# Patient Record
Sex: MALE | Race: WHITE | NOT HISPANIC OR LATINO | Employment: OTHER | URBAN - METROPOLITAN AREA
[De-identification: names, ages, dates, MRNs, and addresses within clinical notes are randomized per-mention and may not be internally consistent; named-entity substitution may affect disease eponyms.]

---

## 2017-01-12 ENCOUNTER — LAB (OUTPATIENT)
Dept: LAB | Facility: HOSPITAL | Age: 77
End: 2017-01-12
Attending: INTERNAL MEDICINE

## 2017-01-12 ENCOUNTER — TRANSCRIBE ORDERS (OUTPATIENT)
Dept: ADMINISTRATIVE | Facility: HOSPITAL | Age: 77
End: 2017-01-12

## 2017-01-12 DIAGNOSIS — N18.30 CHRONIC KIDNEY DISEASE, STAGE III (MODERATE) (HCC): Primary | ICD-10-CM

## 2017-01-12 DIAGNOSIS — N18.30 CHRONIC KIDNEY DISEASE, STAGE III (MODERATE) (HCC): ICD-10-CM

## 2017-01-12 LAB
ANION GAP SERPL CALCULATED.3IONS-SCNC: 14.3 MMOL/L
BACTERIA UR QL AUTO: ABNORMAL /HPF
BILIRUB UR QL STRIP: NEGATIVE
BUN BLD-MCNC: 21 MG/DL (ref 8–23)
BUN/CREAT SERPL: 17.5 (ref 7–25)
CALCIUM SPEC-SCNC: 9.1 MG/DL (ref 8.6–10.5)
CHLORIDE SERPL-SCNC: 98 MMOL/L (ref 98–107)
CLARITY UR: CLEAR
CO2 SERPL-SCNC: 26.7 MMOL/L (ref 22–29)
COLOR UR: YELLOW
CREAT BLD-MCNC: 1.2 MG/DL (ref 0.76–1.27)
CREAT UR-MCNC: 138.6 MG/DL
DEPRECATED RDW RBC AUTO: 43.2 FL (ref 37–54)
ERYTHROCYTE [DISTWIDTH] IN BLOOD BY AUTOMATED COUNT: 12.9 % (ref 11.5–14.5)
GFR SERPL CREATININE-BSD FRML MDRD: 59 ML/MIN/1.73
GLUCOSE BLD-MCNC: 122 MG/DL (ref 65–99)
GLUCOSE UR STRIP-MCNC: NEGATIVE MG/DL
HCT VFR BLD AUTO: 38.5 % (ref 40.4–52.2)
HGB BLD-MCNC: 12.7 G/DL (ref 13.7–17.6)
HGB UR QL STRIP.AUTO: NEGATIVE
HYALINE CASTS UR QL AUTO: ABNORMAL /LPF
KETONES UR QL STRIP: NEGATIVE
LEUKOCYTE ESTERASE UR QL STRIP.AUTO: ABNORMAL
MAGNESIUM SERPL-MCNC: 2.4 MG/DL (ref 1.6–2.4)
MCH RBC QN AUTO: 30.2 PG (ref 27–32.7)
MCHC RBC AUTO-ENTMCNC: 33 G/DL (ref 32.6–36.4)
MCV RBC AUTO: 91.7 FL (ref 79.8–96.2)
NITRITE UR QL STRIP: POSITIVE
PH UR STRIP.AUTO: <=5 [PH] (ref 5–8)
PHOSPHATE SERPL-MCNC: 2.7 MG/DL (ref 2.5–4.5)
PLATELET # BLD AUTO: 260 10*3/MM3 (ref 140–500)
PMV BLD AUTO: 10.2 FL (ref 6–12)
POTASSIUM BLD-SCNC: 4.3 MMOL/L (ref 3.5–5.2)
PROT UR QL STRIP: NEGATIVE
PROT UR-MCNC: 9 MG/DL
RBC # BLD AUTO: 4.2 10*6/MM3 (ref 4.6–6)
RBC # UR: ABNORMAL /HPF
REF LAB TEST METHOD: ABNORMAL
SODIUM BLD-SCNC: 139 MMOL/L (ref 136–145)
SP GR UR STRIP: 1.02 (ref 1–1.03)
SQUAMOUS #/AREA URNS HPF: ABNORMAL /HPF
UROBILINOGEN UR QL STRIP: ABNORMAL
WBC NRBC COR # BLD: 8.61 10*3/MM3 (ref 4.5–10.7)
WBC UR QL AUTO: ABNORMAL /HPF

## 2017-01-12 PROCEDURE — 36415 COLL VENOUS BLD VENIPUNCTURE: CPT

## 2017-01-12 PROCEDURE — 81001 URINALYSIS AUTO W/SCOPE: CPT

## 2017-01-12 PROCEDURE — 82570 ASSAY OF URINE CREATININE: CPT

## 2017-01-12 PROCEDURE — 80048 BASIC METABOLIC PNL TOTAL CA: CPT

## 2017-01-12 PROCEDURE — 84100 ASSAY OF PHOSPHORUS: CPT

## 2017-01-12 PROCEDURE — 84156 ASSAY OF PROTEIN URINE: CPT

## 2017-01-12 PROCEDURE — 85027 COMPLETE CBC AUTOMATED: CPT

## 2017-01-12 PROCEDURE — 83735 ASSAY OF MAGNESIUM: CPT

## 2017-03-22 ENCOUNTER — OFFICE VISIT (OUTPATIENT)
Dept: CARDIOLOGY | Facility: CLINIC | Age: 77
End: 2017-03-22

## 2017-03-22 VITALS
HEART RATE: 64 BPM | SYSTOLIC BLOOD PRESSURE: 140 MMHG | BODY MASS INDEX: 30.86 KG/M2 | DIASTOLIC BLOOD PRESSURE: 60 MMHG | HEIGHT: 66 IN | WEIGHT: 192 LBS

## 2017-03-22 DIAGNOSIS — Z79.4 TYPE 2 DIABETES MELLITUS WITHOUT COMPLICATION, WITH LONG-TERM CURRENT USE OF INSULIN (HCC): ICD-10-CM

## 2017-03-22 DIAGNOSIS — I51.7 LEFT VENTRICULAR HYPERTROPHY: ICD-10-CM

## 2017-03-22 DIAGNOSIS — I10 ESSENTIAL HYPERTENSION: Primary | ICD-10-CM

## 2017-03-22 DIAGNOSIS — E78.5 HYPERLIPIDEMIA, UNSPECIFIED HYPERLIPIDEMIA TYPE: ICD-10-CM

## 2017-03-22 DIAGNOSIS — E11.9 TYPE 2 DIABETES MELLITUS WITHOUT COMPLICATION, WITH LONG-TERM CURRENT USE OF INSULIN (HCC): ICD-10-CM

## 2017-03-22 DIAGNOSIS — G47.33 OBSTRUCTIVE SLEEP APNEA SYNDROME: ICD-10-CM

## 2017-03-22 PROCEDURE — 93000 ELECTROCARDIOGRAM COMPLETE: CPT | Performed by: INTERNAL MEDICINE

## 2017-03-22 PROCEDURE — 99214 OFFICE O/P EST MOD 30 MIN: CPT | Performed by: INTERNAL MEDICINE

## 2017-03-22 RX ORDER — INSULIN DEGLUDEC 200 U/ML
INJECTION, SOLUTION SUBCUTANEOUS
COMMUNITY
Start: 2017-03-03 | End: 2018-01-23 | Stop reason: ALTCHOICE

## 2017-03-22 RX ORDER — INSULIN ASPART 100 [IU]/ML
INJECTION, SOLUTION INTRAVENOUS; SUBCUTANEOUS
COMMUNITY
Start: 2016-12-19 | End: 2018-01-23 | Stop reason: ALTCHOICE

## 2017-03-22 NOTE — PROGRESS NOTES
Date of Office Visit: 2017  Encounter Provider: Tata Lee MD  Place of Service: [facility] CARDIOLOGY  Patient Name: Semaj Herrera  :1940      Patient ID:  Semaj Herrera is a 76 y.o. male is here for  followup for HTN and LVH.         History of Present Illness    I first saw the patient on 2010 for dizziness, fatigue and  bradycardia. At that time, we stopped his metoprolol and this went away.   He has a known history of hypertension and has had left ventricular  hypertrophy and diastolic dysfunction. His last echocardiogram was done  2011, showing grade 2 diastolic dysfunction, mild aortic  insufficiency, ejection fraction of 53%, normal left ventricular systolic  function, mild left atrial enlargement, aortic valve calcification without  stenosis and mild concentric left ventricular hypertrophy.      He does have severe obstructive sleep apnea. He uses a BIPAP machine and follows with Dr. Mcdermott for this. This has been stable.   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      When I saw the patient in 2013, he was having dizziness, weakness, feeling unwell,  and his blood pressure was low. We decreased his diuretic to every other day, the  triamterene/hydrochlorothiazide and he has done much better.      He sees Dr. almanza for his diabetes and Dr. Vicky Duran for his renal disease.      The patient is here today for a followup. He has no chest pain or difficulty breathing. He has had no tachycardia, dizziness or syncope.   He has cramping in his legs only at night when he is trying to sleep, but not when he walks. He worked at [employer] for three months over the winter, eight hours a day, and says that he felt great.  He overall feels like he is doing well, and is tolerating his medications well.   His blood sugars have been a bit high, but his kidney function remains  stable.   He continues to use his CPAP therapy for his obstructive sleep apnea.              Past Medical History:   Diagnosis Date   • Carcinoma of prostate    • Diabetes mellitus    • Diastolic dysfunction     GRADE II   • Difficulty breathing     during exertion   • Dyslipidemia    • Erectile dysfunction    • Fatigue    • Hyperlipidemia    • Hypertension    • Left ventricular hypertrophy    • Obstructive sleep apnea     USING CPAP   • Peptic ulcer    • Restless leg syndrome    • Sinus bradycardia    • Transient cerebral ischemia    • Type 2 diabetes mellitus          Past Surgical History:   Procedure Laterality Date   • HEMORRHOIDECTOMY     • PROSTATECTOMY  2006       Current Outpatient Prescriptions on File Prior to Visit   Medication Sig Dispense Refill   • aspirin 81 MG EC tablet Take 1 tablet by mouth daily.     • diltiaZEM CD (CARDIZEM CD) 120 MG 24 hr capsule Take 1 capsule by mouth Daily.     • Insulin Glargine (LANTUS SOLOSTAR) 100 UNIT/ML injection pen Inject  under the skin. As directed     • Insulin Glargine (TOUJEO SOLOSTAR SC) Inject  under the skin.     • Insulin Pen Needle (B-D UF III MINI PEN NEEDLES) 31G X 5 MM misc      • Liraglutide (VICTOZA) 18 MG/3ML solution pen-injector Inject under the skin.     • modafinil (PROVIGIL) 200 MG tablet Take 200 mg by mouth daily.     • pravastatin (PRAVACHOL) 40 MG tablet Take 40 mg by mouth daily.     • rOPINIRole (REQUIP) 0.5 MG tablet Take 1 tablet by mouth 2 (two) times a day.     • triamterene-hydrochlorothiazide (DYAZIDE) 37.5-25 MG per capsule Take 1 capsule by mouth daily.     • valsartan (DIOVAN) 320 MG tablet TAKE 1 TABLET EVERY DAY 90 tablet 2   • [DISCONTINUED] CARTIA  MG 24 hr capsule TAKE 1 CAPSULE EVERY DAY 90 capsule 2   • [DISCONTINUED] triamterene-hydrochlorothiazide (MAXZIDE-25) 37.5-25 MG per tablet TAKE 1 TABLET EVERY DAY 90 tablet 3     No current facility-administered medications on file prior to visit.        Social History  "    Social History   • Marital status:      Spouse name: N/A   • Number of children: N/A   • Years of education: N/A     Occupational History   • Not on file.     Social History Main Topics   • Smoking status: Former Smoker   • Smokeless tobacco: Not on file   • Alcohol use No      Comment: caffeine use   • Drug use: Not on file   • Sexual activity: Not on file     Other Topics Concern   • Not on file     Social History Narrative           Review of Systems   Constitution: Negative.   HENT: Positive for hearing loss. Negative for congestion and headaches.    Eyes: Negative for vision loss in left eye and vision loss in right eye.   Respiratory: Negative.  Negative for cough, hemoptysis, shortness of breath, sleep disturbances due to breathing, snoring, sputum production and wheezing.    Endocrine: Negative.    Hematologic/Lymphatic: Negative.    Skin: Negative for poor wound healing and rash.   Musculoskeletal: Positive for joint pain. Negative for falls, gout, muscle cramps and myalgias.   Gastrointestinal: Positive for diarrhea. Negative for abdominal pain, dysphagia, hematemesis, melena, nausea and vomiting.   Neurological: Negative for excessive daytime sleepiness, dizziness, light-headedness, loss of balance, seizures and vertigo.   Psychiatric/Behavioral: Negative for depression and substance abuse. The patient is not nervous/anxious.        Procedures    ECG 12 Lead  Date/Time: 3/22/2017 12:04 PM  Performed by: CHRISTIANO MCDOWELL  Authorized by: CHRISTIANO MCDOWELL   Comparison: compared with previous ECG   Similar to previous ECG  Rhythm: sinus rhythm  QRS axis: left  Clinical impression: non-specific ECG               Objective:      Vitals:    03/22/17 1117   BP: 140/60   BP Location: Right arm   Patient Position: Sitting   Pulse: 64   Weight: 192 lb (87.1 kg)   Height: 66\" (167.6 cm)     Body mass index is 30.99 kg/(m^2).    Physical Exam   Constitutional: He is oriented to person, place, and " time. He appears well-developed and well-nourished. No distress.   HENT:   Head: Normocephalic and atraumatic.   Eyes: Conjunctivae are normal. No scleral icterus.   Neck: Neck supple. No JVD present. Carotid bruit is not present. No thyromegaly present.   Cardiovascular: Normal rate, regular rhythm, S1 normal, S2 normal, normal heart sounds and intact distal pulses.   No extrasystoles are present. PMI is not displaced.  Exam reveals no gallop.    No murmur heard.  Pulses:       Carotid pulses are 2+ on the right side, and 2+ on the left side.       Radial pulses are 2+ on the right side, and 2+ on the left side.        Dorsalis pedis pulses are 2+ on the right side, and 2+ on the left side.        Posterior tibial pulses are 2+ on the right side, and 2+ on the left side.   Pulmonary/Chest: Effort normal and breath sounds normal. No respiratory distress. He has no wheezes. He has no rhonchi. He has no rales. He exhibits no tenderness.   Abdominal: Soft. Bowel sounds are normal. He exhibits no distension, no abdominal bruit and no mass. There is no tenderness.   Musculoskeletal: He exhibits no edema or deformity.   Lymphadenopathy:     He has no cervical adenopathy.   Neurological: He is alert and oriented to person, place, and time. No cranial nerve deficit.   Skin: Skin is warm and dry. No rash noted. He is not diaphoretic. No cyanosis. No pallor. Nails show no clubbing.   Psychiatric: He has a normal mood and affect. Judgment normal.   Vitals reviewed.      Lab Review:       Assessment:      Diagnosis Plan   1. Essential hypertension     2. Left ventricular hypertrophy     3. Hyperlipidemia, unspecified hyperlipidemia type     4. Type 2 diabetes mellitus without complication, with long-term current use of insulin     5. Obstructive sleep apnea syndrome               1. Hypertensive heart disease. He has mild left ventricular hypertrophy and  grade II diastolic dysfunction which has been stable. No medication  changes at this time.  2. Hyperlipidemia. Per Dr. Guardado, stable.   3. Diabetes mellitus type 2 with neuropathy. Per Dr. Lu.   4. Transient ischemic attack, no further issues.   5. Obstructive sleep apnea, on CPAP. Follows with Dr. Mcdermott and has been  well treated.   6. Restless leg syndrome.   7. Prostate cancer, status post prostatectomy, radiation therapy and hormone  therapy.   8. Erectile dysfunction.   9. Aortic valve sclerosis and aortic insufficiency which is mild.      Set up echo to evaluate diastolic function.      Plan:       See back in 1 year.

## 2017-03-29 RX ORDER — DILTIAZEM HYDROCHLORIDE 120 MG/1
CAPSULE, EXTENDED RELEASE ORAL
Qty: 90 CAPSULE | Refills: 2 | Status: SHIPPED | OUTPATIENT
Start: 2017-03-29 | End: 2018-03-11 | Stop reason: SDUPTHER

## 2017-03-29 RX ORDER — VALSARTAN 320 MG/1
TABLET ORAL
Qty: 90 TABLET | Refills: 2 | Status: SHIPPED | OUTPATIENT
Start: 2017-03-29 | End: 2018-03-11 | Stop reason: SDUPTHER

## 2017-04-03 ENCOUNTER — HOSPITAL ENCOUNTER (OUTPATIENT)
Dept: CARDIOLOGY | Facility: HOSPITAL | Age: 77
Discharge: HOME | End: 2017-04-03
Attending: INTERNAL MEDICINE | Admitting: INTERNAL MEDICINE

## 2017-04-03 VITALS
SYSTOLIC BLOOD PRESSURE: 156 MMHG | HEART RATE: 55 BPM | WEIGHT: 192 LBS | HEIGHT: 66 IN | OXYGEN SATURATION: 96 % | BODY MASS INDEX: 30.86 KG/M2 | DIASTOLIC BLOOD PRESSURE: 82 MMHG

## 2017-04-03 DIAGNOSIS — E11.9 TYPE 2 DIABETES MELLITUS WITHOUT COMPLICATION, WITH LONG-TERM CURRENT USE OF INSULIN (HCC): ICD-10-CM

## 2017-04-03 DIAGNOSIS — G47.33 OBSTRUCTIVE SLEEP APNEA SYNDROME: ICD-10-CM

## 2017-04-03 DIAGNOSIS — I51.7 LEFT VENTRICULAR HYPERTROPHY: ICD-10-CM

## 2017-04-03 DIAGNOSIS — Z79.4 TYPE 2 DIABETES MELLITUS WITHOUT COMPLICATION, WITH LONG-TERM CURRENT USE OF INSULIN (HCC): ICD-10-CM

## 2017-04-03 DIAGNOSIS — I10 ESSENTIAL HYPERTENSION: ICD-10-CM

## 2017-04-03 LAB
ASCENDING AORTA: 3.2 CM
BH CV ECHO MEAS - ACS: 1.6 CM
BH CV ECHO MEAS - AI DEC SLOPE: 111.4 CM/SEC^2
BH CV ECHO MEAS - AI MAX PG: 32.3 MMHG
BH CV ECHO MEAS - AI MAX VEL: 284 CM/SEC
BH CV ECHO MEAS - AI P1/2T: 747 MSEC
BH CV ECHO MEAS - AO MAX PG (FULL): 11.6 MMHG
BH CV ECHO MEAS - AO MAX PG: 16.6 MMHG
BH CV ECHO MEAS - AO MEAN PG (FULL): 6.3 MMHG
BH CV ECHO MEAS - AO MEAN PG: 8.9 MMHG
BH CV ECHO MEAS - AO ROOT AREA (BSA CORRECTED): 1.5
BH CV ECHO MEAS - AO ROOT AREA: 8.4 CM^2
BH CV ECHO MEAS - AO ROOT DIAM: 3.3 CM
BH CV ECHO MEAS - AO V2 MAX: 203.8 CM/SEC
BH CV ECHO MEAS - AO V2 MEAN: 131.2 CM/SEC
BH CV ECHO MEAS - AO V2 VTI: 45.7 CM
BH CV ECHO MEAS - AVA(I,A): 1.7 CM^2
BH CV ECHO MEAS - AVA(I,D): 1.7 CM^2
BH CV ECHO MEAS - AVA(V,A): 1.6 CM^2
BH CV ECHO MEAS - AVA(V,D): 1.6 CM^2
BH CV ECHO MEAS - BSA(HAYCOCK): 2.3 M^2
BH CV ECHO MEAS - BSA: 2.2 M^2
BH CV ECHO MEAS - BZI_BMI: 32.1 KILOGRAMS/M^2
BH CV ECHO MEAS - BZI_METRIC_HEIGHT: 180.3 CM
BH CV ECHO MEAS - BZI_METRIC_WEIGHT: 104.3 KG
BH CV ECHO MEAS - CONTRAST EF (2CH): 65.6 ML/M^2
BH CV ECHO MEAS - CONTRAST EF 4CH: 62 ML/M^2
BH CV ECHO MEAS - EDV(MOD-SP2): 131 ML
BH CV ECHO MEAS - EDV(MOD-SP4): 121 ML
BH CV ECHO MEAS - EDV(TEICH): 84.6 ML
BH CV ECHO MEAS - EF(CUBED): 75.3 %
BH CV ECHO MEAS - EF(MOD-SP2): 65.6 %
BH CV ECHO MEAS - EF(MOD-SP4): 62 %
BH CV ECHO MEAS - EF(TEICH): 67.5 %
BH CV ECHO MEAS - ESV(MOD-SP2): 45 ML
BH CV ECHO MEAS - ESV(MOD-SP4): 46 ML
BH CV ECHO MEAS - ESV(TEICH): 27.5 ML
BH CV ECHO MEAS - FS: 37.2 %
BH CV ECHO MEAS - IVS/LVPW: 1
BH CV ECHO MEAS - IVSD: 1.2 CM
BH CV ECHO MEAS - LAT PEAK E' VEL: 9 CM/SEC
BH CV ECHO MEAS - LV DIASTOLIC VOL/BSA (35-75): 54.1 ML/M^2
BH CV ECHO MEAS - LV MASS(C)D: 191.7 GRAMS
BH CV ECHO MEAS - LV MASS(C)DI: 85.7 GRAMS/M^2
BH CV ECHO MEAS - LV MAX PG: 5 MMHG
BH CV ECHO MEAS - LV MEAN PG: 2.6 MMHG
BH CV ECHO MEAS - LV SYSTOLIC VOL/BSA (12-30): 20.6 ML/M^2
BH CV ECHO MEAS - LV V1 MAX: 111.6 CM/SEC
BH CV ECHO MEAS - LV V1 MEAN: 74.5 CM/SEC
BH CV ECHO MEAS - LV V1 VTI: 25.7 CM
BH CV ECHO MEAS - LVIDD: 4.3 CM
BH CV ECHO MEAS - LVIDS: 2.7 CM
BH CV ECHO MEAS - LVLD AP2: 8.7 CM
BH CV ECHO MEAS - LVLD AP4: 8 CM
BH CV ECHO MEAS - LVLS AP2: 6.6 CM
BH CV ECHO MEAS - LVLS AP4: 6.9 CM
BH CV ECHO MEAS - LVOT AREA (M): 3.1 CM^2
BH CV ECHO MEAS - LVOT AREA: 3 CM^2
BH CV ECHO MEAS - LVOT DIAM: 2 CM
BH CV ECHO MEAS - LVPWD: 1.2 CM
BH CV ECHO MEAS - MED PEAK E' VEL: 7 CM/SEC
BH CV ECHO MEAS - MR MAX PG: 23.2 MMHG
BH CV ECHO MEAS - MR MAX VEL: 240.6 CM/SEC
BH CV ECHO MEAS - MV A DUR: 0.14 SEC
BH CV ECHO MEAS - MV A MAX VEL: 96.1 CM/SEC
BH CV ECHO MEAS - MV DEC SLOPE: 668.1 CM/SEC^2
BH CV ECHO MEAS - MV DEC TIME: 0.19 SEC
BH CV ECHO MEAS - MV E MAX VEL: 99.4 CM/SEC
BH CV ECHO MEAS - MV E/A: 1
BH CV ECHO MEAS - MV MAX PG: 3.6 MMHG
BH CV ECHO MEAS - MV MEAN PG: 1.2 MMHG
BH CV ECHO MEAS - MV P1/2T MAX VEL: 102.8 CM/SEC
BH CV ECHO MEAS - MV P1/2T: 45.1 MSEC
BH CV ECHO MEAS - MV V2 MAX: 94.7 CM/SEC
BH CV ECHO MEAS - MV V2 MEAN: 48.2 CM/SEC
BH CV ECHO MEAS - MV V2 VTI: 38 CM
BH CV ECHO MEAS - MVA P1/2T LCG: 2.1 CM^2
BH CV ECHO MEAS - MVA(P1/2T): 4.9 CM^2
BH CV ECHO MEAS - MVA(VTI): 2 CM^2
BH CV ECHO MEAS - PA ACC TIME: 0.12 SEC
BH CV ECHO MEAS - PA MAX PG (FULL): 2.9 MMHG
BH CV ECHO MEAS - PA MAX PG: 4 MMHG
BH CV ECHO MEAS - PA PR(ACCEL): 25.1 MMHG
BH CV ECHO MEAS - PA V2 MAX: 100.4 CM/SEC
BH CV ECHO MEAS - PULM A REVS DUR: 0.12 SEC
BH CV ECHO MEAS - PULM A REVS VEL: 35.5 CM/SEC
BH CV ECHO MEAS - PULM DIAS VEL: 39.4 CM/SEC
BH CV ECHO MEAS - PULM S/D: 1.2
BH CV ECHO MEAS - PULM SYS VEL: 48.3 CM/SEC
BH CV ECHO MEAS - PVA(V,A): 1.8 CM^2
BH CV ECHO MEAS - PVA(V,D): 1.8 CM^2
BH CV ECHO MEAS - QP/QS: 0.43
BH CV ECHO MEAS - RV MAX PG: 1.1 MMHG
BH CV ECHO MEAS - RV MEAN PG: 0.49 MMHG
BH CV ECHO MEAS - RV V1 MAX: 53.4 CM/SEC
BH CV ECHO MEAS - RV V1 MEAN: 32.4 CM/SEC
BH CV ECHO MEAS - RV V1 VTI: 9.9 CM
BH CV ECHO MEAS - RVOT AREA: 3.4 CM^2
BH CV ECHO MEAS - RVOT DIAM: 2.1 CM
BH CV ECHO MEAS - SI(AO): 171.9 ML/M^2
BH CV ECHO MEAS - SI(CUBED): 27.4 ML/M^2
BH CV ECHO MEAS - SI(LVOT): 34.4 ML/M^2
BH CV ECHO MEAS - SI(MOD-SP2): 38.4 ML/M^2
BH CV ECHO MEAS - SI(MOD-SP4): 33.5 ML/M^2
BH CV ECHO MEAS - SI(TEICH): 25.5 ML/M^2
BH CV ECHO MEAS - SV(AO): 384.8 ML
BH CV ECHO MEAS - SV(CUBED): 61.2 ML
BH CV ECHO MEAS - SV(LVOT): 77 ML
BH CV ECHO MEAS - SV(MOD-SP2): 86 ML
BH CV ECHO MEAS - SV(MOD-SP4): 75 ML
BH CV ECHO MEAS - SV(RVOT): 33.3 ML
BH CV ECHO MEAS - SV(TEICH): 57.1 ML
BH CV ECHO MEAS - TAPSE (>1.6): 2.2 CM2
BH CV ECHO MEAS - TR MAX VEL: 188.2 CM/SEC
BH CV XLRA - RV BASE: 2.9 CM
BH CV XLRA - TDI S': 15 CM/SEC
E/E' RATIO: 13
LEFT ATRIUM VOLUME INDEX: 31 ML/M2
LV EF 2D ECHO EST: 62 %
SINUS: 2.9 CM
STJ: 2.8 CM

## 2017-04-03 PROCEDURE — 93306 TTE W/DOPPLER COMPLETE: CPT | Performed by: INTERNAL MEDICINE

## 2017-04-03 PROCEDURE — 25010000002 PERFLUTREN (DEFINITY) 8.476 MG IN SODIUM CHLORIDE 10 ML INJECTION: Performed by: INTERNAL MEDICINE

## 2017-04-03 PROCEDURE — C8929 TTE W OR WO FOL WCON,DOPPLER: HCPCS

## 2017-04-03 RX ADMIN — PERFLUTREN 1.5 ML: 6.52 INJECTION, SUSPENSION INTRAVENOUS at 10:44

## 2017-07-05 ENCOUNTER — HOSPITAL ENCOUNTER (OUTPATIENT)
Dept: SLEEP MEDICINE | Facility: HOSPITAL | Age: 77
Discharge: HOME | End: 2017-07-05
Attending: INTERNAL MEDICINE

## 2017-07-05 PROCEDURE — 99213 OFFICE O/P EST LOW 20 MIN: CPT | Performed by: INTERNAL MEDICINE

## 2017-07-05 NOTE — PROGRESS NOTES
Follow Up [facility] Note       Patient Care Team:  Axel Guardado MD as PCP - General  Bob Mcdermott MD as Consulting Physician (Sleep Medicine)    Chief Complaint:  RONNY     Interval History:   The patient was last seen by me in July 2016.  He is stable without new complaints.  He goes to bed at 10:30 PM and awakens at 6 AM.  He awakens 3 times for the restroom.  [place] Sleepiness Scale is normal at 5.    Review of Systems:  Recorded on the Sleep Questionnaire.  Unremarkable .    Social History:  He does not smoke cigarettes.  No alcohol.  One or 2 caffeinated drinks a day.    Allergies:  No known medical allergies.     Medication Review:  His list was reviewed.  He uses modafinil 200 mg by mouth every morning and Roperinole 0.5 mg 2 daily.    Vital Signs:  Height 64.5 inches and weight 196 and he is obese with a body mass index of 33.    Physical Exam:    Constitutional:  Well developed white male and appears in no apparent distress.  Awake & oriented times 3.  Normal mood with normal recent and remote memory and normal judgement.  Eyes:  Conjunctivae normal.  Oropharynx:  moist mucous membranes without exudate and a large tongue and normal uvula and narrow posterior pharyngeal opening.      Results Review:  DME is [employer] and he uses nasal pillows.  Downloads between January 1-June 29, 2017 compliances 99% and average usage is 6 hours and 41 minutes and average AHI is mildly abnormal 7.5 without leak and he uses BiPAP 13/10.       Impression:   Obstructive sleep apnea nearly adequately treated with BiPAP with good compliance and usage and no complaints of hypersomnolence.  Persistent hypersomnolence treated with modafinil 200 mg by mouth every morning.      Plan:  Good sleep hygiene measures should be maintained.  Weight loss would be beneficial in this patient who is obese by BMI.    The patient will continue his BiPAP.  However, pressures will be changed to 14/11.  He will continue  modafinil.    The patient will follow up in 9 months to one year.      Bob Mcdermott MD  07/05/17  8:51 AM

## 2017-08-10 ENCOUNTER — TELEPHONE (OUTPATIENT)
Dept: PULMONOLOGY | Facility: HOSPITAL | Age: 77
End: 2017-08-10

## 2017-08-28 ENCOUNTER — TRANSCRIBE ORDERS (OUTPATIENT)
Dept: ADMINISTRATIVE | Facility: HOSPITAL | Age: 77
End: 2017-08-28

## 2017-08-28 ENCOUNTER — LAB (OUTPATIENT)
Dept: LAB | Facility: HOSPITAL | Age: 77
End: 2017-08-28
Attending: INTERNAL MEDICINE

## 2017-08-28 DIAGNOSIS — N18.30 CHRONIC KIDNEY DISEASE, STAGE III (MODERATE) (HCC): Primary | ICD-10-CM

## 2017-08-28 DIAGNOSIS — N18.30 CHRONIC KIDNEY DISEASE, STAGE III (MODERATE) (HCC): ICD-10-CM

## 2017-08-28 DIAGNOSIS — E55.9 UNSPECIFIED VITAMIN D DEFICIENCY: ICD-10-CM

## 2017-08-28 DIAGNOSIS — E21.1 HYPERPARATHYROIDISM DUE TO 1,25(0H)2D3 (HCC): ICD-10-CM

## 2017-08-28 LAB
25(OH)D3 SERPL-MCNC: 21.5 NG/ML (ref 30–100)
ANION GAP SERPL CALCULATED.3IONS-SCNC: 12.5 MMOL/L
BACTERIA UR QL AUTO: ABNORMAL /HPF
BILIRUB UR QL STRIP: NEGATIVE
BUN BLD-MCNC: 25 MG/DL (ref 8–23)
BUN/CREAT SERPL: 20.5 (ref 7–25)
CALCIUM SPEC-SCNC: 9.8 MG/DL (ref 8.6–10.5)
CHLORIDE SERPL-SCNC: 105 MMOL/L (ref 98–107)
CLARITY UR: CLEAR
CO2 SERPL-SCNC: 24.5 MMOL/L (ref 22–29)
COLOR UR: YELLOW
CREAT BLD-MCNC: 1.22 MG/DL (ref 0.76–1.27)
CREAT UR-MCNC: 141.8 MG/DL
DEPRECATED RDW RBC AUTO: 44.6 FL (ref 37–54)
ERYTHROCYTE [DISTWIDTH] IN BLOOD BY AUTOMATED COUNT: 13.6 % (ref 11.5–14.5)
GFR SERPL CREATININE-BSD FRML MDRD: 58 ML/MIN/1.73
GLUCOSE BLD-MCNC: 92 MG/DL (ref 65–99)
GLUCOSE UR STRIP-MCNC: NEGATIVE MG/DL
HCT VFR BLD AUTO: 40.2 % (ref 40.4–52.2)
HGB BLD-MCNC: 13.5 G/DL (ref 13.7–17.6)
HGB UR QL STRIP.AUTO: NEGATIVE
HYALINE CASTS UR QL AUTO: ABNORMAL /LPF
KETONES UR QL STRIP: NEGATIVE
LEUKOCYTE ESTERASE UR QL STRIP.AUTO: ABNORMAL
MAGNESIUM SERPL-MCNC: 2.4 MG/DL (ref 1.6–2.4)
MCH RBC QN AUTO: 30.4 PG (ref 27–32.7)
MCHC RBC AUTO-ENTMCNC: 33.6 G/DL (ref 32.6–36.4)
MCV RBC AUTO: 90.5 FL (ref 79.8–96.2)
NITRITE UR QL STRIP: NEGATIVE
PH UR STRIP.AUTO: 6 [PH] (ref 5–8)
PHOSPHATE SERPL-MCNC: 3 MG/DL (ref 2.5–4.5)
PLATELET # BLD AUTO: 229 10*3/MM3 (ref 140–500)
PMV BLD AUTO: 10.6 FL (ref 6–12)
POTASSIUM BLD-SCNC: 4.3 MMOL/L (ref 3.5–5.2)
PROT UR QL STRIP: NEGATIVE
PROT UR-MCNC: 14 MG/DL
PTH-INTACT SERPL-MCNC: 31.5 PG/ML (ref 15–65)
RBC # BLD AUTO: 4.44 10*6/MM3 (ref 4.6–6)
RBC # UR: ABNORMAL /HPF
REF LAB TEST METHOD: ABNORMAL
SODIUM BLD-SCNC: 142 MMOL/L (ref 136–145)
SP GR UR STRIP: 1.02 (ref 1–1.03)
SQUAMOUS #/AREA URNS HPF: ABNORMAL /HPF
UROBILINOGEN UR QL STRIP: ABNORMAL
WBC NRBC COR # BLD: 7.17 10*3/MM3 (ref 4.5–10.7)
WBC UR QL AUTO: ABNORMAL /HPF

## 2017-08-28 PROCEDURE — 85027 COMPLETE CBC AUTOMATED: CPT

## 2017-08-28 PROCEDURE — 83735 ASSAY OF MAGNESIUM: CPT

## 2017-08-28 PROCEDURE — 82306 VITAMIN D 25 HYDROXY: CPT

## 2017-08-28 PROCEDURE — 81001 URINALYSIS AUTO W/SCOPE: CPT

## 2017-08-28 PROCEDURE — 84156 ASSAY OF PROTEIN URINE: CPT

## 2017-08-28 PROCEDURE — 84100 ASSAY OF PHOSPHORUS: CPT

## 2017-08-28 PROCEDURE — 82570 ASSAY OF URINE CREATININE: CPT

## 2017-08-28 PROCEDURE — 83970 ASSAY OF PARATHORMONE: CPT

## 2017-08-28 PROCEDURE — 36415 COLL VENOUS BLD VENIPUNCTURE: CPT

## 2017-08-28 PROCEDURE — 80048 BASIC METABOLIC PNL TOTAL CA: CPT

## 2018-01-23 ENCOUNTER — OFFICE VISIT (OUTPATIENT)
Dept: FAMILY MEDICINE CLINIC | Facility: CLINIC | Age: 78
End: 2018-01-23

## 2018-01-23 VITALS
BODY MASS INDEX: 32.78 KG/M2 | RESPIRATION RATE: 20 BRPM | WEIGHT: 204 LBS | OXYGEN SATURATION: 98 % | TEMPERATURE: 98 F | HEIGHT: 66 IN | HEART RATE: 82 BPM | DIASTOLIC BLOOD PRESSURE: 60 MMHG | SYSTOLIC BLOOD PRESSURE: 130 MMHG

## 2018-01-23 DIAGNOSIS — Z87.891 PERSONAL HISTORY OF NICOTINE DEPENDENCE: ICD-10-CM

## 2018-01-23 DIAGNOSIS — R49.0 HOARSENESS: ICD-10-CM

## 2018-01-23 DIAGNOSIS — R06.00 DYSPNEA, UNSPECIFIED TYPE: Primary | ICD-10-CM

## 2018-01-23 PROCEDURE — 71046 X-RAY EXAM CHEST 2 VIEWS: CPT | Performed by: FAMILY MEDICINE

## 2018-01-23 PROCEDURE — 99213 OFFICE O/P EST LOW 20 MIN: CPT | Performed by: FAMILY MEDICINE

## 2018-01-23 RX ORDER — LEVOTHYROXINE SODIUM 0.03 MG/1
37.5 TABLET ORAL DAILY
COMMUNITY
Start: 2018-01-19 | End: 2018-03-28

## 2018-01-23 RX ORDER — ICOSAPENT ETHYL 1000 MG/1
1 CAPSULE ORAL 4 TIMES DAILY
COMMUNITY
Start: 2017-11-22 | End: 2018-07-09

## 2018-01-23 NOTE — PROGRESS NOTES
SUBJECTIVE:  The patient is [] a 77-year-old white male who complains of shortness of breath tries to go to sleep.  The patient does not get these symptoms with exertion.  This been going on rollover month.  He's also had intermittent hoarseness 2.  His smoking history is from age 19 to about 59.  He states has not smoked in about 20 years.  Has a clear cough but no hemoptysis.  No chest pain.  He is a diabetic and sees an endocrinologist for this problem.  No weight loss.  He has sleep apnea and uses a CPAP.    PAST MEDICAL HISTORY:  Reviewed.    REVIEW OF SYSTEMS:  Please see above; 14 point ROS otherwise negative.      OBJECTIVE: Vitals signs are reviewed and are stable.    HEENT: PERRLA.  []Throat and oral pharynx clear  Neck:  Supple.  []No masses or bruits  Lungs:  Clear.  No rales rhonchi or wheezes.  Heart:  Regular rate and rhythm.  []  Abdomen:   Soft, nontender.  []  Extremities:  No cyanosis, clubbing or edema.  []    Two-view chest x-ray is done here and interpreted by me.  No old x-rays for comparison.  Indication hoarseness and dyspnea.  Right hilar markings and right lower base markings    ASSESSMENT:    []Nocturnal dyspnea  Hoarseness  History of tobacco abuse    PLAN:  []CT scan is ordered.  The patient will be referred to ENT.

## 2018-01-25 ENCOUNTER — TELEPHONE (OUTPATIENT)
Dept: FAMILY MEDICINE CLINIC | Facility: CLINIC | Age: 78
End: 2018-01-25

## 2018-01-25 NOTE — TELEPHONE ENCOUNTER
Pt informed of X-Ray report Dr Montes said showed Arthritis and needs to F/U with [facility] on Tuesday so pt made an appt.

## 2018-01-30 ENCOUNTER — OFFICE VISIT (OUTPATIENT)
Dept: FAMILY MEDICINE CLINIC | Facility: CLINIC | Age: 78
End: 2018-01-30

## 2018-01-30 VITALS
OXYGEN SATURATION: 98 % | TEMPERATURE: 98 F | HEIGHT: 66 IN | HEART RATE: 54 BPM | WEIGHT: 202 LBS | BODY MASS INDEX: 32.47 KG/M2 | DIASTOLIC BLOOD PRESSURE: 64 MMHG | SYSTOLIC BLOOD PRESSURE: 136 MMHG

## 2018-01-30 DIAGNOSIS — E03.9 HYPOTHYROIDISM, UNSPECIFIED TYPE: ICD-10-CM

## 2018-01-30 DIAGNOSIS — R49.0 HOARSENESS: Primary | ICD-10-CM

## 2018-01-30 DIAGNOSIS — Z87.891 HISTORY OF TOBACCO ABUSE: ICD-10-CM

## 2018-01-30 PROCEDURE — 99213 OFFICE O/P EST LOW 20 MIN: CPT | Performed by: FAMILY MEDICINE

## 2018-01-30 RX ORDER — MELATONIN
2000 DAILY
COMMUNITY

## 2018-01-30 RX ORDER — DILTIAZEM HYDROCHLORIDE 120 MG/1
120 CAPSULE, EXTENDED RELEASE ORAL DAILY
COMMUNITY
End: 2018-06-11

## 2018-02-12 RX ORDER — MODAFINIL 200 MG/1
TABLET ORAL
Qty: 90 TABLET | Refills: 1 | OUTPATIENT
Start: 2018-02-12 | End: 2018-04-03

## 2018-03-02 ENCOUNTER — TELEPHONE (OUTPATIENT)
Dept: FAMILY MEDICINE CLINIC | Facility: CLINIC | Age: 78
End: 2018-03-02

## 2018-03-06 ENCOUNTER — TELEPHONE (OUTPATIENT)
Dept: FAMILY MEDICINE CLINIC | Facility: CLINIC | Age: 78
End: 2018-03-06

## 2018-03-06 NOTE — TELEPHONE ENCOUNTER
PT SAID HE WAS SUPPOSE TO BE REFERRED TO HAVE EITHER A CT OR AN MRI ON HIS CHEST DUE TO SHORTNESS OF BREATH WHEN HE LAYS DOWN AT NIGHT. PT HASN'T HEARD ANYTHING ABOUT IT SINCE HIS APPOINTMENT WHEN IT WAS DISCUSSED.  HE ALSO SAID HE WAS SUPPOSE TO HAVE AN UPPER SCOPE AND HASN'T HEARD ANYTHING ABOUT THAT EITHER  PT WANTS DR QUINONEZ TO CALL HIM ABOUT ALL OF THIS AND TO DISCUSS OTHER MATTERS (DIDN'T SPECIFY REASONS)

## 2018-03-07 NOTE — TELEPHONE ENCOUNTER
Patient wants to speak with Dr. Montes.  These tests had been previously advised by Dr. Montes.  I'm not sure why they weren't scheduled.

## 2018-03-08 DIAGNOSIS — R04.2 HEMOPTYSIS: Primary | ICD-10-CM

## 2018-03-08 DIAGNOSIS — R06.02 SHORTNESS OF BREATH: ICD-10-CM

## 2018-03-12 ENCOUNTER — HOSPITAL ENCOUNTER (OUTPATIENT)
Dept: CT IMAGING | Facility: HOSPITAL | Age: 78
Discharge: HOME | End: 2018-03-12
Admitting: FAMILY MEDICINE

## 2018-03-12 DIAGNOSIS — R04.2 HEMOPTYSIS: ICD-10-CM

## 2018-03-12 DIAGNOSIS — R06.02 SHORTNESS OF BREATH: ICD-10-CM

## 2018-03-12 PROCEDURE — 71250 CT THORAX DX C-: CPT

## 2018-03-12 RX ORDER — TRIAMTERENE AND HYDROCHLOROTHIAZIDE 37.5; 25 MG/1; MG/1
TABLET ORAL
Qty: 90 TABLET | Refills: 3 | Status: SHIPPED | OUTPATIENT
Start: 2018-03-12 | End: 2018-04-03

## 2018-03-12 RX ORDER — DILTIAZEM HYDROCHLORIDE 120 MG/1
CAPSULE, EXTENDED RELEASE ORAL
Qty: 90 CAPSULE | Refills: 0 | Status: SHIPPED | OUTPATIENT
Start: 2018-03-12 | End: 2018-04-03

## 2018-03-12 RX ORDER — VALSARTAN 320 MG/1
TABLET ORAL
Qty: 90 TABLET | Refills: 0 | Status: SHIPPED | OUTPATIENT
Start: 2018-03-12 | End: 2018-04-03

## 2018-03-13 DIAGNOSIS — C79.51 SECONDARY MALIGNANT NEOPLASM OF BONE (HCC): ICD-10-CM

## 2018-03-13 DIAGNOSIS — R93.89 ABNORMAL CT SCAN: Primary | ICD-10-CM

## 2018-03-13 DIAGNOSIS — I10 ESSENTIAL HYPERTENSION: ICD-10-CM

## 2018-03-13 DIAGNOSIS — C79.9 MULTIPLE LESIONS OF METASTATIC MALIGNANCY (HCC): Primary | ICD-10-CM

## 2018-03-14 ENCOUNTER — OFFICE VISIT (OUTPATIENT)
Dept: FAMILY MEDICINE CLINIC | Facility: CLINIC | Age: 78
End: 2018-03-14

## 2018-03-14 VITALS
DIASTOLIC BLOOD PRESSURE: 62 MMHG | HEART RATE: 64 BPM | BODY MASS INDEX: 32.78 KG/M2 | HEIGHT: 66 IN | RESPIRATION RATE: 20 BRPM | OXYGEN SATURATION: 98 % | TEMPERATURE: 98.1 F | WEIGHT: 204 LBS | SYSTOLIC BLOOD PRESSURE: 140 MMHG

## 2018-03-14 DIAGNOSIS — R79.89 ELEVATED TSH: Primary | ICD-10-CM

## 2018-03-14 DIAGNOSIS — Z87.891 HISTORY OF TOBACCO ABUSE: Primary | ICD-10-CM

## 2018-03-14 DIAGNOSIS — C79.51 SECONDARY MALIGNANT NEOPLASM OF BONE (HCC): ICD-10-CM

## 2018-03-14 DIAGNOSIS — Z85.46 HISTORY OF PROSTATE CANCER: ICD-10-CM

## 2018-03-14 DIAGNOSIS — C61 PRIMARY PROSTATE CANCER WITH METASTASIS FROM PROSTATE TO OTHER SITE (HCC): Primary | ICD-10-CM

## 2018-03-14 DIAGNOSIS — R93.89 ABNORMAL CT SCAN: ICD-10-CM

## 2018-03-14 LAB
ALBUMIN SERPL-MCNC: 4.4 G/DL (ref 3.5–5.2)
ALBUMIN/GLOB SERPL: 1.3 G/DL
ALP SERPL-CCNC: 322 U/L (ref 39–117)
ALT SERPL W P-5'-P-CCNC: 18 U/L (ref 1–41)
AMORPH URATE CRY URNS QL MICRO: ABNORMAL /HPF
ANION GAP SERPL CALCULATED.3IONS-SCNC: 12.8 MMOL/L
AST SERPL-CCNC: 14 U/L (ref 1–40)
BACTERIA UR QL AUTO: ABNORMAL /HPF
BASOPHILS # BLD AUTO: 0.02 10*3/MM3 (ref 0–0.2)
BASOPHILS NFR BLD AUTO: 0.2 % (ref 0–1.5)
BILIRUB SERPL-MCNC: 0.4 MG/DL (ref 0.1–1.2)
BILIRUB UR QL STRIP: NEGATIVE
BUN BLD-MCNC: 21 MG/DL (ref 8–23)
BUN/CREAT SERPL: 16.8 (ref 7–25)
CALCIUM SPEC-SCNC: 9.7 MG/DL (ref 8.6–10.5)
CHLORIDE SERPL-SCNC: 102 MMOL/L (ref 98–107)
CLARITY UR: CLEAR
CO2 SERPL-SCNC: 26.2 MMOL/L (ref 22–29)
COLOR UR: YELLOW
CREAT BLD-MCNC: 1.25 MG/DL (ref 0.76–1.27)
DEPRECATED RDW RBC AUTO: 46.4 FL (ref 37–54)
DEVELOPER EXPIRATION DATE: NORMAL
DEVELOPER LOT NUMBER: NORMAL
EOSINOPHIL # BLD AUTO: 0.14 10*3/MM3 (ref 0–0.7)
EOSINOPHIL NFR BLD AUTO: 1.5 % (ref 0.3–6.2)
ERYTHROCYTE [DISTWIDTH] IN BLOOD BY AUTOMATED COUNT: 14 % (ref 11.5–14.5)
EXPIRATION DATE: NORMAL
FECAL OCCULT BLOOD SCREEN, POC: NEGATIVE
GFR SERPL CREATININE-BSD FRML MDRD: 56 ML/MIN/1.73
GLOBULIN UR ELPH-MCNC: 3.4 GM/DL
GLUCOSE BLD-MCNC: 125 MG/DL (ref 65–99)
GLUCOSE UR STRIP-MCNC: NEGATIVE MG/DL
HCT VFR BLD AUTO: 42.5 % (ref 40.4–52.2)
HGB BLD-MCNC: 13.5 G/DL (ref 13.7–17.6)
HGB UR QL STRIP.AUTO: NEGATIVE
IMM GRANULOCYTES # BLD: 0.04 10*3/MM3 (ref 0–0.03)
IMM GRANULOCYTES NFR BLD: 0.4 % (ref 0–0.5)
KETONES UR QL STRIP: NEGATIVE
LEUKOCYTE ESTERASE UR QL STRIP.AUTO: ABNORMAL
LYMPHOCYTES # BLD AUTO: 3.38 10*3/MM3 (ref 0.9–4.8)
LYMPHOCYTES NFR BLD AUTO: 35.5 % (ref 19.6–45.3)
Lab: NORMAL
MCH RBC QN AUTO: 29 PG (ref 27–32.7)
MCHC RBC AUTO-ENTMCNC: 31.8 G/DL (ref 32.6–36.4)
MCV RBC AUTO: 91.2 FL (ref 79.8–96.2)
MONOCYTES # BLD AUTO: 1.1 10*3/MM3 (ref 0.2–1.2)
MONOCYTES NFR BLD AUTO: 11.6 % (ref 5–12)
NEGATIVE CONTROL: NEGATIVE
NEUTROPHILS # BLD AUTO: 4.84 10*3/MM3 (ref 1.9–8.1)
NEUTROPHILS NFR BLD AUTO: 50.8 % (ref 42.7–76)
NITRITE UR QL STRIP: NEGATIVE
PH UR STRIP.AUTO: 7 [PH] (ref 4.6–8)
PLATELET # BLD AUTO: 258 10*3/MM3 (ref 140–500)
PMV BLD AUTO: 10.9 FL (ref 6–12)
POSITIVE CONTROL: POSITIVE
POTASSIUM BLD-SCNC: 4.7 MMOL/L (ref 3.5–5.2)
PROT SERPL-MCNC: 7.8 G/DL (ref 6–8.5)
PROT UR QL STRIP: NEGATIVE
PSA SERPL-MCNC: 395.1 NG/ML (ref 0–4)
RBC # BLD AUTO: 4.66 10*6/MM3 (ref 4.6–6)
RBC # UR: ABNORMAL /HPF
REF LAB TEST METHOD: ABNORMAL
SODIUM BLD-SCNC: 141 MMOL/L (ref 136–145)
SP GR UR STRIP: 1.02 (ref 1–1.03)
SQUAMOUS #/AREA URNS HPF: ABNORMAL /HPF
T-UPTAKE NFR SERPL: 1.05 TBI (ref 0.8–1.3)
T4 SERPL-MCNC: 6.89 MCG/DL (ref 4.5–11.7)
TSH SERPL DL<=0.05 MIU/L-ACNC: 5.37 MIU/ML (ref 0.27–4.2)
UROBILINOGEN UR QL STRIP: ABNORMAL
WBC NRBC COR # BLD: 9.52 10*3/MM3 (ref 4.5–10.7)
WBC UR QL AUTO: ABNORMAL /HPF

## 2018-03-14 PROCEDURE — 82270 OCCULT BLOOD FECES: CPT | Performed by: FAMILY MEDICINE

## 2018-03-14 PROCEDURE — 99214 OFFICE O/P EST MOD 30 MIN: CPT | Performed by: FAMILY MEDICINE

## 2018-03-14 PROCEDURE — 36415 COLL VENOUS BLD VENIPUNCTURE: CPT | Performed by: FAMILY MEDICINE

## 2018-03-14 PROCEDURE — 85025 COMPLETE CBC W/AUTO DIFF WBC: CPT | Performed by: FAMILY MEDICINE

## 2018-03-14 PROCEDURE — 84479 ASSAY OF THYROID (T3 OR T4): CPT | Performed by: FAMILY MEDICINE

## 2018-03-14 PROCEDURE — 84436 ASSAY OF TOTAL THYROXINE: CPT | Performed by: FAMILY MEDICINE

## 2018-03-14 PROCEDURE — 84153 ASSAY OF PSA TOTAL: CPT | Performed by: FAMILY MEDICINE

## 2018-03-14 PROCEDURE — 80053 COMPREHEN METABOLIC PANEL: CPT | Performed by: FAMILY MEDICINE

## 2018-03-14 PROCEDURE — 81001 URINALYSIS AUTO W/SCOPE: CPT | Performed by: FAMILY MEDICINE

## 2018-03-14 PROCEDURE — 84443 ASSAY THYROID STIM HORMONE: CPT | Performed by: FAMILY MEDICINE

## 2018-03-14 RX ORDER — LEVOTHYROXINE SODIUM 0.07 MG/1
TABLET ORAL
COMMUNITY
Start: 2018-02-12 | End: 2018-04-03

## 2018-03-14 NOTE — PROGRESS NOTES
SUBJECTIVE:  The patient is a 77-year-old white male is here for follow-up.  I saw him on January 30 for hoarseness.  He was a previous smoker.  He subsequently had a CAT scan which was concerning for possible metastatic disease.  He has a history of type 2 diabetes is is on an insulin pump.  He has a history of prostate cancer with a prostatectomy roughly 12 years ago.  He has hypertension and hyperlipidemia.  He states he had a colonoscopy a couple years ago.  He denies any melena or hematochezia.  He denies any fever or chills.  He denies dysuria or frequency.     PAST MEDICAL HISTORY:  Reviewed.    REVIEW OF SYSTEMS:  Please see above; 14 point ROS otherwise negative.      OBJECTIVE: Vitals signs are reviewed and are stable.    HEENT: PERRLA.    Neck:  Supple.  Bilateral bruits-possibly transmitted heart murmur  Lungs:  Clear.  No rales rhonchi or wheezes  Heart:  Regular rate and rhythm.  1/6 systolic murmur  Abdomen:   Soft, nontender.  Obese.  Bowel sounds present.  No organomegaly can be detected.  Rectal: Surgical absence of prostate.  No masses.  Hemoccult negative.  Extremities:  No cyanosis, clubbing or edema.      ASSESSMENT:    Abnormal CAT scan suggestive of bony metastases.  Abnormal lymphadenopathy  History of prostate cancer  History of tobacco abuse    PLAN:  CT scan of abdomen and pelvis.  CBC CMP PSA TSH thyroid profile urinalysis ordered.  The patient will be referred for bronchoscopy.  I will notify him as soon as I know the results of his CAT scan and labs.  Further workup will be pending these results.  He will call me in the meantime should he have any problems.

## 2018-03-15 ENCOUNTER — TRANSCRIBE ORDERS (OUTPATIENT)
Dept: ADMINISTRATIVE | Facility: HOSPITAL | Age: 78
End: 2018-03-15

## 2018-03-15 DIAGNOSIS — C61 PROSTATE CA (HCC): Primary | ICD-10-CM

## 2018-03-15 DIAGNOSIS — C79.51 BONE METASTASIS: ICD-10-CM

## 2018-03-20 ENCOUNTER — HOSPITAL ENCOUNTER (OUTPATIENT)
Dept: NUCLEAR MEDICINE | Facility: HOSPITAL | Age: 78
Discharge: HOME | End: 2018-03-20
Attending: UROLOGY

## 2018-03-20 ENCOUNTER — HOSPITAL ENCOUNTER (OUTPATIENT)
Dept: CT IMAGING | Facility: HOSPITAL | Age: 78
Discharge: HOME | End: 2018-03-20
Admitting: FAMILY MEDICINE

## 2018-03-20 ENCOUNTER — HOSPITAL ENCOUNTER (OUTPATIENT)
Dept: GENERAL RADIOLOGY | Facility: HOSPITAL | Age: 78
Discharge: HOME | End: 2018-03-20

## 2018-03-20 DIAGNOSIS — C61 PROSTATE CA (HCC): ICD-10-CM

## 2018-03-20 DIAGNOSIS — C79.51 BONE METASTASIS: ICD-10-CM

## 2018-03-20 DIAGNOSIS — R52 PAIN: ICD-10-CM

## 2018-03-20 DIAGNOSIS — R93.89 ABNORMAL CT SCAN: ICD-10-CM

## 2018-03-20 LAB — CREAT BLDA-MCNC: 1.2 MG/DL (ref 0.6–1.3)

## 2018-03-20 PROCEDURE — 73060 X-RAY EXAM OF HUMERUS: CPT

## 2018-03-20 PROCEDURE — 74177 CT ABD & PELVIS W/CONTRAST: CPT

## 2018-03-20 PROCEDURE — 25010000002 IOPAMIDOL 61 % SOLUTION: Performed by: FAMILY MEDICINE

## 2018-03-20 PROCEDURE — 82565 ASSAY OF CREATININE: CPT

## 2018-03-20 PROCEDURE — A9503 TC99M MEDRONATE: HCPCS | Performed by: UROLOGY

## 2018-03-20 PROCEDURE — 0 TECHNETIUM MEDRONATE KIT: Performed by: UROLOGY

## 2018-03-20 PROCEDURE — 78306 BONE IMAGING WHOLE BODY: CPT

## 2018-03-20 RX ORDER — TC 99M MEDRONATE 20 MG/10ML
21.3 INJECTION, POWDER, LYOPHILIZED, FOR SOLUTION INTRAVENOUS
Status: COMPLETED | OUTPATIENT
Start: 2018-03-20 | End: 2018-03-20

## 2018-03-20 RX ADMIN — Medication 21.3 MILLICURIE: at 07:10

## 2018-03-20 RX ADMIN — IOPAMIDOL 85 ML: 612 INJECTION, SOLUTION INTRAVENOUS at 08:00

## 2018-03-21 RX ORDER — ROPINIROLE 0.5 MG/1
TABLET, FILM COATED ORAL
Qty: 180 TABLET | Refills: 2 | OUTPATIENT
Start: 2018-03-21 | End: 2018-03-26 | Stop reason: SDUPTHER

## 2018-03-26 RX ORDER — ROPINIROLE 0.5 MG/1
TABLET, FILM COATED ORAL
Qty: 180 TABLET | Refills: 2 | OUTPATIENT
Start: 2018-03-26 | End: 2018-04-03

## 2018-03-28 ENCOUNTER — OFFICE VISIT (OUTPATIENT)
Dept: OTHER | Facility: HOSPITAL | Age: 78
End: 2018-03-28
Attending: THORACIC SURGERY (CARDIOTHORACIC VASCULAR SURGERY)

## 2018-03-28 ENCOUNTER — OFFICE VISIT (OUTPATIENT)
Dept: OTHER | Facility: HOSPITAL | Age: 78
End: 2018-03-28
Attending: INTERNAL MEDICINE

## 2018-03-28 ENCOUNTER — PREP FOR SURGERY (OUTPATIENT)
Dept: OTHER | Facility: HOSPITAL | Age: 78
End: 2018-03-28

## 2018-03-28 VITALS
OXYGEN SATURATION: 96 % | RESPIRATION RATE: 16 BRPM | WEIGHT: 204.7 LBS | SYSTOLIC BLOOD PRESSURE: 127 MMHG | BODY MASS INDEX: 32.9 KG/M2 | HEIGHT: 66 IN | TEMPERATURE: 98.7 F | HEART RATE: 68 BPM | DIASTOLIC BLOOD PRESSURE: 60 MMHG

## 2018-03-28 VITALS
RESPIRATION RATE: 16 BRPM | BODY MASS INDEX: 32.9 KG/M2 | TEMPERATURE: 98.7 F | OXYGEN SATURATION: 96 % | SYSTOLIC BLOOD PRESSURE: 127 MMHG | WEIGHT: 204.7 LBS | DIASTOLIC BLOOD PRESSURE: 60 MMHG | HEART RATE: 68 BPM | HEIGHT: 66 IN

## 2018-03-28 DIAGNOSIS — R59.0 MEDIASTINAL ADENOPATHY: Primary | ICD-10-CM

## 2018-03-28 DIAGNOSIS — C79.51 PROSTATE CANCER METASTATIC TO BONE (HCC): Primary | ICD-10-CM

## 2018-03-28 DIAGNOSIS — C61 PROSTATE CANCER METASTATIC TO BONE (HCC): ICD-10-CM

## 2018-03-28 DIAGNOSIS — C79.51 PROSTATE CANCER METASTATIC TO BONE (HCC): ICD-10-CM

## 2018-03-28 DIAGNOSIS — R79.1 ABNORMAL COAGULATION PROFILE: ICD-10-CM

## 2018-03-28 DIAGNOSIS — R59.0 MEDIASTINAL ADENOPATHY: ICD-10-CM

## 2018-03-28 DIAGNOSIS — R59.0 MEDIASTINAL LYMPHADENOPATHY: Primary | ICD-10-CM

## 2018-03-28 DIAGNOSIS — C61 PROSTATE CANCER METASTATIC TO BONE (HCC): Primary | ICD-10-CM

## 2018-03-28 PROCEDURE — 99205 OFFICE O/P NEW HI 60 MIN: CPT | Performed by: THORACIC SURGERY (CARDIOTHORACIC VASCULAR SURGERY)

## 2018-03-28 PROCEDURE — G0463 HOSPITAL OUTPT CLINIC VISIT: HCPCS

## 2018-03-28 PROCEDURE — 99203 OFFICE O/P NEW LOW 30 MIN: CPT | Performed by: INTERNAL MEDICINE

## 2018-03-28 PROCEDURE — G0463 HOSPITAL OUTPT CLINIC VISIT: HCPCS | Performed by: INTERNAL MEDICINE

## 2018-03-28 RX ORDER — SODIUM CHLORIDE 0.9 % (FLUSH) 0.9 %
1-10 SYRINGE (ML) INJECTION AS NEEDED
Status: CANCELLED | OUTPATIENT
Start: 2018-04-09

## 2018-03-28 RX ORDER — CEFAZOLIN SODIUM 2 G/100ML
2 INJECTION, SOLUTION INTRAVENOUS ONCE
Status: CANCELLED | OUTPATIENT
Start: 2018-04-09 | End: 2018-04-09

## 2018-03-28 NOTE — PATIENT INSTRUCTIONS
Pt seen by Dr. Rivera and will be scheduled for a MEDE with bx. Clara will call patient with appointment date,time and instructions. Pt instructed to call nurse navigator with any questions or concerns. Pt given contact cards for Dr. Rivera and nurse navigator.

## 2018-03-28 NOTE — PROGRESS NOTES
Subjective   Patient ID: Semaj Herrera is a 77 y.o. male is being seen for consultation today at the request of Axel Guardado MD    History of Present Illness  Dear Colleague,  Semaj Herrera was seen in the lung care center at [facility] today March 28, 2018 as part of our multidisciplinary thoracic oncology clinic.  Together with Dr. Jose Lee of the [facility] oncology group we have reviewed his history his x-rays and have examined him.  Thank you for asking us to participate in the care Mr. Herrera.    Mr. Herrera is a 77-year-old  male.  He recently complained of chest tightness and shortness of breath.  CT scan was obtained which showed extensive mediastinal lymphadenopathy.  There is also a polypoid lesion in the lumen of the trachea.  He was referred here for further evaluation.  This gentleman was diagnosed with prostate cancer 12-14 years ago.  He was treated with surgery and hormonal therapy.  He has recently been diagnosed with the bony metastases.    He is a former smoker having stopped smoking in 1998.  He smoked one pack of cigarettes per day for 30 years.  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  He does get short of breath with moderate exertion.  He has had no unexplained weight loss.  He feels as though his voice is more hoarse now than it had been.  He has no cough or hemoptysis.    The following portions of the patient's history were reviewed and updated as appropriate: allergies, current medications, past family history, past medical history, past social history, past surgical history and problem list.  Review of Systems   Constitution: Positive for chills, malaise/fatigue and night sweats.   HENT: Negative.    Eyes: Negative.    Cardiovascular: Negative.         Chest heaviness,ankle swelling   Respiratory: Positive for cough, shortness of breath and sputum production.    Endocrine: Negative.    Hematologic/Lymphatic: Negative.    Skin: Negative.    Musculoskeletal: Positive  for joint pain and stiffness.   Gastrointestinal: Positive for diarrhea.   Genitourinary: Negative.         Urine leakage,difficulty urinating,urinating at night   Neurological: Negative.    Psychiatric/Behavioral: Negative.    All other systems reviewed and are negative.    Patient Active Problem List   Diagnosis   • Diabetes mellitus   • Fatigue   • Hyperlipidemia   • Hypertension   • Left ventricular hypertrophy   • Obstructive sleep apnea syndrome   • Sinus bradycardia   • Prostate cancer metastatic to bone   • Mediastinal adenopathy     Past Medical History:   Diagnosis Date   • Carcinoma of prostate    • Diabetes mellitus    • Diastolic dysfunction     GRADE II   • Difficulty breathing     during exertion   • Dyslipidemia    • Erectile dysfunction    • Fatigue    • Hyperlipidemia    • Hypertension    • Left ventricular hypertrophy    • Obstructive sleep apnea     USING CPAP   • Peptic ulcer    • Restless leg syndrome    • Sinus bradycardia    • Transient cerebral ischemia    • Type 2 diabetes mellitus      Past Surgical History:   Procedure Laterality Date   • COLONOSCOPY     • HEMORRHOIDECTOMY     • PROSTATECTOMY  2006     Family History   Problem Relation Age of Onset   • Heart failure Mother    • Hypertension Mother    • Diabetes Mother    • Cancer Father      LUNG   • Cancer Sister      LUNG   • Hypertension Sister    • Cancer Brother      LUNG, PROSTATE   • Hypertension Brother    • Heart disease Other      Social History     Social History   • Marital status:      Spouse name: N/A   • Number of children: N/A   • Years of education: N/A     Occupational History   • Not on file.     Social History Main Topics   • Smoking status: Former Smoker     Quit date: 1/23/1998   • Smokeless tobacco: Never Used   • Alcohol use No      Comment: caffeine use   • Drug use: Unknown   • Sexual activity: Defer     Other Topics Concern   • Not on file     Social History Narrative   • No narrative on file        Current Outpatient Prescriptions:   •  aspirin 81 MG EC tablet, Take 1 tablet by mouth daily., Disp: , Rfl:   •  CARTIA  MG 24 hr capsule, TAKE 1 CAPSULE EVERY DAY, Disp: 90 capsule, Rfl: 0  •  cholecalciferol (VITAMIN D3) 1000 units tablet, Take 1,000 Units by mouth Daily., Disp: , Rfl:   •  diltiaZEM (TIAZAC) 120 MG 24 hr capsule, Take 120 mg by mouth Daily., Disp: , Rfl:   •  fluticasone (FLONASE SENSIMIST) 27.5 MCG/SPRAY nasal spray, , Disp: , Rfl:   •  insulin aspart (NovoLOG) 100 UNIT/ML patient supplied pump, Inject 150-180 Units under the skin Continuous., Disp: , Rfl:   •  levothyroxine (SYNTHROID, LEVOTHROID) 75 MCG tablet, , Disp: , Rfl:   •  modafinil (PROVIGIL) 200 MG tablet, TAKE ONE TABLET BY MOUTH IN THE MORNING, Disp: 90 tablet, Rfl: 1  •  pravastatin (PRAVACHOL) 40 MG tablet, Take 40 mg by mouth daily., Disp: , Rfl:   •  rOPINIRole (REQUIP) 0.5 MG tablet, TAKE ONE TABLET BY MOUTH IN THE EVENING AND ONE AT BEDTIME, Disp: 180 tablet, Rfl: 2  •  triamterene-hydrochlorothiazide (MAXZIDE-25) 37.5-25 MG per tablet, TAKE 1 TABLET EVERY DAY, Disp: 90 tablet, Rfl: 3  •  valsartan (DIOVAN) 320 MG tablet, TAKE 1 TABLET EVERY DAY, Disp: 90 tablet, Rfl: 0  •  VASCEPA 1 g capsule capsule, Take 1 g by mouth 4 (Four) Times a Day., Disp: , Rfl:   No Known Allergies     Objective   Vitals:    03/28/18 0932   BP: 127/60   Pulse: 68   Resp: 16   Temp: 98.7 °F (37.1 °C)   SpO2: 96%     Physical Exam   Constitutional: He is oriented to person, place, and time. He appears well-developed and well-nourished.   HENT:   Head: Normocephalic.   Eyes: Conjunctivae, EOM and lids are normal. Pupils are equal, round, and reactive to light.   Neck: Trachea normal and normal range of motion. Neck supple. No hepatojugular reflux and no JVD present. Carotid bruit is not present. No thyroid mass and no thyromegaly present.   Cardiovascular: Normal rate, regular rhythm, S1 normal, S2 normal, normal heart sounds and normal  pulses.   No extrasystoles are present. PMI is not displaced.    Pulmonary/Chest: Effort normal and breath sounds normal.   Abdominal: Soft. Normal appearance and bowel sounds are normal. He exhibits no mass. There is no hepatosplenomegaly. There is no tenderness. No hernia.   Musculoskeletal: Normal range of motion.   Neurological: He is alert and oriented to person, place, and time. He has normal strength and normal reflexes. No cranial nerve deficit or sensory deficit. He displays a negative Romberg sign.   Skin: Skin is warm, dry and intact.   Psychiatric: He has a normal mood and affect. His speech is normal and behavior is normal. Judgment and thought content normal. Cognition and memory are normal.     Independent Review of Radiographic Studies:    CT of the chest performed March 12, 2018 was independently reviewed.  There are multiple enlarged lymph nodes within the mediastinum.  These lymph nodes are new since a CT scan dated July 2004.  There is a polypoid abnormality along the right lateral wall of the trachea.  There is no pleural effusion.  There is a 6 mm nodule in the right lower lobe which is stable since 2004.  There is a 5 mm right middle lobe lesion and a left lower lobe nodule also stable since 2004.  Upper abdomen is unremarkable.  There are multiple sclerotic lesions seen throughout the visualized bones.      Assessment/Plan       Etiology of the mediastinal lymphadenopathy is unclear.  It could be related to  metastatic prostate cancer but unlikely.  He could have a second primary such as a lung cancer or a lymphoma.  I believe that the next step should be a mediastinoscopy with lymph node biopsy.  At the same time we can do a bronchoscopy to assess the polypoid lesion in the trachea.  I've explained all this to the patient in detail.  He understands the procedure.  We have discussed the risks and benefits.  I have answered all his questions.  He has requested that we proceed.  Arrangements  will be made to do this at [facility] in the near future.  I will keep you informed of his progress.    Diagnoses and all orders for this visit:    Mediastinal adenopathy  -     Case Request    Prostate cancer metastatic to bone    Other orders  -     fluticasone (FLONASE SENSIMIST) 27.5 MCG/SPRAY nasal spray;

## 2018-03-28 NOTE — PROGRESS NOTES
Subjective discussed findings with patient and son, he notes generalized discomfort including right abdominal wall.    REASON FOR CONSULTATION:    Provide an opinion on any further workup or treatment                             REQUESTING PHYSICIAN:  Axel Guardado    RECORDS OBTAINED:  Records of the patients history including those obtained from the referring provider were reviewed and summarized in detail.    HISTORY OF PRESENT ILLNESS:  The patient is a 77 y.o. year old male who is here for an opinion about the above issue.    History of Present Illness    The patient 77-year-old male with significant past medical history including prostate carcinoma, CKD 3 and long-term tobacco use be been seen by primary care in late January, 2018 for hoarseness.  Chest x-ray is now outpatient January 23 revealed sclerotic change in the posterior mid chest to be within 3 adjacent thoracic vertebral bodies of uncertain significance.  A follow-up chest CT revealed numerous sclerotic foci within all visualized bones consistent with metastatic disease, multiple enlarged mediastinal lymph nodes concerning for metastatic lymphadenopathy and a polypoidal abnormality in the right lateral wall of the trachea.  CT of abdomen March 20 revealed extensive osseous metastatic disease mildly enlarged retroperitoneal lymph nodes.  Bone scan March 20 was consistent with widespread osseous metastasis particularly in the proximal half the left humeral shaft, abnormal uptake in the sternum and manubrium and a small focus in the posterior aspect of the calvarium.  This led to a plain film the left humerus with mottled sclerosis involving the shaft of the humerus particularly in the proximal half but no evidence of pathologic fracture.    The patient has additionally type 2 diabetes on insulin pump, again a history of prostate carcinoma prostatectomy 12 years previously, hypertension, and hyperlipidemia.  Additional studies included a PSA of 395.1  from March 14, 2018.The patient's been seen by urology March 15 with plans to start Firmagon.    The patient is now seen in pulmonary clinic with Dr. Bijan Rivera and we have discussed that he will need bronchoscopy and mediastinoscopy.  Interestingly his additional history includes a long occupational exposure including working in the [place] just out of school, subsequent construction work for many years and a 30-pack-year history of smoking stopping in the late 1990s.  He indicates that he followed with Dr. Guillen of urology for many years with no changes in his exams and normal PSAs.  He stopped this follow-up approximately 2 years ago.    Past Medical History:   Diagnosis Date   • Carcinoma of prostate    • Diabetes mellitus    • Diastolic dysfunction     GRADE II   • Difficulty breathing     during exertion   • Dyslipidemia    • Erectile dysfunction    • Fatigue    • Hyperlipidemia    • Hypertension    • Left ventricular hypertrophy    • Obstructive sleep apnea     USING CPAP   • Peptic ulcer    • Restless leg syndrome    • Sinus bradycardia    • Transient cerebral ischemia    • Type 2 diabetes mellitus         Past Surgical History:   Procedure Laterality Date   • COLONOSCOPY     • HEMORRHOIDECTOMY     • PROSTATECTOMY  2006        Current Outpatient Prescriptions on File Prior to Visit   Medication Sig Dispense Refill   • aspirin 81 MG EC tablet Take 1 tablet by mouth daily.     • CARTIA  MG 24 hr capsule TAKE 1 CAPSULE EVERY DAY 90 capsule 0   • cholecalciferol (VITAMIN D3) 1000 units tablet Take 1,000 Units by mouth Daily.     • diltiaZEM (TIAZAC) 120 MG 24 hr capsule Take 120 mg by mouth Daily.     • fluticasone (FLONASE SENSIMIST) 27.5 MCG/SPRAY nasal spray      • insulin aspart (NovoLOG) 100 UNIT/ML patient supplied pump Inject 150-180 Units under the skin Continuous.     • levothyroxine (SYNTHROID, LEVOTHROID) 75 MCG tablet      • modafinil (PROVIGIL) 200 MG tablet TAKE ONE  "TABLET BY MOUTH IN THE MORNING 90 tablet 1   • pravastatin (PRAVACHOL) 40 MG tablet Take 40 mg by mouth daily.     • rOPINIRole (REQUIP) 0.5 MG tablet TAKE ONE TABLET BY MOUTH IN THE EVENING AND ONE AT BEDTIME 180 tablet 2   • triamterene-hydrochlorothiazide (MAXZIDE-25) 37.5-25 MG per tablet TAKE 1 TABLET EVERY DAY 90 tablet 3   • valsartan (DIOVAN) 320 MG tablet TAKE 1 TABLET EVERY DAY 90 tablet 0   • VASCEPA 1 g capsule capsule Take 1 g by mouth 4 (Four) Times a Day.     • [DISCONTINUED] levothyroxine (SYNTHROID, LEVOTHROID) 25 MCG tablet Take 37.5 mcg by mouth Daily.     • [DISCONTINUED] triamterene-hydrochlorothiazide (DYAZIDE) 37.5-25 MG per capsule Take 1 capsule by mouth daily.       No current facility-administered medications on file prior to visit.         ALLERGIES:  No Known Allergies     Social History     Social History   • Marital status:      Social History Main Topics   • Smoking status: Former Smoker     Quit date: 1/23/1998   • Smokeless tobacco: Never Used   • Alcohol use No      Comment: caffeine use   • Drug use: Unknown     Other Topics Concern   • Not on file        Family History   Problem Relation Age of Onset   • Heart failure Mother    • Hypertension Mother    • Diabetes Mother    • Cancer Father      LUNG   • Cancer Sister      LUNG   • Hypertension Sister    • Cancer Brother      LUNG, PROSTATE   • Hypertension Brother    • Heart disease Other         Review of Systems   Patient describes generalized discomfort for bony skeleton but also particularly right mid to lower abdomen that began just after his bone scan injection.    Objective     Vitals:    03/28/18 1003   BP: 127/60   Pulse: 68   Resp: 16   Temp: 98.7 °F (37.1 °C)   TempSrc: Oral   SpO2: 96%  Comment: room air   Weight: 92.9 kg (204 lb 11.2 oz)   Height: 167.6 cm (65.98\")     No flowsheet data found.    Physical Exam    OBJECTIVE: Vitals signs are reviewed and are stable.    HEENT: PERRLA.    Neck:  Supple.  Bilateral " bruits, No thyromegaly or adenopathy  Lungs:  Clear.  No rales rhonchi or wheezes  Heart:  Regular rate and rhythm.  1/6 systolic murmur  Abdomen:   Soft, nontender.  Obese.  Bowel sounds present.  No organomegaly can be detected, though patient is tender in right lower to mid abdomen.  No ascites or masses again to be detected  Extremities:  No cyanosis, clubbing or edema.      RECENT LABS:  Hematology WBC   Date Value Ref Range Status   03/14/2018 9.52 4.50 - 10.70 10*3/mm3 Final     RBC   Date Value Ref Range Status   03/14/2018 4.66 4.60 - 6.00 10*6/mm3 Final     Hemoglobin   Date Value Ref Range Status   03/14/2018 13.5 (L) 13.7 - 17.6 g/dL Final     Hematocrit   Date Value Ref Range Status   03/14/2018 42.5 40.4 - 52.2 % Final     Platelets   Date Value Ref Range Status   03/14/2018 258 140 - 500 10*3/mm3 Final          Assessment/Plan       77-year-old male with medical history including prostate carcinoma, CK D3, long-term tobacco use recent development of hoarseness and subsequent studies that demonstrated findings consistent with metastatic disease to bone as well as multiple enlarged metastatic lymph nodes, and potential abnormality in the right lateral wall trachea.  His additional history includes type 2 diabetes on insulin pump which has been more effective for control of his blood sugars.    His recent symptoms have included generalized discomfort in multiple sites in his bony skeleton as well as right lower quadrant abdominal pain.  We discussed this made will improve after he starts Firmagon in the a.m.  Discussions with Dr. Rivera also have led to the conclusion that he'll need bronchoscopy and mediastinoscopy which is currently being scheduled.  Plan:  1.  Procedures as above  2.  [facility]-currently treating Mr. Herrera with plans for Firmagon in the am.  3.  Patient to be referred back once tissue diagnosis is obtained.

## 2018-04-03 ENCOUNTER — APPOINTMENT (OUTPATIENT)
Dept: PREADMISSION TESTING | Facility: HOSPITAL | Age: 78
End: 2018-04-03

## 2018-04-03 VITALS
OXYGEN SATURATION: 99 % | SYSTOLIC BLOOD PRESSURE: 123 MMHG | BODY MASS INDEX: 32.14 KG/M2 | RESPIRATION RATE: 20 BRPM | HEIGHT: 66 IN | HEART RATE: 56 BPM | TEMPERATURE: 99.5 F | WEIGHT: 200 LBS | DIASTOLIC BLOOD PRESSURE: 70 MMHG

## 2018-04-03 DIAGNOSIS — R59.0 MEDIASTINAL LYMPHADENOPATHY: ICD-10-CM

## 2018-04-03 DIAGNOSIS — R79.1 ABNORMAL COAGULATION PROFILE: ICD-10-CM

## 2018-04-03 LAB
ABO GROUP BLD: NORMAL
ANION GAP SERPL CALCULATED.3IONS-SCNC: 12.6 MMOL/L
BASOPHILS # BLD AUTO: 0.02 10*3/MM3 (ref 0–0.2)
BASOPHILS NFR BLD AUTO: 0.2 % (ref 0–1.5)
BLD GP AB SCN SERPL QL: NEGATIVE
BUN BLD-MCNC: 20 MG/DL (ref 8–23)
BUN/CREAT SERPL: 16.1 (ref 7–25)
CALCIUM SPEC-SCNC: 8.9 MG/DL (ref 8.6–10.5)
CHLORIDE SERPL-SCNC: 97 MMOL/L (ref 98–107)
CO2 SERPL-SCNC: 25.4 MMOL/L (ref 22–29)
CREAT BLD-MCNC: 1.24 MG/DL (ref 0.76–1.27)
DEPRECATED RDW RBC AUTO: 46 FL (ref 37–54)
EOSINOPHIL # BLD AUTO: 0.19 10*3/MM3 (ref 0–0.7)
EOSINOPHIL NFR BLD AUTO: 1.5 % (ref 0.3–6.2)
ERYTHROCYTE [DISTWIDTH] IN BLOOD BY AUTOMATED COUNT: 14 % (ref 11.5–14.5)
GFR SERPL CREATININE-BSD FRML MDRD: 57 ML/MIN/1.73
GLUCOSE BLD-MCNC: 83 MG/DL (ref 65–99)
HCT VFR BLD AUTO: 37.4 % (ref 40.4–52.2)
HGB BLD-MCNC: 12 G/DL (ref 13.7–17.6)
IMM GRANULOCYTES # BLD: 0.07 10*3/MM3 (ref 0–0.03)
IMM GRANULOCYTES NFR BLD: 0.6 % (ref 0–0.5)
INR PPP: 1.21 (ref 0.9–1.1)
LYMPHOCYTES # BLD AUTO: 1.87 10*3/MM3 (ref 0.9–4.8)
LYMPHOCYTES NFR BLD AUTO: 15.2 % (ref 19.6–45.3)
MCH RBC QN AUTO: 29.1 PG (ref 27–32.7)
MCHC RBC AUTO-ENTMCNC: 32.1 G/DL (ref 32.6–36.4)
MCV RBC AUTO: 90.6 FL (ref 79.8–96.2)
MONOCYTES # BLD AUTO: 1.36 10*3/MM3 (ref 0.2–1.2)
MONOCYTES NFR BLD AUTO: 11 % (ref 5–12)
NEUTROPHILS # BLD AUTO: 8.81 10*3/MM3 (ref 1.9–8.1)
NEUTROPHILS NFR BLD AUTO: 71.5 % (ref 42.7–76)
PLATELET # BLD AUTO: 307 10*3/MM3 (ref 140–500)
PMV BLD AUTO: 10.4 FL (ref 6–12)
POTASSIUM BLD-SCNC: 3.9 MMOL/L (ref 3.5–5.2)
PROTHROMBIN TIME: 15.1 SECONDS (ref 11.7–14.2)
RBC # BLD AUTO: 4.13 10*6/MM3 (ref 4.6–6)
RH BLD: NEGATIVE
SODIUM BLD-SCNC: 135 MMOL/L (ref 136–145)
T&S EXPIRATION DATE: NORMAL
WBC NRBC COR # BLD: 12.32 10*3/MM3 (ref 4.5–10.7)

## 2018-04-03 PROCEDURE — 93005 ELECTROCARDIOGRAM TRACING: CPT

## 2018-04-03 PROCEDURE — 85025 COMPLETE CBC W/AUTO DIFF WBC: CPT | Performed by: THORACIC SURGERY (CARDIOTHORACIC VASCULAR SURGERY)

## 2018-04-03 PROCEDURE — 86900 BLOOD TYPING SEROLOGIC ABO: CPT | Performed by: THORACIC SURGERY (CARDIOTHORACIC VASCULAR SURGERY)

## 2018-04-03 PROCEDURE — 86901 BLOOD TYPING SEROLOGIC RH(D): CPT | Performed by: THORACIC SURGERY (CARDIOTHORACIC VASCULAR SURGERY)

## 2018-04-03 PROCEDURE — 86850 RBC ANTIBODY SCREEN: CPT | Performed by: THORACIC SURGERY (CARDIOTHORACIC VASCULAR SURGERY)

## 2018-04-03 PROCEDURE — 36415 COLL VENOUS BLD VENIPUNCTURE: CPT

## 2018-04-03 PROCEDURE — 85610 PROTHROMBIN TIME: CPT | Performed by: THORACIC SURGERY (CARDIOTHORACIC VASCULAR SURGERY)

## 2018-04-03 PROCEDURE — 80048 BASIC METABOLIC PNL TOTAL CA: CPT | Performed by: THORACIC SURGERY (CARDIOTHORACIC VASCULAR SURGERY)

## 2018-04-03 PROCEDURE — 93010 ELECTROCARDIOGRAM REPORT: CPT | Performed by: INTERNAL MEDICINE

## 2018-04-03 RX ORDER — ROPINIROLE 5 MG/1
5 TABLET, FILM COATED ORAL 2 TIMES DAILY
COMMUNITY
End: 2018-04-05

## 2018-04-03 RX ORDER — MODAFINIL 200 MG/1
200 TABLET ORAL DAILY
COMMUNITY
End: 2018-06-23

## 2018-04-03 RX ORDER — VALSARTAN 320 MG/1
320 TABLET ORAL DAILY
COMMUNITY
End: 2018-05-20 | Stop reason: SDUPTHER

## 2018-04-03 RX ORDER — LEVOTHYROXINE SODIUM 0.07 MG/1
75 TABLET ORAL DAILY
COMMUNITY
End: 2019-03-15

## 2018-04-03 RX ORDER — TRIAMTERENE AND HYDROCHLOROTHIAZIDE 37.5; 25 MG/1; MG/1
1 CAPSULE ORAL EVERY MORNING
COMMUNITY
End: 2019-01-30 | Stop reason: SDUPTHER

## 2018-04-03 NOTE — DISCHARGE INSTRUCTIONS
Take the following medications the morning of surgery with a small sip of water:    DILTIAZEM, VALSARTAN    General Instructions:  • Do not eat solid food after midnight the night before surgery.  • You may drink clear liquids day of surgery but must stop at least one hour before your hospital arrival time.  • It is beneficial for you to have a clear drink that contains carbohydrates the day of surgery.  We suggest a 12 to 20 ounce bottle of Gatorade or Powerade for non-diabetic patients or a 12 to 20 ounce bottle of G2 or Powerade Zero for diabetic patients. (Pediatric patients, are not advised to drink a 12 to 20 ounce carbohydrate drink)    Clear liquids are liquids you can see through.  Nothing red in color.     Plain water                               Sports drinks  Sodas                                   Gelatin (Jell-O)  Fruit juices without pulp such as white grape juice and apple juice  Popsicles that contain no fruit or yogurt  Tea or coffee (no cream or milk added)  Gatorade / Powerade  G2 / Powerade Zero    • Infants may have breast milk up to four hours before surgery.  • Infants drinking formula may drink formula up to six hours before surgery.   • Patients who avoid smoking, chewing tobacco and alcohol for 4 weeks prior to surgery have a reduced risk of post-operative complications.  Quit smoking as many days before surgery as you can.  • Do not smoke, use chewing tobacco or drink alcohol the day of surgery.   • If applicable bring your C-PAP/ BI-PAP machine.  • Bring any papers given to you in the doctor’s office.  • Wear clean comfortable clothes and socks.  • Do not wear contact lenses or make-up.  Bring a case for your glasses.   • Bring crutches or walker if applicable.  • Remove all piercings.  Leave jewelry and any other valuables at home.  • Hair extensions with metal clips must be removed prior to surgery.  • The Pre-Admission Testing nurse will instruct you to bring medications if unable to  obtain an accurate list in Pre-Admission Testing.        If you were given a blood bank ID arm band remember to bring it with you the day of surgery.    Preventing a Surgical Site Infection:  • For 2 to 3 days before surgery, avoid shaving with a razor because the razor can irritate skin and make it easier to develop an infection.  • The night prior to surgery sleep in a clean bed with clean clothing.  Do not allow pets to sleep with you.  • Shower on the morning of surgery using a fresh bar of anti-bacterial soap (such as Dial) and clean washcloth.  Dry with a clean towel and dress in clean clothing.  • Ask your surgeon if you will be receiving antibiotics prior to surgery.  • Make sure you, your family, and all healthcare providers clean their hands with soap and water or an alcohol based hand  before caring for you or your wound.    Day of surgery:  Upon arrival, a Pre-op nurse and Anesthesiologist will review your health history, obtain vital signs, and answer questions you may have.  The only belongings needed at this time will be your home medications and if applicable your C-PAP/BI-PAP machine.  If you are staying overnight your family can leave the rest of your belongings in the car and bring them to your room later.  A Pre-op nurse will start an IV and you may receive medication in preparation for surgery, including something to help you relax.  Your family will be able to see you in the Pre-op area.  While you are in surgery your family should notify the waiting room  if they leave the waiting room area and provide a contact phone number.    Please be aware that surgery does come with discomfort.  We want to make every effort to control your discomfort so please discuss any uncontrolled symptoms with your nurse.   Your doctor will most likely have prescribed pain medications.      If you are going home after surgery you will receive individualized written care instructions before being  discharged.  A responsible adult must drive you to and from the hospital on the day of your surgery and stay with you for 24 hours.    If you are staying overnight following surgery, you will be transported to your hospital room following the recovery period.  [facility] has all private rooms.    If you have any questions please call Pre-Admission Testing at [phone].  Deductibles and co-payments are collected on the day of service. Please be prepared to pay the required co-pay, deductible or deposit on the day of service as defined by your plan.

## 2018-04-05 RX ORDER — ROPINIROLE 0.5 MG/1
TABLET, FILM COATED ORAL
Qty: 180 TABLET | Refills: 2 | OUTPATIENT
Start: 2018-04-05 | End: 2018-05-08 | Stop reason: SDUPTHER

## 2018-04-09 ENCOUNTER — APPOINTMENT (OUTPATIENT)
Dept: GENERAL RADIOLOGY | Facility: HOSPITAL | Age: 78
End: 2018-04-09

## 2018-04-09 ENCOUNTER — ANESTHESIA EVENT (OUTPATIENT)
Dept: PERIOP | Facility: HOSPITAL | Age: 78
End: 2018-04-09

## 2018-04-09 ENCOUNTER — HOSPITAL ENCOUNTER (OUTPATIENT)
Facility: HOSPITAL | Age: 78
Setting detail: HOSPITAL OUTPATIENT SURGERY
Discharge: HOME | End: 2018-04-09
Attending: THORACIC SURGERY (CARDIOTHORACIC VASCULAR SURGERY) | Admitting: THORACIC SURGERY (CARDIOTHORACIC VASCULAR SURGERY)

## 2018-04-09 ENCOUNTER — ANESTHESIA (OUTPATIENT)
Dept: PERIOP | Facility: HOSPITAL | Age: 78
End: 2018-04-09

## 2018-04-09 VITALS
OXYGEN SATURATION: 95 % | TEMPERATURE: 98 F | SYSTOLIC BLOOD PRESSURE: 130 MMHG | WEIGHT: 190.7 LBS | DIASTOLIC BLOOD PRESSURE: 58 MMHG | HEART RATE: 84 BPM | BODY MASS INDEX: 31.77 KG/M2 | RESPIRATION RATE: 16 BRPM | HEIGHT: 65 IN

## 2018-04-09 DIAGNOSIS — R59.0 MEDIASTINAL ADENOPATHY: ICD-10-CM

## 2018-04-09 DIAGNOSIS — R59.0 MEDIASTINAL LYMPHADENOPATHY: ICD-10-CM

## 2018-04-09 LAB — GLUCOSE BLDC GLUCOMTR-MCNC: 144 MG/DL (ref 70–130)

## 2018-04-09 PROCEDURE — 25010000002 PROPOFOL 10 MG/ML EMULSION: Performed by: NURSE ANESTHETIST, CERTIFIED REGISTERED

## 2018-04-09 PROCEDURE — 25010000002 MIDAZOLAM PER 1 MG: Performed by: ANESTHESIOLOGY

## 2018-04-09 PROCEDURE — 25010000002 FENTANYL CITRATE (PF) 100 MCG/2ML SOLUTION: Performed by: NURSE ANESTHETIST, CERTIFIED REGISTERED

## 2018-04-09 PROCEDURE — 82962 GLUCOSE BLOOD TEST: CPT

## 2018-04-09 PROCEDURE — 87070 CULTURE OTHR SPECIMN AEROBIC: CPT | Performed by: THORACIC SURGERY (CARDIOTHORACIC VASCULAR SURGERY)

## 2018-04-09 PROCEDURE — 25010000003 CEFAZOLIN IN DEXTROSE 2-4 GM/100ML-% SOLUTION: Performed by: THORACIC SURGERY (CARDIOTHORACIC VASCULAR SURGERY)

## 2018-04-09 PROCEDURE — 87075 CULTR BACTERIA EXCEPT BLOOD: CPT | Performed by: THORACIC SURGERY (CARDIOTHORACIC VASCULAR SURGERY)

## 2018-04-09 PROCEDURE — 88331 PATH CONSLTJ SURG 1 BLK 1SPC: CPT | Performed by: THORACIC SURGERY (CARDIOTHORACIC VASCULAR SURGERY)

## 2018-04-09 PROCEDURE — 87116 MYCOBACTERIA CULTURE: CPT | Performed by: THORACIC SURGERY (CARDIOTHORACIC VASCULAR SURGERY)

## 2018-04-09 PROCEDURE — 88305 TISSUE EXAM BY PATHOLOGIST: CPT | Performed by: THORACIC SURGERY (CARDIOTHORACIC VASCULAR SURGERY)

## 2018-04-09 PROCEDURE — 87176 TISSUE HOMOGENIZATION CULTR: CPT | Performed by: THORACIC SURGERY (CARDIOTHORACIC VASCULAR SURGERY)

## 2018-04-09 PROCEDURE — 71045 X-RAY EXAM CHEST 1 VIEW: CPT

## 2018-04-09 PROCEDURE — 25010000002 DEXAMETHASONE PER 1 MG: Performed by: NURSE ANESTHETIST, CERTIFIED REGISTERED

## 2018-04-09 PROCEDURE — 25010000002 FENTANYL CITRATE (PF) 100 MCG/2ML SOLUTION: Performed by: ANESTHESIOLOGY

## 2018-04-09 PROCEDURE — 88342 IMHCHEM/IMCYTCHM 1ST ANTB: CPT | Performed by: THORACIC SURGERY (CARDIOTHORACIC VASCULAR SURGERY)

## 2018-04-09 PROCEDURE — 87205 SMEAR GRAM STAIN: CPT | Performed by: THORACIC SURGERY (CARDIOTHORACIC VASCULAR SURGERY)

## 2018-04-09 PROCEDURE — 25010000002 ONDANSETRON PER 1 MG: Performed by: NURSE ANESTHETIST, CERTIFIED REGISTERED

## 2018-04-09 PROCEDURE — 88341 IMHCHEM/IMCYTCHM EA ADD ANTB: CPT | Performed by: THORACIC SURGERY (CARDIOTHORACIC VASCULAR SURGERY)

## 2018-04-09 PROCEDURE — 87206 SMEAR FLUORESCENT/ACID STAI: CPT | Performed by: THORACIC SURGERY (CARDIOTHORACIC VASCULAR SURGERY)

## 2018-04-09 PROCEDURE — 25010000002 PHENYLEPHRINE PER 1 ML: Performed by: NURSE ANESTHETIST, CERTIFIED REGISTERED

## 2018-04-09 PROCEDURE — 39402 MEDIASTINOSCPY W/LMPH NOD BX: CPT | Performed by: THORACIC SURGERY (CARDIOTHORACIC VASCULAR SURGERY)

## 2018-04-09 PROCEDURE — S0260 H&P FOR SURGERY: HCPCS | Performed by: THORACIC SURGERY (CARDIOTHORACIC VASCULAR SURGERY)

## 2018-04-09 RX ORDER — HYDROCODONE BITARTRATE AND ACETAMINOPHEN 7.5; 325 MG/1; MG/1
1 TABLET ORAL EVERY 6 HOURS PRN
Status: DISCONTINUED | OUTPATIENT
Start: 2018-04-09 | End: 2018-04-09 | Stop reason: HOSPADM

## 2018-04-09 RX ORDER — DIPHENHYDRAMINE HYDROCHLORIDE 50 MG/ML
12.5 INJECTION INTRAMUSCULAR; INTRAVENOUS
Status: DISCONTINUED | OUTPATIENT
Start: 2018-04-09 | End: 2018-04-09 | Stop reason: HOSPADM

## 2018-04-09 RX ORDER — EPHEDRINE SULFATE 50 MG/ML
5 INJECTION, SOLUTION INTRAVENOUS ONCE AS NEEDED
Status: DISCONTINUED | OUTPATIENT
Start: 2018-04-09 | End: 2018-04-09 | Stop reason: HOSPADM

## 2018-04-09 RX ORDER — MIDAZOLAM HYDROCHLORIDE 1 MG/ML
1 INJECTION INTRAMUSCULAR; INTRAVENOUS
Status: DISCONTINUED | OUTPATIENT
Start: 2018-04-09 | End: 2018-04-09 | Stop reason: HOSPADM

## 2018-04-09 RX ORDER — ROCURONIUM BROMIDE 10 MG/ML
INJECTION, SOLUTION INTRAVENOUS AS NEEDED
Status: DISCONTINUED | OUTPATIENT
Start: 2018-04-09 | End: 2018-04-09

## 2018-04-09 RX ORDER — DEXAMETHASONE SODIUM PHOSPHATE 10 MG/ML
INJECTION INTRAMUSCULAR; INTRAVENOUS AS NEEDED
Status: DISCONTINUED | OUTPATIENT
Start: 2018-04-09 | End: 2018-04-09 | Stop reason: SURG

## 2018-04-09 RX ORDER — SODIUM CHLORIDE 0.9 % (FLUSH) 0.9 %
1-10 SYRINGE (ML) INJECTION AS NEEDED
Status: DISCONTINUED | OUTPATIENT
Start: 2018-04-09 | End: 2018-04-09 | Stop reason: HOSPADM

## 2018-04-09 RX ORDER — CEFAZOLIN SODIUM 2 G/100ML
2 INJECTION, SOLUTION INTRAVENOUS ONCE
Status: COMPLETED | OUTPATIENT
Start: 2018-04-09 | End: 2018-04-09

## 2018-04-09 RX ORDER — MAGNESIUM HYDROXIDE 1200 MG/15ML
LIQUID ORAL AS NEEDED
Status: DISCONTINUED | OUTPATIENT
Start: 2018-04-09 | End: 2018-04-09 | Stop reason: HOSPADM

## 2018-04-09 RX ORDER — NALOXONE HCL 0.4 MG/ML
0.4 VIAL (ML) INJECTION
Status: CANCELLED | OUTPATIENT
Start: 2018-04-09

## 2018-04-09 RX ORDER — LIDOCAINE HYDROCHLORIDE 20 MG/ML
INJECTION, SOLUTION INFILTRATION; PERINEURAL AS NEEDED
Status: DISCONTINUED | OUTPATIENT
Start: 2018-04-09 | End: 2018-04-09 | Stop reason: SURG

## 2018-04-09 RX ORDER — ONDANSETRON 2 MG/ML
4 INJECTION INTRAMUSCULAR; INTRAVENOUS ONCE AS NEEDED
Status: DISCONTINUED | OUTPATIENT
Start: 2018-04-09 | End: 2018-04-09 | Stop reason: HOSPADM

## 2018-04-09 RX ORDER — PROMETHAZINE HYDROCHLORIDE 25 MG/1
12.5 TABLET ORAL ONCE AS NEEDED
Status: DISCONTINUED | OUTPATIENT
Start: 2018-04-09 | End: 2018-04-09 | Stop reason: HOSPADM

## 2018-04-09 RX ORDER — PROPOFOL 10 MG/ML
VIAL (ML) INTRAVENOUS AS NEEDED
Status: DISCONTINUED | OUTPATIENT
Start: 2018-04-09 | End: 2018-04-09 | Stop reason: SURG

## 2018-04-09 RX ORDER — PROMETHAZINE HYDROCHLORIDE 25 MG/1
25 SUPPOSITORY RECTAL ONCE AS NEEDED
Status: DISCONTINUED | OUTPATIENT
Start: 2018-04-09 | End: 2018-04-09 | Stop reason: HOSPADM

## 2018-04-09 RX ORDER — HYDROMORPHONE HCL 110MG/55ML
0.5 PATIENT CONTROLLED ANALGESIA SYRINGE INTRAVENOUS
Status: DISCONTINUED | OUTPATIENT
Start: 2018-04-09 | End: 2018-04-09 | Stop reason: HOSPADM

## 2018-04-09 RX ORDER — FLUMAZENIL 0.1 MG/ML
0.2 INJECTION INTRAVENOUS AS NEEDED
Status: DISCONTINUED | OUTPATIENT
Start: 2018-04-09 | End: 2018-04-09 | Stop reason: HOSPADM

## 2018-04-09 RX ORDER — EPHEDRINE SULFATE 50 MG/ML
INJECTION, SOLUTION INTRAVENOUS AS NEEDED
Status: DISCONTINUED | OUTPATIENT
Start: 2018-04-09 | End: 2018-04-09 | Stop reason: SURG

## 2018-04-09 RX ORDER — OXYCODONE AND ACETAMINOPHEN 7.5; 325 MG/1; MG/1
1 TABLET ORAL ONCE AS NEEDED
Status: DISCONTINUED | OUTPATIENT
Start: 2018-04-09 | End: 2018-04-09 | Stop reason: HOSPADM

## 2018-04-09 RX ORDER — SODIUM CHLORIDE, SODIUM LACTATE, POTASSIUM CHLORIDE, CALCIUM CHLORIDE 600; 310; 30; 20 MG/100ML; MG/100ML; MG/100ML; MG/100ML
9 INJECTION, SOLUTION INTRAVENOUS CONTINUOUS
Status: DISCONTINUED | OUTPATIENT
Start: 2018-04-09 | End: 2018-04-09 | Stop reason: HOSPADM

## 2018-04-09 RX ORDER — HYDROCODONE BITARTRATE AND ACETAMINOPHEN 5; 325 MG/1; MG/1
1 TABLET ORAL EVERY 6 HOURS PRN
Qty: 20 TABLET | Refills: 0 | Status: SHIPPED | OUTPATIENT
Start: 2018-04-09 | End: 2018-04-18 | Stop reason: SDUPTHER

## 2018-04-09 RX ORDER — LABETALOL HYDROCHLORIDE 5 MG/ML
5 INJECTION, SOLUTION INTRAVENOUS
Status: DISCONTINUED | OUTPATIENT
Start: 2018-04-09 | End: 2018-04-09 | Stop reason: HOSPADM

## 2018-04-09 RX ORDER — HYDRALAZINE HYDROCHLORIDE 20 MG/ML
5 INJECTION INTRAMUSCULAR; INTRAVENOUS
Status: DISCONTINUED | OUTPATIENT
Start: 2018-04-09 | End: 2018-04-09 | Stop reason: HOSPADM

## 2018-04-09 RX ORDER — MIDAZOLAM HYDROCHLORIDE 1 MG/ML
2 INJECTION INTRAMUSCULAR; INTRAVENOUS
Status: DISCONTINUED | OUTPATIENT
Start: 2018-04-09 | End: 2018-04-09 | Stop reason: HOSPADM

## 2018-04-09 RX ORDER — HYDROCODONE BITARTRATE AND ACETAMINOPHEN 7.5; 325 MG/1; MG/1
1 TABLET ORAL ONCE AS NEEDED
Status: COMPLETED | OUTPATIENT
Start: 2018-04-09 | End: 2018-04-09

## 2018-04-09 RX ORDER — PROMETHAZINE HYDROCHLORIDE 25 MG/1
25 TABLET ORAL ONCE AS NEEDED
Status: DISCONTINUED | OUTPATIENT
Start: 2018-04-09 | End: 2018-04-09 | Stop reason: HOSPADM

## 2018-04-09 RX ORDER — FAMOTIDINE 10 MG/ML
20 INJECTION, SOLUTION INTRAVENOUS ONCE
Status: COMPLETED | OUTPATIENT
Start: 2018-04-09 | End: 2018-04-09

## 2018-04-09 RX ORDER — PROMETHAZINE HYDROCHLORIDE 25 MG/ML
12.5 INJECTION, SOLUTION INTRAMUSCULAR; INTRAVENOUS ONCE AS NEEDED
Status: DISCONTINUED | OUTPATIENT
Start: 2018-04-09 | End: 2018-04-09 | Stop reason: HOSPADM

## 2018-04-09 RX ORDER — LIDOCAINE HYDROCHLORIDE 10 MG/ML
INJECTION, SOLUTION EPIDURAL; INFILTRATION; INTRACAUDAL; PERINEURAL AS NEEDED
Status: DISCONTINUED | OUTPATIENT
Start: 2018-04-09 | End: 2018-04-09 | Stop reason: HOSPADM

## 2018-04-09 RX ORDER — NALOXONE HCL 0.4 MG/ML
0.2 VIAL (ML) INJECTION AS NEEDED
Status: DISCONTINUED | OUTPATIENT
Start: 2018-04-09 | End: 2018-04-09 | Stop reason: HOSPADM

## 2018-04-09 RX ORDER — FENTANYL CITRATE 50 UG/ML
50 INJECTION, SOLUTION INTRAMUSCULAR; INTRAVENOUS
Status: DISCONTINUED | OUTPATIENT
Start: 2018-04-09 | End: 2018-04-09 | Stop reason: HOSPADM

## 2018-04-09 RX ORDER — CEPHALEXIN 250 MG/1
250 CAPSULE ORAL 3 TIMES DAILY
Qty: 15 CAPSULE | Refills: 0 | Status: SHIPPED | OUTPATIENT
Start: 2018-04-09 | End: 2018-04-14

## 2018-04-09 RX ORDER — ONDANSETRON 2 MG/ML
INJECTION INTRAMUSCULAR; INTRAVENOUS AS NEEDED
Status: DISCONTINUED | OUTPATIENT
Start: 2018-04-09 | End: 2018-04-09 | Stop reason: SURG

## 2018-04-09 RX ORDER — FENTANYL CITRATE 50 UG/ML
INJECTION, SOLUTION INTRAMUSCULAR; INTRAVENOUS AS NEEDED
Status: DISCONTINUED | OUTPATIENT
Start: 2018-04-09 | End: 2018-04-09 | Stop reason: SURG

## 2018-04-09 RX ORDER — LIDOCAINE HYDROCHLORIDE 10 MG/ML
0.5 INJECTION, SOLUTION EPIDURAL; INFILTRATION; INTRACAUDAL; PERINEURAL ONCE AS NEEDED
Status: DISCONTINUED | OUTPATIENT
Start: 2018-04-09 | End: 2018-04-09 | Stop reason: HOSPADM

## 2018-04-09 RX ORDER — BUPIVACAINE HYDROCHLORIDE 2.5 MG/ML
INJECTION, SOLUTION INFILTRATION; PERINEURAL AS NEEDED
Status: DISCONTINUED | OUTPATIENT
Start: 2018-04-09 | End: 2018-04-09 | Stop reason: HOSPADM

## 2018-04-09 RX ORDER — ROCURONIUM BROMIDE 10 MG/ML
INJECTION, SOLUTION INTRAVENOUS AS NEEDED
Status: DISCONTINUED | OUTPATIENT
Start: 2018-04-09 | End: 2018-04-09 | Stop reason: SURG

## 2018-04-09 RX ADMIN — PROPOFOL 200 MG: 10 INJECTION, EMULSION INTRAVENOUS at 10:11

## 2018-04-09 RX ADMIN — FAMOTIDINE 20 MG: 10 INJECTION INTRAVENOUS at 07:32

## 2018-04-09 RX ADMIN — PHENYLEPHRINE HYDROCHLORIDE 100 MCG: 10 INJECTION INTRAVENOUS at 10:38

## 2018-04-09 RX ADMIN — EPHEDRINE SULFATE 10 MG: 50 INJECTION INTRAMUSCULAR; INTRAVENOUS; SUBCUTANEOUS at 10:38

## 2018-04-09 RX ADMIN — LIDOCAINE HYDROCHLORIDE 60 MG: 20 INJECTION, SOLUTION INFILTRATION; PERINEURAL at 10:11

## 2018-04-09 RX ADMIN — ROCURONIUM BROMIDE 50 MG: 10 INJECTION INTRAVENOUS at 10:29

## 2018-04-09 RX ADMIN — EPHEDRINE SULFATE 10 MG: 50 INJECTION INTRAMUSCULAR; INTRAVENOUS; SUBCUTANEOUS at 10:53

## 2018-04-09 RX ADMIN — FENTANYL CITRATE 50 MCG: 50 INJECTION, SOLUTION INTRAMUSCULAR; INTRAVENOUS at 08:28

## 2018-04-09 RX ADMIN — SUGAMMADEX 200 MG: 100 INJECTION, SOLUTION INTRAVENOUS at 11:15

## 2018-04-09 RX ADMIN — EPHEDRINE SULFATE 10 MG: 50 INJECTION INTRAMUSCULAR; INTRAVENOUS; SUBCUTANEOUS at 10:15

## 2018-04-09 RX ADMIN — SODIUM CHLORIDE, POTASSIUM CHLORIDE, SODIUM LACTATE AND CALCIUM CHLORIDE: 600; 310; 30; 20 INJECTION, SOLUTION INTRAVENOUS at 11:05

## 2018-04-09 RX ADMIN — CEFAZOLIN SODIUM 2 G: 2 INJECTION, SOLUTION INTRAVENOUS at 10:15

## 2018-04-09 RX ADMIN — HYDROCODONE BITARTRATE AND ACETAMINOPHEN 1 TABLET: 7.5; 325 TABLET ORAL at 12:03

## 2018-04-09 RX ADMIN — FENTANYL CITRATE 100 MCG: 50 INJECTION INTRAMUSCULAR; INTRAVENOUS at 10:11

## 2018-04-09 RX ADMIN — PHENYLEPHRINE HYDROCHLORIDE 100 MCG: 10 INJECTION INTRAVENOUS at 10:53

## 2018-04-09 RX ADMIN — MIDAZOLAM 1 MG: 1 INJECTION INTRAMUSCULAR; INTRAVENOUS at 07:32

## 2018-04-09 RX ADMIN — DEXAMETHASONE SODIUM PHOSPHATE 6 MG: 10 INJECTION INTRAMUSCULAR; INTRAVENOUS at 10:45

## 2018-04-09 RX ADMIN — ONDANSETRON 4 MG: 2 INJECTION INTRAMUSCULAR; INTRAVENOUS at 11:15

## 2018-04-09 RX ADMIN — SODIUM CHLORIDE, POTASSIUM CHLORIDE, SODIUM LACTATE AND CALCIUM CHLORIDE 9 ML/HR: 600; 310; 30; 20 INJECTION, SOLUTION INTRAVENOUS at 07:20

## 2018-04-09 RX ADMIN — PHENYLEPHRINE HYDROCHLORIDE 200 MCG: 10 INJECTION INTRAVENOUS at 10:15

## 2018-04-09 NOTE — ANESTHESIA POSTPROCEDURE EVALUATION
Patient: Semaj Herrera    Procedure Summary     Date:  04/09/18 Room / Location:  [facility] OR [address] [place] [address] MAIN OR    Anesthesia Start:  1005 Anesthesia Stop:  1138    Procedures:       MEDIASTINOSCOPY WITH LYMPH NODE BIOPSY (N/A Chest)      BRONCHOSCOPY (N/A Bronchus) Diagnosis:       Mediastinal adenopathy      (Mediastinal adenopathy [R59.0])    Surgeon:  Bijan Rivera III, MD Provider:  Raymundo Paiz MD    Anesthesia Type:  general ASA Status:  3          Anesthesia Type: general  Last vitals  BP   130/58 (04/09/18 1230)   Temp   36.7 °C (98 °F) (04/09/18 1155)   Pulse   84 (04/09/18 1230)   Resp   16 (04/09/18 1230)     SpO2   95 % (04/09/18 1230)     Post Anesthesia Care and Evaluation    Patient location during evaluation: PACU  Patient participation: complete - patient participated  Level of consciousness: awake and alert  Pain management: adequate  Airway patency: patent  Anesthetic complications: No anesthetic complications    Cardiovascular status: acceptable  Respiratory status: acceptable  Hydration status: acceptable    Comments: --------------------            04/09/18               1230     --------------------   BP:       130/58     Pulse:      84       Resp:       16       Temp:                SpO2:      95%      --------------------

## 2018-04-09 NOTE — ANESTHESIA PROCEDURE NOTES
Airway  Urgency: elective    Date/Time: 4/9/2018 10:12 AM  Airway not difficult    General Information and Staff    Patient location during procedure: OR  Anesthesiologist: YESENIA DASILVA  CRNA: JESSICA CARTER    Indications and Patient Condition  Indications for airway management: airway protection    Preoxygenated: yes  MILS maintained throughout  Mask difficulty assessment: 1 - vent by mask    Final Airway Details  Final airway type: supraglottic airway      Successful airway: unique  Size 4.5    Number of attempts at approach: 1

## 2018-04-09 NOTE — ANESTHESIA PREPROCEDURE EVALUATION
Anesthesia Evaluation     Patient summary reviewed and Nursing notes reviewed   no history of anesthetic complications:  NPO Solid Status: > 8 hours  NPO Liquid Status: > 2 hours           Airway   Mallampati: II  TM distance: >3 FB  Neck ROM: full  Dental - normal exam     Pulmonary - normal exam   (+) a smoker Former, shortness of breath, sleep apnea,   Cardiovascular - normal exam    (+) hypertension, hyperlipidemia,     ROS comment: Diastolic dysfunction    Neuro/Psych  (+) TIA,     GI/Hepatic/Renal/Endo    (+)  PUD,  diabetes mellitus,     Musculoskeletal     Abdominal    Substance History      OB/GYN          Other                        Anesthesia Plan    ASA 3     general     Anesthetic plan and risks discussed with patient.

## 2018-04-09 NOTE — ANESTHESIA POSTPROCEDURE EVALUATION
"Patient: Semaj Herrera    Procedure Summary     Date:  04/09/18 Room / Location:  [facility] OR [address] [place] [address] MAIN OR    Anesthesia Start:  1005 Anesthesia Stop:  1138    Procedures:       MEDIASTINOSCOPY WITH LYMPH NODE BIOPSY (N/A Chest)      BRONCHOSCOPY (N/A Bronchus) Diagnosis:       Mediastinal adenopathy      (Mediastinal adenopathy [R59.0])    Surgeon:  Bijan Rivera III, MD Provider:  Raymundo Paiz MD    Anesthesia Type:  general ASA Status:  3          Anesthesia Type: general  Last vitals  BP   134/67 (04/09/18 1215)   Temp   36.7 °C (98 °F) (04/09/18 1155)   Pulse   87 (04/09/18 1215)   Resp   16 (04/09/18 1215)     SpO2   95 % (04/09/18 1215)     Post Anesthesia Care and Evaluation    Patient location during evaluation: bedside  Patient participation: complete - patient participated  Level of consciousness: awake  Pain management: adequate  Airway patency: patent  Anesthetic complications: No anesthetic complications    Cardiovascular status: acceptable  Respiratory status: acceptable  Hydration status: acceptable    Comments: */67   Pulse 87   Temp 36.7 °C (98 °F) (Oral)   Resp 16   Ht 165.1 cm (65\")   Wt 86.5 kg (190 lb 11.2 oz)   SpO2 95%   BMI 31.73 kg/m²         "

## 2018-04-09 NOTE — ANESTHESIA POSTPROCEDURE EVALUATION
Patient: Semaj Herrera    Procedure Summary     Date:  04/09/18 Room / Location:  [facility] OR [address] [place] [address] MAIN OR    Anesthesia Start:  1005 Anesthesia Stop:  1138    Procedures:       MEDIASTINOSCOPY WITH LYMPH NODE BIOPSY (N/A Chest)      BRONCHOSCOPY (N/A Bronchus) Diagnosis:       Mediastinal adenopathy      (Mediastinal adenopathy [R59.0])    Surgeon:  Bijan Rivera III, MD Provider:  Raymundo Paiz MD    Anesthesia Type:  general ASA Status:  3          Anesthesia Type: general  Last vitals  BP   130/58 (04/09/18 1230)   Temp   36.7 °C (98 °F) (04/09/18 1155)   Pulse   84 (04/09/18 1230)   Resp   16 (04/09/18 1230)     SpO2   95 % (04/09/18 1230)     Post Anesthesia Care and Evaluation    Patient location during evaluation: PACU  Patient participation: complete - patient participated  Level of consciousness: awake and alert  Pain management: adequate  Airway patency: patent  Anesthetic complications: No anesthetic complications    Cardiovascular status: acceptable  Respiratory status: acceptable  Hydration status: acceptable    Comments: --------------------            04/09/18               1230     --------------------   BP:       130/58     Pulse:      84       Resp:       16       Temp:                SpO2:      95%      --------------------

## 2018-04-09 NOTE — DISCHARGE INSTRUCTIONS
**** You received a Lortab at 12:03pm today.  You may take a Norco (for pain) at 6:03 pm today. ********

## 2018-04-09 NOTE — OP NOTE
MEDIASTINOSCOPY, BRONCHOSCOPY  Progress Note    Semaj Herrera  4/9/2018    Pre-op Diagnosis:   Mediastinal adenopathy [R59.0]       Post-Op Diagnosis Codes:     * Mediastinal adenopathy [R59.0]    Procedure/CPT® Codes:      Procedure(s):  MEDIASTINOSCOPY WITH LYMPH NODE BIOPSY  BRONCHOSCOPY    Surgeon(s):  Bijan Rivera III, MD    Anesthesia: General    Staff:   Circulator: Susan Zhou RN  Scrub Person: Vik Ordoñez Technician: Juan Manuel Ordoñez Nurse: Trenton Guallpa RN    Estimated Blood Loss: 100 mL    Urine Voided: 0 mL    Specimens:                ID Type Source Tests Collected by Time   1 : MEDIASTINAL LYMPH NODE TISSUE Tissue Mediastinum ANAEROBIC CULTURE, TISSUE / BONE CULTURE, AFB CULTURE Bijan Rivera III, MD 4/9/2018 1109   A : MEDIASTINAL LYMPH NODES; FRESH FOR PERMANENT; CALL [id number] WITH RESULTS Tissue Mediastinum TISSUE PATHOLOGY EXAM Bijan Rivera III, MD 4/9/2018 1107         Drains:  none    Findings:   No Tracheal or endobronchial lesions noted.  Extensive mediastinal lymphadenopathy.  Frozen section showed metastatic adenocarcinoma    Complications: none    Summary of procedure: Mr. Herrera was brought to the operating room and placed on the operating table in supine position.  Following the induction of adequate general anesthesia, the flexible bronchoscope was passed through the LMA airway.  The cords appeared to be normal.  Cords moved equally well bilaterally and met in the midline.  The entire area was anesthetized with 1% Xylocaine.  The scope was passed through the cords into the trachea.  There was some enlarged tracheal rings  to the right but no mucosal abnormality.  Mild distortion of the subglottic area.  The entire trachea appeared normal otherwise.  Moni was sharp and nondisplaced.  Right mainstem bronchus, right upper lobe, right middle lobe, and right lower lobe bronchi showed no endobronchial lesions or mucosal abnormalities.  Scope was passed  down the left main bronchus.  Left upper lobe and left lower lobe bronchi showed no endobronchial lesions or mucosal abnormalities.  The scope was withdrawn.  The LMA was replaced with an endotracheal tube.  Patient tolerated this portion of the procedure well.    A roll was placed behind the shoulders to extend the neck.  Neck chest and shoulders were prepped and draped in usual sterile manner.  An area 1 fingerbreadth above the sternal notch was infiltrated with 0.25% Marcaine.  A transverse incision was made and carried down through the skin and subcutaneous tissues.  Dissection was carried down to the strap muscles.  These muscles were split longitudinally.  The trachea was exposed.  The pretracheal fascia was opened and blunt dissection was carried out into the mediastinum.  Mediastinoscope was introduced into the mediastinum.  Extensive adenopathy was encountered.  Multiple biopsies were taken.  Frozen section showed metastatic adenocarcinoma.  Hemostasis is obtained.      The wound was closed in layers with 3-0 Vicryl and the skin with 4-0 Vicryl.  Dry sterile dressing was applied.  The patient was awakened and extubated in the operating room.  He was transported to the recovery room in satisfactory condition having tolerated procedure well    .        Dictated utilizing Dragon dictation  Bijan Rivera III, MD     Date: 4/9/2018  Time: 11:51 AM

## 2018-04-09 NOTE — H&P
Progress Notes  Encounter Date: 3/28/2018 9:30 AM  Bijan Rivera III, MD   Thoracic Surgery   Expand All Collapse All    []Manual[]Template  []Copied     Subjective      Patient ID: Semaj Herrera is a 77 y.o. male is being seen for consultation today at the request of Axel Guardado MD     History of Present Illness  Dear Colleague,  Semaj Herrera was seen in the lung care center at [facility] today March 28, 2018 as part of our multidisciplinary thoracic oncology clinic.  Together with Dr. Jose Lee of the [facility] oncology group we have reviewed his history his x-rays and have examined him.  Thank you for asking us to participate in the care Mr. Herrera.     Mr. Herrera is a 77-year-old  male.  He recently complained of chest tightness and shortness of breath.  CT scan was obtained which showed extensive mediastinal lymphadenopathy.  There is also a polypoid lesion in the lumen of the trachea.  He was referred here for further evaluation.  This gentleman was diagnosed with prostate cancer 12-14 years ago.  He was treated with surgery and hormonal therapy.  He has recently been diagnosed with the bony metastases.     He is a former smoker having stopped smoking in 1998.  He smoked one pack of cigarettes per day for 30 years.  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  He does get short of breath with moderate exertion.  He has had no unexplained weight loss.  He feels as though his voice is more hoarse now than it had been.  He has no cough or hemoptysis.     The following portions of the patient's history were reviewed and updated as appropriate: allergies, current medications, past family history, past medical history, past social history, past surgical history and problem list.  Review of Systems   Constitution: Positive for chills, malaise/fatigue and night sweats.   HENT: Negative.    Eyes: Negative.    Cardiovascular: Negative.         Chest heaviness,ankle swelling   Respiratory:  Positive for cough, shortness of breath and sputum production.    Endocrine: Negative.    Hematologic/Lymphatic: Negative.    Skin: Negative.    Musculoskeletal: Positive for joint pain and stiffness.   Gastrointestinal: Positive for diarrhea.   Genitourinary: Negative.         Urine leakage,difficulty urinating,urinating at night   Neurological: Negative.    Psychiatric/Behavioral: Negative.    All other systems reviewed and are negative.         Patient Active Problem List   Diagnosis   • Diabetes mellitus   • Fatigue   • Hyperlipidemia   • Hypertension   • Left ventricular hypertrophy   • Obstructive sleep apnea syndrome   • Sinus bradycardia   • Prostate cancer metastatic to bone   • Mediastinal adenopathy      Medical History        Past Medical History:   Diagnosis Date   • Carcinoma of prostate     • Diabetes mellitus     • Diastolic dysfunction       GRADE II   • Difficulty breathing       during exertion   • Dyslipidemia     • Erectile dysfunction     • Fatigue     • Hyperlipidemia     • Hypertension     • Left ventricular hypertrophy     • Obstructive sleep apnea       USING CPAP   • Peptic ulcer     • Restless leg syndrome     • Sinus bradycardia     • Transient cerebral ischemia     • Type 2 diabetes mellitus           Surgical History         Past Surgical History:   Procedure Laterality Date   • COLONOSCOPY       • HEMORRHOIDECTOMY       • PROSTATECTOMY   2006                Family History   Problem Relation Age of Onset   • Heart failure Mother     • Hypertension Mother     • Diabetes Mother     • Cancer Father         LUNG   • Cancer Sister         LUNG   • Hypertension Sister     • Cancer Brother         LUNG, PROSTATE   • Hypertension Brother     • Heart disease Other        Social History   Social History            Social History   • Marital status:        Spouse name: N/A   • Number of children: N/A   • Years of education: N/A          Occupational History   • Not on file.              Social History Main Topics   • Smoking status: Former Smoker       Quit date: 1/23/1998   • Smokeless tobacco: Never Used   • Alcohol use No         Comment: caffeine use   • Drug use: Unknown   • Sexual activity: Defer           Other Topics Concern   • Not on file          Social History Narrative   • No narrative on file            Current Outpatient Prescriptions:   •  aspirin 81 MG EC tablet, Take 1 tablet by mouth daily., Disp: , Rfl:   •  CARTIA  MG 24 hr capsule, TAKE 1 CAPSULE EVERY DAY, Disp: 90 capsule, Rfl: 0  •  cholecalciferol (VITAMIN D3) 1000 units tablet, Take 1,000 Units by mouth Daily., Disp: , Rfl:   •  diltiaZEM (TIAZAC) 120 MG 24 hr capsule, Take 120 mg by mouth Daily., Disp: , Rfl:   •  fluticasone (FLONASE SENSIMIST) 27.5 MCG/SPRAY nasal spray, , Disp: , Rfl:   •  insulin aspart (NovoLOG) 100 UNIT/ML patient supplied pump, Inject 150-180 Units under the skin Continuous., Disp: , Rfl:   •  levothyroxine (SYNTHROID, LEVOTHROID) 75 MCG tablet, , Disp: , Rfl:   •  modafinil (PROVIGIL) 200 MG tablet, TAKE ONE TABLET BY MOUTH IN THE MORNING, Disp: 90 tablet, Rfl: 1  •  pravastatin (PRAVACHOL) 40 MG tablet, Take 40 mg by mouth daily., Disp: , Rfl:   •  rOPINIRole (REQUIP) 0.5 MG tablet, TAKE ONE TABLET BY MOUTH IN THE EVENING AND ONE AT BEDTIME, Disp: 180 tablet, Rfl: 2  •  triamterene-hydrochlorothiazide (MAXZIDE-25) 37.5-25 MG per tablet, TAKE 1 TABLET EVERY DAY, Disp: 90 tablet, Rfl: 3  •  valsartan (DIOVAN) 320 MG tablet, TAKE 1 TABLET EVERY DAY, Disp: 90 tablet, Rfl: 0  •  VASCEPA 1 g capsule capsule, Take 1 g by mouth 4 (Four) Times a Day., Disp: , Rfl:   No Known Allergies        Objective          Vitals:     03/28/18 0932   BP: 127/60   Pulse: 68   Resp: 16   Temp: 98.7 °F (37.1 °C)   SpO2: 96%      Physical Exam   Constitutional: He is oriented to person, place, and time. He appears well-developed and well-nourished.   HENT:   Head: Normocephalic.   Eyes: Conjunctivae, EOM and lids  are normal. Pupils are equal, round, and reactive to light.   Neck: Trachea normal and normal range of motion. Neck supple. No hepatojugular reflux and no JVD present. Carotid bruit is not present. No thyroid mass and no thyromegaly present.   Cardiovascular: Normal rate, regular rhythm, S1 normal, S2 normal, normal heart sounds and normal pulses.   No extrasystoles are present. PMI is not displaced.    Pulmonary/Chest: Effort normal and breath sounds normal.   Abdominal: Soft. Normal appearance and bowel sounds are normal. He exhibits no mass. There is no hepatosplenomegaly. There is no tenderness. No hernia.   Musculoskeletal: Normal range of motion.   Neurological: He is alert and oriented to person, place, and time. He has normal strength and normal reflexes. No cranial nerve deficit or sensory deficit. He displays a negative Romberg sign.   Skin: Skin is warm, dry and intact.   Psychiatric: He has a normal mood and affect. His speech is normal and behavior is normal. Judgment and thought content normal. Cognition and memory are normal.      Independent Review of Radiographic Studies:    CT of the chest performed March 12, 2018 was independently reviewed.  There are multiple enlarged lymph nodes within the mediastinum.  These lymph nodes are new since a CT scan dated July 2004.  There is a polypoid abnormality along the right lateral wall of the trachea.  There is no pleural effusion.  There is a 6 mm nodule in the right lower lobe which is stable since 2004.  There is a 5 mm right middle lobe lesion and a left lower lobe nodule also stable since 2004.  Upper abdomen is unremarkable.  There are multiple sclerotic lesions seen throughout the visualized bones.           Assessment/Plan            Etiology of the mediastinal lymphadenopathy is unclear.  It could be related to  metastatic prostate cancer but unlikely.  He could have a second primary such as a lung cancer or a lymphoma.  I believe that the next step  should be a mediastinoscopy with lymph node biopsy.  At the same time we can do a bronchoscopy to assess the polypoid lesion in the trachea.  I've explained all this to the patient in detail.  He understands the procedure.  We have discussed the risks and benefits.  I have answered all his questions.  He has requested that we proceed.  Arrangements will be made to do this at [facility] in the near future.  I will keep you informed of his progress.     Diagnoses and all orders for this visit:     Mediastinal adenopathy  -     Case Request     Prostate cancer metastatic to bone     Other orders  -     fluticasone (FLONASE SENSIMIST) 27.5 MCG/SPRAY nasal spray;                                Office Visit on 3/28/2018            Revision History            Detailed Report            Update: April 9, 2018    I have seen and examined the patient.  There have been no significant changes since initial evaluation.  The patient is here today for mediastinoscopy with lymph node biopsy.  He also plan to do bronchoscopy.  I've explained the procedures to the patient in detail.  We have discussed the risks and benefits.  I have answered all of his questions.  He has requested that we proceed.

## 2018-04-11 DIAGNOSIS — C34.90 MALIGNANT NEOPLASM OF LUNG, UNSPECIFIED LATERALITY, UNSPECIFIED PART OF LUNG (HCC): Primary | ICD-10-CM

## 2018-04-11 DIAGNOSIS — R91.8 OTHER NONSPECIFIC ABNORMAL FINDING OF LUNG FIELD (CODE): ICD-10-CM

## 2018-04-12 LAB
BACTERIA SPEC AEROBE CULT: NORMAL
GRAM STN SPEC: NORMAL

## 2018-04-13 DIAGNOSIS — C78.1 SECONDARY MALIGNANT NEOPLASM OF MEDIASTINUM (HCC): Primary | ICD-10-CM

## 2018-04-14 LAB — BACTERIA SPEC ANAEROBE CULT: NORMAL

## 2018-04-16 ENCOUNTER — HOSPITAL ENCOUNTER (OUTPATIENT)
Dept: PET IMAGING | Facility: HOSPITAL | Age: 78
End: 2018-04-16
Attending: THORACIC SURGERY (CARDIOTHORACIC VASCULAR SURGERY)

## 2018-04-16 ENCOUNTER — TELEPHONE (OUTPATIENT)
Dept: SURGERY | Facility: CLINIC | Age: 78
End: 2018-04-16

## 2018-04-16 ENCOUNTER — HOSPITAL ENCOUNTER (OUTPATIENT)
Dept: PET IMAGING | Facility: HOSPITAL | Age: 78
Discharge: HOME | End: 2018-04-16
Attending: THORACIC SURGERY (CARDIOTHORACIC VASCULAR SURGERY) | Admitting: THORACIC SURGERY (CARDIOTHORACIC VASCULAR SURGERY)

## 2018-04-16 DIAGNOSIS — C78.1 SECONDARY MALIGNANT NEOPLASM OF MEDIASTINUM (HCC): ICD-10-CM

## 2018-04-16 DIAGNOSIS — R91.8 OTHER NONSPECIFIC ABNORMAL FINDING OF LUNG FIELD (CODE): ICD-10-CM

## 2018-04-16 DIAGNOSIS — C34.90 MALIGNANT NEOPLASM OF LUNG, UNSPECIFIED LATERALITY, UNSPECIFIED PART OF LUNG (HCC): ICD-10-CM

## 2018-04-16 LAB
GLUCOSE BLDC GLUCOMTR-MCNC: 50 MG/DL (ref 70–130)
GLUCOSE BLDC GLUCOMTR-MCNC: 50 MG/DL (ref 70–130)
GLUCOSE BLDC GLUCOMTR-MCNC: 74 MG/DL (ref 70–130)

## 2018-04-16 PROCEDURE — 82962 GLUCOSE BLOOD TEST: CPT

## 2018-04-16 NOTE — TELEPHONE ENCOUNTER
Called PT and let him know that his PET was CX he confirmed.  ----- Message from Bijan Rivera III, MD sent at 4/16/2018  3:37 PM EDT -----  This patient has a history of prostate cancer and has a bone scan which is positive for metastatic disease.  Pathology report shows the lymph nodes are positive for metastatic prostate cancer.  I'm not sure that he currently needs a PET scan.  Lets hold on the PET scan until I see him back in the office and I will talk with the oncologists as to what we do next.  Maybe we could put him on for thoracic conference this week or next  ----- Message -----  From: Portia Peter MA  Sent: 4/12/2018   3:35 PM  To: Bijan Rivera III, MD    After you did the MEDE that showed the lung cancer we ordered a PET and compliance is asking that a telephone note be put in stating that the pt has a DX of lung cancer as they can not use the path. Thank you in advance

## 2018-04-16 NOTE — TELEPHONE ENCOUNTER
----- Message from Portia Peter MA sent at 4/12/2018  3:35 PM EDT -----  After you did the MEDE that showed the lung cancer we ordered a PET and compliance is asking that a telephone note be put in stating that the pt has a DX of lung cancer as they can not use the path. Thank you in advance

## 2018-04-17 ENCOUNTER — APPOINTMENT (OUTPATIENT)
Dept: PET IMAGING | Facility: HOSPITAL | Age: 78
End: 2018-04-17
Attending: THORACIC SURGERY (CARDIOTHORACIC VASCULAR SURGERY)

## 2018-04-18 ENCOUNTER — OFFICE VISIT (OUTPATIENT)
Dept: OTHER | Facility: HOSPITAL | Age: 78
End: 2018-04-18
Attending: THORACIC SURGERY (CARDIOTHORACIC VASCULAR SURGERY)

## 2018-04-18 VITALS
HEART RATE: 87 BPM | OXYGEN SATURATION: 96 % | SYSTOLIC BLOOD PRESSURE: 126 MMHG | TEMPERATURE: 97.1 F | HEIGHT: 65 IN | BODY MASS INDEX: 30.32 KG/M2 | WEIGHT: 182 LBS | DIASTOLIC BLOOD PRESSURE: 76 MMHG | RESPIRATION RATE: 16 BRPM

## 2018-04-18 DIAGNOSIS — R59.0 MEDIASTINAL ADENOPATHY: Primary | ICD-10-CM

## 2018-04-18 DIAGNOSIS — C79.51 PROSTATE CANCER METASTATIC TO BONE (HCC): ICD-10-CM

## 2018-04-18 DIAGNOSIS — C61 PROSTATE CANCER METASTATIC TO BONE (HCC): ICD-10-CM

## 2018-04-18 PROCEDURE — 99213 OFFICE O/P EST LOW 20 MIN: CPT | Performed by: THORACIC SURGERY (CARDIOTHORACIC VASCULAR SURGERY)

## 2018-04-18 RX ORDER — HYDROCODONE BITARTRATE AND ACETAMINOPHEN 5; 325 MG/1; MG/1
1 TABLET ORAL EVERY 6 HOURS PRN
Qty: 60 TABLET | Refills: 0 | Status: SHIPPED | OUTPATIENT
Start: 2018-04-18 | End: 2018-05-03

## 2018-04-18 NOTE — PROGRESS NOTES
Subjective   Patient ID: Semaj Herrera is a 77 y.o. male is here today for follow-up.    History of Present Illness  Dear Colleague,  Semaj Herrera was seen in the lung care center at [facility] today April 18, 2018 as part of our multidisciplinary thoracic oncology clinic.  Since his last visit he has undergone mediastinoscopy with lymph node biopsy.  Lymph nodes were positive for metastatic prostate cancer.  His case was discussed in our multidisciplinary thoracic oncology conference this morning.  He is here today to discuss treatment options.    In January of this year his PSA was 395.  He has received 1 hormone injections since then.  He continues to have quite a bit of back pain.  He also has fatigue and loss of appetite.    The following portions of the patient's history were reviewed and updated as appropriate: allergies, current medications, past family history, past medical history, past social history, past surgical history and problem list.  Review of Systems   Constitution: Positive for chills, malaise/fatigue and night sweats.   HENT: Negative.    Eyes: Negative.    Cardiovascular: Negative.    Respiratory: Positive for cough, shortness of breath and sputum production.    Endocrine: Negative.    Hematologic/Lymphatic: Negative.    Skin: Negative.    Musculoskeletal: Positive for joint pain and stiffness.   Gastrointestinal: Negative.    Genitourinary: Negative.    Neurological: Negative.    Psychiatric/Behavioral: Negative.    All other systems reviewed and are negative.    Patient Active Problem List   Diagnosis   • Diabetes mellitus   • Fatigue   • Hyperlipidemia   • Hypertension   • Left ventricular hypertrophy   • Obstructive sleep apnea syndrome   • Sinus bradycardia   • Prostate cancer metastatic to bone   • Mediastinal adenopathy     Past Medical History:   Diagnosis Date   • Carcinoma of prostate    • Diabetes mellitus    • Diastolic dysfunction     GRADE II   • Difficulty breathing      during exertion   • Dyslipidemia    • Erectile dysfunction    • Fatigue    • Hyperlipidemia    • Hypertension    • Left ventricular hypertrophy    • Metastatic cancer    • Obstructive sleep apnea     USING CPAP   • Peptic ulcer    • Restless leg syndrome    • Sinus bradycardia    • SOB (shortness of breath)    • Transient cerebral ischemia    • Type 2 diabetes mellitus      Past Surgical History:   Procedure Laterality Date   • BRONCHOSCOPY N/A 4/9/2018    Procedure: BRONCHOSCOPY;  Surgeon: Bijan Rivera III, MD;  Location: [facility] OR;  Service: Cardiothoracic   • COLONOSCOPY     • HEMORRHOIDECTOMY     • MEDIASTINOSCOPY N/A 4/9/2018    Procedure: MEDIASTINOSCOPY WITH LYMPH NODE BIOPSY;  Surgeon: Bijan Rivera III, MD;  Location: [facility] OR;  Service: Cardiothoracic   • PROSTATECTOMY  2006     Family History   Problem Relation Age of Onset   • Heart failure Mother    • Hypertension Mother    • Diabetes Mother    • Cancer Father      LUNG   • Cancer Sister      LUNG   • Hypertension Sister    • Cancer Brother      LUNG, PROSTATE   • Hypertension Brother    • Heart disease Other    • Malig Hyperthermia Neg Hx      Social History     Social History   • Marital status:      Spouse name: N/A   • Number of children: N/A   • Years of education: N/A     Occupational History   • Not on file.     Social History Main Topics   • Smoking status: Former Smoker     Quit date: 1/23/1998   • Smokeless tobacco: Never Used   • Alcohol use No      Comment: caffeine use   • Drug use: Unknown   • Sexual activity: Defer     Other Topics Concern   • Not on file     Social History Narrative   • No narrative on file       Current Outpatient Prescriptions:   •  aspirin 81 MG EC tablet, Take 1 tablet by mouth daily., Disp: , Rfl:   •  cholecalciferol (VITAMIN D3) 1000 units tablet, Take 1,000 Units by mouth Daily., Disp: , Rfl:   •  diltiaZEM (TIAZAC) 120 MG 24 hr capsule, Take 120 mg by mouth Daily., Disp: , Rfl:   •   fluticasone (FLONASE SENSIMIST) 27.5 MCG/SPRAY nasal spray, 2 sprays into each nostril Daily., Disp: , Rfl:   •  HYDROcodone-acetaminophen (NORCO) 5-325 MG per tablet, Take 1 tablet by mouth Every 6 (Six) Hours As Needed for Moderate Pain  for up to 15 days., Disp: 60 tablet, Rfl: 0  •  insulin aspart (NovoLOG) 100 UNIT/ML patient supplied pump, Inject 150-180 Units under the skin Continuous., Disp: , Rfl:   •  levothyroxine (SYNTHROID, LEVOTHROID) 75 MCG tablet, Take 75 mcg by mouth Daily. TAKES 1/2 TABLET, Disp: , Rfl:   •  modafinil (PROVIGIL) 200 MG tablet, Take 200 mg by mouth Daily., Disp: , Rfl:   •  pravastatin (PRAVACHOL) 40 MG tablet, Take 40 mg by mouth daily., Disp: , Rfl:   •  rOPINIRole (REQUIP) 0.5 MG tablet, TAKE ONE TABLET BY MOUTH IN THE EVENING AND ONE AT BEDTIME, Disp: 180 tablet, Rfl: 2  •  triamterene-hydrochlorothiazide (DYAZIDE) 37.5-25 MG per capsule, Take 1 capsule by mouth Every Morning., Disp: , Rfl:   •  valsartan (DIOVAN) 320 MG tablet, Take 320 mg by mouth Daily., Disp: , Rfl:   •  VASCEPA 1 g capsule capsule, Take 1 g by mouth 4 (Four) Times a Day., Disp: , Rfl:   No Known Allergies     Objective   Vitals:    04/18/18 0854   BP: 126/76   Pulse: 87   Resp: 16   Temp: 97.1 °F (36.2 °C)   SpO2: 96%     Physical Exam   Constitutional: He is oriented to person, place, and time. He appears well-developed and well-nourished.   HENT:   Head: Normocephalic.   Eyes: Conjunctivae, EOM and lids are normal. Pupils are equal, round, and reactive to light.   Neck: Trachea normal and normal range of motion. Neck supple. No hepatojugular reflux and no JVD present. Carotid bruit is not present. No thyroid mass and no thyromegaly present.   Incision is well-healed.  No drainage.  There is a hematoma present.  There is no tenderness.   Cardiovascular: Normal rate, regular rhythm, S1 normal, S2 normal, normal heart sounds and normal pulses.   No extrasystoles are present. PMI is not displaced.   "  Pulmonary/Chest: Effort normal and breath sounds normal.   Abdominal: Soft. Normal appearance and bowel sounds are normal. He exhibits no mass. There is no hepatosplenomegaly. There is no tenderness. No hernia.   Musculoskeletal: Normal range of motion.   Neurological: He is alert and oriented to person, place, and time. He has normal strength and normal reflexes. No cranial nerve deficit or sensory deficit. He displays a negative Romberg sign.   Skin: Skin is warm, dry and intact.   Psychiatric: He has a normal mood and affect. His speech is normal and behavior is normal. Judgment and thought content normal. Cognition and memory are normal.     Pathology:  Final Diagnosis   \"MEDIASTINAL LYMPH NODE\", EXCISION:                POSITIVE FOR METASTATIC ADENOCARCINOMA, CONSISTENT WITH ORIGIN FROM A PROSTATIC                       PRIMARY (SEE COMMENT).     COMMENT: The above diagnosis is supported by immunohistochemical stains.  See Microscopic Description for immunohistochemical staining details.       TDJ/chandab IHC/a/THM         Assessment/Plan       Patient is recovering from his mediastinoscopy.  The hematoma does not appear infected and should resolve on its own.  The patient has requested consultation with the medical oncology.  He has an appointment to see Dr. Lee.  He will return to see me on an as-needed basis only.  Thank you for allowing me to participate in the care Mr. Herrera.  If I can be of any assistance to you or Mr. Herrera in the future please let me know    Diagnoses and all orders for this visit:    Mediastinal adenopathy    Prostate cancer metastatic to bone    Other orders  -     HYDROcodone-acetaminophen (NORCO) 5-325 MG per tablet; Take 1 tablet by mouth Every 6 (Six) Hours As Needed for Moderate Pain  for up to 15 days.              "

## 2018-04-23 ENCOUNTER — LAB (OUTPATIENT)
Dept: LAB | Facility: HOSPITAL | Age: 78
End: 2018-04-23

## 2018-04-23 ENCOUNTER — OFFICE VISIT (OUTPATIENT)
Dept: ONCOLOGY | Facility: CLINIC | Age: 78
End: 2018-04-23

## 2018-04-23 VITALS
RESPIRATION RATE: 16 BRPM | SYSTOLIC BLOOD PRESSURE: 126 MMHG | WEIGHT: 186 LBS | OXYGEN SATURATION: 98 % | BODY MASS INDEX: 30.99 KG/M2 | TEMPERATURE: 98.7 F | DIASTOLIC BLOOD PRESSURE: 68 MMHG | HEART RATE: 66 BPM | HEIGHT: 65 IN

## 2018-04-23 DIAGNOSIS — E78.1 PURE HYPERGLYCERIDEMIA: Primary | ICD-10-CM

## 2018-04-23 DIAGNOSIS — C79.51 PROSTATE CANCER METASTATIC TO BONE (HCC): ICD-10-CM

## 2018-04-23 DIAGNOSIS — C61 PROSTATE CANCER METASTATIC TO BONE (HCC): ICD-10-CM

## 2018-04-23 DIAGNOSIS — R59.0 MEDIASTINAL ADENOPATHY: Primary | ICD-10-CM

## 2018-04-23 LAB
BASOPHILS # BLD AUTO: 0.05 10*3/MM3 (ref 0–0.1)
BASOPHILS NFR BLD AUTO: 0.6 % (ref 0–1.1)
DEPRECATED RDW RBC AUTO: 43 FL (ref 37–49)
EOSINOPHIL # BLD AUTO: 0.21 10*3/MM3 (ref 0–0.36)
EOSINOPHIL NFR BLD AUTO: 2.5 % (ref 1–5)
ERYTHROCYTE [DISTWIDTH] IN BLOOD BY AUTOMATED COUNT: 13.9 % (ref 11.7–14.5)
HCT VFR BLD AUTO: 40.1 % (ref 40–49)
HGB BLD-MCNC: 13.2 G/DL (ref 13.5–16.5)
IMM GRANULOCYTES # BLD: 0.08 10*3/MM3 (ref 0–0.03)
IMM GRANULOCYTES NFR BLD: 0.9 % (ref 0–0.5)
LYMPHOCYTES # BLD AUTO: 2.34 10*3/MM3 (ref 1–3.5)
LYMPHOCYTES NFR BLD AUTO: 27.3 % (ref 20–49)
MCH RBC QN AUTO: 28.2 PG (ref 27–33)
MCHC RBC AUTO-ENTMCNC: 32.9 G/DL (ref 32–35)
MCV RBC AUTO: 85.7 FL (ref 83–97)
MONOCYTES # BLD AUTO: 0.76 10*3/MM3 (ref 0.25–0.8)
MONOCYTES NFR BLD AUTO: 8.9 % (ref 4–12)
NEUTROPHILS # BLD AUTO: 5.13 10*3/MM3 (ref 1.5–7)
NEUTROPHILS NFR BLD AUTO: 59.8 % (ref 39–75)
NRBC BLD MANUAL-RTO: 0 /100 WBC (ref 0–0)
PLATELET # BLD AUTO: 301 10*3/MM3 (ref 150–375)
PMV BLD AUTO: 9.5 FL (ref 8.9–12.1)
RBC # BLD AUTO: 4.68 10*6/MM3 (ref 4.3–5.5)
WBC NRBC COR # BLD: 8.57 10*3/MM3 (ref 4–10)

## 2018-04-23 PROCEDURE — 36416 COLLJ CAPILLARY BLOOD SPEC: CPT | Performed by: INTERNAL MEDICINE

## 2018-04-23 PROCEDURE — 99215 OFFICE O/P EST HI 40 MIN: CPT | Performed by: INTERNAL MEDICINE

## 2018-04-23 PROCEDURE — 85025 COMPLETE CBC W/AUTO DIFF WBC: CPT | Performed by: INTERNAL MEDICINE

## 2018-04-23 NOTE — PROGRESS NOTES
Subjective patient feeling much improved Blaise discussed treatment in terms of continuing anti-antigen therapy and in agents to improve bone mineralization    REASON FOR CONSULTATION:    Provide an opinion on any further workup or treatment                             REQUESTING PHYSICIAN:  Axel Guardado    .    History of Present Illness    The patient 77-year-old male with significant past medical history including prostate carcinoma, CKD 3 and long-term tobacco use be been seen by primary care in late January, 2018 for hoarseness.  Chest x-ray is now outpatient January 23 revealed sclerotic change in the posterior mid chest to be within 3 adjacent thoracic vertebral bodies of uncertain significance.  A follow-up chest CT revealed numerous sclerotic foci within all visualized bones consistent with metastatic disease, multiple enlarged mediastinal lymph nodes concerning for metastatic lymphadenopathy and a polypoidal abnormality in the right lateral wall of the trachea.  CT of abdomen March 20 revealed extensive osseous metastatic disease mildly enlarged retroperitoneal lymph nodes.  Bone scan March 20 was consistent with widespread osseous metastasis particularly in the proximal half the left humeral shaft, abnormal uptake in the sternum and manubrium and a small focus in the posterior aspect of the calvarium.  This led to a plain film the left humerus with mottled sclerosis involving the shaft of the humerus particularly in the proximal half but no evidence of pathologic fracture.    The patient has additionally type 2 diabetes on insulin pump, again a history of prostate carcinoma prostatectomy 12 years previously, hypertension, and hyperlipidemia.  Additional studies included a PSA of 395.1 from March 14, 2018.The patient's been seen by urology March 15 with plans to start Firmagon.    The patient is now seen in pulmonary clinic with Dr. Bijan Rivera and we have discussed that he will need bronchoscopy and  mediastinoscopy.  Interestingly his additional history includes a long occupational exposure including working in the [place] just out of school, subsequent construction work for many years and a 30-pack-year history of smoking stopping in the late 1990s.  He indicates that he followed with Dr. Guillen of urology for many years with no changes in his exams and normal PSAs.  He stopped this follow-up approximately 2 years ago.     Patient was seen in consultation with thoracic surgery on March 28 and plans are made for mediastinoscopy and bronchoscopy with lymph node biopsy.  Pathology revealed that these nodes were consistent with metastatic prostate carcinoma.  He was discussed at thoracic conference.  A PET/CT was not pursued and it was determined that he see medical oncology for hormonal treatment and radiation therapy if symptomatic.  It should be noted that the patient's March 15 [facility] office note describes that Firmagon was planned.     The patient has met with the son and grandson April 23.  We have also contacted Dr. Taylor of urology in that Firmagon was given just after his last visit and would next be due approximately May 3.  Patient feels considerably better with resolution of his pain, normalization of his performance status.  He would like to continue treatment through our office now contacted Dr. Taylor concerning this.       Past Medical History:   Diagnosis Date   • Carcinoma of prostate    • CKD (chronic kidney disease)     Stage 3   • Diabetes mellitus    • Diastolic dysfunction     GRADE II   • Difficulty breathing     during exertion   • Dyslipidemia    • Erectile dysfunction    • Fatigue    • History of kidney stones    • Hyperlipidemia    • Hypertension    • Left ventricular hypertrophy    • Metastatic cancer    • Obstructive sleep apnea     USING CPAP   • Osteoarthritis    • Peptic ulcer    • Restless leg syndrome    • Sinus bradycardia    • SOB (shortness of  breath)    • Transient cerebral ischemia    • Type 2 diabetes mellitus         Past Surgical History:   Procedure Laterality Date   • BRONCHOSCOPY N/A 4/9/2018    Procedure: BRONCHOSCOPY;  Surgeon: Bijan Rivera III, MD;  Location: [facility] OR;  Service: Cardiothoracic   • COLONOSCOPY     • HEMORRHOIDECTOMY     • MEDIASTINOSCOPY N/A 4/9/2018    Procedure: MEDIASTINOSCOPY WITH LYMPH NODE BIOPSY;  Surgeon: Bijan Rivera III, MD;  Location: [facility] OR;  Service: Cardiothoracic   • OTHER SURGICAL HISTORY      ulcer repair   • PROSTATECTOMY  2006        Current Outpatient Prescriptions on File Prior to Visit   Medication Sig Dispense Refill   • aspirin 81 MG EC tablet Take 1 tablet by mouth daily.     • cholecalciferol (VITAMIN D3) 1000 units tablet Take 1,000 Units by mouth Daily.     • diltiaZEM (TIAZAC) 120 MG 24 hr capsule Take 120 mg by mouth Daily.     • fluticasone (FLONASE SENSIMIST) 27.5 MCG/SPRAY nasal spray 2 sprays into each nostril Daily.     • HYDROcodone-acetaminophen (NORCO) 5-325 MG per tablet Take 1 tablet by mouth Every 6 (Six) Hours As Needed for Moderate Pain  for up to 15 days. 60 tablet 0   • insulin aspart (NovoLOG) 100 UNIT/ML patient supplied pump Inject 150-180 Units under the skin Continuous.     • levothyroxine (SYNTHROID, LEVOTHROID) 75 MCG tablet Take 75 mcg by mouth Daily. TAKES 1/2 TABLET     • modafinil (PROVIGIL) 200 MG tablet Take 200 mg by mouth Daily.     • pravastatin (PRAVACHOL) 40 MG tablet Take 40 mg by mouth daily.     • rOPINIRole (REQUIP) 0.5 MG tablet TAKE ONE TABLET BY MOUTH IN THE EVENING AND ONE AT BEDTIME 180 tablet 2   • triamterene-hydrochlorothiazide (DYAZIDE) 37.5-25 MG per capsule Take 1 capsule by mouth Every Morning.     • valsartan (DIOVAN) 320 MG tablet Take 320 mg by mouth Daily.     • VASCEPA 1 g capsule capsule Take 1 g by mouth 4 (Four) Times a Day.       No current facility-administered medications on file prior to visit.         ALLERGIES:  No  Known Allergies     Social History     Social History   • Marital status:      Occupational History   •  Retired     Social History Main Topics   • Smoking status: Former Smoker     Quit date: 1/23/1998   • Smokeless tobacco: Never Used   • Alcohol use No      Comment: caffeine use   • Drug use: Unknown   • Sexual activity: Defer     Other Topics Concern   • Not on file        Family History   Problem Relation Age of Onset   • Heart failure Mother    • Hypertension Mother    • Diabetes Mother    • Cancer Father      LUNG   • Cancer Sister      LUNG   • Hypertension Sister    • Cancer Brother      LUNG, PROSTATE   • Hypertension Brother    • Heart disease Other    • Malig Hyperthermia Neg Hx         Review of Systems    Constitution:Normalization of performance status  HENT: Negative.    Eyes: Negative.    Cardiovascular: Negative.    Respiratory: Positive for cough, shortness of breath and sputum production.    Endocrine: Negative.    Hematologic/Lymphatic: Negative.    Skin: Negative.    Musculoskeletal: Resolution of joint pain and stiffness.   Gastrointestinal: Negative.    Genitourinary: Negative.    Neurological: Negative.    Psychiatric/Behavioral: Negative.    All other systems reviewed and are negative.  Objective     There were no vitals filed for this visit.  Current Status 4/23/2018   ECOG score 0       Physical Exam    OBJECTIVE: Vitals signs are reviewed and are stable.    HEENT: PERRLA.    Neck:  Supple.  Bilateral bruits, No thyromegaly or adenopathy  Lungs:  Clear.  No rales rhonchi or wheezes  Heart:  Regular rate and rhythm.  1/6 systolic murmur  Abdomen:   Soft, nontender.  Obese.  Bowel sounds present.  No organomegaly can be detected, though patient is tender in right lower to mid abdomen.  No ascites or masses again to be detected  Extremities:  No cyanosis, clubbing or edema.      RECENT LABS:  Hematology WBC   Date Value Ref Range Status   04/23/2018 8.57 4.00 - 10.00 10*3/mm3 Final      RBC   Date Value Ref Range Status   04/23/2018 4.68 4.30 - 5.50 10*6/mm3 Final     Hemoglobin   Date Value Ref Range Status   04/23/2018 13.2 (L) 13.5 - 16.5 g/dL Final     Hematocrit   Date Value Ref Range Status   04/23/2018 40.1 40.0 - 49.0 % Final     Platelets   Date Value Ref Range Status   04/23/2018 301 150 - 375 10*3/mm3 Final          Assessment/Plan       77-year-old male with medical history including prostate carcinoma, CK D3, long-term tobacco use recent development of hoarseness and subsequent studies that demonstrated findings consistent with metastatic disease to bone as well as multiple enlarged metastatic lymph nodes, and potential abnormality in the right lateral wall trachea.  His additional history includes type 2 diabetes on insulin pump which has been more effective for control of his blood sugars.    His recent symptoms have included generalized discomfort in multiple sites in his bony skeleton as well as right lower quadrant abdominal pain.  We discussed this made will improve after he starts Firmagon in the a.m.  Discussions with Dr. Rivera also have led to the conclusion that he'll need bronchoscopy and mediastinoscopy which did proceed as above on May 9.  Pathology revealed mediastinal lymph nodes to be involved with metastatic adenocarcinoma consistent with origin from a prostatic primary . The patient's case was discussed at thoracic conference and recommendations were to continue with ADT and radiation for symptomatic sites.  The patient is seen back April 23 his treatment with ADT-Firmagon-has improved his symptomatic performance status to near baseline.  It is not felt that he'll require other treatments such as oral antiandrogens at this point.  He could, however, potentially benefit from agents such as Xgeva given at appropriate intervals.  For plan:  1.  Patient to return on May 7 for his next treatment with ADT- now with Lupron to be a every 3 month and advanced to a every 6  month treatment thereafter  2.  Patient be seen 1 month later for reassessment and his first Xgeva  3.  Following that visit we'll rescan via CT.  4.   PSA assay will be drawn on May 7 and when the patient is seen back 1 month later.

## 2018-05-07 ENCOUNTER — INFUSION (OUTPATIENT)
Dept: ONCOLOGY | Facility: HOSPITAL | Age: 78
End: 2018-05-07

## 2018-05-07 ENCOUNTER — LAB (OUTPATIENT)
Dept: LAB | Facility: HOSPITAL | Age: 78
End: 2018-05-07

## 2018-05-07 DIAGNOSIS — C79.51 PROSTATE CANCER METASTATIC TO BONE (HCC): ICD-10-CM

## 2018-05-07 DIAGNOSIS — C61 PROSTATE CANCER METASTATIC TO BONE (HCC): Primary | ICD-10-CM

## 2018-05-07 DIAGNOSIS — C79.51 PROSTATE CANCER METASTATIC TO BONE (HCC): Primary | ICD-10-CM

## 2018-05-07 DIAGNOSIS — O26.812 PREGNANCY RELATED FATIGUE IN SECOND TRIMESTER: Primary | ICD-10-CM

## 2018-05-07 DIAGNOSIS — C61 PROSTATE CANCER METASTATIC TO BONE (HCC): ICD-10-CM

## 2018-05-07 LAB
BASOPHILS # BLD AUTO: 0.02 10*3/MM3 (ref 0–0.1)
BASOPHILS NFR BLD AUTO: 0.2 % (ref 0–1.1)
DEPRECATED RDW RBC AUTO: 49.1 FL (ref 37–49)
EOSINOPHIL # BLD AUTO: 0.21 10*3/MM3 (ref 0–0.36)
EOSINOPHIL NFR BLD AUTO: 2.4 % (ref 1–5)
ERYTHROCYTE [DISTWIDTH] IN BLOOD BY AUTOMATED COUNT: 15 % (ref 11.7–14.5)
HCT VFR BLD AUTO: 36.3 % (ref 40–49)
HGB BLD-MCNC: 11.6 G/DL (ref 13.5–16.5)
IMM GRANULOCYTES # BLD: 0.05 10*3/MM3 (ref 0–0.03)
IMM GRANULOCYTES NFR BLD: 0.6 % (ref 0–0.5)
LYMPHOCYTES # BLD AUTO: 2.09 10*3/MM3 (ref 1–3.5)
LYMPHOCYTES NFR BLD AUTO: 24 % (ref 20–49)
MCH RBC QN AUTO: 28.5 PG (ref 27–33)
MCHC RBC AUTO-ENTMCNC: 32 G/DL (ref 32–35)
MCV RBC AUTO: 89.2 FL (ref 83–97)
MONOCYTES # BLD AUTO: 0.94 10*3/MM3 (ref 0.25–0.8)
MONOCYTES NFR BLD AUTO: 10.8 % (ref 4–12)
NEUTROPHILS # BLD AUTO: 5.4 10*3/MM3 (ref 1.5–7)
NEUTROPHILS NFR BLD AUTO: 62 % (ref 39–75)
NRBC BLD MANUAL-RTO: 0 /100 WBC (ref 0–0)
PLATELET # BLD AUTO: 254 10*3/MM3 (ref 150–375)
PMV BLD AUTO: 9.6 FL (ref 8.9–12.1)
PSA SERPL-MCNC: 7.81 NG/ML (ref 0–4)
RBC # BLD AUTO: 4.07 10*6/MM3 (ref 4.3–5.5)
WBC NRBC COR # BLD: 8.71 10*3/MM3 (ref 4–10)

## 2018-05-07 PROCEDURE — 96402 CHEMO HORMON ANTINEOPL SQ/IM: CPT | Performed by: NURSE PRACTITIONER

## 2018-05-07 PROCEDURE — 84153 ASSAY OF PSA TOTAL: CPT | Performed by: INTERNAL MEDICINE

## 2018-05-07 PROCEDURE — 85025 COMPLETE CBC W/AUTO DIFF WBC: CPT | Performed by: INTERNAL MEDICINE

## 2018-05-07 PROCEDURE — 25010000002 LEUPROLIDE ACETATE (3 MONTH) PER 7.5 MG: Performed by: INTERNAL MEDICINE

## 2018-05-07 PROCEDURE — 36415 COLL VENOUS BLD VENIPUNCTURE: CPT | Performed by: INTERNAL MEDICINE

## 2018-05-07 RX ADMIN — LEUPROLIDE ACETATE 22.5 MG: KIT at 08:25

## 2018-05-08 RX ORDER — ROPINIROLE 0.5 MG/1
TABLET, FILM COATED ORAL
Qty: 180 TABLET | Refills: 2 | OUTPATIENT
Start: 2018-05-08 | End: 2019-03-12

## 2018-05-21 LAB
MYCOBACTERIUM SPEC CULT: NORMAL
NIGHT BLUE STAIN TISS: NORMAL

## 2018-05-21 RX ORDER — DILTIAZEM HYDROCHLORIDE 120 MG/1
CAPSULE, EXTENDED RELEASE ORAL
Qty: 90 CAPSULE | Refills: 0 | Status: SHIPPED | OUTPATIENT
Start: 2018-05-21 | End: 2018-07-29 | Stop reason: SDUPTHER

## 2018-05-21 RX ORDER — VALSARTAN 320 MG/1
TABLET ORAL
Qty: 90 TABLET | Refills: 0 | Status: SHIPPED | OUTPATIENT
Start: 2018-05-21 | End: 2018-07-31 | Stop reason: CLARIF

## 2018-06-07 DIAGNOSIS — C79.51 PROSTATE CANCER METASTATIC TO BONE (HCC): ICD-10-CM

## 2018-06-07 DIAGNOSIS — C79.51 OSSEOUS METASTASIS: ICD-10-CM

## 2018-06-07 DIAGNOSIS — C61 PROSTATE CANCER METASTATIC TO BONE (HCC): ICD-10-CM

## 2018-06-11 ENCOUNTER — LAB (OUTPATIENT)
Dept: LAB | Facility: HOSPITAL | Age: 78
End: 2018-06-11

## 2018-06-11 ENCOUNTER — INFUSION (OUTPATIENT)
Dept: ONCOLOGY | Facility: HOSPITAL | Age: 78
End: 2018-06-11

## 2018-06-11 ENCOUNTER — OFFICE VISIT (OUTPATIENT)
Dept: ONCOLOGY | Facility: CLINIC | Age: 78
End: 2018-06-11

## 2018-06-11 VITALS
SYSTOLIC BLOOD PRESSURE: 144 MMHG | TEMPERATURE: 98 F | HEIGHT: 65 IN | RESPIRATION RATE: 12 BRPM | OXYGEN SATURATION: 100 % | BODY MASS INDEX: 32.62 KG/M2 | WEIGHT: 195.8 LBS | HEART RATE: 56 BPM | DIASTOLIC BLOOD PRESSURE: 72 MMHG

## 2018-06-11 DIAGNOSIS — C61 PROSTATE CANCER METASTATIC TO BONE (HCC): Primary | ICD-10-CM

## 2018-06-11 DIAGNOSIS — C79.51 OSSEOUS METASTASIS: ICD-10-CM

## 2018-06-11 DIAGNOSIS — C61 PROSTATE CANCER METASTATIC TO BONE (HCC): ICD-10-CM

## 2018-06-11 DIAGNOSIS — C79.51 OSSEOUS METASTASIS: Primary | ICD-10-CM

## 2018-06-11 DIAGNOSIS — C79.51 PROSTATE CANCER METASTATIC TO BONE (HCC): ICD-10-CM

## 2018-06-11 DIAGNOSIS — C79.51 PROSTATE CANCER METASTATIC TO BONE (HCC): Primary | ICD-10-CM

## 2018-06-11 LAB
ALBUMIN SERPL-MCNC: 4 G/DL (ref 3.5–5.2)
ALBUMIN/GLOB SERPL: 1.3 G/DL (ref 1.1–2.4)
ALP SERPL-CCNC: 191 U/L (ref 38–116)
ALT SERPL W P-5'-P-CCNC: 28 U/L (ref 0–41)
ANION GAP SERPL CALCULATED.3IONS-SCNC: 13.1 MMOL/L
AST SERPL-CCNC: 25 U/L (ref 0–40)
BASOPHILS # BLD AUTO: 0.03 10*3/MM3 (ref 0–0.1)
BASOPHILS NFR BLD AUTO: 0.4 % (ref 0–1.1)
BILIRUB SERPL-MCNC: 0.2 MG/DL (ref 0.1–1.2)
BUN BLD-MCNC: 21 MG/DL (ref 6–20)
BUN/CREAT SERPL: 18.3 (ref 7.3–30)
CALCIUM SPEC-SCNC: 9.4 MG/DL (ref 8.5–10.2)
CHLORIDE SERPL-SCNC: 104 MMOL/L (ref 98–107)
CO2 SERPL-SCNC: 25.9 MMOL/L (ref 22–29)
CREAT BLD-MCNC: 1.15 MG/DL (ref 0.7–1.3)
DEPRECATED RDW RBC AUTO: 53.6 FL (ref 37–49)
EOSINOPHIL # BLD AUTO: 0.15 10*3/MM3 (ref 0–0.36)
EOSINOPHIL NFR BLD AUTO: 2.2 % (ref 1–5)
ERYTHROCYTE [DISTWIDTH] IN BLOOD BY AUTOMATED COUNT: 15.9 % (ref 11.7–14.5)
GFR SERPL CREATININE-BSD FRML MDRD: 62 ML/MIN/1.73
GLOBULIN UR ELPH-MCNC: 3.2 GM/DL (ref 1.8–3.5)
GLUCOSE BLD-MCNC: 118 MG/DL (ref 74–124)
HCT VFR BLD AUTO: 37.2 % (ref 40–49)
HGB BLD-MCNC: 12 G/DL (ref 13.5–16.5)
IMM GRANULOCYTES # BLD: 0.02 10*3/MM3 (ref 0–0.03)
IMM GRANULOCYTES NFR BLD: 0.3 % (ref 0–0.5)
LYMPHOCYTES # BLD AUTO: 2.32 10*3/MM3 (ref 1–3.5)
LYMPHOCYTES NFR BLD AUTO: 34.2 % (ref 20–49)
MAGNESIUM SERPL-MCNC: 2 MG/DL (ref 1.8–2.5)
MCH RBC QN AUTO: 29.5 PG (ref 27–33)
MCHC RBC AUTO-ENTMCNC: 32.3 G/DL (ref 32–35)
MCV RBC AUTO: 91.4 FL (ref 83–97)
MONOCYTES # BLD AUTO: 0.48 10*3/MM3 (ref 0.25–0.8)
MONOCYTES NFR BLD AUTO: 7.1 % (ref 4–12)
NEUTROPHILS # BLD AUTO: 3.79 10*3/MM3 (ref 1.5–7)
NEUTROPHILS NFR BLD AUTO: 55.8 % (ref 39–75)
NRBC BLD MANUAL-RTO: 0 /100 WBC (ref 0–0)
PHOSPHATE SERPL-MCNC: 4.3 MG/DL (ref 2.5–4.5)
PLATELET # BLD AUTO: 205 10*3/MM3 (ref 150–375)
PMV BLD AUTO: 10.3 FL (ref 8.9–12.1)
POTASSIUM BLD-SCNC: 4.6 MMOL/L (ref 3.5–4.7)
PROT SERPL-MCNC: 7.2 G/DL (ref 6.3–8)
PSA SERPL-MCNC: 2.03 NG/ML (ref 0–4)
RBC # BLD AUTO: 4.07 10*6/MM3 (ref 4.3–5.5)
SODIUM BLD-SCNC: 143 MMOL/L (ref 134–145)
WBC NRBC COR # BLD: 6.79 10*3/MM3 (ref 4–10)

## 2018-06-11 PROCEDURE — 80053 COMPREHEN METABOLIC PANEL: CPT | Performed by: INTERNAL MEDICINE

## 2018-06-11 PROCEDURE — 99213 OFFICE O/P EST LOW 20 MIN: CPT | Performed by: INTERNAL MEDICINE

## 2018-06-11 PROCEDURE — 96372 THER/PROPH/DIAG INJ SC/IM: CPT | Performed by: INTERNAL MEDICINE

## 2018-06-11 PROCEDURE — 85025 COMPLETE CBC W/AUTO DIFF WBC: CPT | Performed by: INTERNAL MEDICINE

## 2018-06-11 PROCEDURE — 84153 ASSAY OF PSA TOTAL: CPT | Performed by: INTERNAL MEDICINE

## 2018-06-11 PROCEDURE — 36415 COLL VENOUS BLD VENIPUNCTURE: CPT | Performed by: INTERNAL MEDICINE

## 2018-06-11 PROCEDURE — 83735 ASSAY OF MAGNESIUM: CPT | Performed by: INTERNAL MEDICINE

## 2018-06-11 PROCEDURE — 25010000002 DENOSUMAB 120 MG/1.7ML SOLUTION: Performed by: INTERNAL MEDICINE

## 2018-06-11 PROCEDURE — 84100 ASSAY OF PHOSPHORUS: CPT | Performed by: INTERNAL MEDICINE

## 2018-06-11 RX ORDER — GABAPENTIN 100 MG/1
300 CAPSULE ORAL NIGHTLY
Qty: 90 CAPSULE | Refills: 1 | Status: SHIPPED | OUTPATIENT
Start: 2018-06-11 | End: 2018-07-30 | Stop reason: SDUPTHER

## 2018-06-11 RX ADMIN — DENOSUMAB 120 MG: 120 INJECTION SUBCUTANEOUS at 16:25

## 2018-06-11 NOTE — PROGRESS NOTES
Subjective patient feeling much improve                REASON FOR CONSULTATION:    Provide an opinion on any further workup or treatment                             REQUESTING PHYSICIAN:  Axel Guardado    .    History of Present Illness    The patient 77-year-old male with significant past medical history including prostate carcinoma, CKD 3 and long-term tobacco use be been seen by primary care in late January, 2018 for hoarseness.  Chest x-ray is now outpatient January 23 revealed sclerotic change in the posterior mid chest to be within 3 adjacent thoracic vertebral bodies of uncertain significance.  A follow-up chest CT revealed numerous sclerotic foci within all visualized bones consistent with metastatic disease, multiple enlarged mediastinal lymph nodes concerning for metastatic lymphadenopathy and a polypoidal abnormality in the right lateral wall of the trachea.  CT of abdomen March 20 revealed extensive osseous metastatic disease mildly enlarged retroperitoneal lymph nodes.  Bone scan March 20 was consistent with widespread osseous metastasis particularly in the proximal half the left humeral shaft, abnormal uptake in the sternum and manubrium and a small focus in the posterior aspect of the calvarium.  This led to a plain film the left humerus with mottled sclerosis involving the shaft of the humerus particularly in the proximal half but no evidence of pathologic fracture.    The patient has additionally type 2 diabetes on insulin pump, again a history of prostate carcinoma prostatectomy 12 years previously, hypertension, and hyperlipidemia.  Additional studies included a PSA of 395.1 from March 14, 2018.The patient's been seen by urology March 15 with plans to start Firmagon.    The patient is now seen in pulmonary clinic with Dr. Bijan Rivera and we have discussed that he will need bronchoscopy and mediastinoscopy.  Interestingly his additional history includes a long occupational exposure including  working in the [place] just out of school, subsequent construction work for many years and a 30-pack-year history of smoking stopping in the late 1990s.  He indicates that he followed with Dr. Guillen of urology for many years with no changes in his exams and normal PSAs.  He stopped this follow-up approximately 2 years ago.     Patient was seen in consultation with thoracic surgery on March 28 and plans are made for mediastinoscopy and bronchoscopy with lymph node biopsy.  Pathology revealed that these nodes were consistent with metastatic prostate carcinoma.  He was discussed at thoracic conference.  A PET/CT was not pursued and it was determined that he see medical oncology for hormonal treatment and radiation therapy if symptomatic.  It should be noted that the patient's March 15 [facility] office note describes that Firmagon was planned.     The patient has met with the son and grandson April 23.  We have also contacted Dr. Taylor of urology in that Firmagon was given just after his last visit and would next be due approximately May 3.  Patient feels considerably better with resolution of his pain, normalization of his performance status.  He would like to continue treatment through our office now contacted Dr. Taylor concerning this.    The patient is next seen June 11, 2018 we discussed his follow-up including his treatments with Lupron May 7 and his next treatment on July 30.  He has returned to essentially normal performance status and his PSA has dropped further from 395 March 14 27.8 May 7 and now 2.03 June 11.  We do plan to initiate Xgeva also study him via scans to document his improvement approximately a month from now.       Past Medical History:   Diagnosis Date   • Carcinoma of prostate    • CKD (chronic kidney disease)     Stage 3   • Diabetes mellitus    • Diastolic dysfunction     GRADE II   • Difficulty breathing     during exertion   • Dyslipidemia    • Erectile  dysfunction    • Fatigue    • History of blood transfusion    • History of kidney stones    • Hyperlipidemia    • Hypertension    • Left ventricular hypertrophy    • Metastatic cancer    • Obstructive sleep apnea     USING CPAP   • Osteoarthritis    • Peptic ulcer    • Restless leg syndrome    • Sinus bradycardia    • SOB (shortness of breath)    • Transient cerebral ischemia    • Type 2 diabetes mellitus         Past Surgical History:   Procedure Laterality Date   • BRONCHOSCOPY N/A 4/9/2018    Procedure: BRONCHOSCOPY;  Surgeon: Bijan Rivera III, MD;  Location: [facility] OR;  Service: Cardiothoracic   • COLONOSCOPY     • HEMORRHOIDECTOMY     • MEDIASTINOSCOPY N/A 4/9/2018    Procedure: MEDIASTINOSCOPY WITH LYMPH NODE BIOPSY;  Surgeon: Bijan Rivera III, MD;  Location: [facility] OR;  Service: Cardiothoracic   • OTHER SURGICAL HISTORY      ulcer repair   • PROSTATECTOMY  2006        Current Outpatient Prescriptions on File Prior to Visit   Medication Sig Dispense Refill   • aspirin 81 MG EC tablet Take 1 tablet by mouth daily.     • CARTIA  MG 24 hr capsule TAKE 1 CAPSULE EVERY DAY 90 capsule 0   • cholecalciferol (VITAMIN D3) 1000 units tablet Take 1,000 Units by mouth Daily.     • fluticasone (FLONASE SENSIMIST) 27.5 MCG/SPRAY nasal spray 2 sprays into each nostril Daily.     • insulin aspart (NovoLOG) 100 UNIT/ML patient supplied pump Inject 150-180 Units under the skin Continuous.     • levothyroxine (SYNTHROID, LEVOTHROID) 75 MCG tablet Take 75 mcg by mouth Daily. TAKES 1/2 TABLET     • modafinil (PROVIGIL) 200 MG tablet Take 200 mg by mouth Daily.     • pravastatin (PRAVACHOL) 40 MG tablet Take 40 mg by mouth daily.     • rOPINIRole (REQUIP) 0.5 MG tablet TAKE ONE TABLET BY MOUTH IN THE EVENING AND ONE AT BEDTIME 180 tablet 2   • triamterene-hydrochlorothiazide (DYAZIDE) 37.5-25 MG per capsule Take 1 capsule by mouth Every Morning.     • valsartan (DIOVAN) 320 MG tablet TAKE 1 TABLET EVERY DAY  "90 tablet 0   • VASCEPA 1 g capsule capsule Take 1 g by mouth 4 (Four) Times a Day.     • [DISCONTINUED] diltiaZEM (TIAZAC) 120 MG 24 hr capsule Take 120 mg by mouth Daily.       No current facility-administered medications on file prior to visit.         ALLERGIES:  No Known Allergies     Social History     Social History   • Marital status:    • Years of education: College     Occupational History   •  Retired     Social History Main Topics   • Smoking status: Former Smoker     Packs/day: 1.00     Years: 30.00     Types: Cigarettes     Quit date: 1/23/1998   • Smokeless tobacco: Never Used   • Alcohol use No      Comment: caffeine use   • Drug use: No   • Sexual activity: Defer     Other Topics Concern   • Not on file        Family History   Problem Relation Age of Onset   • Heart failure Mother    • Hypertension Mother    • Diabetes Mother    • Heart disease Mother    • Cancer Father         LUNG   • Lung cancer Father 46   • Cancer Sister         LUNG   • Hypertension Sister    • Lung cancer Sister 60   • Cancer Brother         LUNG   • Hypertension Brother    • Lung cancer Brother 62   • Heart disease Other    • Prostate cancer Brother 60   • Malig Hyperthermia Neg Hx         Review of Systems    Constitution:Normalization of performance status  HENT: Negative.    Eyes: Negative.    Cardiovascular: Negative.    Respiratory: Positive for cough, shortness of breath and sputum production.    Endocrine: Negative.    Hematologic/Lymphatic: Negative.    Skin: Negative.    Musculoskeletal: Resolution of joint pain and stiffness.   Gastrointestinal: Negative.    Genitourinary: Negative.    Neurological: Negative.    Psychiatric/Behavioral: Negative.    All other systems reviewed and are negative.  Objective     Vitals:    06/11/18 1543   BP: 144/72   Pulse: 56   Resp: 12   Temp: 98 °F (36.7 °C)   TempSrc: Oral   SpO2: 100%   Weight: 88.8 kg (195 lb 12.8 oz)   Height: 166.3 cm (65.47\")   PainSc: 0-No pain "     Current Status 6/11/2018   ECOG score 0       Physical Exam    OBJECTIVE: Vitals signs are reviewed and are stable.    HEENT: PERRLA.    Neck:  Supple.  Bilateral bruits, No thyromegaly or adenopathy  Lungs:  Clear.  No rales rhonchi or wheezes  Heart:  Regular rate and rhythm.  1/6 systolic murmur  Abdomen:   Soft, nontender.  Obese.  Bowel sounds present.  No organomegaly can be detected, though patient is tender in right lower to mid abdomen.  No ascites or masses again to be detected  Extremities:  No cyanosis, clubbing or edema.      RECENT LABS:  Hematology WBC   Date Value Ref Range Status   06/11/2018 6.79 4.00 - 10.00 10*3/mm3 Final     RBC   Date Value Ref Range Status   06/11/2018 4.07 (L) 4.30 - 5.50 10*6/mm3 Final     Hemoglobin   Date Value Ref Range Status   06/11/2018 12.0 (L) 13.5 - 16.5 g/dL Final     Hematocrit   Date Value Ref Range Status   06/11/2018 37.2 (L) 40.0 - 49.0 % Final     Platelets   Date Value Ref Range Status   06/11/2018 205 150 - 375 10*3/mm3 Final          Assessment/Plan       77-year-old male with medical history including prostate carcinoma, CK D3, long-term tobacco use recent development of hoarseness and subsequent studies that demonstrated findings consistent with metastatic disease to bone as well as multiple enlarged metastatic lymph nodes, and potential abnormality in the right lateral wall trachea.  His additional history includes type 2 diabetes on insulin pump which has been more effective for control of his blood sugars.    His recent symptoms have included generalized discomfort in multiple sites in his bony skeleton as well as right lower quadrant abdominal pain.  We discussed this made will improve after he starts Firmagon in the a.m.  Discussions with Dr. Rivera also have led to the conclusion that he'll need bronchoscopy and mediastinoscopy which did proceed as above on May 9.  Pathology revealed mediastinal lymph nodes to be involved with metastatic  adenocarcinoma consistent with origin from a prostatic primary . The patient's case was discussed at thoracic conference and recommendations were to continue with ADT and radiation for symptomatic sites.  The patient is seen back April 23 his treatment with ADT-Firmagon-has improved his symptomatic performance status to near baseline.  It is not felt that he'll require other treatments such as oral antiandrogens at this point.  He could, however, potentially benefit from agents such as Xgeva given at appropriate intervals. Plan:  *Xgeva today  *Scans July 23-CT of chest abdomen pelvis, repeat PSA and serum chemistries  *M.D. July 30 with his next Lupron and Xgeva planned, at this point we would move to Lupron at six-month periods

## 2018-06-15 ENCOUNTER — OFFICE VISIT (OUTPATIENT)
Dept: CARDIOLOGY | Facility: CLINIC | Age: 78
End: 2018-06-15

## 2018-06-15 VITALS
DIASTOLIC BLOOD PRESSURE: 80 MMHG | HEIGHT: 65 IN | SYSTOLIC BLOOD PRESSURE: 126 MMHG | WEIGHT: 192.8 LBS | HEART RATE: 63 BPM | BODY MASS INDEX: 32.12 KG/M2

## 2018-06-15 DIAGNOSIS — I51.89 DIASTOLIC DYSFUNCTION: ICD-10-CM

## 2018-06-15 DIAGNOSIS — C61 PROSTATE CANCER METASTATIC TO BONE (HCC): ICD-10-CM

## 2018-06-15 DIAGNOSIS — C79.51 PROSTATE CANCER METASTATIC TO BONE (HCC): ICD-10-CM

## 2018-06-15 DIAGNOSIS — I10 ESSENTIAL HYPERTENSION: Primary | ICD-10-CM

## 2018-06-15 DIAGNOSIS — G47.33 OBSTRUCTIVE SLEEP APNEA SYNDROME: ICD-10-CM

## 2018-06-15 DIAGNOSIS — E78.1 PURE HYPERGLYCERIDEMIA: ICD-10-CM

## 2018-06-15 DIAGNOSIS — R60.0 LOCALIZED EDEMA: ICD-10-CM

## 2018-06-15 DIAGNOSIS — N18.30 CHRONIC KIDNEY DISEASE, STAGE III (MODERATE) (HCC): ICD-10-CM

## 2018-06-15 PROCEDURE — 99213 OFFICE O/P EST LOW 20 MIN: CPT | Performed by: NURSE PRACTITIONER

## 2018-06-15 PROCEDURE — 93000 ELECTROCARDIOGRAM COMPLETE: CPT | Performed by: NURSE PRACTITIONER

## 2018-06-15 NOTE — PROGRESS NOTES
Date of Office Visit: 06/15/2018  Encounter Provider: SYD Underwood  Place of Service: [facility] CARDIOLOGY  Patient Name: Semaj Herrera  :1940    Chief Complaint   Patient presents with   • Follow-up   :     HPI: Semaj Herrera is a 77 y.o. male who presents today for cardiac follow up. He is a new patient to me and his previous records have been reviewed.  He has a history of hypertension, LVH, diastolic dysfunction, severe obstructive sleep apnea compliant with CPAP machine, diabetes mellitus, and chronic kidney disease followed by Dr. Vicky Duran.  He is an established patient of Dr. Tata Lee and was last seen in the office in March.  She recommended a repeat echocardiogram to reassess his diastolic dysfunction which had been grade 2 in the past.  The echocardiogram was completed on 4/3/17 with the final results: EF 62%, grade 2 diastolic dysfunction, left atrium volume borderline increased, trace aortic valve regurgitation, trace mitral valve regurgitation, and trace tricuspid valve regurgitation.    He presents today for follow up.  He feels these been doing well from a cardiac standpoint.  We reviewed the echocardiogram results and he verbalizes understanding.  He has lost about 28 pounds now that he is on hormone treatment for his cancer.  His oncologist is Dr. Lee.  He has also recently been diagnosed with neuropathy.  He denies chest pain, shortness of air, PND, orthopnea, palpitations, dizziness, or syncope.  He has chronic lower extremity edema which is unchanged.  Both his blood pressure and heart rate are normal today.    The following portion of the patient's history were reviewed and updated as appropriate: past medical history, past surgical history, past social history, past family history, allergies, current medications, and problem list.    Past Medical History:   Diagnosis Date   • CKD (chronic kidney disease)     Stage 3; followed by  Dr. Vicky Duran    • Diastolic dysfunction     GRADE II per echo 2018   • Difficulty breathing     during exertion   • Dyslipidemia    • Erectile dysfunction    • Fatigue    • History of blood transfusion    • History of kidney stones    • Hyperlipidemia    • Hypertension    • Left ventricular hypertrophy     per echo 2018   • Neuropathy    • Obstructive sleep apnea     USING CPAP   • Osteoarthritis    • Peptic ulcer    • Prostate cancer     Status post prostatectomy, radiation therapy, and hormone therapy followed by Dr. Lee; metastatsis to bone   • Restless leg syndrome    • Sinus bradycardia    • Transient cerebral ischemia    • Type 2 diabetes mellitus        Past Surgical History:   Procedure Laterality Date   • BRONCHOSCOPY N/A 4/9/2018    Procedure: BRONCHOSCOPY;  Surgeon: Bijan Rivera III, MD;  Location: [facility];  Service: Cardiothoracic   • COLONOSCOPY     • HEMORRHOIDECTOMY     • MEDIASTINOSCOPY N/A 4/9/2018    Procedure: MEDIASTINOSCOPY WITH LYMPH NODE BIOPSY;  Surgeon: Bijan Rivera III, MD;  Location: [facility];  Service: Cardiothoracic   • OTHER SURGICAL HISTORY      ulcer repair   • PROSTATECTOMY  2006       Social History     Social History   • Marital status:      Spouse name: N/A   • Number of children: N/A   • Years of education: College     Occupational History   •  Retired     Social History Main Topics   • Smoking status: Former Smoker     Packs/day: 1.00     Years: 30.00     Types: Cigarettes     Quit date: 1/23/1998   • Smokeless tobacco: Never Used   • Alcohol use No      Comment: caffeine use   • Drug use: No   • Sexual activity: Defer       Family History   Problem Relation Age of Onset   • Heart failure Mother    • Hypertension Mother    • Diabetes Mother    • Heart disease Mother    • Cancer Father         LUNG   • Lung cancer Father 46   • Cancer Sister         LUNG   • Hypertension Sister    • Lung cancer Sister 60   • Cancer Brother         LUNG   •  Hypertension Brother    • Lung cancer Brother 62   • Heart disease Other    • Prostate cancer Brother 60   • Malig Hyperthermia Neg Hx        Review of Systems   Constitution: Negative for chills, diaphoresis, fever, malaise/fatigue, night sweats, weight gain and weight loss.   HENT: Positive for hearing loss. Negative for nosebleeds, sore throat and tinnitus.    Eyes: Negative for blurred vision, double vision, pain and visual disturbance.   Cardiovascular: Negative for chest pain, claudication, cyanosis, dyspnea on exertion, irregular heartbeat, leg swelling, near-syncope, orthopnea, palpitations, paroxysmal nocturnal dyspnea and syncope.   Respiratory: Negative for cough, hemoptysis, shortness of breath, snoring and wheezing.    Endocrine: Positive for cold intolerance and heat intolerance. Negative for polyuria.   Hematologic/Lymphatic: Negative for bleeding problem. Does not bruise/bleed easily.   Skin: Negative for color change, dry skin, flushing and itching.   Musculoskeletal: Negative for falls, joint pain, joint swelling, muscle cramps, muscle weakness and myalgias.   Gastrointestinal: Negative for abdominal pain, constipation, heartburn, melena, nausea and vomiting.   Genitourinary: Positive for frequency. Negative for dysuria and hematuria.   Neurological: Positive for paresthesias. Negative for excessive daytime sleepiness, dizziness, light-headedness, loss of balance, numbness, seizures and vertigo.   Psychiatric/Behavioral: Negative for altered mental status, depression, memory loss and substance abuse. The patient does not have insomnia and is not nervous/anxious.    Allergic/Immunologic: Negative for environmental allergies.       No Known Allergies      Current Outpatient Prescriptions:   •  aspirin 81 MG EC tablet, Take 1 tablet by mouth daily., Disp: , Rfl:   •  Calcium Carbonate (CALTRATE 600 PO), Take 1 tablet by mouth Daily., Disp: , Rfl:   •  CARTIA  MG 24 hr capsule, TAKE 1 CAPSULE  "EVERY DAY, Disp: 90 capsule, Rfl: 0  •  cholecalciferol (VITAMIN D3) 1000 units tablet, Take 2,000 Units by mouth Daily., Disp: , Rfl:   •  Denosumab (XGEVA SC), Inject  under the skin., Disp: , Rfl:   •  fluticasone (FLONASE SENSIMIST) 27.5 MCG/SPRAY nasal spray, 2 sprays into each nostril As Needed., Disp: , Rfl:   •  gabapentin (NEURONTIN) 100 MG capsule, Take 3 capsules by mouth Every Night., Disp: 90 capsule, Rfl: 1  •  insulin aspart (NovoLOG) 100 UNIT/ML patient supplied pump, Inject 150-180 Units under the skin Continuous., Disp: , Rfl:   •  Leuprolide Acetate, 3 Month, (LUPRON DEPOT, 3-MONTH, IM), Inject  into the shoulder, thigh, or buttocks., Disp: , Rfl:   •  levothyroxine (SYNTHROID, LEVOTHROID) 75 MCG tablet, Take 75 mcg by mouth Daily. TAKES 1/2 TABLET, Disp: , Rfl:   •  modafinil (PROVIGIL) 200 MG tablet, Take 200 mg by mouth Daily., Disp: , Rfl:   •  pravastatin (PRAVACHOL) 40 MG tablet, Take 40 mg by mouth daily., Disp: , Rfl:   •  rOPINIRole (REQUIP) 0.5 MG tablet, TAKE ONE TABLET BY MOUTH IN THE EVENING AND ONE AT BEDTIME, Disp: 180 tablet, Rfl: 2  •  triamterene-hydrochlorothiazide (DYAZIDE) 37.5-25 MG per capsule, Take 1 capsule by mouth Every Morning., Disp: , Rfl:   •  valsartan (DIOVAN) 320 MG tablet, TAKE 1 TABLET EVERY DAY, Disp: 90 tablet, Rfl: 0  •  VASCEPA 1 g capsule capsule, Take 1 g by mouth 4 (Four) Times a Day., Disp: , Rfl:       Objective:     Vitals:    06/15/18 1438   BP: 126/80   Pulse: 63   Weight: 87.5 kg (192 lb 12.8 oz)   Height: 165.1 cm (65\")     Body mass index is 32.08 kg/m².    PHYSICAL EXAM:    Vitals Reviewed.   General Appearance: No acute distress, well developed and well nourished.   Eyes: Conjunctiva and lids: No erythema, swelling, or discharge. Sclera non-icteric.   HENT: Atraumatic, normocephalic. External eyes, ears, and nose normal. No hearing loss noted. Mucous membranes normal. Lips not cyanotic. Neck supple with no tenderness.  Respiratory: No signs of " respiratory distress. Respiration rhythm and depth normal.   Clear to auscultation. No rales, crackles, rhonchi, or wheezing auscultated.   Cardiovascular:  Jugular Venous Pressure: Normal  Heart Rate and Rhythm: Normal, Heart Sounds: Normal S1 and S2. No S3 or S4 noted.  Murmurs: No murmurs noted. No rubs, thrills, or gallops.   Arterial Pulses: Carotid pulses normal. No carotid bruit noted. Posterior tibialis and dorsalis pedis pulses normal.   Lower Extremities: No edema noted.  Gastrointestinal:  Abdomen soft, non-distended, non-tender. Normal bowel sounds. No hepatomegaly.   Musculoskeletal: Normal movement of extremities  Skin and Nails: General appearance normal. No pallor, cyanosis, diaphoresis. Skin temperature normal. No clubbing of fingernails.   Psychiatric: Patient alert and oriented to person, place, and time. Speech and behavior appropriate. Normal mood and affect.       ECG 12 Lead  Date/Time: 6/15/2018 2:35 PM  Performed by: CHRIS SMITH  Authorized by: CHRIS SMITH   Comparison: compared with previous ECG from 3/22/2017  Rhythm: sinus rhythm  Rate: normal  BPM: 63  Conduction: conduction normal  ST Segments: ST segments normal  T Waves: T waves normal  QRS axis: normal  Q waves: III and aVF  Clinical impression: abnormal ECG              Assessment:       Diagnosis Plan   1. Essential hypertension     2. Diastolic dysfunction     3. Localized edema     4. Chronic kidney disease, stage III (moderate)     5. Obstructive sleep apnea syndrome     6. Pure hyperglyceridemia     7. Prostate cancer metastatic to bone            Plan:       1.  Essential Hypertension: Blood pressure is well-controlled on current medication regimen.  2.  Diastolic Dysfunction: Noted to be grade 2 on recent echocardiogram with LVH noted.  Good blood pressure control recommended.  3.  Localized Edema: I believe this is multifactorial secondary to his diet, calcium channel blocker, diastolic dysfunction, and chronic  kidney disease.  I've recommended elevation of legs and low-sodium diet.  4.  Chronic Kidney Disease: Followed by Dr. Vicky Duran.  5.  Obstructive Sleep Apnea: Compliant with CPAP machine and followed by Dr. Bob Mcdermott.  6.  Neuropathy: Recently diagnosed and placed on gabapentin.  7.  Prostate Cancer: Status post prostatectomy, radiation therapy, and most recently hormone replacement.  Followed by Dr. Lee.   8.  He is scheduled to follow-up with Dr. Tata Lee in 6 months, unless otherwise needed sooner.    As always, it has been a pleasure to participate in your patient's care.      Sincerely,         SYD Mejia

## 2018-06-23 RX ORDER — MODAFINIL 200 MG/1
200 TABLET ORAL DAILY
Qty: 90 TABLET | Refills: 1 | Status: SHIPPED | OUTPATIENT
Start: 2018-06-23 | End: 2018-12-01

## 2018-07-03 ENCOUNTER — TRANSCRIBE ORDERS (OUTPATIENT)
Dept: ADMINISTRATIVE | Facility: HOSPITAL | Age: 78
End: 2018-07-03

## 2018-07-03 ENCOUNTER — LAB (OUTPATIENT)
Dept: LAB | Facility: HOSPITAL | Age: 78
End: 2018-07-03
Attending: INTERNAL MEDICINE

## 2018-07-03 DIAGNOSIS — N18.30 CHRONIC KIDNEY DISEASE, STAGE III (MODERATE) (HCC): Primary | ICD-10-CM

## 2018-07-03 DIAGNOSIS — N18.30 CHRONIC KIDNEY DISEASE, STAGE III (MODERATE) (HCC): ICD-10-CM

## 2018-07-03 LAB
25(OH)D3 SERPL-MCNC: 24.8 NG/ML (ref 30–100)
ANION GAP SERPL CALCULATED.3IONS-SCNC: 10.3 MMOL/L
BILIRUB UR QL STRIP: NEGATIVE
BUN BLD-MCNC: 19 MG/DL (ref 8–23)
BUN/CREAT SERPL: 17.6 (ref 7–25)
CALCIUM SPEC-SCNC: 8 MG/DL (ref 8.6–10.5)
CHLORIDE SERPL-SCNC: 107 MMOL/L (ref 98–107)
CLARITY UR: CLEAR
CO2 SERPL-SCNC: 23.7 MMOL/L (ref 22–29)
COLOR UR: YELLOW
CREAT BLD-MCNC: 1.08 MG/DL (ref 0.76–1.27)
CREAT UR-MCNC: 90.9 MG/DL
DEPRECATED RDW RBC AUTO: 52.6 FL (ref 37–54)
ERYTHROCYTE [DISTWIDTH] IN BLOOD BY AUTOMATED COUNT: 15.7 % (ref 11.5–14.5)
GFR SERPL CREATININE-BSD FRML MDRD: 66 ML/MIN/1.73
GLUCOSE BLD-MCNC: 92 MG/DL (ref 65–99)
GLUCOSE UR STRIP-MCNC: NEGATIVE MG/DL
HCT VFR BLD AUTO: 35.8 % (ref 40.4–52.2)
HGB BLD-MCNC: 11.7 G/DL (ref 13.7–17.6)
HGB UR QL STRIP.AUTO: NEGATIVE
KETONES UR QL STRIP: NEGATIVE
LEUKOCYTE ESTERASE UR QL STRIP.AUTO: NEGATIVE
MAGNESIUM SERPL-MCNC: 2.5 MG/DL (ref 1.6–2.4)
MCH RBC QN AUTO: 30.2 PG (ref 27–32.7)
MCHC RBC AUTO-ENTMCNC: 32.7 G/DL (ref 32.6–36.4)
MCV RBC AUTO: 92.5 FL (ref 79.8–96.2)
NITRITE UR QL STRIP: NEGATIVE
PH UR STRIP.AUTO: 6 [PH] (ref 5–8)
PHOSPHATE SERPL-MCNC: 2.7 MG/DL (ref 2.5–4.5)
PLATELET # BLD AUTO: 196 10*3/MM3 (ref 140–500)
PMV BLD AUTO: 11 FL (ref 6–12)
POTASSIUM BLD-SCNC: 4.5 MMOL/L (ref 3.5–5.2)
PROT UR QL STRIP: NEGATIVE
PROT UR-MCNC: 8 MG/DL
RBC # BLD AUTO: 3.87 10*6/MM3 (ref 4.6–6)
SODIUM BLD-SCNC: 141 MMOL/L (ref 136–145)
SP GR UR STRIP: 1.02 (ref 1–1.03)
UROBILINOGEN UR QL STRIP: NORMAL
WBC NRBC COR # BLD: 6.85 10*3/MM3 (ref 4.5–10.7)

## 2018-07-03 PROCEDURE — 82570 ASSAY OF URINE CREATININE: CPT

## 2018-07-03 PROCEDURE — 85027 COMPLETE CBC AUTOMATED: CPT

## 2018-07-03 PROCEDURE — 83735 ASSAY OF MAGNESIUM: CPT

## 2018-07-03 PROCEDURE — 36415 COLL VENOUS BLD VENIPUNCTURE: CPT

## 2018-07-03 PROCEDURE — 84100 ASSAY OF PHOSPHORUS: CPT

## 2018-07-03 PROCEDURE — 84156 ASSAY OF PROTEIN URINE: CPT

## 2018-07-03 PROCEDURE — 82306 VITAMIN D 25 HYDROXY: CPT

## 2018-07-03 PROCEDURE — 80048 BASIC METABOLIC PNL TOTAL CA: CPT

## 2018-07-03 PROCEDURE — 81003 URINALYSIS AUTO W/O SCOPE: CPT

## 2018-07-09 ENCOUNTER — DOCUMENTATION (OUTPATIENT)
Dept: FAMILY MEDICINE CLINIC | Facility: CLINIC | Age: 78
End: 2018-07-09

## 2018-07-19 ENCOUNTER — HOSPITAL ENCOUNTER (OUTPATIENT)
Dept: PET IMAGING | Facility: HOSPITAL | Age: 78
Discharge: HOME | End: 2018-07-19
Attending: INTERNAL MEDICINE | Admitting: INTERNAL MEDICINE

## 2018-07-19 ENCOUNTER — LAB (OUTPATIENT)
Dept: LAB | Facility: HOSPITAL | Age: 78
End: 2018-07-19

## 2018-07-19 DIAGNOSIS — C79.51 OSSEOUS METASTASIS: ICD-10-CM

## 2018-07-19 DIAGNOSIS — C79.51 PROSTATE CANCER METASTATIC TO BONE (HCC): ICD-10-CM

## 2018-07-19 DIAGNOSIS — C61 PROSTATE CANCER METASTATIC TO BONE (HCC): ICD-10-CM

## 2018-07-19 LAB
ALBUMIN SERPL-MCNC: 3.9 G/DL (ref 3.5–5.2)
ALBUMIN/GLOB SERPL: 1.3 G/DL (ref 1.1–2.4)
ALP SERPL-CCNC: 139 U/L (ref 38–116)
ALT SERPL W P-5'-P-CCNC: 28 U/L (ref 0–41)
ANION GAP SERPL CALCULATED.3IONS-SCNC: 12.5 MMOL/L
AST SERPL-CCNC: 24 U/L (ref 0–40)
BILIRUB SERPL-MCNC: 0.2 MG/DL (ref 0.1–1.2)
BUN BLD-MCNC: 20 MG/DL (ref 6–20)
BUN/CREAT SERPL: 19.8 (ref 7.3–30)
CALCIUM SPEC-SCNC: 9.1 MG/DL (ref 8.5–10.2)
CHLORIDE SERPL-SCNC: 103 MMOL/L (ref 98–107)
CO2 SERPL-SCNC: 25.5 MMOL/L (ref 22–29)
CREAT BLD-MCNC: 1.01 MG/DL (ref 0.7–1.3)
GFR SERPL CREATININE-BSD FRML MDRD: 72 ML/MIN/1.73
GLOBULIN UR ELPH-MCNC: 3.1 GM/DL (ref 1.8–3.5)
GLUCOSE BLD-MCNC: 113 MG/DL (ref 74–124)
POTASSIUM BLD-SCNC: 4.2 MMOL/L (ref 3.5–4.7)
PROT SERPL-MCNC: 7 G/DL (ref 6.3–8)
PSA SERPL-MCNC: 1.66 NG/ML (ref 0–4)
SODIUM BLD-SCNC: 141 MMOL/L (ref 134–145)

## 2018-07-19 PROCEDURE — 36415 COLL VENOUS BLD VENIPUNCTURE: CPT | Performed by: INTERNAL MEDICINE

## 2018-07-19 PROCEDURE — 74176 CT ABD & PELVIS W/O CONTRAST: CPT

## 2018-07-19 PROCEDURE — 71250 CT THORAX DX C-: CPT

## 2018-07-19 PROCEDURE — 80053 COMPREHEN METABOLIC PANEL: CPT | Performed by: INTERNAL MEDICINE

## 2018-07-19 PROCEDURE — 84153 ASSAY OF PSA TOTAL: CPT | Performed by: INTERNAL MEDICINE

## 2018-07-19 PROCEDURE — 0 DIATRIZOATE MEGLUMINE & SODIUM PER 1 ML: Performed by: INTERNAL MEDICINE

## 2018-07-19 RX ADMIN — DIATRIZOATE MEGLUMINE AND DIATRIZOATE SODIUM 30 ML: 660; 100 LIQUID ORAL; RECTAL at 07:10

## 2018-07-24 DIAGNOSIS — C79.51 PROSTATE CANCER METASTATIC TO BONE (HCC): ICD-10-CM

## 2018-07-24 DIAGNOSIS — C61 PROSTATE CANCER METASTATIC TO BONE (HCC): ICD-10-CM

## 2018-07-24 DIAGNOSIS — C79.51 OSSEOUS METASTASIS: ICD-10-CM

## 2018-07-26 ENCOUNTER — INFUSION (OUTPATIENT)
Dept: ONCOLOGY | Facility: HOSPITAL | Age: 78
End: 2018-07-26

## 2018-07-26 ENCOUNTER — LAB (OUTPATIENT)
Dept: LAB | Facility: HOSPITAL | Age: 78
End: 2018-07-26

## 2018-07-26 ENCOUNTER — OFFICE VISIT (OUTPATIENT)
Dept: ONCOLOGY | Facility: CLINIC | Age: 78
End: 2018-07-26

## 2018-07-26 VITALS
SYSTOLIC BLOOD PRESSURE: 120 MMHG | RESPIRATION RATE: 16 BRPM | DIASTOLIC BLOOD PRESSURE: 64 MMHG | HEIGHT: 65 IN | WEIGHT: 193.6 LBS | HEART RATE: 64 BPM | BODY MASS INDEX: 32.26 KG/M2 | OXYGEN SATURATION: 98 % | TEMPERATURE: 98.4 F

## 2018-07-26 DIAGNOSIS — C79.51 PROSTATE CANCER METASTATIC TO BONE (HCC): Primary | ICD-10-CM

## 2018-07-26 DIAGNOSIS — C79.51 OSSEOUS METASTASIS: ICD-10-CM

## 2018-07-26 DIAGNOSIS — C79.51 PROSTATE CANCER METASTATIC TO BONE (HCC): ICD-10-CM

## 2018-07-26 DIAGNOSIS — C61 PROSTATE CANCER METASTATIC TO BONE (HCC): Primary | ICD-10-CM

## 2018-07-26 DIAGNOSIS — C61 PROSTATE CANCER METASTATIC TO BONE (HCC): ICD-10-CM

## 2018-07-26 LAB
ALBUMIN SERPL-MCNC: 4 G/DL (ref 3.5–5.2)
ALBUMIN/GLOB SERPL: 1.2 G/DL (ref 1.1–2.4)
ALP SERPL-CCNC: 130 U/L (ref 38–116)
ALT SERPL W P-5'-P-CCNC: 25 U/L (ref 0–41)
ANION GAP SERPL CALCULATED.3IONS-SCNC: 12.1 MMOL/L
AST SERPL-CCNC: 22 U/L (ref 0–40)
BASOPHILS # BLD AUTO: 0.03 10*3/MM3 (ref 0–0.1)
BASOPHILS NFR BLD AUTO: 0.4 % (ref 0–1.1)
BILIRUB SERPL-MCNC: 0.2 MG/DL (ref 0.1–1.2)
BUN BLD-MCNC: 24 MG/DL (ref 6–20)
BUN/CREAT SERPL: 20.2 (ref 7.3–30)
CALCIUM SPEC-SCNC: 9.7 MG/DL (ref 8.5–10.2)
CHLORIDE SERPL-SCNC: 104 MMOL/L (ref 98–107)
CO2 SERPL-SCNC: 25.9 MMOL/L (ref 22–29)
CREAT BLD-MCNC: 1.19 MG/DL (ref 0.7–1.3)
DEPRECATED RDW RBC AUTO: 45.9 FL (ref 37–49)
EOSINOPHIL # BLD AUTO: 0.16 10*3/MM3 (ref 0–0.36)
EOSINOPHIL NFR BLD AUTO: 2.1 % (ref 1–5)
ERYTHROCYTE [DISTWIDTH] IN BLOOD BY AUTOMATED COUNT: 13.9 % (ref 11.7–14.5)
GFR SERPL CREATININE-BSD FRML MDRD: 59 ML/MIN/1.73
GLOBULIN UR ELPH-MCNC: 3.3 GM/DL (ref 1.8–3.5)
GLUCOSE BLD-MCNC: 144 MG/DL (ref 74–124)
HCT VFR BLD AUTO: 38.8 % (ref 40–49)
HGB BLD-MCNC: 12.7 G/DL (ref 13.5–16.5)
IMM GRANULOCYTES # BLD: 0.03 10*3/MM3 (ref 0–0.03)
IMM GRANULOCYTES NFR BLD: 0.4 % (ref 0–0.5)
LYMPHOCYTES # BLD AUTO: 2.74 10*3/MM3 (ref 1–3.5)
LYMPHOCYTES NFR BLD AUTO: 36.4 % (ref 20–49)
MAGNESIUM SERPL-MCNC: 2 MG/DL (ref 1.8–2.5)
MCH RBC QN AUTO: 30 PG (ref 27–33)
MCHC RBC AUTO-ENTMCNC: 32.7 G/DL (ref 32–35)
MCV RBC AUTO: 91.7 FL (ref 83–97)
MONOCYTES # BLD AUTO: 0.49 10*3/MM3 (ref 0.25–0.8)
MONOCYTES NFR BLD AUTO: 6.5 % (ref 4–12)
NEUTROPHILS # BLD AUTO: 4.08 10*3/MM3 (ref 1.5–7)
NEUTROPHILS NFR BLD AUTO: 54.2 % (ref 39–75)
NRBC BLD MANUAL-RTO: 0 /100 WBC (ref 0–0)
PHOSPHATE SERPL-MCNC: 4.1 MG/DL (ref 2.5–4.5)
PLATELET # BLD AUTO: 205 10*3/MM3 (ref 150–375)
PMV BLD AUTO: 10 FL (ref 8.9–12.1)
POTASSIUM BLD-SCNC: 4.6 MMOL/L (ref 3.5–4.7)
PROT SERPL-MCNC: 7.3 G/DL (ref 6.3–8)
PSA SERPL-MCNC: 1.79 NG/ML (ref 0–4)
RBC # BLD AUTO: 4.23 10*6/MM3 (ref 4.3–5.5)
SODIUM BLD-SCNC: 142 MMOL/L (ref 134–145)
WBC NRBC COR # BLD: 7.53 10*3/MM3 (ref 4–10)

## 2018-07-26 PROCEDURE — 36415 COLL VENOUS BLD VENIPUNCTURE: CPT | Performed by: INTERNAL MEDICINE

## 2018-07-26 PROCEDURE — 85025 COMPLETE CBC W/AUTO DIFF WBC: CPT | Performed by: INTERNAL MEDICINE

## 2018-07-26 PROCEDURE — 25010000002 LEUPROLIDE ACETATE (3 MONTH) PER 7.5 MG: Performed by: INTERNAL MEDICINE

## 2018-07-26 PROCEDURE — 99214 OFFICE O/P EST MOD 30 MIN: CPT | Performed by: INTERNAL MEDICINE

## 2018-07-26 PROCEDURE — 84100 ASSAY OF PHOSPHORUS: CPT | Performed by: INTERNAL MEDICINE

## 2018-07-26 PROCEDURE — 96372 THER/PROPH/DIAG INJ SC/IM: CPT | Performed by: INTERNAL MEDICINE

## 2018-07-26 PROCEDURE — 25010000002 DENOSUMAB 120 MG/1.7ML SOLUTION: Performed by: INTERNAL MEDICINE

## 2018-07-26 PROCEDURE — 80053 COMPREHEN METABOLIC PANEL: CPT | Performed by: INTERNAL MEDICINE

## 2018-07-26 PROCEDURE — 84153 ASSAY OF PSA TOTAL: CPT | Performed by: INTERNAL MEDICINE

## 2018-07-26 PROCEDURE — 83735 ASSAY OF MAGNESIUM: CPT | Performed by: INTERNAL MEDICINE

## 2018-07-26 RX ADMIN — LEUPROLIDE ACETATE 22.5 MG: KIT at 16:14

## 2018-07-26 RX ADMIN — DENOSUMAB 120 MG: 120 INJECTION SUBCUTANEOUS at 16:07

## 2018-07-26 NOTE — PROGRESS NOTES
Subjective patient continuing to improve                    REASON FOR CONSULTATION:    Provide an opinion on any further workup or treatment                             REQUESTING PHYSICIAN:  Axel Guardado    .    History of Present Illness    The patient 77-year-old male with significant past medical history including prostate carcinoma, CKD 3 and long-term tobacco use be been seen by primary care in late January, 2018 for hoarseness.  Chest x-ray is now outpatient January 23 revealed sclerotic change in the posterior mid chest to be within 3 adjacent thoracic vertebral bodies of uncertain significance.  A follow-up chest CT revealed numerous sclerotic foci within all visualized bones consistent with metastatic disease, multiple enlarged mediastinal lymph nodes concerning for metastatic lymphadenopathy and a polypoidal abnormality in the right lateral wall of the trachea.  CT of abdomen March 20 revealed extensive osseous metastatic disease mildly enlarged retroperitoneal lymph nodes.  Bone scan March 20 was consistent with widespread osseous metastasis particularly in the proximal half the left humeral shaft, abnormal uptake in the sternum and manubrium and a small focus in the posterior aspect of the calvarium.  This led to a plain film the left humerus with mottled sclerosis involving the shaft of the humerus particularly in the proximal half but no evidence of pathologic fracture.    The patient has additionally type 2 diabetes on insulin pump, again a history of prostate carcinoma prostatectomy 12 years previously, hypertension, and hyperlipidemia.  Additional studies included a PSA of 395.1 from March 14, 2018.The patient's been seen by urology March 15 with plans to start Firmagon.    The patient is now seen in pulmonary clinic with Dr. Bijan Rivera and we have discussed that he will need bronchoscopy and mediastinoscopy.  Interestingly his additional history includes a long occupational exposure  including working in the [place] just out of school, subsequent construction work for many years and a 30-pack-year history of smoking stopping in the late 1990s.  He indicates that he followed with Dr. Guillen of urology for many years with no changes in his exams and normal PSAs.  He stopped this follow-up approximately 2 years ago.     Patient was seen in consultation with thoracic surgery on March 28 and plans are made for mediastinoscopy and bronchoscopy with lymph node biopsy.  Pathology revealed that these nodes were consistent with metastatic prostate carcinoma.  He was discussed at thoracic conference.  A PET/CT was not pursued and it was determined that he see medical oncology for hormonal treatment and radiation therapy if symptomatic.  It should be noted that the patient's March 15 [facility] office note describes that Firmagon was planned.     The patient has met with the son and grandson April 23.  We have also contacted Dr. Taylor of urology in that Firmagon was given just after his last visit and would next be due approximately May 3.  Patient feels considerably better with resolution of his pain, normalization of his performance status.  He would like to continue treatment through our office now contacted Dr. Taylor concerning this.    The patient is next seen June 11, 2018 we discussed his follow-up including his treatments with Lupron May 7 and his next treatment on July 30.  He has returned to essentially normal performance status and his PSA has dropped further from 395 March 14 27.8 May 7 and now 2.03 June 11.  We do plan to initiate Xgeva also study him via scans to document his improvement approximately a month from now.   The patient is next seen July 26, 2018.  Repeat imaging demonstrated interval resolution of mediastinal and retroperitoneal lymphadenopathy.  There is thickening in the distal aspect of the appendix with stranding?  Chronic appendicitis.  The patient  indicates he is having no such symptoms and never has.  There is no change in sclerotic bony metastasis in the patient's PSA has dropped substantially to 1.66 by July 19 and 1.79 July 26.  He is actually feeling excellent and this is corroborated by family members.       Past Medical History:   Diagnosis Date   • CKD (chronic kidney disease)     Stage 3; followed by Dr. Vicky Duran    • Diastolic dysfunction     GRADE II per echo 2018   • Difficulty breathing     during exertion   • Dyslipidemia    • Erectile dysfunction    • Fatigue    • History of blood transfusion    • History of kidney stones    • Hyperlipidemia    • Hypertension    • Left ventricular hypertrophy     per echo 2018   • Neuropathy    • Obstructive sleep apnea     USING CPAP   • Osteoarthritis    • Peptic ulcer    • Prostate cancer (CMS/HCC)     Status post prostatectomy, radiation therapy, and hormone therapy followed by Dr. Lee; metastatsis to bone   • Restless leg syndrome    • Sinus bradycardia    • Transient cerebral ischemia    • Type 2 diabetes mellitus (CMS/HCC)         Past Surgical History:   Procedure Laterality Date   • BRONCHOSCOPY N/A 4/9/2018    Procedure: BRONCHOSCOPY;  Surgeon: Bijan Rivera III, MD;  Location: [facility];  Service: Cardiothoracic   • COLONOSCOPY     • HEMORRHOIDECTOMY     • MEDIASTINOSCOPY N/A 4/9/2018    Procedure: MEDIASTINOSCOPY WITH LYMPH NODE BIOPSY;  Surgeon: Bijan Rivera III, MD;  Location: [facility] OR;  Service: Cardiothoracic   • OTHER SURGICAL HISTORY      ulcer repair   • PROSTATECTOMY  2006        Current Outpatient Prescriptions on File Prior to Visit   Medication Sig Dispense Refill   • aspirin 81 MG EC tablet Take 1 tablet by mouth daily.     • Calcium Carbonate (CALTRATE 600 PO) Take 1 tablet by mouth Daily.     • CARTIA  MG 24 hr capsule TAKE 1 CAPSULE EVERY DAY 90 capsule 0   • cholecalciferol (VITAMIN D3) 1000 units tablet Take 2,000 Units by mouth Daily.     •  Denosumab (XGEVA SC) Inject  under the skin.     • gabapentin (NEURONTIN) 100 MG capsule Take 3 capsules by mouth Every Night. 90 capsule 1   • insulin aspart (NovoLOG) 100 UNIT/ML patient supplied pump Inject 150-180 Units under the skin Continuous.     • Leuprolide Acetate, 3 Month, (LUPRON DEPOT, 3-MONTH, IM) Inject  into the shoulder, thigh, or buttocks.     • levothyroxine (SYNTHROID, LEVOTHROID) 75 MCG tablet Take 75 mcg by mouth Daily. TAKES 1/2 TABLET     • modafinil (PROVIGIL) 200 MG tablet Take 1 tablet by mouth Daily. 90 tablet 1   • pravastatin (PRAVACHOL) 40 MG tablet Take 40 mg by mouth daily.     • rOPINIRole (REQUIP) 0.5 MG tablet TAKE ONE TABLET BY MOUTH IN THE EVENING AND ONE AT BEDTIME 180 tablet 2   • triamterene-hydrochlorothiazide (DYAZIDE) 37.5-25 MG per capsule Take 1 capsule by mouth Every Morning.     • valsartan (DIOVAN) 320 MG tablet TAKE 1 TABLET EVERY DAY 90 tablet 0   • fluticasone (FLONASE SENSIMIST) 27.5 MCG/SPRAY nasal spray 2 sprays into each nostril As Needed.       No current facility-administered medications on file prior to visit.         ALLERGIES:  No Known Allergies     Social History     Social History   • Marital status:    • Years of education: College     Occupational History   •  Retired     Social History Main Topics   • Smoking status: Former Smoker     Packs/day: 1.00     Years: 30.00     Types: Cigarettes     Quit date: 1/23/1998   • Smokeless tobacco: Never Used   • Alcohol use No      Comment: caffeine use   • Drug use: No   • Sexual activity: Defer     Other Topics Concern   • Not on file        Family History   Problem Relation Age of Onset   • Heart failure Mother    • Hypertension Mother    • Diabetes Mother    • Heart disease Mother    • Cancer Father         LUNG   • Lung cancer Father 46   • Cancer Sister         LUNG   • Hypertension Sister    • Lung cancer Sister 60   • Cancer Brother         LUNG   • Hypertension Brother    • Lung cancer Brother  "62   • Heart disease Other    • Prostate cancer Brother 60   • Malig Hyperthermia Neg Hx         Review of Systems    Constitution:Normalization of performance status  HENT: Negative.    Eyes: Negative.    Cardiovascular: Negative.    Respiratory:  No additional cough or shortness of breath Endocrine: Negative.    Hematologic/Lymphatic: Negative.    Skin: Negative.    Musculoskeletal: Resolution of joint pain and stiffness.   Gastrointestinal: Negative.    Genitourinary: Negative.    Neurological: Negative.    Psychiatric/Behavioral: Negative.    All other systems reviewed and are negative.  Objective     Vitals:    07/26/18 1512   BP: 120/64   Pulse: 64   Resp: 16   Temp: 98.4 °F (36.9 °C)   SpO2: 98%   Weight: 87.8 kg (193 lb 9.6 oz)   Height: 166.3 cm (65.47\")   PainSc: 0-No pain     Current Status 7/26/2018   ECOG score 0       Physical Exam    OBJECTIVE: Vitals signs are reviewed and are stable.    HEENT: PERRLA.    Neck:  Supple.  Bilateral bruits, No thyromegaly or adenopathy  Lungs:  Clear.  No rales rhonchi or wheezes  Heart:  Regular rate and rhythm.  1/6 systolic murmur  Abdomen:   Soft, nontender.  Obese.  Bowel sounds present.  No organomegaly can be detected, though patient is tender in right lower to mid abdomen.  No ascites or masses again to be detected  Extremities:  No cyanosis, clubbing or edema.      RECENT LABS:  Hematology WBC   Date Value Ref Range Status   07/26/2018 7.53 4.00 - 10.00 10*3/mm3 Final     RBC   Date Value Ref Range Status   07/26/2018 4.23 (L) 4.30 - 5.50 10*6/mm3 Final     Hemoglobin   Date Value Ref Range Status   07/26/2018 12.7 (L) 13.5 - 16.5 g/dL Final     Hematocrit   Date Value Ref Range Status   07/26/2018 38.8 (L) 40.0 - 49.0 % Final     Platelets   Date Value Ref Range Status   07/26/2018 205 150 - 375 10*3/mm3 Final      CT CHEST, ABDOMEN AND PELVIS WITHOUT CONTRAST  July 19, 2018    FINDINGS:  CHEST: There has been resolution of the previously seen " mediastinal  lymphadenopathy. There are no remaining enlarged mediastinal nodes.  There are no pulmonary airspace opacities and there are no pleural or  pericardial effusions. A tiny pleural-based nodular opacity at the right  lower lobe is stable, image 78. Extensive sclerotic bone metastases are  noted.     ABDOMEN/PELVIS: There has been interval resolution of the previously  seen retroperitoneal lymphadenopathy. Shotty groin nodes are stable.  Noncontrasted liver, gallbladder, spleen, pancreas,, adrenals, and  kidneys appear unremarkable. There is extensive colonic diverticulosis  without evidence for diverticulitis. The appendix previously appeared  normal and on the current examination, there is ill-defined thickening  of the distal aspect of the appendix with surrounding stranding, image  156. There is no free fluid or fluid collection. Prostatectomy changes  are noted. Extensive sclerotic bone metastases appear unchanged.     IMPRESSION:  1. There has been interval resolution of the mediastinal and  retroperitoneal lymphadenopathy.  2. There is new thickening of the distal aspect of the appendix with  surrounding stranding. Subacute or chronic appendicitis is possible.  Please correlate if symptoms in the right lower quadrant developed since  the previous CT examination. Follow-up is recommended as clinically  indicated.  3. No significant change in the extensive sclerotic bone metastases         Assessment/Plan       77-year-old male with medical history including prostate carcinoma, CK D3, long-term tobacco use recent development of hoarseness and subsequent studies that demonstrated findings consistent with metastatic disease to bone as well as multiple enlarged metastatic lymph nodes, and potential abnormality in the right lateral wall trachea.  His additional history includes type 2 diabetes on insulin pump which has been more effective for control of his blood sugars.    His recent symptoms have  included generalized discomfort in multiple sites in his bony skeleton as well as right lower quadrant abdominal pain.  We discussed this made will improve after he starts Firmagon in the a.m.  Discussions with Dr. Rivera also have led to the conclusion that he'll need bronchoscopy and mediastinoscopy which did proceed as above on May 9.  Pathology revealed mediastinal lymph nodes to be involved with metastatic adenocarcinoma consistent with origin from a prostatic primary . The patient's case was discussed at thoracic conference and recommendations were to continue with ADT and radiation for symptomatic sites.  The patient is seen back April 23 his treatment with ADT-Firmagon-has improved his symptomatic performance status to near baseline.  It is not felt that he'll require other treatments such as oral antiandrogens at this point.  He could, however, potentially benefit from agents such as Xgeva given at appropriate intervals. We went on to continue with Xgeva and rescan him July 23 demonstrating near remission developing.  This is also supported by his normalizing PSA.  The patient's scans demonstrated possibly chronic appendicitis is not having symptoms referable to this.he plans to be alert to the possibility of stenosis family member.  We discussed moving to an every 6 month treatment but at this point we'll continue at 3 months.  Plan:  *Today treatment with Lupron and Xgeva  *Every month Xgeva  *3 months Lupron and nurse practitioner and if doing well without every 6 month Lupron injection .

## 2018-07-29 RX ORDER — GABAPENTIN 100 MG/1
CAPSULE ORAL
Qty: 90 CAPSULE | Refills: 1 | Status: CANCELLED | OUTPATIENT
Start: 2018-07-29

## 2018-07-30 ENCOUNTER — TELEPHONE (OUTPATIENT)
Dept: CARDIOLOGY | Facility: CLINIC | Age: 78
End: 2018-07-30

## 2018-07-30 ENCOUNTER — PATIENT MESSAGE (OUTPATIENT)
Dept: CARDIOLOGY | Facility: CLINIC | Age: 78
End: 2018-07-30

## 2018-07-30 RX ORDER — GABAPENTIN 100 MG/1
400 CAPSULE ORAL NIGHTLY
Qty: 120 CAPSULE | Refills: 2 | Status: SHIPPED | OUTPATIENT
Start: 2018-07-30 | End: 2018-11-04 | Stop reason: SDUPTHER

## 2018-07-30 RX ORDER — DILTIAZEM HYDROCHLORIDE 120 MG/1
CAPSULE, EXTENDED RELEASE ORAL
Qty: 90 CAPSULE | Refills: 0 | Status: SHIPPED | OUTPATIENT
Start: 2018-07-30 | End: 2018-09-21 | Stop reason: SDUPTHER

## 2018-07-30 NOTE — TELEPHONE ENCOUNTER
----- Message from Jose Lee MD sent at 7/30/2018  7:28 AM EDT -----  Regarding: FW: Prescription Question  Contact: [phone]  Yes. Please. ASAEL Coburn    ----- Message -----  From: Marielos Mosley RN  Sent: 7/30/2018   7:26 AM  To: Jose Lee MD  Subject: FW: Prescription Question                        I didn't see anything in last dictation about increasing so just wanted to check with you first. Is it ok to send a new script with instructions to take 4 pills at night?  ----- Message -----  From: Semaj Herrera  Sent: 7/29/2018  10:51 PM  To: [facility]  Subject: Prescription Question                            We  discussed increasing my gabapentin to 4 instead of 3  but I did not see this mentioned in the visit summary. I need a new prescription sent to [facility].  I am taking 4 every day.

## 2018-07-30 NOTE — TELEPHONE ENCOUNTER
Pt is taking valsartan and it is currently on recall. What do you want to switch him to?     Pt #: [phone]     Thanks Ryann

## 2018-07-31 ENCOUNTER — TELEPHONE (OUTPATIENT)
Dept: CARDIOLOGY | Facility: CLINIC | Age: 78
End: 2018-07-31

## 2018-07-31 RX ORDER — LOSARTAN POTASSIUM 100 MG/1
100 TABLET ORAL DAILY
Qty: 90 TABLET | Refills: 2 | Status: SHIPPED | OUTPATIENT
Start: 2018-07-31 | End: 2018-07-31 | Stop reason: CLARIF

## 2018-07-31 RX ORDER — OLMESARTAN MEDOXOMIL 40 MG/1
40 TABLET ORAL DAILY
Qty: 90 TABLET | Refills: 1 | Status: SHIPPED | OUTPATIENT
Start: 2018-07-31 | End: 2018-11-04 | Stop reason: SDUPTHER

## 2018-07-31 NOTE — TELEPHONE ENCOUNTER
Regarding: FW: Prescription Question  Contact: [phone]  Change to benicar 40mg - stop valsartan - pls send in  ----- Message -----  From: Keren Goldberg MA  Sent: 7/31/2018   8:20 AM  To: Tata Lee MD  Subject: Prescription Question                            ----- Message from Keren Goldberg MA sent at 7/31/2018  8:20 AM EDT -----       ----- Message from Semaj Herrera to Tata Lee MD sent at 7/30/2018  7:46 PM -----   What about the valsartan i am currently taking.  Need a prescription sent to [facility] for replacement med due to recall of valsartan. I continue to take the valsartan 320mg tbs.  thanks

## 2018-07-31 NOTE — TELEPHONE ENCOUNTER
From: Semaj Herrera  To: Tata Lee MD  Sent: 7/30/2018 7:46 PM EDT  Subject: Prescription Question    What about the valsartan i am currently taking. Need a prescription sent to [facility] for replacement med due to recall of valsartan. I continue to take the valsartan 320mg tbs. thanks

## 2018-07-31 NOTE — TELEPHONE ENCOUNTER
Do you want the pt to start taking benicar 40 mg or losartan 100 mg instead of valsartan 320 mg? .....please advise      Keren ENCISO

## 2018-08-01 ENCOUNTER — OFFICE VISIT (OUTPATIENT)
Dept: SLEEP MEDICINE | Facility: HOSPITAL | Age: 78
End: 2018-08-01
Attending: INTERNAL MEDICINE

## 2018-08-01 DIAGNOSIS — G47.14 HYPERSOMNIA DUE TO MEDICAL CONDITION: ICD-10-CM

## 2018-08-01 DIAGNOSIS — G47.33 OBSTRUCTIVE SLEEP APNEA SYNDROME: Primary | ICD-10-CM

## 2018-08-01 PROCEDURE — 99214 OFFICE O/P EST MOD 30 MIN: CPT | Performed by: INTERNAL MEDICINE

## 2018-08-01 PROCEDURE — G0463 HOSPITAL OUTPT CLINIC VISIT: HCPCS

## 2018-08-01 NOTE — PROGRESS NOTES
Follow Up Sleep Disorders Center Note     Chief Complaint:  RONNY     Primary Care Physician: Axel Guardado MD    Interval History:   The patient was last seen by me in July 2017.  He states he is somewhat more agitated.  It takes him longer to fall asleep.  He has had recurrence of his previously treated prostate cancer.  He goes to bed 11:30 PM awakens at 7 AM.  He wakes up twice for the bathroom.  [place] Sleepiness Scale is normal at 4.    Review of Systems:  Recorded on the Sleep Questionnaire.  Unremarkable except for anxiety    Past medical history as outlined above    Family history unchanged    Social History:  2-3 caffeinated beverages  Social History     Social History   • Marital status:    • Years of education: College     Occupational History   •  Retired     Social History Main Topics   • Smoking status: Former Smoker     Packs/day: 1.00     Years: 30.00     Types: Cigarettes     Quit date: 1/23/1998   • Smokeless tobacco: Never Used   • Alcohol use No      Comment: caffeine use   • Drug use: No   • Sexual activity: Defer     Other Topics Concern   • Not on file       Allergies:  Patient has no known allergies.     Medication Review:  His list was reviewed.      Vital Signs:  Height 64.5 inches, weight 193 pounds and BMI obese at 33.    Physical Exam:    Constitutional:  Well developed white male and appears in no apparent distress.  Awake & oriented times 3.  Normal mood with normal recent and remote memory and normal judgement.  Eyes:  Conjunctivae normal.  Oropharynx:  moist mucous membranes without exudate and a large tongue and normal uvula and narrow posterior pharyngeal opening   Please see sleep disorder Center physical exam form for further details     Results Review:  DME is [facility] and he uses a nasal interface.  Downloads between February 2 and July 31, 2018 compliance 69%; since May 28, compliance greater than 95%.  Average usage is 6 hours and 54 minutes.  Average AHI is abnormal at  9.5 without a significant leak.  BiPAP pressure is IPAP of 14 and EPAP of 11.       Impression:   Obstructive sleep apnea nearly adequately treated with BiPAP 14/11 with good compliance and usage and no significant complaints of hypersomnolence since he is treated with modafinil 200 mg by mouth every morning.      Plan:  Good sleep hygiene measures should be maintained.  Weight loss would be beneficial in this patient who is obese by BMI.  The patient is benefiting from the treatment being provided.     After reviewing all with the patient, I would recommend changing to auto BiPAP.  High IPAP of 16 and low EPAP of 10.  Pressure support 6    The patient will continue modafinil    The patient will call for any problems and will follow up in 2 months.      Bob Mcdermott MD  Sleep Medicine  08/01/18  9:03 AM

## 2018-08-04 PROBLEM — G47.14 HYPERSOMNIA DUE TO MEDICAL CONDITION: Status: ACTIVE | Noted: 2018-08-04

## 2018-08-21 DIAGNOSIS — C79.51 OSSEOUS METASTASIS: ICD-10-CM

## 2018-08-21 DIAGNOSIS — C79.51 PROSTATE CANCER METASTATIC TO BONE (HCC): ICD-10-CM

## 2018-08-21 DIAGNOSIS — C61 PROSTATE CANCER METASTATIC TO BONE (HCC): ICD-10-CM

## 2018-08-23 ENCOUNTER — LAB (OUTPATIENT)
Dept: LAB | Facility: HOSPITAL | Age: 78
End: 2018-08-23

## 2018-08-23 ENCOUNTER — INFUSION (OUTPATIENT)
Dept: ONCOLOGY | Facility: HOSPITAL | Age: 78
End: 2018-08-23

## 2018-08-23 DIAGNOSIS — C61 PROSTATE CANCER METASTATIC TO BONE (HCC): ICD-10-CM

## 2018-08-23 DIAGNOSIS — C79.51 PROSTATE CANCER METASTATIC TO BONE (HCC): ICD-10-CM

## 2018-08-23 DIAGNOSIS — C79.51 OSSEOUS METASTASIS: Primary | ICD-10-CM

## 2018-08-23 DIAGNOSIS — C79.51 OSSEOUS METASTASIS: ICD-10-CM

## 2018-08-23 LAB
ALBUMIN SERPL-MCNC: 4.3 G/DL (ref 3.5–5.2)
ALBUMIN/GLOB SERPL: 1.4 G/DL (ref 1.1–2.4)
ALP SERPL-CCNC: 100 U/L (ref 38–116)
ALT SERPL W P-5'-P-CCNC: 31 U/L (ref 0–41)
ANION GAP SERPL CALCULATED.3IONS-SCNC: 12.2 MMOL/L
AST SERPL-CCNC: 26 U/L (ref 0–40)
BASOPHILS # BLD AUTO: 0.02 10*3/MM3 (ref 0–0.1)
BASOPHILS NFR BLD AUTO: 0.3 % (ref 0–1.1)
BILIRUB SERPL-MCNC: 0.2 MG/DL (ref 0.1–1.2)
BUN BLD-MCNC: 21 MG/DL (ref 6–20)
BUN/CREAT SERPL: 19.6 (ref 7.3–30)
CALCIUM SPEC-SCNC: 9.5 MG/DL (ref 8.5–10.2)
CHLORIDE SERPL-SCNC: 103 MMOL/L (ref 98–107)
CO2 SERPL-SCNC: 25.8 MMOL/L (ref 22–29)
CREAT BLD-MCNC: 1.07 MG/DL (ref 0.7–1.3)
DEPRECATED RDW RBC AUTO: 43.5 FL (ref 37–49)
EOSINOPHIL # BLD AUTO: 0.12 10*3/MM3 (ref 0–0.36)
EOSINOPHIL NFR BLD AUTO: 1.8 % (ref 1–5)
ERYTHROCYTE [DISTWIDTH] IN BLOOD BY AUTOMATED COUNT: 13 % (ref 11.7–14.5)
GFR SERPL CREATININE-BSD FRML MDRD: 67 ML/MIN/1.73
GLOBULIN UR ELPH-MCNC: 3.1 GM/DL (ref 1.8–3.5)
GLUCOSE BLD-MCNC: 107 MG/DL (ref 74–124)
HCT VFR BLD AUTO: 40.8 % (ref 40–49)
HGB BLD-MCNC: 13.4 G/DL (ref 13.5–16.5)
IMM GRANULOCYTES # BLD: 0.02 10*3/MM3 (ref 0–0.03)
IMM GRANULOCYTES NFR BLD: 0.3 % (ref 0–0.5)
LYMPHOCYTES # BLD AUTO: 2.36 10*3/MM3 (ref 1–3.5)
LYMPHOCYTES NFR BLD AUTO: 35.9 % (ref 20–49)
MAGNESIUM SERPL-MCNC: 1.9 MG/DL (ref 1.8–2.5)
MCH RBC QN AUTO: 30.1 PG (ref 27–33)
MCHC RBC AUTO-ENTMCNC: 32.8 G/DL (ref 32–35)
MCV RBC AUTO: 91.7 FL (ref 83–97)
MONOCYTES # BLD AUTO: 0.57 10*3/MM3 (ref 0.25–0.8)
MONOCYTES NFR BLD AUTO: 8.7 % (ref 4–12)
NEUTROPHILS # BLD AUTO: 3.48 10*3/MM3 (ref 1.5–7)
NEUTROPHILS NFR BLD AUTO: 53 % (ref 39–75)
NRBC BLD MANUAL-RTO: 0 /100 WBC (ref 0–0)
PHOSPHATE SERPL-MCNC: 3.6 MG/DL (ref 2.5–4.5)
PLATELET # BLD AUTO: 190 10*3/MM3 (ref 150–375)
PMV BLD AUTO: 10 FL (ref 8.9–12.1)
POTASSIUM BLD-SCNC: 5.4 MMOL/L (ref 3.5–4.7)
PROT SERPL-MCNC: 7.4 G/DL (ref 6.3–8)
RBC # BLD AUTO: 4.45 10*6/MM3 (ref 4.3–5.5)
SODIUM BLD-SCNC: 141 MMOL/L (ref 134–145)
WBC NRBC COR # BLD: 6.57 10*3/MM3 (ref 4–10)

## 2018-08-23 PROCEDURE — 83735 ASSAY OF MAGNESIUM: CPT | Performed by: INTERNAL MEDICINE

## 2018-08-23 PROCEDURE — 85025 COMPLETE CBC W/AUTO DIFF WBC: CPT | Performed by: INTERNAL MEDICINE

## 2018-08-23 PROCEDURE — 84100 ASSAY OF PHOSPHORUS: CPT | Performed by: INTERNAL MEDICINE

## 2018-08-23 PROCEDURE — 80053 COMPREHEN METABOLIC PANEL: CPT | Performed by: INTERNAL MEDICINE

## 2018-08-23 PROCEDURE — 36415 COLL VENOUS BLD VENIPUNCTURE: CPT | Performed by: INTERNAL MEDICINE

## 2018-08-23 PROCEDURE — 96372 THER/PROPH/DIAG INJ SC/IM: CPT | Performed by: INTERNAL MEDICINE

## 2018-08-23 PROCEDURE — 25010000002 DENOSUMAB 120 MG/1.7ML SOLUTION: Performed by: INTERNAL MEDICINE

## 2018-08-23 RX ADMIN — DENOSUMAB 120 MG: 120 INJECTION SUBCUTANEOUS at 11:23

## 2018-09-19 DIAGNOSIS — C61 PROSTATE CANCER METASTATIC TO BONE (HCC): ICD-10-CM

## 2018-09-19 DIAGNOSIS — C79.51 PROSTATE CANCER METASTATIC TO BONE (HCC): ICD-10-CM

## 2018-09-19 DIAGNOSIS — C79.51 OSSEOUS METASTASIS: ICD-10-CM

## 2018-09-20 ENCOUNTER — LAB (OUTPATIENT)
Dept: LAB | Facility: HOSPITAL | Age: 78
End: 2018-09-20

## 2018-09-20 ENCOUNTER — INFUSION (OUTPATIENT)
Dept: ONCOLOGY | Facility: HOSPITAL | Age: 78
End: 2018-09-20

## 2018-09-20 DIAGNOSIS — C79.51 PROSTATE CANCER METASTATIC TO BONE (HCC): ICD-10-CM

## 2018-09-20 DIAGNOSIS — C61 PROSTATE CANCER METASTATIC TO BONE (HCC): ICD-10-CM

## 2018-09-20 DIAGNOSIS — C79.51 OSSEOUS METASTASIS: ICD-10-CM

## 2018-09-20 DIAGNOSIS — C79.51 OSSEOUS METASTASIS: Primary | ICD-10-CM

## 2018-09-20 LAB
ALBUMIN SERPL-MCNC: 4 G/DL (ref 3.5–5.2)
ALBUMIN/GLOB SERPL: 1.3 G/DL (ref 1.1–2.4)
ALP SERPL-CCNC: 85 U/L (ref 38–116)
ALT SERPL W P-5'-P-CCNC: 23 U/L (ref 0–41)
ANION GAP SERPL CALCULATED.3IONS-SCNC: 12 MMOL/L
AST SERPL-CCNC: 20 U/L (ref 0–40)
BASOPHILS # BLD AUTO: 0.03 10*3/MM3 (ref 0–0.1)
BASOPHILS NFR BLD AUTO: 0.4 % (ref 0–1.1)
BILIRUB SERPL-MCNC: 0.3 MG/DL (ref 0.1–1.2)
BUN BLD-MCNC: 21 MG/DL (ref 6–20)
BUN/CREAT SERPL: 17.6 (ref 7.3–30)
CALCIUM SPEC-SCNC: 9.3 MG/DL (ref 8.5–10.2)
CHLORIDE SERPL-SCNC: 103 MMOL/L (ref 98–107)
CO2 SERPL-SCNC: 26 MMOL/L (ref 22–29)
CREAT BLD-MCNC: 1.19 MG/DL (ref 0.7–1.3)
DEPRECATED RDW RBC AUTO: 43.5 FL (ref 37–49)
EOSINOPHIL # BLD AUTO: 0.17 10*3/MM3 (ref 0–0.36)
EOSINOPHIL NFR BLD AUTO: 2.5 % (ref 1–5)
ERYTHROCYTE [DISTWIDTH] IN BLOOD BY AUTOMATED COUNT: 12.9 % (ref 11.7–14.5)
GFR SERPL CREATININE-BSD FRML MDRD: 59 ML/MIN/1.73
GLOBULIN UR ELPH-MCNC: 3 GM/DL (ref 1.8–3.5)
GLUCOSE BLD-MCNC: 197 MG/DL (ref 74–124)
HCT VFR BLD AUTO: 37.5 % (ref 40–49)
HGB BLD-MCNC: 12.2 G/DL (ref 13.5–16.5)
IMM GRANULOCYTES # BLD: 0.03 10*3/MM3 (ref 0–0.03)
IMM GRANULOCYTES NFR BLD: 0.4 % (ref 0–0.5)
LYMPHOCYTES # BLD AUTO: 2.54 10*3/MM3 (ref 1–3.5)
LYMPHOCYTES NFR BLD AUTO: 37.9 % (ref 20–49)
MAGNESIUM SERPL-MCNC: 1.8 MG/DL (ref 1.8–2.5)
MCH RBC QN AUTO: 30.1 PG (ref 27–33)
MCHC RBC AUTO-ENTMCNC: 32.5 G/DL (ref 32–35)
MCV RBC AUTO: 92.6 FL (ref 83–97)
MONOCYTES # BLD AUTO: 0.57 10*3/MM3 (ref 0.25–0.8)
MONOCYTES NFR BLD AUTO: 8.5 % (ref 4–12)
NEUTROPHILS # BLD AUTO: 3.37 10*3/MM3 (ref 1.5–7)
NEUTROPHILS NFR BLD AUTO: 50.3 % (ref 39–75)
NRBC BLD MANUAL-RTO: 0 /100 WBC (ref 0–0)
PHOSPHATE SERPL-MCNC: 3.7 MG/DL (ref 2.5–4.5)
PLATELET # BLD AUTO: 177 10*3/MM3 (ref 150–375)
PMV BLD AUTO: 10.4 FL (ref 8.9–12.1)
POTASSIUM BLD-SCNC: 4.9 MMOL/L (ref 3.5–4.7)
PROT SERPL-MCNC: 7 G/DL (ref 6.3–8)
RBC # BLD AUTO: 4.05 10*6/MM3 (ref 4.3–5.5)
SODIUM BLD-SCNC: 141 MMOL/L (ref 134–145)
WBC NRBC COR # BLD: 6.71 10*3/MM3 (ref 4–10)

## 2018-09-20 PROCEDURE — 80053 COMPREHEN METABOLIC PANEL: CPT | Performed by: INTERNAL MEDICINE

## 2018-09-20 PROCEDURE — 36415 COLL VENOUS BLD VENIPUNCTURE: CPT | Performed by: INTERNAL MEDICINE

## 2018-09-20 PROCEDURE — 84100 ASSAY OF PHOSPHORUS: CPT | Performed by: INTERNAL MEDICINE

## 2018-09-20 PROCEDURE — 25010000002 DENOSUMAB 120 MG/1.7ML SOLUTION: Performed by: INTERNAL MEDICINE

## 2018-09-20 PROCEDURE — 83735 ASSAY OF MAGNESIUM: CPT | Performed by: INTERNAL MEDICINE

## 2018-09-20 PROCEDURE — 96372 THER/PROPH/DIAG INJ SC/IM: CPT | Performed by: INTERNAL MEDICINE

## 2018-09-20 PROCEDURE — 85025 COMPLETE CBC W/AUTO DIFF WBC: CPT | Performed by: INTERNAL MEDICINE

## 2018-09-20 RX ADMIN — DENOSUMAB 120 MG: 120 INJECTION SUBCUTANEOUS at 11:02

## 2018-09-24 RX ORDER — DILTIAZEM HYDROCHLORIDE 120 MG/1
CAPSULE, EXTENDED RELEASE ORAL
Qty: 90 CAPSULE | Refills: 1 | Status: SHIPPED | OUTPATIENT
Start: 2018-09-24 | End: 2019-03-15

## 2018-10-10 ENCOUNTER — OFFICE VISIT (OUTPATIENT)
Dept: SLEEP MEDICINE | Facility: HOSPITAL | Age: 78
End: 2018-10-10
Attending: INTERNAL MEDICINE

## 2018-10-10 VITALS
WEIGHT: 200 LBS | HEART RATE: 50 BPM | HEIGHT: 65 IN | DIASTOLIC BLOOD PRESSURE: 60 MMHG | BODY MASS INDEX: 33.32 KG/M2 | OXYGEN SATURATION: 99 % | SYSTOLIC BLOOD PRESSURE: 149 MMHG

## 2018-10-10 DIAGNOSIS — G47.14 HYPERSOMNIA DUE TO MEDICAL CONDITION: ICD-10-CM

## 2018-10-10 DIAGNOSIS — G47.33 OBSTRUCTIVE SLEEP APNEA SYNDROME: Primary | ICD-10-CM

## 2018-10-10 PROCEDURE — G0463 HOSPITAL OUTPT CLINIC VISIT: HCPCS

## 2018-10-10 PROCEDURE — 99213 OFFICE O/P EST LOW 20 MIN: CPT | Performed by: INTERNAL MEDICINE

## 2018-10-10 NOTE — PROGRESS NOTES
"Follow Up Sleep Disorders Center Note     Chief Complaint:  RONNY     Primary Care Physician: Axel Guardado MD    Interval History:   The patient was last seen by me in August.  Pressure changes were made.  He states he is better.  He has a at least 25-50 percent better.  He goes to bed at 9:30 PM and awakens at 6 AM.  [place] Sleepiness Scale normal at 6.  He will use the restroom at night    Review of Systems:  Recorded on the Sleep Questionnaire.  Unremarkable     Social History:    Social History     Social History   • Marital status:    • Years of education: College     Occupational History   •  Retired     Social History Main Topics   • Smoking status: Former Smoker     Packs/day: 1.00     Years: 30.00     Types: Cigarettes     Quit date: 1/23/1998   • Smokeless tobacco: Never Used   • Alcohol use No      Comment: caffeine use   • Drug use: No   • Sexual activity: Defer     Other Topics Concern   • Not on file       Allergies:  Patient has no known allergies.     Medication Review:  Reviewed.      Vital Signs:    Vitals:    10/10/18 0900   BP: 149/60   BP Location: Left arm   Patient Position: Sitting   Pulse: 50   SpO2: 99%   Weight: 90.7 kg (200 lb)   Height: 165.1 cm (65\")     Body mass index is 33.28 kg/m².    Physical Exam:    Constitutional:  Well developed white male and appears in no apparent distress.  Awake & oriented times 3.  Normal mood with normal recent and remote memory and normal judgement.  Eyes:  Conjunctivae normal.  Oropharynx:  moist mucous membranes without exudate and a large tongue and normal uvula and narrow posterior pharyngeal opening      Results Review:  DME is [facility] and he uses a nasal interface.  Downloads between July 12 and October 9, 2018 compliances 98%.  Average usage is 7 hours and 56 minutes.  Average AHI is mildly abnormal at 11 without leak.  Average AutoBiPAP pressure is IPAP of 14.3 and EPAP of 11.3.  Auto BiPAP settings: Max IPAP 16 and minimum EPAP 10 and max " pressure support 6.       Impression:   Obstructive sleep apnea nearly adequately treated with auto titrating BiPAP with good compliance and usage and no significant complaints of hypersomnolence.  Hypersomnolence treated with modafinil 200 mg every a.m.      Plan:  Good sleep hygiene measures should be maintained.  Weight loss would be beneficial in this patient who is obese by BMI.  The patient is benefiting from the treatment being provided.     The patient will continue auto BiPAP.  EPAP will be changed to 11 and pressure support will be changed to 4.  The patient will continue modafinil.    The patient will call for any problems and will follow up in 6 months.      Bob Mcdermott MD  Sleep Medicine  10/10/18  10:54 AM

## 2018-10-17 DIAGNOSIS — C61 PROSTATE CANCER METASTATIC TO BONE (HCC): ICD-10-CM

## 2018-10-17 DIAGNOSIS — C79.51 PROSTATE CANCER METASTATIC TO BONE (HCC): ICD-10-CM

## 2018-10-17 DIAGNOSIS — C79.51 OSSEOUS METASTASIS: ICD-10-CM

## 2018-10-18 ENCOUNTER — OFFICE VISIT (OUTPATIENT)
Dept: ONCOLOGY | Facility: CLINIC | Age: 78
End: 2018-10-18

## 2018-10-18 ENCOUNTER — INFUSION (OUTPATIENT)
Dept: ONCOLOGY | Facility: HOSPITAL | Age: 78
End: 2018-10-18

## 2018-10-18 ENCOUNTER — TELEPHONE (OUTPATIENT)
Dept: ONCOLOGY | Facility: CLINIC | Age: 78
End: 2018-10-18

## 2018-10-18 ENCOUNTER — LAB (OUTPATIENT)
Dept: LAB | Facility: HOSPITAL | Age: 78
End: 2018-10-18

## 2018-10-18 VITALS
HEIGHT: 65 IN | OXYGEN SATURATION: 98 % | SYSTOLIC BLOOD PRESSURE: 120 MMHG | TEMPERATURE: 98 F | WEIGHT: 199 LBS | RESPIRATION RATE: 14 BRPM | DIASTOLIC BLOOD PRESSURE: 68 MMHG | BODY MASS INDEX: 33.15 KG/M2 | HEART RATE: 57 BPM

## 2018-10-18 DIAGNOSIS — C79.51 OSSEOUS METASTASIS: ICD-10-CM

## 2018-10-18 DIAGNOSIS — C79.51 PROSTATE CANCER METASTATIC TO BONE (HCC): Primary | ICD-10-CM

## 2018-10-18 DIAGNOSIS — C61 PROSTATE CANCER METASTATIC TO BONE (HCC): ICD-10-CM

## 2018-10-18 DIAGNOSIS — C61 PROSTATE CANCER METASTATIC TO BONE (HCC): Primary | ICD-10-CM

## 2018-10-18 DIAGNOSIS — N18.30 CHRONIC KIDNEY DISEASE, STAGE III (MODERATE) (HCC): ICD-10-CM

## 2018-10-18 DIAGNOSIS — C79.51 PROSTATE CANCER METASTATIC TO BONE (HCC): ICD-10-CM

## 2018-10-18 LAB
ALBUMIN SERPL-MCNC: 4.2 G/DL (ref 3.5–5.2)
ALBUMIN/GLOB SERPL: 1.5 G/DL (ref 1.1–2.4)
ALP SERPL-CCNC: 85 U/L (ref 38–116)
ALT SERPL W P-5'-P-CCNC: 29 U/L (ref 0–41)
ANION GAP SERPL CALCULATED.3IONS-SCNC: 13.6 MMOL/L
AST SERPL-CCNC: 21 U/L (ref 0–40)
BASOPHILS # BLD AUTO: 0.03 10*3/MM3 (ref 0–0.1)
BASOPHILS NFR BLD AUTO: 0.4 % (ref 0–1.1)
BILIRUB SERPL-MCNC: 0.2 MG/DL (ref 0.1–1.2)
BUN BLD-MCNC: 31 MG/DL (ref 6–20)
BUN/CREAT SERPL: 22 (ref 7.3–30)
CALCIUM SPEC-SCNC: 8.8 MG/DL (ref 8.5–10.2)
CHLORIDE SERPL-SCNC: 102 MMOL/L (ref 98–107)
CO2 SERPL-SCNC: 24.4 MMOL/L (ref 22–29)
CREAT BLD-MCNC: 1.41 MG/DL (ref 0.7–1.3)
DEPRECATED RDW RBC AUTO: 44.6 FL (ref 37–49)
EOSINOPHIL # BLD AUTO: 0.16 10*3/MM3 (ref 0–0.36)
EOSINOPHIL NFR BLD AUTO: 2.2 % (ref 1–5)
ERYTHROCYTE [DISTWIDTH] IN BLOOD BY AUTOMATED COUNT: 13.2 % (ref 11.7–14.5)
GFR SERPL CREATININE-BSD FRML MDRD: 49 ML/MIN/1.73
GLOBULIN UR ELPH-MCNC: 2.8 GM/DL (ref 1.8–3.5)
GLUCOSE BLD-MCNC: 199 MG/DL (ref 74–124)
HCT VFR BLD AUTO: 37.8 % (ref 40–49)
HGB BLD-MCNC: 12.7 G/DL (ref 13.5–16.5)
IMM GRANULOCYTES # BLD: 0.03 10*3/MM3 (ref 0–0.03)
IMM GRANULOCYTES NFR BLD: 0.4 % (ref 0–0.5)
LYMPHOCYTES # BLD AUTO: 2.81 10*3/MM3 (ref 1–3.5)
LYMPHOCYTES NFR BLD AUTO: 38.4 % (ref 20–49)
MAGNESIUM SERPL-MCNC: 2.2 MG/DL (ref 1.8–2.5)
MCH RBC QN AUTO: 30.8 PG (ref 27–33)
MCHC RBC AUTO-ENTMCNC: 33.6 G/DL (ref 32–35)
MCV RBC AUTO: 91.5 FL (ref 83–97)
MONOCYTES # BLD AUTO: 0.61 10*3/MM3 (ref 0.25–0.8)
MONOCYTES NFR BLD AUTO: 8.3 % (ref 4–12)
NEUTROPHILS # BLD AUTO: 3.68 10*3/MM3 (ref 1.5–7)
NEUTROPHILS NFR BLD AUTO: 50.3 % (ref 39–75)
NRBC BLD MANUAL-RTO: 0 /100 WBC (ref 0–0)
PHOSPHATE SERPL-MCNC: 4.1 MG/DL (ref 2.5–4.5)
PLATELET # BLD AUTO: 188 10*3/MM3 (ref 150–375)
PMV BLD AUTO: 10.3 FL (ref 8.9–12.1)
POTASSIUM BLD-SCNC: 4.4 MMOL/L (ref 3.5–4.7)
PROT SERPL-MCNC: 7 G/DL (ref 6.3–8)
PSA SERPL-MCNC: 0.91 NG/ML (ref 0–4)
RBC # BLD AUTO: 4.13 10*6/MM3 (ref 4.3–5.5)
SODIUM BLD-SCNC: 140 MMOL/L (ref 134–145)
WBC NRBC COR # BLD: 7.32 10*3/MM3 (ref 4–10)

## 2018-10-18 PROCEDURE — 80053 COMPREHEN METABOLIC PANEL: CPT | Performed by: INTERNAL MEDICINE

## 2018-10-18 PROCEDURE — 85025 COMPLETE CBC W/AUTO DIFF WBC: CPT | Performed by: INTERNAL MEDICINE

## 2018-10-18 PROCEDURE — 96402 CHEMO HORMON ANTINEOPL SQ/IM: CPT | Performed by: NURSE PRACTITIONER

## 2018-10-18 PROCEDURE — 84153 ASSAY OF PSA TOTAL: CPT | Performed by: INTERNAL MEDICINE

## 2018-10-18 PROCEDURE — 96372 THER/PROPH/DIAG INJ SC/IM: CPT | Performed by: NURSE PRACTITIONER

## 2018-10-18 PROCEDURE — 36415 COLL VENOUS BLD VENIPUNCTURE: CPT | Performed by: INTERNAL MEDICINE

## 2018-10-18 PROCEDURE — 99214 OFFICE O/P EST MOD 30 MIN: CPT | Performed by: NURSE PRACTITIONER

## 2018-10-18 PROCEDURE — 84100 ASSAY OF PHOSPHORUS: CPT | Performed by: INTERNAL MEDICINE

## 2018-10-18 PROCEDURE — 25010000002 DENOSUMAB 120 MG/1.7ML SOLUTION: Performed by: NURSE PRACTITIONER

## 2018-10-18 PROCEDURE — 25010000002 LEUPROLIDE ACETATE (3 MONTH) PER 7.5 MG: Performed by: NURSE PRACTITIONER

## 2018-10-18 PROCEDURE — 83735 ASSAY OF MAGNESIUM: CPT | Performed by: INTERNAL MEDICINE

## 2018-10-18 RX ORDER — HYDROCODONE BITARTRATE AND ACETAMINOPHEN 5; 325 MG/1; MG/1
1 TABLET ORAL EVERY 6 HOURS PRN
Qty: 60 TABLET | Refills: 0 | Status: SHIPPED | OUTPATIENT
Start: 2018-10-18 | End: 2018-12-03 | Stop reason: SDUPTHER

## 2018-10-18 RX ORDER — TRIAMTERENE AND HYDROCHLOROTHIAZIDE 37.5; 25 MG/1; MG/1
TABLET ORAL
COMMUNITY
Start: 2018-10-07 | End: 2018-10-21 | Stop reason: HOSPADM

## 2018-10-18 RX ADMIN — LEUPROLIDE ACETATE 22.5 MG: KIT at 11:40

## 2018-10-18 RX ADMIN — DENOSUMAB 120 MG: 120 INJECTION SUBCUTANEOUS at 11:40

## 2018-10-18 NOTE — PROGRESS NOTES
Subjective patient with no concerns today                        REASON FOR CONSULTATION:                                 REQUESTING PHYSICIAN:  Axel Guardado    .    History of Present Illness    The patient 77-year-old male with significant past medical history including prostate carcinoma, CKD 3 and long-term tobacco use be been seen by primary care in late January, 2018 for hoarseness.  Chest x-ray is now outpatient January 23 revealed sclerotic change in the posterior mid chest to be within 3 adjacent thoracic vertebral bodies of uncertain significance.  A follow-up chest CT revealed numerous sclerotic foci within all visualized bones consistent with metastatic disease, multiple enlarged mediastinal lymph nodes concerning for metastatic lymphadenopathy and a polypoidal abnormality in the right lateral wall of the trachea.  CT of abdomen March 20 revealed extensive osseous metastatic disease mildly enlarged retroperitoneal lymph nodes.  Bone scan March 20 was consistent with widespread osseous metastasis particularly in the proximal half the left humeral shaft, abnormal uptake in the sternum and manubrium and a small focus in the posterior aspect of the calvarium.  This led to a plain film the left humerus with mottled sclerosis involving the shaft of the humerus particularly in the proximal half but no evidence of pathologic fracture.    The patient has additionally type 2 diabetes on insulin pump, again a history of prostate carcinoma prostatectomy 12 years previously, hypertension, and hyperlipidemia.  Additional studies included a PSA of 395.1 from March 14, 2018.The patient's been seen by urology March 15 with plans to start Firmagon.    The patient is now seen in pulmonary clinic with Dr. Bjian Rivera and we have discussed that he will need bronchoscopy and mediastinoscopy.  Interestingly his additional history includes a long occupational exposure including working in the [place]  just out of school, subsequent construction work for many years and a 30-pack-year history of smoking stopping in the late 1990s.  He indicates that he followed with Dr. Guillen of urology for many years with no changes in his exams and normal PSAs.  He stopped this follow-up approximately 2 years ago.     Patient was seen in consultation with thoracic surgery on March 28 and plans are made for mediastinoscopy and bronchoscopy with lymph node biopsy.  Pathology revealed that these nodes were consistent with metastatic prostate carcinoma.  He was discussed at thoracic conference.  A PET/CT was not pursued and it was determined that he see medical oncology for hormonal treatment and radiation therapy if symptomatic.  It should be noted that the patient's March 15 [facility] office note describes that Firmagon was planned.     The patient has met with the son and grandson April 23.  We have also contacted Dr. Taylor of urology in that Firmagon was given just after his last visit and would next be due approximately May 3.  Patient feels considerably better with resolution of his pain, normalization of his performance status.  He would like to continue treatment through our office now contacted Dr. Taylor concerning this.    The patient is next seen June 11, 2018 we discussed his follow-up including his treatments with Lupron May 7 and his next treatment on July 30.  He has returned to essentially normal performance status and his PSA has dropped further from 395 March 14 27.8 May 7 and now 2.03 June 11.  We do plan to initiate Xgeva also study him via scans to document his improvement approximately a month from now.   The patient is next seen July 26, 2018.  Repeat imaging demonstrated interval resolution of mediastinal and retroperitoneal lymphadenopathy.  There is thickening in the distal aspect of the appendix with stranding?  Chronic appendicitis.  The patient indicates he is having no such symptoms and never  has.  There is no change in sclerotic bony metastasis in the patient's PSA has dropped substantially to 1.66 by July 19 and 1.79 July 26.  He is actually feeling excellent and this is corroborated by family members.    Patient is seen on 10/18/2018 with no concerns.  He is happy with regards to the improvement in his PSA.  He denies new pain.  He does have continued pain however in his arms, noted previously to be in the area of metastatic disease.  He reports this causes some numbness in his arms.  He did have some pain medication initially that helps but is not had some for some time.  He states he took this judiciously and lasted quite a while for him.  He is requesting a refill today.  He reports he has a good appetite and is drinking plenty of fluids.  He denies any other changes.       Past Medical History:   Diagnosis Date   • CKD (chronic kidney disease)     Stage 3; followed by Dr. Vicky Duran    • Diastolic dysfunction     GRADE II per echo 2018   • Difficulty breathing     during exertion   • Dyslipidemia    • Erectile dysfunction    • Fatigue    • History of blood transfusion    • History of kidney stones    • Hyperlipidemia    • Hypertension    • Left ventricular hypertrophy     per echo 2018   • Neuropathy    • Obstructive sleep apnea     USING CPAP   • Osteoarthritis    • Peptic ulcer    • Prostate cancer (CMS/HCC)     Status post prostatectomy, radiation therapy, and hormone therapy followed by Dr. Lee; metastatsis to bone   • Restless leg syndrome    • Sinus bradycardia    • Transient cerebral ischemia    • Type 2 diabetes mellitus (CMS/HCC)         Past Surgical History:   Procedure Laterality Date   • BRONCHOSCOPY N/A 4/9/2018    Procedure: BRONCHOSCOPY;  Surgeon: Bijan Rivera III, MD;  Location: [facility];  Service: Cardiothoracic   • COLONOSCOPY     • HEMORRHOIDECTOMY     • MEDIASTINOSCOPY N/A 4/9/2018    Procedure: MEDIASTINOSCOPY WITH LYMPH NODE BIOPSY;  Surgeon: Bijan HOFF  Nicole CROCKER MD;  Location: [facility] OR;  Service: Cardiothoracic   • OTHER SURGICAL HISTORY      ulcer repair   • PROSTATECTOMY  2006        Current Outpatient Prescriptions on File Prior to Visit   Medication Sig Dispense Refill   • aspirin 81 MG EC tablet Take 1 tablet by mouth daily.     • Calcium Carbonate (CALTRATE 600 PO) Take 1 tablet by mouth Daily.     • CARTIA  MG 24 hr capsule TAKE 1 CAPSULE EVERY DAY 90 capsule 1   • cholecalciferol (VITAMIN D3) 1000 units tablet Take 2,000 Units by mouth Daily.     • Denosumab (XGEVA SC) Inject  under the skin.     • fluticasone (FLONASE SENSIMIST) 27.5 MCG/SPRAY nasal spray 2 sprays into each nostril As Needed.     • gabapentin (NEURONTIN) 100 MG capsule Take 4 capsules by mouth Every Night. 120 capsule 2   • insulin aspart (NovoLOG) 100 UNIT/ML patient supplied pump Inject 150-180 Units under the skin Continuous.     • Leuprolide Acetate, 3 Month, (LUPRON DEPOT, 3-MONTH, IM) Inject  into the shoulder, thigh, or buttocks.     • levothyroxine (SYNTHROID, LEVOTHROID) 75 MCG tablet Take 75 mcg by mouth Daily. TAKES 1/2 TABLET     • modafinil (PROVIGIL) 200 MG tablet Take 1 tablet by mouth Daily. 90 tablet 1   • olmesartan (BENICAR) 40 MG tablet Take 1 tablet by mouth Daily. 90 tablet 1   • pravastatin (PRAVACHOL) 40 MG tablet Take 40 mg by mouth daily.     • rOPINIRole (REQUIP) 0.5 MG tablet TAKE ONE TABLET BY MOUTH IN THE EVENING AND ONE AT BEDTIME 180 tablet 2   • triamterene-hydrochlorothiazide (DYAZIDE) 37.5-25 MG per capsule Take 1 capsule by mouth Every Morning.       Current Facility-Administered Medications on File Prior to Visit   Medication Dose Route Frequency Provider Last Rate Last Dose   • [COMPLETED] denosumab (XGEVA) injection 120 mg  120 mg Subcutaneous Once Apurva Fairchild APRN   120 mg at 10/18/18 1140   • [COMPLETED] leuprolide (LUPRON) injection 22.5 mg  22.5 mg Intramuscular Once Apurva Fairchild APRN   22.5 mg at 10/18/18 1140     "    ALLERGIES:  No Known Allergies     Social History     Social History   • Marital status:    • Years of education: College     Occupational History   •  Retired     Social History Main Topics   • Smoking status: Former Smoker     Packs/day: 1.00     Years: 30.00     Types: Cigarettes     Quit date: 1/23/1998   • Smokeless tobacco: Never Used   • Alcohol use No      Comment: caffeine use   • Drug use: No   • Sexual activity: Defer     Other Topics Concern   • Not on file        Family History   Problem Relation Age of Onset   • Heart failure Mother    • Hypertension Mother    • Diabetes Mother    • Heart disease Mother    • Cancer Father         LUNG   • Lung cancer Father 46   • Cancer Sister         LUNG   • Hypertension Sister    • Lung cancer Sister 60   • Cancer Brother         LUNG   • Hypertension Brother    • Lung cancer Brother 62   • Heart disease Other    • Prostate cancer Brother 60   • Malig Hyperthermia Neg Hx         Review of Systems    Constitution:Normalization of performance status  HENT: Negative.    Eyes: Negative.    Cardiovascular: Negative.    Respiratory:  No additional cough or shortness of breath Endocrine: Negative.    Hematologic/Lymphatic: Negative.    Skin: Negative.    Musculoskeletal: Resolution of joint pain and stiffness.   Gastrointestinal: Negative.    Genitourinary: Negative.    Neurological: Negative.    Psychiatric/Behavioral: Negative.    All other systems reviewed and are negative.  Objective     Vitals:    10/18/18 1045   BP: 120/68   Pulse: 57   Resp: 14   Temp: 98 °F (36.7 °C)   SpO2: 98%  Comment: at rest   Weight: 90.3 kg (199 lb)   Height: 166.3 cm (65.47\")   PainSc: 0-No pain     Current Status 10/18/2018   ECOG score 0       Physical Exam    OBJECTIVE: Vitals signs are reviewed and are stable.    HEENT: PERRLA.    Neck:  Supple.  Bilateral bruits, No thyromegaly or adenopathy  Lungs:  Clear.  No rales rhonchi or wheezes  Heart:  Regular rate and rhythm.  1/6 " systolic murmur  Abdomen:   Soft, nontender.  Obese.  Bowel sounds present.  No organomegaly can be detected, though patient is tender in right lower abdomen.  No ascites or masses again to be detected  Extremities:  No cyanosis, clubbing or edema.      RECENT LABS:  Hematology WBC   Date Value Ref Range Status   10/18/2018 7.32 4.00 - 10.00 10*3/mm3 Final     RBC   Date Value Ref Range Status   10/18/2018 4.13 (L) 4.30 - 5.50 10*6/mm3 Final     Hemoglobin   Date Value Ref Range Status   10/18/2018 12.7 (L) 13.5 - 16.5 g/dL Final     Hematocrit   Date Value Ref Range Status   10/18/2018 37.8 (L) 40.0 - 49.0 % Final     Platelets   Date Value Ref Range Status   10/18/2018 188 150 - 375 10*3/mm3 Final      CT CHEST, ABDOMEN AND PELVIS WITHOUT CONTRAST  July 19, 2018    FINDINGS:  CHEST: There has been resolution of the previously seen mediastinal  lymphadenopathy. There are no remaining enlarged mediastinal nodes.  There are no pulmonary airspace opacities and there are no pleural or  pericardial effusions. A tiny pleural-based nodular opacity at the right  lower lobe is stable, image 78. Extensive sclerotic bone metastases are  noted.     ABDOMEN/PELVIS: There has been interval resolution of the previously  seen retroperitoneal lymphadenopathy. Shotty groin nodes are stable.  Noncontrasted liver, gallbladder, spleen, pancreas,, adrenals, and  kidneys appear unremarkable. There is extensive colonic diverticulosis  without evidence for diverticulitis. The appendix previously appeared  normal and on the current examination, there is ill-defined thickening  of the distal aspect of the appendix with surrounding stranding, image  156. There is no free fluid or fluid collection. Prostatectomy changes  are noted. Extensive sclerotic bone metastases appear unchanged.     IMPRESSION:  1. There has been interval resolution of the mediastinal and  retroperitoneal lymphadenopathy.  2. There is new thickening of the distal aspect  of the appendix with  surrounding stranding. Subacute or chronic appendicitis is possible.  Please correlate if symptoms in the right lower quadrant developed since  the previous CT examination. Follow-up is recommended as clinically  indicated.  3. No significant change in the extensive sclerotic bone metastases         Assessment/Plan       77-year-old male with medical history including prostate carcinoma, CK D3, long-term tobacco use recent development of hoarseness and subsequent studies that demonstrated findings consistent with metastatic disease to bone as well as multiple enlarged metastatic lymph nodes, and potential abnormality in the right lateral wall trachea.  His additional history includes type 2 diabetes on insulin pump which has been more effective for control of his blood sugars.    His recent symptoms have included generalized discomfort in multiple sites in his bony skeleton as well as right lower quadrant abdominal pain.  We discussed this made will improve after he starts Firmagon in the a.m.  Discussions with Dr. Rivera also have led to the conclusion that he'll need bronchoscopy and mediastinoscopy which did proceed as above on May 9.  Pathology revealed mediastinal lymph nodes to be involved with metastatic adenocarcinoma consistent with origin from a prostatic primary . The patient's case was discussed at thoracic conference and recommendations were to continue with ADT and radiation for symptomatic sites.  The patient is seen back April 23 his treatment with ADT-Firmagon-has improved his symptomatic performance status to near baseline.  It is not felt that he'll require other treatments such as oral antiandrogens at this point.  He could, however, potentially benefit from agents such as Xgeva given at appropriate intervals. We went on to continue with Xgeva and rescan him July 23 demonstrating near remission developing.  This is also supported by his normalizing PSA.  The patient's scans  demonstrated possibly chronic appendicitis is not having symptoms referable to this.he plans to be alert to the possibility of as is his family member.     She comes in for treatment on 10/18/2018 due for Lupron and Xgeva.  His PSA continues to improve and we will consider spacing out Lupron to every 6 months, to be discussed with Dr. Lee.  The patient is tolerating treatment well.  Chemistries resulted after he left today and his creatinine is elevated as well as his BUN.  He does not wish to return to the office for IV fluids and will instead push oral fluids and have his labs rechecked next week.  If they remain further elevated we will alert Dr. Duran, his nephrologist.      Plan:  *Today treatment with Lupron and Xgeva  *Every month Xgeva  *Follow up with Dr. Lee in 3 months and will plan to push back Lupron to every 6 months.  *  Hydrocodone 5/325 1 po q6H PRN Pain #60 RF 0   *Return to the office in 1 week for repeat stat BMP, patient to wait for results and will receive IVF if it continues to be elevated.  Results to be sent to Dr. Duran at that time.

## 2018-10-18 NOTE — TELEPHONE ENCOUNTER
----- Message from SYD Mcfarlane sent at 10/18/2018  3:06 PM EDT -----  Regarding: labs next week  Please call this patient to set up stat bmp with RN review for possible ivf next week due to elevated BUN/Creat today. He is aware you will be calling

## 2018-10-19 ENCOUNTER — HOSPITAL ENCOUNTER (INPATIENT)
Facility: HOSPITAL | Age: 78
LOS: 2 days | Discharge: HOME | End: 2018-10-21
Attending: EMERGENCY MEDICINE | Admitting: HOSPITALIST

## 2018-10-19 ENCOUNTER — APPOINTMENT (OUTPATIENT)
Dept: GENERAL RADIOLOGY | Facility: HOSPITAL | Age: 78
End: 2018-10-19

## 2018-10-19 DIAGNOSIS — L03.032 CELLULITIS OF GREAT TOE OF LEFT FOOT: Primary | ICD-10-CM

## 2018-10-19 LAB
ALBUMIN SERPL-MCNC: 3.8 G/DL (ref 3.5–5.2)
ALBUMIN/GLOB SERPL: 1.2 G/DL
ALP SERPL-CCNC: 91 U/L (ref 39–117)
ALT SERPL W P-5'-P-CCNC: 26 U/L (ref 1–41)
ANION GAP SERPL CALCULATED.3IONS-SCNC: 12.3 MMOL/L
AST SERPL-CCNC: 21 U/L (ref 1–40)
BASOPHILS # BLD AUTO: 0.01 10*3/MM3 (ref 0–0.2)
BASOPHILS NFR BLD AUTO: 0.1 % (ref 0–1.5)
BILIRUB SERPL-MCNC: <0.2 MG/DL (ref 0.1–1.2)
BUN BLD-MCNC: 32 MG/DL (ref 8–23)
BUN/CREAT SERPL: 25.2 (ref 7–25)
CALCIUM SPEC-SCNC: 8.9 MG/DL (ref 8.6–10.5)
CHLORIDE SERPL-SCNC: 103 MMOL/L (ref 98–107)
CO2 SERPL-SCNC: 22.7 MMOL/L (ref 22–29)
CREAT BLD-MCNC: 1.27 MG/DL (ref 0.76–1.27)
CRP SERPL-MCNC: 1.04 MG/DL (ref 0–0.5)
DEPRECATED RDW RBC AUTO: 45.1 FL (ref 37–54)
EOSINOPHIL # BLD AUTO: 0.12 10*3/MM3 (ref 0–0.7)
EOSINOPHIL NFR BLD AUTO: 1.7 % (ref 0.3–6.2)
ERYTHROCYTE [DISTWIDTH] IN BLOOD BY AUTOMATED COUNT: 13.5 % (ref 11.5–14.5)
ERYTHROCYTE [SEDIMENTATION RATE] IN BLOOD: 40 MM/HR (ref 0–20)
GFR SERPL CREATININE-BSD FRML MDRD: 55 ML/MIN/1.73
GLOBULIN UR ELPH-MCNC: 3.1 GM/DL
GLUCOSE BLD-MCNC: 280 MG/DL (ref 65–99)
GLUCOSE BLDC GLUCOMTR-MCNC: 156 MG/DL (ref 70–130)
HCT VFR BLD AUTO: 35.8 % (ref 40.4–52.2)
HGB BLD-MCNC: 11.5 G/DL (ref 13.7–17.6)
IMM GRANULOCYTES # BLD: 0.02 10*3/MM3 (ref 0–0.03)
IMM GRANULOCYTES NFR BLD: 0.3 % (ref 0–0.5)
LYMPHOCYTES # BLD AUTO: 2.07 10*3/MM3 (ref 0.9–4.8)
LYMPHOCYTES NFR BLD AUTO: 29.2 % (ref 19.6–45.3)
MCH RBC QN AUTO: 29.5 PG (ref 27–32.7)
MCHC RBC AUTO-ENTMCNC: 32.1 G/DL (ref 32.6–36.4)
MCV RBC AUTO: 91.8 FL (ref 79.8–96.2)
MONOCYTES # BLD AUTO: 0.53 10*3/MM3 (ref 0.2–1.2)
MONOCYTES NFR BLD AUTO: 7.5 % (ref 5–12)
NEUTROPHILS # BLD AUTO: 4.33 10*3/MM3 (ref 1.9–8.1)
NEUTROPHILS NFR BLD AUTO: 61.2 % (ref 42.7–76)
PLATELET # BLD AUTO: 175 10*3/MM3 (ref 140–500)
PMV BLD AUTO: 11 FL (ref 6–12)
POTASSIUM BLD-SCNC: 4.3 MMOL/L (ref 3.5–5.2)
PROT SERPL-MCNC: 6.9 G/DL (ref 6–8.5)
RBC # BLD AUTO: 3.9 10*6/MM3 (ref 4.6–6)
SODIUM BLD-SCNC: 138 MMOL/L (ref 136–145)
WBC NRBC COR # BLD: 7.08 10*3/MM3 (ref 4.5–10.7)

## 2018-10-19 PROCEDURE — 80053 COMPREHEN METABOLIC PANEL: CPT | Performed by: EMERGENCY MEDICINE

## 2018-10-19 PROCEDURE — 83036 HEMOGLOBIN GLYCOSYLATED A1C: CPT | Performed by: HOSPITALIST

## 2018-10-19 PROCEDURE — 73630 X-RAY EXAM OF FOOT: CPT

## 2018-10-19 PROCEDURE — 86140 C-REACTIVE PROTEIN: CPT | Performed by: EMERGENCY MEDICINE

## 2018-10-19 PROCEDURE — 36415 COLL VENOUS BLD VENIPUNCTURE: CPT

## 2018-10-19 PROCEDURE — 25010000002 VANCOMYCIN 10 G RECONSTITUTED SOLUTION: Performed by: EMERGENCY MEDICINE

## 2018-10-19 PROCEDURE — 82962 GLUCOSE BLOOD TEST: CPT

## 2018-10-19 PROCEDURE — 87040 BLOOD CULTURE FOR BACTERIA: CPT | Performed by: EMERGENCY MEDICINE

## 2018-10-19 PROCEDURE — 99284 EMERGENCY DEPT VISIT MOD MDM: CPT

## 2018-10-19 PROCEDURE — 85652 RBC SED RATE AUTOMATED: CPT | Performed by: EMERGENCY MEDICINE

## 2018-10-19 PROCEDURE — 85025 COMPLETE CBC W/AUTO DIFF WBC: CPT | Performed by: EMERGENCY MEDICINE

## 2018-10-19 RX ORDER — SODIUM CHLORIDE 0.9 % (FLUSH) 0.9 %
10 SYRINGE (ML) INJECTION AS NEEDED
Status: DISCONTINUED | OUTPATIENT
Start: 2018-10-19 | End: 2018-10-21 | Stop reason: HOSPADM

## 2018-10-19 RX ADMIN — SODIUM CHLORIDE 1000 ML: 9 INJECTION, SOLUTION INTRAVENOUS at 22:37

## 2018-10-19 RX ADMIN — VANCOMYCIN HYDROCHLORIDE 1750 MG: 10 INJECTION, POWDER, LYOPHILIZED, FOR SOLUTION INTRAVENOUS at 22:40

## 2018-10-20 LAB
ANION GAP SERPL CALCULATED.3IONS-SCNC: 10 MMOL/L
BASOPHILS # BLD AUTO: 0.02 10*3/MM3 (ref 0–0.2)
BASOPHILS NFR BLD AUTO: 0.3 % (ref 0–1.5)
BUN BLD-MCNC: 23 MG/DL (ref 8–23)
BUN/CREAT SERPL: 24 (ref 7–25)
CALCIUM SPEC-SCNC: 8.3 MG/DL (ref 8.6–10.5)
CHLORIDE SERPL-SCNC: 108 MMOL/L (ref 98–107)
CO2 SERPL-SCNC: 23 MMOL/L (ref 22–29)
CREAT BLD-MCNC: 0.96 MG/DL (ref 0.76–1.27)
DEPRECATED RDW RBC AUTO: 45 FL (ref 37–54)
EOSINOPHIL # BLD AUTO: 0.21 10*3/MM3 (ref 0–0.7)
EOSINOPHIL NFR BLD AUTO: 3.4 % (ref 0.3–6.2)
ERYTHROCYTE [DISTWIDTH] IN BLOOD BY AUTOMATED COUNT: 13.5 % (ref 11.5–14.5)
GFR SERPL CREATININE-BSD FRML MDRD: 76 ML/MIN/1.73
GLUCOSE BLD-MCNC: 118 MG/DL (ref 65–99)
GLUCOSE BLDC GLUCOMTR-MCNC: 116 MG/DL (ref 70–130)
GLUCOSE BLDC GLUCOMTR-MCNC: 121 MG/DL (ref 70–130)
GLUCOSE BLDC GLUCOMTR-MCNC: 164 MG/DL (ref 70–130)
HBA1C MFR BLD: 7.5 % (ref 4.8–5.6)
HCT VFR BLD AUTO: 34.1 % (ref 40.4–52.2)
HGB BLD-MCNC: 10.9 G/DL (ref 13.7–17.6)
IMM GRANULOCYTES # BLD: 0 10*3/MM3 (ref 0–0.03)
IMM GRANULOCYTES NFR BLD: 0 % (ref 0–0.5)
LYMPHOCYTES # BLD AUTO: 2.63 10*3/MM3 (ref 0.9–4.8)
LYMPHOCYTES NFR BLD AUTO: 42.4 % (ref 19.6–45.3)
MCH RBC QN AUTO: 29.5 PG (ref 27–32.7)
MCHC RBC AUTO-ENTMCNC: 32 G/DL (ref 32.6–36.4)
MCV RBC AUTO: 92.2 FL (ref 79.8–96.2)
MONOCYTES # BLD AUTO: 0.61 10*3/MM3 (ref 0.2–1.2)
MONOCYTES NFR BLD AUTO: 9.8 % (ref 5–12)
NEUTROPHILS # BLD AUTO: 2.74 10*3/MM3 (ref 1.9–8.1)
NEUTROPHILS NFR BLD AUTO: 44.1 % (ref 42.7–76)
PLATELET # BLD AUTO: 158 10*3/MM3 (ref 140–500)
PMV BLD AUTO: 10.8 FL (ref 6–12)
POTASSIUM BLD-SCNC: 4.4 MMOL/L (ref 3.5–5.2)
RBC # BLD AUTO: 3.7 10*6/MM3 (ref 4.6–6)
SODIUM BLD-SCNC: 141 MMOL/L (ref 136–145)
WBC NRBC COR # BLD: 6.21 10*3/MM3 (ref 4.5–10.7)

## 2018-10-20 PROCEDURE — 82962 GLUCOSE BLOOD TEST: CPT

## 2018-10-20 PROCEDURE — 25010000002 ENOXAPARIN PER 10 MG: Performed by: HOSPITALIST

## 2018-10-20 PROCEDURE — 80048 BASIC METABOLIC PNL TOTAL CA: CPT | Performed by: HOSPITALIST

## 2018-10-20 PROCEDURE — 85025 COMPLETE CBC W/AUTO DIFF WBC: CPT | Performed by: HOSPITALIST

## 2018-10-20 PROCEDURE — 25010000002 CEFTRIAXONE PER 250 MG: Performed by: HOSPITALIST

## 2018-10-20 PROCEDURE — 25010000002 CEFTRIAXONE PER 250 MG: Performed by: INTERNAL MEDICINE

## 2018-10-20 PROCEDURE — 25010000002 VANCOMYCIN PER 500 MG: Performed by: HOSPITALIST

## 2018-10-20 RX ORDER — HYDROCODONE BITARTRATE AND ACETAMINOPHEN 5; 325 MG/1; MG/1
1 TABLET ORAL EVERY 6 HOURS PRN
Status: DISCONTINUED | OUTPATIENT
Start: 2018-10-20 | End: 2018-10-21 | Stop reason: HOSPADM

## 2018-10-20 RX ORDER — TRIAMTERENE AND HYDROCHLOROTHIAZIDE 37.5; 25 MG/1; MG/1
1 TABLET ORAL EVERY MORNING
Status: DISCONTINUED | OUTPATIENT
Start: 2018-10-20 | End: 2018-10-21 | Stop reason: HOSPADM

## 2018-10-20 RX ORDER — NALOXONE HCL 0.4 MG/ML
0.4 VIAL (ML) INJECTION
Status: DISCONTINUED | OUTPATIENT
Start: 2018-10-20 | End: 2018-10-21 | Stop reason: HOSPADM

## 2018-10-20 RX ORDER — CEFTRIAXONE SODIUM 1 G/50ML
1 INJECTION, SOLUTION INTRAVENOUS EVERY 24 HOURS
Status: DISCONTINUED | OUTPATIENT
Start: 2018-10-20 | End: 2018-10-20

## 2018-10-20 RX ORDER — HYDROCODONE BITARTRATE AND ACETAMINOPHEN 5; 325 MG/1; MG/1
1 TABLET ORAL EVERY 4 HOURS PRN
Status: DISCONTINUED | OUTPATIENT
Start: 2018-10-20 | End: 2018-10-21 | Stop reason: HOSPADM

## 2018-10-20 RX ORDER — ASPIRIN 81 MG/1
81 TABLET ORAL DAILY
Status: DISCONTINUED | OUTPATIENT
Start: 2018-10-20 | End: 2018-10-21 | Stop reason: HOSPADM

## 2018-10-20 RX ORDER — MODAFINIL 100 MG/1
200 TABLET ORAL DAILY
Status: DISCONTINUED | OUTPATIENT
Start: 2018-10-20 | End: 2018-10-21 | Stop reason: HOSPADM

## 2018-10-20 RX ORDER — TRIAMTERENE AND HYDROCHLOROTHIAZIDE 37.5; 25 MG/1; MG/1
1 CAPSULE ORAL EVERY MORNING
Status: DISCONTINUED | OUTPATIENT
Start: 2018-10-20 | End: 2018-10-20 | Stop reason: CLARIF

## 2018-10-20 RX ORDER — SODIUM CHLORIDE 0.9 % (FLUSH) 0.9 %
3 SYRINGE (ML) INJECTION EVERY 12 HOURS SCHEDULED
Status: DISCONTINUED | OUTPATIENT
Start: 2018-10-20 | End: 2018-10-21 | Stop reason: HOSPADM

## 2018-10-20 RX ORDER — LOSARTAN POTASSIUM 100 MG/1
100 TABLET ORAL
Status: DISCONTINUED | OUTPATIENT
Start: 2018-10-20 | End: 2018-10-21 | Stop reason: HOSPADM

## 2018-10-20 RX ORDER — NICOTINE POLACRILEX 4 MG
15 LOZENGE BUCCAL
Status: DISCONTINUED | OUTPATIENT
Start: 2018-10-20 | End: 2018-10-21 | Stop reason: HOSPADM

## 2018-10-20 RX ORDER — CEFTRIAXONE SODIUM 1 G/50ML
1 INJECTION, SOLUTION INTRAVENOUS EVERY 24 HOURS
Status: DISCONTINUED | OUTPATIENT
Start: 2018-10-20 | End: 2018-10-21 | Stop reason: HOSPADM

## 2018-10-20 RX ORDER — ONDANSETRON 2 MG/ML
4 INJECTION INTRAMUSCULAR; INTRAVENOUS EVERY 6 HOURS PRN
Status: DISCONTINUED | OUTPATIENT
Start: 2018-10-20 | End: 2018-10-21 | Stop reason: HOSPADM

## 2018-10-20 RX ORDER — ONDANSETRON 4 MG/1
4 TABLET, FILM COATED ORAL EVERY 6 HOURS PRN
Status: DISCONTINUED | OUTPATIENT
Start: 2018-10-20 | End: 2018-10-21 | Stop reason: HOSPADM

## 2018-10-20 RX ORDER — VANCOMYCIN HYDROCHLORIDE 1 G/200ML
10 INJECTION, SOLUTION INTRAVENOUS EVERY 12 HOURS
Status: DISCONTINUED | OUTPATIENT
Start: 2018-10-20 | End: 2018-10-21 | Stop reason: HOSPADM

## 2018-10-20 RX ORDER — ONDANSETRON 4 MG/1
4 TABLET, ORALLY DISINTEGRATING ORAL EVERY 6 HOURS PRN
Status: DISCONTINUED | OUTPATIENT
Start: 2018-10-20 | End: 2018-10-21 | Stop reason: HOSPADM

## 2018-10-20 RX ORDER — SODIUM CHLORIDE 0.9 % (FLUSH) 0.9 %
3-10 SYRINGE (ML) INJECTION AS NEEDED
Status: DISCONTINUED | OUTPATIENT
Start: 2018-10-20 | End: 2018-10-21 | Stop reason: HOSPADM

## 2018-10-20 RX ORDER — DEXTROSE MONOHYDRATE 25 G/50ML
25 INJECTION, SOLUTION INTRAVENOUS
Status: DISCONTINUED | OUTPATIENT
Start: 2018-10-20 | End: 2018-10-21 | Stop reason: HOSPADM

## 2018-10-20 RX ORDER — ACETAMINOPHEN 325 MG/1
650 TABLET ORAL EVERY 4 HOURS PRN
Status: DISCONTINUED | OUTPATIENT
Start: 2018-10-20 | End: 2018-10-21 | Stop reason: HOSPADM

## 2018-10-20 RX ORDER — ATORVASTATIN CALCIUM 10 MG/1
10 TABLET, FILM COATED ORAL DAILY
Status: DISCONTINUED | OUTPATIENT
Start: 2018-10-20 | End: 2018-10-21 | Stop reason: HOSPADM

## 2018-10-20 RX ORDER — GABAPENTIN 400 MG/1
400 CAPSULE ORAL NIGHTLY
Status: DISCONTINUED | OUTPATIENT
Start: 2018-10-20 | End: 2018-10-21 | Stop reason: HOSPADM

## 2018-10-20 RX ORDER — LEVOTHYROXINE SODIUM 0.07 MG/1
37.5 TABLET ORAL
Status: DISCONTINUED | OUTPATIENT
Start: 2018-10-20 | End: 2018-10-21 | Stop reason: HOSPADM

## 2018-10-20 RX ORDER — DILTIAZEM HYDROCHLORIDE 120 MG/1
120 CAPSULE, COATED, EXTENDED RELEASE ORAL DAILY
Status: DISCONTINUED | OUTPATIENT
Start: 2018-10-20 | End: 2018-10-21 | Stop reason: HOSPADM

## 2018-10-20 RX ORDER — ROPINIROLE 0.5 MG/1
0.5 TABLET, FILM COATED ORAL NIGHTLY
Status: DISCONTINUED | OUTPATIENT
Start: 2018-10-20 | End: 2018-10-21 | Stop reason: HOSPADM

## 2018-10-20 RX ADMIN — ENOXAPARIN SODIUM 40 MG: 40 INJECTION SUBCUTANEOUS at 02:09

## 2018-10-20 RX ADMIN — LOSARTAN POTASSIUM 100 MG: 100 TABLET, FILM COATED ORAL at 08:49

## 2018-10-20 RX ADMIN — ASPIRIN 81 MG: 81 TABLET, DELAYED RELEASE ORAL at 08:48

## 2018-10-20 RX ADMIN — CEFTRIAXONE SODIUM 1 G: 1 INJECTION, SOLUTION INTRAVENOUS at 02:46

## 2018-10-20 RX ADMIN — ATORVASTATIN CALCIUM 10 MG: 10 TABLET, FILM COATED ORAL at 08:49

## 2018-10-20 RX ADMIN — ROPINIROLE HYDROCHLORIDE 0.5 MG: 0.5 TABLET, FILM COATED ORAL at 20:33

## 2018-10-20 RX ADMIN — LEVOTHYROXINE SODIUM 37.5 MCG: 75 TABLET ORAL at 06:48

## 2018-10-20 RX ADMIN — GABAPENTIN 400 MG: 400 CAPSULE ORAL at 02:09

## 2018-10-20 RX ADMIN — Medication 3 ML: at 01:30

## 2018-10-20 RX ADMIN — VANCOMYCIN HYDROCHLORIDE 1000 MG: 1 INJECTION, SOLUTION INTRAVENOUS at 10:47

## 2018-10-20 RX ADMIN — VANCOMYCIN HYDROCHLORIDE 1000 MG: 1 INJECTION, SOLUTION INTRAVENOUS at 22:50

## 2018-10-20 RX ADMIN — HYDROCODONE BITARTRATE AND ACETAMINOPHEN 1 TABLET: 5; 325 TABLET ORAL at 01:43

## 2018-10-20 RX ADMIN — TRIAMTERENE AND HYDROCHLOROTHIAZIDE 1 TABLET: 37.5; 25 TABLET ORAL at 06:48

## 2018-10-20 RX ADMIN — CEFTRIAXONE SODIUM 1 G: 1 INJECTION, SOLUTION INTRAVENOUS at 20:33

## 2018-10-20 RX ADMIN — GABAPENTIN 400 MG: 400 CAPSULE ORAL at 20:33

## 2018-10-20 RX ADMIN — Medication 3 ML: at 20:34

## 2018-10-20 RX ADMIN — MODAFINIL 200 MG: 100 TABLET ORAL at 10:47

## 2018-10-20 RX ADMIN — Medication 3 ML: at 08:50

## 2018-10-20 RX ADMIN — DILTIAZEM HYDROCHLORIDE 120 MG: 120 CAPSULE, COATED, EXTENDED RELEASE ORAL at 08:49

## 2018-10-20 NOTE — PROGRESS NOTES
"Pharmacokinetic Consult - Vancomycin Dosing (Initial Note)    Semaj Herrera has been consulted for pharmacy to dose vancomycin for SSTI.  Pharmacy dosing vancomycin per Dr. Alexis Piña's request.   Goal trough: 10-20 mg/L   Other antimicrobials: Rocephin    Relevant clinical data and objective history reviewed:  77 y.o. male 165.1 cm (65\") 90.7 kg (200 lb)    Past Medical History:   Diagnosis Date   • CKD (chronic kidney disease)     Stage 3; followed by Dr. Vicky Duran    • Diastolic dysfunction     GRADE II per echo 2018   • Difficulty breathing     during exertion   • Dyslipidemia    • Erectile dysfunction    • Fatigue    • History of blood transfusion    • History of kidney stones    • Hyperlipidemia    • Hypertension    • Left ventricular hypertrophy     per echo 2018   • Neuropathy    • Obstructive sleep apnea     USING CPAP   • Osteoarthritis    • Peptic ulcer    • Prostate cancer (CMS/HCC)     Status post prostatectomy, radiation therapy, and hormone therapy followed by Dr. Lee; metastatsis to bone   • Restless leg syndrome    • Sinus bradycardia    • Transient cerebral ischemia    • Type 2 diabetes mellitus (CMS/HCC)      Creatinine   Date Value Ref Range Status   10/19/2018 1.27 0.76 - 1.27 mg/dL Final   10/18/2018 1.41 (H) 0.70 - 1.30 mg/dL Final   09/20/2018 1.19 0.70 - 1.30 mg/dL Final   03/20/2018 1.20 0.60 - 1.30 mg/dL Final     Comment:     Serial Number: [id number]:  [id number]     BUN   Date Value Ref Range Status   10/19/2018 32 (H) 8 - 23 mg/dL Final     Estimated Creatinine Clearance: 50.4 mL/min (by C-G formula based on SCr of 1.27 mg/dL).    Lab Results   Component Value Date    WBC 7.08 10/19/2018     Temp Readings from Last 3 Encounters:   10/19/18 97.1 °F (36.2 °C) (Oral)   10/18/18 98 °F (36.7 °C)   07/26/18 98.4 °F (36.9 °C)        Assessment/Plan  Patient received Vancomycin 1750 mg iv x1 at 2240    Will start vancomycin 1000 mg IV q12h.     Vancomycin level on 10/21 at " 1030.    Will monitor serum creatinine every 24 hours for the first 3 days then at least every 48 hours per dosing recommendations.      Pharmacy will continue to follow daily while on vancomycin and adjust as needed.     Shai Steele, RP

## 2018-10-20 NOTE — ED NOTES
"Pt to room 18 reports L great toe began to \"bother me\" yesterday but denies any severe pain. Pt reports today he noticed the toe was red and after wearing his shoes for a while the toe was more swollen and red.  Pt describes pain as sharp and \"feels like electricity\" intermittently. Toe is swollen, red but blanchable and warm to touch. + for sensation and with full ROM.  Pt denies hitting toe or any injuries. Denies any fevers at home.   States swelling and redness has improved since removing shoes in room       Giselle Martinez RN  10/19/18 2008    "

## 2018-10-20 NOTE — PLAN OF CARE
Problem: Patient Care Overview  Goal: Plan of Care Review  Outcome: Ongoing (interventions implemented as appropriate)   10/20/18 0037   Coping/Psychosocial   Plan of Care Reviewed With patient;family   Plan of Care Review   Progress improving   OTHER   Outcome Summary Patient denied the need for pain medication today, antibiotics given, pt up ambulating in room and tolerating well. Pt continues to treat BG with insulin pump, accuchecks performed ac/hs. Will continue current plan of care.       Problem: Pain, Acute (Adult)  Goal: Acceptable Pain Control/Comfort Level  Outcome: Ongoing (interventions implemented as appropriate)

## 2018-10-20 NOTE — ED PROVIDER NOTES
EMERGENCY DEPARTMENT ENCOUNTER    CHIEF COMPLAINT  Chief Complaint: Toe pain  History given by: patient   History limited by: n/a  Room Number: P477/1  PMD: Axel Guardado MD Dr Kommor, CBC    HPI:  Pt is a 77 y.o. male who presents complaining of left great toe pain that began yesterday and worsened today. He states that today he noticed increased redness surrounding the toenail. He denies fever, chills, or any other sx. Hx of prostate cancer, in partial remission current. He is not on chemotherapy. Hx of diabetes     Duration:  2 days   Onset: gradual  Timing: constant   Location: left great toe  Radiation: none  Quality: pain  Intensity/Severity: moderate  Progression: worsening   Associated Symptoms: redness   Aggravating Factors: none  Alleviating Factors: none    PAST MEDICAL HISTORY  Active Ambulatory Problems     Diagnosis Date Noted   • Diabetes mellitus (CMS/HCC) 03/22/2016   • Fatigue 03/22/2016   • Hyperlipidemia 03/22/2016   • Hypertension 03/22/2016   • Left ventricular hypertrophy 03/22/2016   • Obstructive sleep apnea syndrome treated auto BiPAP 03/22/2016   • Sinus bradycardia 03/22/2016   • 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) 03/28/2018   • Mediastinal adenopathy 03/28/2018   • Osseous metastasis (CMS/HCC) 06/07/2018   • Chronic kidney disease, stage III (moderate) (CMS/HCC) 02/03/2016   • Diastolic dysfunction 06/15/2018   • Localized edema 06/15/2018   • Neuropathy    • Hypersomnia due to medical condition treated with modafinil 200 mg every morning 08/04/2018     Resolved Ambulatory Problems     Diagnosis Date Noted   • No Resolved Ambulatory Problems     Past Medical History:   Diagnosis Date   • CKD (chronic kidney disease)    • Diastolic dysfunction    • Difficulty breathing    • Dyslipidemia    • Erectile dysfunction    • Fatigue    • History of blood transfusion    • History of kidney stones    • Hyperlipidemia    • Hypertension    • Left ventricular hypertrophy    • Neuropathy     • Obstructive sleep apnea    • Osteoarthritis    • Peptic ulcer    • Prostate cancer (CMS/HCC)    • Restless leg syndrome    • Sinus bradycardia    • Transient cerebral ischemia    • Type 2 diabetes mellitus (CMS/HCC)        PAST SURGICAL HISTORY  Past Surgical History:   Procedure Laterality Date   • BRONCHOSCOPY N/A 4/9/2018    Procedure: BRONCHOSCOPY;  Surgeon: Bijan Rivera III, MD;  Location: [facility];  Service: Cardiothoracic   • COLONOSCOPY     • HEMORRHOIDECTOMY     • MEDIASTINOSCOPY N/A 4/9/2018    Procedure: MEDIASTINOSCOPY WITH LYMPH NODE BIOPSY;  Surgeon: Bijan Rivera III, MD;  Location: [facility];  Service: Cardiothoracic   • OTHER SURGICAL HISTORY      ulcer repair   • PROSTATECTOMY  2006       FAMILY HISTORY  Family History   Problem Relation Age of Onset   • Heart failure Mother    • Hypertension Mother    • Diabetes Mother    • Heart disease Mother    • Cancer Father         LUNG   • Lung cancer Father 46   • Cancer Sister         LUNG   • Hypertension Sister    • Lung cancer Sister 60   • Cancer Brother         LUNG   • Hypertension Brother    • Lung cancer Brother 62   • Heart disease Other    • Prostate cancer Brother 60   • Malig Hyperthermia Neg Hx        SOCIAL HISTORY  Social History     Social History   • Marital status:      Spouse name: N/A   • Number of children: N/A   • Years of education: College     Occupational History   •  Retired     Social History Main Topics   • Smoking status: Former Smoker     Packs/day: 1.00     Years: 30.00     Types: Cigarettes     Quit date: 1/23/1998   • Smokeless tobacco: Never Used   • Alcohol use No      Comment: caffeine use   • Drug use: No   • Sexual activity: Defer     Other Topics Concern   • Not on file     Social History Narrative   • No narrative on file       ALLERGIES  Patient has no known allergies.    REVIEW OF SYSTEMS  Review of Systems   Constitutional: Negative for activity change, appetite change and fever.    HENT: Negative for congestion and sore throat.    Eyes: Negative.    Respiratory: Negative for cough and shortness of breath.    Cardiovascular: Negative for chest pain and leg swelling.   Gastrointestinal: Negative for abdominal pain, diarrhea and vomiting.   Endocrine: Negative.    Genitourinary: Negative for decreased urine volume and dysuria.   Musculoskeletal: Positive for myalgias (left great toe.). Negative for neck pain.   Skin: Negative for rash and wound.   Allergic/Immunologic: Negative.    Neurological: Negative for weakness, numbness and headaches.   Hematological: Negative.    Psychiatric/Behavioral: Negative.    All other systems reviewed and are negative.      PHYSICAL EXAM  ED Triage Vitals   Temp Heart Rate Resp BP SpO2   10/19/18 1933 10/19/18 1933 10/19/18 1933 10/19/18 1959 10/19/18 1933   97.7 °F (36.5 °C) 74 20 145/56 98 %      Temp src Heart Rate Source Patient Position BP Location FiO2 (%)   10/19/18 1933 10/19/18 1959 -- -- --   Tympanic Monitor          Physical Exam   Constitutional: He is oriented to person, place, and time. No distress.   HENT:   Head: Normocephalic and atraumatic.   Eyes: Pupils are equal, round, and reactive to light. EOM are normal.   Neck: Normal range of motion. Neck supple.   Cardiovascular: Normal rate, regular rhythm and normal heart sounds.    Pulmonary/Chest: Effort normal and breath sounds normal. No respiratory distress.   Abdominal: Soft. There is no tenderness. There is no rebound and no guarding.   Musculoskeletal: Normal range of motion. He exhibits no edema.   Warmth and erythema to the left great toe extending into the forefoot. Pulses palpable.    Neurological: He is alert and oriented to person, place, and time. He has normal sensation and normal strength.   Skin: Skin is warm and dry.   Psychiatric: Mood and affect normal.   Nursing note and vitals reviewed.      LAB RESULTS  Lab Results (last 24 hours)     Procedure Component Value Units  Date/Time    CBC & Differential [[id number]] Collected:  10/19/18 2102    Specimen:  Blood Updated:  10/19/18 2123    Narrative:       The following orders were created for panel order CBC & Differential.  Procedure                               Abnormality         Status                     ---------                               -----------         ------                     CBC Auto Differential[[id number]]        Abnormal            Final result                 Please view results for these tests on the individual orders.    Comprehensive Metabolic Panel [[id number]]  (Abnormal) Collected:  10/19/18 2102    Specimen:  Blood Updated:  10/19/18 2149     Glucose 280 (H) mg/dL      BUN 32 (H) mg/dL      Creatinine 1.27 mg/dL      Sodium 138 mmol/L      Potassium 4.3 mmol/L      Chloride 103 mmol/L      CO2 22.7 mmol/L      Calcium 8.9 mg/dL      Total Protein 6.9 g/dL      Albumin 3.80 g/dL      ALT (SGPT) 26 U/L      AST (SGOT) 21 U/L      Alkaline Phosphatase 91 U/L      Total Bilirubin <0.2 mg/dL      eGFR Non African Amer 55 (L) mL/min/1.73      Globulin 3.1 gm/dL      A/G Ratio 1.2 g/dL      BUN/Creatinine Ratio 25.2 (H)     Anion Gap 12.3 mmol/L     Narrative:       The MDRD GFR formula is only valid for adults with stable renal function between ages 18 and 70.    Sedimentation Rate [[id number]]  (Abnormal) Collected:  10/19/18 2102    Specimen:  Blood Updated:  10/19/18 2204     Sed Rate 40 (H) mm/hr     C-reactive Protein [[id number]]  (Abnormal) Collected:  10/19/18 2102    Specimen:  Blood Updated:  10/19/18 2144     C-Reactive Protein 1.04 (H) mg/dL     CBC Auto Differential [[id number]]  (Abnormal) Collected:  10/19/18 2102    Specimen:  Blood Updated:  10/19/18 2123     WBC 7.08 10*3/mm3      RBC 3.90 (L) 10*6/mm3      Hemoglobin 11.5 (L) g/dL      Hematocrit 35.8 (L) %      MCV 91.8 fL      MCH 29.5 pg      MCHC 32.1 (L) g/dL      RDW 13.5 %      RDW-SD 45.1 fl      MPV 11.0 fL      Platelets 175 10*3/mm3       Neutrophil % 61.2 %      Lymphocyte % 29.2 %      Monocyte % 7.5 %      Eosinophil % 1.7 %      Basophil % 0.1 %      Immature Grans % 0.3 %      Neutrophils, Absolute 4.33 10*3/mm3      Lymphocytes, Absolute 2.07 10*3/mm3      Monocytes, Absolute 0.53 10*3/mm3      Eosinophils, Absolute 0.12 10*3/mm3      Basophils, Absolute 0.01 10*3/mm3      Immature Grans, Absolute 0.02 10*3/mm3     Blood Culture - Blood, [[id number]] Collected:  10/19/18 2237    Specimen:  Blood from Arm, Right Updated:  10/19/18 2251    Blood Culture - Blood, [[id number]] Collected:  10/19/18 2237    Specimen:  Blood from Arm, Left Updated:  10/19/18 2251          I ordered the above labs and reviewed the results    RADIOLOGY  XR Foot 3+ View Left   Final Result   Apparent soft tissue defect seen overlying the dorsum of the foot at the   level of the metatarsals. Correlation with physical exam findings is   recommended. No underlying cortical abnormality is seen to suggest   osteomyelitis.       This report was finalized on 10/19/2018 9:41 PM by Dr. Chary Abarca M.D.               I ordered the above noted radiological studies. Interpreted by radiologist. Reviewed by me in PACS.       PROCEDURES  Procedures      PROGRESS AND CONSULTS       20:11  BP- 145/56 HR- 66 Temp- 97.7 °F (36.5 °C) (Tympanic) O2 sat- 99%  Informed pt of the plan for labs and XR. Pt understands and agrees with the plan, all questions answered.    20:35  XR left foot, CMP, CBC, sed rate, and CRP ordered.     21:51  Call placed to [facility] for admission.    21:56  BP- 138/53 HR- 61 Temp- 97.7 °F (36.5 °C) (Tympanic) O2 sat- 99%  Rechecked the patient who is in NAD and is resting comfortably. Informed pt that the XR shows NAD. Informed him of the plan for admission for IV abx. Pt understands and agrees with the plan, all questions answered.    22:02  Vancomycin ordered to treat cellulitis.     22:04  Discussed pt's case with Dr Piña ([facility]), who agrees to admit the pt.      MEDICAL DECISION MAKING  Results were reviewed/discussed with the patient and they were also made aware of online access. Pt also made aware that some labs, such as cultures, will not be resulted during ER visit and follow up with PMD is necessary.     MDM  Number of Diagnoses or Management Options  Cellulitis of great toe of left foot:      Amount and/or Complexity of Data Reviewed  Clinical lab tests: reviewed and ordered (WBC=7.08)  Tests in the radiology section of CPT®: ordered and reviewed (XR left foot shows soft tissue swelling, no bony abnormality. )  Decide to obtain previous medical records or to obtain history from someone other than the patient: yes  Review and summarize past medical records: yes (No prior ED visits. )  Discuss the patient with other providers: yes (Dr Piña ([facility]))    Patient Progress  Patient progress: stable         DIAGNOSIS  Final diagnoses:   Cellulitis of great toe of left foot       DISPOSITION  ADMISSION    Discussed treatment plan and reason for admission with pt/family and admitting physician.  Pt/family voiced understanding of the plan for admission for further testing/treatment as needed.     Latest Documented Vital Signs:  As of 11:20 PM  BP- 160/56 HR- 58 Temp- 97.7 °F (36.5 °C) (Tympanic) O2 sat- 98%    --  Documentation assistance provided by ryanne Bergeron for Dr Rojo.  Information recorded by the scribe was done at my direction and has been verified and validated by me.     Cornelia Bergeron  10/19/18 [id number]       Massimo Rojo MD  10/19/18 4215

## 2018-10-20 NOTE — PROGRESS NOTES
Name: Semaj Herrera ADMIT: 10/19/2018   : 1940  PCP: Axel Guardado MD    MRN: [id number] LOS: 1 days   AGE/SEX: 77 y.o. male  ROOM: [facility]   Subjective   Cc- red toe    Started 2 days ago  Increased redness after part of toenail fell off  On IV abx    ROS  No f/c  No n/v    Objective   Vital Signs  Temp:  [97.1 °F (36.2 °C)-97.7 °F (36.5 °C)] 97.1 °F (36.2 °C)  Heart Rate:  [54-74] 54  Resp:  [16-20] 16  BP: (119-160)/(53-68) 119/65  SpO2:  [97 %-99 %] 97 %  on   ;   Device (Oxygen Therapy): room air  Body mass index is 33.28 kg/m².    Physical Exam   Constitutional: He is oriented to person, place, and time. He appears well-developed and well-nourished.   HENT:   Head: Normocephalic and atraumatic.   Pulmonary/Chest: No respiratory distress.   Musculoskeletal: He exhibits no edema.   Neurological: He is alert and oriented to person, place, and time.   Skin: There is erythema (left great toe ).   Psychiatric: He has a normal mood and affect. His behavior is normal.       Results Review:       I reviewed the patient's new clinical results.    Results from last 7 days  Lab Units 10/20/18  0613 10/19/18  2102 10/18/18  1034   WBC 10*3/mm3 6.21 7.08 7.32   HEMOGLOBIN g/dL 10.9* 11.5* 12.7*   PLATELETS 10*3/mm3 158 175 188     Results from last 7 days  Lab Units 10/20/18  0613 10/19/18  2102 10/18/18  1034   SODIUM mmol/L 141 138 140   POTASSIUM mmol/L 4.4 4.3 4.4   CHLORIDE mmol/L 108* 103 102   CO2 mmol/L 23.0 22.7 24.4   BUN mg/dL 23 32* 31*   CREATININE mg/dL 0.96 1.27 1.41*   GLUCOSE mg/dL 118* 280* 199*   Estimated Creatinine Clearance: 66.7 mL/min (by C-G formula based on SCr of 0.96 mg/dL).  Results from last 7 days  Lab Units 10/19/18  2102 10/18/18  1034   ALBUMIN g/dL 3.80 4.20   BILIRUBIN mg/dL <0.2 0.2   ALK PHOS U/L 91 85   AST (SGOT) U/L 21 21   ALT (SGPT) U/L 26 29     Results from last 7 days  Lab Units 10/20/18  0613 10/19/18  2102 10/18/18  1034   CALCIUM mg/dL 8.3* 8.9 8.8   ALBUMIN g/dL   --  3.80 4.20   MAGNESIUM mg/dL  --   --  2.2   PHOSPHORUS mg/dL  --   --  4.1           XR Foot 3+ View Left [[id number]] Josue as Reviewed   Order Status: Completed Collected: 10/19/18 2138    Updated: 10/19/18 2144   Narrative:     3 VIEWS LEFT FOOT     HISTORY: Infected toe     COMPARISON: None available.     TECHNIQUE: 3 views     FINDINGS:  No acute fracture or subluxation of the left foot is identified. On the  lateral view, there is an apparent soft tissue defect overlying the  dorsal aspect of the foot at the level of the metatarsals. No underlying  cortical abnormality is seen to suggest osteomyelitis, although MRI  would be more sensitive for evaluation. Patient does have some  degenerative changes seen throughout the left foot.      Impression:     Apparent soft tissue defect seen overlying the dorsum of the foot at the  level of the metatarsals. Correlation with physical exam findings is  recommended. No underlying cortical abnormality is seen to suggest  osteomyelitis.              aspirin 81 mg Oral Daily   atorvastatin 10 mg Oral Daily   ceftriaxone 1 g Intravenous Q24H   diltiaZEM  mg Oral Daily   enoxaparin 40 mg Subcutaneous Q24H   gabapentin 400 mg Oral Nightly   levothyroxine 37.5 mcg Oral Q AM   losartan 100 mg Oral Q24H   modafinil 200 mg Oral Daily   rOPINIRole 0.5 mg Oral Nightly   sodium chloride 3 mL Intravenous Q12H   triamterene-hydrochlorothiazide 1 tablet Oral QAM   vancomycin 10 mg/kg Intravenous Q12H       insulin    Pharmacy to dose vancomycin    Diet Regular; Consistent Carbohydrate      Assessment/Plan      Active Hospital Problems    Diagnosis Date Noted   • **Cellulitis of great toe of left foot [L03.032] 10/19/2018   • Neuropathy [G62.9]    • Osseous metastasis (CMS/HCC) [C79.51] 06/07/2018   • 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       Mr. Herrera is a 77 y.o. male with a history of CKD, HTN, RONNY, DM2, RLS, metastatic prostate cancer who has been admitted with cellulitis of the toe    · Continue abx and monitor progress  · On insulin pump  · Monitor labs  · Continue home BP agents and monitor BP    Reviewed previous records    D/W ASHLEY Fox MD  [facility]  10/20/18  8:39 AM

## 2018-10-20 NOTE — H&P
HISTORY AND PHYSICAL   [facility]        Patient Identification:  Name: Semaj Herrera  Age: 77 y.o.  Sex: male  :  1940  MRN: [id number]                     Primary Care Physician: Axel Guardado MD    Chief Complaint:  Right great toe / foot infection    History of Present Illness:         The patient is 77-year-old white male with history of chronic kidney disease, hyperlipidemia, kidney stones, hypertension, LVH, neuropathy, sleep apnea, osteoarthritis, restless leg syndrome, prostate cancer metastatic to bone and type 2 diabetes who is admitted with a one day history of some swelling pain and redness in the left great toe.  The patient came to the ER and appeared to have cellulitis of the left great toe.  He's not had any fevers or chills.  He's not had any chest pain or shortness of air.  The patient was started on some broad-spectrum IV antibiotics and admitted for further evaluation treatment.  He is diabetic and is on insulin pump which is adjusted to keep his sugars under control.    Past Medical History:  Past Medical History:   Diagnosis Date   • CKD (chronic kidney disease)     Stage 3; followed by Dr. Vicky Duran    • Diastolic dysfunction     GRADE II per echo 2018   • Difficulty breathing     during exertion   • Dyslipidemia    • Erectile dysfunction    • Fatigue    • History of blood transfusion    • History of kidney stones    • Hyperlipidemia    • Hypertension    • Left ventricular hypertrophy     per echo 2018   • Neuropathy    • Obstructive sleep apnea     USING CPAP   • Osteoarthritis    • Peptic ulcer    • Prostate cancer (CMS/HCC)     Status post prostatectomy, radiation therapy, and hormone therapy followed by Dr. Lee; metastatsis to bone   • Restless leg syndrome    • Sinus bradycardia    • Transient cerebral ischemia    • Type 2 diabetes mellitus (CMS/HCC)      Past Surgical History:  Past Surgical History:   Procedure Laterality Date   • BRONCHOSCOPY N/A  4/9/2018    Procedure: BRONCHOSCOPY;  Surgeon: Bijan Rivera III, MD;  Location: [facility] OR;  Service: Cardiothoracic   • COLONOSCOPY     • HEMORRHOIDECTOMY     • MEDIASTINOSCOPY N/A 4/9/2018    Procedure: MEDIASTINOSCOPY WITH LYMPH NODE BIOPSY;  Surgeon: Bijan Rivera III, MD;  Location: [facility] OR;  Service: Cardiothoracic   • OTHER SURGICAL HISTORY      ulcer repair   • PROSTATECTOMY  2006      Home Meds:  Prescriptions Prior to Admission   Medication Sig Dispense Refill Last Dose   • aspirin 81 MG EC tablet Take 1 tablet by mouth daily.   Taking   • Calcium Carbonate (CALTRATE 600 PO) Take 1 tablet by mouth Daily.   Taking   • CARTIA  MG 24 hr capsule TAKE 1 CAPSULE EVERY DAY 90 capsule 1 Taking   • cholecalciferol (VITAMIN D3) 1000 units tablet Take 2,000 Units by mouth Daily.   Taking   • Denosumab (XGEVA SC) Inject  under the skin.   Taking   • fluticasone (FLONASE SENSIMIST) 27.5 MCG/SPRAY nasal spray 2 sprays into each nostril As Needed.   Taking   • gabapentin (NEURONTIN) 100 MG capsule Take 4 capsules by mouth Every Night. 120 capsule 2 Taking   • HYDROcodone-acetaminophen (NORCO) 5-325 MG per tablet Take 1 tablet by mouth Every 6 (Six) Hours As Needed for Moderate Pain . 60 tablet 0    • insulin aspart (NovoLOG) 100 UNIT/ML patient supplied pump Inject  under the skin into the appropriate area as directed Continuous. Pt reports pump with basal rate 1.9 units/hr from 0800 to 1200. Basal rate 1.8 units/hr 1200 to 0800.  Checks bg ac/hs with bolus doses per pumps parameters   Taking   • Leuprolide Acetate, 3 Month, (LUPRON DEPOT, 3-MONTH, IM) Inject  into the shoulder, thigh, or buttocks.   Taking   • levothyroxine (SYNTHROID, LEVOTHROID) 75 MCG tablet Take 75 mcg by mouth Daily. TAKES 1/2 TABLET   Taking   • modafinil (PROVIGIL) 200 MG tablet Take 1 tablet by mouth Daily. 90 tablet 1 Taking   • olmesartan (BENICAR) 40 MG tablet Take 1 tablet by mouth Daily. 90 tablet 1 Taking   •  pravastatin (PRAVACHOL) 40 MG tablet Take 40 mg by mouth daily.   Taking   • rOPINIRole (REQUIP) 0.5 MG tablet TAKE ONE TABLET BY MOUTH IN THE EVENING AND ONE AT BEDTIME 180 tablet 2 Taking   • triamterene-hydrochlorothiazide (DYAZIDE) 37.5-25 MG per capsule Take 1 capsule by mouth Every Morning.   Taking   • triamterene-hydrochlorothiazide (MAXZIDE-25) 37.5-25 MG per tablet    Not Taking     Current meds    Current Facility-Administered Medications:   •  Insert peripheral IV, , , Once **AND** sodium chloride 0.9 % flush 10 mL, 10 mL, Intravenous, PRN, Massimo Rojo MD  Allergies:  No Known Allergies  Immunizations:    There is no immunization history on file for this patient.  Social History:   Social History     Social History Narrative   • No narrative on file     Social History     Social History   • Marital status:      Spouse name: N/A   • Number of children: N/A   • Years of education: College     Occupational History   •  Retired     Social History Main Topics   • Smoking status: Former Smoker     Packs/day: 1.00     Years: 30.00     Types: Cigarettes     Quit date: 1/23/1998   • Smokeless tobacco: Never Used   • Alcohol use No      Comment: caffeine use   • Drug use: No   • Sexual activity: Defer     Other Topics Concern   • Not on file     Social History Narrative   • No narrative on file       Family History:  Family History   Problem Relation Age of Onset   • Heart failure Mother    • Hypertension Mother    • Diabetes Mother    • Heart disease Mother    • Cancer Father         LUNG   • Lung cancer Father 46   • Cancer Sister         LUNG   • Hypertension Sister    • Lung cancer Sister 60   • Cancer Brother         LUNG   • Hypertension Brother    • Lung cancer Brother 62   • Heart disease Other    • Prostate cancer Brother 60   • Malig Hyperthermia Neg Hx         Review of Systems  See history of present illness and past medical history.  Patient denies headache, dizziness, syncope, falls,  "trauma, change in vision, change in hearing, change in taste, changes in weight, changes in appetite, focal weakness, numbness, or paresthesia.  Patient denies chest pain, palpitations, dyspnea, orthopnea, PND, cough, sinus pressure, rhinorrhea, epistaxis, hemoptysis, nausea, vomiting, hematemesis, diarrhea, constipation or hematchezia.  Denies cold or heat intolerance, polydipsia, polyuria, polyphagia. Denies hematuria, pyuria, dysuria, hesitancy, frequency or urgency.   Denies fever, chills, sweats, night sweats.   Remainder of ROS is negative.    Objective:  tMax 24 hrs: Temp (24hrs), Av.4 °F (36.3 °C), Min:97.1 °F (36.2 °C), Max:97.7 °F (36.5 °C)    Vitals Ranges:   Temp:  [97.1 °F (36.2 °C)-97.7 °F (36.5 °C)] 97.1 °F (36.2 °C)  Heart Rate:  [58-74] 64  Resp:  [16-20] 16  BP: (138-160)/(53-68) 147/68      Exam:  /68 (BP Location: Left arm, Patient Position: Lying)   Pulse 64   Temp 97.1 °F (36.2 °C) (Oral)   Resp 16   Ht 165.1 cm (65\")   Wt 90.7 kg (200 lb)   SpO2 98%   BMI 33.28 kg/m²     General Appearance:    Alert, cooperative, no distress, appears stated age   Head:    Normocephalic, without obvious abnormality, atraumatic   Eyes:    PERRL, conjunctiva/corneas clear, EOM's intact, both eyes   Ears:    Normal external ear canals, both ears   Nose:   Nares normal, septum midline, mucosa normal, no drainage    or sinus tenderness   Throat:   Lips, mucosa, and tongue normal   Neck:   Supple, symmetrical, trachea midline, no adenopathy;     thyroid:  no enlargement/tenderness/nodules; no carotid    bruit or JVD   Back:     Symmetric, no curvature, ROM normal, no CVA tenderness   Lungs:     Clear to auscultation bilaterally, respirations unlabored   Chest Wall:    No tenderness or deformity    Heart:    Regular rate and rhythm, S1 and S2 normal, no murmur, rub   or gallop   Abdomen:     Soft, non-tender, bowel sounds active all four quadrants,     no masses, no hepatomegaly, no splenomegaly "   Extremities:   Extremities normal, atraumatic, no cyanosis or edema   Pulses:   2+ and symmetric all extremities   Skin:   Skin color, texture, turgor normal, no rashes or lesions   Lymph nodes:   Cervical, supraclavicular, and axillary nodes normal   Neurologic:   CNII-XII intact, normal strength, sensation intact throughout      .    Data Review:  Lab Results (last 72 hours)     Procedure Component Value Units Date/Time    POC Glucose Once [[id number]]  (Abnormal) Collected:  10/19/18 2323    Specimen:  Blood Updated:  10/19/18 2325     Glucose 156 (H) mg/dL     Narrative:       Meter: [id number] : [id number] [place]    Blood Culture - Blood, [[id number]] Collected:  10/19/18 2237    Specimen:  Blood from Arm, Right Updated:  10/19/18 2251    Blood Culture - Blood, [[id number]] Collected:  10/19/18 2237    Specimen:  Blood from Arm, Left Updated:  10/19/18 2251    Sedimentation Rate [[id number]]  (Abnormal) Collected:  10/19/18 2102    Specimen:  Blood Updated:  10/19/18 2204     Sed Rate 40 (H) mm/hr     Comprehensive Metabolic Panel [[id number]]  (Abnormal) Collected:  10/19/18 2102    Specimen:  Blood Updated:  10/19/18 2149     Glucose 280 (H) mg/dL      BUN 32 (H) mg/dL      Creatinine 1.27 mg/dL      Sodium 138 mmol/L      Potassium 4.3 mmol/L      Chloride 103 mmol/L      CO2 22.7 mmol/L      Calcium 8.9 mg/dL      Total Protein 6.9 g/dL      Albumin 3.80 g/dL      ALT (SGPT) 26 U/L      AST (SGOT) 21 U/L      Alkaline Phosphatase 91 U/L      Total Bilirubin <0.2 mg/dL      eGFR Non African Amer 55 (L) mL/min/1.73      Globulin 3.1 gm/dL      A/G Ratio 1.2 g/dL      BUN/Creatinine Ratio 25.2 (H)     Anion Gap 12.3 mmol/L     Narrative:       The MDRD GFR formula is only valid for adults with stable renal function between ages 18 and 70.    C-reactive Protein [[id number]]  (Abnormal) Collected:  10/19/18 2102    Specimen:  Blood Updated:  10/19/18 2144     C-Reactive Protein 1.04 (H) mg/dL     CBC &  Differential [[id number]] Collected:  10/19/18 2102    Specimen:  Blood Updated:  10/19/18 2123    Narrative:       The following orders were created for panel order CBC & Differential.  Procedure                               Abnormality         Status                     ---------                               -----------         ------                     CBC Auto Differential[[id number]]        Abnormal            Final result                 Please view results for these tests on the individual orders.    CBC Auto Differential [[id number]]  (Abnormal) Collected:  10/19/18 2102    Specimen:  Blood Updated:  10/19/18 2123     WBC 7.08 10*3/mm3      RBC 3.90 (L) 10*6/mm3      Hemoglobin 11.5 (L) g/dL      Hematocrit 35.8 (L) %      MCV 91.8 fL      MCH 29.5 pg      MCHC 32.1 (L) g/dL      RDW 13.5 %      RDW-SD 45.1 fl      MPV 11.0 fL      Platelets 175 10*3/mm3      Neutrophil % 61.2 %      Lymphocyte % 29.2 %      Monocyte % 7.5 %      Eosinophil % 1.7 %      Basophil % 0.1 %      Immature Grans % 0.3 %      Neutrophils, Absolute 4.33 10*3/mm3      Lymphocytes, Absolute 2.07 10*3/mm3      Monocytes, Absolute 0.53 10*3/mm3      Eosinophils, Absolute 0.12 10*3/mm3      Basophils, Absolute 0.01 10*3/mm3      Immature Grans, Absolute 0.02 10*3/mm3                    Imaging Results (all)     Procedure Component Value Units Date/Time    XR Foot 3+ View Left [[id number]] Collected:  10/19/18 2138     Updated:  10/19/18 2144    Narrative:       3 VIEWS LEFT FOOT     HISTORY: Infected toe     COMPARISON: None available.     TECHNIQUE: 3 views     FINDINGS:  No acute fracture or subluxation of the left foot is identified. On the  lateral view, there is an apparent soft tissue defect overlying the  dorsal aspect of the foot at the level of the metatarsals. No underlying  cortical abnormality is seen to suggest osteomyelitis, although MRI  would be more sensitive for evaluation. Patient does have some  degenerative changes  seen throughout the left foot.       Impression:       Apparent soft tissue defect seen overlying the dorsum of the foot at the  level of the metatarsals. Correlation with physical exam findings is  recommended. No underlying cortical abnormality is seen to suggest  osteomyelitis.     This report was finalized on 10/19/2018 9:41 PM by Dr. Chary Abarca M.D.               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       Plan:  The patient's admitted to hospital and will continue broad-spectrum IV antibiotics for cellulitis of the left great toe.  Blood cultures have been obtained.  We'll continue with his insulin pump and monitor sugars and repeat some other laboratory studies.    Alexis Piña MD  10/20/2018  12:30 AM

## 2018-10-20 NOTE — PLAN OF CARE
Problem: Patient Care Overview  Goal: Plan of Care Review  Outcome: Ongoing (interventions implemented as appropriate)   10/20/18 0521   Coping/Psychosocial   Plan of Care Reviewed With patient   Plan of Care Review   Progress no change   OTHER   Outcome Summary Pt with cellulitis left great toe. Whole toe is red and warm. Lortab x1 for c/o pain. IV abx per orders. VSS. Pt treating BG with insulin pump, we will perform accuchecks ac/hs. Will monitor for needs       Problem: Skin and Soft Tissue Infection (Adult)  Goal: Signs and Symptoms of Listed Potential Problems Will be Absent, Minimized or Managed (Skin and Soft Tissue Infection)  Outcome: Outcome(s) achieved Date Met: 10/20/18      Problem: Diabetes, Type 2 (Adult)  Goal: Signs and Symptoms of Listed Potential Problems Will be Absent, Minimized or Managed (Diabetes, Type 2)  Outcome: Outcome(s) achieved Date Met: 10/20/18   10/20/18 0521   Goal/Outcome Evaluation   Problems Assessed (Type 2 Diabetes) all   Problems Present (Type 2 Diabetes) hyperglycemia       Problem: Pain, Acute (Adult)  Goal: Identify Related Risk Factors and Signs and Symptoms  Outcome: Outcome(s) achieved Date Met: 10/20/18   10/20/18 0521   Pain, Acute (Adult)   Related Risk Factors (Acute Pain) infection;disease process   Signs and Symptoms (Acute Pain) verbalization of pain descriptors     Goal: Acceptable Pain Control/Comfort Level  Outcome: Ongoing (interventions implemented as appropriate)

## 2018-10-21 VITALS
RESPIRATION RATE: 16 BRPM | BODY MASS INDEX: 33.32 KG/M2 | TEMPERATURE: 97 F | HEIGHT: 65 IN | HEART RATE: 53 BPM | DIASTOLIC BLOOD PRESSURE: 74 MMHG | WEIGHT: 200 LBS | OXYGEN SATURATION: 96 % | SYSTOLIC BLOOD PRESSURE: 149 MMHG

## 2018-10-21 LAB
ANION GAP SERPL CALCULATED.3IONS-SCNC: 10.4 MMOL/L
BUN BLD-MCNC: 20 MG/DL (ref 8–23)
BUN/CREAT SERPL: 16.8 (ref 7–25)
CALCIUM SPEC-SCNC: 9 MG/DL (ref 8.6–10.5)
CHLORIDE SERPL-SCNC: 107 MMOL/L (ref 98–107)
CO2 SERPL-SCNC: 24.6 MMOL/L (ref 22–29)
CREAT BLD-MCNC: 1.19 MG/DL (ref 0.76–1.27)
GFR SERPL CREATININE-BSD FRML MDRD: 59 ML/MIN/1.73
GLUCOSE BLD-MCNC: 130 MG/DL (ref 65–99)
GLUCOSE BLDC GLUCOMTR-MCNC: 145 MG/DL (ref 70–130)
POTASSIUM BLD-SCNC: 4.5 MMOL/L (ref 3.5–5.2)
SODIUM BLD-SCNC: 142 MMOL/L (ref 136–145)

## 2018-10-21 PROCEDURE — 82962 GLUCOSE BLOOD TEST: CPT

## 2018-10-21 PROCEDURE — 80048 BASIC METABOLIC PNL TOTAL CA: CPT | Performed by: HOSPITALIST

## 2018-10-21 PROCEDURE — 25010000002 ENOXAPARIN PER 10 MG: Performed by: HOSPITALIST

## 2018-10-21 RX ORDER — CEPHALEXIN 500 MG/1
500 CAPSULE ORAL 3 TIMES DAILY
Qty: 30 CAPSULE | Refills: 0 | Status: SHIPPED | OUTPATIENT
Start: 2018-10-21 | End: 2018-10-21

## 2018-10-21 RX ORDER — DOXYCYCLINE HYCLATE 100 MG/1
100 CAPSULE ORAL EVERY 12 HOURS SCHEDULED
Qty: 20 CAPSULE | Refills: 0 | Status: SHIPPED | OUTPATIENT
Start: 2018-10-21 | End: 2018-10-21

## 2018-10-21 RX ORDER — DOXYCYCLINE HYCLATE 100 MG/1
100 CAPSULE ORAL EVERY 12 HOURS SCHEDULED
Qty: 20 CAPSULE | Refills: 0 | Status: SHIPPED | OUTPATIENT
Start: 2018-10-21 | End: 2018-10-31

## 2018-10-21 RX ORDER — CEPHALEXIN 500 MG/1
500 CAPSULE ORAL 3 TIMES DAILY
Qty: 30 CAPSULE | Refills: 0 | Status: SHIPPED | OUTPATIENT
Start: 2018-10-21 | End: 2018-10-31

## 2018-10-21 RX ADMIN — LOSARTAN POTASSIUM 100 MG: 100 TABLET, FILM COATED ORAL at 08:21

## 2018-10-21 RX ADMIN — DILTIAZEM HYDROCHLORIDE 120 MG: 120 CAPSULE, COATED, EXTENDED RELEASE ORAL at 08:21

## 2018-10-21 RX ADMIN — ENOXAPARIN SODIUM 40 MG: 40 INJECTION SUBCUTANEOUS at 00:53

## 2018-10-21 RX ADMIN — ATORVASTATIN CALCIUM 10 MG: 10 TABLET, FILM COATED ORAL at 08:21

## 2018-10-21 RX ADMIN — LEVOTHYROXINE SODIUM 37.5 MCG: 75 TABLET ORAL at 06:55

## 2018-10-21 RX ADMIN — Medication 3 ML: at 08:21

## 2018-10-21 RX ADMIN — MODAFINIL 200 MG: 100 TABLET ORAL at 08:21

## 2018-10-21 RX ADMIN — ASPIRIN 81 MG: 81 TABLET, DELAYED RELEASE ORAL at 08:21

## 2018-10-21 RX ADMIN — TRIAMTERENE AND HYDROCHLOROTHIAZIDE 1 TABLET: 37.5; 25 TABLET ORAL at 06:55

## 2018-10-21 NOTE — PLAN OF CARE
Problem: Patient Care Overview  Goal: Plan of Care Review  Outcome: Ongoing (interventions implemented as appropriate)   10/21/18 6715   Coping/Psychosocial   Plan of Care Reviewed With patient   Plan of Care Review   Progress improving   OTHER   Outcome Summary Denies pain. Swelling and redness improving in left great toe. Abx per orders. VSS. Will monitor for needs       Problem: Pain, Acute (Adult)  Goal: Acceptable Pain Control/Comfort Level  Outcome: Ongoing (interventions implemented as appropriate)

## 2018-10-21 NOTE — DISCHARGE SUMMARY
NAME: Semaj Herrera ADMIT: 10/19/2018   : 1940  PCP: Axel Guardado MD    MRN: [id number] LOS: 2 days   AGE/SEX: 77 y.o. 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       SECONDARY DIAGNOSES  Past Medical History:   Diagnosis Date   • CKD (chronic kidney disease)     Stage 3; followed by Dr. Vicky Duran    • Diastolic dysfunction     GRADE II per echo 2018   • Difficulty breathing     during exertion   • Dyslipidemia    • Erectile dysfunction    • Fatigue    • History of blood transfusion    • History of kidney stones    • Hyperlipidemia    • Hypertension    • Left ventricular hypertrophy     per echo 2018   • Neuropathy    • Obstructive sleep apnea     USING CPAP   • Osteoarthritis    • Peptic ulcer    • Prostate cancer (CMS/HCC)     Status post prostatectomy, radiation therapy, and hormone therapy followed by Dr. Lee; metastatsis to bone   • Restless leg syndrome    • Sinus bradycardia    • Transient cerebral ischemia    • Type 2 diabetes mellitus (CMS/HCC)          HOSPITAL COURSE  Mr. Herrera is a 77 y.o. male with a history of CKD, HTN, RONNY, DM2, RLS, metastatic prostate cancer who  has been admitted with cellulitis of the toe after having part of his toenail rip off. He was given a couple of days of IV vanc and ceftriaxone and is having improvement. Will send him home with 10 days of po keflex and doxycycline and follow up with Axel Guardado MD and podiatry.       DIAGNOSTICS    Basic Metabolic Panel [[id number]] (Abnormal) Collected: 10/21/18 0613   Lab Status: Final result Specimen: Blood Updated: 10/21/18 0736    Glucose 130 (H) mg/dL     BUN 20 mg/dL     Creatinine 1.19 mg/dL     Sodium 142 mmol/L     Potassium 4.5 mmol/L     Chloride 107 mmol/L     CO2 24.6 mmol/L     Calcium 9.0 mg/dL     eGFR Non African Amer 59 (L) mL/min/1.73     BUN/Creatinine Ratio 16.8    Anion Gap 10.4 mmol/L      CBC Auto Differential [[id number]] (Abnormal) Collected: 10/20/18 0613   Lab Status: Final result Specimen: Blood Updated: 10/20/18 0639    WBC 6.21 10*3/mm3     RBC 3.70 (L) 10*6/mm3     Hemoglobin 10.9 (L) g/dL     Hematocrit 34.1 (L) %     MCV 92.2 fL     MCH 29.5 pg     MCHC 32.0 (L) g/dL     RDW 13.5 %     RDW-SD 45.0 fl     MPV 10.8 fL     Platelets 158 10*3/mm3     Neutrophil % 44.1 %     Lymphocyte % 42.4 %     Monocyte % 9.8 %          PHYSICAL EXAM  Objective     Physical Exam   Constitutional: He is oriented to person, place, and time. He appears well-developed and well-nourished.   HENT:   Head: Normocephalic and atraumatic.   Pulmonary/Chest: No respiratory distress.   Musculoskeletal: He exhibits no edema.   Neurological: He is alert and oriented to person, place, and time.   Skin: There is erythema (left great toe ).   Psychiatric: He has a normal mood and affect. His behavior is normal.     CONDITION ON DISCHARGE  Stable.      DISCHARGE DISPOSITION   Home or Self Care      DISCHARGE MEDICATIONS       Your medication list      START taking these medications      Instructions Last Dose Given Next Dose Due   cephalexin 500 MG capsule  Commonly known as:  KEFLEX      Take 1 capsule by mouth 3  (Three) Times a Day for 10 days.       doxycycline 100 MG capsule  Commonly known as:  VIBRAMYCIN      Take 1 capsule by mouth Every 12 (Twelve) Hours for 10 days.          CHANGE how you take these medications      Instructions Last Dose Given Next Dose Due   triamterene-hydrochlorothiazide 37.5-25 MG per capsule  Commonly known as:  DYAZIDE  What changed:  Another medication with the same name was removed. Continue taking this medication, and follow the directions you see here.      Take 1 capsule by mouth Every Morning.          CONTINUE taking these medications      Instructions Last Dose Given Next Dose Due   aspirin 81 MG EC tablet      Take 1 tablet by mouth daily.       CALTRATE 600 PO      Take 1 tablet by mouth Daily.       CARTIA  MG 24 hr capsule  Generic drug:  diltiaZEM CD      TAKE 1 CAPSULE EVERY DAY       cholecalciferol 1000 units tablet  Commonly known as:  VITAMIN D3      Take 2,000 Units by mouth Daily.       FLONASE SENSIMIST 27.5 MCG/SPRAY nasal spray  Generic drug:  fluticasone      2 sprays into each nostril As Needed.       gabapentin 100 MG capsule  Commonly known as:  NEURONTIN      Take 4 capsules by mouth Every Night.       HYDROcodone-acetaminophen 5-325 MG per tablet  Commonly known as:  NORCO      Take 1 tablet by mouth Every 6 (Six) Hours As Needed for Moderate Pain .       insulin aspart 100 UNIT/ML patient supplied pump  Commonly known as:  NovoLOG      Inject  under the skin into the appropriate area as directed Continuous. Pt reports pump with basal rate 1.9 units/hr from 0800 to 1200. Basal rate 1.8 units/hr 1200 to 0800.  Checks bg ac/hs with bolus doses per pumps parameters       levothyroxine 75 MCG tablet  Commonly known as:  SYNTHROID, LEVOTHROID      Take 75 mcg by mouth Daily. TAKES 1/2 TABLET       LUPRON DEPOT (3-MONTH) IM      Inject  into the shoulder, thigh, or buttocks.       modafinil 200 MG tablet  Commonly known as:  PROVIGIL      Take 1 tablet by mouth  Daily.       olmesartan 40 MG tablet  Commonly known as:  BENICAR      Take 1 tablet by mouth Daily.       pravastatin 40 MG tablet  Commonly known as:  PRAVACHOL      Take 40 mg by mouth daily.       rOPINIRole 0.5 MG tablet  Commonly known as:  REQUIP      TAKE ONE TABLET BY MOUTH IN THE EVENING AND ONE AT BEDTIME       XGEVA SC      Inject  under the skin.             Where to Get Your Medications      These medications were sent to [facility] - [place], [place] - [address] - [phone] [facility] [phone]   [address] [facility], [facility] [address]    Phone:  [phone]   · cephalexin 500 MG capsule  · doxycycline 100 MG capsule        Future Appointments  Date Time Provider Department Center   10/24/2018 10:00 AM LAB CHAIR 1 [facility]  LAB [facility]   10/24/2018 10:30 AM CHAIR 06 MARGIE PATEL INFUS [facility]   11/15/2018 10:30 AM LAB CHAIR 6 [facility]  LAB [facility]   11/15/2018 11:00 AM INJECTION CHAIR [facility]  INFUS [facility]   12/13/2018 11:30 AM LAB CHAIR 3 [facility]  LAB [facility]   12/13/2018 12:00 PM INJECTION CHAIR [facility]  INFUS [facility]   1/9/2019 2:40 PM Tata Lee MD [facility] CD L[facility] None   1/10/2019 10:30 AM LAB CHAIR 6 [facility]  LAB [facility]   1/10/2019 11:00 AM Jose Lee MD [facility]  [facility]   1/10/2019 11:30 AM INJECTION CHAIR [facility] K[facility]  INFUS [facility]   4/10/2019 10:45 AM Bob Mcdermott MD [facility] ANDERSO2 None     Additional Instructions for the Follow-ups that You Need to Schedule     Discharge Follow-up with PCP    As directed      Currently Documented PCP:  Axel Guardado MD  PCP Phone Number:  [phone]    Follow Up Details:  2 weeks         Discharge Follow-up with Specified Provider: podiatrist; 2 Weeks    As directed      To:  podiatrist    Follow Up:  2 Weeks           Follow-up Information     Axel Guardado MD .    Specialty:  Family Medicine  Why:  2 weeks  Contact information:  [address] [place] [address]  [id number]  [phone]             Jack Boyle MD .    Specialty:  Endocrinology  Why:  2 weeks  Contact information:  [address] [id number] [phone]                   TEST  RESULTS PENDING AT DISCHARGE   Order Current Status    Blood Culture - Blood, Preliminary result    Blood Culture - Blood, Preliminary result             Bull Fox MD  [facility]  10/21/18  9:01 AM      Time: greater than 32 minutes on discharge  It was a pleasure taking care of this patient while in the hospital.

## 2018-10-22 ENCOUNTER — READMISSION MANAGEMENT (OUTPATIENT)
Dept: CALL CENTER | Facility: HOSPITAL | Age: 78
End: 2018-10-22

## 2018-10-22 ENCOUNTER — TELEPHONE (OUTPATIENT)
Dept: GENERAL RADIOLOGY | Facility: HOSPITAL | Age: 78
End: 2018-10-22

## 2018-10-22 ENCOUNTER — TELEPHONE (OUTPATIENT)
Dept: ONCOLOGY | Facility: HOSPITAL | Age: 78
End: 2018-10-22

## 2018-10-22 NOTE — TELEPHONE ENCOUNTER
----- Message from Adry Todd RN sent at 10/22/2018 11:13 AM EDT -----  Cancel appt for Wednesday.  No need to call.

## 2018-10-22 NOTE — TELEPHONE ENCOUNTER
Reviewed labs with Apurva JEROME, patient does Not need to come in for fluids.     ----- Message from Hayde Sotelo sent at 10/22/2018 10:22 AM EDT -----  Regarding: keep appt for fluids?  [phone]  Patient was inpatient over the weekend and received fluids. Does he still need to keep appt Wednesday for fluids?

## 2018-10-22 NOTE — PROGRESS NOTES
Case Management Discharge Note    Final Note: Discharged to home on 10/21/18 @ 08:59. DARRIAN Ji RN, CCP    Destination     No service has been selected for the patient.      Durable Medical Equipment     No service has been selected for the patient.      Dialysis/Infusion     No service has been selected for the patient.      Home Medical Care     No service has been selected for the patient.      Social Care     No service has been selected for the patient.             Final Discharge Disposition Code: 01 - home or self-care

## 2018-10-23 NOTE — OUTREACH NOTE
Prep Survey      Responses   Facility patient discharged from?  [place]   Is patient eligible?  Yes   Discharge diagnosis  left foot toe cellulitis after toenail ripped off   Does the patient have one of the following disease processes/diagnoses(primary or secondary)?  Other   Does the patient have Home health ordered?  No   Is there a DME ordered?  No   General alerts for this patient  metastatic prostate CA   Prep survey completed?  Yes          Katia Otoole RN

## 2018-10-24 ENCOUNTER — APPOINTMENT (OUTPATIENT)
Dept: LAB | Facility: HOSPITAL | Age: 78
End: 2018-10-24

## 2018-10-24 ENCOUNTER — READMISSION MANAGEMENT (OUTPATIENT)
Dept: CALL CENTER | Facility: HOSPITAL | Age: 78
End: 2018-10-24

## 2018-10-24 ENCOUNTER — APPOINTMENT (OUTPATIENT)
Dept: ONCOLOGY | Facility: HOSPITAL | Age: 78
End: 2018-10-24

## 2018-10-24 LAB
BACTERIA SPEC AEROBE CULT: NORMAL
BACTERIA SPEC AEROBE CULT: NORMAL

## 2018-10-24 NOTE — OUTREACH NOTE
Medical Week 1 Survey      Responses   Facility patient discharged from?  [place]   Does the patient have one of the following disease processes/diagnoses(primary or secondary)?  Other   Is there a successful TCM telephone encounter documented?  No   Week 1 attempt successful?  Yes   Call start time  1803   Call end time  1807   General alerts for this patient  metastatic prostate CA   Discharge diagnosis  left foot toe cellulitis after toenail ripped off   Meds reviewed with patient/caregiver?  Yes   Is the patient having any side effects they believe may be caused by any medication additions or changes?  No   Does the patient have all medications ordered at discharge?  Yes   Is the patient taking all medications as directed (includes completed medication regime)?  Yes   Does the patient have a primary care provider?   Yes   Does the patient have an appointment with their PCP within 7 days of discharge?  Yes   Has the patient kept scheduled appointments due by today?  N/A   Has home health visited the patient within 72 hours of discharge?  N/A   Psychosocial issues?  No   Did the patient receive a copy of their discharge instructions?  Yes   Nursing interventions  Reviewed instructions with patient   What is the patient's perception of their health status since discharge?  Improving   Is the patient/caregiver able to teach back signs and symptoms related to disease process for when to call PCP?  Yes   Is the patient/caregiver able to teach back signs and symptoms related to disease process for when to call 911?  Yes   Is the patient/caregiver able to teach back the hierarchy of who to call/visit for symptoms/problems? PCP, Specialist, Home health nurse, Urgent Care, ED, 911  Yes   Additional teach back comments  He says his foot almost looks normal. He is propping it up.   Week 1 call completed?  Yes          Yolanda Juarez RN

## 2018-10-31 ENCOUNTER — READMISSION MANAGEMENT (OUTPATIENT)
Dept: CALL CENTER | Facility: HOSPITAL | Age: 78
End: 2018-10-31

## 2018-10-31 NOTE — OUTREACH NOTE
Medical Week 2 Survey      Responses   Facility patient discharged from?  [place]   Does the patient have one of the following disease processes/diagnoses(primary or secondary)?  Other   Week 2 attempt successful?  No   Unsuccessful attempts  Attempt 1          Mary Jo Mckeon RN

## 2018-11-01 ENCOUNTER — READMISSION MANAGEMENT (OUTPATIENT)
Dept: CALL CENTER | Facility: HOSPITAL | Age: 78
End: 2018-11-01

## 2018-11-01 NOTE — OUTREACH NOTE
Medical Week 2 Survey      Responses   Facility patient discharged from?  [place]   Does the patient have one of the following disease processes/diagnoses(primary or secondary)?  Other   Week 2 attempt successful?  No   Unsuccessful attempts  Attempt 2          Vignesh Stern RN

## 2018-11-02 ENCOUNTER — READMISSION MANAGEMENT (OUTPATIENT)
Dept: CALL CENTER | Facility: HOSPITAL | Age: 78
End: 2018-11-02

## 2018-11-02 NOTE — OUTREACH NOTE
Medical Week 2 Survey      Responses   Facility patient discharged from?  [place]   Does the patient have one of the following disease processes/diagnoses(primary or secondary)?  Other   Week 2 attempt successful?  Yes   Call start time  1640   General alerts for this patient  metastatic prostate CA   Discharge diagnosis  left foot toe cellulitis after toenail ripped off   Call end time  1646   Meds reviewed with patient/caregiver?  Yes   Is the patient having any side effects they believe may be caused by any medication additions or changes?  No   Does the patient have all medications ordered at discharge?  Yes   Is the patient taking all medications as directed (includes completed medication regime)?  Yes   Does the patient have a primary care provider?   Yes   Does the patient have an appointment with their PCP within 7 days of discharge?  No   Nursing Interventions  Verified appointment date/time/provider   Has the patient kept scheduled appointments due by today?  Yes   Comments  He has seen his PCP and he has seen Podiatry, He has an office visit in December with PCP and Endocrinololgy in 2 weeks.    Did the patient receive a copy of their discharge instructions?  Yes   Nursing interventions  Reviewed instructions with patient, Educated on MyChart   What is the patient's perception of their health status since discharge?  Improving   Is the patient/caregiver able to teach back signs and symptoms related to disease process for when to call PCP?  Yes   Is the patient/caregiver able to teach back signs and symptoms related to disease process for when to call 911?  Yes   Is the patient/caregiver able to teach back the hierarchy of who to call/visit for symptoms/problems? PCP, Specialist, Home health nurse, Urgent Care, ED, 911  Yes   Additional teach back comments  He says his foot almost looks normal. He is propping it up.   Week 2 Call Completed?  Yes          Janneth Rose RN

## 2018-11-05 RX ORDER — GABAPENTIN 100 MG/1
CAPSULE ORAL
Qty: 120 CAPSULE | Refills: 2 | Status: SHIPPED | OUTPATIENT
Start: 2018-11-05 | End: 2019-01-19 | Stop reason: SDUPTHER

## 2018-11-05 RX ORDER — OLMESARTAN MEDOXOMIL 40 MG/1
TABLET ORAL
Qty: 90 TABLET | Refills: 1 | Status: SHIPPED | OUTPATIENT
Start: 2018-11-05 | End: 2019-03-15

## 2018-11-05 RX ORDER — TRIAMTERENE AND HYDROCHLOROTHIAZIDE 37.5; 25 MG/1; MG/1
TABLET ORAL
Qty: 90 TABLET | Refills: 3 | Status: SHIPPED | OUTPATIENT
Start: 2018-11-05 | End: 2019-03-15

## 2018-11-10 ENCOUNTER — READMISSION MANAGEMENT (OUTPATIENT)
Dept: CALL CENTER | Facility: HOSPITAL | Age: 78
End: 2018-11-10

## 2018-11-10 NOTE — OUTREACH NOTE
Medical Week 3 Survey      Responses   Facility patient discharged from?  [place]   Does the patient have one of the following disease processes/diagnoses(primary or secondary)?  Other   Week 3 attempt successful?  No   Unsuccessful attempts  Attempt 1          Randee Bah RN

## 2018-11-11 ENCOUNTER — READMISSION MANAGEMENT (OUTPATIENT)
Dept: CALL CENTER | Facility: HOSPITAL | Age: 78
End: 2018-11-11

## 2018-11-14 DIAGNOSIS — C79.51 PROSTATE CANCER METASTATIC TO BONE (HCC): ICD-10-CM

## 2018-11-14 DIAGNOSIS — C79.51 OSSEOUS METASTASIS: ICD-10-CM

## 2018-11-14 DIAGNOSIS — C61 PROSTATE CANCER METASTATIC TO BONE (HCC): ICD-10-CM

## 2018-11-15 ENCOUNTER — LAB (OUTPATIENT)
Dept: LAB | Facility: HOSPITAL | Age: 78
End: 2018-11-15

## 2018-11-15 ENCOUNTER — INFUSION (OUTPATIENT)
Dept: ONCOLOGY | Facility: HOSPITAL | Age: 78
End: 2018-11-15

## 2018-11-15 DIAGNOSIS — C79.51 OSSEOUS METASTASIS: Primary | ICD-10-CM

## 2018-11-15 DIAGNOSIS — C61 PROSTATE CANCER METASTATIC TO BONE (HCC): ICD-10-CM

## 2018-11-15 DIAGNOSIS — C79.51 OSSEOUS METASTASIS: ICD-10-CM

## 2018-11-15 DIAGNOSIS — C79.51 PROSTATE CANCER METASTATIC TO BONE (HCC): ICD-10-CM

## 2018-11-15 LAB
ALBUMIN SERPL-MCNC: 4.1 G/DL (ref 3.5–5.2)
ALBUMIN/GLOB SERPL: 1.3 G/DL (ref 1.1–2.4)
ALP SERPL-CCNC: 72 U/L (ref 38–116)
ALT SERPL W P-5'-P-CCNC: 25 U/L (ref 0–41)
ANION GAP SERPL CALCULATED.3IONS-SCNC: 11.7 MMOL/L
AST SERPL-CCNC: 18 U/L (ref 0–40)
BASOPHILS # BLD AUTO: 0.03 10*3/MM3 (ref 0–0.1)
BASOPHILS NFR BLD AUTO: 0.4 % (ref 0–1.1)
BILIRUB SERPL-MCNC: 0.2 MG/DL (ref 0.1–1.2)
BUN BLD-MCNC: 25 MG/DL (ref 6–20)
BUN/CREAT SERPL: 20.2 (ref 7.3–30)
CALCIUM SPEC-SCNC: 9.8 MG/DL (ref 8.5–10.2)
CHLORIDE SERPL-SCNC: 105 MMOL/L (ref 98–107)
CO2 SERPL-SCNC: 27.3 MMOL/L (ref 22–29)
CREAT BLD-MCNC: 1.24 MG/DL (ref 0.7–1.3)
DEPRECATED RDW RBC AUTO: 44.9 FL (ref 37–49)
EOSINOPHIL # BLD AUTO: 0.16 10*3/MM3 (ref 0–0.36)
EOSINOPHIL NFR BLD AUTO: 2.4 % (ref 1–5)
ERYTHROCYTE [DISTWIDTH] IN BLOOD BY AUTOMATED COUNT: 13.2 % (ref 11.7–14.5)
GFR SERPL CREATININE-BSD FRML MDRD: 56 ML/MIN/1.73
GLOBULIN UR ELPH-MCNC: 3.1 GM/DL (ref 1.8–3.5)
GLUCOSE BLD-MCNC: 114 MG/DL (ref 74–124)
HCT VFR BLD AUTO: 37.1 % (ref 40–49)
HGB BLD-MCNC: 12.2 G/DL (ref 13.5–16.5)
IMM GRANULOCYTES # BLD: 0.01 10*3/MM3 (ref 0–0.03)
IMM GRANULOCYTES NFR BLD: 0.1 % (ref 0–0.5)
LYMPHOCYTES # BLD AUTO: 2.83 10*3/MM3 (ref 1–3.5)
LYMPHOCYTES NFR BLD AUTO: 41.7 % (ref 20–49)
MAGNESIUM SERPL-MCNC: 1.9 MG/DL (ref 1.8–2.5)
MCH RBC QN AUTO: 30.4 PG (ref 27–33)
MCHC RBC AUTO-ENTMCNC: 32.9 G/DL (ref 32–35)
MCV RBC AUTO: 92.5 FL (ref 83–97)
MONOCYTES # BLD AUTO: 0.58 10*3/MM3 (ref 0.25–0.8)
MONOCYTES NFR BLD AUTO: 8.5 % (ref 4–12)
NEUTROPHILS # BLD AUTO: 3.18 10*3/MM3 (ref 1.5–7)
NEUTROPHILS NFR BLD AUTO: 46.9 % (ref 39–75)
NRBC BLD MANUAL-RTO: 0 /100 WBC (ref 0–0)
PHOSPHATE SERPL-MCNC: 4.2 MG/DL (ref 2.5–4.5)
PLATELET # BLD AUTO: 186 10*3/MM3 (ref 150–375)
PMV BLD AUTO: 10 FL (ref 8.9–12.1)
POTASSIUM BLD-SCNC: 5.4 MMOL/L (ref 3.5–4.7)
PROT SERPL-MCNC: 7.2 G/DL (ref 6.3–8)
RBC # BLD AUTO: 4.01 10*6/MM3 (ref 4.3–5.5)
SODIUM BLD-SCNC: 144 MMOL/L (ref 134–145)
WBC NRBC COR # BLD: 6.79 10*3/MM3 (ref 4–10)

## 2018-11-15 PROCEDURE — 25010000002 DENOSUMAB 120 MG/1.7ML SOLUTION: Performed by: INTERNAL MEDICINE

## 2018-11-15 PROCEDURE — 80053 COMPREHEN METABOLIC PANEL: CPT | Performed by: INTERNAL MEDICINE

## 2018-11-15 PROCEDURE — 85025 COMPLETE CBC W/AUTO DIFF WBC: CPT | Performed by: INTERNAL MEDICINE

## 2018-11-15 PROCEDURE — 96372 THER/PROPH/DIAG INJ SC/IM: CPT | Performed by: INTERNAL MEDICINE

## 2018-11-15 PROCEDURE — 84100 ASSAY OF PHOSPHORUS: CPT | Performed by: INTERNAL MEDICINE

## 2018-11-15 PROCEDURE — 83735 ASSAY OF MAGNESIUM: CPT | Performed by: INTERNAL MEDICINE

## 2018-11-15 PROCEDURE — 36415 COLL VENOUS BLD VENIPUNCTURE: CPT | Performed by: INTERNAL MEDICINE

## 2018-11-15 RX ADMIN — DENOSUMAB 120 MG: 120 INJECTION SUBCUTANEOUS at 12:17

## 2018-11-19 ENCOUNTER — OFFICE VISIT (OUTPATIENT)
Dept: FAMILY MEDICINE CLINIC | Facility: CLINIC | Age: 78
End: 2018-11-19

## 2018-11-19 VITALS
HEIGHT: 65 IN | DIASTOLIC BLOOD PRESSURE: 60 MMHG | HEART RATE: 54 BPM | BODY MASS INDEX: 33.15 KG/M2 | OXYGEN SATURATION: 96 % | WEIGHT: 199 LBS | SYSTOLIC BLOOD PRESSURE: 130 MMHG | TEMPERATURE: 97.9 F

## 2018-11-19 DIAGNOSIS — H61.23 BILATERAL HEARING LOSS DUE TO CERUMEN IMPACTION: ICD-10-CM

## 2018-11-19 DIAGNOSIS — Z00.00 MEDICARE ANNUAL WELLNESS VISIT, INITIAL: Primary | ICD-10-CM

## 2018-11-19 PROCEDURE — G0438 PPPS, INITIAL VISIT: HCPCS | Performed by: FAMILY MEDICINE

## 2018-11-19 PROCEDURE — 99213 OFFICE O/P EST LOW 20 MIN: CPT | Performed by: FAMILY MEDICINE

## 2018-11-19 PROCEDURE — 69209 REMOVE IMPACTED EAR WAX UNI: CPT | Performed by: FAMILY MEDICINE

## 2018-11-19 NOTE — PATIENT INSTRUCTIONS
Medicare Wellness  Personal Prevention Plan of Service     Date of Office Visit:  2018  Encounter Provider:  Axel Guardado MD  Place of Service:  [facility]  Patient Name: Semaj Herrera  :  1940    As part of the Medicare Wellness portion of your visit today, we are providing you with this personalized preventive plan of services (PPPS). This plan is based upon recommendations of the [place] Preventive Services Task Force (USPSTF) and the Advisory Committee on Immunization Practices (ACIP).    This lists the preventive care services that should be considered, and provides dates of when you are due. Items listed as completed are up-to-date and do not require any further intervention.    Health Maintenance   Topic Date Due   • URINE MICROALBUMIN  1940   • TDAP/TD VACCINES (1 - Tdap) 1959   • ZOSTER VACCINE (1 of 2) 1990   • LIPID PANEL  2017   • HEMOGLOBIN A1C  2019   • MEDICARE ANNUAL WELLNESS  2019   • COLONOSCOPY  2021   • INFLUENZA VACCINE  Completed   • PNEUMOCOCCAL VACCINES (65+ LOW/MEDIUM RISK)  Completed   • LUNG CANCER SCREENING  Discontinued       No orders of the defined types were placed in this encounter.      No Follow-up on file.

## 2018-11-19 NOTE — PROGRESS NOTES
QUICK REFERENCE INFORMATION:  The ABCs of the Annual Wellness Visit    Initial Medicare Wellness Visit    HEALTH RISK ASSESSMENT    1940    Recent Hospitalizations:  Recently treated at the following:  [facility].        Current Medical Providers:  Patient Care Team:  Axel Guardado MD as PCP - General  Jose Lee MD as PCP - Claims Attributed  Baldemar, Bob HERNANDEZ MD as Consulting Physician (Sleep Medicine)  Tata Lee MD as Consulting Physician (Cardiology)  Bijan Rivera III, MD as Referring Physician (Thoracic Surgery)  Jose Lee MD as Consulting Physician (Hematology and Oncology)        Smoking Status:  Social History     Tobacco Use   Smoking Status Former Smoker   • Packs/day: 1.00   • Years: 30.00   • Pack years: 30.00   • Types: Cigarettes   • Last attempt to quit: 1998   • Years since quittin.8   Smokeless Tobacco Never Used       Alcohol Consumption:  Social History     Substance and Sexual Activity   Alcohol Use No    Comment: caffeine use       Depression Screen:   PHQ-2/PHQ-9 Depression Screening 2018   Little interest or pleasure in doing things 0   Feeling down, depressed, or hopeless 0   Trouble falling or staying asleep, or sleeping too much 1   Feeling tired or having little energy 0   Poor appetite or overeating 2   Feeling bad about yourself - or that you are a failure or have let yourself or your family down 0   Trouble concentrating on things, such as reading the newspaper or watching television 0   Moving or speaking so slowly that other people could have noticed. Or the opposite - being so fidgety or restless that you have been moving around a lot more than usual 1   Thoughts that you would be better off dead, or of hurting yourself in some way 0   Total Score 4   If you checked off any problems, how difficult have these problems made it for you to do your work, take care of things at home, or get along with other people?  Not difficult at all       Health Habits and Functional and Cognitive Screening:  Functional & Cognitive Status 11/19/2018   Do you have difficulty preparing food and eating? No   Do you have difficulty bathing yourself, getting dressed or grooming yourself? No   Do you have difficulty using the toilet? No   Do you have difficulty moving around from place to place? No   Do you have trouble with steps or getting out of a bed or a chair? No   In the past year have you fallen or experienced a near fall? No   Current Diet Well Balanced Diet   Dental Exam Up to date   Eye Exam Up to date   Exercise (times per week) 7 times per week   Current Exercise Activities Include Gardening   Do you need help using the phone?  No   Are you deaf or do you have serious difficulty hearing?  No   Do you need help with transportation? No   Do you need help shopping? No   Do you need help preparing meals?  No   Do you need help with housework?  No   Do you need help with laundry? No   Do you need help taking your medications? No   Do you need help managing money? No   Do you ever drive or ride in a car without wearing a seat belt? No   Have you felt unusual stress, anger or loneliness in the last month? No   Who do you live with? Child   If you need help, do you have trouble finding someone available to you? No   Do you have difficulty concentrating, remembering or making decisions? No           Does the patient have evidence of cognitive impairment? No    Asiprin use counseling: Taking ASA appropriately as indicated      Recent Lab Results:    Visual Acuity:  No exam data present    Age-appropriate Screening Schedule:  Refer to the list below for future screening recommendations based on patient's age, sex and/or medical conditions. Orders for these recommended tests are listed in the plan section. The patient has been provided with a written plan.    Health Maintenance   Topic Date Due   • URINE MICROALBUMIN  1940   • TDAP/TD  VACCINES (1 - Tdap) 11/08/1959   • ZOSTER VACCINE (1 of 2) 11/08/1990   • LIPID PANEL  07/05/2017   • HEMOGLOBIN A1C  04/19/2019   • COLONOSCOPY  01/30/2021   • INFLUENZA VACCINE  Completed   • PNEUMOCOCCAL VACCINES (65+ LOW/MEDIUM RISK)  Completed        Subjective   History of Present Illness    Semaj Herrera is a 78 y.o. male who presents for an Annual Wellness Visit.    The following portions of the patient's history were reviewed and updated as appropriate: past family history and past social history.    Outpatient Medications Prior to Visit   Medication Sig Dispense Refill   • aspirin 81 MG EC tablet Take 1 tablet by mouth daily.     • Calcium Carbonate (CALTRATE 600 PO) Take 1 tablet by mouth Daily.     • CARTIA  MG 24 hr capsule TAKE 1 CAPSULE EVERY DAY 90 capsule 1   • cholecalciferol (VITAMIN D3) 1000 units tablet Take 2,000 Units by mouth Daily.     • Denosumab (XGEVA SC) Inject  under the skin.     • fluticasone (FLONASE SENSIMIST) 27.5 MCG/SPRAY nasal spray 2 sprays into each nostril As Needed.     • gabapentin (NEURONTIN) 100 MG capsule TAKE 4 CAPSULES EVERY NIGHT 120 capsule 2   • HYDROcodone-acetaminophen (NORCO) 5-325 MG per tablet Take 1 tablet by mouth Every 6 (Six) Hours As Needed for Moderate Pain . 60 tablet 0   • insulin aspart (NovoLOG) 100 UNIT/ML patient supplied pump Inject  under the skin into the appropriate area as directed Continuous. Pt reports pump with basal rate 1.9 units/hr from 0800 to 1200. Basal rate 1.8 units/hr 1200 to 0800.  Checks bg ac/hs with bolus doses per pumps parameters     • Leuprolide Acetate, 3 Month, (LUPRON DEPOT, 3-MONTH, IM) Inject  into the shoulder, thigh, or buttocks.     • levothyroxine (SYNTHROID, LEVOTHROID) 75 MCG tablet Take 75 mcg by mouth Daily. TAKES 1/2 TABLET     • modafinil (PROVIGIL) 200 MG tablet Take 1 tablet by mouth Daily. 90 tablet 1   • olmesartan (BENICAR) 40 MG tablet TAKE 1 TABLET EVERY DAY (DISCONTINUE LOSARTAN 100MG) 90 tablet 1  "  • pravastatin (PRAVACHOL) 40 MG tablet Take 40 mg by mouth daily.     • rOPINIRole (REQUIP) 0.5 MG tablet TAKE ONE TABLET BY MOUTH IN THE EVENING AND ONE AT BEDTIME 180 tablet 2   • triamterene-hydrochlorothiazide (DYAZIDE) 37.5-25 MG per capsule Take 1 capsule by mouth Every Morning.     • triamterene-hydrochlorothiazide (MAXZIDE-25) 37.5-25 MG per tablet TAKE 1 TABLET EVERY DAY 90 tablet 3     No facility-administered medications prior to visit.        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    Review of Systems    Compared to one year ago, the patient feels his physical health is better.  Compared to one year ago, the patient feels his mental health is the same.    Objective     Physical Exam    Vitals:    11/19/18 1346   BP: 130/60   BP Location: Left arm   Patient Position: Sitting   Cuff Size: Adult   Pulse: 54   Temp: 97.9 °F (36.6 °C)   TempSrc: Oral   SpO2: 96%   Weight: 90.3 kg (199 lb)   Height: 165.1 cm (65\")   PainSc: 0-No pain       Patient's Body mass index is 33.12 kg/m². BMI is above normal parameters. Recommendations include: exercise counseling and no follow-up required.      Assessment/Plan   Patient Self-Management and Personalized Health Advice  The patient " has been provided with information about: diet, exercise and fall prevention and preventive services including:   · Diabetes screening, see lab orders, Fall Risk assessment done.    Visit Diagnoses:    ICD-10-CM ICD-9-CM   1. Medicare annual wellness visit, initial Z00.00 V70.0       No orders of the defined types were placed in this encounter.      Outpatient Encounter Medications as of 11/19/2018   Medication Sig Dispense Refill   • aspirin 81 MG EC tablet Take 1 tablet by mouth daily.     • Calcium Carbonate (CALTRATE 600 PO) Take 1 tablet by mouth Daily.     • CARTIA  MG 24 hr capsule TAKE 1 CAPSULE EVERY DAY 90 capsule 1   • cholecalciferol (VITAMIN D3) 1000 units tablet Take 2,000 Units by mouth Daily.     • Denosumab (XGEVA SC) Inject  under the skin.     • fluticasone (FLONASE SENSIMIST) 27.5 MCG/SPRAY nasal spray 2 sprays into each nostril As Needed.     • gabapentin (NEURONTIN) 100 MG capsule TAKE 4 CAPSULES EVERY NIGHT 120 capsule 2   • HYDROcodone-acetaminophen (NORCO) 5-325 MG per tablet Take 1 tablet by mouth Every 6 (Six) Hours As Needed for Moderate Pain . 60 tablet 0   • insulin aspart (NovoLOG) 100 UNIT/ML patient supplied pump Inject  under the skin into the appropriate area as directed Continuous. Pt reports pump with basal rate 1.9 units/hr from 0800 to 1200. Basal rate 1.8 units/hr 1200 to 0800.  Checks bg ac/hs with bolus doses per pumps parameters     • Leuprolide Acetate, 3 Month, (LUPRON DEPOT, 3-MONTH, IM) Inject  into the shoulder, thigh, or buttocks.     • levothyroxine (SYNTHROID, LEVOTHROID) 75 MCG tablet Take 75 mcg by mouth Daily. TAKES 1/2 TABLET     • modafinil (PROVIGIL) 200 MG tablet Take 1 tablet by mouth Daily. 90 tablet 1   • olmesartan (BENICAR) 40 MG tablet TAKE 1 TABLET EVERY DAY (DISCONTINUE LOSARTAN 100MG) 90 tablet 1   • pravastatin (PRAVACHOL) 40 MG tablet Take 40 mg by mouth daily.     • rOPINIRole (REQUIP) 0.5 MG tablet TAKE ONE TABLET BY MOUTH IN THE EVENING  AND ONE AT BEDTIME 180 tablet 2   • triamterene-hydrochlorothiazide (DYAZIDE) 37.5-25 MG per capsule Take 1 capsule by mouth Every Morning.     • triamterene-hydrochlorothiazide (MAXZIDE-25) 37.5-25 MG per tablet TAKE 1 TABLET EVERY DAY 90 tablet 3     No facility-administered encounter medications on file as of 11/19/2018.        Reviewed use of high risk medication in the elderly: not applicable  Reviewed for potential of harmful drug interactions in the elderly: not applicable    Follow Up:  No Follow-up on file.     An After Visit Summary and PPPS with all of these plans were given to the patient.        SUBJECTIVE:  The patient is  a 78-year-old white male who is here for follow-up.  He was hospitalized a couple weeks ago at [facility] for cellulitis of the foot.  Please refer to hospital admission history and physical for specific details.  Refer to discharge summary.  He is finished his course of antibiotics and is much better.    PAST MEDICAL HISTORY:  Reviewed.  Current outpatient and discharge medications have been reconciled for the patient.  Reviewed by: Axel Guardado MD    REVIEW OF SYSTEMS:  Please see above; 14 point ROS negative.      OBJECTIVE: Vitals signs are reviewed and are stable.    HEENT: PERRLA.   Neck:  Supple.   Lungs:  Clear.    Heart:  Regular rate and rhythm.   Abdomen:   Soft, nontender.   Extremities:  No cyanosis, clubbing or edema.  Nails are thickened.  There is no evidence of infection in the area of the great toe on the on the left foot.        ASSESSMENT:    Cellulitis left foot-improved  Bilateral cerumen impaction      PLAN: Ears are irrigated.  Patient will follow up as needed.

## 2018-11-30 RX ORDER — GABAPENTIN 100 MG/1
CAPSULE ORAL
Qty: 120 CAPSULE | Refills: 2 | OUTPATIENT
Start: 2018-11-30

## 2018-12-01 RX ORDER — MODAFINIL 200 MG/1
200 TABLET ORAL DAILY
Qty: 90 TABLET | Refills: 1 | Status: SHIPPED | OUTPATIENT
Start: 2018-12-01 | End: 2019-06-24 | Stop reason: SDUPTHER

## 2018-12-03 RX ORDER — HYDROCODONE BITARTRATE AND ACETAMINOPHEN 5; 325 MG/1; MG/1
1 TABLET ORAL EVERY 6 HOURS PRN
Qty: 60 TABLET | Refills: 0 | Status: SHIPPED | OUTPATIENT
Start: 2018-12-03 | End: 2019-01-02 | Stop reason: SDUPTHER

## 2018-12-03 RX ORDER — HYDROCODONE BITARTRATE AND ACETAMINOPHEN 5; 325 MG/1; MG/1
1 TABLET ORAL EVERY 6 HOURS PRN
Qty: 60 TABLET | Refills: 0 | Status: CANCELLED | OUTPATIENT
Start: 2018-12-03

## 2018-12-03 NOTE — TELEPHONE ENCOUNTER
----- Message from Rula Aly MD sent at 12/3/2018  1:47 PM EST -----  An was sent a message to send her Percocet order, treatment but this is a patient who sees  and I don't remember giving this patient Percocet before but please check with Dr. Lee if he needs it or not as I don't regularly see this patient.  Please send the treatment request to Dr.Kommor Rula Aly MD

## 2018-12-11 DIAGNOSIS — C61 PROSTATE CANCER METASTATIC TO BONE (HCC): ICD-10-CM

## 2018-12-11 DIAGNOSIS — C79.51 OSSEOUS METASTASIS: ICD-10-CM

## 2018-12-11 DIAGNOSIS — C79.51 PROSTATE CANCER METASTATIC TO BONE (HCC): ICD-10-CM

## 2018-12-13 ENCOUNTER — LAB (OUTPATIENT)
Dept: LAB | Facility: HOSPITAL | Age: 78
End: 2018-12-13

## 2018-12-13 ENCOUNTER — INFUSION (OUTPATIENT)
Dept: ONCOLOGY | Facility: HOSPITAL | Age: 78
End: 2018-12-13

## 2018-12-13 DIAGNOSIS — C79.51 OSSEOUS METASTASIS: Primary | ICD-10-CM

## 2018-12-13 DIAGNOSIS — C61 PROSTATE CANCER METASTATIC TO BONE (HCC): ICD-10-CM

## 2018-12-13 DIAGNOSIS — C79.51 PROSTATE CANCER METASTATIC TO BONE (HCC): ICD-10-CM

## 2018-12-13 DIAGNOSIS — C79.51 OSSEOUS METASTASIS: ICD-10-CM

## 2018-12-13 LAB
ALBUMIN SERPL-MCNC: 4.3 G/DL (ref 3.5–5.2)
ALBUMIN/GLOB SERPL: 1.4 G/DL (ref 1.1–2.4)
ALP SERPL-CCNC: 82 U/L (ref 38–116)
ALT SERPL W P-5'-P-CCNC: 31 U/L (ref 0–41)
ANION GAP SERPL CALCULATED.3IONS-SCNC: 13.2 MMOL/L
AST SERPL-CCNC: 23 U/L (ref 0–40)
BASOPHILS # BLD AUTO: 0.04 10*3/MM3 (ref 0–0.1)
BASOPHILS NFR BLD AUTO: 0.5 % (ref 0–1.1)
BILIRUB SERPL-MCNC: 0.2 MG/DL (ref 0.1–1.2)
BUN BLD-MCNC: 22 MG/DL (ref 6–20)
BUN/CREAT SERPL: 17.1 (ref 7.3–30)
CALCIUM SPEC-SCNC: 10.1 MG/DL (ref 8.5–10.2)
CHLORIDE SERPL-SCNC: 104 MMOL/L (ref 98–107)
CO2 SERPL-SCNC: 27.8 MMOL/L (ref 22–29)
CREAT BLD-MCNC: 1.29 MG/DL (ref 0.7–1.3)
DEPRECATED RDW RBC AUTO: 44.3 FL (ref 37–49)
EOSINOPHIL # BLD AUTO: 0.18 10*3/MM3 (ref 0–0.36)
EOSINOPHIL NFR BLD AUTO: 2.1 % (ref 1–5)
ERYTHROCYTE [DISTWIDTH] IN BLOOD BY AUTOMATED COUNT: 13 % (ref 11.7–14.5)
GFR SERPL CREATININE-BSD FRML MDRD: 54 ML/MIN/1.73
GLOBULIN UR ELPH-MCNC: 3.1 GM/DL (ref 1.8–3.5)
GLUCOSE BLD-MCNC: 120 MG/DL (ref 74–124)
HCT VFR BLD AUTO: 37.5 % (ref 40–49)
HGB BLD-MCNC: 12.4 G/DL (ref 13.5–16.5)
IMM GRANULOCYTES # BLD: 0.03 10*3/MM3 (ref 0–0.03)
IMM GRANULOCYTES NFR BLD: 0.4 % (ref 0–0.5)
LYMPHOCYTES # BLD AUTO: 2.97 10*3/MM3 (ref 1–3.5)
LYMPHOCYTES NFR BLD AUTO: 35.1 % (ref 20–49)
MAGNESIUM SERPL-MCNC: 2 MG/DL (ref 1.8–2.5)
MCH RBC QN AUTO: 30.7 PG (ref 27–33)
MCHC RBC AUTO-ENTMCNC: 33.1 G/DL (ref 32–35)
MCV RBC AUTO: 92.8 FL (ref 83–97)
MONOCYTES # BLD AUTO: 0.57 10*3/MM3 (ref 0.25–0.8)
MONOCYTES NFR BLD AUTO: 6.7 % (ref 4–12)
NEUTROPHILS # BLD AUTO: 4.68 10*3/MM3 (ref 1.5–7)
NEUTROPHILS NFR BLD AUTO: 55.2 % (ref 39–75)
NRBC BLD MANUAL-RTO: 0 /100 WBC (ref 0–0)
PHOSPHATE SERPL-MCNC: 3.9 MG/DL (ref 2.5–4.5)
PLATELET # BLD AUTO: 189 10*3/MM3 (ref 150–375)
PMV BLD AUTO: 10.3 FL (ref 8.9–12.1)
POTASSIUM BLD-SCNC: 5.2 MMOL/L (ref 3.5–4.7)
PROT SERPL-MCNC: 7.4 G/DL (ref 6.3–8)
RBC # BLD AUTO: 4.04 10*6/MM3 (ref 4.3–5.5)
SODIUM BLD-SCNC: 145 MMOL/L (ref 134–145)
WBC NRBC COR # BLD: 8.47 10*3/MM3 (ref 4–10)

## 2018-12-13 PROCEDURE — 25010000002 DENOSUMAB 120 MG/1.7ML SOLUTION: Performed by: INTERNAL MEDICINE

## 2018-12-13 PROCEDURE — 80053 COMPREHEN METABOLIC PANEL: CPT | Performed by: INTERNAL MEDICINE

## 2018-12-13 PROCEDURE — 84100 ASSAY OF PHOSPHORUS: CPT | Performed by: INTERNAL MEDICINE

## 2018-12-13 PROCEDURE — 36415 COLL VENOUS BLD VENIPUNCTURE: CPT | Performed by: INTERNAL MEDICINE

## 2018-12-13 PROCEDURE — 85025 COMPLETE CBC W/AUTO DIFF WBC: CPT | Performed by: INTERNAL MEDICINE

## 2018-12-13 PROCEDURE — 83735 ASSAY OF MAGNESIUM: CPT | Performed by: INTERNAL MEDICINE

## 2018-12-13 PROCEDURE — 96372 THER/PROPH/DIAG INJ SC/IM: CPT | Performed by: INTERNAL MEDICINE

## 2018-12-13 RX ADMIN — DENOSUMAB 120 MG: 120 INJECTION SUBCUTANEOUS at 12:54

## 2018-12-31 RX ORDER — HYDROCODONE BITARTRATE AND ACETAMINOPHEN 5; 325 MG/1; MG/1
1 TABLET ORAL EVERY 6 HOURS PRN
Qty: 60 TABLET | Refills: 0 | Status: CANCELLED | OUTPATIENT
Start: 2018-12-31

## 2019-01-02 ENCOUNTER — DOCUMENTATION (OUTPATIENT)
Dept: ONCOLOGY | Facility: HOSPITAL | Age: 79
End: 2019-01-02

## 2019-01-02 RX ORDER — HYDROCODONE BITARTRATE AND ACETAMINOPHEN 5; 325 MG/1; MG/1
1 TABLET ORAL EVERY 6 HOURS PRN
Qty: 60 TABLET | Refills: 0 | Status: SHIPPED | OUTPATIENT
Start: 2019-01-02 | End: 2019-01-19 | Stop reason: SDUPTHER

## 2019-01-08 DIAGNOSIS — C61 PROSTATE CANCER METASTATIC TO BONE (HCC): ICD-10-CM

## 2019-01-08 DIAGNOSIS — C79.51 OSSEOUS METASTASIS: ICD-10-CM

## 2019-01-08 DIAGNOSIS — C79.51 PROSTATE CANCER METASTATIC TO BONE (HCC): ICD-10-CM

## 2019-01-08 NOTE — PROGRESS NOTES
Subjective periodic aches and pains, we'll be seen by [facility] for potential coverage as well                         REASON FOR CONSULTATION:    Provide an opinion on any further workup or treatment                             REQUESTING PHYSICIAN:  Axel Guardado    .    History of Present Illness    The patient 78-year-old male with significant past medical history including prostate carcinoma, CKD 3 and long-term tobacco use be been seen by primary care in late January, 2018 for hoarseness.  Chest x-ray is now outpatient January 23 revealed sclerotic change in the posterior mid chest to be within 3 adjacent thoracic vertebral bodies of uncertain significance.  A follow-up chest CT revealed numerous sclerotic foci within all visualized bones consistent with metastatic disease, multiple enlarged mediastinal lymph nodes concerning for metastatic lymphadenopathy and a polypoidal abnormality in the right lateral wall of the trachea.  CT of abdomen March 20 revealed extensive osseous metastatic disease mildly enlarged retroperitoneal lymph nodes.  Bone scan March 20 was consistent with widespread osseous metastasis particularly in the proximal half the left humeral shaft, abnormal uptake in the sternum and manubrium and a small focus in the posterior aspect of the calvarium.  This led to a plain film the left humerus with mottled sclerosis involving the shaft of the humerus particularly in the proximal half but no evidence of pathologic fracture.    The patient has additionally type 2 diabetes on insulin pump, again a history of prostate carcinoma prostatectomy 12 years previously, hypertension, and hyperlipidemia.  Additional studies included a PSA of 395.1 from March 14, 2018.The patient's been seen by urology March 15 with plans to start Firmagon.    The patient is now seen in pulmonary clinic with Dr. Bijan Rivera and we have discussed that he will need bronchoscopy and mediastinoscopy.  Interestingly his  additional history includes a long occupational exposure including working in the [place] just out of school, subsequent construction work for many years and a 30-pack-year history of smoking stopping in the late 1990s.  He indicates that he followed with Dr. Guillen of urology for many years with no changes in his exams and normal PSAs.  He stopped this follow-up approximately 2 years ago.     Patient was seen in consultation with thoracic surgery on March 28 and plans are made for mediastinoscopy and bronchoscopy with lymph node biopsy.  Pathology revealed that these nodes were consistent with metastatic prostate carcinoma.  He was discussed at thoracic conference.  A PET/CT was not pursued and it was determined that he see medical oncology for hormonal treatment and radiation therapy if symptomatic.  It should be noted that the patient's March 15 [facility] office note describes that Firmagon was planned.     The patient has met with the son and grandson April 23.  We have also contacted Dr. Taylor of urology in that Firmagon was given just after his last visit and would next be due approximately May 3.  Patient feels considerably better with resolution of his pain, normalization of his performance status.  He would like to continue treatment through our office now contacted Dr. Taylor concerning this.    The patient is next seen June 11, 2018 we discussed his follow-up including his treatments with Lupron May 7 and his next treatment on July 30.  He has returned to essentially normal performance status and his PSA has dropped further from 395 March 14 27.8 May 7 and now 2.03 June 11.  We do plan to initiate Xgeva also study him via scans to document his improvement approximately a month from now.   The patient is next seen July 26, 2018.  Repeat imaging demonstrated interval resolution of mediastinal and retroperitoneal lymphadenopathy.  There is thickening in the distal aspect of the  appendix with stranding?  Chronic appendicitis.  The patient indicates he is having no such symptoms and never has.  There is no change in sclerotic bony metastasis in the patient's PSA has dropped substantially to 1.66 by July 19 and 1.79 July 26.  He is actually feeling excellent and this is corroborated by family members.   Patient is next seen January 10, 2019 continuing to do very well and, in fact indicating that he is going [facility] by [facility] care, likely hearing replacements, visual review.  He is not certain is going to continue his care with them or with us or combination of both.      Past Medical History:   Diagnosis Date   • Bone cancer (CMS/HCC)    • CKD (chronic kidney disease)     Stage 3; followed by Dr. Vicky Duran    • Diastolic dysfunction     GRADE II per echo 2018   • Difficulty breathing     during exertion   • Dyslipidemia    • Erectile dysfunction    • Fatigue    • History of blood transfusion    • History of kidney stones    • Hyperlipidemia    • Hypertension    • Left ventricular hypertrophy     per echo 2018   • Neuropathy    • Obstructive sleep apnea     USING CPAP   • Osteoarthritis    • Peptic ulcer    • Prostate cancer (CMS/HCC)     Status post prostatectomy, radiation therapy, and hormone therapy followed by Dr. Lee; metastatsis to bone   • Restless leg syndrome    • Sinus bradycardia    • Transient cerebral ischemia    • Type 2 diabetes mellitus (CMS/HCC)         Past Surgical History:   Procedure Laterality Date   • BRONCHOSCOPY N/A 4/9/2018    Procedure: BRONCHOSCOPY;  Surgeon: Bijan Rivera III, MD;  Location: [facility];  Service: Cardiothoracic   • COLONOSCOPY     • DEEP NECK LYMPH NODE BIOPSY / EXCISION     • HEMORRHOIDECTOMY     • MEDIASTINOSCOPY N/A 4/9/2018    Procedure: MEDIASTINOSCOPY WITH LYMPH NODE BIOPSY;  Surgeon: Bijan Rivera III, MD;  Location: [facility];  Service: Cardiothoracic   • OTHER SURGICAL HISTORY      ulcer  repair   • PROSTATECTOMY  2006        Current Outpatient Medications on File Prior to Visit   Medication Sig Dispense Refill   • aspirin 81 MG EC tablet Take 1 tablet by mouth daily.     • Calcium Carbonate (CALTRATE 600 PO) Take 1 tablet by mouth Daily.     • CARTIA  MG 24 hr capsule TAKE 1 CAPSULE EVERY DAY 90 capsule 1   • cholecalciferol (VITAMIN D3) 1000 units tablet Take 2,000 Units by mouth Daily.     • Denosumab (XGEVA SC) Inject  under the skin.     • fluticasone (FLONASE SENSIMIST) 27.5 MCG/SPRAY nasal spray 2 sprays into each nostril As Needed.     • gabapentin (NEURONTIN) 100 MG capsule TAKE 4 CAPSULES EVERY NIGHT 120 capsule 2   • HYDROcodone-acetaminophen (NORCO) 5-325 MG per tablet Take 1 tablet by mouth Every 6 (Six) Hours As Needed for Moderate Pain . 60 tablet 0   • insulin aspart (NovoLOG) 100 UNIT/ML patient supplied pump Inject  under the skin into the appropriate area as directed Continuous. Pt reports pump with basal rate 1.9 units/hr from 0800 to 1200. Basal rate 1.8 units/hr 1200 to 0800.  Checks bg ac/hs with bolus doses per pumps parameters     • Leuprolide Acetate, 3 Month, (LUPRON DEPOT, 3-MONTH, IM) Inject  into the shoulder, thigh, or buttocks.     • levothyroxine (SYNTHROID, LEVOTHROID) 75 MCG tablet Take 75 mcg by mouth Daily. TAKES 1/2 TABLET     • modafinil (PROVIGIL) 200 MG tablet Take 1 tablet by mouth Daily. 90 tablet 1   • olmesartan (BENICAR) 40 MG tablet TAKE 1 TABLET EVERY DAY (DISCONTINUE LOSARTAN 100MG) 90 tablet 1   • pravastatin (PRAVACHOL) 40 MG tablet Take 40 mg by mouth daily.     • Probiotic Product (ALIGN) chewable tablet      • rOPINIRole (REQUIP) 0.5 MG tablet TAKE ONE TABLET BY MOUTH IN THE EVENING AND ONE AT BEDTIME 180 tablet 2   • triamterene-hydrochlorothiazide (DYAZIDE) 37.5-25 MG per capsule Take 1 capsule by mouth Every Morning.     • triamterene-hydrochlorothiazide (MAXZIDE-25) 37.5-25 MG per tablet TAKE 1 TABLET EVERY DAY 90 tablet 3     No current  facility-administered medications on file prior to visit.         ALLERGIES:  No Known Allergies     Social History     Socioeconomic History   • Marital status:      Spouse name: Not on file   • Number of children: Not on file   • Years of education: College   • Highest education level: Not on file   Occupational History     Employer: RETIRED   Tobacco Use   • Smoking status: Former Smoker     Packs/day: 1.00     Years: 30.00     Pack years: 30.00     Types: Cigarettes     Last attempt to quit: 1998     Years since quittin.9   • Smokeless tobacco: Never Used   Substance and Sexual Activity   • Alcohol use: No     Comment: caffeine use   • Drug use: No   • Sexual activity: Defer        Family History   Problem Relation Age of Onset   • Heart failure Mother    • Hypertension Mother    • Diabetes Mother    • Heart disease Mother    • Cancer Father         LUNG   • Lung cancer Father 46   • Cancer Sister         LUNG   • Hypertension Sister    • Lung cancer Sister 60   • Cancer Brother         LUNG   • Hypertension Brother    • Lung cancer Brother 62   • Heart disease Other    • Prostate cancer Brother 60   • Malig Hyperthermia Neg Hx         Review of Systems   Constitutional: Negative for fatigue.   Respiratory: Negative for chest tightness, shortness of breath and wheezing.    Gastrointestinal: Negative for constipation, diarrhea, nausea and vomiting.   Neurological: Negative for weakness.       Constitution:Normalization of performance status  HENT: Negative.    Eyes: Negative.    Cardiovascular: Negative.    Respiratory:  No additional cough or shortness of breath Endocrine: Negative.    Hematologic/Lymphatic: Negative.    Skin: Negative.    Musculoskeletal: Resolution of joint pain and stiffness.   Gastrointestinal: Negative.    Genitourinary: Negative.    Neurological: Negative.    Psychiatric/Behavioral: Negative.    All other systems reviewed and are negative.  Objective     Vitals:     "01/10/19 1042   BP: 143/77   Pulse: 53   Resp: 16   Temp: 98.1 °F (36.7 °C)   TempSrc: Oral   SpO2: 97%   Weight: 92.9 kg (204 lb 12.8 oz)   Height: 165.1 cm (65\")   PainSc:   5   PainLoc: Comment: bilateral hand and shoulder pain     Current Status 1/10/2019   ECOG score 0       Physical Exam    OBJECTIVE: Vitals signs are reviewed and are stable.    HEENT: PERRLA.    Neck:  Supple.  Bilateral bruits, No thyromegaly or adenopathy  Lungs:  Clear.  No rales rhonchi or wheezes  Heart:  Regular rate and rhythm.  1/6 systolic murmur  Abdomen:   Soft, nontender.  Obese.  Bowel sounds present.  No organomegaly can be detected, though patient is tender in right lower to mid abdomen.  No ascites or masses again to be detected  Extremities:  No cyanosis, clubbing or edema.      RECENT LABS:  Hematology WBC   Date Value Ref Range Status   01/10/2019 8.04 4.00 - 10.00 10*3/mm3 Final     RBC   Date Value Ref Range Status   01/10/2019 4.19 (L) 4.30 - 5.50 10*6/mm3 Final     Hemoglobin   Date Value Ref Range Status   01/10/2019 12.7 (L) 13.5 - 16.5 g/dL Final     Hematocrit   Date Value Ref Range Status   01/10/2019 38.8 (L) 40.0 - 49.0 % Final     Platelets   Date Value Ref Range Status   01/10/2019 205 150 - 375 10*3/mm3 Final      CT CHEST, ABDOMEN AND PELVIS WITHOUT CONTRAST  July 19, 2018    FINDINGS:  CHEST: There has been resolution of the previously seen mediastinal  lymphadenopathy. There are no remaining enlarged mediastinal nodes.  There are no pulmonary airspace opacities and there are no pleural or  pericardial effusions. A tiny pleural-based nodular opacity at the right  lower lobe is stable, image 78. Extensive sclerotic bone metastases are  noted.     ABDOMEN/PELVIS: There has been interval resolution of the previously  seen retroperitoneal lymphadenopathy. Shotty groin nodes are stable.  Noncontrasted liver, gallbladder, spleen, pancreas,, adrenals, and  kidneys appear unremarkable. There is extensive colonic " diverticulosis  without evidence for diverticulitis. The appendix previously appeared  normal and on the current examination, there is ill-defined thickening  of the distal aspect of the appendix with surrounding stranding, image  156. There is no free fluid or fluid collection. Prostatectomy changes  are noted. Extensive sclerotic bone metastases appear unchanged.     IMPRESSION:  1. There has been interval resolution of the mediastinal and  retroperitoneal lymphadenopathy.  2. There is new thickening of the distal aspect of the appendix with  surrounding stranding. Subacute or chronic appendicitis is possible.  Please correlate if symptoms in the right lower quadrant developed since  the previous CT examination. Follow-up is recommended as clinically  indicated.  3. No significant change in the extensive sclerotic bone metastases         Assessment/Plan       77-year-old male with medical history including prostate carcinoma, CK D3, long-term tobacco use recent development of hoarseness and subsequent studies that demonstrated findings consistent with metastatic disease to bone as well as multiple enlarged metastatic lymph nodes, and potential abnormality in the right lateral wall trachea.  His additional history includes type 2 diabetes on insulin pump which has been more effective for control of his blood sugars.    His recent symptoms have included generalized discomfort in multiple sites in his bony skeleton as well as right lower quadrant abdominal pain.  We discussed this made will improve after he starts Firmagon in the a.m.  Discussions with Dr. Rivera also have led to the conclusion that he'll need bronchoscopy and mediastinoscopy which did proceed as above on May 9.  Pathology revealed mediastinal lymph nodes to be involved with metastatic adenocarcinoma consistent with origin from a prostatic primary . The patient's case was discussed at thoracic conference and recommendations were to continue with ADT  and radiation for symptomatic sites.  The patient is seen back April 23 his treatment with ADT-Firmagon-has improved his symptomatic performance status to near baseline.  It is not felt that he'll require other treatments such as oral antiandrogens at this point.  He could, however, potentially benefit from agents such as Xgeva given at appropriate intervals. We went on to continue with Xgeva and rescan him July 23 demonstrating near remission developing.  This is also supported by his normalizing PSA.  The patient's scans demonstrated possibly chronic appendicitis is not having symptoms referable to this.he plans to be alert to the possibility of stenosis family member.  We discussed moving to an every 6 month treatment but at this point we'll continue at 3 months. As result we continued Lupron and Xgeva on schedule and is seen back now 6 months later continuing to generally improve.  As he makes his decision about [facility] coverage we will  plan:  *Today treatment with Lupron and Xgeva  *Every month Xgeva  *3 months Chirag nolan M.D. assessment  *Patient will notify us if he wants to [facility] care to [facility].

## 2019-01-10 ENCOUNTER — OFFICE VISIT (OUTPATIENT)
Dept: ONCOLOGY | Facility: CLINIC | Age: 79
End: 2019-01-10

## 2019-01-10 ENCOUNTER — LAB (OUTPATIENT)
Dept: LAB | Facility: HOSPITAL | Age: 79
End: 2019-01-10

## 2019-01-10 ENCOUNTER — INFUSION (OUTPATIENT)
Dept: ONCOLOGY | Facility: HOSPITAL | Age: 79
End: 2019-01-10

## 2019-01-10 VITALS
HEIGHT: 65 IN | RESPIRATION RATE: 16 BRPM | DIASTOLIC BLOOD PRESSURE: 77 MMHG | OXYGEN SATURATION: 97 % | SYSTOLIC BLOOD PRESSURE: 143 MMHG | TEMPERATURE: 98.1 F | BODY MASS INDEX: 34.12 KG/M2 | WEIGHT: 204.8 LBS | HEART RATE: 53 BPM

## 2019-01-10 DIAGNOSIS — C79.51 PROSTATE CANCER METASTATIC TO BONE (HCC): Primary | ICD-10-CM

## 2019-01-10 DIAGNOSIS — C79.51 OSSEOUS METASTASIS: Primary | ICD-10-CM

## 2019-01-10 DIAGNOSIS — C79.51 PROSTATE CANCER METASTATIC TO BONE (HCC): ICD-10-CM

## 2019-01-10 DIAGNOSIS — C61 PROSTATE CANCER METASTATIC TO BONE (HCC): ICD-10-CM

## 2019-01-10 DIAGNOSIS — R59.0 MEDIASTINAL ADENOPATHY: ICD-10-CM

## 2019-01-10 DIAGNOSIS — C79.51 OSSEOUS METASTASIS: ICD-10-CM

## 2019-01-10 DIAGNOSIS — C61 PROSTATE CANCER METASTATIC TO BONE (HCC): Primary | ICD-10-CM

## 2019-01-10 LAB
ALBUMIN SERPL-MCNC: 4.2 G/DL (ref 3.5–5.2)
ALBUMIN/GLOB SERPL: 1.3 G/DL (ref 1.1–2.4)
ALP SERPL-CCNC: 74 U/L (ref 38–116)
ALT SERPL W P-5'-P-CCNC: 30 U/L (ref 0–41)
ANION GAP SERPL CALCULATED.3IONS-SCNC: 11.5 MMOL/L
AST SERPL-CCNC: 23 U/L (ref 0–40)
BASOPHILS # BLD AUTO: 0.03 10*3/MM3 (ref 0–0.1)
BASOPHILS NFR BLD AUTO: 0.4 % (ref 0–1.1)
BILIRUB SERPL-MCNC: 0.2 MG/DL (ref 0.1–1.2)
BUN BLD-MCNC: 24 MG/DL (ref 6–20)
BUN/CREAT SERPL: 17.6 (ref 7.3–30)
CALCIUM SPEC-SCNC: 9.9 MG/DL (ref 8.5–10.2)
CHLORIDE SERPL-SCNC: 106 MMOL/L (ref 98–107)
CO2 SERPL-SCNC: 26.5 MMOL/L (ref 22–29)
CREAT BLD-MCNC: 1.36 MG/DL (ref 0.7–1.3)
DEPRECATED RDW RBC AUTO: 43.5 FL (ref 37–49)
EOSINOPHIL # BLD AUTO: 0.17 10*3/MM3 (ref 0–0.36)
EOSINOPHIL NFR BLD AUTO: 2.1 % (ref 1–5)
ERYTHROCYTE [DISTWIDTH] IN BLOOD BY AUTOMATED COUNT: 12.9 % (ref 11.7–14.5)
GFR SERPL CREATININE-BSD FRML MDRD: 51 ML/MIN/1.73
GLOBULIN UR ELPH-MCNC: 3.2 GM/DL (ref 1.8–3.5)
GLUCOSE BLD-MCNC: 110 MG/DL (ref 74–124)
HCT VFR BLD AUTO: 38.8 % (ref 40–49)
HGB BLD-MCNC: 12.7 G/DL (ref 13.5–16.5)
IMM GRANULOCYTES # BLD AUTO: 0.05 10*3/MM3 (ref 0–0.03)
IMM GRANULOCYTES NFR BLD AUTO: 0.6 % (ref 0–0.5)
LYMPHOCYTES # BLD AUTO: 3.15 10*3/MM3 (ref 1–3.5)
LYMPHOCYTES NFR BLD AUTO: 39.2 % (ref 20–49)
MAGNESIUM SERPL-MCNC: 1.8 MG/DL (ref 1.8–2.5)
MCH RBC QN AUTO: 30.3 PG (ref 27–33)
MCHC RBC AUTO-ENTMCNC: 32.7 G/DL (ref 32–35)
MCV RBC AUTO: 92.6 FL (ref 83–97)
MONOCYTES # BLD AUTO: 0.62 10*3/MM3 (ref 0.25–0.8)
MONOCYTES NFR BLD AUTO: 7.7 % (ref 4–12)
NEUTROPHILS # BLD AUTO: 4.02 10*3/MM3 (ref 1.5–7)
NEUTROPHILS NFR BLD AUTO: 50 % (ref 39–75)
NRBC BLD AUTO-RTO: 0 /100 WBC (ref 0–0)
PHOSPHATE SERPL-MCNC: 4.3 MG/DL (ref 2.5–4.5)
PLATELET # BLD AUTO: 205 10*3/MM3 (ref 150–375)
PMV BLD AUTO: 10.1 FL (ref 8.9–12.1)
POTASSIUM BLD-SCNC: 4.4 MMOL/L (ref 3.5–4.7)
PROT SERPL-MCNC: 7.4 G/DL (ref 6.3–8)
PSA SERPL-MCNC: 0.75 NG/ML (ref 0–4)
RBC # BLD AUTO: 4.19 10*6/MM3 (ref 4.3–5.5)
SODIUM BLD-SCNC: 144 MMOL/L (ref 134–145)
WBC NRBC COR # BLD: 8.04 10*3/MM3 (ref 4–10)

## 2019-01-10 PROCEDURE — 84100 ASSAY OF PHOSPHORUS: CPT | Performed by: INTERNAL MEDICINE

## 2019-01-10 PROCEDURE — 96402 CHEMO HORMON ANTINEOPL SQ/IM: CPT | Performed by: INTERNAL MEDICINE

## 2019-01-10 PROCEDURE — 83735 ASSAY OF MAGNESIUM: CPT | Performed by: INTERNAL MEDICINE

## 2019-01-10 PROCEDURE — 96372 THER/PROPH/DIAG INJ SC/IM: CPT | Performed by: INTERNAL MEDICINE

## 2019-01-10 PROCEDURE — 84153 ASSAY OF PSA TOTAL: CPT | Performed by: INTERNAL MEDICINE

## 2019-01-10 PROCEDURE — 25010000002 LEUPROLIDE ACETATE (3 MONTH) PER 7.5 MG: Performed by: INTERNAL MEDICINE

## 2019-01-10 PROCEDURE — 36415 COLL VENOUS BLD VENIPUNCTURE: CPT | Performed by: INTERNAL MEDICINE

## 2019-01-10 PROCEDURE — 85025 COMPLETE CBC W/AUTO DIFF WBC: CPT | Performed by: INTERNAL MEDICINE

## 2019-01-10 PROCEDURE — 80053 COMPREHEN METABOLIC PANEL: CPT | Performed by: INTERNAL MEDICINE

## 2019-01-10 PROCEDURE — 99214 OFFICE O/P EST MOD 30 MIN: CPT | Performed by: INTERNAL MEDICINE

## 2019-01-10 PROCEDURE — 25010000002 DENOSUMAB 120 MG/1.7ML SOLUTION: Performed by: INTERNAL MEDICINE

## 2019-01-10 RX ORDER — SODIUM FLUORIDE 0.1 MG/ML
1 RINSE ORAL DAILY
COMMUNITY
Start: 2018-10-21 | End: 2020-01-30

## 2019-01-10 RX ADMIN — DENOSUMAB 120 MG: 120 INJECTION SUBCUTANEOUS at 11:53

## 2019-01-10 RX ADMIN — LEUPROLIDE ACETATE 22.5 MG: KIT at 11:53

## 2019-01-21 RX ORDER — HYDROCODONE BITARTRATE AND ACETAMINOPHEN 5; 325 MG/1; MG/1
1 TABLET ORAL EVERY 6 HOURS PRN
Qty: 60 TABLET | Refills: 0 | Status: SHIPPED | OUTPATIENT
Start: 2019-01-21 | End: 2019-02-25 | Stop reason: SDUPTHER

## 2019-01-21 RX ORDER — GABAPENTIN 100 MG/1
400 CAPSULE ORAL NIGHTLY
Qty: 120 CAPSULE | Refills: 2 | Status: SHIPPED | OUTPATIENT
Start: 2019-01-21 | End: 2019-04-27 | Stop reason: SDUPTHER

## 2019-01-30 ENCOUNTER — OFFICE VISIT (OUTPATIENT)
Dept: CARDIOLOGY | Facility: CLINIC | Age: 79
End: 2019-01-30

## 2019-01-30 VITALS
BODY MASS INDEX: 34.22 KG/M2 | WEIGHT: 205.4 LBS | HEIGHT: 65 IN | HEART RATE: 54 BPM | DIASTOLIC BLOOD PRESSURE: 76 MMHG | SYSTOLIC BLOOD PRESSURE: 106 MMHG

## 2019-01-30 DIAGNOSIS — I51.7 LEFT VENTRICULAR HYPERTROPHY: ICD-10-CM

## 2019-01-30 DIAGNOSIS — G47.33 OBSTRUCTIVE SLEEP APNEA SYNDROME: ICD-10-CM

## 2019-01-30 DIAGNOSIS — I10 ESSENTIAL HYPERTENSION: ICD-10-CM

## 2019-01-30 DIAGNOSIS — I51.89 DIASTOLIC DYSFUNCTION: Primary | ICD-10-CM

## 2019-01-30 DIAGNOSIS — E11.9 TYPE 2 DIABETES MELLITUS WITHOUT COMPLICATION, WITH LONG-TERM CURRENT USE OF INSULIN (HCC): ICD-10-CM

## 2019-01-30 DIAGNOSIS — E78.1 PURE HYPERGLYCERIDEMIA: ICD-10-CM

## 2019-01-30 DIAGNOSIS — N18.30 CHRONIC KIDNEY DISEASE, STAGE III (MODERATE) (HCC): ICD-10-CM

## 2019-01-30 DIAGNOSIS — Z79.4 TYPE 2 DIABETES MELLITUS WITHOUT COMPLICATION, WITH LONG-TERM CURRENT USE OF INSULIN (HCC): ICD-10-CM

## 2019-01-30 PROCEDURE — 93000 ELECTROCARDIOGRAM COMPLETE: CPT | Performed by: INTERNAL MEDICINE

## 2019-01-30 PROCEDURE — 99214 OFFICE O/P EST MOD 30 MIN: CPT | Performed by: INTERNAL MEDICINE

## 2019-01-30 NOTE — PROGRESS NOTES
Date of Office Visit: 2019  Encounter Provider: Tata Lee MD  Place of Service: [facility] CARDIOLOGY  Patient Name: Smeaj Herrera  :1940      Patient ID:  Semaj Herrera is a 78 y.o. male is here for  followup for hypertension and LVH.         History of Present Illness    I first saw the patient on 2010 for dizziness, fatigue and  bradycardia. At that time, we stopped his metoprolol and this went away.   He has a known history of hypertension and has had left ventricular  hypertrophy and diastolic dysfunction. His echocardiogram was done  2011, showing grade 2 diastolic dysfunction, mild aortic  insufficiency, ejection fraction of 53%, normal left ventricular systolic  function, mild left atrial enlargement, aortic valve calcification without  stenosis and mild concentric left ventricular hypertrophy.      He does have severe obstructive sleep apnea. He uses a BIPAP machine and follows with Dr. Mcdermott for this. This has been stable.   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      When I saw the patient in 2013, he was having dizziness, weakness, feeling unwell,  and his blood pressure was low. We decreased his diuretic to every other day, the  triamterene/hydrochlorothiazide and he has done much better.      He sees Dr. almanza for his diabetes and Dr. Vicky Duran for his renal disease.     Echo done 3/2017 showed LVEF 62% with grade 2 diastolic dysfunction. There was no significant valve disease.     He sees Dr. Lee for metastatic prostate cancer.  He had bony metastases.  This affected his spine and his right shoulder.  He still has right shoulder pain but his PSA is now 0.7 after hormone therapy.  He has no chest pain or pressure.  He sometimes feels short winded at 3:00 in the morning gets up and sits in a chair and then feels better.  He does use his CPAP machine.  He  doesn't feels heart racing or skipping.  He's had no dizziness or syncope.  He's had no exertional chest tightness or pressure.  He has minimal lower extremity edema.      Past Medical History:   Diagnosis Date   • Bone cancer (CMS/HCC)    • CKD (chronic kidney disease)     Stage 3; followed by Dr. Vicky Duran    • Diastolic dysfunction     GRADE II per echo 2018   • Difficulty breathing     during exertion   • Dyslipidemia    • Erectile dysfunction    • Fatigue    • History of blood transfusion    • History of kidney stones    • Hyperlipidemia    • Hypertension    • Left ventricular hypertrophy     per echo 2018   • Localized edema    • Neuropathy    • Obstructive sleep apnea     USING CPAP   • Osteoarthritis    • Peptic ulcer    • Prostate cancer (CMS/HCC)     Status post prostatectomy, radiation therapy, and hormone therapy followed by Dr. Lee; metastatsis to bone   • Pure hyperglyceridemia    • Restless leg syndrome    • Sinus bradycardia    • Transient cerebral ischemia    • Type 2 diabetes mellitus (CMS/HCC)          Past Surgical History:   Procedure Laterality Date   • BRONCHOSCOPY N/A 4/9/2018    Procedure: BRONCHOSCOPY;  Surgeon: Bijan Rivera III, MD;  Location: [facility];  Service: Cardiothoracic   • COLONOSCOPY     • DEEP NECK LYMPH NODE BIOPSY / EXCISION     • HEMORRHOIDECTOMY     • MEDIASTINOSCOPY N/A 4/9/2018    Procedure: MEDIASTINOSCOPY WITH LYMPH NODE BIOPSY;  Surgeon: Bijan Rivera III, MD;  Location: [facility] OR;  Service: Cardiothoracic   • OTHER SURGICAL HISTORY      ulcer repair   • PROSTATECTOMY  2006       Current Outpatient Medications on File Prior to Visit   Medication Sig Dispense Refill   • aspirin 81 MG EC tablet Take 1 tablet by mouth daily.     • Calcium Carbonate (CALTRATE 600 PO) Take 1 tablet by mouth Daily.     • CARTIA  MG 24 hr capsule TAKE 1 CAPSULE EVERY DAY 90 capsule 1   • cholecalciferol (VITAMIN D3) 1000 units tablet Take 2,000 Units by mouth  Daily.     • Denosumab (XGEVA SC) Inject  under the skin.     • fluticasone (FLONASE SENSIMIST) 27.5 MCG/SPRAY nasal spray 2 sprays into each nostril As Needed.     • gabapentin (NEURONTIN) 100 MG capsule Take 4 capsules by mouth Every Night. 120 capsule 2   • HYDROcodone-acetaminophen (NORCO) 5-325 MG per tablet Take 1 tablet by mouth Every 6 (Six) Hours As Needed for Moderate Pain . 60 tablet 0   • insulin aspart (NovoLOG) 100 UNIT/ML patient supplied pump Inject  under the skin into the appropriate area as directed Continuous. Pt reports pump with basal rate 1.9 units/hr from 0800 to 1200. Basal rate 1.8 units/hr 1200 to 0800.  Checks bg ac/hs with bolus doses per pumps parameters     • Leuprolide Acetate, 3 Month, (LUPRON DEPOT, 3-MONTH, IM) Inject  into the shoulder, thigh, or buttocks.     • levothyroxine (SYNTHROID, LEVOTHROID) 75 MCG tablet Take 75 mcg by mouth Daily. TAKES 1/2 TABLET     • modafinil (PROVIGIL) 200 MG tablet Take 1 tablet by mouth Daily. 90 tablet 1   • olmesartan (BENICAR) 40 MG tablet TAKE 1 TABLET EVERY DAY (DISCONTINUE LOSARTAN 100MG) 90 tablet 1   • pravastatin (PRAVACHOL) 40 MG tablet Take 40 mg by mouth daily.     • Probiotic Product (ALIGN) chewable tablet      • rOPINIRole (REQUIP) 0.5 MG tablet TAKE ONE TABLET BY MOUTH IN THE EVENING AND ONE AT BEDTIME 180 tablet 2   • triamterene-hydrochlorothiazide (MAXZIDE-25) 37.5-25 MG per tablet TAKE 1 TABLET EVERY DAY 90 tablet 3   • [DISCONTINUED] triamterene-hydrochlorothiazide (DYAZIDE) 37.5-25 MG per capsule Take 1 capsule by mouth Every Morning.       No current facility-administered medications on file prior to visit.        Social History     Socioeconomic History   • Marital status:      Spouse name: Not on file   • Number of children: Not on file   • Years of education: College   • Highest education level: Not on file   Social Needs   • Financial resource strain: Not on file   • Food insecurity - worry: Not on file   • Food  insecurity - inability: Not on file   • Transportation needs - medical: Not on file   • Transportation needs - non-medical: Not on file   Occupational History     Employer: RETIRED   Tobacco Use   • Smoking status: Former Smoker     Packs/day: 1.00     Years: 30.00     Pack years: 30.00     Types: Cigarettes     Last attempt to quit: 1998     Years since quittin.0   • Smokeless tobacco: Never Used   Substance and Sexual Activity   • Alcohol use: No     Comment: caffeine use   • Drug use: No   • Sexual activity: Defer   Other Topics Concern   • Not on file   Social History Narrative   • Not on file           Review of Systems   Constitution: Negative.   HENT: Positive for hearing loss. Negative for congestion.    Eyes: Negative for vision loss in left eye and vision loss in right eye.   Respiratory: Positive for cough and shortness of breath. Negative for hemoptysis, sleep disturbances due to breathing, snoring, sputum production and wheezing.    Endocrine: Negative.    Hematologic/Lymphatic: Negative.    Skin: Negative for poor wound healing and rash.   Musculoskeletal: Positive for joint pain. Negative for falls, gout, muscle cramps and myalgias.   Gastrointestinal: Negative for abdominal pain, diarrhea, dysphagia, hematemesis, melena, nausea and vomiting.   Neurological: Negative for excessive daytime sleepiness, dizziness, headaches, light-headedness, loss of balance, seizures and vertigo.   Psychiatric/Behavioral: Negative for depression and substance abuse. The patient is not nervous/anxious.        Procedures    ECG 12 Lead  Date/Time: 2019 9:03 AM  Performed by: Tata Lee MD  Authorized by: Tata Lee MD   Comparison: compared with previous ECG   Similar to previous ECG  Rhythm: sinus rhythm  Clinical impression: normal ECG                Objective:      Vitals:    19 0850   BP: 106/76   BP Location: Left arm   Patient Position: Sitting   Pulse: 54   Weight: 93.2 kg  "(205 lb 6.4 oz)   Height: 165.1 cm (65\")     Body mass index is 34.18 kg/m².    Physical Exam   Constitutional: He is oriented to person, place, and time. He appears well-developed and well-nourished. No distress.   HENT:   Head: Normocephalic and atraumatic.   Eyes: Conjunctivae are normal. No scleral icterus.   Neck: Neck supple. No JVD present. Carotid bruit is not present. No thyromegaly present.   Cardiovascular: Normal rate, regular rhythm, S1 normal, S2 normal, normal heart sounds and intact distal pulses.  No extrasystoles are present. PMI is not displaced. Exam reveals no gallop.   No murmur heard.  Pulses:       Carotid pulses are 2+ on the right side, and 2+ on the left side.       Radial pulses are 2+ on the right side, and 2+ on the left side.        Dorsalis pedis pulses are 2+ on the right side, and 2+ on the left side.        Posterior tibial pulses are 2+ on the right side, and 2+ on the left side.   Pulmonary/Chest: Effort normal and breath sounds normal. No respiratory distress. He has no wheezes. He has no rhonchi. He has no rales. He exhibits no tenderness.   Abdominal: Soft. Bowel sounds are normal. He exhibits no distension, no abdominal bruit and no mass. There is no tenderness.   Musculoskeletal: He exhibits no edema or deformity.   Lymphadenopathy:     He has no cervical adenopathy.   Neurological: He is alert and oriented to person, place, and time. No cranial nerve deficit.   Skin: Skin is warm and dry. No rash noted. He is not diaphoretic. No cyanosis. No pallor. Nails show no clubbing.   Psychiatric: He has a normal mood and affect. Judgment normal.   Vitals reviewed.      Lab Review:       Assessment:      Diagnosis Plan   1. Diastolic dysfunction     2. Left ventricular hypertrophy     3. Essential hypertension     4. Obstructive sleep apnea syndrome treated auto BiPAP     5. Pure hyperglyceridemia     6. Type 2 diabetes mellitus without complication, with long-term current use of " insulin (CMS/HCC)     7. Chronic kidney disease, stage III (moderate) (CMS/HCC)       1. Hypertensive heart disease. He has mild left ventricular hypertrophy and  grade II diastolic dysfunction which has been stable. No medication changes at this time.  2. Hyperlipidemia. Per jayson Lau.   3. Diabetes mellitus type 2 with neuropathy.   4. Transient ischemic attack, no further issues.   5. Obstructive sleep apnea, on CPAP. Follows with Dr. Mcdermott and has been  well treated.   6. Restless leg syndrome.   7. Prostate cancer, status post prostatectomy, radiation therapy and hormone  therapy.  Is metastatic.  Sees Dr. Lee.   8. Erectile dysfunction.   9. Aortic valve sclerosis and aortic insufficiency which is mild.      Plan:       See [facility] in 1 year.  No changes.   May need an echo after the next visit.

## 2019-02-05 DIAGNOSIS — C79.51 PROSTATE CANCER METASTATIC TO BONE (HCC): ICD-10-CM

## 2019-02-05 DIAGNOSIS — C79.51 OSSEOUS METASTASIS: ICD-10-CM

## 2019-02-05 DIAGNOSIS — C61 PROSTATE CANCER METASTATIC TO BONE (HCC): ICD-10-CM

## 2019-02-07 ENCOUNTER — LAB (OUTPATIENT)
Dept: LAB | Facility: HOSPITAL | Age: 79
End: 2019-02-07

## 2019-02-07 ENCOUNTER — INFUSION (OUTPATIENT)
Dept: ONCOLOGY | Facility: HOSPITAL | Age: 79
End: 2019-02-07

## 2019-02-07 DIAGNOSIS — C61 PROSTATE CANCER METASTATIC TO BONE (HCC): ICD-10-CM

## 2019-02-07 DIAGNOSIS — C79.51 PROSTATE CANCER METASTATIC TO BONE (HCC): ICD-10-CM

## 2019-02-07 DIAGNOSIS — C79.51 OSSEOUS METASTASIS: Primary | ICD-10-CM

## 2019-02-07 DIAGNOSIS — C79.51 OSSEOUS METASTASIS: ICD-10-CM

## 2019-02-07 LAB
ALBUMIN SERPL-MCNC: 4.1 G/DL (ref 3.5–5.2)
ALBUMIN/GLOB SERPL: 1.3 G/DL (ref 1.1–2.4)
ALP SERPL-CCNC: 74 U/L (ref 38–116)
ALT SERPL W P-5'-P-CCNC: 25 U/L (ref 0–41)
ANION GAP SERPL CALCULATED.3IONS-SCNC: 12.1 MMOL/L
AST SERPL-CCNC: 18 U/L (ref 0–40)
BASOPHILS # BLD AUTO: 0.03 10*3/MM3 (ref 0–0.1)
BASOPHILS NFR BLD AUTO: 0.4 % (ref 0–1.1)
BILIRUB SERPL-MCNC: 0.2 MG/DL (ref 0.1–1.2)
BUN BLD-MCNC: 30 MG/DL (ref 6–20)
BUN/CREAT SERPL: 19.6 (ref 7.3–30)
CALCIUM SPEC-SCNC: 9.7 MG/DL (ref 8.5–10.2)
CHLORIDE SERPL-SCNC: 106 MMOL/L (ref 98–107)
CO2 SERPL-SCNC: 25.9 MMOL/L (ref 22–29)
CREAT BLD-MCNC: 1.53 MG/DL (ref 0.7–1.3)
DEPRECATED RDW RBC AUTO: 43.5 FL (ref 37–49)
EOSINOPHIL # BLD AUTO: 0.17 10*3/MM3 (ref 0–0.36)
EOSINOPHIL NFR BLD AUTO: 2.2 % (ref 1–5)
ERYTHROCYTE [DISTWIDTH] IN BLOOD BY AUTOMATED COUNT: 12.8 % (ref 11.7–14.5)
GFR SERPL CREATININE-BSD FRML MDRD: 44 ML/MIN/1.73
GLOBULIN UR ELPH-MCNC: 3.1 GM/DL (ref 1.8–3.5)
GLUCOSE BLD-MCNC: 159 MG/DL (ref 74–124)
HCT VFR BLD AUTO: 37.1 % (ref 40–49)
HGB BLD-MCNC: 12 G/DL (ref 13.5–16.5)
IMM GRANULOCYTES # BLD AUTO: 0.04 10*3/MM3 (ref 0–0.03)
IMM GRANULOCYTES NFR BLD AUTO: 0.5 % (ref 0–0.5)
LYMPHOCYTES # BLD AUTO: 3.04 10*3/MM3 (ref 1–3.5)
LYMPHOCYTES NFR BLD AUTO: 39.9 % (ref 20–49)
MAGNESIUM SERPL-MCNC: 1.8 MG/DL (ref 1.8–2.5)
MCH RBC QN AUTO: 30 PG (ref 27–33)
MCHC RBC AUTO-ENTMCNC: 32.3 G/DL (ref 32–35)
MCV RBC AUTO: 92.8 FL (ref 83–97)
MONOCYTES # BLD AUTO: 0.63 10*3/MM3 (ref 0.25–0.8)
MONOCYTES NFR BLD AUTO: 8.3 % (ref 4–12)
NEUTROPHILS # BLD AUTO: 3.71 10*3/MM3 (ref 1.5–7)
NEUTROPHILS NFR BLD AUTO: 48.7 % (ref 39–75)
NRBC BLD AUTO-RTO: 0 /100 WBC (ref 0–0)
PHOSPHATE SERPL-MCNC: 4.6 MG/DL (ref 2.5–4.5)
PLATELET # BLD AUTO: 179 10*3/MM3 (ref 150–375)
PMV BLD AUTO: 10.5 FL (ref 8.9–12.1)
POTASSIUM BLD-SCNC: 4.8 MMOL/L (ref 3.5–4.7)
PROT SERPL-MCNC: 7.2 G/DL (ref 6.3–8)
RBC # BLD AUTO: 4 10*6/MM3 (ref 4.3–5.5)
SODIUM BLD-SCNC: 144 MMOL/L (ref 134–145)
WBC NRBC COR # BLD: 7.62 10*3/MM3 (ref 4–10)

## 2019-02-07 PROCEDURE — 84100 ASSAY OF PHOSPHORUS: CPT | Performed by: INTERNAL MEDICINE

## 2019-02-07 PROCEDURE — 96372 THER/PROPH/DIAG INJ SC/IM: CPT | Performed by: INTERNAL MEDICINE

## 2019-02-07 PROCEDURE — 36415 COLL VENOUS BLD VENIPUNCTURE: CPT | Performed by: INTERNAL MEDICINE

## 2019-02-07 PROCEDURE — 80053 COMPREHEN METABOLIC PANEL: CPT | Performed by: INTERNAL MEDICINE

## 2019-02-07 PROCEDURE — 83735 ASSAY OF MAGNESIUM: CPT | Performed by: INTERNAL MEDICINE

## 2019-02-07 PROCEDURE — 85025 COMPLETE CBC W/AUTO DIFF WBC: CPT | Performed by: INTERNAL MEDICINE

## 2019-02-07 PROCEDURE — 25010000002 DENOSUMAB 120 MG/1.7ML SOLUTION: Performed by: INTERNAL MEDICINE

## 2019-02-07 RX ADMIN — DENOSUMAB 120 MG: 120 INJECTION SUBCUTANEOUS at 10:46

## 2019-02-25 RX ORDER — HYDROCODONE BITARTRATE AND ACETAMINOPHEN 5; 325 MG/1; MG/1
1 TABLET ORAL EVERY 6 HOURS PRN
Qty: 60 TABLET | Refills: 0 | Status: SHIPPED | OUTPATIENT
Start: 2019-02-25 | End: 2019-03-22 | Stop reason: SDUPTHER

## 2019-03-05 DIAGNOSIS — C79.51 PROSTATE CANCER METASTATIC TO BONE (HCC): ICD-10-CM

## 2019-03-05 DIAGNOSIS — C79.51 OSSEOUS METASTASIS: ICD-10-CM

## 2019-03-05 DIAGNOSIS — C61 PROSTATE CANCER METASTATIC TO BONE (HCC): ICD-10-CM

## 2019-03-07 ENCOUNTER — LAB (OUTPATIENT)
Dept: LAB | Facility: HOSPITAL | Age: 79
End: 2019-03-07

## 2019-03-07 ENCOUNTER — INFUSION (OUTPATIENT)
Dept: ONCOLOGY | Facility: HOSPITAL | Age: 79
End: 2019-03-07

## 2019-03-07 DIAGNOSIS — C61 PROSTATE CANCER METASTATIC TO BONE (HCC): ICD-10-CM

## 2019-03-07 DIAGNOSIS — C79.51 PROSTATE CANCER METASTATIC TO BONE (HCC): ICD-10-CM

## 2019-03-07 DIAGNOSIS — C79.51 OSSEOUS METASTASIS: Primary | ICD-10-CM

## 2019-03-07 DIAGNOSIS — C79.51 OSSEOUS METASTASIS: ICD-10-CM

## 2019-03-07 LAB
ALBUMIN SERPL-MCNC: 4.2 G/DL (ref 3.5–5.2)
ALBUMIN/GLOB SERPL: 1.3 G/DL (ref 1.1–2.4)
ALP SERPL-CCNC: 80 U/L (ref 38–116)
ALT SERPL W P-5'-P-CCNC: 27 U/L (ref 0–41)
ANION GAP SERPL CALCULATED.3IONS-SCNC: 10.4 MMOL/L
AST SERPL-CCNC: 22 U/L (ref 0–40)
BASOPHILS # BLD AUTO: 0.03 10*3/MM3 (ref 0–0.2)
BASOPHILS NFR BLD AUTO: 0.3 % (ref 0–1.5)
BILIRUB SERPL-MCNC: 0.2 MG/DL (ref 0.1–1.2)
BUN BLD-MCNC: 24 MG/DL (ref 6–20)
BUN/CREAT SERPL: 19.7 (ref 7.3–30)
CALCIUM SPEC-SCNC: 10.7 MG/DL (ref 8.5–10.2)
CHLORIDE SERPL-SCNC: 104 MMOL/L (ref 98–107)
CO2 SERPL-SCNC: 28.6 MMOL/L (ref 22–29)
CREAT BLD-MCNC: 1.22 MG/DL (ref 0.7–1.3)
DEPRECATED RDW RBC AUTO: 43.8 FL (ref 37–54)
EOSINOPHIL # BLD AUTO: 0.16 10*3/MM3 (ref 0–0.4)
EOSINOPHIL NFR BLD AUTO: 1.8 % (ref 0.3–6.2)
ERYTHROCYTE [DISTWIDTH] IN BLOOD BY AUTOMATED COUNT: 13.1 % (ref 12.3–15.4)
GFR SERPL CREATININE-BSD FRML MDRD: 57 ML/MIN/1.73
GLOBULIN UR ELPH-MCNC: 3.3 GM/DL (ref 1.8–3.5)
GLUCOSE BLD-MCNC: 157 MG/DL (ref 74–124)
HCT VFR BLD AUTO: 39.2 % (ref 37.5–51)
HGB BLD-MCNC: 12.7 G/DL (ref 13–17.7)
IMM GRANULOCYTES # BLD AUTO: 0.05 10*3/MM3 (ref 0–0.05)
IMM GRANULOCYTES NFR BLD AUTO: 0.6 % (ref 0–0.5)
LYMPHOCYTES # BLD AUTO: 3.21 10*3/MM3 (ref 0.7–3.1)
LYMPHOCYTES NFR BLD AUTO: 36.8 % (ref 19.6–45.3)
MAGNESIUM SERPL-MCNC: 1.9 MG/DL (ref 1.8–2.5)
MCH RBC QN AUTO: 29.8 PG (ref 26.6–33)
MCHC RBC AUTO-ENTMCNC: 32.4 G/DL (ref 31.5–35.7)
MCV RBC AUTO: 92 FL (ref 79–97)
MONOCYTES # BLD AUTO: 0.68 10*3/MM3 (ref 0.1–0.9)
MONOCYTES NFR BLD AUTO: 7.8 % (ref 5–12)
NEUTROPHILS # BLD AUTO: 4.6 10*3/MM3 (ref 1.4–7)
NEUTROPHILS NFR BLD AUTO: 52.7 % (ref 42.7–76)
NRBC BLD AUTO-RTO: 0 /100 WBC (ref 0–0)
PHOSPHATE SERPL-MCNC: 4.1 MG/DL (ref 2.5–4.5)
PLATELET # BLD AUTO: 208 10*3/MM3 (ref 140–450)
PMV BLD AUTO: 10.6 FL (ref 6–12)
POTASSIUM BLD-SCNC: 5.3 MMOL/L (ref 3.5–4.7)
PROT SERPL-MCNC: 7.5 G/DL (ref 6.3–8)
RBC # BLD AUTO: 4.26 10*6/MM3 (ref 4.14–5.8)
SODIUM BLD-SCNC: 143 MMOL/L (ref 134–145)
WBC NRBC COR # BLD: 8.73 10*3/MM3 (ref 3.4–10.8)

## 2019-03-07 PROCEDURE — 80053 COMPREHEN METABOLIC PANEL: CPT | Performed by: INTERNAL MEDICINE

## 2019-03-07 PROCEDURE — 85025 COMPLETE CBC W/AUTO DIFF WBC: CPT | Performed by: INTERNAL MEDICINE

## 2019-03-07 PROCEDURE — 25010000002 DENOSUMAB 120 MG/1.7ML SOLUTION: Performed by: INTERNAL MEDICINE

## 2019-03-07 PROCEDURE — 83735 ASSAY OF MAGNESIUM: CPT | Performed by: INTERNAL MEDICINE

## 2019-03-07 PROCEDURE — 84100 ASSAY OF PHOSPHORUS: CPT | Performed by: INTERNAL MEDICINE

## 2019-03-07 PROCEDURE — 36415 COLL VENOUS BLD VENIPUNCTURE: CPT | Performed by: INTERNAL MEDICINE

## 2019-03-07 PROCEDURE — 96372 THER/PROPH/DIAG INJ SC/IM: CPT | Performed by: INTERNAL MEDICINE

## 2019-03-07 RX ADMIN — DENOSUMAB 120 MG: 120 INJECTION SUBCUTANEOUS at 11:41

## 2019-03-07 NOTE — PROGRESS NOTES
Ca level today is 10.7.  Patient is taking calcium 600 mg daily, instructed him to stop taking and we will check again next visit.  Patient v/u.  Message sent to Dr Lee re ca level and inform with any changes.

## 2019-03-12 RX ORDER — ROPINIROLE 0.5 MG/1
TABLET, FILM COATED ORAL
Qty: 180 TABLET | Refills: 2 | Status: SHIPPED | OUTPATIENT
Start: 2019-03-12 | End: 2019-04-29

## 2019-03-15 ENCOUNTER — HOSPITAL ENCOUNTER (INPATIENT)
Facility: HOSPITAL | Age: 79
LOS: 3 days | Discharge: HOME | End: 2019-03-18
Attending: EMERGENCY MEDICINE | Admitting: INTERNAL MEDICINE

## 2019-03-15 ENCOUNTER — APPOINTMENT (OUTPATIENT)
Dept: GENERAL RADIOLOGY | Facility: HOSPITAL | Age: 79
End: 2019-03-15

## 2019-03-15 ENCOUNTER — APPOINTMENT (OUTPATIENT)
Dept: CT IMAGING | Facility: HOSPITAL | Age: 79
End: 2019-03-15

## 2019-03-15 DIAGNOSIS — R50.9 FEBRILE ILLNESS, ACUTE: ICD-10-CM

## 2019-03-15 DIAGNOSIS — J18.9 PNEUMONIA OF LEFT UPPER LOBE DUE TO INFECTIOUS ORGANISM: Primary | ICD-10-CM

## 2019-03-15 PROBLEM — E87.1 HYPONATREMIA: Status: ACTIVE | Noted: 2019-03-15

## 2019-03-15 PROBLEM — A41.9 SEPSIS (HCC): Status: ACTIVE | Noted: 2019-03-15

## 2019-03-15 LAB
ALBUMIN SERPL-MCNC: 3.7 G/DL (ref 3.5–5.2)
ALBUMIN/GLOB SERPL: 1 G/DL
ALP SERPL-CCNC: 65 U/L (ref 39–117)
ALT SERPL W P-5'-P-CCNC: 27 U/L (ref 1–41)
ANION GAP SERPL CALCULATED.3IONS-SCNC: 11.3 MMOL/L
AST SERPL-CCNC: 21 U/L (ref 1–40)
B PARAPERT DNA SPEC QL NAA+PROBE: NOT DETECTED
B PERT DNA SPEC QL NAA+PROBE: NOT DETECTED
BACTERIA UR QL AUTO: ABNORMAL /HPF
BASOPHILS # BLD AUTO: 0.03 10*3/MM3 (ref 0–0.2)
BASOPHILS NFR BLD AUTO: 0.3 % (ref 0–1.5)
BILIRUB SERPL-MCNC: 0.4 MG/DL (ref 0.1–1.2)
BILIRUB UR QL STRIP: NEGATIVE
BUN BLD-MCNC: 25 MG/DL (ref 8–23)
BUN/CREAT SERPL: 17 (ref 7–25)
C PNEUM DNA NPH QL NAA+NON-PROBE: NOT DETECTED
CALCIUM SPEC-SCNC: 8.5 MG/DL (ref 8.6–10.5)
CHLORIDE SERPL-SCNC: 97 MMOL/L (ref 98–107)
CLARITY UR: CLEAR
CO2 SERPL-SCNC: 22.7 MMOL/L (ref 22–29)
COLOR UR: YELLOW
CREAT BLD-MCNC: 1.47 MG/DL (ref 0.76–1.27)
D-LACTATE SERPL-SCNC: 1.6 MMOL/L (ref 0.5–2)
DEPRECATED RDW RBC AUTO: 42.9 FL (ref 37–54)
EOSINOPHIL # BLD AUTO: 0.03 10*3/MM3 (ref 0–0.4)
EOSINOPHIL NFR BLD AUTO: 0.3 % (ref 0.3–6.2)
ERYTHROCYTE [DISTWIDTH] IN BLOOD BY AUTOMATED COUNT: 13.1 % (ref 12.3–15.4)
FLUAV H1 2009 PAND RNA NPH QL NAA+PROBE: NOT DETECTED
FLUAV H1 HA GENE NPH QL NAA+PROBE: NOT DETECTED
FLUAV H3 RNA NPH QL NAA+PROBE: NOT DETECTED
FLUAV SUBTYP SPEC NAA+PROBE: NOT DETECTED
FLUBV RNA ISLT QL NAA+PROBE: NOT DETECTED
GFR SERPL CREATININE-BSD FRML MDRD: 46 ML/MIN/1.73
GLOBULIN UR ELPH-MCNC: 3.8 GM/DL
GLUCOSE BLD-MCNC: 142 MG/DL (ref 65–99)
GLUCOSE BLDC GLUCOMTR-MCNC: 324 MG/DL (ref 70–130)
GLUCOSE UR STRIP-MCNC: NEGATIVE MG/DL
HADV DNA SPEC NAA+PROBE: NOT DETECTED
HCOV 229E RNA SPEC QL NAA+PROBE: NOT DETECTED
HCOV HKU1 RNA SPEC QL NAA+PROBE: NOT DETECTED
HCOV NL63 RNA SPEC QL NAA+PROBE: NOT DETECTED
HCOV OC43 RNA SPEC QL NAA+PROBE: NOT DETECTED
HCT VFR BLD AUTO: 35.2 % (ref 37.5–51)
HGB BLD-MCNC: 11.6 G/DL (ref 13–17.7)
HGB UR QL STRIP.AUTO: ABNORMAL
HMPV RNA NPH QL NAA+NON-PROBE: NOT DETECTED
HPIV1 RNA SPEC QL NAA+PROBE: NOT DETECTED
HPIV2 RNA SPEC QL NAA+PROBE: NOT DETECTED
HPIV3 RNA NPH QL NAA+PROBE: NOT DETECTED
HPIV4 P GENE NPH QL NAA+PROBE: NOT DETECTED
HYALINE CASTS UR QL AUTO: ABNORMAL /LPF
IMM GRANULOCYTES # BLD AUTO: 0.08 10*3/MM3 (ref 0–0.05)
IMM GRANULOCYTES NFR BLD AUTO: 0.7 % (ref 0–0.5)
INR PPP: 1.2 (ref 0.9–1.1)
KETONES UR QL STRIP: NEGATIVE
LEUKOCYTE ESTERASE UR QL STRIP.AUTO: ABNORMAL
LIPASE SERPL-CCNC: 34 U/L (ref 13–60)
LYMPHOCYTES # BLD AUTO: 1.32 10*3/MM3 (ref 0.7–3.1)
LYMPHOCYTES NFR BLD AUTO: 11.7 % (ref 19.6–45.3)
M PNEUMO IGG SER IA-ACNC: NOT DETECTED
MCH RBC QN AUTO: 29.7 PG (ref 26.6–33)
MCHC RBC AUTO-ENTMCNC: 33 G/DL (ref 31.5–35.7)
MCV RBC AUTO: 90.3 FL (ref 79–97)
MONOCYTES # BLD AUTO: 1.32 10*3/MM3 (ref 0.1–0.9)
MONOCYTES NFR BLD AUTO: 11.7 % (ref 5–12)
NEUTROPHILS # BLD AUTO: 8.48 10*3/MM3 (ref 1.4–7)
NEUTROPHILS NFR BLD AUTO: 75.3 % (ref 42.7–76)
NITRITE UR QL STRIP: NEGATIVE
NRBC BLD AUTO-RTO: 0 /100 WBC (ref 0–0)
PH UR STRIP.AUTO: <=5 [PH] (ref 5–8)
PLATELET # BLD AUTO: 213 10*3/MM3 (ref 140–450)
PMV BLD AUTO: 10.6 FL (ref 6–12)
POTASSIUM BLD-SCNC: 4.4 MMOL/L (ref 3.5–5.2)
PROCALCITONIN SERPL-MCNC: 0.31 NG/ML (ref 0.1–0.25)
PROT SERPL-MCNC: 7.5 G/DL (ref 6–8.5)
PROT UR QL STRIP: ABNORMAL
PROTHROMBIN TIME: 15 SECONDS (ref 11.7–14.2)
RBC # BLD AUTO: 3.9 10*6/MM3 (ref 4.14–5.8)
RBC # UR: ABNORMAL /HPF
REF LAB TEST METHOD: ABNORMAL
RHINOVIRUS RNA SPEC NAA+PROBE: NOT DETECTED
RSV RNA NPH QL NAA+NON-PROBE: NOT DETECTED
SODIUM BLD-SCNC: 131 MMOL/L (ref 136–145)
SP GR UR STRIP: 1.02 (ref 1–1.03)
SQUAMOUS #/AREA URNS HPF: ABNORMAL /HPF
TROPONIN T SERPL-MCNC: <0.01 NG/ML (ref 0–0.03)
UROBILINOGEN UR QL STRIP: ABNORMAL
WBC NRBC COR # BLD: 11.26 10*3/MM3 (ref 3.4–10.8)
WBC UR QL AUTO: ABNORMAL /HPF

## 2019-03-15 PROCEDURE — 84484 ASSAY OF TROPONIN QUANT: CPT | Performed by: EMERGENCY MEDICINE

## 2019-03-15 PROCEDURE — 81001 URINALYSIS AUTO W/SCOPE: CPT | Performed by: EMERGENCY MEDICINE

## 2019-03-15 PROCEDURE — 71045 X-RAY EXAM CHEST 1 VIEW: CPT

## 2019-03-15 PROCEDURE — 25010000002 VANCOMYCIN 10 G RECONSTITUTED SOLUTION: Performed by: EMERGENCY MEDICINE

## 2019-03-15 PROCEDURE — 87040 BLOOD CULTURE FOR BACTERIA: CPT | Performed by: EMERGENCY MEDICINE

## 2019-03-15 PROCEDURE — 84145 PROCALCITONIN (PCT): CPT | Performed by: EMERGENCY MEDICINE

## 2019-03-15 PROCEDURE — 83605 ASSAY OF LACTIC ACID: CPT | Performed by: EMERGENCY MEDICINE

## 2019-03-15 PROCEDURE — 83690 ASSAY OF LIPASE: CPT | Performed by: EMERGENCY MEDICINE

## 2019-03-15 PROCEDURE — 93005 ELECTROCARDIOGRAM TRACING: CPT | Performed by: EMERGENCY MEDICINE

## 2019-03-15 PROCEDURE — 82962 GLUCOSE BLOOD TEST: CPT

## 2019-03-15 PROCEDURE — 94640 AIRWAY INHALATION TREATMENT: CPT

## 2019-03-15 PROCEDURE — 87798 DETECT AGENT NOS DNA AMP: CPT | Performed by: EMERGENCY MEDICINE

## 2019-03-15 PROCEDURE — 25010000002 PIPERACILLIN SOD-TAZOBACTAM PER 1 G: Performed by: EMERGENCY MEDICINE

## 2019-03-15 PROCEDURE — 71250 CT THORAX DX C-: CPT

## 2019-03-15 PROCEDURE — 87633 RESP VIRUS 12-25 TARGETS: CPT | Performed by: EMERGENCY MEDICINE

## 2019-03-15 PROCEDURE — 93010 ELECTROCARDIOGRAM REPORT: CPT | Performed by: INTERNAL MEDICINE

## 2019-03-15 PROCEDURE — 74176 CT ABD & PELVIS W/O CONTRAST: CPT

## 2019-03-15 PROCEDURE — 85025 COMPLETE CBC W/AUTO DIFF WBC: CPT | Performed by: EMERGENCY MEDICINE

## 2019-03-15 PROCEDURE — 25010000002 METHYLPREDNISOLONE PER 125 MG: Performed by: EMERGENCY MEDICINE

## 2019-03-15 PROCEDURE — 85610 PROTHROMBIN TIME: CPT | Performed by: EMERGENCY MEDICINE

## 2019-03-15 PROCEDURE — 99285 EMERGENCY DEPT VISIT HI MDM: CPT

## 2019-03-15 PROCEDURE — 87486 CHLMYD PNEUM DNA AMP PROBE: CPT | Performed by: EMERGENCY MEDICINE

## 2019-03-15 PROCEDURE — 80053 COMPREHEN METABOLIC PANEL: CPT | Performed by: EMERGENCY MEDICINE

## 2019-03-15 PROCEDURE — 87581 M.PNEUMON DNA AMP PROBE: CPT | Performed by: EMERGENCY MEDICINE

## 2019-03-15 RX ORDER — METHYLPREDNISOLONE SODIUM SUCCINATE 125 MG/2ML
125 INJECTION, POWDER, LYOPHILIZED, FOR SOLUTION INTRAMUSCULAR; INTRAVENOUS ONCE
Status: COMPLETED | OUTPATIENT
Start: 2019-03-15 | End: 2019-03-15

## 2019-03-15 RX ORDER — IBUPROFEN 200 MG
800 TABLET ORAL ONCE
Status: COMPLETED | OUTPATIENT
Start: 2019-03-15 | End: 2019-03-15

## 2019-03-15 RX ORDER — DILTIAZEM HYDROCHLORIDE 120 MG/1
120 CAPSULE, COATED, EXTENDED RELEASE ORAL DAILY
Status: DISCONTINUED | OUTPATIENT
Start: 2019-03-16 | End: 2019-03-18 | Stop reason: HOSPADM

## 2019-03-15 RX ORDER — OLMESARTAN MEDOXOMIL 40 MG/1
40 TABLET ORAL DAILY
COMMUNITY
End: 2019-05-13 | Stop reason: SDUPTHER

## 2019-03-15 RX ORDER — NITROGLYCERIN 0.4 MG/1
0.4 TABLET SUBLINGUAL
Status: DISCONTINUED | OUTPATIENT
Start: 2019-03-15 | End: 2019-03-18 | Stop reason: HOSPADM

## 2019-03-15 RX ORDER — MODAFINIL 100 MG/1
200 TABLET ORAL DAILY
Status: DISCONTINUED | OUTPATIENT
Start: 2019-03-16 | End: 2019-03-18 | Stop reason: HOSPADM

## 2019-03-15 RX ORDER — TRIAMTERENE AND HYDROCHLOROTHIAZIDE 37.5; 25 MG/1; MG/1
1 TABLET ORAL DAILY
COMMUNITY
End: 2019-11-07 | Stop reason: SDUPTHER

## 2019-03-15 RX ORDER — IPRATROPIUM BROMIDE AND ALBUTEROL SULFATE 2.5; .5 MG/3ML; MG/3ML
3 SOLUTION RESPIRATORY (INHALATION) ONCE
Status: COMPLETED | OUTPATIENT
Start: 2019-03-15 | End: 2019-03-15

## 2019-03-15 RX ORDER — HYDROCODONE BITARTRATE AND ACETAMINOPHEN 5; 325 MG/1; MG/1
1 TABLET ORAL EVERY 6 HOURS PRN
Status: DISCONTINUED | OUTPATIENT
Start: 2019-03-15 | End: 2019-03-18 | Stop reason: HOSPADM

## 2019-03-15 RX ORDER — LEVOTHYROXINE SODIUM 0.05 MG/1
50 TABLET ORAL DAILY
Status: ON HOLD | COMMUNITY
End: 2019-03-18 | Stop reason: SDUPTHER

## 2019-03-15 RX ORDER — LEVOTHYROXINE SODIUM 0.05 MG/1
50 TABLET ORAL
Status: DISCONTINUED | OUTPATIENT
Start: 2019-03-16 | End: 2019-03-17

## 2019-03-15 RX ORDER — ACETAMINOPHEN 325 MG/1
650 TABLET ORAL EVERY 6 HOURS PRN
Status: DISCONTINUED | OUTPATIENT
Start: 2019-03-15 | End: 2019-03-18 | Stop reason: HOSPADM

## 2019-03-15 RX ORDER — L.ACID,PARA/B.BIFIDUM/S.THERM 8B CELL
1 CAPSULE ORAL DAILY
Status: DISCONTINUED | OUTPATIENT
Start: 2019-03-16 | End: 2019-03-18 | Stop reason: HOSPADM

## 2019-03-15 RX ORDER — IPRATROPIUM BROMIDE AND ALBUTEROL SULFATE 2.5; .5 MG/3ML; MG/3ML
3 SOLUTION RESPIRATORY (INHALATION) EVERY 4 HOURS PRN
Status: DISCONTINUED | OUTPATIENT
Start: 2019-03-15 | End: 2019-03-17

## 2019-03-15 RX ORDER — ACETAMINOPHEN 500 MG
1000 TABLET ORAL ONCE
Status: COMPLETED | OUTPATIENT
Start: 2019-03-15 | End: 2019-03-15

## 2019-03-15 RX ORDER — DEXTROSE MONOHYDRATE 25 G/50ML
25 INJECTION, SOLUTION INTRAVENOUS
Status: DISCONTINUED | OUTPATIENT
Start: 2019-03-15 | End: 2019-03-18 | Stop reason: HOSPADM

## 2019-03-15 RX ORDER — ONDANSETRON 2 MG/ML
4 INJECTION INTRAMUSCULAR; INTRAVENOUS EVERY 6 HOURS PRN
Status: DISCONTINUED | OUTPATIENT
Start: 2019-03-15 | End: 2019-03-18 | Stop reason: HOSPADM

## 2019-03-15 RX ORDER — NICOTINE POLACRILEX 4 MG
15 LOZENGE BUCCAL
Status: DISCONTINUED | OUTPATIENT
Start: 2019-03-15 | End: 2019-03-18 | Stop reason: HOSPADM

## 2019-03-15 RX ORDER — ASPIRIN 81 MG/1
81 TABLET ORAL DAILY
Status: DISCONTINUED | OUTPATIENT
Start: 2019-03-16 | End: 2019-03-18 | Stop reason: HOSPADM

## 2019-03-15 RX ORDER — PRAVASTATIN SODIUM 20 MG
40 TABLET ORAL DAILY
Status: DISCONTINUED | OUTPATIENT
Start: 2019-03-16 | End: 2019-03-18 | Stop reason: HOSPADM

## 2019-03-15 RX ORDER — LOSARTAN POTASSIUM 100 MG/1
100 TABLET ORAL
Status: DISCONTINUED | OUTPATIENT
Start: 2019-03-16 | End: 2019-03-18 | Stop reason: HOSPADM

## 2019-03-15 RX ORDER — DILTIAZEM HYDROCHLORIDE 120 MG/1
120 CAPSULE, COATED, EXTENDED RELEASE ORAL DAILY
COMMUNITY
End: 2019-09-04 | Stop reason: SDUPTHER

## 2019-03-15 RX ORDER — GABAPENTIN 400 MG/1
400 CAPSULE ORAL NIGHTLY
Status: DISCONTINUED | OUTPATIENT
Start: 2019-03-15 | End: 2019-03-18 | Stop reason: HOSPADM

## 2019-03-15 RX ORDER — MELATONIN
2000 DAILY
Status: DISCONTINUED | OUTPATIENT
Start: 2019-03-16 | End: 2019-03-18 | Stop reason: HOSPADM

## 2019-03-15 RX ORDER — ROPINIROLE 0.5 MG/1
0.5 TABLET, FILM COATED ORAL 2 TIMES DAILY
Status: DISCONTINUED | OUTPATIENT
Start: 2019-03-16 | End: 2019-03-18 | Stop reason: HOSPADM

## 2019-03-15 RX ADMIN — ACETAMINOPHEN 1000 MG: 500 TABLET, FILM COATED ORAL at 17:07

## 2019-03-15 RX ADMIN — IBUPROFEN 800 MG: 200 TABLET, FILM COATED ORAL at 17:40

## 2019-03-15 RX ADMIN — METHYLPREDNISOLONE SODIUM SUCCINATE 125 MG: 125 INJECTION, POWDER, FOR SOLUTION INTRAMUSCULAR; INTRAVENOUS at 17:08

## 2019-03-15 RX ADMIN — IPRATROPIUM BROMIDE AND ALBUTEROL SULFATE 3 ML: 2.5; .5 SOLUTION RESPIRATORY (INHALATION) at 16:54

## 2019-03-15 RX ADMIN — SODIUM CHLORIDE 1000 ML: 9 INJECTION, SOLUTION INTRAVENOUS at 17:03

## 2019-03-15 RX ADMIN — TAZOBACTAM SODIUM AND PIPERACILLIN SODIUM 3.38 G: 375; 3 INJECTION, SOLUTION INTRAVENOUS at 17:29

## 2019-03-15 RX ADMIN — VANCOMYCIN HYDROCHLORIDE 2000 MG: 10 INJECTION, POWDER, LYOPHILIZED, FOR SOLUTION INTRAVENOUS at 17:55

## 2019-03-15 NOTE — H&P
Patient Name:  Semaj Herrera  YOB: 1940  MRN:  [id number]  Admit Date:  3/15/2019  Patient Care Team:  Axel Guardado MD as PCP - General  Jose Lee MD as PCP - [facility] Bob Thurman MD as Consulting Physician (Sleep Medicine)  Tata Lee MD as Consulting Physician (Cardiology)  Bijan Rivera III, MD as Referring Physician (Thoracic Surgery)  Jose Lee MD as Consulting Physician (Hematology and Oncology)      Subjective   History Present Illness     Chief Complaint   Patient presents with   • Shortness of Breath     acute episode started today.   • Fever       Mr. Herrera is a 78 y.o. former smoker with a history of prostate cancer on immunotherapy, diabetes and CHF that presents to [facility] complaining of fever, SOA and cough.  He has had low-grade fever for a couple of days but became more febrile today.  Did not respond to Tylenol at home and son brought him in.  He sort of downplays his respiratory complaints.  He does feel short of breath and has had a slight cough he states is mostly nonproductive.  He has no sick contacts or recent travel.  He has a history of prostate cancer and is on immunotherapy.  He also has a diabetic.  He was in his facility and of October and treated for cellulitis in his foot.      History of Present Illness  Review of Systems   Constitutional: Positive for chills, diaphoresis and fever.   HENT: Negative.    Eyes: Negative.    Respiratory: Positive for cough and shortness of breath.    Gastrointestinal: Negative.    Endocrine: Negative.    Genitourinary: Negative.    Musculoskeletal: Positive for arthralgias.   Skin: Negative.    Neurological: Positive for weakness.   Hematological: Negative.    Psychiatric/Behavioral: Negative.         Personal History     Past Medical History:   Diagnosis Date   • Bone cancer (CMS/HCC)    • CKD (chronic kidney disease)     Stage 3; followed by Dr. Perry  Roger    • Diastolic dysfunction     GRADE II per echo 2018   • Difficulty breathing     during exertion   • Dyslipidemia    • Erectile dysfunction    • Fatigue    • History of blood transfusion    • History of kidney stones    • Hyperlipidemia    • Hypertension    • Left ventricular hypertrophy     per echo 2018   • Localized edema    • Neuropathy    • Obstructive sleep apnea     USING CPAP   • Osteoarthritis    • Peptic ulcer    • Prostate cancer (CMS/HCC)     Status post prostatectomy, radiation therapy, and hormone therapy followed by Dr. Lee; metastatsis to bone   • Pure hyperglyceridemia    • Restless leg syndrome    • Sinus bradycardia    • Transient cerebral ischemia    • Type 2 diabetes mellitus (CMS/HCC)      Past Surgical History:   Procedure Laterality Date   • BRONCHOSCOPY N/A 2018    Procedure: BRONCHOSCOPY;  Surgeon: Bijan Rivera III, MD;  Location: [facility];  Service: Cardiothoracic   • COLONOSCOPY     • DEEP NECK LYMPH NODE BIOPSY / EXCISION     • HEMORRHOIDECTOMY     • MEDIASTINOSCOPY N/A 2018    Procedure: MEDIASTINOSCOPY WITH LYMPH NODE BIOPSY;  Surgeon: Bijan Rivera III, MD;  Location: [facility];  Service: Cardiothoracic   • OTHER SURGICAL HISTORY      ulcer repair   • PROSTATECTOMY       Family History   Problem Relation Age of Onset   • Heart failure Mother    • Hypertension Mother    • Diabetes Mother    • Heart disease Mother    • Lung cancer Father 46   • Hypertension Sister    • Lung cancer Sister 60   • Hypertension Brother    • Lung cancer Brother 62   • Heart disease Other    • Prostate cancer Brother 60   • Malig Hyperthermia Neg Hx      Social History     Tobacco Use   • Smoking status: Former Smoker     Packs/day: 1.00     Years: 30.00     Pack years: 30.00     Types: Cigarettes     Last attempt to quit: 1998     Years since quittin.1   • Smokeless tobacco: Never Used   Substance Use Topics   • Alcohol use: No     Comment: caffeine use   •  Drug use: No       (Not in a hospital admission)  Allergies:  No Known Allergies    Objective    Objective     Vital Signs  Temp:  [100 °F (37.8 °C)-104.9 °F (40.5 °C)] 100 °F (37.8 °C)  Heart Rate:  [70-80] 70  Resp:  [24] 24  BP: (118-156)/(51-73) 118/51  SpO2:  [92 %-98 %] 92 %  on   ;   Device (Oxygen Therapy): room air  Body mass index is 34.83 kg/m².    Physical Exam   Constitutional: He is oriented to person, place, and time. He appears well-developed and well-nourished.   HENT:   Head: Normocephalic and atraumatic.   Eyes: Conjunctivae and EOM are normal. No scleral icterus.   Neck: Normal range of motion. Neck supple. No JVD present.   Cardiovascular: Normal rate and regular rhythm.   No murmur heard.  Pulmonary/Chest: Tachypnea noted. He has rales (- egophany) in the left upper field.   Abdominal: Soft. Bowel sounds are normal. He exhibits no distension. There is no tenderness.   Musculoskeletal: He exhibits no edema.   Neurological: He is alert and oriented to person, place, and time. No cranial nerve deficit.   Skin: Skin is warm and dry.   Psychiatric: He has a normal mood and affect. His behavior is normal.   Vitals reviewed.      Results Review:  I reviewed the patient's new clinical results.  I reviewed the patient's new imaging results and agree with the interpretation.  I reviewed the patient's other test results and agree with the interpretation  I personally viewed and interpreted the patient's EKG/Telemetry data  Discussed with ED provider.      Lab Results (last 24 hours)     Procedure Component Value Units Date/Time    Comprehensive Metabolic Panel [[id number]]  (Abnormal) Collected:  03/15/19 1647    Specimen:  Blood Updated:  03/15/19 1738     Glucose 142 mg/dL      BUN 25 mg/dL      Creatinine 1.47 mg/dL      Sodium 131 mmol/L      Potassium 4.4 mmol/L      Chloride 97 mmol/L      CO2 22.7 mmol/L      Calcium 8.5 mg/dL      Total Protein 7.5 g/dL      Albumin 3.70 g/dL      ALT (SGPT) 27 U/L       AST (SGOT) 21 U/L      Alkaline Phosphatase 65 U/L      Total Bilirubin 0.4 mg/dL      eGFR Non African Amer 46 mL/min/1.73      Globulin 3.8 gm/dL      A/G Ratio 1.0 g/dL      BUN/Creatinine Ratio 17.0     Anion Gap 11.3 mmol/L     Protime-INR [[id number]]  (Abnormal) Collected:  03/15/19 1647    Specimen:  Blood Updated:  03/15/19 1711     Protime 15.0 Seconds      INR 1.20    Lipase [[id number]]  (Normal) Collected:  03/15/19 1647    Specimen:  Blood Updated:  03/15/19 1736     Lipase 34 U/L     Troponin [[id number]]  (Normal) Collected:  03/15/19 1647    Specimen:  Blood Updated:  03/15/19 1739     Troponin T <0.010 ng/mL     Narrative:       Blood Culture - Blood, Arm, Left [[id number]] Collected:  03/15/19 1647    Specimen:  Blood from Arm, Left Updated:  03/15/19 1652    Lactic Acid, Plasma [[id number]]  (Normal) Collected:  03/15/19 1647    Specimen:  Blood Updated:  03/15/19 1713     Lactate 1.6 mmol/L     Procalcitonin [[id number]]  (Abnormal) Collected:  03/15/19 1647    Specimen:  Blood Updated:  03/15/19 1739     Procalcitonin 0.31 ng/mL     Narrative:       CBC Auto Differential [[id number]]  (Abnormal) Collected:  03/15/19 1647    Specimen:  Blood Updated:  03/15/19 1701     WBC 11.26 10*3/mm3      RBC 3.90 10*6/mm3      Hemoglobin 11.6 g/dL      Hematocrit 35.2 %      MCV 90.3 fL      MCH 29.7 pg      MCHC 33.0 g/dL      RDW 13.1 %      RDW-SD 42.9 fl      MPV 10.6 fL      Platelets 213 10*3/mm3      Neutrophil % 75.3 %      Lymphocyte % 11.7 %      Monocyte % 11.7 %      Eosinophil % 0.3 %      Basophil % 0.3 %      Immature Grans % 0.7 %      Neutrophils, Absolute 8.48 10*3/mm3      Lymphocytes, Absolute 1.32 10*3/mm3      Monocytes, Absolute 1.32 10*3/mm3      Eosinophils, Absolute 0.03 10*3/mm3      Basophils, Absolute 0.03 10*3/mm3      Immature Grans, Absolute 0.08 10*3/mm3      nRBC 0.0 /100 WBC     Respiratory Panel, PCR - Swab, Nasopharynx [[id number]]  (Normal) Collected:  03/15/19 4002     Specimen:  Swab from Nasopharynx Updated:  03/15/19 1811     ADENOVIRUS, PCR Not Detected     Coronavirus 229E Not Detected     Coronavirus HKU1 Not Detected     Coronavirus NL63 Not Detected     Coronavirus OC43 Not Detected     Human Metapneumovirus Not Detected     Human Rhinovirus/Enterovirus Not Detected     Influenza B PCR Not Detected     Parainfluenza Virus 1 Not Detected     Parainfluenza Virus 2 Not Detected     Parainfluenza Virus 3 Not Detected     Parainfluenza Virus 4 Not Detected     Bordetella pertussis pcr Not Detected     Influenza A H1 2009 PCR Not Detected     Chlamydophila pneumoniae PCR Not Detected     Mycoplasma pneumo by PCR Not Detected     Influenza A PCR Not Detected     Influenza A H3 Not Detected     Influenza A H1 Not Detected     RSV, PCR Not Detected     Bordetella parapertussis PCR Not Detected    Blood Culture - Blood, Arm, Left [[id number]] Collected:  03/15/19 1729    Specimen:  Blood from Arm, Left Updated:  03/15/19 1733    Urinalysis With Culture If Indicated - Urine, Clean Catch [[id number]]  (Abnormal) Collected:  03/15/19 1739    Specimen:  Urine, Clean Catch Updated:  03/15/19 1803     Color, UA Yellow     Appearance, UA Clear     pH, UA <=5.0     Specific Gravity, UA 1.020     Glucose, UA Negative     Ketones, UA Negative     Bilirubin, UA Negative     Blood, UA Trace     Protein, UA 30 mg/dL (1+)     Leuk Esterase, UA Trace     Nitrite, UA Negative     Urobilinogen, UA 1.0 E.U./dL    Urinalysis, Microscopic Only - Urine, Clean Catch [[id number]]  (Abnormal) Collected:  03/15/19 1739    Specimen:  Urine, Clean Catch Updated:  03/15/19 1806     RBC, UA 3-5 /HPF      WBC, UA 0-2 /HPF      Bacteria, UA None Seen /HPF      Squamous Epithelial Cells, UA 0-2 /HPF      Hyaline Casts, UA 7-12 /LPF      Methodology Automated Microscopy          Imaging Results (last 24 hours)     Procedure Component Value Units Date/Time    CT Chest Abdomen and Pelvis Without Contrast  Collected:   03/15/19 1903    Impression:       1. Left upper lobe pneumonia, follow-up recommended.  2. Colonic diverticulosis. No acute inflammatory process of bowel is  identified, follow up as indications persist.  3. No urolithiasis or hydronephrosis. Hepatic steatosis.       XR Chest 1 View [[id number]] Collected:  03/15/19 6646    IMPRESSIONS:  No evidence of acute disease within the chest.           Results for orders placed during the hospital encounter of 04/03/17   Adult Transthoracic Echo Complete With Contrast    Narrative · Left ventricular function is normal. Calculated EF = 62%. Estimated EF   was in agreement with the calculated EF. Estimated EF = 62%. Normal left   ventricular cavity size and wall thickness noted. All left ventricular   wall segments contract normally. Left ventricular diastolic dysfunction is   noted (grade II w/high LAP) consistent with pseudonormalization.  · Left atrial volume is borderline increased.  · There is moderate thickening of the aortic valve. Trace aortic valve   regurgitation is present. No aortic valve stenosis is present.  · Trace mitral valve regurgitation is present.  · The tricuspid valve is grossly normal. Trace tricuspid valve   regurgitation is present. Insufficient TR velocity profile to estimate the   right ventricular systolic pressure.          ECG 12 Lead   Final Result   HEART RATE= 75  bpm   RR Interval= 804  ms   ME Interval= 154  ms   P Horizontal Axis= 10  deg   P Front Axis= 30  deg   QRSD Interval= 89  ms   QT Interval= 337  ms   QRS Axis= -25  deg   T Wave Axis= 28  deg   - OTHERWISE NORMAL ECG -   Sinus rhythm   Borderline left axis deviation   Low voltage, extremity leads   No change from prior tracing   Electronically Signed By: Clark Hadley) ([facility]) 15-Mar-2019 19:06:06   Date and Time of Study: 2019-03-15 16:33:54           Assessment/Plan     Active Hospital Problems    Diagnosis Date Noted   • **Pneumonia of left upper lobe due to infectious  organism (CMS/[facility]) [J18.1] 03/15/2019   • Hyponatremia [E87.1] 03/15/2019   • Sepsis (CMS/[facility]) [A41.9] 03/15/2019   • Prostate cancer metastatic to bone (CMS/[facility]) [C61, C79.51] 03/28/2018   • Diabetes mellitus (CMS/[facility]) [E11.9] 03/22/2016   • Hyperlipidemia [E78.5] 03/22/2016   • Hypertension [I10] 03/22/2016   • Obstructive sleep apnea syndrome treated auto BiPAP [G47.33] 03/22/2016   • Chronic kidney disease, stage III (moderate) (CMS/[facility]) [N18.3] 02/03/2016       Mr. Herrera is a 78 y.o. former smoker with a history of prostate cancer on immunotherapy, diabetes and CHF who is admitted with sepsis and SHARON bacterial pneumonia.    · Admit to monitored unit.  · Blood cultures x2 done in ED.  · He was given Zosyn and vancomycin in the emergency department due to his history of cancer and diabetes.  He is rather non-ill-appearing and a little outside the window for nosocomial exposure.  Initial plan was to continue Zosyn and vancomycin in the hospital but I think I will switch to azithromycin and Rocephin in the morning to treat community-acquired pneumonia.  · Note he received Solu-Medrol 125 mg IV in the ED.  This could affect his WBC tomorrow and glycemic control.  He is not wheezing during my exam and will not continue steroids.  · Sputum for gram stain and culture, MRSA screen and VRP.  · Supplemental oxygen to keep SaO2 > 92%  · Bronchodilators and antitussives as needed  · Physical therapy to see  · I discussed the patients findings and my recommendations with patient.    VTE Prophylaxis - Lovenox 40 mg SC daily.  Code Status - Full code.       Darryl Rivera MD  [facility]  03/16/19  12:35 AM    Patient seen by prior to midnight on 3/15/2019

## 2019-03-16 LAB
ANION GAP SERPL CALCULATED.3IONS-SCNC: 13.7 MMOL/L
BUN BLD-MCNC: 26 MG/DL (ref 8–23)
BUN/CREAT SERPL: 16.6 (ref 7–25)
CALCIUM SPEC-SCNC: 8 MG/DL (ref 8.6–10.5)
CHLORIDE SERPL-SCNC: 103 MMOL/L (ref 98–107)
CO2 SERPL-SCNC: 20.3 MMOL/L (ref 22–29)
CREAT BLD-MCNC: 1.57 MG/DL (ref 0.76–1.27)
DEPRECATED RDW RBC AUTO: 42.9 FL (ref 37–54)
ERYTHROCYTE [DISTWIDTH] IN BLOOD BY AUTOMATED COUNT: 12.9 % (ref 12.3–15.4)
GFR SERPL CREATININE-BSD FRML MDRD: 43 ML/MIN/1.73
GLUCOSE BLD-MCNC: 372 MG/DL (ref 65–99)
GLUCOSE BLDC GLUCOMTR-MCNC: 129 MG/DL (ref 70–130)
GLUCOSE BLDC GLUCOMTR-MCNC: 270 MG/DL (ref 70–130)
GLUCOSE BLDC GLUCOMTR-MCNC: 317 MG/DL (ref 70–130)
GLUCOSE BLDC GLUCOMTR-MCNC: 339 MG/DL (ref 70–130)
GLUCOSE BLDC GLUCOMTR-MCNC: 368 MG/DL (ref 70–130)
HBA1C MFR BLD: 8.8 % (ref 4.8–5.6)
HCT VFR BLD AUTO: 31.9 % (ref 37.5–51)
HGB BLD-MCNC: 10.2 G/DL (ref 13–17.7)
L PNEUMO1 AG UR QL IA: NEGATIVE
MCH RBC QN AUTO: 28.7 PG (ref 26.6–33)
MCHC RBC AUTO-ENTMCNC: 32 G/DL (ref 31.5–35.7)
MCV RBC AUTO: 89.9 FL (ref 79–97)
PLATELET # BLD AUTO: 204 10*3/MM3 (ref 140–450)
PMV BLD AUTO: 10.6 FL (ref 6–12)
POTASSIUM BLD-SCNC: 4.4 MMOL/L (ref 3.5–5.2)
RBC # BLD AUTO: 3.55 10*6/MM3 (ref 4.14–5.8)
S PNEUM AG SPEC QL LA: NEGATIVE
SODIUM BLD-SCNC: 137 MMOL/L (ref 136–145)
WBC NRBC COR # BLD: 10.31 10*3/MM3 (ref 3.4–10.8)

## 2019-03-16 PROCEDURE — 94799 UNLISTED PULMONARY SVC/PX: CPT

## 2019-03-16 PROCEDURE — 25010000002 CEFTRIAXONE PER 250 MG: Performed by: HOSPITALIST

## 2019-03-16 PROCEDURE — 87899 AGENT NOS ASSAY W/OPTIC: CPT | Performed by: HOSPITALIST

## 2019-03-16 PROCEDURE — 82962 GLUCOSE BLOOD TEST: CPT

## 2019-03-16 PROCEDURE — 85027 COMPLETE CBC AUTOMATED: CPT | Performed by: HOSPITALIST

## 2019-03-16 PROCEDURE — 63710000001 INSULIN LISPRO (HUMAN) PER 5 UNITS: Performed by: HOSPITALIST

## 2019-03-16 PROCEDURE — 63710000001 INSULIN GLARGINE PER 5 UNITS: Performed by: INTERNAL MEDICINE

## 2019-03-16 PROCEDURE — 87081 CULTURE SCREEN ONLY: CPT | Performed by: HOSPITALIST

## 2019-03-16 PROCEDURE — 87205 SMEAR GRAM STAIN: CPT | Performed by: HOSPITALIST

## 2019-03-16 PROCEDURE — 87070 CULTURE OTHR SPECIMN AEROBIC: CPT | Performed by: HOSPITALIST

## 2019-03-16 PROCEDURE — 25010000002 PIPERACILLIN SOD-TAZOBACTAM PER 1 G: Performed by: HOSPITALIST

## 2019-03-16 PROCEDURE — 99222 1ST HOSP IP/OBS MODERATE 55: CPT | Performed by: INTERNAL MEDICINE

## 2019-03-16 PROCEDURE — 25010000002 AZITHROMYCIN PER 500 MG: Performed by: HOSPITALIST

## 2019-03-16 PROCEDURE — 80048 BASIC METABOLIC PNL TOTAL CA: CPT | Performed by: HOSPITALIST

## 2019-03-16 PROCEDURE — 97162 PT EVAL MOD COMPLEX 30 MIN: CPT | Performed by: PHYSICAL THERAPIST

## 2019-03-16 PROCEDURE — 25010000002 ENOXAPARIN PER 10 MG: Performed by: HOSPITALIST

## 2019-03-16 PROCEDURE — 83036 HEMOGLOBIN GLYCOSYLATED A1C: CPT | Performed by: HOSPITALIST

## 2019-03-16 PROCEDURE — 97116 GAIT TRAINING THERAPY: CPT | Performed by: PHYSICAL THERAPIST

## 2019-03-16 RX ORDER — INSULIN GLARGINE 100 [IU]/ML
40 INJECTION, SOLUTION SUBCUTANEOUS EVERY MORNING
Status: DISCONTINUED | OUTPATIENT
Start: 2019-03-16 | End: 2019-03-18 | Stop reason: HOSPADM

## 2019-03-16 RX ORDER — INSULIN GLARGINE 100 [IU]/ML
40 INJECTION, SOLUTION SUBCUTANEOUS NIGHTLY
Status: DISCONTINUED | OUTPATIENT
Start: 2019-03-16 | End: 2019-03-16

## 2019-03-16 RX ORDER — CEFTRIAXONE SODIUM 1 G/50ML
1 INJECTION, SOLUTION INTRAVENOUS EVERY 24 HOURS
Status: DISCONTINUED | OUTPATIENT
Start: 2019-03-16 | End: 2019-03-18 | Stop reason: HOSPADM

## 2019-03-16 RX ADMIN — INSULIN LISPRO 10 UNITS: 100 INJECTION, SOLUTION INTRAVENOUS; SUBCUTANEOUS at 21:15

## 2019-03-16 RX ADMIN — AZITHROMYCIN MONOHYDRATE 500 MG: 500 INJECTION, POWDER, LYOPHILIZED, FOR SOLUTION INTRAVENOUS at 08:55

## 2019-03-16 RX ADMIN — ROPINIROLE HYDROCHLORIDE 0.5 MG: 0.5 TABLET, FILM COATED ORAL at 21:15

## 2019-03-16 RX ADMIN — ROPINIROLE HYDROCHLORIDE 0.5 MG: 0.5 TABLET, FILM COATED ORAL at 00:55

## 2019-03-16 RX ADMIN — GABAPENTIN 400 MG: 400 CAPSULE ORAL at 00:55

## 2019-03-16 RX ADMIN — DILTIAZEM HYDROCHLORIDE 120 MG: 120 CAPSULE, COATED, EXTENDED RELEASE ORAL at 08:55

## 2019-03-16 RX ADMIN — IPRATROPIUM BROMIDE AND ALBUTEROL SULFATE 3 ML: 2.5; .5 SOLUTION RESPIRATORY (INHALATION) at 17:42

## 2019-03-16 RX ADMIN — LOSARTAN POTASSIUM 100 MG: 100 TABLET, FILM COATED ORAL at 08:55

## 2019-03-16 RX ADMIN — ROPINIROLE HYDROCHLORIDE 0.5 MG: 0.5 TABLET, FILM COATED ORAL at 08:56

## 2019-03-16 RX ADMIN — VITAMIN D, TAB 1000IU (100/BT) 2000 UNITS: 25 TAB at 08:56

## 2019-03-16 RX ADMIN — INSULIN GLARGINE 40 UNITS: 100 INJECTION, SOLUTION SUBCUTANEOUS at 18:02

## 2019-03-16 RX ADMIN — ENOXAPARIN SODIUM 40 MG: 40 INJECTION SUBCUTANEOUS at 06:35

## 2019-03-16 RX ADMIN — CEFTRIAXONE SODIUM 1 G: 1 INJECTION, SOLUTION INTRAVENOUS at 08:55

## 2019-03-16 RX ADMIN — ACETAMINOPHEN 650 MG: 325 TABLET, FILM COATED ORAL at 19:36

## 2019-03-16 RX ADMIN — Medication 1 CAPSULE: at 08:56

## 2019-03-16 RX ADMIN — PRAVASTATIN SODIUM 40 MG: 10 TABLET ORAL at 08:56

## 2019-03-16 RX ADMIN — TAZOBACTAM SODIUM AND PIPERACILLIN SODIUM 3.38 G: 375; 3 INJECTION, SOLUTION INTRAVENOUS at 01:09

## 2019-03-16 RX ADMIN — ASPIRIN 81 MG: 81 TABLET, DELAYED RELEASE ORAL at 08:56

## 2019-03-16 RX ADMIN — IPRATROPIUM BROMIDE AND ALBUTEROL SULFATE 3 ML: 2.5; .5 SOLUTION RESPIRATORY (INHALATION) at 01:19

## 2019-03-16 RX ADMIN — GABAPENTIN 400 MG: 400 CAPSULE ORAL at 21:15

## 2019-03-16 RX ADMIN — LEVOTHYROXINE SODIUM 50 MCG: 50 TABLET ORAL at 06:35

## 2019-03-16 NOTE — PROGRESS NOTES
Clinical Pharmacy Services: Medication History    Semaj Herrera is a 78 y.o. male presenting to [facility] for   Chief Complaint   Patient presents with   • Shortness of Breath     acute episode started today.   • Fever       He  has a past medical history of Bone cancer (CMS/HCC), CKD (chronic kidney disease), Diastolic dysfunction, Difficulty breathing, Dyslipidemia, Erectile dysfunction, Fatigue, History of blood transfusion, History of kidney stones, Hyperlipidemia, Hypertension, Left ventricular hypertrophy, Localized edema, Neuropathy, Obstructive sleep apnea, Osteoarthritis, Peptic ulcer, Prostate cancer (CMS/HCC), Pure hyperglyceridemia, Restless leg syndrome, Sinus bradycardia, Transient cerebral ischemia, and Type 2 diabetes mellitus (CMS/[facility]).    Allergies as of 03/15/2019   • (No Known Allergies)       Medication information was obtained from: Patient, family, medication list  Pharmacy and Phone Number: [facility]a [facility] [phone]    Prior to Admission Medications     Prescriptions Last Dose Informant Patient Reported? Taking?    aspirin 81 MG EC tablet  Family Member Yes Yes    Take 1 tablet by mouth daily.    cholecalciferol (VITAMIN D3) 1000 units tablet  Family Member Yes Yes    Take 2,000 Units by mouth Daily.    Denosumab (XGEVA SC)  Family Member Yes Yes    Inject  under the skin into the appropriate area as directed Every 30 (Thirty) Days.    diltiaZEM CD (CARTIA XT) 120 MG 24 hr capsule  Family Member Yes Yes    Take 120 mg by mouth Daily.    fluticasone (FLONASE SENSIMIST) 27.5 MCG/SPRAY nasal spray  Family Member Yes Yes    2 sprays into each nostril As Needed.    gabapentin (NEURONTIN) 100 MG capsule  Family Member No Yes    Take 4 capsules by mouth Every Night.    HYDROcodone-acetaminophen (NORCO) 5-325 MG per tablet  Family Member No Yes    Take 1 tablet by mouth Every 6 (Six) Hours As Needed for Moderate Pain .    insulin aspart (NovoLOG) 100 UNIT/ML patient supplied pump   Family Member Yes Yes    Inject  under the skin into the appropriate area as directed Continuous. Pt reports pump with basal rate 1.9 units/hr from 0800 to 1200. Basal rate 1.8 units/hr 1200 to 0800.  Checks bg ac/hs with bolus doses per pumps parameters    Leuprolide Acetate, 3 Month, (LUPRON DEPOT, 3-MONTH, IM)  Family Member Yes Yes    Inject  into the appropriate muscle as directed by prescriber Every 3 (Three) Months.    levothyroxine (SYNTHROID, LEVOTHROID) 50 MCG tablet  Family Member Yes Yes    Take 50 mcg by mouth Daily.    modafinil (PROVIGIL) 200 MG tablet  Family Member No Yes    Take 1 tablet by mouth Daily.    olmesartan (BENICAR) 40 MG tablet  Family Member Yes Yes    Take 40 mg by mouth Daily.    pravastatin (PRAVACHOL) 40 MG tablet  Family Member Yes Yes    Take 40 mg by mouth daily.    Probiotic Product (ALIGN) chewable tablet  Family Member Yes Yes    Chew 1 tablet Daily.    rOPINIRole (REQUIP) 0.5 MG tablet  Family Member No Yes    Take 1 tablet by mouth in the evening and 1 at bedtime.    Patient taking differently:  Take 0.5 mg by mouth 2 (Two) Times a Day. Take 1 tablet by mouth in the evening and 1 at bedtime.    triamterene-hydrochlorothiazide (MAXZIDE-25) 37.5-25 MG per tablet  Family Member Yes Yes    Take 1 tablet by mouth Daily.            Medication notes: Calcium removed per patient, D/C by physician    This medication list is complete to the best of my knowledge as of 3/15/2019    Please call if questions.    Tata Montez, Medication History Technician  3/15/2019 8:36 PM

## 2019-03-16 NOTE — THERAPY EVALUATION
Acute Care - Physical Therapy Initial Evaluation  Psychiatric     Patient Name: Semaj Herrera  : 1940  MRN: [id number]  Today's Date: 3/16/2019   Onset of Illness/Injury or Date of Surgery: 19  Date of Referral to PT: 19  Referring Physician: HARJIT Rivera M.D.      Admit Date: 3/15/2019    Visit Dx:     ICD-10-CM ICD-9-CM   1. Pneumonia of left upper lobe due to infectious organism (CMS/HCC) J18.1 486   2. 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 Vicky Duran    • Diastolic dysfunction     GRADE II per echo 2018   • Difficulty breathing     during exertion   • Dyslipidemia    • Erectile dysfunction    • Fatigue    • History of blood transfusion    • History of kidney stones    • Hyperlipidemia    • Hypertension    • Left ventricular hypertrophy     per echo 2018   • Localized edema    • Neuropathy    • Obstructive sleep apnea     USING CPAP   • Osteoarthritis    • Peptic ulcer    • Prostate cancer (CMS/HCC)     Status post prostatectomy, radiation therapy, and hormone therapy followed by Dr. Lee; metastatsis to bone   • Pure hyperglyceridemia    • Restless leg syndrome    • Sinus  bradycardia    • Transient cerebral ischemia    • Type 2 diabetes mellitus (CMS/HCC)      Past Surgical History:   Procedure Laterality Date   • BRONCHOSCOPY N/A 4/9/2018    Procedure: BRONCHOSCOPY;  Surgeon: Bijan Rivera III, MD;  Location: [facility];  Service: Cardiothoracic   • COLONOSCOPY     • DEEP NECK LYMPH NODE BIOPSY / EXCISION     • HEMORRHOIDECTOMY     • MEDIASTINOSCOPY N/A 4/9/2018    Procedure: MEDIASTINOSCOPY WITH LYMPH NODE BIOPSY;  Surgeon: Bijan Rivera III, MD;  Location: [facility];  Service: Cardiothoracic   • OTHER SURGICAL HISTORY      ulcer repair   • PROSTATECTOMY  2006        PT ASSESSMENT (last 12 hours)      Physical Therapy Evaluation     Row Name 03/16/19 0900          PT Evaluation Time/Intention    Subjective Information  no complaints  -MP     Document Type  evaluation  -MP     Mode of Treatment  physical therapy  -MP     Total Evaluation Minutes, Physical Therapy  40  -MP     Patient Effort  good  -MP     Symptoms Noted During/After Treatment  none  -MP     Row Name 03/16/19 0900          General Information    Patient Profile Reviewed?  yes  -MP     Onset of Illness/Injury or Date of Surgery  03/14/19  -MP     Referring Physician  HARJIT Rivera M.D.  -MP     Patient Observations  alert;agree to therapy  -MP     Patient/Family Observations  Mr. Herrera's son and grandson were present at time of evaluation.  -MP     General Observations of Patient  He is sitting up in bed and alert, oriented x 4.  -MP     Prior Level of Function  independent:;community mobility  -MP     Equipment Currently Used at Home  none  -MP     Pertinent History of Current Functional Problem  Recently contracted pneumonia becoming weak and decreasing normal levels of functin.  -MP     Row Name 03/16/19 0900          Relationship/Environment    Primary Source of Support/Comfort  child(jovanni)  -MP     Name of Support/Comfort Primary Source  Cody Herrera  -MP     Lives With  child(jovanni),  adult;grandchild(jovanni)  -MP     Name(s) of Who Lives With Patient  kenny Ross  -MP     Primary Roles/Responsibilities  retired  -MP     Row Name 03/16/19 0900          Resource/Environmental Concerns    Current Living Arrangements  home/apartment/condo  -MP     Resource/Environmental Concerns  none  -MP     Row Name 03/16/19 0900          Cognitive Assessment/Intervention- PT/OT    Orientation Status (Cognition)  oriented x 4  -MP     Follows Commands (Cognition)  WNL  -MP     Row Name 03/16/19 0900          Bed Mobility Assessment/Treatment    Bed Mobility Assessment/Treatment  bed mobility (all) activities  -MP     [place] Level (Bed Mobility)  independent  -MP     Row Name 03/16/19 0900          Transfer Assessment/Treatment    Transfer Assessment/Treatment  sit-stand transfer;stand-sit transfer  -MP     Maintains Weight-bearing Status (Transfers)  able to maintain  -MP     Sit-Stand [place] (Transfers)  supervision  -MP     Stand-Sit [place] (Transfers)  supervision  -MP     Row Name 03/16/19 0900          Gait/Stairs Assessment/Training    05134 - Gait Training Minutes   15  -MP     [place] Level (Gait)  supervision  -MP     Distance in Feet (Gait)  150 Afterwards, rested on bed for 2 minutes and repeated  -MP     Pattern (Gait)  step-through  -MP     Comment (Gait/Stairs)  He intially was somewhat wobbly but improved with distance.  -MP     Row Name 03/16/19 0900          General ROM    GENERAL ROM COMMENTS  Neck, trunk and all extremities were WFL  -MP     Row Name 03/16/19 0900          MMT (Manual Muscle Testing)    General MMT Comments  All extremities and  were normal B  -MP     Row Name 03/16/19 0900          Plan of Care Review    Plan of Care Reviewed With  patient;son  -MP     Row Name 03/16/19 0900          Physical Therapy Clinical Impression    Date of Referral to PT  03/16/19  -MP     PT Diagnosis (PT Clinical Impression)  Difficulty walking   -MP     Prognosis (PT Clinical  Impression)  good  -MP     Functional Level at Time of Evaluation (PT Clinical Impression)  Needs minimal supervision for gait for safety  -MP     Patient/Family Goals Statement (PT Clinical Impression)  Return home  -MP     Criteria for Skilled Interventions Met (PT Clinical Impression)  yes;treatment indicated  -MP     Pathology/Pathophysiology Noted (Describe Specifically for Each System)  musculoskeletal;neuromuscular  -MP     Impairments Found (describe specific impairments)  gait, locomotion, and balance  -MP     Rehab Potential (PT Clinical Summary)  good, to achieve stated therapy goals  -MP     Predicted Duration of Therapy (PT)  2-3 days  -MP     Row Name 03/16/19 0900          Physical Therapy Goals    Transfer Goal Selection (PT)  transfer, PT goal 1  -MP     Gait Training Goal Selection (PT)  gait training, PT goal 1  -MP     Row Name 03/16/19 0900          Transfer Goal 1 (PT)    Activity/Assistive Device (Transfer Goal 1, PT)  sit-to-stand/stand-to-sit  -MP     [place] Level/Cues Needed (Transfer Goal 1, PT)  independent  -MP     Time Frame (Transfer Goal 1, PT)  2 - 3 days  -MP     Row Name 03/16/19 0900          Gait Training Goal 1 (PT)    Activity/Assistive Device (Gait Training Goal 1, PT)  gait (walking locomotion)  -MP     [place] Level (Gait Training Goal 1, PT)  independent  -MP     Distance (Gait Goal 1, PT)  300  -MP     Time Frame (Gait Training Goal 1, PT)  2 - 3 days  -MP     Row Name 03/16/19 0900          Positioning and Restraints    Pre-Treatment Position  in bed  -MP     Post Treatment Position  bathroom  -MP     Bathroom  encouraged to call for assist;with family/caregiver  -MP     Row Name 03/16/19 0900          Living Environment    Home Accessibility  -- No stairs to deal with, all on one level  -MP       User Key  (r) = Recorded By, (t) = Taken By, (c) = Cosigned By    Initials Name Provider Type    Josue Mg, PT Physical Therapist        Physical Therapy  Education     Title: PT OT SLP Therapies (Done)     Topic: Physical Therapy (Done)     Point: Mobility training (Done)     Learning Progress Summary           Patient VICKI Douglas, VU by  at 3/16/2019  9:57 AM   Family VICKI Douglas, VU by  at 3/16/2019  9:57 AM                   Point: Home exercise program (Done)     Learning Progress Summary           Patient Misael, VICKI, VU by MP at 3/16/2019  9:57 AM   Family VICKI Douglas, VU by  at 3/16/2019  9:57 AM                   Point: Body mechanics (Done)     Learning Progress Summary           Patient Eager, E, VU by  at 3/16/2019  9:57 AM   Family Eager, E, VU by MP at 3/16/2019  9:57 AM                   Point: Precautions (Done)     Learning Progress Summary           Patient Misael, VICKI, VU by  at 3/16/2019  9:57 AM   Family VICKI Douglas, VU by MP at 3/16/2019  9:57 AM                               User Key     Initials Effective Dates Name Provider Type Discipline     06/22/16 -  Josue Boucher, PT Physical Therapist PT              PT Recommendation and Plan  Anticipated Discharge Disposition (PT): home  Planned Therapy Interventions (PT Eval): gait training, patient/family education, transfer training  Outcome Summary/Treatment Plan (PT)  Anticipated Discharge Disposition (PT): home  Plan of Care Reviewed With: patient  Progress: improving  Outcome Summary: Mr. Herrera ambulated 150 feet, rested two minutes, then ambulated another 150 feet, level surface with SBA-1, helping with IV pole.  Outcome Measures     Row Name 03/16/19 0900             How much help from another person do you currently need...    Turning from your back to your side while in flat bed without using bedrails?  4  -MP      Moving from lying on back to sitting on the side of a flat bed without bedrails?  4  -MP      Moving to and from a bed to a chair (including a wheelchair)?  3  -MP      Standing up from a chair using your arms (e.g., wheelchair, bedside chair)?  3  -MP      Climbing 3-5 steps with a  railing?  3  -MP      To walk in hospital room?  3  -MP      AM-PAC 6 Clicks Score  20  -MP         Functional Assessment    Outcome Measure Options  AM-PAC 6 Clicks Basic Mobility (PT)  -MP        User Key  (r) = Recorded By, (t) = Taken By, (c) = Cosigned By    Initials Name Provider Type    Josue Mg PT Physical Therapist         Time Calculation:   PT Charges     Row Name 03/16/19 1000 03/16/19 0900          Time Calculation    Start Time  0915  -MP  --     Stop Time  1000  -MP  --     Time Calculation (min)  45 min  -MP  --        Timed Charges    45539 - Gait Training Minutes   --  15  -MP       User Key  (r) = Recorded By, (t) = Taken By, (c) = Cosigned By    Initials Name Provider Type    Josue Mg PT Physical Therapist        Therapy Suggested Charges     Code   Minutes Charges    61331 (CPT®) Hc Pt Neuromusc Re Education Ea 15 Min      74341 (CPT®) Hc Pt Ther Proc Ea 15 Min      49184 (CPT®) Hc Gait Training Ea 15 Min 15 1    76369 (CPT®) Hc Pt Therapeutic Act Ea 15 Min      75256 (CPT®) Hc Pt Manual Therapy Ea 15 Min      62005 (CPT®) Hc Pt Iontophoresis Ea 15 Min      13357 (CPT®) Hc Pt Elec Stim Ea-Per 15 Min      16335 (CPT®) Hc Pt Ultrasound Ea 15 Min      94144 (CPT®) Hc Pt Self Care/Mgmt/Train Ea 15 Min      05398 (CPT®) Hc Pt Prosthetic (S) Train Initial Encounter, Each 15 Min      71533 (CPT®) Hc Pt Orthotic(S)/Prosthetic(S) Encounter, Each 15 Min      90447 (CPT®) Hc Orthotic(S) Mgmt/Train Initial Encounter, Each 15min      Total  15 1        Therapy Charges for Today     Code Description Service Date Service Provider Modifiers Qty    20427578808 HC GAIT TRAINING EA 15 MIN 3/16/2019 Josue Boucher, PT GP 1    55479752890 HC PT EVAL MOD COMPLEXITY 2 3/16/2019 Josue Boucher, PT GP 1          PT G-Codes  Outcome Measure Options: AM-PAC 6 Clicks Basic Mobility (PT)  AM-PAC 6 Clicks Score: 20      Josue Boucher PT  3/16/2019

## 2019-03-16 NOTE — CONSULTS
78 y.o.  Patient Care Team:  Axel Guardado MD as PCP - General  Jose Lee MD as PCP - Claims Attributed  Bob Mcdermott MD as Consulting Physician (Sleep Medicine)  Tata Lee MD as Consulting Physician (Cardiology)  Bijan Rivera III, MD as Referring Physician (Thoracic Surgery)  Jose Lee MD as Consulting Physician (Hematology and Oncology)      Chief Complaint   Patient presents with   • Shortness of Breath     acute episode started today.   • Fever     Reason for consultation-elevated blood sugars    HPI  78-year-old white male presents to the hospital with fever, shortness of breath and cough.  Endocrine has been consulted for elevated blood sugars.  Patient sees Dr. Boyle for the management of his diabetes as outpatient.    Type 2 diabetes mellitus-diagnosed about 25 years ago.  He has been on tandem insulin pump for about 2-3 years.  He is not on any sensor.  He checks his blood sugars at least 3-4 times a day.  Before starting feeling sick his blood sugars have been running between 120-180.  However since he has been sick for the last 1 week his blood sugars were running in 200s range.  Denied complains of significantly low blood sugars.  Does have history of diabetic retinopathy, status post treatment.  Does have diabetic neuropathy on Neurontin as outpatient.  No ulcers or amputations of his toes.  No history of coronary artery disease, stroke but does have history of CKD stage III.    Received IV methylprednisolone x1 here in the hospital.  Insulin pump settings  Basal-2 units/h  Bolus-1 unit for 5 g of carbohydrates  Sensitivity-25  Blood glucose target-120.    Past Medical History:   Diagnosis Date   • Bone cancer (CMS/HCC)    • CKD (chronic kidney disease)     Stage 3; followed by Dr. Vicky Duran    • Diastolic dysfunction     GRADE II per echo 2018   • Difficulty breathing     during exertion   • Dyslipidemia    • Erectile dysfunction    • Fatigue    •  History of blood transfusion    • History of kidney stones    • Hyperlipidemia    • Hypertension    • Left ventricular hypertrophy     per echo 2018   • Localized edema    • Neuropathy    • Obstructive sleep apnea     USING CPAP   • Osteoarthritis    • Peptic ulcer    • Prostate cancer (CMS/HCC)     Status post prostatectomy, radiation therapy, and hormone therapy followed by Dr. Lee; metastatsis to bone   • Pure hyperglyceridemia    • Restless leg syndrome    • Sinus bradycardia    • Transient cerebral ischemia    • Type 2 diabetes mellitus (CMS/HCC)        Family History   Problem Relation Age of Onset   • Heart failure Mother    • Hypertension Mother    • Diabetes Mother    • Heart disease Mother    • Lung cancer Father 46   • Hypertension Sister    • Lung cancer Sister 60   • Hypertension Brother    • Lung cancer Brother 62   • Heart disease Other    • Prostate cancer Brother 60   • Malig Hyperthermia Neg Hx        Social History     Socioeconomic History   • Marital status:      Spouse name: Not on file   • Number of children: Not on file   • Years of education: College   • Highest education level: Not on file   Social Needs   • Financial resource strain: Not on file   • Food insecurity - worry: Not on file   • Food insecurity - inability: Not on file   • Transportation needs - medical: Not on file   • Transportation needs - non-medical: Not on file   Occupational History     Employer: RETIRED   Tobacco Use   • Smoking status: Former Smoker     Packs/day: 1.00     Years: 30.00     Pack years: 30.00     Types: Cigarettes     Last attempt to quit: 1998     Years since quittin.1   • Smokeless tobacco: Never Used   Substance and Sexual Activity   • Alcohol use: No     Comment: caffeine use   • Drug use: No   • Sexual activity: Defer   Other Topics Concern   • Not on file   Social History Narrative   • Not on file       No Known Allergies      Current Facility-Administered Medications:   •   acetaminophen (TYLENOL) tablet 650 mg, 650 mg, Oral, Q6H PRN, Darryl Rivera MD  •  aspirin EC tablet 81 mg, 81 mg, Oral, Daily, Darryl Rivera MD, 81 mg at 03/16/19 0856  •  azithromycin (ZITHROMAX) 500 mg 0.9% NaCl (Add-vantage) 250 mL, 500 mg, Intravenous, Q24H, Darryl Rivera MD, 500 mg at 03/16/19 0855  •  cefTRIAXone (ROCEPHIN) IVPB 1 g, 1 g, Intravenous, Q24H, Darryl Rivera MD, Last Rate: 100 mL/hr at 03/16/19 0855, 1 g at 03/16/19 0855  •  cholecalciferol (VITAMIN D3) tablet 2,000 Units, 2,000 Units, Oral, Daily, Darryl Rivera MD, 2,000 Units at 03/16/19 0856  •  dextrose (D50W) 25 g/ 50mL Intravenous Solution 25 g, 25 g, Intravenous, Q15 Min PRN, Darryl Rivera MD  •  dextrose (GLUTOSE) oral gel 15 g, 15 g, Oral, Q15 Min PRN, Darryl Rivera MD  •  diltiaZEM CD (CARDIZEM CD) 24 hr capsule 120 mg, 120 mg, Oral, Daily, Darryl Rivera MD, 120 mg at 03/16/19 0855  •  enoxaparin (LOVENOX) syringe 40 mg, 40 mg, Subcutaneous, Q24H, Darryl Rivera MD, 40 mg at 03/16/19 0635  •  gabapentin (NEURONTIN) capsule 400 mg, 400 mg, Oral, Nightly, Darryl Rivera MD, 400 mg at 03/16/19 0055  •  glucagon (human recombinant) (GLUCAGEN DIAGNOSTIC) injection 1 mg, 1 mg, Subcutaneous, PRN, Darryl Rivera MD  •  HYDROcodone-acetaminophen (NORCO) 5-325 MG per tablet 1 tablet, 1 tablet, Oral, Q6H PRN, Darryl Rivera MD  •  insulin lispro (humaLOG) injection 0-14 Units, 0-14 Units, Subcutaneous, 4x Daily With Meals & Nightly, Darryl Rivera MD  •  ipratropium-albuterol (DUO-NEB) nebulizer solution 3 mL, 3 mL, Nebulization, Q4H PRN, Darryl Rivera MD, 3 mL at 03/16/19 0119  •  lactobacillus acidophilus (RISAQUAD) capsule 1 capsule, 1 capsule, Oral, Daily, Darryl Rivera MD, 1 capsule at 03/16/19 0856  •  levothyroxine (SYNTHROID, LEVOTHROID) tablet 50 mcg, 50 mcg, Oral, Q AM, Darryl Rivera MD, 50 mcg at 03/16/19 0635  •  losartan (COZAAR) tablet 100 mg, 100 mg, Oral, Q24H, Darryl Rivera MD, 100 mg at 03/16/19 0855  •  magnesium  hydroxide (MILK OF MAGNESIA) suspension 2400 mg/10mL 10 mL, 10 mL, Oral, Daily PRN, Darryl Rivera MD  •  modafinil (PROVIGIL) tablet 200 mg, 200 mg, Oral, Daily, Darryl Rivera MD  •  nitroglycerin (NITROSTAT) SL tablet 0.4 mg, 0.4 mg, Sublingual, Q5 Min PRN, Darryl Rivera MD  •  ondansetron (ZOFRAN) injection 4 mg, 4 mg, Intravenous, Q6H PRN, Darryl Rivera MD  •  Pharmacy to Dose enoxaparin (LOVENOX), , Does not apply, Continuous PRN, Darryl Rivera MD  •  pravastatin (PRAVACHOL) tablet 40 mg, 40 mg, Oral, Daily, Darryl Rivera MD, 40 mg at 03/16/19 0856  •  rOPINIRole (REQUIP) tablet 0.5 mg, 0.5 mg, Oral, BID, Darryl Rivera MD, 0.5 mg at 03/16/19 0856         Review of Systems   Constitutional: Positive for appetite change and fatigue.   Eyes: Negative for visual disturbance.   Respiratory: Positive for cough and shortness of breath.    Cardiovascular: Negative for palpitations.   Gastrointestinal: Negative for nausea and vomiting.   Endocrine: Negative for polydipsia and polyuria.   Musculoskeletal: Positive for arthralgias.   Skin: Positive for wound. Negative for pallor.   Neurological: Positive for weakness. Negative for numbness.   Psychiatric/Behavioral: Negative for agitation.     Objective     Vital Signs  Temp:  [97.3 °F (36.3 °C)-104.9 °F (40.5 °C)] 97.5 °F (36.4 °C)  Heart Rate:  [56-80] 69  Resp:  [18-24] 18  BP: (104-156)/(51-73) 116/62    Physical Exam  Physical Exam   Constitutional: He is oriented to person, place, and time. He appears well-nourished.   obese   HENT:   Head: Normocephalic and atraumatic.   Wide neck   Eyes: Conjunctivae and EOM are normal.   Neck: Normal range of motion. Neck supple. Carotid bruit is not present. No thyromegaly present.   Acanthosis nigricans   Cardiovascular: Normal rate and normal heart sounds.   Pulmonary/Chest: Effort normal. No stridor. No respiratory distress. He has rales.   Abdominal: Soft. Bowel sounds are normal. He exhibits no distension. There is no  tenderness.   Central obesity   Musculoskeletal: He exhibits no edema or tenderness.   Neurological: He is alert and oriented to person, place, and time.   Skin: Skin is warm and dry.   Psychiatric: He has a normal mood and affect. His behavior is normal.   Vitals reviewed.      Results Review:    I reviewed the patient's new clinical results and summarized them in the HPI and in plan.  Glucose   Date/Time Value Ref Range Status   03/16/2019 1112 270 (H) 70 - 130 mg/dL Final   03/16/2019 0625 317 (H) 70 - 130 mg/dL Final   03/16/2019 0232 368 (H) 70 - 130 mg/dL Final   03/15/2019 2157 324 (H) 70 - 130 mg/dL Final     Lab Results (last 24 hours)     Procedure Component Value Units Date/Time    Respiratory Culture - Sputum, Cough [[id number]] Collected:  03/16/19 0858    Specimen:  Sputum from Cough Updated:  03/16/19 1352     Gram Stain Rare (1+) WBCs seen      Rare (1+) Epithelial cells per low power field      Mixed bacterial morphotypes seen on Gram Stain    POC Glucose Once [[id number]]  (Abnormal) Collected:  03/16/19 1112    Specimen:  Blood Updated:  03/16/19 1114     Glucose 270 mg/dL     POC Glucose Once [[id number]]  (Abnormal) Collected:  03/16/19 0625    Specimen:  Blood Updated:  03/16/19 0627     Glucose 317 mg/dL     Basic Metabolic Panel [[id number]]  (Abnormal) Collected:  03/16/19 0505    Specimen:  Blood Updated:  03/16/19 0549     Glucose 372 mg/dL      BUN 26 mg/dL      Creatinine 1.57 mg/dL      Sodium 137 mmol/L      Potassium 4.4 mmol/L      Chloride 103 mmol/L      CO2 20.3 mmol/L      Calcium 8.0 mg/dL      eGFR Non African Amer 43 mL/min/1.73      BUN/Creatinine Ratio 16.6     Anion Gap 13.7 mmol/L     Narrative:       GFR Normal >60  Chronic Kidney Disease <60  Kidney Failure <15    Blood Culture - Blood, Arm, Left [[id number]] Collected:  03/15/19 1729    Specimen:  Blood from Arm, Left Updated:  03/16/19 0546     Blood Culture No growth at less than 24 hours    Hemoglobin A1c [[id number]]   (Abnormal) Collected:  03/16/19 0505    Specimen:  Blood Updated:  03/16/19 0530     Hemoglobin A1C 8.80 %     Narrative:       Hemoglobin A1C Ranges:    Increased Risk for Diabetes  5.7% to 6.4%  Diabetes                     >= 6.5%  Diabetic Goal                < 7.0%    CBC (No Diff) [[id number]]  (Abnormal) Collected:  03/16/19 0505    Specimen:  Blood Updated:  03/16/19 0518     WBC 10.31 10*3/mm3      RBC 3.55 10*6/mm3      Hemoglobin 10.2 g/dL      Hematocrit 31.9 %      MCV 89.9 fL      MCH 28.7 pg      MCHC 32.0 g/dL      RDW 12.9 %      RDW-SD 42.9 fl      MPV 10.6 fL      Platelets 204 10*3/mm3     Blood Culture - Blood, Arm, Left [[id number]] Collected:  03/15/19 1647    Specimen:  Blood from Arm, Left Updated:  03/16/19 0501     Blood Culture No growth at less than 24 hours    POC Glucose Once [[id number]]  (Abnormal) Collected:  03/16/19 0232    Specimen:  Blood Updated:  03/16/19 0233     Glucose 368 mg/dL     S. Pneumo Ag Urine or CSF - Urine, Urinary Bladder [[id number]]  (Normal) Collected:  03/16/19 0057    Specimen:  Urine from Urinary Bladder Updated:  03/16/19 0138     Strep Pneumo Ag Negative    Legionella Antigen, Urine - Urine, Urinary Bladder [[id number]]  (Normal) Collected:  03/16/19 0057    Specimen:  Urine from Urinary Bladder Updated:  03/16/19 0137     LEGIONELLA ANTIGEN, URINE Negative    MRSA Screen Culture - Swab, Naris, Left [[id number]] Collected:  03/16/19 0057    Specimen:  Swab from Naris, Left Updated:  03/16/19 0104    POC Glucose Once [[id number]]  (Abnormal) Collected:  03/15/19 2157    Specimen:  Blood Updated:  03/15/19 2158     Glucose 324 mg/dL     Respiratory Panel, PCR - Swab, Nasopharynx [[id number]]  (Normal) Collected:  03/15/19 1713    Specimen:  Swab from Nasopharynx Updated:  03/15/19 1811     ADENOVIRUS, PCR Not Detected     Coronavirus 229E Not Detected     Coronavirus HKU1 Not Detected     Coronavirus NL63 Not Detected     Coronavirus OC43 Not Detected     Human  Metapneumovirus Not Detected     Human Rhinovirus/Enterovirus Not Detected     Influenza B PCR Not Detected     Parainfluenza Virus 1 Not Detected     Parainfluenza Virus 2 Not Detected     Parainfluenza Virus 3 Not Detected     Parainfluenza Virus 4 Not Detected     Bordetella pertussis pcr Not Detected     Influenza A H1 2009 PCR Not Detected     Chlamydophila pneumoniae PCR Not Detected     Mycoplasma pneumo by PCR Not Detected     Influenza A PCR Not Detected     Influenza A H3 Not Detected     Influenza A H1 Not Detected     RSV, PCR Not Detected     Bordetella parapertussis PCR Not Detected    Urinalysis, Microscopic Only - Urine, Clean Catch [[id number]]  (Abnormal) Collected:  03/15/19 1739    Specimen:  Urine, Clean Catch Updated:  03/15/19 1806     RBC, UA 3-5 /HPF      WBC, UA 0-2 /HPF      Bacteria, UA None Seen /HPF      Squamous Epithelial Cells, UA 0-2 /HPF      Hyaline Casts, UA 7-12 /LPF      Methodology Automated Microscopy    Urinalysis With Culture If Indicated - Urine, Clean Catch [[id number]]  (Abnormal) Collected:  03/15/19 1739    Specimen:  Urine, Clean Catch Updated:  03/15/19 1803     Color, UA Yellow     Appearance, UA Clear     pH, UA <=5.0     Specific Gravity, UA 1.020     Glucose, UA Negative     Ketones, UA Negative     Bilirubin, UA Negative     Blood, UA Trace     Protein, UA 30 mg/dL (1+)     Leuk Esterase, UA Trace     Nitrite, UA Negative     Urobilinogen, UA 1.0 E.U./dL    Troponin [[id number]]  (Normal) Collected:  03/15/19 1647    Specimen:  Blood Updated:  03/15/19 1739     Troponin T <0.010 ng/mL     Narrative:       Troponin T Reference Range:  <= 0.03 ng/mL-   Negative for AMI  >0.03 ng/mL-     Abnormal for myocardial necrosis.  Clinicians would have to utilize clinical acumen, EKG, Troponin and serial changes to determine if it is an Acute Myocardial Infarction or myocardial injury due to an underlying chronic condition.     Procalcitonin [[id number]]  (Abnormal) Collected:   "03/15/19 1647    Specimen:  Blood Updated:  03/15/19 1739     Procalcitonin 0.31 ng/mL     Narrative:       As a Marker for Sepsis (Non-Neonates):   1. <0.5 ng/mL represents a low risk of severe sepsis and/or septic shock.  1. >2 ng/mL represents a high risk of severe sepsis and/or septic shock.    As a Marker for Lower Respiratory Tract Infections that require antibiotic therapy:  PCT on Admission     Antibiotic Therapy             6-12 Hrs later  > 0.5                Strongly Recommended            >0.25 - <0.5         Recommended  0.1 - 0.25           Discouraged                   Remeasure/reassess PCT  <0.1                 Strongly Discouraged          Remeasure/reassess PCT      As 28 day mortality risk marker: \"Change in Procalcitonin Result\" (> 80 % or <=80 %) if Day 0 (or Day 1) and Day 4 values are available. Refer to http://www.[facility] Cardiovascularpct-calculator.[employer]/   Change in PCT <=80 %   A decrease of PCT levels below or equal to 80 % defines a positive change in PCT test result representing a higher risk for 28-day all-cause mortality of patients diagnosed with severe sepsis or septic shock.  Change in PCT > 80 %   A decrease of PCT levels of more than 80 % defines a negative change in PCT result representing a lower risk for 28-day all-cause mortality of patients diagnosed with severe sepsis or septic shock.                Comprehensive Metabolic Panel [[id number]]  (Abnormal) Collected:  03/15/19 1647    Specimen:  Blood Updated:  03/15/19 1738     Glucose 142 mg/dL      BUN 25 mg/dL      Creatinine 1.47 mg/dL      Sodium 131 mmol/L      Potassium 4.4 mmol/L      Chloride 97 mmol/L      CO2 22.7 mmol/L      Calcium 8.5 mg/dL      Total Protein 7.5 g/dL      Albumin 3.70 g/dL      ALT (SGPT) 27 U/L      AST (SGOT) 21 U/L      Alkaline Phosphatase 65 U/L      Total Bilirubin 0.4 mg/dL      eGFR Non African Amer 46 mL/min/1.73      Globulin 3.8 gm/dL      A/G Ratio 1.0 g/dL      BUN/Creatinine Ratio 17.0     Anion " Gap 11.3 mmol/L     Narrative:       GFR Normal >60  Chronic Kidney Disease <60  Kidney Failure <15    Lipase [[id number]]  (Normal) Collected:  03/15/19 1647    Specimen:  Blood Updated:  03/15/19 1736     Lipase 34 U/L     Lactic Acid, Plasma [[id number]]  (Normal) Collected:  03/15/19 1647    Specimen:  Blood Updated:  03/15/19 1713     Lactate 1.6 mmol/L     Protime-INR [[id number]]  (Abnormal) Collected:  03/15/19 1647    Specimen:  Blood Updated:  03/15/19 1711     Protime 15.0 Seconds      INR 1.20    CBC & Differential [[id number]] Collected:  03/15/19 1647    Specimen:  Blood Updated:  03/15/19 1701    Narrative:       The following orders were created for panel order CBC & Differential.  Procedure                               Abnormality         Status                     ---------                               -----------         ------                     CBC Auto Differential[[id number]]        Abnormal            Final result                 Please view results for these tests on the individual orders.    CBC Auto Differential [[id number]]  (Abnormal) Collected:  03/15/19 1647    Specimen:  Blood Updated:  03/15/19 1701     WBC 11.26 10*3/mm3      RBC 3.90 10*6/mm3      Hemoglobin 11.6 g/dL      Hematocrit 35.2 %      MCV 90.3 fL      MCH 29.7 pg      MCHC 33.0 g/dL      RDW 13.1 %      RDW-SD 42.9 fl      MPV 10.6 fL      Platelets 213 10*3/mm3      Neutrophil % 75.3 %      Lymphocyte % 11.7 %      Monocyte % 11.7 %      Eosinophil % 0.3 %      Basophil % 0.3 %      Immature Grans % 0.7 %      Neutrophils, Absolute 8.48 10*3/mm3      Lymphocytes, Absolute 1.32 10*3/mm3      Monocytes, Absolute 1.32 10*3/mm3      Eosinophils, Absolute 0.03 10*3/mm3      Basophils, Absolute 0.03 10*3/mm3      Immature Grans, Absolute 0.08 10*3/mm3      nRBC 0.0 /100 WBC           Imaging Results (last 24 hours)     Procedure Component Value Units Date/Time    CT Chest Without Contrast [[id number]] Collected:  03/15/19 2165      Updated:  03/15/19 1915    Narrative:       CT CHEST WO CONTRAST-, CT ABDOMEN PELVIS WO CONTRAST-     INDICATIONS: Cough, vomiting, fever. Metastatic prostate cancer.     TECHNIQUE: Radiation dose reduction techniques were utilized, including  automated exposure control and exposure modulation based on body size.     Unenhanced CHEST, abdomen, pelvis CT     COMPARISON: 7/19/2018     FINDINGS:      Chest CT:     The heart size is normal without pericardial effusion. Coronary arterial  calcification is present. The ascending aorta is dilated, 3.8 cm, not  significantly changed. A few subcentimeter short axis mediastinal lymph  nodes are seen. Assessment of vascular and mediastinal structures is  limited without intravenous contrast material. Distal paraesophageal  densities, as large as 6 mm x 8 mm on image 79 of series 1 appear  stable, could for example be lymph nodes.        The airways appear clear.     No pleural effusion or pneumothorax.     The lungs show consolidation/pneumonia in the left upper lobe, follow-up  recommended to characterize resolution and to exclude any possibility of  underlying lesion.     Abdomen pelvis CT:     Relative low-density of the liver compatible with steatosis.     Pancreas is thinned.     Prostatectomy change is apparent.     Otherwise unremarkable unenhanced appearance of the liver, gallbladder,  spleen, adrenal glands, pancreas, kidneys, bladder.     No bowel obstruction or abnormal bowel thickening is identified. Colonic  diverticula are seen that do not appear inflamed. Appendix does not  appear inflamed.     No free intraperitoneal gas or free fluid.     Scattered small mesenteric and para-aortic lymph nodes are seen that are  not significant by size criteria.     Abdominal aorta is not aneurysmal. Aortic and other arterial  calcifications are present.     Degenerative changes are seen in the spine. Extensive sclerotic osseous  metastatic disease appears similar to the prior  exam. No acute fracture  is identified.             Impression:             1. Left upper lobe pneumonia, follow-up recommended.  2. Colonic diverticulosis. No acute inflammatory process of bowel is  identified, follow up as indications persist.  3. No urolithiasis or hydronephrosis. Hepatic steatosis.     Discussed by telephone with Dr. Hutton at 1910, 3/15/2019.     This report was finalized on 3/15/2019 7:12 PM by Dr. Dhaval Brink M.D.       CT Abdomen Pelvis Without Contrast [[id number]] Collected:  03/15/19 1903     Updated:  03/15/19 1915    Narrative:       CT CHEST WO CONTRAST-, CT ABDOMEN PELVIS WO CONTRAST-     INDICATIONS: Cough, vomiting, fever. Metastatic prostate cancer.     TECHNIQUE: Radiation dose reduction techniques were utilized, including  automated exposure control and exposure modulation based on body size.     Unenhanced CHEST, abdomen, pelvis CT     COMPARISON: 7/19/2018     FINDINGS:      Chest CT:     The heart size is normal without pericardial effusion. Coronary arterial  calcification is present. The ascending aorta is dilated, 3.8 cm, not  significantly changed. A few subcentimeter short axis mediastinal lymph  nodes are seen. Assessment of vascular and mediastinal structures is  limited without intravenous contrast material. Distal paraesophageal  densities, as large as 6 mm x 8 mm on image 79 of series 1 appear  stable, could for example be lymph nodes.        The airways appear clear.     No pleural effusion or pneumothorax.     The lungs show consolidation/pneumonia in the left upper lobe, follow-up  recommended to characterize resolution and to exclude any possibility of  underlying lesion.     Abdomen pelvis CT:     Relative low-density of the liver compatible with steatosis.     Pancreas is thinned.     Prostatectomy change is apparent.     Otherwise unremarkable unenhanced appearance of the liver, gallbladder,  spleen, adrenal glands, pancreas, kidneys, bladder.     No  bowel obstruction or abnormal bowel thickening is identified. Colonic  diverticula are seen that do not appear inflamed. Appendix does not  appear inflamed.     No free intraperitoneal gas or free fluid.     Scattered small mesenteric and para-aortic lymph nodes are seen that are  not significant by size criteria.     Abdominal aorta is not aneurysmal. Aortic and other arterial  calcifications are present.     Degenerative changes are seen in the spine. Extensive sclerotic osseous  metastatic disease appears similar to the prior exam. No acute fracture  is identified.             Impression:             1. Left upper lobe pneumonia, follow-up recommended.  2. Colonic diverticulosis. No acute inflammatory process of bowel is  identified, follow up as indications persist.  3. No urolithiasis or hydronephrosis. Hepatic steatosis.     Discussed by telephone with Dr. Hutton at 1910, 3/15/2019.     This report was finalized on 3/15/2019 7:12 PM by Dr. Dhaval Brink M.D.       XR Chest 1 View [[id number]] Collected:  03/15/19 1716     Updated:  03/15/19 1720    Narrative:       PORTABLE CHEST 3/15/2019 AT 4:49 PM     CLINICAL HISTORY: Dyspnea     Compared to the previous chest x-ray dated 4/9/2018.     The lungs are fairly well-expanded and appear free of focal infiltrates.  There are no pleural effusions. The heart is normal in size.     IMPRESSIONS: No evidence of acute disease within the chest.     This report was finalized on 3/15/2019 5:17 PM by Dr. Jacobo Collins M.D.             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     Type 2 diabetes mellitus-uncontrolled  HbA1c is elevated  Blood sugars are noted to be elevated here in the hospital.  We will discontinue the insulin pump.  Will start Lantus 40 units now  Will start Humalog 10 units with each meal  We will cover with Humalog sliding scale 3 times daily before meals and at bedtime.    Hypothyroidism  We will check thyroid panel  Continue levothyroxine 50 mcg oral daily    Hyperlipidemia  Continue Pravachol.    Discussed plan with nurse.     Thank you for your consultation.  Will follow the pt while in hospital.     Mrailee Jean MD.  03/16/19  2:42 PM      EMR Dragon / transcription disclaimer:    \"Dictated utilizing Dragon dictation\".   "

## 2019-03-16 NOTE — PROGRESS NOTES
"Pharmacokinetic Consult - Vancomycin Dosing (Initial Note)    Semaj Herrera has been consulted for pharmacy to dose vancomycin for PNA.  Pharmacy dosing vancomycin per Dr Rivera's request.   Goal trough: 15-20 mg/L   Other antimicrobials: zosyn  Duration: 5 days    Relevant clinical data and objective history reviewed:  78 y.o. male 165.1 cm (65\") 96.7 kg (213 lb 1.6 oz)    Past Medical History:   Diagnosis Date   • Bone cancer (CMS/HCC)    • CKD (chronic kidney disease)     Stage 3; followed by Dr. Vicky Duran    • Diastolic dysfunction     GRADE II per echo 2018   • Difficulty breathing     during exertion   • Dyslipidemia    • Erectile dysfunction    • Fatigue    • History of blood transfusion    • History of kidney stones    • Hyperlipidemia    • Hypertension    • Left ventricular hypertrophy     per echo 2018   • Localized edema    • Neuropathy    • Obstructive sleep apnea     USING CPAP   • Osteoarthritis    • Peptic ulcer    • Prostate cancer (CMS/HCC)     Status post prostatectomy, radiation therapy, and hormone therapy followed by Dr. Lee; metastatsis to bone   • Pure hyperglyceridemia    • Restless leg syndrome    • Sinus bradycardia    • Transient cerebral ischemia    • Type 2 diabetes mellitus (CMS/HCC)      Creatinine   Date Value Ref Range Status   03/15/2019 1.47 (H) 0.76 - 1.27 mg/dL Final   03/07/2019 1.22 0.70 - 1.30 mg/dL Final   02/07/2019 1.53 (H) 0.70 - 1.30 mg/dL Final   03/20/2018 1.20 0.60 - 1.30 mg/dL Final     Comment:     Serial Number: [id number]:  [id number]     BUN   Date Value Ref Range Status   03/15/2019 25 (H) 8 - 23 mg/dL Final     Estimated Creatinine Clearance: 44.3 mL/min (A) (by C-G formula based on SCr of 1.47 mg/dL (H)).    Lab Results   Component Value Date    WBC 11.26 (H) 03/15/2019     Temp Readings from Last 3 Encounters:   03/15/19 98.3 °F (36.8 °C) (Oral)   01/10/19 98.1 °F (36.7 °C) (Oral)   11/19/18 97.9 °F (36.6 °C) (Oral)      Baseline " culture/source/susceptibility: .     Assessment/Plan  Pt received 2gm vancomycin @ 17:55. Will start vancomycin 1500mg IV q24h. Will monitor serum creatinine every 24 hours for the first 3 days then at least every 48 hours per dosing recommendations. Vancomycin level 3/17 @ 16:30. Pharmacy will continue to follow daily while on vancomycin and adjust as needed.     Ryann Scherer, [facility]

## 2019-03-16 NOTE — NURSING NOTE
Pt encouraged to take hospital insulin, until seen by endocrinologist,  due to continued elevated blood glucose throughout the night. Pt refused.  Blood glucose this am- 317.  Pt entered blood glucose amount into insulin pump and added 64 carbs for planned breakfast intake, insulin pump to give 20.68 units over 3 hours.

## 2019-03-16 NOTE — PROGRESS NOTES
"Pharmacy Consult to dose Lovenox    Semaj Herrera has been consulted for pharmacy to dose enoxaparin for VTE prophylaxis per Dr Rivera's request.     78 y.o. male 165.1 cm (65\") 96.7 kg (213 lb 1.6 oz)       Lab Results   Component Value Date     03/15/2019     03/07/2019     02/07/2019     Lab Results   Component Value Date    HGB 11.6 (L) 03/15/2019    HGB 12.7 (L) 03/07/2019    HGB 12.0 (L) 02/07/2019     Estimated Creatinine Clearance: 44.3 mL/min (A) (by C-G formula based on SCr of 1.47 mg/dL (H)).      Plan    Will start Lovenox 40mg Q24h. Pharmacy will continue to follow.     Ryann Scherer, [facility]    "

## 2019-03-16 NOTE — PLAN OF CARE
Problem: Patient Care Overview  Goal: Plan of Care Review  Outcome: Ongoing (interventions implemented as appropriate)   03/16/19 0310   Plan of Care Review   Progress improving   OTHER   Outcome Summary Pt admitted from ER from home, lives independently, c/o 2 days of cough and fever that spiked to 104, medicated in ER, has been afebrile since admit to floor, to continue antibiotics, Endocrinologist to see pt today to address pt's insulin pump, pt got resp tx once for c/o soa/chest tightness with good results, urine and mrsa swab obtained, pt instructed to give sputum specimen when able.        Problem: Fall Risk (Adult)  Goal: Absence of Fall  Outcome: Ongoing (interventions implemented as appropriate)

## 2019-03-16 NOTE — PROGRESS NOTES
Name: Semaj Herrera ADMIT: 3/15/2019   : 1940  PCP: Axel Guardado MD    MRN: [id number] LOS: 1 days   AGE/SEX: 78 y.o. male  ROOM: [facility]   Subjective   Ambulating well in room. No CP NVD. Dyspnea has improved some as has cough.    Objective   Vital Signs  Temp:  [97.3 °F (36.3 °C)-104.9 °F (40.5 °C)] 97.5 °F (36.4 °C)  Heart Rate:  [56-80] 69  Resp:  [18-24] 18  BP: (104-156)/(51-73) 116/62  SpO2:  [92 %-98 %] 97 %  on  Flow (L/min):  [2] 2;   Device (Oxygen Therapy): room air  Body mass index is 34.15 kg/m².    Physical Exam   Constitutional: He is oriented to person, place, and time. He appears well-developed. No distress.   HENT:   Head: Atraumatic.   Nose: Nose normal.   Eyes: Conjunctivae and EOM are normal.   Neck: Normal range of motion. Neck supple.   Cardiovascular: Normal rate, regular rhythm and intact distal pulses.   Pulmonary/Chest: Effort normal and breath sounds normal. He has no wheezes. He has no rales.   Abdominal: Soft. He exhibits no distension. There is no tenderness.   Musculoskeletal: Normal range of motion. He exhibits no edema.   Neurological: He is alert and oriented to person, place, and time.   Skin: Skin is warm and dry. He is not diaphoretic.   Psychiatric: He has a normal mood and affect. His behavior is normal.   Nursing note and vitals reviewed.      Results Review:       I reviewed the patient's new clinical results.     I reviewed imaging, agree with interpretation.     I reviewed telemetry/EKG results, sinus rhythm.      Results from last 7 days   Lab Units 19  0505 03/15/19  1647   WBC 10*3/mm3 10.31 11.26*   HEMOGLOBIN g/dL 10.2* 11.6*   PLATELETS 10*3/mm3 204 213     Results from last 7 days   Lab Units 19  0505 03/15/19  1647   SODIUM mmol/L 137 131*   POTASSIUM mmol/L 4.4 4.4   CHLORIDE mmol/L 103 97*   CO2 mmol/L 20.3* 22.7   BUN mg/dL 26* 25*   CREATININE mg/dL 1.57* 1.47*   GLUCOSE mg/dL 372* 142*   Estimated Creatinine Clearance: 40.6 mL/min (A)  (by C-G formula based on SCr of 1.57 mg/dL (H)).  Results from last 7 days   Lab Units 03/16/19  0505 03/15/19  1647   CALCIUM mg/dL 8.0* 8.5*   ALBUMIN g/dL  --  3.70         aspirin 81 mg Oral Daily   azithromycin 500 mg Intravenous Q24H   ceftriaxone 1 g Intravenous Q24H   cholecalciferol 2,000 Units Oral Daily   diltiaZEM  mg Oral Daily   enoxaparin 40 mg Subcutaneous Q24H   gabapentin 400 mg Oral Nightly   insulin lispro 0-14 Units Subcutaneous 4x Daily With Meals & Nightly   lactobacillus acidophilus 1 capsule Oral Daily   levothyroxine 50 mcg Oral Q AM   losartan 100 mg Oral Q24H   modafinil 200 mg Oral Daily   pravastatin 40 mg Oral Daily   rOPINIRole 0.5 mg Oral BID       Pharmacy to Dose enoxaparin (LOVENOX)    Diet Regular; Cardiac, Consistent Carbohydrate      Assessment/Plan      Active Hospital Problems    Diagnosis  POA   • **Pneumonia of left upper lobe due to infectious organism (CMS/HCC) [J18.1]  Yes   • Hyponatremia [E87.1]  Yes   • Sepsis (CMS/HCC) [A41.9]  Yes   • 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     - PNA: Urinary antigens negative. Cx pending. Elevated procal and infiltrate on imaging. Conitnue Rocephin/Azithromycin.  - Sepsis: Fever, leukocytosis on admission are both improving.  - DM2: A1c 8.8. Insulin pump to be reviewed by Endocrinology.  - HTN: Stable  - Hyponatremia: Resolved  - CKD3: Stable Cr.  - Disposition: Home/1-2 days    >35 minutes time spent    Yasir Orellana MD  [facility]  03/16/19  1:51 PM

## 2019-03-16 NOTE — PLAN OF CARE
Problem: Patient Care Overview  Goal: Plan of Care Review   03/16/19 0958   Plan of Care Review   Progress improving   OTHER   Outcome Summary Mr. Herrera ambulated 150 feet, rested two minutes, then ambulated another 150 feet, level surface with SBA-1, helping with IV pole.   Coping/Psychosocial   Plan of Care Reviewed With patient

## 2019-03-16 NOTE — ED NOTES
Nursing report ED to floor  Semaj Herrera  78 y.o.  male    HPI (triage note):   Chief Complaint   Patient presents with   • Shortness of Breath     acute episode started today.   • Fever       Admitting doctor:   Darryl Rivera MD    Admitting diagnosis:   The primary encounter diagnosis was Pneumonia of left upper lobe due to infectious organism (CMS/[facility]). A diagnosis of Febrile illness, acute was also pertinent to this visit.    Code status:   Current Code Status     Date Active Code Status Order ID Comments User Context       Prior          Allergies:   Patient has no known allergies.    Weight:       03/15/19  1614   Weight: 94.9 kg (209 lb 4.8 oz)       Most recent vitals:   Vitals:    03/15/19 1726 03/15/19 1729 03/15/19 1857 03/15/19 1905   BP:  121/73 118/51    Pulse:  79 70    Resp:       Temp: (!) 102.8 °F (39.3 °C)   100 °F (37.8 °C)   TempSrc: Oral   Oral   SpO2:  98% 92%    Weight:       Height:           Active LDAs/IV Access:   Lines, Drains & Airways    Active LDAs     Name:   Placement date:   Placement time:   Site:   Days:    Peripheral IV 03/15/19 1655 Right Antecubital   03/15/19    1655    Antecubital   less than 1                Labs (abnormal labs have a star):   Labs Reviewed   COMPREHENSIVE METABOLIC PANEL - Abnormal; Notable for the following components:       Result Value    Glucose 142 (*)     BUN 25 (*)     Creatinine 1.47 (*)     Sodium 131 (*)     Chloride 97 (*)     Calcium 8.5 (*)     eGFR Non  Amer 46 (*)     All other components within normal limits    Narrative:     GFR Normal >60  Chronic Kidney Disease <60  Kidney Failure <15   PROTIME-INR - Abnormal; Notable for the following components:    Protime 15.0 (*)     INR 1.20 (*)     All other components within normal limits   URINALYSIS W/ CULTURE IF INDICATED - Abnormal; Notable for the following components:    Blood, UA Trace (*)     Protein, UA 30 mg/dL (1+) (*)     Leuk Esterase, UA Trace (*)     All other components  "within normal limits   PROCALCITONIN - Abnormal; Notable for the following components:    Procalcitonin 0.31 (*)     All other components within normal limits    Narrative:     As a Marker for Sepsis (Non-Neonates):   1. <0.5 ng/mL represents a low risk of severe sepsis and/or septic shock.  1. >2 ng/mL represents a high risk of severe sepsis and/or septic shock.    As a Marker for Lower Respiratory Tract Infections that require antibiotic therapy:  PCT on Admission     Antibiotic Therapy             6-12 Hrs later  > 0.5                Strongly Recommended            >0.25 - <0.5         Recommended  0.1 - 0.25           Discouraged                   Remeasure/reassess PCT  <0.1                 Strongly Discouraged          Remeasure/reassess PCT      As 28 day mortality risk marker: \"Change in Procalcitonin Result\" (> 80 % or <=80 %) if Day 0 (or Day 1) and Day 4 values are available. Refer to http://[email or web address].compct-calculator.com/   Change in PCT <=80 %   A decrease of PCT levels below or equal to 80 % defines a positive change in PCT test result representing a higher risk for 28-day all-cause mortality of patients diagnosed with severe sepsis or septic shock.  Change in PCT > 80 %   A decrease of PCT levels of more than 80 % defines a negative change in PCT result representing a lower risk for 28-day all-cause mortality of patients diagnosed with severe sepsis or septic shock.               CBC WITH AUTO DIFFERENTIAL - Abnormal; Notable for the following components:    WBC 11.26 (*)     RBC 3.90 (*)     Hemoglobin 11.6 (*)     Hematocrit 35.2 (*)     Lymphocyte % 11.7 (*)     Immature Grans % 0.7 (*)     Neutrophils, Absolute 8.48 (*)     Monocytes, Absolute 1.32 (*)     Immature Grans, Absolute 0.08 (*)     All other components within normal limits   URINALYSIS, MICROSCOPIC ONLY - Abnormal; Notable for the following components:    RBC, UA 3-5 (*)     All other components within normal limits   RESPIRATORY PANEL, " PCR - Normal   LIPASE - Normal   TROPONIN (IN-HOUSE) - Normal    Narrative:     Troponin T Reference Range:  <= 0.03 ng/mL-   Negative for AMI  >0.03 ng/mL-     Abnormal for myocardial necrosis.  Clinicians would have to utilize clinical acumen, EKG, Troponin and serial changes to determine if it is an Acute Myocardial Infarction or myocardial injury due to an underlying chronic condition.    LACTIC ACID, PLASMA - Normal   BLOOD CULTURE   BLOOD CULTURE   CBC AND DIFFERENTIAL    Narrative:     The following orders were created for panel order CBC & Differential.  Procedure                               Abnormality         Status                     ---------                               -----------         ------                     CBC Auto Differential[[id number]]        Abnormal            Final result                 Please view results for these tests on the individual orders.       EKG:   ECG 12 Lead   Final Result   HEART RATE= 75  bpm   RR Interval= 804  ms   NE Interval= 154  ms   P Horizontal Axis= 10  deg   P Front Axis= 30  deg   QRSD Interval= 89  ms   QT Interval= 337  ms   QRS Axis= -25  deg   T Wave Axis= 28  deg   - OTHERWISE NORMAL ECG -   Sinus rhythm   Borderline left axis deviation   Low voltage, extremity leads   No change from prior tracing   Electronically Signed By: Clark Hadley) ([facility]) 15-Mar-2019 19:06:06   Date and Time of Study: 2019-03-15 16:33:54          Meds given in ED:   Medications   sodium chloride 0.9 % bolus 1,000 mL (1,000 mL Intravenous New Bag 3/15/19 1703)   acetaminophen (TYLENOL) tablet 1,000 mg (1,000 mg Oral Given 3/15/19 1707)   piperacillin-tazobactam (ZOSYN) 3.375 g in iso-osmotic dextrose 50 ml (premix) (0 g Intravenous Stopped 3/15/19 1755)   vancomycin 2000 mg/500 mL 0.9% NS IVPB ([facility]) (2,000 mg Intravenous New Bag 3/15/19 1755)   methylPREDNISolone sodium succinate (SOLU-Medrol) injection 125 mg (125 mg Intravenous Given 3/15/19 1708)    ipratropium-albuterol (DUO-NEB) nebulizer solution 3 mL (3 mL Nebulization Given 3/15/19 1654)   ibuprofen (ADVIL,MOTRIN) tablet 800 mg (800 mg Oral Given 3/15/19 1740)       Imaging results:  Ct Abdomen Pelvis Without Contrast    Result Date: 3/15/2019    1. Left upper lobe pneumonia, follow-up recommended. 2. Colonic diverticulosis. No acute inflammatory process of bowel is identified, follow up as indications persist. 3. No urolithiasis or hydronephrosis. Hepatic steatosis.  Discussed by telephone with Dr. Hutton at , 3/15/2019.  This report was finalized on 3/15/2019 7:12 PM by Dr. Dhaval Brink M.D.      Ct Chest Without Contrast    Result Date: 3/15/2019    1. Left upper lobe pneumonia, follow-up recommended. 2. Colonic diverticulosis. No acute inflammatory process of bowel is identified, follow up as indications persist. 3. No urolithiasis or hydronephrosis. Hepatic steatosis.  Discussed by telephone with Dr. Hutton at , 3/15/2019.  This report was finalized on 3/15/2019 7:12 PM by Dr. Dhaval Brink M.D.        Ambulatory status:   - up ad geo    Social issues:   Social History     Socioeconomic History   • Marital status:      Spouse name: Not on file   • Number of children: Not on file   • Years of education: College   • Highest education level: Not on file   Social Needs   • Financial resource strain: Not on file   • Food insecurity - worry: Not on file   • Food insecurity - inability: Not on file   • Transportation needs - medical: Not on file   • Transportation needs - non-medical: Not on file   Occupational History     Employer: RETIRED   Tobacco Use   • Smoking status: Former Smoker     Packs/day: 1.00     Years: 30.00     Pack years: 30.00     Types: Cigarettes     Last attempt to quit: 1998     Years since quittin.1   • Smokeless tobacco: Never Used   Substance and Sexual Activity   • Alcohol use: No     Comment: caffeine use   • Drug use: No   • Sexual activity:  Defer   Other Topics Concern   • Not on file   Social History Narrative   • Not on file          Cassy Garduno, RN  03/15/19 2008

## 2019-03-16 NOTE — NURSING NOTE
Pt reported using insulin pump and giving self 8.42 units insulin in ER at 2020 and then 8.01 units insulin at 2157 after blood glucose checked on unit.

## 2019-03-17 LAB
ANION GAP SERPL CALCULATED.3IONS-SCNC: 15.7 MMOL/L
BUN BLD-MCNC: 26 MG/DL (ref 8–23)
BUN/CREAT SERPL: 18.2 (ref 7–25)
CALCIUM SPEC-SCNC: 8.5 MG/DL (ref 8.6–10.5)
CHLORIDE SERPL-SCNC: 101 MMOL/L (ref 98–107)
CO2 SERPL-SCNC: 20.3 MMOL/L (ref 22–29)
CREAT BLD-MCNC: 1.43 MG/DL (ref 0.76–1.27)
DEPRECATED RDW RBC AUTO: 44 FL (ref 37–54)
ERYTHROCYTE [DISTWIDTH] IN BLOOD BY AUTOMATED COUNT: 13.1 % (ref 12.3–15.4)
GFR SERPL CREATININE-BSD FRML MDRD: 48 ML/MIN/1.73
GLUCOSE BLD-MCNC: 172 MG/DL (ref 65–99)
GLUCOSE BLDC GLUCOMTR-MCNC: 135 MG/DL (ref 70–130)
GLUCOSE BLDC GLUCOMTR-MCNC: 153 MG/DL (ref 70–130)
GLUCOSE BLDC GLUCOMTR-MCNC: 167 MG/DL (ref 70–130)
GLUCOSE BLDC GLUCOMTR-MCNC: 207 MG/DL (ref 70–130)
HCT VFR BLD AUTO: 36.5 % (ref 37.5–51)
HGB BLD-MCNC: 11.7 G/DL (ref 13–17.7)
MCH RBC QN AUTO: 29.2 PG (ref 26.6–33)
MCHC RBC AUTO-ENTMCNC: 32.1 G/DL (ref 31.5–35.7)
MCV RBC AUTO: 91 FL (ref 79–97)
PLATELET # BLD AUTO: 265 10*3/MM3 (ref 140–450)
PMV BLD AUTO: 10.5 FL (ref 6–12)
POTASSIUM BLD-SCNC: 4.6 MMOL/L (ref 3.5–5.2)
RBC # BLD AUTO: 4.01 10*6/MM3 (ref 4.14–5.8)
SODIUM BLD-SCNC: 137 MMOL/L (ref 136–145)
T4 FREE SERPL-MCNC: 1 NG/DL (ref 0.93–1.7)
TSH SERPL DL<=0.05 MIU/L-ACNC: 4.56 MIU/ML (ref 0.27–4.2)
WBC NRBC COR # BLD: 12.83 10*3/MM3 (ref 3.4–10.8)

## 2019-03-17 PROCEDURE — 63710000001 INSULIN LISPRO (HUMAN) PER 5 UNITS: Performed by: INTERNAL MEDICINE

## 2019-03-17 PROCEDURE — 25010000002 ENOXAPARIN PER 10 MG: Performed by: HOSPITALIST

## 2019-03-17 PROCEDURE — 85027 COMPLETE CBC AUTOMATED: CPT | Performed by: INTERNAL MEDICINE

## 2019-03-17 PROCEDURE — 94799 UNLISTED PULMONARY SVC/PX: CPT

## 2019-03-17 PROCEDURE — 63710000001 INSULIN GLARGINE PER 5 UNITS: Performed by: INTERNAL MEDICINE

## 2019-03-17 PROCEDURE — 25010000002 AZITHROMYCIN PER 500 MG: Performed by: HOSPITALIST

## 2019-03-17 PROCEDURE — 82962 GLUCOSE BLOOD TEST: CPT

## 2019-03-17 PROCEDURE — 84443 ASSAY THYROID STIM HORMONE: CPT | Performed by: INTERNAL MEDICINE

## 2019-03-17 PROCEDURE — 25010000002 CEFTRIAXONE PER 250 MG: Performed by: HOSPITALIST

## 2019-03-17 PROCEDURE — 99232 SBSQ HOSP IP/OBS MODERATE 35: CPT | Performed by: INTERNAL MEDICINE

## 2019-03-17 PROCEDURE — 80048 BASIC METABOLIC PNL TOTAL CA: CPT | Performed by: INTERNAL MEDICINE

## 2019-03-17 PROCEDURE — 63710000001 INSULIN LISPRO (HUMAN) PER 5 UNITS: Performed by: HOSPITALIST

## 2019-03-17 PROCEDURE — 84439 ASSAY OF FREE THYROXINE: CPT | Performed by: INTERNAL MEDICINE

## 2019-03-17 RX ORDER — LEVOTHYROXINE SODIUM 0.07 MG/1
75 TABLET ORAL
Status: DISCONTINUED | OUTPATIENT
Start: 2019-03-18 | End: 2019-03-18 | Stop reason: HOSPADM

## 2019-03-17 RX ORDER — IPRATROPIUM BROMIDE AND ALBUTEROL SULFATE 2.5; .5 MG/3ML; MG/3ML
3 SOLUTION RESPIRATORY (INHALATION)
Status: DISCONTINUED | OUTPATIENT
Start: 2019-03-17 | End: 2019-03-18 | Stop reason: HOSPADM

## 2019-03-17 RX ORDER — ALBUTEROL SULFATE 2.5 MG/3ML
2.5 SOLUTION RESPIRATORY (INHALATION) EVERY 6 HOURS PRN
Status: DISCONTINUED | OUTPATIENT
Start: 2019-03-17 | End: 2019-03-18 | Stop reason: HOSPADM

## 2019-03-17 RX ADMIN — INSULIN LISPRO 3 UNITS: 100 INJECTION, SOLUTION INTRAVENOUS; SUBCUTANEOUS at 12:02

## 2019-03-17 RX ADMIN — ROPINIROLE HYDROCHLORIDE 0.5 MG: 0.5 TABLET, FILM COATED ORAL at 21:18

## 2019-03-17 RX ADMIN — LOSARTAN POTASSIUM 100 MG: 100 TABLET, FILM COATED ORAL at 07:51

## 2019-03-17 RX ADMIN — ASPIRIN 81 MG: 81 TABLET, DELAYED RELEASE ORAL at 07:52

## 2019-03-17 RX ADMIN — INSULIN GLARGINE 40 UNITS: 100 INJECTION, SOLUTION SUBCUTANEOUS at 06:53

## 2019-03-17 RX ADMIN — AZITHROMYCIN MONOHYDRATE 500 MG: 500 INJECTION, POWDER, LYOPHILIZED, FOR SOLUTION INTRAVENOUS at 07:52

## 2019-03-17 RX ADMIN — ACETAMINOPHEN 650 MG: 325 TABLET, FILM COATED ORAL at 07:49

## 2019-03-17 RX ADMIN — DILTIAZEM HYDROCHLORIDE 120 MG: 120 CAPSULE, COATED, EXTENDED RELEASE ORAL at 07:51

## 2019-03-17 RX ADMIN — GABAPENTIN 400 MG: 400 CAPSULE ORAL at 21:18

## 2019-03-17 RX ADMIN — ENOXAPARIN SODIUM 40 MG: 40 INJECTION SUBCUTANEOUS at 06:53

## 2019-03-17 RX ADMIN — INSULIN LISPRO 10 UNITS: 100 INJECTION, SOLUTION INTRAVENOUS; SUBCUTANEOUS at 12:01

## 2019-03-17 RX ADMIN — CEFTRIAXONE SODIUM 1 G: 1 INJECTION, SOLUTION INTRAVENOUS at 07:53

## 2019-03-17 RX ADMIN — ROPINIROLE HYDROCHLORIDE 0.5 MG: 0.5 TABLET, FILM COATED ORAL at 07:52

## 2019-03-17 RX ADMIN — PRAVASTATIN SODIUM 40 MG: 10 TABLET ORAL at 07:51

## 2019-03-17 RX ADMIN — HYDROCODONE BITARTRATE AND ACETAMINOPHEN 1 TABLET: 5; 325 TABLET ORAL at 15:30

## 2019-03-17 RX ADMIN — IPRATROPIUM BROMIDE AND ALBUTEROL SULFATE 3 ML: 2.5; .5 SOLUTION RESPIRATORY (INHALATION) at 15:15

## 2019-03-17 RX ADMIN — IPRATROPIUM BROMIDE AND ALBUTEROL SULFATE 3 ML: 2.5; .5 SOLUTION RESPIRATORY (INHALATION) at 10:34

## 2019-03-17 RX ADMIN — INSULIN LISPRO 5 UNITS: 100 INJECTION, SOLUTION INTRAVENOUS; SUBCUTANEOUS at 21:19

## 2019-03-17 RX ADMIN — LEVOTHYROXINE SODIUM 50 MCG: 50 TABLET ORAL at 06:53

## 2019-03-17 RX ADMIN — IPRATROPIUM BROMIDE AND ALBUTEROL SULFATE 3 ML: 2.5; .5 SOLUTION RESPIRATORY (INHALATION) at 20:30

## 2019-03-17 RX ADMIN — VITAMIN D, TAB 1000IU (100/BT) 2000 UNITS: 25 TAB at 07:52

## 2019-03-17 RX ADMIN — MODAFINIL 200 MG: 100 TABLET ORAL at 07:51

## 2019-03-17 NOTE — PROGRESS NOTES
Name: Semaj Herrera ADMIT: 3/15/2019   : 1940  PCP: Axel Guardado MD    MRN: [id number] LOS: 2 days   AGE/SEX: 78 y.o. male  ROOM: [facility]   Subjective   Fever yesterday afternoon and this morning. Some rigors during the fever as well. He reports that his dyspnea is improving and he has had less cough. No CP or palpitations. No NV or diarrhea. No dysuria.    Objective   Vital Signs  Temp:  [97.5 °F (36.4 °C)-102.6 °F (39.2 °C)] 102.5 °F (39.2 °C)  Heart Rate:  [61-91] 73  Resp:  [16-18] 18  BP: (116-147)/(55-69) 132/68  SpO2:  [93 %-98 %] 93 %  on   ;   Device (Oxygen Therapy): room air  Body mass index is 33.9 kg/m².    Physical Exam   Constitutional: He is oriented to person, place, and time. He appears well-developed. No distress.   HENT:   Head: Atraumatic.   Nose: Nose normal.   Eyes: Conjunctivae and EOM are normal.   Neck: Normal range of motion. Neck supple.   Cardiovascular: Normal rate, regular rhythm and intact distal pulses.   Pulmonary/Chest: Effort normal. He has wheezes (end expiratory). He has no rales.   Abdominal: Soft. He exhibits no distension. There is no tenderness.   Musculoskeletal: Normal range of motion. He exhibits no edema.   Neurological: He is alert and oriented to person, place, and time.   Skin: Skin is warm and dry. He is not diaphoretic.   Psychiatric: He has a normal mood and affect. His behavior is normal.   Nursing note and vitals reviewed.      Results Review:       I reviewed the patient's new clinical results.     I reviewed imaging, agree with interpretation.     I reviewed telemetry/EKG results, sinus rhythm.      Results from last 7 days   Lab Units 19  0607 19  0505 03/15/19  1647   WBC 10*3/mm3 12.83* 10.31 11.26*   HEMOGLOBIN g/dL 11.7* 10.2* 11.6*   PLATELETS 10*3/mm3 265 204 213     Results from last 7 days   Lab Units 19  0607 19  0505 03/15/19  1647   SODIUM mmol/L 137 137 131*   POTASSIUM mmol/L 4.6 4.4 4.4   CHLORIDE mmol/L 101 103  97*   CO2 mmol/L 20.3* 20.3* 22.7   BUN mg/dL 26* 26* 25*   CREATININE mg/dL 1.43* 1.57* 1.47*   GLUCOSE mg/dL 172* 372* 142*   Estimated Creatinine Clearance: 44.5 mL/min (A) (by C-G formula based on SCr of 1.43 mg/dL (H)).  Results from last 7 days   Lab Units 03/17/19  0607 03/16/19  0505 03/15/19  1647   CALCIUM mg/dL 8.5* 8.0* 8.5*   ALBUMIN g/dL  --   --  3.70         aspirin 81 mg Oral Daily   azithromycin 500 mg Intravenous Q24H   ceftriaxone 1 g Intravenous Q24H   cholecalciferol 2,000 Units Oral Daily   diltiaZEM  mg Oral Daily   enoxaparin 40 mg Subcutaneous Q24H   gabapentin 400 mg Oral Nightly   insulin glargine 40 Units Subcutaneous QAM   insulin lispro 0-14 Units Subcutaneous 4x Daily With Meals & Nightly   insulin lispro 10 Units Subcutaneous TID With Meals   lactobacillus acidophilus 1 capsule Oral Daily   levothyroxine 50 mcg Oral Q AM   losartan 100 mg Oral Q24H   modafinil 200 mg Oral Daily   pravastatin 40 mg Oral Daily   rOPINIRole 0.5 mg Oral BID       Pharmacy to Dose enoxaparin (LOVENOX)    Diet Regular; Cardiac, Consistent Carbohydrate      Assessment/Plan      Active Hospital Problems    Diagnosis  POA   • **Pneumonia of left upper lobe due to infectious organism (CMS/HCC) [J18.1]  Yes   • Hyponatremia [E87.1]  Yes   • Sepsis (CMS/HCC) [A41.9]  Yes   • 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     - PNA: Cx remain negative. MRSA is pending. Had fevers overnight and has leukocytosis again. He did have steroid in ER 2 days ago. Repeat procalcitonin. Continue Rocephin/Azithromycin.  - Sepsis  - DM2: A1c 8.8. Insulin per Endocrinology.  - HTN: Stable  -  Hyponatremia: Resolved  - CKD3: Stable Cr.  - Prostate Cancer with Bone Metastasis: Follows with [facility].  - Disposition: Home/TBD     Yasir Orellana MD  [facility]  03/17/19  8:57 AM

## 2019-03-17 NOTE — PLAN OF CARE
Problem: Patient Care Overview  Goal: Plan of Care Review  Outcome: Ongoing (interventions implemented as appropriate)   03/17/19 9655   Plan of Care Review   Progress no change   OTHER   Outcome Summary Pt sleeping much of the time. Tolerating room air with sat above 90%. Meds given as per MAR. Elevated temperature last night.    Coping/Psychosocial   Plan of Care Reviewed With patient     Goal: Individualization and Mutuality  Outcome: Ongoing (interventions implemented as appropriate)      Problem: Fall Risk (Adult)  Goal: Identify Related Risk Factors and Signs and Symptoms  Outcome: Ongoing (interventions implemented as appropriate)    Goal: Absence of Fall  Outcome: Ongoing (interventions implemented as appropriate)      Problem: Pneumonia (Adult)  Goal: Signs and Symptoms of Listed Potential Problems Will be Absent, Minimized or Managed (Pneumonia)  Outcome: Ongoing (interventions implemented as appropriate)      Problem: Pain, Chronic (Adult)  Goal: Identify Related Risk Factors and Signs and Symptoms  Outcome: Ongoing (interventions implemented as appropriate)    Goal: Acceptable Pain/Comfort Level and Functional Ability  Outcome: Ongoing (interventions implemented as appropriate)

## 2019-03-17 NOTE — PROGRESS NOTES
78 y.o.   LOS: 2 days   Patient Care Team:  Axel Guardado MD as PCP - General  Jose Lee MD as PCP - Claims Attributed  Baldemar, Bob HERNANDEZ MD as Consulting Physician (Sleep Medicine)  Tata Lee MD as Consulting Physician (Cardiology)  Bijan Rivera III, MD as Referring Physician (Thoracic Surgery)  Jose Lee MD as Consulting Physician (Hematology and Oncology)    Chief Complaint: Elevated blood sugar    Chief Complaint   Patient presents with   • Shortness of Breath     acute episode started today.   • Fever       Subjective   Patient did have fever yesterday and this morning.  Blood sugars have significantly improved with insulin regimen.  He does report that his appetite is poor.    Interval History:    Review of Systems:   Review of Systems   Constitutional: Positive for appetite change, fatigue and fever.   Eyes: Negative for visual disturbance.   Respiratory: Positive for cough and shortness of breath.    Cardiovascular: Negative for palpitations and leg swelling.   Gastrointestinal: Negative for abdominal pain and vomiting.   Endocrine: Negative for polydipsia and polyuria.   Musculoskeletal: Negative for joint swelling and neck pain.   Skin: Negative for rash.   Neurological: Positive for weakness. Negative for numbness.   Psychiatric/Behavioral: Negative for behavioral problems.     Objective     Vital Signs   Temp:  [98.3 °F (36.8 °C)-102.6 °F (39.2 °C)] 98.7 °F (37.1 °C)  Heart Rate:  [58-91] 82  Resp:  [16-20] 16  BP: (116-147)/(55-69) 116/61    Physical Exam:  Physical Exam   Constitutional: He is oriented to person, place, and time. He appears well-nourished.   obese   HENT:   Head: Normocephalic and atraumatic.   Wide neck   Eyes: Conjunctivae and EOM are normal.   Neck: Normal range of motion. Neck supple. Carotid bruit is not present. No thyromegaly present.   Acanthosis nigricans   Cardiovascular: Normal rate and normal heart sounds.   Pulmonary/Chest: Effort  normal and breath sounds normal. No stridor. No respiratory distress.   Abdominal: Soft. Bowel sounds are normal. He exhibits no distension. There is no tenderness.   Central obesity   Musculoskeletal: He exhibits no edema or tenderness.   Neurological: He is alert and oriented to person, place, and time.   Skin: Skin is warm and dry.   Psychiatric: He has a normal mood and affect. His behavior is normal.   Vitals reviewed.  Results Review:     I reviewed the patient's new clinical results and summarized them in subjective and in plan.      Glucose   Date/Time Value Ref Range Status   03/17/2019 0607 172 (H) 65 - 99 mg/dL Final   03/16/2019 0505 372 (H) 65 - 99 mg/dL Final   03/15/2019 1647 142 (H) 65 - 99 mg/dL Final     Lab Results (last 24 hours)     Procedure Component Value Units Date/Time    MRSA Screen Culture - Swab, Naris, Left [[id number]]  (Normal) Collected:  03/16/19 0057    Specimen:  Swab from Naris, Left Updated:  03/17/19 1120     MRSA SCREEN CX No Methicillin Resistant Staphylococcus aureus isolated    POC Glucose Once [[id number]]  (Abnormal) Collected:  03/17/19 1048    Specimen:  Blood Updated:  03/17/19 1049     Glucose 167 mg/dL     Respiratory Culture - Sputum, Cough [[id number]] Collected:  03/16/19 0858    Specimen:  Sputum from Cough Updated:  03/17/19 0819     Respiratory Culture Heavy growth (4+) Normal Respiratory Marissa     Gram Stain Rare (1+) WBCs seen      Rare (1+) Epithelial cells per low power field      Mixed bacterial morphotypes seen on Gram Stain    Basic Metabolic Panel [[id number]]  (Abnormal) Collected:  03/17/19 0607    Specimen:  Blood Updated:  03/17/19 0713     Glucose 172 mg/dL      BUN 26 mg/dL      Creatinine 1.43 mg/dL      Sodium 137 mmol/L      Potassium 4.6 mmol/L      Chloride 101 mmol/L      CO2 20.3 mmol/L      Calcium 8.5 mg/dL      eGFR Non African Amer 48 mL/min/1.73      BUN/Creatinine Ratio 18.2     Anion Gap 15.7 mmol/L     Narrative:       GFR Normal  >60  Chronic Kidney Disease <60  Kidney Failure <15    T4, Free [[id number]]  (Normal) Collected:  03/17/19 0607    Specimen:  Blood Updated:  03/17/19 0708     Free T4 1.00 ng/dL     TSH [[id number]]  (Abnormal) Collected:  03/17/19 0607    Specimen:  Blood Updated:  03/17/19 0708     TSH 4.560 mIU/mL     CBC (No Diff) [[id number]]  (Abnormal) Collected:  03/17/19 0607    Specimen:  Blood Updated:  03/17/19 0636     WBC 12.83 10*3/mm3      RBC 4.01 10*6/mm3      Hemoglobin 11.7 g/dL      Hematocrit 36.5 %      MCV 91.0 fL      MCH 29.2 pg      MCHC 32.1 g/dL      RDW 13.1 %      RDW-SD 44.0 fl      MPV 10.5 fL      Platelets 265 10*3/mm3     POC Glucose Once [[id number]]  (Abnormal) Collected:  03/17/19 0553    Specimen:  Blood Updated:  03/17/19 0555     Glucose 153 mg/dL     POC Glucose Once [[id number]]  (Abnormal) Collected:  03/16/19 2056    Specimen:  Blood Updated:  03/16/19 2057     Glucose 339 mg/dL     Blood Culture - Blood, Arm, Left [[id number]] Collected:  03/15/19 1729    Specimen:  Blood from Arm, Left Updated:  03/16/19 1746     Blood Culture No growth at 24 hours    Blood Culture - Blood, Arm, Left [[id number]] Collected:  03/15/19 1647    Specimen:  Blood from Arm, Left Updated:  03/16/19 1701     Blood Culture No growth at 24 hours    POC Glucose Once [[id number]]  (Normal) Collected:  03/16/19 1623    Specimen:  Blood Updated:  03/16/19 1625     Glucose 129 mg/dL         Imaging Results (last 24 hours)     ** No results found for the last 24 hours. **          Medication Review: done      Current Facility-Administered Medications:   •  acetaminophen (TYLENOL) tablet 650 mg, 650 mg, Oral, Q6H PRN, Darryl Rivera MD, 650 mg at 03/17/19 0749  •  albuterol (PROVENTIL) nebulizer solution 0.083% 2.5 mg/3mL, 2.5 mg, Nebulization, Q6H PRN, Yasir Orellana MD  •  aspirin EC tablet 81 mg, 81 mg, Oral, Daily, Darryl Rivera MD, 81 mg at 03/17/19 0752  •  azithromycin (ZITHROMAX) 500 mg 0.9% NaCl (Add-vantage)  250 mL, 500 mg, Intravenous, Q24H, Darryl Rivera MD, 500 mg at 03/17/19 0752  •  cefTRIAXone (ROCEPHIN) IVPB 1 g, 1 g, Intravenous, Q24H, Darryl Rivera MD, Last Rate: 100 mL/hr at 03/17/19 0753, 1 g at 03/17/19 0753  •  cholecalciferol (VITAMIN D3) tablet 2,000 Units, 2,000 Units, Oral, Daily, Darryl Rivera MD, 2,000 Units at 03/17/19 0752  •  dextrose (D50W) 25 g/ 50mL Intravenous Solution 25 g, 25 g, Intravenous, Q15 Min PRN, Darryl Rivera MD  •  dextrose (GLUTOSE) oral gel 15 g, 15 g, Oral, Q15 Min PRN, Darryl Rivera MD  •  diltiaZEM CD (CARDIZEM CD) 24 hr capsule 120 mg, 120 mg, Oral, Daily, Darryl Rivera MD, 120 mg at 03/17/19 0751  •  enoxaparin (LOVENOX) syringe 40 mg, 40 mg, Subcutaneous, Q24H, Darryl Rivera MD, 40 mg at 03/17/19 0653  •  gabapentin (NEURONTIN) capsule 400 mg, 400 mg, Oral, Nightly, Darryl Rivera MD, 400 mg at 03/16/19 2115  •  glucagon (human recombinant) (GLUCAGEN DIAGNOSTIC) injection 1 mg, 1 mg, Subcutaneous, PRN, Darryl Rivera MD  •  HYDROcodone-acetaminophen (NORCO) 5-325 MG per tablet 1 tablet, 1 tablet, Oral, Q6H PRN, Darryl Rivera MD  •  insulin glargine (LANTUS) injection 40 Units, 40 Units, Subcutaneous, QAM, Marilee Jean MD, 40 Units at 03/17/19 0653  •  insulin lispro (humaLOG) injection 0-14 Units, 0-14 Units, Subcutaneous, 4x Daily With Meals & Nightly, Darryl Rivera MD, 3 Units at 03/17/19 1202  •  insulin lispro (humaLOG) injection 12 Units, 12 Units, Subcutaneous, TID With Meals, Marilee Jean MD  •  ipratropium-albuterol (DUO-NEB) nebulizer solution 3 mL, 3 mL, Nebulization, 4x Daily - RT, Yasir Orellana MD, 3 mL at 03/17/19 1034  •  lactobacillus acidophilus (RISAQUAD) capsule 1 capsule, 1 capsule, Oral, Daily, Darryl Rivera MD, 1 capsule at 03/16/19 0856  •  [START ON 3/18/2019] levothyroxine (SYNTHROID, LEVOTHROID) tablet 75 mcg, 75 mcg, Oral, Q AM, Marilee Jean MD  •  losartan (COZAAR) tablet 100 mg, 100 mg, Oral, Q24H, Darryl Rivera MD,  "100 mg at 03/17/19 0751  •  magnesium hydroxide (MILK OF MAGNESIA) suspension 2400 mg/10mL 10 mL, 10 mL, Oral, Daily PRN, Darryl Rivera MD  •  modafinil (PROVIGIL) tablet 200 mg, 200 mg, Oral, Daily, Darryl Rivera MD, 200 mg at 03/17/19 0751  •  nitroglycerin (NITROSTAT) SL tablet 0.4 mg, 0.4 mg, Sublingual, Q5 Min PRN, Darryl Rivera MD  •  ondansetron (ZOFRAN) injection 4 mg, 4 mg, Intravenous, Q6H PRN, Darryl Rivera MD  •  Pharmacy to Dose enoxaparin (LOVENOX), , Does not apply, Continuous PRN, Darryl Rivera MD  •  pravastatin (PRAVACHOL) tablet 40 mg, 40 mg, Oral, Daily, Darryl Rivera MD, 40 mg at 03/17/19 0751  •  rOPINIRole (REQUIP) tablet 0.5 mg, 0.5 mg, Oral, BID, Darryl Rivera MD, 0.5 mg at 03/17/19 0752    Assessment/Plan     Active Hospital Problems    Diagnosis  POA   • **Pneumonia of left upper lobe due to infectious organism (CMS/HCC) [J18.1]  Yes   • Hyponatremia [E87.1]  Yes   • Sepsis (CMS/HCC) [A41.9]  Yes   • 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     Type 2 dm - Uncontrolled  History A1c is elevated  We will discontinue the insulin pump  Continue Lantus 40 units in the morning  Increase Humalog to 12 units with each meal  Continue Humalog sliding scale 3 times daily before meals and at bedtime    Hypothyroidism  TSH is noted to be elevated  Increase levothyroxine to 75 mcg of    Hyperlipidemia  Continue Pravachol.    Discussed plan with Nurse.     Marilee Jean MD.  03/17/19  2:45 PM      EMR Dragon / transcription disclaimer:    \"Dictated utilizing Dragon dictation\".   "

## 2019-03-18 VITALS
HEIGHT: 65 IN | BODY MASS INDEX: 34.91 KG/M2 | OXYGEN SATURATION: 98 % | RESPIRATION RATE: 16 BRPM | WEIGHT: 209.5 LBS | DIASTOLIC BLOOD PRESSURE: 79 MMHG | HEART RATE: 56 BPM | TEMPERATURE: 98.9 F | SYSTOLIC BLOOD PRESSURE: 131 MMHG

## 2019-03-18 LAB
ANION GAP SERPL CALCULATED.3IONS-SCNC: 11.7 MMOL/L
BACTERIA SPEC RESP CULT: NORMAL
BASOPHILS # BLD AUTO: 0.02 10*3/MM3 (ref 0–0.2)
BASOPHILS NFR BLD AUTO: 0.2 % (ref 0–1.5)
BUN BLD-MCNC: 20 MG/DL (ref 8–23)
BUN/CREAT SERPL: 17.7 (ref 7–25)
CALCIUM SPEC-SCNC: 8.4 MG/DL (ref 8.6–10.5)
CHLORIDE SERPL-SCNC: 103 MMOL/L (ref 98–107)
CO2 SERPL-SCNC: 25.3 MMOL/L (ref 22–29)
CREAT BLD-MCNC: 1.13 MG/DL (ref 0.76–1.27)
DEPRECATED RDW RBC AUTO: 45 FL (ref 37–54)
EOSINOPHIL # BLD AUTO: 0.14 10*3/MM3 (ref 0–0.4)
EOSINOPHIL NFR BLD AUTO: 1.5 % (ref 0.3–6.2)
ERYTHROCYTE [DISTWIDTH] IN BLOOD BY AUTOMATED COUNT: 13.2 % (ref 12.3–15.4)
GFR SERPL CREATININE-BSD FRML MDRD: 63 ML/MIN/1.73
GLUCOSE BLD-MCNC: 138 MG/DL (ref 65–99)
GLUCOSE BLDC GLUCOMTR-MCNC: 108 MG/DL (ref 70–130)
GLUCOSE BLDC GLUCOMTR-MCNC: 123 MG/DL (ref 70–130)
GRAM STN SPEC: NORMAL
HCT VFR BLD AUTO: 32.9 % (ref 37.5–51)
HGB BLD-MCNC: 10.3 G/DL (ref 13–17.7)
IMM GRANULOCYTES # BLD AUTO: 0.14 10*3/MM3 (ref 0–0.05)
IMM GRANULOCYTES NFR BLD AUTO: 1.5 % (ref 0–0.5)
LYMPHOCYTES # BLD AUTO: 1.91 10*3/MM3 (ref 0.7–3.1)
LYMPHOCYTES NFR BLD AUTO: 21.1 % (ref 19.6–45.3)
MCH RBC QN AUTO: 29.2 PG (ref 26.6–33)
MCHC RBC AUTO-ENTMCNC: 31.3 G/DL (ref 31.5–35.7)
MCV RBC AUTO: 93.2 FL (ref 79–97)
MONOCYTES # BLD AUTO: 1.07 10*3/MM3 (ref 0.1–0.9)
MONOCYTES NFR BLD AUTO: 11.8 % (ref 5–12)
MRSA SPEC QL CULT: NORMAL
NEUTROPHILS # BLD AUTO: 5.79 10*3/MM3 (ref 1.4–7)
NEUTROPHILS NFR BLD AUTO: 63.9 % (ref 42.7–76)
NRBC BLD AUTO-RTO: 0.1 /100 WBC (ref 0–0)
PLATELET # BLD AUTO: 253 10*3/MM3 (ref 140–450)
PMV BLD AUTO: 9.9 FL (ref 6–12)
POTASSIUM BLD-SCNC: 4.5 MMOL/L (ref 3.5–5.2)
PROCALCITONIN SERPL-MCNC: 0.17 NG/ML (ref 0.1–0.25)
RBC # BLD AUTO: 3.53 10*6/MM3 (ref 4.14–5.8)
SODIUM BLD-SCNC: 140 MMOL/L (ref 136–145)
WBC NRBC COR # BLD: 9.07 10*3/MM3 (ref 3.4–10.8)

## 2019-03-18 PROCEDURE — 84145 PROCALCITONIN (PCT): CPT | Performed by: INTERNAL MEDICINE

## 2019-03-18 PROCEDURE — 99232 SBSQ HOSP IP/OBS MODERATE 35: CPT | Performed by: INTERNAL MEDICINE

## 2019-03-18 PROCEDURE — 25010000002 AZITHROMYCIN PER 500 MG: Performed by: HOSPITALIST

## 2019-03-18 PROCEDURE — 94799 UNLISTED PULMONARY SVC/PX: CPT

## 2019-03-18 PROCEDURE — 63710000001 INSULIN GLARGINE PER 5 UNITS: Performed by: INTERNAL MEDICINE

## 2019-03-18 PROCEDURE — 80048 BASIC METABOLIC PNL TOTAL CA: CPT | Performed by: INTERNAL MEDICINE

## 2019-03-18 PROCEDURE — 82962 GLUCOSE BLOOD TEST: CPT

## 2019-03-18 PROCEDURE — 25010000002 ENOXAPARIN PER 10 MG: Performed by: HOSPITALIST

## 2019-03-18 PROCEDURE — 25010000002 CEFTRIAXONE PER 250 MG: Performed by: HOSPITALIST

## 2019-03-18 PROCEDURE — 85025 COMPLETE CBC W/AUTO DIFF WBC: CPT | Performed by: INTERNAL MEDICINE

## 2019-03-18 PROCEDURE — 63710000001 INSULIN LISPRO (HUMAN) PER 5 UNITS: Performed by: INTERNAL MEDICINE

## 2019-03-18 RX ORDER — LEVOTHYROXINE SODIUM 0.07 MG/1
75 TABLET ORAL DAILY
Qty: 30 TABLET | Refills: 0 | Status: SHIPPED | OUTPATIENT
Start: 2019-03-18 | End: 2019-03-18 | Stop reason: SDUPTHER

## 2019-03-18 RX ORDER — CEFDINIR 300 MG/1
300 CAPSULE ORAL 2 TIMES DAILY
Qty: 10 CAPSULE | Refills: 0 | Status: SHIPPED | OUTPATIENT
Start: 2019-03-18 | End: 2019-03-18 | Stop reason: SDUPTHER

## 2019-03-18 RX ORDER — ALBUTEROL SULFATE 90 UG/1
2 AEROSOL, METERED RESPIRATORY (INHALATION) EVERY 4 HOURS PRN
Qty: 1 INHALER | Refills: 0 | Status: SHIPPED | OUTPATIENT
Start: 2019-03-18 | End: 2019-03-18 | Stop reason: SDUPTHER

## 2019-03-18 RX ORDER — AZITHROMYCIN 250 MG/1
TABLET, FILM COATED ORAL
Qty: 6 TABLET | Refills: 0 | Status: SHIPPED | OUTPATIENT
Start: 2019-03-18 | End: 2019-03-18 | Stop reason: SDUPTHER

## 2019-03-18 RX ORDER — LEVOTHYROXINE SODIUM 0.07 MG/1
75 TABLET ORAL DAILY
Qty: 30 TABLET | Refills: 0 | Status: SHIPPED | OUTPATIENT
Start: 2019-03-18 | End: 2020-10-07

## 2019-03-18 RX ORDER — AZITHROMYCIN 250 MG/1
TABLET, FILM COATED ORAL
Qty: 6 TABLET | Refills: 0 | Status: SHIPPED | OUTPATIENT
Start: 2019-03-18 | End: 2019-04-04

## 2019-03-18 RX ORDER — CEFDINIR 300 MG/1
300 CAPSULE ORAL 2 TIMES DAILY
Qty: 10 CAPSULE | Refills: 0 | Status: SHIPPED | OUTPATIENT
Start: 2019-03-18 | End: 2019-03-23

## 2019-03-18 RX ORDER — ALBUTEROL SULFATE 90 UG/1
2 AEROSOL, METERED RESPIRATORY (INHALATION) EVERY 4 HOURS PRN
Qty: 1 INHALER | Refills: 0 | Status: SHIPPED | OUTPATIENT
Start: 2019-03-18 | End: 2021-09-08

## 2019-03-18 RX ADMIN — INSULIN LISPRO 12 UNITS: 100 INJECTION, SOLUTION INTRAVENOUS; SUBCUTANEOUS at 11:31

## 2019-03-18 RX ADMIN — LEVOTHYROXINE SODIUM 75 MCG: 75 TABLET ORAL at 06:37

## 2019-03-18 RX ADMIN — INSULIN LISPRO 12 UNITS: 100 INJECTION, SOLUTION INTRAVENOUS; SUBCUTANEOUS at 08:27

## 2019-03-18 RX ADMIN — LOSARTAN POTASSIUM 100 MG: 100 TABLET, FILM COATED ORAL at 08:28

## 2019-03-18 RX ADMIN — VITAMIN D, TAB 1000IU (100/BT) 2000 UNITS: 25 TAB at 08:28

## 2019-03-18 RX ADMIN — AZITHROMYCIN MONOHYDRATE 500 MG: 500 INJECTION, POWDER, LYOPHILIZED, FOR SOLUTION INTRAVENOUS at 08:34

## 2019-03-18 RX ADMIN — ASPIRIN 81 MG: 81 TABLET, DELAYED RELEASE ORAL at 08:28

## 2019-03-18 RX ADMIN — ROPINIROLE HYDROCHLORIDE 0.5 MG: 0.5 TABLET, FILM COATED ORAL at 08:28

## 2019-03-18 RX ADMIN — Medication 1 CAPSULE: at 08:27

## 2019-03-18 RX ADMIN — INSULIN GLARGINE 40 UNITS: 100 INJECTION, SOLUTION SUBCUTANEOUS at 06:37

## 2019-03-18 RX ADMIN — DILTIAZEM HYDROCHLORIDE 120 MG: 120 CAPSULE, COATED, EXTENDED RELEASE ORAL at 08:27

## 2019-03-18 RX ADMIN — PRAVASTATIN SODIUM 40 MG: 10 TABLET ORAL at 08:28

## 2019-03-18 RX ADMIN — ENOXAPARIN SODIUM 40 MG: 40 INJECTION SUBCUTANEOUS at 06:37

## 2019-03-18 RX ADMIN — IPRATROPIUM BROMIDE AND ALBUTEROL SULFATE 3 ML: 2.5; .5 SOLUTION RESPIRATORY (INHALATION) at 12:34

## 2019-03-18 RX ADMIN — MODAFINIL 200 MG: 100 TABLET ORAL at 08:28

## 2019-03-18 RX ADMIN — CEFTRIAXONE SODIUM 1 G: 1 INJECTION, SOLUTION INTRAVENOUS at 08:25

## 2019-03-18 RX ADMIN — IPRATROPIUM BROMIDE AND ALBUTEROL SULFATE 3 ML: 2.5; .5 SOLUTION RESPIRATORY (INHALATION) at 08:17

## 2019-03-18 NOTE — PLAN OF CARE
Problem: Patient Care Overview  Goal: Plan of Care Review  Outcome: Ongoing (interventions implemented as appropriate)   03/18/19 0520   Plan of Care Review   Progress improving   OTHER   Outcome Summary Pt more active. No c/o's. Meds given as per MAR. Uneventful night.    Coping/Psychosocial   Plan of Care Reviewed With patient     Goal: Individualization and Mutuality  Outcome: Ongoing (interventions implemented as appropriate)      Problem: Fall Risk (Adult)  Goal: Identify Related Risk Factors and Signs and Symptoms  Outcome: Ongoing (interventions implemented as appropriate)    Goal: Absence of Fall  Outcome: Ongoing (interventions implemented as appropriate)      Problem: Pneumonia (Adult)  Goal: Signs and Symptoms of Listed Potential Problems Will be Absent, Minimized or Managed (Pneumonia)  Outcome: Ongoing (interventions implemented as appropriate)      Problem: Pain, Chronic (Adult)  Goal: Identify Related Risk Factors and Signs and Symptoms  Outcome: Ongoing (interventions implemented as appropriate)    Goal: Acceptable Pain/Comfort Level and Functional Ability  Outcome: Ongoing (interventions implemented as appropriate)

## 2019-03-18 NOTE — PLAN OF CARE
Problem: Patient Care Overview  Goal: Plan of Care Review  Outcome: Ongoing (interventions implemented as appropriate)   03/18/19 1222   Plan of Care Review   Progress improving   OTHER   Outcome Summary vss, no falls, no pain, dc home today, continue to monitor   Coping/Psychosocial   Plan of Care Reviewed With patient

## 2019-03-18 NOTE — DISCHARGE SUMMARY
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       Presenting Problem/History of Present Illness:  Febrile illness, acute [R50.9]  Pneumonia of left upper lobe due to infectious organism (CMS/HCC) [J18.1]     Mr. Herrera is a 78 y.o. former smoker with a history of prostate cancer on immunotherapy, diabetes and CHF that presents to [facility] complaining of fever, SOA and cough.  He has had low-grade fever for a couple of days but became more febrile today.  Did not respond to Tylenol at home and son brought him in.  He sort of downplays his respiratory complaints.  He does feel short of breath and has had a slight cough he states is mostly nonproductive.  He has no sick contacts or recent travel.  He has a history of prostate cancer and is on immunotherapy.  He also has a diabetic.  He was in his facility and of October and treated for cellulitis in his foot.    Hospital Course:  The patient is a 78 y.o. male who presented with acute bacterial pneumonia and uncontrolled diabetes.  He was admitted and was given a single dose of steroids in the ER for acute bronchospasm.  He had evidence of pneumonia on chest imaging and also had an elevated pro-calcitonin.  He qualified for sepsis with fever, leukocytosis.  Cultures were obtained and antibiotics were  adjusted to cover for community-acquired pneumonia with Rocephin and azithromycin.  He responded well to this and did not need additional steroids because bronchospasm was controlled with inhalers.  He did have recurrent fever yesterday.  Repeat labs today show resolved leukocytosis and pro calcitonin is now normal.  He has not had a fever for 24 hours and symptoms are nearly back to baseline.  Cultures have remained negative including negative urinary antigens and negative MRSA screen.  He will be discharged with azithromycin and Omnicef to complete his antibiotic course.  I will also prescribe albuterol inhaler as needed for any additional bronchospasm.    He is diabetic and uses an insulin pump at home.  Endocrinology was consulted and his A1c was 8.8.  The pump was stopped inpatient and he was given long and short acting insulin with additional correctional insulin as needed.  I have asked endocrinology to weigh in on whether he should resume his insulin pump at discharge or if they would like to write him prescriptions for subcutaneous insulin injections.    Blood pressures remained stable.  Hyponatremia present on admission has resolved.  CKD stage III is stable.  He has prostate cancer with metastatic disease to bone and should follow-up with his oncologist as scheduled in clinic.    Exam Today:  Constitutional: He is oriented to person, place, and time. He appears well-developed. No distress.   HENT:   Head: Atraumatic.   Nose: Nose normal.   Eyes: Conjunctivae and EOM are normal.   Neck: Normal range of motion. Neck supple.   Cardiovascular: Normal rate, regular rhythm and intact distal pulses.   Pulmonary/Chest: Effort normal. No wheezes or rales.   Abdominal: Soft. He exhibits no distension. There is no tenderness.   Musculoskeletal: Normal range of motion. He exhibits no edema.   Neurological: He is alert and oriented to person, place, and time.   Skin: Skin is warm and dry. He is not diaphoretic.    Psychiatric: He has a normal mood and affect. His behavior is normal.     Results:  CT Chest/Abdomen/Pelvis  1. Left upper lobe pneumonia, follow-up recommended.  2. Colonic diverticulosis. No acute inflammatory process of bowel is  identified, follow up as indications persist.  3. No urolithiasis or hydronephrosis. Hepatic steatosis.    Procedures Performed:         Consults:   Consults     Date and Time Order Name Status Description    3/15/2019 7208 Inpatient Endocrinology Consult Completed     3/15/2019 1924 [facility] (on-call MD unless specified) Completed            Discharge Disposition:  Home or Self Care    Discharge Medications:     Discharge Medications      New Medications      Instructions Start Date   albuterol sulfate  (90 Base) MCG/ACT inhaler  Commonly known as:  PROVENTIL HFA;VENTOLIN HFA;PROAIR HFA   2 puffs, Inhalation, Every 4 Hours PRN      azithromycin 250 MG tablet  Commonly known as:  ZITHROMAX   Take 2 tablets the first day, then 1 tablet daily for 4 days.      cefdinir 300 MG capsule  Commonly known as:  OMNICEF   300 mg, Oral, 2 Times Daily         Changes to Medications      Instructions Start Date   levothyroxine 75 MCG tablet  Commonly known as:  SYNTHROID, LEVOTHROID  What changed:    · medication strength  · how much to take   75 mcg, Oral, Daily      rOPINIRole 0.5 MG tablet  Commonly known as:  REQUIP  What changed:    · how much to take  · how to take this  · when to take this  · additional instructions   Take 1 tablet by mouth in the evening and 1 at bedtime.         Continue These Medications      Instructions Start Date   ALIGN chewable tablet   1 tablet, Oral, Daily      aspirin 81 MG EC tablet   1 tablet, Oral, Daily      BENICAR 40 MG tablet  Generic drug:  olmesartan   40 mg, Oral, Daily      CARTIA  MG 24 hr capsule  Generic drug:  diltiaZEM CD   120 mg, Oral, Daily      cholecalciferol 1000 units tablet  Commonly known as:  VITAMIN D3   2,000 Units, Oral, Daily       FLONASE SENSIMIST 27.5 MCG/SPRAY nasal spray  Generic drug:  fluticasone   2 sprays, Nasal, As Needed      gabapentin 100 MG capsule  Commonly known as:  NEURONTIN   400 mg, Oral, Nightly      HYDROcodone-acetaminophen 5-325 MG per tablet  Commonly known as:  NORCO   1 tablet, Oral, Every 6 Hours PRN      insulin aspart 100 UNIT/ML patient supplied pump  Commonly known as:  NovoLOG   Subcutaneous, Continuous, Pt reports pump with basal rate 1.9 units/hr from 0800 to 1200. Basal rate 1.8 units/hr 1200 to 0800.  Checks bg ac/hs with bolus doses per pumps parameters      LUPRON DEPOT (3-MONTH) IM   Intramuscular, Every 3 Months      modafinil 200 MG tablet  Commonly known as:  PROVIGIL   200 mg, Oral, Daily      pravastatin 40 MG tablet  Commonly known as:  PRAVACHOL   40 mg, Oral, Daily      triamterene-hydrochlorothiazide 37.5-25 MG per tablet  Commonly known as:  MAXZIDE-25   1 tablet, Oral, Daily      XGEVA SC   Subcutaneous, Every 30 Days             Discharge Diet:   Diet Instructions     Diet: Consistent Carbohydrate, Cardiac      Discharge Diet:   Consistent Carbohydrate  Cardiac             Activity at Discharge:   Activity Instructions     Activity as Tolerated            Follow-up Appointments:  Follow-up Information     Axel Guardado MD Follow up.    Specialty:  Family Medicine  Contact information:  [address] [facility] [id number] [phone]             Jose Lee MD Follow up.    Specialties:  Hematology and Oncology, Oncology, Hematology  Contact information:  [address]  [facility] [id number] [phone]             Marilee Jean MD Follow up.    Specialty:  Endocrinology  Contact information:  [address]  [facility] [address] [phone]                   Test Results Pending at Discharge:   Order Current Status    Blood Culture - Blood, Arm, Left Preliminary result    Blood Culture - Blood, Arm, Left Preliminary result           Yasir CANCINO  MD Esteban  03/18/19  12:17 PM    Time Spent on Discharge Activities: >30 minutes

## 2019-03-18 NOTE — PLAN OF CARE
Problem: Pneumonia (Adult)  Intervention: Maximize Oxygenation/Ventilation/Perfusion   03/18/19 0305   Positioning   Head of Bed (HOB) HOB elevated   Respiratory Interventions   Airway/Ventilation Management airway patency maintained;calming measures promoted;humidification applied;pulmonary hygiene promoted

## 2019-03-18 NOTE — PROGRESS NOTES
Discharge Planning Assessment  [facility]     Patient Name: Semaj Herrera  MRN: [id number]  Today's Date: 3/18/2019    Admit Date: 3/15/2019    Discharge Needs Assessment     Row Name 03/18/19 0756       Living Environment    Lives With  child(jovanni), adult    Name(s) of Who Lives With Patient  Cody    Current Living Arrangements  home/apartment/condo    Primary Care Provided by  self;other (see comments)    Provides Primary Care For  no one, unable/limited ability to care for self    Family Caregiver if Needed  child(jovanni), adult    Quality of Family Relationships  helpful;involved;supportive    Able to Return to Prior Arrangements  yes       Resource/Environmental Concerns    Resource/Environmental Concerns  none    Transportation Concerns  car, none       Transition Planning    Patient/Family Anticipates Transition to  home with family;home    Patient/Family Anticipated Services at Transition  none    Transportation Anticipated  family or friend will provide       Discharge Needs Assessment    Readmission Within the Last 30 Days  no previous admission in last 30 days    Concerns to be Addressed  discharge planning    Equipment Currently Used at Home  none    Anticipated Changes Related to Illness  none    Equipment Needed After Discharge  none        Discharge Plan     Row Name 03/18/19 2911       Plan    Plan  Home with brothleah Ross    Patient/Family in Agreement with Plan  yes    Plan Comments  Spoke with pt and his brother Cody at bedside ([phone]), introduced self and explained ccp role. Verified facesheet and confirmed local pharmacy is [facility], pt also uses [facility]. Pt denies any DC needs. Pt states brother will transport home. CCP made pt Hospital follow up appointment for enxt wednesday and put it on his AVS. Garret RN/CCP        Destination      No service coordination in this encounter.      Durable Medical Equipment      No service coordination in this encounter.       Dialysis/Infusion      No service coordination in this encounter.      Home Medical Care      No service coordination in this encounter.      Community Resources      No service coordination in this encounter.        Expected Discharge Date and Time     Expected Discharge Date Expected Discharge Time    Mar 18, 2019         Demographic Summary     Row Name 03/18/19 1336       General Information    Admission Type  inpatient    Referral Source  admission list    Reason for Consult  discharge planning    Preferred Language  English     Used During This Interaction  no       Contact Information    Permission Granted to Share Info With  family/designee        Functional Status     Row Name 03/18/19 1336       Functional Status    Usual Activity Tolerance  good    Current Activity Tolerance  good       Functional Status, IADL    Medications  independent    Meal Preparation  independent    Housekeeping  independent    Laundry  independent    Shopping  independent       Mental Status    General Appearance WDL  WDL       Mental Status Summary    Recent Changes in Mental Status/Cognitive Functioning  no changes       Employment/    Employment Status  retired        Psychosocial    No documentation.       Abuse/Neglect    No documentation.       Legal    No documentation.       Substance Abuse    No documentation.       Patient Forms    No documentation.           Vane Venegas RN

## 2019-03-19 ENCOUNTER — READMISSION MANAGEMENT (OUTPATIENT)
Dept: CALL CENTER | Facility: HOSPITAL | Age: 79
End: 2019-03-19

## 2019-03-19 NOTE — OUTREACH NOTE
Prep Survey      Responses   Facility patient discharged from?  [facility]   Is patient eligible?  Yes   Discharge diagnosis  PNA   Does the patient have one of the following disease processes/diagnoses(primary or secondary)?  COPD/Pneumonia   Does the patient have Home health ordered?  No   Is there a DME ordered?  No   Prep survey completed?  Yes          Gillian Souza RN

## 2019-03-20 ENCOUNTER — READMISSION MANAGEMENT (OUTPATIENT)
Dept: CALL CENTER | Facility: HOSPITAL | Age: 79
End: 2019-03-20

## 2019-03-20 LAB
BACTERIA SPEC AEROBE CULT: NORMAL
BACTERIA SPEC AEROBE CULT: NORMAL

## 2019-03-20 NOTE — OUTREACH NOTE
COPD/PN Week 1 Survey      Responses   Facility patient discharged from?  [facility]   Does the patient have one of the following disease processes/diagnoses(primary or secondary)?  COPD/Pneumonia   Is there a successful TCM telephone encounter documented?  No   Was the primary reason for admission:  Pneumonia   Week 1 attempt successful?  No   Unsuccessful attempts  Attempt 1          Radha Ji, RN

## 2019-03-21 ENCOUNTER — READMISSION MANAGEMENT (OUTPATIENT)
Dept: CALL CENTER | Facility: HOSPITAL | Age: 79
End: 2019-03-21

## 2019-03-21 NOTE — OUTREACH NOTE
COPD/PN Week 1 Survey      Responses   Facility patient discharged from?  [facility]   Does the patient have one of the following disease processes/diagnoses(primary or secondary)?  COPD/Pneumonia   Is there a successful TCM telephone encounter documented?  No   Week 1 attempt successful?  No   Unsuccessful attempts  Attempt 2          Rosalie Hood RN

## 2019-03-22 RX ORDER — HYDROCODONE BITARTRATE AND ACETAMINOPHEN 5; 325 MG/1; MG/1
1 TABLET ORAL EVERY 6 HOURS PRN
Qty: 60 TABLET | Refills: 0 | Status: SHIPPED | OUTPATIENT
Start: 2019-03-22 | End: 2019-04-26 | Stop reason: SDUPTHER

## 2019-03-25 RX ORDER — HYDROCODONE BITARTRATE AND ACETAMINOPHEN 5; 325 MG/1; MG/1
1 TABLET ORAL EVERY 6 HOURS PRN
Qty: 60 TABLET | Refills: 0 | OUTPATIENT
Start: 2019-03-25

## 2019-03-26 ENCOUNTER — READMISSION MANAGEMENT (OUTPATIENT)
Dept: CALL CENTER | Facility: HOSPITAL | Age: 79
End: 2019-03-26

## 2019-03-26 NOTE — OUTREACH NOTE
COPD/PN Week 2 Survey      Responses   Facility patient discharged from?  [facility]   Does the patient have one of the following disease processes/diagnoses(primary or secondary)?  COPD/Pneumonia   Was the primary reason for admission:  Pneumonia   Week 2 attempt successful?  No   Unsuccessful attempts  Attempt 1          France Carpenter RN

## 2019-03-27 ENCOUNTER — OFFICE VISIT (OUTPATIENT)
Dept: FAMILY MEDICINE CLINIC | Facility: CLINIC | Age: 79
End: 2019-03-27

## 2019-03-27 ENCOUNTER — READMISSION MANAGEMENT (OUTPATIENT)
Dept: CALL CENTER | Facility: HOSPITAL | Age: 79
End: 2019-03-27

## 2019-03-27 VITALS
OXYGEN SATURATION: 92 % | HEART RATE: 61 BPM | BODY MASS INDEX: 33.32 KG/M2 | TEMPERATURE: 98 F | DIASTOLIC BLOOD PRESSURE: 50 MMHG | HEIGHT: 65 IN | WEIGHT: 200 LBS | SYSTOLIC BLOOD PRESSURE: 122 MMHG

## 2019-03-27 DIAGNOSIS — J18.9 PNEUMONIA OF LEFT UPPER LOBE DUE TO INFECTIOUS ORGANISM: Primary | ICD-10-CM

## 2019-03-27 PROCEDURE — 99213 OFFICE O/P EST LOW 20 MIN: CPT | Performed by: FAMILY MEDICINE

## 2019-03-27 NOTE — OUTREACH NOTE
COPD/PN Week 2 Survey      Responses   Facility patient discharged from?  [facility]   Does the patient have one of the following disease processes/diagnoses(primary or secondary)?  COPD/Pneumonia   Was the primary reason for admission:  Pneumonia   Week 2 attempt successful?  No   Unsuccessful attempts  Attempt 2          Mary Jo Russell RN

## 2019-03-27 NOTE — PROGRESS NOTES
SUBJECTIVE:  The patient is a 78-year-old white male is here for follow-up.  He was made to the hospital for left upper lobe pneumonia.  Please refer to hospital admission history physical and discharge summary for specific details.  He is feeling better.  He still somewhat tired.  This voice is still a little raspy.  He is also getting treated for prostate cancer.    PAST MEDICAL HISTORY:  Reviewed.  Current outpatient and discharge medications have been reconciled for the patient.  Reviewed by: Axel Guardado MD  REVIEW OF SYSTEMS:  Please see above; 14 point ROS negative.      OBJECTIVE:   Vitals signs are reviewed and are stable.    General:  Well-nourished.  Alert and oriented x3 in no acute distress.  HEENT: PERRLA.   Neck:  Supple.   Lungs:  Clear.    Heart:  Regular rate and rhythm.   Abdomen:   Soft, nontender.   Extremities:  No cyanosis, clubbing or edema.   Neurological:  Grossly intact without motor or sensory deficits.     ASSESSMENT:    Pneumonia-improved  Prostate cancer  PLAN: He is finishes antibiotics.  He will follow-up should he have any problems but otherwise recheck as scheduled.    Dictated utilizing Dragon dictation.

## 2019-04-04 ENCOUNTER — INFUSION (OUTPATIENT)
Dept: ONCOLOGY | Facility: HOSPITAL | Age: 79
End: 2019-04-04

## 2019-04-04 ENCOUNTER — OFFICE VISIT (OUTPATIENT)
Dept: ONCOLOGY | Facility: CLINIC | Age: 79
End: 2019-04-04

## 2019-04-04 ENCOUNTER — LAB (OUTPATIENT)
Dept: LAB | Facility: HOSPITAL | Age: 79
End: 2019-04-04

## 2019-04-04 VITALS
WEIGHT: 200.3 LBS | OXYGEN SATURATION: 98 % | HEIGHT: 65 IN | DIASTOLIC BLOOD PRESSURE: 70 MMHG | BODY MASS INDEX: 33.37 KG/M2 | RESPIRATION RATE: 14 BRPM | HEART RATE: 61 BPM | SYSTOLIC BLOOD PRESSURE: 124 MMHG | TEMPERATURE: 97.9 F

## 2019-04-04 DIAGNOSIS — J18.9 PNEUMONIA OF LEFT UPPER LOBE DUE TO INFECTIOUS ORGANISM: ICD-10-CM

## 2019-04-04 DIAGNOSIS — C79.51 OSSEOUS METASTASIS: ICD-10-CM

## 2019-04-04 DIAGNOSIS — C61 PROSTATE CANCER METASTATIC TO BONE (HCC): Primary | ICD-10-CM

## 2019-04-04 DIAGNOSIS — C79.51 PROSTATE CANCER METASTATIC TO BONE (HCC): ICD-10-CM

## 2019-04-04 DIAGNOSIS — C79.51 OSSEOUS METASTASIS: Primary | ICD-10-CM

## 2019-04-04 DIAGNOSIS — C61 PROSTATE CANCER METASTATIC TO BONE (HCC): ICD-10-CM

## 2019-04-04 DIAGNOSIS — C79.51 PROSTATE CANCER METASTATIC TO BONE (HCC): Primary | ICD-10-CM

## 2019-04-04 LAB
ALBUMIN SERPL-MCNC: 4.1 G/DL (ref 3.5–5.2)
ALBUMIN/GLOB SERPL: 1.3 G/DL (ref 1.1–2.4)
ALP SERPL-CCNC: 78 U/L (ref 38–116)
ALT SERPL W P-5'-P-CCNC: 37 U/L (ref 0–41)
ANION GAP SERPL CALCULATED.3IONS-SCNC: 12.6 MMOL/L
AST SERPL-CCNC: 30 U/L (ref 0–40)
BASOPHILS # BLD AUTO: 0.03 10*3/MM3 (ref 0–0.2)
BASOPHILS NFR BLD AUTO: 0.4 % (ref 0–1.5)
BILIRUB SERPL-MCNC: 0.2 MG/DL (ref 0.2–1.2)
BUN BLD-MCNC: 27 MG/DL (ref 6–20)
BUN/CREAT SERPL: 20 (ref 7.3–30)
CALCIUM SPEC-SCNC: 9.4 MG/DL (ref 8.5–10.2)
CHLORIDE SERPL-SCNC: 108 MMOL/L (ref 98–107)
CO2 SERPL-SCNC: 25.4 MMOL/L (ref 22–29)
CREAT BLD-MCNC: 1.35 MG/DL (ref 0.7–1.3)
DEPRECATED RDW RBC AUTO: 46.4 FL (ref 37–54)
EOSINOPHIL # BLD AUTO: 0.18 10*3/MM3 (ref 0–0.4)
EOSINOPHIL NFR BLD AUTO: 2.2 % (ref 0.3–6.2)
ERYTHROCYTE [DISTWIDTH] IN BLOOD BY AUTOMATED COUNT: 13.5 % (ref 12.3–15.4)
GFR SERPL CREATININE-BSD FRML MDRD: 51 ML/MIN/1.73
GLOBULIN UR ELPH-MCNC: 3.2 GM/DL (ref 1.8–3.5)
GLUCOSE BLD-MCNC: 119 MG/DL (ref 74–124)
HCT VFR BLD AUTO: 35.4 % (ref 37.5–51)
HGB BLD-MCNC: 11.3 G/DL (ref 13–17.7)
IMM GRANULOCYTES # BLD AUTO: 0.02 10*3/MM3 (ref 0–0.05)
IMM GRANULOCYTES NFR BLD AUTO: 0.2 % (ref 0–0.5)
LYMPHOCYTES # BLD AUTO: 2.55 10*3/MM3 (ref 0.7–3.1)
LYMPHOCYTES NFR BLD AUTO: 30.6 % (ref 19.6–45.3)
MAGNESIUM SERPL-MCNC: 2.2 MG/DL (ref 1.8–2.5)
MCH RBC QN AUTO: 29.8 PG (ref 26.6–33)
MCHC RBC AUTO-ENTMCNC: 31.9 G/DL (ref 31.5–35.7)
MCV RBC AUTO: 93.4 FL (ref 79–97)
MONOCYTES # BLD AUTO: 0.71 10*3/MM3 (ref 0.1–0.9)
MONOCYTES NFR BLD AUTO: 8.5 % (ref 5–12)
NEUTROPHILS # BLD AUTO: 4.83 10*3/MM3 (ref 1.4–7)
NEUTROPHILS NFR BLD AUTO: 58.1 % (ref 42.7–76)
NRBC BLD AUTO-RTO: 0 /100 WBC (ref 0–0)
PHOSPHATE SERPL-MCNC: 4 MG/DL (ref 2.5–4.5)
PLATELET # BLD AUTO: 229 10*3/MM3 (ref 140–450)
PMV BLD AUTO: 9.5 FL (ref 6–12)
POTASSIUM BLD-SCNC: 5.6 MMOL/L (ref 3.5–4.7)
PROT SERPL-MCNC: 7.3 G/DL (ref 6.3–8)
RBC # BLD AUTO: 3.79 10*6/MM3 (ref 4.14–5.8)
SODIUM BLD-SCNC: 146 MMOL/L (ref 134–145)
WBC NRBC COR # BLD: 8.32 10*3/MM3 (ref 3.4–10.8)

## 2019-04-04 PROCEDURE — 96402 CHEMO HORMON ANTINEOPL SQ/IM: CPT | Performed by: INTERNAL MEDICINE

## 2019-04-04 PROCEDURE — 80053 COMPREHEN METABOLIC PANEL: CPT | Performed by: INTERNAL MEDICINE

## 2019-04-04 PROCEDURE — 25010000002 LEUPROLIDE ACETATE (3 MONTH) PER 7.5 MG: Performed by: INTERNAL MEDICINE

## 2019-04-04 PROCEDURE — 99214 OFFICE O/P EST MOD 30 MIN: CPT | Performed by: INTERNAL MEDICINE

## 2019-04-04 PROCEDURE — 84100 ASSAY OF PHOSPHORUS: CPT | Performed by: INTERNAL MEDICINE

## 2019-04-04 PROCEDURE — 85025 COMPLETE CBC W/AUTO DIFF WBC: CPT | Performed by: INTERNAL MEDICINE

## 2019-04-04 PROCEDURE — 83735 ASSAY OF MAGNESIUM: CPT | Performed by: INTERNAL MEDICINE

## 2019-04-04 PROCEDURE — 96372 THER/PROPH/DIAG INJ SC/IM: CPT | Performed by: INTERNAL MEDICINE

## 2019-04-04 PROCEDURE — 25010000002 DENOSUMAB 120 MG/1.7ML SOLUTION: Performed by: INTERNAL MEDICINE

## 2019-04-04 PROCEDURE — 36415 COLL VENOUS BLD VENIPUNCTURE: CPT | Performed by: INTERNAL MEDICINE

## 2019-04-04 RX ADMIN — LEUPROLIDE ACETATE 22.5 MG: KIT at 13:03

## 2019-04-04 RX ADMIN — DENOSUMAB 120 MG: 120 INJECTION SUBCUTANEOUS at 13:03

## 2019-04-10 ENCOUNTER — OFFICE VISIT (OUTPATIENT)
Dept: SLEEP MEDICINE | Facility: HOSPITAL | Age: 79
End: 2019-04-10
Attending: INTERNAL MEDICINE

## 2019-04-10 VITALS
BODY MASS INDEX: 33.76 KG/M2 | DIASTOLIC BLOOD PRESSURE: 66 MMHG | HEART RATE: 53 BPM | WEIGHT: 202.6 LBS | HEIGHT: 65 IN | SYSTOLIC BLOOD PRESSURE: 152 MMHG

## 2019-04-10 DIAGNOSIS — G47.31 CSA (CENTRAL SLEEP APNEA): ICD-10-CM

## 2019-04-10 DIAGNOSIS — G47.33 OBSTRUCTIVE SLEEP APNEA SYNDROME: ICD-10-CM

## 2019-04-10 DIAGNOSIS — G47.14 HYPERSOMNIA DUE TO MEDICAL CONDITION: Primary | ICD-10-CM

## 2019-04-10 PROCEDURE — G0463 HOSPITAL OUTPT CLINIC VISIT: HCPCS

## 2019-04-10 PROCEDURE — 99214 OFFICE O/P EST MOD 30 MIN: CPT | Performed by: INTERNAL MEDICINE

## 2019-04-10 NOTE — PROGRESS NOTES
"Follow Up Sleep Disorders Center Note     Chief Complaint:  RONNY     Primary Care Physician: Axel Guardado MD    Interval History:   I last saw the patient in October.  He was hospitalized for pneumonia for 2 weeks in early March.  He is better.  He goes to bed at 10:30 PM.  He does use the bathroom during the nighttime.  [place] Sleepiness Scale is normal at 4    Review of Systems:  Recorded on the Sleep Questionnaire.  Unremarkable except for nasal congestion and occasional cough    Besides his recent hospitalization, the patient does not describe any other significant changes in his past medical history.  No changes in his family history.  I reviewed the integrated medical records.    Social History:    Social History     Socioeconomic History   • Marital status:      Spouse name: Not on file   • Number of children: Not on file   • Years of education: College   • Highest education level: Not on file   Occupational History     Employer: RETIRED   Tobacco Use   • Smoking status: Former Smoker     Packs/day: 1.00     Years: 30.00     Pack years: 30.00     Types: Cigarettes     Last attempt to quit: 1998     Years since quittin.2   • Smokeless tobacco: Never Used   Substance and Sexual Activity   • Alcohol use: No     Comment: caffeine use   • Drug use: No   • Sexual activity: Defer       Allergies:  Patient has no known allergies.     Medication Review:  Reviewed.      Vital Signs:    Vitals:    04/10/19 0900   BP: 152/66   Pulse: 53   Weight: 91.9 kg (202 lb 9.6 oz)   Height: 165.1 cm (65\")     Body mass index is 33.71 kg/m².    Physical Exam:    Constitutional:  Well developed 78 y.o. male that appears in no apparent distress.  Awake & oriented times 3.  Normal mood with normal recent and remote memory and normal judgement.  Eyes:  Conjunctivae normal.  Oropharynx:  moist mucous membranes without exudate and a large tongue and normal uvula and narrow posterior pharyngeal opening  Neck: Trachea " Pt transferred to room 2206 from L&D via wheelchair    1335: Nursery nurse at bedside    1350: Pt sitting up in bed breastfeeding, denies needs, ice water given, assessment deferred until breastfeeding is completed    1400: VSS, postpartum teaching initiated, oriented to room and unit, shift assessment completed, plan of care discussed, will continue to monitor    1445: sitting up in bed, bonding with baby, denies needs, will continue to monitor    1545: Resting quietly, no needs voiced    1700: Sitting up in bed, breastfeeding, denies needs    1745: Pt showering, no needs voiced midline  Respiratory: Effort not labored at rest  Musculoskeletal: No significant changes noted in his gait and he has no clubbing and no significant edema at this time     Results Review:  DME is [facility] and he uses a nasal interface.  Downloads between 1/10 and 4/9/2019 compliance 80%.  Average usage is 8 hours and 12 minutes.  Average AHI is mildly abnormal at 14 without a significant leak.  Average AutoBiPAP pressure is IPAP of 15.2 and an EPAP of 12.7 and auto BiPAP settings: Max IPAP 16 minimum EPAP 11 and pressure support 4.       Impression:   Obstructive sleep apnea nearly adequately treated with auto BiPAP with good compliance and usage and no complaints of hypersomnolence.  The patient continues to take modafinil 200 mg every morning.  The patient is obstructive apnea appears to be adequately treated.  However, the patient's average clear airway index is elevated to 8.6 events per hour.  This is a severe yet for central sleep apnea.  This is most likely worsened with his recent pneumonia?    Plan:  Good sleep hygiene measures should be maintained.  Weight loss would be beneficial in this patient who is obese by BMI.  The patient is benefiting from the treatment being provided.     The patient will continue auto BiPAP.  No changes presently.    The patient will call for any problems and will follow up in 6 months.      Bob Mcdermott MD  Sleep Medicine  04/10/19  11:12 AM

## 2019-04-14 PROBLEM — G47.31 CSA (CENTRAL SLEEP APNEA): Status: ACTIVE | Noted: 2019-04-14

## 2019-04-29 RX ORDER — ROPINIROLE 0.5 MG/1
TABLET, FILM COATED ORAL
Qty: 180 TABLET | Refills: 2 | Status: SHIPPED | OUTPATIENT
Start: 2019-04-29 | End: 2020-01-31 | Stop reason: SDUPTHER

## 2019-04-30 RX ORDER — HYDROCODONE BITARTRATE AND ACETAMINOPHEN 5; 325 MG/1; MG/1
1 TABLET ORAL EVERY 6 HOURS PRN
Qty: 60 TABLET | Refills: 0 | Status: SHIPPED | OUTPATIENT
Start: 2019-04-30 | End: 2019-05-30 | Stop reason: SDUPTHER

## 2019-04-30 RX ORDER — GABAPENTIN 100 MG/1
CAPSULE ORAL
Qty: 120 CAPSULE | Refills: 2 | Status: SHIPPED | OUTPATIENT
Start: 2019-04-30 | End: 2019-07-29 | Stop reason: SDUPTHER

## 2019-05-02 ENCOUNTER — LAB (OUTPATIENT)
Dept: LAB | Facility: HOSPITAL | Age: 79
End: 2019-05-02

## 2019-05-02 ENCOUNTER — INFUSION (OUTPATIENT)
Dept: ONCOLOGY | Facility: HOSPITAL | Age: 79
End: 2019-05-02

## 2019-05-02 DIAGNOSIS — C79.51 PROSTATE CANCER METASTATIC TO BONE (HCC): ICD-10-CM

## 2019-05-02 DIAGNOSIS — C79.51 OSSEOUS METASTASIS: Primary | ICD-10-CM

## 2019-05-02 DIAGNOSIS — C61 PROSTATE CANCER METASTATIC TO BONE (HCC): ICD-10-CM

## 2019-05-02 DIAGNOSIS — C79.51 OSSEOUS METASTASIS: ICD-10-CM

## 2019-05-02 LAB
ALBUMIN SERPL-MCNC: 4.2 G/DL (ref 3.5–5.2)
ALBUMIN/GLOB SERPL: 1.3 G/DL (ref 1.1–2.4)
ALP SERPL-CCNC: 78 U/L (ref 38–116)
ALT SERPL W P-5'-P-CCNC: 17 U/L (ref 0–41)
ANION GAP SERPL CALCULATED.3IONS-SCNC: 10.5 MMOL/L
AST SERPL-CCNC: 20 U/L (ref 0–40)
BASOPHILS # BLD AUTO: 0.03 10*3/MM3 (ref 0–0.2)
BASOPHILS NFR BLD AUTO: 0.4 % (ref 0–1.5)
BILIRUB SERPL-MCNC: 0.3 MG/DL (ref 0.2–1.2)
BUN BLD-MCNC: 29 MG/DL (ref 6–20)
BUN/CREAT SERPL: 22.8 (ref 7.3–30)
CALCIUM SPEC-SCNC: 9.9 MG/DL (ref 8.5–10.2)
CHLORIDE SERPL-SCNC: 106 MMOL/L (ref 98–107)
CO2 SERPL-SCNC: 25.5 MMOL/L (ref 22–29)
CREAT BLD-MCNC: 1.27 MG/DL (ref 0.7–1.3)
DEPRECATED RDW RBC AUTO: 47.8 FL (ref 37–54)
EOSINOPHIL # BLD AUTO: 0.19 10*3/MM3 (ref 0–0.4)
EOSINOPHIL NFR BLD AUTO: 2.3 % (ref 0.3–6.2)
ERYTHROCYTE [DISTWIDTH] IN BLOOD BY AUTOMATED COUNT: 14.1 % (ref 12.3–15.4)
GFR SERPL CREATININE-BSD FRML MDRD: 55 ML/MIN/1.73
GLOBULIN UR ELPH-MCNC: 3.3 GM/DL (ref 1.8–3.5)
GLUCOSE BLD-MCNC: 142 MG/DL (ref 74–124)
HCT VFR BLD AUTO: 37.1 % (ref 37.5–51)
HGB BLD-MCNC: 12.2 G/DL (ref 13–17.7)
IMM GRANULOCYTES # BLD AUTO: 0.03 10*3/MM3 (ref 0–0.05)
IMM GRANULOCYTES NFR BLD AUTO: 0.4 % (ref 0–0.5)
LYMPHOCYTES # BLD AUTO: 2.63 10*3/MM3 (ref 0.7–3.1)
LYMPHOCYTES NFR BLD AUTO: 31.5 % (ref 19.6–45.3)
MAGNESIUM SERPL-MCNC: 2 MG/DL (ref 1.8–2.5)
MCH RBC QN AUTO: 30.3 PG (ref 26.6–33)
MCHC RBC AUTO-ENTMCNC: 32.9 G/DL (ref 31.5–35.7)
MCV RBC AUTO: 92.1 FL (ref 79–97)
MONOCYTES # BLD AUTO: 0.62 10*3/MM3 (ref 0.1–0.9)
MONOCYTES NFR BLD AUTO: 7.4 % (ref 5–12)
NEUTROPHILS # BLD AUTO: 4.84 10*3/MM3 (ref 1.7–7)
NEUTROPHILS NFR BLD AUTO: 58 % (ref 42.7–76)
NRBC BLD AUTO-RTO: 0 /100 WBC (ref 0–0.2)
PHOSPHATE SERPL-MCNC: 3.6 MG/DL (ref 2.5–4.5)
PLATELET # BLD AUTO: 219 10*3/MM3 (ref 140–450)
PMV BLD AUTO: 10.1 FL (ref 6–12)
POTASSIUM BLD-SCNC: 5.2 MMOL/L (ref 3.5–4.7)
PROT SERPL-MCNC: 7.5 G/DL (ref 6.3–8)
RBC # BLD AUTO: 4.03 10*6/MM3 (ref 4.14–5.8)
SODIUM BLD-SCNC: 142 MMOL/L (ref 134–145)
WBC NRBC COR # BLD: 8.34 10*3/MM3 (ref 3.4–10.8)

## 2019-05-02 PROCEDURE — 80053 COMPREHEN METABOLIC PANEL: CPT | Performed by: INTERNAL MEDICINE

## 2019-05-02 PROCEDURE — 36415 COLL VENOUS BLD VENIPUNCTURE: CPT | Performed by: INTERNAL MEDICINE

## 2019-05-02 PROCEDURE — 25010000002 DENOSUMAB 120 MG/1.7ML SOLUTION: Performed by: NURSE PRACTITIONER

## 2019-05-02 PROCEDURE — 83735 ASSAY OF MAGNESIUM: CPT | Performed by: INTERNAL MEDICINE

## 2019-05-02 PROCEDURE — 85025 COMPLETE CBC W/AUTO DIFF WBC: CPT | Performed by: INTERNAL MEDICINE

## 2019-05-02 PROCEDURE — 96372 THER/PROPH/DIAG INJ SC/IM: CPT | Performed by: NURSE PRACTITIONER

## 2019-05-02 PROCEDURE — 84100 ASSAY OF PHOSPHORUS: CPT | Performed by: INTERNAL MEDICINE

## 2019-05-02 RX ORDER — HYDROCODONE BITARTRATE AND ACETAMINOPHEN 5; 325 MG/1; MG/1
1 TABLET ORAL EVERY 6 HOURS PRN
Qty: 60 TABLET | Refills: 0 | OUTPATIENT
Start: 2019-05-02

## 2019-05-02 RX ADMIN — DENOSUMAB 120 MG: 120 INJECTION SUBCUTANEOUS at 12:24

## 2019-05-02 NOTE — TELEPHONE ENCOUNTER
Received refill request from [facility]. Per chart, refill was escribed last week. Called pt and he states [facility] said it was cancelled and new script was needed. Called [facility] pharmacy and they stated prescription was in process of being filled and new script not needed.

## 2019-05-13 RX ORDER — OLMESARTAN MEDOXOMIL 40 MG/1
TABLET ORAL
Qty: 90 TABLET | Refills: 1 | Status: SHIPPED | OUTPATIENT
Start: 2019-05-13 | End: 2019-11-07 | Stop reason: SDUPTHER

## 2019-05-16 RX ORDER — HYDROCODONE BITARTRATE AND ACETAMINOPHEN 5; 325 MG/1; MG/1
1 TABLET ORAL EVERY 6 HOURS PRN
Qty: 60 TABLET | Refills: 0 | Status: CANCELLED | OUTPATIENT
Start: 2019-05-16

## 2019-05-29 DIAGNOSIS — C79.51 OSSEOUS METASTASIS: ICD-10-CM

## 2019-05-29 DIAGNOSIS — C61 PROSTATE CANCER METASTATIC TO BONE (HCC): ICD-10-CM

## 2019-05-29 DIAGNOSIS — C79.51 PROSTATE CANCER METASTATIC TO BONE (HCC): ICD-10-CM

## 2019-05-29 RX ORDER — HYDROCODONE BITARTRATE AND ACETAMINOPHEN 5; 325 MG/1; MG/1
1 TABLET ORAL EVERY 6 HOURS PRN
Qty: 60 TABLET | Refills: 0 | Status: CANCELLED | OUTPATIENT
Start: 2019-05-29

## 2019-05-30 ENCOUNTER — LAB (OUTPATIENT)
Dept: LAB | Facility: HOSPITAL | Age: 79
End: 2019-05-30

## 2019-05-30 ENCOUNTER — INFUSION (OUTPATIENT)
Dept: ONCOLOGY | Facility: HOSPITAL | Age: 79
End: 2019-05-30

## 2019-05-30 DIAGNOSIS — C61 PROSTATE CANCER METASTATIC TO BONE (HCC): ICD-10-CM

## 2019-05-30 DIAGNOSIS — C79.51 OSSEOUS METASTASIS: Primary | ICD-10-CM

## 2019-05-30 DIAGNOSIS — C79.51 PROSTATE CANCER METASTATIC TO BONE (HCC): ICD-10-CM

## 2019-05-30 DIAGNOSIS — C79.51 OSSEOUS METASTASIS: ICD-10-CM

## 2019-05-30 LAB
ALBUMIN SERPL-MCNC: 4.2 G/DL (ref 3.5–5.2)
ALBUMIN/GLOB SERPL: 1.4 G/DL (ref 1.1–2.4)
ALP SERPL-CCNC: 79 U/L (ref 38–116)
ALT SERPL W P-5'-P-CCNC: 17 U/L (ref 0–41)
ANION GAP SERPL CALCULATED.3IONS-SCNC: 9.9 MMOL/L
AST SERPL-CCNC: 14 U/L (ref 0–40)
BASOPHILS # BLD AUTO: 0.03 10*3/MM3 (ref 0–0.2)
BASOPHILS NFR BLD AUTO: 0.4 % (ref 0–1.5)
BILIRUB SERPL-MCNC: 0.2 MG/DL (ref 0.2–1.2)
BUN BLD-MCNC: 27 MG/DL (ref 6–20)
BUN/CREAT SERPL: 21.3 (ref 7.3–30)
CALCIUM SPEC-SCNC: 9.7 MG/DL (ref 8.5–10.2)
CHLORIDE SERPL-SCNC: 109 MMOL/L (ref 98–107)
CO2 SERPL-SCNC: 26.1 MMOL/L (ref 22–29)
CREAT BLD-MCNC: 1.27 MG/DL (ref 0.7–1.3)
DEPRECATED RDW RBC AUTO: 46.7 FL (ref 37–54)
EOSINOPHIL # BLD AUTO: 0.19 10*3/MM3 (ref 0–0.4)
EOSINOPHIL NFR BLD AUTO: 2.6 % (ref 0.3–6.2)
ERYTHROCYTE [DISTWIDTH] IN BLOOD BY AUTOMATED COUNT: 13.9 % (ref 12.3–15.4)
GFR SERPL CREATININE-BSD FRML MDRD: 55 ML/MIN/1.73
GLOBULIN UR ELPH-MCNC: 2.9 GM/DL (ref 1.8–3.5)
GLUCOSE BLD-MCNC: 141 MG/DL (ref 74–124)
HCT VFR BLD AUTO: 36.5 % (ref 37.5–51)
HGB BLD-MCNC: 11.6 G/DL (ref 13–17.7)
IMM GRANULOCYTES # BLD AUTO: 0.03 10*3/MM3 (ref 0–0.05)
IMM GRANULOCYTES NFR BLD AUTO: 0.4 % (ref 0–0.5)
LYMPHOCYTES # BLD AUTO: 2.69 10*3/MM3 (ref 0.7–3.1)
LYMPHOCYTES NFR BLD AUTO: 37 % (ref 19.6–45.3)
MAGNESIUM SERPL-MCNC: 1.9 MG/DL (ref 1.8–2.5)
MCH RBC QN AUTO: 29.7 PG (ref 26.6–33)
MCHC RBC AUTO-ENTMCNC: 31.8 G/DL (ref 31.5–35.7)
MCV RBC AUTO: 93.6 FL (ref 79–97)
MONOCYTES # BLD AUTO: 0.52 10*3/MM3 (ref 0.1–0.9)
MONOCYTES NFR BLD AUTO: 7.1 % (ref 5–12)
NEUTROPHILS # BLD AUTO: 3.82 10*3/MM3 (ref 1.7–7)
NEUTROPHILS NFR BLD AUTO: 52.5 % (ref 42.7–76)
NRBC BLD AUTO-RTO: 0 /100 WBC (ref 0–0.2)
PHOSPHATE SERPL-MCNC: 3.9 MG/DL (ref 2.5–4.5)
PLATELET # BLD AUTO: 196 10*3/MM3 (ref 140–450)
PMV BLD AUTO: 10.4 FL (ref 6–12)
POTASSIUM BLD-SCNC: 5.7 MMOL/L (ref 3.5–4.7)
PROT SERPL-MCNC: 7.1 G/DL (ref 6.3–8)
RBC # BLD AUTO: 3.9 10*6/MM3 (ref 4.14–5.8)
SODIUM BLD-SCNC: 145 MMOL/L (ref 134–145)
WBC NRBC COR # BLD: 7.28 10*3/MM3 (ref 3.4–10.8)

## 2019-05-30 PROCEDURE — 25010000002 DENOSUMAB 120 MG/1.7ML SOLUTION: Performed by: INTERNAL MEDICINE

## 2019-05-30 PROCEDURE — 36415 COLL VENOUS BLD VENIPUNCTURE: CPT | Performed by: INTERNAL MEDICINE

## 2019-05-30 PROCEDURE — 84100 ASSAY OF PHOSPHORUS: CPT | Performed by: INTERNAL MEDICINE

## 2019-05-30 PROCEDURE — 96372 THER/PROPH/DIAG INJ SC/IM: CPT | Performed by: NURSE PRACTITIONER

## 2019-05-30 PROCEDURE — 83735 ASSAY OF MAGNESIUM: CPT | Performed by: INTERNAL MEDICINE

## 2019-05-30 PROCEDURE — 85025 COMPLETE CBC W/AUTO DIFF WBC: CPT | Performed by: INTERNAL MEDICINE

## 2019-05-30 PROCEDURE — 80053 COMPREHEN METABOLIC PANEL: CPT | Performed by: INTERNAL MEDICINE

## 2019-05-30 RX ORDER — HYDROCODONE BITARTRATE AND ACETAMINOPHEN 5; 325 MG/1; MG/1
1 TABLET ORAL EVERY 6 HOURS PRN
Qty: 60 TABLET | Refills: 0 | Status: SHIPPED | OUTPATIENT
Start: 2019-05-30 | End: 2019-06-28 | Stop reason: SDUPTHER

## 2019-05-30 RX ADMIN — DENOSUMAB 120 MG: 120 INJECTION SUBCUTANEOUS at 12:56

## 2019-05-30 NOTE — PROGRESS NOTES
K level today is 5.7.  Reviewed with Nirali JEROME.  Patient sees Vicky Juares as his kidney Dr.   Patient states that his next appt is in July.   Requested that patient call her office to let them know that his k level has been elevated and see if she wants to see him sooner.   Patient is not on any potassium supplements and no other supplements containing potassium.   Patient is no on any ace inhibitors that could elevate K level.   Patient was given list of foods high in potassium to avoid.   Patient v/u.

## 2019-06-10 ENCOUNTER — LAB (OUTPATIENT)
Dept: LAB | Facility: HOSPITAL | Age: 79
End: 2019-06-10

## 2019-06-10 ENCOUNTER — TRANSCRIBE ORDERS (OUTPATIENT)
Dept: LAB | Facility: HOSPITAL | Age: 79
End: 2019-06-10

## 2019-06-10 DIAGNOSIS — N18.30 CHRONIC KIDNEY DISEASE, STAGE III (MODERATE) (HCC): ICD-10-CM

## 2019-06-10 DIAGNOSIS — E55.9 VITAMIN D DEFICIENCY, UNSPECIFIED: ICD-10-CM

## 2019-06-10 DIAGNOSIS — E55.9 AVITAMINOSIS D: ICD-10-CM

## 2019-06-10 DIAGNOSIS — I12.9 HYPERTENSIVE NEPHROPATHY: ICD-10-CM

## 2019-06-10 DIAGNOSIS — N18.30 CHRONIC KIDNEY DISEASE, STAGE III (MODERATE) (HCC): Primary | ICD-10-CM

## 2019-06-10 LAB
25(OH)D3 SERPL-MCNC: 32.9 NG/ML (ref 30–100)
ANION GAP SERPL CALCULATED.3IONS-SCNC: 12.3 MMOL/L
BILIRUB UR QL STRIP: NEGATIVE
BUN BLD-MCNC: 20 MG/DL (ref 8–23)
BUN/CREAT SERPL: 17.9 (ref 7–25)
CALCIUM SPEC-SCNC: 9.1 MG/DL (ref 8.6–10.5)
CALCIUM SPEC-SCNC: 9.1 MG/DL (ref 8.6–10.5)
CHLORIDE SERPL-SCNC: 103 MMOL/L (ref 98–107)
CLARITY UR: CLEAR
CO2 SERPL-SCNC: 24.7 MMOL/L (ref 22–29)
COLOR UR: YELLOW
CREAT BLD-MCNC: 1.12 MG/DL (ref 0.76–1.27)
CREAT UR-MCNC: 128.6 MG/DL
DEPRECATED RDW RBC AUTO: 46.4 FL (ref 37–54)
ERYTHROCYTE [DISTWIDTH] IN BLOOD BY AUTOMATED COUNT: 13.8 % (ref 12.3–15.4)
GFR SERPL CREATININE-BSD FRML MDRD: 63 ML/MIN/1.73
GLUCOSE BLD-MCNC: 135 MG/DL (ref 65–99)
GLUCOSE UR STRIP-MCNC: NEGATIVE MG/DL
HCT VFR BLD AUTO: 36.1 % (ref 37.5–51)
HGB BLD-MCNC: 11.6 G/DL (ref 13–17.7)
HGB UR QL STRIP.AUTO: NEGATIVE
KETONES UR QL STRIP: NEGATIVE
LEUKOCYTE ESTERASE UR QL STRIP.AUTO: NEGATIVE
MAGNESIUM SERPL-MCNC: 1.9 MG/DL (ref 1.6–2.4)
MCH RBC QN AUTO: 29.3 PG (ref 26.6–33)
MCHC RBC AUTO-ENTMCNC: 32.1 G/DL (ref 31.5–35.7)
MCV RBC AUTO: 91.2 FL (ref 79–97)
NITRITE UR QL STRIP: NEGATIVE
PH UR STRIP.AUTO: <=5 [PH] (ref 5–8)
PHOSPHATE SERPL-MCNC: 4.1 MG/DL (ref 2.5–4.5)
PLATELET # BLD AUTO: 186 10*3/MM3 (ref 140–450)
PMV BLD AUTO: 11 FL (ref 6–12)
POTASSIUM BLD-SCNC: 4 MMOL/L (ref 3.5–5.2)
PROT UR QL STRIP: NEGATIVE
PROT UR-MCNC: 8 MG/DL
PROT/CREAT UR: 62.2 MG/G CREA (ref 0–200)
PTH-INTACT SERPL-MCNC: 29.9 PG/ML (ref 15–65)
RBC # BLD AUTO: 3.96 10*6/MM3 (ref 4.14–5.8)
SODIUM BLD-SCNC: 140 MMOL/L (ref 136–145)
SP GR UR STRIP: 1.02 (ref 1–1.03)
UROBILINOGEN UR QL STRIP: NORMAL
WBC NRBC COR # BLD: 6.84 10*3/MM3 (ref 3.4–10.8)

## 2019-06-10 PROCEDURE — 83970 ASSAY OF PARATHORMONE: CPT

## 2019-06-10 PROCEDURE — 80048 BASIC METABOLIC PNL TOTAL CA: CPT

## 2019-06-10 PROCEDURE — 83735 ASSAY OF MAGNESIUM: CPT

## 2019-06-10 PROCEDURE — 84156 ASSAY OF PROTEIN URINE: CPT

## 2019-06-10 PROCEDURE — 82306 VITAMIN D 25 HYDROXY: CPT

## 2019-06-10 PROCEDURE — 84100 ASSAY OF PHOSPHORUS: CPT

## 2019-06-10 PROCEDURE — 85027 COMPLETE CBC AUTOMATED: CPT

## 2019-06-10 PROCEDURE — 82570 ASSAY OF URINE CREATININE: CPT

## 2019-06-10 PROCEDURE — 36415 COLL VENOUS BLD VENIPUNCTURE: CPT

## 2019-06-10 PROCEDURE — 81003 URINALYSIS AUTO W/O SCOPE: CPT

## 2019-06-13 ENCOUNTER — LAB (OUTPATIENT)
Dept: LAB | Facility: HOSPITAL | Age: 79
End: 2019-06-13

## 2019-06-13 DIAGNOSIS — C61 PROSTATE CANCER METASTATIC TO BONE (HCC): ICD-10-CM

## 2019-06-13 DIAGNOSIS — C79.51 PROSTATE CANCER METASTATIC TO BONE (HCC): ICD-10-CM

## 2019-06-13 DIAGNOSIS — C79.51 OSSEOUS METASTASIS: ICD-10-CM

## 2019-06-13 LAB
ALBUMIN SERPL-MCNC: 4.3 G/DL (ref 3.5–5.2)
ALBUMIN/GLOB SERPL: 1.4 G/DL (ref 1.1–2.4)
ALP SERPL-CCNC: 79 U/L (ref 38–116)
ALT SERPL W P-5'-P-CCNC: 17 U/L (ref 0–41)
ANION GAP SERPL CALCULATED.3IONS-SCNC: 10.7 MMOL/L
AST SERPL-CCNC: 21 U/L (ref 0–40)
BILIRUB SERPL-MCNC: 0.2 MG/DL (ref 0.2–1.2)
BUN BLD-MCNC: 23 MG/DL (ref 6–20)
BUN/CREAT SERPL: 18.9 (ref 7.3–30)
CALCIUM SPEC-SCNC: 9.8 MG/DL (ref 8.5–10.2)
CHLORIDE SERPL-SCNC: 107 MMOL/L (ref 98–107)
CO2 SERPL-SCNC: 26.3 MMOL/L (ref 22–29)
CREAT BLD-MCNC: 1.22 MG/DL (ref 0.7–1.3)
GFR SERPL CREATININE-BSD FRML MDRD: 57 ML/MIN/1.73
GLOBULIN UR ELPH-MCNC: 3.1 GM/DL (ref 1.8–3.5)
GLUCOSE BLD-MCNC: 108 MG/DL (ref 74–124)
POTASSIUM BLD-SCNC: 5.3 MMOL/L (ref 3.5–4.7)
PROT SERPL-MCNC: 7.4 G/DL (ref 6.3–8)
PSA SERPL-MCNC: 0.88 NG/ML (ref 0–4)
SODIUM BLD-SCNC: 144 MMOL/L (ref 134–145)

## 2019-06-13 PROCEDURE — 80053 COMPREHEN METABOLIC PANEL: CPT | Performed by: INTERNAL MEDICINE

## 2019-06-13 PROCEDURE — 36415 COLL VENOUS BLD VENIPUNCTURE: CPT | Performed by: INTERNAL MEDICINE

## 2019-06-13 PROCEDURE — 84153 ASSAY OF PSA TOTAL: CPT | Performed by: INTERNAL MEDICINE

## 2019-06-20 ENCOUNTER — APPOINTMENT (OUTPATIENT)
Dept: ONCOLOGY | Facility: CLINIC | Age: 79
End: 2019-06-20

## 2019-06-20 ENCOUNTER — APPOINTMENT (OUTPATIENT)
Dept: ONCOLOGY | Facility: HOSPITAL | Age: 79
End: 2019-06-20

## 2019-06-24 RX ORDER — MODAFINIL 200 MG/1
200 TABLET ORAL DAILY
Qty: 90 TABLET | Refills: 1 | Status: SHIPPED | OUTPATIENT
Start: 2019-06-24 | End: 2019-11-08 | Stop reason: SDUPTHER

## 2019-06-24 RX ORDER — MODAFINIL 200 MG/1
200 TABLET ORAL DAILY
Qty: 90 TABLET | Refills: 1 | Status: SHIPPED | OUTPATIENT
Start: 2019-06-24 | End: 2019-06-24 | Stop reason: SDUPTHER

## 2019-06-24 NOTE — PROGRESS NOTES
Subjective  Today the patient reports that he has been doing well since the last visit. He complains of trouble sleeping.                                     .    History of Present Illness    The patient 78-year-old male with significant past medical history including prostate carcinoma, CKD 3 and long-term tobacco use be been seen by primary care in late January, 2018 for hoarseness.  Chest x-ray is now outpatient January 23 revealed sclerotic change in the posterior mid chest to be within 3 adjacent thoracic vertebral bodies of uncertain significance.  A follow-up chest CT revealed numerous sclerotic foci within all visualized bones consistent with metastatic disease, multiple enlarged mediastinal lymph nodes concerning for metastatic lymphadenopathy and a polypoidal abnormality in the right lateral wall of the trachea.  CT of abdomen March 20 revealed extensive osseous metastatic disease mildly enlarged retroperitoneal lymph nodes.  Bone scan March 20 was consistent with widespread osseous metastasis particularly in the proximal half the left humeral shaft, abnormal uptake in the sternum and manubrium and a small focus in the posterior aspect of the calvarium.  This led to a plain film the left humerus with mottled sclerosis involving the shaft of the humerus particularly in the proximal half but no evidence of pathologic fracture.    The patient has additionally type 2 diabetes on insulin pump, again a history of prostate carcinoma prostatectomy 12 years previously, hypertension, and hyperlipidemia.  Additional studies included a PSA of 395.1 from March 14, 2018.The patient's been seen by urology March 15 with plans to start Firmagon.    The patient is now seen in pulmonary clinic with Dr. Bijan Rivera and we have discussed that he will need bronchoscopy and mediastinoscopy.  Interestingly his additional history includes a long occupational exposure including working in the [place] just  out of school, subsequent construction work for many years and a 30-pack-year history of smoking stopping in the late 1990s.  He indicates that he followed with Dr. Guillen of urology for many years with no changes in his exams and normal PSAs.  He stopped this follow-up approximately 2 years ago.     Patient was seen in consultation with thoracic surgery on March 28 and plans are made for mediastinoscopy and bronchoscopy with lymph node biopsy.  Pathology revealed that these nodes were consistent with metastatic prostate carcinoma.  He was discussed at thoracic conference.  A PET/CT was not pursued and it was determined that he see medical oncology for hormonal treatment and radiation therapy if symptomatic.  It should be noted that the patient's March 15 [facility] office note describes that Firmagon was planned.     The patient has met with the son and grandson April 23.  We have also contacted Dr. Taylor of urology in that Firmagon was given just after his last visit and would next be due approximately May 3.  Patient feels considerably better with resolution of his pain, normalization of his performance status.  He would like to continue treatment through our office now contacted Dr. Taylor concerning this.    The patient is next seen June 11, 2018 we discussed his follow-up including his treatments with Lupron May 7 and his next treatment on July 30.  He has returned to essentially normal performance status and his PSA has dropped further from 395 March 14 27.8 May 7 and now 2.03 June 11.  We do plan to initiate Xgeva also study him via scans to document his improvement approximately a month from now.   The patient is next seen July 26, 2018.  Repeat imaging demonstrated interval resolution of mediastinal and retroperitoneal lymphadenopathy.  There is thickening in the distal aspect of the appendix with stranding?  Chronic appendicitis.  The patient indicates he is having no such symptoms and never has.   There is no change in sclerotic bony metastasis in the patient's PSA has dropped substantially to 1.66 by July 19 and 1.79 July 26.  He is actually feeling excellent and this is corroborated by family members.   Patient is next seen January 10, 2019 continuing to do very well and, in fact indicating [facility], likely [facility] replacements, visual review.  He is not certain is going to continue his care with them or with us or combination of both.    Patient is next seen April 4, 2019.  He is recently discharged after hospitalization March 15-18 with left upper lobe pneumonia.  We reviewed the findings in detail with his gradual recovery now documented.  His studies include a CT of chest which does not demonstrate any further bony lesions and/or pulmonary metastasis having developed.  He is feeling considerably better and approximately back to his baseline.  The patient is next seen June 28, 2019 feeling well but having baseline generally musculoskeletal pain and restless legs.His recent exams include a PSA of 0.81, CMP with potassium of 5.3 with repeat pending.  His performance status overall remains good to very good and is clearly responding to his treatments.    Past Medical History:   Diagnosis Date   • Bone cancer (CMS/HCC)    • CKD (chronic kidney disease)     Stage 3; followed by Dr. Vicky Duran    • Diastolic dysfunction     GRADE II per echo 2018   • Difficulty breathing     during exertion   • Dyslipidemia    • Erectile dysfunction    • Fatigue    • History of blood transfusion    • History of kidney stones    • Hyperlipidemia    • Hypertension    • Left ventricular hypertrophy     per echo 2018   • Localized edema    • Neuropathy    • Obstructive sleep apnea     USING CPAP   • Osteoarthritis    • Peptic ulcer    • Pneumonia    • Prostate cancer (CMS/HCC)     Status post prostatectomy, radiation therapy, and hormone therapy followed by Dr. Lee;  metastatsis to bone   • Pure hyperglyceridemia    • Restless leg syndrome    • Sinus bradycardia    • Transient cerebral ischemia    • Type 2 diabetes mellitus (CMS/HCC)         Past Surgical History:   Procedure Laterality Date   • BRONCHOSCOPY N/A 4/9/2018    Procedure: BRONCHOSCOPY;  Surgeon: Bijan Rivera III, MD;  Location: [facility];  Service: Cardiothoracic   • COLONOSCOPY     • DEEP NECK LYMPH NODE BIOPSY / EXCISION     • HEMORRHOIDECTOMY     • MEDIASTINOSCOPY N/A 4/9/2018    Procedure: MEDIASTINOSCOPY WITH LYMPH NODE BIOPSY;  Surgeon: Bijan Rivera III, MD;  Location: [facility];  Service: Cardiothoracic   • OTHER SURGICAL HISTORY      ulcer repair   • PROSTATECTOMY  2006        Current Outpatient Medications on File Prior to Visit   Medication Sig Dispense Refill   • albuterol sulfate  (90 Base) MCG/ACT inhaler Inhale 2 puffs Every 4 (Four) Hours As Needed for Wheezing or Shortness of Air. 1 inhaler 0   • aspirin 81 MG EC tablet Take 1 tablet by mouth daily.     • cholecalciferol (VITAMIN D3) 1000 units tablet Take 2,000 Units by mouth Daily.     • Denosumab (XGEVA SC) Inject  under the skin into the appropriate area as directed Every 30 (Thirty) Days.     • diltiaZEM CD (CARTIA XT) 120 MG 24 hr capsule Take 120 mg by mouth Daily.     • fluticasone (FLONASE SENSIMIST) 27.5 MCG/SPRAY nasal spray 2 sprays into each nostril As Needed.     • FOLBIC 2.5-25-2 MG tablet tablet Take 1 tablet by mouth Daily.  2   • gabapentin (NEURONTIN) 100 MG capsule TAKE 4 CAPSULES EVERY NIGHT 120 capsule 2   • insulin aspart (NovoLOG) 100 UNIT/ML patient supplied pump Inject  under the skin into the appropriate area as directed Continuous. Pt reports pump with basal rate 1.9 units/hr from 0800 to 1200. Basal rate 1.8 units/hr 1200 to 0800.  Checks bg ac/hs with bolus doses per pumps parameters     • Leuprolide Acetate, 3 Month, (LUPRON DEPOT, 3-MONTH, IM) Inject  into the appropriate muscle as directed by  prescriber Every 3 (Three) Months.     • levothyroxine (SYNTHROID, LEVOTHROID) 75 MCG tablet Take 1 tablet by mouth Daily. 30 tablet 0   • modafinil (PROVIGIL) 200 MG tablet Take 1 tablet by mouth Daily. 90 tablet 1   • olmesartan (BENICAR) 40 MG tablet TAKE 1 TABLET EVERY DAY (DISCONTINUE LOSARTAN 100MG) 90 tablet 1   • pravastatin (PRAVACHOL) 40 MG tablet Take 40 mg by mouth daily.     • Probiotic Product (ALIGN) chewable tablet Chew 1 tablet Daily.     • rOPINIRole (REQUIP) 0.5 MG tablet Take 1 tablet by mouth in the evening and 1 at bedtime. 180 tablet 2   • triamterene-hydrochlorothiazide (MAXZIDE-25) 37.5-25 MG per tablet Take 1 tablet by mouth Daily.     • [DISCONTINUED] HYDROcodone-acetaminophen (NORCO) 5-325 MG per tablet Take 1 tablet by mouth Every 6 (Six) Hours As Needed for Moderate Pain . 60 tablet 0     No current facility-administered medications on file prior to visit.         ALLERGIES:  No Known Allergies     Social History     Socioeconomic History   • Marital status:      Spouse name: Not on file   • Number of children: Not on file   • Years of education: College   • Highest education level: Not on file   Occupational History     Employer: RETIRED   Tobacco Use   • Smoking status: Former Smoker     Packs/day: 1.00     Years: 30.00     Pack years: 30.00     Types: Cigarettes     Last attempt to quit: 1998     Years since quittin.4   • Smokeless tobacco: Never Used   Substance and Sexual Activity   • Alcohol use: No     Comment: caffeine use   • Drug use: No   • Sexual activity: Defer        Family History   Problem Relation Age of Onset   • Heart failure Mother    • Hypertension Mother    • Diabetes Mother    • Heart disease Mother    • Lung cancer Father 46   • Hypertension Sister    • Lung cancer Sister 60   • Hypertension Brother    • Lung cancer Brother 62   • Heart disease Other    • Prostate cancer Brother 60   • Malig Hyperthermia Neg Hx         Review of Systems  "  Constitutional: Negative for fatigue.   Eyes: Negative.    Respiratory: Negative for chest tightness, shortness of breath and wheezing.    Gastrointestinal: Positive for nausea. Negative for abdominal pain, constipation, diarrhea and vomiting.   Allergic/Immunologic: Negative.    Neurological: Positive for weakness.   Hematological: Negative.        Constitution:Normalization of performance status  HENT: Negative.    Eyes: Negative.    Cardiovascular: Negative.    Respiratory:  No additional cough or shortness of breath Endocrine: Negative.    Hematologic/Lymphatic: Negative.    Skin: Negative.    Musculoskeletal: Resolution of joint pain and stiffness.   Gastrointestinal: Negative.    Genitourinary: Negative.    Neurological: Negative.    Psychiatric/Behavioral: Negative.    All other systems reviewed and are negative.  Objective     Vitals:    06/28/19 1421   BP: 121/67   Pulse: 52   Resp: 16   Temp: 98.2 °F (36.8 °C)   TempSrc: Oral   SpO2: 97%   Weight: 90.7 kg (200 lb)   Height: 165.1 cm (65\")   PainSc:   6   PainLoc: Comment: bilateral arma and leg pain     Current Status 6/28/2019   ECOG score 0       Physical Exam    OBJECTIVE: Vitals signs are reviewed and are stable.    HEENT: PERRLA.    Neck:  Supple.  Bilateral bruits, No thyromegaly or adenopathy  Lungs:  Clear.  No rales rhonchi or wheezes  Heart:  Regular rate and rhythm.  1/6 systolic murmur  Abdomen:   Soft, nontender.  Obese.  Bowel sounds present.  No organomegaly can be detected, though patient is tender in right lower to mid abdomen.  No ascites or masses again to be detected  Extremities:  No cyanosis, clubbing or edema.      RECENT LABS:  Hematology WBC   Date Value Ref Range Status   06/28/2019 9.17 3.40 - 10.80 10*3/mm3 Final     RBC   Date Value Ref Range Status   06/28/2019 3.99 (L) 4.14 - 5.80 10*6/mm3 Final     Hemoglobin   Date Value Ref Range Status   06/28/2019 11.7 (L) 13.0 - 17.7 g/dL Final     Hematocrit   Date Value Ref Range " Status   06/28/2019 36.6 (L) 37.5 - 51.0 % Final     Platelets   Date Value Ref Range Status   06/28/2019 194 140 - 450 10*3/mm3 Final        Assessment/Plan       78-year-old male with medical history including prostate carcinoma, CK D3, long-term tobacco use recent development of hoarseness and subsequent studies that demonstrated findings consistent with metastatic disease to bone as well as multiple enlarged metastatic lymph nodes, and potential abnormality in the right lateral wall trachea.  His additional history includes type 2 diabetes on insulin pump which has been more effective for control of his blood sugars.    His recent symptoms have included generalized discomfort in multiple sites in his bony skeleton as well as right lower quadrant abdominal pain.  We discussed this made will improve after he starts Firmagon in the a.m.  Discussions with Dr. Rivera also have led to the conclusion that he'll need bronchoscopy and mediastinoscopy which did proceed as above on May 9.  Pathology revealed mediastinal lymph nodes to be involved with metastatic adenocarcinoma consistent with origin from a prostatic primary . The patient's case was discussed at thoracic conference and recommendations were to continue with ADT and radiation for symptomatic sites.  The patient is seen back April 23 his treatment with ADT-Firmagon-has improved his symptomatic performance status to near baseline.  It is not felt that he'll require other treatments such as oral antiandrogens at this point.  He could, however, potentially benefit from agents such as Xgeva given at appropriate intervals. We went on to continue with Xgeva and rescan him July 23 demonstrating near remission developing.  This is also supported by his normalizing PSA.  The patient's scans demonstrated possibly chronic appendicitis is not having symptoms referable to this.he plans to be alert to the possibility of stenosis family member.  We discussed moving to an  every 6 month treatment but at this point we'll continue at 3 months. As result we continued Lupron and Xgeva on schedule and is seen back now 6 months later continuing to generally improve.     We will continue the patient's treatment and is done well with no further symptoms until recently admitted mid March with upper lobe pneumonia.  Review of his studies during that visit fortunately do not demonstrate bony metastasis, additional mediastinal adenopathy, pleural effusions or masses.  It is felt that his metastatic prostate carcinoma remains controlled.  We planned, as a result to continue Xgeva and Lupron and the patient is now seen back continuing to do well.  We have discussed:      *Continue Lupron and Xgeva today  *Continue additional medications including Neurontin 400 mg p.o. nightly and Requip  *At 25 mg p.o. nightly with his Neurontin to reduce mild pruritus  *Return 3 months, NP, Lupron and Xgeva

## 2019-06-28 ENCOUNTER — APPOINTMENT (OUTPATIENT)
Dept: LAB | Facility: HOSPITAL | Age: 79
End: 2019-06-28

## 2019-06-28 ENCOUNTER — INFUSION (OUTPATIENT)
Dept: ONCOLOGY | Facility: HOSPITAL | Age: 79
End: 2019-06-28

## 2019-06-28 ENCOUNTER — OFFICE VISIT (OUTPATIENT)
Dept: ONCOLOGY | Facility: CLINIC | Age: 79
End: 2019-06-28

## 2019-06-28 ENCOUNTER — LAB (OUTPATIENT)
Dept: LAB | Facility: HOSPITAL | Age: 79
End: 2019-06-28

## 2019-06-28 VITALS
SYSTOLIC BLOOD PRESSURE: 121 MMHG | HEIGHT: 65 IN | BODY MASS INDEX: 33.32 KG/M2 | TEMPERATURE: 98.2 F | RESPIRATION RATE: 16 BRPM | WEIGHT: 200 LBS | DIASTOLIC BLOOD PRESSURE: 67 MMHG | OXYGEN SATURATION: 97 % | HEART RATE: 52 BPM

## 2019-06-28 DIAGNOSIS — C79.51 OSSEOUS METASTASIS: ICD-10-CM

## 2019-06-28 DIAGNOSIS — C79.51 OSSEOUS METASTASIS: Primary | ICD-10-CM

## 2019-06-28 DIAGNOSIS — C79.51 PROSTATE CANCER METASTATIC TO BONE (HCC): ICD-10-CM

## 2019-06-28 DIAGNOSIS — C61 PROSTATE CANCER METASTATIC TO BONE (HCC): ICD-10-CM

## 2019-06-28 LAB
ALBUMIN SERPL-MCNC: 4.3 G/DL (ref 3.5–5.2)
ALBUMIN/GLOB SERPL: 1.4 G/DL (ref 1.1–2.4)
ALP SERPL-CCNC: 80 U/L (ref 38–116)
ALT SERPL W P-5'-P-CCNC: 20 U/L (ref 0–41)
ANION GAP SERPL CALCULATED.3IONS-SCNC: 12.4 MMOL/L (ref 5–15)
AST SERPL-CCNC: 20 U/L (ref 0–40)
BASOPHILS # BLD AUTO: 0.02 10*3/MM3 (ref 0–0.2)
BASOPHILS NFR BLD AUTO: 0.2 % (ref 0–1.5)
BILIRUB SERPL-MCNC: 0.2 MG/DL (ref 0.2–1.2)
BUN BLD-MCNC: 26 MG/DL (ref 6–20)
BUN/CREAT SERPL: 20.6 (ref 7.3–30)
CALCIUM SPEC-SCNC: 9.5 MG/DL (ref 8.5–10.2)
CHLORIDE SERPL-SCNC: 104 MMOL/L (ref 98–107)
CO2 SERPL-SCNC: 24.6 MMOL/L (ref 22–29)
CREAT BLD-MCNC: 1.26 MG/DL (ref 0.7–1.3)
DEPRECATED RDW RBC AUTO: 46.2 FL (ref 37–54)
EOSINOPHIL # BLD AUTO: 0.13 10*3/MM3 (ref 0–0.4)
EOSINOPHIL NFR BLD AUTO: 1.4 % (ref 0.3–6.2)
ERYTHROCYTE [DISTWIDTH] IN BLOOD BY AUTOMATED COUNT: 13.6 % (ref 12.3–15.4)
GFR SERPL CREATININE-BSD FRML MDRD: 55 ML/MIN/1.73
GLOBULIN UR ELPH-MCNC: 3 GM/DL (ref 1.8–3.5)
GLUCOSE BLD-MCNC: 170 MG/DL (ref 74–124)
HCT VFR BLD AUTO: 36.6 % (ref 37.5–51)
HGB BLD-MCNC: 11.7 G/DL (ref 13–17.7)
IMM GRANULOCYTES # BLD AUTO: 0.03 10*3/MM3 (ref 0–0.05)
IMM GRANULOCYTES NFR BLD AUTO: 0.3 % (ref 0–0.5)
LYMPHOCYTES # BLD AUTO: 2.85 10*3/MM3 (ref 0.7–3.1)
LYMPHOCYTES NFR BLD AUTO: 31.1 % (ref 19.6–45.3)
MAGNESIUM SERPL-MCNC: 2.1 MG/DL (ref 1.8–2.5)
MCH RBC QN AUTO: 29.3 PG (ref 26.6–33)
MCHC RBC AUTO-ENTMCNC: 32 G/DL (ref 31.5–35.7)
MCV RBC AUTO: 91.7 FL (ref 79–97)
MONOCYTES # BLD AUTO: 0.6 10*3/MM3 (ref 0.1–0.9)
MONOCYTES NFR BLD AUTO: 6.5 % (ref 5–12)
NEUTROPHILS # BLD AUTO: 5.54 10*3/MM3 (ref 1.7–7)
NEUTROPHILS NFR BLD AUTO: 60.5 % (ref 42.7–76)
NRBC BLD AUTO-RTO: 0 /100 WBC (ref 0–0.2)
PHOSPHATE SERPL-MCNC: 3.2 MG/DL (ref 2.5–4.5)
PLATELET # BLD AUTO: 194 10*3/MM3 (ref 140–450)
PMV BLD AUTO: 10.6 FL (ref 6–12)
POTASSIUM BLD-SCNC: 4.9 MMOL/L (ref 3.5–4.7)
PROT SERPL-MCNC: 7.3 G/DL (ref 6.3–8)
RBC # BLD AUTO: 3.99 10*6/MM3 (ref 4.14–5.8)
SODIUM BLD-SCNC: 141 MMOL/L (ref 134–145)
WBC NRBC COR # BLD: 9.17 10*3/MM3 (ref 3.4–10.8)

## 2019-06-28 PROCEDURE — 25010000002 DENOSUMAB 120 MG/1.7ML SOLUTION: Performed by: INTERNAL MEDICINE

## 2019-06-28 PROCEDURE — 36415 COLL VENOUS BLD VENIPUNCTURE: CPT | Performed by: INTERNAL MEDICINE

## 2019-06-28 PROCEDURE — 25010000002 LEUPROLIDE ACETATE (3 MONTH) PER 7.5 MG: Performed by: INTERNAL MEDICINE

## 2019-06-28 PROCEDURE — 85025 COMPLETE CBC W/AUTO DIFF WBC: CPT | Performed by: INTERNAL MEDICINE

## 2019-06-28 PROCEDURE — 99214 OFFICE O/P EST MOD 30 MIN: CPT | Performed by: INTERNAL MEDICINE

## 2019-06-28 PROCEDURE — 84100 ASSAY OF PHOSPHORUS: CPT | Performed by: INTERNAL MEDICINE

## 2019-06-28 PROCEDURE — 83735 ASSAY OF MAGNESIUM: CPT | Performed by: INTERNAL MEDICINE

## 2019-06-28 PROCEDURE — 96402 CHEMO HORMON ANTINEOPL SQ/IM: CPT | Performed by: INTERNAL MEDICINE

## 2019-06-28 PROCEDURE — 80053 COMPREHEN METABOLIC PANEL: CPT | Performed by: INTERNAL MEDICINE

## 2019-06-28 PROCEDURE — 96372 THER/PROPH/DIAG INJ SC/IM: CPT | Performed by: INTERNAL MEDICINE

## 2019-06-28 RX ORDER — HYDROCODONE BITARTRATE AND ACETAMINOPHEN 5; 325 MG/1; MG/1
1 TABLET ORAL EVERY 6 HOURS PRN
Qty: 120 TABLET | Refills: 0 | Status: SHIPPED | OUTPATIENT
Start: 2019-06-28 | End: 2019-07-29 | Stop reason: SDUPTHER

## 2019-06-28 RX ORDER — FOLIC ACID-PYRIDOXINE-CYANOCOBALAMIN TAB 2.5-25-2 MG 2.5-25-2 MG
1 TAB ORAL DAILY
Refills: 2 | COMMUNITY
Start: 2019-06-04 | End: 2020-01-30 | Stop reason: ALTCHOICE

## 2019-06-28 RX ADMIN — DENOSUMAB 120 MG: 120 INJECTION SUBCUTANEOUS at 15:56

## 2019-06-28 RX ADMIN — LEUPROLIDE ACETATE 22.5 MG: KIT at 15:56

## 2019-07-26 ENCOUNTER — LAB (OUTPATIENT)
Dept: LAB | Facility: HOSPITAL | Age: 79
End: 2019-07-26

## 2019-07-26 ENCOUNTER — INFUSION (OUTPATIENT)
Dept: ONCOLOGY | Facility: HOSPITAL | Age: 79
End: 2019-07-26

## 2019-07-26 DIAGNOSIS — C79.51 PROSTATE CANCER METASTATIC TO BONE (HCC): ICD-10-CM

## 2019-07-26 DIAGNOSIS — C61 PROSTATE CANCER METASTATIC TO BONE (HCC): ICD-10-CM

## 2019-07-26 DIAGNOSIS — C79.51 OSSEOUS METASTASIS: ICD-10-CM

## 2019-07-26 DIAGNOSIS — C79.51 OSSEOUS METASTASIS: Primary | ICD-10-CM

## 2019-07-26 LAB
ALBUMIN SERPL-MCNC: 4.1 G/DL (ref 3.5–5.2)
ALBUMIN/GLOB SERPL: 1.2 G/DL (ref 1.1–2.4)
ALP SERPL-CCNC: 86 U/L (ref 38–116)
ALT SERPL W P-5'-P-CCNC: 18 U/L (ref 0–41)
ANION GAP SERPL CALCULATED.3IONS-SCNC: 12.2 MMOL/L (ref 5–15)
AST SERPL-CCNC: 16 U/L (ref 0–40)
BASOPHILS # BLD AUTO: 0.05 10*3/MM3 (ref 0–0.2)
BASOPHILS NFR BLD AUTO: 0.7 % (ref 0–1.5)
BILIRUB SERPL-MCNC: 0.2 MG/DL (ref 0.2–1.2)
BUN BLD-MCNC: 26 MG/DL (ref 6–20)
BUN/CREAT SERPL: 21.5 (ref 7.3–30)
CALCIUM SPEC-SCNC: 9.7 MG/DL (ref 8.5–10.2)
CHLORIDE SERPL-SCNC: 105 MMOL/L (ref 98–107)
CO2 SERPL-SCNC: 25.8 MMOL/L (ref 22–29)
CREAT BLD-MCNC: 1.21 MG/DL (ref 0.7–1.3)
DEPRECATED RDW RBC AUTO: 45.4 FL (ref 37–54)
EOSINOPHIL # BLD AUTO: 0.13 10*3/MM3 (ref 0–0.4)
EOSINOPHIL NFR BLD AUTO: 1.7 % (ref 0.3–6.2)
ERYTHROCYTE [DISTWIDTH] IN BLOOD BY AUTOMATED COUNT: 13.3 % (ref 12.3–15.4)
GFR SERPL CREATININE-BSD FRML MDRD: 58 ML/MIN/1.73
GLOBULIN UR ELPH-MCNC: 3.3 GM/DL (ref 1.8–3.5)
GLUCOSE BLD-MCNC: 221 MG/DL (ref 74–124)
HCT VFR BLD AUTO: 38.4 % (ref 37.5–51)
HGB BLD-MCNC: 12.1 G/DL (ref 13–17.7)
IMM GRANULOCYTES # BLD AUTO: 0.03 10*3/MM3 (ref 0–0.05)
IMM GRANULOCYTES NFR BLD AUTO: 0.4 % (ref 0–0.5)
LYMPHOCYTES # BLD AUTO: 2.59 10*3/MM3 (ref 0.7–3.1)
LYMPHOCYTES NFR BLD AUTO: 34.2 % (ref 19.6–45.3)
MAGNESIUM SERPL-MCNC: 1.9 MG/DL (ref 1.8–2.5)
MCH RBC QN AUTO: 29.8 PG (ref 26.6–33)
MCHC RBC AUTO-ENTMCNC: 31.5 G/DL (ref 31.5–35.7)
MCV RBC AUTO: 94.6 FL (ref 79–97)
MONOCYTES # BLD AUTO: 0.7 10*3/MM3 (ref 0.1–0.9)
MONOCYTES NFR BLD AUTO: 9.2 % (ref 5–12)
NEUTROPHILS # BLD AUTO: 4.07 10*3/MM3 (ref 1.7–7)
NEUTROPHILS NFR BLD AUTO: 53.8 % (ref 42.7–76)
NRBC BLD AUTO-RTO: 0 /100 WBC (ref 0–0.2)
PHOSPHATE SERPL-MCNC: 3.3 MG/DL (ref 2.5–4.5)
PLATELET # BLD AUTO: 192 10*3/MM3 (ref 140–450)
PMV BLD AUTO: 9.8 FL (ref 6–12)
POTASSIUM BLD-SCNC: 4.9 MMOL/L (ref 3.5–4.7)
PROT SERPL-MCNC: 7.4 G/DL (ref 6.3–8)
RBC # BLD AUTO: 4.06 10*6/MM3 (ref 4.14–5.8)
SODIUM BLD-SCNC: 143 MMOL/L (ref 134–145)
WBC NRBC COR # BLD: 7.57 10*3/MM3 (ref 3.4–10.8)

## 2019-07-26 PROCEDURE — 36415 COLL VENOUS BLD VENIPUNCTURE: CPT | Performed by: INTERNAL MEDICINE

## 2019-07-26 PROCEDURE — 96372 THER/PROPH/DIAG INJ SC/IM: CPT | Performed by: INTERNAL MEDICINE

## 2019-07-26 PROCEDURE — 84100 ASSAY OF PHOSPHORUS: CPT | Performed by: INTERNAL MEDICINE

## 2019-07-26 PROCEDURE — 25010000002 DENOSUMAB 120 MG/1.7ML SOLUTION: Performed by: INTERNAL MEDICINE

## 2019-07-26 PROCEDURE — 85025 COMPLETE CBC W/AUTO DIFF WBC: CPT | Performed by: INTERNAL MEDICINE

## 2019-07-26 PROCEDURE — 80053 COMPREHEN METABOLIC PANEL: CPT | Performed by: INTERNAL MEDICINE

## 2019-07-26 PROCEDURE — 83735 ASSAY OF MAGNESIUM: CPT | Performed by: INTERNAL MEDICINE

## 2019-07-26 RX ADMIN — DENOSUMAB 120 MG: 120 INJECTION SUBCUTANEOUS at 09:39

## 2019-07-27 RX ORDER — GABAPENTIN 100 MG/1
CAPSULE ORAL
Qty: 120 CAPSULE | Refills: 2 | Status: CANCELLED | OUTPATIENT
Start: 2019-07-27

## 2019-07-27 RX ORDER — HYDROCODONE BITARTRATE AND ACETAMINOPHEN 5; 325 MG/1; MG/1
1 TABLET ORAL EVERY 6 HOURS PRN
Qty: 120 TABLET | Refills: 0 | Status: CANCELLED | OUTPATIENT
Start: 2019-07-27

## 2019-07-28 RX ORDER — GABAPENTIN 100 MG/1
CAPSULE ORAL
Qty: 120 CAPSULE | Refills: 2 | Status: CANCELLED | OUTPATIENT
Start: 2019-07-28

## 2019-07-28 RX ORDER — HYDROCODONE BITARTRATE AND ACETAMINOPHEN 5; 325 MG/1; MG/1
1 TABLET ORAL EVERY 6 HOURS PRN
Qty: 120 TABLET | Refills: 0 | Status: CANCELLED | OUTPATIENT
Start: 2019-07-28

## 2019-07-29 RX ORDER — HYDROCODONE BITARTRATE AND ACETAMINOPHEN 5; 325 MG/1; MG/1
1 TABLET ORAL EVERY 6 HOURS PRN
Qty: 120 TABLET | Refills: 0 | Status: SHIPPED | OUTPATIENT
Start: 2019-07-29 | End: 2019-08-28 | Stop reason: SDUPTHER

## 2019-07-29 RX ORDER — GABAPENTIN 100 MG/1
CAPSULE ORAL
Qty: 120 CAPSULE | Refills: 2 | Status: SHIPPED | OUTPATIENT
Start: 2019-07-29 | End: 2019-08-30 | Stop reason: SDUPTHER

## 2019-08-16 DIAGNOSIS — C79.51 PROSTATE CANCER METASTATIC TO BONE (HCC): ICD-10-CM

## 2019-08-16 DIAGNOSIS — C61 PROSTATE CANCER METASTATIC TO BONE (HCC): ICD-10-CM

## 2019-08-16 DIAGNOSIS — C79.51 OSSEOUS METASTASIS: ICD-10-CM

## 2019-08-23 ENCOUNTER — LAB (OUTPATIENT)
Dept: LAB | Facility: HOSPITAL | Age: 79
End: 2019-08-23

## 2019-08-23 ENCOUNTER — INFUSION (OUTPATIENT)
Dept: ONCOLOGY | Facility: HOSPITAL | Age: 79
End: 2019-08-23

## 2019-08-23 DIAGNOSIS — C79.51 PROSTATE CANCER METASTATIC TO BONE (HCC): ICD-10-CM

## 2019-08-23 DIAGNOSIS — C61 PROSTATE CANCER METASTATIC TO BONE (HCC): Primary | ICD-10-CM

## 2019-08-23 DIAGNOSIS — C79.51 OSSEOUS METASTASIS: ICD-10-CM

## 2019-08-23 DIAGNOSIS — C79.51 PROSTATE CANCER METASTATIC TO BONE (HCC): Primary | ICD-10-CM

## 2019-08-23 DIAGNOSIS — C61 PROSTATE CANCER METASTATIC TO BONE (HCC): ICD-10-CM

## 2019-08-23 LAB
ALBUMIN SERPL-MCNC: 4.2 G/DL (ref 3.5–5.2)
ALBUMIN/GLOB SERPL: 1.3 G/DL (ref 1.1–2.4)
ALP SERPL-CCNC: 87 U/L (ref 38–116)
ALT SERPL W P-5'-P-CCNC: 17 U/L (ref 0–41)
ANION GAP SERPL CALCULATED.3IONS-SCNC: 11.8 MMOL/L (ref 5–15)
AST SERPL-CCNC: 17 U/L (ref 0–40)
BASOPHILS # BLD AUTO: 0.03 10*3/MM3 (ref 0–0.2)
BASOPHILS NFR BLD AUTO: 0.4 % (ref 0–1.5)
BILIRUB SERPL-MCNC: 0.2 MG/DL (ref 0.2–1.2)
BUN BLD-MCNC: 23 MG/DL (ref 6–20)
BUN/CREAT SERPL: 18.1 (ref 7.3–30)
CALCIUM SPEC-SCNC: 9.5 MG/DL (ref 8.5–10.2)
CHLORIDE SERPL-SCNC: 103 MMOL/L (ref 98–107)
CO2 SERPL-SCNC: 25.2 MMOL/L (ref 22–29)
CREAT BLD-MCNC: 1.27 MG/DL (ref 0.7–1.3)
DEPRECATED RDW RBC AUTO: 44.6 FL (ref 37–54)
EOSINOPHIL # BLD AUTO: 0.2 10*3/MM3 (ref 0–0.4)
EOSINOPHIL NFR BLD AUTO: 2.6 % (ref 0.3–6.2)
ERYTHROCYTE [DISTWIDTH] IN BLOOD BY AUTOMATED COUNT: 13.3 % (ref 12.3–15.4)
GFR SERPL CREATININE-BSD FRML MDRD: 55 ML/MIN/1.73
GLOBULIN UR ELPH-MCNC: 3.3 GM/DL (ref 1.8–3.5)
GLUCOSE BLD-MCNC: 122 MG/DL (ref 74–124)
HCT VFR BLD AUTO: 38.9 % (ref 37.5–51)
HGB BLD-MCNC: 12.6 G/DL (ref 13–17.7)
IMM GRANULOCYTES # BLD AUTO: 0.05 10*3/MM3 (ref 0–0.05)
IMM GRANULOCYTES NFR BLD AUTO: 0.6 % (ref 0–0.5)
LYMPHOCYTES # BLD AUTO: 2.48 10*3/MM3 (ref 0.7–3.1)
LYMPHOCYTES NFR BLD AUTO: 31.6 % (ref 19.6–45.3)
MAGNESIUM SERPL-MCNC: 2 MG/DL (ref 1.8–2.5)
MCH RBC QN AUTO: 29.6 PG (ref 26.6–33)
MCHC RBC AUTO-ENTMCNC: 32.4 G/DL (ref 31.5–35.7)
MCV RBC AUTO: 91.5 FL (ref 79–97)
MONOCYTES # BLD AUTO: 0.67 10*3/MM3 (ref 0.1–0.9)
MONOCYTES NFR BLD AUTO: 8.5 % (ref 5–12)
NEUTROPHILS # BLD AUTO: 4.41 10*3/MM3 (ref 1.7–7)
NEUTROPHILS NFR BLD AUTO: 56.3 % (ref 42.7–76)
NRBC BLD AUTO-RTO: 0 /100 WBC (ref 0–0.2)
PHOSPHATE SERPL-MCNC: 3.2 MG/DL (ref 2.5–4.5)
PLATELET # BLD AUTO: 204 10*3/MM3 (ref 140–450)
PMV BLD AUTO: 10.1 FL (ref 6–12)
POTASSIUM BLD-SCNC: 4.6 MMOL/L (ref 3.5–4.7)
PROT SERPL-MCNC: 7.5 G/DL (ref 6.3–8)
RBC # BLD AUTO: 4.25 10*6/MM3 (ref 4.14–5.8)
SODIUM BLD-SCNC: 140 MMOL/L (ref 134–145)
WBC NRBC COR # BLD: 7.84 10*3/MM3 (ref 3.4–10.8)

## 2019-08-23 PROCEDURE — 80053 COMPREHEN METABOLIC PANEL: CPT

## 2019-08-23 PROCEDURE — 83735 ASSAY OF MAGNESIUM: CPT

## 2019-08-23 PROCEDURE — 84100 ASSAY OF PHOSPHORUS: CPT

## 2019-08-23 PROCEDURE — 96372 THER/PROPH/DIAG INJ SC/IM: CPT | Performed by: INTERNAL MEDICINE

## 2019-08-23 PROCEDURE — 85025 COMPLETE CBC W/AUTO DIFF WBC: CPT

## 2019-08-23 PROCEDURE — 25010000002 DENOSUMAB 120 MG/1.7ML SOLUTION: Performed by: INTERNAL MEDICINE

## 2019-08-23 PROCEDURE — 36415 COLL VENOUS BLD VENIPUNCTURE: CPT

## 2019-08-23 RX ADMIN — DENOSUMAB 120 MG: 120 INJECTION SUBCUTANEOUS at 11:33

## 2019-08-27 RX ORDER — HYDROCODONE BITARTRATE AND ACETAMINOPHEN 5; 325 MG/1; MG/1
1 TABLET ORAL EVERY 6 HOURS PRN
Qty: 120 TABLET | Refills: 0 | Status: CANCELLED | OUTPATIENT
Start: 2019-08-27

## 2019-08-28 RX ORDER — GABAPENTIN 100 MG/1
CAPSULE ORAL
Qty: 120 CAPSULE | Refills: 2 | OUTPATIENT
Start: 2019-08-28

## 2019-08-28 RX ORDER — HYDROCODONE BITARTRATE AND ACETAMINOPHEN 5; 325 MG/1; MG/1
1 TABLET ORAL EVERY 6 HOURS PRN
Qty: 120 TABLET | Refills: 0 | OUTPATIENT
Start: 2019-08-28

## 2019-08-28 RX ORDER — HYDROCODONE BITARTRATE AND ACETAMINOPHEN 5; 325 MG/1; MG/1
1 TABLET ORAL EVERY 6 HOURS PRN
Qty: 120 TABLET | Refills: 0 | Status: SHIPPED | OUTPATIENT
Start: 2019-08-28 | End: 2019-09-25 | Stop reason: SDUPTHER

## 2019-08-30 RX ORDER — GABAPENTIN 100 MG/1
CAPSULE ORAL
Qty: 120 CAPSULE | Refills: 2 | Status: SHIPPED | OUTPATIENT
Start: 2019-08-30 | End: 2019-09-25 | Stop reason: SDUPTHER

## 2019-09-04 RX ORDER — DILTIAZEM HYDROCHLORIDE 120 MG/1
CAPSULE, COATED, EXTENDED RELEASE ORAL
Qty: 90 CAPSULE | Refills: 1 | Status: SHIPPED | OUTPATIENT
Start: 2019-09-04 | End: 2020-01-31

## 2019-09-20 ENCOUNTER — LAB (OUTPATIENT)
Dept: LAB | Facility: HOSPITAL | Age: 79
End: 2019-09-20

## 2019-09-20 ENCOUNTER — OFFICE VISIT (OUTPATIENT)
Dept: ONCOLOGY | Facility: CLINIC | Age: 79
End: 2019-09-20

## 2019-09-20 ENCOUNTER — INFUSION (OUTPATIENT)
Dept: ONCOLOGY | Facility: HOSPITAL | Age: 79
End: 2019-09-20

## 2019-09-20 VITALS
HEIGHT: 65 IN | OXYGEN SATURATION: 98 % | DIASTOLIC BLOOD PRESSURE: 59 MMHG | SYSTOLIC BLOOD PRESSURE: 144 MMHG | BODY MASS INDEX: 34.01 KG/M2 | TEMPERATURE: 98.1 F | WEIGHT: 204.1 LBS | RESPIRATION RATE: 16 BRPM | HEART RATE: 50 BPM

## 2019-09-20 DIAGNOSIS — C79.51 OSSEOUS METASTASIS: ICD-10-CM

## 2019-09-20 DIAGNOSIS — C61 PROSTATE CANCER METASTATIC TO BONE (HCC): ICD-10-CM

## 2019-09-20 DIAGNOSIS — C79.51 PROSTATE CANCER METASTATIC TO BONE (HCC): ICD-10-CM

## 2019-09-20 DIAGNOSIS — C79.51 OSSEOUS METASTASIS: Primary | ICD-10-CM

## 2019-09-20 LAB
ALBUMIN SERPL-MCNC: 4.1 G/DL (ref 3.5–5.2)
ALBUMIN/GLOB SERPL: 1.2 G/DL (ref 1.1–2.4)
ALP SERPL-CCNC: 87 U/L (ref 38–116)
ALT SERPL W P-5'-P-CCNC: 17 U/L (ref 0–41)
ANION GAP SERPL CALCULATED.3IONS-SCNC: 12.3 MMOL/L (ref 5–15)
AST SERPL-CCNC: 15 U/L (ref 0–40)
BASOPHILS # BLD AUTO: 0.04 10*3/MM3 (ref 0–0.2)
BASOPHILS NFR BLD AUTO: 0.4 % (ref 0–1.5)
BILIRUB SERPL-MCNC: 0.2 MG/DL (ref 0.2–1.2)
BUN BLD-MCNC: 34 MG/DL (ref 6–20)
BUN/CREAT SERPL: 26.8 (ref 7.3–30)
CALCIUM SPEC-SCNC: 9.6 MG/DL (ref 8.5–10.2)
CHLORIDE SERPL-SCNC: 105 MMOL/L (ref 98–107)
CO2 SERPL-SCNC: 24.7 MMOL/L (ref 22–29)
CREAT BLD-MCNC: 1.27 MG/DL (ref 0.7–1.3)
DEPRECATED RDW RBC AUTO: 46.5 FL (ref 37–54)
EOSINOPHIL # BLD AUTO: 0.18 10*3/MM3 (ref 0–0.4)
EOSINOPHIL NFR BLD AUTO: 2 % (ref 0.3–6.2)
ERYTHROCYTE [DISTWIDTH] IN BLOOD BY AUTOMATED COUNT: 13.4 % (ref 12.3–15.4)
GFR SERPL CREATININE-BSD FRML MDRD: 55 ML/MIN/1.73
GLOBULIN UR ELPH-MCNC: 3.3 GM/DL (ref 1.8–3.5)
GLUCOSE BLD-MCNC: 223 MG/DL (ref 74–124)
HCT VFR BLD AUTO: 37.3 % (ref 37.5–51)
HGB BLD-MCNC: 11.7 G/DL (ref 13–17.7)
IMM GRANULOCYTES # BLD AUTO: 0.08 10*3/MM3 (ref 0–0.05)
IMM GRANULOCYTES NFR BLD AUTO: 0.9 % (ref 0–0.5)
LYMPHOCYTES # BLD AUTO: 2.85 10*3/MM3 (ref 0.7–3.1)
LYMPHOCYTES NFR BLD AUTO: 32.1 % (ref 19.6–45.3)
MAGNESIUM SERPL-MCNC: 2.1 MG/DL (ref 1.8–2.5)
MCH RBC QN AUTO: 29.6 PG (ref 26.6–33)
MCHC RBC AUTO-ENTMCNC: 31.4 G/DL (ref 31.5–35.7)
MCV RBC AUTO: 94.4 FL (ref 79–97)
MONOCYTES # BLD AUTO: 0.73 10*3/MM3 (ref 0.1–0.9)
MONOCYTES NFR BLD AUTO: 8.2 % (ref 5–12)
NEUTROPHILS # BLD AUTO: 5.01 10*3/MM3 (ref 1.7–7)
NEUTROPHILS NFR BLD AUTO: 56.4 % (ref 42.7–76)
NRBC BLD AUTO-RTO: 0 /100 WBC (ref 0–0.2)
PHOSPHATE SERPL-MCNC: 3.1 MG/DL (ref 2.5–4.5)
PLATELET # BLD AUTO: 188 10*3/MM3 (ref 140–450)
PMV BLD AUTO: 9.9 FL (ref 6–12)
POTASSIUM BLD-SCNC: 5.1 MMOL/L (ref 3.5–4.7)
PROT SERPL-MCNC: 7.4 G/DL (ref 6.3–8)
RBC # BLD AUTO: 3.95 10*6/MM3 (ref 4.14–5.8)
SODIUM BLD-SCNC: 142 MMOL/L (ref 134–145)
WBC NRBC COR # BLD: 8.89 10*3/MM3 (ref 3.4–10.8)

## 2019-09-20 PROCEDURE — 99213 OFFICE O/P EST LOW 20 MIN: CPT | Performed by: NURSE PRACTITIONER

## 2019-09-20 PROCEDURE — 84100 ASSAY OF PHOSPHORUS: CPT | Performed by: NURSE PRACTITIONER

## 2019-09-20 PROCEDURE — 83735 ASSAY OF MAGNESIUM: CPT | Performed by: NURSE PRACTITIONER

## 2019-09-20 PROCEDURE — 25010000002 DENOSUMAB 120 MG/1.7ML SOLUTION: Performed by: NURSE PRACTITIONER

## 2019-09-20 PROCEDURE — 80053 COMPREHEN METABOLIC PANEL: CPT | Performed by: NURSE PRACTITIONER

## 2019-09-20 PROCEDURE — 96372 THER/PROPH/DIAG INJ SC/IM: CPT | Performed by: NURSE PRACTITIONER

## 2019-09-20 PROCEDURE — 25010000002 LEUPROLIDE ACETATE (3 MONTH) PER 7.5 MG: Performed by: NURSE PRACTITIONER

## 2019-09-20 PROCEDURE — 36415 COLL VENOUS BLD VENIPUNCTURE: CPT | Performed by: NURSE PRACTITIONER

## 2019-09-20 PROCEDURE — 96402 CHEMO HORMON ANTINEOPL SQ/IM: CPT | Performed by: NURSE PRACTITIONER

## 2019-09-20 PROCEDURE — 85025 COMPLETE CBC W/AUTO DIFF WBC: CPT | Performed by: NURSE PRACTITIONER

## 2019-09-20 RX ORDER — INFLUENZA VACCINE, ADJUVANTED 15; 15; 15 UG/.5ML; UG/.5ML; UG/.5ML
INJECTION, SUSPENSION INTRAMUSCULAR
Refills: 0 | COMMUNITY
Start: 2019-09-04 | End: 2020-05-13

## 2019-09-20 RX ADMIN — LEUPROLIDE ACETATE 22.5 MG: KIT at 12:13

## 2019-09-20 RX ADMIN — DENOSUMAB 120 MG: 120 INJECTION SUBCUTANEOUS at 12:13

## 2019-09-20 NOTE — PROGRESS NOTES
Subjective .     REASONS FOR FOLLOWUP: Metastatic prostate cancer with osseous metastases.    HISTORY OF PRESENT ILLNESS:  The patient is a 78 y.o. year old male who is here for follow-up with the above-mentioned history.    History of Present Illness   patient returns today for follow-up visit on his static prostate cancer.  He continues on Lupron injections as well as monthly Xgeva shots.  He does report some occasional hot flashes as well as some generalized arthralgias.  These are all tolerable.  He denies any new problems or concerns since his last visit.     Past Medical History:   Diagnosis Date   • Bone cancer (CMS/HCC)    • CKD (chronic kidney disease)     Stage 3; followed by Dr. Vicky Duran    • Diastolic dysfunction     GRADE II per echo 2018   • Difficulty breathing     during exertion   • Dyslipidemia    • Erectile dysfunction    • Fatigue    • History of blood transfusion    • History of kidney stones    • Hyperlipidemia    • Hypertension    • Left ventricular hypertrophy     per echo 2018   • Localized edema    • Neuropathy    • Obstructive sleep apnea     USING CPAP   • Osteoarthritis    • Peptic ulcer    • Pneumonia    • Prostate cancer (CMS/HCC)     Status post prostatectomy, radiation therapy, and hormone therapy followed by Dr. Lee; metastatsis to bone   • Pure hyperglyceridemia    • Restless leg syndrome    • Sinus bradycardia    • Transient cerebral ischemia    • Type 2 diabetes mellitus (CMS/HCC)        ONCOLOGIC HISTORY:  (History from previous dates can be found in the separate document.)    Current Outpatient Medications on File Prior to Visit   Medication Sig Dispense Refill   • albuterol sulfate  (90 Base) MCG/ACT inhaler Inhale 2 puffs Every 4 (Four) Hours As Needed for Wheezing or Shortness of Air. 1 inhaler 0   • aspirin 81 MG EC tablet Take 1 tablet by mouth daily.     • cholecalciferol (VITAMIN D3) 1000 units tablet Take 2,000 Units by mouth Daily.     • Denosumab  (XGEVA SC) Inject  under the skin into the appropriate area as directed Every 30 (Thirty) Days.     • diltiaZEM CD (CARDIZEM CD) 120 MG 24 hr capsule TAKE 1 CAPSULE EVERY DAY 90 capsule 1   • FLUAD 0.5 ML suspension prefilled syringe ADM 0.5ML IM UTD  0   • fluticasone (FLONASE SENSIMIST) 27.5 MCG/SPRAY nasal spray 2 sprays into each nostril As Needed.     • FOLBIC 2.5-25-2 MG tablet tablet Take 1 tablet by mouth Daily.  2   • gabapentin (NEURONTIN) 100 MG capsule Take 4 capsule every night. 120 capsule 2   • HYDROcodone-acetaminophen (NORCO) 5-325 MG per tablet Take 1 tablet by mouth Every 6 (Six) Hours As Needed for Moderate Pain . 120 tablet 0   • insulin aspart (NovoLOG) 100 UNIT/ML patient supplied pump Inject  under the skin into the appropriate area as directed Continuous. Pt reports pump with basal rate 1.9 units/hr from 0800 to 1200. Basal rate 1.8 units/hr 1200 to 0800.  Checks bg ac/hs with bolus doses per pumps parameters     • Leuprolide Acetate, 3 Month, (LUPRON DEPOT, 3-MONTH, IM) Inject  into the appropriate muscle as directed by prescriber Every 3 (Three) Months.     • levothyroxine (SYNTHROID, LEVOTHROID) 75 MCG tablet Take 1 tablet by mouth Daily. 30 tablet 0   • modafinil (PROVIGIL) 200 MG tablet Take 1 tablet by mouth Daily. 90 tablet 1   • olmesartan (BENICAR) 40 MG tablet TAKE 1 TABLET EVERY DAY (DISCONTINUE LOSARTAN 100MG) 90 tablet 1   • pravastatin (PRAVACHOL) 40 MG tablet Take 40 mg by mouth daily.     • Probiotic Product (ALIGN) chewable tablet Chew 1 tablet Daily.     • rOPINIRole (REQUIP) 0.5 MG tablet Take 1 tablet by mouth in the evening and 1 at bedtime. 180 tablet 2   • triamterene-hydrochlorothiazide (MAXZIDE-25) 37.5-25 MG per tablet Take 1 tablet by mouth Daily.       No current facility-administered medications on file prior to visit.        ALLERGIES:   No Known Allergies    Social History     Socioeconomic History   • Marital status:      Spouse name: Not on file   •  "Number of children: Not on file   • Years of education: College   • Highest education level: Not on file   Occupational History     Employer: RETIRED   Tobacco Use   • Smoking status: Former Smoker     Packs/day: 1.00     Years: 30.00     Pack years: 30.00     Types: Cigarettes     Last attempt to quit: 1998     Years since quittin.6   • Smokeless tobacco: Never Used   Substance and Sexual Activity   • Alcohol use: No     Comment: caffeine use   • Drug use: No   • Sexual activity: Defer         Cancer-related family history includes Lung cancer (age of onset: 46) in his father; Lung cancer (age of onset: 60) in his sister; Lung cancer (age of onset: 62) in his brother; Prostate cancer (age of onset: 60) in his brother.     Review of Systems   Constitutional: Negative for activity change, appetite change, chills, fatigue and fever.   HENT: Negative for mouth sores, nosebleeds and trouble swallowing.    Respiratory: Negative for cough and shortness of breath.    Cardiovascular: Negative for chest pain and leg swelling.   Gastrointestinal: Negative for abdominal pain, constipation, diarrhea, nausea and vomiting.   Endocrine: Positive for heat intolerance.   Genitourinary: Negative for difficulty urinating.   Musculoskeletal: Positive for arthralgias.   Skin: Negative for rash.   Neurological: Negative for dizziness, weakness and numbness.   Hematological: Negative for adenopathy. Does not bruise/bleed easily.   Psychiatric/Behavioral: Negative for sleep disturbance.       Objective      Vitals:    19 1103   BP: 144/59   Pulse: 50   Resp: 16   Temp: 98.1 °F (36.7 °C)   TempSrc: Oral   SpO2: 98%   Weight: 92.6 kg (204 lb 1.6 oz)   Height: 165.1 cm (65\")   PainSc: 0-No pain     Current Status 2019   ECOG score 1       Physical Exam   Constitutional: He is oriented to person, place, and time. He appears well-developed and well-nourished. No distress.   HENT:   Head: Normocephalic and atraumatic. "   Mouth/Throat: Oropharynx is clear and moist and mucous membranes are normal. No oropharyngeal exudate.   Eyes: Pupils are equal, round, and reactive to light.   Neck: Normal range of motion.   Cardiovascular: Normal rate, regular rhythm and normal heart sounds.   No murmur heard.  Pulmonary/Chest: Effort normal and breath sounds normal. No respiratory distress. He has no wheezes. He has no rhonchi. He has no rales.   Abdominal: Soft. Normal appearance and bowel sounds are normal. He exhibits no distension. There is no hepatosplenomegaly.   Musculoskeletal: Normal range of motion. He exhibits no edema.   Neurological: He is alert and oriented to person, place, and time.   Skin: Skin is warm and dry. No rash noted.   Psychiatric: He has a normal mood and affect.   Vitals reviewed.        RECENT LABS:  Hematology WBC   Date Value Ref Range Status   09/20/2019 8.89 3.40 - 10.80 10*3/mm3 Final     RBC   Date Value Ref Range Status   09/20/2019 3.95 (L) 4.14 - 5.80 10*6/mm3 Final     Hemoglobin   Date Value Ref Range Status   09/20/2019 11.7 (L) 13.0 - 17.7 g/dL Final     Hematocrit   Date Value Ref Range Status   09/20/2019 37.3 (L) 37.5 - 51.0 % Final     Platelets   Date Value Ref Range Status   09/20/2019 188 140 - 450 10*3/mm3 Final        Assessment/Plan     1.  Metastatic prostate cancer:  · Patient initially diagnosed in 2006 and had a prostatectomy and hormone therapy.  · Patient in January 2018 began to complain of shortness of breath and hoarseness.  Chest x-ray obtained 1/23/18 showed sclerotic bone lesions.  · CT of the chest 3/12/2018 numerous sclerotic bone foci with all visualized bones consistent with metastatic disease, multiple enlarged mediastinal lymph nodes.  · .1 from 3/14/2018.  Patient was started on Firmagon by urology, first urology.  · 4/9/2018 mediastinoscopy pathology positive for metastatic adenocarcinoma consistent with origin from prostatic primary.  · Case discussed at thoracic  conference and recommendations were to continue ADT and radiation for symptomatic sites.  · Lupron injections every 3 mos initiated 5/7/18 PSA at that time 7.810  · Monthly Xgeva initiated 6/11/18 PSA 2.030  · Last PSA 6/13/2019 0.881  · 9/20/2019 patient tolerating treatment well, no new concerns.      PLAN:  1. Lupron today.  2. Xgeva today.  3. Continue to follow-up monthly for Xgeva injections.  4. Return in 3 months for follow-up visit with Dr. Lee for reevaluation prior to his next dose of Lupron.            Cc:  Jose Lee MD

## 2019-09-20 NOTE — PROGRESS NOTES
K+  level today is 5.1.  Reviewed with Randee JEROME.  Patient had previously been given a list of foods high in potassium to avoid.  We discussed that list again and patient states that he will be more careful about what he's eating.   States that he is drinking plenty of fluids, he also being followed by Dr Duran.

## 2019-09-25 RX ORDER — HYDROCODONE BITARTRATE AND ACETAMINOPHEN 5; 325 MG/1; MG/1
1 TABLET ORAL EVERY 6 HOURS PRN
Qty: 120 TABLET | Refills: 0 | Status: SHIPPED | OUTPATIENT
Start: 2019-09-25 | End: 2019-10-28 | Stop reason: SDUPTHER

## 2019-09-25 RX ORDER — GABAPENTIN 100 MG/1
CAPSULE ORAL
Qty: 120 CAPSULE | Refills: 2 | Status: SHIPPED | OUTPATIENT
Start: 2019-09-25 | End: 2019-12-28 | Stop reason: SDUPTHER

## 2019-10-17 ENCOUNTER — OFFICE VISIT (OUTPATIENT)
Dept: SLEEP MEDICINE | Facility: HOSPITAL | Age: 79
End: 2019-10-17

## 2019-10-17 VITALS — HEIGHT: 65 IN | BODY MASS INDEX: 33.82 KG/M2 | WEIGHT: 203 LBS

## 2019-10-17 DIAGNOSIS — G47.31 CSA (CENTRAL SLEEP APNEA): ICD-10-CM

## 2019-10-17 DIAGNOSIS — G47.33 OBSTRUCTIVE SLEEP APNEA SYNDROME: Primary | ICD-10-CM

## 2019-10-17 DIAGNOSIS — G47.14 HYPERSOMNIA DUE TO MEDICAL CONDITION: ICD-10-CM

## 2019-10-17 PROCEDURE — G0463 HOSPITAL OUTPT CLINIC VISIT: HCPCS

## 2019-10-17 PROCEDURE — 99213 OFFICE O/P EST LOW 20 MIN: CPT | Performed by: INTERNAL MEDICINE

## 2019-10-17 NOTE — PROGRESS NOTES
"[facility] Up [facility] Note     Chief Complaint:  RONNY     Primary Care Physician: Axel Guardado MD    Interval History:   I last saw the patient in April.  Continues to use auto BiPAP and Provigil.  He states he is unchanged.  He goes to bed at 10:30 PM and awakens at 7 AM.  He will use the restroom during the nighttime.  [place] Sleepiness Scale is normal at 5    The patient does have metastatic prostate cancer to bone and he is doing well with hormone therapy.    Review of Systems:    A complete review of systems was done and all were negative with the exception of WILKINSON    Social History:    Social History     Socioeconomic History   • Marital status:      Spouse name: Not on file   • Number of children: Not on file   • Years of education: College   • Highest education level: Not on file   Occupational History     Employer: RETIRED   Tobacco Use   • Smoking status: Former Smoker     Packs/day: 1.00     Years: 30.00     Pack years: 30.00     Types: Cigarettes     Last attempt to quit: 1998     Years since quittin.7   • Smokeless tobacco: Never Used   Substance and Sexual Activity   • Alcohol use: No     Comment: caffeine use   • Drug use: No   • Sexual activity: Defer       Allergies:  Patient has no known allergies.     Medication Review:  Reviewed.      Vital Signs:    Vitals:    10/17/19 0900   Weight: 92.1 kg (203 lb)   Height: 165.1 cm (65\")     Body mass index is 33.78 kg/m².    Physical Exam:    Constitutional:  Well developed 78 y.o. male that appears in no apparent distress.  Awake & oriented times 3.  Normal mood with normal recent and remote memory and normal judgement.  Eyes:  Conjunctivae normal.  Oropharynx:  moist mucous membranes without exudate and a large tongue and normal uvula and narrow posterior pharyngeal opening     Results Review:  DME is [employer] and he uses a nasal interface.  Downloads between  and 10/17/2019 compliance 94%.  Average usage is 8 hours and 14 " minutes.  Average AHI is slightly abnormal at 8.8 and his OA index and hypopnea index is normal and his CA index is borderline at 5.3.  Average AutoBiPAP pressure is IPAP of 14.9 and EPAP of 12.1 and his auto BiPAP settings: Max IPAP 16 minimum EPAP 11 and pressure support 4.       Impression:   Obstructive sleep apnea adequately treated with auto BiPAP with good compliance and usage and no complaints of hypersomnolence.  The patient continues to take modafinil 200 mg every morning.  The patient's obstructive apnea appears to be adequately treated.  However, the patient's average clear airway index has improved from 8.6 down to 5.3 events per hour.      Plan:  Good sleep hygiene measures should be maintained.  Weight loss would be beneficial in this patient who is obese by BMI.  The patient is benefiting from the treatment being provided.     Patient will continue auto BiPAP and a new prescription will be sent to his DME    The patient will call for any problems and will follow up in 6-8 months.      Bob Mcdermott MD  Sleep Medicine  10/26/19  5:37 PM

## 2019-10-18 ENCOUNTER — INFUSION (OUTPATIENT)
Dept: ONCOLOGY | Facility: HOSPITAL | Age: 79
End: 2019-10-18

## 2019-10-18 ENCOUNTER — LAB (OUTPATIENT)
Dept: LAB | Facility: HOSPITAL | Age: 79
End: 2019-10-18

## 2019-10-18 DIAGNOSIS — C61 PROSTATE CANCER METASTATIC TO BONE (HCC): ICD-10-CM

## 2019-10-18 DIAGNOSIS — C79.51 OSSEOUS METASTASIS: Primary | ICD-10-CM

## 2019-10-18 DIAGNOSIS — C79.51 PROSTATE CANCER METASTATIC TO BONE (HCC): ICD-10-CM

## 2019-10-18 LAB
ALBUMIN SERPL-MCNC: 4.1 G/DL (ref 3.5–5.2)
ALBUMIN/GLOB SERPL: 1.3 G/DL (ref 1.1–2.4)
ALP SERPL-CCNC: 94 U/L (ref 38–116)
ALT SERPL W P-5'-P-CCNC: 14 U/L (ref 0–41)
ANION GAP SERPL CALCULATED.3IONS-SCNC: 12.4 MMOL/L (ref 5–15)
AST SERPL-CCNC: 14 U/L (ref 0–40)
BASOPHILS # BLD AUTO: 0.03 10*3/MM3 (ref 0–0.2)
BASOPHILS NFR BLD AUTO: 0.4 % (ref 0–1.5)
BILIRUB SERPL-MCNC: 0.2 MG/DL (ref 0.2–1.2)
BUN BLD-MCNC: 21 MG/DL (ref 6–20)
BUN/CREAT SERPL: 16.8 (ref 7.3–30)
CALCIUM SPEC-SCNC: 9.9 MG/DL (ref 8.5–10.2)
CHLORIDE SERPL-SCNC: 102 MMOL/L (ref 98–107)
CO2 SERPL-SCNC: 26.6 MMOL/L (ref 22–29)
CREAT BLD-MCNC: 1.25 MG/DL (ref 0.7–1.3)
DEPRECATED RDW RBC AUTO: 46.4 FL (ref 37–54)
EOSINOPHIL # BLD AUTO: 0.15 10*3/MM3 (ref 0–0.4)
EOSINOPHIL NFR BLD AUTO: 1.9 % (ref 0.3–6.2)
ERYTHROCYTE [DISTWIDTH] IN BLOOD BY AUTOMATED COUNT: 13.8 % (ref 12.3–15.4)
GFR SERPL CREATININE-BSD FRML MDRD: 56 ML/MIN/1.73
GLOBULIN UR ELPH-MCNC: 3.1 GM/DL (ref 1.8–3.5)
GLUCOSE BLD-MCNC: 224 MG/DL (ref 74–124)
HCT VFR BLD AUTO: 36.1 % (ref 37.5–51)
HGB BLD-MCNC: 11.5 G/DL (ref 13–17.7)
IMM GRANULOCYTES # BLD AUTO: 0.05 10*3/MM3 (ref 0–0.05)
IMM GRANULOCYTES NFR BLD AUTO: 0.6 % (ref 0–0.5)
LYMPHOCYTES # BLD AUTO: 2.43 10*3/MM3 (ref 0.7–3.1)
LYMPHOCYTES NFR BLD AUTO: 30.6 % (ref 19.6–45.3)
MAGNESIUM SERPL-MCNC: 1.7 MG/DL (ref 1.8–2.5)
MCH RBC QN AUTO: 29.9 PG (ref 26.6–33)
MCHC RBC AUTO-ENTMCNC: 31.9 G/DL (ref 31.5–35.7)
MCV RBC AUTO: 93.8 FL (ref 79–97)
MONOCYTES # BLD AUTO: 0.61 10*3/MM3 (ref 0.1–0.9)
MONOCYTES NFR BLD AUTO: 7.7 % (ref 5–12)
NEUTROPHILS # BLD AUTO: 4.68 10*3/MM3 (ref 1.7–7)
NEUTROPHILS NFR BLD AUTO: 58.8 % (ref 42.7–76)
NRBC BLD AUTO-RTO: 0 /100 WBC (ref 0–0.2)
PHOSPHATE SERPL-MCNC: 4.1 MG/DL (ref 2.5–4.5)
PLATELET # BLD AUTO: 196 10*3/MM3 (ref 140–450)
PMV BLD AUTO: 10.2 FL (ref 6–12)
POTASSIUM BLD-SCNC: 4.7 MMOL/L (ref 3.5–4.7)
PROT SERPL-MCNC: 7.2 G/DL (ref 6.3–8)
RBC # BLD AUTO: 3.85 10*6/MM3 (ref 4.14–5.8)
SODIUM BLD-SCNC: 141 MMOL/L (ref 134–145)
WBC NRBC COR # BLD: 7.95 10*3/MM3 (ref 3.4–10.8)

## 2019-10-18 PROCEDURE — 83735 ASSAY OF MAGNESIUM: CPT

## 2019-10-18 PROCEDURE — 84100 ASSAY OF PHOSPHORUS: CPT

## 2019-10-18 PROCEDURE — 85025 COMPLETE CBC W/AUTO DIFF WBC: CPT

## 2019-10-18 PROCEDURE — 80053 COMPREHEN METABOLIC PANEL: CPT

## 2019-10-18 PROCEDURE — 96372 THER/PROPH/DIAG INJ SC/IM: CPT | Performed by: INTERNAL MEDICINE

## 2019-10-18 PROCEDURE — 36416 COLLJ CAPILLARY BLOOD SPEC: CPT

## 2019-10-18 PROCEDURE — 25010000002 DENOSUMAB 120 MG/1.7ML SOLUTION: Performed by: INTERNAL MEDICINE

## 2019-10-18 RX ADMIN — DENOSUMAB 120 MG: 120 INJECTION SUBCUTANEOUS at 10:42

## 2019-10-18 NOTE — PROGRESS NOTES
Reviewed Mag level of 1.7 wit Dr Lee, previous month was 2.1, and okay to give Xgeva today.  Per Dr Lee, no need for supplemental mag, patient does not tolerate.

## 2019-10-25 RX ORDER — GABAPENTIN 100 MG/1
CAPSULE ORAL
Qty: 120 CAPSULE | Refills: 2 | Status: SHIPPED | OUTPATIENT
Start: 2019-10-25 | End: 2020-03-05 | Stop reason: SDUPTHER

## 2019-10-29 RX ORDER — HYDROCODONE BITARTRATE AND ACETAMINOPHEN 5; 325 MG/1; MG/1
1 TABLET ORAL EVERY 6 HOURS PRN
Qty: 120 TABLET | Refills: 0 | Status: SHIPPED | OUTPATIENT
Start: 2019-10-29 | End: 2019-12-02 | Stop reason: SDUPTHER

## 2019-10-29 RX ORDER — HYDROCODONE BITARTRATE AND ACETAMINOPHEN 5; 325 MG/1; MG/1
1 TABLET ORAL EVERY 6 HOURS PRN
Qty: 120 TABLET | Refills: 0 | OUTPATIENT
Start: 2019-10-29

## 2019-11-07 RX ORDER — TRIAMTERENE AND HYDROCHLOROTHIAZIDE 37.5; 25 MG/1; MG/1
TABLET ORAL
Qty: 90 TABLET | Refills: 0 | Status: SHIPPED | OUTPATIENT
Start: 2019-11-07 | End: 2020-01-31

## 2019-11-07 RX ORDER — OLMESARTAN MEDOXOMIL 40 MG/1
TABLET ORAL
Qty: 90 TABLET | Refills: 0 | Status: SHIPPED | OUTPATIENT
Start: 2019-11-07 | End: 2019-11-29 | Stop reason: SDUPTHER

## 2019-11-08 RX ORDER — MODAFINIL 200 MG/1
200 TABLET ORAL DAILY
Qty: 90 TABLET | Refills: 1 | Status: SHIPPED | OUTPATIENT
Start: 2019-11-08 | End: 2020-05-11 | Stop reason: SDUPTHER

## 2019-11-13 DIAGNOSIS — C79.51 OSSEOUS METASTASIS: ICD-10-CM

## 2019-11-13 DIAGNOSIS — C79.51 PROSTATE CANCER METASTATIC TO BONE (HCC): ICD-10-CM

## 2019-11-13 DIAGNOSIS — C61 PROSTATE CANCER METASTATIC TO BONE (HCC): ICD-10-CM

## 2019-11-15 ENCOUNTER — INFUSION (OUTPATIENT)
Dept: ONCOLOGY | Facility: HOSPITAL | Age: 79
End: 2019-11-15

## 2019-11-15 ENCOUNTER — LAB (OUTPATIENT)
Dept: LAB | Facility: HOSPITAL | Age: 79
End: 2019-11-15

## 2019-11-15 DIAGNOSIS — C79.51 OSSEOUS METASTASIS: Primary | ICD-10-CM

## 2019-11-15 DIAGNOSIS — C61 PROSTATE CANCER METASTATIC TO BONE (HCC): ICD-10-CM

## 2019-11-15 DIAGNOSIS — C79.51 OSSEOUS METASTASIS: ICD-10-CM

## 2019-11-15 DIAGNOSIS — C79.51 PROSTATE CANCER METASTATIC TO BONE (HCC): ICD-10-CM

## 2019-11-15 LAB
ALBUMIN SERPL-MCNC: 3.9 G/DL (ref 3.5–5.2)
ALBUMIN/GLOB SERPL: 1.2 G/DL (ref 1.1–2.4)
ALP SERPL-CCNC: 88 U/L (ref 38–116)
ALT SERPL W P-5'-P-CCNC: 16 U/L (ref 0–41)
ANION GAP SERPL CALCULATED.3IONS-SCNC: 11.8 MMOL/L (ref 5–15)
AST SERPL-CCNC: 16 U/L (ref 0–40)
BASOPHILS # BLD AUTO: 0.03 10*3/MM3 (ref 0–0.2)
BASOPHILS NFR BLD AUTO: 0.3 % (ref 0–1.5)
BILIRUB SERPL-MCNC: <0.2 MG/DL (ref 0.2–1.2)
BUN BLD-MCNC: 26 MG/DL (ref 6–20)
BUN/CREAT SERPL: 21.5 (ref 7.3–30)
CALCIUM SPEC-SCNC: 9.6 MG/DL (ref 8.5–10.2)
CHLORIDE SERPL-SCNC: 106 MMOL/L (ref 98–107)
CO2 SERPL-SCNC: 25.2 MMOL/L (ref 22–29)
CREAT BLD-MCNC: 1.21 MG/DL (ref 0.7–1.3)
DEPRECATED RDW RBC AUTO: 46.5 FL (ref 37–54)
EOSINOPHIL # BLD AUTO: 0.17 10*3/MM3 (ref 0–0.4)
EOSINOPHIL NFR BLD AUTO: 2 % (ref 0.3–6.2)
ERYTHROCYTE [DISTWIDTH] IN BLOOD BY AUTOMATED COUNT: 13.6 % (ref 12.3–15.4)
GFR SERPL CREATININE-BSD FRML MDRD: 58 ML/MIN/1.73
GLOBULIN UR ELPH-MCNC: 3.2 GM/DL (ref 1.8–3.5)
GLUCOSE BLD-MCNC: 206 MG/DL (ref 74–124)
HCT VFR BLD AUTO: 35.8 % (ref 37.5–51)
HGB BLD-MCNC: 11.3 G/DL (ref 13–17.7)
IMM GRANULOCYTES # BLD AUTO: 0.07 10*3/MM3 (ref 0–0.05)
IMM GRANULOCYTES NFR BLD AUTO: 0.8 % (ref 0–0.5)
LYMPHOCYTES # BLD AUTO: 2.76 10*3/MM3 (ref 0.7–3.1)
LYMPHOCYTES NFR BLD AUTO: 31.9 % (ref 19.6–45.3)
MAGNESIUM SERPL-MCNC: 1.9 MG/DL (ref 1.8–2.5)
MCH RBC QN AUTO: 29.7 PG (ref 26.6–33)
MCHC RBC AUTO-ENTMCNC: 31.6 G/DL (ref 31.5–35.7)
MCV RBC AUTO: 94.2 FL (ref 79–97)
MONOCYTES # BLD AUTO: 0.68 10*3/MM3 (ref 0.1–0.9)
MONOCYTES NFR BLD AUTO: 7.9 % (ref 5–12)
NEUTROPHILS # BLD AUTO: 4.94 10*3/MM3 (ref 1.7–7)
NEUTROPHILS NFR BLD AUTO: 57.1 % (ref 42.7–76)
NRBC BLD AUTO-RTO: 0 /100 WBC (ref 0–0.2)
PHOSPHATE SERPL-MCNC: 3.5 MG/DL (ref 2.5–4.5)
PLATELET # BLD AUTO: 194 10*3/MM3 (ref 140–450)
PMV BLD AUTO: 10.4 FL (ref 6–12)
POTASSIUM BLD-SCNC: 5.3 MMOL/L (ref 3.5–4.7)
PROT SERPL-MCNC: 7.1 G/DL (ref 6.3–8)
RBC # BLD AUTO: 3.8 10*6/MM3 (ref 4.14–5.8)
SODIUM BLD-SCNC: 143 MMOL/L (ref 134–145)
WBC NRBC COR # BLD: 8.65 10*3/MM3 (ref 3.4–10.8)

## 2019-11-15 PROCEDURE — 84100 ASSAY OF PHOSPHORUS: CPT

## 2019-11-15 PROCEDURE — 85025 COMPLETE CBC W/AUTO DIFF WBC: CPT

## 2019-11-15 PROCEDURE — 25010000002 DENOSUMAB 120 MG/1.7ML SOLUTION: Performed by: INTERNAL MEDICINE

## 2019-11-15 PROCEDURE — 96372 THER/PROPH/DIAG INJ SC/IM: CPT | Performed by: INTERNAL MEDICINE

## 2019-11-15 PROCEDURE — 83735 ASSAY OF MAGNESIUM: CPT

## 2019-11-15 PROCEDURE — 80053 COMPREHEN METABOLIC PANEL: CPT

## 2019-11-15 PROCEDURE — 36415 COLL VENOUS BLD VENIPUNCTURE: CPT

## 2019-11-15 RX ADMIN — DENOSUMAB 120 MG: 120 INJECTION SUBCUTANEOUS at 12:18

## 2019-11-28 RX ORDER — HYDROCODONE BITARTRATE AND ACETAMINOPHEN 5; 325 MG/1; MG/1
1 TABLET ORAL EVERY 6 HOURS PRN
Qty: 120 TABLET | Refills: 0 | Status: CANCELLED | OUTPATIENT
Start: 2019-11-28

## 2019-12-02 RX ORDER — OLMESARTAN MEDOXOMIL 40 MG/1
TABLET ORAL
Qty: 90 TABLET | Refills: 0 | Status: SHIPPED | OUTPATIENT
Start: 2019-12-02 | End: 2020-01-31

## 2019-12-02 RX ORDER — HYDROCODONE BITARTRATE AND ACETAMINOPHEN 5; 325 MG/1; MG/1
1 TABLET ORAL EVERY 6 HOURS PRN
Qty: 120 TABLET | Refills: 0 | OUTPATIENT
Start: 2019-12-02

## 2019-12-02 RX ORDER — HYDROCODONE BITARTRATE AND ACETAMINOPHEN 5; 325 MG/1; MG/1
1 TABLET ORAL EVERY 6 HOURS PRN
Qty: 120 TABLET | Refills: 0 | Status: SHIPPED | OUTPATIENT
Start: 2019-12-02 | End: 2019-12-28 | Stop reason: SDUPTHER

## 2019-12-06 NOTE — PROGRESS NOTES
Subjective  Today the patient reports that he has been doing well since the last visit. He reports that he has had intermittent sleep paralysis.        .    History of Present Illness    The patient 79-year-old male with significant past medical history including prostate carcinoma, CKD 3 and long-term tobacco use be been seen by primary care in late January, 2018 for hoarseness.  Chest x-ray is now outpatient January 23 revealed sclerotic change in the posterior mid chest to be within 3 adjacent thoracic vertebral bodies of uncertain significance.  A follow-up chest CT revealed numerous sclerotic foci within all visualized bones consistent with metastatic disease, multiple enlarged mediastinal lymph nodes concerning for metastatic lymphadenopathy and a polypoidal abnormality in the right lateral wall of the trachea.  CT of abdomen March 20 revealed extensive osseous metastatic disease mildly enlarged retroperitoneal lymph nodes.  Bone scan March 20 was consistent with widespread osseous metastasis particularly in the proximal half the left humeral shaft, abnormal uptake in the sternum and manubrium and a small focus in the posterior aspect of the calvarium.  This led to a plain film the left humerus with mottled sclerosis involving the shaft of the humerus particularly in the proximal half but no evidence of pathologic fracture.    The patient has additionally type 2 diabetes on insulin pump, again a history of prostate carcinoma prostatectomy 12 years previously, hypertension, and hyperlipidemia.  Additional studies included a PSA of 395.1 from March 14, 2018.The patient's been seen by urology March 15 with plans to start Firmagon.    The patient is now seen in pulmonary clinic with Dr. Bijan Rivera and we have discussed that he will need bronchoscopy and mediastinoscopy.  Interestingly his additional history includes a long occupational exposure including working in the [place] just out of  school, subsequent construction work for many years and a 30-pack-year history of smoking stopping in the late 1990s.  He indicates that he followed with Dr. Guillen of urology for many years with no changes in his exams and normal PSAs.  He stopped this follow-up approximately 2 years ago.     Patient was seen in consultation with thoracic surgery on March 28 and plans are made for mediastinoscopy and bronchoscopy with lymph node biopsy.  Pathology revealed that these nodes were consistent with metastatic prostate carcinoma.  He was discussed at thoracic conference.  A PET/CT was not pursued and it was determined that he see medical oncology for hormonal treatment and radiation therapy if symptomatic.  It should be noted that the patient's March 15 [facility] office note describes that Firmagon was planned.     The patient has met with the son and grandson April 23.  We have also contacted Dr. Taylor of urology in that Firmagon was given just after his last visit and would next be due approximately May 3.  Patient feels considerably better with resolution of his pain, normalization of his performance status.  He would like to continue treatment through our office now contacted Dr. Taylor concerning this.    The patient is next seen June 11, 2018 we discussed his follow-up including his treatments with Lupron May 7 and his next treatment on July 30.  He has returned to essentially normal performance status and his PSA has dropped further from 395 March 14 27.8 May 7 and now 2.03 June 11.  We do plan to initiate Xgeva also study him via scans to document his improvement approximately a month from now.   The patient is next seen July 26, 2018.  Repeat imaging demonstrated interval resolution of mediastinal and retroperitoneal lymphadenopathy.  There is thickening in the distal aspect of the appendix with stranding?  Chronic appendicitis.  The patient indicates he is having no such symptoms and never has.  There is  no change in sclerotic bony metastasis in the patient's PSA has dropped substantially to 1.66 by July 19 and 1.79 July 26.  He is actually feeling excellent and this is corroborated by family members.   Patient is next seen January 10, 2019 continuing to do very well and, in fact indicating that [facility], likely [facility] replacements, visual review.  He is not certain is going to continue his care with them or with us or combination of both.    Patient is next seen April 4, 2019.  He is recently discharged after hospitalization March 15-18 with left upper lobe pneumonia.  We reviewed the findings in detail with his gradual recovery now documented.  His studies include a CT of chest which does not demonstrate any further bony lesions and/or pulmonary metastasis having developed.  He is feeling considerably better and approximately back to his baseline.  The patient is next seen June 28, 2019 feeling well but having baseline generally musculoskeletal pain and restless legs.His recent exams include a PSA of 0.81, CMP with potassium of 5.3 with repeat pending.  His performance status overall remains good to very good and is clearly responding to his treatments.  The patient is next seen December 13, 2019 continue to feel well.  He has had, oddly enough, 2 episodes of sleep paralysis which have occurred to him previously and he wonders if there is any connection with his prostate carcinoma though this is felt unlikely.  Past Medical History:   Diagnosis Date   • Bone cancer (CMS/HCC)    • CKD (chronic kidney disease)     Stage 3; followed by Dr. Vicky Duran    • Diastolic dysfunction     GRADE II per echo 2018   • Difficulty breathing     during exertion   • Dyslipidemia    • Erectile dysfunction    • Fatigue    • History of blood transfusion    • History of kidney stones    • Hyperlipidemia    • Hypertension    • Left ventricular hypertrophy     per echo 2018   •  Localized edema    • Neuropathy    • Obstructive sleep apnea     USING CPAP   • Osteoarthritis    • Peptic ulcer    • Pneumonia    • Prostate cancer (CMS/HCC)     Status post prostatectomy, radiation therapy, and hormone therapy followed by Dr. Lee; metastatsis to bone   • Pure hyperglyceridemia    • Restless leg syndrome    • Sinus bradycardia    • Transient cerebral ischemia    • Type 2 diabetes mellitus (CMS/HCC)         Past Surgical History:   Procedure Laterality Date   • BRONCHOSCOPY N/A 4/9/2018    Procedure: BRONCHOSCOPY;  Surgeon: Bijan Rivera III, MD;  Location: [facility];  Service: Cardiothoracic   • COLONOSCOPY     • DEEP NECK LYMPH NODE BIOPSY / EXCISION     • HEMORRHOIDECTOMY     • MEDIASTINOSCOPY N/A 4/9/2018    Procedure: MEDIASTINOSCOPY WITH LYMPH NODE BIOPSY;  Surgeon: Bijan Rivera III, MD;  Location: [facility];  Service: Cardiothoracic   • OTHER SURGICAL HISTORY      ulcer repair   • PROSTATECTOMY  2006        Current Outpatient Medications on File Prior to Visit   Medication Sig Dispense Refill   • albuterol sulfate  (90 Base) MCG/ACT inhaler Inhale 2 puffs Every 4 (Four) Hours As Needed for Wheezing or Shortness of Air. 1 inhaler 0   • aspirin 81 MG EC tablet Take 1 tablet by mouth daily.     • cholecalciferol (VITAMIN D3) 1000 units tablet Take 2,000 Units by mouth Daily.     • Denosumab (XGEVA SC) Inject  under the skin into the appropriate area as directed Every 30 (Thirty) Days.     • diltiaZEM CD (CARDIZEM CD) 120 MG 24 hr capsule TAKE 1 CAPSULE EVERY DAY 90 capsule 1   • FLUAD 0.5 ML suspension prefilled syringe ADM 0.5ML IM UTD  0   • fluticasone (FLONASE SENSIMIST) 27.5 MCG/SPRAY nasal spray 2 sprays into each nostril As Needed.     • FOLBIC 2.5-25-2 MG tablet tablet Take 1 tablet by mouth Daily.  2   • gabapentin (NEURONTIN) 100 MG capsule Take 4 capsule every night. 120 capsule 2   • gabapentin (NEURONTIN) 100 MG capsule TAKE 4 CAPSULES EVERY NIGHT 120  capsule 2   • HYDROcodone-acetaminophen (NORCO) 5-325 MG per tablet Take 1 tablet by mouth Every 6 (Six) Hours As Needed for Moderate Pain . 120 tablet 0   • insulin aspart (NovoLOG) 100 UNIT/ML patient supplied pump Inject  under the skin into the appropriate area as directed Continuous. Pt reports pump with basal rate 1.9 units/hr from 0800 to 1200. Basal rate 1.8 units/hr 1200 to 0800.  Checks bg ac/hs with bolus doses per pumps parameters     • Leuprolide Acetate, 3 Month, (LUPRON DEPOT, 3-MONTH, IM) Inject  into the appropriate muscle as directed by prescriber Every 3 (Three) Months.     • levothyroxine (SYNTHROID, LEVOTHROID) 75 MCG tablet Take 1 tablet by mouth Daily. 30 tablet 0   • modafinil (PROVIGIL) 200 MG tablet Take 1 tablet by mouth Daily. 90 tablet 1   • olmesartan (BENICAR) 40 MG tablet TAKE 1 TABLET EVERY DAY (DISCONTINUE LOSARTAN 100MG) 90 tablet 0   • pravastatin (PRAVACHOL) 40 MG tablet Take 40 mg by mouth daily.     • Probiotic Product (ALIGN) chewable tablet Chew 1 tablet Daily.     • rOPINIRole (REQUIP) 0.5 MG tablet Take 1 tablet by mouth in the evening and 1 at bedtime. 180 tablet 2   • triamterene-hydrochlorothiazide (MAXZIDE-25) 37.5-25 MG per tablet TAKE 1 TABLET EVERY DAY 90 tablet 0     No current facility-administered medications on file prior to visit.         ALLERGIES:  No Known Allergies     Social History     Socioeconomic History   • Marital status:      Spouse name: Not on file   • Number of children: Not on file   • Years of education: College   • Highest education level: Not on file   Occupational History     Employer: RETIRED   Tobacco Use   • Smoking status: Former Smoker     Packs/day: 1.00     Years: 30.00     Pack years: 30.00     Types: Cigarettes     Last attempt to quit: 1998     Years since quittin.9   • Smokeless tobacco: Never Used   Substance and Sexual Activity   • Alcohol use: No     Comment: caffeine use   • Drug use: No   • Sexual activity:  "Defer        Family History   Problem Relation Age of Onset   • Heart failure Mother    • Hypertension Mother    • Diabetes Mother    • Heart disease Mother    • Lung cancer Father 46   • Hypertension Sister    • Lung cancer Sister 60   • Hypertension Brother    • Lung cancer Brother 62   • Heart disease Other    • Prostate cancer Brother 60   • Malig Hyperthermia Neg Hx         Review of Systems   Constitutional: Negative for fatigue.   Eyes: Negative.    Respiratory: Negative for chest tightness, shortness of breath and wheezing.    Gastrointestinal: Positive for nausea. Negative for abdominal pain, constipation, diarrhea and vomiting.   Allergic/Immunologic: Negative.    Neurological: Positive for weakness.   Hematological: Negative.    Psychiatric/Behavioral: Positive for sleep disturbance.       Constitution:Normalization of performance status  HENT: Negative.    Eyes: Negative.    Cardiovascular: Negative.    Respiratory:  No additional cough or shortness of breath Endocrine: Negative.    Hematologic/Lymphatic: Negative.    Skin: Negative.    Musculoskeletal: Resolution of joint pain and stiffness.   Gastrointestinal: Negative.    Genitourinary: Negative.    Neurological: Negative.    Psychiatric/Behavioral: Negative.    All other systems reviewed and are negative.  Objective     Vitals:    12/13/19 0947   BP: 127/67   Pulse: 51   Resp: 18   Temp: 98 °F (36.7 °C)   TempSrc: Oral   SpO2: 97%   Weight: 93.8 kg (206 lb 14.4 oz)   Height: 165.1 cm (65\")   PainSc: 0-No pain     Current Status 12/13/2019   ECOG score 0       Physical Exam    OBJECTIVE: Vitals signs are reviewed and are stable.    HEENT: PERRLA.    Neck:  Supple.  Bilateral bruits, No thyromegaly or adenopathy  Lungs:  Clear.  No rales rhonchi or wheezes  Heart:  Regular rate and rhythm.  1/6 systolic murmur  Abdomen:   Soft, nontender.  Obese.  Bowel sounds present.  No organomegaly can be detected, though patient is tender in right lower to mid " abdomen.  No ascites or masses again to be detected  Extremities:  No cyanosis, clubbing or edema.      RECENT LABS:  Hematology WBC   Date Value Ref Range Status   12/13/2019 7.92 3.40 - 10.80 10*3/mm3 Final     RBC   Date Value Ref Range Status   12/13/2019 4.23 4.14 - 5.80 10*6/mm3 Final     Hemoglobin   Date Value Ref Range Status   12/13/2019 12.4 (L) 13.0 - 17.7 g/dL Final     Hematocrit   Date Value Ref Range Status   12/13/2019 38.3 37.5 - 51.0 % Final     Platelets   Date Value Ref Range Status   12/13/2019 212 140 - 450 10*3/mm3 Final        Assessment/Plan       79-year-old male with medical history including prostate carcinoma, CK D3, long-term tobacco use recent development of hoarseness and subsequent studies that demonstrated findings consistent with metastatic disease to bone as well as multiple enlarged metastatic lymph nodes, and potential abnormality in the right lateral wall trachea.  His additional history includes type 2 diabetes on insulin pump which has been more effective for control of his blood sugars.    His recent symptoms have included generalized discomfort in multiple sites in his bony skeleton as well as right lower quadrant abdominal pain.  We discussed this made will improve after he starts Firmagon in the a.m.  Discussions with Dr. Rivera also have led to the conclusion that he'll need bronchoscopy and mediastinoscopy which did proceed as above on May 9.  Pathology revealed mediastinal lymph nodes to be involved with metastatic adenocarcinoma consistent with origin from a prostatic primary . The patient's case was discussed at thoracic conference and recommendations were to continue with ADT and radiation for symptomatic sites.  The patient is seen back April 23 his treatment with ADT-Firmagon-has improved his symptomatic performance status to near baseline.  It is not felt that he'll require other treatments such as oral antiandrogens at this point.  He could, however,  potentially benefit from agents such as Xgeva given at appropriate intervals. We went on to continue with Xgeva and rescan him July 23 demonstrating near remission developing.  This is also supported by his normalizing PSA.  The patient's scans demonstrated possibly chronic appendicitis is not having symptoms referable to this.he plans to be alert to the possibility of stenosis family member.  We discussed moving to an every 6 month treatment but at this point we'll continue at 3 months. As result we continued Lupron and Xgeva on schedule and is seen back now 6 months later continuing to generally improve.     We will continue the patient's treatment and is done well with no further symptoms until recently admitted mid March with upper lobe pneumonia.  Review of his studies during that visit fortunately do not demonstrate bony metastasis, additional mediastinal adenopathy, pleural effusions or masses.  It is felt that his metastatic prostate carcinoma remains controlled.  We planned, as a result to continue Xgeva and Lupron and the patient is now seen back continuing to do well.  We went on to discuss Lupron and Xgeva, medications including Neurontin and Requip which have been helpful and the patient is next seen December 13, 2019 again with symptoms control.  As his PSA is pending we will plan:      *Continue Lupron and Xgeva today  *Continue additional medications including Neurontin 400 mg p.o. nightly and Requip  *At 25 mg p.o. nightly with his Neurontin to reduce mild pruritus  *Return 3 months, NP, Lupron and Xgeva

## 2019-12-13 ENCOUNTER — LAB (OUTPATIENT)
Dept: LAB | Facility: HOSPITAL | Age: 79
End: 2019-12-13

## 2019-12-13 ENCOUNTER — INFUSION (OUTPATIENT)
Dept: ONCOLOGY | Facility: HOSPITAL | Age: 79
End: 2019-12-13

## 2019-12-13 ENCOUNTER — OFFICE VISIT (OUTPATIENT)
Dept: ONCOLOGY | Facility: CLINIC | Age: 79
End: 2019-12-13

## 2019-12-13 VITALS
WEIGHT: 206.9 LBS | OXYGEN SATURATION: 97 % | SYSTOLIC BLOOD PRESSURE: 127 MMHG | HEART RATE: 51 BPM | BODY MASS INDEX: 34.47 KG/M2 | TEMPERATURE: 98 F | DIASTOLIC BLOOD PRESSURE: 67 MMHG | HEIGHT: 65 IN | RESPIRATION RATE: 18 BRPM

## 2019-12-13 DIAGNOSIS — C61 PROSTATE CANCER METASTATIC TO BONE (HCC): ICD-10-CM

## 2019-12-13 DIAGNOSIS — C79.51 PROSTATE CANCER METASTATIC TO BONE (HCC): ICD-10-CM

## 2019-12-13 DIAGNOSIS — C79.51 OSSEOUS METASTASIS: ICD-10-CM

## 2019-12-13 DIAGNOSIS — C79.51 OSSEOUS METASTASIS: Primary | ICD-10-CM

## 2019-12-13 LAB
ALBUMIN SERPL-MCNC: 4.2 G/DL (ref 3.5–5.2)
ALBUMIN/GLOB SERPL: 1.2 G/DL (ref 1.1–2.4)
ALP SERPL-CCNC: 96 U/L (ref 38–116)
ALT SERPL W P-5'-P-CCNC: 14 U/L (ref 0–41)
ANION GAP SERPL CALCULATED.3IONS-SCNC: 14.2 MMOL/L (ref 5–15)
AST SERPL-CCNC: 13 U/L (ref 0–40)
BASOPHILS # BLD AUTO: 0.04 10*3/MM3 (ref 0–0.2)
BASOPHILS NFR BLD AUTO: 0.5 % (ref 0–1.5)
BILIRUB SERPL-MCNC: 0.2 MG/DL (ref 0.2–1.2)
BUN BLD-MCNC: 33 MG/DL (ref 6–20)
BUN/CREAT SERPL: 26.6 (ref 7.3–30)
CALCIUM SPEC-SCNC: 10.1 MG/DL (ref 8.5–10.2)
CHLORIDE SERPL-SCNC: 103 MMOL/L (ref 98–107)
CO2 SERPL-SCNC: 24.8 MMOL/L (ref 22–29)
CREAT BLD-MCNC: 1.24 MG/DL (ref 0.7–1.3)
DEPRECATED RDW RBC AUTO: 44.8 FL (ref 37–54)
EOSINOPHIL # BLD AUTO: 0.18 10*3/MM3 (ref 0–0.4)
EOSINOPHIL NFR BLD AUTO: 2.3 % (ref 0.3–6.2)
ERYTHROCYTE [DISTWIDTH] IN BLOOD BY AUTOMATED COUNT: 13.4 % (ref 12.3–15.4)
GFR SERPL CREATININE-BSD FRML MDRD: 56 ML/MIN/1.73
GLOBULIN UR ELPH-MCNC: 3.4 GM/DL (ref 1.8–3.5)
GLUCOSE BLD-MCNC: 192 MG/DL (ref 74–124)
HCT VFR BLD AUTO: 38.3 % (ref 37.5–51)
HGB BLD-MCNC: 12.4 G/DL (ref 13–17.7)
IMM GRANULOCYTES # BLD AUTO: 0.03 10*3/MM3 (ref 0–0.05)
IMM GRANULOCYTES NFR BLD AUTO: 0.4 % (ref 0–0.5)
LYMPHOCYTES # BLD AUTO: 2.68 10*3/MM3 (ref 0.7–3.1)
LYMPHOCYTES NFR BLD AUTO: 33.8 % (ref 19.6–45.3)
MAGNESIUM SERPL-MCNC: 1.8 MG/DL (ref 1.8–2.5)
MCH RBC QN AUTO: 29.3 PG (ref 26.6–33)
MCHC RBC AUTO-ENTMCNC: 32.4 G/DL (ref 31.5–35.7)
MCV RBC AUTO: 90.5 FL (ref 79–97)
MONOCYTES # BLD AUTO: 0.66 10*3/MM3 (ref 0.1–0.9)
MONOCYTES NFR BLD AUTO: 8.3 % (ref 5–12)
NEUTROPHILS # BLD AUTO: 4.33 10*3/MM3 (ref 1.7–7)
NEUTROPHILS NFR BLD AUTO: 54.7 % (ref 42.7–76)
NRBC BLD AUTO-RTO: 0 /100 WBC (ref 0–0.2)
PHOSPHATE SERPL-MCNC: 3.5 MG/DL (ref 2.5–4.5)
PLATELET # BLD AUTO: 212 10*3/MM3 (ref 140–450)
PMV BLD AUTO: 9.8 FL (ref 6–12)
POTASSIUM BLD-SCNC: 5 MMOL/L (ref 3.5–4.7)
PROT SERPL-MCNC: 7.6 G/DL (ref 6.3–8)
PSA SERPL-MCNC: 2.9 NG/ML (ref 0–4)
RBC # BLD AUTO: 4.23 10*6/MM3 (ref 4.14–5.8)
SODIUM BLD-SCNC: 142 MMOL/L (ref 134–145)
WBC NRBC COR # BLD: 7.92 10*3/MM3 (ref 3.4–10.8)

## 2019-12-13 PROCEDURE — 25010000002 LEUPROLIDE ACETATE (3 MONTH) PER 7.5 MG: Performed by: INTERNAL MEDICINE

## 2019-12-13 PROCEDURE — 83735 ASSAY OF MAGNESIUM: CPT

## 2019-12-13 PROCEDURE — 85025 COMPLETE CBC W/AUTO DIFF WBC: CPT

## 2019-12-13 PROCEDURE — 99214 OFFICE O/P EST MOD 30 MIN: CPT | Performed by: INTERNAL MEDICINE

## 2019-12-13 PROCEDURE — 25010000002 DENOSUMAB 120 MG/1.7ML SOLUTION: Performed by: INTERNAL MEDICINE

## 2019-12-13 PROCEDURE — 96372 THER/PROPH/DIAG INJ SC/IM: CPT | Performed by: INTERNAL MEDICINE

## 2019-12-13 PROCEDURE — 84100 ASSAY OF PHOSPHORUS: CPT

## 2019-12-13 PROCEDURE — 36415 COLL VENOUS BLD VENIPUNCTURE: CPT

## 2019-12-13 PROCEDURE — 84153 ASSAY OF PSA TOTAL: CPT | Performed by: INTERNAL MEDICINE

## 2019-12-13 PROCEDURE — 96402 CHEMO HORMON ANTINEOPL SQ/IM: CPT | Performed by: INTERNAL MEDICINE

## 2019-12-13 PROCEDURE — 80053 COMPREHEN METABOLIC PANEL: CPT

## 2019-12-13 RX ADMIN — DENOSUMAB 120 MG: 120 INJECTION SUBCUTANEOUS at 11:25

## 2019-12-13 RX ADMIN — LEUPROLIDE ACETATE 22.5 MG: KIT at 11:43

## 2019-12-28 DIAGNOSIS — G62.9 NEUROPATHY: Primary | ICD-10-CM

## 2019-12-28 DIAGNOSIS — C79.51 PROSTATE CANCER METASTATIC TO BONE (HCC): Primary | ICD-10-CM

## 2019-12-28 DIAGNOSIS — C79.51 PROSTATE CANCER METASTATIC TO BONE (HCC): ICD-10-CM

## 2019-12-28 DIAGNOSIS — C61 PROSTATE CANCER METASTATIC TO BONE (HCC): Primary | ICD-10-CM

## 2019-12-28 DIAGNOSIS — C61 PROSTATE CANCER METASTATIC TO BONE (HCC): ICD-10-CM

## 2019-12-30 RX ORDER — HYDROCODONE BITARTRATE AND ACETAMINOPHEN 5; 325 MG/1; MG/1
1 TABLET ORAL EVERY 6 HOURS PRN
Qty: 120 TABLET | Refills: 0 | Status: SHIPPED | OUTPATIENT
Start: 2019-12-30 | End: 2020-01-28 | Stop reason: SDUPTHER

## 2019-12-30 RX ORDER — GABAPENTIN 100 MG/1
CAPSULE ORAL
Qty: 120 CAPSULE | Refills: 2 | Status: SHIPPED | OUTPATIENT
Start: 2019-12-30 | End: 2020-01-30 | Stop reason: SDUPTHER

## 2020-01-10 ENCOUNTER — INFUSION (OUTPATIENT)
Dept: ONCOLOGY | Facility: HOSPITAL | Age: 80
End: 2020-01-10

## 2020-01-10 ENCOUNTER — LAB (OUTPATIENT)
Dept: LAB | Facility: HOSPITAL | Age: 80
End: 2020-01-10

## 2020-01-10 DIAGNOSIS — C61 PROSTATE CANCER METASTATIC TO BONE (HCC): ICD-10-CM

## 2020-01-10 DIAGNOSIS — C79.51 PROSTATE CANCER METASTATIC TO BONE (HCC): ICD-10-CM

## 2020-01-10 DIAGNOSIS — C79.51 OSSEOUS METASTASIS: ICD-10-CM

## 2020-01-10 DIAGNOSIS — C79.51 OSSEOUS METASTASIS: Primary | ICD-10-CM

## 2020-01-10 LAB
ALBUMIN SERPL-MCNC: 4.2 G/DL (ref 3.5–5.2)
ALBUMIN/GLOB SERPL: 1.3 G/DL (ref 1.1–2.4)
ALP SERPL-CCNC: 106 U/L (ref 38–116)
ALT SERPL W P-5'-P-CCNC: 17 U/L (ref 0–41)
ANION GAP SERPL CALCULATED.3IONS-SCNC: 12.3 MMOL/L (ref 5–15)
AST SERPL-CCNC: 16 U/L (ref 0–40)
BASOPHILS # BLD AUTO: 0.07 10*3/MM3 (ref 0–0.2)
BASOPHILS NFR BLD AUTO: 0.8 % (ref 0–1.5)
BILIRUB SERPL-MCNC: <0.2 MG/DL (ref 0.2–1.2)
BUN BLD-MCNC: 23 MG/DL (ref 6–20)
BUN/CREAT SERPL: 19.3 (ref 7.3–30)
CALCIUM SPEC-SCNC: 9.9 MG/DL (ref 8.5–10.2)
CHLORIDE SERPL-SCNC: 103 MMOL/L (ref 98–107)
CO2 SERPL-SCNC: 26.7 MMOL/L (ref 22–29)
CREAT BLD-MCNC: 1.19 MG/DL (ref 0.7–1.3)
DEPRECATED RDW RBC AUTO: 44.1 FL (ref 37–54)
EOSINOPHIL # BLD AUTO: 0.18 10*3/MM3 (ref 0–0.4)
EOSINOPHIL NFR BLD AUTO: 2.2 % (ref 0.3–6.2)
ERYTHROCYTE [DISTWIDTH] IN BLOOD BY AUTOMATED COUNT: 13.2 % (ref 12.3–15.4)
GFR SERPL CREATININE-BSD FRML MDRD: 59 ML/MIN/1.73
GLOBULIN UR ELPH-MCNC: 3.2 GM/DL (ref 1.8–3.5)
GLUCOSE BLD-MCNC: 157 MG/DL (ref 74–124)
HCT VFR BLD AUTO: 39.9 % (ref 37.5–51)
HGB BLD-MCNC: 12.8 G/DL (ref 13–17.7)
IMM GRANULOCYTES # BLD AUTO: 0.04 10*3/MM3 (ref 0–0.05)
IMM GRANULOCYTES NFR BLD AUTO: 0.5 % (ref 0–0.5)
LYMPHOCYTES # BLD AUTO: 2.88 10*3/MM3 (ref 0.7–3.1)
LYMPHOCYTES NFR BLD AUTO: 34.7 % (ref 19.6–45.3)
MAGNESIUM SERPL-MCNC: 1.9 MG/DL (ref 1.8–2.5)
MCH RBC QN AUTO: 29.2 PG (ref 26.6–33)
MCHC RBC AUTO-ENTMCNC: 32.1 G/DL (ref 31.5–35.7)
MCV RBC AUTO: 91.1 FL (ref 79–97)
MONOCYTES # BLD AUTO: 0.66 10*3/MM3 (ref 0.1–0.9)
MONOCYTES NFR BLD AUTO: 7.9 % (ref 5–12)
NEUTROPHILS # BLD AUTO: 4.48 10*3/MM3 (ref 1.7–7)
NEUTROPHILS NFR BLD AUTO: 53.9 % (ref 42.7–76)
NRBC BLD AUTO-RTO: 0 /100 WBC (ref 0–0.2)
PHOSPHATE SERPL-MCNC: 3.4 MG/DL (ref 2.5–4.5)
PLATELET # BLD AUTO: 210 10*3/MM3 (ref 140–450)
PMV BLD AUTO: 10 FL (ref 6–12)
POTASSIUM BLD-SCNC: 4.7 MMOL/L (ref 3.5–4.7)
PROT SERPL-MCNC: 7.4 G/DL (ref 6.3–8)
RBC # BLD AUTO: 4.38 10*6/MM3 (ref 4.14–5.8)
SODIUM BLD-SCNC: 142 MMOL/L (ref 134–145)
WBC NRBC COR # BLD: 8.31 10*3/MM3 (ref 3.4–10.8)

## 2020-01-10 PROCEDURE — 84100 ASSAY OF PHOSPHORUS: CPT

## 2020-01-10 PROCEDURE — 36415 COLL VENOUS BLD VENIPUNCTURE: CPT

## 2020-01-10 PROCEDURE — 83735 ASSAY OF MAGNESIUM: CPT

## 2020-01-10 PROCEDURE — 96372 THER/PROPH/DIAG INJ SC/IM: CPT | Performed by: INTERNAL MEDICINE

## 2020-01-10 PROCEDURE — 80053 COMPREHEN METABOLIC PANEL: CPT

## 2020-01-10 PROCEDURE — 25010000002 DENOSUMAB 120 MG/1.7ML SOLUTION: Performed by: INTERNAL MEDICINE

## 2020-01-10 PROCEDURE — 85025 COMPLETE CBC W/AUTO DIFF WBC: CPT

## 2020-01-10 RX ADMIN — DENOSUMAB 120 MG: 120 INJECTION SUBCUTANEOUS at 11:27

## 2020-01-28 ENCOUNTER — TELEPHONE (OUTPATIENT)
Dept: ONCOLOGY | Facility: HOSPITAL | Age: 80
End: 2020-01-28

## 2020-01-28 DIAGNOSIS — C61 PROSTATE CANCER METASTATIC TO BONE (HCC): ICD-10-CM

## 2020-01-28 DIAGNOSIS — C79.51 PROSTATE CANCER METASTATIC TO BONE (HCC): ICD-10-CM

## 2020-01-28 DIAGNOSIS — G62.9 NEUROPATHY: ICD-10-CM

## 2020-01-28 RX ORDER — GABAPENTIN 100 MG/1
CAPSULE ORAL
Qty: 120 CAPSULE | Refills: 2 | OUTPATIENT
Start: 2020-01-28

## 2020-01-28 NOTE — TELEPHONE ENCOUNTER
PT SENT MESSAGE THRU [employer] ASKING FOR RF ON NEURONTIN. CALLED PT AND EXPLAINED THAT WHEN WE RF'D LAST MONTH WE GAVE HIM 2 ADDITIONAL RF'S ON THAT RX. HE JUST NEEDS TO CALL THE [facility] AND ASK FOR RF. PT V/U.

## 2020-01-29 RX ORDER — HYDROCODONE BITARTRATE AND ACETAMINOPHEN 5; 325 MG/1; MG/1
1 TABLET ORAL EVERY 6 HOURS PRN
Qty: 120 TABLET | Refills: 0 | Status: SHIPPED | OUTPATIENT
Start: 2020-01-29 | End: 2020-02-25 | Stop reason: SDUPTHER

## 2020-01-30 ENCOUNTER — OFFICE VISIT (OUTPATIENT)
Dept: CARDIOLOGY | Facility: CLINIC | Age: 80
End: 2020-01-30

## 2020-01-30 VITALS
HEIGHT: 65 IN | DIASTOLIC BLOOD PRESSURE: 70 MMHG | SYSTOLIC BLOOD PRESSURE: 142 MMHG | BODY MASS INDEX: 34.16 KG/M2 | WEIGHT: 205 LBS | HEART RATE: 59 BPM

## 2020-01-30 DIAGNOSIS — I77.810 ASCENDING AORTA DILATATION (HCC): ICD-10-CM

## 2020-01-30 DIAGNOSIS — I25.10 CORONARY ARTERY CALCIFICATION: ICD-10-CM

## 2020-01-30 DIAGNOSIS — I10 ESSENTIAL HYPERTENSION: ICD-10-CM

## 2020-01-30 DIAGNOSIS — I51.89 DIASTOLIC DYSFUNCTION: ICD-10-CM

## 2020-01-30 DIAGNOSIS — I25.84 CORONARY ARTERY CALCIFICATION: ICD-10-CM

## 2020-01-30 DIAGNOSIS — G47.33 OBSTRUCTIVE SLEEP APNEA SYNDROME: ICD-10-CM

## 2020-01-30 DIAGNOSIS — E78.1 PURE HYPERTRIGLYCERIDEMIA: ICD-10-CM

## 2020-01-30 DIAGNOSIS — R00.1 SINUS BRADYCARDIA: ICD-10-CM

## 2020-01-30 DIAGNOSIS — R01.1 HEART MURMUR: Primary | ICD-10-CM

## 2020-01-30 DIAGNOSIS — R60.0 LOCALIZED EDEMA: ICD-10-CM

## 2020-01-30 DIAGNOSIS — N18.30 CHRONIC KIDNEY DISEASE, STAGE III (MODERATE) (HCC): ICD-10-CM

## 2020-01-30 PROBLEM — E87.1 HYPONATREMIA: Status: RESOLVED | Noted: 2019-03-15 | Resolved: 2020-01-30

## 2020-01-30 PROBLEM — L03.032 CELLULITIS OF GREAT TOE OF LEFT FOOT: Status: RESOLVED | Noted: 2018-10-19 | Resolved: 2020-01-30

## 2020-01-30 PROBLEM — J18.9 PNEUMONIA OF LEFT UPPER LOBE DUE TO INFECTIOUS ORGANISM: Status: RESOLVED | Noted: 2019-03-15 | Resolved: 2020-01-30

## 2020-01-30 PROBLEM — A41.9 SEPSIS: Status: RESOLVED | Noted: 2019-03-15 | Resolved: 2020-01-30

## 2020-01-30 PROCEDURE — 93000 ELECTROCARDIOGRAM COMPLETE: CPT | Performed by: NURSE PRACTITIONER

## 2020-01-30 PROCEDURE — 99214 OFFICE O/P EST MOD 30 MIN: CPT | Performed by: NURSE PRACTITIONER

## 2020-01-30 NOTE — PROGRESS NOTES
Date of Office Visit: 06/15/2018  Encounter Provider: SYD Underwood  Place of Service: [facility] CARDIOLOGY  Patient Name: Semaj Herrera  :1940   Primary Cardiologist: Dr. Tata Lee    Chief Complaint   Patient presents with   • Hypertension   • Annual Exam   :     HPI: Semaj Herrera is a 79 y.o. male who presents today for annual cardiac follow up.     He has been diagnosed with hypertension, LVH, diastolic dysfunction, severe obstructive sleep apnea compliant with BiPAP machine, diabetes mellitus, and chronic kidney disease.  On 4/3/2017 he had a echocardiogram was completed with the final results: EF 62%, grade 2 diastolic dysfunction, left atrium volume borderline increased, trace aortic valve regurgitation, trace mitral valve regurgitation, and trace tricuspid valve regurgitation.    In 2018, he followed up with me and was doing well at that time.  He was on a hormone treatment for metastatic prostate cancer under the care of Dr. Lee.  In 2019, he was hospitalized for pneumonia.  He had a CT of the chest on 3/15/2019 which showed heart size normal, coronary artery calcification, ascending aorta dilated at 3.8 cm, left lower lobe pneumonia, colonic diverticulosis, and hepatic steatosis.    He presents in today with his son accompanying him.  His blood pressure is elevated today and he has not had his morning medications.  His blood pressures been well controlled in the 120s-130s over 80s at home.  Around 3 AM he wakes up in the middle the night short of breath despite wearing his BiPAP machine.  He has an appointment to see Dr. Bob Mcdermott in the near future.  He has been suffering from sinus drainage and a cough.  He denies chest pain, shortness of breath with daily activities, orthopnea, palpitations, dizziness, syncope, or bleeding.  He is not very active during the winter, but during the spring, summer, and fall he gardens all day  long with his specialty being raising tomato plants.    I reviewed his last blood work from 1/10/2020 which included a CBC with a hemoglobin of 12.8 and hematocrit 39.9.  CMP normal except for glucose of 157 and BUN of 23.  Magnesium normal.  He has not had a lipid panel completed in 2019.    Past Medical History:   Diagnosis Date   • Bone cancer (CMS/HCC)    • Cellulitis of great toe of left foot 10/19/2018   • CKD (chronic kidney disease)     Stage 3; followed by Dr. Vicky Duran    • Diastolic dysfunction     GRADE II per echo 2018   • Difficulty breathing     during exertion   • Dyslipidemia    • Erectile dysfunction    • Fatigue    • History of blood transfusion    • History of kidney stones    • Hyperlipidemia    • Hypertension    • Hyponatremia    • Left ventricular hypertrophy     per echo 2018   • Localized edema    • Neuropathy    • Obstructive sleep apnea     USING CPAP   • Osteoarthritis    • Peptic ulcer    • Pneumonia    • Pneumonia of left upper lobe due to infectious organism (CMS/HCC) 3/15/2019   • Prostate cancer (CMS/HCC)     Status post prostatectomy, radiation therapy, and hormone therapy followed by Dr. Lee; metastatsis to bone   • Pure hyperglyceridemia    • Restless leg syndrome    • Sepsis (CMS/HCC)    • Sinus bradycardia    • Transient cerebral ischemia    • Type 2 diabetes mellitus (CMS/HCC)        Past Surgical History:   Procedure Laterality Date   • BRONCHOSCOPY N/A 4/9/2018    Procedure: BRONCHOSCOPY;  Surgeon: Bijan Rivera III, MD;  Location: [facility];  Service: Cardiothoracic   • COLONOSCOPY     • DEEP NECK LYMPH NODE BIOPSY / EXCISION     • HEMORRHOIDECTOMY     • MEDIASTINOSCOPY N/A 4/9/2018    Procedure: MEDIASTINOSCOPY WITH LYMPH NODE BIOPSY;  Surgeon: Bijan Rivera III, MD;  Location: [facility];  Service: Cardiothoracic   • OTHER SURGICAL HISTORY      ulcer repair   • PROSTATECTOMY  2006       Social History     Social History   • Marital status:       Spouse name: N/A   • Number of children: N/A   • Years of education: College     Occupational History   •  Retired     Social History Main Topics   • Smoking status: Former Smoker     Packs/day: 1.00     Years: 30.00     Types: Cigarettes     Quit date: 1/23/1998   • Smokeless tobacco: Never Used   • Alcohol use No      Comment: caffeine use   • Drug use: No   • Sexual activity: Defer       Family History   Problem Relation Age of Onset   • Heart failure Mother    • Hypertension Mother    • Diabetes Mother    • Heart disease Mother    • Lung cancer Father 46   • Hypertension Sister    • Lung cancer Sister 60   • Hypertension Brother    • Lung cancer Brother 62   • Heart disease Other    • Prostate cancer Brother 60   • Malig Hyperthermia Neg Hx        Review of Systems   Constitution: Negative for chills, diaphoresis, fever, malaise/fatigue, night sweats, weight gain and weight loss.   HENT: Positive for hearing loss. Negative for nosebleeds, sore throat and tinnitus.    Eyes: Positive for blurred vision. Negative for double vision, pain and visual disturbance.   Cardiovascular: Negative for chest pain, claudication, cyanosis, dyspnea on exertion, irregular heartbeat, leg swelling, near-syncope, orthopnea, palpitations, paroxysmal nocturnal dyspnea and syncope.   Respiratory: Positive for cough and snoring. Negative for hemoptysis, shortness of breath and wheezing.    Endocrine: Negative for cold intolerance, heat intolerance and polyuria.   Hematologic/Lymphatic: Negative for bleeding problem. Does not bruise/bleed easily.   Skin: Negative for color change, dry skin, flushing and itching.   Musculoskeletal: Positive for joint pain and myalgias. Negative for falls, joint swelling, muscle cramps and muscle weakness.   Gastrointestinal: Negative for abdominal pain, constipation, heartburn, melena, nausea and vomiting.   Genitourinary: Negative for dysuria, frequency and hematuria.        Erectile dysfunction    Neurological: Positive for paresthesias. Negative for excessive daytime sleepiness, dizziness, light-headedness, loss of balance, numbness, seizures and vertigo.   Psychiatric/Behavioral: Negative for altered mental status, depression, memory loss and substance abuse. The patient does not have insomnia and is not nervous/anxious.    Allergic/Immunologic: Negative for environmental allergies.       No Known Allergies      Current Outpatient Medications:   •  albuterol sulfate  (90 Base) MCG/ACT inhaler, Inhale 2 puffs Every 4 (Four) Hours As Needed for Wheezing or Shortness of Air., Disp: 1 inhaler, Rfl: 0  •  aspirin 81 MG EC tablet, Take 1 tablet by mouth daily., Disp: , Rfl:   •  cholecalciferol (VITAMIN D3) 1000 units tablet, Take 2,000 Units by mouth Daily., Disp: , Rfl:   •  Denosumab (XGEVA SC), Inject  under the skin into the appropriate area as directed Every 30 (Thirty) Days., Disp: , Rfl:   •  diltiaZEM CD (CARDIZEM CD) 120 MG 24 hr capsule, TAKE 1 CAPSULE EVERY DAY, Disp: 90 capsule, Rfl: 1  •  FLUAD 0.5 ML suspension prefilled syringe, ADM 0.5ML IM UTD, Disp: , Rfl: 0  •  fluticasone (FLONASE SENSIMIST) 27.5 MCG/SPRAY nasal spray, 2 sprays into each nostril As Needed., Disp: , Rfl:   •  gabapentin (NEURONTIN) 100 MG capsule, TAKE 4 CAPSULES EVERY NIGHT, Disp: 120 capsule, Rfl: 2  •  HYDROcodone-acetaminophen (NORCO) 5-325 MG per tablet, Take 1 tablet by mouth Every 6 (Six) Hours As Needed for Moderate Pain ., Disp: 120 tablet, Rfl: 0  •  insulin aspart (NovoLOG) 100 UNIT/ML patient supplied pump, Inject  under the skin into the appropriate area as directed Continuous. Pt reports pump with basal rate 1.9 units/hr from 0800 to 1200. Basal rate 1.8 units/hr 1200 to 0800.  Checks bg ac/hs with bolus doses per pumps parameters, Disp: , Rfl:   •  Leuprolide Acetate, 3 Month, (LUPRON DEPOT, 3-MONTH, IM), Inject  into the appropriate muscle as directed by prescriber Every 3 (Three) Months., Disp: , Rfl:  "  •  levothyroxine (SYNTHROID, LEVOTHROID) 75 MCG tablet, Take 1 tablet by mouth Daily., Disp: 30 tablet, Rfl: 0  •  modafinil (PROVIGIL) 200 MG tablet, Take 1 tablet by mouth Daily., Disp: 90 tablet, Rfl: 1  •  olmesartan (BENICAR) 40 MG tablet, TAKE 1 TABLET EVERY DAY (DISCONTINUE LOSARTAN 100MG), Disp: 90 tablet, Rfl: 0  •  pravastatin (PRAVACHOL) 40 MG tablet, Take 40 mg by mouth daily., Disp: , Rfl:   •  rOPINIRole (REQUIP) 0.5 MG tablet, Take 1 tablet by mouth in the evening and 1 at bedtime., Disp: 180 tablet, Rfl: 2  •  triamterene-hydrochlorothiazide (MAXZIDE-25) 37.5-25 MG per tablet, TAKE 1 TABLET EVERY DAY, Disp: 90 tablet, Rfl: 0      Objective:     Vitals:    01/30/20 0933   BP: 142/70   BP Location: Left arm   Pulse: 59   Weight: 93 kg (205 lb)   Height: 165.1 cm (65\")     Body mass index is 34.11 kg/m².    PHYSICAL EXAM:    Vitals Reviewed.   General Appearance: No acute distress, well developed and well nourished.  Obese.  Eyes: Conjunctiva and lids: No erythema, swelling, or discharge. Sclera non-icteric.   HENT: Atraumatic, normocephalic. External eyes, ears, and nose normal. No hearing loss noted. Mucous membranes normal. Lips not cyanotic. Neck supple with no tenderness.  Respiratory: No signs of respiratory distress. Respiration rhythm and depth normal.   Clear to auscultation. No rales, crackles, rhonchi, or wheezing auscultated.   Cardiovascular:  Jugular Venous Pressure: Normal  Heart Rate and Rhythm: Normal, Heart Sounds: Normal S1 and S2. No S3 or S4 noted.  Murmurs: RUSB grade 2/6 systolic murmur noted. No rubs, thrills, or gallops.   Lower Extremities: Bilateral trace lower extremity edema noted.  Gastrointestinal:  Abdomen obese, distended, nontender.  Normal bowel sounds.    Musculoskeletal: Normal movement of extremities  Skin and Nails: General appearance normal. No pallor, cyanosis, diaphoresis. Skin temperature normal. No clubbing of fingernails.   Psychiatric: Patient alert and " oriented to person, place, and time. Speech and behavior appropriate. Normal mood and affect.       ECG 12 Lead  Date/Time: 1/30/2020 9:41 AM  Performed by: Stephanie Farooq APRN  Authorized by: Stephanie Faroqo APRN   Comparison: compared with previous ECG from 1/30/2019  Similar to previous ECG  Rhythm: sinus rhythm  Rate: normal  BPM: 59  Conduction: non-specific intraventricular conduction delay  ST Segments: ST segments normal  T Waves: T waves normal  QRS axis: normal  Other findings: poor R wave progression    Clinical impression: abnormal EKG              Assessment:       Diagnosis Plan   1. Heart murmur  Adult Transthoracic Echo Complete W/ Cont if Necessary Per Protocol   2. Diastolic dysfunction  Adult Transthoracic Echo Complete W/ Cont if Necessary Per Protocol   3. Coronary artery calcification     4. Ascending aorta dilatation (CMS/HCC)     5. Sinus bradycardia     6. Essential hypertension     7. Localized edema     8. Chronic kidney disease, stage III (moderate) (CMS/HCC)     9. Pure hypertriglyceridemia  Lipid Panel   10. Obstructive sleep apnea syndrome treated auto BiPAP            Plan:       1.  Heart Murmur: Repeat echocardiogram.    2.  Diastolic Dysfunction: Grade 2 on last echocardiogram and will repeat the echocardiogram.  Recommended good blood pressure control and weight loss.    3.  Coronary Artery Calcification: Seen on CT scan 3/2019.  Denies anginal symptoms.  Continue aspirin and pravastatin for primary prevention.    4.  Ascending Aortic Dilation: Measured 3.8 cm on CT scan.  Continue to monitor .    5. Bradycardia: On diltiazem.  Asymptomatic.    6.  Hypertension: Blood pressure elevated today and is not had his morning medications.  He would benefit from exercise, weight loss, and low-sodium diet.    7.  Edema: He would benefit from compression stockings, low-sodium diet, and elevation of legs.    8.  Chronic Kidney Disease: Followed by Dr. Vicky Duran    9.   Hyperlipidemia/hypertriglyceridemia: I recommended a lipid panel this year and he wants to have that done at Dr. Lee's office.     10.  Obstructive Sleep Apnea: Compliant with BiPAP machine.  I recommended that he follow-up with Dr. Mcdermott as he is waking up short of breath middle the night.    11.  Blurred Vision: I recommend follow-up with his ophthalmologist.    12. I recommended follow-up with Dr. Lee in 1 year, unless otherwise needed sooner.    ADDENDUM 2/20/2020:    Echocardiogram result Text     · Estimated EF appears to be in the range of 61 - 65%.  · Left ventricular diastolic dysfunction is noted (grade II w/high LAP) consistent with pseudonormalization.  · Normal right ventricular cavity size and systolic function noted.  · Left atrial cavity size is mildly dilated.  · Mild aortic valve stenosis is present. Aortic valve maximum pressure gradient is 24.0 mmHg. Aortic valve mean pressure gradient is 11.0 mmHg.  · Calculated right ventricular systolic pressure from tricuspid regurgitation is 22 mmHg.  · There is no evidence of pericardial effusion.     Patient given results and I told him that the heart murmur is coming from the mild aortic stenosis.  We will continue to monitor.    As always, it has been a pleasure to participate in your patient's care.      Sincerely,         SYD Mejia

## 2020-01-30 NOTE — PATIENT INSTRUCTIONS
Echocardiogram  Continue meds  Start walking  Follow up in one year with Dr. Lee   See your eye MD Dr. Mcdermott - tell him your waking up short of breath   Need lipid panel - ask Dr. Lee if he will draw it

## 2020-01-31 RX ORDER — TRIAMTERENE AND HYDROCHLOROTHIAZIDE 37.5; 25 MG/1; MG/1
TABLET ORAL
Qty: 90 TABLET | Refills: 0 | Status: SHIPPED | OUTPATIENT
Start: 2020-01-31 | End: 2020-05-11

## 2020-01-31 RX ORDER — ROPINIROLE 0.5 MG/1
TABLET, FILM COATED ORAL
Qty: 180 TABLET | Refills: 2 | Status: SHIPPED | OUTPATIENT
Start: 2020-01-31 | End: 2020-09-25 | Stop reason: SDUPTHER

## 2020-01-31 RX ORDER — DILTIAZEM HYDROCHLORIDE 120 MG/1
CAPSULE, COATED, EXTENDED RELEASE ORAL
Qty: 90 CAPSULE | Refills: 3 | Status: SHIPPED | OUTPATIENT
Start: 2020-01-31 | End: 2020-11-03

## 2020-01-31 RX ORDER — OLMESARTAN MEDOXOMIL 40 MG/1
TABLET ORAL
Qty: 90 TABLET | Refills: 3 | Status: SHIPPED | OUTPATIENT
Start: 2020-01-31 | End: 2021-05-13

## 2020-02-07 ENCOUNTER — TELEPHONE (OUTPATIENT)
Dept: CARDIOLOGY | Facility: CLINIC | Age: 80
End: 2020-02-07

## 2020-02-07 ENCOUNTER — INFUSION (OUTPATIENT)
Dept: ONCOLOGY | Facility: HOSPITAL | Age: 80
End: 2020-02-07

## 2020-02-07 ENCOUNTER — LAB (OUTPATIENT)
Dept: LAB | Facility: HOSPITAL | Age: 80
End: 2020-02-07

## 2020-02-07 DIAGNOSIS — C79.51 OSSEOUS METASTASIS: Primary | ICD-10-CM

## 2020-02-07 DIAGNOSIS — C79.51 OSSEOUS METASTASIS: ICD-10-CM

## 2020-02-07 DIAGNOSIS — C61 PROSTATE CANCER METASTATIC TO BONE (HCC): ICD-10-CM

## 2020-02-07 DIAGNOSIS — C79.51 PROSTATE CANCER METASTATIC TO BONE (HCC): ICD-10-CM

## 2020-02-07 DIAGNOSIS — E78.1 PURE HYPERTRIGLYCERIDEMIA: ICD-10-CM

## 2020-02-07 LAB
ALBUMIN SERPL-MCNC: 4.3 G/DL (ref 3.5–5.2)
ALBUMIN/GLOB SERPL: 1.3 G/DL (ref 1.1–2.4)
ALP SERPL-CCNC: 109 U/L (ref 38–116)
ALT SERPL W P-5'-P-CCNC: 17 U/L (ref 0–41)
ANION GAP SERPL CALCULATED.3IONS-SCNC: 13.4 MMOL/L (ref 5–15)
AST SERPL-CCNC: 15 U/L (ref 0–40)
BASOPHILS # BLD AUTO: 0.04 10*3/MM3 (ref 0–0.2)
BASOPHILS NFR BLD AUTO: 0.4 % (ref 0–1.5)
BILIRUB SERPL-MCNC: 0.2 MG/DL (ref 0.2–1.2)
BUN BLD-MCNC: 30 MG/DL (ref 6–20)
BUN/CREAT SERPL: 23.4 (ref 7.3–30)
CALCIUM SPEC-SCNC: 9.8 MG/DL (ref 8.5–10.2)
CHLORIDE SERPL-SCNC: 102 MMOL/L (ref 98–107)
CHOLEST SERPL-MCNC: 185 MG/DL (ref 0–200)
CO2 SERPL-SCNC: 23.6 MMOL/L (ref 22–29)
CREAT BLD-MCNC: 1.28 MG/DL (ref 0.7–1.3)
DEPRECATED RDW RBC AUTO: 45 FL (ref 37–54)
EOSINOPHIL # BLD AUTO: 0.19 10*3/MM3 (ref 0–0.4)
EOSINOPHIL NFR BLD AUTO: 2.1 % (ref 0.3–6.2)
ERYTHROCYTE [DISTWIDTH] IN BLOOD BY AUTOMATED COUNT: 13.5 % (ref 12.3–15.4)
GFR SERPL CREATININE-BSD FRML MDRD: 54 ML/MIN/1.73
GLOBULIN UR ELPH-MCNC: 3.3 GM/DL (ref 1.8–3.5)
GLUCOSE BLD-MCNC: 243 MG/DL (ref 74–124)
HCT VFR BLD AUTO: 40.4 % (ref 37.5–51)
HDLC SERPL-MCNC: 40 MG/DL (ref 40–60)
HGB BLD-MCNC: 13.1 G/DL (ref 13–17.7)
IMM GRANULOCYTES # BLD AUTO: 0.06 10*3/MM3 (ref 0–0.05)
IMM GRANULOCYTES NFR BLD AUTO: 0.7 % (ref 0–0.5)
LDLC SERPL CALC-MCNC: 105 MG/DL (ref 0–100)
LDLC/HDLC SERPL: 2.63 {RATIO}
LYMPHOCYTES # BLD AUTO: 2.64 10*3/MM3 (ref 0.7–3.1)
LYMPHOCYTES NFR BLD AUTO: 28.9 % (ref 19.6–45.3)
MAGNESIUM SERPL-MCNC: 2.1 MG/DL (ref 1.8–2.5)
MCH RBC QN AUTO: 29.8 PG (ref 26.6–33)
MCHC RBC AUTO-ENTMCNC: 32.4 G/DL (ref 31.5–35.7)
MCV RBC AUTO: 92 FL (ref 79–97)
MONOCYTES # BLD AUTO: 0.69 10*3/MM3 (ref 0.1–0.9)
MONOCYTES NFR BLD AUTO: 7.5 % (ref 5–12)
NEUTROPHILS # BLD AUTO: 5.52 10*3/MM3 (ref 1.7–7)
NEUTROPHILS NFR BLD AUTO: 60.4 % (ref 42.7–76)
NRBC BLD AUTO-RTO: 0 /100 WBC (ref 0–0.2)
PHOSPHATE SERPL-MCNC: 3.6 MG/DL (ref 2.5–4.5)
PLATELET # BLD AUTO: 229 10*3/MM3 (ref 140–450)
PMV BLD AUTO: 10.1 FL (ref 6–12)
POTASSIUM BLD-SCNC: 4.8 MMOL/L (ref 3.5–4.7)
PROT SERPL-MCNC: 7.6 G/DL (ref 6.3–8)
RBC # BLD AUTO: 4.39 10*6/MM3 (ref 4.14–5.8)
SODIUM BLD-SCNC: 139 MMOL/L (ref 134–145)
TRIGL SERPL-MCNC: 199 MG/DL (ref 0–150)
VLDLC SERPL-MCNC: 39.8 MG/DL (ref 5–40)
WBC NRBC COR # BLD: 9.14 10*3/MM3 (ref 3.4–10.8)

## 2020-02-07 PROCEDURE — 85025 COMPLETE CBC W/AUTO DIFF WBC: CPT

## 2020-02-07 PROCEDURE — 36415 COLL VENOUS BLD VENIPUNCTURE: CPT

## 2020-02-07 PROCEDURE — 80061 LIPID PANEL: CPT | Performed by: NURSE PRACTITIONER

## 2020-02-07 PROCEDURE — 84100 ASSAY OF PHOSPHORUS: CPT

## 2020-02-07 PROCEDURE — 80053 COMPREHEN METABOLIC PANEL: CPT

## 2020-02-07 PROCEDURE — 83735 ASSAY OF MAGNESIUM: CPT

## 2020-02-07 PROCEDURE — 96372 THER/PROPH/DIAG INJ SC/IM: CPT

## 2020-02-07 PROCEDURE — 25010000002 DENOSUMAB 120 MG/1.7ML SOLUTION: Performed by: INTERNAL MEDICINE

## 2020-02-07 RX ADMIN — DENOSUMAB 120 MG: 120 INJECTION SUBCUTANEOUS at 10:54

## 2020-02-07 NOTE — TELEPHONE ENCOUNTER
----- Message from SYD Youngblood sent at 2/7/2020  1:59 PM EST -----  Please call patient and let him know that triglycerides and LDL are both elevated on cholesterol panel.  I would recommend heart healthy diet, weight loss, and regular exercise program.

## 2020-02-11 NOTE — TELEPHONE ENCOUNTER
Called unable to reach the patient will continue to try and if unsuccessful I will send a letter  Tracey Jhaveri RN  Triage nurse

## 2020-02-12 NOTE — TELEPHONE ENCOUNTER
Called- sending letter, he never called back after several attempts to reach him.  Tracey Jhaveri RN  Triage nurse

## 2020-02-14 ENCOUNTER — APPOINTMENT (OUTPATIENT)
Dept: CARDIOLOGY | Facility: HOSPITAL | Age: 80
End: 2020-02-14

## 2020-02-14 NOTE — TELEPHONE ENCOUNTER
Pt called back. Went over results. He verbalized understanding.    Thank you!    Nirali Bhatti, RN  [place] [facility] [place]

## 2020-02-19 ENCOUNTER — HOSPITAL ENCOUNTER (OUTPATIENT)
Dept: CARDIOLOGY | Facility: HOSPITAL | Age: 80
Discharge: HOME | End: 2020-02-19
Admitting: NURSE PRACTITIONER

## 2020-02-19 VITALS
HEART RATE: 55 BPM | SYSTOLIC BLOOD PRESSURE: 128 MMHG | BODY MASS INDEX: 34.16 KG/M2 | WEIGHT: 205 LBS | DIASTOLIC BLOOD PRESSURE: 66 MMHG | HEIGHT: 65 IN

## 2020-02-19 DIAGNOSIS — I51.89 DIASTOLIC DYSFUNCTION: ICD-10-CM

## 2020-02-19 DIAGNOSIS — R01.1 HEART MURMUR: ICD-10-CM

## 2020-02-19 LAB
AORTIC ROOT ANNULUS: 1.7 CM
ASCENDING AORTA: 3.3 CM
BH CV ECHO MEAS - ACS: 1.3 CM
BH CV ECHO MEAS - AI DEC SLOPE: 120.7 CM/SEC^2
BH CV ECHO MEAS - AI MAX PG: 35.3 MMHG
BH CV ECHO MEAS - AI MAX VEL: 297.1 CM/SEC
BH CV ECHO MEAS - AI P1/2T: 720.9 MSEC
BH CV ECHO MEAS - AO MAX PG (FULL): 16.9 MMHG
BH CV ECHO MEAS - AO MAX PG: 24 MMHG
BH CV ECHO MEAS - AO MEAN PG (FULL): 7.4 MMHG
BH CV ECHO MEAS - AO MEAN PG: 11 MMHG
BH CV ECHO MEAS - AO ROOT AREA (BSA CORRECTED): 1.6
BH CV ECHO MEAS - AO ROOT AREA: 7.8 CM^2
BH CV ECHO MEAS - AO ROOT DIAM: 3.2 CM
BH CV ECHO MEAS - AO V2 MAX: 244.3 CM/SEC
BH CV ECHO MEAS - AO V2 MEAN: 154.7 CM/SEC
BH CV ECHO MEAS - AO V2 VTI: 51.1 CM
BH CV ECHO MEAS - AVA(I,A): 1.5 CM^2
BH CV ECHO MEAS - AVA(I,D): 1.5 CM^2
BH CV ECHO MEAS - AVA(V,A): 1.4 CM^2
BH CV ECHO MEAS - AVA(V,D): 1.4 CM^2
BH CV ECHO MEAS - BSA(HAYCOCK): 2.1 M^2
BH CV ECHO MEAS - BSA: 2 M^2
BH CV ECHO MEAS - BZI_BMI: 34.1 KILOGRAMS/M^2
BH CV ECHO MEAS - BZI_METRIC_HEIGHT: 165.1 CM
BH CV ECHO MEAS - BZI_METRIC_WEIGHT: 93 KG
BH CV ECHO MEAS - EDV(MOD-SP2): 53 ML
BH CV ECHO MEAS - EDV(MOD-SP4): 56 ML
BH CV ECHO MEAS - EDV(TEICH): 68.6 ML
BH CV ECHO MEAS - EF(CUBED): 74.4 %
BH CV ECHO MEAS - EF(MOD-BP): 55 %
BH CV ECHO MEAS - EF(MOD-SP2): 56.6 %
BH CV ECHO MEAS - EF(MOD-SP4): 51.8 %
BH CV ECHO MEAS - EF(TEICH): 66.9 %
BH CV ECHO MEAS - ESV(MOD-SP2): 23 ML
BH CV ECHO MEAS - ESV(MOD-SP4): 27 ML
BH CV ECHO MEAS - ESV(TEICH): 22.7 ML
BH CV ECHO MEAS - FS: 36.5 %
BH CV ECHO MEAS - IVS/LVPW: 1
BH CV ECHO MEAS - IVSD: 1 CM
BH CV ECHO MEAS - LAT PEAK E' VEL: 9 CM/SEC
BH CV ECHO MEAS - LV DIASTOLIC VOL/BSA (35-75): 28 ML/M^2
BH CV ECHO MEAS - LV MASS(C)D: 130 GRAMS
BH CV ECHO MEAS - LV MASS(C)DI: 65.1 GRAMS/M^2
BH CV ECHO MEAS - LV MAX PG: 7 MMHG
BH CV ECHO MEAS - LV MEAN PG: 3.9 MMHG
BH CV ECHO MEAS - LV SYSTOLIC VOL/BSA (12-30): 13.5 ML/M^2
BH CV ECHO MEAS - LV V1 MAX: 132.4 CM/SEC
BH CV ECHO MEAS - LV V1 MEAN: 93.5 CM/SEC
BH CV ECHO MEAS - LV V1 VTI: 28.2 CM
BH CV ECHO MEAS - LVIDD: 4 CM
BH CV ECHO MEAS - LVIDS: 2.5 CM
BH CV ECHO MEAS - LVLD AP2: 6.4 CM
BH CV ECHO MEAS - LVLD AP4: 7 CM
BH CV ECHO MEAS - LVLS AP2: 5.8 CM
BH CV ECHO MEAS - LVLS AP4: 5.9 CM
BH CV ECHO MEAS - LVOT AREA (M): 2.5 CM^2
BH CV ECHO MEAS - LVOT AREA: 2.6 CM^2
BH CV ECHO MEAS - LVOT DIAM: 1.8 CM
BH CV ECHO MEAS - LVPWD: 1 CM
BH CV ECHO MEAS - MED PEAK E' VEL: 7 CM/SEC
BH CV ECHO MEAS - MV A DUR: 0.15 SEC
BH CV ECHO MEAS - MV A MAX VEL: 93.5 CM/SEC
BH CV ECHO MEAS - MV DEC SLOPE: 419.3 CM/SEC^2
BH CV ECHO MEAS - MV DEC TIME: 0.16 SEC
BH CV ECHO MEAS - MV E MAX VEL: 96.8 CM/SEC
BH CV ECHO MEAS - MV E/A: 1
BH CV ECHO MEAS - MV MAX PG: 4.5 MMHG
BH CV ECHO MEAS - MV MEAN PG: 1.6 MMHG
BH CV ECHO MEAS - MV P1/2T MAX VEL: 108.6 CM/SEC
BH CV ECHO MEAS - MV P1/2T: 75.9 MSEC
BH CV ECHO MEAS - MV V2 MAX: 106.4 CM/SEC
BH CV ECHO MEAS - MV V2 MEAN: 57.6 CM/SEC
BH CV ECHO MEAS - MV V2 VTI: 42.7 CM
BH CV ECHO MEAS - MVA P1/2T LCG: 2 CM^2
BH CV ECHO MEAS - MVA(P1/2T): 2.9 CM^2
BH CV ECHO MEAS - MVA(VTI): 1.7 CM^2
BH CV ECHO MEAS - PA MAX PG (FULL): 2.2 MMHG
BH CV ECHO MEAS - PA MAX PG: 3.9 MMHG
BH CV ECHO MEAS - PA V2 MAX: 98.8 CM/SEC
BH CV ECHO MEAS - PULM A REVS DUR: 0.08 SEC
BH CV ECHO MEAS - PULM A REVS VEL: 36.9 CM/SEC
BH CV ECHO MEAS - PULM DIAS VEL: 43.7 CM/SEC
BH CV ECHO MEAS - PULM S/D: 1.3
BH CV ECHO MEAS - PULM SYS VEL: 58.2 CM/SEC
BH CV ECHO MEAS - PVA(V,A): 2.9 CM^2
BH CV ECHO MEAS - PVA(V,D): 2.9 CM^2
BH CV ECHO MEAS - QP/QS: 0.66
BH CV ECHO MEAS - RAP SYSTOLE: 3 MMHG
BH CV ECHO MEAS - RV MAX PG: 1.7 MMHG
BH CV ECHO MEAS - RV MEAN PG: 0.83 MMHG
BH CV ECHO MEAS - RV V1 MAX: 65.6 CM/SEC
BH CV ECHO MEAS - RV V1 MEAN: 41.9 CM/SEC
BH CV ECHO MEAS - RV V1 VTI: 11.1 CM
BH CV ECHO MEAS - RVOT AREA: 4.4 CM^2
BH CV ECHO MEAS - RVOT DIAM: 2.4 CM
BH CV ECHO MEAS - RVSP: 22 MMHG
BH CV ECHO MEAS - SI(AO): 200 ML/M^2
BH CV ECHO MEAS - SI(CUBED): 23.2 ML/M^2
BH CV ECHO MEAS - SI(LVOT): 37.1 ML/M^2
BH CV ECHO MEAS - SI(MOD-SP2): 15 ML/M^2
BH CV ECHO MEAS - SI(MOD-SP4): 14.5 ML/M^2
BH CV ECHO MEAS - SI(TEICH): 23 ML/M^2
BH CV ECHO MEAS - SUP REN AO DIAM: 1.8 CM
BH CV ECHO MEAS - SV(AO): 399.8 ML
BH CV ECHO MEAS - SV(CUBED): 46.4 ML
BH CV ECHO MEAS - SV(LVOT): 74.1 ML
BH CV ECHO MEAS - SV(MOD-SP2): 30 ML
BH CV ECHO MEAS - SV(MOD-SP4): 29 ML
BH CV ECHO MEAS - SV(RVOT): 48.8 ML
BH CV ECHO MEAS - SV(TEICH): 45.9 ML
BH CV ECHO MEAS - TAPSE (>1.6): 1.8 CM2
BH CV ECHO MEAS - TR MAX VEL: 219.9 CM/SEC
BH CV ECHO MEASUREMENTS AVERAGE E/E' RATIO: 12.1
BH CV XLRA - RV BASE: 2.8 CM
BH CV XLRA - RV LENGTH: 6.7 CM
BH CV XLRA - RV MID: 2.5 CM
BH CV XLRA - TDI S': 15 CM/SEC
LEFT ATRIUM VOLUME INDEX: 24 ML/M2
SINUS: 3.1 CM
STJ: 3.2 CM

## 2020-02-19 PROCEDURE — 93306 TTE W/DOPPLER COMPLETE: CPT | Performed by: INTERNAL MEDICINE

## 2020-02-19 PROCEDURE — 93306 TTE W/DOPPLER COMPLETE: CPT

## 2020-02-25 DIAGNOSIS — C79.51 PROSTATE CANCER METASTATIC TO BONE (HCC): ICD-10-CM

## 2020-02-25 DIAGNOSIS — C61 PROSTATE CANCER METASTATIC TO BONE (HCC): ICD-10-CM

## 2020-02-25 RX ORDER — HYDROCODONE BITARTRATE AND ACETAMINOPHEN 5; 325 MG/1; MG/1
1 TABLET ORAL EVERY 6 HOURS PRN
Qty: 120 TABLET | Refills: 0 | Status: SHIPPED | OUTPATIENT
Start: 2020-02-25 | End: 2020-03-29 | Stop reason: SDUPTHER

## 2020-03-03 NOTE — PROGRESS NOTES
Subjective Feeling generally well, no additional symptoms    History of Present Illness    The patient 79-year-old male with significant past medical history including prostate carcinoma, CKD 3 and long-term tobacco use be been seen by primary care in late January, 2018 for hoarseness.  Chest x-ray is now outpatient January 23 revealed sclerotic change in the posterior mid chest to be within 3 adjacent thoracic vertebral bodies of uncertain significance.  A follow-up chest CT revealed numerous sclerotic foci within all visualized bones consistent with metastatic disease, multiple enlarged mediastinal lymph nodes concerning for metastatic lymphadenopathy and a polypoidal abnormality in the right lateral wall of the trachea.  CT of abdomen March 20 revealed extensive osseous metastatic disease mildly enlarged retroperitoneal lymph nodes.  Bone scan March 20 was consistent with widespread osseous metastasis particularly in the proximal half the left humeral shaft, abnormal uptake in the sternum and manubrium and a small focus in the posterior aspect of the calvarium.  This led to a plain film the left humerus with mottled sclerosis involving the shaft of the humerus particularly in the proximal half but no evidence of pathologic fracture.    The patient has additionally type 2 diabetes on insulin pump, again a history of prostate carcinoma prostatectomy 12 years previously, hypertension, and hyperlipidemia.  Additional studies included a PSA of 395.1 from March 14, 2018.The patient's been seen by urology March 15 with plans to start Firmagon.    The patient is now seen in pulmonary clinic with Dr. Bijan Rivera and we have discussed that he will need bronchoscopy and mediastinoscopy.  Interestingly his additional history includes a long occupational exposure including working in the [place] just out of school, subsequent construction work for many years and a 30-pack-year history of smoking  stopping in the late 1990s.  He indicates that he followed with Dr. Guillen of urology for many years with no changes in his exams and normal PSAs.  He stopped this follow-up approximately 2 years ago.     Patient was seen in consultation with thoracic surgery on March 28 and plans are made for mediastinoscopy and bronchoscopy with lymph node biopsy.  Pathology revealed that these nodes were consistent with metastatic prostate carcinoma.  He was discussed at thoracic conference.  A PET/CT was not pursued and it was determined that he see medical oncology for hormonal treatment and radiation therapy if symptomatic.  It should be noted that the patient's March 15 [facility] office note describes that Firmagon was planned.     The patient has met with the son and grandson April 23.  We have also contacted Dr. Taylor of urology in that Firmagon was given just after his last visit and would next be due approximately May 3.  Patient feels considerably better with resolution of his pain, normalization of his performance status.  He would like to continue treatment through our office now contacted Dr. Taylor concerning this.    The patient is next seen June 11, 2018 we discussed his follow-up including his treatments with Lupron May 7 and his next treatment on July 30.  He has returned to essentially normal performance status and his PSA has dropped further from 395 March 14 27.8 May 7 and now 2.03 June 11.  We do plan to initiate Xgeva also study him via scans to document his improvement approximately a month from now.   The patient is next seen July 26, 2018.  Repeat imaging demonstrated interval resolution of mediastinal and retroperitoneal lymphadenopathy.  There is thickening in the distal aspect of the appendix with stranding?  Chronic appendicitis.  The patient indicates he is having no such symptoms and never has.  There is no change in sclerotic bony metastasis in the patient's PSA has dropped substantially to  1.66 by July 19 and 1.79 July 26.  He is actually feeling excellent and this is corroborated by family members.   Patient is next seen January 10, 2019 continuing to do very well and, in fact indicating [facility], likely [facility] replacements, visual review.  He is not certain is going to continue his care with them or with us or combination of both.    Patient is next seen April 4, 2019.  He is recently discharged after hospitalization March 15-18 with left upper lobe pneumonia.  We reviewed the findings in detail with his gradual recovery now documented.  His studies include a CT of chest which does not demonstrate any further bony lesions and/or pulmonary metastasis having developed.  He is feeling considerably better and approximately back to his baseline.  The patient is next seen June 28, 2019 feeling well but having baseline generally musculoskeletal pain and restless legs.His recent exams include a PSA of 0.81, CMP with potassium of 5.3 with repeat pending.  His performance status overall remains good to very good and is clearly responding to his treatments.  The patient is next seen December 13, 2019 continue to feel well.  He has had, oddly enough, 2 episodes of sleep paralysis which have occurred to him previously and he wonders if there is any connection with his prostate carcinoma though this is felt unlikely.     The patient when assessed in December had his PSA go to 2.9.  We continue with Lupron and Xgeva and is seen back now March 9, 2020 without additional symptoms.  We discussed that a schedule could likely change moving to 3 months for both of these medications particularly if he needs additional therapy directed at progressive disease.  Past Medical History:   Diagnosis Date   • Bone cancer (CMS/HCC)    • Cellulitis of great toe of left foot 10/19/2018   • CKD (chronic kidney disease)     Stage 3; followed by Dr. Vicky Duran    •  Diastolic dysfunction     GRADE II per echo 2018   • Difficulty breathing     during exertion   • Dyslipidemia    • Erectile dysfunction    • Fatigue    • History of blood transfusion    • History of kidney stones    • Hyperlipidemia    • Hypertension    • Hyponatremia    • Left ventricular hypertrophy     per echo 2018   • Localized edema    • Neuropathy    • Obstructive sleep apnea     USING CPAP   • Osteoarthritis    • Peptic ulcer    • Pneumonia    • Pneumonia of left upper lobe due to infectious organism (CMS/HCC) 3/15/2019   • Prostate cancer (CMS/HCC)     Status post prostatectomy, radiation therapy, and hormone therapy followed by Dr. Lee; metastatsis to bone   • Pure hyperglyceridemia    • Restless leg syndrome    • Sepsis (CMS/HCC)    • Sinus bradycardia    • Transient cerebral ischemia    • Type 2 diabetes mellitus (CMS/HCC)         Past Surgical History:   Procedure Laterality Date   • BRONCHOSCOPY N/A 4/9/2018    Procedure: BRONCHOSCOPY;  Surgeon: Bijan Rivera III, MD;  Location: [facility];  Service: Cardiothoracic   • COLONOSCOPY     • DEEP NECK LYMPH NODE BIOPSY / EXCISION     • HEMORRHOIDECTOMY     • MEDIASTINOSCOPY N/A 4/9/2018    Procedure: MEDIASTINOSCOPY WITH LYMPH NODE BIOPSY;  Surgeon: Bijan Rivera III, MD;  Location: [facility];  Service: Cardiothoracic   • OTHER SURGICAL HISTORY      ulcer repair   • PROSTATECTOMY  2006        Current Outpatient Medications on File Prior to Visit   Medication Sig Dispense Refill   • albuterol sulfate  (90 Base) MCG/ACT inhaler Inhale 2 puffs Every 4 (Four) Hours As Needed for Wheezing or Shortness of Air. 1 inhaler 0   • aspirin 81 MG EC tablet Take 1 tablet by mouth daily.     • cholecalciferol (VITAMIN D3) 1000 units tablet Take 2,000 Units by mouth Daily.     • Denosumab (XGEVA SC) Inject  under the skin into the appropriate area as directed Every 30 (Thirty) Days.     • dilTIAZem CD (CARDIZEM CD) 120 MG 24 hr capsule TAKE 1  CAPSULE EVERY DAY 90 capsule 3   • FLUAD 0.5 ML suspension prefilled syringe ADM 0.5ML IM UTD  0   • fluticasone (FLONASE SENSIMIST) 27.5 MCG/SPRAY nasal spray 2 sprays into each nostril As Needed.     • gabapentin (NEURONTIN) 100 MG capsule Take 4 capsules by mouth Every Night. 360 capsule 1   • HYDROcodone-acetaminophen (NORCO) 5-325 MG per tablet Take 1 tablet by mouth Every 6 (Six) Hours As Needed for Moderate Pain . 120 tablet 0   • insulin aspart (NovoLOG) 100 UNIT/ML patient supplied pump Inject  under the skin into the appropriate area as directed Continuous. Pt reports pump with basal rate 1.9 units/hr from 0800 to 1200. Basal rate 1.8 units/hr 1200 to 0800.  Checks bg ac/hs with bolus doses per pumps parameters     • Leuprolide Acetate, 3 Month, (LUPRON DEPOT, 3-MONTH, IM) Inject  into the appropriate muscle as directed by prescriber Every 3 (Three) Months.     • levothyroxine (SYNTHROID, LEVOTHROID) 75 MCG tablet Take 1 tablet by mouth Daily. 30 tablet 0   • modafinil (PROVIGIL) 200 MG tablet Take 1 tablet by mouth Daily. 90 tablet 1   • olmesartan (BENICAR) 40 MG tablet TAKE 1 TABLET EVERY DAY (DISCONTINUE LOSARTAN 100MG) 90 tablet 3   • pravastatin (PRAVACHOL) 40 MG tablet Take 40 mg by mouth daily.     • rOPINIRole (REQUIP) 0.5 MG tablet Take 1 tablet by mouth in the evening and 1 at bedtime. 180 tablet 2   • triamterene-hydrochlorothiazide (MAXZIDE-25) 37.5-25 MG per tablet TAKE 1 TABLET EVERY DAY 90 tablet 0     No current facility-administered medications on file prior to visit.         ALLERGIES:  No Known Allergies     Social History     Socioeconomic History   • Marital status:      Spouse name: Not on file   • Number of children: Not on file   • Years of education: College   • Highest education level: Not on file   Occupational History     Employer: RETIRED   Tobacco Use   • Smoking status: Former Smoker     Packs/day: 1.00     Years: 30.00     Pack years: 30.00     Types: Cigarettes      "Last attempt to quit: 1998     Years since quittin.1   • Smokeless tobacco: Never Used   Substance and Sexual Activity   • Alcohol use: No   • Drug use: No   • Sexual activity: Defer   Social History Narrative    CAFFEINE USE: 1 CUP DAILY; OCCASIONAL SOFT DRINK.         Family History   Problem Relation Age of Onset   • Heart failure Mother    • Hypertension Mother    • Diabetes Mother    • Heart disease Mother    • Lung cancer Father 46   • Hypertension Sister    • Lung cancer Sister 60   • Hypertension Brother    • Lung cancer Brother 62   • Heart disease Other    • Prostate cancer Brother 60   • Malig Hyperthermia Neg Hx         Review of Systems   Constitutional: Negative for fatigue.   Eyes: Negative.    Respiratory: Negative for chest tightness, shortness of breath and wheezing.    Gastrointestinal: Negative for abdominal pain, constipation, diarrhea, nausea and vomiting.   Allergic/Immunologic: Negative.    Neurological: Negative for weakness.   Hematological: Negative.    Psychiatric/Behavioral: Positive for sleep disturbance.       Constitution:Normalization of performance status  HENT: Negative.    Eyes: Negative.    Cardiovascular: Negative.    Respiratory:  No additional cough or shortness of breath Endocrine: Negative.    Hematologic/Lymphatic: Negative.    Skin: Negative.    Musculoskeletal: Resolution of joint pain and stiffness.   Gastrointestinal: Negative.    Genitourinary: Negative.    Neurological: Negative.    Psychiatric/Behavioral: Negative.    All other systems reviewed and are negative.  Objective     Vitals:    20 1050   BP: 152/76   Pulse: 50   Resp: 16   Temp: 97.5 °F (36.4 °C)   TempSrc: Oral   SpO2: 99%   Weight: 91.9 kg (202 lb 8 oz)   Height: 165.1 cm (65\")   PainSc: 0-No pain     Current Status 3/9/2020   ECOG score 0       Physical Exam    OBJECTIVE: Vitals signs are reviewed and are stable.    HEENT: PERRLA.    Neck:  Supple.  Bilateral bruits, No thyromegaly or " adenopathy  Lungs:  Clear.  No rales rhonchi or wheezes  Heart:  Regular rate and rhythm.  1/6 systolic murmur  Abdomen:   Soft, nontender.  Obese.  Bowel sounds present.  No organomegaly can be detected, though patient is tender in right lower to mid abdomen.  No ascites or masses again to be detected  Extremities:  No cyanosis, clubbing or edema.      RECENT LABS:  Hematology WBC   Date Value Ref Range Status   03/09/2020 7.31 3.40 - 10.80 10*3/mm3 Final     RBC   Date Value Ref Range Status   03/09/2020 4.08 (L) 4.14 - 5.80 10*6/mm3 Final     Hemoglobin   Date Value Ref Range Status   03/09/2020 12.1 (L) 13.0 - 17.7 g/dL Final     Hematocrit   Date Value Ref Range Status   03/09/2020 37.4 (L) 37.5 - 51.0 % Final     Platelets   Date Value Ref Range Status   03/09/2020 193 140 - 450 10*3/mm3 Final        Assessment/Plan       79-year-old male with medical history including prostate carcinoma, CK D3, long-term tobacco use recent development of hoarseness and subsequent studies that demonstrated findings consistent with metastatic disease to bone as well as multiple enlarged metastatic lymph nodes, and potential abnormality in the right lateral wall trachea.  His additional history includes type 2 diabetes on insulin pump which has been more effective for control of his blood sugars.    His recent symptoms have included generalized discomfort in multiple sites in his bony skeleton as well as right lower quadrant abdominal pain.  We discussed this made will improve after he starts Firmagon in the a.m.  Discussions with Dr. Rivera also have led to the conclusion that he'll need bronchoscopy and mediastinoscopy which did proceed as above on May 9.  Pathology revealed mediastinal lymph nodes to be involved with metastatic adenocarcinoma consistent with origin from a prostatic primary . The patient's case was discussed at thoracic conference and recommendations were to continue with ADT and radiation for symptomatic  sites.  The patient is seen back April 23 his treatment with ADT-Firmagon-has improved his symptomatic performance status to near baseline.  It is not felt that he'll require other treatments such as oral antiandrogens at this point.  He could, however, potentially benefit from agents such as Xgeva given at appropriate intervals. We went on to continue with Xgeva and rescan him July 23 demonstrating near remission developing.  This is also supported by his normalizing PSA.  The patient's scans demonstrated possibly chronic appendicitis is not having symptoms referable to this.he plans to be alert to the possibility of stenosis family member.  We discussed moving to an every 6 month treatment but at this point we'll continue at 3 months. As result we continued Lupron and Xgeva on schedule and is seen back now 6 months later continuing to generally improve.     We will continue the patient's treatment and is done well with no further symptoms until recently admitted mid March with upper lobe pneumonia.  Review of his studies during that visit fortunately do not demonstrate bony metastasis, additional mediastinal adenopathy, pleural effusions or masses.  It is felt that his metastatic prostate carcinoma remains controlled.  We planned, as a result to continue Xgeva and Lupron and the patient is now seen back continuing to do well.  We went on to discuss Lupron and Xgeva, medications including Neurontin and Requip which have been helpful and the patient is next seen December 13, 2019 again with symptoms control.  His PSA went on to be measured at 2.9.  Would like to continue same and have him return for follow-up in the further progressive consider adding additional oral therapy to her treatment.      *Continue Lupron and Xgeva today  *The patient will be contacted about his results as they become available and other therapy added as needed.  *Return 3 months, NP, Lupron and Xgeva    Addendum- patient's PSA has returned  at 5.030.  Again he remains asymptomatic though follow-up CT of chest and pelvis will be requested in 11 weeks prior to follow-up in 12 weeks.  The patient agrees with this plan and follow-up.

## 2020-03-05 DIAGNOSIS — C79.51 PROSTATE CANCER METASTATIC TO BONE (HCC): Primary | ICD-10-CM

## 2020-03-05 DIAGNOSIS — C61 PROSTATE CANCER METASTATIC TO BONE (HCC): Primary | ICD-10-CM

## 2020-03-05 RX ORDER — GABAPENTIN 100 MG/1
CAPSULE ORAL
Qty: 120 CAPSULE | Refills: 2 | Status: CANCELLED | OUTPATIENT
Start: 2020-03-05

## 2020-03-05 RX ORDER — GABAPENTIN 100 MG/1
400 CAPSULE ORAL NIGHTLY
Qty: 360 CAPSULE | Refills: 1 | Status: SHIPPED | OUTPATIENT
Start: 2020-03-05 | End: 2020-05-13 | Stop reason: SDUPTHER

## 2020-03-09 ENCOUNTER — INFUSION (OUTPATIENT)
Dept: ONCOLOGY | Facility: HOSPITAL | Age: 80
End: 2020-03-09

## 2020-03-09 ENCOUNTER — OFFICE VISIT (OUTPATIENT)
Dept: ONCOLOGY | Facility: CLINIC | Age: 80
End: 2020-03-09

## 2020-03-09 ENCOUNTER — LAB (OUTPATIENT)
Dept: LAB | Facility: HOSPITAL | Age: 80
End: 2020-03-09

## 2020-03-09 VITALS
RESPIRATION RATE: 16 BRPM | BODY MASS INDEX: 33.74 KG/M2 | WEIGHT: 202.5 LBS | TEMPERATURE: 97.5 F | SYSTOLIC BLOOD PRESSURE: 152 MMHG | OXYGEN SATURATION: 99 % | HEIGHT: 65 IN | DIASTOLIC BLOOD PRESSURE: 76 MMHG | HEART RATE: 50 BPM

## 2020-03-09 DIAGNOSIS — C79.51 OSSEOUS METASTASIS: ICD-10-CM

## 2020-03-09 DIAGNOSIS — C61 PROSTATE CANCER METASTATIC TO BONE (HCC): ICD-10-CM

## 2020-03-09 DIAGNOSIS — C79.51 OSSEOUS METASTASIS: Primary | ICD-10-CM

## 2020-03-09 DIAGNOSIS — C79.51 PROSTATE CANCER METASTATIC TO BONE (HCC): ICD-10-CM

## 2020-03-09 DIAGNOSIS — C61 PROSTATE CANCER METASTATIC TO BONE (HCC): Primary | ICD-10-CM

## 2020-03-09 DIAGNOSIS — C79.51 PROSTATE CANCER METASTATIC TO BONE (HCC): Primary | ICD-10-CM

## 2020-03-09 LAB
ALBUMIN SERPL-MCNC: 4.1 G/DL (ref 3.5–5.2)
ALBUMIN/GLOB SERPL: 1.3 G/DL (ref 1.1–2.4)
ALP SERPL-CCNC: 115 U/L (ref 38–116)
ALT SERPL W P-5'-P-CCNC: 15 U/L (ref 0–41)
ANION GAP SERPL CALCULATED.3IONS-SCNC: 12.3 MMOL/L (ref 5–15)
AST SERPL-CCNC: 14 U/L (ref 0–40)
BASOPHILS # BLD AUTO: 0.03 10*3/MM3 (ref 0–0.2)
BASOPHILS NFR BLD AUTO: 0.4 % (ref 0–1.5)
BILIRUB SERPL-MCNC: 0.3 MG/DL (ref 0.2–1.2)
BUN BLD-MCNC: 25 MG/DL (ref 6–20)
BUN/CREAT SERPL: 20.8 (ref 7.3–30)
CALCIUM SPEC-SCNC: 9.4 MG/DL (ref 8.5–10.2)
CHLORIDE SERPL-SCNC: 104 MMOL/L (ref 98–107)
CO2 SERPL-SCNC: 23.7 MMOL/L (ref 22–29)
CREAT BLD-MCNC: 1.2 MG/DL (ref 0.7–1.3)
DEPRECATED RDW RBC AUTO: 46.9 FL (ref 37–54)
EOSINOPHIL # BLD AUTO: 0.15 10*3/MM3 (ref 0–0.4)
EOSINOPHIL NFR BLD AUTO: 2.1 % (ref 0.3–6.2)
ERYTHROCYTE [DISTWIDTH] IN BLOOD BY AUTOMATED COUNT: 14 % (ref 12.3–15.4)
GFR SERPL CREATININE-BSD FRML MDRD: 58 ML/MIN/1.73
GLOBULIN UR ELPH-MCNC: 3.1 GM/DL (ref 1.8–3.5)
GLUCOSE BLD-MCNC: 169 MG/DL (ref 74–124)
HCT VFR BLD AUTO: 37.4 % (ref 37.5–51)
HGB BLD-MCNC: 12.1 G/DL (ref 13–17.7)
IMM GRANULOCYTES # BLD AUTO: 0.04 10*3/MM3 (ref 0–0.05)
IMM GRANULOCYTES NFR BLD AUTO: 0.5 % (ref 0–0.5)
LYMPHOCYTES # BLD AUTO: 2.58 10*3/MM3 (ref 0.7–3.1)
LYMPHOCYTES NFR BLD AUTO: 35.3 % (ref 19.6–45.3)
MAGNESIUM SERPL-MCNC: 2 MG/DL (ref 1.8–2.5)
MCH RBC QN AUTO: 29.7 PG (ref 26.6–33)
MCHC RBC AUTO-ENTMCNC: 32.4 G/DL (ref 31.5–35.7)
MCV RBC AUTO: 91.7 FL (ref 79–97)
MONOCYTES # BLD AUTO: 0.62 10*3/MM3 (ref 0.1–0.9)
MONOCYTES NFR BLD AUTO: 8.5 % (ref 5–12)
NEUTROPHILS # BLD AUTO: 3.89 10*3/MM3 (ref 1.7–7)
NEUTROPHILS NFR BLD AUTO: 53.2 % (ref 42.7–76)
NRBC BLD AUTO-RTO: 0 /100 WBC (ref 0–0.2)
PHOSPHATE SERPL-MCNC: 2.8 MG/DL (ref 2.5–4.5)
PLATELET # BLD AUTO: 193 10*3/MM3 (ref 140–450)
PMV BLD AUTO: 10.3 FL (ref 6–12)
POTASSIUM BLD-SCNC: 4.7 MMOL/L (ref 3.5–4.7)
PROT SERPL-MCNC: 7.2 G/DL (ref 6.3–8)
PSA SERPL-MCNC: 5.03 NG/ML (ref 0–4)
RBC # BLD AUTO: 4.08 10*6/MM3 (ref 4.14–5.8)
SODIUM BLD-SCNC: 140 MMOL/L (ref 134–145)
WBC NRBC COR # BLD: 7.31 10*3/MM3 (ref 3.4–10.8)

## 2020-03-09 PROCEDURE — 25010000002 LEUPROLIDE ACETATE (3 MONTH) PER 7.5 MG: Performed by: INTERNAL MEDICINE

## 2020-03-09 PROCEDURE — 96402 CHEMO HORMON ANTINEOPL SQ/IM: CPT

## 2020-03-09 PROCEDURE — 84153 ASSAY OF PSA TOTAL: CPT | Performed by: INTERNAL MEDICINE

## 2020-03-09 PROCEDURE — 80053 COMPREHEN METABOLIC PANEL: CPT

## 2020-03-09 PROCEDURE — 84100 ASSAY OF PHOSPHORUS: CPT

## 2020-03-09 PROCEDURE — 99214 OFFICE O/P EST MOD 30 MIN: CPT | Performed by: INTERNAL MEDICINE

## 2020-03-09 PROCEDURE — 83735 ASSAY OF MAGNESIUM: CPT

## 2020-03-09 PROCEDURE — 36415 COLL VENOUS BLD VENIPUNCTURE: CPT

## 2020-03-09 PROCEDURE — 96372 THER/PROPH/DIAG INJ SC/IM: CPT

## 2020-03-09 PROCEDURE — 85025 COMPLETE CBC W/AUTO DIFF WBC: CPT

## 2020-03-09 PROCEDURE — 25010000002 DENOSUMAB 120 MG/1.7ML SOLUTION: Performed by: INTERNAL MEDICINE

## 2020-03-09 RX ADMIN — LEUPROLIDE ACETATE 22.5 MG: KIT at 12:24

## 2020-03-09 RX ADMIN — DENOSUMAB 120 MG: 120 INJECTION SUBCUTANEOUS at 12:24

## 2020-03-10 DIAGNOSIS — C61 PROSTATE CANCER METASTATIC TO BONE (HCC): Primary | ICD-10-CM

## 2020-03-10 DIAGNOSIS — C79.51 PROSTATE CANCER METASTATIC TO BONE (HCC): Primary | ICD-10-CM

## 2020-03-29 DIAGNOSIS — C61 PROSTATE CANCER METASTATIC TO BONE (HCC): ICD-10-CM

## 2020-03-29 DIAGNOSIS — C79.51 PROSTATE CANCER METASTATIC TO BONE (HCC): ICD-10-CM

## 2020-03-30 RX ORDER — HYDROCODONE BITARTRATE AND ACETAMINOPHEN 5; 325 MG/1; MG/1
1 TABLET ORAL EVERY 6 HOURS PRN
Qty: 120 TABLET | Refills: 0 | Status: SHIPPED | OUTPATIENT
Start: 2020-03-30 | End: 2020-04-27 | Stop reason: SDUPTHER

## 2020-04-27 DIAGNOSIS — C79.51 PROSTATE CANCER METASTATIC TO BONE (HCC): ICD-10-CM

## 2020-04-27 DIAGNOSIS — C61 PROSTATE CANCER METASTATIC TO BONE (HCC): ICD-10-CM

## 2020-04-27 RX ORDER — HYDROCODONE BITARTRATE AND ACETAMINOPHEN 5; 325 MG/1; MG/1
1 TABLET ORAL EVERY 6 HOURS PRN
Qty: 120 TABLET | Refills: 0 | Status: SHIPPED | OUTPATIENT
Start: 2020-04-27 | End: 2020-05-26 | Stop reason: SDUPTHER

## 2020-05-04 ENCOUNTER — TELEPHONE (OUTPATIENT)
Dept: ONCOLOGY | Facility: CLINIC | Age: 80
End: 2020-05-04

## 2020-05-04 ENCOUNTER — TELEPHONE (OUTPATIENT)
Dept: ONCOLOGY | Facility: HOSPITAL | Age: 80
End: 2020-05-04

## 2020-05-04 NOTE — TELEPHONE ENCOUNTER
PT called in for advice regarding his CT scan. Pt has been in severe pain in his hip and has been in pain has been getting increasingly worse, PT would like medical advice as soon as possible.    Best call back number: [phone]

## 2020-05-04 NOTE — TELEPHONE ENCOUNTER
----- Message from Jose Lee MD sent at 5/4/2020  1:38 PM EDT -----  Yes, move them up to this week and and schedule with me next week.  Thanks, ASAEL  ----- Message -----  From: Adry Todd RN  Sent: 5/4/2020  10:41 AM EDT  To: Jose Lee MD    Hi Dr. Lee, I tried to call but I know you are busy.    Mr. Hrerera is having more pain in his right hip that has gotten worse over the last 2 days.  The pain meds he has are helping but he says the pain always comes right back.  He is wanting to know if you think we should move his ct scan up?  He is scheduled for a scan at the end of the month for chest/abd/pelvis.    Let me know what you think.

## 2020-05-06 NOTE — PROGRESS NOTES
Subjective Patient describes recently noted hip pain has resolved    History of Present Illness    The patient 79-year-old male with significant past medical history including prostate carcinoma, CKD 3 and long-term tobacco use be been seen by primary care in late January, 2018 for hoarseness.  Chest x-ray is now outpatient January 23 revealed sclerotic change in the posterior mid chest to be within 3 adjacent thoracic vertebral bodies of uncertain significance.  A follow-up chest CT revealed numerous sclerotic foci within all visualized bones consistent with metastatic disease, multiple enlarged mediastinal lymph nodes concerning for metastatic lymphadenopathy and a polypoidal abnormality in the right lateral wall of the trachea.  CT of abdomen March 20 revealed extensive osseous metastatic disease mildly enlarged retroperitoneal lymph nodes.  Bone scan March 20 was consistent with widespread osseous metastasis particularly in the proximal half the left humeral shaft, abnormal uptake in the sternum and manubrium and a small focus in the posterior aspect of the calvarium.  This led to a plain film the left humerus with mottled sclerosis involving the shaft of the humerus particularly in the proximal half but no evidence of pathologic fracture.    The patient has additionally type 2 diabetes on insulin pump, again a history of prostate carcinoma prostatectomy 12 years previously, hypertension, and hyperlipidemia.  Additional studies included a PSA of 395.1 from March 14, 2018.The patient's been seen by urology March 15 with plans to start Firmagon.    The patient is now seen in pulmonary clinic with Dr. Bijan Rivera and we have discussed that he will need bronchoscopy and mediastinoscopy.  Interestingly his additional history includes a long occupational exposure including working in the [place] just out of school, subsequent construction work for many years and a 30-pack-year history of  smoking stopping in the late 1990s.  He indicates that he followed with Dr. Guillen of urology for many years with no changes in his exams and normal PSAs.  He stopped this follow-up approximately 2 years ago.     Patient was seen in consultation with thoracic surgery on March 28 and plans are made for mediastinoscopy and bronchoscopy with lymph node biopsy.  Pathology revealed that these nodes were consistent with metastatic prostate carcinoma.  He was discussed at thoracic conference.  A PET/CT was not pursued and it was determined that he see medical oncology for hormonal treatment and radiation therapy if symptomatic.  It should be noted that the patient's March 15 [facility] office note describes that Firmagon was planned.     The patient has met with the son and grandson April 23.  We have also contacted Dr. Taylor of urology in that Firmagon was given just after his last visit and would next be due approximately May 3.  Patient feels considerably better with resolution of his pain, normalization of his performance status.  He would like to continue treatment through our office now contacted Dr. Taylor concerning this.    The patient is next seen June 11, 2018 we discussed his follow-up including his treatments with Lupron May 7 and his next treatment on July 30.  He has returned to essentially normal performance status and his PSA has dropped further from 395 March 14 27.8 May 7 and now 2.03 June 11.  We do plan to initiate Xgeva also study him via scans to document his improvement approximately a month from now.   The patient is next seen July 26, 2018.  Repeat imaging demonstrated interval resolution of mediastinal and retroperitoneal lymphadenopathy.  There is thickening in the distal aspect of the appendix with stranding?  Chronic appendicitis.  The patient indicates he is having no such symptoms and never has.  There is no change in sclerotic bony metastasis in the patient's PSA has dropped  substantially to 1.66 by July 19 and 1.79 July 26.  He is actually feeling excellent and this is corroborated by family members.   Patient is next seen January 10, 2019 continuing to do very well and, in fact indicating [facility], likely [facility] replacements, visual review.  He is not certain is going to continue his care with them or with us or combination of both.    Patient is next seen April 4, 2019.  He is recently discharged after hospitalization March 15-18 with left upper lobe pneumonia.  We reviewed the findings in detail with his gradual recovery now documented.  His studies include a CT of chest which does not demonstrate any further bony lesions and/or pulmonary metastasis having developed.  He is feeling considerably better and approximately back to his baseline.  The patient is next seen June 28, 2019 feeling well but having baseline generally musculoskeletal pain and restless legs.His recent exams include a PSA of 0.81, CMP with potassium of 5.3 with repeat pending.  His performance status overall remains good to very good and is clearly responding to his treatments.  The patient is next seen December 13, 2019 continue to feel well.  He has had, oddly enough, 2 episodes of sleep paralysis which have occurred to him previously and he wonders if there is any connection with his prostate carcinoma though this is felt unlikely.     The patient when assessed in December had his PSA go to 2.9.  We continue with Lupron and Xgeva and is seen back now March 9, 2020 without additional symptoms.  We discussed that a schedule could likely change moving to 3 months for both of these medications particularly if he needs additional therapy directed at progressive disease.     Patient contacted our practice May 4 concern for pain in his hips.  This led to moving his scans up and they were performed May 8, 2020 showing a sub-6 mm pleural-based pulmonary nodule in  the right lower lobe that is new from March 15, 2018, remainder of the sub-6 mm pulmonary nodules bilaterally are stable.  There is extensive osseous metastatic disease as previous with no other new findings.     The patient indicates, when seen, May 13 that his bone pain is now resolved.  While this is good information does not mean that he does not have further bony metastasis and we have discussed that a follow-up bone scan is likely necessary.  Furthermore he is having some difficulty with sleeplessness and increase in restless legs as he continues to stay at home during the COVID 19 crisis which, itself, is producing more anxiety for him.  Past Medical History:   Diagnosis Date   • Bone cancer (CMS/HCC)    • Cellulitis of great toe of left foot 10/19/2018   • CKD (chronic kidney disease)     Stage 3; followed by Dr. Vicky Duran    • Diastolic dysfunction     GRADE II per echo 2018   • Difficulty breathing     during exertion   • Dyslipidemia    • Erectile dysfunction    • Fatigue    • History of blood transfusion    • History of kidney stones    • Hyperlipidemia    • Hypertension    • Hyponatremia    • Left ventricular hypertrophy     per echo 2018   • Localized edema    • Neuropathy    • Obstructive sleep apnea     USING CPAP   • Osteoarthritis    • Peptic ulcer    • Pneumonia    • Pneumonia of left upper lobe due to infectious organism (CMS/HCC) 3/15/2019   • Prostate cancer (CMS/HCC)     Status post prostatectomy, radiation therapy, and hormone therapy followed by Dr. Lee; metastatsis to bone   • Pure hyperglyceridemia    • Restless leg syndrome    • Sepsis (CMS/HCC)    • Sinus bradycardia    • Transient cerebral ischemia    • Type 2 diabetes mellitus (CMS/HCC)         Past Surgical History:   Procedure Laterality Date   • BRONCHOSCOPY N/A 4/9/2018    Procedure: BRONCHOSCOPY;  Surgeon: Bijan Rivera III, MD;  Location: [facility];  Service: Cardiothoracic   • COLONOSCOPY     • DEEP NECK LYMPH  NODE BIOPSY / EXCISION     • HEMORRHOIDECTOMY     • MEDIASTINOSCOPY N/A 4/9/2018    Procedure: MEDIASTINOSCOPY WITH LYMPH NODE BIOPSY;  Surgeon: Bijan Rivera III, MD;  [facility];  Service: Cardiothoracic   • OTHER SURGICAL HISTORY      ulcer repair   • PROSTATECTOMY  2006        Current Outpatient Medications on File Prior to Visit   Medication Sig Dispense Refill   • albuterol sulfate  (90 Base) MCG/ACT inhaler Inhale 2 puffs Every 4 (Four) Hours As Needed for Wheezing or Shortness of Air. 1 inhaler 0   • aspirin 81 MG EC tablet Take 1 tablet by mouth daily.     • cholecalciferol (VITAMIN D3) 1000 units tablet Take 2,000 Units by mouth Daily.     • Denosumab (XGEVA SC) Inject  under the skin into the appropriate area as directed Every 30 (Thirty) Days.     • dilTIAZem CD (CARDIZEM CD) 120 MG 24 hr capsule TAKE 1 CAPSULE EVERY DAY 90 capsule 3   • fluticasone (FLONASE SENSIMIST) 27.5 MCG/SPRAY nasal spray 2 sprays into each nostril As Needed.     • HYDROcodone-acetaminophen (NORCO) 5-325 MG per tablet Take 1 tablet by mouth Every 6 (Six) Hours As Needed for Moderate Pain . 120 tablet 0   • insulin aspart (NovoLOG) 100 UNIT/ML patient supplied pump Inject  under the skin into the appropriate area as directed Continuous. Pt reports pump with basal rate 1.9 units/hr from 0800 to 1200. Basal rate 1.8 units/hr 1200 to 0800.  Checks bg ac/hs with bolus doses per pumps parameters     • Leuprolide Acetate, 3 Month, (LUPRON DEPOT, 3-MONTH, IM) Inject  into the appropriate muscle as directed by prescriber Every 3 (Three) Months.     • levothyroxine (SYNTHROID, LEVOTHROID) 75 MCG tablet Take 1 tablet by mouth Daily. 30 tablet 0   • modafinil (PROVIGIL) 200 MG tablet Take 1 tablet by mouth Daily. 90 tablet 1   • olmesartan (BENICAR) 40 MG tablet TAKE 1 TABLET EVERY DAY (DISCONTINUE LOSARTAN 100MG) 90 tablet 3   • pravastatin (PRAVACHOL) 40 MG tablet Take 40 mg by mouth daily.     • rOPINIRole (REQUIP)  0.5 MG tablet Take 1 tablet by mouth in the evening and 1 at bedtime. 180 tablet 2   • triamterene-hydrochlorothiazide (MAXZIDE-25) 37.5-25 MG per tablet TAKE 1 TABLET EVERY DAY APPOINTMENT IS NEEDED 90 tablet 0   • [DISCONTINUED] gabapentin (NEURONTIN) 100 MG capsule Take 4 capsules by mouth Every Night. 360 capsule 1   • [DISCONTINUED] FLUAD 0.5 ML suspension prefilled syringe ADM 0.5ML IM UTD  0     No current facility-administered medications on file prior to visit.         ALLERGIES:  No Known Allergies     Social History     Socioeconomic History   • Marital status:      Spouse name: Not on file   • Number of children: Not on file   • Years of education: College   • Highest education level: Not on file   Occupational History     Employer: RETIRED   Tobacco Use   • Smoking status: Former Smoker     Packs/day: 1.00     Years: 30.00     Pack years: 30.00     Types: Cigarettes     Last attempt to quit: 1998     Years since quittin.3   • Smokeless tobacco: Never Used   Substance and Sexual Activity   • Alcohol use: No   • Drug use: No   • Sexual activity: Defer   Social History Narrative    CAFFEINE USE: 1 CUP DAILY; OCCASIONAL SOFT DRINK.         Family History   Problem Relation Age of Onset   • Heart failure Mother    • Hypertension Mother    • Diabetes Mother    • Heart disease Mother    • Lung cancer Father 46   • Hypertension Sister    • Lung cancer Sister 60   • Hypertension Brother    • Lung cancer Brother 62   • Heart disease Other    • Prostate cancer Brother 60   • Malig Hyperthermia Neg Hx         Review of Systems   Constitutional: Negative for fatigue.   HENT: Negative.    Eyes: Negative.    Respiratory: Negative for chest tightness, shortness of breath and wheezing.    Gastrointestinal: Negative for abdominal pain, constipation, diarrhea, nausea and vomiting.   Endocrine: Negative.    Genitourinary: Negative.    Musculoskeletal:        Recently noted arthralgias now resolved   Skin:  "Negative.    Allergic/Immunologic: Negative.    Neurological: Negative for weakness.   Hematological: Negative.    Psychiatric/Behavioral: Positive for sleep disturbance.       Constitution:Normalization of performance status  HENT: Negative.    Eyes: Negative.    Cardiovascular: Negative.    Respiratory:  No additional cough or shortness of breath Endocrine: Negative.    Hematologic/Lymphatic: Negative.    Skin: Negative.    Musculoskeletal: Resolution of joint pain and stiffness.   Gastrointestinal: Negative.    Genitourinary: Negative.    Neurological: Negative.    Psychiatric/Behavioral: Negative.    All other systems reviewed and are negative.  Objective     Vitals:    05/13/20 0807   BP: 160/72   Pulse: 55   Resp: 18   Temp: 98.7 °F (37.1 °C)   TempSrc: Oral   SpO2: 99%   Weight: 90.5 kg (199 lb 8 oz)   Height: 165.1 cm (65\")   PainSc: 0-No pain     Current Status 5/13/2020   ECOG score 0       Physical Exam    OBJECTIVE: Vitals signs are reviewed and are stable.    HEENT: PERRLA.    Neck:  Supple.  Bilateral bruits, No thyromegaly or adenopathy  Lungs:  Clear.  No rales rhonchi or wheezes  Heart:  Regular rate and rhythm.  1/6 systolic murmur  Abdomen:   Soft, nontender.  Obese.  Bowel sounds present.  No organomegaly can be detected, though patient is tender in right lower to mid abdomen.  No ascites or masses again to be detected  Extremities:  No cyanosis, clubbing or edema.    Neurologic: Nonfocal, no sensory loss peripherally    RECENT LABS:  Hematology WBC   Date Value Ref Range Status   05/13/2020 8.48 3.40 - 10.80 10*3/mm3 Final     RBC   Date Value Ref Range Status   05/13/2020 4.32 4.14 - 5.80 10*6/mm3 Final     Hemoglobin   Date Value Ref Range Status   05/13/2020 12.7 (L) 13.0 - 17.7 g/dL Final     Hematocrit   Date Value Ref Range Status   05/13/2020 39.6 37.5 - 51.0 % Final     Platelets   Date Value Ref Range Status   05/13/2020 242 140 - 450 10*3/mm3 Final        Assessment/Plan       " 79-year-old male with medical history including prostate carcinoma, CK D3, long-term tobacco use recent development of hoarseness and subsequent studies that demonstrated findings consistent with metastatic disease to bone as well as multiple enlarged metastatic lymph nodes, and potential abnormality in the right lateral wall trachea.  His additional history includes type 2 diabetes on insulin pump which has been more effective for control of his blood sugars.    His recent symptoms have included generalized discomfort in multiple sites in his bony skeleton as well as right lower quadrant abdominal pain.  We discussed this made will improve after he starts Firmagon in the a.m.  Discussions with Dr. Rivera also have led to the conclusion that he'll need bronchoscopy and mediastinoscopy which did proceed as above on May 9.  Pathology revealed mediastinal lymph nodes to be involved with metastatic adenocarcinoma consistent with origin from a prostatic primary . The patient's case was discussed at thoracic conference and recommendations were to continue with ADT and radiation for symptomatic sites.  The patient is seen back April 23 his treatment with ADT-Firmagon-has improved his symptomatic performance status to near baseline.  It is not felt that he'll require other treatments such as oral antiandrogens at this point.  He could, however, potentially benefit from agents such as Xgeva given at appropriate intervals. We went on to continue with Xgeva and rescan him July 23 demonstrating near remission developing.  This is also supported by his normalizing PSA.  The patient's scans demonstrated possibly chronic appendicitis is not having symptoms referable to this.he plans to be alert to the possibility of stenosis family member.  We discussed moving to an every 6 month treatment but at this point we'll continue at 3 months. As result we continued Lupron and Xgeva on schedule and is seen back now 6 months later  continuing to generally improve.     We will continue the patient's treatment and is done well with no further symptoms until recently admitted mid March with upper lobe pneumonia.  Review of his studies during that visit fortunately do not demonstrate bony metastasis, additional mediastinal adenopathy, pleural effusions or masses.  It is felt that his metastatic prostate carcinoma remains controlled.  We planned, as a result to continue Xgeva and Lupron and the patient is now seen back continuing to do well.  We went on to discuss Lupron and Xgeva, medications including Neurontin and Requip which have been helpful and the patient is next seen December 13, 2019 again with symptoms control.  His PSA went on to be measured at 2.9.  Would like to continue same and have him return for follow-up in the further progressive consider adding additional oral therapy to her treatment.   We continued Xgeva and Lupron and later contacted the patient about his PSA of 5.030.  Follow-up scans were scheduled prior to his visit in 3 months but they have not been done at 2 months at May 8 when the patient called about increasing hip pain.  These scans do not demonstrate progression of disease in any significant way.  We have gone on to review the scans together and his overall symptoms and plan to try to address his additional sleep issues, restless legs, pain management as necessary and further diagnostics.      Plan:    *Patient's Neurontin will be increased to 600 mg p.o. nightly-E scribed to his mail order pharmacy at 300 mg capsules 2 p.o. nightly #60    *Patient continue Norco PRN, he will hold his current recently prescribed trazodone to 50 mg p.o. nightly for sleep if needed.  His increased Neurontin dose may well help this issue and trazodone may not be necessary    *PSA will be added to today's laboratories    *Bone scan scheduled in 3 weeks    *Patient will be asked to be seen in 4 weeks for reevaluation in terms of  localized bony metastasis and possible need for local XRT versus systemic additional therapy such as Xtandi    *Possible Xgeva will be given in 4 weeks moving his schedule to every 3 months unless he has clear progression of bony metastasis indicating that we would need to move this back to monthly.

## 2020-05-08 ENCOUNTER — HOSPITAL ENCOUNTER (OUTPATIENT)
Dept: PET IMAGING | Facility: HOSPITAL | Age: 80
Discharge: HOME | End: 2020-05-08
Admitting: INTERNAL MEDICINE

## 2020-05-08 DIAGNOSIS — C61 PROSTATE CANCER METASTATIC TO BONE (HCC): ICD-10-CM

## 2020-05-08 DIAGNOSIS — C79.51 PROSTATE CANCER METASTATIC TO BONE (HCC): ICD-10-CM

## 2020-05-08 LAB — CREAT BLDA-MCNC: 1.3 MG/DL (ref 0.6–1.3)

## 2020-05-08 PROCEDURE — 82565 ASSAY OF CREATININE: CPT

## 2020-05-08 PROCEDURE — 71260 CT THORAX DX C+: CPT

## 2020-05-08 PROCEDURE — 25010000002 IOPAMIDOL 61 % SOLUTION: Performed by: INTERNAL MEDICINE

## 2020-05-08 PROCEDURE — 0 DIATRIZOATE MEGLUMINE & SODIUM PER 1 ML: Performed by: INTERNAL MEDICINE

## 2020-05-08 PROCEDURE — 74177 CT ABD & PELVIS W/CONTRAST: CPT

## 2020-05-08 RX ADMIN — DIATRIZOATE MEGLUMINE AND DIATRIZOATE SODIUM 30 ML: 660; 100 LIQUID ORAL; RECTAL at 10:45

## 2020-05-08 RX ADMIN — IOPAMIDOL 85 ML: 612 INJECTION, SOLUTION INTRAVENOUS at 11:20

## 2020-05-11 DIAGNOSIS — G47.14 HYPERSOMNIA DUE TO MEDICAL CONDITION: ICD-10-CM

## 2020-05-11 DIAGNOSIS — G47.33 OBSTRUCTIVE SLEEP APNEA SYNDROME: Primary | ICD-10-CM

## 2020-05-11 RX ORDER — TRIAMTERENE AND HYDROCHLOROTHIAZIDE 37.5; 25 MG/1; MG/1
TABLET ORAL
Qty: 90 TABLET | Refills: 0 | Status: SHIPPED | OUTPATIENT
Start: 2020-05-11 | End: 2020-08-08

## 2020-05-11 RX ORDER — MODAFINIL 200 MG/1
200 TABLET ORAL DAILY
Qty: 90 TABLET | Refills: 1 | Status: SHIPPED | OUTPATIENT
Start: 2020-05-11 | End: 2020-10-16 | Stop reason: SDUPTHER

## 2020-05-13 ENCOUNTER — LAB (OUTPATIENT)
Dept: LAB | Facility: HOSPITAL | Age: 80
End: 2020-05-13

## 2020-05-13 ENCOUNTER — APPOINTMENT (OUTPATIENT)
Dept: ONCOLOGY | Facility: HOSPITAL | Age: 80
End: 2020-05-13

## 2020-05-13 ENCOUNTER — OFFICE VISIT (OUTPATIENT)
Dept: ONCOLOGY | Facility: CLINIC | Age: 80
End: 2020-05-13

## 2020-05-13 VITALS
OXYGEN SATURATION: 99 % | HEIGHT: 65 IN | HEART RATE: 55 BPM | BODY MASS INDEX: 33.24 KG/M2 | TEMPERATURE: 98.7 F | DIASTOLIC BLOOD PRESSURE: 72 MMHG | WEIGHT: 199.5 LBS | RESPIRATION RATE: 18 BRPM | SYSTOLIC BLOOD PRESSURE: 160 MMHG

## 2020-05-13 DIAGNOSIS — C79.51 OSSEOUS METASTASIS: ICD-10-CM

## 2020-05-13 DIAGNOSIS — C79.51 PROSTATE CANCER METASTATIC TO BONE (HCC): ICD-10-CM

## 2020-05-13 DIAGNOSIS — C79.51 PROSTATE CANCER METASTATIC TO BONE (HCC): Primary | ICD-10-CM

## 2020-05-13 DIAGNOSIS — C61 PROSTATE CANCER METASTATIC TO BONE (HCC): Primary | ICD-10-CM

## 2020-05-13 DIAGNOSIS — C61 PROSTATE CANCER METASTATIC TO BONE (HCC): ICD-10-CM

## 2020-05-13 LAB
ALBUMIN SERPL-MCNC: 4.3 G/DL (ref 3.5–5.2)
ALBUMIN/GLOB SERPL: 1.3 G/DL (ref 1.1–2.4)
ALP SERPL-CCNC: 115 U/L (ref 38–116)
ALT SERPL W P-5'-P-CCNC: 13 U/L (ref 0–41)
ANION GAP SERPL CALCULATED.3IONS-SCNC: 12.1 MMOL/L (ref 5–15)
AST SERPL-CCNC: 16 U/L (ref 0–40)
BASOPHILS # BLD AUTO: 0.03 10*3/MM3 (ref 0–0.2)
BASOPHILS NFR BLD AUTO: 0.4 % (ref 0–1.5)
BILIRUB SERPL-MCNC: 0.3 MG/DL (ref 0.2–1.2)
BUN BLD-MCNC: 23 MG/DL (ref 6–20)
BUN/CREAT SERPL: 20.2 (ref 7.3–30)
CALCIUM SPEC-SCNC: 9.5 MG/DL (ref 8.5–10.2)
CHLORIDE SERPL-SCNC: 102 MMOL/L (ref 98–107)
CO2 SERPL-SCNC: 23.9 MMOL/L (ref 22–29)
CREAT BLD-MCNC: 1.14 MG/DL (ref 0.7–1.3)
DEPRECATED RDW RBC AUTO: 45.1 FL (ref 37–54)
EOSINOPHIL # BLD AUTO: 0.18 10*3/MM3 (ref 0–0.4)
EOSINOPHIL NFR BLD AUTO: 2.1 % (ref 0.3–6.2)
ERYTHROCYTE [DISTWIDTH] IN BLOOD BY AUTOMATED COUNT: 13.2 % (ref 12.3–15.4)
GFR SERPL CREATININE-BSD FRML MDRD: 62 ML/MIN/1.73
GLOBULIN UR ELPH-MCNC: 3.4 GM/DL (ref 1.8–3.5)
GLUCOSE BLD-MCNC: 125 MG/DL (ref 74–124)
HCT VFR BLD AUTO: 39.6 % (ref 37.5–51)
HGB BLD-MCNC: 12.7 G/DL (ref 13–17.7)
IMM GRANULOCYTES # BLD AUTO: 0.04 10*3/MM3 (ref 0–0.05)
IMM GRANULOCYTES NFR BLD AUTO: 0.5 % (ref 0–0.5)
LYMPHOCYTES # BLD AUTO: 2.96 10*3/MM3 (ref 0.7–3.1)
LYMPHOCYTES NFR BLD AUTO: 34.9 % (ref 19.6–45.3)
MAGNESIUM SERPL-MCNC: 2 MG/DL (ref 1.8–2.5)
MCH RBC QN AUTO: 29.4 PG (ref 26.6–33)
MCHC RBC AUTO-ENTMCNC: 32.1 G/DL (ref 31.5–35.7)
MCV RBC AUTO: 91.7 FL (ref 79–97)
MONOCYTES # BLD AUTO: 0.66 10*3/MM3 (ref 0.1–0.9)
MONOCYTES NFR BLD AUTO: 7.8 % (ref 5–12)
NEUTROPHILS # BLD AUTO: 4.61 10*3/MM3 (ref 1.7–7)
NEUTROPHILS NFR BLD AUTO: 54.3 % (ref 42.7–76)
NRBC BLD AUTO-RTO: 0 /100 WBC (ref 0–0.2)
PHOSPHATE SERPL-MCNC: 3 MG/DL (ref 2.5–4.5)
PLATELET # BLD AUTO: 242 10*3/MM3 (ref 140–450)
PMV BLD AUTO: 9.5 FL (ref 6–12)
POTASSIUM BLD-SCNC: 5.1 MMOL/L (ref 3.5–4.7)
PROT SERPL-MCNC: 7.7 G/DL (ref 6.3–8)
PSA SERPL-MCNC: 7.86 NG/ML (ref 0–4)
RBC # BLD AUTO: 4.32 10*6/MM3 (ref 4.14–5.8)
SODIUM BLD-SCNC: 138 MMOL/L (ref 134–145)
WBC NRBC COR # BLD: 8.48 10*3/MM3 (ref 3.4–10.8)

## 2020-05-13 PROCEDURE — 84100 ASSAY OF PHOSPHORUS: CPT

## 2020-05-13 PROCEDURE — 85025 COMPLETE CBC W/AUTO DIFF WBC: CPT

## 2020-05-13 PROCEDURE — 99215 OFFICE O/P EST HI 40 MIN: CPT | Performed by: INTERNAL MEDICINE

## 2020-05-13 PROCEDURE — 84153 ASSAY OF PSA TOTAL: CPT | Performed by: INTERNAL MEDICINE

## 2020-05-13 PROCEDURE — 80053 COMPREHEN METABOLIC PANEL: CPT

## 2020-05-13 PROCEDURE — 36415 COLL VENOUS BLD VENIPUNCTURE: CPT

## 2020-05-13 PROCEDURE — 83735 ASSAY OF MAGNESIUM: CPT

## 2020-05-13 RX ORDER — GABAPENTIN 300 MG/1
600 CAPSULE ORAL NIGHTLY
Qty: 60 CAPSULE | Refills: 0 | Status: SHIPPED | OUTPATIENT
Start: 2020-05-13 | End: 2020-06-23 | Stop reason: SDUPTHER

## 2020-05-14 ENCOUNTER — APPOINTMENT (OUTPATIENT)
Dept: LAB | Facility: HOSPITAL | Age: 80
End: 2020-05-14

## 2020-05-26 DIAGNOSIS — C61 PROSTATE CANCER METASTATIC TO BONE (HCC): ICD-10-CM

## 2020-05-26 DIAGNOSIS — C79.51 PROSTATE CANCER METASTATIC TO BONE (HCC): ICD-10-CM

## 2020-05-26 RX ORDER — HYDROCODONE BITARTRATE AND ACETAMINOPHEN 5; 325 MG/1; MG/1
1 TABLET ORAL EVERY 6 HOURS PRN
Qty: 120 TABLET | Refills: 0 | Status: CANCELLED | OUTPATIENT
Start: 2020-05-26

## 2020-05-26 RX ORDER — HYDROCODONE BITARTRATE AND ACETAMINOPHEN 5; 325 MG/1; MG/1
1 TABLET ORAL EVERY 6 HOURS PRN
Qty: 120 TABLET | Refills: 0 | Status: SHIPPED | OUTPATIENT
Start: 2020-05-26 | End: 2020-06-23 | Stop reason: SDUPTHER

## 2020-06-03 ENCOUNTER — HOSPITAL ENCOUNTER (OUTPATIENT)
Dept: NUCLEAR MEDICINE | Facility: HOSPITAL | Age: 80
Discharge: HOME | End: 2020-06-03

## 2020-06-03 DIAGNOSIS — C61 PROSTATE CANCER METASTATIC TO BONE (HCC): ICD-10-CM

## 2020-06-03 DIAGNOSIS — C79.51 OSSEOUS METASTASIS: ICD-10-CM

## 2020-06-03 DIAGNOSIS — C79.51 PROSTATE CANCER METASTATIC TO BONE (HCC): ICD-10-CM

## 2020-06-03 PROCEDURE — 0 TECHNETIUM MEDRONATE KIT: Performed by: INTERNAL MEDICINE

## 2020-06-03 PROCEDURE — 78306 BONE IMAGING WHOLE BODY: CPT

## 2020-06-03 PROCEDURE — A9503 TC99M MEDRONATE: HCPCS | Performed by: INTERNAL MEDICINE

## 2020-06-03 RX ORDER — TC 99M MEDRONATE 20 MG/10ML
20 INJECTION, POWDER, LYOPHILIZED, FOR SOLUTION INTRAVENOUS
Status: COMPLETED | OUTPATIENT
Start: 2020-06-03 | End: 2020-06-03

## 2020-06-03 RX ADMIN — Medication 20 MILLICURIE: at 11:27

## 2020-06-10 NOTE — PROGRESS NOTES
Subjective Patient again with resolution of bony discomfort.    History of Present Illness    The patient 79-year-old male with significant past medical history including prostate carcinoma, CKD 3 and long-term tobacco use be been seen by primary care in late January, 2018 for hoarseness.  Chest x-ray is now outpatient January 23 revealed sclerotic change in the posterior mid chest to be within 3 adjacent thoracic vertebral bodies of uncertain significance.  A follow-up chest CT revealed numerous sclerotic foci within all visualized bones consistent with metastatic disease, multiple enlarged mediastinal lymph nodes concerning for metastatic lymphadenopathy and a polypoidal abnormality in the right lateral wall of the trachea.  CT of abdomen March 20 revealed extensive osseous metastatic disease mildly enlarged retroperitoneal lymph nodes.  Bone scan March 20 was consistent with widespread osseous metastasis particularly in the proximal half the left humeral shaft, abnormal uptake in the sternum and manubrium and a small focus in the posterior aspect of the calvarium.  This led to a plain film the left humerus with mottled sclerosis involving the shaft of the humerus particularly in the proximal half but no evidence of pathologic fracture.    The patient has additionally type 2 diabetes on insulin pump, again a history of prostate carcinoma prostatectomy 12 years previously, hypertension, and hyperlipidemia.  Additional studies included a PSA of 395.1 from March 14, 2018.The patient's been seen by urology March 15 with plans to start Firmagon.    The patient is now seen in pulmonary clinic with Dr. Bijan Rivera and we have discussed that he will need bronchoscopy and mediastinoscopy.  Interestingly his additional history includes a long occupational exposure including working in the [place] just out of school, subsequent construction work for many years and a 30-pack-year history of smoking  stopping in the late 1990s.  He indicates that he followed with Dr. Guillen of urology for many years with no changes in his exams and normal PSAs.  He stopped this follow-up approximately 2 years ago.     Patient was seen in consultation with thoracic surgery on March 28 and plans are made for mediastinoscopy and bronchoscopy with lymph node biopsy.  Pathology revealed that these nodes were consistent with metastatic prostate carcinoma.  He was discussed at thoracic conference.  A PET/CT was not pursued and it was determined that he see medical oncology for hormonal treatment and radiation therapy if symptomatic.  It should be noted that the patient's March 15 [facility] office note describes that Firmagon was planned.     The patient has met with the son and grandson April 23.  We have also contacted Dr. Taylor of urology in that Firmagon was given just after his last visit and would next be due approximately May 3.  Patient feels considerably better with resolution of his pain, normalization of his performance status.  He would like to continue treatment through our office now contacted Dr. Taylor concerning this.    The patient is next seen June 11, 2018 we discussed his follow-up including his treatments with Lupron May 7 and his next treatment on July 30.  He has returned to essentially normal performance status and his PSA has dropped further from 395 March 14 27.8 May 7 and now 2.03 June 11.  We do plan to initiate Xgeva also study him via scans to document his improvement approximately a month from now.   The patient is next seen July 26, 2018.  Repeat imaging demonstrated interval resolution of mediastinal and retroperitoneal lymphadenopathy.  There is thickening in the distal aspect of the appendix with stranding?  Chronic appendicitis.  The patient indicates he is having no such symptoms and never has.  There is no change in sclerotic bony metastasis in the patient's PSA has dropped substantially to  1.66 by July 19 and 1.79 July 26.  He is actually feeling excellent and this is corroborated by family members.   Patient is next seen January 10, 2019 continuing to do very well and, in fact indicating that [facility], likely [facility] replacements, visual review.  He is not certain is going to continue his care with them or with us or combination of both.    Patient is next seen April 4, 2019.  He is recently discharged after hospitalization March 15-18 with left upper lobe pneumonia.  We reviewed the findings in detail with his gradual recovery now documented.  His studies include a CT of chest which does not demonstrate any further bony lesions and/or pulmonary metastasis having developed.  He is feeling considerably better and approximately back to his baseline.  The patient is next seen June 28, 2019 feeling well but having baseline generally musculoskeletal pain and restless legs.His recent exams include a PSA of 0.81, CMP with potassium of 5.3 with repeat pending.  His performance status overall remains good to very good and is clearly responding to his treatments.  The patient is next seen December 13, 2019 continue to feel well.  He has had, oddly enough, 2 episodes of sleep paralysis which have occurred to him previously and he wonders if there is any connection with his prostate carcinoma though this is felt unlikely.     The patient when assessed in December had his PSA go to 2.9.  We continue with Lupron and Xgeva and is seen back now March 9, 2020 without additional symptoms.  We discussed that a schedule could likely change moving to 3 months for both of these medications particularly if he needs additional therapy directed at progressive disease.     Patient contacted our practice May 4 concern for pain in his hips.  This led to moving his scans up and they were performed May 8, 2020 showing a sub-6 mm pleural-based pulmonary nodule in the right lower  lobe that is new from March 15, 2018, remainder of the sub-6 mm pulmonary nodules bilaterally are stable.  There is extensive osseous metastatic disease as previous with no other new findings.     The patient indicates, when seen, May 13 that his bone pain is now resolved.  While this is good information does not mean that he does not have further bony metastasis and we have discussed that a follow-up bone scan is likely necessary.  Furthermore he is having some difficulty with sleeplessness and increase in restless legs as he continues to stay at home during the COVID 19 crisis which, itself, is producing more anxiety for him.  A follow-up bone scan was obtained Lety 3 revealing multifocal osseous metastatic disease which was compared to March 2, 2018 with improvement.  No new abnormal uptake is present.  He is next seen with us on June 17, 2020 and, fortunately, is not having any significant pain at this point.  We discussed his scans in detail and, on balance, his performance status also remains improved.  Past Medical History:   Diagnosis Date   • Bone cancer (CMS/HCC)    • Cellulitis of great toe of left foot 10/19/2018   • CKD (chronic kidney disease)     Stage 3; followed by Dr. Vicky Duran    • Diastolic dysfunction     GRADE II per echo 2018   • Difficulty breathing     during exertion   • Dyslipidemia    • Erectile dysfunction    • Fatigue    • History of blood transfusion    • History of kidney stones    • Hyperlipidemia    • Hypertension    • Hyponatremia    • Left ventricular hypertrophy     per echo 2018   • Localized edema    • Neuropathy    • Obstructive sleep apnea     USING CPAP   • Osteoarthritis    • Peptic ulcer    • Pneumonia    • Pneumonia of left upper lobe due to infectious organism 3/15/2019   • Prostate cancer (CMS/HCC)     Status post prostatectomy, radiation therapy, and hormone therapy followed by Dr. Lee; metastatsis to bone   • Pure hyperglyceridemia    • Restless leg syndrome     • Sepsis (CMS/HCC)    • Sinus bradycardia    • Transient cerebral ischemia    • Type 2 diabetes mellitus (CMS/HCC)         Past Surgical History:   Procedure Laterality Date   • BRONCHOSCOPY N/A 4/9/2018    Procedure: BRONCHOSCOPY;  Surgeon: Bijan Rivera III, MD;  Location: [facility] OR;  Service: Cardiothoracic   • COLONOSCOPY     • DEEP NECK LYMPH NODE BIOPSY / EXCISION     • HEMORRHOIDECTOMY     • MEDIASTINOSCOPY N/A 4/9/2018    Procedure: MEDIASTINOSCOPY WITH LYMPH NODE BIOPSY;  Surgeon: Bijan Rivera III, MD;  Location: [facility] OR;  Service: Cardiothoracic   • OTHER SURGICAL HISTORY      ulcer repair   • PROSTATECTOMY  2006        Current Outpatient Medications on File Prior to Visit   Medication Sig Dispense Refill   • albuterol sulfate  (90 Base) MCG/ACT inhaler Inhale 2 puffs Every 4 (Four) Hours As Needed for Wheezing or Shortness of Air. 1 inhaler 0   • aspirin 81 MG EC tablet Take 1 tablet by mouth daily.     • cholecalciferol (VITAMIN D3) 1000 units tablet Take 2,000 Units by mouth Daily.     • Denosumab (XGEVA SC) Inject  under the skin into the appropriate area as directed Every 30 (Thirty) Days.     • dilTIAZem CD (CARDIZEM CD) 120 MG 24 hr capsule TAKE 1 CAPSULE EVERY DAY 90 capsule 3   • fluticasone (FLONASE SENSIMIST) 27.5 MCG/SPRAY nasal spray 2 sprays into each nostril As Needed.     • gabapentin (NEURONTIN) 300 MG capsule Take 2 capsules by mouth Every Night. 60 capsule 0   • HYDROcodone-acetaminophen (NORCO) 5-325 MG per tablet Take 1 tablet by mouth Every 6 (Six) Hours As Needed for Moderate Pain . 120 tablet 0   • insulin aspart (NovoLOG) 100 UNIT/ML patient supplied pump Inject  under the skin into the appropriate area as directed Continuous. Pt reports pump with basal rate 1.9 units/hr from 0800 to 1200. Basal rate 1.8 units/hr 1200 to 0800.  Checks bg ac/hs with bolus doses per pumps parameters     • Leuprolide Acetate, 3 Month, (LUPRON DEPOT, 3-MONTH, IM) Inject   into the appropriate muscle as directed by prescriber Every 3 (Three) Months.     • levothyroxine (SYNTHROID, LEVOTHROID) 75 MCG tablet Take 1 tablet by mouth Daily. 30 tablet 0   • modafinil (PROVIGIL) 200 MG tablet Take 1 tablet by mouth Daily. 90 tablet 1   • olmesartan (BENICAR) 40 MG tablet TAKE 1 TABLET EVERY DAY (DISCONTINUE LOSARTAN 100MG) 90 tablet 3   • pravastatin (PRAVACHOL) 40 MG tablet Take 40 mg by mouth daily.     • rOPINIRole (REQUIP) 0.5 MG tablet Take 1 tablet by mouth in the evening and 1 at bedtime. 180 tablet 2   • triamterene-hydrochlorothiazide (MAXZIDE-25) 37.5-25 MG per tablet TAKE 1 TABLET EVERY DAY APPOINTMENT IS NEEDED 90 tablet 0   • traZODone (DESYREL) 50 MG tablet Take  mg by mouth every night at bedtime.       No current facility-administered medications on file prior to visit.         ALLERGIES:  No Known Allergies     Social History     Socioeconomic History   • Marital status:      Spouse name: Not on file   • Number of children: Not on file   • Years of education: College   • Highest education level: Not on file   Occupational History     Employer: RETIRED   Tobacco Use   • Smoking status: Former Smoker     Packs/day: 1.00     Years: 30.00     Pack years: 30.00     Types: Cigarettes     Last attempt to quit: 1998     Years since quittin.4   • Smokeless tobacco: Never Used   Substance and Sexual Activity   • Alcohol use: No   • Drug use: No   • Sexual activity: Defer   Social History Narrative    CAFFEINE USE: 1 CUP DAILY; OCCASIONAL SOFT DRINK.         Family History   Problem Relation Age of Onset   • Heart failure Mother    • Hypertension Mother    • Diabetes Mother    • Heart disease Mother    • Lung cancer Father 46   • Hypertension Sister    • Lung cancer Sister 60   • Hypertension Brother    • Lung cancer Brother 62   • Heart disease Other    • Prostate cancer Brother 60   • Malig Hyperthermia Neg Hx         Review of Systems   Constitutional:  "Negative for fatigue.   HENT: Negative.    Eyes: Negative.    Respiratory: Negative for chest tightness, shortness of breath and wheezing.    Gastrointestinal: Negative for abdominal pain, constipation, diarrhea, nausea and vomiting.   Endocrine: Negative.    Genitourinary: Negative.    Musculoskeletal:        Recently noted arthralgias now resolved   Skin: Negative.    Allergic/Immunologic: Negative.    Neurological: Negative for weakness.   Hematological: Negative.    Psychiatric/Behavioral: Positive for sleep disturbance.       Constitution:Normalization of performance status  HENT: Negative.    Eyes: Negative.    Cardiovascular: Negative.    Respiratory:  No additional cough or shortness of breath Endocrine: Negative.    Hematologic/Lymphatic: Negative.    Skin: Negative.    Musculoskeletal: Resolution of joint pain and stiffness.   Gastrointestinal: Negative.    Genitourinary: Negative.    Neurological: Negative.    Psychiatric/Behavioral: Negative.    All other systems reviewed and are negative.  Objective     Vitals:    06/17/20 1107   BP: 125/71   Pulse: (!) 49   Resp: 18   Temp: 97.3 °F (36.3 °C)   TempSrc: Temporal   SpO2: 96%   Weight: 91.3 kg (201 lb 4.8 oz)   Height: 165.1 cm (65\")   PainSc: 0-No pain     Current Status 6/17/2020   ECOG score 0       Physical Exam    OBJECTIVE: Vitals signs are reviewed and are stable.    HEENT: PERRLA.    Neck:  Supple.  Bilateral bruits, No thyromegaly or adenopathy  Lungs:  Clear.  No rales rhonchi or wheezes  Heart:  Regular rate and rhythm.  1/6 systolic murmur  Abdomen:   Soft, nontender.  Obese.  Bowel sounds present.  No organomegaly can be detected, though patient is tender in right lower to mid abdomen.  No ascites or masses again to be detected  Extremities:  No cyanosis, clubbing or edema.    Neurologic: Nonfocal, no sensory loss peripherally    RECENT LABS:  Hematology WBC   Date Value Ref Range Status   06/17/2020 7.24 3.40 - 10.80 10*3/mm3 Final     RBC "   Date Value Ref Range Status   06/17/2020 4.14 4.14 - 5.80 10*6/mm3 Final     Hemoglobin   Date Value Ref Range Status   06/17/2020 12.1 (L) 13.0 - 17.7 g/dL Final     Hematocrit   Date Value Ref Range Status   06/17/2020 38.5 37.5 - 51.0 % Final     Platelets   Date Value Ref Range Status   06/17/2020 204 140 - 450 10*3/mm3 Final      NUCLEAR MEDICINE WHOLE BODY BONE SCAN 6/3/2020    FINDINGS:  There is persistent abnormal uptake within the sternum,  thoracic spine, left sacrum, left acetabulum, both proximal femora,  caudal right scapula, proximal left humeral metaphysis, ribs. Overall,  the intensity of uptake and the number of areas of abnormal uptake are  both improved when compared to the previous exam. No new areas of  abnormal uptake are present.     IMPRESSION:  Multifocal osseous metastatic disease. Since the previous  bone scan 03/02/2018, there has been improvement in the intensity and  extent of abnormal uptake. No new abnormal uptake.     This report was finalized on 6/4/2020 8:04 AM by Dr. Boogie Cortez M.D    Assessment/Plan       79-year-old male with medical history including prostate carcinoma, CK D3, long-term tobacco use recent development of hoarseness and subsequent studies that demonstrated findings consistent with metastatic disease to bone as well as multiple enlarged metastatic lymph nodes, and potential abnormality in the right lateral wall trachea.  His additional history includes type 2 diabetes on insulin pump which has been more effective for control of his blood sugars.    His recent symptoms have included generalized discomfort in multiple sites in his bony skeleton as well as right lower quadrant abdominal pain.  We discussed this made will improve after he starts Firmagon in the a.m.  Discussions with Dr. Rivera also have led to the conclusion that he'll need bronchoscopy and mediastinoscopy which did proceed as above on May 9.  Pathology revealed mediastinal lymph nodes to  be involved with metastatic adenocarcinoma consistent with origin from a prostatic primary . The patient's case was discussed at thoracic conference and recommendations were to continue with ADT and radiation for symptomatic sites.  The patient is seen back April 23 his treatment with ADT-Firmagon-has improved his symptomatic performance status to near baseline.  It is not felt that he'll require other treatments such as oral antiandrogens at this point.  He could, however, potentially benefit from agents such as Xgeva given at appropriate intervals. We went on to continue with Xgeva and rescan him July 23 demonstrating near remission developing.  This is also supported by his normalizing PSA.  The patient's scans demonstrated possibly chronic appendicitis is not having symptoms referable to this.he plans to be alert to the possibility of stenosis family member.  We discussed moving to an every 6 month treatment but at this point we'll continue at 3 months. As result we continued Lupron and Xgeva on schedule and is seen back now 6 months later continuing to generally improve.     We will continue the patient's treatment and is done well with no further symptoms until recently admitted mid March with upper lobe pneumonia.  Review of his studies during that visit fortunately do not demonstrate bony metastasis, additional mediastinal adenopathy, pleural effusions or masses.  It is felt that his metastatic prostate carcinoma remains controlled.  We planned, as a result to continue Xgeva and Lupron and the patient is now seen back continuing to do well.  We went on to discuss Lupron and Xgeva, medications including Neurontin and Requip which have been helpful and the patient is next seen December 13, 2019 again with symptoms control.  His PSA went on to be measured at 2.9.  Would like to continue same and have him return for follow-up in the further progressive consider adding additional oral therapy to her treatment.    We continued Xgeva and Lupron and later contacted the patient about his PSA of 5.030.  Follow-up scans were scheduled prior to his visit in 3 months but they have not been done at 2 months at May 8 when the patient called about increasing hip pain.  These scans do not demonstrate progression of disease in any significant way.  We have gone on to review the scans together and his overall symptoms and plan to try to address his additional sleep issues, restless legs, pain management as necessary and further diagnostics.  The patient's Neurontin was increased and did improve his symptoms.  He is next seen with follow-up bone scan that does not demonstrate worsening of bone nor need for localized radiation therapy.  The patient's PSA has been slowly rising and we have not been able to determine worsening of disease and and, therefore, it is not felt warranted add additional medications such as Xtandi or apalutamide to his therapy.  Please note would like to avoid Zytiga try not to add prednisone which would worsen his blood sugar control.          Plan:    *Continue current Neurontin and Trazodone dosing    *PSA now pending.  We decided not to act differently unless it is truly [id number]-.    *At this point Lupron will continue every 3 monthly, Xgeva monthly    *Reassess 11 weeks with repeat PSA, chest x-ray, CMP, CBC    *12 weeks MD, Xgeva and Chirag?  Additional antiandrogen

## 2020-06-17 ENCOUNTER — LAB (OUTPATIENT)
Dept: LAB | Facility: HOSPITAL | Age: 80
End: 2020-06-17

## 2020-06-17 ENCOUNTER — OFFICE VISIT (OUTPATIENT)
Dept: ONCOLOGY | Facility: CLINIC | Age: 80
End: 2020-06-17

## 2020-06-17 ENCOUNTER — INFUSION (OUTPATIENT)
Dept: ONCOLOGY | Facility: HOSPITAL | Age: 80
End: 2020-06-17

## 2020-06-17 VITALS
WEIGHT: 201.3 LBS | SYSTOLIC BLOOD PRESSURE: 125 MMHG | OXYGEN SATURATION: 96 % | HEIGHT: 65 IN | RESPIRATION RATE: 18 BRPM | HEART RATE: 49 BPM | TEMPERATURE: 97.3 F | DIASTOLIC BLOOD PRESSURE: 71 MMHG | BODY MASS INDEX: 33.54 KG/M2

## 2020-06-17 DIAGNOSIS — C61 PROSTATE CANCER METASTATIC TO BONE (HCC): ICD-10-CM

## 2020-06-17 DIAGNOSIS — C79.51 OSSEOUS METASTASIS: Primary | ICD-10-CM

## 2020-06-17 DIAGNOSIS — C79.51 PROSTATE CANCER METASTATIC TO BONE (HCC): ICD-10-CM

## 2020-06-17 DIAGNOSIS — C79.51 OSSEOUS METASTASIS: ICD-10-CM

## 2020-06-17 DIAGNOSIS — C61 PROSTATE CANCER METASTATIC TO BONE (HCC): Primary | ICD-10-CM

## 2020-06-17 DIAGNOSIS — C79.51 PROSTATE CANCER METASTATIC TO BONE (HCC): Primary | ICD-10-CM

## 2020-06-17 LAB
ALBUMIN SERPL-MCNC: 4.2 G/DL (ref 3.5–5.2)
ALBUMIN/GLOB SERPL: 1.3 G/DL (ref 1.1–2.4)
ALP SERPL-CCNC: 113 U/L (ref 38–116)
ALT SERPL W P-5'-P-CCNC: 11 U/L (ref 0–41)
ANION GAP SERPL CALCULATED.3IONS-SCNC: 9.3 MMOL/L (ref 5–15)
AST SERPL-CCNC: 16 U/L (ref 0–40)
BASOPHILS # BLD AUTO: 0.03 10*3/MM3 (ref 0–0.2)
BASOPHILS NFR BLD AUTO: 0.4 % (ref 0–1.5)
BILIRUB SERPL-MCNC: 0.2 MG/DL (ref 0.2–1.2)
BUN BLD-MCNC: 16 MG/DL (ref 6–20)
BUN/CREAT SERPL: 14.8 (ref 7.3–30)
CALCIUM SPEC-SCNC: 9.6 MG/DL (ref 8.5–10.2)
CHLORIDE SERPL-SCNC: 106 MMOL/L (ref 98–107)
CO2 SERPL-SCNC: 25.7 MMOL/L (ref 22–29)
CREAT BLD-MCNC: 1.08 MG/DL (ref 0.7–1.3)
DEPRECATED RDW RBC AUTO: 46 FL (ref 37–54)
EOSINOPHIL # BLD AUTO: 0.14 10*3/MM3 (ref 0–0.4)
EOSINOPHIL NFR BLD AUTO: 1.9 % (ref 0.3–6.2)
ERYTHROCYTE [DISTWIDTH] IN BLOOD BY AUTOMATED COUNT: 13.4 % (ref 12.3–15.4)
GFR SERPL CREATININE-BSD FRML MDRD: 66 ML/MIN/1.73
GLOBULIN UR ELPH-MCNC: 3.2 GM/DL (ref 1.8–3.5)
GLUCOSE BLD-MCNC: 114 MG/DL (ref 74–124)
HCT VFR BLD AUTO: 38.5 % (ref 37.5–51)
HGB BLD-MCNC: 12.1 G/DL (ref 13–17.7)
IMM GRANULOCYTES # BLD AUTO: 0.05 10*3/MM3 (ref 0–0.05)
IMM GRANULOCYTES NFR BLD AUTO: 0.7 % (ref 0–0.5)
LYMPHOCYTES # BLD AUTO: 2.43 10*3/MM3 (ref 0.7–3.1)
LYMPHOCYTES NFR BLD AUTO: 33.6 % (ref 19.6–45.3)
MAGNESIUM SERPL-MCNC: 2 MG/DL (ref 1.8–2.5)
MCH RBC QN AUTO: 29.2 PG (ref 26.6–33)
MCHC RBC AUTO-ENTMCNC: 31.4 G/DL (ref 31.5–35.7)
MCV RBC AUTO: 93 FL (ref 79–97)
MONOCYTES # BLD AUTO: 0.58 10*3/MM3 (ref 0.1–0.9)
MONOCYTES NFR BLD AUTO: 8 % (ref 5–12)
NEUTROPHILS # BLD AUTO: 4.01 10*3/MM3 (ref 1.7–7)
NEUTROPHILS NFR BLD AUTO: 55.4 % (ref 42.7–76)
NRBC BLD AUTO-RTO: 0 /100 WBC (ref 0–0.2)
PHOSPHATE SERPL-MCNC: 3.1 MG/DL (ref 2.5–4.5)
PLATELET # BLD AUTO: 204 10*3/MM3 (ref 140–450)
PMV BLD AUTO: 9.6 FL (ref 6–12)
POTASSIUM BLD-SCNC: 4.7 MMOL/L (ref 3.5–4.7)
PROT SERPL-MCNC: 7.4 G/DL (ref 6.3–8)
PSA SERPL-MCNC: 8.2 NG/ML (ref 0–4)
RBC # BLD AUTO: 4.14 10*6/MM3 (ref 4.14–5.8)
SODIUM BLD-SCNC: 141 MMOL/L (ref 134–145)
WBC NRBC COR # BLD: 7.24 10*3/MM3 (ref 3.4–10.8)

## 2020-06-17 PROCEDURE — 36415 COLL VENOUS BLD VENIPUNCTURE: CPT

## 2020-06-17 PROCEDURE — 25010000002 LEUPROLIDE ACETATE (3 MONTH) PER 7.5 MG: Performed by: INTERNAL MEDICINE

## 2020-06-17 PROCEDURE — 96402 CHEMO HORMON ANTINEOPL SQ/IM: CPT

## 2020-06-17 PROCEDURE — 84153 ASSAY OF PSA TOTAL: CPT | Performed by: INTERNAL MEDICINE

## 2020-06-17 PROCEDURE — 25010000002 DENOSUMAB 120 MG/1.7ML SOLUTION: Performed by: INTERNAL MEDICINE

## 2020-06-17 PROCEDURE — 84100 ASSAY OF PHOSPHORUS: CPT

## 2020-06-17 PROCEDURE — 96372 THER/PROPH/DIAG INJ SC/IM: CPT

## 2020-06-17 PROCEDURE — 99214 OFFICE O/P EST MOD 30 MIN: CPT | Performed by: INTERNAL MEDICINE

## 2020-06-17 PROCEDURE — 85025 COMPLETE CBC W/AUTO DIFF WBC: CPT

## 2020-06-17 PROCEDURE — 80053 COMPREHEN METABOLIC PANEL: CPT

## 2020-06-17 PROCEDURE — 83735 ASSAY OF MAGNESIUM: CPT

## 2020-06-17 RX ORDER — TRAZODONE HYDROCHLORIDE 50 MG/1
50-100 TABLET ORAL
COMMUNITY
Start: 2020-05-28

## 2020-06-17 RX ADMIN — LEUPROLIDE ACETATE 22.5 MG: KIT at 12:47

## 2020-06-17 RX ADMIN — DENOSUMAB 120 MG: 120 INJECTION SUBCUTANEOUS at 12:47

## 2020-06-17 NOTE — PROGRESS NOTES
Case Management Discharge Note    Final Note: home with jordon Combs RN/CCP    Destination      No service has been selected for the patient.      Durable Medical Equipment      No service has been selected for the patient.      Dialysis/Infusion      No service has been selected for the patient.      Home Medical Care      No service has been selected for the patient.      Community Resources      No service has been selected for the patient.        Transportation Services  Private: Car    Final Discharge Disposition Code: 01 - home or self-care   None

## 2020-06-19 ENCOUNTER — TELEPHONE (OUTPATIENT)
Dept: ONCOLOGY | Facility: CLINIC | Age: 80
End: 2020-06-19

## 2020-06-22 ENCOUNTER — TRANSCRIBE ORDERS (OUTPATIENT)
Dept: LAB | Facility: HOSPITAL | Age: 80
End: 2020-06-22

## 2020-06-22 ENCOUNTER — LAB (OUTPATIENT)
Dept: LAB | Facility: HOSPITAL | Age: 80
End: 2020-06-22

## 2020-06-22 DIAGNOSIS — N18.30 CHRONIC KIDNEY DISEASE, STAGE III (MODERATE) (HCC): ICD-10-CM

## 2020-06-22 DIAGNOSIS — N18.30 CHRONIC KIDNEY DISEASE, STAGE III (MODERATE) (HCC): Primary | ICD-10-CM

## 2020-06-22 LAB
ANION GAP SERPL CALCULATED.3IONS-SCNC: 10.2 MMOL/L (ref 5–15)
BILIRUB UR QL STRIP: NEGATIVE
BUN BLD-MCNC: 19 MG/DL (ref 8–23)
BUN/CREAT SERPL: 15.6 (ref 7–25)
CALCIUM SPEC-SCNC: 9.3 MG/DL (ref 8.6–10.5)
CHLORIDE SERPL-SCNC: 104 MMOL/L (ref 98–107)
CLARITY UR: CLEAR
CO2 SERPL-SCNC: 24.8 MMOL/L (ref 22–29)
COLOR UR: YELLOW
CREAT BLD-MCNC: 1.22 MG/DL (ref 0.76–1.27)
DEPRECATED RDW RBC AUTO: 44.1 FL (ref 37–54)
ERYTHROCYTE [DISTWIDTH] IN BLOOD BY AUTOMATED COUNT: 13.3 % (ref 12.3–15.4)
GFR SERPL CREATININE-BSD FRML MDRD: 57 ML/MIN/1.73
GLUCOSE BLD-MCNC: 159 MG/DL (ref 65–99)
GLUCOSE UR STRIP-MCNC: NEGATIVE MG/DL
HCT VFR BLD AUTO: 35.4 % (ref 37.5–51)
HGB BLD-MCNC: 11.6 G/DL (ref 13–17.7)
HGB UR QL STRIP.AUTO: NEGATIVE
KETONES UR QL STRIP: NEGATIVE
LEUKOCYTE ESTERASE UR QL STRIP.AUTO: NEGATIVE
MCH RBC QN AUTO: 29.1 PG (ref 26.6–33)
MCHC RBC AUTO-ENTMCNC: 32.8 G/DL (ref 31.5–35.7)
MCV RBC AUTO: 88.9 FL (ref 79–97)
NITRITE UR QL STRIP: NEGATIVE
PH UR STRIP.AUTO: 6 [PH] (ref 5–8)
PLATELET # BLD AUTO: 211 10*3/MM3 (ref 140–450)
PMV BLD AUTO: 10.6 FL (ref 6–12)
POTASSIUM BLD-SCNC: 4.8 MMOL/L (ref 3.5–5.2)
PROT UR QL STRIP: NEGATIVE
RBC # BLD AUTO: 3.98 10*6/MM3 (ref 4.14–5.8)
SODIUM BLD-SCNC: 139 MMOL/L (ref 136–145)
SP GR UR STRIP: 1.02 (ref 1–1.03)
UROBILINOGEN UR QL STRIP: NORMAL
WBC NRBC COR # BLD: 8.67 10*3/MM3 (ref 3.4–10.8)

## 2020-06-22 PROCEDURE — 85027 COMPLETE CBC AUTOMATED: CPT

## 2020-06-22 PROCEDURE — 80048 BASIC METABOLIC PNL TOTAL CA: CPT

## 2020-06-22 PROCEDURE — 81003 URINALYSIS AUTO W/O SCOPE: CPT

## 2020-06-22 PROCEDURE — 36415 COLL VENOUS BLD VENIPUNCTURE: CPT

## 2020-06-23 ENCOUNTER — TELEPHONE (OUTPATIENT)
Dept: ONCOLOGY | Facility: CLINIC | Age: 80
End: 2020-06-23

## 2020-06-23 DIAGNOSIS — C79.51 PROSTATE CANCER METASTATIC TO BONE (HCC): ICD-10-CM

## 2020-06-23 DIAGNOSIS — C61 PROSTATE CANCER METASTATIC TO BONE (HCC): ICD-10-CM

## 2020-06-23 RX ORDER — GABAPENTIN 300 MG/1
CAPSULE ORAL
Qty: 60 CAPSULE | Refills: 4 | OUTPATIENT
Start: 2020-06-23 | End: 2020-12-15 | Stop reason: SDUPTHER

## 2020-06-23 RX ORDER — HYDROCODONE BITARTRATE AND ACETAMINOPHEN 5; 325 MG/1; MG/1
1 TABLET ORAL EVERY 6 HOURS PRN
Qty: 120 TABLET | Refills: 0 | Status: SHIPPED | OUTPATIENT
Start: 2020-06-23 | End: 2020-07-24 | Stop reason: SDUPTHER

## 2020-06-23 NOTE — TELEPHONE ENCOUNTER
PT. REQUESTING A REFILL ON HIS NORCO 5/325 AND THE NEURONTIN IN WHICH HE TAKES 2 AT BEDTIME FOR A TOTAL OF 600MG EVERY NIGHT.  CALLED TO [facility] AT [phone] WITH 4 ADDITIONAL REFILLS.  WILL SEND THE NORCO SCRIPT TO DR. GRIFFITH FOR APPROVAL AND THEN IT WILL GO TO HIS [facility].   PT. V/U.

## 2020-07-02 ENCOUNTER — APPOINTMENT (OUTPATIENT)
Dept: LAB | Facility: HOSPITAL | Age: 80
End: 2020-07-02

## 2020-07-02 ENCOUNTER — APPOINTMENT (OUTPATIENT)
Dept: ONCOLOGY | Facility: HOSPITAL | Age: 80
End: 2020-07-02

## 2020-07-15 ENCOUNTER — LAB (OUTPATIENT)
Dept: LAB | Facility: HOSPITAL | Age: 80
End: 2020-07-15

## 2020-07-15 ENCOUNTER — INFUSION (OUTPATIENT)
Dept: ONCOLOGY | Facility: HOSPITAL | Age: 80
End: 2020-07-15

## 2020-07-15 DIAGNOSIS — C79.51 OSSEOUS METASTASIS: Primary | ICD-10-CM

## 2020-07-15 DIAGNOSIS — C79.51 PROSTATE CANCER METASTATIC TO BONE (HCC): ICD-10-CM

## 2020-07-15 DIAGNOSIS — C61 PROSTATE CANCER METASTATIC TO BONE (HCC): ICD-10-CM

## 2020-07-15 DIAGNOSIS — C79.51 OSSEOUS METASTASIS: ICD-10-CM

## 2020-07-15 LAB
ALBUMIN SERPL-MCNC: 4.1 G/DL (ref 3.5–5.2)
ALBUMIN/GLOB SERPL: 1.3 G/DL (ref 1.1–2.4)
ALP SERPL-CCNC: 141 U/L (ref 38–116)
ALT SERPL W P-5'-P-CCNC: 16 U/L (ref 0–41)
ANION GAP SERPL CALCULATED.3IONS-SCNC: 11 MMOL/L (ref 5–15)
AST SERPL-CCNC: 16 U/L (ref 0–40)
BASOPHILS # BLD AUTO: 0.03 10*3/MM3 (ref 0–0.2)
BASOPHILS NFR BLD AUTO: 0.4 % (ref 0–1.5)
BILIRUB SERPL-MCNC: 0.2 MG/DL (ref 0.2–1.2)
BUN SERPL-MCNC: 21 MG/DL (ref 6–20)
BUN/CREAT SERPL: 17.1 (ref 7.3–30)
CALCIUM SPEC-SCNC: 9.4 MG/DL (ref 8.5–10.2)
CHLORIDE SERPL-SCNC: 105 MMOL/L (ref 98–107)
CO2 SERPL-SCNC: 24 MMOL/L (ref 22–29)
CREAT SERPL-MCNC: 1.23 MG/DL (ref 0.7–1.3)
DEPRECATED RDW RBC AUTO: 44.1 FL (ref 37–54)
EOSINOPHIL # BLD AUTO: 0.19 10*3/MM3 (ref 0–0.4)
EOSINOPHIL NFR BLD AUTO: 2.7 % (ref 0.3–6.2)
ERYTHROCYTE [DISTWIDTH] IN BLOOD BY AUTOMATED COUNT: 13.4 % (ref 12.3–15.4)
GFR SERPL CREATININE-BSD FRML MDRD: 57 ML/MIN/1.73
GLOBULIN UR ELPH-MCNC: 3.1 GM/DL (ref 1.8–3.5)
GLUCOSE SERPL-MCNC: 115 MG/DL (ref 74–124)
HCT VFR BLD AUTO: 36.1 % (ref 37.5–51)
HGB BLD-MCNC: 11.5 G/DL (ref 13–17.7)
IMM GRANULOCYTES # BLD AUTO: 0.03 10*3/MM3 (ref 0–0.05)
IMM GRANULOCYTES NFR BLD AUTO: 0.4 % (ref 0–0.5)
LYMPHOCYTES # BLD AUTO: 2.61 10*3/MM3 (ref 0.7–3.1)
LYMPHOCYTES NFR BLD AUTO: 37.2 % (ref 19.6–45.3)
MAGNESIUM SERPL-MCNC: 1.9 MG/DL (ref 1.8–2.5)
MCH RBC QN AUTO: 28.8 PG (ref 26.6–33)
MCHC RBC AUTO-ENTMCNC: 31.9 G/DL (ref 31.5–35.7)
MCV RBC AUTO: 90.3 FL (ref 79–97)
MONOCYTES # BLD AUTO: 0.51 10*3/MM3 (ref 0.1–0.9)
MONOCYTES NFR BLD AUTO: 7.3 % (ref 5–12)
NEUTROPHILS NFR BLD AUTO: 3.65 10*3/MM3 (ref 1.7–7)
NEUTROPHILS NFR BLD AUTO: 52 % (ref 42.7–76)
NRBC BLD AUTO-RTO: 0 /100 WBC (ref 0–0.2)
PHOSPHATE SERPL-MCNC: 3.6 MG/DL (ref 2.5–4.5)
PLATELET # BLD AUTO: 195 10*3/MM3 (ref 140–450)
PMV BLD AUTO: 9.9 FL (ref 6–12)
POTASSIUM SERPL-SCNC: 5.1 MMOL/L (ref 3.5–4.7)
PROT SERPL-MCNC: 7.2 G/DL (ref 6.3–8)
RBC # BLD AUTO: 4 10*6/MM3 (ref 4.14–5.8)
SODIUM SERPL-SCNC: 140 MMOL/L (ref 134–145)
WBC # BLD AUTO: 7.02 10*3/MM3 (ref 3.4–10.8)

## 2020-07-15 PROCEDURE — 84100 ASSAY OF PHOSPHORUS: CPT

## 2020-07-15 PROCEDURE — 85025 COMPLETE CBC W/AUTO DIFF WBC: CPT

## 2020-07-15 PROCEDURE — 83735 ASSAY OF MAGNESIUM: CPT

## 2020-07-15 PROCEDURE — 36415 COLL VENOUS BLD VENIPUNCTURE: CPT

## 2020-07-15 PROCEDURE — 80053 COMPREHEN METABOLIC PANEL: CPT

## 2020-07-15 PROCEDURE — 25010000002 DENOSUMAB 120 MG/1.7ML SOLUTION: Performed by: INTERNAL MEDICINE

## 2020-07-15 PROCEDURE — 96372 THER/PROPH/DIAG INJ SC/IM: CPT

## 2020-07-15 RX ADMIN — DENOSUMAB 120 MG: 120 INJECTION SUBCUTANEOUS at 08:26

## 2020-07-24 DIAGNOSIS — C61 PROSTATE CANCER METASTATIC TO BONE (HCC): ICD-10-CM

## 2020-07-24 DIAGNOSIS — C79.51 PROSTATE CANCER METASTATIC TO BONE (HCC): ICD-10-CM

## 2020-07-26 RX ORDER — HYDROCODONE BITARTRATE AND ACETAMINOPHEN 5; 325 MG/1; MG/1
1 TABLET ORAL EVERY 6 HOURS PRN
Qty: 120 TABLET | Refills: 0 | Status: SHIPPED | OUTPATIENT
Start: 2020-07-26 | End: 2020-08-24 | Stop reason: SDUPTHER

## 2020-08-08 RX ORDER — TRIAMTERENE AND HYDROCHLOROTHIAZIDE 37.5; 25 MG/1; MG/1
TABLET ORAL
Qty: 90 TABLET | Refills: 0 | Status: SHIPPED | OUTPATIENT
Start: 2020-08-08 | End: 2020-11-02

## 2020-08-12 ENCOUNTER — LAB (OUTPATIENT)
Dept: LAB | Facility: HOSPITAL | Age: 80
End: 2020-08-12

## 2020-08-12 ENCOUNTER — INFUSION (OUTPATIENT)
Dept: ONCOLOGY | Facility: HOSPITAL | Age: 80
End: 2020-08-12

## 2020-08-12 DIAGNOSIS — C61 PROSTATE CANCER METASTATIC TO BONE (HCC): Primary | ICD-10-CM

## 2020-08-12 DIAGNOSIS — C79.51 OSSEOUS METASTASIS: ICD-10-CM

## 2020-08-12 DIAGNOSIS — C79.51 PROSTATE CANCER METASTATIC TO BONE (HCC): Primary | ICD-10-CM

## 2020-08-12 LAB
ALBUMIN SERPL-MCNC: 3.9 G/DL (ref 3.5–5.2)
ALBUMIN/GLOB SERPL: 1.2 G/DL (ref 1.1–2.4)
ALP SERPL-CCNC: 143 U/L (ref 38–116)
ALT SERPL W P-5'-P-CCNC: 15 U/L (ref 0–41)
ANION GAP SERPL CALCULATED.3IONS-SCNC: 10.7 MMOL/L (ref 5–15)
AST SERPL-CCNC: 16 U/L (ref 0–40)
BASOPHILS # BLD AUTO: 0.05 10*3/MM3 (ref 0–0.2)
BASOPHILS NFR BLD AUTO: 0.6 % (ref 0–1.5)
BILIRUB SERPL-MCNC: <0.2 MG/DL (ref 0.2–1.2)
BUN SERPL-MCNC: 26 MG/DL (ref 6–20)
BUN/CREAT SERPL: 20.8 (ref 7.3–30)
CALCIUM SPEC-SCNC: 10.2 MG/DL (ref 8.5–10.2)
CHLORIDE SERPL-SCNC: 107 MMOL/L (ref 98–107)
CO2 SERPL-SCNC: 23.3 MMOL/L (ref 22–29)
CREAT SERPL-MCNC: 1.25 MG/DL (ref 0.7–1.3)
DEPRECATED RDW RBC AUTO: 44.8 FL (ref 37–54)
EOSINOPHIL # BLD AUTO: 0.17 10*3/MM3 (ref 0–0.4)
EOSINOPHIL NFR BLD AUTO: 2 % (ref 0.3–6.2)
ERYTHROCYTE [DISTWIDTH] IN BLOOD BY AUTOMATED COUNT: 13.9 % (ref 12.3–15.4)
GFR SERPL CREATININE-BSD FRML MDRD: 56 ML/MIN/1.73
GLOBULIN UR ELPH-MCNC: 3.2 GM/DL (ref 1.8–3.5)
GLUCOSE SERPL-MCNC: 191 MG/DL (ref 74–124)
HCT VFR BLD AUTO: 35 % (ref 37.5–51)
HGB BLD-MCNC: 11.4 G/DL (ref 13–17.7)
IMM GRANULOCYTES # BLD AUTO: 0.06 10*3/MM3 (ref 0–0.05)
IMM GRANULOCYTES NFR BLD AUTO: 0.7 % (ref 0–0.5)
LYMPHOCYTES # BLD AUTO: 3.16 10*3/MM3 (ref 0.7–3.1)
LYMPHOCYTES NFR BLD AUTO: 37.5 % (ref 19.6–45.3)
MAGNESIUM SERPL-MCNC: 2.2 MG/DL (ref 1.8–2.5)
MCH RBC QN AUTO: 28.9 PG (ref 26.6–33)
MCHC RBC AUTO-ENTMCNC: 32.6 G/DL (ref 31.5–35.7)
MCV RBC AUTO: 88.8 FL (ref 79–97)
MONOCYTES # BLD AUTO: 0.68 10*3/MM3 (ref 0.1–0.9)
MONOCYTES NFR BLD AUTO: 8.1 % (ref 5–12)
NEUTROPHILS NFR BLD AUTO: 4.3 10*3/MM3 (ref 1.7–7)
NEUTROPHILS NFR BLD AUTO: 51.1 % (ref 42.7–76)
NRBC BLD AUTO-RTO: 0 /100 WBC (ref 0–0.2)
PHOSPHATE SERPL-MCNC: 3.9 MG/DL (ref 2.5–4.5)
PLATELET # BLD AUTO: 245 10*3/MM3 (ref 140–450)
PMV BLD AUTO: 10.2 FL (ref 6–12)
POTASSIUM SERPL-SCNC: 5.5 MMOL/L (ref 3.5–4.7)
PROT SERPL-MCNC: 7.1 G/DL (ref 6.3–8)
RBC # BLD AUTO: 3.94 10*6/MM3 (ref 4.14–5.8)
SODIUM SERPL-SCNC: 141 MMOL/L (ref 134–145)
WBC # BLD AUTO: 8.42 10*3/MM3 (ref 3.4–10.8)

## 2020-08-12 PROCEDURE — 83735 ASSAY OF MAGNESIUM: CPT

## 2020-08-12 PROCEDURE — 96372 THER/PROPH/DIAG INJ SC/IM: CPT

## 2020-08-12 PROCEDURE — 85025 COMPLETE CBC W/AUTO DIFF WBC: CPT

## 2020-08-12 PROCEDURE — 80053 COMPREHEN METABOLIC PANEL: CPT | Performed by: INTERNAL MEDICINE

## 2020-08-12 PROCEDURE — 25010000002 DENOSUMAB 120 MG/1.7ML SOLUTION: Performed by: INTERNAL MEDICINE

## 2020-08-12 PROCEDURE — 84100 ASSAY OF PHOSPHORUS: CPT | Performed by: INTERNAL MEDICINE

## 2020-08-12 PROCEDURE — 36415 COLL VENOUS BLD VENIPUNCTURE: CPT

## 2020-08-12 RX ADMIN — DENOSUMAB 120 MG: 120 INJECTION SUBCUTANEOUS at 10:02

## 2020-08-12 NOTE — PROGRESS NOTES
Pt to injection room for xgeva , had to get labs drawn  Labs okay . xgeva given  K 5.5 today , has been 5.1  Instructed pt to decrease amount of potassium rich foods in diet and let his PCP know of elevated K  Pt V/U

## 2020-08-24 DIAGNOSIS — C61 PROSTATE CANCER METASTATIC TO BONE (HCC): ICD-10-CM

## 2020-08-24 DIAGNOSIS — C79.51 PROSTATE CANCER METASTATIC TO BONE (HCC): ICD-10-CM

## 2020-08-25 RX ORDER — HYDROCODONE BITARTRATE AND ACETAMINOPHEN 5; 325 MG/1; MG/1
1 TABLET ORAL EVERY 6 HOURS PRN
Qty: 120 TABLET | Refills: 0 | Status: SHIPPED | OUTPATIENT
Start: 2020-08-25 | End: 2020-09-27 | Stop reason: SDUPTHER

## 2020-09-03 ENCOUNTER — HOSPITAL ENCOUNTER (OUTPATIENT)
Dept: GENERAL RADIOLOGY | Facility: HOSPITAL | Age: 80
Discharge: HOME | End: 2020-09-03
Admitting: INTERNAL MEDICINE

## 2020-09-03 ENCOUNTER — LAB (OUTPATIENT)
Dept: LAB | Facility: HOSPITAL | Age: 80
End: 2020-09-03

## 2020-09-03 DIAGNOSIS — C79.51 PROSTATE CANCER METASTATIC TO BONE (HCC): ICD-10-CM

## 2020-09-03 DIAGNOSIS — C61 PROSTATE CANCER METASTATIC TO BONE (HCC): Primary | ICD-10-CM

## 2020-09-03 DIAGNOSIS — C61 PROSTATE CANCER METASTATIC TO BONE (HCC): ICD-10-CM

## 2020-09-03 DIAGNOSIS — C79.51 PROSTATE CANCER METASTATIC TO BONE (HCC): Primary | ICD-10-CM

## 2020-09-03 LAB
ALBUMIN SERPL-MCNC: 4.3 G/DL (ref 3.5–5.2)
ALBUMIN/GLOB SERPL: 1.3 G/DL (ref 1.1–2.4)
ALP SERPL-CCNC: 150 U/L (ref 38–116)
ALT SERPL W P-5'-P-CCNC: 12 U/L (ref 0–41)
ANION GAP SERPL CALCULATED.3IONS-SCNC: 8.3 MMOL/L (ref 5–15)
AST SERPL-CCNC: 15 U/L (ref 0–40)
BASOPHILS # BLD AUTO: 0.03 10*3/MM3 (ref 0–0.2)
BASOPHILS NFR BLD AUTO: 0.4 % (ref 0–1.5)
BILIRUB SERPL-MCNC: 0.2 MG/DL (ref 0.2–1.2)
BUN SERPL-MCNC: 30 MG/DL (ref 6–20)
BUN/CREAT SERPL: 22.9 (ref 7.3–30)
CALCIUM SPEC-SCNC: 9.2 MG/DL (ref 8.5–10.2)
CHLORIDE SERPL-SCNC: 106 MMOL/L (ref 98–107)
CO2 SERPL-SCNC: 24.7 MMOL/L (ref 22–29)
CREAT SERPL-MCNC: 1.31 MG/DL (ref 0.7–1.3)
DEPRECATED RDW RBC AUTO: 46.2 FL (ref 37–54)
EOSINOPHIL # BLD AUTO: 0.15 10*3/MM3 (ref 0–0.4)
EOSINOPHIL NFR BLD AUTO: 1.8 % (ref 0.3–6.2)
ERYTHROCYTE [DISTWIDTH] IN BLOOD BY AUTOMATED COUNT: 13.9 % (ref 12.3–15.4)
GFR SERPL CREATININE-BSD FRML MDRD: 53 ML/MIN/1.73
GLOBULIN UR ELPH-MCNC: 3.3 GM/DL (ref 1.8–3.5)
GLUCOSE SERPL-MCNC: 117 MG/DL (ref 74–124)
HCT VFR BLD AUTO: 36.3 % (ref 37.5–51)
HGB BLD-MCNC: 11.4 G/DL (ref 13–17.7)
IMM GRANULOCYTES # BLD AUTO: 0.02 10*3/MM3 (ref 0–0.05)
IMM GRANULOCYTES NFR BLD AUTO: 0.2 % (ref 0–0.5)
LYMPHOCYTES # BLD AUTO: 2.68 10*3/MM3 (ref 0.7–3.1)
LYMPHOCYTES NFR BLD AUTO: 32.3 % (ref 19.6–45.3)
MCH RBC QN AUTO: 28.4 PG (ref 26.6–33)
MCHC RBC AUTO-ENTMCNC: 31.4 G/DL (ref 31.5–35.7)
MCV RBC AUTO: 90.5 FL (ref 79–97)
MONOCYTES # BLD AUTO: 0.68 10*3/MM3 (ref 0.1–0.9)
MONOCYTES NFR BLD AUTO: 8.2 % (ref 5–12)
NEUTROPHILS NFR BLD AUTO: 4.74 10*3/MM3 (ref 1.7–7)
NEUTROPHILS NFR BLD AUTO: 57.1 % (ref 42.7–76)
NRBC BLD AUTO-RTO: 0 /100 WBC (ref 0–0.2)
PLATELET # BLD AUTO: 231 10*3/MM3 (ref 140–450)
PMV BLD AUTO: 9.9 FL (ref 6–12)
POTASSIUM SERPL-SCNC: 5.6 MMOL/L (ref 3.5–4.7)
PROT SERPL-MCNC: 7.6 G/DL (ref 6.3–8)
PSA SERPL-MCNC: 15.8 NG/ML (ref 0–4)
RBC # BLD AUTO: 4.01 10*6/MM3 (ref 4.14–5.8)
SODIUM SERPL-SCNC: 139 MMOL/L (ref 134–145)
WBC # BLD AUTO: 8.3 10*3/MM3 (ref 3.4–10.8)

## 2020-09-03 PROCEDURE — 71046 X-RAY EXAM CHEST 2 VIEWS: CPT

## 2020-09-03 PROCEDURE — 84153 ASSAY OF PSA TOTAL: CPT | Performed by: INTERNAL MEDICINE

## 2020-09-03 PROCEDURE — 36415 COLL VENOUS BLD VENIPUNCTURE: CPT

## 2020-09-03 PROCEDURE — 80053 COMPREHEN METABOLIC PANEL: CPT

## 2020-09-03 PROCEDURE — 85025 COMPLETE CBC W/AUTO DIFF WBC: CPT

## 2020-09-03 NOTE — PROGRESS NOTES
Subjective Patient now with right hip pain after active gardening.    History of Present Illness    The patient 79-year-old male with significant past medical history including prostate carcinoma, CKD 3 and long-term tobacco use be been seen by primary care in late January, 2018 for hoarseness.  Chest x-ray is now outpatient January 23 revealed sclerotic change in the posterior mid chest to be within 3 adjacent thoracic vertebral bodies of uncertain significance.  A follow-up chest CT revealed numerous sclerotic foci within all visualized bones consistent with metastatic disease, multiple enlarged mediastinal lymph nodes concerning for metastatic lymphadenopathy and a polypoidal abnormality in the right lateral wall of the trachea.  CT of abdomen March 20 revealed extensive osseous metastatic disease mildly enlarged retroperitoneal lymph nodes.  Bone scan March 20 was consistent with widespread osseous metastasis particularly in the proximal half the left humeral shaft, abnormal uptake in the sternum and manubrium and a small focus in the posterior aspect of the calvarium.  This led to a plain film the left humerus with mottled sclerosis involving the shaft of the humerus particularly in the proximal half but no evidence of pathologic fracture.    The patient has additionally type 2 diabetes on insulin pump, again a history of prostate carcinoma prostatectomy 12 years previously, hypertension, and hyperlipidemia.  Additional studies included a PSA of 395.1 from March 14, 2018.The patient's been seen by urology March 15 with plans to start Firmagon.    The patient is now seen in pulmonary clinic with Dr. Bijan Rivera and we have discussed that he will need bronchoscopy and mediastinoscopy.  Interestingly his additional history includes a long occupational exposure including working in the [place] just out of school, subsequent construction work for many years and a 30-pack-year history of  smoking stopping in the late 1990s.  He indicates that he followed with Dr. Guillen of urology for many years with no changes in his exams and normal PSAs.  He stopped this follow-up approximately 2 years ago.     Patient was seen in consultation with thoracic surgery on March 28 and plans are made for mediastinoscopy and bronchoscopy with lymph node biopsy.  Pathology revealed that these nodes were consistent with metastatic prostate carcinoma.  He was discussed at thoracic conference.  A PET/CT was not pursued and it was determined that he see medical oncology for hormonal treatment and radiation therapy if symptomatic.  It should be noted that the patient's March 15 [facility] office note describes that Firmagon was planned.     The patient has met with the son and grandson April 23.  We have also contacted Dr. Taylor of urology in that Firmagon was given just after his last visit and would next be due approximately May 3.  Patient feels considerably better with resolution of his pain, normalization of his performance status.  He would like to continue treatment through our office now contacted Dr. Taylor concerning this.    The patient is next seen June 11, 2018 we discussed his follow-up including his treatments with Lupron May 7 and his next treatment on July 30.  He has returned to essentially normal performance status and his PSA has dropped further from 395 March 14 27.8 May 7 and now 2.03 June 11.  We do plan to initiate Xgeva also study him via scans to document his improvement approximately a month from now.   The patient is next seen July 26, 2018.  Repeat imaging demonstrated interval resolution of mediastinal and retroperitoneal lymphadenopathy.  There is thickening in the distal aspect of the appendix with stranding?  Chronic appendicitis.  The patient indicates he is having no such symptoms and never has.  There is no change in sclerotic bony metastasis in the patient's PSA has dropped  substantially to 1.66 by July 19 and 1.79 July 26.  He is actually feeling excellent and this is corroborated by family members.   Patient is next seen January 10, 2019 continuing to do very well and, in fact indicating [facility], likely [facility] replacements, visual review.  He is not certain is going to continue his care with them or with us or combination of both.    Patient is next seen April 4, 2019.  He is recently discharged after hospitalization March 15-18 with left upper lobe pneumonia.  We reviewed the findings in detail with his gradual recovery now documented.  His studies include a CT of chest which does not demonstrate any further bony lesions and/or pulmonary metastasis having developed.  He is feeling considerably better and approximately back to his baseline.  The patient is next seen June 28, 2019 feeling well but having baseline generally musculoskeletal pain and restless legs.His recent exams include a PSA of 0.81, CMP with potassium of 5.3 with repeat pending.  His performance status overall remains good to very good and is clearly responding to his treatments.  The patient is next seen December 13, 2019 continue to feel well.  He has had, oddly enough, 2 episodes of sleep paralysis which have occurred to him previously and he wonders if there is any connection with his prostate carcinoma though this is felt unlikely.     The patient when assessed in December had his PSA go to 2.9.  We continue with Lupron and Xgeva and is seen back now March 9, 2020 without additional symptoms.  We discussed that a schedule could likely change moving to 3 months for both of these medications particularly if he needs additional therapy directed at progressive disease.     Patient contacted our practice May 4 concern for pain in his hips.  This led to moving his scans up and they were performed May 8, 2020 showing a sub-6 mm pleural-based pulmonary nodule in  the right lower lobe that is new from March 15, 2018, remainder of the sub-6 mm pulmonary nodules bilaterally are stable.  There is extensive osseous metastatic disease as previous with no other new findings.     The patient indicates, when seen, May 13 that his bone pain is now resolved.  While this is good information does not mean that he does not have further bony metastasis and we have discussed that a follow-up bone scan is likely necessary.  Furthermore he is having some difficulty with sleeplessness and increase in restless legs as he continues to stay at home during the COVID 19 crisis which, itself, is producing more anxiety for him.  A follow-up bone scan was obtained Lety 3 revealing multifocal osseous metastatic disease which was compared to March 2, 2018 with improvement.  No new abnormal uptake is present.  He is next seen with us on June 17, 2020 and, fortunately, is not having any significant pain at this point.  We discussed his scans in detail and, on balance, his performance status also remains improved.     It was elected to follow the patient rather than changes antiandrogen therapy.  He is reassessed September 3 with unchanged CMP, H&H of 11.4 and 36.3 with normal white count, platelet count and differential, PSA at 15.8.  Follow-up PA lateral chest x-ray does not show any new abnormalities though there is extensive metastatic bone disease throughout the bony thorax and osteoblastic metastasis have been seen on chest CT May 2020.  The patient is seen with his son Georges September 10 noticing increasing bony discomfort primarily in the right hip following fairly intensive gardening which he does frequently.  Now has further elevation of PSA were wondering whether we should try to intervene sooner per additional antiandrogens.  We will need to further assess him via CT scanning to make this decision      Past Medical History:   Diagnosis Date   • Bone cancer (CMS/HCC)    • Cellulitis of great toe  of left foot 10/19/2018   • CKD (chronic kidney disease)     Stage 3; followed by Dr. Vicky Duran    • Diastolic dysfunction     GRADE II per echo 2018   • Difficulty breathing     during exertion   • Dyslipidemia    • Erectile dysfunction    • Fatigue    • History of blood transfusion    • History of kidney stones    • Hyperlipidemia    • Hypertension    • Hyponatremia    • Left ventricular hypertrophy     per echo 2018   • Localized edema    • Neuropathy    • Obstructive sleep apnea     USING CPAP   • Osteoarthritis    • Peptic ulcer    • Pneumonia    • Pneumonia of left upper lobe due to infectious organism 3/15/2019   • Prostate cancer (CMS/HCC)     Status post prostatectomy, radiation therapy, and hormone therapy followed by Dr. Lee; metastatsis to bone   • Pure hyperglyceridemia    • Restless leg syndrome    • Sepsis (CMS/HCC)    • Sinus bradycardia    • Transient cerebral ischemia    • Type 2 diabetes mellitus (CMS/HCC)         Past Surgical History:   Procedure Laterality Date   • BRONCHOSCOPY N/A 4/9/2018    Procedure: BRONCHOSCOPY;  Surgeon: Bijan Rivera III, MD;  Location: [facility]  Service: Cardiothoracic   • COLONOSCOPY     • DEEP NECK LYMPH NODE BIOPSY / EXCISION     • HEMORRHOIDECTOMY     • MEDIASTINOSCOPY N/A 4/9/2018    Procedure: MEDIASTINOSCOPY WITH LYMPH NODE BIOPSY;  Surgeon: Bijan Rivera III, MD;  Location: [facility]  Service: Cardiothoracic   • OTHER SURGICAL HISTORY      ulcer repair   • PROSTATECTOMY  2006        Current Outpatient Medications on File Prior to Visit   Medication Sig Dispense Refill   • albuterol sulfate  (90 Base) MCG/ACT inhaler Inhale 2 puffs Every 4 (Four) Hours As Needed for Wheezing or Shortness of Air. 1 inhaler 0   • aspirin 81 MG EC tablet Take 1 tablet by mouth daily.     • cholecalciferol (VITAMIN D3) 1000 units tablet Take 2,000 Units by mouth Daily.     • Denosumab (XGEVA SC) Inject  under the skin into the appropriate area as  directed Every 30 (Thirty) Days.     • dilTIAZem CD (CARDIZEM CD) 120 MG 24 hr capsule TAKE 1 CAPSULE EVERY DAY 90 capsule 3   • fluticasone (FLONASE SENSIMIST) 27.5 MCG/SPRAY nasal spray 2 sprays into each nostril As Needed.     • FLUZONE HIGH-DOSE QUADRIVALENT 0.7 ML suspension prefilled syringe ADM 0.7ML IM UTD     • gabapentin (NEURONTIN) 300 MG capsule TAKE 2 PO EVERY NIGHT 60 capsule 4   • HYDROcodone-acetaminophen (NORCO) 5-325 MG per tablet Take 1 tablet by mouth Every 6 (Six) Hours As Needed for Moderate Pain . 120 tablet 0   • insulin aspart (NovoLOG) 100 UNIT/ML patient supplied pump Inject  under the skin into the appropriate area as directed Continuous. Pt reports pump with basal rate 1.9 units/hr from 0800 to 1200. Basal rate 1.8 units/hr 1200 to 0800.  Checks bg ac/hs with bolus doses per pumps parameters     • Leuprolide Acetate, 3 Month, (LUPRON DEPOT, 3-MONTH, IM) Inject  into the appropriate muscle as directed by prescriber Every 3 (Three) Months.     • levothyroxine (SYNTHROID, LEVOTHROID) 75 MCG tablet Take 1 tablet by mouth Daily. 30 tablet 0   • levothyroxine (SYNTHROID, LEVOTHROID) 88 MCG tablet      • modafinil (PROVIGIL) 200 MG tablet Take 1 tablet by mouth Daily. 90 tablet 1   • olmesartan (BENICAR) 40 MG tablet TAKE 1 TABLET EVERY DAY (DISCONTINUE LOSARTAN 100MG) 90 tablet 3   • pravastatin (PRAVACHOL) 40 MG tablet Take 40 mg by mouth daily.     • rOPINIRole (REQUIP) 0.5 MG tablet Take 1 tablet by mouth in the evening and 1 at bedtime. 180 tablet 2   • traZODone (DESYREL) 50 MG tablet Take  mg by mouth every night at bedtime.     • triamterene-hydrochlorothiazide (MAXZIDE-25) 37.5-25 MG per tablet TAKE 1 TABLET EVERY DAY; APPOINTMENT IS NEEDED 90 tablet 0     No current facility-administered medications on file prior to visit.         ALLERGIES:    Allergies   Allergen Reactions   • Niacin Unknown - Low Severity   • Statins Unknown - Low Severity        Social History  "    Socioeconomic History   • Marital status:      Spouse name: Not on file   • Number of children: Not on file   • Years of education: College   • Highest education level: Not on file   Occupational History     Employer: RETIRED   Tobacco Use   • Smoking status: Former Smoker     Packs/day: 1.00     Years: 30.00     Pack years: 30.00     Types: Cigarettes     Last attempt to quit: 1998     Years since quittin.6   • Smokeless tobacco: Never Used   Substance and Sexual Activity   • Alcohol use: No   • Drug use: No   • Sexual activity: Defer   Social History Narrative    CAFFEINE USE: 1 CUP DAILY; OCCASIONAL SOFT DRINK.         Family History   Problem Relation Age of Onset   • Heart failure Mother    • Hypertension Mother    • Diabetes Mother    • Heart disease Mother    • Lung cancer Father 46   • Hypertension Sister    • Lung cancer Sister 60   • Hypertension Brother    • Lung cancer Brother 62   • Heart disease Other    • Prostate cancer Brother 60   • Malig Hyperthermia Neg Hx         Review of Systems   Constitutional: Negative for fatigue.   HENT: Negative.    Eyes: Negative.    Respiratory: Negative for chest tightness, shortness of breath and wheezing.    Gastrointestinal: Negative for abdominal pain, constipation, diarrhea, nausea and vomiting.   Endocrine: Negative.    Genitourinary: Negative.    Musculoskeletal: Positive for arthralgias.   Skin: Negative.    Allergic/Immunologic: Negative.    Neurological: Negative for weakness.   Hematological: Negative.    Psychiatric/Behavioral: Positive for sleep disturbance.       .  Objective     Vitals:    09/10/20 1303   BP: 147/65   Pulse: (!) 48   Resp: 16   Temp: 97.3 °F (36.3 °C)   TempSrc: Temporal   SpO2: 98%   Weight: 90.8 kg (200 lb 1.6 oz)   Height: 165.1 cm (65\")   PainSc: 0-No pain     Current Status 9/10/2020   ECOG score 2       Physical Exam    OBJECTIVE: Vitals signs are reviewed and are stable.    HEENT: PERRLA.    Neck:  Supple.  " Bilateral bruits, No thyromegaly or adenopathy  Lungs:  Clear.  No rales rhonchi or wheezes  Heart:  Regular rate and rhythm.  1/6 systolic murmur  Abdomen:   Soft, nontender.  Obese.  Bowel sounds present.  No organomegaly can be detected, though patient is tender in right lower to mid abdomen.  No ascites or masses again to be detected  Extremities:  No cyanosis, clubbing or edema.    Neurologic: Nonfocal, no sensory loss peripherally    RECENT LABS:  Hematology WBC   Date Value Ref Range Status   09/10/2020 7.46 3.40 - 10.80 10*3/mm3 Final     RBC   Date Value Ref Range Status   09/10/2020 4.01 (L) 4.14 - 5.80 10*6/mm3 Final     Hemoglobin   Date Value Ref Range Status   09/10/2020 11.6 (L) 13.0 - 17.7 g/dL Final     Hematocrit   Date Value Ref Range Status   09/10/2020 37.0 (L) 37.5 - 51.0 % Final     Platelets   Date Value Ref Range Status   09/10/2020 255 140 - 450 10*3/mm3 Final      TWO-VIEW CHEST 9/3/2020    The lungs are well-expanded and clear and the heart and hilar structures  are normal. There is extensive metastatic bone disease throughout the  bony thorax with osteoblastic metastases as also noted on the chest CT  scan dated 05/08/2020.     This report was finalized on 9/3/2020 2:15 PM by Dr. Jeremiah Bernard M.D    Assessment/Plan       79-year-old male with medical history including prostate carcinoma, CK D3, long-term tobacco use recent development of hoarseness and subsequent studies that demonstrated findings consistent with metastatic disease to bone as well as multiple enlarged metastatic lymph nodes, and potential abnormality in the right lateral wall trachea.  His additional history includes type 2 diabetes on insulin pump which has been more effective for control of his blood sugars.    His recent symptoms have included generalized discomfort in multiple sites in his bony skeleton as well as right lower quadrant abdominal pain.  We discussed this made will improve after he starts Firmagon in  the a.m.  Discussions with Dr. Rivera also have led to the conclusion that he'll need bronchoscopy and mediastinoscopy which did proceed as above on April 9, 2018.  Pathology revealed mediastinal lymph nodes to be involved with metastatic adenocarcinoma consistent with origin from a prostatic primary . The patient's case was discussed at thoracic conference and recommendations were to continue with ADT and radiation for symptomatic sites.  The patient is seen back April 23 his treatment with ADT-Firmagon-has improved his symptomatic performance status to near baseline.  It is not felt that he'll require other treatments such as oral antiandrogens at this point.  He could, however, potentially benefit from agents such as Xgeva given at appropriate intervals. We went on to continue with Xgeva and rescan him July 23 demonstrating near remission developing.  This is also supported by his normalizing PSA.  The patient's scans demonstrated possibly chronic appendicitis is not having symptoms referable to this.he plans to be alert to the possibility of stenosis family member.  We discussed moving to an every 6 month treatment but at this point we'll continue at 3 months. As result we continued Lupron and Xgeva on schedule and is seen back now 6 months later continuing to generally improve.     We will continue the patient's treatment and is done well with no further symptoms until recently admitted mid March with upper lobe pneumonia.  Review of his studies during that visit fortunately do not demonstrate bony metastasis, additional mediastinal adenopathy, pleural effusions or masses.  It is felt that his metastatic prostate carcinoma remains controlled.  We planned, as a result to continue Xgeva and Lupron and the patient is now seen back continuing to do well.  We went on to discuss Lupron and Xgeva, medications including Neurontin and Requip which have been helpful and the patient is next seen December 13, 2019 again  with symptoms control.  His PSA went on to be measured at 2.9.  Would like to continue same and have him return for follow-up in the further progressive consider adding additional oral therapy to her treatment.   We continued Xgeva and Lupron and later contacted the patient about his PSA of 5.030.  Follow-up scans were scheduled prior to his visit in 3 months but they have not been done at 2 months at May 8 when the patient called about increasing hip pain.  These scans do not demonstrate progression of disease in any significant way.  We have gone on to review the scans together and his overall symptoms and plan to try to address his additional sleep issues, restless legs, pain management as necessary and further diagnostics.  The patient's Neurontin was increased and did improve his symptoms.  He is next seen with follow-up bone scan that does not demonstrate worsening of bone nor need for localized radiation therapy.  The patient's PSA has been slowly rising and we have not been able to determine worsening of disease and and, therefore, it is not felt warranted add additional medications such as Xtandi or apalutamide to his therapy.  Please note would like to avoid Zytiga try not to add prednisone which would worsen his blood sugar control.  Is next seen September 9, 2020 with PSA that has increased to 15.8 though the patient remains reasonably asymptomatic and chest x-ray is otherwise negative.  We have discussed continue Neurontin and trazodone, follow-up CT scan series in the next several months to determine whether he has additional disease progressing that can be documented other than the PSA alone.     As he is reassessed September 10 he is having slowly increasing bony discomfort and his PSA has now gone to just over 15.  We have discussed repeat assessment requesting CT scanning.          Plan:    *Xgeva and Lupron will be due today    *Continue current Neurontin and Trazodone dosing    *CT of chest  abdomen pelvis in 3 weeks, repeat PSA    *Assess for the availability of Xtandi 160 mg Po daily    *4 weeks follow-up, MD and Xgeva

## 2020-09-09 ENCOUNTER — TELEPHONE (OUTPATIENT)
Dept: ONCOLOGY | Facility: CLINIC | Age: 80
End: 2020-09-09

## 2020-09-09 NOTE — TELEPHONE ENCOUNTER
Patient had a lab done last Thursday so he's wondering if the lab on his 09/10 appt is necessary.   He's also asking if his son could accompany him to this appt to help retain information.

## 2020-09-09 NOTE — TELEPHONE ENCOUNTER
Pt asking if he needed to have blood drawn again and if his son can accompany him to MD apt.  We will need to draw labs again due to the medications he is due to receive after MD visit.  Explained to pt that he will need to come up alone and that once his labs are drawn, he can call his son to come up for MD visit.  Pt v/u.

## 2020-09-10 ENCOUNTER — INFUSION (OUTPATIENT)
Dept: ONCOLOGY | Facility: HOSPITAL | Age: 80
End: 2020-09-10

## 2020-09-10 ENCOUNTER — OFFICE VISIT (OUTPATIENT)
Dept: ONCOLOGY | Facility: CLINIC | Age: 80
End: 2020-09-10

## 2020-09-10 ENCOUNTER — LAB (OUTPATIENT)
Dept: LAB | Facility: HOSPITAL | Age: 80
End: 2020-09-10

## 2020-09-10 VITALS
RESPIRATION RATE: 16 BRPM | HEIGHT: 65 IN | SYSTOLIC BLOOD PRESSURE: 147 MMHG | WEIGHT: 200.1 LBS | OXYGEN SATURATION: 98 % | TEMPERATURE: 97.3 F | BODY MASS INDEX: 33.34 KG/M2 | HEART RATE: 48 BPM | DIASTOLIC BLOOD PRESSURE: 65 MMHG

## 2020-09-10 DIAGNOSIS — C79.51 OSSEOUS METASTASIS: Primary | ICD-10-CM

## 2020-09-10 DIAGNOSIS — C61 PROSTATE CANCER METASTATIC TO BONE (HCC): ICD-10-CM

## 2020-09-10 DIAGNOSIS — C79.51 PROSTATE CANCER METASTATIC TO BONE (HCC): ICD-10-CM

## 2020-09-10 DIAGNOSIS — C79.51 OSSEOUS METASTASIS: ICD-10-CM

## 2020-09-10 LAB
ALBUMIN SERPL-MCNC: 4.2 G/DL (ref 3.5–5.2)
ALBUMIN/GLOB SERPL: 1.3 G/DL (ref 1.1–2.4)
ALP SERPL-CCNC: 163 U/L (ref 38–116)
ALT SERPL W P-5'-P-CCNC: 12 U/L (ref 0–41)
ANION GAP SERPL CALCULATED.3IONS-SCNC: 9.3 MMOL/L (ref 5–15)
AST SERPL-CCNC: 15 U/L (ref 0–40)
BASOPHILS # BLD AUTO: 0.04 10*3/MM3 (ref 0–0.2)
BASOPHILS NFR BLD AUTO: 0.5 % (ref 0–1.5)
BILIRUB SERPL-MCNC: 0.2 MG/DL (ref 0.2–1.2)
BUN SERPL-MCNC: 25 MG/DL (ref 6–20)
BUN/CREAT SERPL: 19.8 (ref 7.3–30)
CALCIUM SPEC-SCNC: 9.4 MG/DL (ref 8.5–10.2)
CHLORIDE SERPL-SCNC: 106 MMOL/L (ref 98–107)
CO2 SERPL-SCNC: 23.7 MMOL/L (ref 22–29)
CREAT SERPL-MCNC: 1.26 MG/DL (ref 0.7–1.3)
DEPRECATED RDW RBC AUTO: 46.9 FL (ref 37–54)
EOSINOPHIL # BLD AUTO: 0.16 10*3/MM3 (ref 0–0.4)
EOSINOPHIL NFR BLD AUTO: 2.1 % (ref 0.3–6.2)
ERYTHROCYTE [DISTWIDTH] IN BLOOD BY AUTOMATED COUNT: 13.8 % (ref 12.3–15.4)
GFR SERPL CREATININE-BSD FRML MDRD: 55 ML/MIN/1.73
GLOBULIN UR ELPH-MCNC: 3.3 GM/DL (ref 1.8–3.5)
GLUCOSE SERPL-MCNC: 98 MG/DL (ref 74–124)
HCT VFR BLD AUTO: 37 % (ref 37.5–51)
HGB BLD-MCNC: 11.6 G/DL (ref 13–17.7)
IMM GRANULOCYTES # BLD AUTO: 0.04 10*3/MM3 (ref 0–0.05)
IMM GRANULOCYTES NFR BLD AUTO: 0.5 % (ref 0–0.5)
LYMPHOCYTES # BLD AUTO: 2.88 10*3/MM3 (ref 0.7–3.1)
LYMPHOCYTES NFR BLD AUTO: 38.6 % (ref 19.6–45.3)
MAGNESIUM SERPL-MCNC: 2.2 MG/DL (ref 1.8–2.5)
MCH RBC QN AUTO: 28.9 PG (ref 26.6–33)
MCHC RBC AUTO-ENTMCNC: 31.4 G/DL (ref 31.5–35.7)
MCV RBC AUTO: 92.3 FL (ref 79–97)
MONOCYTES # BLD AUTO: 0.58 10*3/MM3 (ref 0.1–0.9)
MONOCYTES NFR BLD AUTO: 7.8 % (ref 5–12)
NEUTROPHILS NFR BLD AUTO: 3.76 10*3/MM3 (ref 1.7–7)
NEUTROPHILS NFR BLD AUTO: 50.5 % (ref 42.7–76)
NRBC BLD AUTO-RTO: 0 /100 WBC (ref 0–0.2)
PHOSPHATE SERPL-MCNC: 3 MG/DL (ref 2.5–4.5)
PLATELET # BLD AUTO: 255 10*3/MM3 (ref 140–450)
PMV BLD AUTO: 9.8 FL (ref 6–12)
POTASSIUM SERPL-SCNC: 5.5 MMOL/L (ref 3.5–4.7)
PROT SERPL-MCNC: 7.5 G/DL (ref 6.3–8)
RBC # BLD AUTO: 4.01 10*6/MM3 (ref 4.14–5.8)
SODIUM SERPL-SCNC: 139 MMOL/L (ref 134–145)
WBC # BLD AUTO: 7.46 10*3/MM3 (ref 3.4–10.8)

## 2020-09-10 PROCEDURE — 84100 ASSAY OF PHOSPHORUS: CPT

## 2020-09-10 PROCEDURE — 25010000002 LEUPROLIDE ACETATE (3 MONTH) PER 7.5 MG: Performed by: INTERNAL MEDICINE

## 2020-09-10 PROCEDURE — 25010000002 DENOSUMAB 120 MG/1.7ML SOLUTION: Performed by: INTERNAL MEDICINE

## 2020-09-10 PROCEDURE — 80053 COMPREHEN METABOLIC PANEL: CPT

## 2020-09-10 PROCEDURE — 96372 THER/PROPH/DIAG INJ SC/IM: CPT

## 2020-09-10 PROCEDURE — 36415 COLL VENOUS BLD VENIPUNCTURE: CPT

## 2020-09-10 PROCEDURE — 85025 COMPLETE CBC W/AUTO DIFF WBC: CPT

## 2020-09-10 PROCEDURE — 99214 OFFICE O/P EST MOD 30 MIN: CPT | Performed by: INTERNAL MEDICINE

## 2020-09-10 PROCEDURE — 83735 ASSAY OF MAGNESIUM: CPT

## 2020-09-10 PROCEDURE — 96402 CHEMO HORMON ANTINEOPL SQ/IM: CPT

## 2020-09-10 RX ORDER — INFLUENZA A VIRUS A/MICHIGAN/45/2015 X-275 (H1N1) ANTIGEN (FORMALDEHYDE INACTIVATED), INFLUENZA A VIRUS A/SINGAPORE/INFIMH-16-0019/2016 IVR-186 (H3N2) ANTIGEN (FORMALDEHYDE INACTIVATED), INFLUENZA B VIRUS B/PHUKET/3073/2013 ANTIGEN (FORMALDEHYDE INACTIVATED), AND INFLUENZA B VIRUS B/MARYLAND/15/2016 BX-69A ANTIGEN (FORMALDEHYDE INACTIVATED) 60; 60; 60; 60 UG/.7ML; UG/.7ML; UG/.7ML; UG/.7ML
INJECTION, SUSPENSION INTRAMUSCULAR
COMMUNITY
Start: 2020-09-03 | End: 2020-12-10

## 2020-09-10 RX ORDER — LEVOTHYROXINE SODIUM 88 UG/1
TABLET ORAL
COMMUNITY
Start: 2020-08-25

## 2020-09-10 RX ADMIN — LEUPROLIDE ACETATE 22.5 MG: KIT at 13:39

## 2020-09-10 RX ADMIN — DENOSUMAB 120 MG: 120 INJECTION SUBCUTANEOUS at 13:39

## 2020-09-25 RX ORDER — ROPINIROLE 0.5 MG/1
TABLET, FILM COATED ORAL
Qty: 180 TABLET | Refills: 2 | Status: SHIPPED | OUTPATIENT
Start: 2020-09-25 | End: 2021-05-13 | Stop reason: SDUPTHER

## 2020-09-27 DIAGNOSIS — C61 PROSTATE CANCER METASTATIC TO BONE (HCC): ICD-10-CM

## 2020-09-27 DIAGNOSIS — C79.51 PROSTATE CANCER METASTATIC TO BONE (HCC): ICD-10-CM

## 2020-09-28 RX ORDER — HYDROCODONE BITARTRATE AND ACETAMINOPHEN 5; 325 MG/1; MG/1
1 TABLET ORAL EVERY 6 HOURS PRN
Qty: 120 TABLET | Refills: 0 | Status: SHIPPED | OUTPATIENT
Start: 2020-09-28 | End: 2020-10-25 | Stop reason: SDUPTHER

## 2020-10-01 ENCOUNTER — HOSPITAL ENCOUNTER (OUTPATIENT)
Dept: PET IMAGING | Facility: HOSPITAL | Age: 80
Discharge: HOME | End: 2020-10-01
Admitting: INTERNAL MEDICINE

## 2020-10-01 ENCOUNTER — LAB (OUTPATIENT)
Dept: LAB | Facility: HOSPITAL | Age: 80
End: 2020-10-01

## 2020-10-01 DIAGNOSIS — C61 PROSTATE CANCER METASTATIC TO BONE (HCC): ICD-10-CM

## 2020-10-01 DIAGNOSIS — C79.51 OSSEOUS METASTASIS: ICD-10-CM

## 2020-10-01 DIAGNOSIS — C79.51 PROSTATE CANCER METASTATIC TO BONE (HCC): ICD-10-CM

## 2020-10-01 LAB
BASOPHILS # BLD AUTO: 0.03 10*3/MM3 (ref 0–0.2)
BASOPHILS NFR BLD AUTO: 0.4 % (ref 0–1.5)
CREAT BLDA-MCNC: 1 MG/DL (ref 0.6–1.3)
DEPRECATED RDW RBC AUTO: 47.9 FL (ref 37–54)
EOSINOPHIL # BLD AUTO: 0.16 10*3/MM3 (ref 0–0.4)
EOSINOPHIL NFR BLD AUTO: 2.2 % (ref 0.3–6.2)
ERYTHROCYTE [DISTWIDTH] IN BLOOD BY AUTOMATED COUNT: 14.2 % (ref 12.3–15.4)
HCT VFR BLD AUTO: 36.4 % (ref 37.5–51)
HGB BLD-MCNC: 11.4 G/DL (ref 13–17.7)
IMM GRANULOCYTES # BLD AUTO: 0.03 10*3/MM3 (ref 0–0.05)
IMM GRANULOCYTES NFR BLD AUTO: 0.4 % (ref 0–0.5)
LYMPHOCYTES # BLD AUTO: 2.4 10*3/MM3 (ref 0.7–3.1)
LYMPHOCYTES NFR BLD AUTO: 32.6 % (ref 19.6–45.3)
MCH RBC QN AUTO: 28.7 PG (ref 26.6–33)
MCHC RBC AUTO-ENTMCNC: 31.3 G/DL (ref 31.5–35.7)
MCV RBC AUTO: 91.7 FL (ref 79–97)
MONOCYTES # BLD AUTO: 0.66 10*3/MM3 (ref 0.1–0.9)
MONOCYTES NFR BLD AUTO: 9 % (ref 5–12)
NEUTROPHILS NFR BLD AUTO: 4.09 10*3/MM3 (ref 1.7–7)
NEUTROPHILS NFR BLD AUTO: 55.4 % (ref 42.7–76)
NRBC BLD AUTO-RTO: 0 /100 WBC (ref 0–0.2)
PLATELET # BLD AUTO: 209 10*3/MM3 (ref 140–450)
PMV BLD AUTO: 10.7 FL (ref 6–12)
PSA SERPL-MCNC: 16.6 NG/ML (ref 0–4)
RBC # BLD AUTO: 3.97 10*6/MM3 (ref 4.14–5.8)
WBC # BLD AUTO: 7.37 10*3/MM3 (ref 3.4–10.8)

## 2020-10-01 PROCEDURE — 74177 CT ABD & PELVIS W/CONTRAST: CPT

## 2020-10-01 PROCEDURE — 71260 CT THORAX DX C+: CPT

## 2020-10-01 PROCEDURE — 85025 COMPLETE CBC W/AUTO DIFF WBC: CPT

## 2020-10-01 PROCEDURE — 36415 COLL VENOUS BLD VENIPUNCTURE: CPT

## 2020-10-01 PROCEDURE — 84153 ASSAY OF PSA TOTAL: CPT | Performed by: INTERNAL MEDICINE

## 2020-10-01 PROCEDURE — 0 DIATRIZOATE MEGLUMINE & SODIUM PER 1 ML: Performed by: INTERNAL MEDICINE

## 2020-10-01 PROCEDURE — 25010000002 IOPAMIDOL 61 % SOLUTION: Performed by: INTERNAL MEDICINE

## 2020-10-01 PROCEDURE — 82565 ASSAY OF CREATININE: CPT

## 2020-10-01 RX ADMIN — DIATRIZOATE MEGLUMINE AND DIATRIZOATE SODIUM 30 ML: 660; 100 LIQUID ORAL; RECTAL at 09:30

## 2020-10-01 RX ADMIN — IOPAMIDOL 85 ML: 612 INJECTION, SOLUTION INTRAVENOUS at 10:22

## 2020-10-07 ENCOUNTER — INFUSION (OUTPATIENT)
Dept: ONCOLOGY | Facility: HOSPITAL | Age: 80
End: 2020-10-07

## 2020-10-07 ENCOUNTER — OFFICE VISIT (OUTPATIENT)
Dept: ONCOLOGY | Facility: CLINIC | Age: 80
End: 2020-10-07

## 2020-10-07 ENCOUNTER — LAB (OUTPATIENT)
Dept: LAB | Facility: HOSPITAL | Age: 80
End: 2020-10-07

## 2020-10-07 ENCOUNTER — OFFICE VISIT (OUTPATIENT)
Dept: LAB | Facility: HOSPITAL | Age: 80
End: 2020-10-07

## 2020-10-07 VITALS
TEMPERATURE: 96.9 F | BODY MASS INDEX: 33.34 KG/M2 | WEIGHT: 200.1 LBS | HEART RATE: 49 BPM | HEIGHT: 65 IN | OXYGEN SATURATION: 98 % | RESPIRATION RATE: 18 BRPM | DIASTOLIC BLOOD PRESSURE: 62 MMHG | SYSTOLIC BLOOD PRESSURE: 114 MMHG

## 2020-10-07 DIAGNOSIS — C61 PROSTATE CANCER METASTATIC TO BONE (HCC): ICD-10-CM

## 2020-10-07 DIAGNOSIS — C79.51 PROSTATE CANCER METASTATIC TO BONE (HCC): Primary | ICD-10-CM

## 2020-10-07 DIAGNOSIS — C61 PROSTATE CANCER METASTATIC TO BONE (HCC): Primary | ICD-10-CM

## 2020-10-07 DIAGNOSIS — C79.51 OSSEOUS METASTASIS: Primary | ICD-10-CM

## 2020-10-07 DIAGNOSIS — C79.51 OSSEOUS METASTASIS: ICD-10-CM

## 2020-10-07 DIAGNOSIS — C79.51 PROSTATE CANCER METASTATIC TO BONE (HCC): ICD-10-CM

## 2020-10-07 LAB
ALBUMIN SERPL-MCNC: 4.2 G/DL (ref 3.5–5.2)
ALBUMIN/GLOB SERPL: 1.4 G/DL (ref 1.1–2.4)
ALP SERPL-CCNC: 185 U/L (ref 38–116)
ALT SERPL W P-5'-P-CCNC: 12 U/L (ref 0–41)
ANION GAP SERPL CALCULATED.3IONS-SCNC: 9.5 MMOL/L (ref 5–15)
AST SERPL-CCNC: 21 U/L (ref 0–40)
BASOPHILS # BLD AUTO: 0.02 10*3/MM3 (ref 0–0.2)
BASOPHILS NFR BLD AUTO: 0.3 % (ref 0–1.5)
BILIRUB SERPL-MCNC: 0.2 MG/DL (ref 0.2–1.2)
BUN SERPL-MCNC: 22 MG/DL (ref 6–20)
BUN/CREAT SERPL: 18.3 (ref 7.3–30)
CALCIUM SPEC-SCNC: 9.9 MG/DL (ref 8.5–10.2)
CHLORIDE SERPL-SCNC: 107 MMOL/L (ref 98–107)
CO2 SERPL-SCNC: 22.5 MMOL/L (ref 22–29)
CREAT SERPL-MCNC: 1.2 MG/DL (ref 0.7–1.3)
DEPRECATED RDW RBC AUTO: 48 FL (ref 37–54)
EOSINOPHIL # BLD AUTO: 0.18 10*3/MM3 (ref 0–0.4)
EOSINOPHIL NFR BLD AUTO: 2.4 % (ref 0.3–6.2)
ERYTHROCYTE [DISTWIDTH] IN BLOOD BY AUTOMATED COUNT: 14 % (ref 12.3–15.4)
GFR SERPL CREATININE-BSD FRML MDRD: 58 ML/MIN/1.73
GLOBULIN UR ELPH-MCNC: 3.1 GM/DL (ref 1.8–3.5)
GLUCOSE SERPL-MCNC: 116 MG/DL (ref 74–124)
HCT VFR BLD AUTO: 37.6 % (ref 37.5–51)
HGB BLD-MCNC: 11.8 G/DL (ref 13–17.7)
IMM GRANULOCYTES # BLD AUTO: 0.04 10*3/MM3 (ref 0–0.05)
IMM GRANULOCYTES NFR BLD AUTO: 0.5 % (ref 0–0.5)
LYMPHOCYTES # BLD AUTO: 2.33 10*3/MM3 (ref 0.7–3.1)
LYMPHOCYTES NFR BLD AUTO: 30.9 % (ref 19.6–45.3)
MAGNESIUM SERPL-MCNC: 2.1 MG/DL (ref 1.8–2.5)
MCH RBC QN AUTO: 29.1 PG (ref 26.6–33)
MCHC RBC AUTO-ENTMCNC: 31.4 G/DL (ref 31.5–35.7)
MCV RBC AUTO: 92.6 FL (ref 79–97)
MONOCYTES # BLD AUTO: 0.65 10*3/MM3 (ref 0.1–0.9)
MONOCYTES NFR BLD AUTO: 8.6 % (ref 5–12)
NEUTROPHILS NFR BLD AUTO: 4.33 10*3/MM3 (ref 1.7–7)
NEUTROPHILS NFR BLD AUTO: 57.3 % (ref 42.7–76)
NRBC BLD AUTO-RTO: 0 /100 WBC (ref 0–0.2)
PHOSPHATE SERPL-MCNC: 4.1 MG/DL (ref 2.5–4.5)
PLATELET # BLD AUTO: 208 10*3/MM3 (ref 140–450)
PMV BLD AUTO: 9.9 FL (ref 6–12)
POTASSIUM SERPL-SCNC: 5.2 MMOL/L (ref 3.5–4.7)
PROT SERPL-MCNC: 7.3 G/DL (ref 6.3–8)
RBC # BLD AUTO: 4.06 10*6/MM3 (ref 4.14–5.8)
SODIUM SERPL-SCNC: 139 MMOL/L (ref 134–145)
WBC # BLD AUTO: 7.55 10*3/MM3 (ref 3.4–10.8)

## 2020-10-07 PROCEDURE — 84100 ASSAY OF PHOSPHORUS: CPT

## 2020-10-07 PROCEDURE — 99214 OFFICE O/P EST MOD 30 MIN: CPT | Performed by: INTERNAL MEDICINE

## 2020-10-07 PROCEDURE — 80053 COMPREHEN METABOLIC PANEL: CPT | Performed by: INTERNAL MEDICINE

## 2020-10-07 PROCEDURE — 25010000002 DENOSUMAB 120 MG/1.7ML SOLUTION: Performed by: INTERNAL MEDICINE

## 2020-10-07 PROCEDURE — 36415 COLL VENOUS BLD VENIPUNCTURE: CPT

## 2020-10-07 PROCEDURE — 96372 THER/PROPH/DIAG INJ SC/IM: CPT

## 2020-10-07 PROCEDURE — 83735 ASSAY OF MAGNESIUM: CPT

## 2020-10-07 PROCEDURE — 85025 COMPLETE CBC W/AUTO DIFF WBC: CPT

## 2020-10-07 RX ADMIN — DENOSUMAB 120 MG: 120 INJECTION SUBCUTANEOUS at 11:14

## 2020-10-08 ENCOUNTER — OFFICE VISIT (OUTPATIENT)
Dept: SLEEP MEDICINE | Facility: HOSPITAL | Age: 80
End: 2020-10-08

## 2020-10-08 ENCOUNTER — TELEPHONE (OUTPATIENT)
Dept: SLEEP MEDICINE | Facility: HOSPITAL | Age: 80
End: 2020-10-08

## 2020-10-08 VITALS — HEART RATE: 57 BPM | OXYGEN SATURATION: 97 % | BODY MASS INDEX: 32.3 KG/M2 | HEIGHT: 66 IN | WEIGHT: 201 LBS

## 2020-10-08 DIAGNOSIS — G47.14 HYPERSOMNIA DUE TO MEDICAL CONDITION: ICD-10-CM

## 2020-10-08 DIAGNOSIS — G47.33 OBSTRUCTIVE SLEEP APNEA SYNDROME: Primary | ICD-10-CM

## 2020-10-08 DIAGNOSIS — F51.04 PSYCHOPHYSIOLOGICAL INSOMNIA: ICD-10-CM

## 2020-10-08 DIAGNOSIS — G25.81 RESTLESS LEGS SYNDROME (RLS): ICD-10-CM

## 2020-10-08 PROCEDURE — G0463 HOSPITAL OUTPT CLINIC VISIT: HCPCS

## 2020-10-08 PROCEDURE — 99214 OFFICE O/P EST MOD 30 MIN: CPT | Performed by: INTERNAL MEDICINE

## 2020-10-08 NOTE — PROGRESS NOTES
"[facility] Up [facility] Note     Chief Complaint:  RONNY     Primary Care Physician: Axel Guardado MD    Interval History:   The patient is a 79 y.o. male who I last saw 1 year ago.  He reports taking a longer time to go to sleep.  He goes to bed at 9:30 PM and falls asleep pretty quickly but then when he awakens, he has trouble falling back asleep.  He awakens at 6 AM.  He does use the restroom during the nighttime.  [place] Sleepiness Scale is normal at 3.  The patient does describe leg and foot pain.    Review of Systems:    A complete review of systems was done and all were negative with the exception of the above    The patient reports no significant changes in his past medical history or family history since last seen.    Social History:    Social History     Socioeconomic History   • Marital status:      Spouse name: Not on file   • Number of children: Not on file   • Years of education: College   • Highest education level: Not on file   Occupational History     Employer: RETIRED   Tobacco Use   • Smoking status: Former Smoker     Packs/day: 1.00     Years: 30.00     Pack years: 30.00     Types: Cigarettes     Quit date: 1998     Years since quittin.7   • Smokeless tobacco: Never Used   Substance and Sexual Activity   • Alcohol use: No   • Drug use: No   • Sexual activity: Defer   Social History Narrative    CAFFEINE USE: 1 CUP DAILY; OCCASIONAL SOFT DRINK.        Allergies:  Niacin and Statins     Medication Review:  Reviewed.      Vital Signs:    Vitals:    10/08/20 0900   Pulse: 57   SpO2: 97%   Weight: 91.2 kg (201 lb)   Height: 166.4 cm (65.5\")     Body mass index is 32.94 kg/m².    Physical Exam:    Constitutional:  Well developed 79 y.o. male that appears in no apparent distress.  Awake & oriented times 3.  Normal mood with normal recent and remote memory and normal judgement.  Eyes:  Conjunctivae normal.  Oropharynx: Previously, moist mucous membranes without exudate and a " large tongue and normal uvula narrow posterior pharyngeal opening, patient is wearing a facemask.  Neck: Trachea midline  Respiratory: Effort not labored  Musculoskeletal: Gait grossly normal     Results Review:  DME is [employer] and he uses a nasal interface.  Downloads between 9/8 and 10/7/2020 compliance 93%.  Average usage is 8 hours and 28 minutes.  Average AHI is mildly abnormal at 8.8 but all subsets are normal and he has a leak of 27 minutes.  Average AutoBiPAP pressure is IPAP of 15 and EPAP of 12.1 and his auto BiPAP settings: Max IPAP 16 minimum EPAP 11 and maximum pressure support of 4.    Impression:   Obstructive sleep apnea adequately treated with auto BiPAP with good compliance and usage and no complaints of hypersomnolence.  The patient continues to take modafinil 200 mg every morning for complaints of hypersomnolence. The patient's obstructive apnea appears to be adequately treated.  The patient's average clear airway index  is normal.    Restless leg syndrome treated with Requip and gabapentin    Psychophysiologic insomnia    Plan:  Good sleep hygiene measures should be maintained.  Weight loss would be beneficial in this patient who is obese by BMI.  The patient is benefiting from the treatment being provided.     The patient will continue auto BiPAP.  Max IPAP increased to 17 and minimum EPAP decreased to 10.  A new prescription will be sent to his DME.    The patient should take Requip around 6 PM.  Continue gabapentin at bedtime.  It should be noted that the patient does take trazodone about 2 times a week to help with sleep.    The patient will call for any problems and will follow up in 6-8 months.      Bob Mcdermott MD  Sleep Medicine  10/08/20  10:16 EDT

## 2020-10-11 PROBLEM — F51.04 PSYCHOPHYSIOLOGICAL INSOMNIA: Status: ACTIVE | Noted: 2020-10-11

## 2020-10-11 PROBLEM — G25.81 RESTLESS LEGS SYNDROME (RLS): Status: ACTIVE | Noted: 2020-10-11

## 2020-10-16 DIAGNOSIS — G47.33 OBSTRUCTIVE SLEEP APNEA SYNDROME: ICD-10-CM

## 2020-10-16 DIAGNOSIS — G47.14 HYPERSOMNIA DUE TO MEDICAL CONDITION: ICD-10-CM

## 2020-10-16 RX ORDER — MODAFINIL 200 MG/1
200 TABLET ORAL DAILY
Qty: 90 TABLET | Refills: 1 | Status: SHIPPED | OUTPATIENT
Start: 2020-10-16 | End: 2021-03-22 | Stop reason: SDUPTHER

## 2020-10-25 DIAGNOSIS — C79.51 PROSTATE CANCER METASTATIC TO BONE (HCC): ICD-10-CM

## 2020-10-25 DIAGNOSIS — C61 PROSTATE CANCER METASTATIC TO BONE (HCC): ICD-10-CM

## 2020-10-26 RX ORDER — HYDROCODONE BITARTRATE AND ACETAMINOPHEN 5; 325 MG/1; MG/1
1 TABLET ORAL EVERY 6 HOURS PRN
Qty: 120 TABLET | Refills: 0 | Status: SHIPPED | OUTPATIENT
Start: 2020-10-26 | End: 2020-10-27 | Stop reason: SDUPTHER

## 2020-10-27 DIAGNOSIS — C61 PROSTATE CANCER METASTATIC TO BONE (HCC): ICD-10-CM

## 2020-10-27 DIAGNOSIS — C79.51 PROSTATE CANCER METASTATIC TO BONE (HCC): ICD-10-CM

## 2020-10-28 RX ORDER — HYDROCODONE BITARTRATE AND ACETAMINOPHEN 5; 325 MG/1; MG/1
1 TABLET ORAL EVERY 6 HOURS PRN
Qty: 120 TABLET | Refills: 0 | Status: SHIPPED | OUTPATIENT
Start: 2020-10-28 | End: 2020-11-25 | Stop reason: SDUPTHER

## 2020-11-02 RX ORDER — TRIAMTERENE AND HYDROCHLOROTHIAZIDE 37.5; 25 MG/1; MG/1
TABLET ORAL
Qty: 90 TABLET | Refills: 0 | Status: SHIPPED | OUTPATIENT
Start: 2020-11-02 | End: 2021-02-27 | Stop reason: SDUPTHER

## 2020-11-02 NOTE — TELEPHONE ENCOUNTER
Next appointment 02/02/2021      Assessment:        Diagnosis Plan   1. Heart murmur  Adult Transthoracic Echo Complete W/ Cont if Necessary Per Protocol   2. Diastolic dysfunction  Adult Transthoracic Echo Complete W/ Cont if Necessary Per Protocol   3. Coronary artery calcification      4. Ascending aorta dilatation (CMS/HCC)      5. Sinus bradycardia      6. Essential hypertension      7. Localized edema      8. Chronic kidney disease, stage III (moderate) (CMS/HCC)      9. Pure hypertriglyceridemia  Lipid Panel   10. Obstructive sleep apnea syndrome treated auto BiPAP               Plan:       1.  Heart Murmur: Repeat echocardiogram.     2.  Diastolic Dysfunction: Grade 2 on last echocardiogram and will repeat the echocardiogram.  Recommended good blood pressure control and weight loss.     3.  Coronary Artery Calcification: Seen on CT scan 3/2019.  Denies anginal symptoms.  Continue aspirin and pravastatin for primary prevention.     4.  Ascending Aortic Dilation: Measured 3.8 cm on CT scan.  Continue to monitor .     5. Bradycardia: On diltiazem.  Asymptomatic.     6.  Hypertension: Blood pressure elevated today and is not had his morning medications.  He would benefit from exercise, weight loss, and low-sodium diet.     7.  Edema: He would benefit from compression stockings, low-sodium diet, and elevation of legs.     8.  Chronic Kidney Disease: Followed by Dr. Vicky Duran     9.  Hyperlipidemia/hypertriglyceridemia: I recommended a lipid panel this year and he wants to have that done at Dr. Lee's office.      10.  Obstructive Sleep Apnea: Compliant with BiPAP machine.  I recommended that he follow-up with Dr. Mcdermott as he is waking up short of breath middle the night.     11.  Blurred Vision: I recommend follow-up with his ophthalmologist.     12. I recommended follow-up with Dr. Lee in 1 year, unless otherwise needed sooner.     ADDENDUM 2/20/2020:     Echocardiogram result Text        · Estimated EF appears to be in the range of 61 - 65%.  · Left ventricular diastolic dysfunction is noted (grade II w/high LAP) consistent with pseudonormalization.  · Normal right ventricular cavity size and systolic function noted.  · Left atrial cavity size is mildly dilated.  · Mild aortic valve stenosis is present. Aortic valve maximum pressure gradient is 24.0 mmHg. Aortic valve mean pressure gradient is 11.0 mmHg.  · Calculated right ventricular systolic pressure from tricuspid regurgitation is 22 mmHg.  · There is no evidence of pericardial effusion.      Patient given results and I told him that the heart murmur is coming from the mild aortic stenosis.  We will continue to monitor.

## 2020-11-03 RX ORDER — DILTIAZEM HYDROCHLORIDE 120 MG/1
CAPSULE, COATED, EXTENDED RELEASE ORAL
Qty: 90 CAPSULE | Refills: 3 | Status: SHIPPED | OUTPATIENT
Start: 2020-11-03 | End: 2021-09-13

## 2020-11-04 ENCOUNTER — INFUSION (OUTPATIENT)
Dept: ONCOLOGY | Facility: HOSPITAL | Age: 80
End: 2020-11-04

## 2020-11-04 ENCOUNTER — LAB (OUTPATIENT)
Dept: LAB | Facility: HOSPITAL | Age: 80
End: 2020-11-04

## 2020-11-04 DIAGNOSIS — C61 PROSTATE CANCER METASTATIC TO BONE (HCC): ICD-10-CM

## 2020-11-04 DIAGNOSIS — C79.51 PROSTATE CANCER METASTATIC TO BONE (HCC): ICD-10-CM

## 2020-11-04 DIAGNOSIS — C79.51 OSSEOUS METASTASIS: Primary | ICD-10-CM

## 2020-11-04 DIAGNOSIS — C79.51 OSSEOUS METASTASIS: ICD-10-CM

## 2020-11-04 LAB
ALBUMIN SERPL-MCNC: 4.2 G/DL (ref 3.5–5.2)
ALBUMIN/GLOB SERPL: 1.4 G/DL (ref 1.1–2.4)
ALP SERPL-CCNC: 195 U/L (ref 38–116)
ALT SERPL W P-5'-P-CCNC: 12 U/L (ref 0–41)
ANION GAP SERPL CALCULATED.3IONS-SCNC: 8.9 MMOL/L (ref 5–15)
AST SERPL-CCNC: 14 U/L (ref 0–40)
BASOPHILS # BLD AUTO: 0.04 10*3/MM3 (ref 0–0.2)
BASOPHILS NFR BLD AUTO: 0.5 % (ref 0–1.5)
BILIRUB SERPL-MCNC: 0.2 MG/DL (ref 0.2–1.2)
BUN SERPL-MCNC: 26 MG/DL (ref 6–20)
BUN/CREAT SERPL: 21.7 (ref 7.3–30)
CALCIUM SPEC-SCNC: 8.9 MG/DL (ref 8.5–10.2)
CHLORIDE SERPL-SCNC: 108 MMOL/L (ref 98–107)
CO2 SERPL-SCNC: 23.1 MMOL/L (ref 22–29)
CREAT SERPL-MCNC: 1.2 MG/DL (ref 0.7–1.3)
DEPRECATED RDW RBC AUTO: 47.4 FL (ref 37–54)
EOSINOPHIL # BLD AUTO: 0.18 10*3/MM3 (ref 0–0.4)
EOSINOPHIL NFR BLD AUTO: 2.3 % (ref 0.3–6.2)
ERYTHROCYTE [DISTWIDTH] IN BLOOD BY AUTOMATED COUNT: 14.1 % (ref 12.3–15.4)
GFR SERPL CREATININE-BSD FRML MDRD: 58 ML/MIN/1.73
GLOBULIN UR ELPH-MCNC: 3 GM/DL (ref 1.8–3.5)
GLUCOSE SERPL-MCNC: 157 MG/DL (ref 74–124)
HCT VFR BLD AUTO: 36.9 % (ref 37.5–51)
HGB BLD-MCNC: 11.5 G/DL (ref 13–17.7)
IMM GRANULOCYTES # BLD AUTO: 0.05 10*3/MM3 (ref 0–0.05)
IMM GRANULOCYTES NFR BLD AUTO: 0.7 % (ref 0–0.5)
LYMPHOCYTES # BLD AUTO: 2.23 10*3/MM3 (ref 0.7–3.1)
LYMPHOCYTES NFR BLD AUTO: 29.1 % (ref 19.6–45.3)
MAGNESIUM SERPL-MCNC: 2.1 MG/DL (ref 1.8–2.5)
MCH RBC QN AUTO: 28.7 PG (ref 26.6–33)
MCHC RBC AUTO-ENTMCNC: 31.2 G/DL (ref 31.5–35.7)
MCV RBC AUTO: 92 FL (ref 79–97)
MONOCYTES # BLD AUTO: 0.6 10*3/MM3 (ref 0.1–0.9)
MONOCYTES NFR BLD AUTO: 7.8 % (ref 5–12)
NEUTROPHILS NFR BLD AUTO: 4.56 10*3/MM3 (ref 1.7–7)
NEUTROPHILS NFR BLD AUTO: 59.6 % (ref 42.7–76)
NRBC BLD AUTO-RTO: 0 /100 WBC (ref 0–0.2)
PHOSPHATE SERPL-MCNC: 3.1 MG/DL (ref 2.5–4.5)
PLATELET # BLD AUTO: 209 10*3/MM3 (ref 140–450)
PMV BLD AUTO: 10.1 FL (ref 6–12)
POTASSIUM SERPL-SCNC: 5.1 MMOL/L (ref 3.5–4.7)
PROT SERPL-MCNC: 7.2 G/DL (ref 6.3–8)
RBC # BLD AUTO: 4.01 10*6/MM3 (ref 4.14–5.8)
SODIUM SERPL-SCNC: 140 MMOL/L (ref 134–145)
WBC # BLD AUTO: 7.66 10*3/MM3 (ref 3.4–10.8)

## 2020-11-04 PROCEDURE — 96372 THER/PROPH/DIAG INJ SC/IM: CPT

## 2020-11-04 PROCEDURE — 25010000002 DENOSUMAB 120 MG/1.7ML SOLUTION: Performed by: INTERNAL MEDICINE

## 2020-11-04 PROCEDURE — 85025 COMPLETE CBC W/AUTO DIFF WBC: CPT

## 2020-11-04 PROCEDURE — 83735 ASSAY OF MAGNESIUM: CPT

## 2020-11-04 PROCEDURE — 80053 COMPREHEN METABOLIC PANEL: CPT

## 2020-11-04 PROCEDURE — 84100 ASSAY OF PHOSPHORUS: CPT

## 2020-11-04 PROCEDURE — 36415 COLL VENOUS BLD VENIPUNCTURE: CPT

## 2020-11-04 RX ADMIN — DENOSUMAB 120 MG: 120 INJECTION SUBCUTANEOUS at 11:00

## 2020-11-25 ENCOUNTER — LAB (OUTPATIENT)
Dept: LAB | Facility: HOSPITAL | Age: 80
End: 2020-11-25

## 2020-11-25 ENCOUNTER — CLINICAL SUPPORT (OUTPATIENT)
Dept: ONCOLOGY | Facility: HOSPITAL | Age: 80
End: 2020-11-25

## 2020-11-25 DIAGNOSIS — C79.51 PROSTATE CANCER METASTATIC TO BONE (HCC): ICD-10-CM

## 2020-11-25 DIAGNOSIS — C61 PROSTATE CANCER METASTATIC TO BONE (HCC): ICD-10-CM

## 2020-11-25 DIAGNOSIS — C79.51 OSSEOUS METASTASIS: ICD-10-CM

## 2020-11-25 DIAGNOSIS — E78.1 PURE HYPERTRIGLYCERIDEMIA: Primary | ICD-10-CM

## 2020-11-25 LAB
BASOPHILS # BLD AUTO: 0.04 10*3/MM3 (ref 0–0.2)
BASOPHILS NFR BLD AUTO: 0.6 % (ref 0–1.5)
DEPRECATED RDW RBC AUTO: 46.3 FL (ref 37–54)
EOSINOPHIL # BLD AUTO: 0.17 10*3/MM3 (ref 0–0.4)
EOSINOPHIL NFR BLD AUTO: 2.4 % (ref 0.3–6.2)
ERYTHROCYTE [DISTWIDTH] IN BLOOD BY AUTOMATED COUNT: 13.8 % (ref 12.3–15.4)
HCT VFR BLD AUTO: 35.8 % (ref 37.5–51)
HGB BLD-MCNC: 11.4 G/DL (ref 13–17.7)
IMM GRANULOCYTES # BLD AUTO: 0.03 10*3/MM3 (ref 0–0.05)
IMM GRANULOCYTES NFR BLD AUTO: 0.4 % (ref 0–0.5)
LYMPHOCYTES # BLD AUTO: 2.54 10*3/MM3 (ref 0.7–3.1)
LYMPHOCYTES NFR BLD AUTO: 36.6 % (ref 19.6–45.3)
MCH RBC QN AUTO: 28.9 PG (ref 26.6–33)
MCHC RBC AUTO-ENTMCNC: 31.8 G/DL (ref 31.5–35.7)
MCV RBC AUTO: 90.9 FL (ref 79–97)
MONOCYTES # BLD AUTO: 0.63 10*3/MM3 (ref 0.1–0.9)
MONOCYTES NFR BLD AUTO: 9.1 % (ref 5–12)
NEUTROPHILS NFR BLD AUTO: 3.53 10*3/MM3 (ref 1.7–7)
NEUTROPHILS NFR BLD AUTO: 50.9 % (ref 42.7–76)
NRBC BLD AUTO-RTO: 0 /100 WBC (ref 0–0.2)
PLATELET # BLD AUTO: 207 10*3/MM3 (ref 140–450)
PMV BLD AUTO: 9.8 FL (ref 6–12)
PSA SERPL-MCNC: 25.4 NG/ML (ref 0–4)
RBC # BLD AUTO: 3.94 10*6/MM3 (ref 4.14–5.8)
WBC # BLD AUTO: 6.94 10*3/MM3 (ref 3.4–10.8)

## 2020-11-25 PROCEDURE — 85025 COMPLETE CBC W/AUTO DIFF WBC: CPT

## 2020-11-25 PROCEDURE — 84153 ASSAY OF PSA TOTAL: CPT | Performed by: INTERNAL MEDICINE

## 2020-11-25 PROCEDURE — 36415 COLL VENOUS BLD VENIPUNCTURE: CPT

## 2020-11-25 PROCEDURE — G0463 HOSPITAL OUTPT CLINIC VISIT: HCPCS

## 2020-11-25 RX ORDER — HYDROCODONE BITARTRATE AND ACETAMINOPHEN 5; 325 MG/1; MG/1
1 TABLET ORAL EVERY 6 HOURS PRN
Qty: 120 TABLET | Refills: 0 | Status: CANCELLED | OUTPATIENT
Start: 2020-11-25

## 2020-11-25 RX ORDER — HYDROCODONE BITARTRATE AND ACETAMINOPHEN 5; 325 MG/1; MG/1
1 TABLET ORAL EVERY 6 HOURS PRN
Qty: 120 TABLET | Refills: 0 | Status: SHIPPED | OUTPATIENT
Start: 2020-11-25 | End: 2020-12-21 | Stop reason: SDUPTHER

## 2020-11-25 NOTE — NURSING NOTE
Pt is here for lab with RN review.  CBC reviewed with pt, counts are stable for this pt at this time. Pt has no complaints. Pt states that he needs a refill on his Hydrocodone, request sent to Dr. Lee.   Copy of labs given to pt and f/u appt reviewed. Pt is instructed to call the office with any concerns or new symptoms prior to next visit. Pt vu

## 2020-12-02 ENCOUNTER — APPOINTMENT (OUTPATIENT)
Dept: ONCOLOGY | Facility: HOSPITAL | Age: 80
End: 2020-12-02

## 2020-12-02 ENCOUNTER — APPOINTMENT (OUTPATIENT)
Dept: LAB | Facility: HOSPITAL | Age: 80
End: 2020-12-02

## 2020-12-03 NOTE — PROGRESS NOTES
Subjective Patient having periodic bone discomfort in chest and right hip, discussed follow-up bone scan    History of Present Illness    The patient is an 80-year-old male with significant past medical history including prostate carcinoma, CKD 3 and long-term tobacco use be been seen by primary care in late January, 2018 for hoarseness.  Chest x-ray is now outpatient January 23 revealed sclerotic change in the posterior mid chest to be within 3 adjacent thoracic vertebral bodies of uncertain significance.  A follow-up chest CT revealed numerous sclerotic foci within all visualized bones consistent with metastatic disease, multiple enlarged mediastinal lymph nodes concerning for metastatic lymphadenopathy and a polypoidal abnormality in the right lateral wall of the trachea.  CT of abdomen March 20 revealed extensive osseous metastatic disease mildly enlarged retroperitoneal lymph nodes.  Bone scan March 20 was consistent with widespread osseous metastasis particularly in the proximal half the left humeral shaft, abnormal uptake in the sternum and manubrium and a small focus in the posterior aspect of the calvarium.  This led to a plain film the left humerus with mottled sclerosis involving the shaft of the humerus particularly in the proximal half but no evidence of pathologic fracture.    The patient has additionally type 2 diabetes on insulin pump, again a history of prostate carcinoma prostatectomy 12 years previously, hypertension, and hyperlipidemia.  Additional studies included a PSA of 395.1 from March 14, 2018.The patient's been seen by urology March 15 with plans to start Firmagon.    The patient is now seen in pulmonary clinic with Dr. Bijan Rivera and we have discussed that he will need bronchoscopy and mediastinoscopy.  Interestingly his additional history includes a long occupational exposure including working in the [place] just out of school, subsequent construction work for  many years and a 30-pack-year history of smoking stopping in the late 1990s.  He indicates that he followed with Dr. Guillen of urology for many years with no changes in his exams and normal PSAs.  He stopped this follow-up approximately 2 years ago.     Patient was seen in consultation with thoracic surgery on March 28 and plans are made for mediastinoscopy and bronchoscopy with lymph node biopsy.  Pathology revealed that these nodes were consistent with metastatic prostate carcinoma.  He was discussed at thoracic conference.  A PET/CT was not pursued and it was determined that he see medical oncology for hormonal treatment and radiation therapy if symptomatic.  It should be noted that the patient's March 15 [facility] office note describes that Firmagon was planned.     The patient has met with the son and grandson April 23.  We have also contacted Dr. Taylor of urology in that Firmagon was given just after his last visit and would next be due approximately May 3.  Patient feels considerably better with resolution of his pain, normalization of his performance status.  He would like to continue treatment through our office now contacted Dr. Tayolr concerning this.    The patient is next seen June 11, 2018 we discussed his follow-up including his treatments with Lupron May 7 and his next treatment on July 30.  He has returned to essentially normal performance status and his PSA has dropped further from 395 March 14 27.8 May 7 and now 2.03 June 11.  We do plan to initiate Xgeva also study him via scans to document his improvement approximately a month from now.   The patient is next seen July 26, 2018.  Repeat imaging demonstrated interval resolution of mediastinal and retroperitoneal lymphadenopathy.  There is thickening in the distal aspect of the appendix with stranding?  Chronic appendicitis.  The patient indicates he is having no such symptoms and never has.  There is no change in sclerotic bony metastasis in  the patient's PSA has dropped substantially to 1.66 by July 19 and 1.79 July 26.  He is actually feeling excellent and this is corroborated by family members.   Patient is next seen January 10, 2019 continuing to do very well and, in fact indicating that [facility], likely [facility] replacements, visual review.  He is not certain is going to continue his care with them or with us or combination of both.    Patient is next seen April 4, 2019.  He is recently discharged after hospitalization March 15-18 with left upper lobe pneumonia.  We reviewed the findings in detail with his gradual recovery now documented.  His studies include a CT of chest which does not demonstrate any further bony lesions and/or pulmonary metastasis having developed.  He is feeling considerably better and approximately back to his baseline.  The patient is next seen June 28, 2019 feeling well but having baseline generally musculoskeletal pain and restless legs.His recent exams include a PSA of 0.81, CMP with potassium of 5.3 with repeat pending.  His performance status overall remains good to very good and is clearly responding to his treatments.  The patient is next seen December 13, 2019 continue to feel well.  He has had, oddly enough, 2 episodes of sleep paralysis which have occurred to him previously and he wonders if there is any connection with his prostate carcinoma though this is felt unlikely.     The patient when assessed in December had his PSA go to 2.9.  We continue with Lupron and Xgeva and is seen back now March 9, 2020 without additional symptoms.  We discussed that a schedule could likely change moving to 3 months for both of these medications particularly if he needs additional therapy directed at progressive disease.     Patient contacted our practice May 4 concern for pain in his hips.  This led to moving his scans up and they were performed May 8, 2020 showing a sub-6 mm  pleural-based pulmonary nodule in the right lower lobe that is new from March 15, 2018, remainder of the sub-6 mm pulmonary nodules bilaterally are stable.  There is extensive osseous metastatic disease as previous with no other new findings.     The patient indicates, when seen, May 13 that his bone pain is now resolved.  While this is good information does not mean that he does not have further bony metastasis and we have discussed that a follow-up bone scan is likely necessary.  Furthermore he is having some difficulty with sleeplessness and increase in restless legs as he continues to stay at home during the COVID 19 crisis which, itself, is producing more anxiety for him.  A follow-up bone scan was obtained Lety 3 revealing multifocal osseous metastatic disease which was compared to March 2, 2018 with improvement.  No new abnormal uptake is present.  He is next seen with us on June 17, 2020 and, fortunately, is not having any significant pain at this point.  We discussed his scans in detail and, on balance, his performance status also remains improved.     It was elected to follow the patient rather than changes antiandrogen therapy.  He is reassessed September 3 with unchanged CMP, H&H of 11.4 and 36.3 with normal white count, platelet count and differential, PSA at 15.8.  Follow-up PA lateral chest x-ray does not show any new abnormalities though there is extensive metastatic bone disease throughout the bony thorax and osteoblastic metastasis have been seen on chest CT May 2020.  The patient is seen with his son Georges September 10 noticing increasing bony discomfort primarily in the right hip following fairly intensive gardening which he does frequently.  Now has further elevation of PSA were wondering whether we should try to intervene sooner per additional antiandrogens.  We will need to further assess him via CT scanning to make this decision     These were obtained October 1 showing extensive sclerotic  disease with no metastatic disease in chest abdomen or pelvis.  PSA had gone from 15.8 September 3-16 0.6 October 1.     He still has pain getting up in the morning and after active gardening.  This is manageable with current pain medications and, at present, it is not apparent that he needs to switch medications to gain better control of his disease.  The patient did have follow-up studies done including a PSA November 25, 2020 now at 25.4.  The patient is seen with his son December 10, 2020 doing fair but having some increased pain in the mid sternum and right hip that he ascribes to additional exercise/work in managing his garden.  It is apparent however, that his last bone scan was 6 months ago and we do need to reassess him concerning this.      Past Medical History:   Diagnosis Date   • Bone cancer (CMS/HCC)    • Cellulitis of great toe of left foot 10/19/2018   • CKD (chronic kidney disease)     Stage 3; followed by Dr. Vicky Duran    • Diastolic dysfunction     GRADE II per echo 2018   • Difficulty breathing     during exertion   • Dyslipidemia    • Erectile dysfunction    • Fatigue    • History of blood transfusion    • History of kidney stones    • Hyperlipidemia    • Hypertension    • Hyponatremia    • Left ventricular hypertrophy     per echo 2018   • Localized edema    • Neuropathy    • Obstructive sleep apnea     USING CPAP   • Osteoarthritis    • Peptic ulcer    • Pneumonia    • Pneumonia of left upper lobe due to infectious organism 3/15/2019   • Prostate cancer (CMS/HCC)     Status post prostatectomy, radiation therapy, and hormone therapy followed by Dr. Lee; metastatsis to bone   • Pure hyperglyceridemia    • Restless leg syndrome    • Sepsis (CMS/HCC)    • Sinus bradycardia    • Transient cerebral ischemia    • Type 2 diabetes mellitus (CMS/HCC)         Past Surgical History:   Procedure Laterality Date   • BRONCHOSCOPY N/A 4/9/2018    Procedure: BRONCHOSCOPY;  Surgeon: Bijan Rivera  III, MD;  Location: [facility] OR;  Service: Cardiothoracic   • COLONOSCOPY     • DEEP NECK LYMPH NODE BIOPSY / EXCISION     • HEMORRHOIDECTOMY     • MEDIASTINOSCOPY N/A 4/9/2018    Procedure: MEDIASTINOSCOPY WITH LYMPH NODE BIOPSY;  Surgeon: Bijan Rivera III, MD;  Location: [facility] OR;  Service: Cardiothoracic   • OTHER SURGICAL HISTORY      ulcer repair   • PROSTATECTOMY  2006        Current Outpatient Medications on File Prior to Visit   Medication Sig Dispense Refill   • albuterol sulfate  (90 Base) MCG/ACT inhaler Inhale 2 puffs Every 4 (Four) Hours As Needed for Wheezing or Shortness of Air. 1 inhaler 0   • aspirin 81 MG EC tablet Take 1 tablet by mouth daily.     • cholecalciferol (VITAMIN D3) 1000 units tablet Take 2,000 Units by mouth Daily.     • Denosumab (XGEVA SC) Inject  under the skin into the appropriate area as directed Every 30 (Thirty) Days.     • dilTIAZem CD (CARDIZEM CD) 120 MG 24 hr capsule TAKE 1 CAPSULE EVERY DAY 90 capsule 3   • fluticasone (FLONASE SENSIMIST) 27.5 MCG/SPRAY nasal spray 2 sprays into each nostril As Needed.     • gabapentin (NEURONTIN) 300 MG capsule TAKE 2 PO EVERY NIGHT 60 capsule 4   • HYDROcodone-acetaminophen (NORCO) 5-325 MG per tablet Take 1 tablet by mouth Every 6 (Six) Hours As Needed for Moderate Pain . 120 tablet 0   • insulin aspart (NovoLOG) 100 UNIT/ML patient supplied pump Inject  under the skin into the appropriate area as directed Continuous. Pt reports pump with basal rate 1.9 units/hr from 0800 to 1200. Basal rate 1.8 units/hr 1200 to 0800.  Checks bg ac/hs with bolus doses per pumps parameters     • Leuprolide Acetate, 3 Month, (LUPRON DEPOT, 3-MONTH, IM) Inject  into the appropriate muscle as directed by prescriber Every 3 (Three) Months.     • levothyroxine (SYNTHROID, LEVOTHROID) 88 MCG tablet      • modafinil (PROVIGIL) 200 MG tablet Take 1 tablet by mouth Daily. 90 tablet 1   • olmesartan (BENICAR) 40 MG tablet TAKE 1 TABLET EVERY  DAY (DISCONTINUE LOSARTAN 100MG) 90 tablet 3   • pravastatin (PRAVACHOL) 40 MG tablet Take 40 mg by mouth daily.     • rOPINIRole (REQUIP) 0.5 MG tablet Take 1 tablet by mouth in the evening and 1 at bedtime. 180 tablet 2   • traZODone (DESYREL) 50 MG tablet Take  mg by mouth every night at bedtime.     • triamterene-hydrochlorothiazide (MAXZIDE-25) 37.5-25 MG per tablet TAKE 1 TABLET EVERY DAY; APPOINTMENT IS NEEDED 90 tablet 0   • [DISCONTINUED] FLUZONE HIGH-DOSE QUADRIVALENT 0.7 ML suspension prefilled syringe ADM 0.7ML IM UTD       No current facility-administered medications on file prior to visit.         ALLERGIES:    Allergies   Allergen Reactions   • Niacin Unknown - Low Severity   • Statins Unknown - Low Severity        Social History     Socioeconomic History   • Marital status:      Spouse name: Not on file   • Number of children: Not on file   • Years of education: College   • Highest education level: Not on file   Occupational History     Employer: RETIRED   Tobacco Use   • Smoking status: Former Smoker     Packs/day: 1.00     Years: 30.00     Pack years: 30.00     Types: Cigarettes     Quit date: 1998     Years since quittin.8   • Smokeless tobacco: Never Used   Substance and Sexual Activity   • Alcohol use: No   • Drug use: No   • Sexual activity: Defer   Social History Narrative    CAFFEINE USE: 1 CUP DAILY; OCCASIONAL SOFT DRINK.         Family History   Problem Relation Age of Onset   • Heart failure Mother    • Hypertension Mother    • Diabetes Mother    • Heart disease Mother    • Lung cancer Father 46   • Hypertension Sister    • Lung cancer Sister 60   • Hypertension Brother    • Lung cancer Brother 62   • Heart disease Other    • Prostate cancer Brother 60   • Malig Hyperthermia Neg Hx         Review of Systems   Constitutional: Negative for fatigue.   HENT: Negative.    Eyes: Negative.    Respiratory: Negative for chest tightness, shortness of breath and wheezing.   "  Gastrointestinal: Negative for abdominal pain, constipation, diarrhea, nausea and vomiting.   Endocrine: Negative.    Genitourinary: Negative.    Musculoskeletal: Positive for arthralgias.   Skin: Negative.    Allergic/Immunologic: Negative.    Neurological: Negative for weakness.   Hematological: Negative.    Psychiatric/Behavioral: Positive for sleep disturbance.   All other systems reviewed and are negative.      .  Objective     Vitals:    12/10/20 0910   BP: 135/72   Pulse: 51   Resp: 16   Temp: 97.6 °F (36.4 °C)   TempSrc: Temporal   SpO2: 98%   Weight: 92.4 kg (203 lb 11.2 oz)   Height: 165.1 cm (65\")   PainSc:   3   PainLoc: Comment: Right leg     Current Status 12/10/2020   ECOG score 0       Physical Exam    OBJECTIVE: Vitals signs are reviewed and are stable.    HEENT: PERRLA.    Neck:  Supple.  Bilateral bruits, No thyromegaly or adenopathy  Lungs:  Clear.  No rales rhonchi or wheezes  Heart:  Regular rate and rhythm.  1/6 systolic murmur  Abdomen:   Soft, nontender.  Obese.  Bowel sounds present.  No organomegaly can be detected, though patient is tender in right lower to mid abdomen.  No ascites or masses again to be detected  Extremities:  No cyanosis, clubbing or edema.    Neurologic: Nonfocal, no sensory loss peripherally    RECENT LABS:  Hematology WBC   Date Value Ref Range Status   12/10/2020 7.90 3.40 - 10.80 10*3/mm3 Final     RBC   Date Value Ref Range Status   12/10/2020 3.63 (L) 4.14 - 5.80 10*6/mm3 Final     Hemoglobin   Date Value Ref Range Status   12/10/2020 10.6 (L) 13.0 - 17.7 g/dL Final     Hematocrit   Date Value Ref Range Status   12/10/2020 33.0 (L) 37.5 - 51.0 % Final     Platelets   Date Value Ref Range Status   12/10/2020 194 140 - 450 10*3/mm3 Final      TWO-VIEW CHEST 9/3/2020    The lungs are well-expanded and clear and the heart and hilar structures  are normal. There is extensive metastatic bone disease throughout the  bony thorax with osteoblastic metastases as also " noted on the chest CT  scan dated 05/08/2020.     This report was finalized on 9/3/2020 2:15 PM by Dr. Jeremiah Bernard M.D    CT CHEST, ABDOMEN AND PELVIS WITH CONTRAST 10/1/2020    FINDINGS:     CT CHEST: The visualized thyroid gland is normal. There is no  pathological mediastinal lymphadenopathy. Calcified coronary artery  disease is present. No pleural or pericardial effusion is seen. The  central airways are patent without endobronchial lesion. There are  stable sub-3 mm pulmonary nodules, unchanged from prior imaging. A  previously identified right middle lobe nodule on the study from  05/08/2020 in the subpleural location is no longer seen. No new or  suspicious pulmonary nodules are seen. No pathological axillary  lymphadenopathy. Again identified is diffuse osseous metastatic disease  within the visualized bones.     CT ABDOMEN AND PELVIS:No new focal hepatic abnormality. No intrahepatic  biliary dilatation. The gallbladder, spleen and the pancreas is normal.  Bilateral adrenal gland and kidneys are unremarkable apart from small  cortical cysts at the lower pole of bilateral kidneys. No  hydronephrosis. The urinary bladder is partially distended. The patient  is status post prostatectomy and pelvic lymph node dissection. No  pathological retroperitoneal lymphadenopathy is seen. Moderate calcified  atherosclerotic plaque is seen throughout the abdominal aorta and its  branches. No evidence of bowel obstruction. Diverticulosis is present.  Again noted is diffuse osseous metastatic disease.     IMPRESSION:  Extensive sclerotic osseous disease is again identified. No  new convincing evidence of metastatic disease within the chest, abdomen  or pelvis.     Radiation dose reduction techniques were utilized, including automated  exposure control and exposure modulation based on body size.     This report was finalized on 10/2/2020 1:37 PM by Dr. Ester Zurita M.D.    Assessment/Plan       80 year-old male with  medical history including prostate carcinoma, CK D3, long-term tobacco use recent development of hoarseness and subsequent studies that demonstrated findings consistent with metastatic disease to bone as well as multiple enlarged metastatic lymph nodes, and potential abnormality in the right lateral wall trachea.  His additional history includes type 2 diabetes on insulin pump which has been more effective for control of his blood sugars.    His recent symptoms have included generalized discomfort in multiple sites in his bony skeleton as well as right lower quadrant abdominal pain.  We discussed this made will improve after he starts Firmagon in the a.m.  Discussions with Dr. Rivera also have led to the conclusion that he'll need bronchoscopy and mediastinoscopy which did proceed as above on April 9, 2018.  Pathology revealed mediastinal lymph nodes to be involved with metastatic adenocarcinoma consistent with origin from a prostatic primary . The patient's case was discussed at thoracic conference and recommendations were to continue with ADT and radiation for symptomatic sites.  The patient is seen back April 23 his treatment with ADT-Firmagon-has improved his symptomatic performance status to near baseline.  It is not felt that he'll require other treatments such as oral antiandrogens at this point.  He could, however, potentially benefit from agents such as Xgeva given at appropriate intervals. We went on to continue with Xgeva and rescan him July 23 demonstrating near remission developing.  This is also supported by his normalizing PSA.  The patient's scans demonstrated possibly chronic appendicitis is not having symptoms referable to this.he plans to be alert to the possibility of stenosis family member.  We discussed moving to an every 6 month treatment but at this point we'll continue at 3 months. As result we continued Lupron and Xgeva on schedule and is seen back now 6 months later continuing to generally  improve.     We will continue the patient's treatment and is done well with no further symptoms until recently admitted mid March with upper lobe pneumonia.  Review of his studies during that visit fortunately do not demonstrate bony metastasis, additional mediastinal adenopathy, pleural effusions or masses.  It is felt that his metastatic prostate carcinoma remains controlled.  We planned, as a result to continue Xgeva and Lupron and the patient is now seen back continuing to do well.  We went on to discuss Lupron and Xgeva, medications including Neurontin and Requip which have been helpful and the patient is next seen December 13, 2019 again with symptoms control.  His PSA went on to be measured at 2.9.  Would like to continue same and have him return for follow-up in the further progressive consider adding additional oral therapy to her treatment.   We continued Xgeva and Lupron and later contacted the patient about his PSA of 5.030.  Follow-up scans were scheduled prior to his visit in 3 months but they have not been done at 2 months at May 8 when the patient called about increasing hip pain.  These scans do not demonstrate progression of disease in any significant way.  We have gone on to review the scans together and his overall symptoms and plan to try to address his additional sleep issues, restless legs, pain management as necessary and further diagnostics.  The patient's Neurontin was increased and did improve his symptoms.  He is next seen with follow-up bone scan that does not demonstrate worsening of bone nor need for localized radiation therapy.  The patient's PSA has been slowly rising and we have not been able to determine worsening of disease and and, therefore, it is not felt warranted add additional medications such as Xtandi or apalutamide to his therapy.  Please note would like to avoid Zytiga try not to add prednisone which would worsen his blood sugar control.  Is next seen September 9, 2020  with PSA that has increased to 15.8 though the patient remains reasonably asymptomatic and chest x-ray is otherwise negative.  We have discussed continue Neurontin and trazodone, follow-up CT scan series in the next several months to determine whether he has additional disease progressing that can be documented other than the PSA alone.     As he is reassessed September 10 he is having slowly increasing bony discomfort and his PSA has now gone to just over 15.  We have discussed repeat assessment requesting CT scanning.     These scans ultimately reveal no evidence of progression of disease for visceral involvement or clearly for bone involvement.  After further review with the patient and his son we proceeded with Xgeva, continued medications and had him reviewed for Xtandi which was found to be cost prohibitive.  His follow-up PSA has demonstrated gradual increase in his review December 10, 2020 with some mild increase in bony discomfort.  After repeat discussion plan:    *Proceed with Xgeva Lupron today today, repeat PSA pending    *Continue current Neurontin trazodone and pain medications    *Follow-up bone scan-whole-body in 4 weeks    *5 weeks MD for general reassessment, potential radiation therapy for progressively involved sites.

## 2020-12-10 ENCOUNTER — OFFICE VISIT (OUTPATIENT)
Dept: ONCOLOGY | Facility: CLINIC | Age: 80
End: 2020-12-10

## 2020-12-10 ENCOUNTER — INFUSION (OUTPATIENT)
Dept: ONCOLOGY | Facility: HOSPITAL | Age: 80
End: 2020-12-10

## 2020-12-10 ENCOUNTER — LAB (OUTPATIENT)
Dept: LAB | Facility: HOSPITAL | Age: 80
End: 2020-12-10

## 2020-12-10 VITALS
DIASTOLIC BLOOD PRESSURE: 72 MMHG | HEIGHT: 65 IN | SYSTOLIC BLOOD PRESSURE: 135 MMHG | BODY MASS INDEX: 33.94 KG/M2 | HEART RATE: 51 BPM | RESPIRATION RATE: 16 BRPM | WEIGHT: 203.7 LBS | OXYGEN SATURATION: 98 % | TEMPERATURE: 97.6 F

## 2020-12-10 DIAGNOSIS — C61 PROSTATE CANCER METASTATIC TO BONE (HCC): ICD-10-CM

## 2020-12-10 DIAGNOSIS — C79.51 OSSEOUS METASTASIS: ICD-10-CM

## 2020-12-10 DIAGNOSIS — C79.51 OSSEOUS METASTASIS: Primary | ICD-10-CM

## 2020-12-10 DIAGNOSIS — C79.51 PROSTATE CANCER METASTATIC TO BONE (HCC): ICD-10-CM

## 2020-12-10 LAB
ALBUMIN SERPL-MCNC: 3.9 G/DL (ref 3.5–5.2)
ALBUMIN/GLOB SERPL: 1.2 G/DL (ref 1.1–2.4)
ALP SERPL-CCNC: 204 U/L (ref 38–116)
ALT SERPL W P-5'-P-CCNC: 15 U/L (ref 0–41)
ANION GAP SERPL CALCULATED.3IONS-SCNC: 9.5 MMOL/L (ref 5–15)
AST SERPL-CCNC: 15 U/L (ref 0–40)
BASOPHILS # BLD AUTO: 0.03 10*3/MM3 (ref 0–0.2)
BASOPHILS NFR BLD AUTO: 0.4 % (ref 0–1.5)
BILIRUB SERPL-MCNC: 0.2 MG/DL (ref 0.2–1.2)
BUN SERPL-MCNC: 24 MG/DL (ref 6–20)
BUN/CREAT SERPL: 21.1 (ref 7.3–30)
CALCIUM SPEC-SCNC: 9.3 MG/DL (ref 8.5–10.2)
CHLORIDE SERPL-SCNC: 106 MMOL/L (ref 98–107)
CO2 SERPL-SCNC: 23.5 MMOL/L (ref 22–29)
CREAT SERPL-MCNC: 1.14 MG/DL (ref 0.7–1.3)
DEPRECATED RDW RBC AUTO: 46.6 FL (ref 37–54)
EOSINOPHIL # BLD AUTO: 0.18 10*3/MM3 (ref 0–0.4)
EOSINOPHIL NFR BLD AUTO: 2.3 % (ref 0.3–6.2)
ERYTHROCYTE [DISTWIDTH] IN BLOOD BY AUTOMATED COUNT: 14 % (ref 12.3–15.4)
GFR SERPL CREATININE-BSD FRML MDRD: 62 ML/MIN/1.73
GLOBULIN UR ELPH-MCNC: 3.2 GM/DL (ref 1.8–3.5)
GLUCOSE SERPL-MCNC: 142 MG/DL (ref 74–124)
HCT VFR BLD AUTO: 33 % (ref 37.5–51)
HGB BLD-MCNC: 10.6 G/DL (ref 13–17.7)
IMM GRANULOCYTES # BLD AUTO: 0.07 10*3/MM3 (ref 0–0.05)
IMM GRANULOCYTES NFR BLD AUTO: 0.9 % (ref 0–0.5)
LYMPHOCYTES # BLD AUTO: 2.25 10*3/MM3 (ref 0.7–3.1)
LYMPHOCYTES NFR BLD AUTO: 28.5 % (ref 19.6–45.3)
MAGNESIUM SERPL-MCNC: 2.1 MG/DL (ref 1.8–2.5)
MCH RBC QN AUTO: 29.2 PG (ref 26.6–33)
MCHC RBC AUTO-ENTMCNC: 32.1 G/DL (ref 31.5–35.7)
MCV RBC AUTO: 90.9 FL (ref 79–97)
MONOCYTES # BLD AUTO: 0.73 10*3/MM3 (ref 0.1–0.9)
MONOCYTES NFR BLD AUTO: 9.2 % (ref 5–12)
NEUTROPHILS NFR BLD AUTO: 4.64 10*3/MM3 (ref 1.7–7)
NEUTROPHILS NFR BLD AUTO: 58.7 % (ref 42.7–76)
NRBC BLD AUTO-RTO: 0 /100 WBC (ref 0–0.2)
PHOSPHATE SERPL-MCNC: 2.8 MG/DL (ref 2.5–4.5)
PLATELET # BLD AUTO: 194 10*3/MM3 (ref 140–450)
PMV BLD AUTO: 9.6 FL (ref 6–12)
POTASSIUM SERPL-SCNC: 5.2 MMOL/L (ref 3.5–4.7)
PROT SERPL-MCNC: 7.1 G/DL (ref 6.3–8)
PSA SERPL-MCNC: 22.2 NG/ML (ref 0–4)
RBC # BLD AUTO: 3.63 10*6/MM3 (ref 4.14–5.8)
SODIUM SERPL-SCNC: 139 MMOL/L (ref 134–145)
WBC # BLD AUTO: 7.9 10*3/MM3 (ref 3.4–10.8)

## 2020-12-10 PROCEDURE — 96372 THER/PROPH/DIAG INJ SC/IM: CPT

## 2020-12-10 PROCEDURE — 85025 COMPLETE CBC W/AUTO DIFF WBC: CPT

## 2020-12-10 PROCEDURE — 36415 COLL VENOUS BLD VENIPUNCTURE: CPT

## 2020-12-10 PROCEDURE — 99214 OFFICE O/P EST MOD 30 MIN: CPT | Performed by: INTERNAL MEDICINE

## 2020-12-10 PROCEDURE — 25010000002 LEUPROLIDE ACETATE (3 MONTH) PER 7.5 MG: Performed by: INTERNAL MEDICINE

## 2020-12-10 PROCEDURE — 84100 ASSAY OF PHOSPHORUS: CPT

## 2020-12-10 PROCEDURE — 84153 ASSAY OF PSA TOTAL: CPT | Performed by: INTERNAL MEDICINE

## 2020-12-10 PROCEDURE — 80053 COMPREHEN METABOLIC PANEL: CPT

## 2020-12-10 PROCEDURE — 83735 ASSAY OF MAGNESIUM: CPT

## 2020-12-10 PROCEDURE — 25010000002 DENOSUMAB 120 MG/1.7ML SOLUTION: Performed by: INTERNAL MEDICINE

## 2020-12-10 PROCEDURE — 96402 CHEMO HORMON ANTINEOPL SQ/IM: CPT

## 2020-12-10 RX ADMIN — LEUPROLIDE ACETATE 22.5 MG: KIT at 10:20

## 2020-12-10 RX ADMIN — DENOSUMAB 120 MG: 120 INJECTION SUBCUTANEOUS at 10:20

## 2020-12-15 DIAGNOSIS — C61 PROSTATE CANCER METASTATIC TO BONE (HCC): ICD-10-CM

## 2020-12-15 DIAGNOSIS — C79.51 PROSTATE CANCER METASTATIC TO BONE (HCC): ICD-10-CM

## 2020-12-15 RX ORDER — GABAPENTIN 300 MG/1
CAPSULE ORAL
Qty: 60 CAPSULE | Refills: 4 | OUTPATIENT
Start: 2020-12-15 | End: 2020-12-17

## 2020-12-17 DIAGNOSIS — C79.51 PROSTATE CANCER METASTATIC TO BONE (HCC): Primary | ICD-10-CM

## 2020-12-17 DIAGNOSIS — C61 PROSTATE CANCER METASTATIC TO BONE (HCC): Primary | ICD-10-CM

## 2020-12-17 RX ORDER — GABAPENTIN 300 MG/1
600 CAPSULE ORAL
Qty: 60 CAPSULE | Refills: 2 | Status: SHIPPED | OUTPATIENT
Start: 2020-12-17 | End: 2021-04-12 | Stop reason: SDUPTHER

## 2020-12-21 DIAGNOSIS — C79.51 PROSTATE CANCER METASTATIC TO BONE (HCC): ICD-10-CM

## 2020-12-21 DIAGNOSIS — C61 PROSTATE CANCER METASTATIC TO BONE (HCC): ICD-10-CM

## 2020-12-21 RX ORDER — HYDROCODONE BITARTRATE AND ACETAMINOPHEN 5; 325 MG/1; MG/1
1 TABLET ORAL EVERY 6 HOURS PRN
Qty: 120 TABLET | Refills: 0 | Status: CANCELLED | OUTPATIENT
Start: 2020-12-21

## 2020-12-21 RX ORDER — HYDROCODONE BITARTRATE AND ACETAMINOPHEN 5; 325 MG/1; MG/1
1 TABLET ORAL EVERY 6 HOURS PRN
Qty: 120 TABLET | Refills: 0 | Status: SHIPPED | OUTPATIENT
Start: 2020-12-21 | End: 2021-01-25 | Stop reason: SDUPTHER

## 2020-12-23 DIAGNOSIS — C61 PROSTATE CANCER METASTATIC TO BONE (HCC): ICD-10-CM

## 2020-12-23 DIAGNOSIS — C79.51 PROSTATE CANCER METASTATIC TO BONE (HCC): ICD-10-CM

## 2020-12-23 RX ORDER — HYDROCODONE BITARTRATE AND ACETAMINOPHEN 5; 325 MG/1; MG/1
1 TABLET ORAL EVERY 6 HOURS PRN
Qty: 120 TABLET | Refills: 0 | OUTPATIENT
Start: 2020-12-23

## 2020-12-26 DIAGNOSIS — C79.51 PROSTATE CANCER METASTATIC TO BONE (HCC): ICD-10-CM

## 2020-12-26 DIAGNOSIS — C61 PROSTATE CANCER METASTATIC TO BONE (HCC): ICD-10-CM

## 2020-12-28 RX ORDER — HYDROCODONE BITARTRATE AND ACETAMINOPHEN 5; 325 MG/1; MG/1
1 TABLET ORAL EVERY 6 HOURS PRN
Qty: 120 TABLET | Refills: 0 | OUTPATIENT
Start: 2020-12-28

## 2021-01-11 ENCOUNTER — HOSPITAL ENCOUNTER (OUTPATIENT)
Dept: NUCLEAR MEDICINE | Facility: HOSPITAL | Age: 81
Discharge: HOME | End: 2021-01-11

## 2021-01-11 DIAGNOSIS — C79.51 OSSEOUS METASTASIS: ICD-10-CM

## 2021-01-11 DIAGNOSIS — C61 PROSTATE CANCER METASTATIC TO BONE (HCC): ICD-10-CM

## 2021-01-11 DIAGNOSIS — C79.51 PROSTATE CANCER METASTATIC TO BONE (HCC): ICD-10-CM

## 2021-01-11 PROCEDURE — A9503 TC99M MEDRONATE: HCPCS | Performed by: INTERNAL MEDICINE

## 2021-01-11 PROCEDURE — 0 TECHNETIUM MEDRONATE KIT: Performed by: INTERNAL MEDICINE

## 2021-01-11 PROCEDURE — 78306 BONE IMAGING WHOLE BODY: CPT

## 2021-01-11 RX ORDER — TC 99M MEDRONATE 20 MG/10ML
22 INJECTION, POWDER, LYOPHILIZED, FOR SOLUTION INTRAVENOUS
Status: COMPLETED | OUTPATIENT
Start: 2021-01-11 | End: 2021-01-11

## 2021-01-11 RX ADMIN — Medication 22 MILLICURIE: at 09:30

## 2021-01-13 DIAGNOSIS — C79.51 OSSEOUS METASTASIS: Primary | ICD-10-CM

## 2021-01-13 NOTE — PROGRESS NOTES
Subjective Patient having periodic bone discomfort in chest and right hip, discussed follow-up bone scan findings    History of Present Illness    The patient is an 80-year-old male with significant past medical history including prostate carcinoma, CKD 3 and long-term tobacco use be been seen by primary care in late January, 2018 for hoarseness.  Chest x-ray is now outpatient January 23 revealed sclerotic change in the posterior mid chest to be within 3 adjacent thoracic ertebral bodies of uncertain significance.  A follow-up chest CT revealed numerous sclerotic foci within al visualized bones consistent with metastatic disease, multiple enlarged mediastinal lymph nodes concerning for metastatic lymphadenopathy and a polypoidal abnormality in the right lateral wall of the trachea.  CT of abdomen March 20 revealed extensive osseous metastatic disease mildly enlarged retroperitoneal lymph nodes.  Bone scan March 20 was consistent with widespread osseous metastasis particularly in the proximal half the left humeral shaft, abnormal uptake in the sternum and manubrium and a small focus in the posterior aspect of the calvarium.  This led to a plain film the left humerus with mottled sclerosis involving the shaft of the humerus particularly in the proximal half but no evidence of pathologic fracture.    The patient has additionally type 2 diabetes on insulin pump, again a history of prostate carcinoma prostatectomy 12 years previously, hypertension, and hyperlipidemia.  Additional studies included a PSA of 395.1 from March 14, 2018.The patient's been seen by urology March 15 with plans to start Firmagon.    The patient is now seen in pulmonary clinic with Dr. Bijan Rivera and we have discussed that he will need bronchoscopy and mediastinoscopy.  Interestingly his additional history includes a long occupational exposure including working in the [place] just out of school, subsequent construction work  for many years and a 30-pack-year history of smoking stopping in the late 1990s.  He indicates that he followed with Dr. Guillen of urology for many years with no changes in his exams and normal PSAs.  He stopped this follow-up approximately 2 years ago.     Patient was seen in consultation with thoracic surgery on March 28 and plans are made for mediastinoscopy and bronchoscopy with lymph node biopsy.  Pathology revealed that these nodes were consistent with metastatic prostate carcinoma.  He was discussed at thoracic conference.  A PET/CT was not pursued and it was determined that he see medical oncology for hormonal treatment and radiation therapy if symptomatic.  It should be noted that the patient's March 15 [facility] office note describes that Firmagon was planned.     The patient has met with the son and grandson April 23.  We have also contacted Dr. Taylor of urology in that Firmagon was given just after his last visit and would next be due approximately May 3.  Patient feels considerably better with resolution of his pain, normalization of his performance status.  He would like to continue treatment through our office now contacted Dr. Taylor concerning this.    The patient is next seen June 11, 2018 we discussed his follow-up including his treatments with Lupron May 7 and his next treatment on July 30.  He has returned to essentially normal performance status and his PSA has dropped further from 395 March 14 27.8 May 7 and now 2.03 June 11.  We do plan to initiate Xgeva also study him via scans to document his improvement approximately a month from now.   The patient is next seen July 26, 2018.  Repeat imaging demonstrated interval resolution of mediastinal and retroperitoneal lymphadenopathy.  There is thickening in the distal aspect of the appendix with stranding?  Chronic appendicitis.  The patient indicates he is having no such symptoms and never has.  There is no change in sclerotic bony  metastasis in the patient's PSA has dropped substantially to 1.66 by July 19 and 1.79 July 26.  He is actually feeling excellent and this is corroborated by family members.   Patient is next seen January 10, 2019 continuing to do very well and, in fact indicating [facility], likely hearing replacements, visual review.  He is not certain is going to continue his care with them or with us or combination of both.    Patient is next seen April 4, 2019.  He is recently discharged after hospitalization March 15-18 with left upper lobe pneumonia.  We reviewed the findings in detail with his gradual recovery now documented.  His studies include a CT of chest which does not demonstrate any further bony lesions and/or pulmonary metastasis having developed.  He is feeling considerably better and approximately back to his baseline.  The patient is next seen June 28, 2019 feeling well but having baseline generally musculoskeletal pain and restless legs.His recent exams include a PSA of 0.81, CMP with potassium of 5.3 with repeat pending.  His performance status overall remains good to very good and is clearly responding to his treatments.  The patient is next seen December 13, 2019 continue to feel well.  He has had, oddly enough, 2 episodes of sleep paralysis which have occurred to him previously and he wonders if there is any connection with his prostate carcinoma though this is felt unlikely.     The patient when assessed in December had his PSA go to 2.9.  We continue with Lupron and Xgeva and is seen back now March 9, 2020 without additional symptoms.  We discussed that a schedule could likely change moving to 3 months for both of these medications particularly if he needs additional therapy directed at progressive disease.     Patient contacted our practice May 4 concern for pain in his hips.  This led to moving his scans up and they were performed May 8, 2020 showing a  sub-6 mm pleural-based pulmonary nodule in the right lower lobe that is new from March 15, 2018, remainder of the sub-6 mm pulmonary nodules bilaterally are stable.  There is extensive osseous metastatic disease as previous with no other new findings.     The patient indicates, when seen, May 13 that his bone pain is now resolved.  While this is good information does not mean that he does not have further bony metastasis and we have discussed that a follow-up bone scan is likely necessary.  Furthermore he is having some difficulty with sleeplessness and increase in restless legs as he continues to stay at home during the COVID 19 crisis which, itself, is producing more anxiety for him.  A follow-up bone scan was obtained Lety 3 revealing multifocal osseous metastatic disease which was compared to March 2, 2018 with improvement.  No new abnormal uptake is present.  He is next seen with us on June 17, 2020 and, fortunately, is not having any significant pain at this point.  We discussed his scans in detail and, on balance, his performance status also remains improved.     It was elected to follow the patient rather than changes antiandrogen therapy.  He is reassessed September 3 with unchanged CMP, H&H of 11.4 and 36.3 with normal white count, platelet count and differential, PSA at 15.8.  Follow-up PA lateral chest x-ray does not show any new abnormalities though there is extensive metastatic bone disease throughout the bony thorax and osteoblastic metastasis have been seen on chest CT May 2020.  The patient is seen with his son Georges September 10 noticing increasing bony discomfort primarily in the right hip following fairly intensive gardening which he does frequently.  Now has further elevation of PSA were wondering whether we should try to intervene sooner per additional antiandrogens.  We will need to further assess him via CT scanning to make this decision     These were obtained October 1 showing extensive  sclerotic disease with no metastatic disease in chest abdomen or pelvis.  PSA had gone from 15.8 September 3-16 0.6 October 1.     He still has pain getting up in the morning and after active gardening.  This is manageable with current pain medications and, at present, it is not apparent that he needs to switch medications to gain better control of his disease.  The patient did have follow-up studies done including a PSA November 25, 2020 now at 25.4.  The patient is seen with his son December 10, 2020 doing fair but having some increased pain in the mid sternum and right hip that he ascribes to additional exercise/work in managing his garden.  It is apparent however, that his last bone scan was 6 months ago and we do need to reassess him concerning this.     The patient had follow-up bone scan performed January 11, 2021 showing again uptake in the calvarium, humerus, right medial clavicle, sternum, ribs, spine, sacrum, pelvis, right acetabulum, proximal femora and now left frontal bone which appears new.  There is also mild increase in sternal uptake and equivocal increase in the pelvic lesions of all of the sacrum and the right proximal femur.     The patient is seen with his son January 18, 2021 and indicates that he is having pain gradually worsened in his right hip, mid chest that had been an issue previously.  These findings on bone scan are reviewed with both of them as likely the underlying culprit for his discomfort.  He would need change of the systemic therapy but, at present, will ask for radiation therapy palliation initially.  He was previously treated by Dr. Kulkarni many years ago and will ask her to see him.  We will also make application for the current cost of Xtandi or Zytiga.  The patient is not felt to be a candidate for systemic chemotherapy.        Past Medical History:   Diagnosis Date   • Bone cancer (CMS/HCC)    • Cellulitis of great toe of left foot 10/19/2018   • CKD (chronic kidney  disease)     Stage 3; followed by Dr. Vicky Duran    • Diastolic dysfunction     GRADE II per echo 2018   • Difficulty breathing     during exertion   • Dyslipidemia    • Erectile dysfunction    • Fatigue    • History of blood transfusion    • History of kidney stones    • Hyperlipidemia    • Hypertension    • Hyponatremia    • Left ventricular hypertrophy     per echo 2018   • Localized edema    • Neuropathy    • Obstructive sleep apnea     USING CPAP   • Osteoarthritis    • Peptic ulcer    • Pneumonia    • Pneumonia of left upper lobe due to infectious organism 3/15/2019   • Prostate cancer (CMS/HCC)     Status post prostatectomy, radiation therapy, and hormone therapy followed by Dr. Lee; metastatsis to bone   • Pure hyperglyceridemia    • Restless leg syndrome    • Sepsis (CMS/HCC)    • Sinus bradycardia    • Transient cerebral ischemia    • Type 2 diabetes mellitus (CMS/HCC)         Past Surgical History:   Procedure Laterality Date   • BRONCHOSCOPY N/A 4/9/2018    Procedure: BRONCHOSCOPY;  Surgeon: Bijan Rivera III, MD;  Location: [facility];  Service: Cardiothoracic   • COLONOSCOPY     • DEEP NECK LYMPH NODE BIOPSY / EXCISION     • HEMORRHOIDECTOMY     • MEDIASTINOSCOPY N/A 4/9/2018    Procedure: MEDIASTINOSCOPY WITH LYMPH NODE BIOPSY;  Surgeon: Bijan Rivera III, MD;  Location: [facility];  Service: Cardiothoracic   • OTHER SURGICAL HISTORY      ulcer repair   • PROSTATECTOMY  2006        Current Outpatient Medications on File Prior to Visit   Medication Sig Dispense Refill   • albuterol sulfate  (90 Base) MCG/ACT inhaler Inhale 2 puffs Every 4 (Four) Hours As Needed for Wheezing or Shortness of Air. 1 inhaler 0   • aspirin 81 MG EC tablet Take 1 tablet by mouth daily.     • cholecalciferol (VITAMIN D3) 1000 units tablet Take 2,000 Units by mouth Daily.     • Denosumab (XGEVA SC) Inject  under the skin into the appropriate area as directed Every 30 (Thirty) Days.     • dilTIAZem  CD (CARDIZEM CD) 120 MG 24 hr capsule TAKE 1 CAPSULE EVERY DAY 90 capsule 3   • fluticasone (FLONASE SENSIMIST) 27.5 MCG/SPRAY nasal spray 2 sprays into each nostril As Needed.     • gabapentin (NEURONTIN) 300 MG capsule Take 2 capsules by mouth every night at bedtime. 60 capsule 2   • HYDROcodone-acetaminophen (NORCO) 5-325 MG per tablet Take 1 tablet by mouth Every 6 (Six) Hours As Needed for Moderate Pain . 120 tablet 0   • insulin aspart (NovoLOG) 100 UNIT/ML patient supplied pump Inject  under the skin into the appropriate area as directed Continuous. Pt reports pump with basal rate 1.9 units/hr from 0800 to 1200. Basal rate 1.8 units/hr 1200 to 0800.  Checks bg ac/hs with bolus doses per pumps parameters     • Leuprolide Acetate, 3 Month, (LUPRON DEPOT, 3-MONTH, IM) Inject  into the appropriate muscle as directed by prescriber Every 3 (Three) Months.     • levothyroxine (SYNTHROID, LEVOTHROID) 88 MCG tablet      • modafinil (PROVIGIL) 200 MG tablet Take 1 tablet by mouth Daily. 90 tablet 1   • olmesartan (BENICAR) 40 MG tablet TAKE 1 TABLET EVERY DAY (DISCONTINUE LOSARTAN 100MG) 90 tablet 3   • pravastatin (PRAVACHOL) 40 MG tablet Take 40 mg by mouth daily.     • rOPINIRole (REQUIP) 0.5 MG tablet Take 1 tablet by mouth in the evening and 1 at bedtime. 180 tablet 2   • traZODone (DESYREL) 50 MG tablet Take  mg by mouth every night at bedtime.     • triamterene-hydrochlorothiazide (MAXZIDE-25) 37.5-25 MG per tablet TAKE 1 TABLET EVERY DAY; APPOINTMENT IS NEEDED 90 tablet 0     No current facility-administered medications on file prior to visit.         ALLERGIES:    Allergies   Allergen Reactions   • Niacin Unknown - Low Severity   • Statins Unknown - Low Severity        Social History     Socioeconomic History   • Marital status:      Spouse name: Not on file   • Number of children: Not on file   • Years of education: College   • Highest education level: Not on file   Occupational History      "Employer: RETIRED   Tobacco Use   • Smoking status: Former Smoker     Packs/day: 1.00     Years: 30.00     Pack years: 30.00     Types: Cigarettes     Quit date: 1998     Years since quittin.0   • Smokeless tobacco: Never Used   Substance and Sexual Activity   • Alcohol use: No   • Drug use: No   • Sexual activity: Defer   Social History Narrative    CAFFEINE USE: 1 CUP DAILY; OCCASIONAL SOFT DRINK.         Family History   Problem Relation Age of Onset   • Heart failure Mother    • Hypertension Mother    • Diabetes Mother    • Heart disease Mother    • Lung cancer Father 46   • Hypertension Sister    • Lung cancer Sister 60   • Hypertension Brother    • Lung cancer Brother 62   • Heart disease Other    • Prostate cancer Brother 60   • Malig Hyperthermia Neg Hx         Review of Systems   Constitutional: Positive for fatigue.   HENT: Negative.    Eyes: Negative.    Respiratory: Negative for chest tightness, shortness of breath and wheezing.    Gastrointestinal: Negative for abdominal pain, constipation, diarrhea, nausea and vomiting.   Endocrine: Negative.    Genitourinary: Negative.    Musculoskeletal: Positive for arthralgias.   Skin: Negative.    Allergic/Immunologic: Negative.    Neurological: Positive for weakness.   Hematological: Negative.    Psychiatric/Behavioral: Positive for sleep disturbance.   All other systems reviewed and are negative.      .  Objective     Vitals:    21 0948   BP: 155/71   Pulse: 54   Resp: 18   Temp: 97.3 °F (36.3 °C)   TempSrc: Temporal   SpO2: 98%   Weight: 89.7 kg (197 lb 11.2 oz)   Height: 165.1 cm (65\")   PainSc:   5   PainLoc: Comment: Right Leg     Current Status 2021   ECOG score 0       Physical Exam    OBJECTIVE: Vitals signs are reviewed and are stable.    HEENT: PERRLA.    Neck:  Supple.  Bilateral bruits, No thyromegaly or adenopathy  Lungs:  Clear.  No rales rhonchi or wheezes  Heart:  Regular rate and rhythm.   systolic murmur  Abdomen:   Soft, " nontender.  Obese.  Bowel sounds present.  No organomegaly can be detected, though patient is tender in right lower to mid abdomen.  No ascites or masses again to be detected  Extremities:  No cyanosis, clubbing or edema.    Neurologic: Nonfocal, no sensory loss peripherally    RECENT LABS:  Hematology WBC   Date Value Ref Range Status   01/18/2021 7.12 3.40 - 10.80 10*3/mm3 Final     RBC   Date Value Ref Range Status   01/18/2021 3.90 (L) 4.14 - 5.80 10*6/mm3 Final     Hemoglobin   Date Value Ref Range Status   01/18/2021 11.1 (L) 13.0 - 17.7 g/dL Final     Hematocrit   Date Value Ref Range Status   01/18/2021 35.4 (L) 37.5 - 51.0 % Final     Platelets   Date Value Ref Range Status   01/18/2021 224 140 - 450 10*3/mm3 Final      NUCLEAR MEDICINE WHOLE BODY BONE SCAN 1/11/20221    FINDINGS:  There is multifocal osseous metastatic disease with abnormal  uptake throughout the axial and appendicular skeleton involving the left  calvarium, left proximal humerus, right medial clavicle, sternum, ribs,  spine, sacrum, pelvis, right acetabulum, both proximal femora. Left  frontal bone uptake appears to be new when compared to the previous  exam. There has been mild progression in degree of sternal uptake and  there is equivocal increase in intensity of uptake involving pelvic  lesions including the left sacrum and right proximal femur.      IMPRESSION:  Multifocal osseous metastatic disease with some evidence for  progression when compared to the prior exam 06/03/2020. There is a new  small lesion overlying the left frontal bone.     This report was finalized on 1/11/2021 2:49 PM by Dr. Boogie Cortez M.D.    Assessment/Plan       80 year-old male with medical history including prostate carcinoma, CK D3, long-term tobacco use recent development of hoarseness and subsequent studies that demonstrated findings consistent with metastatic disease to bone as well as multiple enlarged metastatic lymph nodes, and potential  abnormality in the right lateral wall trachea.  His additional history includes type 2 diabetes on insulin pump which has been more effective for control of his blood sugars.    His recent symptoms have included generalized discomfort in multiple sites in his bony skeleton as well as right lower quadrant abdominal pain.  We discussed this made will improve after he starts Firmagon in the a.m.  Discussions with Dr. Rivera also have led to the conclusion that he'll need bronchoscopy and mediastinoscopy which did proceed as above on April 9, 2018.  Pathology revealed mediastinal lymph nodes to be involved with metastatic adenocarcinoma consistent with origin from a prostatic primary . The patient's case was discussed at thoracic conference and recommendations were to continue with ADT and radiation for symptomatic sites.  The patient is seen back April 23 his treatment with ADT-Firmagon-has improved his symptomatic performance status to near baseline.  It is not felt that he'll require other treatments such as oral antiandrogens at this point.  He could, however, potentially benefit from agents such as Xgeva given at appropriate intervals. We went on to continue with Xgeva and rescan him July 23 demonstrating near remission developing.  This is also supported by his normalizing PSA.  The patient's scans demonstrated possibly chronic appendicitis is not having symptoms referable to this.he plans to be alert to the possibility of stenosis family member.  We discussed moving to an every 6 month treatment but at this point we'll continue at 3 months. As result we continued Lupron and Xgeva on schedule and is seen back now 6 months later continuing to generally improve.     We will continue the patient's treatment and is done well with no further symptoms until recently admitted mid March with upper lobe pneumonia.  Review of his studies during that visit fortunately do not demonstrate bony metastasis, additional  mediastinal adenopathy, pleural effusions or masses.  It is felt that his metastatic prostate carcinoma remains controlled.  We planned, as a result to continue Xgeva and Lupron and the patient is now seen back continuing to do well.  We went on to discuss Lupron and Xgeva, medications including Neurontin and Requip which have been helpful and the patient is next seen December 13, 2019 again with symptoms control.  His PSA went on to be measured at 2.9.  Would like to continue same and have him return for follow-up in the further progressive consider adding additional oral therapy to her treatment.   We continued Xgeva and Lupron and later contacted the patient about his PSA of 5.030.  Follow-up scans were scheduled prior to his visit in 3 months but they have not been done at 2 months at May 8 when the patient called about increasing hip pain.  These scans do not demonstrate progression of disease in any significant way.  We have gone on to review the scans together and his overall symptoms and plan to try to address his additional sleep issues, restless legs, pain management as necessary and further diagnostics.  The patient's Neurontin was increased and did improve his symptoms.  He is next seen with follow-up bone scan that does not demonstrate worsening of bone nor need for localized radiation therapy.  The patient's PSA has been slowly rising and we have not been able to determine worsening of disease and and, therefore, it is not felt warranted add additional medications such as Xtandi or apalutamide to his therapy.  Please note would like to avoid Zytiga try not to add prednisone which would worsen his blood sugar control.  Is next seen September 9, 2020 with PSA that has increased to 15.8 though the patient remains reasonably asymptomatic and chest x-ray is otherwise negative.  We have discussed continue Neurontin and trazodone, follow-up CT scan series in the next several months to determine whether he has  additional disease progressing that can be documented other than the PSA alone.     As he is reassessed September 10 he is having slowly increasing bony discomfort and his PSA has now gone to just over 15.  We have discussed repeat assessment requesting CT scanning.     These scans ultimately reveal no evidence of progression of disease for visceral involvement or clearly for bone involvement.  After further review with the patient and his son we proceeded with Xgeva, continued medications and had him reviewed for Xtandi which was found to be cost prohibitive.  His follow-up PSA has demonstrated gradual increase in his review December 10, 2020 with some mild increase in bony discomfort.  After repeat discussion we went on to have him undergo Lupron therapy, and his PSA actually to be mildly reduced at 22.2.  Follow-up bone scan demonstrates some evidence of progression in sternum, left sacrum and proximal femur.  These findings were reviewed with the patient and his son January 10, 2020 and after discussion plan:  *Refer to radiation therapy for palliative RT to painful sites-Dr. Kulkarni    *Reevaluate cost of Zytiga or Xtandi for this patient    *Return 1 month for Xgeva, PSA    *MD 2 months for Xgeva, PSA, Lupron, review of medication costs

## 2021-01-18 ENCOUNTER — DOCUMENTATION (OUTPATIENT)
Dept: ONCOLOGY | Facility: CLINIC | Age: 81
End: 2021-01-18

## 2021-01-18 ENCOUNTER — LAB (OUTPATIENT)
Dept: LAB | Facility: HOSPITAL | Age: 81
End: 2021-01-18

## 2021-01-18 ENCOUNTER — OFFICE VISIT (OUTPATIENT)
Dept: ONCOLOGY | Facility: CLINIC | Age: 81
End: 2021-01-18

## 2021-01-18 ENCOUNTER — TELEPHONE (OUTPATIENT)
Dept: ONCOLOGY | Facility: CLINIC | Age: 81
End: 2021-01-18

## 2021-01-18 ENCOUNTER — INFUSION (OUTPATIENT)
Dept: ONCOLOGY | Facility: HOSPITAL | Age: 81
End: 2021-01-18

## 2021-01-18 VITALS
RESPIRATION RATE: 18 BRPM | HEART RATE: 54 BPM | TEMPERATURE: 97.3 F | WEIGHT: 197.7 LBS | DIASTOLIC BLOOD PRESSURE: 71 MMHG | OXYGEN SATURATION: 98 % | HEIGHT: 65 IN | SYSTOLIC BLOOD PRESSURE: 155 MMHG | BODY MASS INDEX: 32.94 KG/M2

## 2021-01-18 DIAGNOSIS — C79.51 PROSTATE CANCER METASTATIC TO BONE (HCC): ICD-10-CM

## 2021-01-18 DIAGNOSIS — C61 PROSTATE CANCER METASTATIC TO BONE (HCC): ICD-10-CM

## 2021-01-18 DIAGNOSIS — C79.51 OSSEOUS METASTASIS: Primary | ICD-10-CM

## 2021-01-18 DIAGNOSIS — C79.51 OSSEOUS METASTASIS: ICD-10-CM

## 2021-01-18 LAB
ALBUMIN SERPL-MCNC: 3.8 G/DL (ref 3.5–5.2)
ALBUMIN/GLOB SERPL: 1.1 G/DL (ref 1.1–2.4)
ALP SERPL-CCNC: 264 U/L (ref 38–116)
ALT SERPL W P-5'-P-CCNC: 12 U/L (ref 0–41)
ANION GAP SERPL CALCULATED.3IONS-SCNC: 10.4 MMOL/L (ref 5–15)
AST SERPL-CCNC: 14 U/L (ref 0–40)
BASOPHILS # BLD AUTO: 0.04 10*3/MM3 (ref 0–0.2)
BASOPHILS NFR BLD AUTO: 0.6 % (ref 0–1.5)
BILIRUB SERPL-MCNC: 0.2 MG/DL (ref 0.2–1.2)
BUN SERPL-MCNC: 25 MG/DL (ref 6–20)
BUN/CREAT SERPL: 21.6 (ref 7.3–30)
CALCIUM SPEC-SCNC: 8.6 MG/DL (ref 8.5–10.2)
CHLORIDE SERPL-SCNC: 109 MMOL/L (ref 98–107)
CO2 SERPL-SCNC: 20.6 MMOL/L (ref 22–29)
CREAT SERPL-MCNC: 1.16 MG/DL (ref 0.7–1.3)
DEPRECATED RDW RBC AUTO: 47.1 FL (ref 37–54)
EOSINOPHIL # BLD AUTO: 0.14 10*3/MM3 (ref 0–0.4)
EOSINOPHIL NFR BLD AUTO: 2 % (ref 0.3–6.2)
ERYTHROCYTE [DISTWIDTH] IN BLOOD BY AUTOMATED COUNT: 14.2 % (ref 12.3–15.4)
GFR SERPL CREATININE-BSD FRML MDRD: 61 ML/MIN/1.73
GLOBULIN UR ELPH-MCNC: 3.5 GM/DL (ref 1.8–3.5)
GLUCOSE SERPL-MCNC: 173 MG/DL (ref 74–124)
HCT VFR BLD AUTO: 35.4 % (ref 37.5–51)
HGB BLD-MCNC: 11.1 G/DL (ref 13–17.7)
IMM GRANULOCYTES # BLD AUTO: 0.04 10*3/MM3 (ref 0–0.05)
IMM GRANULOCYTES NFR BLD AUTO: 0.6 % (ref 0–0.5)
LYMPHOCYTES # BLD AUTO: 1.99 10*3/MM3 (ref 0.7–3.1)
LYMPHOCYTES NFR BLD AUTO: 27.9 % (ref 19.6–45.3)
MAGNESIUM SERPL-MCNC: 2.4 MG/DL (ref 1.8–2.5)
MCH RBC QN AUTO: 28.5 PG (ref 26.6–33)
MCHC RBC AUTO-ENTMCNC: 31.4 G/DL (ref 31.5–35.7)
MCV RBC AUTO: 90.8 FL (ref 79–97)
MONOCYTES # BLD AUTO: 0.63 10*3/MM3 (ref 0.1–0.9)
MONOCYTES NFR BLD AUTO: 8.8 % (ref 5–12)
NEUTROPHILS NFR BLD AUTO: 4.28 10*3/MM3 (ref 1.7–7)
NEUTROPHILS NFR BLD AUTO: 60.1 % (ref 42.7–76)
NRBC BLD AUTO-RTO: 0 /100 WBC (ref 0–0.2)
PHOSPHATE SERPL-MCNC: 2.5 MG/DL (ref 2.5–4.5)
PLATELET # BLD AUTO: 224 10*3/MM3 (ref 140–450)
PMV BLD AUTO: 9.9 FL (ref 6–12)
POTASSIUM SERPL-SCNC: 4.7 MMOL/L (ref 3.5–4.7)
PROT SERPL-MCNC: 7.3 G/DL (ref 6.3–8)
RBC # BLD AUTO: 3.9 10*6/MM3 (ref 4.14–5.8)
SODIUM SERPL-SCNC: 140 MMOL/L (ref 134–145)
WBC # BLD AUTO: 7.12 10*3/MM3 (ref 3.4–10.8)

## 2021-01-18 PROCEDURE — 96372 THER/PROPH/DIAG INJ SC/IM: CPT

## 2021-01-18 PROCEDURE — 36415 COLL VENOUS BLD VENIPUNCTURE: CPT

## 2021-01-18 PROCEDURE — 83735 ASSAY OF MAGNESIUM: CPT

## 2021-01-18 PROCEDURE — 80053 COMPREHEN METABOLIC PANEL: CPT

## 2021-01-18 PROCEDURE — 85025 COMPLETE CBC W/AUTO DIFF WBC: CPT

## 2021-01-18 PROCEDURE — 99214 OFFICE O/P EST MOD 30 MIN: CPT | Performed by: INTERNAL MEDICINE

## 2021-01-18 PROCEDURE — 25010000002 DENOSUMAB 120 MG/1.7ML SOLUTION: Performed by: INTERNAL MEDICINE

## 2021-01-18 PROCEDURE — 84100 ASSAY OF PHOSPHORUS: CPT

## 2021-01-18 RX ADMIN — DENOSUMAB 120 MG: 120 INJECTION SUBCUTANEOUS at 10:57

## 2021-01-18 NOTE — PROGRESS NOTES
Staff message rec from Dr Lee asking me to resubmit for the cost of Xtandi or Zytiga.    I contacted [employer] and spoke to Marylin. I inquired on a 30 day supply of both medications.    Abiraterone is $825   Xtandi is almost $1000 ($995)    These are both Tier 5 medications and require a PA.    I have informed Dr Lee of the abouve and let him know that I can sent the rxs to [facility] and we can seek internal assistance.    Jose Lee MD sent to [facility].             Please resubmit for the cost of Xtandi or Zytiga for this patient.  ThanksASAEL

## 2021-01-25 ENCOUNTER — CONSULT (OUTPATIENT)
Dept: RADIATION ONCOLOGY | Facility: HOSPITAL | Age: 81
End: 2021-01-25

## 2021-01-25 ENCOUNTER — APPOINTMENT (OUTPATIENT)
Dept: RADIATION ONCOLOGY | Facility: HOSPITAL | Age: 81
End: 2021-01-25

## 2021-01-25 VITALS
DIASTOLIC BLOOD PRESSURE: 73 MMHG | HEART RATE: 53 BPM | OXYGEN SATURATION: 98 % | BODY MASS INDEX: 33.28 KG/M2 | WEIGHT: 200 LBS | SYSTOLIC BLOOD PRESSURE: 170 MMHG

## 2021-01-25 DIAGNOSIS — C79.51 PROSTATE CANCER METASTATIC TO BONE (HCC): Primary | ICD-10-CM

## 2021-01-25 DIAGNOSIS — C61 PROSTATE CANCER METASTATIC TO BONE (HCC): ICD-10-CM

## 2021-01-25 DIAGNOSIS — C79.51 PROSTATE CANCER METASTATIC TO BONE (HCC): ICD-10-CM

## 2021-01-25 DIAGNOSIS — C61 PROSTATE CANCER METASTATIC TO BONE (HCC): Primary | ICD-10-CM

## 2021-01-25 PROCEDURE — 99204 OFFICE O/P NEW MOD 45 MIN: CPT | Performed by: RADIOLOGY

## 2021-01-25 PROCEDURE — G0463 HOSPITAL OUTPT CLINIC VISIT: HCPCS | Performed by: RADIOLOGY

## 2021-01-25 PROCEDURE — 77290 THER RAD SIMULAJ FIELD CPLX: CPT | Performed by: RADIOLOGY

## 2021-01-25 RX ORDER — HYDROCODONE BITARTRATE AND ACETAMINOPHEN 5; 325 MG/1; MG/1
1 TABLET ORAL EVERY 6 HOURS PRN
Qty: 120 TABLET | Refills: 0 | Status: SHIPPED | OUTPATIENT
Start: 2021-01-25 | End: 2021-02-24 | Stop reason: SDUPTHER

## 2021-01-25 NOTE — PROGRESS NOTES
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 year old male who is well-known to me from previous radiation to the prostate bed in 2004.     He has continued close follow-up with the [facility] physicians and most recent bone scan on January 11, 2021 showed multiple bony metastases with evidence of progression throughout the axial and appendicular skeleton, specifically calvarium left humerus right clavicle, sternum, ribs, spine, sacrum, pelvis, right acetabulum, both femur and left frontal bone.    He started treatment with Xgeva with plans to move on to Xtandi as well as continued Lupron.    Concurrently he has complained of increasing pain in the right hip, mid chest and I was asked to see the patient at the request of the referring provider noted above for advice and recommendations regarding this diagnosis.     Performance Status: (1) Restricted in physically strenuous activity, ambulatory and able to do work of light nature  Objective   Past Medical History: he  has a past medical history of Bone cancer (CMS/HCC), Cellulitis of great toe of left foot (10/19/2018), CKD (chronic kidney disease), Diastolic dysfunction, Difficulty breathing, Dyslipidemia, Erectile dysfunction, Fatigue, History of blood transfusion, History of kidney stones, Hyperlipidemia, Hypertension, Hyponatremia, Left ventricular hypertrophy, Localized edema, Neuropathy, Obstructive sleep apnea, Osteoarthritis, Peptic ulcer, Pneumonia, Pneumonia of left upper lobe due to infectious organism (3/15/2019), Prostate cancer (CMS/HCC), Pure hyperglyceridemia, Restless leg syndrome, Sepsis (CMS/HCC), Sinus bradycardia, Transient cerebral ischemia, and Type 2 diabetes mellitus (CMS/HCC).    Past  Surgical History:  he has a past surgical history that includes Hemorrhoid surgery; Prostatectomy (2006); Colonoscopy; Mediastinoscopy (N/A, 4/9/2018); Bronchoscopy (N/A, 4/9/2018); Other surgical history; and Deep neck lymph node biopsy / excision.    Meds:    Current Outpatient Medications:   •  albuterol sulfate  (90 Base) MCG/ACT inhaler, Inhale 2 puffs Every 4 (Four) Hours As Needed for Wheezing or Shortness of Air., Disp: 1 inhaler, Rfl: 0  •  aspirin 81 MG EC tablet, Take 1 tablet by mouth daily., Disp: , Rfl:   •  cholecalciferol (VITAMIN D3) 1000 units tablet, Take 2,000 Units by mouth Daily., Disp: , Rfl:   •  Denosumab (XGEVA SC), Inject  under the skin into the appropriate area as directed Every 30 (Thirty) Days., Disp: , Rfl:   •  dilTIAZem CD (CARDIZEM CD) 120 MG 24 hr capsule, TAKE 1 CAPSULE EVERY DAY, Disp: 90 capsule, Rfl: 3  •  fluticasone (FLONASE SENSIMIST) 27.5 MCG/SPRAY nasal spray, 2 sprays into each nostril As Needed., Disp: , Rfl:   •  gabapentin (NEURONTIN) 300 MG capsule, Take 2 capsules by mouth every night at bedtime., Disp: 60 capsule, Rfl: 2  •  HYDROcodone-acetaminophen (NORCO) 5-325 MG per tablet, Take 1 tablet by mouth Every 6 (Six) Hours As Needed for Moderate Pain ., Disp: 120 tablet, Rfl: 0  •  insulin aspart (NovoLOG) 100 UNIT/ML patient supplied pump, Inject  under the skin into the appropriate area as directed Continuous. Pt reports pump with basal rate 1.9 units/hr from 0800 to 1200. Basal rate 1.8 units/hr 1200 to 0800.  Checks bg ac/hs with bolus doses per pumps parameters, Disp: , Rfl:   •  Leuprolide Acetate, 3 Month, (LUPRON DEPOT, 3-MONTH, IM), Inject  into the appropriate muscle as directed by prescriber Every 3 (Three) Months., Disp: , Rfl:   •  levothyroxine (SYNTHROID, LEVOTHROID) 88 MCG tablet, , Disp: , Rfl:   •  modafinil (PROVIGIL) 200 MG tablet, Take 1 tablet by mouth Daily., Disp: 90 tablet, Rfl: 1  •  olmesartan (BENICAR) 40 MG tablet, TAKE 1 TABLET  EVERY DAY (DISCONTINUE LOSARTAN 100MG), Disp: 90 tablet, Rfl: 3  •  pravastatin (PRAVACHOL) 40 MG tablet, Take 40 mg by mouth daily., Disp: , Rfl:   •  rOPINIRole (REQUIP) 0.5 MG tablet, Take 1 tablet by mouth in the evening and 1 at bedtime., Disp: 180 tablet, Rfl: 2  •  traZODone (DESYREL) 50 MG tablet, Take  mg by mouth every night at bedtime., Disp: , Rfl:   •  triamterene-hydrochlorothiazide (MAXZIDE-25) 37.5-25 MG per tablet, TAKE 1 TABLET EVERY DAY; APPOINTMENT IS NEEDED, Disp: 90 tablet, Rfl: 0    Allergies:    Allergies   Allergen Reactions   • Niacin Unknown - Low Severity   • Statins Unknown - Low Severity       Family History:  his family history includes Diabetes in his mother; Heart disease in his mother and another family member; Heart failure in his mother; Hypertension in his brother, mother, and sister; Lung cancer (age of onset: 46) in his father; Lung cancer (age of onset: 60) in his sister; Lung cancer (age of onset: 62) in his brother; Prostate cancer (age of onset: 60) in his brother.    Social History:  he  reports that he quit smoking about 23 years ago. His smoking use included cigarettes. He has a 30.00 pack-year smoking history. He has never used smokeless tobacco. He reports that he does not drink alcohol or use drugs.    Pertinent Findings on   Review of Systems   Constitutional: Positive for chills. Negative for appetite change, diaphoresis, fatigue, fever and unexpected weight change.   HENT:   Negative for hearing loss, lump/mass, mouth sores, nosebleeds, sore throat, tinnitus, trouble swallowing and voice change.    Eyes: Negative for eye problems.   Respiratory: Negative for chest tightness, cough, hemoptysis, shortness of breath and wheezing.    Cardiovascular: Negative for chest pain, leg swelling and palpitations.   Gastrointestinal: Positive for diarrhea. Negative for abdominal distention, abdominal pain, blood in stool, constipation, nausea, rectal pain and vomiting.    Endocrine: Negative for hot flashes.   Genitourinary: Positive for bladder incontinence and frequency. Negative for difficulty urinating, dysuria, hematuria, nocturia and pelvic pain.    Musculoskeletal: Positive for arthralgias and back pain. Negative for flank pain, gait problem, myalgias, neck pain and neck stiffness.   Skin: Negative for itching and rash.   Neurological: Positive for numbness. Negative for dizziness, extremity weakness, gait problem, headaches, light-headedness, seizures and speech difficulty.   Hematological: Negative for adenopathy. Does not bruise/bleed easily.   Psychiatric/Behavioral: Positive for sleep disturbance. Negative for confusion, decreased concentration, depression and suicidal ideas. The patient is not nervous/anxious.    :     Subjective   Vitals:    01/25/21 1405   BP: 170/73   Pulse: 53   SpO2: 98%   Weight: 90.7 kg (200 lb)   PainSc: 0-No pain       Pertinent Findings on:  Physical Exam  Vitals signs reviewed.   Constitutional:       Appearance: He is well-developed.   HENT:      Head: Normocephalic and atraumatic.   Neck:      Musculoskeletal: Normal range of motion.   Pulmonary:      Effort: Pulmonary effort is normal.   Abdominal:      Palpations: Abdomen is soft.   Musculoskeletal: Normal range of motion.      Comments: Pain localized to right low hip, radiating anteriorly toward groin; pain with pressure lateral to sternum, extending down inferiorly toward xyphoid   Skin:     General: Skin is warm and dry.   Neurological:      Mental Status: He is alert and oriented to person, place, and time.   Psychiatric:         Behavior: Behavior normal.         Thought Content: Thought content normal.         Judgment: Judgment normal.            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  Certainly I think the right femur and sternal lesion require treatment.    We discussed a course of radiation therapy aimed at the above areas to a total dose of 3000 cGy in 10 treatments over 2 weeks. We reviewed the logistics and goals of the course of treatment. We then discussed acute side effects which are expected be minimal but might include slight erythema of the skin, sore throat, dysphagia, irritability of the bowel and possible fatigue. We also discussed the long-term effect of the radiation therapy on the bone and I believe all questions were answered.      We were able to take our treatment planning films today and will be getting the treatments underway within a couple of days.       Objective     My assessment above comes from my review of the imaging, pathology, physician notes and other pertinent information as mentioned.    I personally spent greater than 45 minutes today assessing, managing, discussing and documenting my visit with the patient. That time includes review of records, imaging and pathology reports, obtaining my own history, performing a medically appropriate evaluation, counseling and educating the patient, discussing goals, logistics, alternatives and risks of my recommendations, surveillance options and potential outcomes. It also includes the time documenting the clinical information in the EMR and communicating my recommendations to the other involved physicians.    .

## 2021-01-26 PROCEDURE — 77470 SPECIAL RADIATION TREATMENT: CPT

## 2021-01-27 PROCEDURE — 77263 THER RADIOLOGY TX PLNG CPLX: CPT | Performed by: RADIOLOGY

## 2021-01-27 PROCEDURE — 77334 RADIATION TREATMENT AID(S): CPT | Performed by: RADIOLOGY

## 2021-01-27 PROCEDURE — 77295 3-D RADIOTHERAPY PLAN: CPT | Performed by: RADIOLOGY

## 2021-01-27 PROCEDURE — 77300 RADIATION THERAPY DOSE PLAN: CPT | Performed by: RADIOLOGY

## 2021-01-28 PROCEDURE — 77412 RADIATION TX DELIVERY LVL 3: CPT | Performed by: RADIOLOGY

## 2021-01-28 PROCEDURE — 77295 3-D RADIOTHERAPY PLAN: CPT | Performed by: RADIOLOGY

## 2021-01-28 PROCEDURE — 77427 RADIATION TX MANAGEMENT X5: CPT | Performed by: RADIOLOGY

## 2021-01-28 PROCEDURE — 77334 RADIATION TREATMENT AID(S): CPT | Performed by: RADIOLOGY

## 2021-01-28 PROCEDURE — 77300 RADIATION THERAPY DOSE PLAN: CPT | Performed by: RADIOLOGY

## 2021-01-29 PROCEDURE — 77412 RADIATION TX DELIVERY LVL 3: CPT | Performed by: RADIOLOGY

## 2021-02-01 ENCOUNTER — APPOINTMENT (OUTPATIENT)
Dept: RADIATION ONCOLOGY | Facility: HOSPITAL | Age: 81
End: 2021-02-01

## 2021-02-01 ENCOUNTER — RADIATION ONCOLOGY WEEKLY ASSESSMENT (OUTPATIENT)
Dept: RADIATION ONCOLOGY | Facility: HOSPITAL | Age: 81
End: 2021-02-01

## 2021-02-01 DIAGNOSIS — C79.51 PROSTATE CANCER METASTATIC TO BONE (HCC): Primary | ICD-10-CM

## 2021-02-01 DIAGNOSIS — C61 PROSTATE CANCER METASTATIC TO BONE (HCC): Primary | ICD-10-CM

## 2021-02-01 PROCEDURE — 77014 CHG CT GUIDANCE RADIATION THERAPY FLDS PLACEMENT: CPT | Performed by: RADIOLOGY

## 2021-02-01 PROCEDURE — 77412 RADIATION TX DELIVERY LVL 3: CPT | Performed by: RADIOLOGY

## 2021-02-01 PROCEDURE — 77280 THER RAD SIMULAJ FIELD SMPL: CPT | Performed by: RADIOLOGY

## 2021-02-01 NOTE — PROGRESS NOTES
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 No problems or questions, cont on.    Subjective   Performance Status:  (1) Restricted in physically strenuous activity, ambulatory and able to do work of light nature    ROS - Other than as listed above, as follows:  Constitutional - Normal - no complaints of fatigue, denies lack of appetite, fever, night sweats or change in weight.  Cardiovascular - Normal - denies arrhythmias, chest pain, dyspnea, edema, orthopnea or palpitations.  Respiratory - Normal - denies cough, dyspnea, hemoptysis, hiccoughs, pleuritic chest pain or wheezing.  Gastrointestinal - Normal - no complaints of constipation, abdominal pain, diarrhea, heartburn/dyspepsia, hematemesis, hemorrhoids, melena or GI bleeding, nausea, pain or cramping or vomiting.  Objective   PHYSICAL EXAM - Other than as listed above, as follows:  There were no vitals filed for this visit.      Constitutional - Normal - no evidence of impaired alertness, inadequate appearance, premature or advanced chronologic age, uncooperativeness, altered mood, affect or disorientation.    Problem added:  No problems updated.  Medications added: No orders of the defined types were placed in this encounter.    Ancillary referrals made: No orders of the defined types were placed in this encounter.      Technical aspects reviewed:  Weekly OBI approved if applicable? Yes  Weekly port films approved?   Yes  Change requests noted if applicable?  Yes  Patient setup and plan reviewed?  Yes    Chart Reviewed:  Continue current treatment plan?  Yes  Treatment plan change requested?  No    I have reviewed and marked as \"reviewed\" the current medications, allergies and problem list in the patients EMR.    I have reviewed the patient's medical, surgical  history " in detail, reviewed any pertinent lab work  and updated the computerized patient record if needed.    Patient's Care Team:  Patient Care Team:  Axel Guardado MD as PCP - [facility], Bob HERNANDEZ MD as Consulting Physician (Sleep Medicine)  Tata Lee MD as Consulting Physician (Cardiology)  Bijan Rivera III, MD as Referring Physician (Thoracic Surgery)  Jose Lee MD as Consulting Physician (Hematology and Oncology)  Adriana Kulkarni MD as Consulting Physician (Radiation Oncology)

## 2021-02-02 ENCOUNTER — OFFICE VISIT (OUTPATIENT)
Dept: CARDIOLOGY | Facility: CLINIC | Age: 81
End: 2021-02-02

## 2021-02-02 VITALS
WEIGHT: 197.8 LBS | SYSTOLIC BLOOD PRESSURE: 134 MMHG | DIASTOLIC BLOOD PRESSURE: 70 MMHG | HEIGHT: 65 IN | HEART RATE: 53 BPM | BODY MASS INDEX: 32.96 KG/M2

## 2021-02-02 DIAGNOSIS — I10 ESSENTIAL HYPERTENSION: ICD-10-CM

## 2021-02-02 DIAGNOSIS — E78.2 MIXED HYPERLIPIDEMIA: ICD-10-CM

## 2021-02-02 DIAGNOSIS — E11.21 TYPE 2 DIABETES MELLITUS WITH DIABETIC NEPHROPATHY, WITH LONG-TERM CURRENT USE OF INSULIN (HCC): Primary | ICD-10-CM

## 2021-02-02 DIAGNOSIS — Z79.4 TYPE 2 DIABETES MELLITUS WITH DIABETIC NEPHROPATHY, WITH LONG-TERM CURRENT USE OF INSULIN (HCC): Primary | ICD-10-CM

## 2021-02-02 DIAGNOSIS — G47.33 OBSTRUCTIVE SLEEP APNEA SYNDROME: ICD-10-CM

## 2021-02-02 PROCEDURE — 77300 RADIATION THERAPY DOSE PLAN: CPT | Performed by: RADIOLOGY

## 2021-02-02 PROCEDURE — 77336 RADIATION PHYSICS CONSULT: CPT | Performed by: RADIOLOGY

## 2021-02-02 PROCEDURE — 77412 RADIATION TX DELIVERY LVL 3: CPT | Performed by: RADIOLOGY

## 2021-02-02 PROCEDURE — 77387 GUIDANCE FOR RADJ TX DLVR: CPT | Performed by: RADIOLOGY

## 2021-02-02 PROCEDURE — 93000 ELECTROCARDIOGRAM COMPLETE: CPT | Performed by: INTERNAL MEDICINE

## 2021-02-02 PROCEDURE — 77014 CHG CT GUIDANCE RADIATION THERAPY FLDS PLACEMENT: CPT | Performed by: RADIOLOGY

## 2021-02-02 PROCEDURE — 99214 OFFICE O/P EST MOD 30 MIN: CPT | Performed by: INTERNAL MEDICINE

## 2021-02-02 NOTE — PROGRESS NOTES
Date of Office Visit: 2021  Encounter Provider: Tata Lee MD  Place of Service: [facility] CARDIOLOGY  Patient Name: Semaj Herrera  :1940      Patient ID:  Semaj Herrera is a 80 y.o. male is here for  followup for hypertension and LVH.        History of Present Illness    He has diabetes mellitus type 2, insulin-dependent, severe obstructive and central sleep apnea on BiPAP, hypertension with left ventricular perjury, hyperlipidemia, chronic kidney disease.  He has had metastatic prostate cancer and follows with Dr. Lee.    I first saw the patient on 2010 for dizziness, fatigue and  bradycardia. At that time, we stopped his metoprolol and this went away.   He has a known history of hypertension and has had left ventricular  hypertrophy and diastolic dysfunction.      He does have severe obstructive sleep apnea. He uses a BIPAP machine and follows with Dr. Mcdermott for this. This has been stable.   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      When I saw the patient in 2013, he was having dizziness, weakness, feeling unwell,  and his blood pressure was low. We decreased his triamterene/hydrochlorothiazide and he did better.     He sees Dr. almanza for his diabetes and Dr. Vicky Duran for his renal disease.      Echo done 3/2017 showed LVEF 62% with grade 2 diastolic dysfunction. There was no significant valve disease.      He sees Dr. Lee for metastatic prostate cancer.  He had bony metastases.  This affected his spine and his right shoulder.      An echocardiogram done 2020 with ejection fraction 61-65%, grade 2 diastolic dysfunction, mild left atrial enlargement, mild aortic valvular stenosis with a mean gradient of 11 and a maximal gradient 24, RVSP normal 22 mmHg.  He had a contrasted CT abdomen pelvis done 10/1/2020 which showed probable extensive coronary artery calcification  noted in all vessels.    Labs on 1/18/2021 showed glucose 173, creatinine 1.16, otherwise normal CMP, magnesium 2.4, hemoglobin 11.1, otherwise normal CBC.  His PSA was done 12/10/2020 and was elevated at 22.2.  He last saw Dr. Lee 1/18/2021 and was having increasing bone pain because of the cancer.  His scans have not shown any progression of disease.  He has been started on Lupron therapy and referred for palliative radiation therapy for the painful bony sites that he has.  He is supposed to maintain on Xgeva monthly.    He has no chest pain or pressure.  He denies exertional dyspnea.  He does not feels heart racing or skipping is had no dizziness.  He does sometimes feel short winded at night but when he takes his CPAP machine off, he says he breathes better but then he always replaces this.  He has had no fevers, chills or cough.  He denies nausea or vomiting.    Past Medical History:   Diagnosis Date   • Bone cancer (CMS/HCC)    • Cellulitis of great toe of left foot 10/19/2018   • CKD (chronic kidney disease)     Stage 3; followed by Dr. Vicky Duran    • Diastolic dysfunction     GRADE II per echo 2018   • Difficulty breathing     during exertion   • Dyslipidemia    • Erectile dysfunction    • Fatigue    • History of blood transfusion    • History of kidney stones    • Hyperlipidemia    • Hypertension    • Hyponatremia    • Left ventricular hypertrophy     per echo 2018   • Localized edema    • Neuropathy    • Obstructive sleep apnea     USING CPAP   • Osteoarthritis    • Peptic ulcer    • Pneumonia    • Pneumonia of left upper lobe due to infectious organism 3/15/2019   • Prostate cancer (CMS/HCC)     Status post prostatectomy, radiation therapy, and hormone therapy followed by Dr. Lee; metastatsis to bone   • Pure hyperglyceridemia    • Restless leg syndrome    • Sepsis (CMS/HCC)    • Sinus bradycardia    • Transient cerebral ischemia    • Type 2 diabetes mellitus (CMS/HCC)          Past Surgical  History:   Procedure Laterality Date   • BRONCHOSCOPY N/A 4/9/2018    Procedure: BRONCHOSCOPY;  Surgeon: Bijan Rivera III, MD;  Location: [facility] OR;  Service: Cardiothoracic   • COLONOSCOPY     • DEEP NECK LYMPH NODE BIOPSY / EXCISION     • HEMORRHOIDECTOMY     • MEDIASTINOSCOPY N/A 4/9/2018    Procedure: MEDIASTINOSCOPY WITH LYMPH NODE BIOPSY;  Surgeon: Bijan Rivera III, MD;  Location: [facility] OR;  Service: Cardiothoracic   • OTHER SURGICAL HISTORY      ulcer repair   • PROSTATECTOMY  2006       Current Outpatient Medications on File Prior to Visit   Medication Sig Dispense Refill   • albuterol sulfate  (90 Base) MCG/ACT inhaler Inhale 2 puffs Every 4 (Four) Hours As Needed for Wheezing or Shortness of Air. 1 inhaler 0   • aspirin 81 MG EC tablet Take 1 tablet by mouth daily.     • cholecalciferol (VITAMIN D3) 1000 units tablet Take 2,000 Units by mouth Daily.     • Denosumab (XGEVA SC) Inject  under the skin into the appropriate area as directed Every 30 (Thirty) Days.     • dilTIAZem CD (CARDIZEM CD) 120 MG 24 hr capsule TAKE 1 CAPSULE EVERY DAY 90 capsule 3   • fluticasone (FLONASE SENSIMIST) 27.5 MCG/SPRAY nasal spray 2 sprays into each nostril As Needed.     • gabapentin (NEURONTIN) 300 MG capsule Take 2 capsules by mouth every night at bedtime. 60 capsule 2   • HYDROcodone-acetaminophen (NORCO) 5-325 MG per tablet Take 1 tablet by mouth Every 6 (Six) Hours As Needed for Moderate Pain . 120 tablet 0   • insulin aspart (NovoLOG) 100 UNIT/ML patient supplied pump Inject  under the skin into the appropriate area as directed Continuous. Pt reports pump with basal rate 1.9 units/hr from 0800 to 1200. Basal rate 1.8 units/hr 1200 to 0800.  Checks bg ac/hs with bolus doses per pumps parameters     • Leuprolide Acetate, 3 Month, (LUPRON DEPOT, 3-MONTH, IM) Inject  into the appropriate muscle as directed by prescriber Every 3 (Three) Months.     • levothyroxine (SYNTHROID, LEVOTHROID) 88 MCG  "tablet      • modafinil (PROVIGIL) 200 MG tablet Take 1 tablet by mouth Daily. 90 tablet 1   • olmesartan (BENICAR) 40 MG tablet TAKE 1 TABLET EVERY DAY (DISCONTINUE LOSARTAN 100MG) 90 tablet 3   • pravastatin (PRAVACHOL) 40 MG tablet Take 40 mg by mouth daily.     • rOPINIRole (REQUIP) 0.5 MG tablet Take 1 tablet by mouth in the evening and 1 at bedtime. 180 tablet 2   • traZODone (DESYREL) 50 MG tablet Take  mg by mouth every night at bedtime.     • triamterene-hydrochlorothiazide (MAXZIDE-25) 37.5-25 MG per tablet TAKE 1 TABLET EVERY DAY; APPOINTMENT IS NEEDED 90 tablet 0     No current facility-administered medications on file prior to visit.        Social History     Socioeconomic History   • Marital status:      Spouse name: Not on file   • Number of children: Not on file   • Years of education: College   • Highest education level: Not on file   Occupational History     Employer: RETIRED   Tobacco Use   • Smoking status: Former Smoker     Packs/day: 1.00     Years: 30.00     Pack years: 30.00     Types: Cigarettes     Quit date: 1998     Years since quittin.0   • Smokeless tobacco: Never Used   Substance and Sexual Activity   • Alcohol use: No     Comment: Caffeine use: 2-3 cups daily   • Drug use: No   • Sexual activity: Defer   Social History Narrative    CAFFEINE USE: 1 CUP DAILY; OCCASIONAL SOFT DRINK.            ROS    Procedures    ECG 12 Lead    Date/Time: 2021 11:34 AM  Performed by: Tata Lee MD  Authorized by: Tata Lee MD   Comparison: compared with previous ECG   Similar to previous ECG  Rhythm: sinus rhythm    Clinical impression: normal ECG                Objective:      Vitals:    21 1103   BP: 134/70   BP Location: Left arm   Weight: 89.7 kg (197 lb 12.8 oz)   Height: 165.1 cm (65\")     Body mass index is 32.92 kg/m².    Vitals signs reviewed.   Constitutional:       General: Not in acute distress.     Appearance: Well-developed. Not " diaphoretic.   Eyes:      General: No scleral icterus.     Conjunctiva/sclera: Conjunctivae normal.   HENT:      Head: Normocephalic and atraumatic.   Neck:      Musculoskeletal: Neck supple.      Thyroid: No thyromegaly.      Vascular: No carotid bruit or JVD.      Lymphadenopathy: No cervical adenopathy.   Pulmonary:      Effort: Pulmonary effort is normal. No respiratory distress.      Breath sounds: Normal breath sounds. No wheezing. No rhonchi. No rales.   Chest:      Chest wall: Not tender to palpatation.   Cardiovascular:      Normal rate. Regular rhythm.      Murmurs: There is a grade 2/6 harsh midsystolic murmur at the URSB, radiating to the neck.      No gallop. No rub.   Edema:     Peripheral edema absent.   Abdominal:      General: Bowel sounds are normal. There is no distension or abdominal bruit.      Palpations: Abdomen is soft. There is no abdominal mass.      Tenderness: There is no abdominal tenderness.   Musculoskeletal:         General: No deformity.      Extremities: No clubbing present.  Skin:     General: Skin is warm and dry. There is no cyanosis.      Coloration: Skin is not pale.      Findings: No rash.   Neurological:      Mental Status: Alert and oriented to person, place, and time.      Cranial Nerves: No cranial nerve deficit.   Psychiatric:         Judgment: Judgment normal.         Lab Review:       Assessment:      Diagnosis Plan   1. Type 2 diabetes mellitus with diabetic nephropathy, with long-term current use of insulin (CMS/[facility])     2. Mixed hyperlipidemia     3. Essential hypertension     4. Obstructive sleep apnea syndrome treated auto BiPAP       1. Hypertensive heart disease. He has mild left ventricular hypertrophy and  grade II diastolic dysfunction which has been stable. No medication changes at this time.  2. Hyperlipidemia. Per jayson Lau.   3. Diabetes mellitus type 2 with neuropathy.   4. Transient ischemic attack, no further issues.   5. Obstructive sleep  apnea, on CPAP. Follows with Dr. Mcdermott and has been  well treated.   6. Restless leg syndrome.   7. Prostate cancer, status post prostatectomy, radiation therapy and hormone  therapy.  Is metastatic.  Sees Dr. Lee.   8. Erectile dysfunction.   9.  Aortic stenosis, mild and aortic insufficiency which is mild.      Plan:       See Stephanie in 1 year, no medication changes, overall doing well.  No other testing at this time.

## 2021-02-03 PROCEDURE — 77412 RADIATION TX DELIVERY LVL 3: CPT | Performed by: RADIOLOGY

## 2021-02-03 PROCEDURE — 77014 CHG CT GUIDANCE RADIATION THERAPY FLDS PLACEMENT: CPT | Performed by: RADIOLOGY

## 2021-02-04 PROCEDURE — 77412 RADIATION TX DELIVERY LVL 3: CPT | Performed by: RADIOLOGY

## 2021-02-04 PROCEDURE — 77014 CHG CT GUIDANCE RADIATION THERAPY FLDS PLACEMENT: CPT | Performed by: RADIOLOGY

## 2021-02-04 PROCEDURE — 77427 RADIATION TX MANAGEMENT X5: CPT | Performed by: RADIOLOGY

## 2021-02-04 PROCEDURE — 77387 GUIDANCE FOR RADJ TX DLVR: CPT | Performed by: RADIOLOGY

## 2021-02-05 PROCEDURE — 77387 GUIDANCE FOR RADJ TX DLVR: CPT | Performed by: RADIOLOGY

## 2021-02-05 PROCEDURE — 77014 CHG CT GUIDANCE RADIATION THERAPY FLDS PLACEMENT: CPT | Performed by: RADIOLOGY

## 2021-02-05 PROCEDURE — 77412 RADIATION TX DELIVERY LVL 3: CPT | Performed by: RADIOLOGY

## 2021-02-08 PROCEDURE — 77014 CHG CT GUIDANCE RADIATION THERAPY FLDS PLACEMENT: CPT | Performed by: RADIOLOGY

## 2021-02-08 PROCEDURE — 77412 RADIATION TX DELIVERY LVL 3: CPT | Performed by: RADIOLOGY

## 2021-02-08 PROCEDURE — 77387 GUIDANCE FOR RADJ TX DLVR: CPT | Performed by: RADIOLOGY

## 2021-02-09 ENCOUNTER — RADIATION ONCOLOGY WEEKLY ASSESSMENT (OUTPATIENT)
Dept: RADIATION ONCOLOGY | Facility: HOSPITAL | Age: 81
End: 2021-02-09

## 2021-02-09 DIAGNOSIS — C79.51 OSSEOUS METASTASIS: Primary | ICD-10-CM

## 2021-02-09 PROCEDURE — 77014 CHG CT GUIDANCE RADIATION THERAPY FLDS PLACEMENT: CPT | Performed by: RADIOLOGY

## 2021-02-09 PROCEDURE — 77336 RADIATION PHYSICS CONSULT: CPT | Performed by: RADIOLOGY

## 2021-02-09 PROCEDURE — 77412 RADIATION TX DELIVERY LVL 3: CPT | Performed by: RADIOLOGY

## 2021-02-09 PROCEDURE — 77387 GUIDANCE FOR RADJ TX DLVR: CPT | Performed by: RADIOLOGY

## 2021-02-09 NOTE — PROGRESS NOTES
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 Mild loose stool - has immodium, discussed etiology and how to take if needed. No problems or questions, cont on. Will see back prn and call if problems.    Subjective   Performance Status:  (1) Restricted in physically strenuous activity, ambulatory and able to do work of light nature    ROS - Other than as listed above, as follows:  Constitutional - Normal - no complaints of fatigue, denies lack of appetite, fever, night sweats or change in weight.  Cardiovascular - Normal - denies arrhythmias, chest pain, dyspnea, edema, orthopnea or palpitations.  Respiratory - Normal - denies cough, dyspnea, hemoptysis, hiccoughs, pleuritic chest pain or wheezing.  Gastrointestinal - Normal - no complaints of constipation, abdominal pain, diarrhea, heartburn/dyspepsia, hematemesis, hemorrhoids, melena or GI bleeding, nausea, pain or cramping or vomiting.  Objective   PHYSICAL EXAM - Other than as listed above, as follows:  There were no vitals filed for this visit.      Constitutional - Normal - no evidence of impaired alertness, inadequate appearance, premature or advanced chronologic age, uncooperativeness, altered mood, affect or disorientation.    Problem added:  No problems updated.  Medications added: No orders of the defined types were placed in this encounter.    Ancillary referrals made: No orders of the defined types were placed in this encounter.      Technical aspects reviewed:  Weekly OBI approved if applicable? Yes  Weekly port films approved?   Yes  Change requests noted if applicable?  Yes  Patient setup and plan reviewed?  Yes    Chart Reviewed:  Continue current treatment plan?  Yes  Treatment plan change requested?  No    I have reviewed and marked as  "\"reviewed\" the current medications, allergies and problem list in the patients EMR.    I have reviewed the patient's medical, surgical  history in detail, reviewed any pertinent lab work  and updated the computerized patient record if needed.    Patient's Care Team:  Patient Care Team:  Axel Guardado MD as PCP - Bob Jenkins MD as Consulting Physician (Sleep Medicine)  Tata Lee MD as Consulting Physician (Cardiology)  Bijan Rivera III, MD as Referring Physician (Thoracic Surgery)  Jose Lee MD as Consulting Physician (Hematology and Oncology)  Adriana Kulkarni MD as Consulting Physician (Radiation Oncology)        "

## 2021-02-10 PROCEDURE — 77412 RADIATION TX DELIVERY LVL 3: CPT | Performed by: RADIOLOGY

## 2021-02-10 PROCEDURE — 77387 GUIDANCE FOR RADJ TX DLVR: CPT | Performed by: RADIOLOGY

## 2021-02-10 PROCEDURE — 77014 CHG CT GUIDANCE RADIATION THERAPY FLDS PLACEMENT: CPT | Performed by: RADIOLOGY

## 2021-02-11 PROCEDURE — 77387 GUIDANCE FOR RADJ TX DLVR: CPT | Performed by: RADIOLOGY

## 2021-02-11 PROCEDURE — 77412 RADIATION TX DELIVERY LVL 3: CPT | Performed by: RADIOLOGY

## 2021-02-12 ENCOUNTER — DOCUMENTATION (OUTPATIENT)
Dept: RADIATION ONCOLOGY | Facility: HOSPITAL | Age: 81
End: 2021-02-12

## 2021-02-12 PROCEDURE — 77412 RADIATION TX DELIVERY LVL 3: CPT | Performed by: RADIOLOGY

## 2021-02-12 PROCEDURE — 77387 GUIDANCE FOR RADJ TX DLVR: CPT | Performed by: RADIOLOGY

## 2021-02-15 ENCOUNTER — LAB (OUTPATIENT)
Dept: LAB | Facility: HOSPITAL | Age: 81
End: 2021-02-15

## 2021-02-15 ENCOUNTER — INFUSION (OUTPATIENT)
Dept: ONCOLOGY | Facility: HOSPITAL | Age: 81
End: 2021-02-15

## 2021-02-15 DIAGNOSIS — C79.51 OSSEOUS METASTASIS: ICD-10-CM

## 2021-02-15 DIAGNOSIS — C79.51 OSSEOUS METASTASIS: Primary | ICD-10-CM

## 2021-02-15 DIAGNOSIS — C61 PROSTATE CANCER METASTATIC TO BONE (HCC): ICD-10-CM

## 2021-02-15 DIAGNOSIS — C79.51 PROSTATE CANCER METASTATIC TO BONE (HCC): ICD-10-CM

## 2021-02-15 LAB
ALBUMIN SERPL-MCNC: 4 G/DL (ref 3.5–5.2)
ALBUMIN/GLOB SERPL: 1.2 G/DL (ref 1.1–2.4)
ALP SERPL-CCNC: 341 U/L (ref 38–116)
ALT SERPL W P-5'-P-CCNC: 13 U/L (ref 0–41)
ANION GAP SERPL CALCULATED.3IONS-SCNC: 10 MMOL/L (ref 5–15)
AST SERPL-CCNC: 18 U/L (ref 0–40)
BASOPHILS # BLD AUTO: 0.03 10*3/MM3 (ref 0–0.2)
BASOPHILS NFR BLD AUTO: 0.6 % (ref 0–1.5)
BILIRUB SERPL-MCNC: 0.2 MG/DL (ref 0.2–1.2)
BUN SERPL-MCNC: 23 MG/DL (ref 6–20)
BUN/CREAT SERPL: 21.5 (ref 7.3–30)
CALCIUM SPEC-SCNC: 8.5 MG/DL (ref 8.5–10.2)
CHLORIDE SERPL-SCNC: 103 MMOL/L (ref 98–107)
CO2 SERPL-SCNC: 24 MMOL/L (ref 22–29)
CREAT SERPL-MCNC: 1.07 MG/DL (ref 0.7–1.3)
DEPRECATED RDW RBC AUTO: 46.9 FL (ref 37–54)
EOSINOPHIL # BLD AUTO: 0.18 10*3/MM3 (ref 0–0.4)
EOSINOPHIL NFR BLD AUTO: 3.3 % (ref 0.3–6.2)
ERYTHROCYTE [DISTWIDTH] IN BLOOD BY AUTOMATED COUNT: 14.5 % (ref 12.3–15.4)
GFR SERPL CREATININE-BSD FRML MDRD: 66 ML/MIN/1.73
GLOBULIN UR ELPH-MCNC: 3.3 GM/DL (ref 1.8–3.5)
GLUCOSE SERPL-MCNC: 228 MG/DL (ref 74–124)
HCT VFR BLD AUTO: 36.6 % (ref 37.5–51)
HGB BLD-MCNC: 11.6 G/DL (ref 13–17.7)
IMM GRANULOCYTES # BLD AUTO: 0.05 10*3/MM3 (ref 0–0.05)
IMM GRANULOCYTES NFR BLD AUTO: 0.9 % (ref 0–0.5)
LYMPHOCYTES # BLD AUTO: 0.72 10*3/MM3 (ref 0.7–3.1)
LYMPHOCYTES NFR BLD AUTO: 13.3 % (ref 19.6–45.3)
MAGNESIUM SERPL-MCNC: 2.7 MG/DL (ref 1.8–2.5)
MCH RBC QN AUTO: 28.4 PG (ref 26.6–33)
MCHC RBC AUTO-ENTMCNC: 31.7 G/DL (ref 31.5–35.7)
MCV RBC AUTO: 89.5 FL (ref 79–97)
MONOCYTES # BLD AUTO: 0.63 10*3/MM3 (ref 0.1–0.9)
MONOCYTES NFR BLD AUTO: 11.7 % (ref 5–12)
NEUTROPHILS NFR BLD AUTO: 3.79 10*3/MM3 (ref 1.7–7)
NEUTROPHILS NFR BLD AUTO: 70.2 % (ref 42.7–76)
NRBC BLD AUTO-RTO: 0 /100 WBC (ref 0–0.2)
PHOSPHATE SERPL-MCNC: 1.9 MG/DL (ref 2.5–4.5)
PLATELET # BLD AUTO: 170 10*3/MM3 (ref 140–450)
PMV BLD AUTO: 9.8 FL (ref 6–12)
POTASSIUM SERPL-SCNC: 4.6 MMOL/L (ref 3.5–4.7)
PROT SERPL-MCNC: 7.3 G/DL (ref 6.3–8)
RBC # BLD AUTO: 4.09 10*6/MM3 (ref 4.14–5.8)
SODIUM SERPL-SCNC: 137 MMOL/L (ref 134–145)
WBC # BLD AUTO: 5.4 10*3/MM3 (ref 3.4–10.8)

## 2021-02-15 PROCEDURE — 85025 COMPLETE CBC W/AUTO DIFF WBC: CPT

## 2021-02-15 PROCEDURE — 80053 COMPREHEN METABOLIC PANEL: CPT

## 2021-02-15 PROCEDURE — 83735 ASSAY OF MAGNESIUM: CPT

## 2021-02-15 PROCEDURE — 84100 ASSAY OF PHOSPHORUS: CPT

## 2021-02-15 PROCEDURE — 36415 COLL VENOUS BLD VENIPUNCTURE: CPT

## 2021-02-24 DIAGNOSIS — C61 PROSTATE CANCER METASTATIC TO BONE (HCC): ICD-10-CM

## 2021-02-24 DIAGNOSIS — C79.51 PROSTATE CANCER METASTATIC TO BONE (HCC): ICD-10-CM

## 2021-02-24 RX ORDER — HYDROCODONE BITARTRATE AND ACETAMINOPHEN 5; 325 MG/1; MG/1
1 TABLET ORAL EVERY 6 HOURS PRN
Qty: 120 TABLET | Refills: 0 | Status: SHIPPED | OUTPATIENT
Start: 2021-02-24 | End: 2021-03-24 | Stop reason: SDUPTHER

## 2021-02-27 RX ORDER — TRIAMTERENE AND HYDROCHLOROTHIAZIDE 37.5; 25 MG/1; MG/1
1 TABLET ORAL DAILY
Qty: 90 TABLET | Refills: 0 | Status: SHIPPED | OUTPATIENT
Start: 2021-02-27 | End: 2021-05-12

## 2021-03-02 DIAGNOSIS — Z23 IMMUNIZATION DUE: ICD-10-CM

## 2021-03-08 NOTE — PROGRESS NOTES
Subjective  Patient with better pain control, symptoms of esophagitis      History of present illness:   The patient is an 80-year-old male with significant past medical history including prostate carcinoma, CKD 3 and long-term tobacco use be been seen by primary care in late January, 2018 for hoarseness.  Chest x-ray is now outpatient January 23 revealed sclerotic change in the posterior mid chest to be within 3 adjacent thoracic ertebral bodies of uncertain significance.  A follow-up chest CT revealed numerous sclerotic foci within al visualized bones consistent with metastatic disease, multiple enlarged mediastinal lymph nodes concerning for metastatic lymphadenopathy and a polypoidal abnormality in the right lateral wall of the trachea.  CT of abdomen March 20 revealed extensive osseous metastatic disease mildly enlarged retroperitoneal lymph nodes.  Bone scan March 20 was consistent with widespread osseous metastasis particularly in the proximal half the left humeral shaft, abnormal uptake in the sternum and manubrium and a small focus in the posterior aspect of the calvarium.  This led to a plain film the left humerus with mottled sclerosis involving the shaft of the humerus particularly in the proximal half but no evidence of pathologic fracture.    The patient has additionally type 2 diabetes on insulin pump, again a history of prostate carcinoma prostatectomy 12 years previously, hypertension, and hyperlipidemia.  Additional studies included a PSA of 395.1 from March 14, 2018.The patient's been seen by urology March 15 with plans to start Firmagon.    The patient is now seen in pulmonary clinic with Dr. Bijan Rivera and we have discussed that he will need bronchoscopy and mediastinoscopy.  Interestingly his additional history includes a long occupational exposure including working in the [place] just out of school, subsequent construction work for many years and a 30-pack-year history  of smoking stopping in the late 1990s.  He indicates that he followed with Dr. Guillen of urology for many years with no changes in his exams and normal PSAs.  He stopped this follow-up approximately 2 years ago.     Patient was seen in consultation with thoracic surgery on March 28 and plans are made for mediastinoscopy and bronchoscopy with lymph node biopsy.  Pathology revealed that these nodes were consistent with metastatic prostate carcinoma.  He was discussed at thoracic conference.  A PET/CT was not pursued and it was determined that he see medical oncology for hormonal treatment and radiation therapy if symptomatic.  It should be noted that the patient's March 15 [facility] office note describes that Firmagon was planned.     The patient has met with the son and grandson April 23.  We have also contacted Dr. Taylor of urology in that Firmagon was given just after his last visit and would next be due approximately May 3.  Patient feels considerably better with resolution of his pain, normalization of his performance status.  He would like to continue treatment through our office now contacted Dr. Taylor concerning this.    The patient is next seen June 11, 2018 we discussed his follow-up including his treatments with Lupron May 7 and his next treatment on July 30.  He has returned to essentially normal performance status and his PSA has dropped further from 395 March 14 27.8 May 7 and now 2.03 June 11.  We do plan to initiate Xgeva also study him via scans to document his improvement approximately a month from now.   The patient is next seen July 26, 2018.  Repeat imaging demonstrated interval resolution of mediastinal and retroperitoneal lymphadenopathy.  There is thickening in the distal aspect of the appendix with stranding?  Chronic appendicitis.  The patient indicates he is having no such symptoms and never has.  There is no change in sclerotic bony metastasis in the patient's PSA has dropped  substantially to 1.66 by July 19 and 1.79 July 26.  He is actually feeling excellent and this is corroborated by family members.   Patient is next seen January 10, 2019 continuing to do very well and, in fact indicating that [facility], likely hearing replacements, visual review.  He is not certain is going to continue his care with them or with us or combination of both.    Patient is next seen April 4, 2019.  He is recently discharged after hospitalization March 15-18 with left upper lobe pneumonia.  We reviewed the findings in detail with his gradual recovery now documented.  His studies include a CT of chest which does not demonstrate any further bony lesions and/or pulmonary metastasis having developed.  He is feeling considerably better and approximately back to his baseline.  The patient is next seen June 28, 2019 feeling well but having baseline generally musculoskeletal pain and restless legs.His recent exams include a PSA of 0.81, CMP with potassium of 5.3 with repeat pending.  His performance status overall remains good to very good and is clearly responding to his treatments.  The patient is next seen December 13, 2019 continue to feel well.  He has had, oddly enough, 2 episodes of sleep paralysis which have occurred to him previously and he wonders if there is any connection with his prostate carcinoma though this is felt unlikely.     The patient when assessed in December had his PSA go to 2.9.  We continue with Lupron and Xgeva and is seen back now March 9, 2020 without additional symptoms.  We discussed that a schedule could likely change moving to 3 months for both of these medications particularly if he needs additional therapy directed at progressive disease.     Patient contacted our practice May 4 concern for pain in his hips.  This led to moving his scans up and they were performed May 8, 2020 showing a sub-6 mm pleural-based pulmonary nodule in  the right lower lobe that is new from March 15, 2018, remainder of the sub-6 mm pulmonary nodules bilaterally are stable.  There is extensive osseous metastatic disease as previous with no other new findings.     The patient indicates, when seen, May 13 that his bone pain is now resolved.  While this is good information does not mean that he does not have further bony metastasis and we have discussed that a follow-up bone scan is likely necessary.  Furthermore he is having some difficulty with sleeplessness and increase in restless legs as he continues to stay at home during the COVID 19 crisis which, itself, is producing more anxiety for him.  A follow-up bone scan was obtained Lety 3 revealing multifocal osseous metastatic disease which was compared to March 2, 2018 with improvement.  No new abnormal uptake is present.  He is next seen with us on June 17, 2020 and, fortunately, is not having any significant pain at this point.  We discussed his scans in detail and, on balance, his performance status also remains improved.     It was elected to follow the patient rather than changes antiandrogen therapy.  He is reassessed September 3 with unchanged CMP, H&H of 11.4 and 36.3 with normal white count, platelet count and differential, PSA at 15.8.  Follow-up PA lateral chest x-ray does not show any new abnormalities though there is extensive metastatic bone disease throughout the bony thorax and osteoblastic metastasis have been seen on chest CT May 2020.  The patient is seen with his son Georges September 10 noticing increasing bony discomfort primarily in the right hip following fairly intensive gardening which he does frequently.  Now has further elevation of PSA were wondering whether we should try to intervene sooner per additional antiandrogens.  We will need to further assess him via CT scanning to make this decision     These were obtained October 1 showing extensive sclerotic disease with no metastatic disease  in chest abdomen or pelvis.  PSA had gone from 15.8 September 3-16 0.6 October 1.     He still has pain getting up in the morning and after active gardening.  This is manageable with current pain medications and, at present, it is not apparent that he needs to switch medications to gain better control of his disease.  The patient did have follow-up studies done including a PSA November 25, 2020 now at 25.4.  The patient is seen with his son December 10, 2020 doing fair but having some increased pain in the mid sternum and right hip that he ascribes to additional exercise/work in managing his garden.  It is apparent however, that his last bone scan was 6 months ago and we do need to reassess him concerning this.     The patient had follow-up bone scan performed January 11, 2021 showing again uptake in the calvarium, humerus, right medial clavicle, sternum, ribs, spine, sacrum, pelvis, right acetabulum, proximal femora and now left frontal bone which appears new.  There is also mild increase in sternal uptake and equivocal increase in the pelvic lesions of all of the sacrum and the right proximal femur.     The patient is seen with his son January 18, 2021 and indicates that he is having pain gradually worsened in his right hip, mid chest that had been an issue previously.  These findings on bone scan are reviewed with both of them as likely the underlying culprit for his discomfort.  He would need change of the systemic therapy but, at present, will ask for radiation therapy palliation initially.  He was previously treated by Dr. Kulkarni many years ago and will ask her to see him.  We will also make application for the current cost of Xtandi or Zytiga.  The patient is not felt to be a candidate for systemic chemotherapy.     The patient was referred for ration therapy as we continued to assess his PSA on current therapy as well as exam the cost of Zytiga or Xtandi.  Radiation therapy (Dr. Kulkarni) saw the patient  January 26 and felt that the right femur and sternal lesion could benefit from therapy-3000 cGy in 10 fractions.  The patient took treatment February 1 to February 12 to the above areas and is next seen back March 15.  Fortunately his pain has improved dramatically and he is quite happy about this.  Unfortunately he notes some difficulty with swallowing that is temporary and often leads to increased salivation and mucus production.        Past Medical History:   Diagnosis Date   • Bone cancer (CMS/HCC)    • Cellulitis of great toe of left foot 10/19/2018   • CKD (chronic kidney disease)     Stage 3; followed by Dr. Vicky Duran    • Diastolic dysfunction     GRADE II per echo 2018   • Difficulty breathing     during exertion   • Dyslipidemia    • Erectile dysfunction    • Fatigue    • History of blood transfusion    • History of kidney stones    • Hyperlipidemia    • Hypertension    • Hyponatremia    • Left ventricular hypertrophy     per echo 2018   • Localized edema    • Neuropathy    • Obstructive sleep apnea     USING CPAP   • Osteoarthritis    • Peptic ulcer    • Pneumonia    • Pneumonia of left upper lobe due to infectious organism 3/15/2019   • Prostate cancer (CMS/HCC)     Status post prostatectomy, radiation therapy, and hormone therapy followed by Dr. Lee; metastatsis to bone   • Pure hyperglyceridemia    • Restless leg syndrome    • Sepsis (CMS/HCC)    • Sinus bradycardia    • Transient cerebral ischemia    • Type 2 diabetes mellitus (CMS/HCC)         Past Surgical History:   Procedure Laterality Date   • BRONCHOSCOPY N/A 4/9/2018    Procedure: BRONCHOSCOPY;  Surgeon: Bijan Rivera III, MD;  Location: [facility];  Service: Cardiothoracic   • COLONOSCOPY     • DEEP NECK LYMPH NODE BIOPSY / EXCISION     • HEMORRHOIDECTOMY     • MEDIASTINOSCOPY N/A 4/9/2018    Procedure: MEDIASTINOSCOPY WITH LYMPH NODE BIOPSY;  Surgeon: Bijan Rivera III, MD;  Location: [facility] OR;  Service:  Cardiothoracic   • OTHER SURGICAL HISTORY      ulcer repair   • PROSTATECTOMY  2006        Current Outpatient Medications on File Prior to Visit   Medication Sig Dispense Refill   • albuterol sulfate  (90 Base) MCG/ACT inhaler Inhale 2 puffs Every 4 (Four) Hours As Needed for Wheezing or Shortness of Air. 1 inhaler 0   • aspirin 81 MG EC tablet Take 1 tablet by mouth daily.     • cholecalciferol (VITAMIN D3) 1000 units tablet Take 2,000 Units by mouth Daily.     • Denosumab (XGEVA SC) Inject  under the skin into the appropriate area as directed Every 30 (Thirty) Days.     • dilTIAZem CD (CARDIZEM CD) 120 MG 24 hr capsule TAKE 1 CAPSULE EVERY DAY 90 capsule 3   • fluticasone (FLONASE SENSIMIST) 27.5 MCG/SPRAY nasal spray 2 sprays into each nostril As Needed.     • gabapentin (NEURONTIN) 300 MG capsule Take 2 capsules by mouth every night at bedtime. 60 capsule 2   • HYDROcodone-acetaminophen (NORCO) 5-325 MG per tablet Take 1 tablet by mouth Every 6 (Six) Hours As Needed for Moderate Pain . 120 tablet 0   • insulin aspart (NovoLOG) 100 UNIT/ML patient supplied pump Inject  under the skin into the appropriate area as directed Continuous. Pt reports pump with basal rate 1.9 units/hr from 0800 to 1200. Basal rate 1.8 units/hr 1200 to 0800.  Checks bg ac/hs with bolus doses per pumps parameters     • Leuprolide Acetate, 3 Month, (LUPRON DEPOT, 3-MONTH, IM) Inject  into the appropriate muscle as directed by prescriber Every 3 (Three) Months.     • levothyroxine (SYNTHROID, LEVOTHROID) 88 MCG tablet      • modafinil (PROVIGIL) 200 MG tablet Take 1 tablet by mouth Daily. 90 tablet 1   • olmesartan (BENICAR) 40 MG tablet TAKE 1 TABLET EVERY DAY (DISCONTINUE LOSARTAN 100MG) 90 tablet 3   • pravastatin (PRAVACHOL) 40 MG tablet Take 40 mg by mouth daily.     • rOPINIRole (REQUIP) 0.5 MG tablet Take 1 tablet by mouth in the evening and 1 at bedtime. 180 tablet 2   • traZODone (DESYREL) 50 MG tablet Take  mg by mouth  every night at bedtime.     • triamterene-hydrochlorothiazide (MAXZIDE-25) 37.5-25 MG per tablet Take 1 tablet by mouth Daily. 90 tablet 0     Current Facility-Administered Medications on File Prior to Visit   Medication Dose Route Frequency Provider Last Rate Last Admin   • [COMPLETED] leuprolide (LUPRON) injection 22.5 mg  22.5 mg Intramuscular Once Jose Lee MD   22.5 mg at 03/15/21 1234   • [DISCONTINUED] denosumab (XGEVA) injection 120 mg  120 mg Subcutaneous Once Jose Lee MD            ALLERGIES:    Allergies   Allergen Reactions   • Niacin Unknown - Low Severity   • Statins Unknown - Low Severity        Social History     Socioeconomic History   • Marital status:      Spouse name: Not on file   • Number of children: Not on file   • Years of education: College   • Highest education level: Not on file   Tobacco Use   • Smoking status: Former Smoker     Packs/day: 1.00     Years: 30.00     Pack years: 30.00     Types: Cigarettes     Quit date: 1998     Years since quittin.1   • Smokeless tobacco: Never Used   Substance and Sexual Activity   • Alcohol use: No     Comment: Caffeine use: 2-3 cups daily   • Drug use: No   • Sexual activity: Defer        Family History   Problem Relation Age of Onset   • Heart failure Mother    • Hypertension Mother    • Diabetes Mother    • Heart disease Mother    • Lung cancer Father 46   • Hypertension Sister    • Lung cancer Sister 60   • Hypertension Brother    • Lung cancer Brother 62   • Heart disease Other    • Prostate cancer Brother 60   • Malig Hyperthermia Neg Hx         Review of Systems   Constitutional: Positive for fatigue.   HENT: Negative.    Eyes: Negative.    Respiratory: Negative for chest tightness, shortness of breath and wheezing.    Gastrointestinal: Negative for abdominal pain, constipation, diarrhea, nausea and vomiting.        Increasing reflux symptoms   Endocrine: Negative.    Genitourinary: Negative.   "  Musculoskeletal: Negative for arthralgias.   Skin: Negative.    Allergic/Immunologic: Negative.    Neurological: Positive for weakness.   Hematological: Negative.    Psychiatric/Behavioral: Positive for sleep disturbance.   All other systems reviewed and are negative.      .  Objective     Vitals:    03/15/21 1059   BP: 149/69   Pulse: 63   Resp: 16   Temp: 96.9 °F (36.1 °C)   TempSrc: Temporal   SpO2: 98%   Weight: 90 kg (198 lb 6.4 oz)   Height: 165.1 cm (65\")   PainSc:   4   PainLoc: Throat     Current Status 3/15/2021   ECOG score 0       Physical Exam   Constitutional: He is oriented to person, place, and time.   HENT:   Head: Normocephalic and atraumatic.   Nose: Nose normal.   Mouth/Throat: Mucous membranes are moist. Oropharynx is clear.   Eyes: Pupils are equal, round, and reactive to light. Conjunctivae are normal.   Cardiovascular: Normal rate, regular rhythm, normal heart sounds and normal pulses.   Pulmonary/Chest: Effort normal and breath sounds normal.   Abdominal: Soft. Normal appearance and bowel sounds are normal.   Musculoskeletal: Normal range of motion.   Neurological: He is alert and oriented to person, place, and time.   Skin: Skin is warm and dry.   Psychiatric: His behavior is normal. Mood normal.     RECENT LABS:  Hematology WBC   Date Value Ref Range Status   03/15/2021 4.88 3.40 - 10.80 10*3/mm3 Final     RBC   Date Value Ref Range Status   03/15/2021 3.94 (L) 4.14 - 5.80 10*6/mm3 Final     Hemoglobin   Date Value Ref Range Status   03/15/2021 11.5 (L) 13.0 - 17.7 g/dL Final     Hematocrit   Date Value Ref Range Status   03/15/2021 35.8 (L) 37.5 - 51.0 % Final     Platelets   Date Value Ref Range Status   03/15/2021 212 140 - 450 10*3/mm3 Final      NUCLEAR MEDICINE WHOLE BODY BONE SCAN 1/11/20221    FINDINGS:  There is multifocal osseous metastatic disease with abnormal  uptake throughout the axial and appendicular skeleton involving the left  calvarium, left proximal humerus, right " medial clavicle, sternum, ribs,  spine, sacrum, pelvis, right acetabulum, both proximal femora. Left  frontal bone uptake appears to be new when compared to the previous  exam. There has been mild progression in degree of sternal uptake and  there is equivocal increase in intensity of uptake involving pelvic  lesions including the left sacrum and right proximal femur.      IMPRESSION:  Multifocal osseous metastatic disease with some evidence for  progression when compared to the prior exam 06/03/2020. There is a new  small lesion overlying the left frontal bone.     This report was finalized on 1/11/2021 2:49 PM by Dr. Boogie Cortez M.D.    Assessment/Plan       80 year-old male with medical history including prostate carcinoma, CK D3, long-term tobacco use recent development of hoarseness and subsequent studies that demonstrated findings consistent with metastatic disease to bone as well as multiple enlarged metastatic lymph nodes, and potential abnormality in the right lateral wall trachea.  His additional history includes type 2 diabetes on insulin pump which has been more effective for control of his blood sugars.    His recent symptoms have included generalized discomfort in multiple sites in his bony skeleton as well as right lower quadrant abdominal pain.  We discussed this made will improve after he starts Firmagon in the a.m.  Discussions with Dr. Rivera also have led to the conclusion that he'll need bronchoscopy and mediastinoscopy which did proceed as above on April 9, 2018.  Pathology revealed mediastinal lymph nodes to be involved with metastatic adenocarcinoma consistent with origin from a prostatic primary . The patient's case was discussed at thoracic conference and recommendations were to continue with ADT and radiation for symptomatic sites.  The patient is seen back April 23 his treatment with ADT-Firmagon-has improved his symptomatic performance status to near baseline.  It is not felt that  he'll require other treatments such as oral antiandrogens at this point.  He could, however, potentially benefit from agents such as Xgeva given at appropriate intervals. We went on to continue with Xgeva and rescan him July 23 demonstrating near remission developing.  This is also supported by his normalizing PSA.  The patient's scans demonstrated possibly chronic appendicitis is not having symptoms referable to this.he plans to be alert to the possibility of stenosis family member.  We discussed moving to an every 6 month treatment but at this point we'll continue at 3 months. As result we continued Lupron and Xgeva on schedule and is seen back now 6 months later continuing to generally improve.     We will continue the patient's treatment and is done well with no further symptoms until recently admitted mid March with upper lobe pneumonia.  Review of his studies during that visit fortunately do not demonstrate bony metastasis, additional mediastinal adenopathy, pleural effusions or masses.  It is felt that his metastatic prostate carcinoma remains controlled.  We planned, as a result to continue Xgeva and Lupron and the patient is now seen back continuing to do well.  We went on to discuss Lupron and Xgeva, medications including Neurontin and Requip which have been helpful and the patient is next seen December 13, 2019 again with symptoms control.  His PSA went on to be measured at 2.9.  Would like to continue same and have him return for follow-up in the further progressive consider adding additional oral therapy to her treatment.   We continued Xgeva and Lupron and later contacted the patient about his PSA of 5.030.  Follow-up scans were scheduled prior to his visit in 3 months but they have not been done at 2 months at May 8 when the patient called about increasing hip pain.  These scans do not demonstrate progression of disease in any significant way.  We have gone on to review the scans together and his  overall symptoms and plan to try to address his additional sleep issues, restless legs, pain management as necessary and further diagnostics.  The patient's Neurontin was increased and did improve his symptoms.  He is next seen with follow-up bone scan that does not demonstrate worsening of bone nor need for localized radiation therapy.  The patient's PSA has been slowly rising and we have not been able to determine worsening of disease and and, therefore, it is not felt warranted add additional medications such as Xtandi or apalutamide to his therapy.  Please note would like to avoid Zytiga try not to add prednisone which would worsen his blood sugar control.  Is next seen September 9, 2020 with PSA that has increased to 15.8 though the patient remains reasonably asymptomatic and chest x-ray is otherwise negative.  We have discussed continue Neurontin and trazodone, follow-up CT scan series in the next several months to determine whether he has additional disease progressing that can be documented other than the PSA alone.     As he is reassessed September 10 he is having slowly increasing bony discomfort and his PSA has now gone to just over 15.  We have discussed repeat assessment requesting CT scanning.     These scans ultimately reveal no evidence of progression of disease for visceral involvement or clearly for bone involvement.  After further review with the patient and his son we proceeded with Xgeva, continued medications and had him reviewed for Xtandi which was found to be cost prohibitive.  His follow-up PSA has demonstrated gradual increase in his review December 10, 2020 with some mild increase in bony discomfort.  After repeat discussion we went on to have him undergo Lupron therapy, and his PSA actually to be mildly reduced at 22.2.  Follow-up bone scan demonstrates some evidence of progression in sternum, left sacrum and proximal femur.  These findings were reviewed with the patient and his son  January 10, 2020 and the patient was referred for palliative radiotherapy(Dr. Kulkarni) who saw the patient January 26 and felt that the right femur and sternal lesion could benefit from therapy-3000 cGy in 10 fractions.  The patient took treatment February 1 to February 12 to the above areas with significant symptom improvement.     The patient's neck seen back March 15, 2021 with symptoms of increasing reflux.    Plan:  *Proceed with Xgeva (pending electrolyte assessment) and Lupron      *Awaiting PSA and addition laboratory studies    *At present Zytiga or Xtandi are cost prohibitive though we could seek assistance as needed    *Reflux to be addressed with Prilosec 20 mg p.o. nightly, other medications to continue at present    *Follow-up assessment 4 weeks, MD, Pallavi and Chirag

## 2021-03-10 ENCOUNTER — LAB (OUTPATIENT)
Dept: LAB | Facility: HOSPITAL | Age: 81
End: 2021-03-10

## 2021-03-10 ENCOUNTER — TRANSCRIBE ORDERS (OUTPATIENT)
Dept: ADMINISTRATIVE | Facility: HOSPITAL | Age: 81
End: 2021-03-10

## 2021-03-10 DIAGNOSIS — N18.30 MALIGNANT HYPERTENSIVE HEART AND CHRONIC KIDNEY DISEASE STAGE III (HCC): ICD-10-CM

## 2021-03-10 DIAGNOSIS — I10 HYPERTENSION, UNSPECIFIED TYPE: ICD-10-CM

## 2021-03-10 DIAGNOSIS — E55.9 VITAMIN D DEFICIENCY: ICD-10-CM

## 2021-03-10 DIAGNOSIS — N18.30 MALIGNANT HYPERTENSIVE HEART AND CHRONIC KIDNEY DISEASE STAGE III (HCC): Primary | ICD-10-CM

## 2021-03-10 DIAGNOSIS — I13.10 MALIGNANT HYPERTENSIVE HEART AND CHRONIC KIDNEY DISEASE STAGE III (HCC): Primary | ICD-10-CM

## 2021-03-10 DIAGNOSIS — I13.10 MALIGNANT HYPERTENSIVE HEART AND CHRONIC KIDNEY DISEASE STAGE III (HCC): ICD-10-CM

## 2021-03-10 LAB
25(OH)D3 SERPL-MCNC: 43.5 NG/ML (ref 30–100)
ANION GAP SERPL CALCULATED.3IONS-SCNC: 9.4 MMOL/L (ref 5–15)
BACTERIA UR QL AUTO: NORMAL /HPF
BILIRUB UR QL STRIP: NEGATIVE
BUN SERPL-MCNC: 24 MG/DL (ref 8–23)
BUN/CREAT SERPL: 21.8 (ref 7–25)
CALCIUM SPEC-SCNC: 8.4 MG/DL (ref 8.6–10.5)
CHLORIDE SERPL-SCNC: 107 MMOL/L (ref 98–107)
CLARITY UR: CLEAR
CO2 SERPL-SCNC: 22.6 MMOL/L (ref 22–29)
COLOR UR: YELLOW
CREAT SERPL-MCNC: 1.1 MG/DL (ref 0.76–1.27)
CREAT UR-MCNC: 107.4 MG/DL
DEPRECATED RDW RBC AUTO: 46.4 FL (ref 37–54)
ERYTHROCYTE [DISTWIDTH] IN BLOOD BY AUTOMATED COUNT: 14.9 % (ref 12.3–15.4)
GFR SERPL CREATININE-BSD FRML MDRD: 64 ML/MIN/1.73
GLUCOSE SERPL-MCNC: 96 MG/DL (ref 65–99)
GLUCOSE UR STRIP-MCNC: NEGATIVE MG/DL
HCT VFR BLD AUTO: 34.1 % (ref 37.5–51)
HGB BLD-MCNC: 11.2 G/DL (ref 13–17.7)
HGB UR QL STRIP.AUTO: ABNORMAL
HYALINE CASTS UR QL AUTO: NORMAL /LPF
KETONES UR QL STRIP: NEGATIVE
LEUKOCYTE ESTERASE UR QL STRIP.AUTO: NEGATIVE
MAGNESIUM SERPL-MCNC: 2.8 MG/DL (ref 1.6–2.4)
MCH RBC QN AUTO: 28.3 PG (ref 26.6–33)
MCHC RBC AUTO-ENTMCNC: 32.8 G/DL (ref 31.5–35.7)
MCV RBC AUTO: 86.1 FL (ref 79–97)
NITRITE UR QL STRIP: NEGATIVE
PH UR STRIP.AUTO: 5.5 [PH] (ref 5–8)
PHOSPHATE SERPL-MCNC: 2.1 MG/DL (ref 2.5–4.5)
PLATELET # BLD AUTO: 215 10*3/MM3 (ref 140–450)
PMV BLD AUTO: 9.7 FL (ref 6–12)
POTASSIUM SERPL-SCNC: 5.4 MMOL/L (ref 3.5–5.2)
PROT UR QL STRIP: NEGATIVE
PROT UR-MCNC: 13 MG/DL
PTH-INTACT SERPL-MCNC: 93 PG/ML (ref 15–65)
RBC # BLD AUTO: 3.96 10*6/MM3 (ref 4.14–5.8)
RBC # UR: NORMAL /HPF
REF LAB TEST METHOD: NORMAL
SODIUM SERPL-SCNC: 139 MMOL/L (ref 136–145)
SP GR UR STRIP: >=1.03 (ref 1–1.03)
SQUAMOUS #/AREA URNS HPF: NORMAL /HPF
UROBILINOGEN UR QL STRIP: ABNORMAL
WBC # BLD AUTO: 5.72 10*3/MM3 (ref 3.4–10.8)
WBC UR QL AUTO: NORMAL /HPF

## 2021-03-10 PROCEDURE — 83970 ASSAY OF PARATHORMONE: CPT

## 2021-03-10 PROCEDURE — 82306 VITAMIN D 25 HYDROXY: CPT

## 2021-03-10 PROCEDURE — 84156 ASSAY OF PROTEIN URINE: CPT

## 2021-03-10 PROCEDURE — 81001 URINALYSIS AUTO W/SCOPE: CPT

## 2021-03-10 PROCEDURE — 84100 ASSAY OF PHOSPHORUS: CPT

## 2021-03-10 PROCEDURE — 80048 BASIC METABOLIC PNL TOTAL CA: CPT

## 2021-03-10 PROCEDURE — 82570 ASSAY OF URINE CREATININE: CPT

## 2021-03-10 PROCEDURE — 36415 COLL VENOUS BLD VENIPUNCTURE: CPT

## 2021-03-10 PROCEDURE — 85027 COMPLETE CBC AUTOMATED: CPT

## 2021-03-10 PROCEDURE — 83735 ASSAY OF MAGNESIUM: CPT

## 2021-03-15 ENCOUNTER — LAB (OUTPATIENT)
Dept: LAB | Facility: HOSPITAL | Age: 81
End: 2021-03-15

## 2021-03-15 ENCOUNTER — DOCUMENTATION (OUTPATIENT)
Dept: ONCOLOGY | Facility: CLINIC | Age: 81
End: 2021-03-15

## 2021-03-15 ENCOUNTER — INFUSION (OUTPATIENT)
Dept: ONCOLOGY | Facility: HOSPITAL | Age: 81
End: 2021-03-15

## 2021-03-15 ENCOUNTER — OFFICE VISIT (OUTPATIENT)
Dept: ONCOLOGY | Facility: CLINIC | Age: 81
End: 2021-03-15

## 2021-03-15 VITALS
RESPIRATION RATE: 16 BRPM | OXYGEN SATURATION: 98 % | BODY MASS INDEX: 33.05 KG/M2 | WEIGHT: 198.4 LBS | SYSTOLIC BLOOD PRESSURE: 149 MMHG | HEIGHT: 65 IN | TEMPERATURE: 96.9 F | DIASTOLIC BLOOD PRESSURE: 69 MMHG | HEART RATE: 63 BPM

## 2021-03-15 DIAGNOSIS — C79.51 OSSEOUS METASTASIS: Primary | ICD-10-CM

## 2021-03-15 DIAGNOSIS — C79.51 PROSTATE CANCER METASTATIC TO BONE (HCC): ICD-10-CM

## 2021-03-15 DIAGNOSIS — C61 PROSTATE CANCER METASTATIC TO BONE (HCC): ICD-10-CM

## 2021-03-15 DIAGNOSIS — C79.51 OSSEOUS METASTASIS: ICD-10-CM

## 2021-03-15 LAB
ALBUMIN SERPL-MCNC: 3.9 G/DL (ref 3.5–5.2)
ALBUMIN/GLOB SERPL: 1.2 G/DL (ref 1.1–2.4)
ALP SERPL-CCNC: 270 U/L (ref 38–116)
ALT SERPL W P-5'-P-CCNC: 11 U/L (ref 0–41)
ANION GAP SERPL CALCULATED.3IONS-SCNC: 8.9 MMOL/L (ref 5–15)
AST SERPL-CCNC: 13 U/L (ref 0–40)
BASOPHILS # BLD AUTO: 0.03 10*3/MM3 (ref 0–0.2)
BASOPHILS NFR BLD AUTO: 0.6 % (ref 0–1.5)
BILIRUB SERPL-MCNC: 0.2 MG/DL (ref 0.2–1.2)
BUN SERPL-MCNC: 16 MG/DL (ref 6–20)
BUN/CREAT SERPL: 15.2 (ref 7.3–30)
CALCIUM SPEC-SCNC: 8.9 MG/DL (ref 8.5–10.2)
CHLORIDE SERPL-SCNC: 106 MMOL/L (ref 98–107)
CO2 SERPL-SCNC: 23.1 MMOL/L (ref 22–29)
CREAT SERPL-MCNC: 1.05 MG/DL (ref 0.7–1.3)
DEPRECATED RDW RBC AUTO: 50.5 FL (ref 37–54)
EOSINOPHIL # BLD AUTO: 0.13 10*3/MM3 (ref 0–0.4)
EOSINOPHIL NFR BLD AUTO: 2.7 % (ref 0.3–6.2)
ERYTHROCYTE [DISTWIDTH] IN BLOOD BY AUTOMATED COUNT: 15.1 % (ref 12.3–15.4)
GFR SERPL CREATININE-BSD FRML MDRD: 68 ML/MIN/1.73
GLOBULIN UR ELPH-MCNC: 3.3 GM/DL (ref 1.8–3.5)
GLUCOSE SERPL-MCNC: 157 MG/DL (ref 74–124)
HCT VFR BLD AUTO: 35.8 % (ref 37.5–51)
HGB BLD-MCNC: 11.5 G/DL (ref 13–17.7)
IMM GRANULOCYTES # BLD AUTO: 0.06 10*3/MM3 (ref 0–0.05)
IMM GRANULOCYTES NFR BLD AUTO: 1.2 % (ref 0–0.5)
LYMPHOCYTES # BLD AUTO: 0.91 10*3/MM3 (ref 0.7–3.1)
LYMPHOCYTES NFR BLD AUTO: 18.6 % (ref 19.6–45.3)
MAGNESIUM SERPL-MCNC: 2.5 MG/DL (ref 1.8–2.5)
MCH RBC QN AUTO: 29.2 PG (ref 26.6–33)
MCHC RBC AUTO-ENTMCNC: 32.1 G/DL (ref 31.5–35.7)
MCV RBC AUTO: 90.9 FL (ref 79–97)
MONOCYTES # BLD AUTO: 0.56 10*3/MM3 (ref 0.1–0.9)
MONOCYTES NFR BLD AUTO: 11.5 % (ref 5–12)
NEUTROPHILS NFR BLD AUTO: 3.19 10*3/MM3 (ref 1.7–7)
NEUTROPHILS NFR BLD AUTO: 65.4 % (ref 42.7–76)
NRBC BLD AUTO-RTO: 0 /100 WBC (ref 0–0.2)
PHOSPHATE SERPL-MCNC: 1.9 MG/DL (ref 2.5–4.5)
PLATELET # BLD AUTO: 212 10*3/MM3 (ref 140–450)
PMV BLD AUTO: 9.4 FL (ref 6–12)
POTASSIUM SERPL-SCNC: 4.8 MMOL/L (ref 3.5–4.7)
PROT SERPL-MCNC: 7.2 G/DL (ref 6.3–8)
PSA SERPL-MCNC: 38.5 NG/ML (ref 0–4)
RBC # BLD AUTO: 3.94 10*6/MM3 (ref 4.14–5.8)
SODIUM SERPL-SCNC: 138 MMOL/L (ref 134–145)
WBC # BLD AUTO: 4.88 10*3/MM3 (ref 3.4–10.8)

## 2021-03-15 PROCEDURE — 84153 ASSAY OF PSA TOTAL: CPT | Performed by: INTERNAL MEDICINE

## 2021-03-15 PROCEDURE — 84100 ASSAY OF PHOSPHORUS: CPT

## 2021-03-15 PROCEDURE — 99214 OFFICE O/P EST MOD 30 MIN: CPT | Performed by: INTERNAL MEDICINE

## 2021-03-15 PROCEDURE — 80053 COMPREHEN METABOLIC PANEL: CPT

## 2021-03-15 PROCEDURE — 25010000002 LEUPROLIDE ACETATE (3 MONTH) PER 7.5 MG: Performed by: INTERNAL MEDICINE

## 2021-03-15 PROCEDURE — 85025 COMPLETE CBC W/AUTO DIFF WBC: CPT

## 2021-03-15 PROCEDURE — 83735 ASSAY OF MAGNESIUM: CPT

## 2021-03-15 PROCEDURE — 36415 COLL VENOUS BLD VENIPUNCTURE: CPT

## 2021-03-15 PROCEDURE — 96402 CHEMO HORMON ANTINEOPL SQ/IM: CPT

## 2021-03-15 RX ORDER — OMEPRAZOLE 20 MG/1
20 CAPSULE, DELAYED RELEASE ORAL
Qty: 90 CAPSULE | Refills: 2 | Status: SHIPPED | OUTPATIENT
Start: 2021-03-15 | End: 2021-09-08

## 2021-03-15 RX ADMIN — LEUPROLIDE ACETATE 22.5 MG: KIT at 12:34

## 2021-03-15 NOTE — PROGRESS NOTES
Chief Complaint  Injections    Subjective     {Problem List  Visit Diagnosis   Encounters  Notes  Medications  Labs  Result Review Imaging  Media :23}     Semaj Herrera presents to [facility]  .      Objective   Vital Signs:   There were no vitals taken for this visit.    Physical Exam   Result Review :{Labs  Result Review  Imaging  Med Tab  Media :23}   {The following data was reviewed by (Optional):54101}  {Ambulatory Labs (Optional):55418}  {Data reviewed (Optional):64692:::1}          Assessment and Plan {CC Problem List  Visit Diagnosis  ROS  Review (Popup)  Health Maintenance  Quality  BestPractice  Medications  SmartSets  SnapShot Encounters  Media :23}   Diagnoses and all orders for this visit:    1. Osseous metastasis (CMS/HCC) (Primary)  -     CBC and Differential; Future  -     Comprehensive metabolic panel; Future  -     Magnesium; Future  -     Phosphorus; Future  -     Nursing Communication  -     Discontinue: denosumab (XGEVA) injection 120 mg    2. 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  Patient was given instructions and counseling regarding his condition or for health maintenance advice. Please see specific information pulled into the AVS if appropriate.

## 2021-03-22 DIAGNOSIS — G47.14 HYPERSOMNIA DUE TO MEDICAL CONDITION: ICD-10-CM

## 2021-03-22 DIAGNOSIS — G47.33 OBSTRUCTIVE SLEEP APNEA SYNDROME: ICD-10-CM

## 2021-03-22 RX ORDER — MODAFINIL 200 MG/1
200 TABLET ORAL DAILY
Qty: 90 TABLET | Refills: 1 | Status: SHIPPED | OUTPATIENT
Start: 2021-03-22 | End: 2021-08-19 | Stop reason: SDUPTHER

## 2021-03-24 DIAGNOSIS — C79.51 PROSTATE CANCER METASTATIC TO BONE (HCC): ICD-10-CM

## 2021-03-24 DIAGNOSIS — C61 PROSTATE CANCER METASTATIC TO BONE (HCC): ICD-10-CM

## 2021-03-25 RX ORDER — HYDROCODONE BITARTRATE AND ACETAMINOPHEN 5; 325 MG/1; MG/1
1 TABLET ORAL EVERY 6 HOURS PRN
Qty: 120 TABLET | Refills: 0 | Status: SHIPPED | OUTPATIENT
Start: 2021-03-25 | End: 2021-04-21 | Stop reason: SDUPTHER

## 2021-04-10 NOTE — PROGRESS NOTES
Subjective  Patient with better pain control, symptoms of esophagitis slowly resolving      History of present illness:   The patient is an 80-year-old male with significant past medical history including prostate carcinoma, CKD 3 and long-term tobacco use be been seen by primary care in late January, 2018 for hoarseness.  Chest x-ray is now outpatient January 23 revealed sclerotic change in the posterior mid chest to be within 3 adjacent thoracic ertebral bodies of uncertain significance.  A follow-up chest CT revealed numerous sclerotic foci within al visualized bones consistent with metastatic disease, multiple enlarged mediastinal lymph nodes concerning for metastatic lymphadenopathy and a polypoidal abnormality in the right lateral wall of the trachea.  CT of abdomen March 20 revealed extensive osseous metastatic disease mildly enlarged retroperitoneal lymph nodes.  Bone scan March 20 was consistent with widespread osseous metastasis particularly in the proximal half the left humeral shaft, abnormal uptake in the sternum and manubrium and a small focus in the posterior aspect of the calvarium.  This led to a plain film the left humerus with mottled sclerosis involving the shaft of the humerus particularly in the proximal half but no evidence of pathologic fracture.    The patient has additionally type 2 diabetes on insulin pump, again a history of prostate carcinoma prostatectomy 12 years previously, hypertension, and hyperlipidemia.  Additional studies included a PSA of 395.1 from March 14, 2018.The patient's been seen by urology March 15 with plans to start Firmagon.    The patient is now seen in pulmonary clinic with Dr. Bijan Rivera and we have discussed that he will need bronchoscopy and mediastinoscopy.  Interestingly his additional history includes a long occupational exposure including working in the [place] just out of school, subsequent construction work for many years and a  30-pack-year history of smoking stopping in the late 1990s.  He indicates that he followed with Dr. Guillen of urology for many years with no changes in his exams and normal PSAs.  He stopped this follow-up approximately 2 years ago.     Patient was seen in consultation with thoracic surgery on March 28 and plans are made for mediastinoscopy and bronchoscopy with lymph node biopsy.  Pathology revealed that these nodes were consistent with metastatic prostate carcinoma.  He was discussed at thoracic conference.  A PET/CT was not pursued and it was determined that he see medical oncology for hormonal treatment and radiation therapy if symptomatic.  It should be noted that the patient's March 15 [facility] office note describes that Firmagon was planned.     The patient has met with the son and grandson April 23.  We have also contacted Dr. Taylor of urology in that Firmagon was given just after his last visit and would next be due approximately May 3.  Patient feels considerably better with resolution of his pain, normalization of his performance status.  He would like to continue treatment through our office now contacted Dr. Taylor concerning this.    The patient is next seen June 11, 2018 we discussed his follow-up including his treatments with Lupron May 7 and his next treatment on July 30.  He has returned to essentially normal performance status and his PSA has dropped further from 395 March 14 27.8 May 7 and now 2.03 June 11.  We do plan to initiate Xgeva also study him via scans to document his improvement approximately a month from now.   The patient is next seen July 26, 2018.  Repeat imaging demonstrated interval resolution of mediastinal and retroperitoneal lymphadenopathy.  There is thickening in the distal aspect of the appendix with stranding?  Chronic appendicitis.  The patient indicates he is having no such symptoms and never has.  There is no change in sclerotic bony metastasis in the patient's  PSA has dropped substantially to 1.66 by July 19 and 1.79 July 26.  He is actually feeling excellent and this is corroborated by family members.   Patient is next seen January 10, 2019 continuing to do very well and, in fact indicating that [facility], likely hearing replacements, visual review.  He is not certain is going to continue his care with them or with us or combination of both.    Patient is next seen April 4, 2019.  He is recently discharged after hospitalization March 15-18 with left upper lobe pneumonia.  We reviewed the findings in detail with his gradual recovery now documented.  His studies include a CT of chest which does not demonstrate any further bony lesions and/or pulmonary metastasis having developed.  He is feeling considerably better and approximately back to his baseline.  The patient is next seen June 28, 2019 feeling well but having baseline generally musculoskeletal pain and restless legs.His recent exams include a PSA of 0.81, CMP with potassium of 5.3 with repeat pending.  His performance status overall remains good to very good and is clearly responding to his treatments.  The patient is next seen December 13, 2019 continue to feel well.  He has had, oddly enough, 2 episodes of sleep paralysis which have occurred to him previously and he wonders if there is any connection with his prostate carcinoma though this is felt unlikely.     The patient when assessed in December had his PSA go to 2.9.  We continue with Lupron and Xgeva and is seen back now March 9, 2020 without additional symptoms.  We discussed that a schedule could likely change moving to 3 months for both of these medications particularly if he needs additional therapy directed at progressive disease.     Patient contacted our practice May 4 concern for pain in his hips.  This led to moving his scans up and they were performed May 8, 2020 showing a sub-6 mm pleural-based  pulmonary nodule in the right lower lobe that is new from March 15, 2018, remainder of the sub-6 mm pulmonary nodules bilaterally are stable.  There is extensive osseous metastatic disease as previous with no other new findings.     The patient indicates, when seen, May 13 that his bone pain is now resolved.  While this is good information does not mean that he does not have further bony metastasis and we have discussed that a follow-up bone scan is likely necessary.  Furthermore he is having some difficulty with sleeplessness and increase in restless legs as he continues to stay at home during the COVID 19 crisis which, itself, is producing more anxiety for him.  A follow-up bone scan was obtained Lety 3 revealing multifocal osseous metastatic disease which was compared to March 2, 2018 with improvement.  No new abnormal uptake is present.  He is next seen with us on June 17, 2020 and, fortunately, is not having any significant pain at this point.  We discussed his scans in detail and, on balance, his performance status also remains improved.     It was elected to follow the patient rather than changes antiandrogen therapy.  He is reassessed September 3 with unchanged CMP, H&H of 11.4 and 36.3 with normal white count, platelet count and differential, PSA at 15.8.  Follow-up PA lateral chest x-ray does not show any new abnormalities though there is extensive metastatic bone disease throughout the bony thorax and osteoblastic metastasis have been seen on chest CT May 2020.  The patient is seen with his son Georges September 10 noticing increasing bony discomfort primarily in the right hip following fairly intensive gardening which he does frequently.  Now has further elevation of PSA were wondering whether we should try to intervene sooner per additional antiandrogens.  We will need to further assess him via CT scanning to make this decision     These were obtained October 1 showing extensive sclerotic disease with no  metastatic disease in chest abdomen or pelvis.  PSA had gone from 15.8 September 3-16 0.6 October 1.     He still has pain getting up in the morning and after active gardening.  This is manageable with current pain medications and, at present, it is not apparent that he needs to switch medications to gain better control of his disease.  The patient did have follow-up studies done including a PSA November 25, 2020 now at 25.4.  The patient is seen with his son December 10, 2020 doing fair but having some increased pain in the mid sternum and right hip that he ascribes to additional exercise/work in managing his garden.  It is apparent however, that his last bone scan was 6 months ago and we do need to reassess him concerning this.     The patient had follow-up bone scan performed January 11, 2021 showing again uptake in the calvarium, humerus, right medial clavicle, sternum, ribs, spine, sacrum, pelvis, right acetabulum, proximal femora and now left frontal bone which appears new.  There is also mild increase in sternal uptake and equivocal increase in the pelvic lesions of all of the sacrum and the right proximal femur.     The patient is seen with his son January 18, 2021 and indicates that he is having pain gradually worsened in his right hip, mid chest that had been an issue previously.  These findings on bone scan are reviewed with both of them as likely the underlying culprit for his discomfort.  He would need change of the systemic therapy but, at present, will ask for radiation therapy palliation initially.  He was previously treated by Dr. Kulkarni many years ago and will ask her to see him.  We will also make application for the current cost of Xtandi or Zytiga.  The patient is not felt to be a candidate for systemic chemotherapy.     The patient was referred for ration therapy as we continued to assess his PSA on current therapy as well as exam the cost of Zytiga or Xtandi.  Radiation therapy (Dr. Kulkarni)  saw the patient January 26 and felt that the right femur and sternal lesion could benefit from therapy-3000 cGy in 10 fractions.  The patient took treatment February 1 to February 12 to the above areas and is next seen back March 15.  Fortunately his pain has improved dramatically and he is quite happy about this.  Unfortunately he notes some difficulty with swallowing that is temporary and often leads to increased salivation and mucus production.  Patient was asked to return his next assessed April 12, 2021.  He is able to swallow with less pain but still has excess mucus production and issues with salivation.  His PSA has noted increase but he is having no additional bony discomfort at this point and, additionally, has noted low-grade fevers just under 100 degrees at nighttime?.  His weight, appetite and general performance status have remained good to excellent.  We discussed follow-up studies at this point to determine his status.        Past Medical History:   Diagnosis Date   • Bone cancer (CMS/HCC)    • Cellulitis of great toe of left foot 10/19/2018   • CKD (chronic kidney disease)     Stage 3; followed by Dr. Vicky Duran    • Diastolic dysfunction     GRADE II per echo 2018   • Difficulty breathing     during exertion   • Dyslipidemia    • Erectile dysfunction    • Fatigue    • History of blood transfusion    • History of kidney stones    • Hyperlipidemia    • Hypertension    • Hyponatremia    • Left ventricular hypertrophy     per echo 2018   • Localized edema    • Neuropathy    • Obstructive sleep apnea     USING CPAP   • Osteoarthritis    • Peptic ulcer    • Pneumonia    • Pneumonia of left upper lobe due to infectious organism 3/15/2019   • Prostate cancer (CMS/HCC)     Status post prostatectomy, radiation therapy, and hormone therapy followed by Dr. Lee; metastatsis to bone   • Pure hyperglyceridemia    • Restless leg syndrome    • Sepsis (CMS/HCC)    • Sinus bradycardia    • Transient cerebral  ischemia    • Type 2 diabetes mellitus (CMS/HCC)         Past Surgical History:   Procedure Laterality Date   • BRONCHOSCOPY N/A 4/9/2018    Procedure: BRONCHOSCOPY;  Surgeon: Bijan Rivera III, MD;  Location: [facility];  Service: Cardiothoracic   • COLONOSCOPY     • DEEP NECK LYMPH NODE BIOPSY / EXCISION     • HEMORRHOIDECTOMY     • MEDIASTINOSCOPY N/A 4/9/2018    Procedure: MEDIASTINOSCOPY WITH LYMPH NODE BIOPSY;  Surgeon: Bijan Rivera III, MD;  Location: [facility];  Service: Cardiothoracic   • OTHER SURGICAL HISTORY      ulcer repair   • PROSTATECTOMY  2006        Current Outpatient Medications on File Prior to Visit   Medication Sig Dispense Refill   • albuterol sulfate  (90 Base) MCG/ACT inhaler Inhale 2 puffs Every 4 (Four) Hours As Needed for Wheezing or Shortness of Air. 1 inhaler 0   • aspirin 81 MG EC tablet Take 1 tablet by mouth daily.     • cholecalciferol (VITAMIN D3) 1000 units tablet Take 2,000 Units by mouth Daily.     • Denosumab (XGEVA SC) Inject  under the skin into the appropriate area as directed Every 30 (Thirty) Days.     • dilTIAZem CD (CARDIZEM CD) 120 MG 24 hr capsule TAKE 1 CAPSULE EVERY DAY 90 capsule 3   • fluticasone (FLONASE SENSIMIST) 27.5 MCG/SPRAY nasal spray 2 sprays into each nostril As Needed.     • gabapentin (NEURONTIN) 300 MG capsule Take 2 capsules by mouth every night at bedtime. 60 capsule 2   • HYDROcodone-acetaminophen (NORCO) 5-325 MG per tablet Take 1 tablet by mouth Every 6 (Six) Hours As Needed for Moderate Pain . 120 tablet 0   • insulin aspart (NovoLOG) 100 UNIT/ML patient supplied pump Inject  under the skin into the appropriate area as directed Continuous. Pt reports pump with basal rate 1.9 units/hr from 0800 to 1200. Basal rate 1.8 units/hr 1200 to 0800.  Checks bg ac/hs with bolus doses per pumps parameters     • Leuprolide Acetate, 3 Month, (LUPRON DEPOT, 3-MONTH, IM) Inject  into the appropriate muscle as directed by prescriber Every  3 (Three) Months.     • levothyroxine (SYNTHROID, LEVOTHROID) 88 MCG tablet      • modafinil (PROVIGIL) 200 MG tablet Take 1 tablet by mouth Daily. 90 tablet 1   • olmesartan (BENICAR) 40 MG tablet TAKE 1 TABLET EVERY DAY (DISCONTINUE LOSARTAN 100MG) 90 tablet 3   • omeprazole (priLOSEC) 20 MG capsule Take 1 capsule by mouth every night at bedtime. 90 capsule 2   • pravastatin (PRAVACHOL) 40 MG tablet Take 40 mg by mouth daily.     • rOPINIRole (REQUIP) 0.5 MG tablet Take 1 tablet by mouth in the evening and 1 at bedtime. 180 tablet 2   • traZODone (DESYREL) 50 MG tablet Take  mg by mouth every night at bedtime.     • triamterene-hydrochlorothiazide (MAXZIDE-25) 37.5-25 MG per tablet Take 1 tablet by mouth Daily. 90 tablet 0     No current facility-administered medications on file prior to visit.        ALLERGIES:    Allergies   Allergen Reactions   • Niacin Unknown - Low Severity   • Statins Unknown - Low Severity        Social History     Socioeconomic History   • Marital status:      Spouse name: Not on file   • Number of children: Not on file   • Years of education: College   • Highest education level: Not on file   Tobacco Use   • Smoking status: Former Smoker     Packs/day: 1.00     Years: 30.00     Pack years: 30.00     Types: Cigarettes     Quit date: 1998     Years since quittin.2   • Smokeless tobacco: Never Used   Substance and Sexual Activity   • Alcohol use: No     Comment: Caffeine use: 2-3 cups daily   • Drug use: No   • Sexual activity: Defer        Family History   Problem Relation Age of Onset   • Heart failure Mother    • Hypertension Mother    • Diabetes Mother    • Heart disease Mother    • Lung cancer Father 46   • Hypertension Sister    • Lung cancer Sister 60   • Hypertension Brother    • Lung cancer Brother 62   • Heart disease Other    • Prostate cancer Brother 60   • Malig Hyperthermia Neg Hx         Review of Systems   Constitutional: Positive for fatigue and fever.  "  HENT: Negative.    Eyes: Negative.    Respiratory: Negative for chest tightness, shortness of breath and wheezing.    Gastrointestinal: Negative for abdominal pain, constipation, diarrhea, nausea and vomiting.        Increasing reflux symptoms   Endocrine: Negative.    Genitourinary: Negative.    Musculoskeletal: Negative for arthralgias.   Skin: Negative.    Allergic/Immunologic: Negative.    Neurological: Positive for weakness.   Hematological: Negative.    Psychiatric/Behavioral: Positive for sleep disturbance.   All other systems reviewed and are negative.      .  Objective     Vitals:    04/12/21 1006   BP: 147/55   Pulse: 61   Resp: 16   Temp: 97.3 °F (36.3 °C)   TempSrc: Temporal   SpO2: 94%   Weight: 90.5 kg (199 lb 8 oz)   Height: 165.1 cm (65\")   PainSc: 0-No pain     Current Status 4/12/2021   ECOG score 1       Physical Exam   Constitutional: He is oriented to person, place, and time.   HENT:   Head: Normocephalic and atraumatic.   Nose: Nose normal.   Mouth/Throat: Mucous membranes are moist. Oropharynx is clear.   Eyes: Pupils are equal, round, and reactive to light. Conjunctivae are normal.   Cardiovascular: Normal rate, regular rhythm, normal heart sounds and normal pulses.   Pulmonary/Chest: Effort normal and breath sounds normal.   Abdominal: Soft. Normal appearance and bowel sounds are normal.   Musculoskeletal: Normal range of motion.   Neurological: He is alert and oriented to person, place, and time.   Skin: Skin is warm and dry.   Psychiatric: His behavior is normal. Mood normal.     RECENT LABS:  Hematology WBC   Date Value Ref Range Status   04/12/2021 7.03 3.40 - 10.80 10*3/mm3 Final     RBC   Date Value Ref Range Status   04/12/2021 3.67 (L) 4.14 - 5.80 10*6/mm3 Final     Hemoglobin   Date Value Ref Range Status   04/12/2021 10.5 (L) 13.0 - 17.7 g/dL Final     Hematocrit   Date Value Ref Range Status   04/12/2021 33.7 (L) 37.5 - 51.0 % Final     Platelets   Date Value Ref Range Status "   04/12/2021 197 140 - 450 10*3/mm3 Final      NUCLEAR MEDICINE WHOLE BODY BONE SCAN 1/11/20221    FINDINGS:  There is multifocal osseous metastatic disease with abnormal  uptake throughout the axial and appendicular skeleton involving the left  calvarium, left proximal humerus, right medial clavicle, sternum, ribs,  spine, sacrum, pelvis, right acetabulum, both proximal femora. Left  frontal bone uptake appears to be new when compared to the previous  exam. There has been mild progression in degree of sternal uptake and  there is equivocal increase in intensity of uptake involving pelvic  lesions including the left sacrum and right proximal femur.      IMPRESSION:  Multifocal osseous metastatic disease with some evidence for  progression when compared to the prior exam 06/03/2020. There is a new  small lesion overlying the left frontal bone.       Assessment/Plan       80 year-old male with medical history including prostate carcinoma, CK D3, long-term tobacco use recent development of hoarseness and subsequent studies that demonstrated findings consistent with metastatic disease to bone as well as multiple enlarged metastatic lymph nodes, and potential abnormality in the right lateral wall trachea.  His additional history includes type 2 diabetes on insulin pump which has been more effective for control of his blood sugars.    His recent symptoms have included generalized discomfort in multiple sites in his bony skeleton as well as right lower quadrant abdominal pain.  We discussed this made will improve after he starts Firmagon in the a.m.  Discussions with Dr. Rivera also have led to the conclusion that he'll need bronchoscopy and mediastinoscopy which did proceed as above on April 9, 2018.  Pathology revealed mediastinal lymph nodes to be involved with metastatic adenocarcinoma consistent with origin from a prostatic primary . The patient's case was discussed at thoracic conference and recommendations were to  continue with ADT and radiation for symptomatic sites.  The patient is seen back April 23 his treatment with ADT-Firmagon-has improved his symptomatic performance status to near baseline.  It is not felt that he'll require other treatments such as oral antiandrogens at this point.  He could, however, potentially benefit from agents such as Xgeva given at appropriate intervals. We went on to continue with Xgeva and rescan him July 23 demonstrating near remission developing.  This is also supported by his normalizing PSA.  The patient's scans demonstrated possibly chronic appendicitis is not having symptoms referable to this.he plans to be alert to the possibility of stenosis family member.  We discussed moving to an every 6 month treatment but at this point we'll continue at 3 months. As result we continued Lupron and Xgeva on schedule and is seen back now 6 months later continuing to generally improve.     We will continue the patient's treatment and is done well with no further symptoms until recently admitted mid March with upper lobe pneumonia.  Review of his studies during that visit fortunately do not demonstrate bony metastasis, additional mediastinal adenopathy, pleural effusions or masses.  It is felt that his metastatic prostate carcinoma remains controlled.  We planned, as a result to continue Xgeva and Lupron and the patient is now seen back continuing to do well.  We went on to discuss Lupron and Xgeva, medications including Neurontin and Requip which have been helpful and the patient is next seen December 13, 2019 again with symptoms control.  His PSA went on to be measured at 2.9.  Would like to continue same and have him return for follow-up in the further progressive consider adding additional oral therapy to her treatment.   We continued Xgeva and Lupron and later contacted the patient about his PSA of 5.030.  Follow-up scans were scheduled prior to his visit in 3 months but they have not been done  at 2 months at May 8 when the patient called about increasing hip pain.  These scans do not demonstrate progression of disease in any significant way.  We have gone on to review the scans together and his overall symptoms and plan to try to address his additional sleep issues, restless legs, pain management as necessary and further diagnostics.  The patient's Neurontin was increased and did improve his symptoms.  He is next seen with follow-up bone scan that does not demonstrate worsening of bone nor need for localized radiation therapy.  The patient's PSA has been slowly rising and we have not been able to determine worsening of disease and and, therefore, it is not felt warranted add additional medications such as Xtandi or apalutamide to his therapy.  Please note would like to avoid Zytiga try not to add prednisone which would worsen his blood sugar control.  Is next seen September 9, 2020 with PSA that has increased to 15.8 though the patient remains reasonably asymptomatic and chest x-ray is otherwise negative.  We have discussed continue Neurontin and trazodone, follow-up CT scan series in the next several months to determine whether he has additional disease progressing that can be documented other than the PSA alone.     As he is reassessed September 10 he is having slowly increasing bony discomfort and his PSA has now gone to just over 15.  We have discussed repeat assessment requesting CT scanning.     These scans ultimately reveal no evidence of progression of disease for visceral involvement or clearly for bone involvement.  After further review with the patient and his son we proceeded with Xgeva, continued medications and had him reviewed for Xtandi which was found to be cost prohibitive.  His follow-up PSA has demonstrated gradual increase in his review December 10, 2020 with some mild increase in bony discomfort.  After repeat discussion we went on to have him undergo Lupron therapy, and his PSA  actually to be mildly reduced at 22.2.  Follow-up bone scan demonstrates some evidence of progression in sternum, left sacrum and proximal femur.  These findings were reviewed with the patient and his son January 10, 2020 and the patient was referred for palliative radiotherapy(Dr. Kulkarni) who saw the patient January 26 and felt that the right femur and sternal lesion could benefit from therapy-3000 cGy in 10 fractions.  The patient took treatment February 1 to February 12 to the above areas with significant symptom improvement.     The patient's neck seen back March 15, 2021 with symptoms of increasing reflux.  We proceeded with Xgeva and Lupron and noted his PSA to be elevated to 38.5.  The patient was seen April 12, 2021 is having low-grade fevers without significant symptoms, no cough, no chest pain, no shortness of breath.    Plan:  *Proceed with Xgeva (pending electrolyte assessment)     *PSA repeat today    *We have discussed follow-up CT chest and pelvis in 3 weeks    *4 weeks MD follow-up, possible Xgeva that day, son may attend    *At present Zytiga or Xtandi are cost prohibitive though we could seek assistance as needed, recheck with office staff about cost    *Reflux to be addressed with Prilosec 20 mg p.o. nightly, other medications to continue at present, refill of Neurontin    *Patient contacted about hyperkalemia, status post 2 issues this a.m., recheck upon follow-up.        I spent 45 total minutes, face-to-face, caring for Semaj today.  Greater than 50% of this time involved counseling and/or coordination of care as documented within this note.

## 2021-04-12 ENCOUNTER — INFUSION (OUTPATIENT)
Dept: ONCOLOGY | Facility: HOSPITAL | Age: 81
End: 2021-04-12

## 2021-04-12 ENCOUNTER — LAB (OUTPATIENT)
Dept: LAB | Facility: HOSPITAL | Age: 81
End: 2021-04-12

## 2021-04-12 ENCOUNTER — OFFICE VISIT (OUTPATIENT)
Dept: ONCOLOGY | Facility: CLINIC | Age: 81
End: 2021-04-12

## 2021-04-12 VITALS
TEMPERATURE: 97.3 F | OXYGEN SATURATION: 94 % | DIASTOLIC BLOOD PRESSURE: 55 MMHG | HEART RATE: 61 BPM | WEIGHT: 199.5 LBS | SYSTOLIC BLOOD PRESSURE: 147 MMHG | HEIGHT: 65 IN | RESPIRATION RATE: 16 BRPM | BODY MASS INDEX: 33.24 KG/M2

## 2021-04-12 DIAGNOSIS — C79.51 OSSEOUS METASTASIS: ICD-10-CM

## 2021-04-12 DIAGNOSIS — C61 PROSTATE CANCER METASTATIC TO BONE (HCC): Primary | ICD-10-CM

## 2021-04-12 DIAGNOSIS — C61 PROSTATE CANCER METASTATIC TO BONE (HCC): ICD-10-CM

## 2021-04-12 DIAGNOSIS — C79.51 PROSTATE CANCER METASTATIC TO BONE (HCC): Primary | ICD-10-CM

## 2021-04-12 DIAGNOSIS — C79.51 PROSTATE CANCER METASTATIC TO BONE (HCC): ICD-10-CM

## 2021-04-12 LAB
ALBUMIN SERPL-MCNC: 3.9 G/DL (ref 3.5–5.2)
ALBUMIN/GLOB SERPL: 1.3 G/DL (ref 1.1–2.4)
ALP SERPL-CCNC: 352 U/L (ref 38–116)
ALT SERPL W P-5'-P-CCNC: 14 U/L (ref 0–41)
ANION GAP SERPL CALCULATED.3IONS-SCNC: 8.7 MMOL/L (ref 5–15)
AST SERPL-CCNC: 17 U/L (ref 0–40)
BASOPHILS # BLD AUTO: 0.03 10*3/MM3 (ref 0–0.2)
BASOPHILS NFR BLD AUTO: 0.4 % (ref 0–1.5)
BILIRUB SERPL-MCNC: <0.2 MG/DL (ref 0.2–1.2)
BUN SERPL-MCNC: 25 MG/DL (ref 6–20)
BUN/CREAT SERPL: 22.5 (ref 7.3–30)
CALCIUM SPEC-SCNC: 9.2 MG/DL (ref 8.5–10.2)
CHLORIDE SERPL-SCNC: 109 MMOL/L (ref 98–107)
CO2 SERPL-SCNC: 22.3 MMOL/L (ref 22–29)
CREAT SERPL-MCNC: 1.11 MG/DL (ref 0.7–1.3)
DEPRECATED RDW RBC AUTO: 51.6 FL (ref 37–54)
EOSINOPHIL # BLD AUTO: 0.17 10*3/MM3 (ref 0–0.4)
EOSINOPHIL NFR BLD AUTO: 2.4 % (ref 0.3–6.2)
ERYTHROCYTE [DISTWIDTH] IN BLOOD BY AUTOMATED COUNT: 15.4 % (ref 12.3–15.4)
GFR SERPL CREATININE-BSD FRML MDRD: 64 ML/MIN/1.73
GLOBULIN UR ELPH-MCNC: 3.1 GM/DL (ref 1.8–3.5)
GLUCOSE SERPL-MCNC: 112 MG/DL (ref 74–124)
HCT VFR BLD AUTO: 33.7 % (ref 37.5–51)
HGB BLD-MCNC: 10.5 G/DL (ref 13–17.7)
IMM GRANULOCYTES # BLD AUTO: 0.22 10*3/MM3 (ref 0–0.05)
IMM GRANULOCYTES NFR BLD AUTO: 3.1 % (ref 0–0.5)
LYMPHOCYTES # BLD AUTO: 1.89 10*3/MM3 (ref 0.7–3.1)
LYMPHOCYTES NFR BLD AUTO: 26.9 % (ref 19.6–45.3)
MAGNESIUM SERPL-MCNC: 2.4 MG/DL (ref 1.8–2.5)
MCH RBC QN AUTO: 28.6 PG (ref 26.6–33)
MCHC RBC AUTO-ENTMCNC: 31.2 G/DL (ref 31.5–35.7)
MCV RBC AUTO: 91.8 FL (ref 79–97)
MONOCYTES # BLD AUTO: 0.65 10*3/MM3 (ref 0.1–0.9)
MONOCYTES NFR BLD AUTO: 9.2 % (ref 5–12)
NEUTROPHILS NFR BLD AUTO: 4.07 10*3/MM3 (ref 1.7–7)
NEUTROPHILS NFR BLD AUTO: 58 % (ref 42.7–76)
NRBC BLD AUTO-RTO: 0 /100 WBC (ref 0–0.2)
PHOSPHATE SERPL-MCNC: 2.5 MG/DL (ref 2.5–4.5)
PLATELET # BLD AUTO: 197 10*3/MM3 (ref 140–450)
PMV BLD AUTO: 9 FL (ref 6–12)
POTASSIUM SERPL-SCNC: 6.1 MMOL/L (ref 3.5–4.7)
PROT SERPL-MCNC: 7 G/DL (ref 6.3–8)
PSA SERPL-MCNC: 44 NG/ML (ref 0–4)
RBC # BLD AUTO: 3.67 10*6/MM3 (ref 4.14–5.8)
SODIUM SERPL-SCNC: 140 MMOL/L (ref 134–145)
WBC # BLD AUTO: 7.03 10*3/MM3 (ref 3.4–10.8)

## 2021-04-12 PROCEDURE — 96372 THER/PROPH/DIAG INJ SC/IM: CPT

## 2021-04-12 PROCEDURE — 36415 COLL VENOUS BLD VENIPUNCTURE: CPT

## 2021-04-12 PROCEDURE — 80053 COMPREHEN METABOLIC PANEL: CPT

## 2021-04-12 PROCEDURE — 99215 OFFICE O/P EST HI 40 MIN: CPT | Performed by: INTERNAL MEDICINE

## 2021-04-12 PROCEDURE — 83735 ASSAY OF MAGNESIUM: CPT

## 2021-04-12 PROCEDURE — 84100 ASSAY OF PHOSPHORUS: CPT

## 2021-04-12 PROCEDURE — 85025 COMPLETE CBC W/AUTO DIFF WBC: CPT

## 2021-04-12 PROCEDURE — 84153 ASSAY OF PSA TOTAL: CPT | Performed by: INTERNAL MEDICINE

## 2021-04-12 PROCEDURE — 25010000002 DENOSUMAB 120 MG/1.7ML SOLUTION: Performed by: INTERNAL MEDICINE

## 2021-04-12 RX ORDER — GABAPENTIN 300 MG/1
600 CAPSULE ORAL
Qty: 60 CAPSULE | Refills: 2 | Status: SHIPPED | OUTPATIENT
Start: 2021-04-12 | End: 2021-07-26 | Stop reason: SDUPTHER

## 2021-04-12 RX ADMIN — DENOSUMAB 120 MG: 120 INJECTION SUBCUTANEOUS at 11:34

## 2021-04-13 ENCOUNTER — TELEPHONE (OUTPATIENT)
Dept: FAMILY MEDICINE CLINIC | Facility: CLINIC | Age: 81
End: 2021-04-13

## 2021-04-13 NOTE — TELEPHONE ENCOUNTER
SPOKE WITH PT  WANTED MEDICAL ADVICE REGARDING GRANDSON WHO IS NOT A PT HERE  INFORMED UNABLE TO GIVE ADVICE DUE TO NOT BEING PT

## 2021-04-13 NOTE — TELEPHONE ENCOUNTER
PATIENT WOULD LIKE A CALL BACK FROM CLINICAL TO DISCUSS A PARTIAL BLOCKED VALVE. PATIENT DID NOT WANT TO LEAVE ANY FURTHER DETAILS AND STATED THAT HE IS A LONG TIME PATIENT OF DR. THOMPSON AND HE SHOULD KNOW WHAT IS GOING ON.    PLEASE ADVISE  [phone]

## 2021-04-21 DIAGNOSIS — C79.51 PROSTATE CANCER METASTATIC TO BONE (HCC): ICD-10-CM

## 2021-04-21 DIAGNOSIS — C61 PROSTATE CANCER METASTATIC TO BONE (HCC): ICD-10-CM

## 2021-04-22 RX ORDER — HYDROCODONE BITARTRATE AND ACETAMINOPHEN 5; 325 MG/1; MG/1
1 TABLET ORAL EVERY 6 HOURS PRN
Qty: 120 TABLET | Refills: 0 | Status: SHIPPED | OUTPATIENT
Start: 2021-04-22 | End: 2021-05-20 | Stop reason: SDUPTHER

## 2021-05-04 ENCOUNTER — HOSPITAL ENCOUNTER (OUTPATIENT)
Dept: PET IMAGING | Facility: HOSPITAL | Age: 81
Discharge: HOME | End: 2021-05-04
Admitting: INTERNAL MEDICINE

## 2021-05-04 DIAGNOSIS — C79.51 PROSTATE CANCER METASTATIC TO BONE (HCC): ICD-10-CM

## 2021-05-04 DIAGNOSIS — C61 PROSTATE CANCER METASTATIC TO BONE (HCC): ICD-10-CM

## 2021-05-04 LAB — CREAT BLDA-MCNC: 1.1 MG/DL (ref 0.6–1.3)

## 2021-05-04 PROCEDURE — 0 DIATRIZOATE MEGLUMINE & SODIUM PER 1 ML: Performed by: INTERNAL MEDICINE

## 2021-05-04 PROCEDURE — 71260 CT THORAX DX C+: CPT

## 2021-05-04 PROCEDURE — 74177 CT ABD & PELVIS W/CONTRAST: CPT

## 2021-05-04 PROCEDURE — 82565 ASSAY OF CREATININE: CPT

## 2021-05-04 PROCEDURE — 25010000002 IOPAMIDOL 61 % SOLUTION: Performed by: INTERNAL MEDICINE

## 2021-05-04 RX ADMIN — IOPAMIDOL 85 ML: 612 INJECTION, SOLUTION INTRAVENOUS at 08:51

## 2021-05-04 RX ADMIN — DIATRIZOATE MEGLUMINE AND DIATRIZOATE SODIUM 30 ML: 660; 100 LIQUID ORAL; RECTAL at 08:10

## 2021-05-10 ENCOUNTER — OFFICE VISIT (OUTPATIENT)
Dept: ONCOLOGY | Facility: CLINIC | Age: 81
End: 2021-05-10

## 2021-05-10 ENCOUNTER — LAB (OUTPATIENT)
Dept: LAB | Facility: HOSPITAL | Age: 81
End: 2021-05-10

## 2021-05-10 ENCOUNTER — INFUSION (OUTPATIENT)
Dept: ONCOLOGY | Facility: HOSPITAL | Age: 81
End: 2021-05-10

## 2021-05-10 VITALS
WEIGHT: 201.8 LBS | SYSTOLIC BLOOD PRESSURE: 136 MMHG | HEIGHT: 65 IN | HEART RATE: 58 BPM | BODY MASS INDEX: 33.62 KG/M2 | TEMPERATURE: 97.5 F | DIASTOLIC BLOOD PRESSURE: 53 MMHG | RESPIRATION RATE: 16 BRPM | OXYGEN SATURATION: 98 %

## 2021-05-10 DIAGNOSIS — C79.51 PROSTATE CANCER METASTATIC TO BONE (HCC): ICD-10-CM

## 2021-05-10 DIAGNOSIS — C61 PROSTATE CANCER METASTATIC TO BONE (HCC): ICD-10-CM

## 2021-05-10 DIAGNOSIS — C61 PROSTATE CANCER METASTATIC TO BONE (HCC): Primary | ICD-10-CM

## 2021-05-10 DIAGNOSIS — C79.51 OSSEOUS METASTASIS: ICD-10-CM

## 2021-05-10 DIAGNOSIS — C79.51 PROSTATE CANCER METASTATIC TO BONE (HCC): Primary | ICD-10-CM

## 2021-05-10 DIAGNOSIS — C79.51 OSSEOUS METASTASIS: Primary | ICD-10-CM

## 2021-05-10 LAB
ALBUMIN SERPL-MCNC: 3.8 G/DL (ref 3.5–5.2)
ALBUMIN/GLOB SERPL: 1.2 G/DL (ref 1.1–2.4)
ALP SERPL-CCNC: 276 U/L (ref 38–116)
ALT SERPL W P-5'-P-CCNC: 14 U/L (ref 0–41)
ANION GAP SERPL CALCULATED.3IONS-SCNC: 9.2 MMOL/L (ref 5–15)
AST SERPL-CCNC: 18 U/L (ref 0–40)
BASOPHILS # BLD AUTO: 0.02 10*3/MM3 (ref 0–0.2)
BASOPHILS NFR BLD AUTO: 0.4 % (ref 0–1.5)
BILIRUB SERPL-MCNC: 0.2 MG/DL (ref 0.2–1.2)
BUN SERPL-MCNC: 19 MG/DL (ref 6–20)
BUN/CREAT SERPL: 16.5 (ref 7.3–30)
CALCIUM SPEC-SCNC: 8.1 MG/DL (ref 8.5–10.2)
CHLORIDE SERPL-SCNC: 107 MMOL/L (ref 98–107)
CO2 SERPL-SCNC: 22.8 MMOL/L (ref 22–29)
CREAT SERPL-MCNC: 1.15 MG/DL (ref 0.7–1.3)
DEPRECATED RDW RBC AUTO: 51.3 FL (ref 37–54)
EOSINOPHIL # BLD AUTO: 0.16 10*3/MM3 (ref 0–0.4)
EOSINOPHIL NFR BLD AUTO: 3 % (ref 0.3–6.2)
ERYTHROCYTE [DISTWIDTH] IN BLOOD BY AUTOMATED COUNT: 15.4 % (ref 12.3–15.4)
GFR SERPL CREATININE-BSD FRML MDRD: 61 ML/MIN/1.73
GLOBULIN UR ELPH-MCNC: 3.2 GM/DL (ref 1.8–3.5)
GLUCOSE SERPL-MCNC: 141 MG/DL (ref 74–124)
HCT VFR BLD AUTO: 31.6 % (ref 37.5–51)
HGB BLD-MCNC: 10.1 G/DL (ref 13–17.7)
IMM GRANULOCYTES # BLD AUTO: 0.08 10*3/MM3 (ref 0–0.05)
IMM GRANULOCYTES NFR BLD AUTO: 1.5 % (ref 0–0.5)
LYMPHOCYTES # BLD AUTO: 1.17 10*3/MM3 (ref 0.7–3.1)
LYMPHOCYTES NFR BLD AUTO: 21.6 % (ref 19.6–45.3)
MAGNESIUM SERPL-MCNC: 2.6 MG/DL (ref 1.8–2.5)
MCH RBC QN AUTO: 29.3 PG (ref 26.6–33)
MCHC RBC AUTO-ENTMCNC: 32 G/DL (ref 31.5–35.7)
MCV RBC AUTO: 91.6 FL (ref 79–97)
MONOCYTES # BLD AUTO: 0.62 10*3/MM3 (ref 0.1–0.9)
MONOCYTES NFR BLD AUTO: 11.5 % (ref 5–12)
NEUTROPHILS NFR BLD AUTO: 3.36 10*3/MM3 (ref 1.7–7)
NEUTROPHILS NFR BLD AUTO: 62 % (ref 42.7–76)
NRBC BLD AUTO-RTO: 0 /100 WBC (ref 0–0.2)
PHOSPHATE SERPL-MCNC: 2.3 MG/DL (ref 2.5–4.5)
PLATELET # BLD AUTO: 192 10*3/MM3 (ref 140–450)
PMV BLD AUTO: 8.9 FL (ref 6–12)
POTASSIUM SERPL-SCNC: 4.8 MMOL/L (ref 3.5–4.7)
PROT SERPL-MCNC: 7 G/DL (ref 6.3–8)
PSA SERPL-MCNC: 42.6 NG/ML (ref 0–4)
RBC # BLD AUTO: 3.45 10*6/MM3 (ref 4.14–5.8)
SODIUM SERPL-SCNC: 139 MMOL/L (ref 134–145)
WBC # BLD AUTO: 5.41 10*3/MM3 (ref 3.4–10.8)

## 2021-05-10 PROCEDURE — 84100 ASSAY OF PHOSPHORUS: CPT

## 2021-05-10 PROCEDURE — 85025 COMPLETE CBC W/AUTO DIFF WBC: CPT

## 2021-05-10 PROCEDURE — 25010000002 DENOSUMAB 120 MG/1.7ML SOLUTION: Performed by: INTERNAL MEDICINE

## 2021-05-10 PROCEDURE — 36415 COLL VENOUS BLD VENIPUNCTURE: CPT

## 2021-05-10 PROCEDURE — 84153 ASSAY OF PSA TOTAL: CPT | Performed by: INTERNAL MEDICINE

## 2021-05-10 PROCEDURE — 99215 OFFICE O/P EST HI 40 MIN: CPT | Performed by: INTERNAL MEDICINE

## 2021-05-10 PROCEDURE — 80053 COMPREHEN METABOLIC PANEL: CPT

## 2021-05-10 PROCEDURE — 83735 ASSAY OF MAGNESIUM: CPT

## 2021-05-10 PROCEDURE — 96372 THER/PROPH/DIAG INJ SC/IM: CPT

## 2021-05-10 RX ADMIN — DENOSUMAB 120 MG: 120 INJECTION SUBCUTANEOUS at 09:45

## 2021-05-10 NOTE — PROGRESS NOTES
Subjective  Patient with better pain control, symptoms of esophagitis slowly resolving      History of present illness:   The patient is an 80-year-old male with significant past medical history including prostate carcinoma, CKD 3 and long-term tobacco use be been seen by primary care in late January, 2018 for hoarseness.  Chest x-ray is now outpatient January 23 revealed sclerotic change in the posterior mid chest to be within 3 adjacent thoracic ertebral bodies of uncertain significance.  A follow-up chest CT revealed numerous sclerotic foci within al visualized bones consistent with metastatic disease, multiple enlarged mediastinal lymph nodes concerning for metastatic lymphadenopathy and a polypoidal abnormality in the right lateral wall of the trachea.  CT of abdomen March 20 revealed extensive osseous metastatic disease mildly enlarged retroperitoneal lymph nodes.  Bone scan March 20 was consistent with widespread osseous metastasis particularly in the proximal half the left humeral shaft, abnormal uptake in the sternum and manubrium and a small focus in the posterior aspect of the calvarium.  This led to a plain film the left humerus with mottled sclerosis involving the shaft of the humerus particularly in the proximal half but no evidence of pathologic fracture.    The patient has additionally type 2 diabetes on insulin pump, again a history of prostate carcinoma prostatectomy 12 years previously, hypertension, and hyperlipidemia.  Additional studies included a PSA of 395.1 from March 14, 2018.The patient's been seen by urology March 15 with plans to start Firmagon.    The patient is now seen in pulmonary clinic with Dr. Bijan Rivera and we have discussed that he will need bronchoscopy and mediastinoscopy.  Interestingly his additional history includes a long occupational exposure including working in the [place] just out of school, subsequent construction work for many years and a  30-pack-year history of smoking stopping in the late 1990s.  He indicates that he followed with Dr. Guillen of urology for many years with no changes in his exams and normal PSAs.  He stopped this follow-up approximately 2 years ago.     Patient was seen in consultation with thoracic surgery on March 28 and plans are made for mediastinoscopy and bronchoscopy with lymph node biopsy.  Pathology revealed that these nodes were consistent with metastatic prostate carcinoma.  He was discussed at thoracic conference.  A PET/CT was not pursued and it was determined that he see medical oncology for hormonal treatment and radiation therapy if symptomatic.  It should be noted that the patient's March 15 [facility] office note describes that Firmagon was planned.     The patient has met with the son and grandson April 23.  We have also contacted Dr. Taylor of urology in that Firmagon was given just after his last visit and would next be due approximately May 3.  Patient feels considerably better with resolution of his pain, normalization of his performance status.  He would like to continue treatment through our office now contacted Dr. Taylor concerning this.    The patient is next seen June 11, 2018 we discussed his follow-up including his treatments with Lupron May 7 and his next treatment on July 30.  He has returned to essentially normal performance status and his PSA has dropped further from 395 March 14 27.8 May 7 and now 2.03 June 11.  We do plan to initiate Xgeva also study him via scans to document his improvement approximately a month from now.   The patient is next seen July 26, 2018.  Repeat imaging demonstrated interval resolution of mediastinal and retroperitoneal lymphadenopathy.  There is thickening in the distal aspect of the appendix with stranding?  Chronic appendicitis.  The patient indicates he is having no such symptoms and never has.  There is no change in sclerotic bony metastasis in the patient's  PSA has dropped substantially to 1.66 by July 19 and 1.79 July 26.  He is actually feeling excellent and this is corroborated by family members.   Patient is next seen January 10, 2019 continuing to do very well and, in fact indicating that [facility], likely hearing replacements, visual review.  He is not certain is going to continue his care with them or with us or combination of both.    Patient is next seen April 4, 2019.  He is recently discharged after hospitalization March 15-18 with left upper lobe pneumonia.  We reviewed the findings in detail with his gradual recovery now documented.  His studies include a CT of chest which does not demonstrate any further bony lesions and/or pulmonary metastasis having developed.  He is feeling considerably better and approximately back to his baseline.  The patient is next seen June 28, 2019 feeling well but having baseline generally musculoskeletal pain and restless legs.His recent exams include a PSA of 0.81, CMP with potassium of 5.3 with repeat pending.  His performance status overall remains good to very good and is clearly responding to his treatments.  The patient is next seen December 13, 2019 continue to feel well.  He has had, oddly enough, 2 episodes of sleep paralysis which have occurred to him previously and he wonders if there is any connection with his prostate carcinoma though this is felt unlikely.     The patient when assessed in December had his PSA go to 2.9.  We continue with Lupron and Xgeva and is seen back now March 9, 2020 without additional symptoms.  We discussed that a schedule could likely change moving to 3 months for both of these medications particularly if he needs additional therapy directed at progressive disease.     Patient contacted our practice May 4 concern for pain in his hips.  This led to moving his scans up and they were performed May 8, 2020 showing a sub-6 mm pleural-based  pulmonary nodule in the right lower lobe that is new from March 15, 2018, remainder of the sub-6 mm pulmonary nodules bilaterally are stable.  There is extensive osseous metastatic disease as previous with no other new findings.     The patient indicates, when seen, May 13 that his bone pain is now resolved.  While this is good information does not mean that he does not have further bony metastasis and we have discussed that a follow-up bone scan is likely necessary.  Furthermore he is having some difficulty with sleeplessness and increase in restless legs as he continues to stay at home during the COVID 19 crisis which, itself, is producing more anxiety for him.  A follow-up bone scan was obtained Lety 3 revealing multifocal osseous metastatic disease which was compared to March 2, 2018 with improvement.  No new abnormal uptake is present.  He is next seen with us on June 17, 2020 and, fortunately, is not having any significant pain at this point.  We discussed his scans in detail and, on balance, his performance status also remains improved.     It was elected to follow the patient rather than changes antiandrogen therapy.  He is reassessed September 3 with unchanged CMP, H&H of 11.4 and 36.3 with normal white count, platelet count and differential, PSA at 15.8.  Follow-up PA lateral chest x-ray does not show any new abnormalities though there is extensive metastatic bone disease throughout the bony thorax and osteoblastic metastasis have been seen on chest CT May 2020.  The patient is seen with his son Georges September 10 noticing increasing bony discomfort primarily in the right hip following fairly intensive gardening which he does frequently.  Now has further elevation of PSA were wondering whether we should try to intervene sooner per additional antiandrogens.  We will need to further assess him via CT scanning to make this decision     These were obtained October 1 showing extensive sclerotic disease with no  metastatic disease in chest abdomen or pelvis.  PSA had gone from 15.8 September 3-16 0.6 October 1.     He still has pain getting up in the morning and after active gardening.  This is manageable with current pain medications and, at present, it is not apparent that he needs to switch medications to gain better control of his disease.  The patient did have follow-up studies done including a PSA November 25, 2020 now at 25.4.  The patient is seen with his son December 10, 2020 doing fair but having some increased pain in the mid sternum and right hip that he ascribes to additional exercise/work in managing his garden.  It is apparent however, that his last bone scan was 6 months ago and we do need to reassess him concerning this.     The patient had follow-up bone scan performed January 11, 2021 showing again uptake in the calvarium, humerus, right medial clavicle, sternum, ribs, spine, sacrum, pelvis, right acetabulum, proximal femora and now left frontal bone which appears new.  There is also mild increase in sternal uptake and equivocal increase in the pelvic lesions of all of the sacrum and the right proximal femur.     The patient is seen with his son January 18, 2021 and indicates that he is having pain gradually worsened in his right hip, mid chest that had been an issue previously.  These findings on bone scan are reviewed with both of them as likely the underlying culprit for his discomfort.  He would need change of the systemic therapy but, at present, will ask for radiation therapy palliation initially.  He was previously treated by Dr. Kulkarni many years ago and will ask her to see him.  We will also make application for the current cost of Xtandi or Zytiga.  The patient is not felt to be a candidate for systemic chemotherapy.     The patient was referred for ration therapy as we continued to assess his PSA on current therapy as well as exam the cost of Zytiga or Xtandi.  Radiation therapy (Dr. Kulkarni)  saw the patient January 26 and felt that the right femur and sternal lesion could benefit from therapy-3000 cGy in 10 fractions.  The patient took treatment February 1 to February 12 to the above areas and is next seen back March 15.  Fortunately his pain has improved dramatically and he is quite happy about this.  Unfortunately he notes some difficulty with swallowing that is temporary and often leads to increased salivation and mucus production.  Patient was asked to return his next assessed April 12, 2021.  He is able to swallow with less pain but still has excess mucus production and issues with salivation.  His PSA has noted increase but he is having no additional bony discomfort at this point and, additionally, has noted low-grade fevers just under 100 degrees at nighttime?.  His weight, appetite and general performance status have remained good to excellent.  We discussed follow-up studies at this point to determine his status.    Follow-up scans were scheduled CT scans of chest and pelvis 5/4/2021 showing osseous metastasis quite similar to previous examinations in the chest, CT of abdomen pelvis also with no acute intra-abdominal adenopathy no indication of abdominal pelvic metastatic disease but again widespread osseous metastasis.  His PSA at this point have been slowly increasing to a level of 44 on 4/12/2021.  As the patient is reassessed 5/10/2021 we find, fortunately, that he is not exceeding symptomatic.  It could make reasonable sense at this point to take the mediastinal nodes from his biopsy 4/9/2018, reassess potential targeted therapies, MSI status, and germline analysis.  Fortunately he is not having significant pain or discomfort and is seen with his son as we discussed the above plan.  Notably a follow-up PSA is found to be 42.6.  1    Past Medical History:   Diagnosis Date   • Bone cancer (CMS/HCC)    • Cellulitis of great toe of left foot 10/19/2018   • CKD (chronic kidney disease)      Stage 3; followed by Dr. Vicky Duran    • Diastolic dysfunction     GRADE II per echo 2018   • Difficulty breathing     during exertion   • Dyslipidemia    • Erectile dysfunction    • Fatigue    • History of blood transfusion    • History of kidney stones    • Hyperlipidemia    • Hypertension    • Hyponatremia    • Left ventricular hypertrophy     per echo 2018   • Localized edema    • Neuropathy    • Obstructive sleep apnea     USING CPAP   • Osteoarthritis    • Peptic ulcer    • Pneumonia    • Pneumonia of left upper lobe due to infectious organism 3/15/2019   • Prostate cancer (CMS/HCC)     Status post prostatectomy, radiation therapy, and hormone therapy followed by Dr. Lee; metastatsis to bone   • Pure hyperglyceridemia    • Restless leg syndrome    • Sepsis (CMS/HCC)    • Sinus bradycardia    • Transient cerebral ischemia    • Type 2 diabetes mellitus (CMS/HCC)         Past Surgical History:   Procedure Laterality Date   • BRONCHOSCOPY N/A 4/9/2018    Procedure: BRONCHOSCOPY;  Surgeon: Bijan Rivera III, MD;  Location: [facility];  Service: Cardiothoracic   • COLONOSCOPY     • DEEP NECK LYMPH NODE BIOPSY / EXCISION     • HEMORRHOIDECTOMY     • MEDIASTINOSCOPY N/A 4/9/2018    Procedure: MEDIASTINOSCOPY WITH LYMPH NODE BIOPSY;  Surgeon: Bijan Rivera III, MD;  Location: [facility];  Service: Cardiothoracic   • OTHER SURGICAL HISTORY      ulcer repair   • PROSTATECTOMY  2006        Current Outpatient Medications on File Prior to Visit   Medication Sig Dispense Refill   • albuterol sulfate  (90 Base) MCG/ACT inhaler Inhale 2 puffs Every 4 (Four) Hours As Needed for Wheezing or Shortness of Air. 1 inhaler 0   • aspirin 81 MG EC tablet Take 1 tablet by mouth daily.     • cholecalciferol (VITAMIN D3) 1000 units tablet Take 2,000 Units by mouth Daily.     • Denosumab (XGEVA SC) Inject  under the skin into the appropriate area as directed Every 30 (Thirty) Days.     • dilTIAZem CD (CARDIZEM  CD) 120 MG 24 hr capsule TAKE 1 CAPSULE EVERY DAY 90 capsule 3   • fluticasone (FLONASE SENSIMIST) 27.5 MCG/SPRAY nasal spray 2 sprays into each nostril As Needed.     • gabapentin (NEURONTIN) 300 MG capsule Take 2 capsules by mouth every night at bedtime. 60 capsule 2   • HYDROcodone-acetaminophen (NORCO) 5-325 MG per tablet Take 1 tablet by mouth Every 6 (Six) Hours As Needed for Moderate Pain . 120 tablet 0   • insulin aspart (NovoLOG) 100 UNIT/ML patient supplied pump Inject  under the skin into the appropriate area as directed Continuous. Pt reports pump with basal rate 1.9 units/hr from 0800 to 1200. Basal rate 1.8 units/hr 1200 to 0800.  Checks bg ac/hs with bolus doses per pumps parameters     • Leuprolide Acetate, 3 Month, (LUPRON DEPOT, 3-MONTH, IM) Inject  into the appropriate muscle as directed by prescriber Every 3 (Three) Months.     • levothyroxine (SYNTHROID, LEVOTHROID) 88 MCG tablet      • modafinil (PROVIGIL) 200 MG tablet Take 1 tablet by mouth Daily. 90 tablet 1   • olmesartan (BENICAR) 40 MG tablet TAKE 1 TABLET EVERY DAY (DISCONTINUE LOSARTAN 100MG) 90 tablet 3   • omeprazole (priLOSEC) 20 MG capsule Take 1 capsule by mouth every night at bedtime. 90 capsule 2   • pravastatin (PRAVACHOL) 40 MG tablet Take 40 mg by mouth daily.     • rOPINIRole (REQUIP) 0.5 MG tablet Take 1 tablet by mouth in the evening and 1 at bedtime. 180 tablet 2   • traZODone (DESYREL) 50 MG tablet Take  mg by mouth every night at bedtime.     • triamterene-hydrochlorothiazide (MAXZIDE-25) 37.5-25 MG per tablet Take 1 tablet by mouth Daily. 90 tablet 0     No current facility-administered medications on file prior to visit.        ALLERGIES:    Allergies   Allergen Reactions   • Niacin Unknown - Low Severity   • Statins Unknown - Low Severity        Social History     Socioeconomic History   • Marital status:      Spouse name: Not on file   • Number of children: Not on file   • Years of education: College   •  "Highest education level: Not on file   Tobacco Use   • Smoking status: Former Smoker     Packs/day: 1.00     Years: 30.00     Pack years: 30.00     Types: Cigarettes     Quit date: 1998     Years since quittin.3   • Smokeless tobacco: Never Used   Substance and Sexual Activity   • Alcohol use: No     Comment: Caffeine use: 2-3 cups daily   • Drug use: No   • Sexual activity: Defer        Family History   Problem Relation Age of Onset   • Heart failure Mother    • Hypertension Mother    • Diabetes Mother    • Heart disease Mother    • Lung cancer Father 46   • Hypertension Sister    • Lung cancer Sister 60   • Hypertension Brother    • Lung cancer Brother 62   • Heart disease Other    • Prostate cancer Brother 60   • Malig Hyperthermia Neg Hx         Review of Systems   Constitutional: Negative for fever.   HENT: Negative.    Eyes: Negative.    Respiratory: Negative for chest tightness, shortness of breath and wheezing.    Gastrointestinal: Negative for abdominal pain, constipation, diarrhea, nausea and vomiting.        Increasing reflux symptoms   Endocrine: Negative.    Genitourinary: Negative.    Musculoskeletal: Negative for arthralgias.   Skin: Negative.    Allergic/Immunologic: Negative.    Hematological: Negative.    Psychiatric/Behavioral: Positive for sleep disturbance.   All other systems reviewed and are negative.      .  Objective     Vitals:    05/10/21 0846   BP: 136/53   Pulse: 58   Resp: 16   Temp: 97.5 °F (36.4 °C)   TempSrc: Temporal   SpO2: 98%   Weight: 91.5 kg (201 lb 12.8 oz)   Height: 165.1 cm (65\")   PainSc: 0-No pain     Current Status 5/10/2021   ECOG score 1       Physical Exam   Constitutional: He is oriented to person, place, and time.   HENT:   Head: Normocephalic and atraumatic.   Nose: Nose normal.   Mouth/Throat: Mucous membranes are moist. Oropharynx is clear.   Eyes: Pupils are equal, round, and reactive to light. Conjunctivae are normal.   Cardiovascular: Normal rate, " regular rhythm, normal heart sounds and normal pulses.   Pulmonary/Chest: Effort normal and breath sounds normal.   Abdominal: Soft. Normal appearance and bowel sounds are normal.   Musculoskeletal: Normal range of motion.   Neurological: He is alert and oriented to person, place, and time.   Skin: Skin is warm and dry.   Psychiatric: His behavior is normal. Mood normal.     RECENT LABS:  Hematology WBC   Date Value Ref Range Status   05/10/2021 5.41 3.40 - 10.80 10*3/mm3 Final     RBC   Date Value Ref Range Status   05/10/2021 3.45 (L) 4.14 - 5.80 10*6/mm3 Final     Hemoglobin   Date Value Ref Range Status   05/10/2021 10.1 (L) 13.0 - 17.7 g/dL Final     Hematocrit   Date Value Ref Range Status   05/10/2021 31.6 (L) 37.5 - 51.0 % Final     Platelets   Date Value Ref Range Status   05/10/2021 192 140 - 450 10*3/mm3 Final      NUCLEAR MEDICINE WHOLE BODY BONE SCAN 1/11/20221    FINDINGS:  There is multifocal osseous metastatic disease with abnormal  uptake throughout the axial and appendicular skeleton involving the left  calvarium, left proximal humerus, right medial clavicle, sternum, ribs,  spine, sacrum, pelvis, right acetabulum, both proximal femora. Left  frontal bone uptake appears to be new when compared to the previous  exam. There has been mild progression in degree of sternal uptake and  there is equivocal increase in intensity of uptake involving pelvic  lesions including the left sacrum and right proximal femur.      IMPRESSION:  Multifocal osseous metastatic disease with some evidence for  progression when compared to the prior exam 06/03/2020. There is a new  small lesion overlying the left frontal bone.     CT CHEST W CONTRAST DIAGNOSTIC-, CT ABDOMEN PELVIS W CONTRAST-5/4/2021    CT CHEST FINDINGS: Stable normal cardiac size, no pericardial  abnormality. Esophagus is normal in course and caliber. No mediastinal  or hilar mass/lymphadenopathy has developed. 5 mm pleural-based nodule  along the  periphery of the right lower lobe is again seen on image #73,  the lungs are otherwise clear. No pleural effusion or pulmonary edema.     Widespread osseous metastasis quite similar to the previous examination.     CONCLUSION: There is again widespread osseous metastasis, no acute  intrathoracic abnormality or intrathoracic metastatic deposit.     CT OF THE ABDOMEN AND PELVIS FINDINGS: The liver, pancreas, gallbladder,  spleen and adrenal glands are satisfactory in appearance. No biliary  duct dilatation. The stomach is collapsed, contour is normal. Small  renal cysts, the kidneys are otherwise within normal limits. No calculus  nor obstructive uropathy. Diameter of the aorta is normal. Urinary  bladder is satisfactory in appearance.     Prostatectomy site is stable and satisfactory. There is diverticulosis  of the colon.     The appendix and small bowel have a satisfactory appearance. No  abdominal or pelvic lymphadenopathy. Widespread osseous metastasis  similar to the previous examination.     CONCLUSION: No acute intra-abdominal abnormality nor indication of  abdominal/pelvic metastatic disease. There is again widespread osseous  metastasis.         Assessment/Plan       80 year-old male with medical history including prostate carcinoma, CK D3, long-term tobacco use recent development of hoarseness and subsequent studies that demonstrated findings consistent with metastatic disease to bone as well as multiple enlarged metastatic lymph nodes, and potential abnormality in the right lateral wall trachea.  His additional history includes type 2 diabetes on insulin pump which has been more effective for control of his blood sugars.    His recent symptoms have included generalized discomfort in multiple sites in his bony skeleton as well as right lower quadrant abdominal pain.  We discussed this made will improve after he starts Firmagon in the a.m.  Discussions with Dr. Rivera also have led to the conclusion that  he'll need bronchoscopy and mediastinoscopy which did proceed as above on April 9, 2018.  Pathology revealed mediastinal lymph nodes to be involved with metastatic adenocarcinoma consistent with origin from a prostatic primary . The patient's case was discussed at thoracic conference and recommendations were to continue with ADT and radiation for symptomatic sites.  The patient is seen back April 23 his treatment with ADT-Firmagon-has improved his symptomatic performance status to near baseline.  It is not felt that he'll require other treatments such as oral antiandrogens at this point.  He could, however, potentially benefit from agents such as Xgeva given at appropriate intervals. We went on to continue with Xgeva and rescan him July 23 demonstrating near remission developing.  This is also supported by his normalizing PSA.  The patient's scans demonstrated possibly chronic appendicitis is not having symptoms referable to this.he plans to be alert to the possibility of stenosis family member.  We discussed moving to an every 6 month treatment but at this point we'll continue at 3 months. As result we continued Lupron and Xgeva on schedule and is seen back now 6 months later continuing to generally improve.     We will continue the patient's treatment and is done well with no further symptoms until recently admitted mid March with upper lobe pneumonia.  Review of his studies during that visit fortunately do not demonstrate bony metastasis, additional mediastinal adenopathy, pleural effusions or masses.  It is felt that his metastatic prostate carcinoma remains controlled.  We planned, as a result to continue Xgeva and Lupron and the patient is now seen back continuing to do well.  We went on to discuss Lupron and Xgeva, medications including Neurontin and Requip which have been helpful and the patient is next seen December 13, 2019 again with symptoms control.  His PSA went on to be measured at 2.9.  Would like to  continue same and have him return for follow-up in the further progressive consider adding additional oral therapy to her treatment.   We continued Xgeva and Lupron and later contacted the patient about his PSA of 5.030.  Follow-up scans were scheduled prior to his visit in 3 months but they have not been done at 2 months at May 8 when the patient called about increasing hip pain.  These scans do not demonstrate progression of disease in any significant way.  We have gone on to review the scans together and his overall symptoms and plan to try to address his additional sleep issues, restless legs, pain management as necessary and further diagnostics.  The patient's Neurontin was increased and did improve his symptoms.  He is next seen with follow-up bone scan that does not demonstrate worsening of bone nor need for localized radiation therapy.  The patient's PSA has been slowly rising and we have not been able to determine worsening of disease and and, therefore, it is not felt warranted add additional medications such as Xtandi or apalutamide to his therapy.  Please note would like to avoid Zytiga try not to add prednisone which would worsen his blood sugar control.  Is next seen September 9, 2020 with PSA that has increased to 15.8 though the patient remains reasonably asymptomatic and chest x-ray is otherwise negative.  We have discussed continue Neurontin and trazodone, follow-up CT scan series in the next several months to determine whether he has additional disease progressing that can be documented other than the PSA alone.     As he is reassessed September 10 he is having slowly increasing bony discomfort and his PSA has now gone to just over 15.  We have discussed repeat assessment requesting CT scanning.     These scans ultimately reveal no evidence of progression of disease for visceral involvement or clearly for bone involvement.  After further review with the patient and his son we proceeded with Xgeva,  continued medications and had him reviewed for Xtandi which was found to be cost prohibitive.  His follow-up PSA has demonstrated gradual increase in his review December 10, 2020 with some mild increase in bony discomfort.  After repeat discussion we went on to have him undergo Lupron therapy, and his PSA actually to be mildly reduced at 22.2.  Follow-up bone scan demonstrates some evidence of progression in sternum, left sacrum and proximal femur.  These findings were reviewed with the patient and his son January 10, 2020 and the patient was referred for palliative radiotherapy(Dr. Kulkarni) who saw the patient January 26 and felt that the right femur and sternal lesion could benefit from therapy-3000 cGy in 10 fractions.  The patient took treatment February 1 to February 12 to the above areas with significant symptom improvement.     The patient's neck seen back March 15, 2021 with symptoms of increasing reflux.  We proceeded with Xgeva and Lupron and noted his PSA to be elevated to 38.5.  The patient was seen April 12, 2021 is having low-grade fevers without significant symptoms, no cough, no chest pain, no shortness of breath.  The patient return for follow-up as we have continued Xgeva monthly and Lupron every 3 months.  Follow-up CT chest abdomen pelvis 5/4/2021 - for progression of disease and follow-up PSA 5 10/2021 is at 42 slightly reduced from previous.  We have discussed how to proceed from here and find a significant family history that clearly supports a potential genetic assessment for his malignancy.        It is felt most reasonable at this point to take the mediastinal nodes from his biopsy 4/9/2018 and reassess for potential targeted therapies, MSI status, as well as germline analysis.      Plan:  *Proceed with Xgeva (pending electrolyte assessment)     *PSA repeat today now available in 42.6    *Request [facility] [employer] analysis of the patient's tissue from 2018, [facility] "[employer]  assessment    *At present we do not need to alter his current therapy particularly finding  Zytiga or Xtandi are cost prohibitive though we could seek assistance as needed.    *Reflux to be addressed with Prilosec 20 mg p.o. nightly, other medications to continue at present, refill of Neurontin    *Return 4 weeks Pallavi OMALLEY spent 45 total minutes, face-to-face, caring for Semaj today.  Greater than 50% of this time involved counseling and/or coordination of care as documented within this note.

## 2021-05-12 RX ORDER — TRIAMTERENE AND HYDROCHLOROTHIAZIDE 37.5; 25 MG/1; MG/1
TABLET ORAL
Qty: 90 TABLET | Refills: 0 | Status: SHIPPED | OUTPATIENT
Start: 2021-05-12 | End: 2021-06-27

## 2021-05-13 RX ORDER — OLMESARTAN MEDOXOMIL 40 MG/1
TABLET ORAL
Qty: 90 TABLET | Refills: 3 | Status: SHIPPED | OUTPATIENT
Start: 2021-05-13 | End: 2022-03-31

## 2021-05-13 RX ORDER — ROPINIROLE 0.5 MG/1
TABLET, FILM COATED ORAL
Qty: 180 TABLET | Refills: 2 | Status: SHIPPED | OUTPATIENT
Start: 2021-05-13 | End: 2021-11-23 | Stop reason: SDUPTHER

## 2021-05-20 DIAGNOSIS — C79.51 PROSTATE CANCER METASTATIC TO BONE (HCC): ICD-10-CM

## 2021-05-20 DIAGNOSIS — C61 PROSTATE CANCER METASTATIC TO BONE (HCC): ICD-10-CM

## 2021-05-20 RX ORDER — HYDROCODONE BITARTRATE AND ACETAMINOPHEN 5; 325 MG/1; MG/1
1 TABLET ORAL EVERY 6 HOURS PRN
Qty: 120 TABLET | Refills: 0 | Status: SHIPPED | OUTPATIENT
Start: 2021-05-20 | End: 2021-06-17 | Stop reason: SDUPTHER

## 2021-06-01 NOTE — PROGRESS NOTES
Subjective  Patient with, again, better pain control, symptoms of esophagitis slowly resolving      History of present illness:   The patient is an 80-year-old male with significant past medical history including prostate carcinoma, CKD 3 and long-term tobacco use be been seen by primary care in late January, 2018 for hoarseness.  Chest x-ray is now outpatient January 23 revealed sclerotic change in the posterior mid chest to be within 3 adjacent thoracic ertebral bodies of uncertain significance.  A follow-up chest CT revealed numerous sclerotic foci within al visualized bones consistent with metastatic disease, multiple enlarged mediastinal lymph nodes concerning for metastatic lymphadenopathy and a polypoidal abnormality in the right lateral wall of the trachea.  CT of abdomen March 20 revealed extensive osseous metastatic disease mildly enlarged retroperitoneal lymph nodes.  Bone scan March 20 was consistent with widespread osseous metastasis particularly in the proximal half the left humeral shaft, abnormal uptake in the sternum and manubrium and a small focus in the posterior aspect of the calvarium.  This led to a plain film the left humerus with mottled sclerosis involving the shaft of the humerus particularly in the proximal half but no evidence of pathologic fracture.    The patient has additionally type 2 diabetes on insulin pump, again a history of prostate carcinoma prostatectomy 12 years previously, hypertension, and hyperlipidemia.  Additional studies included a PSA of 395.1 from March 14, 2018.The patient's been seen by urology March 15 with plans to start Firmagon.    The patient is now seen in pulmonary clinic with Dr. Bijan Rivera and we have discussed that he will need bronchoscopy and mediastinoscopy.  Interestingly his additional history includes a long occupational exposure including working in the [place] just out of school, subsequent construction work for many years  and a 30-pack-year history of smoking stopping in the late 1990s.  He indicates that he followed with Dr. Guillen of urology for many years with no changes in his exams and normal PSAs.  He stopped this follow-up approximately 2 years ago.     Patient was seen in consultation with thoracic surgery on March 28 and plans are made for mediastinoscopy and bronchoscopy with lymph node biopsy.  Pathology revealed that these nodes were consistent with metastatic prostate carcinoma.  He was discussed at thoracic conference.  A PET/CT was not pursued and it was determined that he see medical oncology for hormonal treatment and radiation therapy if symptomatic.  It should be noted that the patient's March 15 [facility] office note describes that Firmagon was planned.     The patient has met with the son and grandson April 23.  We have also contacted Dr. Taylor of urology in that Firmagon was given just after his last visit and would next be due approximately May 3.  Patient feels considerably better with resolution of his pain, normalization of his performance status.  He would like to continue treatment through our office now contacted Dr. Taylor concerning this.    The patient is next seen June 11, 2018 we discussed his follow-up including his treatments with Lupron May 7 and his next treatment on July 30.  He has returned to essentially normal performance status and his PSA has dropped further from 395 March 14 27.8 May 7 and now 2.03 June 11.  We do plan to initiate Xgeva also study him via scans to document his improvement approximately a month from now.   The patient is next seen July 26, 2018.  Repeat imaging demonstrated interval resolution of mediastinal and retroperitoneal lymphadenopathy.  There is thickening in the distal aspect of the appendix with stranding?  Chronic appendicitis.  The patient indicates he is having no such symptoms and never has.  There is no change in sclerotic bony metastasis in the  patient's PSA has dropped substantially to 1.66 by July 19 and 1.79 July 26.  He is actually feeling excellent and this is corroborated by family members.   Patient is next seen January 10, 2019 continuing to do very well and, in fact indicating that [facility], likely hearing replacements, visual review.  He is not certain is going to continue his care with them or with us or combination of both.    Patient is next seen April 4, 2019.  He is recently discharged after hospitalization March 15-18 with left upper lobe pneumonia.  We reviewed the findings in detail with his gradual recovery now documented.  His studies include a CT of chest which does not demonstrate any further bony lesions and/or pulmonary metastasis having developed.  He is feeling considerably better and approximately back to his baseline.  The patient is next seen June 28, 2019 feeling well but having baseline generally musculoskeletal pain and restless legs.His recent exams include a PSA of 0.81, CMP with potassium of 5.3 with repeat pending.  His performance status overall remains good to very good and is clearly responding to his treatments.  The patient is next seen December 13, 2019 continue to feel well.  He has had, oddly enough, 2 episodes of sleep paralysis which have occurred to him previously and he wonders if there is any connection with his prostate carcinoma though this is felt unlikely.     The patient when assessed in December had his PSA go to 2.9.  We continue with Lupron and Xgeva and is seen back now March 9, 2020 without additional symptoms.  We discussed that a schedule could likely change moving to 3 months for both of these medications particularly if he needs additional therapy directed at progressive disease.     Patient contacted our practice May 4 concern for pain in his hips.  This led to moving his scans up and they were performed May 8, 2020 showing a sub-6 mm  pleural-based pulmonary nodule in the right lower lobe that is new from March 15, 2018, remainder of the sub-6 mm pulmonary nodules bilaterally are stable.  There is extensive osseous metastatic disease as previous with no other new findings.     The patient indicates, when seen, May 13 that his bone pain is now resolved.  While this is good information does not mean that he does not have further bony metastasis and we have discussed that a follow-up bone scan is likely necessary.  Furthermore he is having some difficulty with sleeplessness and increase in restless legs as he continues to stay at home during the COVID 19 crisis which, itself, is producing more anxiety for him.  A follow-up bone scan was obtained Lety 3 revealing multifocal osseous metastatic disease which was compared to March 2, 2018 with improvement.  No new abnormal uptake is present.  He is next seen with us on June 17, 2020 and, fortunately, is not having any significant pain at this point.  We discussed his scans in detail and, on balance, his performance status also remains improved.     It was elected to follow the patient rather than changes antiandrogen therapy.  He is reassessed September 3 with unchanged CMP, H&H of 11.4 and 36.3 with normal white count, platelet count and differential, PSA at 15.8.  Follow-up PA lateral chest x-ray does not show any new abnormalities though there is extensive metastatic bone disease throughout the bony thorax and osteoblastic metastasis have been seen on chest CT May 2020.  The patient is seen with his son Georges September 10 noticing increasing bony discomfort primarily in the right hip following fairly intensive gardening which he does frequently.  Now has further elevation of PSA were wondering whether we should try to intervene sooner per additional antiandrogens.  We will need to further assess him via CT scanning to make this decision     These were obtained October 1 showing extensive sclerotic  disease with no metastatic disease in chest abdomen or pelvis.  PSA had gone from 15.8 September 3-16 0.6 October 1.     He still has pain getting up in the morning and after active gardening.  This is manageable with current pain medications and, at present, it is not apparent that he needs to switch medications to gain better control of his disease.  The patient did have follow-up studies done including a PSA November 25, 2020 now at 25.4.  The patient is seen with his son December 10, 2020 doing fair but having some increased pain in the mid sternum and right hip that he ascribes to additional exercise/work in managing his garden.  It is apparent however, that his last bone scan was 6 months ago and we do need to reassess him concerning this.     The patient had follow-up bone scan performed January 11, 2021 showing again uptake in the calvarium, humerus, right medial clavicle, sternum, ribs, spine, sacrum, pelvis, right acetabulum, proximal femora and now left frontal bone which appears new.  There is also mild increase in sternal uptake and equivocal increase in the pelvic lesions of all of the sacrum and the right proximal femur.     The patient is seen with his son January 18, 2021 and indicates that he is having pain gradually worsened in his right hip, mid chest that had been an issue previously.  These findings on bone scan are reviewed with both of them as likely the underlying culprit for his discomfort.  He would need change of the systemic therapy but, at present, will ask for radiation therapy palliation initially.  He was previously treated by Dr. Kulkarni many years ago and will ask her to see him.  We will also make application for the current cost of Xtandi or Zytiga.  The patient is not felt to be a candidate for systemic chemotherapy.     The patient was referred for ration therapy as we continued to assess his PSA on current therapy as well as exam the cost of Zytiga or Xtandi.  Radiation therapy  (Dr. Kulkarni) saw the patient January 26 and felt that the right femur and sternal lesion could benefit from therapy-3000 cGy in 10 fractions.  The patient took treatment February 1 to February 12 to the above areas and is next seen back March 15.  Fortunately his pain has improved dramatically and he is quite happy about this.  Unfortunately he notes some difficulty with swallowing that is temporary and often leads to increased salivation and mucus production.  Patient was asked to return his next assessed April 12, 2021.  He is able to swallow with less pain but still has excess mucus production and issues with salivation.  His PSA has noted increase but he is having no additional bony discomfort at this point and, additionally, has noted low-grade fevers just under 100 degrees at nighttime?.  His weight, appetite and general performance status have remained good to excellent.  We discussed follow-up studies at this point to determine his status.    Follow-up scans were scheduled CT scans of chest and pelvis 5/4/2021 showing osseous metastasis quite similar to previous examinations in the chest, CT of abdomen pelvis also with no acute intra-abdominal adenopathy no indication of abdominal pelvic metastatic disease but again widespread osseous metastasis.  His PSA at this point have been slowly increasing to a level of 44 on 4/12/2021.  As the patient is reassessed 5/10/2021 we find, fortunately, that he is not exceeding symptomatic.  It could make reasonable sense at this point to take the mediastinal nodes from his biopsy 4/9/2018, reassess potential targeted therapies, MSI status, and germline analysis.  Fortunately he is not having significant pain or discomfort and is seen with his son as we discussed the above plan.  Notably a follow-up PSA is found to be 42.6.   Patient is next seen in 6/7/2021 for Lupron and Xgeva.  Fortunately he is feeling considerably better according to both he and his son though his  stamina has reduced.  He is not having additional pain at this point.  We have also reviewed his genetics assessment which was significant only for a variant of unknown significance.  This is not an actionable abnormality.    Past Medical History:   Diagnosis Date   • Bone cancer (CMS/HCC)    • Cellulitis of great toe of left foot 10/19/2018   • CKD (chronic kidney disease)     Stage 3; followed by Dr. Vicky Duran    • Diastolic dysfunction     GRADE II per echo 2018   • Difficulty breathing     during exertion   • Dyslipidemia    • Erectile dysfunction    • Fatigue    • History of blood transfusion    • History of kidney stones    • Hyperlipidemia    • Hypertension    • Hyponatremia    • Left ventricular hypertrophy     per echo 2018   • Localized edema    • Neuropathy    • Obstructive sleep apnea     USING CPAP   • Osteoarthritis    • Peptic ulcer    • Pneumonia    • Pneumonia of left upper lobe due to infectious organism 3/15/2019   • Prostate cancer (CMS/HCC)     Status post prostatectomy, radiation therapy, and hormone therapy followed by Dr. Lee; metastatsis to bone   • Pure hyperglyceridemia    • Restless leg syndrome    • Sepsis (CMS/HCC)    • Sinus bradycardia    • Transient cerebral ischemia    • Type 2 diabetes mellitus (CMS/HCC)         Past Surgical History:   Procedure Laterality Date   • BRONCHOSCOPY N/A 4/9/2018    Procedure: BRONCHOSCOPY;  Surgeon: Bijan Rivera III, MD;  Location: [facility];  Service: Cardiothoracic   • COLONOSCOPY     • DEEP NECK LYMPH NODE BIOPSY / EXCISION     • HEMORRHOIDECTOMY     • MEDIASTINOSCOPY N/A 4/9/2018    Procedure: MEDIASTINOSCOPY WITH LYMPH NODE BIOPSY;  Surgeon: Bijan Rivera III, MD;  Location: [facility];  Service: Cardiothoracic   • OTHER SURGICAL HISTORY      ulcer repair   • PROSTATECTOMY  2006        Current Outpatient Medications on File Prior to Visit   Medication Sig Dispense Refill   • albuterol sulfate  (90 Base) MCG/ACT  inhaler Inhale 2 puffs Every 4 (Four) Hours As Needed for Wheezing or Shortness of Air. 1 inhaler 0   • aspirin 81 MG EC tablet Take 1 tablet by mouth daily.     • cholecalciferol (VITAMIN D3) 1000 units tablet Take 2,000 Units by mouth Daily.     • Denosumab (XGEVA SC) Inject  under the skin into the appropriate area as directed Every 30 (Thirty) Days.     • dilTIAZem CD (CARDIZEM CD) 120 MG 24 hr capsule TAKE 1 CAPSULE EVERY DAY 90 capsule 3   • fluticasone (FLONASE SENSIMIST) 27.5 MCG/SPRAY nasal spray 2 sprays into each nostril As Needed.     • gabapentin (NEURONTIN) 300 MG capsule Take 2 capsules by mouth every night at bedtime. 60 capsule 2   • HYDROcodone-acetaminophen (NORCO) 5-325 MG per tablet Take 1 tablet by mouth Every 6 (Six) Hours As Needed for Moderate Pain . 120 tablet 0   • insulin aspart (NovoLOG) 100 UNIT/ML patient supplied pump Inject  under the skin into the appropriate area as directed Continuous. Pt reports pump with basal rate 1.9 units/hr from 0800 to 1200. Basal rate 1.8 units/hr 1200 to 0800.  Checks bg ac/hs with bolus doses per pumps parameters     • Leuprolide Acetate, 3 Month, (LUPRON DEPOT, 3-MONTH, IM) Inject  into the appropriate muscle as directed by prescriber Every 3 (Three) Months.     • levothyroxine (SYNTHROID, LEVOTHROID) 88 MCG tablet      • modafinil (PROVIGIL) 200 MG tablet Take 1 tablet by mouth Daily. 90 tablet 1   • olmesartan (BENICAR) 40 MG tablet TAKE 1 TABLET EVERY DAY (DISCONTINUE LOSARTAN 100MG) 90 tablet 3   • omeprazole (priLOSEC) 20 MG capsule Take 1 capsule by mouth every night at bedtime. 90 capsule 2   • pravastatin (PRAVACHOL) 40 MG tablet Take 40 mg by mouth daily.     • rOPINIRole (REQUIP) 0.5 MG tablet Take 1 tablet by mouth in the evening and 1 at bedtime. 180 tablet 2   • traZODone (DESYREL) 50 MG tablet Take  mg by mouth every night at bedtime.     • triamterene-hydrochlorothiazide (MAXZIDE-25) 37.5-25 MG per tablet TAKE 1 TABLET EVERY DAY 90  "tablet 0     No current facility-administered medications on file prior to visit.        ALLERGIES:    Allergies   Allergen Reactions   • Niacin Unknown - Low Severity   • Statins Unknown - Low Severity        Social History     Socioeconomic History   • Marital status:      Spouse name: Not on file   • Number of children: Not on file   • Years of education: College   • Highest education level: Not on file   Tobacco Use   • Smoking status: Former Smoker     Packs/day: 1.00     Years: 30.00     Pack years: 30.00     Types: Cigarettes     Quit date: 1998     Years since quittin.3   • Smokeless tobacco: Never Used   Substance and Sexual Activity   • Alcohol use: No     Comment: Caffeine use: 2-3 cups daily   • Drug use: No   • Sexual activity: Defer        Family History   Problem Relation Age of Onset   • Heart failure Mother    • Hypertension Mother    • Diabetes Mother    • Heart disease Mother    • Lung cancer Father 46   • Hypertension Sister    • Lung cancer Sister 60   • Hypertension Brother    • Lung cancer Brother 62   • Heart disease Other    • Prostate cancer Brother 60   • Malig Hyperthermia Neg Hx         Review of Systems   Constitutional: Negative for fever.   HENT: Negative.    Eyes: Negative.    Respiratory: Negative for chest tightness, shortness of breath and wheezing.    Gastrointestinal: Negative for abdominal pain, constipation, diarrhea, nausea and vomiting.        Increasing reflux symptoms   Endocrine: Negative.    Genitourinary: Negative.    Musculoskeletal: Negative for arthralgias.   Skin: Negative.    Allergic/Immunologic: Negative.    Hematological: Negative.    Psychiatric/Behavioral: Positive for sleep disturbance.   All other systems reviewed and are negative.      .  Objective     Vitals:    21 0845   BP: 138/74   Pulse: 63   Resp: 18   Temp: 97.8 °F (36.6 °C)   TempSrc: Temporal   SpO2: 99%   Weight: 91.6 kg (201 lb 14.4 oz)   Height: 165.1 cm (65\")   PainSc: " 0-No pain     Current Status 6/7/2021   ECOG score 0       Physical Exam   Constitutional: He is oriented to person, place, and time.   HENT:   Head: Normocephalic and atraumatic.   Nose: Nose normal.   Mouth/Throat: Mucous membranes are moist. Oropharynx is clear.   Eyes: Pupils are equal, round, and reactive to light. Conjunctivae are normal.   Cardiovascular: Normal rate, regular rhythm, normal heart sounds and normal pulses.   Pulmonary/Chest: Effort normal and breath sounds normal.   Abdominal: Soft. Normal appearance and bowel sounds are normal.   Musculoskeletal: Normal range of motion.   Neurological: He is alert and oriented to person, place, and time.   Skin: Skin is warm and dry.   Psychiatric: His behavior is normal. Mood normal.     RECENT LABS:  Hematology WBC   Date Value Ref Range Status   06/07/2021 6.15 3.40 - 10.80 10*3/mm3 Final     RBC   Date Value Ref Range Status   06/07/2021 3.45 (L) 4.14 - 5.80 10*6/mm3 Final     Hemoglobin   Date Value Ref Range Status   06/07/2021 10.2 (L) 13.0 - 17.7 g/dL Final     Hematocrit   Date Value Ref Range Status   06/07/2021 31.9 (L) 37.5 - 51.0 % Final     Platelets   Date Value Ref Range Status   06/07/2021 191 140 - 450 10*3/mm3 Final      NUCLEAR MEDICINE WHOLE BODY BONE SCAN 1/11/20221    FINDINGS:  There is multifocal osseous metastatic disease with abnormal  uptake throughout the axial and appendicular skeleton involving the left  calvarium, left proximal humerus, right medial clavicle, sternum, ribs,  spine, sacrum, pelvis, right acetabulum, both proximal femora. Left  frontal bone uptake appears to be new when compared to the previous  exam. There has been mild progression in degree of sternal uptake and  there is equivocal increase in intensity of uptake involving pelvic  lesions including the left sacrum and right proximal femur.      IMPRESSION:  Multifocal osseous metastatic disease with some evidence for  progression when compared to the prior exam  06/03/2020. There is a new  small lesion overlying the left frontal bone.           CT CHEST W CONTRAST DIAGNOSTIC-, CT ABDOMEN PELVIS W CONTRAST-5/4/2021    CT CHEST FINDINGS: Stable normal cardiac size, no pericardial  abnormality. Esophagus is normal in course and caliber. No mediastinal  or hilar mass/lymphadenopathy has developed. 5 mm pleural-based nodule  along the periphery of the right lower lobe is again seen on image #73,  the lungs are otherwise clear. No pleural effusion or pulmonary edema.     Widespread osseous metastasis quite similar to the previous examination.     CONCLUSION: There is again widespread osseous metastasis, no acute  intrathoracic abnormality or intrathoracic metastatic deposit.     CT OF THE ABDOMEN AND PELVIS FINDINGS: The liver, pancreas, gallbladder,  spleen and adrenal glands are satisfactory in appearance. No biliary  duct dilatation. The stomach is collapsed, contour is normal. Small  renal cysts, the kidneys are otherwise within normal limits. No calculus  nor obstructive uropathy. Diameter of the aorta is normal. Urinary  bladder is satisfactory in appearance.     Prostatectomy site is stable and satisfactory. There is diverticulosis  of the colon.     The appendix and small bowel have a satisfactory appearance. No  abdominal or pelvic lymphadenopathy. Widespread osseous metastasis  similar to the previous examination.     CONCLUSION: No acute intra-abdominal abnormality nor indication of  abdominal/pelvic metastatic disease. There is again widespread osseous  metastasis.         Assessment/Plan       80 year-old male with medical history including prostate carcinoma, CK D3, long-term tobacco use recent development of hoarseness and subsequent studies that demonstrated findings consistent with metastatic disease to bone as well as multiple enlarged metastatic lymph nodes, and potential abnormality in the right lateral wall trachea.  His additional history includes type 2  diabetes on insulin pump which has been more effective for control of his blood sugars.    His recent symptoms have included generalized discomfort in multiple sites in his bony skeleton as well as right lower quadrant abdominal pain.  We discussed this made will improve after he starts Firmagon in the a.m.  Discussions with Dr. Rivera also have led to the conclusion that he'll need bronchoscopy and mediastinoscopy which did proceed as above on April 9, 2018.  Pathology revealed mediastinal lymph nodes to be involved with metastatic adenocarcinoma consistent with origin from a prostatic primary . The patient's case was discussed at thoracic conference and recommendations were to continue with ADT and radiation for symptomatic sites.  The patient is seen back April 23 his treatment with ADT-Firmagon-has improved his symptomatic performance status to near baseline.  It is not felt that he'll require other treatments such as oral antiandrogens at this point.  He could, however, potentially benefit from agents such as Xgeva given at appropriate intervals. We went on to continue with Xgeva and rescan him July 23 demonstrating near remission developing.  This is also supported by his normalizing PSA.  The patient's scans demonstrated possibly chronic appendicitis is not having symptoms referable to this.he plans to be alert to the possibility of stenosis family member.  We discussed moving to an every 6 month treatment but at this point we'll continue at 3 months. As result we continued Lupron and Xgeva on schedule and is seen back now 6 months later continuing to generally improve.     We will continue the patient's treatment and is done well with no further symptoms until recently admitted mid March with upper lobe pneumonia.  Review of his studies during that visit fortunately do not demonstrate bony metastasis, additional mediastinal adenopathy, pleural effusions or masses.  It is felt that his metastatic prostate  carcinoma remains controlled.  We planned, as a result to continue Xgeva and Lupron and the patient is now seen back continuing to do well.  We went on to discuss Lupron and Xgeva, medications including Neurontin and Requip which have been helpful and the patient is next seen December 13, 2019 again with symptoms control.  His PSA went on to be measured at 2.9.  Would like to continue same and have him return for follow-up in the further progressive consider adding additional oral therapy to her treatment.   We continued Xgeva and Lupron and later contacted the patient about his PSA of 5.030.  Follow-up scans were scheduled prior to his visit in 3 months but they have not been done at 2 months at May 8 when the patient called about increasing hip pain.  These scans do not demonstrate progression of disease in any significant way.  We have gone on to review the scans together and his overall symptoms and plan to try to address his additional sleep issues, restless legs, pain management as necessary and further diagnostics.  The patient's Neurontin was increased and did improve his symptoms.  He is next seen with follow-up bone scan that does not demonstrate worsening of bone nor need for localized radiation therapy.  The patient's PSA has been slowly rising and we have not been able to determine worsening of disease and and, therefore, it is not felt warranted add additional medications such as Xtandi or apalutamide to his therapy.  Please note would like to avoid Zytiga try not to add prednisone which would worsen his blood sugar control.  Is next seen September 9, 2020 with PSA that has increased to 15.8 though the patient remains reasonably asymptomatic and chest x-ray is otherwise negative.  We have discussed continue Neurontin and trazodone, follow-up CT scan series in the next several months to determine whether he has additional disease progressing that can be documented other than the PSA alone.     As he is  reassessed September 10 he is having slowly increasing bony discomfort and his PSA has now gone to just over 15.  We have discussed repeat assessment requesting CT scanning.     These scans ultimately reveal no evidence of progression of disease for visceral involvement or clearly for bone involvement.  After further review with the patient and his son we proceeded with Xgeva, continued medications and had him reviewed for Xtandi which was found to be cost prohibitive.  His follow-up PSA has demonstrated gradual increase in his review December 10, 2020 with some mild increase in bony discomfort.  After repeat discussion we went on to have him undergo Lupron therapy, and his PSA actually to be mildly reduced at 22.2.  Follow-up bone scan demonstrates some evidence of progression in sternum, left sacrum and proximal femur.  These findings were reviewed with the patient and his son January 10, 2020 and the patient was referred for palliative radiotherapy(Dr. Kulkarni) who saw the patient January 26 and felt that the right femur and sternal lesion could benefit from therapy-3000 cGy in 10 fractions.  The patient took treatment February 1 to February 12 to the above areas with significant symptom improvement.     The patient's neck seen back March 15, 2021 with symptoms of increasing reflux.  We proceeded with Xgeva and Lupron and noted his PSA to be elevated to 38.5.  The patient was seen April 12, 2021 is having low-grade fevers without significant symptoms, no cough, no chest pain, no shortness of breath.  The patient return for follow-up as we have continued Xgeva monthly and Lupron every 3 months.  Follow-up CT chest abdomen pelvis 5/4/2021 - for progression of disease and follow-up PSA 5 10/2021 is at 42 slightly reduced from previous.  We have discussed how to proceed from here and find a significant family history that clearly supports a potential genetic assessment for his malignancy.        It is felt most  reasonable at this point to take the mediastinal nodes from his biopsy 4/9/2018 and reassess for potential targeted therapies, MSI status, as well as germline analysis.    The patient then seen 6/7/2021 with his analysis completed for actionable mutations including germline mutations.  These exams were negative though there is a variant of unknown significance.  Again this is not an actionable mutation.      Plan:  *Proceed with Xgeva (pending electrolyte assessment), Lupron will also be given.    *PSA repeat today now pending    *Return 1 month for Xgeva, 2 months for Xgeva, MD assessment in 3 months, Lupron and Xgeva, PSA monthly    *At present we do not need to alter his current therapy particularly finding  Zytiga or Xtandi are cost prohibitive though we could seek assistance as needed.    *Reflux to be addressed with Prilosec 20 mg p.o. nightly, other medications to continue at present, refill of Neurontin    *Patient and his son advised to call for any worsening symptoms in the interval.

## 2021-06-07 ENCOUNTER — INFUSION (OUTPATIENT)
Dept: ONCOLOGY | Facility: HOSPITAL | Age: 81
End: 2021-06-07

## 2021-06-07 ENCOUNTER — LAB (OUTPATIENT)
Dept: LAB | Facility: HOSPITAL | Age: 81
End: 2021-06-07

## 2021-06-07 ENCOUNTER — OFFICE VISIT (OUTPATIENT)
Dept: ONCOLOGY | Facility: CLINIC | Age: 81
End: 2021-06-07

## 2021-06-07 VITALS
BODY MASS INDEX: 33.64 KG/M2 | WEIGHT: 201.9 LBS | HEIGHT: 65 IN | OXYGEN SATURATION: 99 % | TEMPERATURE: 97.8 F | RESPIRATION RATE: 18 BRPM | SYSTOLIC BLOOD PRESSURE: 138 MMHG | HEART RATE: 63 BPM | DIASTOLIC BLOOD PRESSURE: 74 MMHG

## 2021-06-07 DIAGNOSIS — C61 PROSTATE CANCER METASTATIC TO BONE (HCC): ICD-10-CM

## 2021-06-07 DIAGNOSIS — C79.51 OSSEOUS METASTASIS: Primary | ICD-10-CM

## 2021-06-07 DIAGNOSIS — C79.51 PROSTATE CANCER METASTATIC TO BONE (HCC): ICD-10-CM

## 2021-06-07 DIAGNOSIS — C79.51 OSSEOUS METASTASIS: ICD-10-CM

## 2021-06-07 LAB
ALBUMIN SERPL-MCNC: 4 G/DL (ref 3.5–5.2)
ALBUMIN/GLOB SERPL: 1.3 G/DL (ref 1.1–2.4)
ALP SERPL-CCNC: 333 U/L (ref 38–116)
ALT SERPL W P-5'-P-CCNC: 13 U/L (ref 0–41)
ANION GAP SERPL CALCULATED.3IONS-SCNC: 7.1 MMOL/L (ref 5–15)
AST SERPL-CCNC: 13 U/L (ref 0–40)
BASOPHILS # BLD AUTO: 0.03 10*3/MM3 (ref 0–0.2)
BASOPHILS NFR BLD AUTO: 0.5 % (ref 0–1.5)
BILIRUB SERPL-MCNC: 0.2 MG/DL (ref 0.2–1.2)
BUN SERPL-MCNC: 26 MG/DL (ref 6–20)
BUN/CREAT SERPL: 21.7 (ref 7.3–30)
CALCIUM SPEC-SCNC: 8.5 MG/DL (ref 8.5–10.2)
CHLORIDE SERPL-SCNC: 109 MMOL/L (ref 98–107)
CO2 SERPL-SCNC: 21.9 MMOL/L (ref 22–29)
CREAT SERPL-MCNC: 1.2 MG/DL (ref 0.7–1.3)
CYTO UR: NORMAL
DEPRECATED RDW RBC AUTO: 50.5 FL (ref 37–54)
EOSINOPHIL # BLD AUTO: 0.13 10*3/MM3 (ref 0–0.4)
EOSINOPHIL NFR BLD AUTO: 2.1 % (ref 0.3–6.2)
ERYTHROCYTE [DISTWIDTH] IN BLOOD BY AUTOMATED COUNT: 15 % (ref 12.3–15.4)
GFR SERPL CREATININE-BSD FRML MDRD: 58 ML/MIN/1.73
GLOBULIN UR ELPH-MCNC: 3.2 GM/DL (ref 1.8–3.5)
GLUCOSE SERPL-MCNC: 124 MG/DL (ref 74–124)
HCT VFR BLD AUTO: 31.9 % (ref 37.5–51)
HGB BLD-MCNC: 10.2 G/DL (ref 13–17.7)
IMM GRANULOCYTES # BLD AUTO: 0.07 10*3/MM3 (ref 0–0.05)
IMM GRANULOCYTES NFR BLD AUTO: 1.1 % (ref 0–0.5)
LAB AP CASE REPORT: NORMAL
LAB AP CLINICAL INFORMATION: NORMAL
LYMPHOCYTES # BLD AUTO: 1.34 10*3/MM3 (ref 0.7–3.1)
LYMPHOCYTES NFR BLD AUTO: 21.8 % (ref 19.6–45.3)
Lab: NORMAL
MAGNESIUM SERPL-MCNC: 2.6 MG/DL (ref 1.8–2.5)
MCH RBC QN AUTO: 29.6 PG (ref 26.6–33)
MCHC RBC AUTO-ENTMCNC: 32 G/DL (ref 31.5–35.7)
MCV RBC AUTO: 92.5 FL (ref 79–97)
MONOCYTES # BLD AUTO: 0.6 10*3/MM3 (ref 0.1–0.9)
MONOCYTES NFR BLD AUTO: 9.8 % (ref 5–12)
NEUTROPHILS NFR BLD AUTO: 3.98 10*3/MM3 (ref 1.7–7)
NEUTROPHILS NFR BLD AUTO: 64.7 % (ref 42.7–76)
NRBC BLD AUTO-RTO: 0 /100 WBC (ref 0–0.2)
PATH REPORT.ADDENDUM SPEC: NORMAL
PATH REPORT.FINAL DX SPEC: NORMAL
PATH REPORT.GROSS SPEC: NORMAL
PHOSPHATE SERPL-MCNC: 2.1 MG/DL (ref 2.5–4.5)
PLATELET # BLD AUTO: 191 10*3/MM3 (ref 140–450)
PMV BLD AUTO: 9.1 FL (ref 6–12)
POTASSIUM SERPL-SCNC: 4.6 MMOL/L (ref 3.5–4.7)
PROT SERPL-MCNC: 7.2 G/DL (ref 6.3–8)
PSA SERPL-MCNC: 65.9 NG/ML (ref 0–4)
RBC # BLD AUTO: 3.45 10*6/MM3 (ref 4.14–5.8)
SODIUM SERPL-SCNC: 138 MMOL/L (ref 134–145)
WBC # BLD AUTO: 6.15 10*3/MM3 (ref 3.4–10.8)

## 2021-06-07 PROCEDURE — 84100 ASSAY OF PHOSPHORUS: CPT

## 2021-06-07 PROCEDURE — 99214 OFFICE O/P EST MOD 30 MIN: CPT | Performed by: INTERNAL MEDICINE

## 2021-06-07 PROCEDURE — 36415 COLL VENOUS BLD VENIPUNCTURE: CPT

## 2021-06-07 PROCEDURE — 84153 ASSAY OF PSA TOTAL: CPT | Performed by: INTERNAL MEDICINE

## 2021-06-07 PROCEDURE — 96402 CHEMO HORMON ANTINEOPL SQ/IM: CPT

## 2021-06-07 PROCEDURE — 96372 THER/PROPH/DIAG INJ SC/IM: CPT

## 2021-06-07 PROCEDURE — 85025 COMPLETE CBC W/AUTO DIFF WBC: CPT

## 2021-06-07 PROCEDURE — 83735 ASSAY OF MAGNESIUM: CPT

## 2021-06-07 PROCEDURE — 25010000002 DENOSUMAB 120 MG/1.7ML SOLUTION: Performed by: INTERNAL MEDICINE

## 2021-06-07 PROCEDURE — 80053 COMPREHEN METABOLIC PANEL: CPT

## 2021-06-07 PROCEDURE — 25010000002 LEUPROLIDE ACETATE (3 MONTH) PER 7.5 MG: Performed by: INTERNAL MEDICINE

## 2021-06-07 RX ADMIN — DENOSUMAB 120 MG: 120 INJECTION SUBCUTANEOUS at 10:28

## 2021-06-07 RX ADMIN — LEUPROLIDE ACETATE 22.5 MG: KIT at 10:29

## 2021-06-09 ENCOUNTER — OFFICE VISIT (OUTPATIENT)
Dept: SLEEP MEDICINE | Facility: HOSPITAL | Age: 81
End: 2021-06-09

## 2021-06-09 VITALS — HEART RATE: 63 BPM | HEIGHT: 65 IN | OXYGEN SATURATION: 99 % | BODY MASS INDEX: 33.32 KG/M2 | WEIGHT: 200 LBS

## 2021-06-09 DIAGNOSIS — F51.04 PSYCHOPHYSIOLOGICAL INSOMNIA: ICD-10-CM

## 2021-06-09 DIAGNOSIS — G25.81 RESTLESS LEGS SYNDROME (RLS): ICD-10-CM

## 2021-06-09 DIAGNOSIS — G47.14 HYPERSOMNIA DUE TO MEDICAL CONDITION: ICD-10-CM

## 2021-06-09 DIAGNOSIS — G47.33 OBSTRUCTIVE SLEEP APNEA SYNDROME: Primary | ICD-10-CM

## 2021-06-09 PROCEDURE — G0463 HOSPITAL OUTPT CLINIC VISIT: HCPCS

## 2021-06-09 PROCEDURE — 99213 OFFICE O/P EST LOW 20 MIN: CPT | Performed by: INTERNAL MEDICINE

## 2021-06-09 NOTE — PROGRESS NOTES
[facility] Up [facility] Note     Chief Complaint:  RONNY     Primary Care Physician: Axel Guaraddo MD    Interval History:   The patient is a 80 y.o. male  who I last saw 10/8/2020 and that note was reviewed.  The patient is being followed for metastatic prostate cancer.  Prostate cancer has metastasized to bone, mediastinal lymph nodes, and right lateral tracheal wall.  Besides chemotherapy, the patient has undergone radiation therapy to that area.  This prevented him from being treated with his auto BiPAP during that time.  However, the patient has reinitiated therapy 2021 and he states he feels better.  The patient goes to bed at 9 PM and awakens at 6 AM.  He will use the bathroom during the nighttime.    Self-administered [place] Sleepiness Scale test results: 3; during the last visit it was 3  0-5 Lower normal daytime sleepiness  6-10 Higher normal daytime sleepiness  11-12 Mild, 13-15 Moderate, & 16-24 Severe excessive daytime sleepiness    Review of Systems:    A complete review of systems was done and all were negative with the exception of nasal congestion    Social History:    Social History     Socioeconomic History   • Marital status:      Spouse name: Not on file   • Number of children: Not on file   • Years of education: College   • Highest education level: Not on file   Tobacco Use   • Smoking status: Former Smoker     Packs/day: 1.00     Years: 30.00     Pack years: 30.00     Types: Cigarettes     Quit date: 1998     Years since quittin.3   • Smokeless tobacco: Never Used   Substance and Sexual Activity   • Alcohol use: No     Comment: Caffeine use: 2-3 cups daily   • Drug use: No   • Sexual activity: Defer       Allergies:  Niacin and Statins     Medication Review:  Reviewed.  Patient takes modafinil 200 mg every morning.  Requip is split up at nighttime or he takes it all at bedtime.  Trazodone being taken much less often.    Vital Signs:    Vitals:    21 0941  "  Pulse: 63   SpO2: 99%   Weight: 90.7 kg (200 lb)   Height: 165.1 cm (65\")     Body mass index is 33.28 kg/m².    Physical Exam:    Constitutional:  Well developed 80 y.o. male that appears in no apparent distress.  Awake & oriented times 3.  Normal mood with normal recent and remote memory and normal judgement.  Eyes:  Conjunctivae normal.  Oropharynx: Previously, moist mucous membranes without exudate and a large tongue and normal uvula and narrowed posterior pharyngeal opening, patient is wearing a facemask.     Downloaded PAP Data Reviewed For Compliance:  DME is [facility] and he uses a nasal interface.  Downloads between 5/21 and 6/8/2021 compliance 100%.  Average usage is 8 hours and 41 minutes.  Average AHI is normal with a leak of 51 minutes.  Average Auto BiPAP pressure is IPAP of 14 and EPAP of 11 and his auto BiPAP settings: Max IPAP 17 minimum EPAP 10 and pressure support of 4.    I have reviewed the above results and compared them with the patient's last downloads and reviewed with the patient.    Impression:   Obstructive sleep apnea adequately treated with auto BiPAP with good compliance and usage and no complaints of hypersomnolence.  The patient continues to take modafinil 200 mg every morning for complaints of hypersomnolence. The patient's obstructive apnea appears to be adequately treated.  The patient's average clear airway index  is normal.    Restless leg syndrome treated with Requip and gabapentin    Psychophysiologic insomnia treated with as needed trazodone    Plan:  Good sleep hygiene measures should be maintained.  Weight loss would be beneficial in this patient who is obese by BMI.      After evaluating the patient and assessing results available, the patient is benefiting from the treatment being provided.     The patient will continue auto BiPAP.  After clinical evaluation and review of downloads, I recommend no changes to the patient's pressures.  A new prescription will be sent to the " patient's DME.    Additionally, no changes to the patient medications outlined above.  The patient will call when he needs a refill on the modafinil.    I answered all of the patient's questions.  The patient will call for any problems and will follow up in 6-8 months.      Bob Mcdermott MD  Sleep Medicine  06/09/21  10:03 EDT

## 2021-06-14 LAB — REF LAB TEST RESULTS: NORMAL

## 2021-06-17 DIAGNOSIS — C61 PROSTATE CANCER METASTATIC TO BONE (HCC): ICD-10-CM

## 2021-06-17 DIAGNOSIS — C79.51 PROSTATE CANCER METASTATIC TO BONE (HCC): ICD-10-CM

## 2021-06-17 RX ORDER — HYDROCODONE BITARTRATE AND ACETAMINOPHEN 5; 325 MG/1; MG/1
1 TABLET ORAL EVERY 6 HOURS PRN
Qty: 120 TABLET | Refills: 0 | Status: CANCELLED | OUTPATIENT
Start: 2021-06-17

## 2021-06-17 RX ORDER — HYDROCODONE BITARTRATE AND ACETAMINOPHEN 5; 325 MG/1; MG/1
1 TABLET ORAL EVERY 6 HOURS PRN
Qty: 120 TABLET | Refills: 0 | Status: SHIPPED | OUTPATIENT
Start: 2021-06-17 | End: 2021-07-19 | Stop reason: SDUPTHER

## 2021-06-27 RX ORDER — TRIAMTERENE AND HYDROCHLOROTHIAZIDE 37.5; 25 MG/1; MG/1
TABLET ORAL
Qty: 90 TABLET | Refills: 0 | Status: SHIPPED | OUTPATIENT
Start: 2021-06-27 | End: 2021-09-14

## 2021-07-07 ENCOUNTER — LAB (OUTPATIENT)
Dept: LAB | Facility: HOSPITAL | Age: 81
End: 2021-07-07

## 2021-07-07 ENCOUNTER — INFUSION (OUTPATIENT)
Dept: ONCOLOGY | Facility: HOSPITAL | Age: 81
End: 2021-07-07

## 2021-07-07 DIAGNOSIS — C61 PROSTATE CANCER METASTATIC TO BONE (HCC): ICD-10-CM

## 2021-07-07 DIAGNOSIS — C79.51 OSSEOUS METASTASIS: ICD-10-CM

## 2021-07-07 DIAGNOSIS — C79.51 PROSTATE CANCER METASTATIC TO BONE (HCC): ICD-10-CM

## 2021-07-07 DIAGNOSIS — C79.51 OSSEOUS METASTASIS: Primary | ICD-10-CM

## 2021-07-07 LAB
ALBUMIN SERPL-MCNC: 4.1 G/DL (ref 3.5–5.2)
ALBUMIN/GLOB SERPL: 1.3 G/DL (ref 1.1–2.4)
ALP SERPL-CCNC: 364 U/L (ref 38–116)
ALT SERPL W P-5'-P-CCNC: 9 U/L (ref 0–41)
ANION GAP SERPL CALCULATED.3IONS-SCNC: 9.7 MMOL/L (ref 5–15)
AST SERPL-CCNC: 16 U/L (ref 0–40)
BASOPHILS # BLD AUTO: 0.02 10*3/MM3 (ref 0–0.2)
BASOPHILS NFR BLD AUTO: 0.3 % (ref 0–1.5)
BILIRUB SERPL-MCNC: 0.2 MG/DL (ref 0.2–1.2)
BUN SERPL-MCNC: 43 MG/DL (ref 6–20)
BUN/CREAT SERPL: 32.1 (ref 7.3–30)
CALCIUM SPEC-SCNC: 8.2 MG/DL (ref 8.5–10.2)
CHLORIDE SERPL-SCNC: 108 MMOL/L (ref 98–107)
CO2 SERPL-SCNC: 20.3 MMOL/L (ref 22–29)
CREAT SERPL-MCNC: 1.34 MG/DL (ref 0.7–1.3)
DEPRECATED RDW RBC AUTO: 50.4 FL (ref 37–54)
EOSINOPHIL # BLD AUTO: 0.11 10*3/MM3 (ref 0–0.4)
EOSINOPHIL NFR BLD AUTO: 1.9 % (ref 0.3–6.2)
ERYTHROCYTE [DISTWIDTH] IN BLOOD BY AUTOMATED COUNT: 14.9 % (ref 12.3–15.4)
GFR SERPL CREATININE-BSD FRML MDRD: 51 ML/MIN/1.73
GLOBULIN UR ELPH-MCNC: 3.2 GM/DL (ref 1.8–3.5)
GLUCOSE SERPL-MCNC: 113 MG/DL (ref 74–124)
HCT VFR BLD AUTO: 33.5 % (ref 37.5–51)
HGB BLD-MCNC: 10.4 G/DL (ref 13–17.7)
IMM GRANULOCYTES # BLD AUTO: 0.06 10*3/MM3 (ref 0–0.05)
IMM GRANULOCYTES NFR BLD AUTO: 1 % (ref 0–0.5)
LYMPHOCYTES # BLD AUTO: 1.17 10*3/MM3 (ref 0.7–3.1)
LYMPHOCYTES NFR BLD AUTO: 20.2 % (ref 19.6–45.3)
MAGNESIUM SERPL-MCNC: 3.4 MG/DL (ref 1.8–2.5)
MCH RBC QN AUTO: 28.5 PG (ref 26.6–33)
MCHC RBC AUTO-ENTMCNC: 31 G/DL (ref 31.5–35.7)
MCV RBC AUTO: 91.8 FL (ref 79–97)
MONOCYTES # BLD AUTO: 0.53 10*3/MM3 (ref 0.1–0.9)
MONOCYTES NFR BLD AUTO: 9.2 % (ref 5–12)
NEUTROPHILS NFR BLD AUTO: 3.89 10*3/MM3 (ref 1.7–7)
NEUTROPHILS NFR BLD AUTO: 67.4 % (ref 42.7–76)
NRBC BLD AUTO-RTO: 0 /100 WBC (ref 0–0.2)
PHOSPHATE SERPL-MCNC: 2 MG/DL (ref 2.5–4.5)
PLATELET # BLD AUTO: 250 10*3/MM3 (ref 140–450)
PMV BLD AUTO: 9.3 FL (ref 6–12)
POTASSIUM SERPL-SCNC: 5.1 MMOL/L (ref 3.5–4.7)
PROT SERPL-MCNC: 7.3 G/DL (ref 6.3–8)
PSA SERPL-MCNC: 89 NG/ML (ref 0–4)
RBC # BLD AUTO: 3.65 10*6/MM3 (ref 4.14–5.8)
SODIUM SERPL-SCNC: 138 MMOL/L (ref 134–145)
WBC # BLD AUTO: 5.78 10*3/MM3 (ref 3.4–10.8)

## 2021-07-07 PROCEDURE — 96372 THER/PROPH/DIAG INJ SC/IM: CPT

## 2021-07-07 PROCEDURE — 80053 COMPREHEN METABOLIC PANEL: CPT

## 2021-07-07 PROCEDURE — 84100 ASSAY OF PHOSPHORUS: CPT

## 2021-07-07 PROCEDURE — 25010000002 DENOSUMAB 120 MG/1.7ML SOLUTION: Performed by: INTERNAL MEDICINE

## 2021-07-07 PROCEDURE — 36415 COLL VENOUS BLD VENIPUNCTURE: CPT

## 2021-07-07 PROCEDURE — 83735 ASSAY OF MAGNESIUM: CPT

## 2021-07-07 PROCEDURE — 84153 ASSAY OF PSA TOTAL: CPT | Performed by: INTERNAL MEDICINE

## 2021-07-07 PROCEDURE — 85025 COMPLETE CBC W/AUTO DIFF WBC: CPT

## 2021-07-07 RX ADMIN — DENOSUMAB 120 MG: 120 INJECTION SUBCUTANEOUS at 09:41

## 2021-07-19 DIAGNOSIS — C61 PROSTATE CANCER METASTATIC TO BONE (HCC): ICD-10-CM

## 2021-07-19 DIAGNOSIS — C79.51 PROSTATE CANCER METASTATIC TO BONE (HCC): ICD-10-CM

## 2021-07-20 ENCOUNTER — TELEPHONE (OUTPATIENT)
Dept: GENERAL RADIOLOGY | Facility: HOSPITAL | Age: 81
End: 2021-07-20

## 2021-07-20 ENCOUNTER — TELEPHONE (OUTPATIENT)
Dept: ONCOLOGY | Facility: CLINIC | Age: 81
End: 2021-07-20

## 2021-07-20 DIAGNOSIS — C61 PROSTATE CANCER METASTATIC TO BONE (HCC): Primary | ICD-10-CM

## 2021-07-20 DIAGNOSIS — C79.51 PROSTATE CANCER METASTATIC TO BONE (HCC): Primary | ICD-10-CM

## 2021-07-20 RX ORDER — HYDROCODONE BITARTRATE AND ACETAMINOPHEN 5; 325 MG/1; MG/1
1 TABLET ORAL EVERY 6 HOURS PRN
Qty: 120 TABLET | Refills: 0 | Status: SHIPPED | OUTPATIENT
Start: 2021-07-20 | End: 2021-08-17 | Stop reason: SDUPTHER

## 2021-07-20 NOTE — TELEPHONE ENCOUNTER
Called pt and informed him of Dr. Lee's orders. He was agreeable to PET scan. Orders placed and message sent to scheduling.     ----- Message from Jose Lee MD sent at 7/20/2021 10:44 AM EDT -----  Regarding: FW: Test Results Question  Contact: [phone]  I think the patient should have an Axumin scan.    ----- Message -----  From: Jacqueline Souza RN  Sent: 7/20/2021   8:32 AM EDT  To: Jose Lee MD  Subject: FW: Test Results Question                        Please advise on pt's elevated PSA level. Thank you!  ----- Message -----  From: Marielos Mosley RN  Sent: 7/19/2021   3:49 PM EDT  To: Jacqueline Souza RN  Subject: FW: Test Results Question                          ----- Message -----  From: Semaj Herrera  Sent: 7/19/2021   3:39 PM EDT  [facility]  Subject: Test Results Question                            I saw that my PSA is now 89.  Any idea what is causing it to be so high?  Just concerned.  THANKS SEMAJ

## 2021-07-20 NOTE — TELEPHONE ENCOUNTER
----- Message from Jacqueline Souza RN sent at 7/20/2021 12:01 PM EDT -----  Please schedule pt for PET scan mid-August and MD visit with Dr. Lee the following week. Thank you!

## 2021-07-26 DIAGNOSIS — C79.51 PROSTATE CANCER METASTATIC TO BONE (HCC): ICD-10-CM

## 2021-07-26 DIAGNOSIS — C61 PROSTATE CANCER METASTATIC TO BONE (HCC): ICD-10-CM

## 2021-07-26 RX ORDER — GABAPENTIN 300 MG/1
600 CAPSULE ORAL
Qty: 60 CAPSULE | Refills: 2 | Status: SHIPPED | OUTPATIENT
Start: 2021-07-26 | End: 2021-09-22 | Stop reason: SDUPTHER

## 2021-08-04 ENCOUNTER — INFUSION (OUTPATIENT)
Dept: ONCOLOGY | Facility: HOSPITAL | Age: 81
End: 2021-08-04

## 2021-08-04 ENCOUNTER — LAB (OUTPATIENT)
Dept: LAB | Facility: HOSPITAL | Age: 81
End: 2021-08-04

## 2021-08-04 DIAGNOSIS — C61 PROSTATE CANCER METASTATIC TO BONE (HCC): ICD-10-CM

## 2021-08-04 DIAGNOSIS — C79.51 OSSEOUS METASTASIS: ICD-10-CM

## 2021-08-04 DIAGNOSIS — C79.51 PROSTATE CANCER METASTATIC TO BONE (HCC): ICD-10-CM

## 2021-08-04 DIAGNOSIS — C79.51 OSSEOUS METASTASIS: Primary | ICD-10-CM

## 2021-08-04 LAB
ALBUMIN SERPL-MCNC: 3.9 G/DL (ref 3.5–5.2)
ALBUMIN/GLOB SERPL: 1.3 G/DL (ref 1.1–2.4)
ALP SERPL-CCNC: 343 U/L (ref 38–116)
ALT SERPL W P-5'-P-CCNC: 10 U/L (ref 0–41)
ANION GAP SERPL CALCULATED.3IONS-SCNC: 9.7 MMOL/L (ref 5–15)
AST SERPL-CCNC: 14 U/L (ref 0–40)
BASOPHILS # BLD AUTO: 0.02 10*3/MM3 (ref 0–0.2)
BASOPHILS NFR BLD AUTO: 0.3 % (ref 0–1.5)
BILIRUB SERPL-MCNC: 0.2 MG/DL (ref 0.2–1.2)
BUN SERPL-MCNC: 25 MG/DL (ref 6–20)
BUN/CREAT SERPL: 22.9 (ref 7.3–30)
CALCIUM SPEC-SCNC: 7.9 MG/DL (ref 8.5–10.2)
CHLORIDE SERPL-SCNC: 106 MMOL/L (ref 98–107)
CO2 SERPL-SCNC: 21.3 MMOL/L (ref 22–29)
CREAT SERPL-MCNC: 1.09 MG/DL (ref 0.7–1.3)
DEPRECATED RDW RBC AUTO: 52 FL (ref 37–54)
EOSINOPHIL # BLD AUTO: 0.08 10*3/MM3 (ref 0–0.4)
EOSINOPHIL NFR BLD AUTO: 1.3 % (ref 0.3–6.2)
ERYTHROCYTE [DISTWIDTH] IN BLOOD BY AUTOMATED COUNT: 15.4 % (ref 12.3–15.4)
GFR SERPL CREATININE-BSD FRML MDRD: 65 ML/MIN/1.73
GLOBULIN UR ELPH-MCNC: 3.1 GM/DL (ref 1.8–3.5)
GLUCOSE SERPL-MCNC: 133 MG/DL (ref 74–124)
HCT VFR BLD AUTO: 31.7 % (ref 37.5–51)
HGB BLD-MCNC: 9.6 G/DL (ref 13–17.7)
IMM GRANULOCYTES # BLD AUTO: 0.06 10*3/MM3 (ref 0–0.05)
IMM GRANULOCYTES NFR BLD AUTO: 1 % (ref 0–0.5)
LYMPHOCYTES # BLD AUTO: 1.04 10*3/MM3 (ref 0.7–3.1)
LYMPHOCYTES NFR BLD AUTO: 16.8 % (ref 19.6–45.3)
MAGNESIUM SERPL-MCNC: 3.1 MG/DL (ref 1.8–2.5)
MCH RBC QN AUTO: 27.9 PG (ref 26.6–33)
MCHC RBC AUTO-ENTMCNC: 30.3 G/DL (ref 31.5–35.7)
MCV RBC AUTO: 92.2 FL (ref 79–97)
MONOCYTES # BLD AUTO: 0.53 10*3/MM3 (ref 0.1–0.9)
MONOCYTES NFR BLD AUTO: 8.6 % (ref 5–12)
NEUTROPHILS NFR BLD AUTO: 4.46 10*3/MM3 (ref 1.7–7)
NEUTROPHILS NFR BLD AUTO: 72 % (ref 42.7–76)
NRBC BLD AUTO-RTO: 0 /100 WBC (ref 0–0.2)
PHOSPHATE SERPL-MCNC: 2.3 MG/DL (ref 2.5–4.5)
PLATELET # BLD AUTO: 208 10*3/MM3 (ref 140–450)
PMV BLD AUTO: 8.9 FL (ref 6–12)
POTASSIUM SERPL-SCNC: 5.8 MMOL/L (ref 3.5–4.7)
PROT SERPL-MCNC: 7 G/DL (ref 6.3–8)
RBC # BLD AUTO: 3.44 10*6/MM3 (ref 4.14–5.8)
SODIUM SERPL-SCNC: 137 MMOL/L (ref 134–145)
WBC # BLD AUTO: 6.19 10*3/MM3 (ref 3.4–10.8)

## 2021-08-04 PROCEDURE — 80053 COMPREHEN METABOLIC PANEL: CPT

## 2021-08-04 PROCEDURE — 83735 ASSAY OF MAGNESIUM: CPT

## 2021-08-04 PROCEDURE — 25010000002 DENOSUMAB 120 MG/1.7ML SOLUTION: Performed by: INTERNAL MEDICINE

## 2021-08-04 PROCEDURE — 36415 COLL VENOUS BLD VENIPUNCTURE: CPT

## 2021-08-04 PROCEDURE — 85025 COMPLETE CBC W/AUTO DIFF WBC: CPT

## 2021-08-04 PROCEDURE — 96372 THER/PROPH/DIAG INJ SC/IM: CPT

## 2021-08-04 PROCEDURE — 84100 ASSAY OF PHOSPHORUS: CPT

## 2021-08-04 RX ADMIN — DENOSUMAB 120 MG: 120 INJECTION SUBCUTANEOUS at 10:57

## 2021-08-04 NOTE — PROGRESS NOTES
Reviewed ca 7.9 and K+ of 5.8 with Dr Lee, and okay t give Xgeva today and review with patient to avoid high potassium foods.

## 2021-08-16 DIAGNOSIS — C61 PROSTATE CANCER METASTATIC TO BONE (HCC): ICD-10-CM

## 2021-08-16 DIAGNOSIS — C79.51 PROSTATE CANCER METASTATIC TO BONE (HCC): ICD-10-CM

## 2021-08-16 RX ORDER — HYDROCODONE BITARTRATE AND ACETAMINOPHEN 5; 325 MG/1; MG/1
1 TABLET ORAL EVERY 6 HOURS PRN
Qty: 120 TABLET | Refills: 0 | Status: CANCELLED | OUTPATIENT
Start: 2021-08-16

## 2021-08-17 DIAGNOSIS — C79.51 PROSTATE CANCER METASTATIC TO BONE (HCC): ICD-10-CM

## 2021-08-17 DIAGNOSIS — C61 PROSTATE CANCER METASTATIC TO BONE (HCC): ICD-10-CM

## 2021-08-17 RX ORDER — HYDROCODONE BITARTRATE AND ACETAMINOPHEN 5; 325 MG/1; MG/1
1 TABLET ORAL EVERY 6 HOURS PRN
Qty: 120 TABLET | Refills: 0 | Status: SHIPPED | OUTPATIENT
Start: 2021-08-17 | End: 2021-09-14 | Stop reason: SDUPTHER

## 2021-08-18 ENCOUNTER — HOSPITAL ENCOUNTER (OUTPATIENT)
Dept: PET IMAGING | Facility: HOSPITAL | Age: 81
End: 2021-08-18

## 2021-08-18 ENCOUNTER — APPOINTMENT (OUTPATIENT)
Dept: PET IMAGING | Facility: HOSPITAL | Age: 81
End: 2021-08-18

## 2021-08-19 ENCOUNTER — TELEPHONE (OUTPATIENT)
Dept: ONCOLOGY | Facility: CLINIC | Age: 81
End: 2021-08-19

## 2021-08-19 DIAGNOSIS — G47.33 OBSTRUCTIVE SLEEP APNEA SYNDROME: ICD-10-CM

## 2021-08-19 DIAGNOSIS — G47.14 HYPERSOMNIA DUE TO MEDICAL CONDITION: ICD-10-CM

## 2021-08-19 RX ORDER — MODAFINIL 200 MG/1
200 TABLET ORAL DAILY
Qty: 90 TABLET | Refills: 1 | Status: SHIPPED | OUTPATIENT
Start: 2021-08-19 | End: 2021-12-27 | Stop reason: SDUPTHER

## 2021-08-19 NOTE — TELEPHONE ENCOUNTER
Caller: PT    Relationship: SELF    Best call back number: [phone] OK TO LEAVE A VM MSG     Medication needed:     HYDROcodone-acetaminophen (NORCO) 5-325 MG per tablet [27266] (Order [id number])      When do you need the refill by: ASAP    What additional details did the patient provide when requesting the medication: COMPLETELY OUT OF MEDICATION KATARINA IS SAYING DID NOT RECEIVE PREVIOUS REQUEST ON 8/17    Does the patient have less than a 3 day supply:  [x] Yes  [] No    What is the patient's preferred pharmacy:      Pharmacy    [facility] [address] [place] [address] [id number] [facility] - [phone] [facility] [phone] [place] [address]   Phone:  [phone]  Fax:  [phone]

## 2021-08-19 NOTE — TELEPHONE ENCOUNTER
Pt calling trying to  his prescription for [place].  Contacted [facility] pharmacy and the pharmacy stated they had the prescription and they were just waiting on pharmacist approval.  Stated prescription would be ready today.  Pt informed of same.  Pt v/u.

## 2021-08-20 ENCOUNTER — HOSPITAL ENCOUNTER (OUTPATIENT)
Dept: PET IMAGING | Facility: HOSPITAL | Age: 81
Discharge: HOME | End: 2021-08-20
Admitting: INTERNAL MEDICINE

## 2021-08-20 DIAGNOSIS — C61 PROSTATE CANCER METASTATIC TO BONE (HCC): ICD-10-CM

## 2021-08-20 DIAGNOSIS — C79.51 PROSTATE CANCER METASTATIC TO BONE (HCC): ICD-10-CM

## 2021-08-20 PROCEDURE — 78815 PET IMAGE W/CT SKULL-THIGH: CPT

## 2021-08-20 PROCEDURE — 0 FLUCICLOVINE: Performed by: INTERNAL MEDICINE

## 2021-08-20 PROCEDURE — A9588 FLUCICLOVINE F-18: HCPCS | Performed by: INTERNAL MEDICINE

## 2021-08-20 RX ADMIN — FLUCICLOVINE F-18 1 DOSE: 221 INJECTION, SOLUTION INTRAVENOUS at 12:15

## 2021-08-24 NOTE — PROGRESS NOTES
Subjective  Patient with excellent performance status, concerned about slowly escalating PSA    History of present illness:   The patient is an 80-year-old male with significant past medical history including prostate carcinoma, CKD 3 and long-term tobacco use be been seen by primary care in late January, 2018 for hoarseness.  Chest x-ray is now outpatient January 23 revealed sclerotic change in the posterior mid chest to be within 3 adjacent thoracic ertebral bodies of uncertain significance.  A follow-up chest CT revealed numerous sclerotic foci within al visualized bones consistent with metastatic disease, multiple enlarged mediastinal lymph nodes concerning for metastatic lymphadenopathy and a polypoidal abnormality in the right lateral wall of the trachea.  CT of abdomen March 20 revealed extensive osseous metastatic disease mildly enlarged retroperitoneal lymph nodes.  Bone scan March 20 was consistent with widespread osseous metastasis particularly in the proximal half the left humeral shaft, abnormal uptake in the sternum and manubrium and a small focus in the posterior aspect of the calvarium.  This led to a plain film the left humerus with mottled sclerosis involving the shaft of the humerus particularly in the proximal half but no evidence of pathologic fracture.    The patient has additionally type 2 diabetes on insulin pump, again a history of prostate carcinoma prostatectomy 12 years previously, hypertension, and hyperlipidemia.  Additional studies included a PSA of 395.1 from March 14, 2018.The patient's been seen by urology March 15 with plans to start Firmagon.    The patient is now seen in pulmonary clinic with Dr. Bijan Rivera and we have discussed that he will need bronchoscopy and mediastinoscopy.  Interestingly his additional history includes a long occupational exposure including working in the [place] just out of school, subsequent construction work for many years and a  30-pack-year history of smoking stopping in the late 1990s.  He indicates that he followed with Dr. Guillen of urology for many years with no changes in his exams and normal PSAs.  He stopped this follow-up approximately 2 years ago.     Patient was seen in consultation with thoracic surgery on March 28 and plans are made for mediastinoscopy and bronchoscopy with lymph node biopsy.  Pathology revealed that these nodes were consistent with metastatic prostate carcinoma.  He was discussed at thoracic conference.  A PET/CT was not pursued and it was determined that he see medical oncology for hormonal treatment and radiation therapy if symptomatic.  It should be noted that the patient's March 15 [facility] office note describes that Firmagon was planned.     The patient has met with the son and grandson April 23.  We have also contacted Dr. Taylor of urology in that Firmagon was given just after his last visit and would next be due approximately May 3.  Patient feels considerably better with resolution of his pain, normalization of his performance status.  He would like to continue treatment through our office now contacted Dr. Taylor concerning this.    The patient is next seen June 11, 2018 we discussed his follow-up including his treatments with Lupron May 7 and his next treatment on July 30.  He has returned to essentially normal performance status and his PSA has dropped further from 395 March 14 27.8 May 7 and now 2.03 June 11.  We do plan to initiate Xgeva also study him via scans to document his improvement approximately a month from now.   The patient is next seen July 26, 2018.  Repeat imaging demonstrated interval resolution of mediastinal and retroperitoneal lymphadenopathy.  There is thickening in the distal aspect of the appendix with stranding?  Chronic appendicitis.  The patient indicates he is having no such symptoms and never has.  There is no change in sclerotic bony metastasis in the patient's  PSA has dropped substantially to 1.66 by July 19 and 1.79 July 26.  He is actually feeling excellent and this is corroborated by family members.   Patient is next seen January 10, 2019 continuing to do very well and, in fact indicating that [facility], likely hearing replacements, visual review.  He is not certain is going to continue his care with them or with us or combination of both.    Patient is next seen April 4, 2019.  He is recently discharged after hospitalization March 15-18 with left upper lobe pneumonia.  We reviewed the findings in detail with his gradual recovery now documented.  His studies include a CT of chest which does not demonstrate any further bony lesions and/or pulmonary metastasis having developed.  He is feeling considerably better and approximately back to his baseline.  The patient is next seen June 28, 2019 feeling well but having baseline generally musculoskeletal pain and restless legs.His recent exams include a PSA of 0.81, CMP with potassium of 5.3 with repeat pending.  His performance status overall remains good to very good and is clearly responding to his treatments.  The patient is next seen December 13, 2019 continue to feel well.  He has had, oddly enough, 2 episodes of sleep paralysis which have occurred to him previously and he wonders if there is any connection with his prostate carcinoma though this is felt unlikely.     The patient when assessed in December had his PSA go to 2.9.  We continue with Lupron and Xgeva and is seen back now March 9, 2020 without additional symptoms.  We discussed that a schedule could likely change moving to 3 months for both of these medications particularly if he needs additional therapy directed at progressive disease.     Patient contacted our practice May 4 concern for pain in his hips.  This led to moving his scans up and they were performed May 8, 2020 showing a sub-6 mm pleural-based  pulmonary nodule in the right lower lobe that is new from March 15, 2018, remainder of the sub-6 mm pulmonary nodules bilaterally are stable.  There is extensive osseous metastatic disease as previous with no other new findings.     The patient indicates, when seen, May 13 that his bone pain is now resolved.  While this is good information does not mean that he does not have further bony metastasis and we have discussed that a follow-up bone scan is likely necessary.  Furthermore he is having some difficulty with sleeplessness and increase in restless legs as he continues to stay at home during the COVID 19 crisis which, itself, is producing more anxiety for him.  A follow-up bone scan was obtained Lety 3 revealing multifocal osseous metastatic disease which was compared to March 2, 2018 with improvement.  No new abnormal uptake is present.  He is next seen with us on June 17, 2020 and, fortunately, is not having any significant pain at this point.  We discussed his scans in detail and, on balance, his performance status also remains improved.     It was elected to follow the patient rather than changes antiandrogen therapy.  He is reassessed September 3 with unchanged CMP, H&H of 11.4 and 36.3 with normal white count, platelet count and differential, PSA at 15.8.  Follow-up PA lateral chest x-ray does not show any new abnormalities though there is extensive metastatic bone disease throughout the bony thorax and osteoblastic metastasis have been seen on chest CT May 2020.  The patient is seen with his son Georges September 10 noticing increasing bony discomfort primarily in the right hip following fairly intensive gardening which he does frequently.  Now has further elevation of PSA were wondering whether we should try to intervene sooner per additional antiandrogens.  We will need to further assess him via CT scanning to make this decision     These were obtained October 1 showing extensive sclerotic disease with no  metastatic disease in chest abdomen or pelvis.  PSA had gone from 15.8 September 3-16 0.6 October 1.     He still has pain getting up in the morning and after active gardening.  This is manageable with current pain medications and, at present, it is not apparent that he needs to switch medications to gain better control of his disease.  The patient did have follow-up studies done including a PSA November 25, 2020 now at 25.4.  The patient is seen with his son December 10, 2020 doing fair but having some increased pain in the mid sternum and right hip that he ascribes to additional exercise/work in managing his garden.  It is apparent however, that his last bone scan was 6 months ago and we do need to reassess him concerning this.     The patient had follow-up bone scan performed January 11, 2021 showing again uptake in the calvarium, humerus, right medial clavicle, sternum, ribs, spine, sacrum, pelvis, right acetabulum, proximal femora and now left frontal bone which appears new.  There is also mild increase in sternal uptake and equivocal increase in the pelvic lesions of all of the sacrum and the right proximal femur.     The patient is seen with his son January 18, 2021 and indicates that he is having pain gradually worsened in his right hip, mid chest that had been an issue previously.  These findings on bone scan are reviewed with both of them as likely the underlying culprit for his discomfort.  He would need change of the systemic therapy but, at present, will ask for radiation therapy palliation initially.  He was previously treated by Dr. Kulkarni many years ago and will ask her to see him.  We will also make application for the current cost of Xtandi or Zytiga.  The patient is not felt to be a candidate for systemic chemotherapy.     The patient was referred for ration therapy as we continued to assess his PSA on current therapy as well as exam the cost of Zytiga or Xtandi.  Radiation therapy (Dr. Kulkarni)  saw the patient January 26 and felt that the right femur and sternal lesion could benefit from therapy-3000 cGy in 10 fractions.  The patient took treatment February 1 to February 12 to the above areas and is next seen back March 15.  Fortunately his pain has improved dramatically and he is quite happy about this.  Unfortunately he notes some difficulty with swallowing that is temporary and often leads to increased salivation and mucus production.  Patient was asked to return his next assessed April 12, 2021.  He is able to swallow with less pain but still has excess mucus production and issues with salivation.  His PSA has noted increase but he is having no additional bony discomfort at this point and, additionally, has noted low-grade fevers just under 100 degrees at nighttime?.  His weight, appetite and general performance status have remained good to excellent.  We discussed follow-up studies at this point to determine his status.    Follow-up scans were scheduled CT scans of chest and pelvis 5/4/2021 showing osseous metastasis quite similar to previous examinations in the chest, CT of abdomen pelvis also with no acute intra-abdominal adenopathy no indication of abdominal pelvic metastatic disease but again widespread osseous metastasis.  His PSA at this point have been slowly increasing to a level of 44 on 4/12/2021.  As the patient is reassessed 5/10/2021 we find, fortunately, that he is not exceeding symptomatic.  It could make reasonable sense at this point to take the mediastinal nodes from his biopsy 4/9/2018, reassess potential targeted therapies, MSI status, and germline analysis.  Fortunately he is not having significant pain or discomfort and is seen with his son as we discussed the above plan.  Notably a follow-up PSA is found to be 42.6.   Patient is next seen in 6/7/2021 for Lupron and Xgeva.  Fortunately he is feeling considerably better according to both he and his son though his stamina has  reduced.  He is not having additional pain at this point.  We have also reviewed his genetics assessment which was significant only for a variant of unknown significance.  This is not an actionable abnormality.    As the patient's PSA further escalated increasing to 89 7/7/2021 his subsequent Axumin PET was obtained 8/20 demonstrating extensive osteosclerotic metastatic disease showing reduced uptake-typical finding but no evidence of visceral metastatic disease in the neck chest abdomen pelvis.  These findings are discussed with the patient and his son 8/25/2021 and indicative of his progressive disease-etiology of his rising PSA-though the patient is asymptomatic we have discussed moving to initiate Xtandi is available at 160 mg p.o. twice daily along with his current Lupron and Xgeva.        Past Medical History:   Diagnosis Date   • Bone cancer (CMS/HCC)    • Cellulitis of great toe of left foot 10/19/2018   • CKD (chronic kidney disease)     Stage 3; followed by Dr. Vicky Duran    • Diastolic dysfunction     GRADE II per echo 2018   • Difficulty breathing     during exertion   • Dyslipidemia    • Erectile dysfunction    • Fatigue    • History of blood transfusion    • History of kidney stones    • Hyperlipidemia    • Hypertension    • Hyponatremia    • Left ventricular hypertrophy     per echo 2018   • Localized edema    • Neuropathy    • Obstructive sleep apnea     USING CPAP   • Osteoarthritis    • Peptic ulcer    • Pneumonia    • Pneumonia of left upper lobe due to infectious organism 3/15/2019   • Prostate cancer (CMS/HCC)     Status post prostatectomy, radiation therapy, and hormone therapy followed by Dr. Lee; metastatsis to bone   • Pure hyperglyceridemia    • Restless leg syndrome    • Sepsis (CMS/HCC)    • Sinus bradycardia    • Transient cerebral ischemia    • Type 2 diabetes mellitus (CMS/HCC)         Past Surgical History:   Procedure Laterality Date   • BRONCHOSCOPY N/A 4/9/2018     Procedure: BRONCHOSCOPY;  Surgeon: Bijan Rivera III, MD;  Location: [facility] OR;  Service: Cardiothoracic   • COLONOSCOPY     • DEEP NECK LYMPH NODE BIOPSY / EXCISION     • HEMORRHOIDECTOMY     • MEDIASTINOSCOPY N/A 4/9/2018    Procedure: MEDIASTINOSCOPY WITH LYMPH NODE BIOPSY;  Surgeon: Bijan Rivera III, MD;  Location: [facility] MAIN OR;  Service: Cardiothoracic   • OTHER SURGICAL HISTORY      ulcer repair   • PROSTATECTOMY  2006        Current Outpatient Medications on File Prior to Visit   Medication Sig Dispense Refill   • albuterol sulfate  (90 Base) MCG/ACT inhaler Inhale 2 puffs Every 4 (Four) Hours As Needed for Wheezing or Shortness of Air. 1 inhaler 0   • aspirin 81 MG EC tablet Take 1 tablet by mouth daily.     • cholecalciferol (VITAMIN D3) 1000 units tablet Take 2,000 Units by mouth Daily.     • Denosumab (XGEVA SC) Inject  under the skin into the appropriate area as directed Every 30 (Thirty) Days.     • dilTIAZem CD (CARDIZEM CD) 120 MG 24 hr capsule TAKE 1 CAPSULE EVERY DAY 90 capsule 3   • fluticasone (FLONASE SENSIMIST) 27.5 MCG/SPRAY nasal spray 2 sprays into each nostril As Needed.     • gabapentin (NEURONTIN) 300 MG capsule Take 2 capsules by mouth every night at bedtime. 60 capsule 2   • HYDROcodone-acetaminophen (NORCO) 5-325 MG per tablet Take 1 tablet by mouth Every 6 (Six) Hours As Needed for Moderate Pain . 120 tablet 0   • insulin aspart (NovoLOG) 100 UNIT/ML patient supplied pump Inject  under the skin into the appropriate area as directed Continuous. Pt reports pump with basal rate 1.9 units/hr from 0800 to 1200. Basal rate 1.8 units/hr 1200 to 0800.  Checks bg ac/hs with bolus doses per pumps parameters     • Leuprolide Acetate, 3 Month, (LUPRON DEPOT, 3-MONTH, IM) Inject  into the appropriate muscle as directed by prescriber Every 3 (Three) Months.     • levothyroxine (SYNTHROID, LEVOTHROID) 88 MCG tablet      • modafinil (PROVIGIL) 200 MG tablet Take 1 tablet by mouth  Daily. 90 tablet 1   • olmesartan (BENICAR) 40 MG tablet TAKE 1 TABLET EVERY DAY (DISCONTINUE LOSARTAN 100MG) 90 tablet 3   • omeprazole (priLOSEC) 20 MG capsule Take 1 capsule by mouth every night at bedtime. 90 capsule 2   • pravastatin (PRAVACHOL) 40 MG tablet Take 40 mg by mouth daily.     • rOPINIRole (REQUIP) 0.5 MG tablet Take 1 tablet by mouth in the evening and 1 at bedtime. 180 tablet 2   • traZODone (DESYREL) 50 MG tablet Take  mg by mouth every night at bedtime.     • triamterene-hydrochlorothiazide (MAXZIDE-25) 37.5-25 MG per tablet TAKE 1 TABLET EVERY DAY 90 tablet 0     No current facility-administered medications on file prior to visit.        ALLERGIES:    Allergies   Allergen Reactions   • Niacin Unknown - Low Severity   • Statins Unknown - Low Severity        Social History     Socioeconomic History   • Marital status:      Spouse name: Not on file   • Number of children: Not on file   • Years of education: College   • Highest education level: Not on file   Tobacco Use   • Smoking status: Former Smoker     Packs/day: 1.00     Years: 30.00     Pack years: 30.00     Types: Cigarettes     Quit date: 1998     Years since quittin.6   • Smokeless tobacco: Never Used   Substance and Sexual Activity   • Alcohol use: No     Comment: Caffeine use: 2-3 cups daily   • Drug use: No   • Sexual activity: Defer        Family History   Problem Relation Age of Onset   • Heart failure Mother    • Hypertension Mother    • Diabetes Mother    • Heart disease Mother    • Lung cancer Father 46   • Hypertension Sister    • Lung cancer Sister 60   • Hypertension Brother    • Lung cancer Brother 62   • Heart disease Other    • Prostate cancer Brother 60   • Malig Hyperthermia Neg Hx         Review of Systems   Constitutional: Negative for fever.   HENT: Negative.    Eyes: Negative.    Respiratory: Negative for chest tightness, shortness of breath and wheezing.    Gastrointestinal: Negative for  "abdominal pain, constipation, diarrhea, nausea and vomiting.        Increasing reflux symptoms   Endocrine: Negative.    Genitourinary: Negative.    Musculoskeletal: Negative for arthralgias.   Skin: Negative.    Allergic/Immunologic: Negative.    Hematological: Negative.    Psychiatric/Behavioral: Positive for sleep disturbance.   All other systems reviewed and are negative.      .  Objective     Vitals:    08/25/21 1336   BP: 138/63   Pulse: 59   Resp: 18   SpO2: 96%   Weight: 88.5 kg (195 lb 3.2 oz)   Height: 165.1 cm (65\")   PainSc:   4   PainLoc: Abdomen     Current Status 8/25/2021   ECOG score 0       Physical Exam   Constitutional: He is oriented to person, place, and time.   HENT:   Head: Normocephalic and atraumatic.   Nose: Nose normal.   Mouth/Throat: Mucous membranes are moist. Oropharynx is clear.   Eyes: Pupils are equal, round, and reactive to light. Conjunctivae are normal.   Cardiovascular: Normal rate, regular rhythm, normal heart sounds and normal pulses.   Pulmonary/Chest: Effort normal and breath sounds normal.   Abdominal: Soft. Normal appearance and bowel sounds are normal.   Musculoskeletal: Normal range of motion.   Neurological: He is alert and oriented to person, place, and time.   Skin: Skin is warm and dry.   Psychiatric: His behavior is normal. Mood normal.     RECENT LABS:  Hematology WBC   Date Value Ref Range Status   08/25/2021 6.11 3.40 - 10.80 10*3/mm3 Final     RBC   Date Value Ref Range Status   08/25/2021 3.28 (L) 4.14 - 5.80 10*6/mm3 Final     Hemoglobin   Date Value Ref Range Status   08/25/2021 9.4 (L) 13.0 - 17.7 g/dL Final     Hematocrit   Date Value Ref Range Status   08/25/2021 30.1 (L) 37.5 - 51.0 % Final     Platelets   Date Value Ref Range Status   08/25/2021 176 140 - 450 10*3/mm3 Final      NUCLEAR MEDICINE WHOLE BODY BONE SCAN 1/11/20221    FINDINGS:  There is multifocal osseous metastatic disease with abnormal  uptake throughout the axial and appendicular " skeleton involving the left  calvarium, left proximal humerus, right medial clavicle, sternum, ribs,  spine, sacrum, pelvis, right acetabulum, both proximal femora. Left  frontal bone uptake appears to be new when compared to the previous  exam. There has been mild progression in degree of sternal uptake and  there is equivocal increase in intensity of uptake involving pelvic  lesions including the left sacrum and right proximal femur.      IMPRESSION:  Multifocal osseous metastatic disease with some evidence for  progression when compared to the prior exam 06/03/2020. There is a new  small lesion overlying the left frontal bone.           CT CHEST W CONTRAST DIAGNOSTIC-, CT ABDOMEN PELVIS W CONTRAST-5/4/2021    CT CHEST FINDINGS: Stable normal cardiac size, no pericardial  abnormality. Esophagus is normal in course and caliber. No mediastinal  or hilar mass/lymphadenopathy has developed. 5 mm pleural-based nodule  along the periphery of the right lower lobe is again seen on image #73,  the lungs are otherwise clear. No pleural effusion or pulmonary edema.     Widespread osseous metastasis quite similar to the previous examination.     CONCLUSION: There is again widespread osseous metastasis, no acute  intrathoracic abnormality or intrathoracic metastatic deposit.     CT OF THE ABDOMEN AND PELVIS FINDINGS: The liver, pancreas, gallbladder,  spleen and adrenal glands are satisfactory in appearance. No biliary  duct dilatation. The stomach is collapsed, contour is normal. Small  renal cysts, the kidneys are otherwise within normal limits. No calculus  nor obstructive uropathy. Diameter of the aorta is normal. Urinary  bladder is satisfactory in appearance.     Prostatectomy site is stable and satisfactory. There is diverticulosis  of the colon.     The appendix and small bowel have a satisfactory appearance. No  abdominal or pelvic lymphadenopathy. Widespread osseous metastasis  similar to the previous examination.      CONCLUSION: No acute intra-abdominal abnormality nor indication of  abdominal/pelvic metastatic disease. There is again widespread osseous  Metastasis.    F-18 FLUCICLOVINE PET/CT OF THE NECK, CHEST, ABDOMEN AND PELVIS     IMPRESSION:  Extensive osteosclerotic  metastatic disease throughout the  skeleton which demonstrates very little, if any uptake of fluciclovine  which is a typical finding. There is moderately intense uptake within  the nonosteosclerotic bone marrow that may be due to hyperplasia or  nonosteosclerotic metastatic disease. Otherwise unremarkable  fluciclovine PET/CT imaging. There is no evidence of visceral metastatic  disease within the neck, chest, abdomen or pelvis.              Assessment/Plan       80 year-old male with medical history including prostate carcinoma, CK D3, long-term tobacco use recent development of hoarseness and subsequent studies that demonstrated findings consistent with metastatic disease to bone as well as multiple enlarged metastatic lymph nodes, and potential abnormality in the right lateral wall trachea.  His additional history includes type 2 diabetes on insulin pump which has been more effective for control of his blood sugars.    His recent symptoms have included generalized discomfort in multiple sites in his bony skeleton as well as right lower quadrant abdominal pain.  We discussed this made will improve after he starts Firmagon in the a.m.  Discussions with Dr. Rivera also have led to the conclusion that he'll need bronchoscopy and mediastinoscopy which did proceed as above on April 9, 2018.  Pathology revealed mediastinal lymph nodes to be involved with metastatic adenocarcinoma consistent with origin from a prostatic primary . The patient's case was discussed at thoracic conference and recommendations were to continue with ADT and radiation for symptomatic sites.  The patient is seen back April 23 his treatment with ADT-Firmagon-has improved his symptomatic  performance status to near baseline.  It is not felt that he'll require other treatments such as oral antiandrogens at this point.  He could, however, potentially benefit from agents such as Xgeva given at appropriate intervals. We went on to continue with Xgeva and rescan him July 23 demonstrating near remission developing.  This is also supported by his normalizing PSA.  The patient's scans demonstrated possibly chronic appendicitis is not having symptoms referable to this.he plans to be alert to the possibility of stenosis family member.  We discussed moving to an every 6 month treatment but at this point we'll continue at 3 months. As result we continued Lupron and Xgeva on schedule and is seen back now 6 months later continuing to generally improve.     We will continue the patient's treatment and is done well with no further symptoms until recently admitted mid March with upper lobe pneumonia.  Review of his studies during that visit fortunately do not demonstrate bony metastasis, additional mediastinal adenopathy, pleural effusions or masses.  It is felt that his metastatic prostate carcinoma remains controlled.  We planned, as a result to continue Xgeva and Lupron and the patient is now seen back continuing to do well.  We went on to discuss Lupron and Xgeva, medications including Neurontin and Requip which have been helpful and the patient is next seen December 13, 2019 again with symptoms control.  His PSA went on to be measured at 2.9.  Would like to continue same and have him return for follow-up in the further progressive consider adding additional oral therapy to her treatment.   We continued Xgeva and Lupron and later contacted the patient about his PSA of 5.030.  Follow-up scans were scheduled prior to his visit in 3 months but they have not been done at 2 months at May 8 when the patient called about increasing hip pain.  These scans do not demonstrate progression of disease in any significant way.   We have gone on to review the scans together and his overall symptoms and plan to try to address his additional sleep issues, restless legs, pain management as necessary and further diagnostics.  The patient's Neurontin was increased and did improve his symptoms.  He is next seen with follow-up bone scan that does not demonstrate worsening of bone nor need for localized radiation therapy.  The patient's PSA has been slowly rising and we have not been able to determine worsening of disease and and, therefore, it is not felt warranted add additional medications such as Xtandi or apalutamide to his therapy.  Please note would like to avoid Zytiga try not to add prednisone which would worsen his blood sugar control.  Is next seen September 9, 2020 with PSA that has increased to 15.8 though the patient remains reasonably asymptomatic and chest x-ray is otherwise negative.  We have discussed continue Neurontin and trazodone, follow-up CT scan series in the next several months to determine whether he has additional disease progressing that can be documented other than the PSA alone.     As he is reassessed September 10 he is having slowly increasing bony discomfort and his PSA has now gone to just over 15.  We have discussed repeat assessment requesting CT scanning.     These scans ultimately reveal no evidence of progression of disease for visceral involvement or clearly for bone involvement.  After further review with the patient and his son we proceeded with Xgeva, continued medications and had him reviewed for Xtandi which was found to be cost prohibitive.  His follow-up PSA has demonstrated gradual increase in his review December 10, 2020 with some mild increase in bony discomfort.  After repeat discussion we went on to have him undergo Lupron therapy, and his PSA actually to be mildly reduced at 22.2.  Follow-up bone scan demonstrates some evidence of progression in sternum, left sacrum and proximal femur.  These  findings were reviewed with the patient and his son January 10, 2020 and the patient was referred for palliative radiotherapy(Dr. Kulkarni) who saw the patient January 26 and felt that the right femur and sternal lesion could benefit from therapy-3000 cGy in 10 fractions.  The patient took treatment February 1 to February 12 to the above areas with significant symptom improvement.     The patient's neck seen back March 15, 2021 with symptoms of increasing reflux.  We proceeded with Xgeva and Lupron and noted his PSA to be elevated to 38.5.  The patient was seen April 12, 2021 is having low-grade fevers without significant symptoms, no cough, no chest pain, no shortness of breath.  The patient return for follow-up as we have continued Xgeva monthly and Lupron every 3 months.  Follow-up CT chest abdomen pelvis 5/4/2021 - for progression of disease and follow-up PSA 5 10/2021 is at 42 slightly reduced from previous.  We have discussed how to proceed from here and find a significant family history that clearly supports a potential genetic assessment for his malignancy.        It is felt most reasonable at this point to take the mediastinal nodes from his biopsy 4/9/2018 and reassess for potential targeted therapies, MSI status, as well as germline analysis.    The patient then seen 6/7/2021 with his analysis completed for actionable mutations including germline mutations.  These exams were negative though there is a variant of unknown significance.  Again this is not an actionable mutation.    We proceeded with additional Xgeva and Lupron planning for monthly Xgeva and Lupron at 3 months.    Unfortunately patient subsequent PSA huber to 89 7/7/2021 and subsequent testing with Axumin PET was performed confirming extensive osteosclerotic metastatic disease with little uptake, no evidence of visceral metastatic disease in neck chest abdomen or pelvis.    Patient seen in office 8/25/2021, PET/CT reviewed, subsequent PSA  increased to 105.  Patient fortunately asymptomatic.    After discussion plan:    *Request reevaluation for Xtandi 160 mg p.o. daily-prescription to [facility], seek internal assistance as previously suggested    *Keep scheduled appointment 9/8/2021 for NP or MD, Lupron and Pallavi      I spent 50 minutes caring for Semaj on this date of service. This time includes time spent by me in the following activities: preparing for the visit, reviewing tests, obtaining and/or reviewing a separately obtained history, performing a medically appropriate examination and/or evaluation, counseling and educating the patient/family/caregiver, ordering medications, tests, or procedures, documenting information in the medical record, independently interpreting results and communicating that information with the patient/family/caregiver and care coordination.

## 2021-08-25 ENCOUNTER — OFFICE VISIT (OUTPATIENT)
Dept: ONCOLOGY | Facility: CLINIC | Age: 81
End: 2021-08-25

## 2021-08-25 ENCOUNTER — LAB (OUTPATIENT)
Dept: LAB | Facility: HOSPITAL | Age: 81
End: 2021-08-25

## 2021-08-25 VITALS
DIASTOLIC BLOOD PRESSURE: 63 MMHG | WEIGHT: 195.2 LBS | OXYGEN SATURATION: 96 % | SYSTOLIC BLOOD PRESSURE: 138 MMHG | RESPIRATION RATE: 18 BRPM | HEART RATE: 59 BPM | BODY MASS INDEX: 32.52 KG/M2 | HEIGHT: 65 IN

## 2021-08-25 DIAGNOSIS — C79.51 OSSEOUS METASTASIS: ICD-10-CM

## 2021-08-25 DIAGNOSIS — C79.51 PROSTATE CANCER METASTATIC TO BONE (HCC): ICD-10-CM

## 2021-08-25 DIAGNOSIS — C61 PROSTATE CANCER METASTATIC TO BONE (HCC): ICD-10-CM

## 2021-08-25 DIAGNOSIS — N18.30 MALIGNANT HYPERTENSIVE HEART AND CHRONIC KIDNEY DISEASE STAGE III (HCC): Primary | ICD-10-CM

## 2021-08-25 DIAGNOSIS — I13.10 MALIGNANT HYPERTENSIVE HEART AND CHRONIC KIDNEY DISEASE STAGE III (HCC): Primary | ICD-10-CM

## 2021-08-25 DIAGNOSIS — C79.51 OSSEOUS METASTASIS: Primary | ICD-10-CM

## 2021-08-25 LAB
BASOPHILS # BLD AUTO: 0.03 10*3/MM3 (ref 0–0.2)
BASOPHILS NFR BLD AUTO: 0.5 % (ref 0–1.5)
DEPRECATED RDW RBC AUTO: 53.1 FL (ref 37–54)
EOSINOPHIL # BLD AUTO: 0.14 10*3/MM3 (ref 0–0.4)
EOSINOPHIL NFR BLD AUTO: 2.3 % (ref 0.3–6.2)
ERYTHROCYTE [DISTWIDTH] IN BLOOD BY AUTOMATED COUNT: 15.9 % (ref 12.3–15.4)
HCT VFR BLD AUTO: 30.1 % (ref 37.5–51)
HGB BLD-MCNC: 9.4 G/DL (ref 13–17.7)
IMM GRANULOCYTES # BLD AUTO: 0.07 10*3/MM3 (ref 0–0.05)
IMM GRANULOCYTES NFR BLD AUTO: 1.1 % (ref 0–0.5)
LYMPHOCYTES # BLD AUTO: 1.23 10*3/MM3 (ref 0.7–3.1)
LYMPHOCYTES NFR BLD AUTO: 20.1 % (ref 19.6–45.3)
MCH RBC QN AUTO: 28.7 PG (ref 26.6–33)
MCHC RBC AUTO-ENTMCNC: 31.2 G/DL (ref 31.5–35.7)
MCV RBC AUTO: 91.8 FL (ref 79–97)
MONOCYTES # BLD AUTO: 0.63 10*3/MM3 (ref 0.1–0.9)
MONOCYTES NFR BLD AUTO: 10.3 % (ref 5–12)
NEUTROPHILS NFR BLD AUTO: 4.01 10*3/MM3 (ref 1.7–7)
NEUTROPHILS NFR BLD AUTO: 65.7 % (ref 42.7–76)
NRBC BLD AUTO-RTO: 0 /100 WBC (ref 0–0.2)
PLATELET # BLD AUTO: 176 10*3/MM3 (ref 140–450)
PMV BLD AUTO: 8.9 FL (ref 6–12)
PSA SERPL-MCNC: 105 NG/ML (ref 0–4)
RBC # BLD AUTO: 3.28 10*6/MM3 (ref 4.14–5.8)
WBC # BLD AUTO: 6.11 10*3/MM3 (ref 3.4–10.8)

## 2021-08-25 PROCEDURE — 36415 COLL VENOUS BLD VENIPUNCTURE: CPT

## 2021-08-25 PROCEDURE — 84153 ASSAY OF PSA TOTAL: CPT | Performed by: INTERNAL MEDICINE

## 2021-08-25 PROCEDURE — 85025 COMPLETE CBC W/AUTO DIFF WBC: CPT

## 2021-08-25 PROCEDURE — 99215 OFFICE O/P EST HI 40 MIN: CPT | Performed by: INTERNAL MEDICINE

## 2021-08-26 ENCOUNTER — TELEPHONE (OUTPATIENT)
Dept: PHARMACY | Facility: HOSPITAL | Age: 81
End: 2021-08-26

## 2021-08-26 NOTE — TELEPHONE ENCOUNTER
[facility] SILVEIRA approved from 8/26/2021 to 2/22/22.     Susan Bauman - Care Coordinator   8/26/2021  09:19 EDT

## 2021-08-26 NOTE — TELEPHONE ENCOUNTER
Jose Lee MD Spencer, Catherine, PharmD  Yes, that is fine, MDK           Previous Messages       ----- Message -----   From: Xochilt Brink, PharmD   Sent: 8/26/2021   3:44 PM EDT   To: Jose Lee MD, *     Dr. Lee,     Looks like we can get this medication to him as soon as Tuesday, 8/31.     Patient is wanting to wait for education/medication start until his next office visit on 9/8. Are you okay with this, or would you like him to start sooner?     Thanks!   -meredith       ----- Message from Susan Bauman, Pharmacy Technician sent at 8/26/2021  8:02 AM EDT -----  Ok. Thank you Shirley. I'll take it from here.     Susan Bauman - Care Coordinator   8/26/2021  08:02 EDT      ----- Message -----  From: Shirley Russell  Sent: 8/26/2021   7:37 AM EDT  To: Jose Lee MD, #    Meredith/Susan-See Dr Lee's Message below.    I researched this in January and he had a high copay (roughly $1000). The medication at that time need a PA through [employer]. Prostate funding is still closed-internal assistance will be needed.    ----- Message -----  From: Jose Lee MD  Sent: 8/25/2021   9:15 PM EDT  To: Shirley Watson-we had previously discussed this patient in the use of Xtandi at 160 mg daily.  It was cost prohibitive but we could possibly seek assistance.  He now has progressive disease to the point of requiring this medications.  Could you reevaluate him for this possibly?.  ASAEL Coburn

## 2021-08-27 DIAGNOSIS — C61 PROSTATE CANCER METASTATIC TO BONE (HCC): Primary | ICD-10-CM

## 2021-08-27 DIAGNOSIS — C79.51 PROSTATE CANCER METASTATIC TO BONE (HCC): Primary | ICD-10-CM

## 2021-08-27 RX ORDER — ONDANSETRON HYDROCHLORIDE 8 MG/1
8 TABLET, FILM COATED ORAL 3 TIMES DAILY PRN
Qty: 30 TABLET | Refills: 5 | Status: SHIPPED | OUTPATIENT
Start: 2021-08-27

## 2021-09-01 ENCOUNTER — APPOINTMENT (OUTPATIENT)
Dept: ONCOLOGY | Facility: HOSPITAL | Age: 81
End: 2021-09-01

## 2021-09-01 ENCOUNTER — APPOINTMENT (OUTPATIENT)
Dept: LAB | Facility: HOSPITAL | Age: 81
End: 2021-09-01

## 2021-09-03 ENCOUNTER — DOCUMENTATION (OUTPATIENT)
Dept: PHARMACY | Facility: HOSPITAL | Age: 81
End: 2021-09-03

## 2021-09-03 NOTE — PROGRESS NOTES
Patient qualifies for the [facility] internal nathen so co-pay will be $0.     Med is being shipped to the [facility] office in time for his 9/8/21 Oral chemo edu appt.     Per Radha Brito, Destin arrived and is in the [facility].     Susan Bauman - Care Coordinator   9/3/2021  13:28 EDT

## 2021-09-08 ENCOUNTER — LAB (OUTPATIENT)
Dept: LAB | Facility: HOSPITAL | Age: 81
End: 2021-09-08

## 2021-09-08 ENCOUNTER — CLINICAL SUPPORT (OUTPATIENT)
Dept: ONCOLOGY | Facility: HOSPITAL | Age: 81
End: 2021-09-08

## 2021-09-08 ENCOUNTER — TELEMEDICINE (OUTPATIENT)
Dept: ONCOLOGY | Facility: CLINIC | Age: 81
End: 2021-09-08

## 2021-09-08 ENCOUNTER — SPECIALTY PHARMACY (OUTPATIENT)
Dept: ONCOLOGY | Facility: HOSPITAL | Age: 81
End: 2021-09-08

## 2021-09-08 ENCOUNTER — INFUSION (OUTPATIENT)
Dept: ONCOLOGY | Facility: HOSPITAL | Age: 81
End: 2021-09-08

## 2021-09-08 VITALS
OXYGEN SATURATION: 99 % | TEMPERATURE: 97.1 F | DIASTOLIC BLOOD PRESSURE: 74 MMHG | RESPIRATION RATE: 16 BRPM | BODY MASS INDEX: 31.99 KG/M2 | SYSTOLIC BLOOD PRESSURE: 134 MMHG | WEIGHT: 192 LBS | HEIGHT: 65 IN | HEART RATE: 52 BPM

## 2021-09-08 VITALS
OXYGEN SATURATION: 99 % | HEART RATE: 52 BPM | WEIGHT: 192 LBS | DIASTOLIC BLOOD PRESSURE: 74 MMHG | HEIGHT: 65 IN | BODY MASS INDEX: 31.99 KG/M2 | RESPIRATION RATE: 16 BRPM | TEMPERATURE: 97.1 F | SYSTOLIC BLOOD PRESSURE: 134 MMHG

## 2021-09-08 DIAGNOSIS — C61 PROSTATE CANCER METASTATIC TO BONE (HCC): Primary | ICD-10-CM

## 2021-09-08 DIAGNOSIS — C61 PROSTATE CANCER METASTATIC TO BONE (HCC): ICD-10-CM

## 2021-09-08 DIAGNOSIS — C79.51 OSSEOUS METASTASIS: ICD-10-CM

## 2021-09-08 DIAGNOSIS — C79.51 PROSTATE CANCER METASTATIC TO BONE (HCC): Primary | ICD-10-CM

## 2021-09-08 DIAGNOSIS — E87.5 HYPERKALEMIA: Primary | ICD-10-CM

## 2021-09-08 DIAGNOSIS — C79.51 PROSTATE CANCER METASTATIC TO BONE (HCC): ICD-10-CM

## 2021-09-08 DIAGNOSIS — N18.30 STAGE 3 CHRONIC KIDNEY DISEASE, UNSPECIFIED WHETHER STAGE 3A OR 3B CKD (HCC): ICD-10-CM

## 2021-09-08 LAB
ALBUMIN SERPL-MCNC: 4.1 G/DL (ref 3.5–5.2)
ALBUMIN/GLOB SERPL: 1.3 G/DL (ref 1.1–2.4)
ALP SERPL-CCNC: 399 U/L (ref 38–116)
ALT SERPL W P-5'-P-CCNC: 13 U/L (ref 0–41)
ANION GAP SERPL CALCULATED.3IONS-SCNC: 9.7 MMOL/L (ref 5–15)
AST SERPL-CCNC: 17 U/L (ref 0–40)
BASOPHILS # BLD AUTO: 0.05 10*3/MM3 (ref 0–0.2)
BASOPHILS NFR BLD AUTO: 0.8 % (ref 0–1.5)
BILIRUB SERPL-MCNC: <0.2 MG/DL (ref 0.2–1.2)
BUN SERPL-MCNC: 28 MG/DL (ref 6–20)
BUN/CREAT SERPL: 26.2 (ref 7.3–30)
CALCIUM SPEC-SCNC: 8.6 MG/DL (ref 8.5–10.2)
CHLORIDE SERPL-SCNC: 112 MMOL/L (ref 98–107)
CO2 SERPL-SCNC: 19.3 MMOL/L (ref 22–29)
CREAT SERPL-MCNC: 1.07 MG/DL (ref 0.7–1.3)
DEPRECATED RDW RBC AUTO: 56.5 FL (ref 37–54)
EOSINOPHIL # BLD AUTO: 0.16 10*3/MM3 (ref 0–0.4)
EOSINOPHIL NFR BLD AUTO: 2.5 % (ref 0.3–6.2)
ERYTHROCYTE [DISTWIDTH] IN BLOOD BY AUTOMATED COUNT: 16.5 % (ref 12.3–15.4)
GFR SERPL CREATININE-BSD FRML MDRD: 66 ML/MIN/1.73
GLOBULIN UR ELPH-MCNC: 3.1 GM/DL (ref 1.8–3.5)
GLUCOSE SERPL-MCNC: 157 MG/DL (ref 74–124)
HCT VFR BLD AUTO: 31.8 % (ref 37.5–51)
HGB BLD-MCNC: 9.5 G/DL (ref 13–17.7)
IMM GRANULOCYTES # BLD AUTO: 0.14 10*3/MM3 (ref 0–0.05)
IMM GRANULOCYTES NFR BLD AUTO: 2.2 % (ref 0–0.5)
LYMPHOCYTES # BLD AUTO: 1.97 10*3/MM3 (ref 0.7–3.1)
LYMPHOCYTES NFR BLD AUTO: 30.3 % (ref 19.6–45.3)
MAGNESIUM SERPL-MCNC: 2.7 MG/DL (ref 1.8–2.5)
MCH RBC QN AUTO: 28.2 PG (ref 26.6–33)
MCHC RBC AUTO-ENTMCNC: 29.9 G/DL (ref 31.5–35.7)
MCV RBC AUTO: 94.4 FL (ref 79–97)
MONOCYTES # BLD AUTO: 0.56 10*3/MM3 (ref 0.1–0.9)
MONOCYTES NFR BLD AUTO: 8.6 % (ref 5–12)
NEUTROPHILS NFR BLD AUTO: 3.62 10*3/MM3 (ref 1.7–7)
NEUTROPHILS NFR BLD AUTO: 55.6 % (ref 42.7–76)
NRBC BLD AUTO-RTO: 0 /100 WBC (ref 0–0.2)
PHOSPHATE SERPL-MCNC: 1.9 MG/DL (ref 2.5–4.5)
PLATELET # BLD AUTO: 191 10*3/MM3 (ref 140–450)
PMV BLD AUTO: 9.5 FL (ref 6–12)
POTASSIUM SERPL-SCNC: 6.4 MMOL/L (ref 3.5–4.7)
PROT SERPL-MCNC: 7.2 G/DL (ref 6.3–8)
PSA SERPL-MCNC: 112 NG/ML (ref 0–4)
RBC # BLD AUTO: 3.37 10*6/MM3 (ref 4.14–5.8)
SODIUM SERPL-SCNC: 141 MMOL/L (ref 134–145)
WBC # BLD AUTO: 6.5 10*3/MM3 (ref 3.4–10.8)

## 2021-09-08 PROCEDURE — 85025 COMPLETE CBC W/AUTO DIFF WBC: CPT

## 2021-09-08 PROCEDURE — 93010 ELECTROCARDIOGRAM REPORT: CPT | Performed by: INTERNAL MEDICINE

## 2021-09-08 PROCEDURE — 93000 ELECTROCARDIOGRAM COMPLETE: CPT | Performed by: NURSE PRACTITIONER

## 2021-09-08 PROCEDURE — 36415 COLL VENOUS BLD VENIPUNCTURE: CPT

## 2021-09-08 PROCEDURE — 25010000002 LEUPROLIDE ACETATE (3 MONTH) PER 7.5 MG: Performed by: NURSE PRACTITIONER

## 2021-09-08 PROCEDURE — 83735 ASSAY OF MAGNESIUM: CPT

## 2021-09-08 PROCEDURE — 80053 COMPREHEN METABOLIC PANEL: CPT

## 2021-09-08 PROCEDURE — 96402 CHEMO HORMON ANTINEOPL SQ/IM: CPT

## 2021-09-08 PROCEDURE — 99215 OFFICE O/P EST HI 40 MIN: CPT | Performed by: NURSE PRACTITIONER

## 2021-09-08 PROCEDURE — 84153 ASSAY OF PSA TOTAL: CPT | Performed by: INTERNAL MEDICINE

## 2021-09-08 PROCEDURE — 93005 ELECTROCARDIOGRAM TRACING: CPT | Performed by: NURSE PRACTITIONER

## 2021-09-08 PROCEDURE — 84100 ASSAY OF PHOSPHORUS: CPT

## 2021-09-08 RX ORDER — GUAIFENESIN 200 MG/10ML
20 LIQUID ORAL
COMMUNITY
End: 2021-12-12

## 2021-09-08 RX ADMIN — LEUPROLIDE ACETATE 22.5 MG: KIT at 14:46

## 2021-09-08 NOTE — PROGRESS NOTES
Subjective      REASONS FOR FOLLOW UP:   1.  Metastatic prostate cancer    HISTORY OF PRESENT ILLNESS:  Patient is a very pleasant 80-year-old male with the above-mentioned history who is here today for lab review and evaluation due for Lupron and Xgeva.  He is receiving Lupron every 3 month.  He reports some issues with hot flashes.  He is also complaining of issues with neuropathy, primarily at night.  He states it causes him to have difficulty falling asleep.  He does take gabapentin 600 mg at bedtime, along with Requip and trazodone.  He has no new complaints of pain today.  He is scheduled to have his education with [facility] pharmacist today for Xtandi.    Hematologic/oncologic history:   The patient is an 80-year-old male with significant past medical history including prostate carcinoma, CKD 3 and long-term tobacco use be been seen by primary care in late January, 2018 for hoarseness.  Chest x-ray is now outpatient January 23 revealed sclerotic change in the posterior mid chest to be within 3 adjacent thoracic ertebral bodies of uncertain significance.  A follow-up chest CT revealed numerous sclerotic foci within al visualized bones consistent with metastatic disease, multiple enlarged mediastinal lymph nodes concerning for metastatic lymphadenopathy and a polypoidal abnormality in the right lateral wall of the trachea.  CT of abdomen March 20 revealed extensive osseous metastatic disease mildly enlarged retroperitoneal lymph nodes.  Bone scan March 20 was consistent with widespread osseous metastasis particularly in the proximal half the left humeral shaft, abnormal uptake in the sternum and manubrium and a small focus in the posterior aspect of the calvarium.  This led to a plain film the left humerus with mottled sclerosis involving the shaft of the humerus particularly in the proximal half but no evidence of pathologic fracture.    The patient has additionally type 2 diabetes on insulin pump, again a history  of prostate carcinoma prostatectomy 12 years previously, hypertension, and hyperlipidemia.  Additional studies included a PSA of 395.1 from March 14, 2018.The patient's been seen by urology March 15 with plans to start Firmagon.    The patient is now seen in pulmonary clinic with Dr. Bijan Rivera and we have discussed that he will need bronchoscopy and mediastinoscopy.  Interestingly his additional history includes a long occupational exposure including working in the [place] just out of school, subsequent construction work for many years and a 30-pack-year history of smoking stopping in the late 1990s.  He indicates that he followed with Dr. Guillen of urology for many years with no changes in his exams and normal PSAs.  He stopped this follow-up approximately 2 years ago.     Patient was seen in consultation with thoracic surgery on March 28 and plans are made for mediastinoscopy and bronchoscopy with lymph node biopsy.  Pathology revealed that these nodes were consistent with metastatic prostate carcinoma.  He was discussed at thoracic conference.  A PET/CT was not pursued and it was determined that he see medical oncology for hormonal treatment and radiation therapy if symptomatic.  It should be noted that the patient's March 15 [facility] office note describes that Firmagon was planned.     The patient has met with the son and grandson April 23.  We have also contacted Dr. Taylor of urology in that Firmagon was given just after his last visit and would next be due approximately May 3.  Patient feels considerably better with resolution of his pain, normalization of his performance status.  He would like to continue treatment through our office now contacted Dr. Taylor concerning this.    The patient is next seen June 11, 2018 we discussed his follow-up including his treatments with Lupron May 7 and his next treatment on July 30.  He has returned to essentially normal performance status  and his PSA has dropped further from 395 March 14 27.8 May 7 and now 2.03 June 11.  We do plan to initiate Xgeva also study him via scans to document his improvement approximately a month from now.   The patient is next seen July 26, 2018.  Repeat imaging demonstrated interval resolution of mediastinal and retroperitoneal lymphadenopathy.  There is thickening in the distal aspect of the appendix with stranding?  Chronic appendicitis.  The patient indicates he is having no such symptoms and never has.  There is no change in sclerotic bony metastasis in the patient's PSA has dropped substantially to 1.66 by July 19 and 1.79 July 26.  He is actually feeling excellent and this is corroborated by family members.   Patient is next seen January 10, 2019 continuing to do very well and, in fact indicating that [facility], likely hearing replacements, visual review.  He is not certain is going to continue his care with them or with us or combination of both.    Patient is next seen April 4, 2019.  He is recently discharged after hospitalization March 15-18 with left upper lobe pneumonia.  We reviewed the findings in detail with his gradual recovery now documented.  His studies include a CT of chest which does not demonstrate any further bony lesions and/or pulmonary metastasis having developed.  He is feeling considerably better and approximately back to his baseline.  The patient is next seen June 28, 2019 feeling well but having baseline generally musculoskeletal pain and restless legs.His recent exams include a PSA of 0.81, CMP with potassium of 5.3 with repeat pending.  His performance status overall remains good to very good and is clearly responding to his treatments.  The patient is next seen December 13, 2019 continue to feel well.  He has had, oddly enough, 2 episodes of sleep paralysis which have occurred to him previously and he wonders if there is any connection  with his prostate carcinoma though this is felt unlikely.     The patient when assessed in December had his PSA go to 2.9.  We continue with Lupron and Xgeva and is seen back now March 9, 2020 without additional symptoms.  We discussed that a schedule could likely change moving to 3 months for both of these medications particularly if he needs additional therapy directed at progressive disease.     Patient contacted our practice May 4 concern for pain in his hips.  This led to moving his scans up and they were performed May 8, 2020 showing a sub-6 mm pleural-based pulmonary nodule in the right lower lobe that is new from March 15, 2018, remainder of the sub-6 mm pulmonary nodules bilaterally are stable.  There is extensive osseous metastatic disease as previous with no other new findings.     The patient indicates, when seen, May 13 that his bone pain is now resolved.  While this is good information does not mean that he does not have further bony metastasis and we have discussed that a follow-up bone scan is likely necessary.  Furthermore he is having some difficulty with sleeplessness and increase in restless legs as he continues to stay at home during the COVID 19 crisis which, itself, is producing more anxiety for him.  A follow-up bone scan was obtained Lety 3 revealing multifocal osseous metastatic disease which was compared to March 2, 2018 with improvement.  No new abnormal uptake is present.  He is next seen with us on June 17, 2020 and, fortunately, is not having any significant pain at this point.  We discussed his scans in detail and, on balance, his performance status also remains improved.     It was elected to follow the patient rather than changes antiandrogen therapy.  He is reassessed September 3 with unchanged CMP, H&H of 11.4 and 36.3 with normal white count, platelet count and differential, PSA at 15.8.  Follow-up PA lateral chest x-ray does not show any new abnormalities though there is  extensive metastatic bone disease throughout the bony thorax and osteoblastic metastasis have been seen on chest CT May 2020.  The patient is seen with his son Georges September 10 noticing increasing bony discomfort primarily in the right hip following fairly intensive gardening which he does frequently.  Now has further elevation of PSA were wondering whether we should try to intervene sooner per additional antiandrogens.  We will need to further assess him via CT scanning to make this decision     These were obtained October 1 showing extensive sclerotic disease with no metastatic disease in chest abdomen or pelvis.  PSA had gone from 15.8 September 3-16 0.6 October 1.     He still has pain getting up in the morning and after active gardening.  This is manageable with current pain medications and, at present, it is not apparent that he needs to switch medications to gain better control of his disease.  The patient did have follow-up studies done including a PSA November 25, 2020 now at 25.4.  The patient is seen with his son December 10, 2020 doing fair but having some increased pain in the mid sternum and right hip that he ascribes to additional exercise/work in managing his garden.  It is apparent however, that his last bone scan was 6 months ago and we do need to reassess him concerning this.     The patient had follow-up bone scan performed January 11, 2021 showing again uptake in the calvarium, humerus, right medial clavicle, sternum, ribs, spine, sacrum, pelvis, right acetabulum, proximal femora and now left frontal bone which appears new.  There is also mild increase in sternal uptake and equivocal increase in the pelvic lesions of all of the sacrum and the right proximal femur.     The patient is seen with his son January 18, 2021 and indicates that he is having pain gradually worsened in his right hip, mid chest that had been an issue previously.  These findings on bone scan are reviewed with both of them  as likely the underlying culprit for his discomfort.  He would need change of the systemic therapy but, at present, will ask for radiation therapy palliation initially.  He was previously treated by Dr. Kulkarni many years ago and will ask her to see him.  We will also make application for the current cost of Xtandi or Zytiga.  The patient is not felt to be a candidate for systemic chemotherapy.     The patient was referred for ration therapy as we continued to assess his PSA on current therapy as well as exam the cost of Zytiga or Xtandi.  Radiation therapy (Dr. Kulkarni) saw the patient January 26 and felt that the right femur and sternal lesion could benefit from therapy-3000 cGy in 10 fractions.  The patient took treatment February 1 to February 12 to the above areas and is next seen back March 15.  Fortunately his pain has improved dramatically and he is quite happy about this.  Unfortunately he notes some difficulty with swallowing that is temporary and often leads to increased salivation and mucus production.  Patient was asked to return his next assessed April 12, 2021.  He is able to swallow with less pain but still has excess mucus production and issues with salivation.  His PSA has noted increase but he is having no additional bony discomfort at this point and, additionally, has noted low-grade fevers just under 100 degrees at nighttime?.  His weight, appetite and general performance status have remained good to excellent.  We discussed follow-up studies at this point to determine his status.    Follow-up scans were scheduled CT scans of chest and pelvis 5/4/2021 showing osseous metastasis quite similar to previous examinations in the chest, CT of abdomen pelvis also with no acute intra-abdominal adenopathy no indication of abdominal pelvic metastatic disease but again widespread osseous metastasis.  His PSA at this point have been slowly increasing to a level of 44 on 4/12/2021.  As the patient is reassessed  5/10/2021 we find, fortunately, that he is not exceeding symptomatic.  It could make reasonable sense at this point to take the mediastinal nodes from his biopsy 4/9/2018, reassess potential targeted therapies, MSI status, and germline analysis.  Fortunately he is not having significant pain or discomfort and is seen with his son as we discussed the above plan.  Notably a follow-up PSA is found to be 42.6.   Patient is next seen in 6/7/2021 for Lupron and Xgeva.  Fortunately he is feeling considerably better according to both he and his son though his stamina has reduced.  He is not having additional pain at this point.  We have also reviewed his genetics assessment which was significant only for a variant of unknown significance.  This is not an actionable abnormality.    As the patient's PSA further escalated increasing to 89 7/7/2021 his subsequent Axumin PET was obtained 8/20 demonstrating extensive osteosclerotic metastatic disease showing reduced uptake-typical finding but no evidence of visceral metastatic disease in the neck chest abdomen pelvis.  These findings are discussed with the patient and his son 8/25/2021 and indicative of his progressive disease-etiology of his rising PSA-though the patient is asymptomatic we have discussed moving to initiate Xtandi is available at 160 mg p.o. twice daily along with his current Lupron and Xgeva.        Past Medical History:   Diagnosis Date   • Bone cancer (CMS/HCC)    • Cellulitis of great toe of left foot 10/19/2018   • CKD (chronic kidney disease)     Stage 3; followed by Dr. Vicky Duran    • Diastolic dysfunction     GRADE II per echo 2018   • Difficulty breathing     during exertion   • Dyslipidemia    • Erectile dysfunction    • Fatigue    • History of blood transfusion    • History of kidney stones    • Hyperlipidemia    • Hypertension    • Hyponatremia    • Left ventricular hypertrophy     per echo 2018   • Localized edema    • Neuropathy    •  Obstructive sleep apnea     USING CPAP   • Osteoarthritis    • Peptic ulcer    • Pneumonia    • Pneumonia of left upper lobe due to infectious organism 3/15/2019   • Prostate cancer (CMS/HCC)     Status post prostatectomy, radiation therapy, and hormone therapy followed by Dr. Lee; metastatsis to bone   • Pure hyperglyceridemia    • Restless leg syndrome    • Sepsis (CMS/HCC)    • Sinus bradycardia    • Transient cerebral ischemia    • Type 2 diabetes mellitus (CMS/HCC)         Past Surgical History:   Procedure Laterality Date   • BRONCHOSCOPY N/A 4/9/2018    Procedure: BRONCHOSCOPY;  Surgeon: Bijan Rivera III, MD;  Location: [facility];  Service: Cardiothoracic   • COLONOSCOPY     • DEEP NECK LYMPH NODE BIOPSY / EXCISION     • HEMORRHOIDECTOMY     • MEDIASTINOSCOPY N/A 4/9/2018    Procedure: MEDIASTINOSCOPY WITH LYMPH NODE BIOPSY;  Surgeon: Bijan Rivera III, MD;  Location: [facility];  Service: Cardiothoracic   • OTHER SURGICAL HISTORY      ulcer repair   • PROSTATECTOMY  2006        Current Outpatient Medications on File Prior to Visit   Medication Sig Dispense Refill   • aspirin 81 MG EC tablet Take 1 tablet by mouth daily.     • cholecalciferol (VITAMIN D3) 1000 units tablet Take 2,000 Units by mouth Daily.     • Denosumab (XGEVA SC) Inject  under the skin into the appropriate area as directed Every 30 (Thirty) Days.     • dilTIAZem CD (CARDIZEM CD) 120 MG 24 hr capsule TAKE 1 CAPSULE EVERY DAY 90 capsule 3   • enzalutamide (XTANDI) 40 MG chemo capsule Take 4 capsules by mouth Daily. 120 capsule 3   • gabapentin (NEURONTIN) 300 MG capsule Take 2 capsules by mouth every night at bedtime. (Patient taking differently: Take 900 mg by mouth every night at bedtime. Starting 9/8, as instructed by APRN) 60 capsule 2   • HYDROcodone-acetaminophen (NORCO) 5-325 MG per tablet Take 1 tablet by mouth Every 6 (Six) Hours As Needed for Moderate Pain . 120 tablet 0   • insulin aspart (NovoLOG) 100 UNIT/ML  patient supplied pump Inject  under the skin into the appropriate area as directed Continuous. Pt reports pump with basal rate 1.9 units/hr from 0800 to 1200. Basal rate 1.8 units/hr 1200 to 0800.  Checks bg ac/hs with bolus doses per pumps parameters     • Leuprolide Acetate, 3 Month, (LUPRON DEPOT, 3-MONTH, IM) Inject  into the appropriate muscle as directed by prescriber Every 3 (Three) Months.     • levothyroxine (SYNTHROID, LEVOTHROID) 88 MCG tablet      • modafinil (PROVIGIL) 200 MG tablet Take 1 tablet by mouth Daily. 90 tablet 1   • olmesartan (BENICAR) 40 MG tablet TAKE 1 TABLET EVERY DAY (DISCONTINUE LOSARTAN 100MG) 90 tablet 3   • ondansetron (ZOFRAN) 8 MG tablet Take 1 tablet by mouth 3 (Three) Times a Day As Needed for Nausea or Vomiting. 30 tablet 5   • pravastatin (PRAVACHOL) 40 MG tablet Take 40 mg by mouth daily.     • rOPINIRole (REQUIP) 0.5 MG tablet Take 1 tablet by mouth in the evening and 1 at bedtime. 180 tablet 2   • traZODone (DESYREL) 50 MG tablet Take  mg by mouth every night at bedtime.     • triamterene-hydrochlorothiazide (MAXZIDE-25) 37.5-25 MG per tablet TAKE 1 TABLET EVERY DAY 90 tablet 0   • [DISCONTINUED] albuterol sulfate  (90 Base) MCG/ACT inhaler Inhale 2 puffs Every 4 (Four) Hours As Needed for Wheezing or Shortness of Air. 1 inhaler 0   • [DISCONTINUED] fluticasone (FLONASE SENSIMIST) 27.5 MCG/SPRAY nasal spray 2 sprays into each nostril As Needed.     • [DISCONTINUED] omeprazole (priLOSEC) 20 MG capsule Take 1 capsule by mouth every night at bedtime. 90 capsule 2     No current facility-administered medications on file prior to visit.        ALLERGIES:    Allergies   Allergen Reactions   • Niacin Unknown - Low Severity   • Statins Unknown - Low Severity        Social History     Socioeconomic History   • Marital status:      Spouse name: Not on file   • Number of children: Not on file   • Years of education: College   • Highest education level: Not on file  "  Tobacco Use   • Smoking status: Former Smoker     Packs/day: 1.00     Years: 30.00     Pack years: 30.00     Types: Cigarettes     Quit date: 1998     Years since quittin.6   • Smokeless tobacco: Never Used   Substance and Sexual Activity   • Alcohol use: No     Comment: Caffeine use: 2-3 cups daily   • Drug use: No   • Sexual activity: Defer        Family History   Problem Relation Age of Onset   • Heart failure Mother    • Hypertension Mother    • Diabetes Mother    • Heart disease Mother    • Lung cancer Father 46   • Hypertension Sister    • Lung cancer Sister 60   • Hypertension Brother    • Lung cancer Brother 62   • Heart disease Other    • Prostate cancer Brother 60   • Malig Hyperthermia Neg Hx         Review of Systems    .  Objective     Vitals:    21 1315   BP: 134/74   Pulse: 52   Resp: 16   Temp: 97.1 °F (36.2 °C)   TempSrc: Skin   SpO2: 99%   Weight: 87.1 kg (192 lb)   Height: 165.1 cm (65\")   PainSc: 0-No pain     Current Status 2021   ECOG score 0       Physical Exam   Constitutional: He is oriented to person, place, and time. He appears well-developed. No distress.   Limited exam due to tele-health visit.    Pulmonary/Chest: Effort normal. No respiratory distress.   Neurological: He is alert and oriented to person, place, and time.   Skin: Skin is warm and dry.     RECENT LABS:  Hematology WBC   Date Value Ref Range Status   2021 6.50 3.40 - 10.80 10*3/mm3 Final     RBC   Date Value Ref Range Status   2021 3.37 (L) 4.14 - 5.80 10*6/mm3 Final     Hemoglobin   Date Value Ref Range Status   2021 9.5 (L) 13.0 - 17.7 g/dL Final     Hematocrit   Date Value Ref Range Status   2021 31.8 (L) 37.5 - 51.0 % Final     Platelets   Date Value Ref Range Status   2021 191 140 - 450 10*3/mm3 Final      NM Bone Scan Whole Body (2021 13:25)  CT Chest With Contrast Diagnostic (2021 09:11)  CT Abdomen Pelvis With Contrast (2021 09:11)  NM PET/CT " Skull Base to Mid Thigh (08/20/2021 12:18)      Assessment/Plan      1.  Metastatic prostate cancer:  ? Patient initially diagnosed in 2006 and had a prostatectomy and hormone therapy.  ? Patient in January 2018 began to complain of shortness of breath and hoarseness.  Chest x-ray obtained 1/23/18 showed sclerotic bone lesions.  ? CT of the chest 3/12/2018 numerous sclerotic bone foci with all visualized bones consistent with metastatic disease, multiple enlarged mediastinal lymph nodes.  ? .1 from 3/14/2018.  Patient was started on Firmagon by urology, [facility].  ? 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  ? Case discussed at thoracic conference and recommendations were to continue ADT and radiation for symptomatic sites.  ? Lupron injections every 3 mos initiated 5/7/18 PSA at that time 7.810  ? Monthly Xgeva initiated 6/11/18 PSA 2.030  ? Last PSA 6/13/2019 0.881  ? 9/20/2019 patient tolerating treatment well, no new concerns.  ? 12/2019 PSA 2.9  ? 3/9/2020  PSA increasing to 5.030, he remains continuing on Lupron and Xgeva and asymptomatic though follow-up CT of chest and pelvis will be requested in 11 weeks prior to follow-up in 12 weeks.   ? 5/4/2020 patient called reported worsening right hip pain, plans to purse CT scans ASAP.   ? 5/8/2020 CT scans C/A/P show no progression of disease. Hip pain improved, Gabapentin added.. Bone scan requested.  ? 6/3/2020 bone scan that does not demonstrate worsening of bone nor need for localized radiation therapy.  The patient's PSA has been slowly rising and we have not been able to determine worsening of disease and and, therefore, it is not felt warranted add additional medications such as Xtandi or apalutamide to his therapy.  Please note would like to avoid Zytiga try not to add prednisone which would worsen his blood sugar control.  ? 9/3/2020 PSA 15.8, CXR extensive  metastatic bone disease- patient reviewed 9/10/2020 reporting increased bony discomfort CT scans requested.  ? Scans done and reviewed 10/7/2020 ultimately reveal no evidence of progression of disease for visceral involvement or clearly for bone involvement.  After further review with the patient and his son plan continued Xgeva and Lupron.  We have assessed for availability of Xtandi 160 mg p.o. daily and find the cost is currently prohibitive.   ? 11/25/2020 PSA 25.4  ? 12/10/2020 review with some mild increase in bony discomfort.  After repeat discussion we went on to have him undergo Lupron therapy, and his PSA actually to be mildly reduced at 22.2.   ? Follow-up bone scan 1/11/2021 demonstrates some evidence of progression in sternum, left sacrum and proximal femur.  These findings were reviewed with the patient and his son January 18, 2020 and the patient was referred for palliative radiotherapy(Dr. Kulkarni)  ? Palliative radiation under care Dr. Kulkarni to right femur and sternal lesion -3000 cGy in 10 fractions given February 1 to February 12 with significant symptom improvement.  ? 3/15/2021 PSA 38.5  ? 4/12/2021 PSA 44  ? 5/10/2021 PSA 42.6 CT chest abdomen pelvis 5/4/2021 - for progression of disease and follow-up PSA 5 10/2021 is at 42 slightly reduced from previous.  We have discussed how to proceed from here and find a significant family history that clearly supports a potential genetic assessment for his malignancy.  ? It is felt most reasonable at this point to take the mediastinal nodes from his biopsy 4/9/2018 and reassess for potential targeted therapies, MSI status, as well as germline analysis.  ? 6/7/2021 PSA 65.9 his analysis completed for actionable mutations including germline mutations.  These exams were negative though there is a variant of unknown significance.  Again this is not an actionable mutation.  We proceeded with additional Xgeva and Lupron planning for monthly Xgeva and Lupron at  3 months  ? 7/7/2021 PSA 89 and subsequent testing with Axumin PET was performed confirming extensive osteosclerotic metastatic disease with little uptake, no evidence of visceral metastatic disease in neck chest abdomen or pelvis.  ? 8/25/2021  , PET/CT reviewed, subsequent PSA increased to 105.  Patient fortunately asymptomatic.  Requested reevaluation for Xtandi 160 mg po daily.   ? 9/8/2021 due for his lupron, receiving every 3 months, and monthly Xgeva.  Will have education today for Xtandi and will receive his medication today as well.     2.  Neuropathy/ restless legs:    · 5/13/2020 gabapentin 600 mg at bedtime, trazadone 50 mg QHS.  · On gabapentin 300, 2 tablets at bedtime, and requip 0.5.mg    · Still having neuropathy symptoms.  Will increase gabapentin to 900 mg at bedtime.     3.  Cancer related pain: on Norco 5/325 every 6 hours as needed.    4.  Hyperkalemia:  · Intermittent issue for the patient.  It is typically dietary related, as he consumes large amount  fresh tomatoes and other foods from his garden..  He was instructed to significantly decrease his intake as his potassium level today is 6.4, magnesium 2.7  He is not symptomatic.  We will also check an EKG.     5.  Hypophosphatemia:  Phosphorus 1.9- hold xgeva today., Increase foods high in phosphorus, recheck in 2 weeks.     PLAN:  2. Lupron today, repeating every 3 months.  3. EKG today.  4. Decrease intake of tomatoes  5. Hold Xgeva due to low phosphorus.  Patient given list of foods to increase intake of phosphorus  6. Increase Gabapentin to 900 mg at bedtime.   7. Return in 2 weeks for toxicity check with Nurse practitioner. Patient will have started Xtandi 160 mg daily at that point.  Will recheck labs and possible Xgeva that day.  8. Follow up with MD in 4 weeks with labs.     Patient is on high risk medication requiring close monitoring for toxicity.    Randee Alfredo, APRN  09/08/2021         I spent 45 minutes caring for  Semaj Herrera on this date of service. This time includes time spent by me in the following activities:preparing for the visit, reviewing tests, obtaining and/or reviewing a separately obtained history, performing a medically appropriate examination and/or evaluation , counseling and educating the patient/family/caregiver, ordering medications, tests, or procedures, referring and communicating with other health care professionals , documenting information in the medical record, independently interpreting results and communicating that information with the patient/family/caregiver and care coordination.

## 2021-09-08 NOTE — PROGRESS NOTES
Reviewed labs with Randee  NP and no Xgeva today due to low phos of 1.9.  Patient to increase oral intake of milk, beans and whole grains.  K+ level today is 6.4. per Randee patient needs to decrease amount of tomatoes that he is eating. Patient states that he has been drinking tomato juice also. Patient v/u that he needs to decrease his intake of these.   EKG also done in office today and viewed by Dr Lee.

## 2021-09-08 NOTE — PROGRESS NOTES
[facility] Oral Chemo Education Appointment:     Patient Name/:  Semaj Herrera  1940  Medication Regimen:  Xtandi 160 mg (4x40mg capsules) by mouth every evening  Date to Start Medication: 21    Specialty Pharmacy Assessment    Enzalutamide (Xtandi)  Dose: 160 mg  Frequency: Once a day  Indication: Prostate Cancer  New Start: Yes  Prescription should be submitted to: [facility]  Prior Authorization status: Approved  Financial Assistance status: Approved  Consent obtained: Yes  CCA obtained: Yes  Counseling: Take orally once daily at the same time each day, Take with or without food, Swallow capsules whole. Do not chew, dissolve or open the capsules, It should not be handled by people other than patient or caregivers, and especially not by females who are or may become pregnant or are breastfeeding, Store medication at room temperature in a tightly closed container out of the reach of children, Most common side effects - weakness or feeling more tired than usual, decreased appetite, hot flashes, joint pain, dizziness, vertigo, high blood pressure, headache, weight loss, Serious side effects - seizure, poterior or Reversible Encephalopathy Syndrome (PRES), allergic reactions, heart disease, falls and fractures         Additional counseling:   - Proper administration and storage of medication: Store medication safe away from children and pets, at room temp away from light. If you use a pill box, use a separate pill box from other medication. Counseled patient on safe handling of soiled linen and proper flush technique.  Discussed if the patient is handling their own medications, then they need to wash their hands properly after touching the medication.  If a caregiver is assisting with handling medication, need to wear disposal gloves and wash hands properly afterwards.   - Preventative/supportive medications: zofran for nausea, pt has imodium prn diarrhea    Provided patient with:   -Education sheets about the  medication  -24-hour clinic phone number and my contact information and instructions to call should additional questions arise.   - Instructions for obtaining refills     Completed medication reconciliation today to assess for drug interactions.   Drug-drug interactions noted: Xtandi may decrease the efficacy of diltiazem (will send a note to patient's cardiologist to make her aware); xtandi may decrease efficacy of trazodone, but patient rarely takes this anyway; modafinil may decrease effectiveness of Xtandi - there is no specific dose reduction needed, but will send a message to Dr. Lee to make sure he is aware  Advised pt to call the clinic if any new medications are started so we can assess for drug-drug interactions     Discussed aforementioned material with patient in person, face-to-face, in clinic.   Chemo consents/CCA were signed at today's visit.   Patient expressed understanding. All questions and concerns addressed.   Patient picked up medication at the end of today's session.   Patient's son, Georges, was also present and active in today's appointment.     Clementina Brink, Jessica  9/8/2021  16:09 EDT

## 2021-09-13 RX ORDER — OMEPRAZOLE 20 MG/1
CAPSULE, DELAYED RELEASE ORAL
Qty: 90 CAPSULE | Refills: 1 | Status: SHIPPED | OUTPATIENT
Start: 2021-09-13 | End: 2021-12-12

## 2021-09-13 RX ORDER — DILTIAZEM HYDROCHLORIDE 120 MG/1
CAPSULE, COATED, EXTENDED RELEASE ORAL
Qty: 90 CAPSULE | Refills: 1 | Status: SHIPPED | OUTPATIENT
Start: 2021-09-13 | End: 2022-02-14 | Stop reason: SDUPTHER

## 2021-09-14 ENCOUNTER — TELEPHONE (OUTPATIENT)
Dept: PHARMACY | Facility: HOSPITAL | Age: 81
End: 2021-09-14

## 2021-09-14 DIAGNOSIS — C79.51 PROSTATE CANCER METASTATIC TO BONE (HCC): ICD-10-CM

## 2021-09-14 DIAGNOSIS — C61 PROSTATE CANCER METASTATIC TO BONE (HCC): ICD-10-CM

## 2021-09-14 RX ORDER — HYDROCODONE BITARTRATE AND ACETAMINOPHEN 5; 325 MG/1; MG/1
1 TABLET ORAL EVERY 6 HOURS PRN
Qty: 120 TABLET | Refills: 0 | Status: SHIPPED | OUTPATIENT
Start: 2021-09-14 | End: 2021-10-12 | Stop reason: SDUPTHER

## 2021-09-14 RX ORDER — TRIAMTERENE AND HYDROCHLOROTHIAZIDE 37.5; 25 MG/1; MG/1
TABLET ORAL
Qty: 90 TABLET | Refills: 0 | Status: SHIPPED | OUTPATIENT
Start: 2021-09-14 | End: 2022-02-14

## 2021-09-14 NOTE — TELEPHONE ENCOUNTER
[facility] telephone encounter re:adherence and side effects (Xtandi)     Semaj reports 100% adherence to Xtandi since starting on 9/8/21; patient is taking 4x40 mg capsules (160mg) daily each night with dinner ~5pm and reports no missed doses in past 30 days.  Patient reports he was not having any issues with nausea until yesterday, when he threw up ~30-45 min after taking his Xtandi. He has zofran at home and advised he try to take zofran ~15-30 min prior to Xtandi tonight to help with preventing this. Pt agreeable. He is unsure that the Xtandi cwas what caused him to vomit, but he knows to call back if he continues to have issues with N/V.  Additionally, pt reports falling in the bathtub this morning. He denies falling d/t dizziness or confusion (therefore, I don't think this is related to xtandi), simply slipped while trying to prop his foot up and wash it because the floor was soapy. He did seek care for this and said he needed 3 stitched in his pinky toe.     Completed medication reconciliation with patient. Patient denies starting or stopping any medications.  Assessed medication list for interactions, no significant drug interactions noted.  Patient has had no issues obtaining medication from pharmacy. Semaj had no additional questions or concerns for the [facility] office. He is aware to call with issues or concerns prior to his next visit on 9/22/21.     Clementina Brink, Jessica  9/14/2021  13:20 EDT

## 2021-09-15 ENCOUNTER — TELEPHONE (OUTPATIENT)
Dept: SLEEP MEDICINE | Facility: HOSPITAL | Age: 81
End: 2021-09-15

## 2021-09-22 ENCOUNTER — LAB (OUTPATIENT)
Dept: LAB | Facility: HOSPITAL | Age: 81
End: 2021-09-22

## 2021-09-22 ENCOUNTER — OFFICE VISIT (OUTPATIENT)
Dept: ONCOLOGY | Facility: CLINIC | Age: 81
End: 2021-09-22

## 2021-09-22 ENCOUNTER — APPOINTMENT (OUTPATIENT)
Dept: ONCOLOGY | Facility: HOSPITAL | Age: 81
End: 2021-09-22

## 2021-09-22 VITALS
HEART RATE: 61 BPM | HEIGHT: 65 IN | WEIGHT: 191 LBS | SYSTOLIC BLOOD PRESSURE: 125 MMHG | DIASTOLIC BLOOD PRESSURE: 68 MMHG | BODY MASS INDEX: 31.82 KG/M2 | TEMPERATURE: 97.3 F | OXYGEN SATURATION: 98 % | RESPIRATION RATE: 18 BRPM

## 2021-09-22 DIAGNOSIS — K52.1 DIARRHEA DUE TO DRUG: ICD-10-CM

## 2021-09-22 DIAGNOSIS — C79.51 OSSEOUS METASTASIS: ICD-10-CM

## 2021-09-22 DIAGNOSIS — C79.51 PROSTATE CANCER METASTATIC TO BONE (HCC): ICD-10-CM

## 2021-09-22 DIAGNOSIS — E83.39 HYPOPHOSPHATEMIA: ICD-10-CM

## 2021-09-22 DIAGNOSIS — C61 PROSTATE CANCER METASTATIC TO BONE (HCC): ICD-10-CM

## 2021-09-22 DIAGNOSIS — E87.5 HYPERKALEMIA: ICD-10-CM

## 2021-09-22 DIAGNOSIS — G62.9 PERIPHERAL POLYNEUROPATHY: ICD-10-CM

## 2021-09-22 DIAGNOSIS — C79.51 OSSEOUS METASTASIS: Primary | ICD-10-CM

## 2021-09-22 DIAGNOSIS — C79.51 PROSTATE CANCER METASTATIC TO BONE (HCC): Primary | ICD-10-CM

## 2021-09-22 DIAGNOSIS — C61 PROSTATE CANCER METASTATIC TO BONE (HCC): Primary | ICD-10-CM

## 2021-09-22 LAB
ALBUMIN SERPL-MCNC: 4 G/DL (ref 3.5–5.2)
ALBUMIN/GLOB SERPL: 1.4 G/DL (ref 1.1–2.4)
ALP SERPL-CCNC: 459 U/L (ref 38–116)
ALT SERPL W P-5'-P-CCNC: 12 U/L (ref 0–41)
ANION GAP SERPL CALCULATED.3IONS-SCNC: 8.1 MMOL/L (ref 5–15)
AST SERPL-CCNC: 18 U/L (ref 0–40)
BASOPHILS # BLD AUTO: 0.02 10*3/MM3 (ref 0–0.2)
BASOPHILS NFR BLD AUTO: 0.4 % (ref 0–1.5)
BILIRUB SERPL-MCNC: 0.2 MG/DL (ref 0.2–1.2)
BUN SERPL-MCNC: 17 MG/DL (ref 6–20)
BUN/CREAT SERPL: 16.2 (ref 7.3–30)
CALCIUM SPEC-SCNC: 8 MG/DL (ref 8.5–10.2)
CHLORIDE SERPL-SCNC: 110 MMOL/L (ref 98–107)
CO2 SERPL-SCNC: 20.9 MMOL/L (ref 22–29)
CREAT SERPL-MCNC: 1.05 MG/DL (ref 0.7–1.3)
DEPRECATED RDW RBC AUTO: 58.5 FL (ref 37–54)
EOSINOPHIL # BLD AUTO: 0.19 10*3/MM3 (ref 0–0.4)
EOSINOPHIL NFR BLD AUTO: 3.8 % (ref 0.3–6.2)
ERYTHROCYTE [DISTWIDTH] IN BLOOD BY AUTOMATED COUNT: 17.2 % (ref 12.3–15.4)
GFR SERPL CREATININE-BSD FRML MDRD: 68 ML/MIN/1.73
GLOBULIN UR ELPH-MCNC: 2.9 GM/DL (ref 1.8–3.5)
GLUCOSE SERPL-MCNC: 89 MG/DL (ref 74–124)
HCT VFR BLD AUTO: 32.4 % (ref 37.5–51)
HGB BLD-MCNC: 9.8 G/DL (ref 13–17.7)
IMM GRANULOCYTES # BLD AUTO: 0.08 10*3/MM3 (ref 0–0.05)
IMM GRANULOCYTES NFR BLD AUTO: 1.6 % (ref 0–0.5)
LYMPHOCYTES # BLD AUTO: 1.37 10*3/MM3 (ref 0.7–3.1)
LYMPHOCYTES NFR BLD AUTO: 27.6 % (ref 19.6–45.3)
MAGNESIUM SERPL-MCNC: 2.8 MG/DL (ref 1.8–2.5)
MCH RBC QN AUTO: 28.7 PG (ref 26.6–33)
MCHC RBC AUTO-ENTMCNC: 30.2 G/DL (ref 31.5–35.7)
MCV RBC AUTO: 95 FL (ref 79–97)
MONOCYTES # BLD AUTO: 0.42 10*3/MM3 (ref 0.1–0.9)
MONOCYTES NFR BLD AUTO: 8.5 % (ref 5–12)
NEUTROPHILS NFR BLD AUTO: 2.88 10*3/MM3 (ref 1.7–7)
NEUTROPHILS NFR BLD AUTO: 58.1 % (ref 42.7–76)
NRBC BLD AUTO-RTO: 0 /100 WBC (ref 0–0.2)
PHOSPHATE SERPL-MCNC: 1.7 MG/DL (ref 2.5–4.5)
PLATELET # BLD AUTO: 182 10*3/MM3 (ref 140–450)
PMV BLD AUTO: 8.6 FL (ref 6–12)
POTASSIUM SERPL-SCNC: 5.7 MMOL/L (ref 3.5–4.7)
PROT SERPL-MCNC: 6.9 G/DL (ref 6.3–8)
RBC # BLD AUTO: 3.41 10*6/MM3 (ref 4.14–5.8)
SODIUM SERPL-SCNC: 139 MMOL/L (ref 134–145)
WBC # BLD AUTO: 4.96 10*3/MM3 (ref 3.4–10.8)

## 2021-09-22 PROCEDURE — 36415 COLL VENOUS BLD VENIPUNCTURE: CPT

## 2021-09-22 PROCEDURE — 85025 COMPLETE CBC W/AUTO DIFF WBC: CPT

## 2021-09-22 PROCEDURE — 99214 OFFICE O/P EST MOD 30 MIN: CPT | Performed by: NURSE PRACTITIONER

## 2021-09-22 PROCEDURE — 80053 COMPREHEN METABOLIC PANEL: CPT

## 2021-09-22 PROCEDURE — 83735 ASSAY OF MAGNESIUM: CPT

## 2021-09-22 PROCEDURE — 84100 ASSAY OF PHOSPHORUS: CPT

## 2021-09-22 RX ORDER — CEPHALEXIN 500 MG/1
500 CAPSULE ORAL 2 TIMES DAILY
COMMUNITY
Start: 2021-09-14 | End: 2021-12-12

## 2021-09-22 RX ORDER — GABAPENTIN 300 MG/1
900 CAPSULE ORAL
Qty: 90 CAPSULE | Refills: 2 | Status: SHIPPED | OUTPATIENT
Start: 2021-09-22 | End: 2021-12-28 | Stop reason: SDUPTHER

## 2021-09-22 NOTE — PROGRESS NOTES
Subjective      REASONS FOR FOLLOW UP:   1.  Metastatic prostate cancer    HISTORY OF PRESENT ILLNESS:  Patient is an 80-year-old male with the above-mentioned history who is here today for lab review and toxicity check continuing on Xtandi.  Patient started Xtandi on 9/8/2021.  He has noticed a little bit of increase in diarrhea since starting Xtandi, but it is easily controlled with Imodium.  He reports since his last office visit he slipped in the bathtub and has a cut on the bottom of his foot.  It actually happened just prior to him going to see a podiatrist so they were able to get it stitched up that day.  He states he has 3 stitches in the bottom of his left foot, and is now wearing a walking shoe.    He has significantly decreased the amount of tomatoes and vegetables that he is eating from his garden.  He states that he is no longer eating any tomatoes.    Also, at his last visit we instructed him to increase his gabapentin to 900 mg at bedtime to see if this would improve his neuropathy symptoms, and he states that it has.  He is in need of a refill of his gabapentin to account for the increase in dose.    Hematologic/oncologic history:   The patient is an 80-year-old male with significant past medical history including prostate carcinoma, CKD 3 and long-term tobacco use be been seen by primary care in late January, 2018 for hoarseness.  Chest x-ray is now outpatient January 23 revealed sclerotic change in the posterior mid chest to be within 3 adjacent thoracic ertebral bodies of uncertain significance.  A follow-up chest CT revealed numerous sclerotic foci within al visualized bones consistent with metastatic disease, multiple enlarged mediastinal lymph nodes concerning for metastatic lymphadenopathy and a polypoidal abnormality in the right lateral wall of the trachea.  CT of abdomen March 20 revealed extensive osseous metastatic disease mildly enlarged retroperitoneal lymph nodes.  Bone scan March  20 was consistent with widespread osseous metastasis particularly in the proximal half the left humeral shaft, abnormal uptake in the sternum and manubrium and a small focus in the posterior aspect of the calvarium.  This led to a plain film the left humerus with mottled sclerosis involving the shaft of the humerus particularly in the proximal half but no evidence of pathologic fracture.    The patient has additionally type 2 diabetes on insulin pump, again a history of prostate carcinoma prostatectomy 12 years previously, hypertension, and hyperlipidemia.  Additional studies included a PSA of 395.1 from March 14, 2018.The patient's been seen by urology March 15 with plans to start Firmagon.    The patient is now seen in pulmonary clinic with Dr. Bijan Rivera and we have discussed that he will need bronchoscopy and mediastinoscopy.  Interestingly his additional history includes a long occupational exposure including working in the [place] just out of school, subsequent construction work for many years and a 30-pack-year history of smoking stopping in the late 1990s.  He indicates that he followed with Dr. Guillen of urology for many years with no changes in his exams and normal PSAs.  He stopped this follow-up approximately 2 years ago.     Patient was seen in consultation with thoracic surgery on March 28 and plans are made for mediastinoscopy and bronchoscopy with lymph node biopsy.  Pathology revealed that these nodes were consistent with metastatic prostate carcinoma.  He was discussed at thoracic conference.  A PET/CT was not pursued and it was determined that he see medical oncology for hormonal treatment and radiation therapy if symptomatic.  It should be noted that the patient's March 15 [facility] office note describes that Firmagon was planned.     The patient has met with the son and grandson April 23.  We have also contacted Dr. Taylor of urology in that Firmagon was given just  after his last visit and would next be due approximately May 3.  Patient feels considerably better with resolution of his pain, normalization of his performance status.  He would like to continue treatment through our office now contacted Dr. Taylor concerning this.    The patient is next seen June 11, 2018 we discussed his follow-up including his treatments with Lupron May 7 and his next treatment on July 30.  He has returned to essentially normal performance status and his PSA has dropped further from 395 March 14 27.8 May 7 and now 2.03 June 11.  We do plan to initiate Xgeva also study him via scans to document his improvement approximately a month from now.   The patient is next seen July 26, 2018.  Repeat imaging demonstrated interval resolution of mediastinal and retroperitoneal lymphadenopathy.  There is thickening in the distal aspect of the appendix with stranding?  Chronic appendicitis.  The patient indicates he is having no such symptoms and never has.  There is no change in sclerotic bony metastasis in the patient's PSA has dropped substantially to 1.66 by July 19 and 1.79 July 26.  He is actually feeling excellent and this is corroborated by family members.   Patient is next seen January 10, 2019 continuing to do very well and, in fact indicating that [facility], likely hearing replacements, visual review.  He is not certain is going to continue his care with them or with us or combination of both.    Patient is next seen April 4, 2019.  He is recently discharged after hospitalization March 15-18 with left upper lobe pneumonia.  We reviewed the findings in detail with his gradual recovery now documented.  His studies include a CT of chest which does not demonstrate any further bony lesions and/or pulmonary metastasis having developed.  He is feeling considerably better and approximately back to his baseline.  The patient is next seen June 28, 2019  feeling well but having baseline generally musculoskeletal pain and restless legs.His recent exams include a PSA of 0.81, CMP with potassium of 5.3 with repeat pending.  His performance status overall remains good to very good and is clearly responding to his treatments.  The patient is next seen December 13, 2019 continue to feel well.  He has had, oddly enough, 2 episodes of sleep paralysis which have occurred to him previously and he wonders if there is any connection with his prostate carcinoma though this is felt unlikely.     The patient when assessed in December had his PSA go to 2.9.  We continue with Lupron and Xgeva and is seen back now March 9, 2020 without additional symptoms.  We discussed that a schedule could likely change moving to 3 months for both of these medications particularly if he needs additional therapy directed at progressive disease.     Patient contacted our practice May 4 concern for pain in his hips.  This led to moving his scans up and they were performed May 8, 2020 showing a sub-6 mm pleural-based pulmonary nodule in the right lower lobe that is new from March 15, 2018, remainder of the sub-6 mm pulmonary nodules bilaterally are stable.  There is extensive osseous metastatic disease as previous with no other new findings.     The patient indicates, when seen, May 13 that his bone pain is now resolved.  While this is good information does not mean that he does not have further bony metastasis and we have discussed that a follow-up bone scan is likely necessary.  Furthermore he is having some difficulty with sleeplessness and increase in restless legs as he continues to stay at home during the COVID 19 crisis which, itself, is producing more anxiety for him.  A follow-up bone scan was obtained Lety 3 revealing multifocal osseous metastatic disease which was compared to March 2, 2018 with improvement.  No new abnormal uptake is present.  He is next seen with us on June 17, 2020 and,  fortunately, is not having any significant pain at this point.  We discussed his scans in detail and, on balance, his performance status also remains improved.     It was elected to follow the patient rather than changes antiandrogen therapy.  He is reassessed September 3 with unchanged CMP, H&H of 11.4 and 36.3 with normal white count, platelet count and differential, PSA at 15.8.  Follow-up PA lateral chest x-ray does not show any new abnormalities though there is extensive metastatic bone disease throughout the bony thorax and osteoblastic metastasis have been seen on chest CT May 2020.  The patient is seen with his son Georges September 10 noticing increasing bony discomfort primarily in the right hip following fairly intensive gardening which he does frequently.  Now has further elevation of PSA were wondering whether we should try to intervene sooner per additional antiandrogens.  We will need to further assess him via CT scanning to make this decision     These were obtained October 1 showing extensive sclerotic disease with no metastatic disease in chest abdomen or pelvis.  PSA had gone from 15.8 September 3-16 0.6 October 1.     He still has pain getting up in the morning and after active gardening.  This is manageable with current pain medications and, at present, it is not apparent that he needs to switch medications to gain better control of his disease.  The patient did have follow-up studies done including a PSA November 25, 2020 now at 25.4.  The patient is seen with his son December 10, 2020 doing fair but having some increased pain in the mid sternum and right hip that he ascribes to additional exercise/work in managing his garden.  It is apparent however, that his last bone scan was 6 months ago and we do need to reassess him concerning this.     The patient had follow-up bone scan performed January 11, 2021 showing again uptake in the calvarium, humerus, right medial clavicle, sternum, ribs, spine,  sacrum, pelvis, right acetabulum, proximal femora and now left frontal bone which appears new.  There is also mild increase in sternal uptake and equivocal increase in the pelvic lesions of all of the sacrum and the right proximal femur.     The patient is seen with his son January 18, 2021 and indicates that he is having pain gradually worsened in his right hip, mid chest that had been an issue previously.  These findings on bone scan are reviewed with both of them as likely the underlying culprit for his discomfort.  He would need change of the systemic therapy but, at present, will ask for radiation therapy palliation initially.  He was previously treated by Dr. Kulkarni many years ago and will ask her to see him.  We will also make application for the current cost of Xtandi or Zytiga.  The patient is not felt to be a candidate for systemic chemotherapy.     The patient was referred for ration therapy as we continued to assess his PSA on current therapy as well as exam the cost of Zytiga or Xtandi.  Radiation therapy (Dr. Kulkarni) saw the patient January 26 and felt that the right femur and sternal lesion could benefit from therapy-3000 cGy in 10 fractions.  The patient took treatment February 1 to February 12 to the above areas and is next seen back March 15.  Fortunately his pain has improved dramatically and he is quite happy about this.  Unfortunately he notes some difficulty with swallowing that is temporary and often leads to increased salivation and mucus production.  Patient was asked to return his next assessed April 12, 2021.  He is able to swallow with less pain but still has excess mucus production and issues with salivation.  His PSA has noted increase but he is having no additional bony discomfort at this point and, additionally, has noted low-grade fevers just under 100 degrees at nighttime?.  His weight, appetite and general performance status have remained good to excellent.  We discussed follow-up  studies at this point to determine his status.    Follow-up scans were scheduled CT scans of chest and pelvis 5/4/2021 showing osseous metastasis quite similar to previous examinations in the chest, CT of abdomen pelvis also with no acute intra-abdominal adenopathy no indication of abdominal pelvic metastatic disease but again widespread osseous metastasis.  His PSA at this point have been slowly increasing to a level of 44 on 4/12/2021.  As the patient is reassessed 5/10/2021 we find, fortunately, that he is not exceeding symptomatic.  It could make reasonable sense at this point to take the mediastinal nodes from his biopsy 4/9/2018, reassess potential targeted therapies, MSI status, and germline analysis.  Fortunately he is not having significant pain or discomfort and is seen with his son as we discussed the above plan.  Notably a follow-up PSA is found to be 42.6.   Patient is next seen in 6/7/2021 for Lupron and Xgeva.  Fortunately he is feeling considerably better according to both he and his son though his stamina has reduced.  He is not having additional pain at this point.  We have also reviewed his genetics assessment which was significant only for a variant of unknown significance.  This is not an actionable abnormality.    As the patient's PSA further escalated increasing to 89 7/7/2021 his subsequent Axumin PET was obtained 8/20 demonstrating extensive osteosclerotic metastatic disease showing reduced uptake-typical finding but no evidence of visceral metastatic disease in the neck chest abdomen pelvis.  These findings are discussed with the patient and his son 8/25/2021 and indicative of his progressive disease-etiology of his rising PSA-though the patient is asymptomatic we have discussed moving to initiate Xtandi is available at 160 mg p.o. twice daily along with his current Lupron and Xgeva.        Past Medical History:   Diagnosis Date   • Bone cancer (CMS/HCC)    • Cellulitis of great toe of left  foot 10/19/2018   • CKD (chronic kidney disease)     Stage 3; followed by Dr. Vicky Duran    • Diastolic dysfunction     GRADE II per echo 2018   • Difficulty breathing     during exertion   • Dyslipidemia    • Erectile dysfunction    • Fatigue    • History of blood transfusion    • History of kidney stones    • Hyperlipidemia    • Hypertension    • Hyponatremia    • Left ventricular hypertrophy     per echo 2018   • Localized edema    • Neuropathy    • Obstructive sleep apnea     USING CPAP   • Osteoarthritis    • Peptic ulcer    • Pneumonia    • Pneumonia of left upper lobe due to infectious organism 3/15/2019   • Prostate cancer (CMS/HCC)     Status post prostatectomy, radiation therapy, and hormone therapy followed by Dr. Lee; metastatsis to bone   • Pure hyperglyceridemia    • Restless leg syndrome    • Sepsis (CMS/HCC)    • Sinus bradycardia    • Transient cerebral ischemia    • Type 2 diabetes mellitus (CMS/HCC)         Past Surgical History:   Procedure Laterality Date   • BRONCHOSCOPY N/A 4/9/2018    Procedure: BRONCHOSCOPY;  Surgeon: Bijan Rivera III, MD;  Location: [facility];  Service: Cardiothoracic   • COLONOSCOPY     • DEEP NECK LYMPH NODE BIOPSY / EXCISION     • HEMORRHOIDECTOMY     • MEDIASTINOSCOPY N/A 4/9/2018    Procedure: MEDIASTINOSCOPY WITH LYMPH NODE BIOPSY;  Surgeon: Bijan Rivera III, MD;  Location: [facility];  Service: Cardiothoracic   • OTHER SURGICAL HISTORY      ulcer repair   • PROSTATECTOMY  2006        Current Outpatient Medications on File Prior to Visit   Medication Sig Dispense Refill   • aspirin 81 MG EC tablet Take 1 tablet by mouth daily.     • cephalexin (KEFLEX) 500 MG capsule Take 500 mg by mouth 2 (Two) Times a Day.     • cholecalciferol (VITAMIN D3) 1000 units tablet Take 2,000 Units by mouth Daily.     • Denosumab (XGEVA SC) Inject  under the skin into the appropriate area as directed Every 30 (Thirty) Days.     • dilTIAZem CD (CARDIZEM CD) 120 MG  24 hr capsule TAKE 1 CAPSULE EVERY DAY 90 capsule 1   • enzalutamide (XTANDI) 40 MG chemo capsule Take 4 capsules by mouth Daily. 120 capsule 3   • guaifenesin (ROBITUSSIN) 100 MG/5ML liquid Take 20 mL by mouth every night at bedtime.     • HYDROcodone-acetaminophen (NORCO) 5-325 MG per tablet Take 1 tablet by mouth Every 6 (Six) Hours As Needed for Moderate Pain . 120 tablet 0   • insulin aspart (NovoLOG) 100 UNIT/ML patient supplied pump Inject  under the skin into the appropriate area as directed Continuous. Pt reports pump with basal rate 1.9 units/hr from 0800 to 1200. Basal rate 1.8 units/hr 1200 to 0800.  Checks bg ac/hs with bolus doses per pumps parameters     • Leuprolide Acetate, 3 Month, (LUPRON DEPOT, 3-MONTH, IM) Inject  into the appropriate muscle as directed by prescriber Every 3 (Three) Months.     • levothyroxine (SYNTHROID, LEVOTHROID) 88 MCG tablet      • loperamide (IMODIUM) 1 MG/5ML solution Take 2 mg by mouth 4 (Four) Times a Day As Needed for Diarrhea.     • modafinil (PROVIGIL) 200 MG tablet Take 1 tablet by mouth Daily. 90 tablet 1   • olmesartan (BENICAR) 40 MG tablet TAKE 1 TABLET EVERY DAY (DISCONTINUE LOSARTAN 100MG) 90 tablet 3   • omeprazole (priLOSEC) 20 MG capsule TAKE 1 CAPSULE BY MOUTH EVERY NIGHT AT BEDTIME. 90 capsule 1   • ondansetron (ZOFRAN) 8 MG tablet Take 1 tablet by mouth 3 (Three) Times a Day As Needed for Nausea or Vomiting. 30 tablet 5   • pravastatin (PRAVACHOL) 40 MG tablet Take 40 mg by mouth daily.     • rOPINIRole (REQUIP) 0.5 MG tablet Take 1 tablet by mouth in the evening and 1 at bedtime. 180 tablet 2   • traZODone (DESYREL) 50 MG tablet Take  mg by mouth every night at bedtime.     • triamterene-hydrochlorothiazide (MAXZIDE-25) 37.5-25 MG per tablet TAKE 1 TABLET EVERY DAY 90 tablet 0     No current facility-administered medications on file prior to visit.        ALLERGIES:    Allergies   Allergen Reactions   • Niacin Unknown - Low Severity   • Statins  "Unknown - Low Severity        Social History     Socioeconomic History   • Marital status:      Spouse name: Not on file   • Number of children: Not on file   • Years of education: College   • Highest education level: Not on file   Tobacco Use   • Smoking status: Former Smoker     Packs/day: 1.00     Years: 30.00     Pack years: 30.00     Types: Cigarettes     Quit date: 1998     Years since quittin.6   • Smokeless tobacco: Never Used   Substance and Sexual Activity   • Alcohol use: No     Comment: Caffeine use: 2-3 cups daily   • Drug use: No   • Sexual activity: Defer        Family History   Problem Relation Age of Onset   • Heart failure Mother    • Hypertension Mother    • Diabetes Mother    • Heart disease Mother    • Lung cancer Father 46   • Hypertension Sister    • Lung cancer Sister 60   • Hypertension Brother    • Lung cancer Brother 62   • Heart disease Other    • Prostate cancer Brother 60   • Malig Hyperthermia Neg Hx         Review of Systems  As per HPI   .  Objective     Vitals:    21 1045   BP: 125/68   Pulse: 61   Resp: 18   Temp: 97.3 °F (36.3 °C)   TempSrc: Temporal   SpO2: 98%   Weight: 86.6 kg (191 lb)   Height: 165.1 cm (65\")   PainSc: 0-No pain     Current Status 2021   ECOG score 0       Physical Exam   Constitutional: He is oriented to person, place, and time. He appears well-developed. No distress.   HENT:   Head: Normocephalic and atraumatic.   Mouth/Throat: No oropharyngeal exudate.   Eyes: Pupils are equal, round, and reactive to light.   Cardiovascular: Normal rate, regular rhythm and normal heart sounds.   No murmur heard.  Pulmonary/Chest: Effort normal and breath sounds normal. No respiratory distress. He has no wheezes. He has no rhonchi. He has no rales.   Abdominal: Soft. Normal appearance and bowel sounds are normal. He exhibits no distension.   Musculoskeletal: Normal range of motion.      Comments: Walking shoe on left foot.   Neurological: He is " alert and oriented to person, place, and time.   Skin: Skin is warm and dry. No rash noted.   Vitals reviewed.    RECENT LABS:  Hematology WBC   Date Value Ref Range Status   09/22/2021 4.96 3.40 - 10.80 10*3/mm3 Final     RBC   Date Value Ref Range Status   09/22/2021 3.41 (L) 4.14 - 5.80 10*6/mm3 Final     Hemoglobin   Date Value Ref Range Status   09/22/2021 9.8 (L) 13.0 - 17.7 g/dL Final     Hematocrit   Date Value Ref Range Status   09/22/2021 32.4 (L) 37.5 - 51.0 % Final     Platelets   Date Value Ref Range Status   09/22/2021 182 140 - 450 10*3/mm3 Final        Lab Results   Component Value Date    GLUCOSE 89 09/22/2021    BUN 17 09/22/2021    CREATININE 1.05 09/22/2021    EGFRIFNONA 68 09/22/2021    BCR 16.2 09/22/2021    K 5.7 (C) 09/22/2021    CO2 20.9 (L) 09/22/2021    CALCIUM 8.0 (L) 09/22/2021    ALBUMIN 4.00 09/22/2021    AST 18 09/22/2021    ALT 12 09/22/2021       NM Bone Scan Whole Body (01/11/2021 13:25)  CT Chest With Contrast Diagnostic (05/04/2021 09:11)  CT Abdomen Pelvis With Contrast (05/04/2021 09:11)  NM PET/CT Skull Base to Mid Thigh (08/20/2021 12:18)      Assessment/Plan      1.  Metastatic prostate cancer:  ? Patient initially diagnosed in 2006 and had a prostatectomy and hormone therapy.  ? Patient in January 2018 began to complain of shortness of breath and hoarseness.  Chest x-ray obtained 1/23/18 showed sclerotic bone lesions.  ? CT of the chest 3/12/2018 numerous sclerotic bone foci with all visualized bones consistent with metastatic disease, multiple enlarged mediastinal lymph nodes.  ? .1 from 3/14/2018.  Patient was started on Firmagon by urology, [facility].  ? 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  ? Case discussed at thoracic conference and recommendations were to continue ADT and radiation for symptomatic sites.  ? Lupron injections every 3 mos initiated 5/7/18 PSA at that time  7.810  ? Monthly Xgeva initiated 6/11/18 PSA 2.030  ? Last PSA 6/13/2019 0.881  ? 9/20/2019 patient tolerating treatment well, no new concerns.  ? 12/2019 PSA 2.9  ? 3/9/2020  PSA increasing to 5.030, he remains continuing on Lupron and Xgeva and asymptomatic though follow-up CT of chest and pelvis will be requested in 11 weeks prior to follow-up in 12 weeks.   ? 5/4/2020 patient called reported worsening right hip pain, plans to purse CT scans ASAP.   ? 5/8/2020 CT scans C/A/P show no progression of disease. Hip pain improved, Gabapentin added.. Bone scan requested.  ? 6/3/2020 bone scan that does not demonstrate worsening of bone nor need for localized radiation therapy.  The patient's PSA has been slowly rising and we have not been able to determine worsening of disease and and, therefore, it is not felt warranted add additional medications such as Xtandi or apalutamide to his therapy.  Please note would like to avoid Zytiga try not to add prednisone which would worsen his blood sugar control.  ? 9/3/2020 PSA 15.8, CXR extensive metastatic bone disease- patient reviewed 9/10/2020 reporting increased bony discomfort CT scans requested.  ? Scans done and reviewed 10/7/2020 ultimately reveal no evidence of progression of disease for visceral involvement or clearly for bone involvement.  After further review with the patient and his son plan continued Xgeva and Lupron.  We have assessed for availability of Xtandi 160 mg p.o. daily and find the cost is currently prohibitive.   ? 11/25/2020 PSA 25.4  ? 12/10/2020 review with some mild increase in bony discomfort.  After repeat discussion we went on to have him undergo Lupron therapy, and his PSA actually to be mildly reduced at 22.2.   ? Follow-up bone scan 1/11/2021 demonstrates some evidence of progression in sternum, left sacrum and proximal femur.  These findings were reviewed with the patient and his son January 18, 2020 and the patient was referred for palliative  radiotherapy(Dr. Kulkarni)  ? Palliative radiation under care Dr. Kulkarni to right femur and sternal lesion -3000 cGy in 10 fractions given February 1 to February 12 with significant symptom improvement.  ? 3/15/2021 PSA 38.5  ? 4/12/2021 PSA 44  ? 5/10/2021 PSA 42.6 CT chest abdomen pelvis 5/4/2021 - for progression of disease and follow-up PSA 5 10/2021 is at 42 slightly reduced from previous.  We have discussed how to proceed from here and find a significant family history that clearly supports a potential genetic assessment for his malignancy.  ? It is felt most reasonable at this point to take the mediastinal nodes from his biopsy 4/9/2018 and reassess for potential targeted therapies, MSI status, as well as germline analysis.  ? 6/7/2021 PSA 65.9 his analysis completed for actionable mutations including germline mutations.  These exams were negative though there is a variant of unknown significance.  Again this is not an actionable mutation.  We proceeded with additional Xgeva and Lupron planning for monthly Xgeva and Lupron at 3 months  ? 7/7/2021 PSA 89 and subsequent testing with Axumin PET was performed confirming extensive osteosclerotic metastatic disease with little uptake, no evidence of visceral metastatic disease in neck chest abdomen or pelvis.  ? 8/25/2021  , PET/CT reviewed, subsequent PSA increased to 105.  Patient fortunately asymptomatic.  Requested reevaluation for Xtandi 160 mg po daily.   ? 9/8/2021 due for his lupron, receiving every 3 months, and monthly Xgeva.  Will have education today for Xtandi and will receive his medication today as well.   ? Started Xtandi 9/8/2021.  ? 9/22/2021 here for toxicity check, so far tolerating Xtandi quite well other than a slight increase in diarrhea controlled with Imodium.    2.  Neuropathy/ restless legs:    · 5/13/2020 gabapentin 600 mg at bedtime, trazadone 50 mg QHS.  · On gabapentin 300, 2 tablets at bedtime, and requip 0.5.mg    · Still  having neuropathy symptoms.  Will increase gabapentin to 900 mg at bedtime.   · 9/22/2021 increase in gabapentin to 900 mg at bedtime has helped neuropathy.  We will provide him with a refill of this prescription today.    3.  Cancer related pain: on Norco 5/325 every 6 hours as needed.    4.  Hyperkalemia:  · Intermittent issue for the patient.  It is typically dietary related, as he consumes large amount  fresh tomatoes and other foods from his garden..  He was instructed to significantly decrease his intake as his potassium level today is 6.4, magnesium 2.7  He is not symptomatic.  We will also check an EKG.   · 9/22/2021 potassium level is improved to 5.7.  Patient states that he is no longer eating tomatoes from his garden.    5.  Hypophosphatemia:  Phosphorus 1.9- hold xgeva 9/8/2021 ., Increase foods high in phosphorus, recheck in 2 weeks. \  · 9/22/2021 phosphorus level 1.7.  Reviewed with Dr. Lee.  In addition to increasing foods high in phosphorus, I have instructed the patient to start Neutra-Phos 1 tablet daily.  We will recheck again in 2 weeks.    PLAN:  1. Continue Xtandi 160 mg daily.  2. Neutra-Phos 1 tablet daily in addition to increasing foods high in phosphorus.  3. Continue gabapentin 900 mg at bedtime.  4. Continue Imodium if needed for diarrhea.  5. Return in 2 weeks for follow-up visit with nurse practitioner with repeat CBC and stat CMP as well as toxicity check.  6. Return in 4 weeks for follow-up visit with Dr. Lee with repeat CBC, stat CMP, possible Xgeva.  7. Call/return sooner should he develop any new concerns or problems.  8. Continue Lupron injections every 3 months (last given 9/8/2021).    Patient is on high risk medication requiring close monitoring for toxicity.    Randee Alfredo, APRN  09/22/2021

## 2021-09-30 DIAGNOSIS — C61 PROSTATE CANCER METASTATIC TO BONE (HCC): Primary | ICD-10-CM

## 2021-09-30 DIAGNOSIS — C79.51 PROSTATE CANCER METASTATIC TO BONE (HCC): Primary | ICD-10-CM

## 2021-10-01 ENCOUNTER — TELEPHONE (OUTPATIENT)
Dept: PHARMACY | Facility: HOSPITAL | Age: 81
End: 2021-10-01

## 2021-10-01 NOTE — TELEPHONE ENCOUNTER
[facility] Refill Outreach    Called regarding refill of medication: [place]  Spoke with patient.    Assessment  • It looks like it is almost time for your refill, how many doses do you have left? At least 1 week  •  Semaj reports 100% adherence to Xtandi; patient is taking 4 capsules (160 mg) po daily at 5 pm with dinner and reports no missed doses in past 30 days.    • Patient is experiencing side effects of  was having diarrhea, and this was managed with imodium; however, pt states this has pretty much resolved. .    • Completed medication reconciliation with patient. Patient denies starting or stopping any medications.  Assessed medication list for interactions, no significant drug interactions noted.      Shipment  • Patient was advised medication will be bee-lined to [facility] office: [facility] location, with expected delivery on prior to his appointment on 10/6. .He would also like his zofran sent along with it - states he isn't having much nausea, but likes to have it on hand   • Patient is aware and willing to pay copay, [facility].   • Ensured patient has clinic contact information for questions. Semaj had no questions or concerns for the [facility] office.     Xochilt Brink, PharmD  10/1/2021  11:21 EDT

## 2021-10-06 ENCOUNTER — INFUSION (OUTPATIENT)
Dept: ONCOLOGY | Facility: HOSPITAL | Age: 81
End: 2021-10-06

## 2021-10-06 ENCOUNTER — OFFICE VISIT (OUTPATIENT)
Dept: ONCOLOGY | Facility: CLINIC | Age: 81
End: 2021-10-06

## 2021-10-06 ENCOUNTER — LAB (OUTPATIENT)
Dept: LAB | Facility: HOSPITAL | Age: 81
End: 2021-10-06

## 2021-10-06 VITALS
OXYGEN SATURATION: 96 % | SYSTOLIC BLOOD PRESSURE: 128 MMHG | HEART RATE: 63 BPM | WEIGHT: 194.7 LBS | HEIGHT: 65 IN | TEMPERATURE: 97.5 F | DIASTOLIC BLOOD PRESSURE: 73 MMHG | RESPIRATION RATE: 16 BRPM | BODY MASS INDEX: 32.44 KG/M2

## 2021-10-06 DIAGNOSIS — E87.5 HYPERKALEMIA: ICD-10-CM

## 2021-10-06 DIAGNOSIS — C79.51 PROSTATE CANCER METASTATIC TO BONE (HCC): ICD-10-CM

## 2021-10-06 DIAGNOSIS — E83.41 HYPERMAGNESEMIA: ICD-10-CM

## 2021-10-06 DIAGNOSIS — C79.51 OSSEOUS METASTASIS: ICD-10-CM

## 2021-10-06 DIAGNOSIS — C61 PROSTATE CANCER METASTATIC TO BONE (HCC): ICD-10-CM

## 2021-10-06 DIAGNOSIS — K52.1 DIARRHEA DUE TO DRUG: ICD-10-CM

## 2021-10-06 DIAGNOSIS — C79.51 OSSEOUS METASTASIS: Primary | ICD-10-CM

## 2021-10-06 DIAGNOSIS — E83.39 HYPOPHOSPHATEMIA: Primary | ICD-10-CM

## 2021-10-06 DIAGNOSIS — D64.9 ANEMIA, UNSPECIFIED TYPE: ICD-10-CM

## 2021-10-06 LAB
ALBUMIN SERPL-MCNC: 4 G/DL (ref 3.5–5.2)
ALBUMIN/GLOB SERPL: 1.3 G/DL (ref 1.1–2.4)
ALP SERPL-CCNC: 415 U/L (ref 38–116)
ALT SERPL W P-5'-P-CCNC: 8 U/L (ref 0–41)
ANION GAP SERPL CALCULATED.3IONS-SCNC: 8 MMOL/L (ref 5–15)
AST SERPL-CCNC: 15 U/L (ref 0–40)
BASOPHILS # BLD AUTO: 0.02 10*3/MM3 (ref 0–0.2)
BASOPHILS NFR BLD AUTO: 0.2 % (ref 0–1.5)
BILIRUB SERPL-MCNC: 0.2 MG/DL (ref 0.2–1.2)
BUN SERPL-MCNC: 21 MG/DL (ref 6–20)
BUN/CREAT SERPL: 18.8 (ref 7.3–30)
CALCIUM SPEC-SCNC: 8.1 MG/DL (ref 8.5–10.2)
CHLORIDE SERPL-SCNC: 111 MMOL/L (ref 98–107)
CO2 SERPL-SCNC: 22 MMOL/L (ref 22–29)
CREAT SERPL-MCNC: 1.12 MG/DL (ref 0.7–1.3)
DEPRECATED RDW RBC AUTO: 60.7 FL (ref 37–54)
EOSINOPHIL # BLD AUTO: 0.12 10*3/MM3 (ref 0–0.4)
EOSINOPHIL NFR BLD AUTO: 1.5 % (ref 0.3–6.2)
ERYTHROCYTE [DISTWIDTH] IN BLOOD BY AUTOMATED COUNT: 17.2 % (ref 12.3–15.4)
FERRITIN SERPL-MCNC: 169.9 NG/ML (ref 30–400)
FOLATE SERPL-MCNC: 10.2 NG/ML (ref 4.78–24.2)
GFR SERPL CREATININE-BSD FRML MDRD: 63 ML/MIN/1.73
GLOBULIN UR ELPH-MCNC: 3.1 GM/DL (ref 1.8–3.5)
GLUCOSE SERPL-MCNC: 75 MG/DL (ref 74–124)
HCT VFR BLD AUTO: 29.7 % (ref 37.5–51)
HGB BLD-MCNC: 9 G/DL (ref 13–17.7)
IMM GRANULOCYTES # BLD AUTO: 0.06 10*3/MM3 (ref 0–0.05)
IMM GRANULOCYTES NFR BLD AUTO: 0.7 % (ref 0–0.5)
IRON 24H UR-MRATE: 31 MCG/DL (ref 59–158)
IRON SATN MFR SERPL: 9 % (ref 14–48)
LYMPHOCYTES # BLD AUTO: 1.42 10*3/MM3 (ref 0.7–3.1)
LYMPHOCYTES NFR BLD AUTO: 17.7 % (ref 19.6–45.3)
MAGNESIUM SERPL-MCNC: 2.8 MG/DL (ref 1.8–2.5)
MCH RBC QN AUTO: 29.2 PG (ref 26.6–33)
MCHC RBC AUTO-ENTMCNC: 30.3 G/DL (ref 31.5–35.7)
MCV RBC AUTO: 96.4 FL (ref 79–97)
MONOCYTES # BLD AUTO: 0.83 10*3/MM3 (ref 0.1–0.9)
MONOCYTES NFR BLD AUTO: 10.3 % (ref 5–12)
NEUTROPHILS NFR BLD AUTO: 5.58 10*3/MM3 (ref 1.7–7)
NEUTROPHILS NFR BLD AUTO: 69.6 % (ref 42.7–76)
NRBC BLD AUTO-RTO: 0 /100 WBC (ref 0–0.2)
PHOSPHATE SERPL-MCNC: 2.3 MG/DL (ref 2.5–4.5)
PLATELET # BLD AUTO: 178 10*3/MM3 (ref 140–450)
PMV BLD AUTO: 9.2 FL (ref 6–12)
POTASSIUM SERPL-SCNC: 5.8 MMOL/L (ref 3.5–4.7)
PROT SERPL-MCNC: 7.1 G/DL (ref 6.3–8)
PSA SERPL-MCNC: 9.69 NG/ML (ref 0–4)
RBC # BLD AUTO: 3.08 10*6/MM3 (ref 4.14–5.8)
SODIUM SERPL-SCNC: 141 MMOL/L (ref 134–145)
TIBC SERPL-MCNC: 337 MCG/DL (ref 249–505)
TRANSFERRIN SERPL-MCNC: 241 MG/DL (ref 200–360)
VIT B12 BLD-MCNC: 221 PG/ML (ref 211–946)
WBC # BLD AUTO: 8.03 10*3/MM3 (ref 3.4–10.8)

## 2021-10-06 PROCEDURE — 82728 ASSAY OF FERRITIN: CPT | Performed by: NURSE PRACTITIONER

## 2021-10-06 PROCEDURE — 99214 OFFICE O/P EST MOD 30 MIN: CPT | Performed by: NURSE PRACTITIONER

## 2021-10-06 PROCEDURE — 25010000002 DENOSUMAB 120 MG/1.7ML SOLUTION: Performed by: NURSE PRACTITIONER

## 2021-10-06 PROCEDURE — 36415 COLL VENOUS BLD VENIPUNCTURE: CPT

## 2021-10-06 PROCEDURE — 85025 COMPLETE CBC W/AUTO DIFF WBC: CPT

## 2021-10-06 PROCEDURE — 84100 ASSAY OF PHOSPHORUS: CPT | Performed by: NURSE PRACTITIONER

## 2021-10-06 PROCEDURE — 96372 THER/PROPH/DIAG INJ SC/IM: CPT

## 2021-10-06 PROCEDURE — 84153 ASSAY OF PSA TOTAL: CPT | Performed by: INTERNAL MEDICINE

## 2021-10-06 PROCEDURE — 83735 ASSAY OF MAGNESIUM: CPT | Performed by: NURSE PRACTITIONER

## 2021-10-06 PROCEDURE — 84466 ASSAY OF TRANSFERRIN: CPT | Performed by: NURSE PRACTITIONER

## 2021-10-06 PROCEDURE — 80053 COMPREHEN METABOLIC PANEL: CPT

## 2021-10-06 PROCEDURE — 83540 ASSAY OF IRON: CPT | Performed by: NURSE PRACTITIONER

## 2021-10-06 PROCEDURE — 82607 VITAMIN B-12: CPT | Performed by: NURSE PRACTITIONER

## 2021-10-06 PROCEDURE — 82746 ASSAY OF FOLIC ACID SERUM: CPT | Performed by: NURSE PRACTITIONER

## 2021-10-06 RX ADMIN — DENOSUMAB 120 MG: 120 INJECTION SUBCUTANEOUS at 12:25

## 2021-10-06 NOTE — PROGRESS NOTES
Subjective      REASONS FOR FOLLOW UP:   1.  Metastatic prostate cancer    HISTORY OF PRESENT ILLNESS:  Patient is an 80-year-old with metastatic prostate cancer who is here today for lab review and toxicity check.  He continues on Xtandi 160 mg daily.  He reports he has tolerating this well so far.  He has had some mild diarrhea but it is controlled with Imodium.  He is somewhat sad today, as he had the passing of his youngest brother this past week.  Patient states that he was very close to him and it has been very hard for him emotionally this past week.    He reports neuropathy symptoms are stable.  Otherwise no new problems or concerns.    Hematologic/oncologic history:   The patient is an 80-year-old male with significant past medical history including prostate carcinoma, CKD 3 and long-term tobacco use be been seen by primary care in late January, 2018 for hoarseness.  Chest x-ray is now outpatient January 23 revealed sclerotic change in the posterior mid chest to be within 3 adjacent thoracic ertebral bodies of uncertain significance.  A follow-up chest CT revealed numerous sclerotic foci within al visualized bones consistent with metastatic disease, multiple enlarged mediastinal lymph nodes concerning for metastatic lymphadenopathy and a polypoidal abnormality in the right lateral wall of the trachea.  CT of abdomen March 20 revealed extensive osseous metastatic disease mildly enlarged retroperitoneal lymph nodes.  Bone scan March 20 was consistent with widespread osseous metastasis particularly in the proximal half the left humeral shaft, abnormal uptake in the sternum and manubrium and a small focus in the posterior aspect of the calvarium.  This led to a plain film the left humerus with mottled sclerosis involving the shaft of the humerus particularly in the proximal half but no evidence of pathologic fracture.    The patient has additionally type 2 diabetes on insulin pump, again a history of prostate  carcinoma prostatectomy 12 years previously, hypertension, and hyperlipidemia.  Additional studies included a PSA of 395.1 from March 14, 2018.The patient's been seen by urology March 15 with plans to start Firmagon.    The patient is now seen in pulmonary clinic with Dr. Bijan Rivera and we have discussed that he will need bronchoscopy and mediastinoscopy.  Interestingly his additional history includes a long occupational exposure including working in the [place] just out of school, subsequent construction work for many years and a 30-pack-year history of smoking stopping in the late 1990s.  He indicates that he followed with Dr. Guillen of urology for many years with no changes in his exams and normal PSAs.  He stopped this follow-up approximately 2 years ago.     Patient was seen in consultation with thoracic surgery on March 28 and plans are made for mediastinoscopy and bronchoscopy with lymph node biopsy.  Pathology revealed that these nodes were consistent with metastatic prostate carcinoma.  He was discussed at thoracic conference.  A PET/CT was not pursued and it was determined that he see medical oncology for hormonal treatment and radiation therapy if symptomatic.  It should be noted that the patient's March 15 [facility] office note describes that Firmagon was planned.     The patient has met with the son and grandson April 23.  We have also contacted Dr. Taylor of urology in that Firmagon was given just after his last visit and would next be due approximately May 3.  Patient feels considerably better with resolution of his pain, normalization of his performance status.  He would like to continue treatment through our office now contacted Dr. Taylor concerning this.    The patient is next seen June 11, 2018 we discussed his follow-up including his treatments with Lupron May 7 and his next treatment on July 30.  He has returned to essentially normal performance status and his PSA  has dropped further from 395 March 14 27.8 May 7 and now 2.03 June 11.  We do plan to initiate Xgeva also study him via scans to document his improvement approximately a month from now.   The patient is next seen July 26, 2018.  Repeat imaging demonstrated interval resolution of mediastinal and retroperitoneal lymphadenopathy.  There is thickening in the distal aspect of the appendix with stranding?  Chronic appendicitis.  The patient indicates he is having no such symptoms and never has.  There is no change in sclerotic bony metastasis in the patient's PSA has dropped substantially to 1.66 by July 19 and 1.79 July 26.  He is actually feeling excellent and this is corroborated by family members.   Patient is next seen January 10, 2019 continuing to do very well and, in fact indicating that [facility], likely hearing replacements, visual review.  He is not certain is going to continue his care with them or with us or combination of both.    Patient is next seen April 4, 2019.  He is recently discharged after hospitalization March 15-18 with left upper lobe pneumonia.  We reviewed the findings in detail with his gradual recovery now documented.  His studies include a CT of chest which does not demonstrate any further bony lesions and/or pulmonary metastasis having developed.  He is feeling considerably better and approximately back to his baseline.  The patient is next seen June 28, 2019 feeling well but having baseline generally musculoskeletal pain and restless legs.His recent exams include a PSA of 0.81, CMP with potassium of 5.3 with repeat pending.  His performance status overall remains good to very good and is clearly responding to his treatments.  The patient is next seen December 13, 2019 continue to feel well.  He has had, oddly enough, 2 episodes of sleep paralysis which have occurred to him previously and he wonders if there is any connection with his  prostate carcinoma though this is felt unlikely.     The patient when assessed in December had his PSA go to 2.9.  We continue with Lupron and Xgeva and is seen back now March 9, 2020 without additional symptoms.  We discussed that a schedule could likely change moving to 3 months for both of these medications particularly if he needs additional therapy directed at progressive disease.     Patient contacted our practice May 4 concern for pain in his hips.  This led to moving his scans up and they were performed May 8, 2020 showing a sub-6 mm pleural-based pulmonary nodule in the right lower lobe that is new from March 15, 2018, remainder of the sub-6 mm pulmonary nodules bilaterally are stable.  There is extensive osseous metastatic disease as previous with no other new findings.     The patient indicates, when seen, May 13 that his bone pain is now resolved.  While this is good information does not mean that he does not have further bony metastasis and we have discussed that a follow-up bone scan is likely necessary.  Furthermore he is having some difficulty with sleeplessness and increase in restless legs as he continues to stay at home during the COVID 19 crisis which, itself, is producing more anxiety for him.  A follow-up bone scan was obtained Lety 3 revealing multifocal osseous metastatic disease which was compared to March 2, 2018 with improvement.  No new abnormal uptake is present.  He is next seen with us on June 17, 2020 and, fortunately, is not having any significant pain at this point.  We discussed his scans in detail and, on balance, his performance status also remains improved.     It was elected to follow the patient rather than changes antiandrogen therapy.  He is reassessed September 3 with unchanged CMP, H&H of 11.4 and 36.3 with normal white count, platelet count and differential, PSA at 15.8.  Follow-up PA lateral chest x-ray does not show any new abnormalities though there is extensive  metastatic bone disease throughout the bony thorax and osteoblastic metastasis have been seen on chest CT May 2020.  The patient is seen with his son Georges September 10 noticing increasing bony discomfort primarily in the right hip following fairly intensive gardening which he does frequently.  Now has further elevation of PSA were wondering whether we should try to intervene sooner per additional antiandrogens.  We will need to further assess him via CT scanning to make this decision     These were obtained October 1 showing extensive sclerotic disease with no metastatic disease in chest abdomen or pelvis.  PSA had gone from 15.8 September 3-16 0.6 October 1.     He still has pain getting up in the morning and after active gardening.  This is manageable with current pain medications and, at present, it is not apparent that he needs to switch medications to gain better control of his disease.  The patient did have follow-up studies done including a PSA November 25, 2020 now at 25.4.  The patient is seen with his son December 10, 2020 doing fair but having some increased pain in the mid sternum and right hip that he ascribes to additional exercise/work in managing his garden.  It is apparent however, that his last bone scan was 6 months ago and we do need to reassess him concerning this.     The patient had follow-up bone scan performed January 11, 2021 showing again uptake in the calvarium, humerus, right medial clavicle, sternum, ribs, spine, sacrum, pelvis, right acetabulum, proximal femora and now left frontal bone which appears new.  There is also mild increase in sternal uptake and equivocal increase in the pelvic lesions of all of the sacrum and the right proximal femur.     The patient is seen with his son January 18, 2021 and indicates that he is having pain gradually worsened in his right hip, mid chest that had been an issue previously.  These findings on bone scan are reviewed with both of them as likely  the underlying culprit for his discomfort.  He would need change of the systemic therapy but, at present, will ask for radiation therapy palliation initially.  He was previously treated by Dr. Kulkarni many years ago and will ask her to see him.  We will also make application for the current cost of Xtandi or Zytiga.  The patient is not felt to be a candidate for systemic chemotherapy.     The patient was referred for ration therapy as we continued to assess his PSA on current therapy as well as exam the cost of Zytiga or Xtandi.  Radiation therapy (Dr. Kulkarni) saw the patient January 26 and felt that the right femur and sternal lesion could benefit from therapy-3000 cGy in 10 fractions.  The patient took treatment February 1 to February 12 to the above areas and is next seen back March 15.  Fortunately his pain has improved dramatically and he is quite happy about this.  Unfortunately he notes some difficulty with swallowing that is temporary and often leads to increased salivation and mucus production.  Patient was asked to return his next assessed April 12, 2021.  He is able to swallow with less pain but still has excess mucus production and issues with salivation.  His PSA has noted increase but he is having no additional bony discomfort at this point and, additionally, has noted low-grade fevers just under 100 degrees at nighttime?.  His weight, appetite and general performance status have remained good to excellent.  We discussed follow-up studies at this point to determine his status.    Follow-up scans were scheduled CT scans of chest and pelvis 5/4/2021 showing osseous metastasis quite similar to previous examinations in the chest, CT of abdomen pelvis also with no acute intra-abdominal adenopathy no indication of abdominal pelvic metastatic disease but again widespread osseous metastasis.  His PSA at this point have been slowly increasing to a level of 44 on 4/12/2021.  As the patient is reassessed 5/10/2021  we find, fortunately, that he is not exceeding symptomatic.  It could make reasonable sense at this point to take the mediastinal nodes from his biopsy 4/9/2018, reassess potential targeted therapies, MSI status, and germline analysis.  Fortunately he is not having significant pain or discomfort and is seen with his son as we discussed the above plan.  Notably a follow-up PSA is found to be 42.6.   Patient is next seen in 6/7/2021 for Lupron and Xgeva.  Fortunately he is feeling considerably better according to both he and his son though his stamina has reduced.  He is not having additional pain at this point.  We have also reviewed his genetics assessment which was significant only for a variant of unknown significance.  This is not an actionable abnormality.    As the patient's PSA further escalated increasing to 89 7/7/2021 his subsequent Axumin PET was obtained 8/20 demonstrating extensive osteosclerotic metastatic disease showing reduced uptake-typical finding but no evidence of visceral metastatic disease in the neck chest abdomen pelvis.  These findings are discussed with the patient and his son 8/25/2021 and indicative of his progressive disease-etiology of his rising PSA-though the patient is asymptomatic we have discussed moving to initiate Xtandi is available at 160 mg p.o. twice daily along with his current Lupron and Xgeva.        Past Medical History:   Diagnosis Date   • Bone cancer (HCC)    • Cellulitis of great toe of left foot 10/19/2018   • CKD (chronic kidney disease)     Stage 3; followed by Dr. Vicky Duran    • Diastolic dysfunction     GRADE II per echo 2018   • Difficulty breathing     during exertion   • Dyslipidemia    • Erectile dysfunction    • Fatigue    • History of blood transfusion    • History of kidney stones    • Hyperlipidemia    • Hypertension    • Hyponatremia    • Left ventricular hypertrophy     per echo 2018   • Localized edema    • Neuropathy    • Obstructive sleep  apnea     USING CPAP   • Osteoarthritis    • Peptic ulcer    • Pneumonia    • Pneumonia of left upper lobe due to infectious organism 3/15/2019   • Prostate cancer (HCC)     Status post prostatectomy, radiation therapy, and hormone therapy followed by Dr. Lee; metastatsis to bone   • Pure hyperglyceridemia    • Restless leg syndrome    • Sepsis (HCC)    • Sinus bradycardia    • Transient cerebral ischemia    • Type 2 diabetes mellitus (HCC)         Past Surgical History:   Procedure Laterality Date   • BRONCHOSCOPY N/A 4/9/2018    Procedure: BRONCHOSCOPY;  Surgeon: Bijan Rivera III, MD;  Location: [facility];  Service: Cardiothoracic   • COLONOSCOPY     • DEEP NECK LYMPH NODE BIOPSY / EXCISION     • HEMORRHOIDECTOMY     • MEDIASTINOSCOPY N/A 4/9/2018    Procedure: MEDIASTINOSCOPY WITH LYMPH NODE BIOPSY;  Surgeon: Bijan Rivera III, MD;  Location: [facility];  Service: Cardiothoracic   • OTHER SURGICAL HISTORY      ulcer repair   • PROSTATECTOMY  2006        Current Outpatient Medications on File Prior to Visit   Medication Sig Dispense Refill   • aspirin 81 MG EC tablet Take 1 tablet by mouth daily.     • cephalexin (KEFLEX) 500 MG capsule Take 500 mg by mouth 2 (Two) Times a Day.     • cholecalciferol (VITAMIN D3) 1000 units tablet Take 2,000 Units by mouth Daily.     • Denosumab (XGEVA SC) Inject  under the skin into the appropriate area as directed Every 30 (Thirty) Days.     • dilTIAZem CD (CARDIZEM CD) 120 MG 24 hr capsule TAKE 1 CAPSULE EVERY DAY 90 capsule 1   • enzalutamide (XTANDI) 40 MG chemo capsule Take 4 capsules by mouth Daily. 120 capsule 3   • gabapentin (NEURONTIN) 300 MG capsule Take 3 capsules by mouth every night at bedtime. 90 capsule 2   • guaifenesin (ROBITUSSIN) 100 MG/5ML liquid Take 20 mL by mouth every night at bedtime.     • HYDROcodone-acetaminophen (NORCO) 5-325 MG per tablet Take 1 tablet by mouth Every 6 (Six) Hours As Needed for Moderate Pain . 120 tablet 0   •  insulin aspart (NovoLOG) 100 UNIT/ML patient supplied pump Inject  under the skin into the appropriate area as directed Continuous. Pt reports pump with basal rate 1.9 units/hr from 0800 to 1200. Basal rate 1.8 units/hr 1200 to 0800.  Checks bg ac/hs with bolus doses per pumps parameters     • Leuprolide Acetate, 3 Month, (LUPRON DEPOT, 3-MONTH, IM) Inject  into the appropriate muscle as directed by prescriber Every 3 (Three) Months.     • levothyroxine (SYNTHROID, LEVOTHROID) 88 MCG tablet      • loperamide (IMODIUM) 1 MG/5ML solution Take 2 mg by mouth 4 (Four) Times a Day As Needed for Diarrhea.     • modafinil (PROVIGIL) 200 MG tablet Take 1 tablet by mouth Daily. 90 tablet 1   • olmesartan (BENICAR) 40 MG tablet TAKE 1 TABLET EVERY DAY (DISCONTINUE LOSARTAN 100MG) 90 tablet 3   • omeprazole (priLOSEC) 20 MG capsule TAKE 1 CAPSULE BY MOUTH EVERY NIGHT AT BEDTIME. 90 capsule 1   • ondansetron (ZOFRAN) 8 MG tablet Take 1 tablet by mouth 3 (Three) Times a Day As Needed for Nausea or Vomiting. 30 tablet 5   • phosphorus (K PHOS NEUTRAL) 155-852-130 MG tablet Take 1 tablet by mouth Daily. 30 tablet 1   • pravastatin (PRAVACHOL) 40 MG tablet Take 40 mg by mouth daily.     • rOPINIRole (REQUIP) 0.5 MG tablet Take 1 tablet by mouth in the evening and 1 at bedtime. 180 tablet 2   • traZODone (DESYREL) 50 MG tablet Take  mg by mouth every night at bedtime.     • triamterene-hydrochlorothiazide (MAXZIDE-25) 37.5-25 MG per tablet TAKE 1 TABLET EVERY DAY 90 tablet 0     No current facility-administered medications on file prior to visit.        ALLERGIES:    Allergies   Allergen Reactions   • Niacin Unknown - Low Severity   • Statins Unknown - Low Severity        Social History     Socioeconomic History   • Marital status:      Spouse name: Not on file   • Number of children: Not on file   • Years of education: College   • Highest education level: Not on file   Tobacco Use   • Smoking status: Former Smoker      "Packs/day: 1.00     Years: 30.00     Pack years: 30.00     Types: Cigarettes     Quit date: 1998     Years since quittin.7   • Smokeless tobacco: Never Used   Substance and Sexual Activity   • Alcohol use: No     Comment: Caffeine use: 2-3 cups daily   • Drug use: No   • Sexual activity: Defer        Family History   Problem Relation Age of Onset   • Heart failure Mother    • Hypertension Mother    • Diabetes Mother    • Heart disease Mother    • Lung cancer Father 46   • Hypertension Sister    • Lung cancer Sister 60   • Hypertension Brother    • Lung cancer Brother 62   • Heart disease Other    • Prostate cancer Brother 60   • Malig Hyperthermia Neg Hx         Review of Systems  As per HPI   .  Objective     Vitals:    10/06/21 1059   BP: 128/73   Pulse: 63   Resp: 16   Temp: 97.5 °F (36.4 °C)   TempSrc: Temporal   SpO2: 96%   Weight: 88.3 kg (194 lb 11.2 oz)   Height: 165.1 cm (65\")   PainSc: 0-No pain     Current Status 2021   ECOG score 0       Physical Exam   Constitutional: He is oriented to person, place, and time. He appears well-developed. No distress.   HENT:   Head: Normocephalic and atraumatic.   Mouth/Throat: No oropharyngeal exudate.   Eyes: Pupils are equal, round, and reactive to light.   Cardiovascular: Normal rate, regular rhythm and normal heart sounds.   No murmur heard.  Pulmonary/Chest: Effort normal and breath sounds normal. No respiratory distress. He has no wheezes. He has no rhonchi. He has no rales.   Abdominal: Soft. Normal appearance and bowel sounds are normal. He exhibits no distension.   Musculoskeletal: Normal range of motion.   Neurological: He is alert and oriented to person, place, and time.   Skin: Skin is warm and dry. No rash noted.   Vitals reviewed.    RECENT LABS:  Hematology WBC   Date Value Ref Range Status   10/06/2021 8.03 3.40 - 10.80 10*3/mm3 Final     RBC   Date Value Ref Range Status   10/06/2021 3.08 (L) 4.14 - 5.80 10*6/mm3 Final     Hemoglobin "   Date Value Ref Range Status   10/06/2021 9.0 (L) 13.0 - 17.7 g/dL Final     Hematocrit   Date Value Ref Range Status   10/06/2021 29.7 (L) 37.5 - 51.0 % Final     Platelets   Date Value Ref Range Status   10/06/2021 178 140 - 450 10*3/mm3 Final        Lab Results   Component Value Date    GLUCOSE 75 10/06/2021    BUN 21 (H) 10/06/2021    CREATININE 1.12 10/06/2021    EGFRIFNONA 63 10/06/2021    BCR 18.8 10/06/2021    K 5.8 (C) 10/06/2021    CO2 22.0 10/06/2021    CALCIUM 8.1 (L) 10/06/2021    ALBUMIN 4.00 10/06/2021    AST 15 10/06/2021    ALT 8 10/06/2021       NM Bone Scan Whole Body (01/11/2021 13:25)  CT Chest With Contrast Diagnostic (05/04/2021 09:11)  CT Abdomen Pelvis With Contrast (05/04/2021 09:11)  NM PET/CT Skull Base to Mid Thigh (08/20/2021 12:18)      Assessment/Plan      1.  Metastatic prostate cancer:  ? Patient initially diagnosed in 2006 and had a prostatectomy and hormone therapy.  ? Patient in January 2018 began to complain of shortness of breath and hoarseness.  Chest x-ray obtained 1/23/18 showed sclerotic bone lesions.  ? CT of the chest 3/12/2018 numerous sclerotic bone foci with all visualized bones consistent with metastatic disease, multiple enlarged mediastinal lymph nodes.  ? .1 from 3/14/2018.  Patient was started on Firmagon by urology, [facility].  ? 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  ? Case discussed at thoracic conference and recommendations were to continue ADT and radiation for symptomatic sites.  ? Lupron injections every 3 mos initiated 5/7/18 PSA at that time 7.810  ? Monthly Xgeva initiated 6/11/18 PSA 2.030  ? Last PSA 6/13/2019 0.881  ? 9/20/2019 patient tolerating treatment well, no new concerns.  ? 12/2019 PSA 2.9  ? 3/9/2020  PSA increasing to 5.030, he remains continuing on Lupron and Xgeva and asymptomatic though follow-up CT of chest and pelvis will be requested in 11  weeks prior to follow-up in 12 weeks.   ? 5/4/2020 patient called reported worsening right hip pain, plans to purse CT scans ASAP.   ? 5/8/2020 CT scans C/A/P show no progression of disease. Hip pain improved, Gabapentin added.. Bone scan requested.  ? 6/3/2020 bone scan that does not demonstrate worsening of bone nor need for localized radiation therapy.  The patient's PSA has been slowly rising and we have not been able to determine worsening of disease and and, therefore, it is not felt warranted add additional medications such as Xtandi or apalutamide to his therapy.  Please note would like to avoid Zytiga try not to add prednisone which would worsen his blood sugar control.  ? 9/3/2020 PSA 15.8, CXR extensive metastatic bone disease- patient reviewed 9/10/2020 reporting increased bony discomfort CT scans requested.  ? Scans done and reviewed 10/7/2020 ultimately reveal no evidence of progression of disease for visceral involvement or clearly for bone involvement.  After further review with the patient and his son plan continued Xgeva and Lupron.  We have assessed for availability of Xtandi 160 mg p.o. daily and find the cost is currently prohibitive.   ? 11/25/2020 PSA 25.4  ? 12/10/2020 review with some mild increase in bony discomfort.  After repeat discussion we went on to have him undergo Lupron therapy, and his PSA actually to be mildly reduced at 22.2.   ? Follow-up bone scan 1/11/2021 demonstrates some evidence of progression in sternum, left sacrum and proximal femur.  These findings were reviewed with the patient and his son January 18, 2020 and the patient was referred for palliative radiotherapy(Dr. Kulkarni)  ? Palliative radiation under care Dr. Kulkarni to right femur and sternal lesion -3000 cGy in 10 fractions given February 1 to February 12 with significant symptom improvement.  ? 3/15/2021 PSA 38.5  ? 4/12/2021 PSA 44  ? 5/10/2021 PSA 42.6 CT chest abdomen pelvis 5/4/2021 - for progression of  disease and follow-up PSA 5 10/2021 is at 42 slightly reduced from previous.  We have discussed how to proceed from here and find a significant family history that clearly supports a potential genetic assessment for his malignancy.  ? It is felt most reasonable at this point to take the mediastinal nodes from his biopsy 4/9/2018 and reassess for potential targeted therapies, MSI status, as well as germline analysis.  ? 6/7/2021 PSA 65.9 his analysis completed for actionable mutations including germline mutations.  These exams were negative though there is a variant of unknown significance.  Again this is not an actionable mutation.  We proceeded with additional Xgeva and Lupron planning for monthly Xgeva and Lupron at 3 months  ? 7/7/2021 PSA 89 and subsequent testing with Axumin PET was performed confirming extensive osteosclerotic metastatic disease with little uptake, no evidence of visceral metastatic disease in neck chest abdomen or pelvis.  ? 8/25/2021  , PET/CT reviewed, subsequent PSA increased to 105.  Patient fortunately asymptomatic.  Requested reevaluation for Xtandi 160 mg po daily.   ? 9/8/2021 due for his lupron, receiving every 3 months, and monthly Xgeva.  Will have education today for Xtandi and will receive his medication today as well.   ? Started Xtandi 9/8/2021.  ? 9/22/2021 here for toxicity check, so far tolerating Xtandi quite well other than a slight increase in diarrhea controlled with Imodium.  ? 10/6/2021 continues to tolerate Xtandi well.  Labs are within range today, we will proceed with Xgeva today.    2.  Neuropathy/ restless legs:    · 5/13/2020 gabapentin 600 mg at bedtime, trazadone 50 mg QHS.  · On gabapentin 300, 2 tablets at bedtime, and requip 0.5.mg    · Still having neuropathy symptoms.  Will increase gabapentin to 900 mg at bedtime.   · 9/22/2021 increase in gabapentin to 900 mg at bedtime has helped neuropathy.  We will provide him with a refill of this  prescription.    3.  Cancer related pain: on Norco 5/325 every 6 hours as needed.    4.  Hyperkalemia:  · Intermittent issue for the patient.  It is typically dietary related, as he consumes large amount  fresh tomatoes and other foods from his garden..  He was instructed to significantly decrease his intake as his potassium level today is 6.4, magnesium 2.7  He is not symptomatic.  We will also check an EKG.   · 9/22/2021 potassium level is improved to 5.7.  Patient states that he is no longer eating tomatoes from his garden.  · 10/6/2021 potassium level is 5.8.  Due to patient's multiple electrolyte abnormalities we will go ahead and refer him to nephrology for further evaluation.  Patient states he actually has an existing nephrologist, Dr. Prery Daily who he typically only sees on an annual basis.  We will schedule him to be seen by her soon for further evaluation.    5.  Hypophosphatemia:  Phosphorus 1.9- hold xgeva 9/8/2021 ., Increase foods high in phosphorus, recheck in 2 weeks. \  · 9/22/2021 phosphorus level 1.7.  Reviewed with Dr. Lee.  In addition to increasing foods high in phosphorus, I have instructed the patient to start Neutra-Phos 1 tablet daily.  We will recheck again in 2 weeks.  · Phosphorus 2.3 today.  Patient to discontinue Neutra-Phos    PLAN:  1. We will go ahead and proceed with Xgeva today.  2. Discontinue Neutra-Phos  3. Continue Xtandi 160 mg daily.  4. Refer to nephrology, patient's existing nephrologist Dr. Perry daily for further evaluation of multiple electrolyte abnormalities.  5. Continue gabapentin 900 mg at bedtime.  6. Continue Imodium if needed for diarrhea.  7. Return in 2 weeks for follow-up visit with Dr. Lee with repeat CBC, stat CMP, magnesium, phosphorus.  8. Call/return sooner should he develop any new problems or concerns.  Patient will continue on Lupron injections every 3 months (last given 9/8/2021).      Patient on high risk medication requiring close  monitoring for toxicity.    Randee Alfredo, APRN  10/06/2021

## 2021-10-12 DIAGNOSIS — C79.51 PROSTATE CANCER METASTATIC TO BONE (HCC): ICD-10-CM

## 2021-10-12 DIAGNOSIS — C61 PROSTATE CANCER METASTATIC TO BONE (HCC): ICD-10-CM

## 2021-10-13 RX ORDER — HYDROCODONE BITARTRATE AND ACETAMINOPHEN 5; 325 MG/1; MG/1
1 TABLET ORAL EVERY 6 HOURS PRN
Qty: 120 TABLET | Refills: 0 | Status: SHIPPED | OUTPATIENT
Start: 2021-10-13 | End: 2021-11-15 | Stop reason: SDUPTHER

## 2021-10-15 DIAGNOSIS — C79.51 PROSTATE CANCER METASTATIC TO BONE (HCC): Primary | ICD-10-CM

## 2021-10-15 DIAGNOSIS — C61 PROSTATE CANCER METASTATIC TO BONE (HCC): Primary | ICD-10-CM

## 2021-10-20 ENCOUNTER — OFFICE VISIT (OUTPATIENT)
Dept: ONCOLOGY | Facility: CLINIC | Age: 81
End: 2021-10-20

## 2021-10-20 ENCOUNTER — LAB (OUTPATIENT)
Dept: LAB | Facility: HOSPITAL | Age: 81
End: 2021-10-20

## 2021-10-20 VITALS
WEIGHT: 187 LBS | HEART RATE: 67 BPM | HEIGHT: 65 IN | SYSTOLIC BLOOD PRESSURE: 130 MMHG | OXYGEN SATURATION: 98 % | DIASTOLIC BLOOD PRESSURE: 78 MMHG | TEMPERATURE: 98.2 F | RESPIRATION RATE: 16 BRPM | BODY MASS INDEX: 31.16 KG/M2

## 2021-10-20 DIAGNOSIS — I12.9 PARENCHYMAL RENAL HYPERTENSION, STAGE 1 THROUGH STAGE 4 OR UNSPECIFIED CHRONIC KIDNEY DISEASE: ICD-10-CM

## 2021-10-20 DIAGNOSIS — C79.51 PROSTATE CANCER METASTATIC TO BONE (HCC): ICD-10-CM

## 2021-10-20 DIAGNOSIS — C61 PROSTATE CANCER METASTATIC TO BONE (HCC): Primary | ICD-10-CM

## 2021-10-20 DIAGNOSIS — N18.9 CHRONIC KIDNEY DISEASE, UNSPECIFIED CKD STAGE: Primary | ICD-10-CM

## 2021-10-20 DIAGNOSIS — C61 PROSTATE CANCER METASTATIC TO BONE (HCC): ICD-10-CM

## 2021-10-20 DIAGNOSIS — C79.51 PROSTATE CANCER METASTATIC TO BONE (HCC): Primary | ICD-10-CM

## 2021-10-20 LAB
ALBUMIN SERPL-MCNC: 4.1 G/DL (ref 3.5–5.2)
ALBUMIN SERPL-MCNC: 4.1 G/DL (ref 3.5–5.2)
ALBUMIN UR-MCNC: 1.3 MG/DL
ALBUMIN/GLOB SERPL: 1.3 G/DL (ref 1.1–2.4)
ALP SERPL-CCNC: 332 U/L (ref 38–116)
ALT SERPL W P-5'-P-CCNC: 7 U/L (ref 0–41)
ANION GAP SERPL CALCULATED.3IONS-SCNC: 10.1 MMOL/L (ref 5–15)
ANION GAP SERPL CALCULATED.3IONS-SCNC: 9.7 MMOL/L (ref 5–15)
AST SERPL-CCNC: 10 U/L (ref 0–40)
BASOPHILS # BLD AUTO: 0.03 10*3/MM3 (ref 0–0.2)
BASOPHILS NFR BLD AUTO: 0.4 % (ref 0–1.5)
BILIRUB SERPL-MCNC: 0.2 MG/DL (ref 0.2–1.2)
BILIRUB UR QL STRIP: NEGATIVE
BUN SERPL-MCNC: 32 MG/DL (ref 6–20)
BUN SERPL-MCNC: 32 MG/DL (ref 8–23)
BUN/CREAT SERPL: 23.4 (ref 7.3–30)
BUN/CREAT SERPL: 23.7 (ref 7–25)
CALCIUM SPEC-SCNC: 8.4 MG/DL (ref 8.5–10.2)
CALCIUM SPEC-SCNC: 8.4 MG/DL (ref 8.6–10.5)
CHLORIDE SERPL-SCNC: 106 MMOL/L (ref 98–107)
CHLORIDE SERPL-SCNC: 106 MMOL/L (ref 98–107)
CLARITY UR: CLEAR
CO2 SERPL-SCNC: 19.3 MMOL/L (ref 22–29)
CO2 SERPL-SCNC: 19.9 MMOL/L (ref 22–29)
COLOR UR: YELLOW
CREAT SERPL-MCNC: 1.35 MG/DL (ref 0.76–1.27)
CREAT SERPL-MCNC: 1.37 MG/DL (ref 0.7–1.3)
CREAT UR-MCNC: 99.5 MG/DL
DEPRECATED RDW RBC AUTO: 54.7 FL (ref 37–54)
EOSINOPHIL # BLD AUTO: 0.19 10*3/MM3 (ref 0–0.4)
EOSINOPHIL NFR BLD AUTO: 2.7 % (ref 0.3–6.2)
ERYTHROCYTE [DISTWIDTH] IN BLOOD BY AUTOMATED COUNT: 16.1 % (ref 12.3–15.4)
GFR SERPL CREATININE-BSD FRML MDRD: 50 ML/MIN/1.73
GFR SERPL CREATININE-BSD FRML MDRD: 51 ML/MIN/1.73
GLOBULIN UR ELPH-MCNC: 3.2 GM/DL (ref 1.8–3.5)
GLUCOSE SERPL-MCNC: 119 MG/DL (ref 74–124)
GLUCOSE SERPL-MCNC: 122 MG/DL (ref 65–99)
GLUCOSE UR STRIP-MCNC: NEGATIVE MG/DL
HCT VFR BLD AUTO: 32.8 % (ref 37.5–51)
HGB BLD-MCNC: 9.9 G/DL (ref 13–17.7)
HGB UR QL STRIP.AUTO: NEGATIVE
IMM GRANULOCYTES # BLD AUTO: 0.1 10*3/MM3 (ref 0–0.05)
IMM GRANULOCYTES NFR BLD AUTO: 1.4 % (ref 0–0.5)
KETONES UR QL STRIP: NEGATIVE
LEUKOCYTE ESTERASE UR QL STRIP.AUTO: NEGATIVE
LYMPHOCYTES # BLD AUTO: 1.51 10*3/MM3 (ref 0.7–3.1)
LYMPHOCYTES NFR BLD AUTO: 21.2 % (ref 19.6–45.3)
MAGNESIUM SERPL-MCNC: 2.8 MG/DL (ref 1.8–2.5)
MCH RBC QN AUTO: 28.3 PG (ref 26.6–33)
MCHC RBC AUTO-ENTMCNC: 30.2 G/DL (ref 31.5–35.7)
MCV RBC AUTO: 93.7 FL (ref 79–97)
MICROALBUMIN/CREAT UR: 13.1 MG/G
MONOCYTES # BLD AUTO: 0.51 10*3/MM3 (ref 0.1–0.9)
MONOCYTES NFR BLD AUTO: 7.2 % (ref 5–12)
NEUTROPHILS NFR BLD AUTO: 4.79 10*3/MM3 (ref 1.7–7)
NEUTROPHILS NFR BLD AUTO: 67.1 % (ref 42.7–76)
NITRITE UR QL STRIP: NEGATIVE
NRBC BLD AUTO-RTO: 0 /100 WBC (ref 0–0.2)
PH UR STRIP.AUTO: 5.5 [PH] (ref 4.5–8)
PHOSPHATE SERPL-MCNC: 2.3 MG/DL (ref 2.5–4.5)
PHOSPHATE SERPL-MCNC: 2.4 MG/DL (ref 2.5–4.5)
PLATELET # BLD AUTO: 285 10*3/MM3 (ref 140–450)
PMV BLD AUTO: 8.7 FL (ref 6–12)
POTASSIUM SERPL-SCNC: 5.3 MMOL/L (ref 3.5–4.7)
POTASSIUM SERPL-SCNC: 5.3 MMOL/L (ref 3.5–5.2)
PROT SERPL-MCNC: 7.3 G/DL (ref 6.3–8)
PROT UR QL STRIP: NEGATIVE
RBC # BLD AUTO: 3.5 10*6/MM3 (ref 4.14–5.8)
SODIUM SERPL-SCNC: 135 MMOL/L (ref 136–145)
SODIUM SERPL-SCNC: 136 MMOL/L (ref 134–145)
SP GR UR STRIP: 1.02 (ref 1–1.03)
UROBILINOGEN UR QL STRIP: NORMAL
WBC # BLD AUTO: 7.13 10*3/MM3 (ref 3.4–10.8)

## 2021-10-20 PROCEDURE — 82043 UR ALBUMIN QUANTITATIVE: CPT | Performed by: NURSE PRACTITIONER

## 2021-10-20 PROCEDURE — 84100 ASSAY OF PHOSPHORUS: CPT

## 2021-10-20 PROCEDURE — 36415 COLL VENOUS BLD VENIPUNCTURE: CPT

## 2021-10-20 PROCEDURE — 80053 COMPREHEN METABOLIC PANEL: CPT

## 2021-10-20 PROCEDURE — 80069 RENAL FUNCTION PANEL: CPT

## 2021-10-20 PROCEDURE — 85025 COMPLETE CBC W/AUTO DIFF WBC: CPT

## 2021-10-20 PROCEDURE — 82570 ASSAY OF URINE CREATININE: CPT | Performed by: NURSE PRACTITIONER

## 2021-10-20 PROCEDURE — 81003 URINALYSIS AUTO W/O SCOPE: CPT

## 2021-10-20 PROCEDURE — 99214 OFFICE O/P EST MOD 30 MIN: CPT | Performed by: INTERNAL MEDICINE

## 2021-10-20 PROCEDURE — 83735 ASSAY OF MAGNESIUM: CPT

## 2021-10-20 NOTE — PROGRESS NOTES
Subjective  Patient pleased about PSA response, nausea noted from Xtandi use    REASONS FOR FOLLOW UP:   1.  Metastatic prostate cancer    HISTORY OF PRESENT ILLNESS:     Patient is an 80-year-old with metastatic prostate cancer who is here today for lab review and toxicity check.  He continues on Xtandi 160 mg daily.  He reports that he has been noting mild diarrhea and nausea.  Unfortunately he also remains somewhat saddened by the passing of his youngest brother and this continues to be very difficult for him to try to process.      Hematologic/oncologic history:   The patient is an 80-year-old male with significant past medical history including prostate carcinoma, CKD 3 and long-term tobacco use be been seen by primary care in late January, 2018 for hoarseness.  Chest x-ray is now outpatient January 23 revealed sclerotic change in the posterior mid chest to be within 3 adjacent thoracic ertebral bodies of uncertain significance.  A follow-up chest CT revealed numerous sclerotic foci within al visualized bones consistent with metastatic disease, multiple enlarged mediastinal lymph nodes concerning for metastatic lymphadenopathy and a polypoidal abnormality in the right lateral wall of the trachea.  CT of abdomen March 20 revealed extensive osseous metastatic disease mildly enlarged retroperitoneal lymph nodes.  Bone scan March 20 was consistent with widespread osseous metastasis particularly in the proximal half the left humeral shaft, abnormal uptake in the sternum and manubrium and a small focus in the posterior aspect of the calvarium.  This led to a plain film the left humerus with mottled sclerosis involving the shaft of the humerus particularly in the proximal half but no evidence of pathologic fracture.    The patient has additionally type 2 diabetes on insulin pump, again a history of prostate carcinoma prostatectomy 12 years previously, hypertension, and hyperlipidemia.  Additional studies included a  PSA of 395.1 from March 14, 2018.The patient's been seen by urology March 15 with plans to start Firmagon.    The patient is now seen in pulmonary clinic with Dr. Bijan Rivera and we have discussed that he will need bronchoscopy and mediastinoscopy.  Interestingly his additional history includes a long occupational exposure including working in the [place] just out of school, subsequent construction work for many years and a 30-pack-year history of smoking stopping in the late 1990s.  He indicates that he followed with Dr. Guillen of urology for many years with no changes in his exams and normal PSAs.  He stopped this follow-up approximately 2 years ago.     Patient was seen in consultation with thoracic surgery on March 28 and plans are made for mediastinoscopy and bronchoscopy with lymph node biopsy.  Pathology revealed that these nodes were consistent with metastatic prostate carcinoma.  He was discussed at thoracic conference.  A PET/CT was not pursued and it was determined that he see medical oncology for hormonal treatment and radiation therapy if symptomatic.  It should be noted that the patient's March 15 [facility] office note describes that Firmagon was planned.     The patient has met with the son and grandson April 23.  We have also contacted Dr. Taylor of urology in that Firmagon was given just after his last visit and would next be due approximately May 3.  Patient feels considerably better with resolution of his pain, normalization of his performance status.  He would like to continue treatment through our office now contacted Dr. Taylor concerning this.    The patient is next seen June 11, 2018 we discussed his follow-up including his treatments with Lupron May 7 and his next treatment on July 30.  He has returned to essentially normal performance status and his PSA has dropped further from 395 March 14 27.8 May 7 and now 2.03 June 11.  We do plan to initiate Xgeva also study  him via scans to document his improvement approximately a month from now.   The patient is next seen July 26, 2018.  Repeat imaging demonstrated interval resolution of mediastinal and retroperitoneal lymphadenopathy.  There is thickening in the distal aspect of the appendix with stranding?  Chronic appendicitis.  The patient indicates he is having no such symptoms and never has.  There is no change in sclerotic bony metastasis in the patient's PSA has dropped substantially to 1.66 by July 19 and 1.79 July 26.  He is actually feeling excellent and this is corroborated by family members.   Patient is next seen January 10, 2019 continuing to do very well and, in fact indicating that [facility], likely hearing replacements, visual review.  He is not certain is going to continue his care with them or with us or combination of both.    Patient is next seen April 4, 2019.  He is recently discharged after hospitalization March 15-18 with left upper lobe pneumonia.  We reviewed the findings in detail with his gradual recovery now documented.  His studies include a CT of chest which does not demonstrate any further bony lesions and/or pulmonary metastasis having developed.  He is feeling considerably better and approximately back to his baseline.  The patient is next seen June 28, 2019 feeling well but having baseline generally musculoskeletal pain and restless legs.His recent exams include a PSA of 0.81, CMP with potassium of 5.3 with repeat pending.  His performance status overall remains good to very good and is clearly responding to his treatments.  The patient is next seen December 13, 2019 continue to feel well.  He has had, oddly enough, 2 episodes of sleep paralysis which have occurred to him previously and he wonders if there is any connection with his prostate carcinoma though this is felt unlikely.     The patient when assessed in December had his PSA go to 2.9.  We  continue with Lupron and Xgeva and is seen back now March 9, 2020 without additional symptoms.  We discussed that a schedule could likely change moving to 3 months for both of these medications particularly if he needs additional therapy directed at progressive disease.     Patient contacted our practice May 4 concern for pain in his hips.  This led to moving his scans up and they were performed May 8, 2020 showing a sub-6 mm pleural-based pulmonary nodule in the right lower lobe that is new from March 15, 2018, remainder of the sub-6 mm pulmonary nodules bilaterally are stable.  There is extensive osseous metastatic disease as previous with no other new findings.     The patient indicates, when seen, May 13 that his bone pain is now resolved.  While this is good information does not mean that he does not have further bony metastasis and we have discussed that a follow-up bone scan is likely necessary.  Furthermore he is having some difficulty with sleeplessness and increase in restless legs as he continues to stay at home during the COVID 19 crisis which, itself, is producing more anxiety for him.  A follow-up bone scan was obtained Lety 3 revealing multifocal osseous metastatic disease which was compared to March 2, 2018 with improvement.  No new abnormal uptake is present.  He is next seen with us on June 17, 2020 and, fortunately, is not having any significant pain at this point.  We discussed his scans in detail and, on balance, his performance status also remains improved.     It was elected to follow the patient rather than changes antiandrogen therapy.  He is reassessed September 3 with unchanged CMP, H&H of 11.4 and 36.3 with normal white count, platelet count and differential, PSA at 15.8.  Follow-up PA lateral chest x-ray does not show any new abnormalities though there is extensive metastatic bone disease throughout the bony thorax and osteoblastic metastasis have been seen on chest CT May 2020.  The  patient is seen with his son Georges September 10 noticing increasing bony discomfort primarily in the right hip following fairly intensive gardening which he does frequently.  Now has further elevation of PSA were wondering whether we should try to intervene sooner per additional antiandrogens.  We will need to further assess him via CT scanning to make this decision     These were obtained October 1 showing extensive sclerotic disease with no metastatic disease in chest abdomen or pelvis.  PSA had gone from 15.8 September 3-16 0.6 October 1.     He still has pain getting up in the morning and after active gardening.  This is manageable with current pain medications and, at present, it is not apparent that he needs to switch medications to gain better control of his disease.  The patient did have follow-up studies done including a PSA November 25, 2020 now at 25.4.  The patient is seen with his son December 10, 2020 doing fair but having some increased pain in the mid sternum and right hip that he ascribes to additional exercise/work in managing his garden.  It is apparent however, that his last bone scan was 6 months ago and we do need to reassess him concerning this.     The patient had follow-up bone scan performed January 11, 2021 showing again uptake in the calvarium, humerus, right medial clavicle, sternum, ribs, spine, sacrum, pelvis, right acetabulum, proximal femora and now left frontal bone which appears new.  There is also mild increase in sternal uptake and equivocal increase in the pelvic lesions of all of the sacrum and the right proximal femur.     The patient is seen with his son January 18, 2021 and indicates that he is having pain gradually worsened in his right hip, mid chest that had been an issue previously.  These findings on bone scan are reviewed with both of them as likely the underlying culprit for his discomfort.  He would need change of the systemic therapy but, at present, will ask for  radiation therapy palliation initially.  He was previously treated by Dr. Kulkarni many years ago and will ask her to see him.  We will also make application for the current cost of Xtandi or Zytiga.  The patient is not felt to be a candidate for systemic chemotherapy.     The patient was referred for ration therapy as we continued to assess his PSA on current therapy as well as exam the cost of Zytiga or Xtandi.  Radiation therapy (Dr. Kulkarni) saw the patient January 26 and felt that the right femur and sternal lesion could benefit from therapy-3000 cGy in 10 fractions.  The patient took treatment February 1 to February 12 to the above areas and is next seen back March 15.  Fortunately his pain has improved dramatically and he is quite happy about this.  Unfortunately he notes some difficulty with swallowing that is temporary and often leads to increased salivation and mucus production.  Patient was asked to return his next assessed April 12, 2021.  He is able to swallow with less pain but still has excess mucus production and issues with salivation.  His PSA has noted increase but he is having no additional bony discomfort at this point and, additionally, has noted low-grade fevers just under 100 degrees at nighttime?.  His weight, appetite and general performance status have remained good to excellent.  We discussed follow-up studies at this point to determine his status.    Follow-up scans were scheduled CT scans of chest and pelvis 5/4/2021 showing osseous metastasis quite similar to previous examinations in the chest, CT of abdomen pelvis also with no acute intra-abdominal adenopathy no indication of abdominal pelvic metastatic disease but again widespread osseous metastasis.  His PSA at this point have been slowly increasing to a level of 44 on 4/12/2021.  As the patient is reassessed 5/10/2021 we find, fortunately, that he is not exceeding symptomatic.  It could make reasonable sense at this point to take the  mediastinal nodes from his biopsy 4/9/2018, reassess potential targeted therapies, MSI status, and germline analysis.  Fortunately he is not having significant pain or discomfort and is seen with his son as we discussed the above plan.  Notably a follow-up PSA is found to be 42.6.   Patient is next seen in 6/7/2021 for Lupron and Xgeva.  Fortunately he is feeling considerably better according to both he and his son though his stamina has reduced.  He is not having additional pain at this point.  We have also reviewed his genetics assessment which was significant only for a variant of unknown significance.  This is not an actionable abnormality.    As the patient's PSA further escalated increasing to 89 7/7/2021 his subsequent Axumin PET was obtained 8/20 demonstrating extensive osteosclerotic metastatic disease showing reduced uptake-typical finding but no evidence of visceral metastatic disease in the neck chest abdomen pelvis.  These findings are discussed with the patient and his son 8/25/2021 and indicative of his progressive disease-etiology of his rising PSA-though the patient is asymptomatic we have discussed moving to initiate Xtandi is available at 160 mg p.o. twice daily along with his current Lupron and Xgeva.     The patient's testing 10/6 included PSA that is dropped to 9.69.  He is seen with his son, Cody, both indicating that he been doing relatively well with treatment but began to notice nausea in the last week.  The schedule that he has been given also needs to be somewhat updated in terms of his Lupron and Xgeva.  He has been able to maintain his appetite and overall performance status to date.        Past Medical History:   Diagnosis Date   • Bone cancer (HCC)    • Cellulitis of great toe of left foot 10/19/2018   • CKD (chronic kidney disease)     Stage 3; followed by Dr. Vicky Duran    • Diastolic dysfunction     GRADE II per echo 2018   • Difficulty breathing     during exertion   •  Dyslipidemia    • Erectile dysfunction    • Fatigue    • History of blood transfusion    • History of kidney stones    • Hyperlipidemia    • Hypertension    • Hyponatremia    • Left ventricular hypertrophy     per echo 2018   • Localized edema    • Neuropathy    • Obstructive sleep apnea     USING CPAP   • Osteoarthritis    • Peptic ulcer    • Pneumonia    • Pneumonia of left upper lobe due to infectious organism 3/15/2019   • Prostate cancer (HCC)     Status post prostatectomy, radiation therapy, and hormone therapy followed by Dr. Lee; metastatsis to bone   • Pure hyperglyceridemia    • Restless leg syndrome    • Sepsis (HCC)    • Sinus bradycardia    • Transient cerebral ischemia    • Type 2 diabetes mellitus (HCC)         Past Surgical History:   Procedure Laterality Date   • BRONCHOSCOPY N/A 4/9/2018    Procedure: BRONCHOSCOPY;  Surgeon: Bijan Rivera III, MD;  Location: [facility] OR;  Service: Cardiothoracic   • COLONOSCOPY     • DEEP NECK LYMPH NODE BIOPSY / EXCISION     • HEMORRHOIDECTOMY     • MEDIASTINOSCOPY N/A 4/9/2018    Procedure: MEDIASTINOSCOPY WITH LYMPH NODE BIOPSY;  Surgeon: Bjian Rivera III, MD;  Location: [facility];  Service: Cardiothoracic   • OTHER SURGICAL HISTORY      ulcer repair   • PROSTATECTOMY  2006        Current Outpatient Medications on File Prior to Visit   Medication Sig Dispense Refill   • aspirin 81 MG EC tablet Take 1 tablet by mouth daily.     • cephalexin (KEFLEX) 500 MG capsule Take 500 mg by mouth 2 (Two) Times a Day.     • cholecalciferol (VITAMIN D3) 1000 units tablet Take 2,000 Units by mouth Daily.     • Denosumab (XGEVA SC) Inject  under the skin into the appropriate area as directed Every 30 (Thirty) Days.     • dilTIAZem CD (CARDIZEM CD) 120 MG 24 hr capsule TAKE 1 CAPSULE EVERY DAY 90 capsule 1   • enzalutamide (XTANDI) 40 MG chemo capsule Take 4 capsules by mouth Daily. 120 capsule 3   • gabapentin (NEURONTIN) 300 MG capsule Take 3 capsules by mouth  every night at bedtime. 90 capsule 2   • HYDROcodone-acetaminophen (NORCO) 5-325 MG per tablet Take 1 tablet by mouth Every 6 (Six) Hours As Needed for Moderate Pain . 120 tablet 0   • insulin aspart (NovoLOG) 100 UNIT/ML patient supplied pump Inject  under the skin into the appropriate area as directed Continuous. Pt reports pump with basal rate 1.9 units/hr from 0800 to 1200. Basal rate 1.8 units/hr 1200 to 0800.  Checks bg ac/hs with bolus doses per pumps parameters     • Leuprolide Acetate, 3 Month, (LUPRON DEPOT, 3-MONTH, IM) Inject  into the appropriate muscle as directed by prescriber Every 3 (Three) Months.     • levothyroxine (SYNTHROID, LEVOTHROID) 88 MCG tablet      • loperamide (IMODIUM) 1 MG/5ML solution Take 2 mg by mouth 4 (Four) Times a Day As Needed for Diarrhea.     • modafinil (PROVIGIL) 200 MG tablet Take 1 tablet by mouth Daily. 90 tablet 1   • olmesartan (BENICAR) 40 MG tablet TAKE 1 TABLET EVERY DAY (DISCONTINUE LOSARTAN 100MG) 90 tablet 3   • ondansetron (ZOFRAN) 8 MG tablet Take 1 tablet by mouth 3 (Three) Times a Day As Needed for Nausea or Vomiting. 30 tablet 5   • phosphorus (K PHOS NEUTRAL) 155-852-130 MG tablet Take 1 tablet by mouth Daily. 30 tablet 1   • pravastatin (PRAVACHOL) 40 MG tablet Take 40 mg by mouth daily.     • rOPINIRole (REQUIP) 0.5 MG tablet Take 1 tablet by mouth in the evening and 1 at bedtime. 180 tablet 2   • traZODone (DESYREL) 50 MG tablet Take  mg by mouth every night at bedtime.     • triamterene-hydrochlorothiazide (MAXZIDE-25) 37.5-25 MG per tablet TAKE 1 TABLET EVERY DAY 90 tablet 0   • guaifenesin (ROBITUSSIN) 100 MG/5ML liquid Take 20 mL by mouth every night at bedtime.     • omeprazole (priLOSEC) 20 MG capsule TAKE 1 CAPSULE BY MOUTH EVERY NIGHT AT BEDTIME. 90 capsule 1     No current facility-administered medications on file prior to visit.        ALLERGIES:    Allergies   Allergen Reactions   • Niacin Unknown - Low Severity   • Statins Unknown -  "Low Severity        Social History     Socioeconomic History   • Marital status:    • Years of education: College   Tobacco Use   • Smoking status: Former Smoker     Packs/day: 1.00     Years: 30.00     Pack years: 30.00     Types: Cigarettes     Quit date: 1998     Years since quittin.7   • Smokeless tobacco: Never Used   Substance and Sexual Activity   • Alcohol use: No     Comment: Caffeine use: 2-3 cups daily   • Drug use: No   • Sexual activity: Defer        Family History   Problem Relation Age of Onset   • Heart failure Mother    • Hypertension Mother    • Diabetes Mother    • Heart disease Mother    • Lung cancer Father 46   • Hypertension Sister    • Lung cancer Sister 60   • Hypertension Brother    • Lung cancer Brother 62   • Heart disease Other    • Prostate cancer Brother 60   • Malig Hyperthermia Neg Hx         Review of Systems  As per HPI   .  Objective     Vitals:    10/20/21 1116   BP: 130/78   Pulse: 67   Resp: 16   Temp: 98.2 °F (36.8 °C)   TempSrc: Oral   SpO2: 98%   Weight: 84.8 kg (187 lb)   Height: 165 cm (64.96\")   PainSc:   5   PainLoc: Abdomen     Current Status 10/20/2021   ECOG score 0       Physical Exam   Constitutional: He is oriented to person, place, and time. He appears well-developed. No distress.   HENT:   Head: Normocephalic and atraumatic.   Mouth/Throat: No oropharyngeal exudate.   Eyes: Pupils are equal, round, and reactive to light.   Cardiovascular: Normal rate, regular rhythm and normal heart sounds.   No murmur heard.  Pulmonary/Chest: Effort normal and breath sounds normal. No respiratory distress. He has no wheezes. He has no rhonchi. He has no rales.   Abdominal: Soft. Normal appearance and bowel sounds are normal. He exhibits no distension.   Musculoskeletal: Normal range of motion.   Neurological: He is alert and oriented to person, place, and time.   Skin: Skin is warm and dry. No rash noted.   Vitals reviewed.    RECENT LABS:  Hematology WBC   Date " Value Ref Range Status   10/20/2021 7.13 3.40 - 10.80 10*3/mm3 Final     RBC   Date Value Ref Range Status   10/20/2021 3.50 (L) 4.14 - 5.80 10*6/mm3 Final     Hemoglobin   Date Value Ref Range Status   10/20/2021 9.9 (L) 13.0 - 17.7 g/dL Final     Hematocrit   Date Value Ref Range Status   10/20/2021 32.8 (L) 37.5 - 51.0 % Final     Platelets   Date Value Ref Range Status   10/20/2021 285 140 - 450 10*3/mm3 Final        Lab Results   Component Value Date    GLUCOSE 119 10/20/2021    BUN 32 (H) 10/20/2021    CREATININE 1.37 (H) 10/20/2021    EGFRIFNONA 50 (L) 10/20/2021    BCR 23.4 10/20/2021    K 5.3 (H) 10/20/2021    CO2 19.9 (L) 10/20/2021    CALCIUM 8.4 (L) 10/20/2021    ALBUMIN 4.10 10/20/2021    AST 10 10/20/2021    ALT 7 10/20/2021       NM Bone Scan Whole Body (01/11/2021 13:25)  CT Chest With Contrast Diagnostic (05/04/2021 09:11)  CT Abdomen Pelvis With Contrast (05/04/2021 09:11)  NM PET/CT Skull Base to Mid Thigh (08/20/2021 12:18)      Assessment/Plan      1.  Metastatic prostate cancer:  ? Patient initially diagnosed in 2006 and had a prostatectomy and hormone therapy.  ? Patient in January 2018 began to complain of shortness of breath and hoarseness.  Chest x-ray obtained 1/23/18 showed sclerotic bone lesions.  ? CT of the chest 3/12/2018 numerous sclerotic bone foci with all visualized bones consistent with metastatic disease, multiple enlarged mediastinal lymph nodes.  ? .1 from 3/14/2018.  Patient was started on Firmagon by urology, [facility].  ? 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  ? Case discussed at thoracic conference and recommendations were to continue ADT and radiation for symptomatic sites.  ? Lupron injections every 3 mos initiated 5/7/18 PSA at that time 7.810  ? Monthly Xgeva initiated 6/11/18 PSA 2.030  ? Last PSA 6/13/2019 0.881  ? 9/20/2019 patient tolerating treatment well, no new  concerns.  ? 12/2019 PSA 2.9  ? 3/9/2020  PSA increasing to 5.030, he remains continuing on Lupron and Xgeva and asymptomatic though follow-up CT of chest and pelvis will be requested in 11 weeks prior to follow-up in 12 weeks.   ? 5/4/2020 patient called reported worsening right hip pain, plans to purse CT scans ASAP.   ? 5/8/2020 CT scans C/A/P show no progression of disease. Hip pain improved, Gabapentin added.. Bone scan requested.  ? 6/3/2020 bone scan that does not demonstrate worsening of bone nor need for localized radiation therapy.  The patient's PSA has been slowly rising and we have not been able to determine worsening of disease and and, therefore, it is not felt warranted add additional medications such as Xtandi or apalutamide to his therapy.  Please note would like to avoid Zytiga try not to add prednisone which would worsen his blood sugar control.  ? 9/3/2020 PSA 15.8, CXR extensive metastatic bone disease- patient reviewed 9/10/2020 reporting increased bony discomfort CT scans requested.  ? Scans done and reviewed 10/7/2020 ultimately reveal no evidence of progression of disease for visceral involvement or clearly for bone involvement.  After further review with the patient and his son plan continued Xgeva and Lupron.  We have assessed for availability of Xtandi 160 mg p.o. daily and find the cost is currently prohibitive.   ? 11/25/2020 PSA 25.4  ? 12/10/2020 review with some mild increase in bony discomfort.  After repeat discussion we went on to have him undergo Lupron therapy, and his PSA actually to be mildly reduced at 22.2.   ? Follow-up bone scan 1/11/2021 demonstrates some evidence of progression in sternum, left sacrum and proximal femur.  These findings were reviewed with the patient and his son January 18, 2020 and the patient was referred for palliative radiotherapy(Dr. Kulkarni)  ? Palliative radiation under care Dr. Kulkarni to right femur and sternal lesion -3000 cGy in 10 fractions  given February 1 to February 12 with significant symptom improvement.  ? 3/15/2021 PSA 38.5  ? 4/12/2021 PSA 44  ? 5/10/2021 PSA 42.6 CT chest abdomen pelvis 5/4/2021 - for progression of disease and follow-up PSA 5 10/2021 is at 42 slightly reduced from previous.  We have discussed how to proceed from here and find a significant family history that clearly supports a potential genetic assessment for his malignancy.  ? It is felt most reasonable at this point to take the mediastinal nodes from his biopsy 4/9/2018 and reassess for potential targeted therapies, MSI status, as well as germline analysis.  ? 6/7/2021 PSA 65.9 his analysis completed for actionable mutations including germline mutations.  These exams were negative though there is a variant of unknown significance.  Again this is not an actionable mutation.  We proceeded with additional Xgeva and Lupron planning for monthly Xgeva and Lupron at 3 months  ? 7/7/2021 PSA 89 and subsequent testing with Axumin PET was performed confirming extensive osteosclerotic metastatic disease with little uptake, no evidence of visceral metastatic disease in neck chest abdomen or pelvis.  ? 8/25/2021  , PET/CT reviewed, subsequent PSA increased to 105.  Patient fortunately asymptomatic.  Requested reevaluation for Xtandi 160 mg po daily.   ? 9/8/2021 due for his lupron, receiving every 3 months, and monthly Xgeva.  Will have education today for Xtandi and will receive his medication today as well.   ? Started Xtandi 9/8/2021.  ? 9/22/2021 here for toxicity check, so far tolerating Xtandi quite well other than a slight increase in diarrhea controlled with Imodium.  ? 10/6/2021 continues to tolerate Xtandi well.  Labs are within range today, we will proceed with Xgeva today.  ? Patient seen 10/20/2021 having more nausea with Xtandi and demonstrating a substantial reduction in PSA level.  After discussion we plan to reduce his dose to 80 mg daily following his PSA and  performance status over the next approximate 7 weeks at which time he would be scheduled for his follow-up Lupron.    2.  Neuropathy/ restless legs:    · 5/13/2020 gabapentin 600 mg at bedtime, trazadone 50 mg QHS.  · On gabapentin 300, 2 tablets at bedtime, and requip 0.5.mg    · Still having neuropathy symptoms.  Will increase gabapentin to 900 mg at bedtime.   · 9/22/2021 increase in gabapentin to 900 mg at bedtime has helped neuropathy.    3.  Cancer related pain: on Norco 5/325 every 6 hours as needed.    4.  Hyperkalemia:  · Intermittent issue for the patient.  It is typically dietary related, as he consumes large amount  fresh tomatoes and other foods from his garden..  He was instructed to significantly decrease his intake as his potassium level today is 6.4, magnesium 2.7  He is not symptomatic.  We will also check an EKG.   · 9/22/2021 potassium level is improved to 5.7.  Patient states that he is no longer eating tomatoes from his garden.  · 10/6/2021 potassium level is 5.8.  Due to patient's multiple electrolyte abnormalities we will go ahead and refer him to nephrology for further evaluation.  Patient states he actually has an existing nephrologist, Dr. Perry Daily who he typically only sees on an annual basis.  We will schedule him to be seen by her soon for further evaluation.    5.  Hypophosphatemia:  Phosphorus 1.9- hold xgeva 9/8/2021 ., Increase foods high in phosphorus, recheck in 2 weeks. \  · 9/22/2021 phosphorus level 1.7.  Reviewed with Dr. Lee.  In addition to increasing foods high in phosphorus, I have instructed the patient to start Neutra-Phos 1 tablet daily.  We will recheck again in 2 weeks.  · Phosphorus 2.3 assessed 10/20/2021    PLAN:      1. Continue gabapentin 900 mg at bedtime.  2. Continue Imodium if needed for diarrhea.  3. Reduce Xtandi to 80 mg daily  4. Return in 3 weeks for Xgeva  5. Return 7 weeks for MD, Pallavi and Chirag  6. 6 weeks repeat laboratories including  PSA  7. Call/return sooner should he develop any new problems or concerns.  Patient will continue on Lupron injections every 3 months (last given 9/8/2021).      Patient on high risk medication requiring close monitoring for toxicity.    Jose Lee MD  10/20/2021

## 2021-10-21 ENCOUNTER — MEDICATION THERAPY MANAGEMENT (OUTPATIENT)
Dept: PHARMACY | Facility: HOSPITAL | Age: 81
End: 2021-10-21

## 2021-10-21 DIAGNOSIS — C79.51 PROSTATE CANCER METASTATIC TO BONE (HCC): ICD-10-CM

## 2021-10-21 DIAGNOSIS — C61 PROSTATE CANCER METASTATIC TO BONE (HCC): ICD-10-CM

## 2021-10-21 NOTE — PROGRESS NOTES
MTM Chart Review- Destin OMALLEY dictation noted. Per Dr. Lee, since pt with more nausea with Xtandi and demonstrating a substantial reduction in PSA level, dose will be reduced to 80 mg daily. RX sent to pharmacy with MD to cosign to profile for when next refill is due. Will call patient within next 5-7 days to check on adherence and tolerance of new dosing. Pharmacy will continue to follow.     Clementina Brink, Pharmacist  10/21/2021  09:09 EDT

## 2021-10-26 ENCOUNTER — MEDICATION THERAPY MANAGEMENT (OUTPATIENT)
Dept: PHARMACY | Facility: HOSPITAL | Age: 81
End: 2021-10-26

## 2021-10-26 NOTE — PROGRESS NOTES
[facility] telephone encounter re:adherence and side effects (Xtandi)     Semaj reports doing well - he reports taking 80 mg (2x40 mg capsules) by mouth daily; his nausea is improved on this new dose. Patient improved GI symptoms on new dose of Xtandi Completed medication reconciliation with patient. Patient denies starting or stopping any medications.  Assessed medication list for interactions, no significant drug interactions noted.  Patient has had no issues obtaining medication from pharmacy. Since his dose was decreased, he is not yet due for a refill (patient has ~40 tablets remaining). Semaj had no additional questions or concerns for the [facility] office.     Clementina Brink, Pharmacist  10/26/2021  12:37 EDT

## 2021-11-08 ENCOUNTER — SPECIALTY PHARMACY (OUTPATIENT)
Dept: PHARMACY | Facility: HOSPITAL | Age: 81
End: 2021-11-08

## 2021-11-09 ENCOUNTER — SPECIALTY PHARMACY (OUTPATIENT)
Dept: PHARMACY | Facility: HOSPITAL | Age: 81
End: 2021-11-09

## 2021-11-09 RX ORDER — FUROSEMIDE 40 MG/1
40 TABLET ORAL EVERY MORNING
COMMUNITY

## 2021-11-09 NOTE — PROGRESS NOTES
Specialty Pharmacy Refill Coordination Note     Semaj is a 81 y.o. male contacted today regarding refills of  Xtandi specialty medication(s).    Reviewed and verified with patient: Allergies  Meds       Specialty medication(s) and dose(s) confirmed: Yes    Refill Questions      Most Recent Value   Changes to allergies? No   Changes to medications? Yes  [started lasix 40 mg qam - added to med list. no DDI]   New conditions since last clinic visit No   Unplanned office visit, urgent care, ED, or hospital admission in the last 4 weeks  No   How does patient/caregiver feel medication is working? Very good   Financial problems or insurance changes  No   How many doses of your specialty medications were missed in the last 4 weeks? 0          Delivery Questions      Most Recent Value   Delivery method [employer]Ex  [[employer] Standard - ship on 11/10 to arrive on 11/11]   Delivery address correct? Yes   Preferred delivery time? --  [[employer] Standard - ship on 11/10 to arrive on 11/11]   Number of medications in delivery 1   Medication being filled and delivered Xtandi   Doses left of specialty medications at least 1 week   Is there any medication that is due not being filled? No          Medication Adherence    Any gaps in refill history greater than 2 weeks in the last 3 months: no  Demonstrates understanding of importance of adherence: yes  Informant: patient  Reliability of informant: reliable  Provider-estimated medication adherence level: %  Reasons for non-adherence: no problems identified          Follow-up: 3 week(s)    Clementina Brink Pharmacist  11/9/2021  14:04 EST

## 2021-11-10 ENCOUNTER — LAB (OUTPATIENT)
Dept: LAB | Facility: HOSPITAL | Age: 81
End: 2021-11-10

## 2021-11-10 ENCOUNTER — INFUSION (OUTPATIENT)
Dept: ONCOLOGY | Facility: HOSPITAL | Age: 81
End: 2021-11-10

## 2021-11-10 DIAGNOSIS — C79.51 OSSEOUS METASTASIS: Primary | ICD-10-CM

## 2021-11-10 DIAGNOSIS — C61 PROSTATE CANCER METASTATIC TO BONE (HCC): ICD-10-CM

## 2021-11-10 DIAGNOSIS — C79.51 PROSTATE CANCER METASTATIC TO BONE (HCC): ICD-10-CM

## 2021-11-10 DIAGNOSIS — C79.51 OSSEOUS METASTASIS: ICD-10-CM

## 2021-11-10 LAB
ALBUMIN SERPL-MCNC: 4.1 G/DL (ref 3.5–5.2)
ALBUMIN/GLOB SERPL: 1.4 G/DL
ALP SERPL-CCNC: 220 U/L (ref 39–117)
ALT SERPL W P-5'-P-CCNC: 6 U/L (ref 1–41)
ANION GAP SERPL CALCULATED.3IONS-SCNC: 7.9 MMOL/L (ref 5–15)
AST SERPL-CCNC: 11 U/L (ref 1–40)
BASOPHILS # BLD AUTO: 0.02 10*3/MM3 (ref 0–0.2)
BASOPHILS NFR BLD AUTO: 0.4 % (ref 0–1.5)
BILIRUB SERPL-MCNC: 0.2 MG/DL (ref 0–1.2)
BUN SERPL-MCNC: 20 MG/DL (ref 8–23)
BUN/CREAT SERPL: 25.3 (ref 7–25)
CALCIUM SPEC-SCNC: 8 MG/DL (ref 8.6–10.5)
CHLORIDE SERPL-SCNC: 110 MMOL/L (ref 98–107)
CO2 SERPL-SCNC: 25.1 MMOL/L (ref 22–29)
CREAT SERPL-MCNC: 0.79 MG/DL (ref 0.76–1.27)
DEPRECATED RDW RBC AUTO: 55.2 FL (ref 37–54)
EOSINOPHIL # BLD AUTO: 0.13 10*3/MM3 (ref 0–0.4)
EOSINOPHIL NFR BLD AUTO: 2.7 % (ref 0.3–6.2)
ERYTHROCYTE [DISTWIDTH] IN BLOOD BY AUTOMATED COUNT: 16.3 % (ref 12.3–15.4)
GFR SERPL CREATININE-BSD FRML MDRD: 94 ML/MIN/1.73
GLOBULIN UR ELPH-MCNC: 3 GM/DL
GLUCOSE SERPL-MCNC: 168 MG/DL (ref 65–99)
HCT VFR BLD AUTO: 30.7 % (ref 37.5–51)
HGB BLD-MCNC: 9.5 G/DL (ref 13–17.7)
IMM GRANULOCYTES # BLD AUTO: 0.05 10*3/MM3 (ref 0–0.05)
IMM GRANULOCYTES NFR BLD AUTO: 1 % (ref 0–0.5)
LYMPHOCYTES # BLD AUTO: 1.42 10*3/MM3 (ref 0.7–3.1)
LYMPHOCYTES NFR BLD AUTO: 29.2 % (ref 19.6–45.3)
MAGNESIUM SERPL-MCNC: 2.4 MG/DL (ref 1.6–2.4)
MCH RBC QN AUTO: 29 PG (ref 26.6–33)
MCHC RBC AUTO-ENTMCNC: 30.9 G/DL (ref 31.5–35.7)
MCV RBC AUTO: 93.6 FL (ref 79–97)
MONOCYTES # BLD AUTO: 0.35 10*3/MM3 (ref 0.1–0.9)
MONOCYTES NFR BLD AUTO: 7.2 % (ref 5–12)
NEUTROPHILS NFR BLD AUTO: 2.9 10*3/MM3 (ref 1.7–7)
NEUTROPHILS NFR BLD AUTO: 59.5 % (ref 42.7–76)
NRBC BLD AUTO-RTO: 0 /100 WBC (ref 0–0.2)
PHOSPHATE SERPL-MCNC: 1.5 MG/DL (ref 2.5–4.5)
PLATELET # BLD AUTO: 230 10*3/MM3 (ref 140–450)
PMV BLD AUTO: 9.1 FL (ref 6–12)
POTASSIUM SERPL-SCNC: 4.6 MMOL/L (ref 3.5–5.2)
PROT SERPL-MCNC: 7.1 G/DL (ref 6–8.5)
RBC # BLD AUTO: 3.28 10*6/MM3 (ref 4.14–5.8)
SODIUM SERPL-SCNC: 143 MMOL/L (ref 136–145)
WBC # BLD AUTO: 4.87 10*3/MM3 (ref 3.4–10.8)

## 2021-11-10 PROCEDURE — 83735 ASSAY OF MAGNESIUM: CPT | Performed by: INTERNAL MEDICINE

## 2021-11-10 PROCEDURE — 80053 COMPREHEN METABOLIC PANEL: CPT | Performed by: INTERNAL MEDICINE

## 2021-11-10 PROCEDURE — 36415 COLL VENOUS BLD VENIPUNCTURE: CPT

## 2021-11-10 PROCEDURE — 85025 COMPLETE CBC W/AUTO DIFF WBC: CPT

## 2021-11-10 PROCEDURE — 84100 ASSAY OF PHOSPHORUS: CPT | Performed by: INTERNAL MEDICINE

## 2021-11-15 DIAGNOSIS — C79.51 PROSTATE CANCER METASTATIC TO BONE (HCC): ICD-10-CM

## 2021-11-15 DIAGNOSIS — C61 PROSTATE CANCER METASTATIC TO BONE (HCC): ICD-10-CM

## 2021-11-15 RX ORDER — HYDROCODONE BITARTRATE AND ACETAMINOPHEN 5; 325 MG/1; MG/1
1 TABLET ORAL EVERY 6 HOURS PRN
Qty: 120 TABLET | Refills: 0 | Status: SHIPPED | OUTPATIENT
Start: 2021-11-15 | End: 2021-12-13 | Stop reason: SDUPTHER

## 2021-11-23 RX ORDER — ROPINIROLE 0.5 MG/1
TABLET, FILM COATED ORAL
Qty: 180 TABLET | Refills: 2 | Status: SHIPPED | OUTPATIENT
Start: 2021-11-23 | End: 2022-07-28 | Stop reason: SDUPTHER

## 2021-12-01 ENCOUNTER — LAB (OUTPATIENT)
Dept: LAB | Facility: HOSPITAL | Age: 81
End: 2021-12-01

## 2021-12-01 ENCOUNTER — TELEPHONE (OUTPATIENT)
Dept: ONCOLOGY | Facility: CLINIC | Age: 81
End: 2021-12-01

## 2021-12-01 DIAGNOSIS — C79.51 PROSTATE CANCER METASTATIC TO BONE (HCC): ICD-10-CM

## 2021-12-01 DIAGNOSIS — C61 PROSTATE CANCER METASTATIC TO BONE (HCC): ICD-10-CM

## 2021-12-01 LAB
ALBUMIN SERPL-MCNC: 4 G/DL (ref 3.5–5.2)
ALBUMIN/GLOB SERPL: 1.3 G/DL (ref 1.1–2.4)
ALP SERPL-CCNC: 171 U/L (ref 38–116)
ALT SERPL W P-5'-P-CCNC: <5 U/L (ref 0–41)
ANION GAP SERPL CALCULATED.3IONS-SCNC: 11.6 MMOL/L (ref 5–15)
AST SERPL-CCNC: 12 U/L (ref 0–40)
BASOPHILS # BLD AUTO: 0.03 10*3/MM3 (ref 0–0.2)
BASOPHILS NFR BLD AUTO: 0.6 % (ref 0–1.5)
BILIRUB SERPL-MCNC: 0.2 MG/DL (ref 0.2–1.2)
BUN SERPL-MCNC: 20 MG/DL (ref 6–20)
BUN/CREAT SERPL: 21.5 (ref 7.3–30)
CALCIUM SPEC-SCNC: 8.8 MG/DL (ref 8.5–10.2)
CHLORIDE SERPL-SCNC: 109 MMOL/L (ref 98–107)
CO2 SERPL-SCNC: 25.4 MMOL/L (ref 22–29)
CREAT SERPL-MCNC: 0.93 MG/DL (ref 0.7–1.3)
DEPRECATED RDW RBC AUTO: 53.2 FL (ref 37–54)
EOSINOPHIL # BLD AUTO: 0.12 10*3/MM3 (ref 0–0.4)
EOSINOPHIL NFR BLD AUTO: 2.3 % (ref 0.3–6.2)
ERYTHROCYTE [DISTWIDTH] IN BLOOD BY AUTOMATED COUNT: 15.8 % (ref 12.3–15.4)
GFR SERPL CREATININE-BSD FRML MDRD: 78 ML/MIN/1.73
GLOBULIN UR ELPH-MCNC: 3 GM/DL (ref 1.8–3.5)
GLUCOSE SERPL-MCNC: 98 MG/DL (ref 74–124)
HCT VFR BLD AUTO: 32.7 % (ref 37.5–51)
HGB BLD-MCNC: 10.2 G/DL (ref 13–17.7)
IMM GRANULOCYTES # BLD AUTO: 0.06 10*3/MM3 (ref 0–0.05)
IMM GRANULOCYTES NFR BLD AUTO: 1.1 % (ref 0–0.5)
LYMPHOCYTES # BLD AUTO: 1.43 10*3/MM3 (ref 0.7–3.1)
LYMPHOCYTES NFR BLD AUTO: 27.1 % (ref 19.6–45.3)
MCH RBC QN AUTO: 29.1 PG (ref 26.6–33)
MCHC RBC AUTO-ENTMCNC: 31.2 G/DL (ref 31.5–35.7)
MCV RBC AUTO: 93.2 FL (ref 79–97)
MONOCYTES # BLD AUTO: 0.45 10*3/MM3 (ref 0.1–0.9)
MONOCYTES NFR BLD AUTO: 8.5 % (ref 5–12)
NEUTROPHILS NFR BLD AUTO: 3.19 10*3/MM3 (ref 1.7–7)
NEUTROPHILS NFR BLD AUTO: 60.4 % (ref 42.7–76)
NRBC BLD AUTO-RTO: 0 /100 WBC (ref 0–0.2)
PLATELET # BLD AUTO: 186 10*3/MM3 (ref 140–450)
PMV BLD AUTO: 9.1 FL (ref 6–12)
POTASSIUM SERPL-SCNC: 4.3 MMOL/L (ref 3.5–4.7)
PROT SERPL-MCNC: 7 G/DL (ref 6.3–8)
PSA SERPL-MCNC: 5.2 NG/ML (ref 0–4)
RBC # BLD AUTO: 3.51 10*6/MM3 (ref 4.14–5.8)
SODIUM SERPL-SCNC: 146 MMOL/L (ref 134–145)
WBC NRBC COR # BLD: 5.28 10*3/MM3 (ref 3.4–10.8)

## 2021-12-01 PROCEDURE — 85025 COMPLETE CBC W/AUTO DIFF WBC: CPT

## 2021-12-01 PROCEDURE — 84153 ASSAY OF PSA TOTAL: CPT | Performed by: INTERNAL MEDICINE

## 2021-12-01 PROCEDURE — 80053 COMPREHEN METABOLIC PANEL: CPT

## 2021-12-01 PROCEDURE — 36415 COLL VENOUS BLD VENIPUNCTURE: CPT

## 2021-12-02 ENCOUNTER — SPECIALTY PHARMACY (OUTPATIENT)
Dept: PHARMACY | Facility: HOSPITAL | Age: 81
End: 2021-12-02

## 2021-12-03 ENCOUNTER — SPECIALTY PHARMACY (OUTPATIENT)
Dept: PHARMACY | Facility: HOSPITAL | Age: 81
End: 2021-12-03

## 2021-12-03 ENCOUNTER — OFFICE VISIT (OUTPATIENT)
Dept: SLEEP MEDICINE | Facility: HOSPITAL | Age: 81
End: 2021-12-03

## 2021-12-03 ENCOUNTER — TELEPHONE (OUTPATIENT)
Dept: ONCOLOGY | Facility: CLINIC | Age: 81
End: 2021-12-03

## 2021-12-03 VITALS — WEIGHT: 193 LBS | HEART RATE: 58 BPM | BODY MASS INDEX: 32.15 KG/M2 | HEIGHT: 65 IN | OXYGEN SATURATION: 98 %

## 2021-12-03 DIAGNOSIS — G47.33 OBSTRUCTIVE SLEEP APNEA SYNDROME: Primary | ICD-10-CM

## 2021-12-03 DIAGNOSIS — F51.04 PSYCHOPHYSIOLOGICAL INSOMNIA: ICD-10-CM

## 2021-12-03 DIAGNOSIS — G47.14 HYPERSOMNIA DUE TO MEDICAL CONDITION: ICD-10-CM

## 2021-12-03 DIAGNOSIS — G25.81 RESTLESS LEGS SYNDROME (RLS): ICD-10-CM

## 2021-12-03 PROCEDURE — 99213 OFFICE O/P EST LOW 20 MIN: CPT | Performed by: INTERNAL MEDICINE

## 2021-12-03 PROCEDURE — G0463 HOSPITAL OUTPT CLINIC VISIT: HCPCS

## 2021-12-03 NOTE — PROGRESS NOTES
Specialty Pharmacy Refill Coordination Note     Semaj is a 81 y.o. male contacted today regarding refills of  Xtandi specialty medication(s).    Reviewed and verified with patient:      Specialty medication(s) and dose(s) confirmed: yes    Refill Questions      Most Recent Value   Changes to allergies? No   Changes to medications? No   New conditions since last clinic visit Yes  [diarrhea and constipation( alternates)]   Unplanned office visit, urgent care, ED, or hospital admission in the last 4 weeks  No   How does patient/caregiver feel medication is working? Good   How many doses of your specialty medications were missed in the last 4 weeks? none          Delivery Questions      Most Recent Value   Delivery method [employer]Ex  [[employer] standard no sig ship on 12/6 deliver on 12/7 leave on table near door]   Delivery address correct? Yes   Preferred delivery time? Anytime   Number of medications in delivery 1   Medication being filled and delivered Xtandi   Doses left of specialty medications 15 doses   Questions or concerns for the pharmacist? No                 Follow-up: 3 week(s)     Radha Brito  Specialty Pharmacy Technician

## 2021-12-03 NOTE — PROGRESS NOTES
"Follow Up [facility] Note     Chief Complaint:  RONNY     Primary Care Physician: Axel Guardado MD    Interval History:   The patient is a 81 y.o. male  who I last saw 2021 and that note was reviewed.  The patient continues to deal with metastatic prostate cancer.  The patient reports problems with his BiPAP which is causing congestion, sneezing, and coughing?  The patient goes to bed at 8:30 PM and awakens at 5 AM.  He will use the restroom during that time.    Self-administered Dana Sleepiness Scale test results: 4  0-5 Lower normal daytime sleepiness  6-10 Higher normal daytime sleepiness  11-12 Mild, 13-15 Moderate, & 16-24 Severe excessive daytime sleepiness    Review of Systems:    A complete review of systems was done and all were negative with the exception of some postnasal drip and cough    Social History:    Social History     Socioeconomic History   • Marital status:    • Years of education: College   Tobacco Use   • Smoking status: Former Smoker     Packs/day: 1.00     Years: 30.00     Pack years: 30.00     Types: Cigarettes     Quit date: 1998     Years since quittin.8   • Smokeless tobacco: Never Used   Substance and Sexual Activity   • Alcohol use: No     Comment: Caffeine use: 2-3 cups daily   • Drug use: No   • Sexual activity: Defer       Allergies:  Niacin and Statins     Medication Review:  Reviewed.      Vital Signs:    Vitals:    21 1357   Pulse: 58   SpO2: 98%   Weight: 87.5 kg (193 lb)   Height: 165 cm (64.96\")     Body mass index is 32.16 kg/m².    Physical Exam:    Constitutional:  Well developed 81 y.o. male that appears in no apparent distress.  Awake & oriented times 3.  Normal mood with normal recent and remote memory and normal judgement.  Eyes:  Conjunctivae normal.  Oropharynx: Previously, moist mucous membranes without exudate and a large tongue and normal uvula and narrow posterior pharyngeal opening, patient is wearing a " facemask.     Downloaded PAP Data Reviewed For Compliance:  DME is [facility] and he uses a nasal interface.  Downloads between 7/14 and 10/11/2021 compliance only 11% because he stopped it due to the recall.  Average usage is 8 hours and 29 minutes.  Average AHI is mildly abnormal at 8.4 and all subsets are normal and he does have a leak of 1 hour and 11 minutes.  Average auto BiPAP pressure is IPAP of 14.8 and EPAP of 12 and his auto BiPAP settings: Max IPAP 17 minimum EPAP 10 and maximum pressure support of 4.    I have reviewed the above results and compared them with the patient's last downloads and reviewed with the patient.    Impression:   Obstructive sleep apnea adequately treated with auto BiPAP with poor compliance and good usage and no complaints of hypersomnolence.  The patient continues to take modafinil 200 mg every morning for complaints of hypersomnolence. The patient's obstructive apnea appears to be adequately treated.  The patient's average clear airway index is normal.    Restless leg syndrome treated with Requip and gabapentin    Psychophysiologic insomnia treated with trazodone    Plan:  Good sleep hygiene measures should be maintained.  Weight loss would be beneficial in this patient who is obese by BMI.      After evaluating the patient and assessing results available, the patient is benefiting from the treatment being provided.     The patient will continue auto CPAP.  After clinical evaluation and review of downloads, I recommend no changes to the patient's pressures.  A new prescription will be sent to the patient's DME.    The patient has registered his device with [employer].  The patient should stop his ozone .  The patient is to restart auto BiPAP with no water or may add level 1 or level 2 humidity level.    The patient's device is old and due to the recall, orders for new auto BiPAP will be placed with his DME.    I answered all of the patient's questions.  The patient will  call for any problems and will follow up in 3 months after getting his new device..      Bob Mcdermott MD  Sleep Medicine  12/03/21  14:09 EST

## 2021-12-03 NOTE — TELEPHONE ENCOUNTER
----- Message from Jose Lee MD sent at 1/16/2021  1:19 PM EST -----  When this patient is seen January 18, 2021 by MD please add Xgeva to his therapy that day.  Thanks,ASAEL    
03-Dec-2021 12:11

## 2021-12-03 NOTE — TELEPHONE ENCOUNTER
Caller: Semaj Herrera    Relationship: Self    Best call back number: [phone]    What is the best time to reach you: ANY    Who are you requesting to speak with (clinical staff, provider,  specific staff member): TORSTEN      What was the call regarding: PATIENT STATED RETURNING KELLYS CALL REGARDING HIS MEDICATION    Do you require a callback: YES

## 2021-12-08 ENCOUNTER — INFUSION (OUTPATIENT)
Dept: ONCOLOGY | Facility: HOSPITAL | Age: 81
End: 2021-12-08

## 2021-12-08 ENCOUNTER — OFFICE VISIT (OUTPATIENT)
Dept: ONCOLOGY | Facility: CLINIC | Age: 81
End: 2021-12-08

## 2021-12-08 ENCOUNTER — LAB (OUTPATIENT)
Dept: LAB | Facility: HOSPITAL | Age: 81
End: 2021-12-08

## 2021-12-08 VITALS
RESPIRATION RATE: 16 BRPM | HEIGHT: 65 IN | TEMPERATURE: 97.1 F | DIASTOLIC BLOOD PRESSURE: 65 MMHG | OXYGEN SATURATION: 98 % | WEIGHT: 195.2 LBS | SYSTOLIC BLOOD PRESSURE: 160 MMHG | HEART RATE: 47 BPM | BODY MASS INDEX: 32.52 KG/M2

## 2021-12-08 DIAGNOSIS — C61 PROSTATE CANCER METASTATIC TO BONE (HCC): ICD-10-CM

## 2021-12-08 DIAGNOSIS — C79.51 PROSTATE CANCER METASTATIC TO BONE (HCC): ICD-10-CM

## 2021-12-08 DIAGNOSIS — C79.51 OSSEOUS METASTASIS: Primary | ICD-10-CM

## 2021-12-08 DIAGNOSIS — C79.51 OSSEOUS METASTASIS: ICD-10-CM

## 2021-12-08 LAB
ALBUMIN SERPL-MCNC: 4 G/DL (ref 3.5–5.2)
ALBUMIN/GLOB SERPL: 1.5 G/DL (ref 1.1–2.4)
ALP SERPL-CCNC: 164 U/L (ref 38–116)
ALT SERPL W P-5'-P-CCNC: 7 U/L (ref 0–41)
ANION GAP SERPL CALCULATED.3IONS-SCNC: 9.5 MMOL/L (ref 5–15)
AST SERPL-CCNC: 10 U/L (ref 0–40)
BASOPHILS # BLD AUTO: 0.01 10*3/MM3 (ref 0–0.2)
BASOPHILS NFR BLD AUTO: 0.2 % (ref 0–1.5)
BILIRUB SERPL-MCNC: 0.2 MG/DL (ref 0.2–1.2)
BUN SERPL-MCNC: 15 MG/DL (ref 6–20)
BUN/CREAT SERPL: 16.5 (ref 7.3–30)
CALCIUM SPEC-SCNC: 8.1 MG/DL (ref 8.5–10.2)
CHLORIDE SERPL-SCNC: 109 MMOL/L (ref 98–107)
CO2 SERPL-SCNC: 25.5 MMOL/L (ref 22–29)
CREAT SERPL-MCNC: 0.91 MG/DL (ref 0.7–1.3)
DEPRECATED RDW RBC AUTO: 53.6 FL (ref 37–54)
EOSINOPHIL # BLD AUTO: 0.13 10*3/MM3 (ref 0–0.4)
EOSINOPHIL NFR BLD AUTO: 2.4 % (ref 0.3–6.2)
ERYTHROCYTE [DISTWIDTH] IN BLOOD BY AUTOMATED COUNT: 15.6 % (ref 12.3–15.4)
GFR SERPL CREATININE-BSD FRML MDRD: 80 ML/MIN/1.73
GLOBULIN UR ELPH-MCNC: 2.7 GM/DL (ref 1.8–3.5)
GLUCOSE SERPL-MCNC: 103 MG/DL (ref 74–124)
HCT VFR BLD AUTO: 32.3 % (ref 37.5–51)
HGB BLD-MCNC: 10.1 G/DL (ref 13–17.7)
IMM GRANULOCYTES # BLD AUTO: 0.04 10*3/MM3 (ref 0–0.05)
IMM GRANULOCYTES NFR BLD AUTO: 0.8 % (ref 0–0.5)
LYMPHOCYTES # BLD AUTO: 1.52 10*3/MM3 (ref 0.7–3.1)
LYMPHOCYTES NFR BLD AUTO: 28.5 % (ref 19.6–45.3)
MAGNESIUM SERPL-MCNC: 2.1 MG/DL (ref 1.8–2.5)
MCH RBC QN AUTO: 29.4 PG (ref 26.6–33)
MCHC RBC AUTO-ENTMCNC: 31.3 G/DL (ref 31.5–35.7)
MCV RBC AUTO: 94.2 FL (ref 79–97)
MONOCYTES # BLD AUTO: 0.44 10*3/MM3 (ref 0.1–0.9)
MONOCYTES NFR BLD AUTO: 8.3 % (ref 5–12)
NEUTROPHILS NFR BLD AUTO: 3.19 10*3/MM3 (ref 1.7–7)
NEUTROPHILS NFR BLD AUTO: 59.8 % (ref 42.7–76)
NRBC BLD AUTO-RTO: 0 /100 WBC (ref 0–0.2)
PHOSPHATE SERPL-MCNC: 1.9 MG/DL (ref 2.5–4.5)
PLATELET # BLD AUTO: 189 10*3/MM3 (ref 140–450)
PMV BLD AUTO: 9.5 FL (ref 6–12)
POTASSIUM SERPL-SCNC: 4.5 MMOL/L (ref 3.5–4.7)
PROT SERPL-MCNC: 6.7 G/DL (ref 6.3–8)
PSA SERPL-MCNC: 4.7 NG/ML (ref 0–4)
RBC # BLD AUTO: 3.43 10*6/MM3 (ref 4.14–5.8)
SODIUM SERPL-SCNC: 144 MMOL/L (ref 134–145)
WBC NRBC COR # BLD: 5.33 10*3/MM3 (ref 3.4–10.8)

## 2021-12-08 PROCEDURE — 99214 OFFICE O/P EST MOD 30 MIN: CPT | Performed by: INTERNAL MEDICINE

## 2021-12-08 PROCEDURE — 84153 ASSAY OF PSA TOTAL: CPT | Performed by: INTERNAL MEDICINE

## 2021-12-08 PROCEDURE — 84100 ASSAY OF PHOSPHORUS: CPT

## 2021-12-08 PROCEDURE — 80053 COMPREHEN METABOLIC PANEL: CPT

## 2021-12-08 PROCEDURE — 25010000002 LEUPROLIDE ACETATE (3 MONTH) PER 7.5 MG: Performed by: INTERNAL MEDICINE

## 2021-12-08 PROCEDURE — 96402 CHEMO HORMON ANTINEOPL SQ/IM: CPT

## 2021-12-08 PROCEDURE — 83735 ASSAY OF MAGNESIUM: CPT

## 2021-12-08 PROCEDURE — 85025 COMPLETE CBC W/AUTO DIFF WBC: CPT

## 2021-12-08 PROCEDURE — 36415 COLL VENOUS BLD VENIPUNCTURE: CPT

## 2021-12-08 RX ADMIN — LEUPROLIDE ACETATE 22.5 MG: KIT at 12:01

## 2021-12-13 DIAGNOSIS — C61 PROSTATE CANCER METASTATIC TO BONE (HCC): ICD-10-CM

## 2021-12-13 DIAGNOSIS — C79.51 PROSTATE CANCER METASTATIC TO BONE (HCC): ICD-10-CM

## 2021-12-13 RX ORDER — HYDROCODONE BITARTRATE AND ACETAMINOPHEN 5; 325 MG/1; MG/1
1 TABLET ORAL EVERY 6 HOURS PRN
Qty: 120 TABLET | Refills: 0 | Status: SHIPPED | OUTPATIENT
Start: 2021-12-13 | End: 2022-01-11 | Stop reason: SDUPTHER

## 2021-12-20 ENCOUNTER — SPECIALTY PHARMACY (OUTPATIENT)
Dept: PHARMACY | Facility: HOSPITAL | Age: 81
End: 2021-12-20

## 2021-12-27 DIAGNOSIS — G47.33 OBSTRUCTIVE SLEEP APNEA SYNDROME: ICD-10-CM

## 2021-12-27 DIAGNOSIS — G47.14 HYPERSOMNIA DUE TO MEDICAL CONDITION: ICD-10-CM

## 2021-12-27 RX ORDER — MODAFINIL 200 MG/1
200 TABLET ORAL DAILY
Qty: 90 TABLET | Refills: 1 | Status: SHIPPED | OUTPATIENT
Start: 2021-12-27 | End: 2022-08-27 | Stop reason: SDUPTHER

## 2021-12-28 ENCOUNTER — SPECIALTY PHARMACY (OUTPATIENT)
Dept: PHARMACY | Facility: HOSPITAL | Age: 81
End: 2021-12-28

## 2021-12-28 DIAGNOSIS — C61 PROSTATE CANCER METASTATIC TO BONE (HCC): ICD-10-CM

## 2021-12-28 DIAGNOSIS — C79.51 PROSTATE CANCER METASTATIC TO BONE (HCC): ICD-10-CM

## 2021-12-28 RX ORDER — GABAPENTIN 300 MG/1
900 CAPSULE ORAL
Qty: 90 CAPSULE | Refills: 2 | Status: SHIPPED | OUTPATIENT
Start: 2021-12-28 | End: 2022-04-04 | Stop reason: SDUPTHER

## 2021-12-28 NOTE — PROGRESS NOTES
Specialty Pharmacy Refill Coordination Note     Semaj is a 81 y.o. male contacted today regarding refills of  xtandi specialty medication(s).    Reviewed and verified with patient:       Specialty medication(s) and dose(s) confirmed: yes    Refill Questions      Most Recent Value   Changes to allergies? No   Changes to medications? No   New conditions since last clinic visit No   Unplanned office visit, urgent care, ED, or hospital admission in the last 4 weeks  No   How does patient/caregiver feel medication is working? Good   How many doses of your specialty medications were missed in the last 4 weeks? none          Delivery Questions      Most Recent Value   Delivery method [employer]  [[employer] standard no sig ship 1/6 deliver 1/7]   Delivery address correct? Yes   Preferred delivery time? Anytime   Number of medications in delivery 1   Medication being filled and delivered Xtandi   Doses left of specialty medications 3 weeks   Questions or concerns for the pharmacist? No                 Follow-up: 3 week(s)     Radha Brito  Specialty Pharmacy Technician

## 2022-01-11 DIAGNOSIS — C61 PROSTATE CANCER METASTATIC TO BONE: ICD-10-CM

## 2022-01-11 DIAGNOSIS — C79.51 PROSTATE CANCER METASTATIC TO BONE: ICD-10-CM

## 2022-01-11 RX ORDER — HYDROCODONE BITARTRATE AND ACETAMINOPHEN 5; 325 MG/1; MG/1
1 TABLET ORAL EVERY 6 HOURS PRN
Qty: 120 TABLET | Refills: 0 | Status: SHIPPED | OUTPATIENT
Start: 2022-01-11 | End: 2022-02-08 | Stop reason: SDUPTHER

## 2022-01-19 ENCOUNTER — TRANSCRIBE ORDERS (OUTPATIENT)
Dept: ADMINISTRATIVE | Facility: HOSPITAL | Age: 82
End: 2022-01-19

## 2022-01-19 ENCOUNTER — LAB (OUTPATIENT)
Dept: LAB | Facility: HOSPITAL | Age: 82
End: 2022-01-19

## 2022-01-19 DIAGNOSIS — I12.9 HYPERTENSIVE NEPHROPATHY: ICD-10-CM

## 2022-01-19 DIAGNOSIS — N18.9 CHRONIC KIDNEY DISEASE, UNSPECIFIED CKD STAGE: ICD-10-CM

## 2022-01-19 DIAGNOSIS — N18.9 CHRONIC KIDNEY DISEASE, UNSPECIFIED CKD STAGE: Primary | ICD-10-CM

## 2022-01-19 LAB
ALBUMIN SERPL-MCNC: 3.8 G/DL (ref 3.5–5.2)
ANION GAP SERPL CALCULATED.3IONS-SCNC: 11 MMOL/L (ref 5–15)
BUN SERPL-MCNC: 17 MG/DL (ref 8–23)
BUN/CREAT SERPL: 16.2 (ref 7–25)
CALCIUM SPEC-SCNC: 8.5 MG/DL (ref 8.6–10.5)
CHLORIDE SERPL-SCNC: 106 MMOL/L (ref 98–107)
CO2 SERPL-SCNC: 26 MMOL/L (ref 22–29)
CREAT SERPL-MCNC: 1.05 MG/DL (ref 0.76–1.27)
GFR SERPL CREATININE-BSD FRML MDRD: 68 ML/MIN/1.73
GLUCOSE SERPL-MCNC: 196 MG/DL (ref 65–99)
MAGNESIUM SERPL-MCNC: 2 MG/DL (ref 1.6–2.4)
PHOSPHATE SERPL-MCNC: 2.6 MG/DL (ref 2.5–4.5)
POTASSIUM SERPL-SCNC: 4.1 MMOL/L (ref 3.5–5.2)
SODIUM SERPL-SCNC: 143 MMOL/L (ref 136–145)

## 2022-01-19 PROCEDURE — 36415 COLL VENOUS BLD VENIPUNCTURE: CPT

## 2022-01-19 PROCEDURE — 80069 RENAL FUNCTION PANEL: CPT | Performed by: NURSE PRACTITIONER

## 2022-01-19 PROCEDURE — 83735 ASSAY OF MAGNESIUM: CPT

## 2022-02-01 ENCOUNTER — SPECIALTY PHARMACY (OUTPATIENT)
Dept: PHARMACY | Facility: HOSPITAL | Age: 82
End: 2022-02-01

## 2022-02-01 NOTE — PROGRESS NOTES
Specialty Pharmacy Refill Coordination Note     Semaj is a 81 y.o. male contacted today regarding refills of  Xtandi specialty medication(s).    Reviewed and verified with patient:       Specialty medication(s) and dose(s) confirmed: yes    Refill Questions      Most Recent Value   Changes to allergies? No   Changes to medications? No   New conditions since last clinic visit No   Unplanned office visit, urgent care, ED, or hospital admission in the last 4 weeks  No   How does patient/caregiver feel medication is working? Good   How many doses of your specialty medications were missed in the last 4 weeks? none   Does this patient require a clinical escalation to a pharmacist? No          Delivery Questions      Most Recent Value   Delivery method [employer]Ex  [[employer] standard no sig ship 2/7 deliver 2/8]   Delivery address correct? Yes   Preferred delivery time? Anytime   Number of medications in delivery 1   Medication being filled and delivered Xtandi   Doses left of specialty medications 15 days   Questions or concerns for the pharmacist? No                 Follow-up: 3 week(s)     Radha Brito  Specialty Pharmacy Technician      
Follow up Sonogram for Growth/1st Trimester Sonogram/20 Week Level II Sonogram/Ultra Screen at 12 Weeks

## 2022-02-08 ENCOUNTER — SPECIALTY PHARMACY (OUTPATIENT)
Dept: PHARMACY | Facility: HOSPITAL | Age: 82
End: 2022-02-08

## 2022-02-08 DIAGNOSIS — C79.51 PROSTATE CANCER METASTATIC TO BONE: ICD-10-CM

## 2022-02-08 DIAGNOSIS — C61 PROSTATE CANCER METASTATIC TO BONE: ICD-10-CM

## 2022-02-08 RX ORDER — HYDROCODONE BITARTRATE AND ACETAMINOPHEN 5; 325 MG/1; MG/1
1 TABLET ORAL EVERY 6 HOURS PRN
Qty: 120 TABLET | Refills: 0 | Status: SHIPPED | OUTPATIENT
Start: 2022-02-08 | End: 2022-03-09 | Stop reason: SDUPTHER

## 2022-02-14 ENCOUNTER — OFFICE VISIT (OUTPATIENT)
Dept: CARDIOLOGY | Facility: CLINIC | Age: 82
End: 2022-02-14

## 2022-02-14 VITALS
SYSTOLIC BLOOD PRESSURE: 138 MMHG | WEIGHT: 194 LBS | HEIGHT: 64 IN | BODY MASS INDEX: 33.12 KG/M2 | HEART RATE: 63 BPM | DIASTOLIC BLOOD PRESSURE: 70 MMHG

## 2022-02-14 DIAGNOSIS — E66.9 CLASS 1 OBESITY WITH SERIOUS COMORBIDITY AND BODY MASS INDEX (BMI) OF 33.0 TO 33.9 IN ADULT, UNSPECIFIED OBESITY TYPE: ICD-10-CM

## 2022-02-14 DIAGNOSIS — I51.89 DIASTOLIC DYSFUNCTION: Primary | ICD-10-CM

## 2022-02-14 DIAGNOSIS — G47.31 CSA (CENTRAL SLEEP APNEA): ICD-10-CM

## 2022-02-14 DIAGNOSIS — I10 PRIMARY HYPERTENSION: ICD-10-CM

## 2022-02-14 DIAGNOSIS — I77.810 ASCENDING AORTA DILATATION: ICD-10-CM

## 2022-02-14 DIAGNOSIS — N18.31 STAGE 3A CHRONIC KIDNEY DISEASE: ICD-10-CM

## 2022-02-14 DIAGNOSIS — R60.0 LOCALIZED EDEMA: ICD-10-CM

## 2022-02-14 DIAGNOSIS — I35.0 AORTIC STENOSIS, MILD: ICD-10-CM

## 2022-02-14 DIAGNOSIS — G47.33 OBSTRUCTIVE SLEEP APNEA SYNDROME: ICD-10-CM

## 2022-02-14 PROBLEM — R60.9 EDEMA: Status: ACTIVE | Noted: 2022-02-14

## 2022-02-14 PROCEDURE — 93000 ELECTROCARDIOGRAM COMPLETE: CPT | Performed by: NURSE PRACTITIONER

## 2022-02-14 PROCEDURE — 99214 OFFICE O/P EST MOD 30 MIN: CPT | Performed by: NURSE PRACTITIONER

## 2022-02-14 RX ORDER — DILTIAZEM HYDROCHLORIDE 120 MG/1
120 CAPSULE, COATED, EXTENDED RELEASE ORAL DAILY
Qty: 90 CAPSULE | Refills: 3 | Status: SHIPPED | OUTPATIENT
Start: 2022-02-14 | End: 2022-03-02

## 2022-02-14 NOTE — PROGRESS NOTES
Date of Office Visit: 2022  Encounter Provider: SYD Underwood  Place of Service: [facility]  Patient Name: Semaj Herrera  :1940   Primary Cardiologist: Dr. Tata Lee    Chief Complaint   Patient presents with   • Diastolic dysfunction   • Follow-up   :     HPI: Semaj Herrera is a 81 y.o. male who presents today for annual follow-up on diastolic dysfunction.  I reviewed his medical records.    He has been diagnosed with hypertension, insulin-dependent diabetes (Dr. Madrid), and chronic kidney disease (Dr. Vicky Duran).  He has known hypertension normal.  He is also been diagnosed with severe obstructive/central sleep apnea and is compliant with his BiPAP machine. He also has a history of prostate cancer with metastasis and follows with Dr. Lee.     We see him for diastolic dysfunction.  In 2017, he was diagnosed with diastolic dysfunction per echocardiogram.  In 2019, CT of the chest showed coronary artery calcification.  He has never been diagnosed with obstructive coronary artery disease.    In 2020, repeat echocardiogram showed normal LVEF, grade 2 diastolic dysfunction, left atrial cavity mildly dilated, mild aortic valve stenosis, and mild mitral regurgitation.    He presents today for his follow-up visit.  His blood pressure is elevated on the first check and better on the second check.  He has been following up with Dr. Vicky Duran for hypertensive management.  He occasionally experiences some dyspnea with exertion and lower extremity/pedal edema.  He denies chest pain, PND, orthopnea, palpitations, dizziness, syncope, or bleeding.  His sleep apnea machine is on recall and he is awaiting a new machine.    I reviewed his last renal function panel from 2022: Normal except for glucose of 196 and calcium 8.5.  In 2021, hemoglobin 10.1 and hematocrit 32.3.    Past Medical History:   Diagnosis  Date   • Aortic stenosis, mild 1/1/2021    Per echocardiogram 2021   • Ascending aorta dilatation (HCC)    • Bone cancer (HCC)    • Cellulitis of great toe of left foot 10/19/2018   • CKD (chronic kidney disease)     Stage 3; followed by Dr. Vicky Duran    • Diastolic dysfunction     GRADE II per echo 2018   • Difficulty breathing     during exertion   • Dyslipidemia    • Erectile dysfunction    • Fatigue    • History of blood transfusion    • History of kidney stones    • Hyperlipidemia    • Hypertension    • Hyponatremia    • Left ventricular hypertrophy     per echo 2018   • Localized edema    • Neuropathy    • Obstructive sleep apnea     USING CPAP   • Osteoarthritis    • Peptic ulcer    • Pneumonia    • Pneumonia of left upper lobe due to infectious organism 3/15/2019   • Prostate cancer (HCC)     Status post prostatectomy, radiation therapy, and hormone therapy followed by Dr. Lee; metastatsis to bone   • Pure hyperglyceridemia    • Restless leg syndrome    • Sepsis (HCC)    • Sinus bradycardia    • Transient cerebral ischemia    • Type 2 diabetes mellitus (HCC)        Past Surgical History:   Procedure Laterality Date   • BRONCHOSCOPY N/A 4/9/2018    Procedure: BRONCHOSCOPY;  Surgeon: Bijan Rivera III, MD;  Location: [facility];  Service: Cardiothoracic   • COLONOSCOPY     • DEEP NECK LYMPH NODE BIOPSY / EXCISION     • HEMORRHOIDECTOMY     • MEDIASTINOSCOPY N/A 4/9/2018    Procedure: MEDIASTINOSCOPY WITH LYMPH NODE BIOPSY;  Surgeon: Bijan Rivera III, MD;  Location: [facility];  Service: Cardiothoracic   • OTHER SURGICAL HISTORY      ulcer repair   • PROSTATECTOMY  2006       Social History     Social History   • Marital status:      Spouse name: N/A   • Number of children: N/A   • Years of education: College     Occupational History   •  Retired     Social History Main Topics   • Smoking status: Former Smoker     Packs/day: 1.00     Years: 30.00     Types: Cigarettes     Quit  date: 1/23/1998   • Smokeless tobacco: Never Used   • Alcohol use No      Comment: caffeine use   • Drug use: No   • Sexual activity: Defer       Family History   Problem Relation Age of Onset   • Heart failure Mother    • Hypertension Mother    • Diabetes Mother    • Heart disease Mother    • Lung cancer Father 46   • Hypertension Sister    • Lung cancer Sister 60   • Hypertension Brother    • Lung cancer Brother 62   • Heart disease Other    • Prostate cancer Brother 60   • Malig Hyperthermia Neg Hx        Review of Systems   Constitutional: Negative for chills, diaphoresis, fever, malaise/fatigue, night sweats, weight gain and weight loss.   HENT: Negative for hearing loss, nosebleeds, sore throat and tinnitus.    Eyes: Negative for blurred vision, double vision, pain and visual disturbance.   Cardiovascular: Positive for dyspnea on exertion and leg swelling. Negative for chest pain, claudication, cyanosis, irregular heartbeat, near-syncope, orthopnea, palpitations, paroxysmal nocturnal dyspnea and syncope.   Respiratory: Negative for cough, hemoptysis, shortness of breath, snoring and wheezing.    Endocrine: Negative for cold intolerance, heat intolerance and polyuria.   Hematologic/Lymphatic: Negative for bleeding problem. Does not bruise/bleed easily.   Skin: Negative for color change, dry skin, flushing and itching.   Musculoskeletal: Negative for falls, joint pain, joint swelling, muscle cramps, muscle weakness and myalgias.   Gastrointestinal: Negative for abdominal pain, constipation, heartburn, melena, nausea and vomiting.   Genitourinary: Negative for dysuria, frequency and hematuria.        Erectile dysfunction   Neurological: Negative for excessive daytime sleepiness, dizziness, light-headedness, loss of balance, numbness, paresthesias, seizures and vertigo.   Psychiatric/Behavioral: Negative for altered mental status, depression, memory loss and substance abuse. The patient does not have insomnia and  is not nervous/anxious.    Allergic/Immunologic: Negative for environmental allergies.       Allergies   Allergen Reactions   • Niacin Unknown - Low Severity   • Statins Unknown - Low Severity         Current Outpatient Medications:   •  aspirin 81 MG EC tablet, Take 1 tablet by mouth daily., Disp: , Rfl:   •  cholecalciferol (VITAMIN D3) 1000 units tablet, Take 2,000 Units by mouth Daily., Disp: , Rfl:   •  Denosumab (XGEVA SC), Inject  under the skin into the appropriate area as directed Every 30 (Thirty) Days., Disp: , Rfl:   •  dilTIAZem CD (CARDIZEM CD) 120 MG 24 hr capsule, Take 1 capsule by mouth Daily., Disp: 90 capsule, Rfl: 3  •  enzalutamide (XTANDI) 40 MG chemo capsule, Take 2 capsules by mouth Daily., Disp: 60 capsule, Rfl: 3  •  furosemide (LASIX) 40 MG tablet, Take 40 mg by mouth Every Morning., Disp: , Rfl:   •  gabapentin (NEURONTIN) 300 MG capsule, Take 3 capsules by mouth every night at bedtime., Disp: 90 capsule, Rfl: 2  •  HYDROcodone-acetaminophen (NORCO) 5-325 MG per tablet, Take 1 tablet by mouth Every 6 (Six) Hours As Needed for Moderate Pain ., Disp: 120 tablet, Rfl: 0  •  insulin aspart (NovoLOG) 100 UNIT/ML patient supplied pump, Inject  under the skin into the appropriate area as directed Continuous. Pt reports pump with basal rate 1.9 units/hr from 0800 to 1200. Basal rate 1.8 units/hr 1200 to 0800.  Checks bg ac/hs with bolus doses per pumps parameters, Disp: , Rfl:   •  Leuprolide Acetate, 3 Month, (LUPRON DEPOT, 3-MONTH, IM), Inject  into the appropriate muscle as directed by prescriber Every 3 (Three) Months., Disp: , Rfl:   •  levothyroxine (SYNTHROID, LEVOTHROID) 88 MCG tablet, , Disp: , Rfl:   •  loperamide (IMODIUM) 1 MG/5ML solution, Take 2 mg by mouth 4 (Four) Times a Day As Needed for Diarrhea., Disp: , Rfl:   •  modafinil (PROVIGIL) 200 MG tablet, Take 1 tablet by mouth Daily., Disp: 90 tablet, Rfl: 1  •  olmesartan (BENICAR) 40 MG tablet, TAKE 1 TABLET EVERY DAY  "(DISCONTINUE LOSARTAN 100MG), Disp: 90 tablet, Rfl: 3  •  ondansetron (ZOFRAN) 8 MG tablet, Take 1 tablet by mouth 3 (Three) Times a Day As Needed for Nausea or Vomiting., Disp: 30 tablet, Rfl: 5  •  pravastatin (PRAVACHOL) 40 MG tablet, Take 40 mg by mouth daily., Disp: , Rfl:   •  rOPINIRole (REQUIP) 0.5 MG tablet, Take 1 tablet by mouth in the evening and 1 at bedtime., Disp: 180 tablet, Rfl: 2  •  traZODone (DESYREL) 50 MG tablet, Take  mg by mouth every night at bedtime., Disp: , Rfl:       Objective:     Vitals:    02/14/22 0856 02/14/22 0905 02/14/22 0922   BP: 140/64 148/68 138/70   BP Location: Left arm Right arm Left arm   Patient Position:   Sitting   Pulse: 63     Weight: 88 kg (194 lb)     Height: 162.6 cm (64\")       Body mass index is 33.3 kg/m².    PHYSICAL EXAM:    Vitals Reviewed.   General Appearance: No acute distress, well developed and well nourished.  Obese.  Eyes: Conjunctiva and lids: No erythema, swelling, or discharge. Sclera non-icteric.   HENT: Atraumatic, normocephalic. External eyes, ears, and nose normal. No hearing loss noted. Mucous membranes normal. Lips not cyanotic. Neck supple with no tenderness.  Wearing a mask.  Respiratory: No signs of respiratory distress. Respiration rhythm and depth normal.   Clear to auscultation. No rales, crackles, rhonchi, or wheezing auscultated.   Cardiovascular:  Jugular Venous Pressure: Normal  Heart Rate and Rhythm: Normal, Heart Sounds: Normal S1 and S2. No S3 or S4 noted.  Murmurs: No murmur present.  No rubs, thrills, or gallops.   Lower Extremities: +1 lower extremity edema noted bilaterally and pedal edema.    Gastrointestinal:  Abdomen obese, distended, nontender.  Normal bowel sounds.    Musculoskeletal: Normal movement of extremities  Skin and Nails: General appearance normal. No pallor, cyanosis, diaphoresis. Skin temperature normal. No clubbing of fingernails.   Psychiatric: Patient alert and oriented to person, place, and time. " Speech and behavior appropriate. Normal mood and affect.       ECG 12 Lead    Date/Time: 2/14/2022 8:51 AM  Performed by: Stephanie Farooq APRN  Authorized by: Stephanie Farooq APRN   Comparison: compared with previous ECG from 9/8/2021  Similar to previous ECG  Rhythm: sinus rhythm  Rate: normal  BPM: 63  Conduction: conduction normal  ST Segments: ST segments normal  T Waves: T waves normal  QRS axis: normal    Clinical impression: normal ECG              Assessment:       Diagnosis Plan   1. Diastolic dysfunction     2. Primary hypertension     3. Stage 3a chronic kidney disease (HCC)     4. Localized edema     5. Aortic stenosis, mild     6. CSA (central sleep apnea)     7. Obstructive sleep apnea syndrome treated auto BiPAP     8. Class 1 obesity with serious comorbidity and body mass index (BMI) of 33.0 to 33.9 in adult, unspecified obesity type     9. Ascending aorta dilatation (HCC)            Plan:       1.  Diastolic Dysfunction: Grade 2 on echocardiogram.  He has some mild dyspnea which she feels is unchanged and lower extremity edema.  His nephrologist, Dr. Vicky Duran recently started him on furosemide.    2.  Hypertension: Blood pressure better controlled on the second check today.    3.  Chronic Kidney Disease: Followed by Dr. Vicky Duran and she manages his hypertension.     4.  Localized Edema: Most likely due to obesity, calcium channel blocker (diltiazem), and possibly higher sodium diet.  I recommended leg elevation and continue with the furosemide.  I will defer to Dr. Duran if she wants to stop the diltiazem and change to a different antihypertensive medication.    5.  Aortic Stenosis: Mild per echocardiogram in 2020.  I have heard a heart murmur in the past, but did not appreciate that today.    6/7.  Central/Obstructive Sleep Apnea: Typically treated with BiPAP machine, but unfortunately his machine is on recall.  He is awaiting a new machine.    8.  Obesity: BMI is 33.3.  He  would benefit from exercise and weight loss.    9.  Ascending Aortic Dilation: Measured 3.8 cm on CT scan.  Continue to monitor.    10.  I recommended a 1 year follow-up visit with Dr. Tata Lee.      As always, it has been a pleasure to participate in your patient's care.      Sincerely,         SYD Mejia        · Dictated using Dragon  · COVID-19 Precautions - Patient was compliant in wearing a mask. When I saw the patient, I used appropriate personal protective equipment (PPE) including mask and eye shield (standard procedure).  Additionally, I used gown and gloves if indicated.  Hand hygiene was completed before and after seeing the patient.  I spent 33 minutes reviewing her medical records/testing/previous office notes/labs, face-to-face interaction with patient, physical examination, formulating the plan of care, and discussion of plan of care with patient.

## 2022-02-23 ENCOUNTER — LAB (OUTPATIENT)
Dept: LAB | Facility: HOSPITAL | Age: 82
End: 2022-02-23

## 2022-02-23 DIAGNOSIS — C79.51 PROSTATE CANCER METASTATIC TO BONE: ICD-10-CM

## 2022-02-23 DIAGNOSIS — C61 PROSTATE CANCER METASTATIC TO BONE: ICD-10-CM

## 2022-02-23 DIAGNOSIS — C79.51 OSSEOUS METASTASIS: ICD-10-CM

## 2022-02-23 LAB
ALBUMIN SERPL-MCNC: 3.7 G/DL (ref 3.5–5.2)
ALBUMIN/GLOB SERPL: 1.1 G/DL (ref 1.1–2.4)
ALP SERPL-CCNC: 315 U/L (ref 38–116)
ALT SERPL W P-5'-P-CCNC: 10 U/L (ref 0–41)
ANION GAP SERPL CALCULATED.3IONS-SCNC: 10.8 MMOL/L (ref 5–15)
AST SERPL-CCNC: 13 U/L (ref 0–40)
BASOPHILS # BLD AUTO: 0.02 10*3/MM3 (ref 0–0.2)
BASOPHILS NFR BLD AUTO: 0.4 % (ref 0–1.5)
BILIRUB SERPL-MCNC: 0.2 MG/DL (ref 0.2–1.2)
BUN SERPL-MCNC: 22 MG/DL (ref 6–20)
BUN/CREAT SERPL: 17.9 (ref 7.3–30)
CALCIUM SPEC-SCNC: 9 MG/DL (ref 8.5–10.2)
CHLORIDE SERPL-SCNC: 104 MMOL/L (ref 98–107)
CO2 SERPL-SCNC: 27.2 MMOL/L (ref 22–29)
CREAT SERPL-MCNC: 1.23 MG/DL (ref 0.7–1.3)
DEPRECATED RDW RBC AUTO: 47 FL (ref 37–54)
EOSINOPHIL # BLD AUTO: 0.13 10*3/MM3 (ref 0–0.4)
EOSINOPHIL NFR BLD AUTO: 2.3 % (ref 0.3–6.2)
ERYTHROCYTE [DISTWIDTH] IN BLOOD BY AUTOMATED COUNT: 13.9 % (ref 12.3–15.4)
GFR SERPL CREATININE-BSD FRML MDRD: 56 ML/MIN/1.73
GLOBULIN UR ELPH-MCNC: 3.3 GM/DL (ref 1.8–3.5)
GLUCOSE SERPL-MCNC: 192 MG/DL (ref 74–124)
HCT VFR BLD AUTO: 34.5 % (ref 37.5–51)
HGB BLD-MCNC: 10.6 G/DL (ref 13–17.7)
IMM GRANULOCYTES # BLD AUTO: 0.09 10*3/MM3 (ref 0–0.05)
IMM GRANULOCYTES NFR BLD AUTO: 1.6 % (ref 0–0.5)
LYMPHOCYTES # BLD AUTO: 1.64 10*3/MM3 (ref 0.7–3.1)
LYMPHOCYTES NFR BLD AUTO: 29.5 % (ref 19.6–45.3)
MCH RBC QN AUTO: 28.5 PG (ref 26.6–33)
MCHC RBC AUTO-ENTMCNC: 30.7 G/DL (ref 31.5–35.7)
MCV RBC AUTO: 92.7 FL (ref 79–97)
MONOCYTES # BLD AUTO: 0.37 10*3/MM3 (ref 0.1–0.9)
MONOCYTES NFR BLD AUTO: 6.7 % (ref 5–12)
NEUTROPHILS NFR BLD AUTO: 3.3 10*3/MM3 (ref 1.7–7)
NEUTROPHILS NFR BLD AUTO: 59.5 % (ref 42.7–76)
NRBC BLD AUTO-RTO: 0 /100 WBC (ref 0–0.2)
PLATELET # BLD AUTO: 242 10*3/MM3 (ref 140–450)
PMV BLD AUTO: 9 FL (ref 6–12)
POTASSIUM SERPL-SCNC: 3.9 MMOL/L (ref 3.5–4.7)
PROT SERPL-MCNC: 7 G/DL (ref 6.3–8)
PSA SERPL-MCNC: 7.48 NG/ML (ref 0–4)
RBC # BLD AUTO: 3.72 10*6/MM3 (ref 4.14–5.8)
SODIUM SERPL-SCNC: 142 MMOL/L (ref 134–145)
WBC NRBC COR # BLD: 5.55 10*3/MM3 (ref 3.4–10.8)

## 2022-02-23 PROCEDURE — 36415 COLL VENOUS BLD VENIPUNCTURE: CPT

## 2022-02-23 PROCEDURE — 85025 COMPLETE CBC W/AUTO DIFF WBC: CPT

## 2022-02-23 PROCEDURE — 80053 COMPREHEN METABOLIC PANEL: CPT

## 2022-02-23 PROCEDURE — 84153 ASSAY OF PSA TOTAL: CPT | Performed by: INTERNAL MEDICINE

## 2022-03-02 RX ORDER — DILTIAZEM HYDROCHLORIDE 120 MG/1
CAPSULE, COATED, EXTENDED RELEASE ORAL
Qty: 90 CAPSULE | Refills: 3 | Status: SHIPPED | OUTPATIENT
Start: 2022-03-02 | End: 2022-12-19

## 2022-03-04 ENCOUNTER — SPECIALTY PHARMACY (OUTPATIENT)
Dept: PHARMACY | Facility: HOSPITAL | Age: 82
End: 2022-03-04

## 2022-03-04 DIAGNOSIS — C79.51 PROSTATE CANCER METASTATIC TO BONE: ICD-10-CM

## 2022-03-04 DIAGNOSIS — C61 PROSTATE CANCER METASTATIC TO BONE: ICD-10-CM

## 2022-03-04 NOTE — PROGRESS NOTES
Specialty Pharmacy Refill Coordination Note     Semaj is a 81 y.o. male contacted today regarding refills of  Xtandi specialty medication(s).    Reviewed and verified with patient:       Specialty medication(s) and dose(s) confirmed: yes    Refill Questions      Most Recent Value   Changes to allergies? No   Changes to medications? No   New conditions since last clinic visit No   Unplanned office visit, urgent care, ED, or hospital admission in the last 4 weeks  No   How does patient/caregiver feel medication is working? Good   Financial problems or insurance changes  No   How many doses of your specialty medications were missed in the last 4 weeks? none   Does this patient require a clinical escalation to a pharmacist? No          Delivery Questions      Most Recent Value   Delivery method [employer]  [[employer] standard no sig- leave on table ship 3/10 deliver 3/11]   Delivery address correct? Yes   Preferred delivery time? Anytime   Number of medications in delivery 1   Medication being filled and delivered Xtandi   Doses left of specialty medications 15 days   Questions or concerns for the pharmacist? No                 Follow-up: 3 week(s)     Radha Brito  Specialty Pharmacy Technician

## 2022-03-04 NOTE — TELEPHONE ENCOUNTER
Refill requested from pharmacy. Per last chart note, patient to continue xtandi 80 mg po daily . Will route to MD for cosignature.

## 2022-03-09 DIAGNOSIS — C61 PROSTATE CANCER METASTATIC TO BONE: ICD-10-CM

## 2022-03-09 DIAGNOSIS — C79.51 PROSTATE CANCER METASTATIC TO BONE: ICD-10-CM

## 2022-03-10 RX ORDER — HYDROCODONE BITARTRATE AND ACETAMINOPHEN 5; 325 MG/1; MG/1
1 TABLET ORAL EVERY 6 HOURS PRN
Qty: 120 TABLET | Refills: 0 | Status: SHIPPED | OUTPATIENT
Start: 2022-03-10 | End: 2022-04-11 | Stop reason: SDUPTHER

## 2022-03-14 DIAGNOSIS — C61 PROSTATE CANCER METASTATIC TO BONE: ICD-10-CM

## 2022-03-14 DIAGNOSIS — C79.51 PROSTATE CANCER METASTATIC TO BONE: ICD-10-CM

## 2022-03-14 RX ORDER — HYDROCODONE BITARTRATE AND ACETAMINOPHEN 5; 325 MG/1; MG/1
1 TABLET ORAL EVERY 6 HOURS PRN
Qty: 120 TABLET | Refills: 0 | OUTPATIENT
Start: 2022-03-14

## 2022-03-21 ENCOUNTER — LAB (OUTPATIENT)
Dept: LAB | Facility: HOSPITAL | Age: 82
End: 2022-03-21

## 2022-03-21 ENCOUNTER — OFFICE VISIT (OUTPATIENT)
Dept: ONCOLOGY | Facility: CLINIC | Age: 82
End: 2022-03-21

## 2022-03-21 ENCOUNTER — SPECIALTY PHARMACY (OUTPATIENT)
Dept: ONCOLOGY | Facility: HOSPITAL | Age: 82
End: 2022-03-21

## 2022-03-21 ENCOUNTER — INFUSION (OUTPATIENT)
Dept: ONCOLOGY | Facility: HOSPITAL | Age: 82
End: 2022-03-21

## 2022-03-21 VITALS
WEIGHT: 194.4 LBS | RESPIRATION RATE: 16 BRPM | HEIGHT: 64 IN | BODY MASS INDEX: 33.19 KG/M2 | HEART RATE: 53 BPM | TEMPERATURE: 96.9 F | OXYGEN SATURATION: 98 % | SYSTOLIC BLOOD PRESSURE: 149 MMHG | DIASTOLIC BLOOD PRESSURE: 73 MMHG

## 2022-03-21 VITALS — BODY MASS INDEX: 33.33 KG/M2 | WEIGHT: 194.2 LBS

## 2022-03-21 DIAGNOSIS — C79.51 OSSEOUS METASTASIS: ICD-10-CM

## 2022-03-21 DIAGNOSIS — C79.51 PROSTATE CANCER METASTATIC TO BONE: ICD-10-CM

## 2022-03-21 DIAGNOSIS — C61 PROSTATE CANCER METASTATIC TO BONE: Primary | ICD-10-CM

## 2022-03-21 DIAGNOSIS — C61 PROSTATE CANCER METASTATIC TO BONE: ICD-10-CM

## 2022-03-21 DIAGNOSIS — C79.51 PROSTATE CANCER METASTATIC TO BONE: Primary | ICD-10-CM

## 2022-03-21 DIAGNOSIS — C79.51 OSSEOUS METASTASIS: Primary | ICD-10-CM

## 2022-03-21 LAB
ALBUMIN SERPL-MCNC: 4.1 G/DL (ref 3.5–5.2)
ALBUMIN/GLOB SERPL: 1.3 G/DL (ref 1.1–2.4)
ALP SERPL-CCNC: 309 U/L (ref 38–116)
ALT SERPL W P-5'-P-CCNC: 10 U/L (ref 0–41)
ANION GAP SERPL CALCULATED.3IONS-SCNC: 10.8 MMOL/L (ref 5–15)
AST SERPL-CCNC: 14 U/L (ref 0–40)
BASOPHILS # BLD AUTO: 0.03 10*3/MM3 (ref 0–0.2)
BASOPHILS NFR BLD AUTO: 0.4 % (ref 0–1.5)
BILIRUB SERPL-MCNC: 0.3 MG/DL (ref 0.2–1.2)
BUN SERPL-MCNC: 33 MG/DL (ref 6–20)
BUN/CREAT SERPL: 29.5 (ref 7.3–30)
CALCIUM SPEC-SCNC: 10.1 MG/DL (ref 8.5–10.2)
CHLORIDE SERPL-SCNC: 103 MMOL/L (ref 98–107)
CO2 SERPL-SCNC: 28.2 MMOL/L (ref 22–29)
CREAT SERPL-MCNC: 1.12 MG/DL (ref 0.7–1.3)
DEPRECATED RDW RBC AUTO: 48.4 FL (ref 37–54)
EGFRCR SERPLBLD CKD-EPI 2021: 66 ML/MIN/1.73
EOSINOPHIL # BLD AUTO: 0.15 10*3/MM3 (ref 0–0.4)
EOSINOPHIL NFR BLD AUTO: 2.2 % (ref 0.3–6.2)
ERYTHROCYTE [DISTWIDTH] IN BLOOD BY AUTOMATED COUNT: 14.5 % (ref 12.3–15.4)
GLOBULIN UR ELPH-MCNC: 3.1 GM/DL (ref 1.8–3.5)
GLUCOSE SERPL-MCNC: 147 MG/DL (ref 74–124)
HCT VFR BLD AUTO: 34.1 % (ref 37.5–51)
HGB BLD-MCNC: 11 G/DL (ref 13–17.7)
IMM GRANULOCYTES # BLD AUTO: 0.06 10*3/MM3 (ref 0–0.05)
IMM GRANULOCYTES NFR BLD AUTO: 0.9 % (ref 0–0.5)
LYMPHOCYTES # BLD AUTO: 2.01 10*3/MM3 (ref 0.7–3.1)
LYMPHOCYTES NFR BLD AUTO: 29.9 % (ref 19.6–45.3)
MAGNESIUM SERPL-MCNC: 1.6 MG/DL (ref 1.8–2.5)
MCH RBC QN AUTO: 29.6 PG (ref 26.6–33)
MCHC RBC AUTO-ENTMCNC: 32.3 G/DL (ref 31.5–35.7)
MCV RBC AUTO: 91.9 FL (ref 79–97)
MONOCYTES # BLD AUTO: 0.59 10*3/MM3 (ref 0.1–0.9)
MONOCYTES NFR BLD AUTO: 8.8 % (ref 5–12)
NEUTROPHILS NFR BLD AUTO: 3.89 10*3/MM3 (ref 1.7–7)
NEUTROPHILS NFR BLD AUTO: 57.8 % (ref 42.7–76)
NRBC BLD AUTO-RTO: 0 /100 WBC (ref 0–0.2)
PHOSPHATE SERPL-MCNC: 4.3 MG/DL (ref 2.5–4.5)
PLATELET # BLD AUTO: 187 10*3/MM3 (ref 140–450)
PMV BLD AUTO: 9.2 FL (ref 6–12)
POTASSIUM SERPL-SCNC: 4.6 MMOL/L (ref 3.5–4.7)
PROT SERPL-MCNC: 7.2 G/DL (ref 6.3–8)
PSA SERPL-MCNC: 9.06 NG/ML (ref 0–4)
RBC # BLD AUTO: 3.71 10*6/MM3 (ref 4.14–5.8)
SODIUM SERPL-SCNC: 142 MMOL/L (ref 134–145)
WBC NRBC COR # BLD: 6.73 10*3/MM3 (ref 3.4–10.8)

## 2022-03-21 PROCEDURE — 96372 THER/PROPH/DIAG INJ SC/IM: CPT

## 2022-03-21 PROCEDURE — 25010000002 LEUPROLIDE ACETATE (3 MONTH) PER 7.5 MG: Performed by: INTERNAL MEDICINE

## 2022-03-21 PROCEDURE — 80053 COMPREHEN METABOLIC PANEL: CPT

## 2022-03-21 PROCEDURE — 96402 CHEMO HORMON ANTINEOPL SQ/IM: CPT

## 2022-03-21 PROCEDURE — 99214 OFFICE O/P EST MOD 30 MIN: CPT | Performed by: INTERNAL MEDICINE

## 2022-03-21 PROCEDURE — 84153 ASSAY OF PSA TOTAL: CPT | Performed by: INTERNAL MEDICINE

## 2022-03-21 PROCEDURE — 83735 ASSAY OF MAGNESIUM: CPT

## 2022-03-21 PROCEDURE — 85025 COMPLETE CBC W/AUTO DIFF WBC: CPT

## 2022-03-21 PROCEDURE — 36415 COLL VENOUS BLD VENIPUNCTURE: CPT

## 2022-03-21 PROCEDURE — 25010000002 DENOSUMAB 120 MG/1.7ML SOLUTION: Performed by: INTERNAL MEDICINE

## 2022-03-21 PROCEDURE — 84100 ASSAY OF PHOSPHORUS: CPT

## 2022-03-21 RX ADMIN — LEUPROLIDE ACETATE 22.5 MG: KIT at 10:29

## 2022-03-21 RX ADMIN — DENOSUMAB 120 MG: 120 INJECTION SUBCUTANEOUS at 10:29

## 2022-03-21 NOTE — PROGRESS NOTES
MTM Encounter-Re: Adherence and side effects (xtandi)    Today's encounter was conducted in person, face-to-face.     Medication:  Xtandi 80 mg po daily  - Reason for outreach: Routine medication check-in .  - Administration: Patient is taking every day at the same time .  - Missed doses: Patient reports missing 0 doses in the last 30 days.  - Self-administration: Patient demonstrates ability to self-administer medication. No barriers to adherence identified.   - Diagnosis/Indication: Metastatic prostate cancer. Progress toward achieving therapeutic goals reviewed.   - Patient denies side effects, aside from fatigue and diarrhea, which is manageable and not bothersome to patient. Advised patient to alert office if this changes.    - Medication availability/affordability: Patient has had no issues obtaining medication from pharmacy.   - Questions/concerns about medications: none       Completed medication reconciliation today to assess for drug interactions.   Reviewed allergies, medical history, labs, quality of life( mild fatigue and diarrhea- he reports them as minor), and medication history with patient.   Patient denies starting or stopping any medications.  Assessed medication list for interactions, no significant drug interactions noted.   Advised pt to call the clinic if any new medications are started so we can assess for drug-drug interactions.     All questions addressed. Patient had no additional concerns for MTM office.     Nikki Remy RPH  3/21/2022  09:14 EDT

## 2022-03-30 RX ORDER — OMEPRAZOLE 20 MG/1
CAPSULE, DELAYED RELEASE ORAL
Qty: 90 CAPSULE | Refills: 1 | OUTPATIENT
Start: 2022-03-30

## 2022-03-31 RX ORDER — OLMESARTAN MEDOXOMIL 40 MG/1
TABLET ORAL
Qty: 90 TABLET | Refills: 3 | Status: SHIPPED | OUTPATIENT
Start: 2022-03-31 | End: 2022-12-19

## 2022-04-04 ENCOUNTER — SPECIALTY PHARMACY (OUTPATIENT)
Dept: PHARMACY | Facility: HOSPITAL | Age: 82
End: 2022-04-04

## 2022-04-04 DIAGNOSIS — C79.51 PROSTATE CANCER METASTATIC TO BONE: ICD-10-CM

## 2022-04-04 DIAGNOSIS — C61 PROSTATE CANCER METASTATIC TO BONE: ICD-10-CM

## 2022-04-04 RX ORDER — GABAPENTIN 300 MG/1
900 CAPSULE ORAL
Qty: 90 CAPSULE | Refills: 2 | Status: SHIPPED | OUTPATIENT
Start: 2022-04-04 | End: 2022-07-28

## 2022-04-04 NOTE — PROGRESS NOTES
Called pt - diarrhea is not everyday. He describes as erratic and unpredictable. Could be during the day or at night. When it occurs, could be up to 3-4 times per day, but this is not every day. He takes imodium as recommended when diarrhea occurs. Advised he can take up to 8 imodium per day. Call if worsens or imodium not controlling. Patient expressed understanding and had no additional questions at this time.

## 2022-04-04 NOTE — PROGRESS NOTES
Specialty Pharmacy Refill Coordination Note     Semaj is a 81 y.o. male contacted today regarding refills of  Xtandi specialty medication(s).    Reviewed and verified with patient:         Specialty medication(s) and dose(s) confirmed: yes    Refill Questions    Flowsheet Row Most Recent Value   Changes to allergies? No   Changes to medications? Yes  [started Immodium]   New conditions since last clinic visit No  [still having diarrhea- somewhat better-though can have 3-4 times daily.]   Unplanned office visit, urgent care, ED, or hospital admission in the last 4 weeks  No   How does patient/caregiver feel medication is working? Good   Financial problems or insurance changes  No   How many doses of your specialty medications were missed in the last 4 weeks? none   Does this patient require a clinical escalation to a pharmacist? Yes          Delivery Questions    Flowsheet Row Most Recent Value   Delivery method [employer]  [[employer] standard leave on little table ship 4/7 deliver 4/8]   Delivery address correct? Yes   Preferred delivery time? Anytime   Number of medications in delivery 1   Medication being filled and delivered Xtandi   Doses left of specialty medications 2 weeks   Questions or concerns for the pharmacist? No            Medication Adherence    Adherence tools used: directed education  Support network for adherence: healthcare provider          Follow-up: 3 week(s)     Radha Brito  Specialty Pharmacy Technician

## 2022-04-11 DIAGNOSIS — C79.51 PROSTATE CANCER METASTATIC TO BONE: ICD-10-CM

## 2022-04-11 DIAGNOSIS — C61 PROSTATE CANCER METASTATIC TO BONE: ICD-10-CM

## 2022-04-11 RX ORDER — HYDROCODONE BITARTRATE AND ACETAMINOPHEN 5; 325 MG/1; MG/1
1 TABLET ORAL EVERY 6 HOURS PRN
Qty: 120 TABLET | Refills: 0 | Status: SHIPPED | OUTPATIENT
Start: 2022-04-11 | End: 2022-05-10 | Stop reason: SDUPTHER

## 2022-05-02 ENCOUNTER — OFFICE VISIT (OUTPATIENT)
Dept: ONCOLOGY | Facility: CLINIC | Age: 82
End: 2022-05-02

## 2022-05-02 ENCOUNTER — LAB (OUTPATIENT)
Dept: LAB | Facility: HOSPITAL | Age: 82
End: 2022-05-02

## 2022-05-02 ENCOUNTER — APPOINTMENT (OUTPATIENT)
Dept: ONCOLOGY | Facility: HOSPITAL | Age: 82
End: 2022-05-02

## 2022-05-02 VITALS
WEIGHT: 195.7 LBS | BODY MASS INDEX: 33.41 KG/M2 | OXYGEN SATURATION: 98 % | TEMPERATURE: 97.5 F | HEART RATE: 55 BPM | DIASTOLIC BLOOD PRESSURE: 73 MMHG | RESPIRATION RATE: 16 BRPM | HEIGHT: 64 IN | SYSTOLIC BLOOD PRESSURE: 146 MMHG

## 2022-05-02 DIAGNOSIS — C79.51 OSSEOUS METASTASIS: Primary | ICD-10-CM

## 2022-05-02 DIAGNOSIS — C61 PROSTATE CANCER METASTATIC TO BONE: ICD-10-CM

## 2022-05-02 DIAGNOSIS — C79.51 PROSTATE CANCER METASTATIC TO BONE: ICD-10-CM

## 2022-05-02 DIAGNOSIS — C79.51 OSSEOUS METASTASIS: ICD-10-CM

## 2022-05-02 LAB
ALBUMIN SERPL-MCNC: 4 G/DL (ref 3.5–5.2)
ALBUMIN/GLOB SERPL: 1.3 G/DL (ref 1.1–2.4)
ALP SERPL-CCNC: 161 U/L (ref 38–116)
ALT SERPL W P-5'-P-CCNC: 7 U/L (ref 0–41)
ANION GAP SERPL CALCULATED.3IONS-SCNC: 11.6 MMOL/L (ref 5–15)
AST SERPL-CCNC: 13 U/L (ref 0–40)
BASOPHILS # BLD AUTO: 0.02 10*3/MM3 (ref 0–0.2)
BASOPHILS NFR BLD AUTO: 0.3 % (ref 0–1.5)
BILIRUB SERPL-MCNC: 0.2 MG/DL (ref 0.2–1.2)
BUN SERPL-MCNC: 22 MG/DL (ref 6–20)
BUN/CREAT SERPL: 21.2 (ref 7.3–30)
CALCIUM SPEC-SCNC: 8.7 MG/DL (ref 8.5–10.2)
CHLORIDE SERPL-SCNC: 105 MMOL/L (ref 98–107)
CO2 SERPL-SCNC: 25.4 MMOL/L (ref 22–29)
CREAT SERPL-MCNC: 1.04 MG/DL (ref 0.7–1.3)
DEPRECATED RDW RBC AUTO: 51.8 FL (ref 37–54)
EGFRCR SERPLBLD CKD-EPI 2021: 72.1 ML/MIN/1.73
EOSINOPHIL # BLD AUTO: 0.12 10*3/MM3 (ref 0–0.4)
EOSINOPHIL NFR BLD AUTO: 2 % (ref 0.3–6.2)
ERYTHROCYTE [DISTWIDTH] IN BLOOD BY AUTOMATED COUNT: 15 % (ref 12.3–15.4)
GLOBULIN UR ELPH-MCNC: 3.1 GM/DL (ref 1.8–3.5)
GLUCOSE SERPL-MCNC: 136 MG/DL (ref 74–124)
HCT VFR BLD AUTO: 33.4 % (ref 37.5–51)
HGB BLD-MCNC: 10.5 G/DL (ref 13–17.7)
IMM GRANULOCYTES # BLD AUTO: 0.04 10*3/MM3 (ref 0–0.05)
IMM GRANULOCYTES NFR BLD AUTO: 0.7 % (ref 0–0.5)
LYMPHOCYTES # BLD AUTO: 1.71 10*3/MM3 (ref 0.7–3.1)
LYMPHOCYTES NFR BLD AUTO: 28.6 % (ref 19.6–45.3)
MCH RBC QN AUTO: 29.5 PG (ref 26.6–33)
MCHC RBC AUTO-ENTMCNC: 31.4 G/DL (ref 31.5–35.7)
MCV RBC AUTO: 93.8 FL (ref 79–97)
MONOCYTES # BLD AUTO: 0.54 10*3/MM3 (ref 0.1–0.9)
MONOCYTES NFR BLD AUTO: 9 % (ref 5–12)
NEUTROPHILS NFR BLD AUTO: 3.55 10*3/MM3 (ref 1.7–7)
NEUTROPHILS NFR BLD AUTO: 59.4 % (ref 42.7–76)
NRBC BLD AUTO-RTO: 0 /100 WBC (ref 0–0.2)
PLATELET # BLD AUTO: 192 10*3/MM3 (ref 140–450)
PMV BLD AUTO: 9 FL (ref 6–12)
POTASSIUM SERPL-SCNC: 4.8 MMOL/L (ref 3.5–4.7)
PROT SERPL-MCNC: 7.1 G/DL (ref 6.3–8)
PSA SERPL-MCNC: 10.1 NG/ML (ref 0–4)
RBC # BLD AUTO: 3.56 10*6/MM3 (ref 4.14–5.8)
SODIUM SERPL-SCNC: 142 MMOL/L (ref 134–145)
WBC NRBC COR # BLD: 5.98 10*3/MM3 (ref 3.4–10.8)

## 2022-05-02 PROCEDURE — 84153 ASSAY OF PSA TOTAL: CPT | Performed by: INTERNAL MEDICINE

## 2022-05-02 PROCEDURE — 99214 OFFICE O/P EST MOD 30 MIN: CPT | Performed by: INTERNAL MEDICINE

## 2022-05-02 PROCEDURE — 36415 COLL VENOUS BLD VENIPUNCTURE: CPT

## 2022-05-02 PROCEDURE — 85025 COMPLETE CBC W/AUTO DIFF WBC: CPT

## 2022-05-02 PROCEDURE — 80053 COMPREHEN METABOLIC PANEL: CPT

## 2022-05-02 NOTE — PROGRESS NOTES
Subjective  Patient tasneem improved per his performance status    REASONS FOR FOLLOW UP:   1.  Metastatic prostate cancer    HISTORY OF PRESENT ILLNESS:     Patient is an 81-year-old with metastatic prostate cancer who is here today for lab review and toxicity check.  He continues on Xtandi which has now been reduced to 80 mg daily.  He is tolerating this far better than the full dose and his PSA continues to drop now 5.2 by 12/1/2021 and 4.7 by 12/8/2021.  The patient states his ability to function at home is also improved with, again, less nausea, improved appetite and no additional musculoskeletal pain.  He does remain hypophosphatemic as result of Xgeva and such response would indicate that the interval between these injections can also be lengthened to, possibly, 3 months along when he is given his Lupron.     When the patient was seen 6 weeks ago his PSA had begun to slowly increase moving from 4.7-7.8 and then a 9.06.  We had reduced his Xtandi finding that he rapidly responded to relatively low dose but I discussed today that he will need to move up to 160 mg daily.  When he is seen 5/2/2022 he is feeling reasonably well with an acceptable performance status.    Hematologic/oncologic history:   The patient is an 81-year-old male with significant past medical history including prostate carcinoma, CKD 3 and long-term tobacco use be been seen by primary care in late January, 2018 for hoarseness.  Chest x-ray is now outpatient January 23 revealed sclerotic change in the posterior mid chest to be within 3 adjacent thoracic ertebral bodies of uncertain significance.  A follow-up chest CT revealed numerous sclerotic foci within al visualized bones consistent with metastatic disease, multiple enlarged mediastinal lymph nodes concerning for metastatic lymphadenopathy and a polypoidal abnormality in the right lateral wall of the trachea.  CT of abdomen March 20 revealed extensive osseous metastatic disease mildly  enlarged retroperitoneal lymph nodes.  Bone scan March 20 was consistent with widespread osseous metastasis particularly in the proximal half the left humeral shaft, abnormal uptake in the sternum and manubrium and a small focus in the posterior aspect of the calvarium.  This led to a plain film the left humerus with mottled sclerosis involving the shaft of the humerus particularly in the proximal half but no evidence of pathologic fracture.    The patient has additionally type 2 diabetes on insulin pump, again a history of prostate carcinoma prostatectomy 12 years previously, hypertension, and hyperlipidemia.  Additional studies included a PSA of 395.1 from March 14, 2018.The patient's been seen by urology March 15 with plans to start Firmagon.    The patient is now seen in pulmonary clinic with Dr. Bijan Rivera and we have discussed that he will need bronchoscopy and mediastinoscopy.  Interestingly his additional history includes a long occupational exposure including working in the [place] just out of school, subsequent construction work for many years and a 30-pack-year history of smoking stopping in the late 1990s.  He indicates that he followed with Dr. Guillen of urology for many years with no changes in his exams and normal PSAs.  He stopped this follow-up approximately 2 years ago.     Patient was seen in consultation with thoracic surgery on March 28 and plans are made for mediastinoscopy and bronchoscopy with lymph node biopsy.  Pathology revealed that these nodes were consistent with metastatic prostate carcinoma.  He was discussed at thoracic conference.  A PET/CT was not pursued and it was determined that he see medical oncology for hormonal treatment and radiation therapy if symptomatic.  It should be noted that the patient's March 15 [facility] office note describes that Firmagon was planned.     The patient has met with the son and grandson April 23.  We have also contacted   Brandon of urology in that Firmagon was given just after his last visit and would next be due approximately May 3.  Patient feels considerably better with resolution of his pain, normalization of his performance status.  He would like to continue treatment through our office now contacted Dr. Taylor concerning this.    The patient is next seen June 11, 2018 we discussed his follow-up including his treatments with Lupron May 7 and his next treatment on July 30.  He has returned to essentially normal performance status and his PSA has dropped further from 395 March 14 27.8 May 7 and now 2.03 June 11.  We do plan to initiate Xgeva also study him via scans to document his improvement approximately a month from now.   The patient is next seen July 26, 2018.  Repeat imaging demonstrated interval resolution of mediastinal and retroperitoneal lymphadenopathy.  There is thickening in the distal aspect of the appendix with stranding?  Chronic appendicitis.  The patient indicates he is having no such symptoms and never has.  There is no change in sclerotic bony metastasis in the patient's PSA has dropped substantially to 1.66 by July 19 and 1.79 July 26.  He is actually feeling excellent and this is corroborated by family members.   Patient is next seen January 10, 2019 continuing to do very well and, in fact indicating that [facility], likely hearing replacements, visual review.  He is not certain is going to continue his care with them or with us or combination of both.    Patient is next seen April 4, 2019.  He is recently discharged after hospitalization March 15-18 with left upper lobe pneumonia.  We reviewed the findings in detail with his gradual recovery now documented.  His studies include a CT of chest which does not demonstrate any further bony lesions and/or pulmonary metastasis having developed.  He is feeling considerably better and approximately back to his  baseline.  The patient is next seen June 28, 2019 feeling well but having baseline generally musculoskeletal pain and restless legs.His recent exams include a PSA of 0.81, CMP with potassium of 5.3 with repeat pending.  His performance status overall remains good to very good and is clearly responding to his treatments.  The patient is next seen December 13, 2019 continue to feel well.  He has had, oddly enough, 2 episodes of sleep paralysis which have occurred to him previously and he wonders if there is any connection with his prostate carcinoma though this is felt unlikely.     The patient when assessed in December had his PSA go to 2.9.  We continue with Lupron and Xgeva and is seen back now March 9, 2020 without additional symptoms.  We discussed that a schedule could likely change moving to 3 months for both of these medications particularly if he needs additional therapy directed at progressive disease.     Patient contacted our practice May 4 concern for pain in his hips.  This led to moving his scans up and they were performed May 8, 2020 showing a sub-6 mm pleural-based pulmonary nodule in the right lower lobe that is new from March 15, 2018, remainder of the sub-6 mm pulmonary nodules bilaterally are stable.  There is extensive osseous metastatic disease as previous with no other new findings.     The patient indicates, when seen, May 13 that his bone pain is now resolved.  While this is good information does not mean that he does not have further bony metastasis and we have discussed that a follow-up bone scan is likely necessary.  Furthermore he is having some difficulty with sleeplessness and increase in restless legs as he continues to stay at home during the COVID 19 crisis which, itself, is producing more anxiety for him.  A follow-up bone scan was obtained Lety 3 revealing multifocal osseous metastatic disease which was compared to March 2, 2018 with improvement.  No new abnormal uptake is  present.  He is next seen with us on June 17, 2020 and, fortunately, is not having any significant pain at this point.  We discussed his scans in detail and, on balance, his performance status also remains improved.     It was elected to follow the patient rather than changes antiandrogen therapy.  He is reassessed September 3 with unchanged CMP, H&H of 11.4 and 36.3 with normal white count, platelet count and differential, PSA at 15.8.  Follow-up PA lateral chest x-ray does not show any new abnormalities though there is extensive metastatic bone disease throughout the bony thorax and osteoblastic metastasis have been seen on chest CT May 2020.  The patient is seen with his son Georges September 10 noticing increasing bony discomfort primarily in the right hip following fairly intensive gardening which he does frequently.  Now has further elevation of PSA were wondering whether we should try to intervene sooner per additional antiandrogens.  We will need to further assess him via CT scanning to make this decision     These were obtained October 1 showing extensive sclerotic disease with no metastatic disease in chest abdomen or pelvis.  PSA had gone from 15.8 September 3-16 0.6 October 1.     He still has pain getting up in the morning and after active gardening.  This is manageable with current pain medications and, at present, it is not apparent that he needs to switch medications to gain better control of his disease.  The patient did have follow-up studies done including a PSA November 25, 2020 now at 25.4.  The patient is seen with his son December 10, 2020 doing fair but having some increased pain in the mid sternum and right hip that he ascribes to additional exercise/work in managing his garden.  It is apparent however, that his last bone scan was 6 months ago and we do need to reassess him concerning this.     The patient had follow-up bone scan performed January 11, 2021 showing again uptake in the  calvarium, humerus, right medial clavicle, sternum, ribs, spine, sacrum, pelvis, right acetabulum, proximal femora and now left frontal bone which appears new.  There is also mild increase in sternal uptake and equivocal increase in the pelvic lesions of all of the sacrum and the right proximal femur.     The patient is seen with his son January 18, 2021 and indicates that he is having pain gradually worsened in his right hip, mid chest that had been an issue previously.  These findings on bone scan are reviewed with both of them as likely the underlying culprit for his discomfort.  He would need change of the systemic therapy but, at present, will ask for radiation therapy palliation initially.  He was previously treated by Dr. Kulkarni many years ago and will ask her to see him.  We will also make application for the current cost of Xtandi or Zytiga.  The patient is not felt to be a candidate for systemic chemotherapy.     The patient was referred for ration therapy as we continued to assess his PSA on current therapy as well as exam the cost of Zytiga or Xtandi.  Radiation therapy (Dr. Kulkarni) saw the patient January 26 and felt that the right femur and sternal lesion could benefit from therapy-3000 cGy in 10 fractions.  The patient took treatment February 1 to February 12 to the above areas and is next seen back March 15.  Fortunately his pain has improved dramatically and he is quite happy about this.  Unfortunately he notes some difficulty with swallowing that is temporary and often leads to increased salivation and mucus production.  Patient was asked to return his next assessed April 12, 2021.  He is able to swallow with less pain but still has excess mucus production and issues with salivation.  His PSA has noted increase but he is having no additional bony discomfort at this point and, additionally, has noted low-grade fevers just under 100 degrees at nighttime?.  His weight, appetite and general performance  status have remained good to excellent.  We discussed follow-up studies at this point to determine his status.    Follow-up scans were scheduled CT scans of chest and pelvis 5/4/2021 showing osseous metastasis quite similar to previous examinations in the chest, CT of abdomen pelvis also with no acute intra-abdominal adenopathy no indication of abdominal pelvic metastatic disease but again widespread osseous metastasis.  His PSA at this point have been slowly increasing to a level of 44 on 4/12/2021.  As the patient is reassessed 5/10/2021 we find, fortunately, that he is not exceeding symptomatic.  It could make reasonable sense at this point to take the mediastinal nodes from his biopsy 4/9/2018, reassess potential targeted therapies, MSI status, and germline analysis.  Fortunately he is not having significant pain or discomfort and is seen with his son as we discussed the above plan.  Notably a follow-up PSA is found to be 42.6.   Patient is next seen in 6/7/2021 for Lupron and Xgeva.  Fortunately he is feeling considerably better according to both he and his son though his stamina has reduced.  He is not having additional pain at this point.  We have also reviewed his genetics assessment which was significant only for a variant of unknown significance.  This is not an actionable abnormality.    As the patient's PSA further escalated increasing to 89 7/7/2021 his subsequent Axumin PET was obtained 8/20 demonstrating extensive osteosclerotic metastatic disease showing reduced uptake-typical finding but no evidence of visceral metastatic disease in the neck chest abdomen pelvis.  These findings are discussed with the patient and his son 8/25/2021 and indicative of his progressive disease-etiology of his rising PSA-though the patient is asymptomatic we have discussed moving to initiate Xtandi is available at 160 mg p.o. twice daily along with his current Lupron and Xgeva.     The patient's testing 10/6 included PSA  that is dropped to 9.69.  He is seen with his son, Cody, both indicating that he been doing relatively well with treatment but began to notice nausea in the last week.  The schedule that he has been given also needs to be somewhat updated in terms of his Lupron and Xgeva.  He has been able to maintain his appetite and overall performance status to date.    The patient is next assessed 12/8/2021 tolerating his current Xtandi dose better than previous and maintaining a good performance status overall as well as demonstrating a drop in his PSA now to 4.7 on 12/8/2021 compared to high of 112 9/8/2021.  He remains hypophosphatemic and hypocalcemic and we have discussed, again, changing his Xgeva dosing to every 3 months along with his Lupron will continue his Xtandi to 80 mg daily.    The patient is next reviewed 3/21/2022 seen for both Xgeva and Lupron.  Fortunately he is feeling reasonably well with little additional pain though he has restarted to place his garden into shape for the season and is working more outdoors.    Patient reevaluated 5/2/2022 with repeat CBC including H&H of 10.5 and 33.4, white count 5980 and platelet count of 192,000, currently CMP and PSA pending with a previous PSA 1 month ago at 9.06.  At this point we increased his Xtandi to 160 mg p.o. daily while continuing treatment with every 3 month Xgeva and Lupron.  Notably the patient's request now at 1.5 mg p.o. nightly and Neurontin 900 mg p.o. daily has improved his restless leg syndrome substantially.      Past Medical History:   Diagnosis Date   • Aortic stenosis, mild 1/1/2021    Per echocardiogram 2021   • Ascending aorta dilatation (HCC)    • Bone cancer (HCC)    • Cellulitis of great toe of left foot 10/19/2018   • CKD (chronic kidney disease)     Stage 3; followed by Dr. Vicky Duran    • Diastolic dysfunction     GRADE II per echo 2018   • Difficulty breathing     during exertion   • Dyslipidemia    • Erectile dysfunction    •  Fatigue    • History of blood transfusion    • History of kidney stones    • Hyperlipidemia    • Hypertension    • Hyponatremia    • Left ventricular hypertrophy     per echo 2018   • Localized edema    • Neuropathy    • Obstructive sleep apnea     USING CPAP   • Osteoarthritis    • Peptic ulcer    • Pneumonia    • Pneumonia of left upper lobe due to infectious organism 3/15/2019   • Prostate cancer (HCC)     Status post prostatectomy, radiation therapy, and hormone therapy followed by Dr. Lee; metastatsis to bone   • Pure hyperglyceridemia    • Restless leg syndrome    • Sepsis (HCC)    • Sinus bradycardia    • Transient cerebral ischemia    • Type 2 diabetes mellitus (HCC)         Past Surgical History:   Procedure Laterality Date   • BRONCHOSCOPY N/A 4/9/2018    Procedure: BRONCHOSCOPY;  Surgeon: Bijan Rivera III, MD;  Location: [facility];  Service: Cardiothoracic   • COLONOSCOPY     • DEEP NECK LYMPH NODE BIOPSY / EXCISION     • HEMORRHOIDECTOMY     • MEDIASTINOSCOPY N/A 4/9/2018    Procedure: MEDIASTINOSCOPY WITH LYMPH NODE BIOPSY;  Surgeon: Bijan Rivera III, MD;  Location: [facility];  Service: Cardiothoracic   • OTHER SURGICAL HISTORY      ulcer repair   • PROSTATECTOMY  2006        Current Outpatient Medications on File Prior to Visit   Medication Sig Dispense Refill   • aspirin 81 MG EC tablet Take 1 tablet by mouth daily.     • cholecalciferol (VITAMIN D3) 1000 units tablet Take 2,000 Units by mouth Daily.     • Denosumab (XGEVA SC) Inject  under the skin into the appropriate area as directed Every 30 (Thirty) Days.     • dilTIAZem CD (CARDIZEM CD) 120 MG 24 hr capsule TAKE 1 CAPSULE EVERY DAY 90 capsule 3   • enzalutamide (Xtandi) 40 MG chemo capsule Take 2 capsules by mouth Daily. 60 capsule 5   • furosemide (LASIX) 40 MG tablet Take 40 mg by mouth Every Morning.     • gabapentin (NEURONTIN) 300 MG capsule Take 3 capsules by mouth every night at bedtime. 90 capsule 2   •  HYDROcodone-acetaminophen (NORCO) 5-325 MG per tablet Take 1 tablet by mouth Every 6 (Six) Hours As Needed for Moderate Pain . 120 tablet 0   • insulin aspart (NovoLOG) 100 UNIT/ML patient supplied pump Inject  under the skin into the appropriate area as directed Continuous. Pt reports pump with basal rate 1.9 units/hr from 0800 to 1200. Basal rate 1.8 units/hr 1200 to 0800.  Checks bg ac/hs with bolus doses per pumps parameters     • Leuprolide Acetate, 3 Month, (LUPRON DEPOT, 3-MONTH, IM) Inject  into the appropriate muscle as directed by prescriber Every 3 (Three) Months.     • levothyroxine (SYNTHROID, LEVOTHROID) 88 MCG tablet      • loperamide (IMODIUM) 1 MG/5ML solution Take 2 mg by mouth 4 (Four) Times a Day As Needed for Diarrhea.     • modafinil (PROVIGIL) 200 MG tablet Take 1 tablet by mouth Daily. 90 tablet 1   • olmesartan (BENICAR) 40 MG tablet TAKE 1 TABLET EVERY DAY (DISCONTINUE LOSARTAN 100MG) 90 tablet 3   • ondansetron (ZOFRAN) 8 MG tablet Take 1 tablet by mouth 3 (Three) Times a Day As Needed for Nausea or Vomiting. 30 tablet 5   • pravastatin (PRAVACHOL) 40 MG tablet Take 40 mg by mouth daily.     • rOPINIRole (REQUIP) 0.5 MG tablet Take 1 tablet by mouth in the evening and 1 at bedtime. 180 tablet 2   • traZODone (DESYREL) 50 MG tablet Take  mg by mouth every night at bedtime.       No current facility-administered medications on file prior to visit.        ALLERGIES:    Allergies   Allergen Reactions   • Niacin Unknown - Low Severity   • Statins Unknown - Low Severity        Social History     Socioeconomic History   • Marital status:    • Years of education: College   Tobacco Use   • Smoking status: Former Smoker     Packs/day: 1.00     Years: 30.00     Pack years: 30.00     Types: Cigarettes     Quit date: 1998     Years since quittin.2   • Smokeless tobacco: Never Used   Substance and Sexual Activity   • Alcohol use: No     Comment: Caffeine use: 2-3 cups daily   •  "Drug use: No   • Sexual activity: Defer        Family History   Problem Relation Age of Onset   • Heart failure Mother    • Hypertension Mother    • Diabetes Mother    • Heart disease Mother    • Lung cancer Father 46   • Hypertension Sister    • Lung cancer Sister 60   • Hypertension Brother    • Lung cancer Brother 62   • Heart disease Other    • Prostate cancer Brother 60   • Malig Hyperthermia Neg Hx         Review of Systems  As per HPI   .  Objective     Vitals:    05/02/22 1244   BP: 146/73   Pulse: 55   Resp: 16   Temp: 97.5 °F (36.4 °C)   TempSrc: Temporal   SpO2: 98%   Weight: 88.8 kg (195 lb 11.2 oz)   Height: 162.6 cm (64.02\")   PainSc:   6   PainLoc: Abdomen  Comment: right side/ between back & abdomen     Current Status 5/2/2022   ECOG score 0       Physical Exam   Constitutional: He is oriented to person, place, and time. He appears well-developed. No distress.   HENT:   Head: Normocephalic and atraumatic.   Mouth/Throat: No oropharyngeal exudate.   Eyes: Pupils are equal, round, and reactive to light.   Cardiovascular: Normal rate, regular rhythm and normal heart sounds.   No murmur heard.  Pulmonary/Chest: Effort normal and breath sounds normal. No respiratory distress. He has no wheezes. He has no rhonchi. He has no rales.   Abdominal: Soft. Normal appearance and bowel sounds are normal. He exhibits no distension.   Musculoskeletal: Normal range of motion.   Neurological: He is alert and oriented to person, place, and time.   Skin: Skin is warm and dry. No rash noted.   Vitals reviewed.    RECENT LABS:  Hematology WBC   Date Value Ref Range Status   05/02/2022 5.98 3.40 - 10.80 10*3/mm3 Final     RBC   Date Value Ref Range Status   05/02/2022 3.56 (L) 4.14 - 5.80 10*6/mm3 Final     Hemoglobin   Date Value Ref Range Status   05/02/2022 10.5 (L) 13.0 - 17.7 g/dL Final     Hematocrit   Date Value Ref Range Status   05/02/2022 33.4 (L) 37.5 - 51.0 % Final     Platelets   Date Value Ref Range Status "   05/02/2022 192 140 - 450 10*3/mm3 Final        Lab Results   Component Value Date    GLUCOSE 136 (H) 05/02/2022    BUN 22 (H) 05/02/2022    CREATININE 1.04 05/02/2022    EGFRIFNONA 56 (L) 02/23/2022    BCR 21.2 05/02/2022    K 4.8 (H) 05/02/2022    CO2 25.4 05/02/2022    CALCIUM 8.7 05/02/2022    ALBUMIN 4.00 05/02/2022    AST 13 05/02/2022    ALT 7 05/02/2022       NM Bone Scan Whole Body (01/11/2021 13:25)  CT Chest With Contrast Diagnostic (05/04/2021 09:11)  CT Abdomen Pelvis With Contrast (05/04/2021 09:11)  NM PET/CT Skull Base to Mid Thigh (08/20/2021 12:18)      Assessment/Plan      1.  Metastatic prostate cancer:  ? Patient initially diagnosed in 2006 and had a prostatectomy and hormone therapy.  ? Patient in January 2018 began to complain of shortness of breath and hoarseness.  Chest x-ray obtained 1/23/18 showed sclerotic bone lesions.  ? CT of the chest 3/12/2018 numerous sclerotic bone foci with all visualized bones consistent with metastatic disease, multiple enlarged mediastinal lymph nodes.  ? .1 from 3/14/2018.  Patient was started on Firmagon by urology, [facility].  ? 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  ? Case discussed at thoracic conference and recommendations were to continue ADT and radiation for symptomatic sites.  ? Lupron injections every 3 mos initiated 5/7/18 PSA at that time 7.810  ? Monthly Xgeva initiated 6/11/18 PSA 2.030  ? Last PSA 6/13/2019 0.881  ? 9/20/2019 patient tolerating treatment well, no new concerns.  ? 12/2019 PSA 2.9  ? 3/9/2020  PSA increasing to 5.030, he remains continuing on Lupron and Xgeva and asymptomatic though follow-up CT of chest and pelvis will be requested in 11 weeks prior to follow-up in 12 weeks.   ? 5/4/2020 patient called reported worsening right hip pain, plans to purse CT scans ASAP.   ? 5/8/2020 CT scans C/A/P show no progression of disease. Hip pain  improved, Gabapentin added.. Bone scan requested.  ? 6/3/2020 bone scan that does not demonstrate worsening of bone nor need for localized radiation therapy.  The patient's PSA has been slowly rising and we have not been able to determine worsening of disease and and, therefore, it is not felt warranted add additional medications such as Xtandi or apalutamide to his therapy.  Please note would like to avoid Zytiga try not to add prednisone which would worsen his blood sugar control.  ? 9/3/2020 PSA 15.8, CXR extensive metastatic bone disease- patient reviewed 9/10/2020 reporting increased bony discomfort CT scans requested.  ? Scans done and reviewed 10/7/2020 ultimately reveal no evidence of progression of disease for visceral involvement or clearly for bone involvement.  After further review with the patient and his son plan continued Xgeva and Lupron.  We have assessed for availability of Xtandi 160 mg p.o. daily and find the cost is currently prohibitive.   ? 11/25/2020 PSA 25.4  ? 12/10/2020 review with some mild increase in bony discomfort.  After repeat discussion we went on to have him undergo Lupron therapy, and his PSA actually to be mildly reduced at 22.2.   ? Follow-up bone scan 1/11/2021 demonstrates some evidence of progression in sternum, left sacrum and proximal femur.  These findings were reviewed with the patient and his son January 18, 2020 and the patient was referred for palliative radiotherapy(Dr. Kulkarni)  ? Palliative radiation under care Dr. Kulkarni to right femur and sternal lesion -3000 cGy in 10 fractions given February 1 to February 12 with significant symptom improvement.  ? 3/15/2021 PSA 38.5  ? 4/12/2021 PSA 44  ? 5/10/2021 PSA 42.6 CT chest abdomen pelvis 5/4/2021 - for progression of disease and follow-up PSA 5 10/2021 is at 42 slightly reduced from previous.  We have discussed how to proceed from here and find a significant family history that clearly supports a potential genetic  assessment for his malignancy.  ? It is felt most reasonable at this point to take the mediastinal nodes from his biopsy 4/9/2018 and reassess for potential targeted therapies, MSI status, as well as germline analysis.  ? 6/7/2021 PSA 65.9 his analysis completed for actionable mutations including germline mutations.  These exams were negative though there is a variant of unknown significance.  Again this is not an actionable mutation.  We proceeded with additional Xgeva and Lupron planning for monthly Xgeva and Lupron at 3 months  ? 7/7/2021 PSA 89 and subsequent testing with Axumin PET was performed confirming extensive osteosclerotic metastatic disease with little uptake, no evidence of visceral metastatic disease in neck chest abdomen or pelvis.  ? 8/25/2021  , PET/CT reviewed, subsequent PSA increased to 105.  Patient fortunately asymptomatic.  Requested reevaluation for Xtandi 160 mg po daily.   ? 9/8/2021 due for his lupron, receiving every 3 months, and monthly Xgeva.  Will have education today for Xtandi and will receive his medication today as well.   ? Started Xtandi 9/8/2021.  ? 9/22/2021 here for toxicity check, so far tolerating Xtandi quite well other than a slight increase in diarrhea controlled with Imodium.  ? 10/6/2021 continues to tolerate Xtandi well.  Labs are within range today, we will proceed with Xgeva today.  ? Patient seen 10/20/2021 having more nausea with Xtandi and demonstrating a substantial reduction in PSA level.  After discussion we plan to reduce his dose to 80 mg daily following his PSA and performance status over the next approximate 7 weeks at which time he would be scheduled for his follow-up Lupron.  ? Patient assessed 12/8/2021 with further reduction in PSA to 4.7, ongoing hypophosphatemia and hypocalcemia-Xgeva moved to every 3 months given on same schedule as Lupron  ? Patient seen 3/21/2022, 6-week follow-up with mild increase in PSA recognized  ? Patient reassessed  5/2/2022 with PSA at 10.1, Xtandi moved to 160 mg p.o. daily    2.  Neuropathy/ restless legs:    · 5/13/2020 gabapentin 600 mg at bedtime, trazadone 50 mg QHS.  · On gabapentin 300, 2 tablets at bedtime, and requip 0.5.mg    · Still having neuropathy symptoms.  Will increase gabapentin to 900 mg at bedtime.   · 9/22/2021 increase in gabapentin to 900 mg at bedtime has helped neuropathy.  · Requip moved to bedtime dosing only-1 mg, escalate as needed    3.  Cancer related pain: on Norco 5/325 every 6 hours as needed.    4.  Hyperkalemia:  · Intermittent issue for the patient.  It is typically dietary related, as he consumes large amount  fresh tomatoes and other foods from his garden..  He was instructed to significantly decrease his intake as his potassium level today is 6.4, magnesium 2.7  He is not symptomatic.  We will also check an EKG.   · 9/22/2021 potassium level is improved to 5.7.  Patient states that he is no longer eating tomatoes from his garden.  · 10/6/2021 potassium level is 5.8.  Due to patient's multiple electrolyte abnormalities we will go ahead and refer him to nephrology for further evaluation.  Patient states he actually has an existing nephrologist, Dr. Perry Daily who he typically only sees on an annual basis.  We will schedule him to be seen by her soon for further evaluation.  · Reassessed 5/2/2020 2-4.8    5.  Hypophosphatemia:  Phosphorus 1.9- hold xgeva 9/8/2021 ., Increase foods high in phosphorus, recheck in 2 weeks. \  · 9/22/2021 phosphorus level 1.7.  Reviewed with Dr. Lee.  In addition to increasing foods high in phosphorus, I have instructed the patient to start Neutra-Phos 1 tablet daily.  We will recheck again in 2 weeks.  · Phosphorus 2.3 assessed 10/20/2021  · Reassess 12/8/2021 at 1.9, Xgeva moved to every 3 months    PLAN:      1. Continue gabapentin 900 mg at bedtime, Requip to move to 2 mg p.o. nightly, escalate as needed  2. Continue Imodium if needed for  diarrhea.  3. Increase Xtandi to 160 mg daily  4. Proceed with Xgeva and Lupron now next due 6/20/2022  5. Reassessment in 6 weeks with stat PSA  6. Case discussed with patient's son, Cody, who concurs as well as the patient.    Patient on high risk medication requiring close monitoring for toxicity.    Jose Lee MD  05/02/2022

## 2022-05-03 ENCOUNTER — SPECIALTY PHARMACY (OUTPATIENT)
Dept: PHARMACY | Facility: HOSPITAL | Age: 82
End: 2022-05-03

## 2022-05-03 ENCOUNTER — DOCUMENTATION (OUTPATIENT)
Dept: PHARMACY | Facility: HOSPITAL | Age: 82
End: 2022-05-03

## 2022-05-03 DIAGNOSIS — C61 PROSTATE CANCER METASTATIC TO BONE: ICD-10-CM

## 2022-05-03 DIAGNOSIS — C79.51 PROSTATE CANCER METASTATIC TO BONE: ICD-10-CM

## 2022-05-03 NOTE — PROGRESS NOTES
Specialty Pharmacy Refill Coordination Note     Semaj is a 81 y.o. male contacted today regarding refills of  Xtandi specialty medication(s).    Reviewed and verified with patient:         Specialty medication(s) and dose(s) confirmed: yes        Delivery Questions    Flowsheet Row Most Recent Value   Delivery method [employer]  [[employer] standard no sig ship 5/5 deliver 5/6]   Delivery address correct? Yes   Preferred delivery time? Anytime   Number of medications in delivery 1   Medication being filled and delivered Xtandi   Doses left of specialty medications 4 days with new sig   Questions or concerns for the pharmacist? No            Medication Adherence    Adherence tools used: directed education  Support network for adherence: healthcare provider          Follow-up: 3 week(s)     Radha Brito  Specialty Pharmacy Technician

## 2022-05-03 NOTE — PROGRESS NOTES
"MTM Chart Review    MD dictation noted:   \"Increase Xtandi to 160 mg daily\"      New RX sent to pharmacy to reflect updated sig, with MD to cosign. Pharmacy will continue to follow.     Clementina Brink, Pharmacist  5/3/2022  08:25 EDT         "

## 2022-05-10 DIAGNOSIS — C61 PROSTATE CANCER METASTATIC TO BONE: ICD-10-CM

## 2022-05-10 DIAGNOSIS — C79.51 PROSTATE CANCER METASTATIC TO BONE: ICD-10-CM

## 2022-05-12 RX ORDER — HYDROCODONE BITARTRATE AND ACETAMINOPHEN 5; 325 MG/1; MG/1
1 TABLET ORAL EVERY 6 HOURS PRN
Qty: 120 TABLET | Refills: 0 | Status: SHIPPED | OUTPATIENT
Start: 2022-05-12 | End: 2022-06-09 | Stop reason: SDUPTHER

## 2022-05-17 ENCOUNTER — SPECIALTY PHARMACY (OUTPATIENT)
Dept: PHARMACY | Facility: HOSPITAL | Age: 82
End: 2022-05-17

## 2022-05-17 NOTE — PROGRESS NOTES
MTM telephone encounter re:adherence and side effects (Xtandi)     Called patient, no answer. Left voicemail to return my call at direct line [phone].      Clementina Brink Pharmacist  5/17/2022  10:01 EDT

## 2022-05-20 ENCOUNTER — SPECIALTY PHARMACY (OUTPATIENT)
Dept: PHARMACY | Facility: HOSPITAL | Age: 82
End: 2022-05-20

## 2022-05-20 NOTE — PROGRESS NOTES
MTM telephone encounter re:adherence and side effects (Xtandi)     Semaj reports doing well, no side effects since increasing the Xtandi to 160 mg by mouth daily after the last appointment. Patient denies starting or stopping any medications.  Assessed medication list for interactions, no significant drug interactions noted.  Patient has had no issues obtaining medication from pharmacy Semaj had no questions or concerns for the [place] office.     Clementina Brink, Pharmacist  5/20/2022  14:18 EDT

## 2022-05-25 ENCOUNTER — TELEPHONE (OUTPATIENT)
Dept: SLEEP MEDICINE | Facility: HOSPITAL | Age: 82
End: 2022-05-25

## 2022-05-27 ENCOUNTER — SPECIALTY PHARMACY (OUTPATIENT)
Dept: PHARMACY | Facility: HOSPITAL | Age: 82
End: 2022-05-27

## 2022-05-27 ENCOUNTER — OFFICE VISIT (OUTPATIENT)
Dept: SLEEP MEDICINE | Facility: HOSPITAL | Age: 82
End: 2022-05-27

## 2022-05-27 VITALS — WEIGHT: 200.6 LBS | HEIGHT: 64 IN | OXYGEN SATURATION: 97 % | HEART RATE: 64 BPM | BODY MASS INDEX: 34.25 KG/M2

## 2022-05-27 DIAGNOSIS — F51.04 PSYCHOPHYSIOLOGICAL INSOMNIA: ICD-10-CM

## 2022-05-27 DIAGNOSIS — G47.33 OBSTRUCTIVE SLEEP APNEA SYNDROME: Primary | ICD-10-CM

## 2022-05-27 DIAGNOSIS — G47.14 HYPERSOMNIA DUE TO MEDICAL CONDITION: ICD-10-CM

## 2022-05-27 DIAGNOSIS — G25.81 RESTLESS LEGS SYNDROME (RLS): ICD-10-CM

## 2022-05-27 PROCEDURE — G0463 HOSPITAL OUTPT CLINIC VISIT: HCPCS

## 2022-05-27 PROCEDURE — 99213 OFFICE O/P EST LOW 20 MIN: CPT | Performed by: INTERNAL MEDICINE

## 2022-05-27 NOTE — PROGRESS NOTES
Specialty Pharmacy Refill Coordination Note     Semaj is a 81 y.o. male contacted today regarding refills of  Xtandi specialty medication(s).    Reviewed and verified with patient:         Specialty medication(s) and dose(s) confirmed: yes    Refill Questions    Flowsheet Row Most Recent Value   Changes to allergies? No   Changes to medications? No   New conditions since last clinic visit No   Unplanned office visit, urgent care, ED, or hospital admission in the last 4 weeks  No   How does patient/caregiver feel medication is working? Good   Financial problems or insurance changes  No   Since the previous refill, were any specialty medication doses or scheduled injections missed or delayed?  No   Does this patient require a clinical escalation to a pharmacist? No          Delivery Questions    Flowsheet Row Most Recent Value   Delivery method [employer]  [[employer] standard no sig leave on table ship 5/31 deliver 6/1]   Delivery address correct? Yes   Preferred delivery time? Anytime   Number of medications in delivery 1   Medication being filled and delivered Xtandi   Doses left of specialty medications more than 1 week   Is there any medication that is due not being filled? No   Cooler needed? No   Do any medications need mixed or dated? No   Copay form of payment Payment plan already set up   Questions or concerns for the pharmacist? No   Are any medications first time fills? No            Medication Adherence    Adherence tools used: directed education  Support network for adherence: healthcare provider          Follow-up: 3 week(s)     Radha Brito  Specialty Pharmacy Technician

## 2022-06-09 DIAGNOSIS — C61 PROSTATE CANCER METASTATIC TO BONE: ICD-10-CM

## 2022-06-09 DIAGNOSIS — C79.51 PROSTATE CANCER METASTATIC TO BONE: ICD-10-CM

## 2022-06-09 RX ORDER — HYDROCODONE BITARTRATE AND ACETAMINOPHEN 5; 325 MG/1; MG/1
1 TABLET ORAL EVERY 6 HOURS PRN
Qty: 120 TABLET | Refills: 0 | Status: SHIPPED | OUTPATIENT
Start: 2022-06-09 | End: 2022-07-11 | Stop reason: SDUPTHER

## 2022-06-10 NOTE — PROGRESS NOTES
Subjective  Patient not tolerating the heat well, feeling fair on full dose Xtandi  REASONS FOR FOLLOW UP:   1.  Metastatic prostate cancer    HISTORY OF PRESENT ILLNESS:     Patient is an 81-year-old with metastatic prostate cancer who is here today for lab review and toxicity check.  He continues on Xtandi which has now been reduced to 80 mg daily.  He is tolerating this far better than the full dose and his PSA continues to drop now 5.2 by 12/1/2021 and 4.7 by 12/8/2021.  The patient states his ability to function at home is also improved with, again, less nausea, improved appetite and no additional musculoskeletal pain.  He does remain hypophosphatemic as result of Xgeva and such response would indicate that the interval between these injections can also be lengthened to, possibly, 3 months along when he is given his Lupron.     When the patient was seen 6 weeks ago his PSA had begun to slowly increase moving from 4.7-7.8 and then a 9.06.  We had reduced his Xtandi finding that he rapidly responded to relatively low dose but I discussed today that he will need to move up to 160 mg daily.  When he is seen 5/2/2022 he is feeling reasonably well with an acceptable performance status.     The patient's PSA 5/2/2022 was 10.1 and he did move of his Xtandi to 160 mg p.o. daily.    His follow-up appointment is now 6/13/2022 at which time he is also ready for Xgeva and Lupron.  We have discussed that his Xgeva can now be moved to every 6 months.  He is seen with his son 6/13/2022 having dehydrated somewhat in the heat 6/12/2022 but feeling improved.  He does note some difficulty with swallowing and is asked to observe this as he rehydrate and manage his blood sugar more effectively.          Hematologic/oncologic history:   The patient is an 81-year-old male with significant past medical history including prostate carcinoma, CKD 3 and long-term tobacco use be been seen by primary care in late January, 2018 for  hoarseness.  Chest x-ray is now outpatient January 23 revealed sclerotic change in the posterior mid chest to be within 3 adjacent thoracic ertebral bodies of uncertain significance.  A follow-up chest CT revealed numerous sclerotic foci within al visualized bones consistent with metastatic disease, multiple enlarged mediastinal lymph nodes concerning for metastatic lymphadenopathy and a polypoidal abnormality in the right lateral wall of the trachea.  CT of abdomen March 20 revealed extensive osseous metastatic disease mildly enlarged retroperitoneal lymph nodes.  Bone scan March 20 was consistent with widespread osseous metastasis particularly in the proximal half the left humeral shaft, abnormal uptake in the sternum and manubrium and a small focus in the posterior aspect of the calvarium.  This led to a plain film the left humerus with mottled sclerosis involving the shaft of the humerus particularly in the proximal half but no evidence of pathologic fracture.    The patient has additionally type 2 diabetes on insulin pump, again a history of prostate carcinoma prostatectomy 12 years previously, hypertension, and hyperlipidemia.  Additional studies included a PSA of 395.1 from March 14, 2018.The patient's been seen by urology March 15 with plans to start Firmagon.    The patient is now seen in pulmonary clinic with Dr. iBjan Rivera and we have discussed that he will need bronchoscopy and mediastinoscopy.  Interestingly his additional history includes a long occupational exposure including working in the [place] just out of school, subsequent construction work for many years and a 30-pack-year history of smoking stopping in the late 1990s.  He indicates that he followed with Dr. Guillen of urology for many years with no changes in his exams and normal PSAs.  He stopped this follow-up approximately 2 years ago.     Patient was seen in consultation with thoracic surgery on March 28 and plans are  made for mediastinoscopy and bronchoscopy with lymph node biopsy.  Pathology revealed that these nodes were consistent with metastatic prostate carcinoma.  He was discussed at thoracic conference.  A PET/CT was not pursued and it was determined that he see medical oncology for hormonal treatment and radiation therapy if symptomatic.  It should be noted that the patient's March 15 [facility] office note describes that Firmagon was planned.     The patient has met with the son and grandson April 23.  We have also contacted Dr. Taylor of urology in that Firmagon was given just after his last visit and would next be due approximately May 3.  Patient feels considerably better with resolution of his pain, normalization of his performance status.  He would like to continue treatment through our office now contacted Dr. Taylor concerning this.    The patient is next seen June 11, 2018 we discussed his follow-up including his treatments with Lupron May 7 and his next treatment on July 30.  He has returned to essentially normal performance status and his PSA has dropped further from 395 March 14 27.8 May 7 and now 2.03 June 11.  We do plan to initiate Xgeva also study him via scans to document his improvement approximately a month from now.   The patient is next seen July 26, 2018.  Repeat imaging demonstrated interval resolution of mediastinal and retroperitoneal lymphadenopathy.  There is thickening in the distal aspect of the appendix with stranding?  Chronic appendicitis.  The patient indicates he is having no such symptoms and never has.  There is no change in sclerotic bony metastasis in the patient's PSA has dropped substantially to 1.66 by July 19 and 1.79 July 26.  He is actually feeling excellent and this is corroborated by family members.   Patient is next seen January 10, 2019 continuing to do very well and, in fact indicating that [facility], likely hearing  replacements, visual review.  He is not certain is going to continue his care with them or with us or combination of both.    Patient is next seen April 4, 2019.  He is recently discharged after hospitalization March 15-18 with left upper lobe pneumonia.  We reviewed the findings in detail with his gradual recovery now documented.  His studies include a CT of chest which does not demonstrate any further bony lesions and/or pulmonary metastasis having developed.  He is feeling considerably better and approximately back to his baseline.  The patient is next seen June 28, 2019 feeling well but having baseline generally musculoskeletal pain and restless legs.His recent exams include a PSA of 0.81, CMP with potassium of 5.3 with repeat pending.  His performance status overall remains good to very good and is clearly responding to his treatments.  The patient is next seen December 13, 2019 continue to feel well.  He has had, oddly enough, 2 episodes of sleep paralysis which have occurred to him previously and he wonders if there is any connection with his prostate carcinoma though this is felt unlikely.     The patient when assessed in December had his PSA go to 2.9.  We continue with Lupron and Xgeva and is seen back now March 9, 2020 without additional symptoms.  We discussed that a schedule could likely change moving to 3 months for both of these medications particularly if he needs additional therapy directed at progressive disease.     Patient contacted our practice May 4 concern for pain in his hips.  This led to moving his scans up and they were performed May 8, 2020 showing a sub-6 mm pleural-based pulmonary nodule in the right lower lobe that is new from March 15, 2018, remainder of the sub-6 mm pulmonary nodules bilaterally are stable.  There is extensive osseous metastatic disease as previous with no other new findings.     The patient indicates, when seen, May 13 that his bone pain is now resolved.  While this  is good information does not mean that he does not have further bony metastasis and we have discussed that a follow-up bone scan is likely necessary.  Furthermore he is having some difficulty with sleeplessness and increase in restless legs as he continues to stay at home during the COVID 19 crisis which, itself, is producing more anxiety for him.  A follow-up bone scan was obtained Lety 3 revealing multifocal osseous metastatic disease which was compared to March 2, 2018 with improvement.  No new abnormal uptake is present.  He is next seen with us on June 17, 2020 and, fortunately, is not having any significant pain at this point.  We discussed his scans in detail and, on balance, his performance status also remains improved.     It was elected to follow the patient rather than changes antiandrogen therapy.  He is reassessed September 3 with unchanged CMP, H&H of 11.4 and 36.3 with normal white count, platelet count and differential, PSA at 15.8.  Follow-up PA lateral chest x-ray does not show any new abnormalities though there is extensive metastatic bone disease throughout the bony thorax and osteoblastic metastasis have been seen on chest CT May 2020.  The patient is seen with his son Georges September 10 noticing increasing bony discomfort primarily in the right hip following fairly intensive gardening which he does frequently.  Now has further elevation of PSA were wondering whether we should try to intervene sooner per additional antiandrogens.  We will need to further assess him via CT scanning to make this decision     These were obtained October 1 showing extensive sclerotic disease with no metastatic disease in chest abdomen or pelvis.  PSA had gone from 15.8 September 3-16 0.6 October 1.     He still has pain getting up in the morning and after active gardening.  This is manageable with current pain medications and, at present, it is not apparent that he needs to switch medications to gain better control  of his disease.  The patient did have follow-up studies done including a PSA November 25, 2020 now at 25.4.  The patient is seen with his son December 10, 2020 doing fair but having some increased pain in the mid sternum and right hip that he ascribes to additional exercise/work in managing his garden.  It is apparent however, that his last bone scan was 6 months ago and we do need to reassess him concerning this.     The patient had follow-up bone scan performed January 11, 2021 showing again uptake in the calvarium, humerus, right medial clavicle, sternum, ribs, spine, sacrum, pelvis, right acetabulum, proximal femora and now left frontal bone which appears new.  There is also mild increase in sternal uptake and equivocal increase in the pelvic lesions of all of the sacrum and the right proximal femur.     The patient is seen with his son January 18, 2021 and indicates that he is having pain gradually worsened in his right hip, mid chest that had been an issue previously.  These findings on bone scan are reviewed with both of them as likely the underlying culprit for his discomfort.  He would need change of the systemic therapy but, at present, will ask for radiation therapy palliation initially.  He was previously treated by Dr. Kulkarni many years ago and will ask her to see him.  We will also make application for the current cost of Xtandi or Zytiga.  The patient is not felt to be a candidate for systemic chemotherapy.     The patient was referred for ration therapy as we continued to assess his PSA on current therapy as well as exam the cost of Zytiga or Xtandi.  Radiation therapy (Dr. Kulkarni) saw the patient January 26 and felt that the right femur and sternal lesion could benefit from therapy-3000 cGy in 10 fractions.  The patient took treatment February 1 to February 12 to the above areas and is next seen back March 15.  Fortunately his pain has improved dramatically and he is quite happy about this.   Unfortunately he notes some difficulty with swallowing that is temporary and often leads to increased salivation and mucus production.  Patient was asked to return his next assessed April 12, 2021.  He is able to swallow with less pain but still has excess mucus production and issues with salivation.  His PSA has noted increase but he is having no additional bony discomfort at this point and, additionally, has noted low-grade fevers just under 100 degrees at nighttime?.  His weight, appetite and general performance status have remained good to excellent.  We discussed follow-up studies at this point to determine his status.    Follow-up scans were scheduled CT scans of chest and pelvis 5/4/2021 showing osseous metastasis quite similar to previous examinations in the chest, CT of abdomen pelvis also with no acute intra-abdominal adenopathy no indication of abdominal pelvic metastatic disease but again widespread osseous metastasis.  His PSA at this point have been slowly increasing to a level of 44 on 4/12/2021.  As the patient is reassessed 5/10/2021 we find, fortunately, that he is not exceeding symptomatic.  It could make reasonable sense at this point to take the mediastinal nodes from his biopsy 4/9/2018, reassess potential targeted therapies, MSI status, and germline analysis.  Fortunately he is not having significant pain or discomfort and is seen with his son as we discussed the above plan.  Notably a follow-up PSA is found to be 42.6.   Patient is next seen in 6/7/2021 for Lupron and Xgeva.  Fortunately he is feeling considerably better according to both he and his son though his stamina has reduced.  He is not having additional pain at this point.  We have also reviewed his genetics assessment which was significant only for a variant of unknown significance.  This is not an actionable abnormality.    As the patient's PSA further escalated increasing to 89 7/7/2021 his subsequent Axumin PET was obtained 8/20  demonstrating extensive osteosclerotic metastatic disease showing reduced uptake-typical finding but no evidence of visceral metastatic disease in the neck chest abdomen pelvis.  These findings are discussed with the patient and his son 8/25/2021 and indicative of his progressive disease-etiology of his rising PSA-though the patient is asymptomatic we have discussed moving to initiate Xtandi is available at 160 mg p.o. twice daily along with his current Lupron and Xgeva.     The patient's testing 10/6 included PSA that is dropped to 9.69.  He is seen with his son, Cody, both indicating that he been doing relatively well with treatment but began to notice nausea in the last week.  The schedule that he has been given also needs to be somewhat updated in terms of his Lupron and Xgeva.  He has been able to maintain his appetite and overall performance status to date.    The patient is next assessed 12/8/2021 tolerating his current Xtandi dose better than previous and maintaining a good performance status overall as well as demonstrating a drop in his PSA now to 4.7 on 12/8/2021 compared to high of 112 9/8/2021.  He remains hypophosphatemic and hypocalcemic and we have discussed, again, changing his Xgeva dosing to every 3 months along with his Lupron will continue his Xtandi to 80 mg daily.    The patient is next reviewed 3/21/2022 seen for both Xgeva and Lupron.  Fortunately he is feeling reasonably well with little additional pain though he has restarted to place his garden into shape for the season and is working more outdoors.    Patient reevaluated 5/2/2022 with repeat CBC including H&H of 10.5 and 33.4, white count 5980 and platelet count of 192,000, currently CMP and PSA pending with a previous PSA 1 month ago at 9.06.  At this point we increased his Xtandi to 160 mg p.o. daily while continuing treatment with every 3 month Xgeva and Lupron.  Notably the patient's Requip  alcohol I am sure it is mariza now at  1.5 mg p.o. nightly and Neurontin 900 mg p.o. daily has improved his restless leg syndrome substantially.    Patient seen 6/13/2022 at which time Xtandi at 160 mg p.o. daily was continued, Xgeva moved to every 6 months and Lupron every 3 months.      Past Medical History:   Diagnosis Date   • Aortic stenosis, mild 1/1/2021    Per echocardiogram 2021   • Ascending aorta dilatation (HCC)    • Bone cancer (HCC)    • Cellulitis of great toe of left foot 10/19/2018   • CKD (chronic kidney disease)     Stage 3; followed by Dr. Vicky Duran    • Diastolic dysfunction     GRADE II per echo 2018   • Difficulty breathing     during exertion   • Dyslipidemia    • Erectile dysfunction    • Fatigue    • History of blood transfusion    • History of kidney stones    • Hyperlipidemia    • Hypertension    • Hyponatremia    • Left ventricular hypertrophy     per echo 2018   • Localized edema    • Neuropathy    • Obstructive sleep apnea     USING CPAP   • Osteoarthritis    • Peptic ulcer    • Pneumonia    • Pneumonia of left upper lobe due to infectious organism 3/15/2019   • Prostate cancer (HCC)     Status post prostatectomy, radiation therapy, and hormone therapy followed by Dr. Lee; metastatsis to bone   • Pure hyperglyceridemia    • Restless leg syndrome    • Sepsis (HCC)    • Sinus bradycardia    • Transient cerebral ischemia    • Type 2 diabetes mellitus (HCC)         Past Surgical History:   Procedure Laterality Date   • BRONCHOSCOPY N/A 4/9/2018    Procedure: BRONCHOSCOPY;  Surgeon: Bijan Rivera III, MD;  Location: [facility];  Service: Cardiothoracic   • COLONOSCOPY     • DEEP NECK LYMPH NODE BIOPSY / EXCISION     • HEMORRHOIDECTOMY     • MEDIASTINOSCOPY N/A 4/9/2018    Procedure: MEDIASTINOSCOPY WITH LYMPH NODE BIOPSY;  Surgeon: Bijan Rivera III, MD;  Location: [facility];  Service: Cardiothoracic   • OTHER SURGICAL HISTORY      ulcer repair   • PROSTATECTOMY  2006        Current Outpatient  Medications on File Prior to Visit   Medication Sig Dispense Refill   • aspirin 81 MG EC tablet Take 1 tablet by mouth daily.     • cholecalciferol (VITAMIN D3) 1000 units tablet Take 2,000 Units by mouth Daily.     • Denosumab (XGEVA SC) Inject  under the skin into the appropriate area as directed Every 30 (Thirty) Days.     • dilTIAZem CD (CARDIZEM CD) 120 MG 24 hr capsule TAKE 1 CAPSULE EVERY DAY 90 capsule 3   • enzalutamide (Xtandi) 40 MG chemo capsule Take 4 capsules by mouth Daily. 120 capsule 2   • furosemide (LASIX) 40 MG tablet Take 40 mg by mouth Every Morning.     • gabapentin (NEURONTIN) 300 MG capsule Take 3 capsules by mouth every night at bedtime. 90 capsule 2   • HYDROcodone-acetaminophen (NORCO) 5-325 MG per tablet Take 1 tablet by mouth Every 6 (Six) Hours As Needed for Moderate Pain . 120 tablet 0   • Insulin Aspart (novoLOG) 100 UNIT/ML injection INJECT 100 UNITS VIA PUMP ONCE DAILY     • insulin aspart (NovoLOG) 100 UNIT/ML patient supplied pump Inject  under the skin into the appropriate area as directed Continuous. Pt reports pump with basal rate 1.9 units/hr from 0800 to 1200. Basal rate 1.8 units/hr 1200 to 0800.  Checks bg ac/hs with bolus doses per pumps parameters     • Leuprolide Acetate, 3 Month, (LUPRON DEPOT, 3-MONTH, IM) Inject  into the appropriate muscle as directed by prescriber Every 3 (Three) Months.     • levothyroxine (SYNTHROID, LEVOTHROID) 88 MCG tablet      • loperamide (IMODIUM) 1 MG/5ML solution Take 2 mg by mouth 4 (Four) Times a Day As Needed for Diarrhea.     • modafinil (PROVIGIL) 200 MG tablet Take 1 tablet by mouth Daily. 90 tablet 1   • olmesartan (BENICAR) 40 MG tablet TAKE 1 TABLET EVERY DAY (DISCONTINUE LOSARTAN 100MG) 90 tablet 3   • ondansetron (ZOFRAN) 8 MG tablet Take 1 tablet by mouth 3 (Three) Times a Day As Needed for Nausea or Vomiting. 30 tablet 5   • pravastatin (PRAVACHOL) 40 MG tablet Take 40 mg by mouth daily.     • rOPINIRole (REQUIP) 0.5 MG  "tablet Take 1 tablet by mouth in the evening and 1 at bedtime. 180 tablet 2   • traZODone (DESYREL) 50 MG tablet Take  mg by mouth every night at bedtime.       No current facility-administered medications on file prior to visit.        ALLERGIES:    Allergies   Allergen Reactions   • Niacin Unknown - Low Severity   • Statins Unknown - Low Severity        Social History     Socioeconomic History   • Marital status:    • Years of education: College   Tobacco Use   • Smoking status: Former Smoker     Packs/day: 1.00     Years: 30.00     Pack years: 30.00     Types: Cigarettes     Quit date: 1998     Years since quittin.4   • Smokeless tobacco: Never Used   Substance and Sexual Activity   • Alcohol use: No     Comment: Caffeine use: 2-3 cups daily   • Drug use: No   • Sexual activity: Defer        Family History   Problem Relation Age of Onset   • Heart failure Mother    • Hypertension Mother    • Diabetes Mother    • Heart disease Mother    • Lung cancer Father 46   • Hypertension Sister    • Lung cancer Sister 60   • Hypertension Brother    • Lung cancer Brother 62   • Heart disease Other    • Prostate cancer Brother 60   • Malig Hyperthermia Neg Hx         Review of Systems  As per HPI   .  Objective     Vitals:    22 0932   BP: 165/75   Pulse: 50   Resp: 16   Temp: 98.4 °F (36.9 °C)   TempSrc: Temporal   SpO2: 96%   Weight: 90.7 kg (200 lb)   Height: 162.6 cm (64.02\")   PainSc: 0-No pain     Current Status 2022   ECOG score 0       Physical Exam   Constitutional: He is oriented to person, place, and time. He appears well-developed. No distress.   HENT:   Head: Normocephalic and atraumatic.   Mouth/Throat: No oropharyngeal exudate.   Eyes: Pupils are equal, round, and reactive to light.   Cardiovascular: Normal rate, regular rhythm and normal heart sounds.   No murmur heard.  Pulmonary/Chest: Effort normal and breath sounds normal. No respiratory distress. He has no wheezes. He has " no rhonchi. He has no rales.   Abdominal: Soft. Normal appearance and bowel sounds are normal. He exhibits no distension.   Musculoskeletal: Normal range of motion.   Neurological: He is alert and oriented to person, place, and time.   Skin: Skin is warm and dry. No rash noted.   Vitals reviewed.    RECENT LABS:  Hematology WBC   Date Value Ref Range Status   06/13/2022 5.02 3.40 - 10.80 10*3/mm3 Final     RBC   Date Value Ref Range Status   06/13/2022 3.56 (L) 4.14 - 5.80 10*6/mm3 Final     Hemoglobin   Date Value Ref Range Status   06/13/2022 10.7 (L) 13.0 - 17.7 g/dL Final     Hematocrit   Date Value Ref Range Status   06/13/2022 34.0 (L) 37.5 - 51.0 % Final     Platelets   Date Value Ref Range Status   06/13/2022 193 140 - 450 10*3/mm3 Final        Lab Results   Component Value Date    GLUCOSE 152 (H) 06/13/2022    BUN 26 (H) 06/13/2022    CREATININE 1.20 06/13/2022    EGFRIFNONA 56 (L) 02/23/2022    BCR 21.7 06/13/2022    K 4.7 06/13/2022    CO2 23.6 06/13/2022    CALCIUM 9.0 06/13/2022    ALBUMIN 4.00 06/13/2022    AST 14 06/13/2022    ALT 8 06/13/2022       NM Bone Scan Whole Body (01/11/2021 13:25)  CT Chest With Contrast Diagnostic (05/04/2021 09:11)  CT Abdomen Pelvis With Contrast (05/04/2021 09:11)  NM PET/CT Skull Base to Mid Thigh (08/20/2021 12:18)      Assessment & Plan      1.  Metastatic prostate cancer:  ? Patient initially diagnosed in 2006 and had a prostatectomy and hormone therapy.  ? Patient in January 2018 began to complain of shortness of breath and hoarseness.  Chest x-ray obtained 1/23/18 showed sclerotic bone lesions.  ? CT of the chest 3/12/2018 numerous sclerotic bone foci with all visualized bones consistent with metastatic disease, multiple enlarged mediastinal lymph nodes.  ? .1 from 3/14/2018.  Patient was started on Firmagon by urology, [facility].  ? 4/9/2018 mediastinoscopy pathology positive for metastatic adenocarcinoma consistent with origin from prostatic  primary.  ? Case discussed at thoracic conference and recommendations were to continue ADT and radiation for symptomatic sites.  ? Lupron injections every 3 mos initiated 5/7/18 PSA at that time 7.810  ? Monthly Xgeva initiated 6/11/18 PSA 2.030  ? Last PSA 6/13/2019 0.881  ? 9/20/2019 patient tolerating treatment well, no new concerns.  ? 12/2019 PSA 2.9  ? 3/9/2020  PSA increasing to 5.030, he remains continuing on Lupron and Xgeva and asymptomatic though follow-up CT of chest and pelvis will be requested in 11 weeks prior to follow-up in 12 weeks.   ? 5/4/2020 patient called reported worsening right hip pain, plans to purse CT scans ASAP.   ? 5/8/2020 CT scans C/A/P show no progression of disease. Hip pain improved, Gabapentin added.. Bone scan requested.  ? 6/3/2020 bone scan that does not demonstrate worsening of bone nor need for localized radiation therapy.  The patient's PSA has been slowly rising and we have not been able to determine worsening of disease and and, therefore, it is not felt warranted add additional medications such as Xtandi or apalutamide to his therapy.  Please note would like to avoid Zytiga try not to add prednisone which would worsen his blood sugar control.  ? 9/3/2020 PSA 15.8, CXR extensive metastatic bone disease- patient reviewed 9/10/2020 reporting increased bony discomfort CT scans requested.  ? Scans done and reviewed 10/7/2020 ultimately reveal no evidence of progression of disease for visceral involvement or clearly for bone involvement.  After further review with the patient and his son plan continued Xgeva and Lupron.  We have assessed for availability of Xtandi 160 mg p.o. daily and find the cost is currently prohibitive.   ? 11/25/2020 PSA 25.4  ? 12/10/2020 review with some mild increase in bony discomfort.  After repeat discussion we went on to have him undergo Lupron therapy, and his PSA actually to be mildly reduced at 22.2.   ? Follow-up bone scan 1/11/2021  demonstrates some evidence of progression in sternum, left sacrum and proximal femur.  These findings were reviewed with the patient and his son January 18, 2020 and the patient was referred for palliative radiotherapy(Dr. Kulkarni)  ? Palliative radiation under care Dr. Kulkarni to right femur and sternal lesion -3000 cGy in 10 fractions given February 1 to February 12 with significant symptom improvement.  ? 3/15/2021 PSA 38.5  ? 4/12/2021 PSA 44  ? 5/10/2021 PSA 42.6 CT chest abdomen pelvis 5/4/2021 - for progression of disease and follow-up PSA 5 10/2021 is at 42 slightly reduced from previous.  We have discussed how to proceed from here and find a significant family history that clearly supports a potential genetic assessment for his malignancy.  ? It is felt most reasonable at this point to take the mediastinal nodes from his biopsy 4/9/2018 and reassess for potential targeted therapies, MSI status, as well as germline analysis.  ? 6/7/2021 PSA 65.9 his analysis completed for actionable mutations including germline mutations.  These exams were negative though there is a variant of unknown significance.  Again this is not an actionable mutation.  We proceeded with additional Xgeva and Lupron planning for monthly Xgeva and Lupron at 3 months  ? 7/7/2021 PSA 89 and subsequent testing with Axumin PET was performed confirming extensive osteosclerotic metastatic disease with little uptake, no evidence of visceral metastatic disease in neck chest abdomen or pelvis.  ? 8/25/2021  , PET/CT reviewed, subsequent PSA increased to 105.  Patient fortunately asymptomatic.  Requested reevaluation for Xtandi 160 mg po daily.   ? 9/8/2021 due for his lupron, receiving every 3 months, and monthly Xgeva.  Will have education today for Xtandi and will receive his medication today as well.   ? Started Xtandi 9/8/2021.  ? 9/22/2021 here for toxicity check, so far tolerating Xtandi quite well other than a slight increase in  diarrhea controlled with Imodium.  ? 10/6/2021 continues to tolerate Xtandi well.  Labs are within range today, we will proceed with Xgeva today.  ? Patient seen 10/20/2021 having more nausea with Xtandi and demonstrating a substantial reduction in PSA level.  After discussion we plan to reduce his dose to 80 mg daily following his PSA and performance status over the next approximate 7 weeks at which time he would be scheduled for his follow-up Lupron.  ? Patient assessed 12/8/2021 with further reduction in PSA to 4.7, ongoing hypophosphatemia and hypocalcemia-Xgeva moved to every 3 months given on same schedule as Lupron  ? Patient seen 3/21/2022, 6-week follow-up with mild increase in PSA recognized  ? Patient reassessed 5/2/2022 with PSA at 10.1, Xtandi moved to 160 mg p.o. daily  ? Patient assessed 6/13/2022, Xtandi at 160 mg p.o. daily, PSA pending, Xgeva moved to every 6 months, Lupron every 3 months    2.  Neuropathy/ restless legs:    · 5/13/2020 gabapentin 600 mg at bedtime, trazadone 50 mg QHS.  · On gabapentin 300, 2 tablets at bedtime, and requip 0.5.mg    · Still having neuropathy symptoms.  Will increase gabapentin to 900 mg at bedtime.   · 9/22/2021 increase in gabapentin to 900 mg at bedtime has helped neuropathy.  · Requip moved to bedtime dosing only-1 mg, escalate as needed    3.  Cancer related pain: on Norco 5/325 every 6 hours as needed.    4.  Hyperkalemia:  · Intermittent issue for the patient.  It is typically dietary related, as he consumes large amount  fresh tomatoes and other foods from his garden..  He was instructed to significantly decrease his intake as his potassium level today is 6.4, magnesium 2.7  He is not symptomatic.  We will also check an EKG.   · 9/22/2021 potassium level is improved to 5.7.  Patient states that he is no longer eating tomatoes from his garden.  · 10/6/2021 potassium level is 5.8.  Due to patient's multiple electrolyte abnormalities we will go ahead and refer  him to nephrology for further evaluation.  Patient states he actually has an existing nephrologist, Dr. Vicky Sauceda who he typically only sees on an annual basis.  We will schedule him to be seen by her soon for further evaluation.  · Reassessed 6/13/2022 at 4.7    5.  Hypophosphatemia:  Phosphorus 1.9- hold xgeva 9/8/2021 ., Increase foods high in phosphorus, recheck in 2 weeks. \  · 9/22/2021 phosphorus level 1.7.  Reviewed with Dr. Lee.  In addition to increasing foods high in phosphorus, I have instructed the patient to start Neutra-Phos 1 tablet daily.  We will recheck again in 2 weeks.  · Phosphorus 2.3 assessed 10/20/2021  · Reassess 12/8/2021 at 1.9, Xgeva moved to every 3 months.  · Patient assessed 6/13/2022 Xgeva moved every 6 months    PLAN:      1. Continue gabapentin 900 mg at bedtime, Requip to move to 2 mg p.o. nightly, escalate as needed  2. Continue Imodium if needed for diarrhea.  3. Continue Xtandi to 160 mg daily  4. Proceed with Xgeva and Lupron today, Xgeva moved to every 6 months, Lupron every 3 months  5. Case discussed with patient's son, Cody, who concurs as well as the patient.    Patient on high risk medication requiring close monitoring for toxicity.    Jose Lee MD  06/13/2022

## 2022-06-13 ENCOUNTER — INFUSION (OUTPATIENT)
Dept: ONCOLOGY | Facility: HOSPITAL | Age: 82
End: 2022-06-13

## 2022-06-13 ENCOUNTER — OFFICE VISIT (OUTPATIENT)
Dept: ONCOLOGY | Facility: CLINIC | Age: 82
End: 2022-06-13

## 2022-06-13 ENCOUNTER — LAB (OUTPATIENT)
Dept: LAB | Facility: HOSPITAL | Age: 82
End: 2022-06-13

## 2022-06-13 VITALS
OXYGEN SATURATION: 96 % | HEIGHT: 64 IN | BODY MASS INDEX: 34.15 KG/M2 | DIASTOLIC BLOOD PRESSURE: 75 MMHG | TEMPERATURE: 98.4 F | RESPIRATION RATE: 16 BRPM | WEIGHT: 200 LBS | SYSTOLIC BLOOD PRESSURE: 165 MMHG | HEART RATE: 50 BPM

## 2022-06-13 DIAGNOSIS — C79.51 PROSTATE CANCER METASTATIC TO BONE: ICD-10-CM

## 2022-06-13 DIAGNOSIS — C79.51 OSSEOUS METASTASIS: ICD-10-CM

## 2022-06-13 DIAGNOSIS — C79.51 PROSTATE CANCER METASTATIC TO BONE: Primary | ICD-10-CM

## 2022-06-13 DIAGNOSIS — C61 PROSTATE CANCER METASTATIC TO BONE: ICD-10-CM

## 2022-06-13 DIAGNOSIS — C61 PROSTATE CANCER METASTATIC TO BONE: Primary | ICD-10-CM

## 2022-06-13 LAB
ALBUMIN SERPL-MCNC: 4 G/DL (ref 3.5–5.2)
ALBUMIN/GLOB SERPL: 1.4 G/DL (ref 1.1–2.4)
ALP SERPL-CCNC: 141 U/L (ref 38–116)
ALT SERPL W P-5'-P-CCNC: 8 U/L (ref 0–41)
ANION GAP SERPL CALCULATED.3IONS-SCNC: 12.4 MMOL/L (ref 5–15)
AST SERPL-CCNC: 14 U/L (ref 0–40)
BASOPHILS # BLD AUTO: 0.01 10*3/MM3 (ref 0–0.2)
BASOPHILS NFR BLD AUTO: 0.2 % (ref 0–1.5)
BILIRUB SERPL-MCNC: 0.2 MG/DL (ref 0.2–1.2)
BUN SERPL-MCNC: 26 MG/DL (ref 6–20)
BUN/CREAT SERPL: 21.7 (ref 7.3–30)
CALCIUM SPEC-SCNC: 9 MG/DL (ref 8.5–10.2)
CHLORIDE SERPL-SCNC: 106 MMOL/L (ref 98–107)
CO2 SERPL-SCNC: 23.6 MMOL/L (ref 22–29)
CREAT SERPL-MCNC: 1.2 MG/DL (ref 0.7–1.3)
DEPRECATED RDW RBC AUTO: 49.8 FL (ref 37–54)
EGFRCR SERPLBLD CKD-EPI 2021: 60.8 ML/MIN/1.73
EOSINOPHIL # BLD AUTO: 0.16 10*3/MM3 (ref 0–0.4)
EOSINOPHIL NFR BLD AUTO: 3.2 % (ref 0.3–6.2)
ERYTHROCYTE [DISTWIDTH] IN BLOOD BY AUTOMATED COUNT: 14.4 % (ref 12.3–15.4)
GLOBULIN UR ELPH-MCNC: 2.9 GM/DL (ref 1.8–3.5)
GLUCOSE SERPL-MCNC: 152 MG/DL (ref 74–124)
HCT VFR BLD AUTO: 34 % (ref 37.5–51)
HGB BLD-MCNC: 10.7 G/DL (ref 13–17.7)
IMM GRANULOCYTES # BLD AUTO: 0.03 10*3/MM3 (ref 0–0.05)
IMM GRANULOCYTES NFR BLD AUTO: 0.6 % (ref 0–0.5)
LYMPHOCYTES # BLD AUTO: 1.4 10*3/MM3 (ref 0.7–3.1)
LYMPHOCYTES NFR BLD AUTO: 27.9 % (ref 19.6–45.3)
MAGNESIUM SERPL-MCNC: 1.9 MG/DL (ref 1.8–2.5)
MCH RBC QN AUTO: 30.1 PG (ref 26.6–33)
MCHC RBC AUTO-ENTMCNC: 31.5 G/DL (ref 31.5–35.7)
MCV RBC AUTO: 95.5 FL (ref 79–97)
MONOCYTES # BLD AUTO: 0.44 10*3/MM3 (ref 0.1–0.9)
MONOCYTES NFR BLD AUTO: 8.8 % (ref 5–12)
NEUTROPHILS NFR BLD AUTO: 2.98 10*3/MM3 (ref 1.7–7)
NEUTROPHILS NFR BLD AUTO: 59.3 % (ref 42.7–76)
NRBC BLD AUTO-RTO: 0 /100 WBC (ref 0–0.2)
PHOSPHATE SERPL-MCNC: 3 MG/DL (ref 2.5–4.5)
PLATELET # BLD AUTO: 193 10*3/MM3 (ref 140–450)
PMV BLD AUTO: 9.4 FL (ref 6–12)
POTASSIUM SERPL-SCNC: 4.7 MMOL/L (ref 3.5–4.7)
PROT SERPL-MCNC: 6.9 G/DL (ref 6.3–8)
PSA SERPL-MCNC: 10.5 NG/ML (ref 0–4)
RBC # BLD AUTO: 3.56 10*6/MM3 (ref 4.14–5.8)
SODIUM SERPL-SCNC: 142 MMOL/L (ref 134–145)
WBC NRBC COR # BLD: 5.02 10*3/MM3 (ref 3.4–10.8)

## 2022-06-13 PROCEDURE — 83735 ASSAY OF MAGNESIUM: CPT

## 2022-06-13 PROCEDURE — 84153 ASSAY OF PSA TOTAL: CPT | Performed by: INTERNAL MEDICINE

## 2022-06-13 PROCEDURE — 80053 COMPREHEN METABOLIC PANEL: CPT

## 2022-06-13 PROCEDURE — 85025 COMPLETE CBC W/AUTO DIFF WBC: CPT

## 2022-06-13 PROCEDURE — 96372 THER/PROPH/DIAG INJ SC/IM: CPT

## 2022-06-13 PROCEDURE — 96402 CHEMO HORMON ANTINEOPL SQ/IM: CPT

## 2022-06-13 PROCEDURE — 25010000002 LEUPROLIDE ACETATE (3 MONTH) PER 7.5 MG: Performed by: INTERNAL MEDICINE

## 2022-06-13 PROCEDURE — 25010000002 DENOSUMAB 120 MG/1.7ML SOLUTION: Performed by: INTERNAL MEDICINE

## 2022-06-13 PROCEDURE — 84100 ASSAY OF PHOSPHORUS: CPT

## 2022-06-13 PROCEDURE — 36415 COLL VENOUS BLD VENIPUNCTURE: CPT

## 2022-06-13 PROCEDURE — 99214 OFFICE O/P EST MOD 30 MIN: CPT | Performed by: INTERNAL MEDICINE

## 2022-06-13 RX ORDER — INSULIN ASPART 100 [IU]/ML
INJECTION, SOLUTION INTRAVENOUS; SUBCUTANEOUS
COMMUNITY
Start: 2022-06-04

## 2022-06-13 RX ADMIN — DENOSUMAB 120 MG: 120 INJECTION SUBCUTANEOUS at 10:52

## 2022-06-13 RX ADMIN — LEUPROLIDE ACETATE 22.5 MG: KIT at 10:51

## 2022-06-24 ENCOUNTER — SPECIALTY PHARMACY (OUTPATIENT)
Dept: PHARMACY | Facility: HOSPITAL | Age: 82
End: 2022-06-24

## 2022-06-24 NOTE — PROGRESS NOTES
Specialty Pharmacy Refill Coordination Note     Semaj is a 81 y.o. male contacted today regarding refills of  XTANDI specialty medication(s).    Reviewed and verified with patient:         Specialty medication(s) and dose(s) confirmed: yes    Refill Questions    Flowsheet Row Most Recent Value   Changes to allergies? No   Changes to medications? No   New conditions since last clinic visit No   Unplanned office visit, urgent care, ED, or hospital admission in the last 4 weeks  No   How does patient/caregiver feel medication is working? Good   Financial problems or insurance changes  No   Since the previous refill, were any specialty medication doses or scheduled injections missed or delayed?  No   If yes, please provide the amount N/A   Does this patient require a clinical escalation to a pharmacist? No          Delivery Questions    Flowsheet Row Most Recent Value   Delivery method FedEx  [ship stnd to home on 6/29 to arrive 6/30, Address confirmed. $0 co-pay.]   Delivery address correct? Yes   Delivery phone number [phone]   Preferred delivery time? Anytime   Number of medications in delivery 1   Medication being filled and delivered XTANDI   Doses left of specialty medications 7   Is there any medication that is due not being filled? No   Supplies needed? No supplies needed   Cooler needed? No   Copay form of payment Payment plan already set up   Additional comments N/A   Questions or concerns for the pharmacist? No   Explain any questions or concerns for the pharmacist N/A   Are any medications first time fills? No            Medication Adherence    Adherence tools used: directed education  Support network for adherence: healthcare provider          Follow-up: 3 week(s)     Susan Bauman, Pharmacy Technician  Specialty Pharmacy Technician

## 2022-07-11 DIAGNOSIS — C79.51 PROSTATE CANCER METASTATIC TO BONE: ICD-10-CM

## 2022-07-11 DIAGNOSIS — C61 PROSTATE CANCER METASTATIC TO BONE: ICD-10-CM

## 2022-07-11 RX ORDER — HYDROCODONE BITARTRATE AND ACETAMINOPHEN 5; 325 MG/1; MG/1
1 TABLET ORAL EVERY 6 HOURS PRN
Qty: 120 TABLET | Refills: 0 | Status: SHIPPED | OUTPATIENT
Start: 2022-07-11 | End: 2022-08-09 | Stop reason: SDUPTHER

## 2022-07-19 ENCOUNTER — TRANSCRIBE ORDERS (OUTPATIENT)
Dept: ADMINISTRATIVE | Facility: HOSPITAL | Age: 82
End: 2022-07-19

## 2022-07-19 ENCOUNTER — LAB (OUTPATIENT)
Dept: LAB | Facility: HOSPITAL | Age: 82
End: 2022-07-19

## 2022-07-19 DIAGNOSIS — N18.9 CHRONIC KIDNEY DISEASE, UNSPECIFIED CKD STAGE: ICD-10-CM

## 2022-07-19 DIAGNOSIS — D63.1 ANEMIA IN END-STAGE RENAL DISEASE: ICD-10-CM

## 2022-07-19 DIAGNOSIS — N18.9 CHRONIC KIDNEY DISEASE, UNSPECIFIED CKD STAGE: Primary | ICD-10-CM

## 2022-07-19 DIAGNOSIS — E55.9 VITAMIN D DEFICIENCY: ICD-10-CM

## 2022-07-19 DIAGNOSIS — I12.9 HYPERTENSIVE NEPHROPATHY: ICD-10-CM

## 2022-07-19 DIAGNOSIS — N18.6 ANEMIA IN END-STAGE RENAL DISEASE: ICD-10-CM

## 2022-07-19 LAB
25(OH)D3 SERPL-MCNC: 47.8 NG/ML (ref 30–100)
ALBUMIN SERPL-MCNC: 4.1 G/DL (ref 3.5–5.2)
ANION GAP SERPL CALCULATED.3IONS-SCNC: 9 MMOL/L (ref 5–15)
BILIRUB UR QL STRIP: NEGATIVE
BUN SERPL-MCNC: 22 MG/DL (ref 8–23)
BUN/CREAT SERPL: 21.4 (ref 7–25)
CALCIUM SPEC-SCNC: 8.8 MG/DL (ref 8.6–10.5)
CHLORIDE SERPL-SCNC: 106 MMOL/L (ref 98–107)
CLARITY UR: CLEAR
CO2 SERPL-SCNC: 25 MMOL/L (ref 22–29)
COLOR UR: YELLOW
CREAT SERPL-MCNC: 1.03 MG/DL (ref 0.76–1.27)
CREAT UR-MCNC: 150.5 MG/DL
DEPRECATED RDW RBC AUTO: 46.5 FL (ref 37–54)
EGFRCR SERPLBLD CKD-EPI 2021: 73 ML/MIN/1.73
ERYTHROCYTE [DISTWIDTH] IN BLOOD BY AUTOMATED COUNT: 14.4 % (ref 12.3–15.4)
GLUCOSE SERPL-MCNC: 224 MG/DL (ref 65–99)
GLUCOSE UR STRIP-MCNC: ABNORMAL MG/DL
HCT VFR BLD AUTO: 31.2 % (ref 37.5–51)
HGB BLD-MCNC: 10.6 G/DL (ref 13–17.7)
HGB UR QL STRIP.AUTO: NEGATIVE
KETONES UR QL STRIP: ABNORMAL
LEUKOCYTE ESTERASE UR QL STRIP.AUTO: NEGATIVE
MCH RBC QN AUTO: 30.3 PG (ref 26.6–33)
MCHC RBC AUTO-ENTMCNC: 34 G/DL (ref 31.5–35.7)
MCV RBC AUTO: 89.1 FL (ref 79–97)
NITRITE UR QL STRIP: NEGATIVE
PH UR STRIP.AUTO: <=5 [PH] (ref 5–8)
PHOSPHATE SERPL-MCNC: 2.5 MG/DL (ref 2.5–4.5)
PLATELET # BLD AUTO: 207 10*3/MM3 (ref 140–450)
PMV BLD AUTO: 9.9 FL (ref 6–12)
POTASSIUM SERPL-SCNC: 4.7 MMOL/L (ref 3.5–5.2)
PROT ?TM UR-MCNC: 15.8 MG/DL
PROT UR QL STRIP: ABNORMAL
PROT/CREAT UR: 105 MG/G CREA (ref 0–200)
RBC # BLD AUTO: 3.5 10*6/MM3 (ref 4.14–5.8)
SODIUM SERPL-SCNC: 140 MMOL/L (ref 136–145)
SP GR UR STRIP: 1.02 (ref 1–1.03)
UROBILINOGEN UR QL STRIP: ABNORMAL
WBC NRBC COR # BLD: 5.37 10*3/MM3 (ref 3.4–10.8)

## 2022-07-19 PROCEDURE — 82570 ASSAY OF URINE CREATININE: CPT

## 2022-07-19 PROCEDURE — 81003 URINALYSIS AUTO W/O SCOPE: CPT

## 2022-07-19 PROCEDURE — 80069 RENAL FUNCTION PANEL: CPT | Performed by: NURSE PRACTITIONER

## 2022-07-19 PROCEDURE — 36415 COLL VENOUS BLD VENIPUNCTURE: CPT | Performed by: NURSE PRACTITIONER

## 2022-07-19 PROCEDURE — 85027 COMPLETE CBC AUTOMATED: CPT

## 2022-07-19 PROCEDURE — 84156 ASSAY OF PROTEIN URINE: CPT

## 2022-07-19 PROCEDURE — 82306 VITAMIN D 25 HYDROXY: CPT

## 2022-07-22 ENCOUNTER — SPECIALTY PHARMACY (OUTPATIENT)
Dept: PHARMACY | Facility: HOSPITAL | Age: 82
End: 2022-07-22

## 2022-07-22 DIAGNOSIS — C61 PROSTATE CANCER METASTATIC TO BONE: ICD-10-CM

## 2022-07-22 DIAGNOSIS — C79.51 PROSTATE CANCER METASTATIC TO BONE: ICD-10-CM

## 2022-07-22 NOTE — TELEPHONE ENCOUNTER
Refill requested from pharmacy. Per last chart note, patient to continue Xtandi 160 mg daily. Will route to MD for cosignature.

## 2022-07-22 NOTE — PROGRESS NOTES
Specialty Pharmacy Refill Coordination Note     Semaj is a 81 y.o. male contacted today regarding refills of  Xtandi specialty medication(s).    Reviewed and verified with patient:         Specialty medication(s) and dose(s) confirmed: yes    Refill Questions    Flowsheet Row Most Recent Value   Changes to allergies? No   Changes to medications? No   New conditions since last clinic visit No   Unplanned office visit, urgent care, ED, or hospital admission in the last 4 weeks  No   How does patient/caregiver feel medication is working? Good   Financial problems or insurance changes  No   Since the previous refill, were any specialty medication doses or scheduled injections missed or delayed?  No   Does this patient require a clinical escalation to a pharmacist? No          Delivery Questions    Flowsheet Row Most Recent Value   Delivery method [employer]  [[employer] standard no sig ship 7/25 deliver 7/26- leave on table by door]   Delivery address correct? Yes   Preferred delivery time? Anytime   Number of medications in delivery 1   Medication being filled and delivered Xtandi   Doses left of specialty medications 1 & 1/2 weeks   Is there any medication that is due not being filled? No   Supplies needed? No supplies needed   Cooler needed? No   Do any medications need mixed or dated? No   Copay form of payment Payment plan already set up   Questions or concerns for the pharmacist? No   Are any medications first time fills? No            Medication Adherence    Adherence tools used: directed education  Support network for adherence: healthcare provider          Follow-up: 3 week(s)     Radha Brito  Specialty Pharmacy Technician

## 2022-07-27 DIAGNOSIS — C79.51 PROSTATE CANCER METASTATIC TO BONE: ICD-10-CM

## 2022-07-27 DIAGNOSIS — C61 PROSTATE CANCER METASTATIC TO BONE: ICD-10-CM

## 2022-07-28 RX ORDER — ROPINIROLE 0.5 MG/1
TABLET, FILM COATED ORAL
Qty: 180 TABLET | Refills: 2 | Status: SHIPPED | OUTPATIENT
Start: 2022-07-28 | End: 2022-08-27 | Stop reason: SDUPTHER

## 2022-07-28 RX ORDER — GABAPENTIN 300 MG/1
CAPSULE ORAL
Qty: 90 CAPSULE | Refills: 0 | Status: SHIPPED | OUTPATIENT
Start: 2022-07-28 | End: 2022-10-03

## 2022-08-04 ENCOUNTER — OFFICE VISIT (OUTPATIENT)
Dept: SLEEP MEDICINE | Facility: HOSPITAL | Age: 82
End: 2022-08-04

## 2022-08-04 VITALS — BODY MASS INDEX: 33.63 KG/M2 | HEIGHT: 64 IN | OXYGEN SATURATION: 98 % | WEIGHT: 197 LBS | HEART RATE: 59 BPM

## 2022-08-04 DIAGNOSIS — F51.04 PSYCHOPHYSIOLOGICAL INSOMNIA: ICD-10-CM

## 2022-08-04 DIAGNOSIS — G47.14 HYPERSOMNIA DUE TO MEDICAL CONDITION: ICD-10-CM

## 2022-08-04 DIAGNOSIS — G25.81 RESTLESS LEGS SYNDROME (RLS): ICD-10-CM

## 2022-08-04 DIAGNOSIS — G47.33 OBSTRUCTIVE SLEEP APNEA SYNDROME: Primary | ICD-10-CM

## 2022-08-04 PROCEDURE — G0463 HOSPITAL OUTPT CLINIC VISIT: HCPCS

## 2022-08-04 PROCEDURE — 99213 OFFICE O/P EST LOW 20 MIN: CPT | Performed by: INTERNAL MEDICINE

## 2022-08-04 NOTE — PROGRESS NOTES
"Follow Up [facility] Note     Chief Complaint:  RONNY     Primary Care Physician: Axel Guardado MD    Interval History:   The patient is a 81 y.o. male  who I last saw 2022 and that note was reviewed.  The patient obtained a new ResMed auto BiPAP.  He states he is improved.  However, he has had some increase in his restless legs.  The patient goes to bed at 9:30 PM and awakens at 6:30 AM.  He will use the restroom during that time.    Review of Systems:    A complete review of systems was done and all were negative with the exception of the above    Social History:    Social History     Socioeconomic History   • Marital status:    • Years of education: College   Tobacco Use   • Smoking status: Former Smoker     Packs/day: 1.00     Years: 30.00     Pack years: 30.00     Types: Cigarettes     Quit date: 1998     Years since quittin.5   • Smokeless tobacco: Never Used   Substance and Sexual Activity   • Alcohol use: No     Comment: Caffeine use: 2-3 cups daily   • Drug use: No   • Sexual activity: Defer       Allergies:  Niacin and Statins     Medication Review:  Reviewed.  The patient takes Requip 0.5 mg 4 in the evening    Vital Signs:    Vitals:    22 0859   Pulse: 59   SpO2: 98%   Weight: 89.4 kg (197 lb)   Height: 162.6 cm (64.02\")     Body mass index is 33.79 kg/m².    Physical Exam:    Constitutional:  Well developed 81 y.o. male that appears in no apparent distress.  Awake & oriented times 3.  Normal mood with normal recent and remote memory and normal judgement.  Eyes:  Conjunctivae normal.  Oropharynx: Previously, moist mucous membranes without exudate and a large tongue and normal uvula and narrow posterior pharyngeal opening, patient is wearing a facemask.    Self-administered Tiger Sleepiness Scale test results: 4  0-5 Lower normal daytime sleepiness  6-10 Higher normal daytime sleepiness  11-12 Mild, 13-15 Moderate, & 16-24 Severe excessive daytime " sleepiness     Downloaded PAP Data Reviewed For Compliance:  DME is [facility] and he uses nasal pillows.  Downloads between 6/24 and 8/2/2022 compliance 100%.  Average usage is 8 hours and 47 minutes.  Average AHI is mildly abnormal at 7 but all subsets are normal and he does have a leak.  Average auto BiPAP pressure is IPAP of 15.3 and EPAP of 11.3 and his ResMed auto BiPAP: Max IPAP 17 minimum EPAP 10 and pressure support of 4    I have reviewed the above results and compared them with the patient's last downloads and reviewed with the patient.    Impression:   Obstructive sleep apnea adequately treated with ResMed auto BiPAP with good compliance and good usage and no complaints of hypersomnolence.  The patient continues to take modafinil 200 mg every morning for complaints of hypersomnolence. The patient's obstructive apnea appears to be adequately treated.  The patient's average clear airway index is normal.    Restless leg syndrome treated with Requip 0.5 mg tablets, 4 every evening, and gabapentin    Psychophysiologic insomnia treated with trazodone    Plan:  Good sleep hygiene measures should be maintained.  Weight loss would be beneficial in this patient who is obese by BMI.      After evaluating the patient and assessing results available, the patient is benefiting from the treatment being provided.     The patient will continue ResMed auto BiPAP.  After clinical evaluation and review of downloads, I recommend no changes to the patient's pressures.  A new prescription will be sent to the patient's DME.    The patient will continue modafinil as prescribed.  Sen reviewed and there are no irregularities.    I answered all of the patient's questions.  The patient will call for any problems and will follow up in 6-8 months [facility].      Bob Mcdermott MD  Sleep Medicine  08/04/22  09:09 EDT

## 2022-08-09 DIAGNOSIS — C61 PROSTATE CANCER METASTATIC TO BONE: ICD-10-CM

## 2022-08-09 DIAGNOSIS — C79.51 PROSTATE CANCER METASTATIC TO BONE: ICD-10-CM

## 2022-08-09 RX ORDER — HYDROCODONE BITARTRATE AND ACETAMINOPHEN 5; 325 MG/1; MG/1
1 TABLET ORAL EVERY 6 HOURS PRN
Qty: 120 TABLET | Refills: 0 | Status: SHIPPED | OUTPATIENT
Start: 2022-08-09 | End: 2022-09-08 | Stop reason: SDUPTHER

## 2022-08-19 ENCOUNTER — SPECIALTY PHARMACY (OUTPATIENT)
Dept: PHARMACY | Facility: HOSPITAL | Age: 82
End: 2022-08-19

## 2022-08-19 NOTE — PROGRESS NOTES
Specialty Pharmacy Refill Coordination Note     Semaj is a 81 y.o. male contacted today regarding refills of  Xtandi specialty medication(s).    Reviewed and verified with patient:       Specialty medication(s) and dose(s) confirmed: yes    Refill Questions    Flowsheet Row Most Recent Value   Changes to allergies? No   Changes to medications? No   New conditions since last clinic visit No   Unplanned office visit, urgent care, ED, or hospital admission in the last 4 weeks  No   How does patient/caregiver feel medication is working? Good   Financial problems or insurance changes  No   Since the previous refill, were any specialty medication doses or scheduled injections missed or delayed?  No   Does this patient require a clinical escalation to a pharmacist? No          Delivery Questions    Flowsheet Row Most Recent Value   Delivery method [employer]  [[employer] standard no sig ship 8/23 deliver 8/24]   Delivery address correct? Yes   Preferred delivery time? Anytime   Number of medications in delivery 1   Medication being filled and delivered Xtandi   Doses left of specialty medications 1 & ½ weeks   Is there any medication that is due not being filled? No   Supplies needed? No supplies needed   Cooler needed? No   Do any medications need mixed or dated? No   Copay form of payment Payment plan already set up   Questions or concerns for the pharmacist? No   Are any medications first time fills? No            Medication Adherence    Adherence tools used: directed education  Support network for adherence: healthcare provider          Follow-up: 3 week(s)     Radha Brito  Specialty Pharmacy Technician

## 2022-08-27 DIAGNOSIS — G47.33 OBSTRUCTIVE SLEEP APNEA SYNDROME: ICD-10-CM

## 2022-08-27 DIAGNOSIS — G47.14 HYPERSOMNIA DUE TO MEDICAL CONDITION: ICD-10-CM

## 2022-08-27 RX ORDER — ROPINIROLE 0.5 MG/1
TABLET, FILM COATED ORAL
Qty: 360 TABLET | Refills: 2 | Status: SHIPPED | OUTPATIENT
Start: 2022-08-27 | End: 2023-02-08 | Stop reason: SDUPTHER

## 2022-08-27 RX ORDER — MODAFINIL 200 MG/1
200 TABLET ORAL DAILY
Qty: 90 TABLET | Refills: 1 | Status: SHIPPED | OUTPATIENT
Start: 2022-08-27 | End: 2022-11-17 | Stop reason: SDUPTHER

## 2022-09-08 DIAGNOSIS — C79.51 PROSTATE CANCER METASTATIC TO BONE: ICD-10-CM

## 2022-09-08 DIAGNOSIS — C61 PROSTATE CANCER METASTATIC TO BONE: ICD-10-CM

## 2022-09-08 RX ORDER — HYDROCODONE BITARTRATE AND ACETAMINOPHEN 5; 325 MG/1; MG/1
1 TABLET ORAL EVERY 6 HOURS PRN
Qty: 120 TABLET | Refills: 0 | Status: SHIPPED | OUTPATIENT
Start: 2022-09-08 | End: 2022-10-03 | Stop reason: SDUPTHER

## 2022-09-20 ENCOUNTER — SPECIALTY PHARMACY (OUTPATIENT)
Dept: PHARMACY | Facility: HOSPITAL | Age: 82
End: 2022-09-20

## 2022-09-20 NOTE — PROGRESS NOTES
Specialty Pharmacy Refill Coordination Note     Semaj is a 81 y.o. male contacted today regarding refills of  Xtandi specialty medication(s).    Reviewed and verified with patient:       Specialty medication(s) and dose(s) confirmed: yes    Refill Questions    Flowsheet Row Most Recent Value   Changes to allergies? No   Changes to medications? No   New conditions since last clinic visit No   Unplanned office visit, urgent care, ED, or hospital admission in the last 4 weeks  No   How does patient/caregiver feel medication is working? Good   Financial problems or insurance changes  No   Since the previous refill, were any specialty medication doses or scheduled injections missed or delayed?  No   Does this patient require a clinical escalation to a pharmacist? No          Delivery Questions    Flowsheet Row Most Recent Value   Delivery method [employer]  [[employer] standard no sig ship 9/26 deliver 9/27- leave on little table]   Delivery address correct? Yes   Preferred delivery time? Anytime   Number of medications in delivery 1   Medication being filled and delivered Xtandi   Doses left of specialty medications ~10 days   Is there any medication that is due not being filled? No   Supplies needed? No supplies needed   Cooler needed? No   Do any medications need mixed or dated? No   Copay form of payment Payment plan already set up   Questions or concerns for the pharmacist? No   Are any medications first time fills? No            Medication Adherence    Adherence tools used: directed education  Support network for adherence: healthcare provider          Follow-up: 3 week(s)     Radha Brito  Specialty Pharmacy Technician

## 2022-09-20 NOTE — PROGRESS NOTES
Subjective  Patient doing fairly well with current therapy, able to manage his garden without significant exhaustion or worsening pain symptoms.        REASONS FOR FOLLOW UP:   1.  Metastatic prostate cancer    HISTORY OF PRESENT ILLNESS:     Patient is an 81-year-old with metastatic prostate cancer who is here today for lab review and toxicity check.  He continues on Xtandi which has now been reduced to 80 mg daily.  He is tolerating this far better than the full dose and his PSA continues to drop now 5.2 by 12/1/2021 and 4.7 by 12/8/2021.  The patient states his ability to function at home is also improved with, again, less nausea, improved appetite and no additional musculoskeletal pain.  He does remain hypophosphatemic as result of Xgeva and such response would indicate that the interval between these injections can also be lengthened to, possibly, 3 months along when he is given his Lupron.     When the patient was seen 6 weeks ago his PSA had begun to slowly increase moving from 4.7-7.8 and then a 9.06.  We had reduced his Xtandi finding that he rapidly responded to relatively low dose but I discussed today that he will need to move up to 160 mg daily.  When he is seen 5/2/2022 he is feeling reasonably well with an acceptable performance status.     The patient's PSA 5/2/2022 was 10.1 and he did move of his Xtandi to 160 mg p.o. daily.    His follow-up appointment is now 6/13/2022 at which time he is also ready for Xgeva and Lupron.  We have discussed that his Xgeva can now be moved to every 6 months.  He is seen with his son 6/13/2022 having dehydrated somewhat in the heat 6/12/2022 but feeling improved.  He does note some difficulty with swallowing and is asked to observe this as he rehydrate and manage his blood sugar more effectively.  Plans were made to try to adjust the patient's Xgeva every 6 months and Lupron every 3 months.  The patient is seen back along with his son, Georges, 9/21/2022 fortunately  doing reasonably well without additional pain, discomfort or excessive fatigue.  We have discussed various options for treatment of prostate cancer if we are unable to control this disease with current measures.          Hematologic/oncologic history:   The patient is an 81-year-old male with significant past medical history including prostate carcinoma, CKD 3 and long-term tobacco use be been seen by primary care in late January, 2018 for hoarseness.  Chest x-ray is now outpatient January 23 revealed sclerotic change in the posterior mid chest to be within 3 adjacent thoracic ertebral bodies of uncertain significance.  A follow-up chest CT revealed numerous sclerotic foci within al visualized bones consistent with metastatic disease, multiple enlarged mediastinal lymph nodes concerning for metastatic lymphadenopathy and a polypoidal abnormality in the right lateral wall of the trachea.  CT of abdomen March 20 revealed extensive osseous metastatic disease mildly enlarged retroperitoneal lymph nodes.  Bone scan March 20 was consistent with widespread osseous metastasis particularly in the proximal half the left humeral shaft, abnormal uptake in the sternum and manubrium and a small focus in the posterior aspect of the calvarium.  This led to a plain film the left humerus with mottled sclerosis involving the shaft of the humerus particularly in the proximal half but no evidence of pathologic fracture.    The patient has additionally type 2 diabetes on insulin pump, again a history of prostate carcinoma prostatectomy 12 years previously, hypertension, and hyperlipidemia.  Additional studies included a PSA of 395.1 from March 14, 2018.The patient's been seen by urology March 15 with plans to start Firmagon.    The patient is now seen in pulmonary clinic with Dr. Bijan Rivera and we have discussed that he will need bronchoscopy and mediastinoscopy.  Interestingly his additional history includes a long occupational  exposure including working in the [place] just out of school, subsequent construction work for many years and a 30-pack-year history of smoking stopping in the late 1990s.  He indicates that he followed with Dr. Guillen of urology for many years with no changes in his exams and normal PSAs.  He stopped this follow-up approximately 2 years ago.     Patient was seen in consultation with thoracic surgery on March 28 and plans are made for mediastinoscopy and bronchoscopy with lymph node biopsy.  Pathology revealed that these nodes were consistent with metastatic prostate carcinoma.  He was discussed at thoracic conference.  A PET/CT was not pursued and it was determined that he see medical oncology for hormonal treatment and radiation therapy if symptomatic.  It should be noted that the patient's March 15 [facility] office note describes that Firmagon was planned.     The patient has met with the son and grandson April 23.  We have also contacted Dr. Taylor of urology in that Firmagon was given just after his last visit and would next be due approximately May 3.  Patient feels considerably better with resolution of his pain, normalization of his performance status.  He would like to continue treatment through our office now contacted Dr. Taylor concerning this.    The patient is next seen June 11, 2018 we discussed his follow-up including his treatments with Lupron May 7 and his next treatment on July 30.  He has returned to essentially normal performance status and his PSA has dropped further from 395 March 14 27.8 May 7 and now 2.03 June 11.  We do plan to initiate Xgeva also study him via scans to document his improvement approximately a month from now.   The patient is next seen July 26, 2018.  Repeat imaging demonstrated interval resolution of mediastinal and retroperitoneal lymphadenopathy.  There is thickening in the distal aspect of the appendix with stranding?  Chronic appendicitis.  The  patient indicates he is having no such symptoms and never has.  There is no change in sclerotic bony metastasis in the patient's PSA has dropped substantially to 1.66 by July 19 and 1.79 July 26.  He is actually feeling excellent and this is corroborated by family members.   Patient is next seen January 10, 2019 continuing to do very well and, in fact indicating that [facility], likely hearing replacements, visual review.  He is not certain is going to continue his care with them or with us or combination of both.    Patient is next seen April 4, 2019.  He is recently discharged after hospitalization March 15-18 with left upper lobe pneumonia.  We reviewed the findings in detail with his gradual recovery now documented.  His studies include a CT of chest which does not demonstrate any further bony lesions and/or pulmonary metastasis having developed.  He is feeling considerably better and approximately back to his baseline.  The patient is next seen June 28, 2019 feeling well but having baseline generally musculoskeletal pain and restless legs.His recent exams include a PSA of 0.81, CMP with potassium of 5.3 with repeat pending.  His performance status overall remains good to very good and is clearly responding to his treatments.  The patient is next seen December 13, 2019 continue to feel well.  He has had, oddly enough, 2 episodes of sleep paralysis which have occurred to him previously and he wonders if there is any connection with his prostate carcinoma though this is felt unlikely.     The patient when assessed in December had his PSA go to 2.9.  We continue with Lupron and Xgeva and is seen back now March 9, 2020 without additional symptoms.  We discussed that a schedule could likely change moving to 3 months for both of these medications particularly if he needs additional therapy directed at progressive disease.     Patient contacted our practice May 4 concern  for pain in his hips.  This led to moving his scans up and they were performed May 8, 2020 showing a sub-6 mm pleural-based pulmonary nodule in the right lower lobe that is new from March 15, 2018, remainder of the sub-6 mm pulmonary nodules bilaterally are stable.  There is extensive osseous metastatic disease as previous with no other new findings.     The patient indicates, when seen, May 13 that his bone pain is now resolved.  While this is good information does not mean that he does not have further bony metastasis and we have discussed that a follow-up bone scan is likely necessary.  Furthermore he is having some difficulty with sleeplessness and increase in restless legs as he continues to stay at home during the COVID 19 crisis which, itself, is producing more anxiety for him.  A follow-up bone scan was obtained Ltey 3 revealing multifocal osseous metastatic disease which was compared to March 2, 2018 with improvement.  No new abnormal uptake is present.  He is next seen with us on June 17, 2020 and, fortunately, is not having any significant pain at this point.  We discussed his scans in detail and, on balance, his performance status also remains improved.     It was elected to follow the patient rather than changes antiandrogen therapy.  He is reassessed September 3 with unchanged CMP, H&H of 11.4 and 36.3 with normal white count, platelet count and differential, PSA at 15.8.  Follow-up PA lateral chest x-ray does not show any new abnormalities though there is extensive metastatic bone disease throughout the bony thorax and osteoblastic metastasis have been seen on chest CT May 2020.  The patient is seen with his son Georges September 10 noticing increasing bony discomfort primarily in the right hip following fairly intensive gardening which he does frequently.  Now has further elevation of PSA were wondering whether we should try to intervene sooner per additional antiandrogens.  We will need to further  assess him via CT scanning to make this decision     These were obtained October 1 showing extensive sclerotic disease with no metastatic disease in chest abdomen or pelvis.  PSA had gone from 15.8 September 3-16 0.6 October 1.     He still has pain getting up in the morning and after active gardening.  This is manageable with current pain medications and, at present, it is not apparent that he needs to switch medications to gain better control of his disease.  The patient did have follow-up studies done including a PSA November 25, 2020 now at 25.4.  The patient is seen with his son December 10, 2020 doing fair but having some increased pain in the mid sternum and right hip that he ascribes to additional exercise/work in managing his garden.  It is apparent however, that his last bone scan was 6 months ago and we do need to reassess him concerning this.     The patient had follow-up bone scan performed January 11, 2021 showing again uptake in the calvarium, humerus, right medial clavicle, sternum, ribs, spine, sacrum, pelvis, right acetabulum, proximal femora and now left frontal bone which appears new.  There is also mild increase in sternal uptake and equivocal increase in the pelvic lesions of all of the sacrum and the right proximal femur.     The patient is seen with his son January 18, 2021 and indicates that he is having pain gradually worsened in his right hip, mid chest that had been an issue previously.  These findings on bone scan are reviewed with both of them as likely the underlying culprit for his discomfort.  He would need change of the systemic therapy but, at present, will ask for radiation therapy palliation initially.  He was previously treated by Dr. Kulkarni many years ago and will ask her to see him.  We will also make application for the current cost of Xtandi or Zytiga.  The patient is not felt to be a candidate for systemic chemotherapy.     The patient was referred for ration therapy as we  continued to assess his PSA on current therapy as well as exam the cost of Zytiga or Xtandi.  Radiation therapy (Dr. Kulkarni) saw the patient January 26 and felt that the right femur and sternal lesion could benefit from therapy-3000 cGy in 10 fractions.  The patient took treatment February 1 to February 12 to the above areas and is next seen back March 15.  Fortunately his pain has improved dramatically and he is quite happy about this.  Unfortunately he notes some difficulty with swallowing that is temporary and often leads to increased salivation and mucus production.  Patient was asked to return his next assessed April 12, 2021.  He is able to swallow with less pain but still has excess mucus production and issues with salivation.  His PSA has noted increase but he is having no additional bony discomfort at this point and, additionally, has noted low-grade fevers just under 100 degrees at nighttime?.  His weight, appetite and general performance status have remained good to excellent.  We discussed follow-up studies at this point to determine his status.    Follow-up scans were scheduled CT scans of chest and pelvis 5/4/2021 showing osseous metastasis quite similar to previous examinations in the chest, CT of abdomen pelvis also with no acute intra-abdominal adenopathy no indication of abdominal pelvic metastatic disease but again widespread osseous metastasis.  His PSA at this point have been slowly increasing to a level of 44 on 4/12/2021.  As the patient is reassessed 5/10/2021 we find, fortunately, that he is not exceeding symptomatic.  It could make reasonable sense at this point to take the mediastinal nodes from his biopsy 4/9/2018, reassess potential targeted therapies, MSI status, and germline analysis.  Fortunately he is not having significant pain or discomfort and is seen with his son as we discussed the above plan.  Notably a follow-up PSA is found to be 42.6.   Patient is next seen in 6/7/2021 for  Lupron and Xgeva.  Fortunately he is feeling considerably better according to both he and his son though his stamina has reduced.  He is not having additional pain at this point.  We have also reviewed his genetics assessment which was significant only for a variant of unknown significance.  This is not an actionable abnormality.    As the patient's PSA further escalated increasing to 89 7/7/2021 his subsequent Axumin PET was obtained 8/20 demonstrating extensive osteosclerotic metastatic disease showing reduced uptake-typical finding but no evidence of visceral metastatic disease in the neck chest abdomen pelvis.  These findings are discussed with the patient and his son 8/25/2021 and indicative of his progressive disease-etiology of his rising PSA-though the patient is asymptomatic we have discussed moving to initiate Xtandi is available at 160 mg p.o. twice daily along with his current Lupron and Xgeva.     The patient's testing 10/6 included PSA that is dropped to 9.69.  He is seen with his son, Cody, both indicating that he been doing relatively well with treatment but began to notice nausea in the last week.  The schedule that he has been given also needs to be somewhat updated in terms of his Lupron and Xgeva.  He has been able to maintain his appetite and overall performance status to date.    The patient is next assessed 12/8/2021 tolerating his current Xtandi dose better than previous and maintaining a good performance status overall as well as demonstrating a drop in his PSA now to 4.7 on 12/8/2021 compared to high of 112 9/8/2021.  He remains hypophosphatemic and hypocalcemic and we have discussed, again, changing his Xgeva dosing to every 3 months along with his Lupron will continue his Xtandi to 80 mg daily.    The patient is next reviewed 3/21/2022 seen for both Xgeva and Lupron.  Fortunately he is feeling reasonably well with little additional pain though he has restarted to place his garden into  shape for the season and is working more outdoors.    Patient reevaluated 5/2/2022 with repeat CBC including H&H of 10.5 and 33.4, white count 5980 and platelet count of 192,000, currently CMP and PSA pending with a previous PSA 1 month ago at 9.06.  At this point we increased his Xtandi to 160 mg p.o. daily while continuing treatment with every 3 month Xgeva and Lupron.  Notably the patient's Requip  alcohol I am sure it is mariza now at 1.5 mg p.o. nightly and Neurontin 900 mg p.o. daily has improved his restless leg syndrome substantially.    Patient seen 6/13/2022 at which time Xtandi at 160 mg p.o. daily was continued, Xgeva moved to every 6 months and Lupron every 3 months.    This was continued when patient was seen 9/21/2022.      Past Medical History:   Diagnosis Date   • Aortic stenosis, mild 1/1/2021    Per echocardiogram 2021   • Ascending aorta dilatation (HCC)    • Bone cancer (HCC)    • Cellulitis of great toe of left foot 10/19/2018   • CKD (chronic kidney disease)     Stage 3; followed by Dr. Vicky Duran    • Diastolic dysfunction     GRADE II per echo 2018   • Difficulty breathing     during exertion   • Dyslipidemia    • Erectile dysfunction    • Fatigue    • History of blood transfusion    • History of kidney stones    • Hyperlipidemia    • Hypertension    • Hyponatremia    • Left ventricular hypertrophy     per echo 2018   • Localized edema    • Neuropathy    • Obstructive sleep apnea     USING CPAP   • Osteoarthritis    • Peptic ulcer    • Pneumonia    • Pneumonia of left upper lobe due to infectious organism 3/15/2019   • Prostate cancer (HCC)     Status post prostatectomy, radiation therapy, and hormone therapy followed by Dr. Lee; metastatsis to bone   • Pure hyperglyceridemia    • Restless leg syndrome    • Sepsis (HCC)    • Sinus bradycardia    • Transient cerebral ischemia    • Type 2 diabetes mellitus (HCC)         Past Surgical History:   Procedure Laterality Date   •  BRONCHOSCOPY N/A 4/9/2018    Procedure: BRONCHOSCOPY;  Surgeon: Bijan Rivera III, MD;  Location: [facility] OR;  Service: Cardiothoracic   • COLONOSCOPY     • DEEP NECK LYMPH NODE BIOPSY / EXCISION     • HEMORRHOIDECTOMY     • MEDIASTINOSCOPY N/A 4/9/2018    Procedure: MEDIASTINOSCOPY WITH LYMPH NODE BIOPSY;  Surgeon: Bijan Rivera III, MD;  Location: [facility] OR;  Service: Cardiothoracic   • OTHER SURGICAL HISTORY      ulcer repair   • PROSTATECTOMY  2006        Current Outpatient Medications on File Prior to Visit   Medication Sig Dispense Refill   • aspirin 81 MG EC tablet Take 1 tablet by mouth daily.     • cholecalciferol (VITAMIN D3) 1000 units tablet Take 2,000 Units by mouth Daily.     • Denosumab (XGEVA SC) Inject  under the skin into the appropriate area as directed Every 30 (Thirty) Days.     • dilTIAZem CD (CARDIZEM CD) 120 MG 24 hr capsule TAKE 1 CAPSULE EVERY DAY 90 capsule 3   • enzalutamide (XTANDI) 40 MG chemo capsule Take 4 capsules by mouth Daily. 120 capsule 2   • furosemide (LASIX) 40 MG tablet Take 40 mg by mouth Every Morning.     • gabapentin (NEURONTIN) 300 MG capsule TAKE 3 CAPSULES EVERY NIGHT AT BEDTIME 90 capsule 0   • HYDROcodone-acetaminophen (NORCO) 5-325 MG per tablet Take 1 tablet by mouth Every 6 (Six) Hours As Needed for Moderate Pain. 120 tablet 0   • Insulin Aspart (novoLOG) 100 UNIT/ML injection INJECT 100 UNITS VIA PUMP ONCE DAILY     • insulin aspart (NovoLOG) 100 UNIT/ML patient supplied pump Inject  under the skin into the appropriate area as directed Continuous. Pt reports pump with basal rate 1.9 units/hr from 0800 to 1200. Basal rate 1.8 units/hr 1200 to 0800.  Checks bg ac/hs with bolus doses per pumps parameters     • Leuprolide Acetate, 3 Month, (LUPRON DEPOT, 3-MONTH, IM) Inject  into the appropriate muscle as directed by prescriber Every 3 (Three) Months.     • levothyroxine (SYNTHROID, LEVOTHROID) 88 MCG tablet      • loperamide (IMODIUM) 1 MG/5ML  "solution Take 2 mg by mouth 4 (Four) Times a Day As Needed for Diarrhea.     • modafinil (PROVIGIL) 200 MG tablet Take 1 tablet by mouth Daily. 90 tablet 1   • olmesartan (BENICAR) 40 MG tablet TAKE 1 TABLET EVERY DAY (DISCONTINUE LOSARTAN 100MG) 90 tablet 3   • ondansetron (ZOFRAN) 8 MG tablet Take 1 tablet by mouth 3 (Three) Times a Day As Needed for Nausea or Vomiting. 30 tablet 5   • pravastatin (PRAVACHOL) 40 MG tablet Take 40 mg by mouth daily.     • rOPINIRole (REQUIP) 0.5 MG tablet Take 2 tablets by mouth in the evening and 2 at bedtime. 360 tablet 2   • traZODone (DESYREL) 50 MG tablet Take  mg by mouth every night at bedtime.       No current facility-administered medications on file prior to visit.        ALLERGIES:    Allergies   Allergen Reactions   • Niacin Unknown - Low Severity   • Statins Unknown - Low Severity        Social History     Socioeconomic History   • Marital status:    • Years of education: College   Tobacco Use   • Smoking status: Former Smoker     Packs/day: 1.00     Years: 30.00     Pack years: 30.00     Types: Cigarettes     Quit date: 1998     Years since quittin.6   • Smokeless tobacco: Never Used   Substance and Sexual Activity   • Alcohol use: No     Comment: Caffeine use: 2-3 cups daily   • Drug use: No   • Sexual activity: Defer        Family History   Problem Relation Age of Onset   • Heart failure Mother    • Hypertension Mother    • Diabetes Mother    • Heart disease Mother    • Lung cancer Father 46   • Hypertension Sister    • Lung cancer Sister 60   • Hypertension Brother    • Lung cancer Brother 62   • Heart disease Other    • Prostate cancer Brother 60   • Malig Hyperthermia Neg Hx         Review of Systems  As per HPI   .  Objective     Vitals:    22 1137   BP: 150/75   Pulse: 52   Resp: 16   Temp: 97.8 °F (36.6 °C)   TempSrc: Temporal   SpO2: 95%   Weight: 90.4 kg (199 lb 6.4 oz)   Height: 162.6 cm (64.02\")   PainSc:   4   PainLoc: " Back  Comment: upper right side/ right leg     Current Status 9/21/2022   ECOG score 0       Physical Exam   Constitutional: He is oriented to person, place, and time. He appears well-developed. No distress.   HENT:   Head: Normocephalic and atraumatic.   Mouth/Throat: No oropharyngeal exudate.   Eyes: Pupils are equal, round, and reactive to light.   Cardiovascular: Normal rate, regular rhythm and normal heart sounds.   No murmur heard.  Pulmonary/Chest: Effort normal and breath sounds normal. No respiratory distress. He has no wheezes. He has no rhonchi. He has no rales.   Abdominal: Soft. Normal appearance and bowel sounds are normal. He exhibits no distension.   Musculoskeletal: Normal range of motion.   Neurological: He is alert and oriented to person, place, and time.   Skin: Skin is warm and dry. No rash noted.   Vitals reviewed.    RECENT LABS:  Hematology WBC   Date Value Ref Range Status   09/21/2022 5.36 3.40 - 10.80 10*3/mm3 Final     RBC   Date Value Ref Range Status   09/21/2022 3.41 (L) 4.14 - 5.80 10*6/mm3 Final     Hemoglobin   Date Value Ref Range Status   09/21/2022 10.4 (L) 13.0 - 17.7 g/dL Final     Hematocrit   Date Value Ref Range Status   09/21/2022 32.9 (L) 37.5 - 51.0 % Final     Platelets   Date Value Ref Range Status   09/21/2022 179 140 - 450 10*3/mm3 Final        Lab Results   Component Value Date    GLUCOSE 157 (H) 09/21/2022    BUN 32 (H) 09/21/2022    CREATININE 1.16 09/21/2022    EGFRIFNONA 56 (L) 02/23/2022    BCR 27.6 09/21/2022    K 4.8 (H) 09/21/2022    CO2 23.7 09/21/2022    CALCIUM 9.8 09/21/2022    ALBUMIN 4.00 09/21/2022    AST 17 09/21/2022    ALT 8 09/21/2022       NM Bone Scan Whole Body (01/11/2021 13:25)  CT Chest With Contrast Diagnostic (05/04/2021 09:11)  CT Abdomen Pelvis With Contrast (05/04/2021 09:11)  NM PET/CT Skull Base to Mid Thigh (08/20/2021 12:18)      Assessment & Plan      1.  Metastatic prostate cancer:  ? Patient initially diagnosed in 2006 and had a  prostatectomy and hormone therapy.  ? Patient in January 2018 began to complain of shortness of breath and hoarseness.  Chest x-ray obtained 1/23/18 showed sclerotic bone lesions.  ? CT of the chest 3/12/2018 numerous sclerotic bone foci with all visualized bones consistent with metastatic disease, multiple enlarged mediastinal lymph nodes.  ? .1 from 3/14/2018.  Patient was started on Firmagon by urology, [facility].  ? 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  ? Case discussed at thoracic conference and recommendations were to continue ADT and radiation for symptomatic sites.  ? Lupron injections every 3 mos initiated 5/7/18 PSA at that time 7.810  ? Monthly Xgeva initiated 6/11/18 PSA 2.030  ? Last PSA 6/13/2019 0.881  ? 9/20/2019 patient tolerating treatment well, no new concerns.  ? 12/2019 PSA 2.9  ? 3/9/2020  PSA increasing to 5.030, he remains continuing on Lupron and Xgeva and asymptomatic though follow-up CT of chest and pelvis will be requested in 11 weeks prior to follow-up in 12 weeks.   ? 5/4/2020 patient called reported worsening right hip pain, plans to purse CT scans ASAP.   ? 5/8/2020 CT scans C/A/P show no progression of disease. Hip pain improved, Gabapentin added.. Bone scan requested.  ? 6/3/2020 bone scan that does not demonstrate worsening of bone nor need for localized radiation therapy.  The patient's PSA has been slowly rising and we have not been able to determine worsening of disease and and, therefore, it is not felt warranted add additional medications such as Xtandi or apalutamide to his therapy.  Please note would like to avoid Zytiga try not to add prednisone which would worsen his blood sugar control.  ? 9/3/2020 PSA 15.8, CXR extensive metastatic bone disease- patient reviewed 9/10/2020 reporting increased bony discomfort CT scans requested.  ? Scans done and reviewed 10/7/2020 ultimately  reveal no evidence of progression of disease for visceral involvement or clearly for bone involvement.  After further review with the patient and his son plan continued Xgeva and Lupron.  We have assessed for availability of Xtandi 160 mg p.o. daily and find the cost is currently prohibitive.   ? 11/25/2020 PSA 25.4  ? 12/10/2020 review with some mild increase in bony discomfort.  After repeat discussion we went on to have him undergo Lupron therapy, and his PSA actually to be mildly reduced at 22.2.   ? Follow-up bone scan 1/11/2021 demonstrates some evidence of progression in sternum, left sacrum and proximal femur.  These findings were reviewed with the patient and his son January 18, 2020 and the patient was referred for palliative radiotherapy(Dr. Kulkarni)  ? Palliative radiation under care Dr. Kulkarni to right femur and sternal lesion -3000 cGy in 10 fractions given February 1 to February 12 with significant symptom improvement.  ? 3/15/2021 PSA 38.5  ? 4/12/2021 PSA 44  ? 5/10/2021 PSA 42.6 CT chest abdomen pelvis 5/4/2021 - for progression of disease and follow-up PSA 5 10/2021 is at 42 slightly reduced from previous.  We have discussed how to proceed from here and find a significant family history that clearly supports a potential genetic assessment for his malignancy.  ? It is felt most reasonable at this point to take the mediastinal nodes from his biopsy 4/9/2018 and reassess for potential targeted therapies, MSI status, as well as germline analysis.  ? 6/7/2021 PSA 65.9 his analysis completed for actionable mutations including germline mutations.  These exams were negative though there is a variant of unknown significance.  Again this is not an actionable mutation.  We proceeded with additional Xgeva and Lupron planning for monthly Xgeva and Lupron at 3 months  ? 7/7/2021 PSA 89 and subsequent testing with Axumin PET was performed confirming extensive osteosclerotic metastatic disease with little uptake,  no evidence of visceral metastatic disease in neck chest abdomen or pelvis.  ? 8/25/2021  , PET/CT reviewed, subsequent PSA increased to 105.  Patient fortunately asymptomatic.  Requested reevaluation for Xtandi 160 mg po daily.   ? 9/8/2021 due for his lupron, receiving every 3 months, and monthly Xgeva.  Will have education today for Xtandi and will receive his medication today as well.   ? Started Xtandi 9/8/2021.  ? 9/22/2021 here for toxicity check, so far tolerating Xtandi quite well other than a slight increase in diarrhea controlled with Imodium.  ? 10/6/2021 continues to tolerate Xtandi well.  Labs are within range today, we will proceed with Xgeva today.  ? Patient seen 10/20/2021 having more nausea with Xtandi and demonstrating a substantial reduction in PSA level.  After discussion we plan to reduce his dose to 80 mg daily following his PSA and performance status over the next approximate 7 weeks at which time he would be scheduled for his follow-up Lupron.  ? Patient assessed 12/8/2021 with further reduction in PSA to 4.7, ongoing hypophosphatemia and hypocalcemia-Xgeva moved to every 3 months given on same schedule as Lupron  ? Patient seen 3/21/2022, 6-week follow-up with mild increase in PSA recognized  ? Patient reassessed 5/2/2022 with PSA at 10.1, Xtandi moved to 160 mg p.o. daily  ? Patient assessed 6/13/2022, Xtandi at 160 mg p.o. daily, PSA pending, Xgeva moved to every 6 months, Lupron every 3 months  ? Patient seen 9/21/2022 at which time we continued our current approach, reviewed additional options for therapy should he clearly progress.    2.  Neuropathy/ restless legs:    · 5/13/2020 gabapentin 600 mg at bedtime, trazadone 50 mg QHS.  · On gabapentin 300, 2 tablets at bedtime, and requip 0.5.mg    · Still having neuropathy symptoms.  Will increase gabapentin to 900 mg at bedtime.   · 9/22/2021 increase in gabapentin to 900 mg at bedtime has helped neuropathy.  · Requip moved to  bedtime dosing only-1 mg, escalate as needed    3.  Cancer related pain: on Norco 5/325 every 6 hours as needed.    4.  Hyperkalemia:  · Intermittent issue for the patient.  It is typically dietary related, as he consumes large amount  fresh tomatoes and other foods from his garden..  He was instructed to significantly decrease his intake as his potassium level today is 6.4, magnesium 2.7  He is not symptomatic.  We will also check an EKG.   · 9/22/2021 potassium level is improved to 5.7.  Patient states that he is no longer eating tomatoes from his garden.  · 10/6/2021 potassium level is 5.8.  Due to patient's multiple electrolyte abnormalities we will go ahead and refer him to nephrology for further evaluation.  Patient states he actually has an existing nephrologist, Dr. Perry Daily who he typically only sees on an annual basis.  We will schedule him to be seen by her soon for further evaluation.  · Reassess 9/21/2022 potassium 4.8    5.  Hypophosphatemia:  Phosphorus 1.9- hold xgeva 9/8/2021 ., Increase foods high in phosphorus, recheck in 2 weeks. \  · 9/22/2021 phosphorus level 1.7.  Reviewed with Dr. Lee.  In addition to increasing foods high in phosphorus, I have instructed the patient to start Neutra-Phos 1 tablet daily.  We will recheck again in 2 weeks.  · Phosphorus 2.3 assessed 10/20/2021  · Reassess 12/8/2021 at 1.9, Xgeva moved to every 3 months.  · Patient assessed 6/13/2022 Xgeva moved every 6 months  · Reevaluation 9/21/2022 with phosphorus 3.6, Xgeva continued every 6 months    PLAN:      1. Continue gabapentin 900 mg at bedtime, Requip to move to 2 mg p.o. nightly, escalate as needed  2. Continue Imodium if needed for diarrhea.  3. Continue Xtandi to 160 mg daily  4. Proceed with Xgeva and Lupron today, Xgeva moved to every 6 months, Lupron every 3 months.  PSA currently pending  5. Case discussed with patient's son, Cody, who concurs as well as the patient.    Patient on high risk  medication requiring close monitoring for toxicity.    Jose Lee MD  09/21/2022

## 2022-09-21 ENCOUNTER — LAB (OUTPATIENT)
Dept: LAB | Facility: HOSPITAL | Age: 82
End: 2022-09-21

## 2022-09-21 ENCOUNTER — SPECIALTY PHARMACY (OUTPATIENT)
Dept: ONCOLOGY | Facility: HOSPITAL | Age: 82
End: 2022-09-21

## 2022-09-21 ENCOUNTER — INFUSION (OUTPATIENT)
Dept: ONCOLOGY | Facility: HOSPITAL | Age: 82
End: 2022-09-21

## 2022-09-21 ENCOUNTER — OFFICE VISIT (OUTPATIENT)
Dept: ONCOLOGY | Facility: CLINIC | Age: 82
End: 2022-09-21

## 2022-09-21 VITALS
WEIGHT: 199.4 LBS | TEMPERATURE: 97.8 F | HEIGHT: 64 IN | OXYGEN SATURATION: 95 % | RESPIRATION RATE: 16 BRPM | HEART RATE: 52 BPM | BODY MASS INDEX: 34.04 KG/M2 | DIASTOLIC BLOOD PRESSURE: 75 MMHG | SYSTOLIC BLOOD PRESSURE: 150 MMHG

## 2022-09-21 DIAGNOSIS — C79.51 OSSEOUS METASTASIS: Primary | ICD-10-CM

## 2022-09-21 DIAGNOSIS — C79.51 OSSEOUS METASTASIS: ICD-10-CM

## 2022-09-21 DIAGNOSIS — C61 PROSTATE CANCER METASTATIC TO BONE: ICD-10-CM

## 2022-09-21 DIAGNOSIS — C79.51 PROSTATE CANCER METASTATIC TO BONE: Primary | ICD-10-CM

## 2022-09-21 DIAGNOSIS — C79.51 PROSTATE CANCER METASTATIC TO BONE: ICD-10-CM

## 2022-09-21 DIAGNOSIS — C61 PROSTATE CANCER METASTATIC TO BONE: Primary | ICD-10-CM

## 2022-09-21 LAB
ALBUMIN SERPL-MCNC: 4 G/DL (ref 3.5–5.2)
ALBUMIN/GLOB SERPL: 1.3 G/DL (ref 1.1–2.4)
ALP SERPL-CCNC: 204 U/L (ref 38–116)
ALT SERPL W P-5'-P-CCNC: 8 U/L (ref 0–41)
ANION GAP SERPL CALCULATED.3IONS-SCNC: 12.3 MMOL/L (ref 5–15)
AST SERPL-CCNC: 17 U/L (ref 0–40)
BASOPHILS # BLD AUTO: 0.02 10*3/MM3 (ref 0–0.2)
BASOPHILS NFR BLD AUTO: 0.4 % (ref 0–1.5)
BILIRUB SERPL-MCNC: 0.2 MG/DL (ref 0.2–1.2)
BUN SERPL-MCNC: 32 MG/DL (ref 6–20)
BUN/CREAT SERPL: 27.6 (ref 7.3–30)
CALCIUM SPEC-SCNC: 9.8 MG/DL (ref 8.5–10.2)
CHLORIDE SERPL-SCNC: 108 MMOL/L (ref 98–107)
CO2 SERPL-SCNC: 23.7 MMOL/L (ref 22–29)
CREAT SERPL-MCNC: 1.16 MG/DL (ref 0.7–1.3)
DEPRECATED RDW RBC AUTO: 48.9 FL (ref 37–54)
EGFRCR SERPLBLD CKD-EPI 2021: 63.3 ML/MIN/1.73
EOSINOPHIL # BLD AUTO: 0.11 10*3/MM3 (ref 0–0.4)
EOSINOPHIL NFR BLD AUTO: 2.1 % (ref 0.3–6.2)
ERYTHROCYTE [DISTWIDTH] IN BLOOD BY AUTOMATED COUNT: 13.8 % (ref 12.3–15.4)
GLOBULIN UR ELPH-MCNC: 3 GM/DL (ref 1.8–3.5)
GLUCOSE SERPL-MCNC: 157 MG/DL (ref 74–124)
HCT VFR BLD AUTO: 32.9 % (ref 37.5–51)
HGB BLD-MCNC: 10.4 G/DL (ref 13–17.7)
IMM GRANULOCYTES # BLD AUTO: 0.03 10*3/MM3 (ref 0–0.05)
IMM GRANULOCYTES NFR BLD AUTO: 0.6 % (ref 0–0.5)
LYMPHOCYTES # BLD AUTO: 1.45 10*3/MM3 (ref 0.7–3.1)
LYMPHOCYTES NFR BLD AUTO: 27.1 % (ref 19.6–45.3)
MAGNESIUM SERPL-MCNC: 1.6 MG/DL (ref 1.8–2.5)
MCH RBC QN AUTO: 30.5 PG (ref 26.6–33)
MCHC RBC AUTO-ENTMCNC: 31.6 G/DL (ref 31.5–35.7)
MCV RBC AUTO: 96.5 FL (ref 79–97)
MONOCYTES # BLD AUTO: 0.5 10*3/MM3 (ref 0.1–0.9)
MONOCYTES NFR BLD AUTO: 9.3 % (ref 5–12)
NEUTROPHILS NFR BLD AUTO: 3.25 10*3/MM3 (ref 1.7–7)
NEUTROPHILS NFR BLD AUTO: 60.5 % (ref 42.7–76)
NRBC BLD AUTO-RTO: 0 /100 WBC (ref 0–0.2)
PHOSPHATE SERPL-MCNC: 3.6 MG/DL (ref 2.5–4.5)
PLATELET # BLD AUTO: 179 10*3/MM3 (ref 140–450)
PMV BLD AUTO: 9.6 FL (ref 6–12)
POTASSIUM SERPL-SCNC: 4.8 MMOL/L (ref 3.5–4.7)
PROT SERPL-MCNC: 7 G/DL (ref 6.3–8)
PSA SERPL-MCNC: 16.5 NG/ML (ref 0–4)
RBC # BLD AUTO: 3.41 10*6/MM3 (ref 4.14–5.8)
SODIUM SERPL-SCNC: 144 MMOL/L (ref 134–145)
WBC NRBC COR # BLD: 5.36 10*3/MM3 (ref 3.4–10.8)

## 2022-09-21 PROCEDURE — 96372 THER/PROPH/DIAG INJ SC/IM: CPT

## 2022-09-21 PROCEDURE — 83735 ASSAY OF MAGNESIUM: CPT

## 2022-09-21 PROCEDURE — 96402 CHEMO HORMON ANTINEOPL SQ/IM: CPT

## 2022-09-21 PROCEDURE — 25010000002 LEUPROLIDE ACETATE (3 MONTH) PER 7.5 MG: Performed by: INTERNAL MEDICINE

## 2022-09-21 PROCEDURE — 85025 COMPLETE CBC W/AUTO DIFF WBC: CPT

## 2022-09-21 PROCEDURE — 99214 OFFICE O/P EST MOD 30 MIN: CPT | Performed by: INTERNAL MEDICINE

## 2022-09-21 PROCEDURE — 25010000002 DENOSUMAB 120 MG/1.7ML SOLUTION: Performed by: INTERNAL MEDICINE

## 2022-09-21 PROCEDURE — 80053 COMPREHEN METABOLIC PANEL: CPT

## 2022-09-21 PROCEDURE — 36415 COLL VENOUS BLD VENIPUNCTURE: CPT

## 2022-09-21 PROCEDURE — 84153 ASSAY OF PSA TOTAL: CPT | Performed by: INTERNAL MEDICINE

## 2022-09-21 PROCEDURE — 84100 ASSAY OF PHOSPHORUS: CPT

## 2022-09-21 RX ADMIN — LEUPROLIDE ACETATE 22.5 MG: KIT at 12:45

## 2022-09-21 RX ADMIN — DENOSUMAB 120 MG: 120 INJECTION SUBCUTANEOUS at 12:45

## 2022-09-21 NOTE — PROGRESS NOTES
MTM Encounter-Re: Adherence and side effects (Xtandi)    Today's encounter was conducted in person, face-to-face.     Medication:  Xtandi 160 mg daily  - Reason for outreach: Routine medication check-in .  - Administration: Patient is taking every day at the same time  and as prescribed .  - Missed doses: Patient reports missing 0 doses in the last 30 days.  - Self-administration: Patient demonstrates ability to self-administer medication. No barriers to adherence identified.   - Diagnosis/Indication: prostate cancer. Progress toward achieving therapeutic goals reviewed.   - Patient denies side effects.    - Medication availability/affordability: Patient has had no issues obtaining medication from pharmacy.   - Questions/concerns about medications: none       Completed medication reconciliation today to assess for drug interactions.   Reviewed allergies, medical history, labs, quality of life, and medication history with patient.   Patient denies starting or stopping any medications.  Assessed medication list for interactions, no significant drug interactions noted.   Advised pt to call the clinic if any new medications are started so we can assess for drug-drug interactions.     All questions addressed. Patient had no additional concerns for MTM office.     Sadie Harris RPH  9/21/2022  11:17 EDT

## 2022-10-03 DIAGNOSIS — C79.51 PROSTATE CANCER METASTATIC TO BONE: ICD-10-CM

## 2022-10-03 DIAGNOSIS — C61 PROSTATE CANCER METASTATIC TO BONE: ICD-10-CM

## 2022-10-03 RX ORDER — GABAPENTIN 300 MG/1
CAPSULE ORAL
Qty: 90 CAPSULE | Refills: 0 | Status: SHIPPED | OUTPATIENT
Start: 2022-10-03 | End: 2022-11-14

## 2022-10-04 RX ORDER — GABAPENTIN 300 MG/1
CAPSULE ORAL
Qty: 90 CAPSULE | Refills: 0 | OUTPATIENT
Start: 2022-10-04

## 2022-10-04 RX ORDER — HYDROCODONE BITARTRATE AND ACETAMINOPHEN 5; 325 MG/1; MG/1
1 TABLET ORAL EVERY 6 HOURS PRN
Qty: 120 TABLET | Refills: 0 | Status: SHIPPED | OUTPATIENT
Start: 2022-10-04 | End: 2022-11-05 | Stop reason: SDUPTHER

## 2022-10-14 ENCOUNTER — SPECIALTY PHARMACY (OUTPATIENT)
Dept: PHARMACY | Facility: HOSPITAL | Age: 82
End: 2022-10-14

## 2022-10-14 DIAGNOSIS — C61 PROSTATE CANCER METASTATIC TO BONE: ICD-10-CM

## 2022-10-14 DIAGNOSIS — C79.51 PROSTATE CANCER METASTATIC TO BONE: ICD-10-CM

## 2022-10-14 NOTE — PROGRESS NOTES
Specialty Pharmacy Refill Coordination Note     Semaj is a 81 y.o. male contacted today regarding refills of  Xtandi specialty medication(s).    Reviewed and verified with patient:       Specialty medication(s) and dose(s) confirmed: yes    Refill Questions    Flowsheet Row Most Recent Value   Changes to allergies? No   Changes to medications? No   New conditions since last clinic visit No   Unplanned office visit, urgent care, ED, or hospital admission in the last 4 weeks  No   How does patient/caregiver feel medication is working? Good   Financial problems or insurance changes  No   Since the previous refill, were any specialty medication doses or scheduled injections missed or delayed?  No   Does this patient require a clinical escalation to a pharmacist? No          Delivery Questions    Flowsheet Row Most Recent Value   Delivery method [employer]  [[employer] standard no sig ship 10/20 deliver 10/21- leave on table on porch]   Delivery address correct? Yes   Preferred delivery time? Anytime   Number of medications in delivery 1   Medication being filled and delivered Xtandi   Doses left of specialty medications ~2 weeks   Is there any medication that is due not being filled? No   Supplies needed? No supplies needed   Cooler needed? No   Do any medications need mixed or dated? No   Copay form of payment Payment plan already set up   Questions or concerns for the pharmacist? No   Are any medications first time fills? No            Medication Adherence    Adherence tools used: directed education  Support network for adherence: healthcare provider          Follow-up: 3 week(s)     Radha Brito  Specialty Pharmacy Technician

## 2022-10-14 NOTE — TELEPHONE ENCOUNTER
Refill requested from pharmacy. Per last chart note, patient to continue Xtandi to 160 mg daily. Will route to MD for cosignature.    Sunday Harris, PharmD, [facility]  10/14/2022 13:09 EDT

## 2022-11-05 DIAGNOSIS — C61 PROSTATE CANCER METASTATIC TO BONE: ICD-10-CM

## 2022-11-05 DIAGNOSIS — C79.51 PROSTATE CANCER METASTATIC TO BONE: ICD-10-CM

## 2022-11-07 RX ORDER — HYDROCODONE BITARTRATE AND ACETAMINOPHEN 5; 325 MG/1; MG/1
1 TABLET ORAL EVERY 6 HOURS PRN
Qty: 120 TABLET | Refills: 0 | Status: SHIPPED | OUTPATIENT
Start: 2022-11-07 | End: 2022-12-02 | Stop reason: SDUPTHER

## 2022-11-08 ENCOUNTER — SPECIALTY PHARMACY (OUTPATIENT)
Dept: PHARMACY | Facility: HOSPITAL | Age: 82
End: 2022-11-08

## 2022-11-08 NOTE — PROGRESS NOTES
Specialty Pharmacy Refill Coordination Note     Semaj is a 82 y.o. male contacted today regarding refills of  Xtandi specialty medication(s).    Reviewed and verified with patient:       Specialty medication(s) and dose(s) confirmed: yes    Refill Questions    Flowsheet Row Most Recent Value   Changes to allergies? No   Changes to medications? No   New conditions since last clinic visit No  [the diarrhea isn't new but unexpected- Imodium working fairly well]   Unplanned office visit, urgent care, ED, or hospital admission in the last 4 weeks  No   How does patient/caregiver feel medication is working? Good   Financial problems or insurance changes  No   Since the previous refill, were any specialty medication doses or scheduled injections missed or delayed?  No   Does this patient require a clinical escalation to a pharmacist? No          Delivery Questions    Flowsheet Row Most Recent Value   Delivery method [employer]  [[employer] standard no sig ship 11/21 deliver 11/22]   Delivery address correct? Yes   Preferred delivery time? Anytime   Number of medications in delivery 1   Medication being filled and delivered Xtandi   Doses left of specialty medications 3 weeks   Is there any medication that is due not being filled? No   Supplies needed? No supplies needed   Cooler needed? No   Do any medications need mixed or dated? No   Copay form of payment Payment plan already set up   Questions or concerns for the pharmacist? No   Are any medications first time fills? No            Medication Adherence    Adherence tools used: directed education  Support network for adherence: healthcare provider          Follow-up: 3 week(s)     Radha Brito  Specialty Pharmacy Technician

## 2022-11-12 DIAGNOSIS — C61 PROSTATE CANCER METASTATIC TO BONE: ICD-10-CM

## 2022-11-12 DIAGNOSIS — C79.51 PROSTATE CANCER METASTATIC TO BONE: ICD-10-CM

## 2022-11-14 RX ORDER — GABAPENTIN 300 MG/1
CAPSULE ORAL
Qty: 90 CAPSULE | Refills: 0 | Status: SHIPPED | OUTPATIENT
Start: 2022-11-14 | End: 2023-02-01

## 2022-11-15 ENCOUNTER — SPECIALTY PHARMACY (OUTPATIENT)
Dept: PHARMACY | Facility: HOSPITAL | Age: 82
End: 2022-11-15

## 2022-11-17 ENCOUNTER — OFFICE VISIT (OUTPATIENT)
Dept: SLEEP MEDICINE | Facility: HOSPITAL | Age: 82
End: 2022-11-17

## 2022-11-17 ENCOUNTER — TELEPHONE (OUTPATIENT)
Dept: SLEEP MEDICINE | Facility: HOSPITAL | Age: 82
End: 2022-11-17

## 2022-11-17 VITALS — HEART RATE: 57 BPM | OXYGEN SATURATION: 98 % | WEIGHT: 205 LBS | HEIGHT: 64 IN | BODY MASS INDEX: 35 KG/M2

## 2022-11-17 DIAGNOSIS — F51.04 PSYCHOPHYSIOLOGICAL INSOMNIA: ICD-10-CM

## 2022-11-17 DIAGNOSIS — G47.33 OBSTRUCTIVE SLEEP APNEA SYNDROME: ICD-10-CM

## 2022-11-17 DIAGNOSIS — G47.14 HYPERSOMNIA DUE TO MEDICAL CONDITION: ICD-10-CM

## 2022-11-17 DIAGNOSIS — E66.01 CLASS 2 SEVERE OBESITY DUE TO EXCESS CALORIES WITH SERIOUS COMORBIDITY AND BODY MASS INDEX (BMI) OF 35.0 TO 35.9 IN ADULT: Primary | ICD-10-CM

## 2022-11-17 DIAGNOSIS — G25.81 RESTLESS LEGS SYNDROME (RLS): ICD-10-CM

## 2022-11-17 PROCEDURE — 99214 OFFICE O/P EST MOD 30 MIN: CPT | Performed by: INTERNAL MEDICINE

## 2022-11-17 PROCEDURE — G0463 HOSPITAL OUTPT CLINIC VISIT: HCPCS

## 2022-11-17 RX ORDER — MODAFINIL 200 MG/1
200 TABLET ORAL DAILY
Qty: 30 TABLET | Refills: 0 | Status: SHIPPED | OUTPATIENT
Start: 2022-11-17

## 2022-11-17 NOTE — PROGRESS NOTES
"Follow Up [facility] Note     Chief Complaint:  RONNY     Primary Care Physician: Axel Guardado MD    Interval History:   The patient is a 82 y.o. male  who I last saw 2022 and that note was reviewed.  The patient reports he is unchanged.  He is having some problems with not getting enough air?  He goes to bed at 9:30 PM and awakens at 6 AM.  He will use the restroom during that time.    The patient continues to take modafinil 200 mg every morning.    Review of Systems:    A complete review of systems was done and all were negative with the exception of the above    Social History:    Social History     Socioeconomic History   • Marital status:    • Years of education: College   Tobacco Use   • Smoking status: Former     Packs/day: 1.00     Years: 30.00     Pack years: 30.00     Types: Cigarettes     Quit date: 1998     Years since quittin.8   • Smokeless tobacco: Never   Substance and Sexual Activity   • Alcohol use: No     Comment: Caffeine use: 2-3 cups daily   • Drug use: No   • Sexual activity: Defer       Allergies:  Niacin and Statins     Medication Review: His list was reviewed.      Vital Signs:    Vitals:    22 0840   Pulse: 57   SpO2: 98%   Weight: 93 kg (205 lb)   Height: 162.6 cm (64.02\")     Body mass index is 35.17 kg/m².    Physical Exam:    Constitutional:  Well developed 82 y.o. male that appears in no apparent distress.  Awake & oriented times 3.  Normal mood with normal recent and remote memory and normal judgement.  Eyes:  Conjunctivae normal.  Oropharynx: Previously, moist mucous membranes without exudate and a large tongue and normal uvula and narrowed posterior pharyngeal opening, patient is wearing a facemask.    Self-administered [facility] Sleepiness Scale test results: 5, previously 4  0-5 Lower normal daytime sleepiness  6-10 Higher normal daytime sleepiness  11-12 Mild, 13-15 Moderate, & 16-24 Severe excessive daytime sleepiness     Downloaded PAP Data " Reviewed For Compliance:  DME is [facility] and he uses nasal pillows.  Downloads between 8/17 and 11/14/2022 compliance 100%.  Average usage is 9 hours 11 minutes.  Average AHI is mildly abnormal 7.1 but all subsets are normal and he does have a significant leak.  Average ResMed auto BiPAP pressure is IPAP of 15.4 and EPAP of 11.4 and his auto BiPAP settings: Max IPAP 17 minimum EPAP 10 and pressure support of 4    I have reviewed the above results and compared them with the patient's last downloads and reviewed with the patient.    Impression:   Obstructive sleep apnea adequately treated with ResMed auto BiPAP with good compliance and good usage and no complaints of hypersomnolence.  The patient continues to take modafinil 200 mg every morning for complaints of hypersomnolence. The patient's obstructive apnea appears to be adequately treated.  The patient's average clear airway index is normal.    Restless leg syndrome treated with Requip 0.5 mg tablets, 4 every evening, and gabapentin 300 mg, 3 at bedtime  Psychophysiologic insomnia treated with trazodone 50 mg at bedtime     Plan:  Good sleep hygiene measures should be maintained.  Weight loss would be beneficial in this patient who has class II severe obesity by Body mass index is 35.17 kg/m²..      After evaluating the patient and assessing results available, the patient is benefiting from the treatment being provided.     The patient will continue ResMed auto BiPAP.  After clinical evaluation and review of downloads, I recommend changes to the patient's pressures.  Due to the patient's complaints, max IPAP will be increased from 17 up to 18 and minimum EPAP will be increased from 10 up to 11.  A new prescription will be sent to the patient's DME.    Additionally, I reviewed Sen related to modafinil that I prescribed.  It is noted that he also takes hydrocodone along with the gabapentin that I mentioned above.  Sen reveals no irregularities in  prescribing.    I answered all of the patient's questions.  The patient will call for any problems and will follow up in 6 months.      Bob Mcdermott MD  Sleep Medicine  11/17/22  08:43 EST

## 2022-11-20 PROBLEM — E66.01 CLASS 2 SEVERE OBESITY DUE TO EXCESS CALORIES WITH SERIOUS COMORBIDITY AND BODY MASS INDEX (BMI) OF 35.0 TO 35.9 IN ADULT (HCC): Status: ACTIVE | Noted: 2022-02-14

## 2022-12-02 DIAGNOSIS — C61 PROSTATE CANCER METASTATIC TO BONE: ICD-10-CM

## 2022-12-02 DIAGNOSIS — C79.51 PROSTATE CANCER METASTATIC TO BONE: ICD-10-CM

## 2022-12-02 RX ORDER — HYDROCODONE BITARTRATE AND ACETAMINOPHEN 5; 325 MG/1; MG/1
1 TABLET ORAL EVERY 6 HOURS PRN
Qty: 120 TABLET | Refills: 0 | Status: SHIPPED | OUTPATIENT
Start: 2022-12-02 | End: 2023-01-01 | Stop reason: SDUPTHER

## 2022-12-19 RX ORDER — OLMESARTAN MEDOXOMIL 40 MG/1
TABLET ORAL
Qty: 90 TABLET | Refills: 3 | Status: SHIPPED | OUTPATIENT
Start: 2022-12-19

## 2022-12-19 RX ORDER — DILTIAZEM HYDROCHLORIDE 120 MG/1
CAPSULE, COATED, EXTENDED RELEASE ORAL
Qty: 90 CAPSULE | Refills: 3 | Status: SHIPPED | OUTPATIENT
Start: 2022-12-19

## 2022-12-19 NOTE — TELEPHONE ENCOUNTER
NOV-02/16/23-RM  LOV-02/14/22-TK    Plan:       1.  Diastolic Dysfunction: Grade 2 on echocardiogram.  He has some mild dyspnea which she feels is unchanged and lower extremity edema.  His nephrologist, Dr. Vicky Duran recently started him on furosemide.     2.  Hypertension: Blood pressure better controlled on the second check today.     3.  Chronic Kidney Disease: Followed by Dr. Vicky Duran and she manages his hypertension.      4.  Localized Edema: Most likely due to obesity, calcium channel blocker (diltiazem), and possibly higher sodium diet.  I recommended leg elevation and continue with the furosemide.  I will defer to Dr. Duran if she wants to stop the diltiazem and change to a different antihypertensive medication.     5.  Aortic Stenosis: Mild per echocardiogram in 2020.  I have heard a heart murmur in the past, but did not appreciate that today.     6/7.  Central/Obstructive Sleep Apnea: Typically treated with BiPAP machine, but unfortunately his machine is on recall.  He is awaiting a new machine.     8.  Obesity: BMI is 33.3.  He would benefit from exercise and weight loss.     9.  Ascending Aortic Dilation: Measured 3.8 cm on CT scan.  Continue to monitor.     10.  I recommended a 1 year follow-up visit with Dr. Tata Lee.

## 2022-12-20 ENCOUNTER — SPECIALTY PHARMACY (OUTPATIENT)
Dept: PHARMACY | Facility: HOSPITAL | Age: 82
End: 2022-12-20

## 2022-12-20 NOTE — PROGRESS NOTES
Subjective  Patient with increasing bony pain.    REASONS FOR FOLLOW UP:   1.  Metastatic prostate cancer    HISTORY OF PRESENT ILLNESS:     Patient is an 82-year-old with metastatic prostate cancer who is here today for lab review and toxicity check.  He continues on Xtandi which has now been reduced to 80 mg daily.  He is tolerating this far better than the full dose and his PSA continues to drop now 5.2 by 12/1/2021 and 4.7 by 12/8/2021.  The patient states his ability to function at home is also improved with, again, less nausea, improved appetite and no additional musculoskeletal pain.  He does remain hypophosphatemic as result of Xgeva and such response would indicate that the interval between these injections can also be lengthened to, possibly, 3 months along when he is given his Lupron.     When the patient was seen 6 weeks ago his PSA had begun to slowly increase moving from 4.7-7.8 and then a 9.06.  We had reduced his Xtandi finding that he rapidly responded to relatively low dose but I discussed today that he will need to move up to 160 mg daily.  When he is seen 5/2/2022 he is feeling reasonably well with an acceptable performance status.     The patient's PSA 5/2/2022 was 10.1 and he did move of his Xtandi to 160 mg p.o. daily.    His follow-up appointment is now 6/13/2022 at which time he is also ready for Xgeva and Lupron.  We have discussed that his Xgeva can now be moved to every 6 months.  He is seen with his son 6/13/2022 having dehydrated somewhat in the heat 6/12/2022 but feeling improved.  He does note some difficulty with swallowing and is asked to observe this as he rehydrate and manage his blood sugar more effectively.  Plans were made to try to adjust the patient's Xgeva every 6 months and Lupron every 3 months.  The patient was seen back along with his son, Georges, 9/21/2022 fortunately doing reasonably well without additional pain, discomfort or excessive fatigue.  We have discussed  "various options for treatment of prostate cancer if we are unable to control this disease with current measures.    He is next evaluated 12/21/2022 now scheduled for Lupron.  He had last received both his Lupron and Xgeva 9/21/2022.  He notes increasing chest discomfort that appears to be \"when he twists\".  Initially he felt he might have a pneumonia but never had fever or cough.  We have discussed that his disease could well be progressing and then assessment that would allow us to clarify treatment options is reasonable.          Hematologic/oncologic history:   The patient is an 82-year-old male with significant past medical history including prostate carcinoma, CKD 3 and long-term tobacco use be been seen by primary care in late January, 2018 for hoarseness.  Chest x-ray is now outpatient January 23 revealed sclerotic change in the posterior mid chest to be within 3 adjacent thoracic ertebral bodies of uncertain significance.  A follow-up chest CT revealed numerous sclerotic foci within al visualized bones consistent with metastatic disease, multiple enlarged mediastinal lymph nodes concerning for metastatic lymphadenopathy and a polypoidal abnormality in the right lateral wall of the trachea.  CT of abdomen March 20 revealed extensive osseous metastatic disease mildly enlarged retroperitoneal lymph nodes.  Bone scan March 20 was consistent with widespread osseous metastasis particularly in the proximal half the left humeral shaft, abnormal uptake in the sternum and manubrium and a small focus in the posterior aspect of the calvarium.  This led to a plain film the left humerus with mottled sclerosis involving the shaft of the humerus particularly in the proximal half but no evidence of pathologic fracture.    The patient has additionally type 2 diabetes on insulin pump, again a history of prostate carcinoma prostatectomy 12 years previously, hypertension, and hyperlipidemia.  Additional studies included a PSA of " 395.1 from March 14, 2018.The patient's been seen by urology March 15 with plans to start Firmagon.    The patient is now seen in pulmonary clinic with Dr. Bijan Rivera and we have discussed that he will need bronchoscopy and mediastinoscopy.  Interestingly his additional history includes a long occupational exposure including working in the [place] just out of school, subsequent construction work for many years and a 30-pack-year history of smoking stopping in the late 1990s.  He indicates that he followed with Dr. Guillen of urology for many years with no changes in his exams and normal PSAs.  He stopped this follow-up approximately 2 years ago.     Patient was seen in consultation with thoracic surgery on March 28 and plans are made for mediastinoscopy and bronchoscopy with lymph node biopsy.  Pathology revealed that these nodes were consistent with metastatic prostate carcinoma.  He was discussed at thoracic conference.  A PET/CT was not pursued and it was determined that he see medical oncology for hormonal treatment and radiation therapy if symptomatic.  It should be noted that the patient's March 15 [facility] office note describes that Firmagon was planned.     The patient has met with the son and grandson April 23.  We have also contacted Dr. Taylor of urology in that Firmagon was given just after his last visit and would next be due approximately May 3.  Patient feels considerably better with resolution of his pain, normalization of his performance status.  He would like to continue treatment through our office now contacted Dr. Taylor concerning this.    The patient is next seen June 11, 2018 we discussed his follow-up including his treatments with Lupron May 7 and his next treatment on July 30.  He has returned to essentially normal performance status and his PSA has dropped further from 395 March 14 27.8 May 7 and now 2.03 June 11.  We do plan to initiate Xgeva also study him via  scans to document his improvement approximately a month from now.   The patient is next seen July 26, 2018.  Repeat imaging demonstrated interval resolution of mediastinal and retroperitoneal lymphadenopathy.  There is thickening in the distal aspect of the appendix with stranding?  Chronic appendicitis.  The patient indicates he is having no such symptoms and never has.  There is no change in sclerotic bony metastasis in the patient's PSA has dropped substantially to 1.66 by July 19 and 1.79 July 26.  He is actually feeling excellent and this is corroborated by family members.   Patient is next seen January 10, 2019 continuing to do very well and, in fact indicating that [facility], likely hearing replacements, visual review.  He is not certain is going to continue his care with them or with us or combination of both.    Patient is next seen April 4, 2019.  He is recently discharged after hospitalization March 15-18 with left upper lobe pneumonia.  We reviewed the findings in detail with his gradual recovery now documented.  His studies include a CT of chest which does not demonstrate any further bony lesions and/or pulmonary metastasis having developed.  He is feeling considerably better and approximately back to his baseline.  The patient is next seen June 28, 2019 feeling well but having baseline generally musculoskeletal pain and restless legs.His recent exams include a PSA of 0.81, CMP with potassium of 5.3 with repeat pending.  His performance status overall remains good to very good and is clearly responding to his treatments.  The patient is next seen December 13, 2019 continue to feel well.  He has had, oddly enough, 2 episodes of sleep paralysis which have occurred to him previously and he wonders if there is any connection with his prostate carcinoma though this is felt unlikely.     The patient when assessed in December had his PSA go to 2.9.  We continue  with Lupron and Xgeva and is seen back now March 9, 2020 without additional symptoms.  We discussed that a schedule could likely change moving to 3 months for both of these medications particularly if he needs additional therapy directed at progressive disease.     Patient contacted our practice May 4 concern for pain in his hips.  This led to moving his scans up and they were performed May 8, 2020 showing a sub-6 mm pleural-based pulmonary nodule in the right lower lobe that is new from March 15, 2018, remainder of the sub-6 mm pulmonary nodules bilaterally are stable.  There is extensive osseous metastatic disease as previous with no other new findings.     The patient indicates, when seen, May 13 that his bone pain is now resolved.  While this is good information does not mean that he does not have further bony metastasis and we have discussed that a follow-up bone scan is likely necessary.  Furthermore he is having some difficulty with sleeplessness and increase in restless legs as he continues to stay at home during the COVID 19 crisis which, itself, is producing more anxiety for him.  A follow-up bone scan was obtained Lety 3 revealing multifocal osseous metastatic disease which was compared to March 2, 2018 with improvement.  No new abnormal uptake is present.  He is next seen with us on June 17, 2020 and, fortunately, is not having any significant pain at this point.  We discussed his scans in detail and, on balance, his performance status also remains improved.     It was elected to follow the patient rather than changes antiandrogen therapy.  He is reassessed September 3 with unchanged CMP, H&H of 11.4 and 36.3 with normal white count, platelet count and differential, PSA at 15.8.  Follow-up PA lateral chest x-ray does not show any new abnormalities though there is extensive metastatic bone disease throughout the bony thorax and osteoblastic metastasis have been seen on chest CT May 2020.  The patient is  seen with his son Georges September 10 noticing increasing bony discomfort primarily in the right hip following fairly intensive gardening which he does frequently.  Now has further elevation of PSA were wondering whether we should try to intervene sooner per additional antiandrogens.  We will need to further assess him via CT scanning to make this decision     These were obtained October 1 showing extensive sclerotic disease with no metastatic disease in chest abdomen or pelvis.  PSA had gone from 15.8 September 3-16 0.6 October 1.     He still has pain getting up in the morning and after active gardening.  This is manageable with current pain medications and, at present, it is not apparent that he needs to switch medications to gain better control of his disease.  The patient did have follow-up studies done including a PSA November 25, 2020 now at 25.4.  The patient is seen with his son December 10, 2020 doing fair but having some increased pain in the mid sternum and right hip that he ascribes to additional exercise/work in managing his garden.  It is apparent however, that his last bone scan was 6 months ago and we do need to reassess him concerning this.     The patient had follow-up bone scan performed January 11, 2021 showing again uptake in the calvarium, humerus, right medial clavicle, sternum, ribs, spine, sacrum, pelvis, right acetabulum, proximal femora and now left frontal bone which appears new.  There is also mild increase in sternal uptake and equivocal increase in the pelvic lesions of all of the sacrum and the right proximal femur.     The patient is seen with his son January 18, 2021 and indicates that he is having pain gradually worsened in his right hip, mid chest that had been an issue previously.  These findings on bone scan are reviewed with both of them as likely the underlying culprit for his discomfort.  He would need change of the systemic therapy but, at present, will ask for radiation  therapy palliation initially.  He was previously treated by Dr. Kulkarni many years ago and will ask her to see him.  We will also make application for the current cost of Xtandi or Zytiga.  The patient is not felt to be a candidate for systemic chemotherapy.     The patient was referred for ration therapy as we continued to assess his PSA on current therapy as well as exam the cost of Zytiga or Xtandi.  Radiation therapy (Dr. Kulkarni) saw the patient January 26 and felt that the right femur and sternal lesion could benefit from therapy-3000 cGy in 10 fractions.  The patient took treatment February 1 to February 12 to the above areas and is next seen back March 15.  Fortunately his pain has improved dramatically and he is quite happy about this.  Unfortunately he notes some difficulty with swallowing that is temporary and often leads to increased salivation and mucus production.  Patient was asked to return his next assessed April 12, 2021.  He is able to swallow with less pain but still has excess mucus production and issues with salivation.  His PSA has noted increase but he is having no additional bony discomfort at this point and, additionally, has noted low-grade fevers just under 100 degrees at nighttime?.  His weight, appetite and general performance status have remained good to excellent.  We discussed follow-up studies at this point to determine his status.    Follow-up scans were scheduled CT scans of chest and pelvis 5/4/2021 showing osseous metastasis quite similar to previous examinations in the chest, CT of abdomen pelvis also with no acute intra-abdominal adenopathy no indication of abdominal pelvic metastatic disease but again widespread osseous metastasis.  His PSA at this point have been slowly increasing to a level of 44 on 4/12/2021.  As the patient is reassessed 5/10/2021 we find, fortunately, that he is not exceeding symptomatic.  It could make reasonable sense at this point to take the  mediastinal nodes from his biopsy 4/9/2018, reassess potential targeted therapies, MSI status, and germline analysis.  Fortunately he is not having significant pain or discomfort and is seen with his son as we discussed the above plan.  Notably a follow-up PSA is found to be 42.6.   Patient is next seen in 6/7/2021 for Lupron and Xgeva.  Fortunately he is feeling considerably better according to both he and his son though his stamina has reduced.  He is not having additional pain at this point.  We have also reviewed his genetics assessment which was significant only for a variant of unknown significance.  This is not an actionable abnormality.    As the patient's PSA further escalated increasing to 89 7/7/2021 his subsequent Axumin PET was obtained 8/20 demonstrating extensive osteosclerotic metastatic disease showing reduced uptake-typical finding but no evidence of visceral metastatic disease in the neck chest abdomen pelvis.  These findings are discussed with the patient and his son 8/25/2021 and indicative of his progressive disease-etiology of his rising PSA-though the patient is asymptomatic we have discussed moving to initiate Xtandi is available at 160 mg p.o. twice daily along with his current Lupron and Xgeva.     The patient's testing 10/6 included PSA that is dropped to 9.69.  He is seen with his son, Cody, both indicating that he been doing relatively well with treatment but began to notice nausea in the last week.  The schedule that he has been given also needs to be somewhat updated in terms of his Lupron and Xgeva.  He has been able to maintain his appetite and overall performance status to date.    The patient is next assessed 12/8/2021 tolerating his current Xtandi dose better than previous and maintaining a good performance status overall as well as demonstrating a drop in his PSA now to 4.7 on 12/8/2021 compared to high of 112 9/8/2021.  He remains hypophosphatemic and hypocalcemic and we have  discussed, again, changing his Xgeva dosing to every 3 months along with his Lupron will continue his Xtandi to 80 mg daily.    The patient is next reviewed 3/21/2022 seen for both Xgeva and Lupron.  Fortunately he is feeling reasonably well with little additional pain though he has restarted to place his garden into shape for the season and is working more outdoors.    Patient reevaluated 5/2/2022 with repeat CBC including H&H of 10.5 and 33.4, white count 5980 and platelet count of 192,000, currently CMP and PSA pending with a previous PSA 1 month ago at 9.06.  At this point we increased his Xtandi to 160 mg p.o. daily while continuing treatment with every 3 month Xgeva and Lupron.  Notably the patient's Requip  alcohol I am sure it is mariza now at 1.5 mg p.o. nightly and Neurontin 900 mg p.o. daily has improved his restless leg syndrome substantially.    Patient seen 6/13/2022 at which time Xtandi at 160 mg p.o. daily was continued, Xgeva moved to every 6 months and Lupron every 3 months.    This was continued when patient was seen 9/21/2022.    Patient seen 12/21/2022 at which time a subsequent Pylarify scan was requested as result of increasing PSA level and bony discomfort.  The patient received Lupron and Xgeva at this point.      Past Medical History:   Diagnosis Date   • Aortic stenosis, mild 1/1/2021    Per echocardiogram 2021   • Ascending aorta dilatation (HCC)    • Bone cancer (HCC)    • Cellulitis of great toe of left foot 10/19/2018   • CKD (chronic kidney disease)     Stage 3; followed by Dr. Vicky Duran    • Diastolic dysfunction     GRADE II per echo 2018   • Difficulty breathing     during exertion   • Dyslipidemia    • Erectile dysfunction    • Fatigue    • History of blood transfusion    • History of kidney stones    • Hyperlipidemia    • Hypertension    • Hyponatremia    • Left ventricular hypertrophy     per echo 2018   • Localized edema    • Neuropathy    • Obstructive sleep apnea      USING CPAP   • Osteoarthritis    • Peptic ulcer    • Pneumonia    • Pneumonia of left upper lobe due to infectious organism 3/15/2019   • Prostate cancer (HCC)     Status post prostatectomy, radiation therapy, and hormone therapy followed by Dr. Lee; metastatsis to bone   • Pure hyperglyceridemia    • Restless leg syndrome    • Sepsis (HCC)    • Sinus bradycardia    • Transient cerebral ischemia    • Type 2 diabetes mellitus (HCC)         Past Surgical History:   Procedure Laterality Date   • BRONCHOSCOPY N/A 4/9/2018    Procedure: BRONCHOSCOPY;  Surgeon: Bijan Rivera III, MD;  Location: [facility];  Service: Cardiothoracic   • COLONOSCOPY     • DEEP NECK LYMPH NODE BIOPSY / EXCISION     • HEMORRHOIDECTOMY     • MEDIASTINOSCOPY N/A 4/9/2018    Procedure: MEDIASTINOSCOPY WITH LYMPH NODE BIOPSY;  Surgeon: Bijan Rivera III, MD;  Location: [facility];  Service: Cardiothoracic   • OTHER SURGICAL HISTORY      ulcer repair   • PROSTATECTOMY  2006        Current Outpatient Medications on File Prior to Visit   Medication Sig Dispense Refill   • Accu-Chek Guide test strip USE 1 STRIP TO CHECK BLOOD SUGAR 4 TIMES DAILY     • Accu-Chek Softclix Lancets lancets USE 1 LANCET TO CHECK GLUCOSE 4 TIMES DAILY     • aspirin 81 MG EC tablet Take 1 tablet by mouth daily.     • cholecalciferol (VITAMIN D3) 1000 units tablet Take 2,000 Units by mouth Daily.     • Denosumab (XGEVA SC) Inject  under the skin into the appropriate area as directed Every 30 (Thirty) Days.     • dilTIAZem CD (CARDIZEM CD) 120 MG 24 hr capsule TAKE 1 CAPSULE EVERY DAY 90 capsule 3   • enzalutamide (XTANDI) 40 MG chemo capsule Take 4 capsules by mouth Daily. 120 capsule 2   • furosemide (LASIX) 40 MG tablet Take 40 mg by mouth Every Morning.     • gabapentin (NEURONTIN) 300 MG capsule TAKE 3 CAPSULES EVERY NIGHT AT BEDTIME 90 capsule 0   • HYDROcodone-acetaminophen (NORCO) 5-325 MG per tablet Take 1 tablet by mouth Every 6 (Six) Hours As Needed  for Moderate Pain. 120 tablet 0   • Insulin Aspart (novoLOG) 100 UNIT/ML injection INJECT 100 UNITS VIA PUMP ONCE DAILY     • insulin aspart (NovoLOG) 100 UNIT/ML patient supplied pump Inject  under the skin into the appropriate area as directed Continuous. Pt reports pump with basal rate 1.9 units/hr from 0800 to 1200. Basal rate 1.8 units/hr 1200 to 0800.  Checks bg ac/hs with bolus doses per pumps parameters     • Leuprolide Acetate, 3 Month, (LUPRON DEPOT, 3-MONTH, IM) Inject  into the appropriate muscle as directed by prescriber Every 3 (Three) Months.     • levothyroxine (SYNTHROID, LEVOTHROID) 88 MCG tablet      • loperamide (IMODIUM) 1 MG/5ML solution Take 2 mg by mouth 4 (Four) Times a Day As Needed for Diarrhea.     • modafinil (PROVIGIL) 200 MG tablet Take 1 tablet by mouth Daily. 30 tablet 0   • olmesartan (BENICAR) 40 MG tablet TAKE 1 TABLET EVERY DAY (DISCONTINUE LOSARTAN 100MG) 90 tablet 3   • ondansetron (ZOFRAN) 8 MG tablet Take 1 tablet by mouth 3 (Three) Times a Day As Needed for Nausea or Vomiting. 30 tablet 5   • pravastatin (PRAVACHOL) 40 MG tablet Take 40 mg by mouth daily.     • rOPINIRole (REQUIP) 0.5 MG tablet Take 2 tablets by mouth in the evening and 2 at bedtime. 360 tablet 2   • traZODone (DESYREL) 50 MG tablet Take  mg by mouth every night at bedtime.       No current facility-administered medications on file prior to visit.        ALLERGIES:    Allergies   Allergen Reactions   • Niacin Unknown - Low Severity   • Statins Unknown - Low Severity        Social History     Socioeconomic History   • Marital status:    • Years of education: College   Tobacco Use   • Smoking status: Former     Packs/day: 1.00     Years: 30.00     Pack years: 30.00     Types: Cigarettes     Quit date: 1998     Years since quittin.9   • Smokeless tobacco: Never   Substance and Sexual Activity   • Alcohol use: No     Comment: Caffeine use: 2-3 cups daily   • Drug use: No   • Sexual  "activity: Defer        Family History   Problem Relation Age of Onset   • Heart failure Mother    • Hypertension Mother    • Diabetes Mother    • Heart disease Mother    • Lung cancer Father 46   • Hypertension Sister    • Lung cancer Sister 60   • Hypertension Brother    • Lung cancer Brother 62   • Heart disease Other    • Prostate cancer Brother 60   • Malig Hyperthermia Neg Hx         Review of Systems  As per HPI   .  Objective     Vitals:    12/21/22 1121   BP: 169/79   Pulse: 53   Resp: 16   Temp: 98.5 °F (36.9 °C)   TempSrc: Temporal   SpO2: 98%   Weight: 89.3 kg (196 lb 14.4 oz)   Height: 162.6 cm (64.02\")   PainSc: 0-No pain     Current Status 12/21/2022   ECOG score 0       Physical Exam   Constitutional: He is oriented to person, place, and time. He appears well-developed. No distress.   HENT:   Head: Normocephalic and atraumatic.   Mouth/Throat: No oropharyngeal exudate.   Eyes: Pupils are equal, round, and reactive to light.   Cardiovascular: Normal rate, regular rhythm and normal heart sounds.   No murmur heard.  Pulmonary/Chest: Effort normal and breath sounds normal. No respiratory distress. He has no wheezes. He has no rhonchi. He has no rales.   Abdominal: Soft. Normal appearance and bowel sounds are normal. He exhibits no distension.   Musculoskeletal: Normal range of motion.   Neurological: He is alert and oriented to person, place, and time.   Skin: Skin is warm and dry. No rash noted.   Vitals reviewed.    RECENT LABS:  Hematology WBC   Date Value Ref Range Status   12/21/2022 5.11 3.40 - 10.80 10*3/mm3 Final     RBC   Date Value Ref Range Status   12/21/2022 3.68 (L) 4.14 - 5.80 10*6/mm3 Final     Hemoglobin   Date Value Ref Range Status   12/21/2022 10.7 (L) 13.0 - 17.7 g/dL Final     Hematocrit   Date Value Ref Range Status   12/21/2022 33.7 (L) 37.5 - 51.0 % Final     Platelets   Date Value Ref Range Status   12/21/2022 241 140 - 450 10*3/mm3 Final        Lab Results   Component Value Date "    GLUCOSE 149 (H) 12/21/2022    BUN 16 12/21/2022    CREATININE 0.99 12/21/2022    EGFRIFNONA 56 (L) 02/23/2022    BCR 16.2 12/21/2022    K 4.7 12/21/2022    CO2 25.6 12/21/2022    CALCIUM 10.1 12/21/2022    ALBUMIN 3.80 12/21/2022    AST 12 12/21/2022    ALT <5 12/21/2022       NM Bone Scan Whole Body (01/11/2021 13:25)  CT Chest With Contrast Diagnostic (05/04/2021 09:11)  CT Abdomen Pelvis With Contrast (05/04/2021 09:11)  NM PET/CT Skull Base to Mid Thigh (08/20/2021 12:18)      Assessment & Plan      1.  Metastatic prostate cancer:  ? Patient initially diagnosed in 2006 and had a prostatectomy and hormone therapy.  ? Patient in January 2018 began to complain of shortness of breath and hoarseness.  Chest x-ray obtained 1/23/18 showed sclerotic bone lesions.  ? CT of the chest 3/12/2018 numerous sclerotic bone foci with all visualized bones consistent with metastatic disease, multiple enlarged mediastinal lymph nodes.  ? .1 from 3/14/2018.  Patient was started on Firmagon by urology, first urology.  ? 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  ? Case discussed at thoracic conference and recommendations were to continue ADT and radiation for symptomatic sites.  ? Lupron injections every 3 mos initiated 5/7/18 PSA at that time 7.810  ? Monthly Xgeva initiated 6/11/18 PSA 2.030  ? Last PSA 6/13/2019 0.881  ? 9/20/2019 patient tolerating treatment well, no new concerns.  ? 12/2019 PSA 2.9  ? 3/9/2020  PSA increasing to 5.030, he remains continuing on Lupron and Xgeva and asymptomatic though follow-up CT of chest and pelvis will be requested in 11 weeks prior to follow-up in 12 weeks.   ? 5/4/2020 patient called reported worsening right hip pain, plans to purse CT scans ASAP.   ? 5/8/2020 CT scans C/A/P show no progression of disease. Hip pain improved, Gabapentin added.. Bone scan requested.  ? 6/3/2020 bone scan that does not  demonstrate worsening of bone nor need for localized radiation therapy.  The patient's PSA has been slowly rising and we have not been able to determine worsening of disease and and, therefore, it is not felt warranted add additional medications such as Xtandi or apalutamide to his therapy.  Please note would like to avoid Zytiga try not to add prednisone which would worsen his blood sugar control.  ? 9/3/2020 PSA 15.8, CXR extensive metastatic bone disease- patient reviewed 9/10/2020 reporting increased bony discomfort CT scans requested.  ? Scans done and reviewed 10/7/2020 ultimately reveal no evidence of progression of disease for visceral involvement or clearly for bone involvement.  After further review with the patient and his son plan continued Xgeva and Lupron.  We have assessed for availability of Xtandi 160 mg p.o. daily and find the cost is currently prohibitive.   ? 11/25/2020 PSA 25.4  ? 12/10/2020 review with some mild increase in bony discomfort.  After repeat discussion we went on to have him undergo Lupron therapy, and his PSA actually to be mildly reduced at 22.2.   ? Follow-up bone scan 1/11/2021 demonstrates some evidence of progression in sternum, left sacrum and proximal femur.  These findings were reviewed with the patient and his son January 18, 2020 and the patient was referred for palliative radiotherapy(Dr. Kulkarni)  ? Palliative radiation under care Dr. Kulkarni to right femur and sternal lesion -3000 cGy in 10 fractions given February 1 to February 12 with significant symptom improvement.  ? 3/15/2021 PSA 38.5  ? 4/12/2021 PSA 44  ? 5/10/2021 PSA 42.6 CT chest abdomen pelvis 5/4/2021 - for progression of disease and follow-up PSA 5 10/2021 is at 42 slightly reduced from previous.  We have discussed how to proceed from here and find a significant family history that clearly supports a potential genetic assessment for his malignancy.  ? It is felt most reasonable at this point to take the  mediastinal nodes from his biopsy 4/9/2018 and reassess for potential targeted therapies, MSI status, as well as germline analysis.  ? 6/7/2021 PSA 65.9 his analysis completed for actionable mutations including germline mutations.  These exams were negative though there is a variant of unknown significance.  Again this is not an actionable mutation.  We proceeded with additional Xgeva and Lupron planning for monthly Xgeva and Lupron at 3 months  ? 7/7/2021 PSA 89 and subsequent testing with Axumin PET was performed confirming extensive osteosclerotic metastatic disease with little uptake, no evidence of visceral metastatic disease in neck chest abdomen or pelvis.  ? 8/25/2021  , PET/CT reviewed, subsequent PSA increased to 105.  Patient fortunately asymptomatic.  Requested reevaluation for Xtandi 160 mg po daily.   ? 9/8/2021 due for his lupron, receiving every 3 months, and monthly Xgeva.  Will have education today for Xtandi and will receive his medication today as well.   ? Started Xtandi 9/8/2021.  ? 9/22/2021 here for toxicity check, so far tolerating Xtandi quite well other than a slight increase in diarrhea controlled with Imodium.  ? 10/6/2021 continues to tolerate Xtandi well.  Labs are within range today, we will proceed with Xgeva today.  ? Patient seen 10/20/2021 having more nausea with Xtandi and demonstrating a substantial reduction in PSA level.  After discussion we plan to reduce his dose to 80 mg daily following his PSA and performance status over the next approximate 7 weeks at which time he would be scheduled for his follow-up Lupron.  ? Patient assessed 12/8/2021 with further reduction in PSA to 4.7, ongoing hypophosphatemia and hypocalcemia-Xgeva moved to every 3 months given on same schedule as Lupron  ? Patient seen 3/21/2022, 6-week follow-up with mild increase in PSA recognized  ? Patient reassessed 5/2/2022 with PSA at 10.1, Xtandi moved to 160 mg p.o. daily  ? Patient assessed  6/13/2022, Xtandi at 160 mg p.o. daily, PSA pending, Xgeva moved to every 6 months, Lupron every 3 months  ? Patient seen 9/21/2022 at which time we continued our current approach, reviewed additional options for therapy should he clearly progress.  ? Patient seen 12/21/2022, increasing bony discomfort, Xgeva and Eligard continued, plans made for Pylarify scanning.    2.  Neuropathy/ restless legs:    · 5/13/2020 gabapentin 600 mg at bedtime, trazadone 50 mg QHS.  · On gabapentin 300, 2 tablets at bedtime, and requip 0.5.mg    · Still having neuropathy symptoms.  Will increase gabapentin to 900 mg at bedtime.   · 9/22/2021 increase in gabapentin to 900 mg at bedtime has helped neuropathy.  · Requip moved to bedtime dosing only-1 mg, escalate as needed    3.  Cancer related pain: on Norco 5/325 every 6 hours as needed.    4.  Hyperkalemia:  · Intermittent issue for the patient.  It is typically dietary related, as he consumes large amount  fresh tomatoes and other foods from his garden..  He was instructed to significantly decrease his intake as his potassium level today is 6.4, magnesium 2.7  He is not symptomatic.  We will also check an EKG.   · 9/22/2021 potassium level is improved to 5.7.  Patient states that he is no longer eating tomatoes from his garden.  · 10/6/2021 potassium level is 5.8.  Due to patient's multiple electrolyte abnormalities we will go ahead and refer him to nephrology for further evaluation.  Patient states he actually has an existing nephrologist, Dr. Perry Daily who he typically only sees on an annual basis.  We will schedule him to be seen by her soon for further evaluation.  · Reassess 12/21/2022 at 4.7    5.  Hypophosphatemia:  Phosphorus 1.9- hold xgeva 9/8/2021 ., Increase foods high in phosphorus, recheck in 2 weeks. \  · 9/22/2021 phosphorus level 1.7.  Reviewed with Dr. Lee.  In addition to increasing foods high in phosphorus, I have instructed the patient to start  Neutra-Phos 1 tablet daily.  We will recheck again in 2 weeks.  · Phosphorus 2.3 assessed 10/20/2021  · Reassess 12/8/2021 at 1.9, Xgeva moved to every 3 months.  · Patient assessed 6/13/2022 Xgeva moved every 6 months  · Reevaluation 9/21/2022 with phosphorus 3.6, Xgeva continued every 6 months  · Reevaluation 12/21/2022 at 4.0    PLAN:      1. Continue gabapentin 900 mg at bedtime, Requip to move to 2 mg p.o. nightly, escalate as needed  2. Continue Imodium if needed for diarrhea.  3. Continue Xtandi to 160 mg daily  4. Proceed with Xgeva and Lupron today, Xgeva moved to every 6 months, Lupron every 3 months.  PSA currently pending  5. Pylarify scan requested in 4 weeks with follow-up at 6 weeks, adjust above schedule when patient next seen.  6. Case discussed with patient's son, Cody, who concurs as well as the patient.    Patient on high risk medication requiring close monitoring for toxicity.    Jose Lee MD  12/21/2022

## 2022-12-20 NOTE — PROGRESS NOTES
Specialty Pharmacy Refill Coordination Note     Semaj is a 82 y.o. male contacted today regarding refills of  Xtandi specialty medication(s).    Reviewed and verified with patient:       Specialty medication(s) and dose(s) confirmed: yes    Refill Questions    Flowsheet Row Most Recent Value   Changes to allergies? No   Changes to medications? No   New conditions since last clinic visit No   Unplanned office visit, urgent care, ED, or hospital admission in the last 4 weeks  No   How does patient/caregiver feel medication is working? Good   Financial problems or insurance changes  Yes   If yes, describe changes in insurance or financial issues. PPAF    Since the previous refill, were any specialty medication doses or scheduled injections missed or delayed?  No   Does this patient require a clinical escalation to a pharmacist? No          Delivery Questions    Flowsheet Row Most Recent Value   Delivery method [employer]  [[employer] standard no sig ship  deliver ]   Delivery address correct? Yes   Preferred delivery time? Anytime   Number of medications in delivery 1   Medication being filled and delivered Xtandi   Doses left of specialty medications 3 weeks   Is there any medication that is due not being filled? No   Supplies needed? No supplies needed   Cooler needed? No   Do any medications need mixed or dated? No   Copay form of payment Payment plan already set up   Questions or concerns for the pharmacist? No   Are any medications first time fills? No            Medication Adherence    Adherence tools used: directed education  Support network for adherence: healthcare provider          Follow-up: 3 week(s)     Radha Brito  Specialty Pharmacy Technician

## 2022-12-21 ENCOUNTER — OFFICE VISIT (OUTPATIENT)
Dept: ONCOLOGY | Facility: CLINIC | Age: 82
End: 2022-12-21

## 2022-12-21 ENCOUNTER — SPECIALTY PHARMACY (OUTPATIENT)
Dept: PHARMACY | Facility: HOSPITAL | Age: 82
End: 2022-12-21

## 2022-12-21 ENCOUNTER — LAB (OUTPATIENT)
Dept: LAB | Facility: HOSPITAL | Age: 82
End: 2022-12-21

## 2022-12-21 ENCOUNTER — INFUSION (OUTPATIENT)
Dept: ONCOLOGY | Facility: HOSPITAL | Age: 82
End: 2022-12-21

## 2022-12-21 VITALS
OXYGEN SATURATION: 98 % | SYSTOLIC BLOOD PRESSURE: 169 MMHG | BODY MASS INDEX: 33.61 KG/M2 | TEMPERATURE: 98.5 F | HEIGHT: 64 IN | WEIGHT: 196.9 LBS | DIASTOLIC BLOOD PRESSURE: 79 MMHG | RESPIRATION RATE: 16 BRPM | HEART RATE: 53 BPM

## 2022-12-21 DIAGNOSIS — C61 PROSTATE CANCER METASTATIC TO BONE: Primary | ICD-10-CM

## 2022-12-21 DIAGNOSIS — C79.51 PROSTATE CANCER METASTATIC TO BONE: Primary | ICD-10-CM

## 2022-12-21 DIAGNOSIS — R97.21 RISING PSA FOLLOWING TREATMENT FOR MALIGNANT NEOPLASM OF PROSTATE: ICD-10-CM

## 2022-12-21 DIAGNOSIS — C79.51 OSSEOUS METASTASIS: ICD-10-CM

## 2022-12-21 DIAGNOSIS — R97.21 INCREASING PROSTATE SPECIFIC ANTIGEN (PSA) LEVEL AFTER TREATMENT FOR MALIGNANT NEOPLASM OF PROSTATE: ICD-10-CM

## 2022-12-21 DIAGNOSIS — C79.51 PROSTATE CANCER METASTATIC TO BONE: ICD-10-CM

## 2022-12-21 DIAGNOSIS — C61 PROSTATE CANCER METASTATIC TO BONE: ICD-10-CM

## 2022-12-21 LAB
ALBUMIN SERPL-MCNC: 3.8 G/DL (ref 3.5–5.2)
ALBUMIN/GLOB SERPL: 1.1 G/DL (ref 1.1–2.4)
ALP SERPL-CCNC: 207 U/L (ref 38–116)
ALT SERPL W P-5'-P-CCNC: <5 U/L (ref 0–41)
ANION GAP SERPL CALCULATED.3IONS-SCNC: 13.4 MMOL/L (ref 5–15)
AST SERPL-CCNC: 12 U/L (ref 0–40)
BASOPHILS # BLD AUTO: 0.02 10*3/MM3 (ref 0–0.2)
BASOPHILS NFR BLD AUTO: 0.4 % (ref 0–1.5)
BILIRUB SERPL-MCNC: 0.3 MG/DL (ref 0.2–1.2)
BUN SERPL-MCNC: 16 MG/DL (ref 6–20)
BUN/CREAT SERPL: 16.2 (ref 7.3–30)
CALCIUM SPEC-SCNC: 10.1 MG/DL (ref 8.5–10.2)
CHLORIDE SERPL-SCNC: 105 MMOL/L (ref 98–107)
CO2 SERPL-SCNC: 25.6 MMOL/L (ref 22–29)
CREAT SERPL-MCNC: 0.99 MG/DL (ref 0.7–1.3)
DEPRECATED RDW RBC AUTO: 46.9 FL (ref 37–54)
EGFRCR SERPLBLD CKD-EPI 2021: 76.1 ML/MIN/1.73
EOSINOPHIL # BLD AUTO: 0.15 10*3/MM3 (ref 0–0.4)
EOSINOPHIL NFR BLD AUTO: 2.9 % (ref 0.3–6.2)
ERYTHROCYTE [DISTWIDTH] IN BLOOD BY AUTOMATED COUNT: 13.8 % (ref 12.3–15.4)
GLOBULIN UR ELPH-MCNC: 3.6 GM/DL (ref 1.8–3.5)
GLUCOSE SERPL-MCNC: 149 MG/DL (ref 74–124)
HCT VFR BLD AUTO: 33.7 % (ref 37.5–51)
HGB BLD-MCNC: 10.7 G/DL (ref 13–17.7)
IMM GRANULOCYTES # BLD AUTO: 0.04 10*3/MM3 (ref 0–0.05)
IMM GRANULOCYTES NFR BLD AUTO: 0.8 % (ref 0–0.5)
LYMPHOCYTES # BLD AUTO: 1.47 10*3/MM3 (ref 0.7–3.1)
LYMPHOCYTES NFR BLD AUTO: 28.8 % (ref 19.6–45.3)
MAGNESIUM SERPL-MCNC: 1.8 MG/DL (ref 1.8–2.5)
MCH RBC QN AUTO: 29.1 PG (ref 26.6–33)
MCHC RBC AUTO-ENTMCNC: 31.8 G/DL (ref 31.5–35.7)
MCV RBC AUTO: 91.6 FL (ref 79–97)
MONOCYTES # BLD AUTO: 0.46 10*3/MM3 (ref 0.1–0.9)
MONOCYTES NFR BLD AUTO: 9 % (ref 5–12)
NEUTROPHILS NFR BLD AUTO: 2.97 10*3/MM3 (ref 1.7–7)
NEUTROPHILS NFR BLD AUTO: 58.1 % (ref 42.7–76)
NRBC BLD AUTO-RTO: 0 /100 WBC (ref 0–0.2)
PHOSPHATE SERPL-MCNC: 4 MG/DL (ref 2.5–4.5)
PLATELET # BLD AUTO: 241 10*3/MM3 (ref 140–450)
PMV BLD AUTO: 9.1 FL (ref 6–12)
POTASSIUM SERPL-SCNC: 4.7 MMOL/L (ref 3.5–4.7)
PROT SERPL-MCNC: 7.4 G/DL (ref 6.3–8)
PSA SERPL-MCNC: 23.9 NG/ML (ref 0–4)
RBC # BLD AUTO: 3.68 10*6/MM3 (ref 4.14–5.8)
SODIUM SERPL-SCNC: 144 MMOL/L (ref 134–145)
WBC NRBC COR # BLD: 5.11 10*3/MM3 (ref 3.4–10.8)

## 2022-12-21 PROCEDURE — 96402 CHEMO HORMON ANTINEOPL SQ/IM: CPT

## 2022-12-21 PROCEDURE — 36415 COLL VENOUS BLD VENIPUNCTURE: CPT

## 2022-12-21 PROCEDURE — 25010000002 LEUPROLIDE ACETATE (3 MONTH) PER 7.5 MG: Performed by: INTERNAL MEDICINE

## 2022-12-21 PROCEDURE — 80053 COMPREHEN METABOLIC PANEL: CPT

## 2022-12-21 PROCEDURE — 84100 ASSAY OF PHOSPHORUS: CPT

## 2022-12-21 PROCEDURE — 25010000002 DENOSUMAB 120 MG/1.7ML SOLUTION: Performed by: INTERNAL MEDICINE

## 2022-12-21 PROCEDURE — 85025 COMPLETE CBC W/AUTO DIFF WBC: CPT

## 2022-12-21 PROCEDURE — 96372 THER/PROPH/DIAG INJ SC/IM: CPT

## 2022-12-21 PROCEDURE — 83735 ASSAY OF MAGNESIUM: CPT

## 2022-12-21 PROCEDURE — 84153 ASSAY OF PSA TOTAL: CPT | Performed by: INTERNAL MEDICINE

## 2022-12-21 PROCEDURE — 99214 OFFICE O/P EST MOD 30 MIN: CPT | Performed by: INTERNAL MEDICINE

## 2022-12-21 RX ORDER — BLOOD SUGAR DIAGNOSTIC
STRIP MISCELLANEOUS
COMMUNITY
Start: 2022-12-01

## 2022-12-21 RX ORDER — LANCETS
EACH MISCELLANEOUS
COMMUNITY
Start: 2022-12-10

## 2022-12-21 RX ADMIN — LEUPROLIDE ACETATE 22.5 MG: KIT at 12:13

## 2022-12-21 RX ADMIN — DENOSUMAB 120 MG: 120 INJECTION SUBCUTANEOUS at 12:13

## 2022-12-21 NOTE — PROGRESS NOTES
"Xtandi put in [facility] with notes \"[employer] standard no sig ship 12/27 deliver 12/28\"  "

## 2023-01-01 ENCOUNTER — APPOINTMENT (OUTPATIENT)
Dept: CT IMAGING | Facility: HOSPITAL | Age: 83
End: 2023-01-01
Payer: MEDICARE

## 2023-01-01 ENCOUNTER — APPOINTMENT (OUTPATIENT)
Dept: GENERAL RADIOLOGY | Facility: HOSPITAL | Age: 83
End: 2023-01-01
Payer: MEDICARE

## 2023-01-01 ENCOUNTER — HOSPITAL ENCOUNTER (INPATIENT)
Facility: HOSPITAL | Age: 83
LOS: 16 days | Discharge: NURSING, REHAB OR LONG-TERM CARE | End: 2024-01-03
Attending: STUDENT IN AN ORGANIZED HEALTH CARE EDUCATION/TRAINING PROGRAM | Admitting: HOSPITALIST
Payer: COMMERCIAL

## 2023-01-01 DIAGNOSIS — D62 ACUTE BLOOD LOSS ANEMIA: Primary | ICD-10-CM

## 2023-01-01 DIAGNOSIS — C79.51 PROSTATE CANCER METASTATIC TO BONE: ICD-10-CM

## 2023-01-01 DIAGNOSIS — R53.1 WEAKNESS: ICD-10-CM

## 2023-01-01 DIAGNOSIS — D63.8 ANEMIA, CHRONIC DISEASE: ICD-10-CM

## 2023-01-01 DIAGNOSIS — C61 PROSTATE CANCER METASTATIC TO BONE: ICD-10-CM

## 2023-01-01 DIAGNOSIS — K92.2 GASTROINTESTINAL HEMORRHAGE, UNSPECIFIED GASTROINTESTINAL HEMORRHAGE TYPE: ICD-10-CM

## 2023-01-01 LAB
ABO GROUP BLD: NORMAL
ABO GROUP BLD: NORMAL
ADV 40+41 DNA STL QL NAA+NON-PROBE: NOT DETECTED
ALBUMIN SERPL-MCNC: 3.2 G/DL (ref 3.5–5.2)
ALBUMIN SERPL-MCNC: 3.6 G/DL (ref 3.5–5.2)
ALBUMIN/GLOB SERPL: 1.3 G/DL
ALBUMIN/GLOB SERPL: 1.6 G/DL
ALP SERPL-CCNC: 163 U/L (ref 39–117)
ALP SERPL-CCNC: 167 U/L (ref 39–117)
ALT SERPL W P-5'-P-CCNC: 13 U/L (ref 1–41)
ALT SERPL W P-5'-P-CCNC: 13 U/L (ref 1–41)
ANION GAP SERPL CALCULATED.3IONS-SCNC: 11 MMOL/L (ref 5–15)
ANION GAP SERPL CALCULATED.3IONS-SCNC: 12 MMOL/L (ref 5–15)
ANION GAP SERPL CALCULATED.3IONS-SCNC: 13 MMOL/L (ref 5–15)
ANION GAP SERPL CALCULATED.3IONS-SCNC: 13 MMOL/L (ref 5–15)
ANION GAP SERPL CALCULATED.3IONS-SCNC: 13.2 MMOL/L (ref 5–15)
ANION GAP SERPL CALCULATED.3IONS-SCNC: 13.3 MMOL/L (ref 5–15)
ANION GAP SERPL CALCULATED.3IONS-SCNC: 13.4 MMOL/L (ref 5–15)
ANION GAP SERPL CALCULATED.3IONS-SCNC: 13.5 MMOL/L (ref 5–15)
ANISOCYTOSIS BLD QL: ABNORMAL
AST SERPL-CCNC: 18 U/L (ref 1–40)
AST SERPL-CCNC: 22 U/L (ref 1–40)
ASTRO TYP 1-8 RNA STL QL NAA+NON-PROBE: NOT DETECTED
BH BB BLOOD EXPIRATION DATE: NORMAL
BH BB BLOOD TYPE BARCODE: 6200
BH BB BLOOD TYPE BARCODE: 9500
BH BB DISPENSE STATUS: NORMAL
BH BB PRODUCT CODE: NORMAL
BH BB UNIT NUMBER: NORMAL
BILIRUB SERPL-MCNC: 0.9 MG/DL (ref 0–1.2)
BILIRUB SERPL-MCNC: 1.5 MG/DL (ref 0–1.2)
BLD GP AB SCN SERPL QL: NEGATIVE
BLD GP AB SCN SERPL QL: NEGATIVE
BUN SERPL-MCNC: 12 MG/DL (ref 8–23)
BUN SERPL-MCNC: 16 MG/DL (ref 8–23)
BUN SERPL-MCNC: 17 MG/DL (ref 8–23)
BUN SERPL-MCNC: 18 MG/DL (ref 8–23)
BUN SERPL-MCNC: 18 MG/DL (ref 8–23)
BUN SERPL-MCNC: 19 MG/DL (ref 8–23)
BUN/CREAT SERPL: 19.4 (ref 7–25)
BUN/CREAT SERPL: 26.2 (ref 7–25)
BUN/CREAT SERPL: 26.2 (ref 7–25)
BUN/CREAT SERPL: 27.1 (ref 7–25)
BUN/CREAT SERPL: 28.6 (ref 7–25)
BUN/CREAT SERPL: 28.8 (ref 7–25)
BUN/CREAT SERPL: 31.7 (ref 7–25)
BUN/CREAT SERPL: 32.7 (ref 7–25)
C CAYETANENSIS DNA STL QL NAA+NON-PROBE: NOT DETECTED
C COLI+JEJ+UPSA DNA STL QL NAA+NON-PROBE: NOT DETECTED
C DIFF TOX GENS STL QL NAA+PROBE: NEGATIVE
CALCIUM SPEC-SCNC: 8 MG/DL (ref 8.6–10.5)
CALCIUM SPEC-SCNC: 8.2 MG/DL (ref 8.6–10.5)
CALCIUM SPEC-SCNC: 8.4 MG/DL (ref 8.6–10.5)
CALCIUM SPEC-SCNC: 8.6 MG/DL (ref 8.6–10.5)
CALCIUM SPEC-SCNC: 8.6 MG/DL (ref 8.6–10.5)
CALCIUM SPEC-SCNC: 8.7 MG/DL (ref 8.6–10.5)
CALCIUM SPEC-SCNC: 8.9 MG/DL (ref 8.6–10.5)
CALCIUM SPEC-SCNC: 8.9 MG/DL (ref 8.6–10.5)
CHLORIDE SERPL-SCNC: 101 MMOL/L (ref 98–107)
CHLORIDE SERPL-SCNC: 103 MMOL/L (ref 98–107)
CHLORIDE SERPL-SCNC: 104 MMOL/L (ref 98–107)
CHLORIDE SERPL-SCNC: 104 MMOL/L (ref 98–107)
CHLORIDE SERPL-SCNC: 105 MMOL/L (ref 98–107)
CHLORIDE SERPL-SCNC: 106 MMOL/L (ref 98–107)
CO2 SERPL-SCNC: 17.6 MMOL/L (ref 22–29)
CO2 SERPL-SCNC: 18 MMOL/L (ref 22–29)
CO2 SERPL-SCNC: 18.8 MMOL/L (ref 22–29)
CO2 SERPL-SCNC: 19 MMOL/L (ref 22–29)
CO2 SERPL-SCNC: 20.7 MMOL/L (ref 22–29)
CO2 SERPL-SCNC: 21.5 MMOL/L (ref 22–29)
CO2 SERPL-SCNC: 22 MMOL/L (ref 22–29)
CO2 SERPL-SCNC: 22 MMOL/L (ref 22–29)
CREAT SERPL-MCNC: 0.55 MG/DL (ref 0.76–1.27)
CREAT SERPL-MCNC: 0.59 MG/DL (ref 0.76–1.27)
CREAT SERPL-MCNC: 0.59 MG/DL (ref 0.76–1.27)
CREAT SERPL-MCNC: 0.6 MG/DL (ref 0.76–1.27)
CREAT SERPL-MCNC: 0.61 MG/DL (ref 0.76–1.27)
CREAT SERPL-MCNC: 0.61 MG/DL (ref 0.76–1.27)
CREAT SERPL-MCNC: 0.62 MG/DL (ref 0.76–1.27)
CREAT SERPL-MCNC: 0.63 MG/DL (ref 0.76–1.27)
CROSSMATCH INTERPRETATION: NORMAL
CRYPTOSP DNA STL QL NAA+NON-PROBE: NOT DETECTED
DACRYOCYTES BLD QL SMEAR: ABNORMAL
DEPRECATED RDW RBC AUTO: 58.4 FL (ref 37–54)
DEPRECATED RDW RBC AUTO: 58.4 FL (ref 37–54)
DEPRECATED RDW RBC AUTO: 58.9 FL (ref 37–54)
DEPRECATED RDW RBC AUTO: 59.7 FL (ref 37–54)
DEPRECATED RDW RBC AUTO: 60 FL (ref 37–54)
DEPRECATED RDW RBC AUTO: 61.1 FL (ref 37–54)
DEPRECATED RDW RBC AUTO: 65 FL (ref 37–54)
DEPRECATED RDW RBC AUTO: 83.2 FL (ref 37–54)
E HISTOLYT DNA STL QL NAA+NON-PROBE: NOT DETECTED
EAEC PAA PLAS AGGR+AATA ST NAA+NON-PRB: NOT DETECTED
EC STX1+STX2 GENES STL QL NAA+NON-PROBE: NOT DETECTED
EGFRCR SERPLBLD CKD-EPI 2021: 94.4 ML/MIN/1.73
EGFRCR SERPLBLD CKD-EPI 2021: 94.8 ML/MIN/1.73
EGFRCR SERPLBLD CKD-EPI 2021: 95.3 ML/MIN/1.73
EGFRCR SERPLBLD CKD-EPI 2021: 95.3 ML/MIN/1.73
EGFRCR SERPLBLD CKD-EPI 2021: 95.8 ML/MIN/1.73
EGFRCR SERPLBLD CKD-EPI 2021: 96.3 ML/MIN/1.73
EGFRCR SERPLBLD CKD-EPI 2021: 96.3 ML/MIN/1.73
EGFRCR SERPLBLD CKD-EPI 2021: 98.3 ML/MIN/1.73
EOSINOPHIL # BLD MANUAL: 0.05 10*3/MM3 (ref 0–0.4)
EOSINOPHIL # BLD MANUAL: 0.08 10*3/MM3 (ref 0–0.4)
EOSINOPHIL NFR BLD MANUAL: 2 % (ref 0.3–6.2)
EOSINOPHIL NFR BLD MANUAL: 2.1 % (ref 0.3–6.2)
EPEC EAE GENE STL QL NAA+NON-PROBE: DETECTED
ERYTHROCYTE [DISTWIDTH] IN BLOOD BY AUTOMATED COUNT: 18.1 % (ref 12.3–15.4)
ERYTHROCYTE [DISTWIDTH] IN BLOOD BY AUTOMATED COUNT: 18.2 % (ref 12.3–15.4)
ERYTHROCYTE [DISTWIDTH] IN BLOOD BY AUTOMATED COUNT: 18.2 % (ref 12.3–15.4)
ERYTHROCYTE [DISTWIDTH] IN BLOOD BY AUTOMATED COUNT: 18.4 % (ref 12.3–15.4)
ERYTHROCYTE [DISTWIDTH] IN BLOOD BY AUTOMATED COUNT: 18.6 % (ref 12.3–15.4)
ERYTHROCYTE [DISTWIDTH] IN BLOOD BY AUTOMATED COUNT: 18.7 % (ref 12.3–15.4)
ERYTHROCYTE [DISTWIDTH] IN BLOOD BY AUTOMATED COUNT: 19.1 % (ref 12.3–15.4)
ERYTHROCYTE [DISTWIDTH] IN BLOOD BY AUTOMATED COUNT: 25.4 % (ref 12.3–15.4)
ETEC LTA+ST1A+ST1B TOX ST NAA+NON-PROBE: NOT DETECTED
EXPIRATION DATE: ABNORMAL
FECAL OCCULT BLOOD SCREEN, POC: POSITIVE
FERRITIN SERPL-MCNC: 1987 NG/ML (ref 30–400)
G LAMBLIA DNA STL QL NAA+NON-PROBE: NOT DETECTED
GLOBULIN UR ELPH-MCNC: 2.3 GM/DL
GLOBULIN UR ELPH-MCNC: 2.4 GM/DL
GLUCOSE BLDC GLUCOMTR-MCNC: 111 MG/DL (ref 70–130)
GLUCOSE BLDC GLUCOMTR-MCNC: 111 MG/DL (ref 70–130)
GLUCOSE BLDC GLUCOMTR-MCNC: 112 MG/DL (ref 70–130)
GLUCOSE BLDC GLUCOMTR-MCNC: 125 MG/DL (ref 70–130)
GLUCOSE BLDC GLUCOMTR-MCNC: 137 MG/DL (ref 70–130)
GLUCOSE BLDC GLUCOMTR-MCNC: 141 MG/DL (ref 70–130)
GLUCOSE BLDC GLUCOMTR-MCNC: 141 MG/DL (ref 70–130)
GLUCOSE BLDC GLUCOMTR-MCNC: 148 MG/DL (ref 70–130)
GLUCOSE BLDC GLUCOMTR-MCNC: 148 MG/DL (ref 70–130)
GLUCOSE BLDC GLUCOMTR-MCNC: 165 MG/DL (ref 70–130)
GLUCOSE BLDC GLUCOMTR-MCNC: 176 MG/DL (ref 70–130)
GLUCOSE BLDC GLUCOMTR-MCNC: 191 MG/DL (ref 70–130)
GLUCOSE BLDC GLUCOMTR-MCNC: 197 MG/DL (ref 70–130)
GLUCOSE BLDC GLUCOMTR-MCNC: 207 MG/DL (ref 70–130)
GLUCOSE BLDC GLUCOMTR-MCNC: 210 MG/DL (ref 70–130)
GLUCOSE BLDC GLUCOMTR-MCNC: 227 MG/DL (ref 70–130)
GLUCOSE BLDC GLUCOMTR-MCNC: 229 MG/DL (ref 70–130)
GLUCOSE BLDC GLUCOMTR-MCNC: 237 MG/DL (ref 70–130)
GLUCOSE BLDC GLUCOMTR-MCNC: 245 MG/DL (ref 70–130)
GLUCOSE BLDC GLUCOMTR-MCNC: 252 MG/DL (ref 70–130)
GLUCOSE BLDC GLUCOMTR-MCNC: 259 MG/DL (ref 70–130)
GLUCOSE BLDC GLUCOMTR-MCNC: 287 MG/DL (ref 70–130)
GLUCOSE BLDC GLUCOMTR-MCNC: 70 MG/DL (ref 70–130)
GLUCOSE BLDC GLUCOMTR-MCNC: 74 MG/DL (ref 70–130)
GLUCOSE BLDC GLUCOMTR-MCNC: 79 MG/DL (ref 70–130)
GLUCOSE BLDC GLUCOMTR-MCNC: 80 MG/DL (ref 70–130)
GLUCOSE BLDC GLUCOMTR-MCNC: 81 MG/DL (ref 70–130)
GLUCOSE BLDC GLUCOMTR-MCNC: 89 MG/DL (ref 70–130)
GLUCOSE BLDC GLUCOMTR-MCNC: 94 MG/DL (ref 70–130)
GLUCOSE SERPL-MCNC: 125 MG/DL (ref 65–99)
GLUCOSE SERPL-MCNC: 155 MG/DL (ref 65–99)
GLUCOSE SERPL-MCNC: 204 MG/DL (ref 65–99)
GLUCOSE SERPL-MCNC: 271 MG/DL (ref 65–99)
GLUCOSE SERPL-MCNC: 64 MG/DL (ref 65–99)
GLUCOSE SERPL-MCNC: 78 MG/DL (ref 65–99)
GLUCOSE SERPL-MCNC: 82 MG/DL (ref 65–99)
GLUCOSE SERPL-MCNC: 85 MG/DL (ref 65–99)
HBA1C MFR BLD: 5.9 % (ref 4.8–5.6)
HCT VFR BLD AUTO: 18.3 % (ref 37.5–51)
HCT VFR BLD AUTO: 20.1 % (ref 37.5–51)
HCT VFR BLD AUTO: 21 % (ref 37.5–51)
HCT VFR BLD AUTO: 21.4 % (ref 37.5–51)
HCT VFR BLD AUTO: 21.9 % (ref 37.5–51)
HCT VFR BLD AUTO: 25.3 % (ref 37.5–51)
HCT VFR BLD AUTO: 25.7 % (ref 37.5–51)
HCT VFR BLD AUTO: 26.2 % (ref 37.5–51)
HCT VFR BLD AUTO: 26.7 % (ref 37.5–51)
HCT VFR BLD AUTO: 27 % (ref 37.5–51)
HCT VFR BLD AUTO: 28.2 % (ref 37.5–51)
HCT VFR BLD AUTO: 30.2 % (ref 37.5–51)
HGB BLD-MCNC: 10.1 G/DL (ref 13–17.7)
HGB BLD-MCNC: 5.8 G/DL (ref 13–17.7)
HGB BLD-MCNC: 6.8 G/DL (ref 13–17.7)
HGB BLD-MCNC: 7.1 G/DL (ref 13–17.7)
HGB BLD-MCNC: 7.1 G/DL (ref 13–17.7)
HGB BLD-MCNC: 7.3 G/DL (ref 13–17.7)
HGB BLD-MCNC: 8.7 G/DL (ref 13–17.7)
HGB BLD-MCNC: 8.8 G/DL (ref 13–17.7)
HGB BLD-MCNC: 8.9 G/DL (ref 13–17.7)
HGB BLD-MCNC: 8.9 G/DL (ref 13–17.7)
HGB BLD-MCNC: 9.1 G/DL (ref 13–17.7)
HGB BLD-MCNC: 9.2 G/DL (ref 13–17.7)
HYPOCHROMIA BLD QL: ABNORMAL
IRON 24H UR-MRATE: 243 MCG/DL (ref 59–158)
IRON SATN MFR SERPL: 90 % (ref 20–50)
LYMPHOCYTES # BLD MANUAL: 0.31 10*3/MM3 (ref 0.7–3.1)
LYMPHOCYTES # BLD MANUAL: 0.44 10*3/MM3 (ref 0.7–3.1)
LYMPHOCYTES # BLD MANUAL: 0.8 10*3/MM3 (ref 0.7–3.1)
LYMPHOCYTES NFR BLD MANUAL: 10 % (ref 5–12)
LYMPHOCYTES NFR BLD MANUAL: 6.3 % (ref 5–12)
LYMPHOCYTES NFR BLD MANUAL: 7 % (ref 5–12)
Lab: 230
MCH RBC QN AUTO: 28.9 PG (ref 26.6–33)
MCH RBC QN AUTO: 30 PG (ref 26.6–33)
MCH RBC QN AUTO: 30.3 PG (ref 26.6–33)
MCH RBC QN AUTO: 30.9 PG (ref 26.6–33)
MCH RBC QN AUTO: 31.2 PG (ref 26.6–33)
MCH RBC QN AUTO: 31.4 PG (ref 26.6–33)
MCH RBC QN AUTO: 31.5 PG (ref 26.6–33)
MCH RBC QN AUTO: 31.5 PG (ref 26.6–33)
MCHC RBC AUTO-ENTMCNC: 31.7 G/DL (ref 31.5–35.7)
MCHC RBC AUTO-ENTMCNC: 32.3 G/DL (ref 31.5–35.7)
MCHC RBC AUTO-ENTMCNC: 33.2 G/DL (ref 31.5–35.7)
MCHC RBC AUTO-ENTMCNC: 33.4 G/DL (ref 31.5–35.7)
MCHC RBC AUTO-ENTMCNC: 33.8 G/DL (ref 31.5–35.7)
MCHC RBC AUTO-ENTMCNC: 34 G/DL (ref 31.5–35.7)
MCHC RBC AUTO-ENTMCNC: 34.1 G/DL (ref 31.5–35.7)
MCHC RBC AUTO-ENTMCNC: 34.8 G/DL (ref 31.5–35.7)
MCV RBC AUTO: 90.7 FL (ref 79–97)
MCV RBC AUTO: 91 FL (ref 79–97)
MCV RBC AUTO: 91.4 FL (ref 79–97)
MCV RBC AUTO: 91.5 FL (ref 79–97)
MCV RBC AUTO: 91.9 FL (ref 79–97)
MCV RBC AUTO: 92.5 FL (ref 79–97)
MCV RBC AUTO: 93.1 FL (ref 79–97)
MCV RBC AUTO: 93.8 FL (ref 79–97)
METAMYELOCYTES NFR BLD MANUAL: 2 % (ref 0–0)
METAMYELOCYTES NFR BLD MANUAL: 3 % (ref 0–0)
MICROCYTES BLD QL: ABNORMAL
MONOCYTES # BLD: 0.15 10*3/MM3 (ref 0.1–0.9)
MONOCYTES # BLD: 0.33 10*3/MM3 (ref 0.1–0.9)
MONOCYTES # BLD: 0.39 10*3/MM3 (ref 0.1–0.9)
MYELOCYTES NFR BLD MANUAL: 2 % (ref 0–0)
MYELOCYTES NFR BLD MANUAL: 3 % (ref 0–0)
NEGATIVE CONTROL: NEGATIVE
NEUTROPHILS # BLD AUTO: 1.81 10*3/MM3 (ref 1.7–7)
NEUTROPHILS # BLD AUTO: 2.87 10*3/MM3 (ref 1.7–7)
NEUTROPHILS # BLD AUTO: 3.39 10*3/MM3 (ref 1.7–7)
NEUTROPHILS NFR BLD MANUAL: 72 % (ref 42.7–76)
NEUTROPHILS NFR BLD MANUAL: 73.7 % (ref 42.7–76)
NEUTROPHILS NFR BLD MANUAL: 74 % (ref 42.7–76)
NOROVIRUS GI+II RNA STL QL NAA+NON-PROBE: NOT DETECTED
NRBC SPEC MANUAL: 13 /100 WBC (ref 0–0.2)
NRBC SPEC MANUAL: 2.1 /100 WBC (ref 0–0.2)
NRBC SPEC MANUAL: 4 /100 WBC (ref 0–0.2)
P SHIGELLOIDES DNA STL QL NAA+NON-PROBE: NOT DETECTED
PLAT MORPH BLD: NORMAL
PLATELET # BLD AUTO: 33 10*3/MM3 (ref 140–450)
PLATELET # BLD AUTO: 34 10*3/MM3 (ref 140–450)
PLATELET # BLD AUTO: 35 10*3/MM3 (ref 140–450)
PLATELET # BLD AUTO: 36 10*3/MM3 (ref 140–450)
PLATELET # BLD AUTO: 36 10*3/MM3 (ref 140–450)
PLATELET # BLD AUTO: 40 10*3/MM3 (ref 140–450)
PLATELET # BLD AUTO: 41 10*3/MM3 (ref 140–450)
PLATELET # BLD AUTO: 56 10*3/MM3 (ref 140–450)
PMV BLD AUTO: 10.5 FL (ref 6–12)
PMV BLD AUTO: 10.5 FL (ref 6–12)
PMV BLD AUTO: 8.6 FL (ref 6–12)
PMV BLD AUTO: 9.2 FL (ref 6–12)
PMV BLD AUTO: 9.4 FL (ref 6–12)
PMV BLD AUTO: 9.7 FL (ref 6–12)
PMV BLD AUTO: 9.9 FL (ref 6–12)
POSITIVE CONTROL: POSITIVE
POTASSIUM SERPL-SCNC: 3.3 MMOL/L (ref 3.5–5.2)
POTASSIUM SERPL-SCNC: 3.5 MMOL/L (ref 3.5–5.2)
POTASSIUM SERPL-SCNC: 3.7 MMOL/L (ref 3.5–5.2)
POTASSIUM SERPL-SCNC: 3.9 MMOL/L (ref 3.5–5.2)
POTASSIUM SERPL-SCNC: 3.9 MMOL/L (ref 3.5–5.2)
POTASSIUM SERPL-SCNC: 4.1 MMOL/L (ref 3.5–5.2)
POTASSIUM SERPL-SCNC: 4.1 MMOL/L (ref 3.5–5.2)
POTASSIUM SERPL-SCNC: 4.2 MMOL/L (ref 3.5–5.2)
PROT SERPL-MCNC: 5.6 G/DL (ref 6–8.5)
PROT SERPL-MCNC: 5.9 G/DL (ref 6–8.5)
QT INTERVAL: 359 MS
QTC INTERVAL: 407 MS
RBC # BLD AUTO: 2.01 10*6/MM3 (ref 4.14–5.8)
RBC # BLD AUTO: 2.2 10*6/MM3 (ref 4.14–5.8)
RBC # BLD AUTO: 2.34 10*6/MM3 (ref 4.14–5.8)
RBC # BLD AUTO: 2.79 10*6/MM3 (ref 4.14–5.8)
RBC # BLD AUTO: 2.85 10*6/MM3 (ref 4.14–5.8)
RBC # BLD AUTO: 2.92 10*6/MM3 (ref 4.14–5.8)
RBC # BLD AUTO: 3.03 10*6/MM3 (ref 4.14–5.8)
RBC # BLD AUTO: 3.22 10*6/MM3 (ref 4.14–5.8)
RBC MORPH BLD: NORMAL
RH BLD: NEGATIVE
RH BLD: NEGATIVE
RVA RNA STL QL NAA+NON-PROBE: NOT DETECTED
S ENT+BONG DNA STL QL NAA+NON-PROBE: NOT DETECTED
SAPO I+II+IV+V RNA STL QL NAA+NON-PROBE: NOT DETECTED
SHIGELLA SP+EIEC IPAH ST NAA+NON-PROBE: NOT DETECTED
SODIUM SERPL-SCNC: 132 MMOL/L (ref 136–145)
SODIUM SERPL-SCNC: 134 MMOL/L (ref 136–145)
SODIUM SERPL-SCNC: 136 MMOL/L (ref 136–145)
SODIUM SERPL-SCNC: 136 MMOL/L (ref 136–145)
SODIUM SERPL-SCNC: 137 MMOL/L (ref 136–145)
SODIUM SERPL-SCNC: 137 MMOL/L (ref 136–145)
SODIUM SERPL-SCNC: 138 MMOL/L (ref 136–145)
SODIUM SERPL-SCNC: 141 MMOL/L (ref 136–145)
T&S EXPIRATION DATE: NORMAL
T&S EXPIRATION DATE: NORMAL
TIBC SERPL-MCNC: 271 MCG/DL (ref 298–536)
TRANSFERRIN SERPL-MCNC: 182 MG/DL (ref 200–360)
UNIT  ABO: NORMAL
UNIT  RH: NORMAL
V CHOL+PARA+VUL DNA STL QL NAA+NON-PROBE: NOT DETECTED
V CHOLERAE DNA STL QL NAA+NON-PROBE: NOT DETECTED
VARIANT LYMPHS NFR BLD MANUAL: 17 % (ref 19.6–45.3)
VARIANT LYMPHS NFR BLD MANUAL: 17.9 % (ref 19.6–45.3)
VARIANT LYMPHS NFR BLD MANUAL: 8 % (ref 19.6–45.3)
WBC MORPH BLD: NORMAL
WBC NRBC COR # BLD AUTO: 2.21 10*3/MM3 (ref 3.4–10.8)
WBC NRBC COR # BLD AUTO: 2.46 10*3/MM3 (ref 3.4–10.8)
WBC NRBC COR # BLD AUTO: 2.52 10*3/MM3 (ref 3.4–10.8)
WBC NRBC COR # BLD AUTO: 3.22 10*3/MM3 (ref 3.4–10.8)
WBC NRBC COR # BLD AUTO: 3.3 10*3/MM3 (ref 3.4–10.8)
WBC NRBC COR # BLD AUTO: 3.34 10*3/MM3 (ref 3.4–10.8)
WBC NRBC COR # BLD AUTO: 3.88 10*3/MM3 (ref 3.4–10.8)
WBC NRBC COR # BLD AUTO: 4.71 10*3/MM3 (ref 3.4–10.8)
Y ENTEROCOL DNA STL QL NAA+NON-PROBE: NOT DETECTED

## 2023-01-01 PROCEDURE — 97166 OT EVAL MOD COMPLEX 45 MIN: CPT

## 2023-01-01 PROCEDURE — 74177 CT ABD & PELVIS W/CONTRAST: CPT

## 2023-01-01 PROCEDURE — 85014 HEMATOCRIT: CPT | Performed by: NURSE PRACTITIONER

## 2023-01-01 PROCEDURE — 82948 REAGENT STRIP/BLOOD GLUCOSE: CPT

## 2023-01-01 PROCEDURE — P9016 RBC LEUKOCYTES REDUCED: HCPCS

## 2023-01-01 PROCEDURE — 85007 BL SMEAR W/DIFF WBC COUNT: CPT | Performed by: PHYSICIAN ASSISTANT

## 2023-01-01 PROCEDURE — 43235 EGD DIAGNOSTIC BRUSH WASH: CPT | Performed by: INTERNAL MEDICINE

## 2023-01-01 PROCEDURE — 99231 SBSQ HOSP IP/OBS SF/LOW 25: CPT | Performed by: INTERNAL MEDICINE

## 2023-01-01 PROCEDURE — 80048 BASIC METABOLIC PNL TOTAL CA: CPT | Performed by: HOSPITALIST

## 2023-01-01 PROCEDURE — 80053 COMPREHEN METABOLIC PANEL: CPT | Performed by: NURSE PRACTITIONER

## 2023-01-01 PROCEDURE — 25010000002 HYDROMORPHONE 1 MG/ML SOLUTION: Performed by: INTERNAL MEDICINE

## 2023-01-01 PROCEDURE — 85027 COMPLETE CBC AUTOMATED: CPT | Performed by: HOSPITALIST

## 2023-01-01 PROCEDURE — P9035 PLATELET PHERES LEUKOREDUCED: HCPCS

## 2023-01-01 PROCEDURE — 85018 HEMOGLOBIN: CPT | Performed by: NURSE PRACTITIONER

## 2023-01-01 PROCEDURE — 63710000001 INSULIN LISPRO (HUMAN) PER 5 UNITS: Performed by: NURSE PRACTITIONER

## 2023-01-01 PROCEDURE — 85027 COMPLETE CBC AUTOMATED: CPT | Performed by: NURSE PRACTITIONER

## 2023-01-01 PROCEDURE — 99232 SBSQ HOSP IP/OBS MODERATE 35: CPT | Performed by: INTERNAL MEDICINE

## 2023-01-01 PROCEDURE — 85007 BL SMEAR W/DIFF WBC COUNT: CPT | Performed by: INTERNAL MEDICINE

## 2023-01-01 PROCEDURE — 85014 HEMATOCRIT: CPT | Performed by: INTERNAL MEDICINE

## 2023-01-01 PROCEDURE — 85025 COMPLETE CBC W/AUTO DIFF WBC: CPT | Performed by: INTERNAL MEDICINE

## 2023-01-01 PROCEDURE — 93005 ELECTROCARDIOGRAM TRACING: CPT | Performed by: PHYSICIAN ASSISTANT

## 2023-01-01 PROCEDURE — 80048 BASIC METABOLIC PNL TOTAL CA: CPT | Performed by: NURSE PRACTITIONER

## 2023-01-01 PROCEDURE — 25010000002 HYDROMORPHONE PER 4 MG: Performed by: INTERNAL MEDICINE

## 2023-01-01 PROCEDURE — 63710000001 INSULIN LISPRO (HUMAN) PER 5 UNITS: Performed by: HOSPITALIST

## 2023-01-01 PROCEDURE — 97535 SELF CARE MNGMENT TRAINING: CPT

## 2023-01-01 PROCEDURE — 63710000001 INSULIN GLARGINE PER 5 UNITS: Performed by: NURSE PRACTITIONER

## 2023-01-01 PROCEDURE — P9100 PATHOGEN TEST FOR PLATELETS: HCPCS

## 2023-01-01 PROCEDURE — 97530 THERAPEUTIC ACTIVITIES: CPT

## 2023-01-01 PROCEDURE — 86901 BLOOD TYPING SEROLOGIC RH(D): CPT | Performed by: PHYSICIAN ASSISTANT

## 2023-01-01 PROCEDURE — 84466 ASSAY OF TRANSFERRIN: CPT | Performed by: INTERNAL MEDICINE

## 2023-01-01 PROCEDURE — 93010 ELECTROCARDIOGRAM REPORT: CPT | Performed by: INTERNAL MEDICINE

## 2023-01-01 PROCEDURE — 85018 HEMOGLOBIN: CPT | Performed by: INTERNAL MEDICINE

## 2023-01-01 PROCEDURE — 25010000002 MIDAZOLAM PER 1 MG: Performed by: INTERNAL MEDICINE

## 2023-01-01 PROCEDURE — 99222 1ST HOSP IP/OBS MODERATE 55: CPT | Performed by: INTERNAL MEDICINE

## 2023-01-01 PROCEDURE — 80053 COMPREHEN METABOLIC PANEL: CPT | Performed by: PHYSICIAN ASSISTANT

## 2023-01-01 PROCEDURE — 86900 BLOOD TYPING SEROLOGIC ABO: CPT

## 2023-01-01 PROCEDURE — 25010000002 MORPHINE PER 10 MG: Performed by: INTERNAL MEDICINE

## 2023-01-01 PROCEDURE — 99232 SBSQ HOSP IP/OBS MODERATE 35: CPT | Performed by: NURSE PRACTITIONER

## 2023-01-01 PROCEDURE — 99222 1ST HOSP IP/OBS MODERATE 55: CPT | Performed by: PHYSICIAN ASSISTANT

## 2023-01-01 PROCEDURE — 25510000001 IOPAMIDOL 61 % SOLUTION: Performed by: HOSPITALIST

## 2023-01-01 PROCEDURE — 86850 RBC ANTIBODY SCREEN: CPT | Performed by: INTERNAL MEDICINE

## 2023-01-01 PROCEDURE — 97162 PT EVAL MOD COMPLEX 30 MIN: CPT

## 2023-01-01 PROCEDURE — 63710000001 INSULIN GLARGINE PER 5 UNITS: Performed by: HOSPITALIST

## 2023-01-01 PROCEDURE — 82728 ASSAY OF FERRITIN: CPT | Performed by: INTERNAL MEDICINE

## 2023-01-01 PROCEDURE — 83036 HEMOGLOBIN GLYCOSYLATED A1C: CPT | Performed by: HOSPITALIST

## 2023-01-01 PROCEDURE — 86900 BLOOD TYPING SEROLOGIC ABO: CPT | Performed by: INTERNAL MEDICINE

## 2023-01-01 PROCEDURE — 97110 THERAPEUTIC EXERCISES: CPT

## 2023-01-01 PROCEDURE — 87507 IADNA-DNA/RNA PROBE TQ 12-25: CPT | Performed by: PHYSICIAN ASSISTANT

## 2023-01-01 PROCEDURE — 85025 COMPLETE CBC W/AUTO DIFF WBC: CPT | Performed by: PHYSICIAN ASSISTANT

## 2023-01-01 PROCEDURE — 0DJ08ZZ INSPECTION OF UPPER INTESTINAL TRACT, VIA NATURAL OR ARTIFICIAL OPENING ENDOSCOPIC: ICD-10-PCS | Performed by: INTERNAL MEDICINE

## 2023-01-01 PROCEDURE — 0 DIATRIZOATE MEGLUMINE & SODIUM PER 1 ML: Performed by: HOSPITALIST

## 2023-01-01 PROCEDURE — 86850 RBC ANTIBODY SCREEN: CPT | Performed by: PHYSICIAN ASSISTANT

## 2023-01-01 PROCEDURE — 99231 SBSQ HOSP IP/OBS SF/LOW 25: CPT | Performed by: NURSE PRACTITIONER

## 2023-01-01 PROCEDURE — 86923 COMPATIBILITY TEST ELECTRIC: CPT

## 2023-01-01 PROCEDURE — 86901 BLOOD TYPING SEROLOGIC RH(D): CPT | Performed by: INTERNAL MEDICINE

## 2023-01-01 PROCEDURE — 36430 TRANSFUSION BLD/BLD COMPNT: CPT

## 2023-01-01 PROCEDURE — 99221 1ST HOSP IP/OBS SF/LOW 40: CPT | Performed by: NURSE PRACTITIONER

## 2023-01-01 PROCEDURE — 99291 CRITICAL CARE FIRST HOUR: CPT

## 2023-01-01 PROCEDURE — 99233 SBSQ HOSP IP/OBS HIGH 50: CPT | Performed by: INTERNAL MEDICINE

## 2023-01-01 PROCEDURE — 82270 OCCULT BLOOD FECES: CPT | Performed by: PHYSICIAN ASSISTANT

## 2023-01-01 PROCEDURE — 25810000003 SODIUM CHLORIDE 0.9 % SOLUTION: Performed by: INTERNAL MEDICINE

## 2023-01-01 PROCEDURE — 36415 COLL VENOUS BLD VENIPUNCTURE: CPT | Performed by: NURSE PRACTITIONER

## 2023-01-01 PROCEDURE — 71045 X-RAY EXAM CHEST 1 VIEW: CPT

## 2023-01-01 PROCEDURE — 86900 BLOOD TYPING SEROLOGIC ABO: CPT | Performed by: PHYSICIAN ASSISTANT

## 2023-01-01 PROCEDURE — 87493 C DIFF AMPLIFIED PROBE: CPT | Performed by: PHYSICIAN ASSISTANT

## 2023-01-01 PROCEDURE — 83540 ASSAY OF IRON: CPT | Performed by: INTERNAL MEDICINE

## 2023-01-01 RX ORDER — LORAZEPAM 2 MG/ML
2 CONCENTRATE ORAL
Status: ACTIVE | OUTPATIENT
Start: 2023-01-01 | End: 2024-01-01

## 2023-01-01 RX ORDER — DEXAMETHASONE 2 MG/1
2 TABLET ORAL DAILY
Qty: 51 TABLET | Refills: 0 | Status: DISCONTINUED | OUTPATIENT
Start: 2023-01-01 | End: 2023-01-01

## 2023-01-01 RX ORDER — ACETAMINOPHEN 325 MG/1
650 TABLET ORAL EVERY 4 HOURS PRN
Status: DISCONTINUED | OUTPATIENT
Start: 2023-01-01 | End: 2024-01-01 | Stop reason: HOSPADM

## 2023-01-01 RX ORDER — IBUPROFEN 600 MG/1
1 TABLET ORAL
Status: DISCONTINUED | OUTPATIENT
Start: 2023-01-01 | End: 2023-01-01

## 2023-01-01 RX ORDER — MIDAZOLAM HYDROCHLORIDE 1 MG/ML
1 INJECTION INTRAMUSCULAR; INTRAVENOUS
Status: ACTIVE | OUTPATIENT
Start: 2023-01-01 | End: 2024-01-01

## 2023-01-01 RX ORDER — MORPHINE SULFATE 2 MG/ML
6 INJECTION, SOLUTION INTRAMUSCULAR; INTRAVENOUS
Status: DISCONTINUED | OUTPATIENT
Start: 2023-01-01 | End: 2024-01-01 | Stop reason: HOSPADM

## 2023-01-01 RX ORDER — BISACODYL 10 MG
10 SUPPOSITORY, RECTAL RECTAL DAILY PRN
Status: DISCONTINUED | OUTPATIENT
Start: 2023-01-01 | End: 2024-01-01 | Stop reason: HOSPADM

## 2023-01-01 RX ORDER — DIPHENOXYLATE HYDROCHLORIDE AND ATROPINE SULFATE 2.5; .025 MG/1; MG/1
1 TABLET ORAL
Status: DISCONTINUED | OUTPATIENT
Start: 2023-01-01 | End: 2024-01-01 | Stop reason: HOSPADM

## 2023-01-01 RX ORDER — LORAZEPAM 2 MG/ML
1 CONCENTRATE ORAL
Status: ACTIVE | OUTPATIENT
Start: 2023-01-01 | End: 2024-01-01

## 2023-01-01 RX ORDER — MIDAZOLAM HYDROCHLORIDE 1 MG/ML
2 INJECTION INTRAMUSCULAR; INTRAVENOUS
Status: ACTIVE | OUTPATIENT
Start: 2023-01-01 | End: 2024-01-01

## 2023-01-01 RX ORDER — GUAIFENESIN 600 MG/1
600 TABLET, EXTENDED RELEASE ORAL EVERY 12 HOURS SCHEDULED
Status: DISCONTINUED | OUTPATIENT
Start: 2023-01-01 | End: 2024-01-01 | Stop reason: HOSPADM

## 2023-01-01 RX ORDER — MIDAZOLAM HYDROCHLORIDE 1 MG/ML
4 INJECTION INTRAMUSCULAR; INTRAVENOUS
Status: ACTIVE | OUTPATIENT
Start: 2023-01-01 | End: 2024-01-01

## 2023-01-01 RX ORDER — NITROGLYCERIN 0.4 MG/1
0.4 TABLET SUBLINGUAL
Status: DISCONTINUED | OUTPATIENT
Start: 2023-01-01 | End: 2023-01-01

## 2023-01-01 RX ORDER — MODAFINIL 100 MG/1
200 TABLET ORAL DAILY
Status: DISPENSED | OUTPATIENT
Start: 2023-01-01 | End: 2023-01-01

## 2023-01-01 RX ORDER — INSULIN LISPRO 100 [IU]/ML
4 INJECTION, SOLUTION INTRAVENOUS; SUBCUTANEOUS
Status: DISCONTINUED | OUTPATIENT
Start: 2023-01-01 | End: 2023-01-01

## 2023-01-01 RX ORDER — FENTANYL 50 UG/1
1 PATCH TRANSDERMAL
Status: DISCONTINUED | OUTPATIENT
Start: 2023-01-01 | End: 2024-01-01 | Stop reason: HOSPADM

## 2023-01-01 RX ORDER — LEVOTHYROXINE SODIUM 88 UG/1
176 TABLET ORAL 2 TIMES WEEKLY
Status: DISCONTINUED | OUTPATIENT
Start: 2023-01-01 | End: 2024-01-01 | Stop reason: HOSPADM

## 2023-01-01 RX ORDER — MORPHINE SULFATE 2 MG/ML
4 INJECTION, SOLUTION INTRAMUSCULAR; INTRAVENOUS
Status: DISCONTINUED | OUTPATIENT
Start: 2023-01-01 | End: 2024-01-01 | Stop reason: HOSPADM

## 2023-01-01 RX ORDER — CETIRIZINE HYDROCHLORIDE 10 MG/1
5 TABLET ORAL 2 TIMES DAILY
Qty: 10 TABLET | Refills: 0 | Status: COMPLETED | OUTPATIENT
Start: 2023-01-01 | End: 2023-01-01

## 2023-01-01 RX ORDER — LORAZEPAM 1 MG/1
1 TABLET ORAL
Status: ACTIVE | OUTPATIENT
Start: 2023-01-01 | End: 2024-01-01

## 2023-01-01 RX ORDER — LEVOTHYROXINE SODIUM 88 UG/1
2 TABLET ORAL 2 TIMES WEEKLY
COMMUNITY

## 2023-01-01 RX ORDER — OXYCODONE HYDROCHLORIDE 5 MG/1
5 TABLET ORAL EVERY 4 HOURS PRN
Status: DISPENSED | OUTPATIENT
Start: 2023-01-01 | End: 2023-01-01

## 2023-01-01 RX ORDER — ACETAMINOPHEN 160 MG/5ML
650 SOLUTION ORAL EVERY 4 HOURS PRN
Status: DISCONTINUED | OUTPATIENT
Start: 2023-01-01 | End: 2024-01-01 | Stop reason: HOSPADM

## 2023-01-01 RX ORDER — SODIUM CHLORIDE 9 MG/ML
30 INJECTION, SOLUTION INTRAVENOUS CONTINUOUS PRN
Status: DISCONTINUED | OUTPATIENT
Start: 2023-01-01 | End: 2024-01-01 | Stop reason: HOSPADM

## 2023-01-01 RX ORDER — ACETAMINOPHEN 650 MG/1
650 SUPPOSITORY RECTAL EVERY 4 HOURS PRN
Status: DISCONTINUED | OUTPATIENT
Start: 2023-01-01 | End: 2024-01-01 | Stop reason: HOSPADM

## 2023-01-01 RX ORDER — IBUPROFEN 600 MG/1
1 TABLET ORAL
Status: DISCONTINUED | OUTPATIENT
Start: 2023-01-01 | End: 2024-01-01 | Stop reason: HOSPADM

## 2023-01-01 RX ORDER — OXYCODONE HYDROCHLORIDE 5 MG/1
5 TABLET ORAL EVERY 4 HOURS PRN
Status: DISCONTINUED | OUTPATIENT
Start: 2023-01-01 | End: 2023-01-01

## 2023-01-01 RX ORDER — LEVOTHYROXINE SODIUM 88 UG/1
88 TABLET ORAL
Status: DISCONTINUED | OUTPATIENT
Start: 2023-01-01 | End: 2024-01-01 | Stop reason: HOSPADM

## 2023-01-01 RX ORDER — PANTOPRAZOLE SODIUM 40 MG/10ML
80 INJECTION, POWDER, LYOPHILIZED, FOR SOLUTION INTRAVENOUS ONCE
Status: COMPLETED | OUTPATIENT
Start: 2023-01-01 | End: 2023-01-01

## 2023-01-01 RX ORDER — ONDANSETRON 2 MG/ML
4 INJECTION INTRAMUSCULAR; INTRAVENOUS EVERY 6 HOURS PRN
Status: DISCONTINUED | OUTPATIENT
Start: 2023-01-01 | End: 2024-01-01 | Stop reason: HOSPADM

## 2023-01-01 RX ORDER — LORAZEPAM 2 MG/ML
2 CONCENTRATE ORAL
Status: DISPENSED | OUTPATIENT
Start: 2023-01-01 | End: 2024-01-01

## 2023-01-01 RX ORDER — LORAZEPAM 0.5 MG/1
0.5 TABLET ORAL
Status: ACTIVE | OUTPATIENT
Start: 2023-01-01 | End: 2024-01-01

## 2023-01-01 RX ORDER — MORPHINE SULFATE 20 MG/ML
10 SOLUTION ORAL
Status: DISCONTINUED | OUTPATIENT
Start: 2023-01-01 | End: 2024-01-01 | Stop reason: HOSPADM

## 2023-01-01 RX ORDER — SODIUM CHLORIDE 9 MG/ML
40 INJECTION, SOLUTION INTRAVENOUS AS NEEDED
Status: DISCONTINUED | OUTPATIENT
Start: 2023-01-01 | End: 2024-01-01 | Stop reason: HOSPADM

## 2023-01-01 RX ORDER — NICOTINE POLACRILEX 4 MG
15 LOZENGE BUCCAL
Status: DISCONTINUED | OUTPATIENT
Start: 2023-01-01 | End: 2024-01-01 | Stop reason: HOSPADM

## 2023-01-01 RX ORDER — ROPINIROLE 2 MG/1
2 TABLET, FILM COATED ORAL NIGHTLY
Status: DISCONTINUED | OUTPATIENT
Start: 2023-01-01 | End: 2024-01-01 | Stop reason: HOSPADM

## 2023-01-01 RX ORDER — SODIUM CHLORIDE 0.9 % (FLUSH) 0.9 %
10 SYRINGE (ML) INJECTION EVERY 12 HOURS SCHEDULED
Status: DISCONTINUED | OUTPATIENT
Start: 2023-01-01 | End: 2024-01-01 | Stop reason: HOSPADM

## 2023-01-01 RX ORDER — POLYETHYLENE GLYCOL 3350 17 G/17G
17 POWDER, FOR SOLUTION ORAL DAILY PRN
Status: DISCONTINUED | OUTPATIENT
Start: 2023-01-01 | End: 2024-01-01 | Stop reason: HOSPADM

## 2023-01-01 RX ORDER — MIDAZOLAM HYDROCHLORIDE 1 MG/ML
2 INJECTION INTRAMUSCULAR; INTRAVENOUS
Status: DISPENSED | OUTPATIENT
Start: 2023-01-01 | End: 2024-01-01

## 2023-01-01 RX ORDER — LORAZEPAM 2 MG/ML
0.5 CONCENTRATE ORAL
Status: ACTIVE | OUTPATIENT
Start: 2023-01-01 | End: 2024-01-01

## 2023-01-01 RX ORDER — GABAPENTIN 300 MG/1
900 CAPSULE ORAL NIGHTLY
Status: DISCONTINUED | OUTPATIENT
Start: 2023-01-01 | End: 2024-01-01 | Stop reason: HOSPADM

## 2023-01-01 RX ORDER — HYDROMORPHONE HYDROCHLORIDE 1 MG/ML
0.5 INJECTION, SOLUTION INTRAMUSCULAR; INTRAVENOUS; SUBCUTANEOUS
Status: DISCONTINUED | OUTPATIENT
Start: 2023-01-01 | End: 2024-01-01 | Stop reason: HOSPADM

## 2023-01-01 RX ORDER — NICOTINE POLACRILEX 4 MG
15 LOZENGE BUCCAL
Status: DISCONTINUED | OUTPATIENT
Start: 2023-01-01 | End: 2023-01-01

## 2023-01-01 RX ORDER — MORPHINE SULFATE 20 MG/ML
20 SOLUTION ORAL
Status: DISCONTINUED | OUTPATIENT
Start: 2023-01-01 | End: 2024-01-01 | Stop reason: HOSPADM

## 2023-01-01 RX ORDER — POTASSIUM CHLORIDE 750 MG/1
40 TABLET, FILM COATED, EXTENDED RELEASE ORAL ONCE
Status: COMPLETED | OUTPATIENT
Start: 2023-01-01 | End: 2023-01-01

## 2023-01-01 RX ORDER — INSULIN LISPRO 100 [IU]/ML
3 INJECTION, SOLUTION INTRAVENOUS; SUBCUTANEOUS
Status: DISCONTINUED | OUTPATIENT
Start: 2023-01-01 | End: 2023-01-01

## 2023-01-01 RX ORDER — DILTIAZEM HYDROCHLORIDE 120 MG/1
120 CAPSULE, COATED, EXTENDED RELEASE ORAL DAILY
Status: DISCONTINUED | OUTPATIENT
Start: 2023-01-01 | End: 2023-01-01

## 2023-01-01 RX ORDER — DEXTROSE MONOHYDRATE 25 G/50ML
25 INJECTION, SOLUTION INTRAVENOUS
Status: DISCONTINUED | OUTPATIENT
Start: 2023-01-01 | End: 2023-01-01

## 2023-01-01 RX ORDER — LORAZEPAM 1 MG/1
2 TABLET ORAL
Status: DISPENSED | OUTPATIENT
Start: 2023-01-01 | End: 2024-01-01

## 2023-01-01 RX ORDER — PRAVASTATIN SODIUM 40 MG
40 TABLET ORAL NIGHTLY
Status: DISCONTINUED | OUTPATIENT
Start: 2023-01-01 | End: 2023-01-01

## 2023-01-01 RX ORDER — CHOLECALCIFEROL (VITAMIN D3) 125 MCG
1000 CAPSULE ORAL DAILY
Status: DISCONTINUED | OUTPATIENT
Start: 2023-01-01 | End: 2023-01-01

## 2023-01-01 RX ORDER — DEXTROSE MONOHYDRATE 25 G/50ML
25 INJECTION, SOLUTION INTRAVENOUS
Status: DISCONTINUED | OUTPATIENT
Start: 2023-01-01 | End: 2024-01-01 | Stop reason: HOSPADM

## 2023-01-01 RX ORDER — MORPHINE SULFATE 20 MG/ML
5 SOLUTION ORAL
Status: DISCONTINUED | OUTPATIENT
Start: 2023-01-01 | End: 2024-01-01 | Stop reason: HOSPADM

## 2023-01-01 RX ORDER — INSULIN LISPRO 100 [IU]/ML
2-7 INJECTION, SOLUTION INTRAVENOUS; SUBCUTANEOUS
Status: DISCONTINUED | OUTPATIENT
Start: 2023-01-01 | End: 2023-01-01

## 2023-01-01 RX ORDER — MORPHINE SULFATE 2 MG/ML
2 INJECTION, SOLUTION INTRAMUSCULAR; INTRAVENOUS
Status: DISCONTINUED | OUTPATIENT
Start: 2023-01-01 | End: 2024-01-01 | Stop reason: HOSPADM

## 2023-01-01 RX ORDER — INSULIN LISPRO 100 [IU]/ML
2-9 INJECTION, SOLUTION INTRAVENOUS; SUBCUTANEOUS
Status: DISCONTINUED | OUTPATIENT
Start: 2023-01-01 | End: 2023-01-01

## 2023-01-01 RX ORDER — PANTOPRAZOLE SODIUM 40 MG/10ML
40 INJECTION, POWDER, LYOPHILIZED, FOR SOLUTION INTRAVENOUS
Status: DISCONTINUED | OUTPATIENT
Start: 2023-01-01 | End: 2023-01-01

## 2023-01-01 RX ORDER — INSULIN LISPRO 100 [IU]/ML
2 INJECTION, SOLUTION INTRAVENOUS; SUBCUTANEOUS
Status: DISCONTINUED | OUTPATIENT
Start: 2023-01-01 | End: 2023-01-01

## 2023-01-01 RX ORDER — MELATONIN
2000 DAILY
Status: DISCONTINUED | OUTPATIENT
Start: 2023-01-01 | End: 2023-01-01

## 2023-01-01 RX ORDER — OLMESARTAN MEDOXOMIL 40 MG/1
40 TABLET ORAL DAILY
COMMUNITY
Start: 2023-01-01

## 2023-01-01 RX ORDER — BISACODYL 5 MG/1
5 TABLET, DELAYED RELEASE ORAL DAILY PRN
Status: DISCONTINUED | OUTPATIENT
Start: 2023-01-01 | End: 2024-01-01 | Stop reason: HOSPADM

## 2023-01-01 RX ORDER — PANTOPRAZOLE SODIUM 40 MG/1
40 TABLET, DELAYED RELEASE ORAL
Status: DISCONTINUED | OUTPATIENT
Start: 2023-01-01 | End: 2024-01-01 | Stop reason: HOSPADM

## 2023-01-01 RX ORDER — AMOXICILLIN 250 MG
2 CAPSULE ORAL 2 TIMES DAILY
Status: DISCONTINUED | OUTPATIENT
Start: 2023-01-01 | End: 2024-01-01 | Stop reason: HOSPADM

## 2023-01-01 RX ORDER — MIDAZOLAM HYDROCHLORIDE 1 MG/ML
4 INJECTION INTRAMUSCULAR; INTRAVENOUS
Status: DISPENSED | OUTPATIENT
Start: 2023-01-01 | End: 2024-01-01

## 2023-01-01 RX ORDER — SODIUM CHLORIDE 0.9 % (FLUSH) 0.9 %
10 SYRINGE (ML) INJECTION AS NEEDED
Status: DISCONTINUED | OUTPATIENT
Start: 2023-01-01 | End: 2024-01-01 | Stop reason: HOSPADM

## 2023-01-01 RX ORDER — ROPINIROLE 1 MG/1
1 TABLET, FILM COATED ORAL NIGHTLY
Status: DISCONTINUED | OUTPATIENT
Start: 2023-01-01 | End: 2023-01-01

## 2023-01-01 RX ADMIN — PANTOPRAZOLE SODIUM 40 MG: 40 TABLET, DELAYED RELEASE ORAL at 17:11

## 2023-01-01 RX ADMIN — MORPHINE SULFATE 4 MG: 2 INJECTION, SOLUTION INTRAMUSCULAR; INTRAVENOUS at 01:01

## 2023-01-01 RX ADMIN — PRAVASTATIN SODIUM 40 MG: 40 TABLET ORAL at 20:58

## 2023-01-01 RX ADMIN — GABAPENTIN 900 MG: 300 CAPSULE ORAL at 21:01

## 2023-01-01 RX ADMIN — PANTOPRAZOLE SODIUM 40 MG: 40 INJECTION, POWDER, FOR SOLUTION INTRAVENOUS at 18:00

## 2023-01-01 RX ADMIN — Medication 2000 UNITS: at 12:47

## 2023-01-01 RX ADMIN — Medication 1 APPLICATION: at 17:13

## 2023-01-01 RX ADMIN — INSULIN GLARGINE 15 UNITS: 100 INJECTION, SOLUTION SUBCUTANEOUS at 21:02

## 2023-01-01 RX ADMIN — ROPINIROLE 2 MG: 2 TABLET, FILM COATED ORAL at 21:00

## 2023-01-01 RX ADMIN — ROPINIROLE 2 MG: 2 TABLET, FILM COATED ORAL at 20:16

## 2023-01-01 RX ADMIN — INSULIN LISPRO 2 UNITS: 100 INJECTION, SOLUTION INTRAVENOUS; SUBCUTANEOUS at 17:23

## 2023-01-01 RX ADMIN — LEVOTHYROXINE SODIUM 88 MCG: 0.09 TABLET ORAL at 06:30

## 2023-01-01 RX ADMIN — INSULIN LISPRO 4 UNITS: 100 INJECTION, SOLUTION INTRAVENOUS; SUBCUTANEOUS at 17:13

## 2023-01-01 RX ADMIN — MORPHINE SULFATE 4 MG: 2 INJECTION, SOLUTION INTRAMUSCULAR; INTRAVENOUS at 10:21

## 2023-01-01 RX ADMIN — Medication 10 ML: at 21:34

## 2023-01-01 RX ADMIN — PANTOPRAZOLE SODIUM 40 MG: 40 INJECTION, POWDER, FOR SOLUTION INTRAVENOUS at 17:14

## 2023-01-01 RX ADMIN — MORPHINE SULFATE 20 MG: 100 SOLUTION ORAL at 17:52

## 2023-01-01 RX ADMIN — MODAFINIL 200 MG: 100 TABLET ORAL at 08:25

## 2023-01-01 RX ADMIN — Medication 10 ML: at 23:49

## 2023-01-01 RX ADMIN — ROPINIROLE 2 MG: 2 TABLET, FILM COATED ORAL at 22:30

## 2023-01-01 RX ADMIN — PRAVASTATIN SODIUM 40 MG: 40 TABLET ORAL at 21:01

## 2023-01-01 RX ADMIN — CETIRIZINE HYDROCHLORIDE 5 MG: 10 TABLET ORAL at 09:36

## 2023-01-01 RX ADMIN — MODAFINIL 200 MG: 100 TABLET ORAL at 08:51

## 2023-01-01 RX ADMIN — PANTOPRAZOLE SODIUM 40 MG: 40 INJECTION, POWDER, FOR SOLUTION INTRAVENOUS at 08:25

## 2023-01-01 RX ADMIN — LEVOTHYROXINE SODIUM 88 MCG: 0.09 TABLET ORAL at 06:58

## 2023-01-01 RX ADMIN — DILTIAZEM HYDROCHLORIDE 120 MG: 120 CAPSULE, EXTENDED RELEASE ORAL at 08:25

## 2023-01-01 RX ADMIN — PANTOPRAZOLE SODIUM 40 MG: 40 TABLET, DELAYED RELEASE ORAL at 06:26

## 2023-01-01 RX ADMIN — HYDROMORPHONE HYDROCHLORIDE 0.5 MG: 1 INJECTION, SOLUTION INTRAMUSCULAR; INTRAVENOUS; SUBCUTANEOUS at 21:44

## 2023-01-01 RX ADMIN — ROPINIROLE 2 MG: 2 TABLET, FILM COATED ORAL at 21:30

## 2023-01-01 RX ADMIN — PANTOPRAZOLE SODIUM 40 MG: 40 TABLET, DELAYED RELEASE ORAL at 16:31

## 2023-01-01 RX ADMIN — Medication 1 APPLICATION: at 21:03

## 2023-01-01 RX ADMIN — GABAPENTIN 900 MG: 300 CAPSULE ORAL at 21:30

## 2023-01-01 RX ADMIN — Medication 2000 UNITS: at 08:51

## 2023-01-01 RX ADMIN — HYDROMORPHONE HYDROCHLORIDE 1 MG: 1 INJECTION, SOLUTION INTRAMUSCULAR; INTRAVENOUS; SUBCUTANEOUS at 01:21

## 2023-01-01 RX ADMIN — FENTANYL 1 PATCH: 50 PATCH, EXTENDED RELEASE TRANSDERMAL at 15:00

## 2023-01-01 RX ADMIN — MODAFINIL 200 MG: 100 TABLET ORAL at 09:00

## 2023-01-01 RX ADMIN — Medication 10 ML: at 08:26

## 2023-01-01 RX ADMIN — GUAIFENESIN 600 MG: 600 TABLET, EXTENDED RELEASE ORAL at 22:27

## 2023-01-01 RX ADMIN — PRAVASTATIN SODIUM 40 MG: 40 TABLET ORAL at 20:47

## 2023-01-01 RX ADMIN — Medication 1000 MCG: at 08:51

## 2023-01-01 RX ADMIN — LEVOTHYROXINE SODIUM 176 MCG: 0.09 TABLET ORAL at 06:26

## 2023-01-01 RX ADMIN — SENNOSIDES AND DOCUSATE SODIUM 2 TABLET: 50; 8.6 TABLET ORAL at 09:04

## 2023-01-01 RX ADMIN — INSULIN LISPRO 6 UNITS: 100 INJECTION, SOLUTION INTRAVENOUS; SUBCUTANEOUS at 21:33

## 2023-01-01 RX ADMIN — DILTIAZEM HYDROCHLORIDE 120 MG: 120 CAPSULE, EXTENDED RELEASE ORAL at 08:53

## 2023-01-01 RX ADMIN — LEVOTHYROXINE SODIUM 88 MCG: 0.09 TABLET ORAL at 06:32

## 2023-01-01 RX ADMIN — MODAFINIL 200 MG: 100 TABLET ORAL at 11:34

## 2023-01-01 RX ADMIN — MIDAZOLAM HYDROCHLORIDE 2 MG: 1 INJECTION, SOLUTION INTRAMUSCULAR; INTRAVENOUS at 06:46

## 2023-01-01 RX ADMIN — LEVOTHYROXINE SODIUM 88 MCG: 0.09 TABLET ORAL at 06:39

## 2023-01-01 RX ADMIN — PANTOPRAZOLE SODIUM 40 MG: 40 TABLET, DELAYED RELEASE ORAL at 16:34

## 2023-01-01 RX ADMIN — FENTANYL 1 PATCH: 50 PATCH, EXTENDED RELEASE TRANSDERMAL at 17:06

## 2023-01-01 RX ADMIN — CETIRIZINE HYDROCHLORIDE 5 MG: 10 TABLET ORAL at 09:15

## 2023-01-01 RX ADMIN — Medication 1 APPLICATION: at 10:45

## 2023-01-01 RX ADMIN — SENNOSIDES AND DOCUSATE SODIUM 2 TABLET: 50; 8.6 TABLET ORAL at 21:33

## 2023-01-01 RX ADMIN — CETIRIZINE HYDROCHLORIDE 5 MG: 10 TABLET ORAL at 20:16

## 2023-01-01 RX ADMIN — GUAIFENESIN 600 MG: 600 TABLET, EXTENDED RELEASE ORAL at 09:15

## 2023-01-01 RX ADMIN — INSULIN LISPRO 3 UNITS: 100 INJECTION, SOLUTION INTRAVENOUS; SUBCUTANEOUS at 17:06

## 2023-01-01 RX ADMIN — GABAPENTIN 900 MG: 300 CAPSULE ORAL at 22:27

## 2023-01-01 RX ADMIN — GUAIFENESIN 600 MG: 600 TABLET, EXTENDED RELEASE ORAL at 08:08

## 2023-01-01 RX ADMIN — Medication 10 ML: at 21:03

## 2023-01-01 RX ADMIN — CETIRIZINE HYDROCHLORIDE 5 MG: 10 TABLET ORAL at 20:09

## 2023-01-01 RX ADMIN — INSULIN LISPRO 4 UNITS: 100 INJECTION, SOLUTION INTRAVENOUS; SUBCUTANEOUS at 21:02

## 2023-01-01 RX ADMIN — MORPHINE SULFATE 2 MG: 2 INJECTION, SOLUTION INTRAMUSCULAR; INTRAVENOUS at 12:13

## 2023-01-01 RX ADMIN — MORPHINE SULFATE 4 MG: 2 INJECTION, SOLUTION INTRAMUSCULAR; INTRAVENOUS at 13:59

## 2023-01-01 RX ADMIN — PANTOPRAZOLE SODIUM 40 MG: 40 TABLET, DELAYED RELEASE ORAL at 17:13

## 2023-01-01 RX ADMIN — INSULIN LISPRO 4 UNITS: 100 INJECTION, SOLUTION INTRAVENOUS; SUBCUTANEOUS at 11:35

## 2023-01-01 RX ADMIN — Medication 1000 MCG: at 08:00

## 2023-01-01 RX ADMIN — LORAZEPAM 2 MG: 2 LIQUID ORAL at 17:52

## 2023-01-01 RX ADMIN — PANTOPRAZOLE SODIUM 40 MG: 40 TABLET, DELAYED RELEASE ORAL at 06:58

## 2023-01-01 RX ADMIN — LEVOTHYROXINE SODIUM 176 MCG: 0.09 TABLET ORAL at 17:08

## 2023-01-01 RX ADMIN — Medication 2000 UNITS: at 08:25

## 2023-01-01 RX ADMIN — MODAFINIL 200 MG: 100 TABLET ORAL at 10:46

## 2023-01-01 RX ADMIN — OXYCODONE HYDROCHLORIDE 5 MG: 5 TABLET ORAL at 09:58

## 2023-01-01 RX ADMIN — INSULIN LISPRO 2 UNITS: 100 INJECTION, SOLUTION INTRAVENOUS; SUBCUTANEOUS at 11:34

## 2023-01-01 RX ADMIN — SENNOSIDES AND DOCUSATE SODIUM 2 TABLET: 50; 8.6 TABLET ORAL at 22:27

## 2023-01-01 RX ADMIN — INSULIN LISPRO 2 UNITS: 100 INJECTION, SOLUTION INTRAVENOUS; SUBCUTANEOUS at 14:47

## 2023-01-01 RX ADMIN — GUAIFENESIN 600 MG: 600 TABLET, EXTENDED RELEASE ORAL at 08:51

## 2023-01-01 RX ADMIN — GABAPENTIN 900 MG: 300 CAPSULE ORAL at 20:47

## 2023-01-01 RX ADMIN — DIATRIZOATE MEGLUMINE AND DIATRIZOATE SODIUM 30 ML: 660; 100 LIQUID ORAL; RECTAL at 15:40

## 2023-01-01 RX ADMIN — Medication 10 ML: at 09:38

## 2023-01-01 RX ADMIN — PANTOPRAZOLE SODIUM 40 MG: 40 TABLET, DELAYED RELEASE ORAL at 06:30

## 2023-01-01 RX ADMIN — CETIRIZINE HYDROCHLORIDE 5 MG: 10 TABLET ORAL at 09:04

## 2023-01-01 RX ADMIN — Medication 1 APPLICATION: at 13:09

## 2023-01-01 RX ADMIN — LEVOTHYROXINE SODIUM 176 MCG: 0.09 TABLET ORAL at 06:21

## 2023-01-01 RX ADMIN — SENNOSIDES AND DOCUSATE SODIUM 2 TABLET: 50; 8.6 TABLET ORAL at 20:57

## 2023-01-01 RX ADMIN — PANTOPRAZOLE SODIUM 40 MG: 40 TABLET, DELAYED RELEASE ORAL at 18:04

## 2023-01-01 RX ADMIN — HYDROMORPHONE HYDROCHLORIDE 1 MG: 1 INJECTION, SOLUTION INTRAMUSCULAR; INTRAVENOUS; SUBCUTANEOUS at 04:52

## 2023-01-01 RX ADMIN — DILTIAZEM HYDROCHLORIDE 120 MG: 120 CAPSULE, EXTENDED RELEASE ORAL at 08:00

## 2023-01-01 RX ADMIN — OXYCODONE HYDROCHLORIDE 5 MG: 5 TABLET ORAL at 17:06

## 2023-01-01 RX ADMIN — HYDROMORPHONE HYDROCHLORIDE 1 MG: 1 INJECTION, SOLUTION INTRAMUSCULAR; INTRAVENOUS; SUBCUTANEOUS at 11:42

## 2023-01-01 RX ADMIN — GUAIFENESIN 600 MG: 600 TABLET, EXTENDED RELEASE ORAL at 20:16

## 2023-01-01 RX ADMIN — INSULIN LISPRO 3 UNITS: 100 INJECTION, SOLUTION INTRAVENOUS; SUBCUTANEOUS at 12:10

## 2023-01-01 RX ADMIN — GUAIFENESIN 600 MG: 600 TABLET, EXTENDED RELEASE ORAL at 21:56

## 2023-01-01 RX ADMIN — Medication 1 APPLICATION: at 21:00

## 2023-01-01 RX ADMIN — MODAFINIL 200 MG: 100 TABLET ORAL at 12:48

## 2023-01-01 RX ADMIN — MIDAZOLAM HYDROCHLORIDE 4 MG: 1 INJECTION, SOLUTION INTRAMUSCULAR; INTRAVENOUS at 18:47

## 2023-01-01 RX ADMIN — Medication 10 ML: at 10:22

## 2023-01-01 RX ADMIN — INSULIN LISPRO 4 UNITS: 100 INJECTION, SOLUTION INTRAVENOUS; SUBCUTANEOUS at 13:09

## 2023-01-01 RX ADMIN — MODAFINIL 200 MG: 100 TABLET ORAL at 08:53

## 2023-01-01 RX ADMIN — Medication 1 APPLICATION: at 12:35

## 2023-01-01 RX ADMIN — PANTOPRAZOLE SODIUM 40 MG: 40 TABLET, DELAYED RELEASE ORAL at 08:53

## 2023-01-01 RX ADMIN — CETIRIZINE HYDROCHLORIDE 5 MG: 10 TABLET ORAL at 20:47

## 2023-01-01 RX ADMIN — Medication 10 ML: at 21:00

## 2023-01-01 RX ADMIN — PANTOPRAZOLE SODIUM 40 MG: 40 TABLET, DELAYED RELEASE ORAL at 17:25

## 2023-01-01 RX ADMIN — Medication 1 APPLICATION: at 08:26

## 2023-01-01 RX ADMIN — LEVOTHYROXINE SODIUM 88 MCG: 0.09 TABLET ORAL at 06:18

## 2023-01-01 RX ADMIN — MODAFINIL 200 MG: 100 TABLET ORAL at 09:37

## 2023-01-01 RX ADMIN — Medication 1 APPLICATION: at 17:25

## 2023-01-01 RX ADMIN — POTASSIUM CHLORIDE 40 MEQ: 750 TABLET, EXTENDED RELEASE ORAL at 08:53

## 2023-01-01 RX ADMIN — Medication 10 ML: at 08:09

## 2023-01-01 RX ADMIN — CETIRIZINE HYDROCHLORIDE 5 MG: 10 TABLET ORAL at 23:47

## 2023-01-01 RX ADMIN — PRAVASTATIN SODIUM 40 MG: 40 TABLET ORAL at 21:00

## 2023-01-01 RX ADMIN — ROPINIROLE 2 MG: 2 TABLET, FILM COATED ORAL at 20:47

## 2023-01-01 RX ADMIN — INSULIN GLARGINE 15 UNITS: 100 INJECTION, SOLUTION SUBCUTANEOUS at 21:32

## 2023-01-01 RX ADMIN — LEVOTHYROXINE SODIUM 176 MCG: 0.09 TABLET ORAL at 06:34

## 2023-01-01 RX ADMIN — FENTANYL 1 PATCH: 50 PATCH, EXTENDED RELEASE TRANSDERMAL at 14:53

## 2023-01-01 RX ADMIN — PANTOPRAZOLE SODIUM 40 MG: 40 TABLET, DELAYED RELEASE ORAL at 17:30

## 2023-01-01 RX ADMIN — HYDROMORPHONE HYDROCHLORIDE 1 MG: 1 INJECTION, SOLUTION INTRAMUSCULAR; INTRAVENOUS; SUBCUTANEOUS at 18:47

## 2023-01-01 RX ADMIN — GABAPENTIN 900 MG: 300 CAPSULE ORAL at 20:09

## 2023-01-01 RX ADMIN — Medication 1 APPLICATION: at 08:54

## 2023-01-01 RX ADMIN — ROPINIROLE 2 MG: 2 TABLET, FILM COATED ORAL at 21:33

## 2023-01-01 RX ADMIN — Medication 2000 UNITS: at 08:53

## 2023-01-01 RX ADMIN — INSULIN LISPRO 6 UNITS: 100 INJECTION, SOLUTION INTRAVENOUS; SUBCUTANEOUS at 12:36

## 2023-01-01 RX ADMIN — Medication 10 ML: at 21:02

## 2023-01-01 RX ADMIN — CETIRIZINE HYDROCHLORIDE 5 MG: 10 TABLET ORAL at 10:45

## 2023-01-01 RX ADMIN — GABAPENTIN 900 MG: 300 CAPSULE ORAL at 21:33

## 2023-01-01 RX ADMIN — SODIUM CHLORIDE 30 ML/HR: 9 INJECTION, SOLUTION INTRAVENOUS at 10:06

## 2023-01-01 RX ADMIN — SENNOSIDES AND DOCUSATE SODIUM 2 TABLET: 50; 8.6 TABLET ORAL at 20:40

## 2023-01-01 RX ADMIN — LEVOTHYROXINE SODIUM 88 MCG: 0.09 TABLET ORAL at 06:57

## 2023-01-01 RX ADMIN — Medication 10 ML: at 20:18

## 2023-01-01 RX ADMIN — GABAPENTIN 900 MG: 300 CAPSULE ORAL at 20:16

## 2023-01-01 RX ADMIN — ROPINIROLE 2 MG: 2 TABLET, FILM COATED ORAL at 20:09

## 2023-01-01 RX ADMIN — Medication 1 APPLICATION: at 18:07

## 2023-01-01 RX ADMIN — MORPHINE SULFATE 5 MG: 100 SOLUTION ORAL at 09:05

## 2023-01-01 RX ADMIN — Medication 10 ML: at 21:31

## 2023-01-01 RX ADMIN — Medication 10 ML: at 20:48

## 2023-01-01 RX ADMIN — INSULIN LISPRO 4 UNITS: 100 INJECTION, SOLUTION INTRAVENOUS; SUBCUTANEOUS at 08:51

## 2023-01-01 RX ADMIN — DILTIAZEM HYDROCHLORIDE 120 MG: 120 CAPSULE, EXTENDED RELEASE ORAL at 08:51

## 2023-01-01 RX ADMIN — SENNOSIDES AND DOCUSATE SODIUM 2 TABLET: 50; 8.6 TABLET ORAL at 08:53

## 2023-01-01 RX ADMIN — MORPHINE SULFATE 10 MG: 100 SOLUTION ORAL at 17:11

## 2023-01-01 RX ADMIN — GABAPENTIN 900 MG: 300 CAPSULE ORAL at 20:57

## 2023-01-01 RX ADMIN — Medication 1000 MCG: at 17:14

## 2023-01-01 RX ADMIN — PANTOPRAZOLE SODIUM 40 MG: 40 TABLET, DELAYED RELEASE ORAL at 17:57

## 2023-01-01 RX ADMIN — Medication 10 ML: at 20:40

## 2023-01-01 RX ADMIN — OXYCODONE HYDROCHLORIDE 5 MG: 5 TABLET ORAL at 21:01

## 2023-01-01 RX ADMIN — GUAIFENESIN 600 MG: 600 TABLET, EXTENDED RELEASE ORAL at 20:09

## 2023-01-01 RX ADMIN — MORPHINE SULFATE 10 MG: 100 SOLUTION ORAL at 12:19

## 2023-01-01 RX ADMIN — Medication 10 ML: at 12:56

## 2023-01-01 RX ADMIN — GABAPENTIN 900 MG: 300 CAPSULE ORAL at 20:18

## 2023-01-01 RX ADMIN — INSULIN GLARGINE 15 UNITS: 100 INJECTION, SOLUTION SUBCUTANEOUS at 20:40

## 2023-01-01 RX ADMIN — GUAIFENESIN 600 MG: 600 TABLET, EXTENDED RELEASE ORAL at 20:47

## 2023-01-01 RX ADMIN — INSULIN LISPRO 4 UNITS: 100 INJECTION, SOLUTION INTRAVENOUS; SUBCUTANEOUS at 18:00

## 2023-01-01 RX ADMIN — Medication 2000 UNITS: at 08:00

## 2023-01-01 RX ADMIN — INSULIN GLARGINE 15 UNITS: 100 INJECTION, SOLUTION SUBCUTANEOUS at 20:57

## 2023-01-01 RX ADMIN — Medication 10 ML: at 22:33

## 2023-01-01 RX ADMIN — GABAPENTIN 900 MG: 300 CAPSULE ORAL at 23:48

## 2023-01-01 RX ADMIN — PRAVASTATIN SODIUM 40 MG: 40 TABLET ORAL at 21:30

## 2023-01-01 RX ADMIN — Medication 10 ML: at 20:59

## 2023-01-01 RX ADMIN — Medication 1 APPLICATION: at 20:59

## 2023-01-01 RX ADMIN — Medication 10 ML: at 22:35

## 2023-01-01 RX ADMIN — INSULIN LISPRO 2 UNITS: 100 INJECTION, SOLUTION INTRAVENOUS; SUBCUTANEOUS at 08:51

## 2023-01-01 RX ADMIN — INSULIN LISPRO 4 UNITS: 100 INJECTION, SOLUTION INTRAVENOUS; SUBCUTANEOUS at 17:15

## 2023-01-01 RX ADMIN — INSULIN LISPRO 4 UNITS: 100 INJECTION, SOLUTION INTRAVENOUS; SUBCUTANEOUS at 12:34

## 2023-01-01 RX ADMIN — MORPHINE SULFATE 2 MG: 2 INJECTION, SOLUTION INTRAMUSCULAR; INTRAVENOUS at 22:27

## 2023-01-01 RX ADMIN — GUAIFENESIN 600 MG: 600 TABLET, EXTENDED RELEASE ORAL at 09:37

## 2023-01-01 RX ADMIN — DILTIAZEM HYDROCHLORIDE 120 MG: 120 CAPSULE, EXTENDED RELEASE ORAL at 17:08

## 2023-01-01 RX ADMIN — Medication 1 APPLICATION: at 21:33

## 2023-01-01 RX ADMIN — HYDROMORPHONE HYDROCHLORIDE 1 MG: 1 INJECTION, SOLUTION INTRAMUSCULAR; INTRAVENOUS; SUBCUTANEOUS at 20:40

## 2023-01-01 RX ADMIN — Medication 2000 UNITS: at 17:07

## 2023-01-01 RX ADMIN — IOPAMIDOL 85 ML: 612 INJECTION, SOLUTION INTRAVENOUS at 17:36

## 2023-01-01 RX ADMIN — INSULIN LISPRO 6 UNITS: 100 INJECTION, SOLUTION INTRAVENOUS; SUBCUTANEOUS at 17:24

## 2023-01-01 RX ADMIN — GUAIFENESIN 600 MG: 600 TABLET, EXTENDED RELEASE ORAL at 21:01

## 2023-01-01 RX ADMIN — ROPINIROLE 2 MG: 2 TABLET, FILM COATED ORAL at 23:47

## 2023-01-01 RX ADMIN — ROPINIROLE 2 MG: 2 TABLET, FILM COATED ORAL at 21:01

## 2023-01-01 RX ADMIN — GUAIFENESIN 600 MG: 600 TABLET, EXTENDED RELEASE ORAL at 08:00

## 2023-01-01 RX ADMIN — ROPINIROLE 2 MG: 2 TABLET, FILM COATED ORAL at 20:21

## 2023-01-01 RX ADMIN — MODAFINIL 200 MG: 100 TABLET ORAL at 17:08

## 2023-01-01 RX ADMIN — GUAIFENESIN 600 MG: 600 TABLET, EXTENDED RELEASE ORAL at 09:04

## 2023-01-01 RX ADMIN — PANTOPRAZOLE SODIUM 40 MG: 40 TABLET, DELAYED RELEASE ORAL at 06:39

## 2023-01-01 RX ADMIN — CETIRIZINE HYDROCHLORIDE 5 MG: 10 TABLET ORAL at 21:00

## 2023-01-01 RX ADMIN — LEVOTHYROXINE SODIUM 88 MCG: 0.09 TABLET ORAL at 07:00

## 2023-01-01 RX ADMIN — MIDAZOLAM HYDROCHLORIDE 4 MG: 1 INJECTION, SOLUTION INTRAMUSCULAR; INTRAVENOUS at 20:40

## 2023-01-01 RX ADMIN — Medication 1 APPLICATION: at 12:37

## 2023-01-01 RX ADMIN — PANTOPRAZOLE SODIUM 80 MG: 40 INJECTION, POWDER, FOR SOLUTION INTRAVENOUS at 10:11

## 2023-01-01 RX ADMIN — MORPHINE SULFATE 10 MG: 100 SOLUTION ORAL at 22:55

## 2023-01-01 RX ADMIN — PANTOPRAZOLE SODIUM 40 MG: 40 TABLET, DELAYED RELEASE ORAL at 06:57

## 2023-01-01 RX ADMIN — Medication 10 ML: at 08:51

## 2023-01-01 RX ADMIN — GUAIFENESIN 600 MG: 600 TABLET, EXTENDED RELEASE ORAL at 23:47

## 2023-01-01 RX ADMIN — PRAVASTATIN SODIUM 40 MG: 40 TABLET ORAL at 20:18

## 2023-01-01 RX ADMIN — Medication 1 APPLICATION: at 08:00

## 2023-01-01 RX ADMIN — Medication 10 ML: at 10:45

## 2023-01-01 RX ADMIN — Medication 10 ML: at 20:09

## 2023-01-01 RX ADMIN — SENNOSIDES AND DOCUSATE SODIUM 2 TABLET: 50; 8.6 TABLET ORAL at 08:01

## 2023-01-01 RX ADMIN — INSULIN LISPRO 4 UNITS: 100 INJECTION, SOLUTION INTRAVENOUS; SUBCUTANEOUS at 17:57

## 2023-01-01 RX ADMIN — MORPHINE SULFATE 10 MG: 100 SOLUTION ORAL at 14:38

## 2023-01-01 RX ADMIN — MIDAZOLAM HYDROCHLORIDE 2 MG: 1 INJECTION, SOLUTION INTRAMUSCULAR; INTRAVENOUS at 10:21

## 2023-01-01 RX ADMIN — LORAZEPAM 2 MG: 1 TABLET ORAL at 22:27

## 2023-01-01 RX ADMIN — Medication 10 ML: at 08:52

## 2023-01-01 RX ADMIN — MODAFINIL 200 MG: 100 TABLET ORAL at 08:08

## 2023-01-01 RX ADMIN — Medication 1000 MCG: at 12:48

## 2023-01-01 RX ADMIN — INSULIN LISPRO 2 UNITS: 100 INJECTION, SOLUTION INTRAVENOUS; SUBCUTANEOUS at 08:39

## 2023-01-01 RX ADMIN — Medication 10 ML: at 09:16

## 2023-01-01 RX ADMIN — ROPINIROLE 2 MG: 2 TABLET, FILM COATED ORAL at 20:58

## 2023-01-01 RX ADMIN — MIDAZOLAM HYDROCHLORIDE 4 MG: 1 INJECTION, SOLUTION INTRAMUSCULAR; INTRAVENOUS at 11:42

## 2023-01-01 RX ADMIN — Medication 10 ML: at 08:54

## 2023-01-01 RX ADMIN — INSULIN LISPRO 3 UNITS: 100 INJECTION, SOLUTION INTRAVENOUS; SUBCUTANEOUS at 17:30

## 2023-01-01 RX ADMIN — FENTANYL 1 PATCH: 50 PATCH, EXTENDED RELEASE TRANSDERMAL at 16:31

## 2023-01-01 RX ADMIN — Medication 10 ML: at 20:16

## 2023-01-01 RX ADMIN — Medication 10 ML: at 09:05

## 2023-01-01 RX ADMIN — INSULIN LISPRO 4 UNITS: 100 INJECTION, SOLUTION INTRAVENOUS; SUBCUTANEOUS at 20:54

## 2023-01-01 RX ADMIN — PANTOPRAZOLE SODIUM 40 MG: 40 TABLET, DELAYED RELEASE ORAL at 06:33

## 2023-01-01 RX ADMIN — FENTANYL 1 PATCH: 50 PATCH, EXTENDED RELEASE TRANSDERMAL at 14:00

## 2023-01-01 RX ADMIN — INSULIN LISPRO 2 UNITS: 100 INJECTION, SOLUTION INTRAVENOUS; SUBCUTANEOUS at 17:12

## 2023-01-01 RX ADMIN — Medication 1000 MCG: at 08:25

## 2023-01-01 RX ADMIN — GUAIFENESIN 600 MG: 600 TABLET, EXTENDED RELEASE ORAL at 21:33

## 2023-01-01 RX ADMIN — CETIRIZINE HYDROCHLORIDE 5 MG: 10 TABLET ORAL at 08:51

## 2023-01-01 RX ADMIN — GUAIFENESIN 600 MG: 600 TABLET, EXTENDED RELEASE ORAL at 08:53

## 2023-01-01 RX ADMIN — GABAPENTIN 900 MG: 300 CAPSULE ORAL at 20:40

## 2023-01-03 RX ORDER — HYDROCODONE BITARTRATE AND ACETAMINOPHEN 5; 325 MG/1; MG/1
1 TABLET ORAL EVERY 6 HOURS PRN
Qty: 120 TABLET | Refills: 0 | Status: SHIPPED | OUTPATIENT
Start: 2023-01-03 | End: 2023-02-01 | Stop reason: SDUPTHER

## 2023-01-06 ENCOUNTER — APPOINTMENT (OUTPATIENT)
Dept: PET IMAGING | Facility: HOSPITAL | Age: 83
End: 2023-01-06

## 2023-01-06 ENCOUNTER — HOSPITAL ENCOUNTER (OUTPATIENT)
Dept: PET IMAGING | Facility: HOSPITAL | Age: 83
Discharge: HOME | End: 2023-01-06
Payer: MEDICARE

## 2023-01-06 ENCOUNTER — HOSPITAL ENCOUNTER (OUTPATIENT)
Dept: PET IMAGING | Facility: HOSPITAL | Age: 83
End: 2023-01-06

## 2023-01-06 DIAGNOSIS — R97.21 INCREASING PROSTATE SPECIFIC ANTIGEN (PSA) LEVEL AFTER TREATMENT FOR MALIGNANT NEOPLASM OF PROSTATE: ICD-10-CM

## 2023-01-06 DIAGNOSIS — R97.21 RISING PSA FOLLOWING TREATMENT FOR MALIGNANT NEOPLASM OF PROSTATE: ICD-10-CM

## 2023-01-06 DIAGNOSIS — C61 PROSTATE CANCER METASTATIC TO BONE: ICD-10-CM

## 2023-01-06 DIAGNOSIS — C79.51 PROSTATE CANCER METASTATIC TO BONE: ICD-10-CM

## 2023-01-06 PROCEDURE — 0 PIFLUFOLASTAT F 18 9 MCI SOLUTION PREFILLED SYRINGE: Performed by: INTERNAL MEDICINE

## 2023-01-06 PROCEDURE — A9595 PIFLUFOLASTAT F 18 9 MCI SOLUTION PREFILLED SYRINGE: HCPCS | Performed by: INTERNAL MEDICINE

## 2023-01-06 PROCEDURE — 78815 PET IMAGE W/CT SKULL-THIGH: CPT

## 2023-01-06 RX ADMIN — PIFLUFOLASTAT F-18 1 DOSE: 80 INJECTION INTRAVENOUS at 12:14

## 2023-01-10 NOTE — PROGRESS NOTES
"  Subjective  Patient, again, with increasing bony pain.    REASONS FOR FOLLOW UP:   1.  Metastatic prostate cancer    HISTORY OF PRESENT ILLNESS:     Patient is an 82-year-old with metastatic prostate cancer who is seen as he continues current therapy with Xtandi, Xgeva moved to every 6 months, Lupron every 3 months.                                  The patient was seen back along with his son, Georges, 9/21/2022 fortunately doing reasonably well without additional pain, discomfort or excessive fatigue.  We have discussed various options for treatment of prostate cancer if we are unable to control this disease with current measures.    He is next evaluated 12/21/2022 now scheduled for Lupron.  He had last received both his Lupron and Xgeva 9/21/2022.  He notes increasing chest discomfort that appears to be \"when he twists\".  Initially he felt he might have a pneumonia but never had fever or cough.  We have discussed that his disease could well be progressing and then assessment that would allow us to clarify treatment options is reasonable.      This led to a repeat PSA 12/21/2022 that was found to be elevated to 23.9 and a follow-up Pylarify study performed 1/6/2023 that does demonstrate marked progression of sclerotic bone metastasis diffusely compared to 8/20/2021.  This includes the femoral neck,, sacrum, left iliac bone with SUV up to 48.8, stable tiny mediastinal nodes, 2 punctate nodules in the calvarium.      He is seen back with his son 1/11/2023.  The potential treatment options such as Pluvicto (Lutetium Yday 177 vipivotide tetraxetan), now that we know that his PSMA scan is positive, though the patient would have to initially be treated with taxane chemotherapy which would be difficult for him to tolerate, radium-223 considering his bony metastasis.  He has slowly progressive disease however we have discussed additional issues including radiation therapy and/or alteration to another androgen receptor " agonist such as darolutamide.  We have discussed all these options moving to have assessments done by radiation therapy and, [facility] as we address his symptoms.      Hematologic/oncologic history:   The patient is an 82-year-old male with significant past medical history including prostate carcinoma, CKD 3 and long-term tobacco use be been seen by primary care in late January, 2018 for hoarseness.  Chest x-ray is now outpatient January 23 revealed sclerotic change in the posterior mid chest to be within 3 adjacent thoracic ertebral bodies of uncertain significance.  A follow-up chest CT revealed numerous sclerotic foci within al visualized bones consistent with metastatic disease, multiple enlarged mediastinal lymph nodes concerning for metastatic lymphadenopathy and a polypoidal abnormality in the right lateral wall of the trachea.  CT of abdomen March 20 revealed extensive osseous metastatic disease mildly enlarged retroperitoneal lymph nodes.  Bone scan March 20 was consistent with widespread osseous metastasis particularly in the proximal half the left humeral shaft, abnormal uptake in the sternum and manubrium and a small focus in the posterior aspect of the calvarium.  This led to a plain film the left humerus with mottled sclerosis involving the shaft of the humerus particularly in the proximal half but no evidence of pathologic fracture.    The patient has additionally type 2 diabetes on insulin pump, again a history of prostate carcinoma prostatectomy 12 years previously, hypertension, and hyperlipidemia.  Additional studies included a PSA of 395.1 from March 14, 2018.The patient's been seen by urology March 15 with plans to start Firmagon.    The patient is now seen in pulmonary clinic with Dr. Bijan Rivera and we have discussed that he will need bronchoscopy and mediastinoscopy.  Interestingly his additional history includes a long occupational exposure including working [place] the [place] just out of school, subsequent construction work for many years and a 30-pack-year history of smoking stopping in the late 1990s.  He indicates that he followed with Dr. Guillen of urology for many years with no changes in his exams and normal PSAs.  He stopped this follow-up approximately 2 years ago.     Patient was seen in consultation with thoracic surgery on March 28 and plans are made for mediastinoscopy and bronchoscopy with lymph node biopsy.  Pathology revealed that these nodes were consistent with metastatic prostate carcinoma.  He was discussed at thoracic conference.  A PET/CT was not pursued and it was determined that he see medical oncology for hormonal treatment and radiation therapy if symptomatic.  It should be noted that the patient's March 15 [facility] office note describes that Firmagon was planned.     The patient has met with the son and grandson April 23.  We have also contacted Dr. Taylor of urology in that Firmagon was given just after his last visit and would next be due approximately May 3.  Patient feels considerably better with resolution of his pain, normalization of his performance status.  He would like to continue treatment through our office now contacted Dr. Taylor concerning this.    The patient is next seen June 11, 2018 we discussed his follow-up including his treatments with Lupron May 7 and his next treatment on July 30.  He has returned to essentially normal performance status and his PSA has dropped further from 395 March 14 27.8 May 7 and now 2.03 June 11.  We do plan to initiate Xgeva also study him via scans to document his improvement approximately a month from now.   The patient is next seen July 26, 2018.  Repeat imaging demonstrated interval resolution of mediastinal and retroperitoneal lymphadenopathy.  There is thickening in the distal aspect of the appendix with stranding?  Chronic appendicitis.  The patient indicates he is having no such symptoms  and never has.  There is no change in sclerotic bony metastasis in the patient's PSA has dropped substantially to 1.66 by July 19 and 1.79 July 26.  He is actually feeling excellent and this is corroborated by family members.   Patient is next seen January 10, 2019 continuing to do very well and, in fact indicating [facility], likely [facility] replacements, visual review.  He is not certain is going to continue his care with them or with us or combination of both.    Patient is next seen April 4, 2019.  He is recently discharged after hospitalization March 15-18 with left upper lobe pneumonia.  We reviewed the findings in detail with his gradual recovery now documented.  His studies include a CT of chest which does not demonstrate any further bony lesions and/or pulmonary metastasis having developed.  He is feeling considerably better and approximately back to his baseline.  The patient is next seen June 28, 2019 feeling well but having baseline generally musculoskeletal pain and restless legs.His recent exams include a PSA of 0.81, CMP with potassium of 5.3 with repeat pending.  His performance status overall remains good to very good and is clearly responding to his treatments.  The patient is next seen December 13, 2019 continue to feel well.  He has had, oddly enough, 2 episodes of sleep paralysis which have occurred to him previously and he wonders if there is any connection with his prostate carcinoma though this is felt unlikely.     The patient when assessed in December had his PSA go to 2.9.  We continue with Lupron and Xgeva and is seen back now March 9, 2020 without additional symptoms.  We discussed that a schedule could likely change moving to 3 months for both of these medications particularly if he needs additional therapy directed at progressive disease.     Patient contacted our practice May 4 concern for pain in his hips.  This led to moving his  scans up and they were performed May 8, 2020 showing a sub-6 mm pleural-based pulmonary nodule in the right lower lobe that is new from March 15, 2018, remainder of the sub-6 mm pulmonary nodules bilaterally are stable.  There is extensive osseous metastatic disease as previous with no other new findings.     The patient indicates, when seen, May 13 that his bone pain is now resolved.  While this is good information does not mean that he does not have further bony metastasis and we have discussed that a follow-up bone scan is likely necessary.  Furthermore he is having some difficulty with sleeplessness and increase in restless legs as he continues to stay at home during the COVID 19 crisis which, itself, is producing more anxiety for him.  A follow-up bone scan was obtained Lety 3 revealing multifocal osseous metastatic disease which was compared to March 2, 2018 with improvement.  No new abnormal uptake is present.  He is next seen with us on June 17, 2020 and, fortunately, is not having any significant pain at this point.  We discussed his scans in detail and, on balance, his performance status also remains improved.     It was elected to follow the patient rather than changes antiandrogen therapy.  He is reassessed September 3 with unchanged CMP, H&H of 11.4 and 36.3 with normal white count, platelet count and differential, PSA at 15.8.  Follow-up PA lateral chest x-ray does not show any new abnormalities though there is extensive metastatic bone disease throughout the bony thorax and osteoblastic metastasis have been seen on chest CT May 2020.  The patient is seen with his son Georges September 10 noticing increasing bony discomfort primarily in the right hip following fairly intensive gardening which he does frequently.  Now has further elevation of PSA were wondering whether we should try to intervene sooner per additional antiandrogens.  We will need to further assess him via CT scanning to make this  decision     These were obtained October 1 showing extensive sclerotic disease with no metastatic disease in chest abdomen or pelvis.  PSA had gone from 15.8 September 3-16 0.6 October 1.     He still has pain getting up in the morning and after active gardening.  This is manageable with current pain medications and, at present, it is not apparent that he needs to switch medications to gain better control of his disease.  The patient did have follow-up studies done including a PSA November 25, 2020 now at 25.4.  The patient is seen with his son December 10, 2020 doing fair but having some increased pain in the mid sternum and right hip that he ascribes to additional exercise/work in managing his garden.  It is apparent however, that his last bone scan was 6 months ago and we do need to reassess him concerning this.     The patient had follow-up bone scan performed January 11, 2021 showing again uptake in the calvarium, humerus, right medial clavicle, sternum, ribs, spine, sacrum, pelvis, right acetabulum, proximal femora and now left frontal bone which appears new.  There is also mild increase in sternal uptake and equivocal increase in the pelvic lesions of all of the sacrum and the right proximal femur.     The patient is seen with his son January 18, 2021 and indicates that he is having pain gradually worsened in his right hip, mid chest that had been an issue previously.  These findings on bone scan are reviewed with both of them as likely the underlying culprit for his discomfort.  He would need change of the systemic therapy but, at present, will ask for radiation therapy palliation initially.  He was previously treated by Dr. Kulkarni many years ago and will ask her to see him.  We will also make application for the current cost of Xtandi or Zytiga.  The patient is not felt to be a candidate for systemic chemotherapy.     The patient was referred for ration therapy as we continued to assess his PSA on current  therapy as well as exam the cost of Zytiga or Xtandi.  Radiation therapy (Dr. Kulkarni) saw the patient January 26 and felt that the right femur and sternal lesion could benefit from therapy-3000 cGy in 10 fractions.  The patient took treatment February 1 to February 12 to the above areas and is next seen back March 15.  Fortunately his pain has improved dramatically and he is quite happy about this.  Unfortunately he notes some difficulty with swallowing that is temporary and often leads to increased salivation and mucus production.  Patient was asked to return his next assessed April 12, 2021.  He is able to swallow with less pain but still has excess mucus production and issues with salivation.  His PSA has noted increase but he is having no additional bony discomfort at this point and, additionally, has noted low-grade fevers just under 100 degrees at nighttime?.  His weight, appetite and general performance status have remained good to excellent.  We discussed follow-up studies at this point to determine his status.    Follow-up scans were scheduled CT scans of chest and pelvis 5/4/2021 showing osseous metastasis quite similar to previous examinations in the chest, CT of abdomen pelvis also with no acute intra-abdominal adenopathy no indication of abdominal pelvic metastatic disease but again widespread osseous metastasis.  His PSA at this point have been slowly increasing to a level of 44 on 4/12/2021.  As the patient is reassessed 5/10/2021 we find, fortunately, that he is not exceeding symptomatic.  It could make reasonable sense at this point to take the mediastinal nodes from his biopsy 4/9/2018, reassess potential targeted therapies, MSI status, and germline analysis.  Fortunately he is not having significant pain or discomfort and is seen with his son as we discussed the above plan.  Notably a follow-up PSA is found to be 42.6.   Patient is next seen in 6/7/2021 for Lupron and Xgeva.  Fortunately he is  feeling considerably better according to both he and his son though his stamina has reduced.  He is not having additional pain at this point.  We have also reviewed his genetics assessment which was significant only for a variant of unknown significance.  This is not an actionable abnormality.    As the patient's PSA further escalated increasing to 89 7/7/2021 his subsequent Axumin PET was obtained 8/20 demonstrating extensive osteosclerotic metastatic disease showing reduced uptake-typical finding but no evidence of visceral metastatic disease in the neck chest abdomen pelvis.  These findings are discussed with the patient and his son 8/25/2021 and indicative of his progressive disease-etiology of his rising PSA-though the patient is asymptomatic we have discussed moving to initiate Xtandi is available at 160 mg p.o. twice daily along with his current Lupron and Xgeva.     The patient's testing 10/6 included PSA that is dropped to 9.69.  He is seen with his son, Cody, both indicating that he been doing relatively well with treatment but began to notice nausea in the last week.  The schedule that he has been given also needs to be somewhat updated in terms of his Lupron and Xgeva.  He has been able to maintain his appetite and overall performance status to date.    The patient is next assessed 12/8/2021 tolerating his current Xtandi dose better than previous and maintaining a good performance status overall as well as demonstrating a drop in his PSA now to 4.7 on 12/8/2021 compared to high of 112 9/8/2021.  He remains hypophosphatemic and hypocalcemic and we have discussed, again, changing his Xgeva dosing to every 3 months along with his Lupron will continue his Xtandi to 80 mg daily.    The patient is next reviewed 3/21/2022 seen for both Xgeva and Lupron.  Fortunately he is feeling reasonably well with little additional pain though he has restarted to place his garden into shape for the season and is working  more outdoors.    Patient reevaluated 5/2/2022 with repeat CBC including H&H of 10.5 and 33.4, white count 5980 and platelet count of 192,000, currently CMP and PSA pending with a previous PSA 1 month ago at 9.06.  At this point we increased his Xtandi to 160 mg p.o. daily while continuing treatment with every 3 month Xgeva and Lupron.  Notably the patient's Requip  alcohol I am sure it is mariza now at 1.5 mg p.o. nightly and Neurontin 900 mg p.o. daily has improved his restless leg syndrome substantially.    Patient seen 6/13/2022 at which time Xtandi at 160 mg p.o. daily was continued, Xgeva moved to every 6 months and Lupron every 3 months.    This was continued when patient was seen 9/21/2022.    Patient seen 12/21/2022 at which time a subsequent Pylarify scan was requested as result of increasing PSA level and bony discomfort.  The patient received Lupron and Xgeva at this point.     He is seen back with his son 1/11/2023.  The potential treatment options such as Pluvicto (Lutetium Yady 177 vipivotide tetraxetan), now that we know that his PSMA scan is positive, though the patient would have to initially be treated with taxane chemotherapy which would be difficult for him to tolerate, radium-223 considering his bony metastasis.  He has slowly progressive disease however we have discussed additional issues including radiation therapy and/or alteration to another androgen receptor agonist such as darolutamide.  We have discussed all these options moving to have assessments done by radiation therapy and, [facility] as we address his symptoms.    Past Medical History:   Diagnosis Date   • Aortic stenosis, mild 1/1/2021    Per echocardiogram 2021   • Ascending aorta dilatation (HCC)    • Bone cancer (HCC)    • Cellulitis of great toe of left foot 10/19/2018   • CKD (chronic kidney disease)     Stage 3; followed by Dr. Vicky Duran    • Diastolic dysfunction     GRADE II per echo 2018   • Difficulty breathing      during exertion   • Dyslipidemia    • Erectile dysfunction    • Fatigue    • History of blood transfusion    • History of kidney stones    • Hyperlipidemia    • Hypertension    • Hyponatremia    • Left ventricular hypertrophy     per echo 2018   • Localized edema    • Neuropathy    • Obstructive sleep apnea     USING CPAP   • Osteoarthritis    • Peptic ulcer    • Pneumonia    • Pneumonia of left upper lobe due to infectious organism 3/15/2019   • Prostate cancer (HCC)     Status post prostatectomy, radiation therapy, and hormone therapy followed by Dr. Lee; metastatsis to bone   • Pure hyperglyceridemia    • Restless leg syndrome    • Sepsis (HCC)    • Sinus bradycardia    • Transient cerebral ischemia    • Type 2 diabetes mellitus (HCC)         Past Surgical History:   Procedure Laterality Date   • BRONCHOSCOPY N/A 4/9/2018    Procedure: BRONCHOSCOPY;  Surgeon: Bijan Rivera III, MD;  Location: [facility];  Service: Cardiothoracic   • COLONOSCOPY     • DEEP NECK LYMPH NODE BIOPSY / EXCISION     • HEMORRHOIDECTOMY     • MEDIASTINOSCOPY N/A 4/9/2018    Procedure: MEDIASTINOSCOPY WITH LYMPH NODE BIOPSY;  Surgeon: Bijan Rivera III, MD;  Location: [facility];  Service: Cardiothoracic   • OTHER SURGICAL HISTORY      ulcer repair   • PROSTATECTOMY  2006        Current Outpatient Medications on File Prior to Visit   Medication Sig Dispense Refill   • Accu-Chek Guide test strip USE 1 STRIP TO CHECK BLOOD SUGAR 4 TIMES DAILY     • Accu-Chek Softclix Lancets lancets USE 1 LANCET TO CHECK GLUCOSE 4 TIMES DAILY     • aspirin 81 MG EC tablet Take 1 tablet by mouth daily.     • cholecalciferol (VITAMIN D3) 1000 units tablet Take 2,000 Units by mouth Daily.     • Denosumab (XGEVA SC) Inject  under the skin into the appropriate area as directed Every 30 (Thirty) Days.     • dilTIAZem CD (CARDIZEM CD) 120 MG 24 hr capsule TAKE 1 CAPSULE EVERY DAY 90 capsule 3   • enzalutamide (XTANDI) 40 MG chemo capsule Take 4  capsules by mouth Daily. 120 capsule 2   • furosemide (LASIX) 40 MG tablet Take 40 mg by mouth Every Morning.     • gabapentin (NEURONTIN) 300 MG capsule TAKE 3 CAPSULES EVERY NIGHT AT BEDTIME 90 capsule 0   • HYDROcodone-acetaminophen (NORCO) 5-325 MG per tablet Take 1 tablet by mouth Every 6 (Six) Hours As Needed for Moderate Pain. 120 tablet 0   • Insulin Aspart (novoLOG) 100 UNIT/ML injection INJECT 100 UNITS VIA PUMP ONCE DAILY     • insulin aspart (NovoLOG) 100 UNIT/ML patient supplied pump Inject  under the skin into the appropriate area as directed Continuous. Pt reports pump with basal rate 1.9 units/hr from 0800 to 1200. Basal rate 1.8 units/hr 1200 to 0800.  Checks bg ac/hs with bolus doses per pumps parameters     • Leuprolide Acetate, 3 Month, (LUPRON DEPOT, 3-MONTH, IM) Inject  into the appropriate muscle as directed by prescriber Every 3 (Three) Months.     • levothyroxine (SYNTHROID, LEVOTHROID) 88 MCG tablet      • loperamide (IMODIUM) 1 MG/5ML solution Take 2 mg by mouth 4 (Four) Times a Day As Needed for Diarrhea.     • modafinil (PROVIGIL) 200 MG tablet Take 1 tablet by mouth Daily. 30 tablet 0   • olmesartan (BENICAR) 40 MG tablet TAKE 1 TABLET EVERY DAY (DISCONTINUE LOSARTAN 100MG) 90 tablet 3   • ondansetron (ZOFRAN) 8 MG tablet Take 1 tablet by mouth 3 (Three) Times a Day As Needed for Nausea or Vomiting. 30 tablet 5   • pravastatin (PRAVACHOL) 40 MG tablet Take 40 mg by mouth daily.     • rOPINIRole (REQUIP) 0.5 MG tablet Take 2 tablets by mouth in the evening and 2 at bedtime. 360 tablet 2   • traZODone (DESYREL) 50 MG tablet Take  mg by mouth every night at bedtime.       No current facility-administered medications on file prior to visit.        ALLERGIES:    Allergies   Allergen Reactions   • Niacin Unknown - Low Severity   • Statins Unknown - Low Severity        Social History     Socioeconomic History   • Marital status:    • Years of education: College   Tobacco Use   •  "Smoking status: Former     Packs/day: 1.00     Years: 30.00     Pack years: 30.00     Types: Cigarettes     Quit date: 1998     Years since quittin.9   • Smokeless tobacco: Never   Substance and Sexual Activity   • Alcohol use: No     Comment: Caffeine use: 2-3 cups daily   • Drug use: No   • Sexual activity: Defer        Family History   Problem Relation Age of Onset   • Heart failure Mother    • Hypertension Mother    • Diabetes Mother    • Heart disease Mother    • Lung cancer Father 46   • Hypertension Sister    • Lung cancer Sister 60   • Hypertension Brother    • Lung cancer Brother 62   • Heart disease Other    • Prostate cancer Brother 60   • Malig Hyperthermia Neg Hx         Review of Systems  As per HPI   .  Pain-primary left hip and left leg  Objective     Vitals:    23 0805   BP: (!) 187/95   Pulse: 55   Resp: 16   Temp: 97.3 °F (36.3 °C)   TempSrc: Temporal   SpO2: 98%   Weight: 90.9 kg (200 lb 6.4 oz)   Height: 162.6 cm (64.02\")   PainSc:   6   PainLoc: Back  Comment: left side     Current Status 2023   ECOG score 0       Physical Exam   Constitutional: He is oriented to person, place, and time. He appears well-developed. No distress.   HENT:   Head: Normocephalic and atraumatic.   Mouth/Throat: No oropharyngeal exudate.   Eyes: Pupils are equal, round, and reactive to light.   Cardiovascular: Normal rate, regular rhythm and normal heart sounds.   No murmur heard.  Pulmonary/Chest: Effort normal and breath sounds normal. No respiratory distress. He has no wheezes. He has no rhonchi. He has no rales.   Abdominal: Soft. Normal appearance and bowel sounds are normal. He exhibits no distension.   Musculoskeletal: Normal range of motion.   Neurological: He is alert and oriented to person, place, and time.   Skin: Skin is warm and dry. No rash noted.   Vitals reviewed.    RECENT LABS:  Hematology WBC   Date Value Ref Range Status   2023 4.73 3.40 - 10.80 10*3/mm3 Final     RBC "   Date Value Ref Range Status   01/11/2023 3.40 (L) 4.14 - 5.80 10*6/mm3 Final     Hemoglobin   Date Value Ref Range Status   01/11/2023 10.0 (L) 13.0 - 17.7 g/dL Final     Hematocrit   Date Value Ref Range Status   01/11/2023 31.2 (L) 37.5 - 51.0 % Final     Platelets   Date Value Ref Range Status   01/11/2023 180 140 - 450 10*3/mm3 Final        Lab Results   Component Value Date    GLUCOSE 149 (H) 12/21/2022    BUN 16 12/21/2022    CREATININE 0.99 12/21/2022    EGFRIFNONA 56 (L) 02/23/2022    BCR 16.2 12/21/2022    K 4.7 12/21/2022    CO2 25.6 12/21/2022    CALCIUM 10.1 12/21/2022    ALBUMIN 3.80 12/21/2022    AST 12 12/21/2022    ALT <5 12/21/2022       NM Bone Scan Whole Body (01/11/2021 13:25)  CT Chest With Contrast Diagnostic (05/04/2021 09:11)  CT Abdomen Pelvis With Contrast (05/04/2021 09:11)  NM PET/CT Skull Base to Mid Thigh (08/20/2021 12:18)      Assessment & Plan      1.  Metastatic prostate cancer:  ? Patient initially diagnosed in 2006 and had a prostatectomy and hormone therapy.  ? Patient in January 2018 began to complain of shortness of breath and hoarseness.  Chest x-ray obtained 1/23/18 showed sclerotic bone lesions.  ? CT of the chest 3/12/2018 numerous sclerotic bone foci with all visualized bones consistent with metastatic disease, multiple enlarged mediastinal lymph nodes.  ? .1 from 3/14/2018.  Patient was started on Firmagon by [facility]rology[facility].  ? 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  ? Case discussed at thoracic conference and recommendations were to continue ADT and radiation for symptomatic sites.  ? Lupron injections every 3 mos initiated 5/7/18 PSA at that time 7.810  ? Monthly Xgeva initiated 6/11/18 PSA 2.030  ? Last PSA 6/13/2019 0.881  ? 9/20/2019 patient tolerating treatment well, no new concerns.  ? 12/2019 PSA 2.9  ? 3/9/2020  PSA increasing to 5.030, he remains continuing on  Lupron and Xgeva and asymptomatic though follow-up CT of chest and pelvis will be requested in 11 weeks prior to follow-up in 12 weeks.   ? 5/4/2020 patient called reported worsening right hip pain, plans to purse CT scans ASAP.   ? 5/8/2020 CT scans C/A/P show no progression of disease. Hip pain improved, Gabapentin added.. Bone scan requested.  ? 6/3/2020 bone scan that does not demonstrate worsening of bone nor need for localized radiation therapy.  The patient's PSA has been slowly rising and we have not been able to determine worsening of disease and and, therefore, it is not felt warranted add additional medications such as Xtandi or apalutamide to his therapy.  Please note would like to avoid Zytiga try not to add prednisone which would worsen his blood sugar control.  ? 9/3/2020 PSA 15.8, CXR extensive metastatic bone disease- patient reviewed 9/10/2020 reporting increased bony discomfort CT scans requested.  ? Scans done and reviewed 10/7/2020 ultimately reveal no evidence of progression of disease for visceral involvement or clearly for bone involvement.  After further review with the patient and his son plan continued Xgeva and Lupron.  We have assessed for availability of Xtandi 160 mg p.o. daily and find the cost is currently prohibitive.   ? 11/25/2020 PSA 25.4  ? 12/10/2020 review with some mild increase in bony discomfort.  After repeat discussion we went on to have him undergo Lupron therapy, and his PSA actually to be mildly reduced at 22.2.   ? Follow-up bone scan 1/11/2021 demonstrates some evidence of progression in sternum, left sacrum and proximal femur.  These findings were reviewed with the patient and his son January 18, 2020 and the patient was referred for palliative radiotherapy(Dr. Kulkarni)  ? Palliative radiation under care Dr. Kulkarni to right femur and sternal lesion -3000 cGy in 10 fractions given February 1 to February 12 with significant symptom improvement.  ? 3/15/2021 PSA  38.5  ? 4/12/2021 PSA 44  ? 5/10/2021 PSA 42.6 CT chest abdomen pelvis 5/4/2021 - for progression of disease and follow-up PSA 5 10/2021 is at 42 slightly reduced from previous.  We have discussed how to proceed from here and find a significant family history that clearly supports a potential genetic assessment for his malignancy.  ? It is felt most reasonable at this point to take the mediastinal nodes from his biopsy 4/9/2018 and reassess for potential targeted therapies, MSI status, as well as germline analysis.  ? 6/7/2021 PSA 65.9 his analysis completed for actionable mutations including germline mutations.  These exams were negative though there is a variant of unknown significance.  Again this is not an actionable mutation.  We proceeded with additional Xgeva and Lupron planning for monthly Xgeva and Lupron at 3 months  ? 7/7/2021 PSA 89 and subsequent testing with Axumin PET was performed confirming extensive osteosclerotic metastatic disease with little uptake, no evidence of visceral metastatic disease in neck chest abdomen or pelvis.  ? 8/25/2021  , PET/CT reviewed, subsequent PSA increased to 105.  Patient fortunately asymptomatic.  Requested reevaluation for Xtandi 160 mg po daily.   ? 9/8/2021 due for his lupron, receiving every 3 months, and monthly Xgeva.  Will have education today for Xtandi and will receive his medication today as well.   ? Started Xtandi 9/8/2021.  ? 9/22/2021 here for toxicity check, so far tolerating Xtandi quite well other than a slight increase in diarrhea controlled with Imodium.  ? 10/6/2021 continues to tolerate Xtandi well.  Labs are within range today, we will proceed with Xgeva today.  ? Patient seen 10/20/2021 having more nausea with Xtandi and demonstrating a substantial reduction in PSA level.  After discussion we plan to reduce his dose to 80 mg daily following his PSA and performance status over the next approximate 7 weeks at which time he would be scheduled  for his follow-up Lupron.  ? Patient assessed 12/8/2021 with further reduction in PSA to 4.7, ongoing hypophosphatemia and hypocalcemia-Xgeva moved to every 3 months given on same schedule as Lupron  ? Patient seen 3/21/2022, 6-week follow-up with mild increase in PSA recognized  ? Patient reassessed 5/2/2022 with PSA at 10.1, Xtandi moved to 160 mg p.o. daily  ? Patient assessed 6/13/2022, Xtandi at 160 mg p.o. daily, PSA pending, Xgeva moved to every 6 months, Lupron every 3 months  ? Patient seen 9/21/2022 at which time we continued our current approach, reviewed additional options for therapy should he clearly progress.  ? Patient seen 12/21/2022, increasing bony discomfort, Xgeva and Eligard continued, plans made for Pylarify scanning.   ? Patient seen 1/11/2023 with his son, discuss potential treatment options such as Pluvicto (Lutetium Yady 177 vipivotide tetraxetan) knowing that Pms-Asa is strongly positive in bone, palliative radiotherapy, radium-223 and alteration to additional androgen receptor such as darolutamide.  Patient referred to radiation therapy for their evaluation and possible treatment options, [facility] as application made for darolutamide and medications altered to include meloxicam 15 mg p.o. daily along with his Norco.    2.  Neuropathy/ restless legs:    · 5/13/2020 gabapentin 600 mg at bedtime, trazadone 50 mg QHS.  · On gabapentin 300, 2 tablets at bedtime, and requip 0.5.mg    · Still having neuropathy symptoms.  Will increase gabapentin to 900 mg at bedtime.   · 9/22/2021 increase in gabapentin to 900 mg at bedtime has helped neuropathy.  · Requip moved to bedtime dosing only-1 mg, escalate as needed    3.  Cancer related pain: on Norco 5/325 every 6 hours as needed.  · Mobic 15 mg p.o. every morning initiated 1/11/2023, consider MS Contin every 12 hours    4.  Hyperkalemia:  · Intermittent issue for the patient.  It is typically dietary related, as he consumes large amount  fresh  tomatoes and other foods from his garden..  He was instructed to significantly decrease his intake as his potassium level today is 6.4, magnesium 2.7  He is not symptomatic.  We will also check an EKG.   · 9/22/2021 potassium level is improved to 5.7.  Patient states that he is no longer eating tomatoes from his garden.  · 10/6/2021 potassium level is 5.8.  Due to patient's multiple electrolyte abnormalities we will go ahead and refer him to nephrology for further evaluation.  Patient states he actually has an existing nephrologist, Dr. Perry Daily who he typically only sees on an annual basis.  We will schedule him to be seen by her soon for further evaluation.  · Reassess 12/21/2022 at 4.7    5.  Hypophosphatemia:  Phosphorus 1.9- hold xgeva 9/8/2021 ., Increase foods high in phosphorus, recheck in 2 weeks. \  · 9/22/2021 phosphorus level 1.7.  Reviewed with Dr. Lee.  In addition to increasing foods high in phosphorus, I have instructed the patient to start Neutra-Phos 1 tablet daily.  We will recheck again in 2 weeks.  · Phosphorus 2.3 assessed 10/20/2021  · Reassess 12/8/2021 at 1.9, Xgeva moved to every 3 months.  · Patient assessed 6/13/2022 Xgeva moved every 6 months  · Reevaluation 9/21/2022 with phosphorus 3.6, Xgeva continued every 6 months  · Reevaluation 12/21/2022 at 4.0    PLAN:      1. Continue gabapentin 900 mg at bedtime, Requip to move to 2 mg p.o. nightly, escalate as needed  2. Continue Imodium if needed for diarrhea.  3. Continue Xtandi present, make application for darolutamide 300 to 600 mg twice daily.  4. Xgeva moved to every 6 months, Lupron every 3 months.  5. Consultations with Radiation therapy and [facility]  6. Case discussed with patient's son, Cody, who concurs as well as the patient.    Patient on high risk medication requiring close monitoring for toxicity.    I spent 65 minutes caring for Semaj on this date of service. This time includes time spent by me in the  following activities: preparing for the visit, reviewing tests, obtaining and/or reviewing a separately obtained history, performing a medically appropriate examination and/or evaluation, counseling and educating the patient/family/caregiver, ordering medications, tests, or procedures, referring and communicating with other health care professionals, documenting information in the medical record, independently interpreting results and communicating that information with the patient/family/caregiver and care coordination.       Jose Lee MD  01/11/2023

## 2023-01-11 ENCOUNTER — LAB (OUTPATIENT)
Dept: LAB | Facility: HOSPITAL | Age: 83
End: 2023-01-11
Payer: MEDICARE

## 2023-01-11 ENCOUNTER — OFFICE VISIT (OUTPATIENT)
Dept: ONCOLOGY | Facility: CLINIC | Age: 83
End: 2023-01-11
Payer: MEDICARE

## 2023-01-11 VITALS
OXYGEN SATURATION: 98 % | HEIGHT: 64 IN | SYSTOLIC BLOOD PRESSURE: 187 MMHG | DIASTOLIC BLOOD PRESSURE: 95 MMHG | RESPIRATION RATE: 16 BRPM | TEMPERATURE: 97.3 F | BODY MASS INDEX: 34.21 KG/M2 | HEART RATE: 55 BPM | WEIGHT: 200.4 LBS

## 2023-01-11 DIAGNOSIS — C61 PROSTATE CANCER METASTATIC TO BONE: ICD-10-CM

## 2023-01-11 DIAGNOSIS — C79.51 PROSTATE CANCER METASTATIC TO BONE: Primary | ICD-10-CM

## 2023-01-11 DIAGNOSIS — C79.51 OSSEOUS METASTASIS: ICD-10-CM

## 2023-01-11 DIAGNOSIS — C61 PROSTATE CANCER METASTATIC TO BONE: Primary | ICD-10-CM

## 2023-01-11 DIAGNOSIS — N18.32 CHRONIC KIDNEY DISEASE (CKD) STAGE G3B/A1, MODERATELY DECREASED GLOMERULAR FILTRATION RATE (GFR) BETWEEN 30-44 ML/MIN/1.73 SQUARE METER AND ALBUMINURIA CREATININE RATIO LESS THAN 30 MG/G (CMS/H*: Primary | ICD-10-CM

## 2023-01-11 DIAGNOSIS — E55.9 AVITAMINOSIS D: ICD-10-CM

## 2023-01-11 DIAGNOSIS — C79.51 PROSTATE CANCER METASTATIC TO BONE: ICD-10-CM

## 2023-01-11 LAB
25(OH)D3 SERPL-MCNC: 44.3 NG/ML (ref 30–100)
ALBUMIN SERPL-MCNC: 3.6 G/DL (ref 3.5–5.2)
ALBUMIN/GLOB SERPL: 1.1 G/DL (ref 1.1–2.4)
ALP SERPL-CCNC: 210 U/L (ref 38–116)
ALT SERPL W P-5'-P-CCNC: 7 U/L (ref 0–41)
ANION GAP SERPL CALCULATED.3IONS-SCNC: 10 MMOL/L (ref 5–15)
AST SERPL-CCNC: 12 U/L (ref 0–40)
BACTERIA UR QL AUTO: NEGATIVE /HPF
BASOPHILS # BLD AUTO: 0.01 10*3/MM3 (ref 0–0.2)
BASOPHILS NFR BLD AUTO: 0.2 % (ref 0–1.5)
BILIRUB SERPL-MCNC: 0.3 MG/DL (ref 0.2–1.2)
BILIRUB UR QL STRIP: NEGATIVE
BUN SERPL-MCNC: 18 MG/DL (ref 6–20)
BUN/CREAT SERPL: 22.5 (ref 7.3–30)
CALCIUM SPEC-SCNC: 8.6 MG/DL (ref 8.6–10.5)
CALCIUM SPEC-SCNC: 9 MG/DL (ref 8.5–10.2)
CHLORIDE SERPL-SCNC: 107 MMOL/L (ref 98–107)
CLARITY UR: CLEAR
CO2 SERPL-SCNC: 24 MMOL/L (ref 22–29)
COLOR UR: YELLOW
CREAT SERPL-MCNC: 0.8 MG/DL (ref 0.7–1.3)
CREAT UR-MCNC: 68.1 MG/DL
DEPRECATED RDW RBC AUTO: 50.1 FL (ref 37–54)
EGFRCR SERPLBLD CKD-EPI 2021: 88.4 ML/MIN/1.73
EOSINOPHIL # BLD AUTO: 0.16 10*3/MM3 (ref 0–0.4)
EOSINOPHIL NFR BLD AUTO: 3.4 % (ref 0.3–6.2)
ERYTHROCYTE [DISTWIDTH] IN BLOOD BY AUTOMATED COUNT: 15.3 % (ref 12.3–15.4)
GLOBULIN UR ELPH-MCNC: 3.2 GM/DL (ref 1.8–3.5)
GLUCOSE SERPL-MCNC: 196 MG/DL (ref 74–124)
GLUCOSE UR STRIP-MCNC: NEGATIVE MG/DL
HCT VFR BLD AUTO: 31.2 % (ref 37.5–51)
HGB BLD-MCNC: 10 G/DL (ref 13–17.7)
HGB UR QL STRIP.AUTO: NEGATIVE
HYALINE CASTS UR QL AUTO: NORMAL /LPF
IMM GRANULOCYTES # BLD AUTO: 0.07 10*3/MM3 (ref 0–0.05)
IMM GRANULOCYTES NFR BLD AUTO: 1.5 % (ref 0–0.5)
KETONES UR QL STRIP: NEGATIVE
LEUKOCYTE ESTERASE UR QL STRIP.AUTO: NEGATIVE
LYMPHOCYTES # BLD AUTO: 1.44 10*3/MM3 (ref 0.7–3.1)
LYMPHOCYTES NFR BLD AUTO: 30.4 % (ref 19.6–45.3)
MAGNESIUM SERPL-MCNC: 2.1 MG/DL (ref 1.8–2.5)
MCH RBC QN AUTO: 29.4 PG (ref 26.6–33)
MCHC RBC AUTO-ENTMCNC: 32.1 G/DL (ref 31.5–35.7)
MCV RBC AUTO: 91.8 FL (ref 79–97)
MONOCYTES # BLD AUTO: 0.39 10*3/MM3 (ref 0.1–0.9)
MONOCYTES NFR BLD AUTO: 8.2 % (ref 5–12)
NEUTROPHILS NFR BLD AUTO: 2.66 10*3/MM3 (ref 1.7–7)
NEUTROPHILS NFR BLD AUTO: 56.3 % (ref 42.7–76)
NITRITE UR QL STRIP: NEGATIVE
NRBC BLD AUTO-RTO: 0 /100 WBC (ref 0–0.2)
PH UR STRIP.AUTO: 5.5 [PH] (ref 4.5–8)
PHOSPHATE SERPL-MCNC: 2.4 MG/DL (ref 2.5–4.5)
PLATELET # BLD AUTO: 180 10*3/MM3 (ref 140–450)
PMV BLD AUTO: 9.3 FL (ref 6–12)
POTASSIUM SERPL-SCNC: 4.1 MMOL/L (ref 3.5–4.7)
PROT ?TM UR-MCNC: 29.1 MG/DL
PROT SERPL-MCNC: 6.8 G/DL (ref 6.3–8)
PROT UR QL STRIP: ABNORMAL
PROT/CREAT UR: 427.3 MG/G CREA (ref 0–200)
PTH-INTACT SERPL-MCNC: 67.6 PG/ML (ref 15–65)
RBC # BLD AUTO: 3.4 10*6/MM3 (ref 4.14–5.8)
RBC # UR STRIP: NORMAL /HPF
REF LAB TEST METHOD: NORMAL
SODIUM SERPL-SCNC: 141 MMOL/L (ref 134–145)
SP GR UR STRIP: 1.02 (ref 1–1.03)
SQUAMOUS #/AREA URNS HPF: NORMAL /HPF
UROBILINOGEN UR QL STRIP: ABNORMAL
WBC # UR STRIP: NORMAL /HPF
WBC NRBC COR # BLD: 4.73 10*3/MM3 (ref 3.4–10.8)

## 2023-01-11 PROCEDURE — 84100 ASSAY OF PHOSPHORUS: CPT

## 2023-01-11 PROCEDURE — 83735 ASSAY OF MAGNESIUM: CPT

## 2023-01-11 PROCEDURE — 85025 COMPLETE CBC W/AUTO DIFF WBC: CPT

## 2023-01-11 PROCEDURE — 82306 VITAMIN D 25 HYDROXY: CPT | Performed by: NURSE PRACTITIONER

## 2023-01-11 PROCEDURE — 99215 OFFICE O/P EST HI 40 MIN: CPT | Performed by: INTERNAL MEDICINE

## 2023-01-11 PROCEDURE — 81001 URINALYSIS AUTO W/SCOPE: CPT

## 2023-01-11 PROCEDURE — 83970 ASSAY OF PARATHORMONE: CPT | Performed by: NURSE PRACTITIONER

## 2023-01-11 PROCEDURE — 36415 COLL VENOUS BLD VENIPUNCTURE: CPT

## 2023-01-11 PROCEDURE — 82570 ASSAY OF URINE CREATININE: CPT | Performed by: NURSE PRACTITIONER

## 2023-01-11 PROCEDURE — 84156 ASSAY OF PROTEIN URINE: CPT | Performed by: NURSE PRACTITIONER

## 2023-01-11 PROCEDURE — 80053 COMPREHEN METABOLIC PANEL: CPT

## 2023-01-11 RX ORDER — MELOXICAM 15 MG/1
15 TABLET ORAL DAILY
Qty: 30 TABLET | Refills: 1 | Status: SHIPPED | OUTPATIENT
Start: 2023-01-11 | End: 2023-03-20 | Stop reason: SDUPTHER

## 2023-01-11 NOTE — LETTER
"January 11, 2023     Axel Guardado MD  [address] [facility] [address]    Patient: Semaj Herrera   YOB: 1940   Date of Visit: 1/11/2023       Dear Dr. Geo MD:    Thank you for referring Semaj Herrera to me for evaluation. Below are the relevant portions of my assessment and plan of care.    If you have questions, please do not hesitate to call me. I look forward to following Semaj along with you.         Sincerely,        Jose Lee MD        CC: No Recipients  Jose Lee MD  01/11/23 0847  Signed    Subjective   Patient, again, with increasing bony pain.    REASONS FOR FOLLOW UP:   1.  Metastatic prostate cancer    HISTORY OF PRESENT ILLNESS:     Patient is an 82-year-old with metastatic prostate cancer who is seen as he continues current therapy with Xtandi, Xgeva moved to every 6 months, Lupron every 3 months.                                  The patient was seen back along with his son, Georges, 9/21/2022 fortunately doing reasonably well without additional pain, discomfort or excessive fatigue.  We have discussed various options for treatment of prostate cancer if we are unable to control this disease with current measures.    He is next evaluated 12/21/2022 now scheduled for Lupron.  He had last received both his Lupron and Xgeva 9/21/2022.  He notes increasing chest discomfort that appears to be \"when he twists\".  Initially he felt he might have a pneumonia but never had fever or cough.  We have discussed that his disease could well be progressing and then assessment that would allow us to clarify treatment options is reasonable.      This led to a repeat PSA 12/21/2022 that was found to be elevated to 23.9 and a follow-up Pylarify study performed 1/6/2023 that does demonstrate marked progression of sclerotic bone metastasis diffusely compared to 8/20/2021.  This includes the femoral neck,, sacrum, left iliac bone with SUV up to 48.8, stable tiny mediastinal nodes, 2 " punctate nodules in the calvarium.      He is seen back with his son 1/11/2023.  The potential treatment options such as Pluvicto (Lutetium Yady 177 vipivotide tetraxetan), now that we know that his PSMA scan is positive, though the patient would have to initially be treated with taxane chemotherapy which would be difficult for him to tolerate, radium-223 considering his bony metastasis.  He has slowly progressive disease however we have discussed additional issues including radiation therapy and/or alteration to another androgen receptor agonist such as darolutamide.  We have discussed all these options moving to have assessments done by radiation therapy and, [facility] as we address his symptoms.      Hematologic/oncologic history:   The patient is an 82-year-old male with significant past medical history including prostate carcinoma, CKD 3 and long-term tobacco use be been seen by primary care in late January, 2018 for hoarseness.  Chest x-ray is now outpatient January 23 revealed sclerotic change in the posterior mid chest to be within 3 adjacent thoracic ertebral bodies of uncertain significance.  A follow-up chest CT revealed numerous sclerotic foci within al visualized bones consistent with metastatic disease, multiple enlarged mediastinal lymph nodes concerning for metastatic lymphadenopathy and a polypoidal abnormality in the right lateral wall of the trachea.  CT of abdomen March 20 revealed extensive osseous metastatic disease mildly enlarged retroperitoneal lymph nodes.  Bone scan March 20 was consistent with widespread osseous metastasis particularly in the proximal half the left humeral shaft, abnormal uptake in the sternum and manubrium and a small focus in the posterior aspect of the calvarium.  This led to a plain film the left humerus with mottled sclerosis involving the shaft of the humerus particularly in the proximal half but no evidence of pathologic fracture.    The patient has additionally  type 2 diabetes on insulin pump, again a history of prostate carcinoma prostatectomy 12 years previously, hypertension, and hyperlipidemia.  Additional studies included a PSA of 395.1 from March 14, 2018.The patient's been seen by urology March 15 with plans to start Firmagon.    The patient is now seen in pulmonary clinic with Dr. Bijan Rivera and we have discussed that he will need bronchoscopy and mediastinoscopy.  Interestingly his additional history includes a long occupational exposure including working in the [place] just out of school, subsequent construction work for many years and a 30-pack-year history of smoking stopping in the late 1990s.  He indicates that he followed with Dr. Guillen of urology for many years with no changes in his exams and normal PSAs.  He stopped this follow-up approximately 2 years ago.     Patient was seen in consultation with thoracic surgery on March 28 and plans are made for mediastinoscopy and bronchoscopy with lymph node biopsy.  Pathology revealed that these nodes were consistent with metastatic prostate carcinoma.  He was discussed at thoracic conference.  A PET/CT was not pursued and it was determined that he see medical oncology for hormonal treatment and radiation therapy if symptomatic.  It should be noted that the patient's March 15 [facility] office note describes that Firmagon was planned.     The patient has met with the son and grandson April 23.  We have also contacted Dr. Taylor of urology in that Firmagon was given just after his last visit and would next be due approximately May 3.  Patient feels considerably better with resolution of his pain, normalization of his performance status.  He would like to continue treatment through our office now contacted Dr. Taylor concerning this.    The patient is next seen June 11, 2018 we discussed his follow-up including his treatments with Lupron May 7 and his next treatment on July 30.  He has  returned to essentially normal performance status and his PSA has dropped further from 395 March 14 27.8 May 7 and now 2.03 June 11.  We do plan to initiate Xgeva also study him via scans to document his improvement approximately a month from now.   The patient is next seen July 26, 2018.  Repeat imaging demonstrated interval resolution of mediastinal and retroperitoneal lymphadenopathy.  There is thickening in the distal aspect of the appendix with stranding?  Chronic appendicitis.  The patient indicates he is having no such symptoms and never has.  There is no change in sclerotic bony metastasis in the patient's PSA has dropped substantially to 1.66 by July 19 and 1.79 July 26.  He is actually feeling excellent and this is corroborated by family members.   Patient is next seen January 10, 2019 continuing to do very well and, in fact indicating [facility], likely [facility] replacements, visual review.  He is not certain is going to continue his care with them or with us or combination of both.    Patient is next seen April 4, 2019.  He is recently discharged after hospitalization March 15-18 with left upper lobe pneumonia.  We reviewed the findings in detail with his gradual recovery now documented.  His studies include a CT of chest which does not demonstrate any further bony lesions and/or pulmonary metastasis having developed.  He is feeling considerably better and approximately back to his baseline.  The patient is next seen June 28, 2019 feeling well but having baseline generally musculoskeletal pain and restless legs.His recent exams include a PSA of 0.81, CMP with potassium of 5.3 with repeat pending.  His performance status overall remains good to very good and is clearly responding to his treatments.  The patient is next seen December 13, 2019 continue to feel well.  He has had, oddly enough, 2 episodes of sleep paralysis which have occurred to him  previously and he wonders if there is any connection with his prostate carcinoma though this is felt unlikely.     The patient when assessed in December had his PSA go to 2.9.  We continue with Lupron and Xgeva and is seen back now March 9, 2020 without additional symptoms.  We discussed that a schedule could likely change moving to 3 months for both of these medications particularly if he needs additional therapy directed at progressive disease.     Patient contacted our practice May 4 concern for pain in his hips.  This led to moving his scans up and they were performed May 8, 2020 showing a sub-6 mm pleural-based pulmonary nodule in the right lower lobe that is new from March 15, 2018, remainder of the sub-6 mm pulmonary nodules bilaterally are stable.  There is extensive osseous metastatic disease as previous with no other new findings.     The patient indicates, when seen, May 13 that his bone pain is now resolved.  While this is good information does not mean that he does not have further bony metastasis and we have discussed that a follow-up bone scan is likely necessary.  Furthermore he is having some difficulty with sleeplessness and increase in restless legs as he continues to stay at home during the COVID 19 crisis which, itself, is producing more anxiety for him.  A follow-up bone scan was obtained Lety 3 revealing multifocal osseous metastatic disease which was compared to March 2, 2018 with improvement.  No new abnormal uptake is present.  He is next seen with us on June 17, 2020 and, fortunately, is not having any significant pain at this point.  We discussed his scans in detail and, on balance, his performance status also remains improved.     It was elected to follow the patient rather than changes antiandrogen therapy.  He is reassessed September 3 with unchanged CMP, H&H of 11.4 and 36.3 with normal white count, platelet count and differential, PSA at 15.8.  Follow-up PA lateral chest x-ray does  not show any new abnormalities though there is extensive metastatic bone disease throughout the bony thorax and osteoblastic metastasis have been seen on chest CT May 2020.  The patient is seen with his son Georges September 10 noticing increasing bony discomfort primarily in the right hip following fairly intensive gardening which he does frequently.  Now has further elevation of PSA were wondering whether we should try to intervene sooner per additional antiandrogens.  We will need to further assess him via CT scanning to make this decision     These were obtained October 1 showing extensive sclerotic disease with no metastatic disease in chest abdomen or pelvis.  PSA had gone from 15.8 September 3-16 0.6 October 1.     He still has pain getting up in the morning and after active gardening.  This is manageable with current pain medications and, at present, it is not apparent that he needs to switch medications to gain better control of his disease.  The patient did have follow-up studies done including a PSA November 25, 2020 now at 25.4.  The patient is seen with his son December 10, 2020 doing fair but having some increased pain in the mid sternum and right hip that he ascribes to additional exercise/work in managing his garden.  It is apparent however, that his last bone scan was 6 months ago and we do need to reassess him concerning this.     The patient had follow-up bone scan performed January 11, 2021 showing again uptake in the calvarium, humerus, right medial clavicle, sternum, ribs, spine, sacrum, pelvis, right acetabulum, proximal femora and now left frontal bone which appears new.  There is also mild increase in sternal uptake and equivocal increase in the pelvic lesions of all of the sacrum and the right proximal femur.     The patient is seen with his son January 18, 2021 and indicates that he is having pain gradually worsened in his right hip, mid chest that had been an issue previously.  These  findings on bone scan are reviewed with both of them as likely the underlying culprit for his discomfort.  He would need change of the systemic therapy but, at present, will ask for radiation therapy palliation initially.  He was previously treated by Dr. Kulkarni many years ago and will ask her to see him.  We will also make application for the current cost of Xtandi or Zytiga.  The patient is not felt to be a candidate for systemic chemotherapy.     The patient was referred for ration therapy as we continued to assess his PSA on current therapy as well as exam the cost of Zytiga or Xtandi.  Radiation therapy (Dr. Kulkarni) saw the patient January 26 and felt that the right femur and sternal lesion could benefit from therapy-3000 cGy in 10 fractions.  The patient took treatment February 1 to February 12 to the above areas and is next seen back March 15.  Fortunately his pain has improved dramatically and he is quite happy about this.  Unfortunately he notes some difficulty with swallowing that is temporary and often leads to increased salivation and mucus production.  Patient was asked to return his next assessed April 12, 2021.  He is able to swallow with less pain but still has excess mucus production and issues with salivation.  His PSA has noted increase but he is having no additional bony discomfort at this point and, additionally, has noted low-grade fevers just under 100 degrees at nighttime?.  His weight, appetite and general performance status have remained good to excellent.  We discussed follow-up studies at this point to determine his status.    Follow-up scans were scheduled CT scans of chest and pelvis 5/4/2021 showing osseous metastasis quite similar to previous examinations in the chest, CT of abdomen pelvis also with no acute intra-abdominal adenopathy no indication of abdominal pelvic metastatic disease but again widespread osseous metastasis.  His PSA at this point have been slowly increasing to a  level of 44 on 4/12/2021.  As the patient is reassessed 5/10/2021 we find, fortunately, that he is not exceeding symptomatic.  It could make reasonable sense at this point to take the mediastinal nodes from his biopsy 4/9/2018, reassess potential targeted therapies, MSI status, and germline analysis.  Fortunately he is not having significant pain or discomfort and is seen with his son as we discussed the above plan.  Notably a follow-up PSA is found to be 42.6.   Patient is next seen in 6/7/2021 for Lupron and Xgeva.  Fortunately he is feeling considerably better according to both he and his son though his stamina has reduced.  He is not having additional pain at this point.  We have also reviewed his genetics assessment which was significant only for a variant of unknown significance.  This is not an actionable abnormality.    As the patient's PSA further escalated increasing to 89 7/7/2021 his subsequent Axumin PET was obtained 8/20 demonstrating extensive osteosclerotic metastatic disease showing reduced uptake-typical finding but no evidence of visceral metastatic disease in the neck chest abdomen pelvis.  These findings are discussed with the patient and his son 8/25/2021 and indicative of his progressive disease-etiology of his rising PSA-though the patient is asymptomatic we have discussed moving to initiate Xtandi is available at 160 mg p.o. twice daily along with his current Lupron and Xgeva.     The patient's testing 10/6 included PSA that is dropped to 9.69.  He is seen with his son, Cody, both indicating that he been doing relatively well with treatment but began to notice nausea in the last week.  The schedule that he has been given also needs to be somewhat updated in terms of his Lupron and Xgeva.  He has been able to maintain his appetite and overall performance status to date.    The patient is next assessed 12/8/2021 tolerating his current Xtandi dose better than previous and maintaining a good  performance status overall as well as demonstrating a drop in his PSA now to 4.7 on 12/8/2021 compared to high of 112 9/8/2021.  He remains hypophosphatemic and hypocalcemic and we have discussed, again, changing his Xgeva dosing to every 3 months along with his Lupron will continue his Xtandi to 80 mg daily.    The patient is next reviewed 3/21/2022 seen for both Xgeva and Lupron.  Fortunately he is feeling reasonably well with little additional pain though he has restarted to place his garden into shape for the season and is working more outdoors.    Patient reevaluated 5/2/2022 with repeat CBC including H&H of 10.5 and 33.4, white count 5980 and platelet count of 192,000, currently CMP and PSA pending with a previous PSA 1 month ago at 9.06.  At this point we increased his Xtandi to 160 mg p.o. daily while continuing treatment with every 3 month Xgeva and Lupron.  Notably the patient's Requip  alcohol I am sure it is mariza now at 1.5 mg p.o. nightly and Neurontin 900 mg p.o. daily has improved his restless leg syndrome substantially.    Patient seen 6/13/2022 at which time Xtandi at 160 mg p.o. daily was continued, Xgeva moved to every 6 months and Lupron every 3 months.    This was continued when patient was seen 9/21/2022.    Patient seen 12/21/2022 at which time a subsequent Pylarify scan was requested as result of increasing PSA level and bony discomfort.  The patient received Lupron and Xgeva at this point.     He is seen back with his son 1/11/2023.  The potential treatment options such as Pluvicto (Lutetium Yady 177 vipivotide tetraxetan), now that we know that his PSMA scan is positive, though the patient would have to initially be treated with taxane chemotherapy which would be difficult for him to tolerate, radium-223 considering his bony metastasis.  He has slowly progressive disease however we have discussed additional issues including radiation therapy and/or alteration to another androgen receptor  agonist such as darolutamide.  We have discussed all these options moving to have assessments done by radiation therapy and, [facility] as we address his symptoms.    Past Medical History:   Diagnosis Date   • Aortic stenosis, mild 1/1/2021    Per echocardiogram 2021   • Ascending aorta dilatation (HCC)    • Bone cancer (HCC)    • Cellulitis of great toe of left foot 10/19/2018   • CKD (chronic kidney disease)     Stage 3; followed by Dr. Vicky Duran    • Diastolic dysfunction     GRADE II per echo 2018   • Difficulty breathing     during exertion   • Dyslipidemia    • Erectile dysfunction    • Fatigue    • History of blood transfusion    • History of kidney stones    • Hyperlipidemia    • Hypertension    • Hyponatremia    • Left ventricular hypertrophy     per echo 2018   • Localized edema    • Neuropathy    • Obstructive sleep apnea     USING CPAP   • Osteoarthritis    • Peptic ulcer    • Pneumonia    • Pneumonia of left upper lobe due to infectious organism 3/15/2019   • Prostate cancer (HCC)     Status post prostatectomy, radiation therapy, and hormone therapy followed by Dr. Lee; metastatsis to bone   • Pure hyperglyceridemia    • Restless leg syndrome    • Sepsis (HCC)    • Sinus bradycardia    • Transient cerebral ischemia    • Type 2 diabetes mellitus (HCC)         Past Surgical History:   Procedure Laterality Date   • BRONCHOSCOPY N/A 4/9/2018    Procedure: BRONCHOSCOPY;  Surgeon: Bijan Rivera III, MD;  Location: [facility] OR;  Service: Cardiothoracic   • COLONOSCOPY     • DEEP NECK LYMPH NODE BIOPSY / EXCISION     • HEMORRHOIDECTOMY     • MEDIASTINOSCOPY N/A 4/9/2018    Procedure: MEDIASTINOSCOPY WITH LYMPH NODE BIOPSY;  Surgeon: Bijan Rivera III, MD;  Location: [facility];  Service: Cardiothoracic   • OTHER SURGICAL HISTORY      ulcer repair   • PROSTATECTOMY  2006        Current Outpatient Medications on File Prior to Visit   Medication Sig Dispense Refill   • Accu-Chek Guide  test strip USE 1 STRIP TO CHECK BLOOD SUGAR 4 TIMES DAILY     • Accu-Chek Softclix Lancets lancets USE 1 LANCET TO CHECK GLUCOSE 4 TIMES DAILY     • aspirin 81 MG EC tablet Take 1 tablet by mouth daily.     • cholecalciferol (VITAMIN D3) 1000 units tablet Take 2,000 Units by mouth Daily.     • Denosumab (XGEVA SC) Inject  under the skin into the appropriate area as directed Every 30 (Thirty) Days.     • dilTIAZem CD (CARDIZEM CD) 120 MG 24 hr capsule TAKE 1 CAPSULE EVERY DAY 90 capsule 3   • enzalutamide (XTANDI) 40 MG chemo capsule Take 4 capsules by mouth Daily. 120 capsule 2   • furosemide (LASIX) 40 MG tablet Take 40 mg by mouth Every Morning.     • gabapentin (NEURONTIN) 300 MG capsule TAKE 3 CAPSULES EVERY NIGHT AT BEDTIME 90 capsule 0   • HYDROcodone-acetaminophen (NORCO) 5-325 MG per tablet Take 1 tablet by mouth Every 6 (Six) Hours As Needed for Moderate Pain. 120 tablet 0   • Insulin Aspart (novoLOG) 100 UNIT/ML injection INJECT 100 UNITS VIA PUMP ONCE DAILY     • insulin aspart (NovoLOG) 100 UNIT/ML patient supplied pump Inject  under the skin into the appropriate area as directed Continuous. Pt reports pump with basal rate 1.9 units/hr from 0800 to 1200. Basal rate 1.8 units/hr 1200 to 0800.  Checks bg ac/hs with bolus doses per pumps parameters     • Leuprolide Acetate, 3 Month, (LUPRON DEPOT, 3-MONTH, IM) Inject  into the appropriate muscle as directed by prescriber Every 3 (Three) Months.     • levothyroxine (SYNTHROID, LEVOTHROID) 88 MCG tablet      • loperamide (IMODIUM) 1 MG/5ML solution Take 2 mg by mouth 4 (Four) Times a Day As Needed for Diarrhea.     • modafinil (PROVIGIL) 200 MG tablet Take 1 tablet by mouth Daily. 30 tablet 0   • olmesartan (BENICAR) 40 MG tablet TAKE 1 TABLET EVERY DAY (DISCONTINUE LOSARTAN 100MG) 90 tablet 3   • ondansetron (ZOFRAN) 8 MG tablet Take 1 tablet by mouth 3 (Three) Times a Day As Needed for Nausea or Vomiting. 30 tablet 5   • pravastatin (PRAVACHOL) 40 MG tablet  "Take 40 mg by mouth daily.     • rOPINIRole (REQUIP) 0.5 MG tablet Take 2 tablets by mouth in the evening and 2 at bedtime. 360 tablet 2   • traZODone (DESYREL) 50 MG tablet Take  mg by mouth every night at bedtime.       No current facility-administered medications on file prior to visit.        ALLERGIES:    Allergies   Allergen Reactions   • Niacin Unknown - Low Severity   • Statins Unknown - Low Severity        Social History     Socioeconomic History   • Marital status:    • Years of education: College   Tobacco Use   • Smoking status: Former     Packs/day: 1.00     Years: 30.00     Pack years: 30.00     Types: Cigarettes     Quit date: 1998     Years since quittin.9   • Smokeless tobacco: Never   Substance and Sexual Activity   • Alcohol use: No     Comment: Caffeine use: 2-3 cups daily   • Drug use: No   • Sexual activity: Defer        Family History   Problem Relation Age of Onset   • Heart failure Mother    • Hypertension Mother    • Diabetes Mother    • Heart disease Mother    • Lung cancer Father 46   • Hypertension Sister    • Lung cancer Sister 60   • Hypertension Brother    • Lung cancer Brother 62   • Heart disease Other    • Prostate cancer Brother 60   • Malig Hyperthermia Neg Hx         Review of Systems  As per HPI   .  Pain-primary left hip and left leg  Objective      Vitals:    23 0805   BP: (!) 187/95   Pulse: 55   Resp: 16   Temp: 97.3 °F (36.3 °C)   TempSrc: Temporal   SpO2: 98%   Weight: 90.9 kg (200 lb 6.4 oz)   Height: 162.6 cm (64.02\")   PainSc:   6   PainLoc: Back  Comment: left side     Current Status 2023   ECOG score 0       Physical Exam   Constitutional: He is oriented to person, place, and time. He appears well-developed. No distress.   HENT:   Head: Normocephalic and atraumatic.   Mouth/Throat: No oropharyngeal exudate.   Eyes: Pupils are equal, round, and reactive to light.   Cardiovascular: Normal rate, regular rhythm and normal heart sounds. "   No murmur heard.  Pulmonary/Chest: Effort normal and breath sounds normal. No respiratory distress. He has no wheezes. He has no rhonchi. He has no rales.   Abdominal: Soft. Normal appearance and bowel sounds are normal. He exhibits no distension.   Musculoskeletal: Normal range of motion.   Neurological: He is alert and oriented to person, place, and time.   Skin: Skin is warm and dry. No rash noted.   Vitals reviewed.    RECENT LABS:  Hematology WBC   Date Value Ref Range Status   01/11/2023 4.73 3.40 - 10.80 10*3/mm3 Final     RBC   Date Value Ref Range Status   01/11/2023 3.40 (L) 4.14 - 5.80 10*6/mm3 Final     Hemoglobin   Date Value Ref Range Status   01/11/2023 10.0 (L) 13.0 - 17.7 g/dL Final     Hematocrit   Date Value Ref Range Status   01/11/2023 31.2 (L) 37.5 - 51.0 % Final     Platelets   Date Value Ref Range Status   01/11/2023 180 140 - 450 10*3/mm3 Final        Lab Results   Component Value Date    GLUCOSE 149 (H) 12/21/2022    BUN 16 12/21/2022    CREATININE 0.99 12/21/2022    EGFRIFNONA 56 (L) 02/23/2022    BCR 16.2 12/21/2022    K 4.7 12/21/2022    CO2 25.6 12/21/2022    CALCIUM 10.1 12/21/2022    ALBUMIN 3.80 12/21/2022    AST 12 12/21/2022    ALT <5 12/21/2022       NM Bone Scan Whole Body (01/11/2021 13:25)  CT Chest With Contrast Diagnostic (05/04/2021 09:11)  CT Abdomen Pelvis With Contrast (05/04/2021 09:11)  NM PET/CT Skull Base to Mid Thigh (08/20/2021 12:18)      Assessment & Plan      1.  Metastatic prostate cancer:  ? Patient initially diagnosed in 2006 and had a prostatectomy and hormone therapy.  ? Patient in January 2018 began to complain of shortness of breath and hoarseness.  Chest x-ray obtained 1/23/18 showed sclerotic bone lesions.  ? CT of the chest 3/12/2018 numerous sclerotic bone foci with all visualized bones consistent with metastatic disease, multiple enlarged mediastinal lymph nodes.  ? .1 from 3/14/2018.  Patient was started on Firmagon by [facility]rology[facility].  ? 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  ? Case discussed at thoracic conference and recommendations were to continue ADT and radiation for symptomatic sites.  ? Lupron injections every 3 mos initiated 5/7/18 PSA at that time 7.810  ? Monthly Xgeva initiated 6/11/18 PSA 2.030  ? Last PSA 6/13/2019 0.881  ? 9/20/2019 patient tolerating treatment well, no new concerns.  ? 12/2019 PSA 2.9  ? 3/9/2020  PSA increasing to 5.030, he remains continuing on Lupron and Xgeva and asymptomatic though follow-up CT of chest and pelvis will be requested in 11 weeks prior to follow-up in 12 weeks.   ? 5/4/2020 patient called reported worsening right hip pain, plans to purse CT scans ASAP.   ? 5/8/2020 CT scans C/A/P show no progression of disease. Hip pain improved, Gabapentin added.. Bone scan requested.  ? 6/3/2020 bone scan that does not demonstrate worsening of bone nor need for localized radiation therapy.  The patient's PSA has been slowly rising and we have not been able to determine worsening of disease and and, therefore, it is not felt warranted add additional medications such as Xtandi or apalutamide to his therapy.  Please note would like to avoid Zytiga try not to add prednisone which would worsen his blood sugar control.  ? 9/3/2020 PSA 15.8, CXR extensive metastatic bone disease- patient reviewed 9/10/2020 reporting increased bony discomfort CT scans requested.  ? Scans done and reviewed 10/7/2020 ultimately reveal no evidence of progression of disease for visceral involvement or clearly for bone involvement.  After further review with the patient and his son plan continued Xgeva and Lupron.  We have assessed for availability of Xtandi 160 mg p.o. daily and find the cost is currently prohibitive.   ? 11/25/2020 PSA 25.4  ? 12/10/2020 review with some mild increase in bony discomfort.  After repeat discussion we went on to have  him undergo Lupron therapy, and his PSA actually to be mildly reduced at 22.2.   ? Follow-up bone scan 1/11/2021 demonstrates some evidence of progression in sternum, left sacrum and proximal femur.  These findings were reviewed with the patient and his son January 18, 2020 and the patient was referred for palliative radiotherapy(Dr. Kulkarni)  ? Palliative radiation under care Dr. Kulkarni to right femur and sternal lesion -3000 cGy in 10 fractions given February 1 to February 12 with significant symptom improvement.  ? 3/15/2021 PSA 38.5  ? 4/12/2021 PSA 44  ? 5/10/2021 PSA 42.6 CT chest abdomen pelvis 5/4/2021 - for progression of disease and follow-up PSA 5 10/2021 is at 42 slightly reduced from previous.  We have discussed how to proceed from here and find a significant family history that clearly supports a potential genetic assessment for his malignancy.  ? It is felt most reasonable at this point to take the mediastinal nodes from his biopsy 4/9/2018 and reassess for potential targeted therapies, MSI status, as well as germline analysis.  ? 6/7/2021 PSA 65.9 his analysis completed for actionable mutations including germline mutations.  These exams were negative though there is a variant of unknown significance.  Again this is not an actionable mutation.  We proceeded with additional Xgeva and Lupron planning for monthly Xgeva and Lupron at 3 months  ? 7/7/2021 PSA 89 and subsequent testing with Axumin PET was performed confirming extensive osteosclerotic metastatic disease with little uptake, no evidence of visceral metastatic disease in neck chest abdomen or pelvis.  ? 8/25/2021  , PET/CT reviewed, subsequent PSA increased to 105.  Patient fortunately asymptomatic.  Requested reevaluation for Xtandi 160 mg po daily.   ? 9/8/2021 due for his lupron, receiving every 3 months, and monthly Xgeva.  Will have education today for Xtandi and will receive his medication today as well.   ? Started Xtandi  9/8/2021.  ? 9/22/2021 here for toxicity check, so far tolerating Xtandi quite well other than a slight increase in diarrhea controlled with Imodium.  ? 10/6/2021 continues to tolerate Xtandi well.  Labs are within range today, we will proceed with Xgeva today.  ? Patient seen 10/20/2021 having more nausea with Xtandi and demonstrating a substantial reduction in PSA level.  After discussion we plan to reduce his dose to 80 mg daily following his PSA and performance status over the next approximate 7 weeks at which time he would be scheduled for his follow-up Lupron.  ? Patient assessed 12/8/2021 with further reduction in PSA to 4.7, ongoing hypophosphatemia and hypocalcemia-Xgeva moved to every 3 months given on same schedule as Lupron  ? Patient seen 3/21/2022, 6-week follow-up with mild increase in PSA recognized  ? Patient reassessed 5/2/2022 with PSA at 10.1, Xtandi moved to 160 mg p.o. daily  ? Patient assessed 6/13/2022, Xtandi at 160 mg p.o. daily, PSA pending, Xgeva moved to every 6 months, Lupron every 3 months  ? Patient seen 9/21/2022 at which time we continued our current approach, reviewed additional options for therapy should he clearly progress.  ? Patient seen 12/21/2022, increasing bony discomfort, Xgeva and Eligard continued, plans made for Pylarify scanning.   ? Patient seen 1/11/2023 with his son, discuss potential treatment options such as Pluvicto (Lutetium Yady 177 vipivotide tetraxetan) knowing that Pms-Asa is strongly positive in bone, palliative radiotherapy, radium-223 and alteration to additional androgen receptor such as darolutamide.  Patient referred to radiation therapy for their evaluation and possible treatment options, [facility] as application made for darolutamide and medications altered to include meloxicam 15 mg p.o. daily along with his Norco.    2.  Neuropathy/ restless legs:    · 5/13/2020 gabapentin 600 mg at bedtime, trazadone 50 mg QHS.  · On gabapentin 300, 2 tablets at  bedtime, and requip 0.5.mg    · Still having neuropathy symptoms.  Will increase gabapentin to 900 mg at bedtime.   · 9/22/2021 increase in gabapentin to 900 mg at bedtime has helped neuropathy.  · Requip moved to bedtime dosing only-1 mg, escalate as needed    3.  Cancer related pain: on Norco 5/325 every 6 hours as needed.  · Mobic 15 mg p.o. every morning initiated 1/11/2023, consider MS Contin every 12 hours    4.  Hyperkalemia:  · Intermittent issue for the patient.  It is typically dietary related, as he consumes large amount  fresh tomatoes and other foods from his garden..  He was instructed to significantly decrease his intake as his potassium level today is 6.4, magnesium 2.7  He is not symptomatic.  We will also check an EKG.   · 9/22/2021 potassium level is improved to 5.7.  Patient states that he is no longer eating tomatoes from his garden.  · 10/6/2021 potassium level is 5.8.  Due to patient's multiple electrolyte abnormalities we will go ahead and refer him to nephrology for further evaluation.  Patient states he actually has an existing nephrologist, Dr. Perry Daily who he typically only sees on an annual basis.  We will schedule him to be seen by her soon for further evaluation.  · Reassess 12/21/2022 at 4.7    5.  Hypophosphatemia:  Phosphorus 1.9- hold xgeva 9/8/2021 ., Increase foods high in phosphorus, recheck in 2 weeks. \  · 9/22/2021 phosphorus level 1.7.  Reviewed with Dr. Lee.  In addition to increasing foods high in phosphorus, I have instructed the patient to start Neutra-Phos 1 tablet daily.  We will recheck again in 2 weeks.  · Phosphorus 2.3 assessed 10/20/2021  · Reassess 12/8/2021 at 1.9, Xgeva moved to every 3 months.  · Patient assessed 6/13/2022 Xgeva moved every 6 months  · Reevaluation 9/21/2022 with phosphorus 3.6, Xgeva continued every 6 months  · Reevaluation 12/21/2022 at 4.0    PLAN:      1. Continue gabapentin 900 mg at bedtime, Requip to move to 2 mg p.o. nightly,  escalate as needed  2. Continue Imodium if needed for diarrhea.  3. Continue Xtandi present, make application for darolutamide 300 to 600 mg twice daily.  4. Xgeva moved to every 6 months, Lupron every 3 months.  5. Consultations with Radiation therapy and [facility]  6. Case discussed with patient's son, Cody, who concurs as well as the patient.    Patient on high risk medication requiring close monitoring for toxicity.    I spent 65 minutes caring for Semaj on this date of service. This time includes time spent by me in the following activities: preparing for the visit, reviewing tests, obtaining and/or reviewing a separately obtained history, performing a medically appropriate examination and/or evaluation, counseling and educating the patient/family/caregiver, ordering medications, tests, or procedures, referring and communicating with other health care professionals, documenting information in the medical record, independently interpreting results and communicating that information with the patient/family/caregiver and care coordination.       Jose Lee MD  01/11/2023

## 2023-01-17 NOTE — PROGRESS NOTES
"[facility], [place] Radiation Oncology   Consult    Chief Complaint  Metastatic Prostate Cancer      Diagnosis: Metastatic Prostate Cancer    Overall Stage Metastatic      Pacemaker: no, insulin pump  Prior History of Radiation: yes, 30Gy/10fxs to sternum and right hip completed 2/2021, possible prior radiation to prostate bed  Contraindications to Radiation: no  Patient Requires Pregnancy Test: No, patient is male      HPI:    Semaj Herrera is an 82-year-old male who was initially diagnosed with prostate cancer in 2006 and had a prostatectomy and androgen deprivation therapy, he also reports what sounds like adjuvant prostate bed radiation, however we do not have those records at this time.     Patient was found to have metastatic disease in 2018 with numerous bone metastases and mediastinal lymphadenopathy.  He was started on Firmagon and Lupron and Xgeva.  Over the following years PSA decreased from 395 to 0.8 ng/mL.  By November 2020 his PSA had again risen to 25.  Bone scan 1/11/2021 demonstrated progression of bony disease in the sternum, left sacrum, proximal femur.  Patient underwent palliative radiation therapy to the right femur and sternal lesion, 30 Gray in 10 fractions completing on February 12, 2021.  His PSA continued to rise and he was started on Xtandi in September 2021.     In December 2022 the patient had worsening bony discomfort, new Pylarify PET/CT was obtained showing extensive bony metastasis evidence for recurrence in the prostate bed or metastatic lymphadenopathy.  Patient was referred for consideration of further palliative radiation therapy.  Dr. Lee is also considering starting the patient on darolutamide.        Imaging:    Pylarify PET/CT 1/6/2023    IMPRESSION:  Very extensive bone metastases. There is no evidence for  recurrence at the prostatic bed or metastatic lymphadenopathy.      Pathology:    Mediastinal lymph node pathology 4/9/2018    Final Diagnosis   \"MEDIASTINAL LYMPH NODE\", " EXCISION:                POSITIVE FOR METASTATIC ADENOCARCINOMA, CONSISTENT WITH ORIGIN FROM A PROSTATIC                       PRIMARY (SEE COMMENT).         Labs:    Lab Results   Component Value Date    CREATININE 0.80 01/11/2023     Lab Results   Component Value Date    PSA 23.900 (H) 12/21/2022    PSA 16.500 (H) 09/21/2022    PSA 10.500 (H) 06/13/2022             Problem List:  Patient Active Problem List   Diagnosis   • Type 2 diabetes mellitus with kidney complication, with long-term current use of insulin ([facility])   • Fatigue   • Hyperlipidemia   • Hypertension   • Left ventricular hypertrophy   • Obstructive sleep apnea syndrome treated auto BiPAP   • Sinus bradycardia   • Prostate cancer metastatic to bone ([facility])   • Mediastinal adenopathy   • Osseous metastasis ([facility])   • Chronic kidney disease, stage III (moderate) ([facility])   • Diastolic dysfunction   • Localized edema   • Neuropathy   • Hypersomnia due to medical condition treated with modafinil 200 mg every morning   • CSA (central sleep apnea)   • Restless legs syndrome (RLS)   • Psychophysiological insomnia   • Edema   • Type 2 diabetes mellitus with diabetic chronic kidney disease ([facility])   • Class 2 severe obesity due to excess calories with serious comorbidity and body mass index (BMI) of 35.0 to 35.9 in adult ([facility])   • Aortic stenosis, mild   • Ascending aorta dilatation ([facility])          Medications:  Current Outpatient Medications on File Prior to Visit   Medication Sig Dispense Refill   • Accu-Chek Guide test strip USE 1 STRIP TO CHECK BLOOD SUGAR 4 TIMES DAILY     • Accu-Chek Softclix Lancets lancets USE 1 LANCET TO CHECK GLUCOSE 4 TIMES DAILY     • aspirin 81 MG EC tablet Take 1 tablet by mouth daily.     • cholecalciferol (VITAMIN D3) 1000 units tablet Take 2,000 Units by mouth Daily.     • Denosumab (XGEVA SC) Inject  under the skin into the appropriate area as directed Every 30 (Thirty) Days.     • dilTIAZem CD (CARDIZEM CD) 120 MG 24 hr capsule TAKE 1  CAPSULE EVERY DAY 90 capsule 3   • enzalutamide (XTANDI) 40 MG chemo capsule Take 4 capsules by mouth Daily. 120 capsule 2   • furosemide (LASIX) 40 MG tablet Take 40 mg by mouth Every Morning.     • gabapentin (NEURONTIN) 300 MG capsule TAKE 3 CAPSULES EVERY NIGHT AT BEDTIME 90 capsule 0   • HYDROcodone-acetaminophen (NORCO) 5-325 MG per tablet Take 1 tablet by mouth Every 6 (Six) Hours As Needed for Moderate Pain. 120 tablet 0   • Insulin Aspart (novoLOG) 100 UNIT/ML injection INJECT 100 UNITS VIA PUMP ONCE DAILY     • levothyroxine (SYNTHROID, LEVOTHROID) 88 MCG tablet      • loperamide (IMODIUM) 1 MG/5ML solution Take 2 mg by mouth 4 (Four) Times a Day As Needed for Diarrhea.     • meloxicam (MOBIC) 15 MG tablet Take 1 tablet by mouth Daily. 30 tablet 1   • modafinil (PROVIGIL) 200 MG tablet Take 1 tablet by mouth Daily. 30 tablet 0   • olmesartan (BENICAR) 40 MG tablet TAKE 1 TABLET EVERY DAY (DISCONTINUE LOSARTAN 100MG) 90 tablet 3   • ondansetron (ZOFRAN) 8 MG tablet Take 1 tablet by mouth 3 (Three) Times a Day As Needed for Nausea or Vomiting. 30 tablet 5   • pravastatin (PRAVACHOL) 40 MG tablet Take 40 mg by mouth daily.     • rOPINIRole (REQUIP) 0.5 MG tablet Take 2 tablets by mouth in the evening and 2 at bedtime. 360 tablet 2   • traZODone (DESYREL) 50 MG tablet Take  mg by mouth every night at bedtime.     • insulin aspart (NovoLOG) 100 UNIT/ML patient supplied pump Inject  under the skin into the appropriate area as directed Continuous. Pt reports pump with basal rate 1.9 units/hr from 0800 to 1200. Basal rate 1.8 units/hr 1200 to 0800.  Checks bg ac/hs with bolus doses per pumps parameters     • Leuprolide Acetate, 3 Month, (LUPRON DEPOT, 3-MONTH, IM) Inject  into the appropriate muscle as directed by prescriber Every 3 (Three) Months.       No current facility-administered medications on file prior to visit.          Allergies:  Allergies   Allergen Reactions   • Niacin Unknown - Low Severity  "  • Statins Unknown - Low Severity         Family History:  The patient has no family history of conditions which would be contraindications to radiation therapy      Social History:  Former Smoker     Distance From Clinic: <30 minutes    Patient has someone who can assist with transportation: yes      Review of Systems:    Review of Systems   HENT: Positive for hearing loss, tinnitus and trouble swallowing.    Gastrointestinal: Positive for diarrhea.   Genitourinary: Positive for frequency.   Musculoskeletal: Positive for back pain.   Neurological: Positive for numbness.       Vital Signs:  BP (!) 188/69   Pulse 57   Wt 92.4 kg (203 lb 12.8 oz)   SpO2 100%   BMI 34.96 kg/m²   Estimated body mass index is 34.96 kg/m² as calculated from the following:    Height as of 1/11/23: 162.6 cm (64.02\").    Weight as of this encounter: 92.4 kg (203 lb 12.8 oz).  Pain Score    01/18/23 0901   PainSc:   4   PainLoc: Comment: Back to Lt hip         ECOG: Ambulatory and capable of all selfcare but unable to carry out any work activities; up and about more than 50% of waking hours = 2    Physical Exam  Vitals reviewed.   Constitutional:       General: He is not in acute distress.     Appearance: Normal appearance.   HENT:      Head: Normocephalic and atraumatic.   Eyes:      Extraocular Movements: Extraocular movements intact.      Pupils: Pupils are equal, round, and reactive to light.   Pulmonary:      Effort: Pulmonary effort is normal.      Breath sounds: Normal breath sounds.   Abdominal:      General: Abdomen is flat.      Palpations: Abdomen is soft.   Musculoskeletal:      Cervical back: Normal range of motion and neck supple.      Comments: Musculoskeletal tenderness somewhat diffusely, however more predominant in the L-spine and left pelvis   Skin:     General: Skin is warm and dry.   Neurological:      General: No focal deficit present.      Mental Status: He is alert and oriented to person, place, and time. "   Psychiatric:         Mood and Affect: Mood normal.         Behavior: Behavior normal.            Result Review :  The following data was reviewed by: Wang Harper MD on 01/18/2023:  Labs: Last Creatinine   Data reviewed: Radiologic studies Pylarify PET            Diagnoses and all orders for this visit:    1. Prostate cancer metastatic to bone ([facility])        Assessment:      The patient is an 82-year-old male with a history of metastatic prostate cancer.  His initial treatment involved prostatectomy, androgen deprivation, and what sounds like adjuvant prostate bed radiation however we do not have those records at this time.     Patient developed diffusely metastatic recurrence which was first detected in 2018.  He has been on a number of different systemic agents including Firmagon, Lupron, Xgeva, Xtandi.  He has also had prior palliative radiation therapy to the sternum and right hip in 2021.    Patient has again progressed on recent Pylarify PET.  Dr. Lee is evaluating for possible darolutamide as his next-line agent.  He has been referred for consideration of further palliative radiation therapy for his metastatic disease.    I met with the patient and his son in consultation and discussed the risks, benefits, side effects, and logistics of radiation treatment planning and delivery.  At this time the patient's most significant area of pain is in his L-spine radiating down into his left pelvis.  I emphasized that we will have to review his prior radiation records carefully, as well as obtain his prior prostate bed radiation records prior to making definitive treatment plans.  I do think it is likely that we will be able to deliver additional palliative radiation therapy however.  Given his diffuse disease we are going to have to focus on the areas that are most painful to him.  Patient expressed understanding of this plan.  Consents were signed.  Plan for CT simulation 1/25/2023      Plan:    -Plan for  palliative radiation therapy for painful bony mets from prostate cancer.  CT simulation 1/25/2023       I spent 45 minutes caring for Semaj on this date of service. This time includes time spent by me in the following activities:preparing for the visit, reviewing tests, obtaining and/or reviewing a separately obtained history, documenting information in the medical record, independently interpreting results and communicating that information with the patient/family/caregiver and care coordination  Follow Up   No follow-ups on file.  Patient was given instructions and counseling regarding his condition or for health maintenance advice. Please see specific information pulled into the AVS if appropriate.     Wang Harper MD

## 2023-01-18 ENCOUNTER — OFFICE VISIT (OUTPATIENT)
Dept: RADIATION ONCOLOGY | Facility: HOSPITAL | Age: 83
End: 2023-01-18
Payer: MEDICARE

## 2023-01-18 ENCOUNTER — CONSULT (OUTPATIENT)
Dept: RADIATION ONCOLOGY | Facility: HOSPITAL | Age: 83
End: 2023-01-18
Payer: MEDICARE

## 2023-01-18 VITALS
SYSTOLIC BLOOD PRESSURE: 188 MMHG | HEART RATE: 57 BPM | DIASTOLIC BLOOD PRESSURE: 69 MMHG | WEIGHT: 203.8 LBS | OXYGEN SATURATION: 100 % | BODY MASS INDEX: 34.96 KG/M2

## 2023-01-18 DIAGNOSIS — C61 PROSTATE CANCER METASTATIC TO BONE: ICD-10-CM

## 2023-01-18 DIAGNOSIS — C79.51 PROSTATE CANCER METASTATIC TO BONE: ICD-10-CM

## 2023-01-18 PROCEDURE — 99215 OFFICE O/P EST HI 40 MIN: CPT | Performed by: STUDENT IN AN ORGANIZED HEALTH CARE EDUCATION/TRAINING PROGRAM

## 2023-01-18 PROCEDURE — G0463 HOSPITAL OUTPT CLINIC VISIT: HCPCS | Performed by: STUDENT IN AN ORGANIZED HEALTH CARE EDUCATION/TRAINING PROGRAM

## 2023-01-18 PROCEDURE — 77263 THER RADIOLOGY TX PLNG CPLX: CPT | Performed by: STUDENT IN AN ORGANIZED HEALTH CARE EDUCATION/TRAINING PROGRAM

## 2023-01-19 ENCOUNTER — SPECIALTY PHARMACY (OUTPATIENT)
Dept: PHARMACY | Facility: HOSPITAL | Age: 83
End: 2023-01-19
Payer: MEDICARE

## 2023-01-23 ENCOUNTER — SPECIALTY PHARMACY (OUTPATIENT)
Dept: PHARMACY | Facility: HOSPITAL | Age: 83
End: 2023-01-23
Payer: MEDICARE

## 2023-01-23 DIAGNOSIS — C61 PROSTATE CANCER METASTATIC TO BONE: ICD-10-CM

## 2023-01-23 DIAGNOSIS — C79.51 PROSTATE CANCER METASTATIC TO BONE: ICD-10-CM

## 2023-01-23 NOTE — PROGRESS NOTES
I got a small smith for Prostate cancer to cover medications until free drug applications are approved. Billing information is below:    Bin: [id number]  PCN: [id number]  Group: [id number]  ID: [id number]  Smith amount: $[id number]  End: 1/24/24

## 2023-01-25 ENCOUNTER — SPECIALTY PHARMACY (OUTPATIENT)
Dept: PHARMACY | Facility: HOSPITAL | Age: 83
End: 2023-01-25
Payer: MEDICARE

## 2023-01-25 PROCEDURE — 77470 SPECIAL RADIATION TREATMENT: CPT | Performed by: STUDENT IN AN ORGANIZED HEALTH CARE EDUCATION/TRAINING PROGRAM

## 2023-01-25 PROCEDURE — 77290 THER RAD SIMULAJ FIELD CPLX: CPT | Performed by: RADIOLOGY

## 2023-01-25 NOTE — PROGRESS NOTES
Pt is getting donated medication of Xtandi from  for this month while I am working on free drug application for Nubeqa.  Pt signed the free drug form and it's been faxed to .

## 2023-01-26 ENCOUNTER — SPECIALTY PHARMACY (OUTPATIENT)
Dept: PHARMACY | Facility: HOSPITAL | Age: 83
End: 2023-01-26
Payer: MEDICARE

## 2023-01-26 NOTE — PROGRESS NOTES
[facility] and placed it in the [employer] at [facility].

## 2023-02-01 ENCOUNTER — APPOINTMENT (OUTPATIENT)
Dept: RADIATION ONCOLOGY | Facility: HOSPITAL | Age: 83
End: 2023-02-01
Payer: MEDICARE

## 2023-02-01 ENCOUNTER — SPECIALTY PHARMACY (OUTPATIENT)
Dept: PHARMACY | Facility: HOSPITAL | Age: 83
End: 2023-02-01
Payer: MEDICARE

## 2023-02-01 DIAGNOSIS — C79.51 PROSTATE CANCER METASTATIC TO BONE: ICD-10-CM

## 2023-02-01 DIAGNOSIS — C61 PROSTATE CANCER METASTATIC TO BONE: ICD-10-CM

## 2023-02-01 RX ORDER — GABAPENTIN 300 MG/1
CAPSULE ORAL
Qty: 90 CAPSULE | Refills: 1 | Status: SHIPPED | OUTPATIENT
Start: 2023-02-01

## 2023-02-01 RX ORDER — HYDROCODONE BITARTRATE AND ACETAMINOPHEN 5; 325 MG/1; MG/1
1 TABLET ORAL EVERY 6 HOURS PRN
Qty: 120 TABLET | Refills: 0 | Status: SHIPPED | OUTPATIENT
Start: 2023-02-01 | End: 2023-03-01 | Stop reason: SDUPTHER

## 2023-02-01 NOTE — PROGRESS NOTES
Smith for Prostate smith waitlist opened up for PAN.  I applied.  Waiting on response.     2/2/23- [facility] smith closed up before it was approved.  Pt was put back on wait-list.

## 2023-02-02 ENCOUNTER — OFFICE VISIT (OUTPATIENT)
Dept: FAMILY MEDICINE CLINIC | Facility: CLINIC | Age: 83
End: 2023-02-02
Payer: MEDICARE

## 2023-02-02 ENCOUNTER — SPECIALTY PHARMACY (OUTPATIENT)
Dept: PHARMACY | Facility: HOSPITAL | Age: 83
End: 2023-02-02
Payer: MEDICARE

## 2023-02-02 VITALS
HEIGHT: 64 IN | TEMPERATURE: 98.6 F | WEIGHT: 198 LBS | HEART RATE: 76 BPM | OXYGEN SATURATION: 97 % | RESPIRATION RATE: 18 BRPM | DIASTOLIC BLOOD PRESSURE: 78 MMHG | SYSTOLIC BLOOD PRESSURE: 148 MMHG | BODY MASS INDEX: 33.8 KG/M2

## 2023-02-02 DIAGNOSIS — E78.2 MIXED HYPERLIPIDEMIA: ICD-10-CM

## 2023-02-02 DIAGNOSIS — I10 PRIMARY HYPERTENSION: ICD-10-CM

## 2023-02-02 DIAGNOSIS — Z00.00 MEDICARE ANNUAL WELLNESS VISIT, SUBSEQUENT: Primary | ICD-10-CM

## 2023-02-02 DIAGNOSIS — C61 PROSTATE CANCER: ICD-10-CM

## 2023-02-02 LAB
RAD ONC ARIA COURSE END DATE: NORMAL
RAD ONC ARIA COURSE ID: NORMAL
RAD ONC ARIA COURSE INTENT: NORMAL
RAD ONC ARIA COURSE LAST TREATMENT DATE: NORMAL
RAD ONC ARIA COURSE START DATE: NORMAL
RAD ONC ARIA COURSE TREATMENT ELAPSED DAYS: 13
RAD ONC ARIA FIRST TREATMENT DATE: NORMAL
RAD ONC ARIA PLAN FRACTIONS TREATED TO DATE: 10
RAD ONC ARIA PLAN ID: NORMAL
RAD ONC ARIA PLAN NAME: NORMAL
RAD ONC ARIA PLAN PRESCRIBED DOSE PER FRACTION: 3 GY
RAD ONC ARIA PLAN PRIMARY REFERENCE POINT: NORMAL
RAD ONC ARIA PLAN TOTAL FRACTIONS PRESCRIBED: 10
RAD ONC ARIA PLAN TOTAL PRESCRIBED DOSE: 3000 CGY
RAD ONC ARIA REFERENCE POINT DOSAGE GIVEN TO DATE: 30 GY
RAD ONC ARIA REFERENCE POINT DOSAGE GIVEN TO DATE: 30.93 GY
RAD ONC ARIA REFERENCE POINT ID: NORMAL
RAD ONC ARIA REFERENCE POINT ID: NORMAL

## 2023-02-02 PROCEDURE — 1170F FXNL STATUS ASSESSED: CPT | Performed by: FAMILY MEDICINE

## 2023-02-02 PROCEDURE — 77301 RADIOTHERAPY DOSE PLAN IMRT: CPT | Performed by: STUDENT IN AN ORGANIZED HEALTH CARE EDUCATION/TRAINING PROGRAM

## 2023-02-02 PROCEDURE — 1125F AMNT PAIN NOTED PAIN PRSNT: CPT | Performed by: FAMILY MEDICINE

## 2023-02-02 PROCEDURE — 77338 DESIGN MLC DEVICE FOR IMRT: CPT | Performed by: STUDENT IN AN ORGANIZED HEALTH CARE EDUCATION/TRAINING PROGRAM

## 2023-02-02 PROCEDURE — 77300 RADIATION THERAPY DOSE PLAN: CPT | Performed by: STUDENT IN AN ORGANIZED HEALTH CARE EDUCATION/TRAINING PROGRAM

## 2023-02-02 PROCEDURE — G0439 PPPS, SUBSEQ VISIT: HCPCS | Performed by: FAMILY MEDICINE

## 2023-02-02 PROCEDURE — 1160F RVW MEDS BY RX/DR IN RCRD: CPT | Performed by: FAMILY MEDICINE

## 2023-02-02 PROCEDURE — 1126F AMNT PAIN NOTED NONE PRSNT: CPT | Performed by: FAMILY MEDICINE

## 2023-02-02 PROCEDURE — 1159F MED LIST DOCD IN RCRD: CPT | Performed by: FAMILY MEDICINE

## 2023-02-02 NOTE — PROGRESS NOTES
The ABCs of the Annual Wellness Visit  Subsequent Medicare Wellness Visit    Subjective      Semaj Herrera is a 82 y.o. male who presents for a Subsequent Medicare Wellness Visit.    The following portions of the patient's history were reviewed and   updated as appropriate: past family history and past medical history.    Compared to one year ago, the patient feels his physical   health is worse.    Compared to one year ago, the patient feels his mental   health is the same.    Recent Hospitalizations:  He was not admitted to the hospital during the last year.       Current Medical Providers:  Patient Care Team:  Axel Guardado MD as PCP - General  Baldemar, Bob HERNANDEZ MD as Consulting Physician (Sleep Medicine)  Tata Lee MD as Consulting Physician (Cardiology)  Bijan Rivera III, MD as Referring Physician (Thoracic Surgery)  Jose Lee MD as Consulting Physician (Hematology and Oncology)  Adriana Kulkarni MD (Inactive) as Consulting Physician (Radiation Oncology)  Saroj Madrid MD as Consulting Physician (Nephrology)    Outpatient Medications Prior to Visit   Medication Sig Dispense Refill   • Accu-Chek Guide test strip USE 1 STRIP TO CHECK BLOOD SUGAR 4 TIMES DAILY     • Accu-Chek Softclix Lancets lancets USE 1 LANCET TO CHECK GLUCOSE 4 TIMES DAILY     • aspirin 81 MG EC tablet Take 1 tablet by mouth daily.     • cholecalciferol (VITAMIN D3) 1000 units tablet Take 2,000 Units by mouth Daily.     • Denosumab (XGEVA SC) Inject  under the skin into the appropriate area as directed Every 30 (Thirty) Days.     • dilTIAZem CD (CARDIZEM CD) 120 MG 24 hr capsule TAKE 1 CAPSULE EVERY DAY 90 capsule 3   • enzalutamide (XTANDI) 40 MG chemo capsule Take 4 capsules by mouth Daily. 120 capsule 0   • furosemide (LASIX) 40 MG tablet Take 40 mg by mouth Every Morning.     • gabapentin (NEURONTIN) 300 MG capsule TAKE 3 CAPSULES EVERY NIGHT AT BEDTIME 90 capsule 1   •  HYDROcodone-acetaminophen (NORCO) 5-325 MG per tablet Take 1 tablet by mouth Every 6 (Six) Hours As Needed for Moderate Pain. 120 tablet 0   • Insulin Aspart (novoLOG) 100 UNIT/ML injection INJECT 100 UNITS VIA PUMP ONCE DAILY     • Leuprolide Acetate, 3 Month, (LUPRON DEPOT, 3-MONTH, IM) Inject  into the appropriate muscle as directed by prescriber Every 3 (Three) Months.     • levothyroxine (SYNTHROID, LEVOTHROID) 88 MCG tablet      • loperamide (IMODIUM) 1 MG/5ML solution Take 2 mg by mouth 4 (Four) Times a Day As Needed for Diarrhea.     • meloxicam (MOBIC) 15 MG tablet Take 1 tablet by mouth Daily. 30 tablet 1   • modafinil (PROVIGIL) 200 MG tablet Take 1 tablet by mouth Daily. 30 tablet 0   • olmesartan (BENICAR) 40 MG tablet TAKE 1 TABLET EVERY DAY (DISCONTINUE LOSARTAN 100MG) 90 tablet 3   • ondansetron (ZOFRAN) 8 MG tablet Take 1 tablet by mouth 3 (Three) Times a Day As Needed for Nausea or Vomiting. 30 tablet 5   • pravastatin (PRAVACHOL) 40 MG tablet Take 40 mg by mouth daily.     • rOPINIRole (REQUIP) 0.5 MG tablet Take 2 tablets by mouth in the evening and 2 at bedtime. 360 tablet 2   • traZODone (DESYREL) 50 MG tablet Take  mg by mouth every night at bedtime.     • gabapentin (NEURONTIN) 300 MG capsule TAKE 3 CAPSULES EVERY NIGHT AT BEDTIME 90 capsule 0   • HYDROcodone-acetaminophen (NORCO) 5-325 MG per tablet Take 1 tablet by mouth Every 6 (Six) Hours As Needed for Moderate Pain. 120 tablet 0   • insulin aspart (NovoLOG) 100 UNIT/ML patient supplied pump Inject  under the skin into the appropriate area as directed Continuous. Pt reports pump with basal rate 1.9 units/hr from 0800 to 1200. Basal rate 1.8 units/hr 1200 to 0800.  Checks bg ac/hs with bolus doses per pumps parameters       No facility-administered medications prior to visit.       Opioid medication/s are on active medication list.  and I have evaluated his active treatment plan and pain score trends (see table).  Vitals:    02/02/23  "0806   PainSc:   4   PainLoc: Back     I have reviewed the chart for potential of high risk medication and harmful drug interactions in the elderly.            Aspirin is on active medication list. Aspirin use is not indicated based on review of current medical condition/s. Risk of harm outweighs potential benefits. Patient instructed to discontinue this medication.  .      Patient Active Problem List   Diagnosis   • Type 2 diabetes mellitus with kidney complication, with long-term current use of insulin (HCC)   • Fatigue   • Hyperlipidemia   • Hypertension   • Left ventricular hypertrophy   • Obstructive sleep apnea syndrome treated auto BiPAP   • Sinus bradycardia   • Prostate cancer metastatic to bone (HCC)   • Mediastinal adenopathy   • Osseous metastasis (HCC)   • Chronic kidney disease, stage III (moderate) (HCC)   • Diastolic dysfunction   • Localized edema   • Neuropathy   • Hypersomnia due to medical condition treated with modafinil 200 mg every morning   • CSA (central sleep apnea)   • Restless legs syndrome (RLS)   • Psychophysiological insomnia   • Edema   • Type 2 diabetes mellitus with diabetic chronic kidney disease (HCC)   • Class 2 severe obesity due to excess calories with serious comorbidity and body mass index (BMI) of 35.0 to 35.9 in adult (HCC)   • Aortic stenosis, mild   • Ascending aorta dilatation (HCC)     Advance Care Planning  Advance Directive is on file.  ACP discussion was held with the patient during this visit. Patient has an advance directive in EMR which is still valid.      Objective    Vitals:    02/02/23 0806   Pulse: 76   Resp: 18   Temp: 98.6 °F (37 °C)   TempSrc: Infrared   SpO2: 97%   Weight: 89.8 kg (198 lb)   Height: 162.6 cm (64\")   PainSc:   4   PainLoc: Back     Estimated body mass index is 33.99 kg/m² as calculated from the following:    Height as of this encounter: 162.6 cm (64\").    Weight as of this encounter: 89.8 kg (198 lb).    BMI is >= 30 and <35. (Class 1 " Obesity). The following options were offered after discussion;: exercise counseling/recommendations      Does the patient have evidence of cognitive impairment?   No            HEALTH RISK ASSESSMENT    Smoking Status:  Social History     Tobacco Use   Smoking Status Former   • Packs/day: 1.50   • Years: 30.00   • Pack years: 45.00   • Types: Cigarettes   • Start date: 1968   • Quit date: 1998   • Years since quittin.0   Smokeless Tobacco Never     Alcohol Consumption:  Social History     Substance and Sexual Activity   Alcohol Use Not Currently    Comment: Caffeine use: 2-3 cups daily     Fall Risk Screen:    STEADI Fall Risk Assessment was completed, and patient is at LOW risk for falls.Assessment completed on:2023    Depression Screening:  PHQ-2/PHQ-9 Depression Screening 2023   Little Interest or Pleasure in Doing Things 0-->not at all   Feeling Down, Depressed or Hopeless 0-->not at all   Trouble Falling or Staying Asleep, or Sleeping Too Much -   Feeling Tired or Having Little Energy -   Poor Appetite or Overeating -   Feeling Bad about Yourself - or that You are a Failure or Have Let Yourself or Your Family Down -   Trouble Concentrating on Things, Such as Reading the Newspaper or Watching Television -   Moving or Speaking So Slowly that Other People Could Have Noticed? Or the Opposite - Being So Fidgety -   Thoughts that You Would be Better Off Dead or of Hurting Yourself in Some Way -   PHQ-9: Brief Depression Severity Measure Score 0       Health Habits and Functional and Cognitive Screening:  Functional & Cognitive Status 2023   Do you have difficulty preparing food and eating? No   Do you have difficulty bathing yourself, getting dressed or grooming yourself? No   Do you have difficulty using the toilet? No   Do you have difficulty moving around from place to place? No   Do you have trouble with steps or getting out of a bed or a chair? No   Current Diet Well Balanced Diet    Dental Exam Not up to date   Eye Exam Not up to date   Exercise (times per week) 5 times per week   Current Exercises Include Gardening   Current Exercise Activities Include -   Do you need help using the phone?  No   Are you deaf or do you have serious difficulty hearing?  No   Do you need help with transportation? No   Do you need help shopping? No   Do you need help preparing meals?  No   Do you need help with housework?  No   Do you need help with laundry? No   Do you need help taking your medications? No   Do you need help managing money? No   Do you ever drive or ride in a car without wearing a seat belt? No   Have you felt unusual stress, anger or loneliness in the last month? No   Who do you live with? Child   If you need help, do you have trouble finding someone available to you? No   Have you been bothered in the last four weeks by sexual problems? No   Do you have difficulty concentrating, remembering or making decisions? No       Age-appropriate Screening Schedule:  Refer to the list below for future screening recommendations based on patient's age, sex and/or medical conditions. Orders for these recommended tests are listed in the plan section. The patient has been provided with a written plan.    Health Maintenance   Topic Date Due   • TDAP/TD VACCINES (1 - Tdap) Never done   • ZOSTER VACCINE (1 of 2) Never done   • DIABETIC EYE EXAM  07/05/2017   • HEMOGLOBIN A1C  05/19/2020   • LIPID PANEL  02/07/2021   • URINE MICROALBUMIN  01/11/2024   • INFLUENZA VACCINE  Completed                CMS Preventative Services Quick Reference  Risk Factors Identified During Encounter:    Immunizations Discussed/Encouraged: Shingrix    The above risks/problems have been discussed with the patient.  Pertinent information has been shared with the patient in the After Visit Summary.    Diagnoses and all orders for this visit:    1. Medicare annual wellness visit, subsequent (Primary)    2. Primary hypertension    3.  "Mixed hyperlipidemia    4. Prostate cancer (HCC)        Follow Up:   Next Medicare Wellness visit to be scheduled in 1 year.      An After Visit Summary and PPPS were made available to the patient.    I have reviewed the above.    SUBJECTIVE:  The patient is an 82-year-old male.  He received a Medicare wellness exam today.  He has hypertension hyperlipidemia and diabetes.  He has prostate cancer and is going to start radiation treatment for this soon.  He has bony mets from his prostate cancer.  His only complaint today other than the expected pain is he is having trouble hearing.  He wears hearing aids.    PAST MEDICAL HISTORY:  Reviewed.    REVIEW OF SYSTEMS:  Please see above.  All others reviewed and are negative.      OBJECTIVE:   Pulse 76   Temp 98.6 °F (37 °C) (Infrared)   Resp 18   Ht 162.6 cm (64\")   Wt 89.8 kg (198 lb)   SpO2 97%   BMI 33.99 kg/m²    Vitals signs are reviewed and are stable.  Blood pressure 148/78  General:  Well-nourished.  Alert and oriented x3 in no acute distress.  HEENT: PERRLA.  Hearing aids are removed.  The TMs are actually visualized.  There is no wax of significance present in the ear canals.  Neck:  Supple.   Lungs:  Clear.    Heart:  Regular rate and rhythm.   Abdomen:   Soft, nontender.   Extremities:  No cyanosis, clubbing or edema.   Neurological:  Grossly intact without motor or sensory deficits.     ASSESSMENT:      Diagnoses and all orders for this visit:    1. Medicare annual wellness visit, subsequent (Primary)    2. Primary hypertension    3. Mixed hyperlipidemia    4. Prostate cancer (HCC)         PLAN: He is going to contact the [facility] and have his hearing and hearing aids reevaluated.  We discussed healthy lifestyle.  He will follow-up as needed.  He will continue to see a specialist.  He will call if any problems.    Dictated utilizing Dragon dictation.            "

## 2023-02-02 NOTE — PROGRESS NOTES
I called [employer] [facility]PER to check on status of Nubeqa nathen.  They are sending it to the  to have the application processed.  We should have a response in 3-4 business days.

## 2023-02-05 DIAGNOSIS — C79.51 PROSTATE CANCER METASTATIC TO BONE: ICD-10-CM

## 2023-02-05 DIAGNOSIS — C61 PROSTATE CANCER METASTATIC TO BONE: ICD-10-CM

## 2023-02-06 RX ORDER — GABAPENTIN 300 MG/1
CAPSULE ORAL
Qty: 90 CAPSULE | Refills: 1 | OUTPATIENT
Start: 2023-02-06

## 2023-02-07 ENCOUNTER — SPECIALTY PHARMACY (OUTPATIENT)
Dept: PHARMACY | Facility: HOSPITAL | Age: 83
End: 2023-02-07
Payer: MEDICARE

## 2023-02-07 ENCOUNTER — TREATMENT (OUTPATIENT)
Dept: RADIATION ONCOLOGY | Facility: HOSPITAL | Age: 83
End: 2023-02-07
Payer: MEDICARE

## 2023-02-07 ENCOUNTER — DOCUMENTATION (OUTPATIENT)
Dept: PHARMACY | Facility: HOSPITAL | Age: 83
End: 2023-02-07
Payer: MEDICARE

## 2023-02-07 LAB
RAD ONC ARIA COURSE ID: NORMAL
RAD ONC ARIA COURSE INTENT: NORMAL
RAD ONC ARIA COURSE LAST TREATMENT DATE: NORMAL
RAD ONC ARIA COURSE START DATE: NORMAL
RAD ONC ARIA COURSE TREATMENT ELAPSED DAYS: 0
RAD ONC ARIA FIRST TREATMENT DATE: NORMAL
RAD ONC ARIA PLAN FRACTIONS TREATED TO DATE: 1
RAD ONC ARIA PLAN ID: NORMAL
RAD ONC ARIA PLAN PRESCRIBED DOSE PER FRACTION: 3 GY
RAD ONC ARIA PLAN PRIMARY REFERENCE POINT: NORMAL
RAD ONC ARIA PLAN TOTAL FRACTIONS PRESCRIBED: 10
RAD ONC ARIA PLAN TOTAL PRESCRIBED DOSE: 3000 CGY
RAD ONC ARIA REFERENCE POINT DOSAGE GIVEN TO DATE: 3 GY
RAD ONC ARIA REFERENCE POINT DOSAGE GIVEN TO DATE: 3.08 GY
RAD ONC ARIA REFERENCE POINT ID: NORMAL
RAD ONC ARIA REFERENCE POINT ID: NORMAL
RAD ONC ARIA REFERENCE POINT SESSION DOSAGE GIVEN: 3 GY
RAD ONC ARIA REFERENCE POINT SESSION DOSAGE GIVEN: 3.08 GY

## 2023-02-07 PROCEDURE — 77386 CHG INTENSITY MODULATED RADIATION TX DLVR COMPLEX: CPT | Performed by: STUDENT IN AN ORGANIZED HEALTH CARE EDUCATION/TRAINING PROGRAM

## 2023-02-07 PROCEDURE — 77386: CPT | Performed by: STUDENT IN AN ORGANIZED HEALTH CARE EDUCATION/TRAINING PROGRAM

## 2023-02-07 PROCEDURE — 77427 RADIATION TX MANAGEMENT X5: CPT | Performed by: STUDENT IN AN ORGANIZED HEALTH CARE EDUCATION/TRAINING PROGRAM

## 2023-02-07 NOTE — PROGRESS NOTES
Free drug for Nubeqa was approved verbally on the phone.  I called pt to let him know they were going to call to schedule delivery.  I left VM to go on and schedule delivery but not to start taking it until pharmacist tells him to start it.

## 2023-02-07 NOTE — PROGRESS NOTES
Xtandi supply from [facility] picked up by pt on 2/7/2023.     Shirley Russell  Specialty Pharmacy Technician

## 2023-02-08 ENCOUNTER — TREATMENT (OUTPATIENT)
Dept: RADIATION ONCOLOGY | Facility: HOSPITAL | Age: 83
End: 2023-02-08
Payer: MEDICARE

## 2023-02-08 LAB
RAD ONC ARIA COURSE ID: NORMAL
RAD ONC ARIA COURSE INTENT: NORMAL
RAD ONC ARIA COURSE LAST TREATMENT DATE: NORMAL
RAD ONC ARIA COURSE START DATE: NORMAL
RAD ONC ARIA COURSE TREATMENT ELAPSED DAYS: 1
RAD ONC ARIA FIRST TREATMENT DATE: NORMAL
RAD ONC ARIA PLAN FRACTIONS TREATED TO DATE: 2
RAD ONC ARIA PLAN ID: NORMAL
RAD ONC ARIA PLAN PRESCRIBED DOSE PER FRACTION: 3 GY
RAD ONC ARIA PLAN PRIMARY REFERENCE POINT: NORMAL
RAD ONC ARIA PLAN TOTAL FRACTIONS PRESCRIBED: 10
RAD ONC ARIA PLAN TOTAL PRESCRIBED DOSE: 3000 CGY
RAD ONC ARIA REFERENCE POINT DOSAGE GIVEN TO DATE: 6 GY
RAD ONC ARIA REFERENCE POINT DOSAGE GIVEN TO DATE: 6.15 GY
RAD ONC ARIA REFERENCE POINT ID: NORMAL
RAD ONC ARIA REFERENCE POINT ID: NORMAL
RAD ONC ARIA REFERENCE POINT SESSION DOSAGE GIVEN: 3 GY
RAD ONC ARIA REFERENCE POINT SESSION DOSAGE GIVEN: 3.08 GY

## 2023-02-08 PROCEDURE — 77386: CPT | Performed by: STUDENT IN AN ORGANIZED HEALTH CARE EDUCATION/TRAINING PROGRAM

## 2023-02-08 PROCEDURE — 77386 CHG INTENSITY MODULATED RADIATION TX DLVR COMPLEX: CPT | Performed by: STUDENT IN AN ORGANIZED HEALTH CARE EDUCATION/TRAINING PROGRAM

## 2023-02-08 RX ORDER — ROPINIROLE 0.5 MG/1
TABLET, FILM COATED ORAL
Qty: 360 TABLET | Refills: 2 | Status: SHIPPED | OUTPATIENT
Start: 2023-02-08

## 2023-02-09 ENCOUNTER — SPECIALTY PHARMACY (OUTPATIENT)
Dept: ONCOLOGY | Facility: HOSPITAL | Age: 83
End: 2023-02-09
Payer: MEDICARE

## 2023-02-09 ENCOUNTER — TREATMENT (OUTPATIENT)
Dept: RADIATION ONCOLOGY | Facility: HOSPITAL | Age: 83
End: 2023-02-09
Payer: MEDICARE

## 2023-02-09 ENCOUNTER — OFFICE VISIT (OUTPATIENT)
Dept: ONCOLOGY | Facility: CLINIC | Age: 83
End: 2023-02-09
Payer: MEDICARE

## 2023-02-09 VITALS
RESPIRATION RATE: 18 BRPM | HEART RATE: 59 BPM | TEMPERATURE: 97.1 F | BODY MASS INDEX: 34.09 KG/M2 | WEIGHT: 199.7 LBS | OXYGEN SATURATION: 97 % | SYSTOLIC BLOOD PRESSURE: 175 MMHG | HEIGHT: 64 IN | DIASTOLIC BLOOD PRESSURE: 77 MMHG

## 2023-02-09 DIAGNOSIS — C79.51 PROSTATE CANCER METASTATIC TO BONE: Primary | ICD-10-CM

## 2023-02-09 DIAGNOSIS — C61 PROSTATE CANCER METASTATIC TO BONE: Primary | ICD-10-CM

## 2023-02-09 LAB
RAD ONC ARIA COURSE ID: NORMAL
RAD ONC ARIA COURSE INTENT: NORMAL
RAD ONC ARIA COURSE LAST TREATMENT DATE: NORMAL
RAD ONC ARIA COURSE START DATE: NORMAL
RAD ONC ARIA COURSE TREATMENT ELAPSED DAYS: 2
RAD ONC ARIA FIRST TREATMENT DATE: NORMAL
RAD ONC ARIA PLAN FRACTIONS TREATED TO DATE: 3
RAD ONC ARIA PLAN ID: NORMAL
RAD ONC ARIA PLAN PRESCRIBED DOSE PER FRACTION: 3 GY
RAD ONC ARIA PLAN PRIMARY REFERENCE POINT: NORMAL
RAD ONC ARIA PLAN TOTAL FRACTIONS PRESCRIBED: 10
RAD ONC ARIA PLAN TOTAL PRESCRIBED DOSE: 3000 CGY
RAD ONC ARIA REFERENCE POINT DOSAGE GIVEN TO DATE: 9 GY
RAD ONC ARIA REFERENCE POINT DOSAGE GIVEN TO DATE: 9.23 GY
RAD ONC ARIA REFERENCE POINT ID: NORMAL
RAD ONC ARIA REFERENCE POINT ID: NORMAL
RAD ONC ARIA REFERENCE POINT SESSION DOSAGE GIVEN: 3 GY
RAD ONC ARIA REFERENCE POINT SESSION DOSAGE GIVEN: 3.08 GY

## 2023-02-09 PROCEDURE — 99212 OFFICE O/P EST SF 10 MIN: CPT | Performed by: NURSE PRACTITIONER

## 2023-02-09 PROCEDURE — 77386 CHG INTENSITY MODULATED RADIATION TX DLVR COMPLEX: CPT | Performed by: STUDENT IN AN ORGANIZED HEALTH CARE EDUCATION/TRAINING PROGRAM

## 2023-02-09 PROCEDURE — 77386: CPT | Performed by: STUDENT IN AN ORGANIZED HEALTH CARE EDUCATION/TRAINING PROGRAM

## 2023-02-09 PROCEDURE — 77336 RADIATION PHYSICS CONSULT: CPT | Performed by: STUDENT IN AN ORGANIZED HEALTH CARE EDUCATION/TRAINING PROGRAM

## 2023-02-09 NOTE — PROGRESS NOTES
TREATMENT  PREPARATION    Semaj Herrera  [id number]  1940    Chief Complaint: Treatment preparation and needs assessment    History of present illness:  Semaj Herrera is a 82 y.o. year old male who is here today for treatment preparation and needs assessment.  The patient has been diagnosed with   Encounter Diagnosis   Name Primary?   • Prostate cancer metastatic to bone (HCC) Yes    and is scheduled to begin treatment with:     Oncology History:    Oncology/Hematology History   Prostate cancer metastatic to bone (HCC)   3/28/2018 Initial Diagnosis    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)     6/11/2018 -  Chemotherapy    OP Denosumab (Xgeva) Q6MONTHS     8/26/2021 - 8/26/2021 Chemotherapy    OP PROSTATE Enzalutamide     2/13/2023 -  Chemotherapy    OP Darolutamide     Osseous metastasis (HCC)   6/7/2018 Initial Diagnosis    Osseous metastasis (CMS/HCC)     6/11/2018 -  Chemotherapy    OP Denosumab (Xgeva) Q6MONTHS         The current medication list and allergy list were reviewed and reconciled.     Past Medical History, Past Surgical History, Social History, Family History have been reviewed and are without significant changes except as mentioned.    Physical Exam:    Vitals:    02/09/23 1424   BP: 175/77   Pulse: 59   Resp: 18   Temp: 97.1 °F (36.2 °C)   SpO2: 97%     Vitals:    02/09/23 1424   PainSc: 0-No pain                Limited due to video visit.  Physical Exam  Vitals reviewed.   Eyes:      Conjunctiva/sclera: Conjunctivae normal.      Pupils: Pupils are equal, round, and reactive to light.   Pulmonary:      Effort: Pulmonary effort is normal.   Neurological:      General: No focal deficit present.      Mental Status: He is alert and oriented to person, place, and time. Mental status is at baseline.   Psychiatric:         Mood and Affect: Mood normal.         Behavior: Behavior normal.         Thought Content: Thought content normal.         Judgment: Judgment normal.       NEEDS  ASSESSMENTS    Genetics  The patient's new diagnosis and family history have been reviewed for genetic counseling needs. A genetic referral is not recommended.     Psychosocial and Barriers to care  The patient has completed a PHQ-9 Depression Screening and the Distress Thermometer (DT) today.  PHQ-9 results show PHQ-2 Total Score: 0 PHQ-9 Total Score: PHQ-9 Total Score: 0     The patient scored their distress today as Distress Level: 5 on a scale of 0-10 with 0 being no distress and 10 being extreme distress. Problems marked by the patient as being an issue for them within the last week include Physical concerns  Fatigue: Yes  Pain: Yes  Sleep: Yes .      Results were reviewed along with psychosocial resources offered by our cancer center.  Our Supportive Oncology team will be flagged for a score of 4 or above, and a same day call will be made for a score of 9 or 10.  A mental health referral is offered at that time. Patients who score less than 4 have been educated on our support services and can be referred to our  upon request.  The patient will not be referred to our .  He reports currently feeling very overwhelmed by the amount of appointments he has, and does not wish to add any additional appointments.  He feels within the next 10 days his distress level will decrease as he will have completed most of his appointments at this time.    Nutrition  The patient has completed the malnutrition screening today. They scored Malnutrition Screening Tool  Have you recently lost weight without trying?  If yes, how much weight have you lost?: 0--> No  Have you been eating poorly because of a decreased appetite?: 0--> No  MST score: 0   with a score of 0-1 meaning not at risk in a score of 2 or greater meaning at risk.  Patients with a score of 3 or higher will be referred to our oncology dietitian for support. Patients beginning at risk treatment regimens or who have dietary concerns will also  be referred to our oncology dietitian. The patient will not be referred.    Functional Assessment  Persons who are age 70 or greater will be screened for qualification of a comprehensive geriatric assessment by our survivorship nurse practitioner.  Older adults with cancer face unique challenges. These may include an increased risk of drug reactions, financial burdens, and caregiver stress. The patient scored G8 Questionnaire  Has food intake declined over the past 3 months due to loss of appetite, digestive problems, chewing or swallowing difficulties?: No decrease in food intake  Weight loss during the last 3 months: No weight loss  Mobility: Able to get out of bed/chair but does not go out  Neuropsychological Problems: No psychological problems  Body Mass Index (BMI (weight in kg) / (height in m2)): BMI 23 and > 23  Takes more than 3 medications a day: Yes, takes more than 3 prescription drugs per day  In comparison with other people of the same age, how does the patient consider his/her health status?: As good  Age: 80 to 85  Total Score: 13 . Patients scoring 14 or lower will referred for an older adult functional assessment with the survivorship advanced practice registered nurse to ensure all needed support is provided as patients plan for their treatments. The patient will not be referred.    Intravenous Access Assessment  The patient and I discussed planned intravenous chemo/biotherapy as well as other IV treatments that are often needed throughout the course of treatment. These may include, but are not limited to blood transfusions, antibiotics, and IV hydration. Discussed that depending on selected treatment and vein assessment, patient may require venous access device (VAD) which could include but not limited to a Mediport or PICC line. Risks and benefits of VADs reviewed. The patient will be treated via PIV.    Reproductive/Sexual Activity   People should avoid becoming pregnant and should not get a  "partner pregnant while undergoing chemo/biotherapy.  People of childbearing age should use effective contraception during active therapy. The best recommendation for all people is to use a barrier method for a minimum of 1 week after the last infusion of chemo/biotherapy to prevent your partner being exposed to byproducts from treatment medications in bodily fluids. Effective contraception should be discussed with your oncology team to make sure it is safe to take based on your diagnosis. Possible options include oral contraceptives, barrier methods. Chemo/biotherapy can change your ability to reproduce children in the future.  There are options for fertility preservation. The patient will not be referred.    Advanced Care Planning  Advance Care Planning   The patient and I discussed advanced care planning, \"Conversations that Matter\".   This service is offered for development of advance directives with a certified ACP facilitator.  The patient does have an up-to-date advanced directive. This document is on file with our office. The patient is not interested in an appointment with one of our facilitators to create or update their advanced directives.     Smoking cessation  Tobacco Use: Medium Risk   • Smoking Tobacco Use: Former   • Smokeless Tobacco Use: Never   • Passive Exposure: Not on file       Patient and I discussed their tobacco use history. Referral will not be made for smoking cessation.      Palliative Care  When appropriate, the patient and I discussed the availability palliative care services and when appropriate Hospice care. Palliative care is not the same as Hospice care which was explained to the patient.  The patient is not interested in additional information from our  on these services.     Survivorship   When appropriate, we discussed that we will refer the patient to survivorship clinic to discuss next steps following completion of planned treatment.  Reviewed this visit will " include assessment of your physical, psychological, functional, and spiritual needs as a survivor and the need at attend this visit when scheduled.    Assessment and Plan:    Diagnoses and all orders for this visit:    1. Prostate cancer metastatic to bone (HCC) (Primary)      No orders of the defined types were placed in this encounter.    1. Needs assessment was completed where applicable including genetics, psychosocial needs, barriers to care, VAD evaluation, advanced care planning, survivorship, and palliative care services where indicated. Referrals have been ordered as appropriate based upon evaluation today and patient desires.   2. Adequate time was given to answer questions.  Patient made aware of their care team members and contact information if they have questions or problems throughout the treatment course.  3. Discussion held and written information provided describing frequency of office visits and ongoing monitoring throughout the treatment plan.     4. Reviewed with patient any prescribed medication sent to pharmacy.  Education provided regarding proper storage, safe handling, and proper disposal of unused medication.  5. Proper handling of body fluids and waste discussed and written information provided.  6. If appropriate, patient had pretreatment labs drawn today.    Learning assessment completed at initial patient encounter. See separate flowsheet.    You have chosen to receive care through a telehealth visit.  Do you consent to use a video/audio connection for your medical care today? Yes    I spent 12 minutes caring for Semaj on this date of service. This time includes time spent by me in the following activities: preparing for the visit, obtaining and/or reviewing a separately obtained history, performing a medically appropriate examination and/or evaluation, counseling and educating the patient/family/caregiver and documenting information in the medical record.     SYD Rainey    02/09/23

## 2023-02-09 NOTE — PROGRESS NOTES
[facility] Hematology/Oncology Treatment Plan Summary    [id number]  MD: Dr. Lee    Diagnosis: Prostate Cancer Stage: IV    Goal of chemotherapy: disease control    Treatment Medication(s) / Frequency and Dosing    1. Nubeqa 300 mg twice daily; plans to increase to 600 mg twice daily as tolerated    Number of cycles: until disease progression or unacceptable toxicity    Starting on: 2/10/23     Follow-up Testing to be determined after TBD cycles by MD.     Items for home use: Thermometer       Completing Pharmacist: Sadie Harris RPH             Date/time: 02/09/2023 14:51 EST    Please note: You will be seen by a provider frequently with your treatment plan. This plan may change depending on many factors, if so, this will be discussed with you by your physician.  Last update 12/2020.       Counseling Provided:  - Side effects reviewed with patient including: decreased WBC/increased risk for infection  and changes in liver function. Additional side effects covered in written handout.   - Reviewed proper administration: Discussed if the patient is handling their own medications, then they need to wash their hands properly after touching the medication. If a caregiver is assisting with handling medication, need to wear disposal gloves and wash hands properly afterwards. Patient was counseled to take Nubeqa with food, at the same time each day. Additional precautions: avoid grapefruit and grapefruit juice  - Provided information on prior storage of medication: Store medication safe away from children and pets, away from light, and at room temperature. If you use a pill box, use a separate pill box from other medication.   - Provided information on safe handling of soiled linen and proper flush technique and reviewed proper disposal of medication.   - Reviewed what to do in the event of a missed dose: Do not take the missed dose if it has been more than 6 hours since you should  have taken it. Simply take the next dose at the regularly scheduled time.  - Reviewed expected goals/outcomes, contraindications and safety precautions, including when to seek medical care.  - Provided information on pregnancy considerations - women should not become pregnant and men should not get a partner pregnant while taking; men and women of childbearing age and potential should use effective contraception during and after therapy.    Provided patient with:   Education sheets about the medication, 24-hour clinic phone number and my contact information and instructions to call should additional questions arise.     Completed medication reconciliation today to assess for drug interactions.   Reviewed concomitant medications, allergies, labs, comorbidities/medical history, and immunization history.   Drug-drug interactions noted: no significant drug interactions noted.   Advised pt to call the clinic if any new medications are started so we can assess for drug-drug interactions     Wrap-up:  Discussed aforementioned material with patient in person, face-to-face, in clinic.   Chemo consents/CCA were signed at today's visit.   Medication availability: patient already has medication on hand  Patient and his son, present in today's appointment, expressed understanding.  Patient demonstrates ability to self-administer medication. No barriers to adherence identified.  All questions and concerns addressed.     Sadie Harris RPH  2/9/2023  14:51 EST

## 2023-02-10 ENCOUNTER — TREATMENT (OUTPATIENT)
Dept: RADIATION ONCOLOGY | Facility: HOSPITAL | Age: 83
End: 2023-02-10
Payer: MEDICARE

## 2023-02-10 LAB
RAD ONC ARIA COURSE ID: NORMAL
RAD ONC ARIA COURSE INTENT: NORMAL
RAD ONC ARIA COURSE LAST TREATMENT DATE: NORMAL
RAD ONC ARIA COURSE START DATE: NORMAL
RAD ONC ARIA COURSE TREATMENT ELAPSED DAYS: 3
RAD ONC ARIA FIRST TREATMENT DATE: NORMAL
RAD ONC ARIA PLAN FRACTIONS TREATED TO DATE: 4
RAD ONC ARIA PLAN ID: NORMAL
RAD ONC ARIA PLAN PRESCRIBED DOSE PER FRACTION: 3 GY
RAD ONC ARIA PLAN PRIMARY REFERENCE POINT: NORMAL
RAD ONC ARIA PLAN TOTAL FRACTIONS PRESCRIBED: 10
RAD ONC ARIA PLAN TOTAL PRESCRIBED DOSE: 3000 CGY
RAD ONC ARIA REFERENCE POINT DOSAGE GIVEN TO DATE: 12 GY
RAD ONC ARIA REFERENCE POINT DOSAGE GIVEN TO DATE: 12.31 GY
RAD ONC ARIA REFERENCE POINT ID: NORMAL
RAD ONC ARIA REFERENCE POINT ID: NORMAL
RAD ONC ARIA REFERENCE POINT SESSION DOSAGE GIVEN: 3 GY
RAD ONC ARIA REFERENCE POINT SESSION DOSAGE GIVEN: 3.08 GY

## 2023-02-10 PROCEDURE — 77386 CHG INTENSITY MODULATED RADIATION TX DLVR COMPLEX: CPT | Performed by: STUDENT IN AN ORGANIZED HEALTH CARE EDUCATION/TRAINING PROGRAM

## 2023-02-10 PROCEDURE — 77386: CPT | Performed by: STUDENT IN AN ORGANIZED HEALTH CARE EDUCATION/TRAINING PROGRAM

## 2023-02-13 ENCOUNTER — TREATMENT (OUTPATIENT)
Dept: RADIATION ONCOLOGY | Facility: HOSPITAL | Age: 83
End: 2023-02-13
Payer: MEDICARE

## 2023-02-13 ENCOUNTER — RADIATION ONCOLOGY WEEKLY ASSESSMENT (OUTPATIENT)
Dept: RADIATION ONCOLOGY | Facility: HOSPITAL | Age: 83
End: 2023-02-13
Payer: MEDICARE

## 2023-02-13 VITALS
SYSTOLIC BLOOD PRESSURE: 187 MMHG | BODY MASS INDEX: 33.94 KG/M2 | WEIGHT: 197.8 LBS | OXYGEN SATURATION: 98 % | HEART RATE: 59 BPM | DIASTOLIC BLOOD PRESSURE: 80 MMHG

## 2023-02-13 DIAGNOSIS — C79.51 PROSTATE CANCER METASTATIC TO BONE: Primary | ICD-10-CM

## 2023-02-13 DIAGNOSIS — C61 PROSTATE CANCER METASTATIC TO BONE: Primary | ICD-10-CM

## 2023-02-13 LAB
RAD ONC ARIA COURSE ID: NORMAL
RAD ONC ARIA COURSE INTENT: NORMAL
RAD ONC ARIA COURSE LAST TREATMENT DATE: NORMAL
RAD ONC ARIA COURSE START DATE: NORMAL
RAD ONC ARIA COURSE TREATMENT ELAPSED DAYS: 6
RAD ONC ARIA FIRST TREATMENT DATE: NORMAL
RAD ONC ARIA PLAN FRACTIONS TREATED TO DATE: 5
RAD ONC ARIA PLAN ID: NORMAL
RAD ONC ARIA PLAN PRESCRIBED DOSE PER FRACTION: 3 GY
RAD ONC ARIA PLAN PRIMARY REFERENCE POINT: NORMAL
RAD ONC ARIA PLAN TOTAL FRACTIONS PRESCRIBED: 10
RAD ONC ARIA PLAN TOTAL PRESCRIBED DOSE: 3000 CGY
RAD ONC ARIA REFERENCE POINT DOSAGE GIVEN TO DATE: 15 GY
RAD ONC ARIA REFERENCE POINT DOSAGE GIVEN TO DATE: 15.39 GY
RAD ONC ARIA REFERENCE POINT ID: NORMAL
RAD ONC ARIA REFERENCE POINT ID: NORMAL
RAD ONC ARIA REFERENCE POINT SESSION DOSAGE GIVEN: 3 GY
RAD ONC ARIA REFERENCE POINT SESSION DOSAGE GIVEN: 3.08 GY

## 2023-02-13 PROCEDURE — 77386 CHG INTENSITY MODULATED RADIATION TX DLVR COMPLEX: CPT | Performed by: STUDENT IN AN ORGANIZED HEALTH CARE EDUCATION/TRAINING PROGRAM

## 2023-02-13 PROCEDURE — 77386: CPT | Performed by: STUDENT IN AN ORGANIZED HEALTH CARE EDUCATION/TRAINING PROGRAM

## 2023-02-13 NOTE — PROGRESS NOTES
"Radiation Oncology  On-Treatment Note      Patient: Semaj Herrera    MRN: [id number]    Attending Physician: Wang Harper MD     Diagnosis:     ICD-10-CM ICD-9-CM   1. Prostate cancer metastatic to bone (HCC)  C61 185    C79.51 198.5       Radiation Therapy Visit:  Continue radiation therapy, Dosimetry plan remains acceptable, Films reviewed and remains acceptable, Pain assessed, Pain management planned, Radiation dose schedule reviewed and remains acceptable, Radiation technique remains acceptable and Symptoms within expected range    Radiation Treatments     Active   Plans   LSP/Pelvis RA   Most recent treatment: Dose planned: 300 cGy (fraction 5 on 2/13/2023)   Total: Dose planned: 3,000 cGy (10 fractions)   Elapsed Days: 6      Reference Points   Rx: LSP/Pelvis   Most recent treatment: Dose given: 300 cGy (on 2/13/2023)   Total: Dose given: 1,500 cGy   Elapsed Days: 6      calc LSP/Pelvis   Most recent treatment: Dose given: 308 cGy (on 2/13/2023)   Total: Dose given: 1,539 cGy   Elapsed Days: 6                    Physical Examination:  Vitals: Blood pressure (!) 187/80, pulse 59, weight 89.7 kg (197 lb 12.8 oz), SpO2 98 %.  Vitals:    02/13/23 0916   BP: (!) 187/80   Pulse: 59   SpO2: 98%   Weight: 89.7 kg (197 lb 12.8 oz)     Pain Score    02/13/23 0916   PainSc: 0-No pain       Restricted in physically strenuous activity but ambulatory and able to carry out work of a light or sedentary nature, e.g., light house work, office work = 1    We examined the relevant areas: yes  Findings are within the expected range for this stage of treatment: yes  -------------------------------------------------------------------------------------------------------------------    ACTION ITEMS:  Patient tolerating treatment well and as expected for this stage in their treatment and Continue radiation therapy as planned    Patient reported some intermittent feeling of \"heaviness\" in his legs over the weekend.  He is not having this " today.  We will continue to monitor.    Estimated Completion Date: 2/20/2023      Wang Harper MD  Radiation Oncology

## 2023-02-14 ENCOUNTER — TREATMENT (OUTPATIENT)
Dept: RADIATION ONCOLOGY | Facility: HOSPITAL | Age: 83
End: 2023-02-14
Payer: MEDICARE

## 2023-02-14 LAB
RAD ONC ARIA COURSE ID: NORMAL
RAD ONC ARIA COURSE INTENT: NORMAL
RAD ONC ARIA COURSE LAST TREATMENT DATE: NORMAL
RAD ONC ARIA COURSE START DATE: NORMAL
RAD ONC ARIA COURSE TREATMENT ELAPSED DAYS: 7
RAD ONC ARIA FIRST TREATMENT DATE: NORMAL
RAD ONC ARIA PLAN FRACTIONS TREATED TO DATE: 6
RAD ONC ARIA PLAN ID: NORMAL
RAD ONC ARIA PLAN PRESCRIBED DOSE PER FRACTION: 3 GY
RAD ONC ARIA PLAN PRIMARY REFERENCE POINT: NORMAL
RAD ONC ARIA PLAN TOTAL FRACTIONS PRESCRIBED: 10
RAD ONC ARIA PLAN TOTAL PRESCRIBED DOSE: 3000 CGY
RAD ONC ARIA REFERENCE POINT DOSAGE GIVEN TO DATE: 18 GY
RAD ONC ARIA REFERENCE POINT DOSAGE GIVEN TO DATE: 18.46 GY
RAD ONC ARIA REFERENCE POINT ID: NORMAL
RAD ONC ARIA REFERENCE POINT ID: NORMAL
RAD ONC ARIA REFERENCE POINT SESSION DOSAGE GIVEN: 3 GY
RAD ONC ARIA REFERENCE POINT SESSION DOSAGE GIVEN: 3.08 GY

## 2023-02-14 PROCEDURE — 77386: CPT | Performed by: STUDENT IN AN ORGANIZED HEALTH CARE EDUCATION/TRAINING PROGRAM

## 2023-02-14 PROCEDURE — 77427 RADIATION TX MANAGEMENT X5: CPT | Performed by: STUDENT IN AN ORGANIZED HEALTH CARE EDUCATION/TRAINING PROGRAM

## 2023-02-14 PROCEDURE — 77386 CHG INTENSITY MODULATED RADIATION TX DLVR COMPLEX: CPT | Performed by: STUDENT IN AN ORGANIZED HEALTH CARE EDUCATION/TRAINING PROGRAM

## 2023-02-15 ENCOUNTER — TREATMENT (OUTPATIENT)
Dept: RADIATION ONCOLOGY | Facility: HOSPITAL | Age: 83
End: 2023-02-15
Payer: MEDICARE

## 2023-02-15 LAB
RAD ONC ARIA COURSE ID: NORMAL
RAD ONC ARIA COURSE INTENT: NORMAL
RAD ONC ARIA COURSE LAST TREATMENT DATE: NORMAL
RAD ONC ARIA COURSE START DATE: NORMAL
RAD ONC ARIA COURSE TREATMENT ELAPSED DAYS: 8
RAD ONC ARIA FIRST TREATMENT DATE: NORMAL
RAD ONC ARIA PLAN FRACTIONS TREATED TO DATE: 7
RAD ONC ARIA PLAN ID: NORMAL
RAD ONC ARIA PLAN PRESCRIBED DOSE PER FRACTION: 3 GY
RAD ONC ARIA PLAN PRIMARY REFERENCE POINT: NORMAL
RAD ONC ARIA PLAN TOTAL FRACTIONS PRESCRIBED: 10
RAD ONC ARIA PLAN TOTAL PRESCRIBED DOSE: 3000 CGY
RAD ONC ARIA REFERENCE POINT DOSAGE GIVEN TO DATE: 21 GY
RAD ONC ARIA REFERENCE POINT DOSAGE GIVEN TO DATE: 21.54 GY
RAD ONC ARIA REFERENCE POINT ID: NORMAL
RAD ONC ARIA REFERENCE POINT ID: NORMAL
RAD ONC ARIA REFERENCE POINT SESSION DOSAGE GIVEN: 3 GY
RAD ONC ARIA REFERENCE POINT SESSION DOSAGE GIVEN: 3.08 GY

## 2023-02-15 PROCEDURE — 77386: CPT | Performed by: STUDENT IN AN ORGANIZED HEALTH CARE EDUCATION/TRAINING PROGRAM

## 2023-02-15 PROCEDURE — 77386 CHG INTENSITY MODULATED RADIATION TX DLVR COMPLEX: CPT | Performed by: STUDENT IN AN ORGANIZED HEALTH CARE EDUCATION/TRAINING PROGRAM

## 2023-02-16 ENCOUNTER — TREATMENT (OUTPATIENT)
Dept: RADIATION ONCOLOGY | Facility: HOSPITAL | Age: 83
End: 2023-02-16
Payer: MEDICARE

## 2023-02-16 ENCOUNTER — SPECIALTY PHARMACY (OUTPATIENT)
Dept: PHARMACY | Facility: HOSPITAL | Age: 83
End: 2023-02-16
Payer: MEDICARE

## 2023-02-16 LAB
RAD ONC ARIA COURSE ID: NORMAL
RAD ONC ARIA COURSE INTENT: NORMAL
RAD ONC ARIA COURSE LAST TREATMENT DATE: NORMAL
RAD ONC ARIA COURSE START DATE: NORMAL
RAD ONC ARIA COURSE TREATMENT ELAPSED DAYS: 9
RAD ONC ARIA FIRST TREATMENT DATE: NORMAL
RAD ONC ARIA PLAN FRACTIONS TREATED TO DATE: 8
RAD ONC ARIA PLAN ID: NORMAL
RAD ONC ARIA PLAN PRESCRIBED DOSE PER FRACTION: 3 GY
RAD ONC ARIA PLAN PRIMARY REFERENCE POINT: NORMAL
RAD ONC ARIA PLAN TOTAL FRACTIONS PRESCRIBED: 10
RAD ONC ARIA PLAN TOTAL PRESCRIBED DOSE: 3000 CGY
RAD ONC ARIA REFERENCE POINT DOSAGE GIVEN TO DATE: 24 GY
RAD ONC ARIA REFERENCE POINT DOSAGE GIVEN TO DATE: 24.62 GY
RAD ONC ARIA REFERENCE POINT ID: NORMAL
RAD ONC ARIA REFERENCE POINT ID: NORMAL
RAD ONC ARIA REFERENCE POINT SESSION DOSAGE GIVEN: 3 GY
RAD ONC ARIA REFERENCE POINT SESSION DOSAGE GIVEN: 3.08 GY

## 2023-02-16 PROCEDURE — 77386 CHG INTENSITY MODULATED RADIATION TX DLVR COMPLEX: CPT | Performed by: STUDENT IN AN ORGANIZED HEALTH CARE EDUCATION/TRAINING PROGRAM

## 2023-02-16 PROCEDURE — 77336 RADIATION PHYSICS CONSULT: CPT | Performed by: STUDENT IN AN ORGANIZED HEALTH CARE EDUCATION/TRAINING PROGRAM

## 2023-02-16 PROCEDURE — 77386: CPT | Performed by: STUDENT IN AN ORGANIZED HEALTH CARE EDUCATION/TRAINING PROGRAM

## 2023-02-17 ENCOUNTER — TREATMENT (OUTPATIENT)
Dept: RADIATION ONCOLOGY | Facility: HOSPITAL | Age: 83
End: 2023-02-17
Payer: MEDICARE

## 2023-02-17 ENCOUNTER — SPECIALTY PHARMACY (OUTPATIENT)
Dept: PHARMACY | Facility: HOSPITAL | Age: 83
End: 2023-02-17
Payer: MEDICARE

## 2023-02-17 LAB
RAD ONC ARIA COURSE ID: NORMAL
RAD ONC ARIA COURSE INTENT: NORMAL
RAD ONC ARIA COURSE LAST TREATMENT DATE: NORMAL
RAD ONC ARIA COURSE START DATE: NORMAL
RAD ONC ARIA COURSE TREATMENT ELAPSED DAYS: 10
RAD ONC ARIA FIRST TREATMENT DATE: NORMAL
RAD ONC ARIA PLAN FRACTIONS TREATED TO DATE: 9
RAD ONC ARIA PLAN ID: NORMAL
RAD ONC ARIA PLAN PRESCRIBED DOSE PER FRACTION: 3 GY
RAD ONC ARIA PLAN PRIMARY REFERENCE POINT: NORMAL
RAD ONC ARIA PLAN TOTAL FRACTIONS PRESCRIBED: 10
RAD ONC ARIA PLAN TOTAL PRESCRIBED DOSE: 3000 CGY
RAD ONC ARIA REFERENCE POINT DOSAGE GIVEN TO DATE: 27 GY
RAD ONC ARIA REFERENCE POINT DOSAGE GIVEN TO DATE: 27.7 GY
RAD ONC ARIA REFERENCE POINT ID: NORMAL
RAD ONC ARIA REFERENCE POINT ID: NORMAL
RAD ONC ARIA REFERENCE POINT SESSION DOSAGE GIVEN: 3 GY
RAD ONC ARIA REFERENCE POINT SESSION DOSAGE GIVEN: 3.08 GY

## 2023-02-17 PROCEDURE — 77386: CPT | Performed by: STUDENT IN AN ORGANIZED HEALTH CARE EDUCATION/TRAINING PROGRAM

## 2023-02-17 PROCEDURE — 77386 CHG INTENSITY MODULATED RADIATION TX DLVR COMPLEX: CPT | Performed by: STUDENT IN AN ORGANIZED HEALTH CARE EDUCATION/TRAINING PROGRAM

## 2023-02-17 NOTE — PROGRESS NOTES
Called patient for toxicity check for new medication, no answer. Left voicemail to return my call at direct line [phone].     Sunday Harris, PharmD, BCOP  2/17/2023 15:55 EST

## 2023-02-17 NOTE — PROGRESS NOTES
"  Subjective  Patient feeling reasonably well: Radiation therapy just completed, diarrhea noted around radiation therapy and with initiation of darolutamide.    REASONS FOR FOLLOW UP:   1.  Metastatic prostate cancer    HISTORY OF PRESENT ILLNESS:     Patient is an 82-year-old with metastatic prostate cancer who is seen as he continues current therapy with Xtandi, Xgeva moved to every 6 months, Lupron every 3 months.                                  The patient was seen back along with his son, Georges, 9/21/2022 fortunately doing reasonably well without additional pain, discomfort or excessive fatigue.  We have discussed various options for treatment of prostate cancer if we are unable to control this disease with current measures.    He is next evaluated 12/21/2022 now scheduled for Lupron.  He had last received both his Lupron and Xgeva 9/21/2022.  He notes increasing chest discomfort that appears to be \"when he twists\".  Initially he felt he might have a pneumonia but never had fever or cough.  We have discussed that his disease could well be progressing and then assessment that would allow us to clarify treatment options is reasonable.      This led to a repeat PSA 12/21/2022 that was found to be elevated to 23.9 and a follow-up Pylarify study performed 1/6/2023 that does demonstrate marked progression of sclerotic bone metastasis diffusely compared to 8/20/2021.  This includes the femoral neck,, sacrum, left iliac bone with SUV up to 48.8, stable tiny mediastinal nodes, 2 punctate nodules in the calvarium.      He is seen back with his son 1/11/2023.  The potential treatment options such as Pluvicto (Lutetium Yady 177 vipivotide tetraxetan), now that we know that his PSMA scan is positive, though the patient would have to initially be treated with taxane chemotherapy which would be difficult for him to tolerate, radium-223 considering his bony metastasis.  He has slowly progressive disease however we have " discussed additional issues including radiation therapy and/or alteration to another androgen receptor agonist such as darolutamide.  We have discussed all these options moving to have assessments done by radiation therapy and, [facility] as we address his symptoms.    The patient was seen by radiation therapy and felt a candidate for palliative radiotherapy as we made application for darolutamide.He is also seen by [facility] for additional treatment options 1/23/2023 and 2/10/2023 as radiation therapy proceeded to LSP/pelvis.    The patient was also seen by [facility] without additional therapeutic options at this time.    He is next seen 2/20/2023 to review his status recognizing his follow-up Xgeva will be due 3/15/2023 and and Lupron would be due 6/7/2023.  He was able to obtain darolutamide (Nubeqa) as free drug.  He is seen with his son both indicating that he just completed radiation therapy today.  While his pain has improved, he did have diarrhea develop during radiation therapy and is using antidiarrheal medications to control it currently.  This is also, potentially, side effect of darolutamide.      Hematologic/oncologic history:   The patient is an 82-year-old male with significant past medical history including prostate carcinoma, CKD 3 and long-term tobacco use be been seen by primary care in late January, 2018 for hoarseness.  Chest x-ray is now outpatient January 23 revealed sclerotic change in the posterior mid chest to be within 3 adjacent thoracic ertebral bodies of uncertain significance.  A follow-up chest CT revealed numerous sclerotic foci within al visualized bones consistent with metastatic disease, multiple enlarged mediastinal lymph nodes concerning for metastatic lymphadenopathy and a polypoidal abnormality in the right lateral wall of the trachea.  CT of abdomen March 20 revealed extensive osseous metastatic disease mildly enlarged retroperitoneal lymph nodes.  Bone scan March  20 was consistent with widespread osseous metastasis particularly in the proximal half the left humeral shaft, abnormal uptake in the sternum and manubrium and a small focus in the posterior aspect of the calvarium.  This led to a plain film the left humerus with mottled sclerosis involving the shaft of the humerus particularly in the proximal half but no evidence of pathologic fracture.    The patient has additionally type 2 diabetes on insulin pump, again a history of prostate carcinoma prostatectomy 12 years previously, hypertension, and hyperlipidemia.  Additional studies included a PSA of 395.1 from March 14, 2018.The patient's been seen by urology March 15 with plans to start Firmagon.    The patient is now seen in pulmonary clinic with Dr. Bijan Rivera and we have discussed that he will need bronchoscopy and mediastinoscopy.  Interestingly his additional history includes a long occupational exposure including working in the [place] just out of school, subsequent construction work for many years and a 30-pack-year history of smoking stopping in the late 1990s.  He indicates that he followed with Dr. Guillen of urology for many years with no changes in his exams and normal PSAs.  He stopped this follow-up approximately 2 years ago.     Patient was seen in consultation with thoracic surgery on March 28 and plans are made for mediastinoscopy and bronchoscopy with lymph node biopsy.  Pathology revealed that these nodes were consistent with metastatic prostate carcinoma.  He was discussed at thoracic conference.  A PET/CT was not pursued and it was determined that he see medical oncology for hormonal treatment and radiation therapy if symptomatic.  It should be noted that the patient's March 15 [facility] office note describes that Firmagon was planned.     The patient has met with the son and grandson April 23.  We have also contacted Dr. Taylor of urology in that Firmagon was given just  after his last visit and would next be due approximately May 3.  Patient feels considerably better with resolution of his pain, normalization of his performance status.  He would like to continue treatment through our office now contacted Dr. Taylor concerning this.    The patient is next seen June 11, 2018 we discussed his follow-up including his treatments with Lupron May 7 and his next treatment on July 30.  He has returned to essentially normal performance status and his PSA has dropped further from 395 March 14 27.8 May 7 and now 2.03 June 11.  We do plan to initiate Xgeva also study him via scans to document his improvement approximately a month from now.   The patient is next seen July 26, 2018.  Repeat imaging demonstrated interval resolution of mediastinal and retroperitoneal lymphadenopathy.  There is thickening in the distal aspect of the appendix with stranding?  Chronic appendicitis.  The patient indicates he is having no such symptoms and never has.  There is no change in sclerotic bony metastasis in the patient's PSA has dropped substantially to 1.66 by July 19 and 1.79 July 26.  He is actually feeling excellent and this is corroborated by family members.   Patient is next seen January 10, 2019 continuing to do very well and, in fact indicating [facility], likely [facility] replacements, visual review.  He is not certain is going to continue his care with them or with us or combination of both.    Patient is next seen April 4, 2019.  He is recently discharged after hospitalization March 15-18 with left upper lobe pneumonia.  We reviewed the findings in detail with his gradual recovery now documented.  His studies include a CT of chest which does not demonstrate any further bony lesions and/or pulmonary metastasis having developed.  He is feeling considerably better and approximately back to his baseline.  The patient is next seen June 28, 2019  feeling well but having baseline generally musculoskeletal pain and restless legs.His recent exams include a PSA of 0.81, CMP with potassium of 5.3 with repeat pending.  His performance status overall remains good to very good and is clearly responding to his treatments.  The patient is next seen December 13, 2019 continue to feel well.  He has had, oddly enough, 2 episodes of sleep paralysis which have occurred to him previously and he wonders if there is any connection with his prostate carcinoma though this is felt unlikely.     The patient when assessed in December had his PSA go to 2.9.  We continue with Lupron and Xgeva and is seen back now March 9, 2020 without additional symptoms.  We discussed that a schedule could likely change moving to 3 months for both of these medications particularly if he needs additional therapy directed at progressive disease.     Patient contacted our practice May 4 concern for pain in his hips.  This led to moving his scans up and they were performed May 8, 2020 showing a sub-6 mm pleural-based pulmonary nodule in the right lower lobe that is new from March 15, 2018, remainder of the sub-6 mm pulmonary nodules bilaterally are stable.  There is extensive osseous metastatic disease as previous with no other new findings.     The patient indicates, when seen, May 13 that his bone pain is now resolved.  While this is good information does not mean that he does not have further bony metastasis and we have discussed that a follow-up bone scan is likely necessary.  Furthermore he is having some difficulty with sleeplessness and increase in restless legs as he continues to stay at home during the COVID 19 crisis which, itself, is producing more anxiety for him.  A follow-up bone scan was obtained Lety 3 revealing multifocal osseous metastatic disease which was compared to March 2, 2018 with improvement.  No new abnormal uptake is present.  He is next seen with us on June 17, 2020 and,  fortunately, is not having any significant pain at this point.  We discussed his scans in detail and, on balance, his performance status also remains improved.     It was elected to follow the patient rather than changes antiandrogen therapy.  He is reassessed September 3 with unchanged CMP, H&H of 11.4 and 36.3 with normal white count, platelet count and differential, PSA at 15.8.  Follow-up PA lateral chest x-ray does not show any new abnormalities though there is extensive metastatic bone disease throughout the bony thorax and osteoblastic metastasis have been seen on chest CT May 2020.  The patient is seen with his son Georges September 10 noticing increasing bony discomfort primarily in the right hip following fairly intensive gardening which he does frequently.  Now has further elevation of PSA were wondering whether we should try to intervene sooner per additional antiandrogens.  We will need to further assess him via CT scanning to make this decision     These were obtained October 1 showing extensive sclerotic disease with no metastatic disease in chest abdomen or pelvis.  PSA had gone from 15.8 September 3-16 0.6 October 1.     He still has pain getting up in the morning and after active gardening.  This is manageable with current pain medications and, at present, it is not apparent that he needs to switch medications to gain better control of his disease.  The patient did have follow-up studies done including a PSA November 25, 2020 now at 25.4.  The patient is seen with his son December 10, 2020 doing fair but having some increased pain in the mid sternum and right hip that he ascribes to additional exercise/work in managing his garden.  It is apparent however, that his last bone scan was 6 months ago and we do need to reassess him concerning this.     The patient had follow-up bone scan performed January 11, 2021 showing again uptake in the calvarium, humerus, right medial clavicle, sternum, ribs, spine,  sacrum, pelvis, right acetabulum, proximal femora and now left frontal bone which appears new.  There is also mild increase in sternal uptake and equivocal increase in the pelvic lesions of all of the sacrum and the right proximal femur.     The patient is seen with his son January 18, 2021 and indicates that he is having pain gradually worsened in his right hip, mid chest that had been an issue previously.  These findings on bone scan are reviewed with both of them as likely the underlying culprit for his discomfort.  He would need change of the systemic therapy but, at present, will ask for radiation therapy palliation initially.  He was previously treated by Dr. Kulkarni many years ago and will ask her to see him.  We will also make application for the current cost of Xtandi or Zytiga.  The patient is not felt to be a candidate for systemic chemotherapy.     The patient was referred for ration therapy as we continued to assess his PSA on current therapy as well as exam the cost of Zytiga or Xtandi.  Radiation therapy (Dr. Kulkarni) saw the patient January 26 and felt that the right femur and sternal lesion could benefit from therapy-3000 cGy in 10 fractions.  The patient took treatment February 1 to February 12 to the above areas and is next seen back March 15.  Fortunately his pain has improved dramatically and he is quite happy about this.  Unfortunately he notes some difficulty with swallowing that is temporary and often leads to increased salivation and mucus production.  Patient was asked to return his next assessed April 12, 2021.  He is able to swallow with less pain but still has excess mucus production and issues with salivation.  His PSA has noted increase but he is having no additional bony discomfort at this point and, additionally, has noted low-grade fevers just under 100 degrees at nighttime?.  His weight, appetite and general performance status have remained good to excellent.  We discussed follow-up  studies at this point to determine his status.    Follow-up scans were scheduled CT scans of chest and pelvis 5/4/2021 showing osseous metastasis quite similar to previous examinations in the chest, CT of abdomen pelvis also with no acute intra-abdominal adenopathy no indication of abdominal pelvic metastatic disease but again widespread osseous metastasis.  His PSA at this point have been slowly increasing to a level of 44 on 4/12/2021.  As the patient is reassessed 5/10/2021 we find, fortunately, that he is not exceeding symptomatic.  It could make reasonable sense at this point to take the mediastinal nodes from his biopsy 4/9/2018, reassess potential targeted therapies, MSI status, and germline analysis.  Fortunately he is not having significant pain or discomfort and is seen with his son as we discussed the above plan.  Notably a follow-up PSA is found to be 42.6.   Patient is next seen in 6/7/2021 for Lupron and Xgeva.  Fortunately he is feeling considerably better according to both he and his son though his stamina has reduced.  He is not having additional pain at this point.  We have also reviewed his genetics assessment which was significant only for a variant of unknown significance.  This is not an actionable abnormality.    As the patient's PSA further escalated increasing to 89 7/7/2021 his subsequent Axumin PET was obtained 8/20 demonstrating extensive osteosclerotic metastatic disease showing reduced uptake-typical finding but no evidence of visceral metastatic disease in the neck chest abdomen pelvis.  These findings are discussed with the patient and his son 8/25/2021 and indicative of his progressive disease-etiology of his rising PSA-though the patient is asymptomatic we have discussed moving to initiate Xtandi is available at 160 mg p.o. twice daily along with his current Lupron and Xgeva.     The patient's testing 10/6 included PSA that is dropped to 9.69.  He is seen with his son, Cody, both  indicating that he been doing relatively well with treatment but began to notice nausea in the last week.  The schedule that he has been given also needs to be somewhat updated in terms of his Lupron and Xgeva.  He has been able to maintain his appetite and overall performance status to date.    The patient is next assessed 12/8/2021 tolerating his current Xtandi dose better than previous and maintaining a good performance status overall as well as demonstrating a drop in his PSA now to 4.7 on 12/8/2021 compared to high of 112 9/8/2021.  He remains hypophosphatemic and hypocalcemic and we have discussed, again, changing his Xgeva dosing to every 3 months along with his Lupron will continue his Xtandi to 80 mg daily.    The patient is next reviewed 3/21/2022 seen for both Xgeva and Lupron.  Fortunately he is feeling reasonably well with little additional pain though he has restarted to place his garden into shape for the season and is working more outdoors.    Patient reevaluated 5/2/2022 with repeat CBC including H&H of 10.5 and 33.4, white count 5980 and platelet count of 192,000, currently CMP and PSA pending with a previous PSA 1 month ago at 9.06.  At this point we increased his Xtandi to 160 mg p.o. daily while continuing treatment with every 3 month Xgeva and Lupron.  Notably the patient's Requip  alcohol I am sure it is mariza now at 1.5 mg p.o. nightly and Neurontin 900 mg p.o. daily has improved his restless leg syndrome substantially.    Patient seen 6/13/2022 at which time Xtandi at 160 mg p.o. daily was continued, Xgeva moved to every 6 months and Lupron every 3 months.    This was continued when patient was seen 9/21/2022 though subsequently we proceeded to every 3 months for both medications.    Patient seen 12/21/2022 at which time a subsequent Pylarify scan was requested as result of increasing PSA level and bony discomfort.  The patient received Lupron and Xgeva at this point.     He is seen back  with his son 1/11/2023.  The potential treatment options such as Pluvicto (Lutetium Yady 177 vipivotide tetraxetan), now that we know that his PSMA scan is positive, though the patient would have to initially be treated with taxane chemotherapy which would be difficult for him to tolerate, radium-223 considering his bony metastasis.  He has slowly progressive disease however we have discussed additional issues including radiation therapy and/or alteration to another androgen receptor agonist such as darolutamide.  We have discussed all these options moving to have assessments done by radiation therapy and, [facility] as we address his symptoms.    The patient was not felt a candidate for additional treatments via [facility], was able to start darolutamide and completed palliative radiotherapy.  He was stable seen 2/20/2023 though experiencing diarrhea post radiation therapy.    Past Medical History:   Diagnosis Date   • Aortic stenosis, mild 01/01/2021    Per echocardiogram 2021   • Ascending aorta dilatation (HCC)    • Asthma    • Benign prostatic hyperplasia    • Bone cancer (HCC)    • Cellulitis of great toe of left foot 10/19/2018   • CKD (chronic kidney disease)     Stage 3; followed by Dr. Vicky Duran    • Diastolic dysfunction     GRADE II per echo 2018   • Difficulty breathing     during exertion   • Dyslipidemia    • Erectile dysfunction    • Fatigue    • Heart murmur    • History of blood transfusion    • History of kidney stones    • HL (hearing loss)    • Hyperlipidemia    • Hypertension    • Hyponatremia    • Kidney stone    • Left ventricular hypertrophy     per echo 2018   • Localized edema    • Neuropathy    • Obstructive sleep apnea     USING CPAP   • Osteoarthritis    • Peptic ulcer    • Pneumonia    • Pneumonia of left upper lobe due to infectious organism 03/15/2019   • Prostate cancer (HCC)     Status post prostatectomy, radiation therapy, and hormone therapy followed by Dr. Lee;  metastatsis to bone   • Pure hyperglyceridemia    • Restless leg syndrome    • Sepsis (HCC)    • Sinus bradycardia    • Transient cerebral ischemia    • Type 2 diabetes mellitus (HCC)         Past Surgical History:   Procedure Laterality Date   • BRONCHOSCOPY N/A 04/09/2018    Procedure: BRONCHOSCOPY;  Surgeon: Bijan Rivera III, MD;  Location: [facility];  Service: Cardiothoracic   • COLONOSCOPY     • DEEP NECK LYMPH NODE BIOPSY / EXCISION     • HEMORRHOIDECTOMY     • LYMPH NODE BIOPSY     • MEDIASTINOSCOPY N/A 04/09/2018    Procedure: MEDIASTINOSCOPY WITH LYMPH NODE BIOPSY;  Surgeon: Bijan Rivera III, MD;  Location: [facility];  Service: Cardiothoracic   • OTHER SURGICAL HISTORY      ulcer repair   • PROSTATECTOMY  2006        Current Outpatient Medications on File Prior to Visit   Medication Sig Dispense Refill   • Accu-Chek Guide test strip USE 1 STRIP TO CHECK BLOOD SUGAR 4 TIMES DAILY     • Accu-Chek Softclix Lancets lancets USE 1 LANCET TO CHECK GLUCOSE 4 TIMES DAILY     • aspirin 81 MG EC tablet Take 1 tablet by mouth daily.     • cholecalciferol (VITAMIN D3) 1000 units tablet Take 2,000 Units by mouth Daily.     • Darolutamide 300 MG tablet Take 1 tablet by mouth 2 (Two) Times a Day. 60 tablet 0   • Denosumab (XGEVA SC) Inject  under the skin into the appropriate area as directed Every 30 (Thirty) Days.     • dilTIAZem CD (CARDIZEM CD) 120 MG 24 hr capsule TAKE 1 CAPSULE EVERY DAY 90 capsule 3   • furosemide (LASIX) 40 MG tablet Take 40 mg by mouth Every Morning.     • gabapentin (NEURONTIN) 300 MG capsule TAKE 3 CAPSULES EVERY NIGHT AT BEDTIME 90 capsule 1   • HYDROcodone-acetaminophen (NORCO) 5-325 MG per tablet Take 1 tablet by mouth Every 6 (Six) Hours As Needed for Moderate Pain. 120 tablet 0   • Insulin Aspart (novoLOG) 100 UNIT/ML injection INJECT 100 UNITS VIA PUMP ONCE DAILY     • Leuprolide Acetate, 3 Month, (LUPRON DEPOT, 3-MONTH, IM) Inject  into the appropriate muscle as directed  by prescriber Every 3 (Three) Months.     • levothyroxine (SYNTHROID, LEVOTHROID) 88 MCG tablet      • loperamide (IMODIUM) 1 MG/5ML solution Take 2 mg by mouth 4 (Four) Times a Day As Needed for Diarrhea.     • meloxicam (MOBIC) 15 MG tablet Take 1 tablet by mouth Daily. 30 tablet 1   • modafinil (PROVIGIL) 200 MG tablet Take 1 tablet by mouth Daily. 30 tablet 0   • olmesartan (BENICAR) 40 MG tablet TAKE 1 TABLET EVERY DAY (DISCONTINUE LOSARTAN 100MG) 90 tablet 3   • ondansetron (ZOFRAN) 8 MG tablet Take 1 tablet by mouth 3 (Three) Times a Day As Needed for Nausea or Vomiting. 30 tablet 5   • pravastatin (PRAVACHOL) 40 MG tablet Take 40 mg by mouth daily.     • rOPINIRole (REQUIP) 0.5 MG tablet Take 2 tablets by mouth in the evening and 2 at bedtime. 360 tablet 2   • traZODone (DESYREL) 50 MG tablet Take  mg by mouth every night at bedtime.       No current facility-administered medications on file prior to visit.        ALLERGIES:    Allergies   Allergen Reactions   • Niacin Unknown - Low Severity   • Statins Unknown - Low Severity        Social History     Socioeconomic History   • Marital status:    • Years of education: College   Tobacco Use   • Smoking status: Former     Packs/day: 1.50     Years: 30.00     Pack years: 45.00     Types: Cigarettes     Start date: 1968     Quit date: 1998     Years since quittin.0   • Smokeless tobacco: Never   Substance and Sexual Activity   • Alcohol use: Not Currently     Comment: Caffeine use: 2-3 cups daily   • Drug use: Never   • Sexual activity: Not Currently        Family History   Problem Relation Age of Onset   • Heart failure Mother    • Hypertension Mother            • Diabetes Mother    • Heart disease Mother            • Lung cancer Father 46   • Hypertension Sister    • Lung cancer Sister 60   • Hypertension Brother            • Lung cancer Brother 62   • Diabetes Brother    • Heart disease Other    • Prostate  "cancer Brother 60   • Cancer Brother            • Cancer Sister            • Cancer Sister    • Cancer Sister    • Heart disease Brother            • Malig Hyperthermia Neg Hx         Review of Systems  As per HPI   .  Pain improved and clued in his hip and left pelvis, diarrhea during radiation therapy  Objective     Vitals:    23 1030   BP: (!) 190/76   Pulse: 58   Resp: 16   Temp: 98.3 °F (36.8 °C)   TempSrc: Temporal   SpO2: 98%   Weight: 88.5 kg (195 lb 1.6 oz)   Height: 162.6 cm (64.02\")   PainSc: 0-No pain     Current Status 2023   ECOG score 0       Physical Exam   Constitutional: He is oriented to person, place, and time. He appears well-developed. No distress.   HENT:   Head: Normocephalic and atraumatic.   Mouth/Throat: No oropharyngeal exudate.   Eyes: Pupils are equal, round, and reactive to light.   Cardiovascular: Normal rate, regular rhythm and normal heart sounds.   No murmur heard.  Pulmonary/Chest: Effort normal and breath sounds normal. No respiratory distress. He has no wheezes. He has no rhonchi. He has no rales.   Abdominal: Soft. Normal appearance and bowel sounds are normal. He exhibits no distension.   Musculoskeletal: Normal range of motion.   Neurological: He is alert and oriented to person, place, and time.   Skin: Skin is warm and dry. No rash noted.   Vitals reviewed.    RECENT LABS:  Hematology WBC   Date Value Ref Range Status   2023 5.22 3.40 - 10.80 10*3/mm3 Final     RBC   Date Value Ref Range Status   2023 3.59 (L) 4.14 - 5.80 10*6/mm3 Final     Hemoglobin   Date Value Ref Range Status   2023 10.6 (L) 13.0 - 17.7 g/dL Final     Hematocrit   Date Value Ref Range Status   2023 31.8 (L) 37.5 - 51.0 % Final     Platelets   Date Value Ref Range Status   2023 187 140 - 450 10*3/mm3 Final        Lab Results   Component Value Date    GLUCOSE 196 (H) 2023    BUN 18 2023    CREATININE 0.80 2023    EGFRIFNONA " 56 (L) 02/23/2022    BCR 22.5 01/11/2023    K 4.1 01/11/2023    CO2 24.0 01/11/2023    CALCIUM 9.0 01/11/2023    CALCIUM 8.6 01/11/2023    ALBUMIN 3.6 01/11/2023    AST 12 01/11/2023    ALT 7 01/11/2023       NM Bone Scan Whole Body (01/11/2021 13:25)  CT Chest With Contrast Diagnostic (05/04/2021 09:11)  CT Abdomen Pelvis With Contrast (05/04/2021 09:11)  NM PET/CT Skull Base to Mid Thigh (08/20/2021 12:18)      Assessment & Plan      1.  Metastatic prostate cancer:  ? Patient initially diagnosed in 2006 and had a prostatectomy and hormone therapy.  ? Patient in January 2018 began to complain of shortness of breath and hoarseness.  Chest x-ray obtained 1/23/18 showed sclerotic bone lesions.  ? CT of the chest 3/12/2018 numerous sclerotic bone foci with all visualized bones consistent with metastatic disease, multiple enlarged mediastinal lymph nodes.  ? .1 from 3/14/2018.  Patient was started on Firmagon by urology, [facility].  ? 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  ? Case discussed at thoracic conference and recommendations were to continue ADT and radiation for symptomatic sites.  ? Lupron injections every 3 mos initiated 5/7/18 PSA at that time 7.810  ? Monthly Xgeva initiated 6/11/18 PSA 2.030  ? Last PSA 6/13/2019 0.881  ? 9/20/2019 patient tolerating treatment well, no new concerns.  ? 12/2019 PSA 2.9  ? 3/9/2020  PSA increasing to 5.030, he remains continuing on Lupron and Xgeva and asymptomatic though follow-up CT of chest and pelvis will be requested in 11 weeks prior to follow-up in 12 weeks.   ? 5/4/2020 patient called reported worsening right hip pain, plans to purse CT scans ASAP.   ? 5/8/2020 CT scans C/A/P show no progression of disease. Hip pain improved, Gabapentin added.. Bone scan requested.  ? 6/3/2020 bone scan that does not demonstrate worsening of bone nor need for localized radiation therapy.  The  patient's PSA has been slowly rising and we have not been able to determine worsening of disease and and, therefore, it is not felt warranted add additional medications such as Xtandi or apalutamide to his therapy.  Please note would like to avoid Zytiga try not to add prednisone which would worsen his blood sugar control.  ? 9/3/2020 PSA 15.8, CXR extensive metastatic bone disease- patient reviewed 9/10/2020 reporting increased bony discomfort CT scans requested.  ? Scans done and reviewed 10/7/2020 ultimately reveal no evidence of progression of disease for visceral involvement or clearly for bone involvement.  After further review with the patient and his son plan continued Xgeva and Lupron.  We have assessed for availability of Xtandi 160 mg p.o. daily and find the cost is currently prohibitive.   ? 11/25/2020 PSA 25.4  ? 12/10/2020 review with some mild increase in bony discomfort.  After repeat discussion we went on to have him undergo Lupron therapy, and his PSA actually to be mildly reduced at 22.2.   ? Follow-up bone scan 1/11/2021 demonstrates some evidence of progression in sternum, left sacrum and proximal femur.  These findings were reviewed with the patient and his son January 18, 2020 and the patient was referred for palliative radiotherapy(Dr. Kulkarni)  ? Palliative radiation under care Dr. Kulkarni to right femur and sternal lesion -3000 cGy in 10 fractions given February 1 to February 12 with significant symptom improvement.  ? 3/15/2021 PSA 38.5  ? 4/12/2021 PSA 44  ? 5/10/2021 PSA 42.6 CT chest abdomen pelvis 5/4/2021 - for progression of disease and follow-up PSA 5 10/2021 is at 42 slightly reduced from previous.  We have discussed how to proceed from here and find a significant family history that clearly supports a potential genetic assessment for his malignancy.  ? It is felt most reasonable at this point to take the mediastinal nodes from his biopsy 4/9/2018 and reassess for potential targeted  therapies, MSI status, as well as germline analysis.  ? 6/7/2021 PSA 65.9 his analysis completed for actionable mutations including germline mutations.  These exams were negative though there is a variant of unknown significance.  Again this is not an actionable mutation.  We proceeded with additional Xgeva and Lupron planning for monthly Xgeva and Lupron at 3 months  ? 7/7/2021 PSA 89 and subsequent testing with Axumin PET was performed confirming extensive osteosclerotic metastatic disease with little uptake, no evidence of visceral metastatic disease in neck chest abdomen or pelvis.  ? 8/25/2021  , PET/CT reviewed, subsequent PSA increased to 105.  Patient fortunately asymptomatic.  Requested reevaluation for Xtandi 160 mg po daily.   ? 9/8/2021 due for his lupron, receiving every 3 months, and monthly Xgeva.  Will have education today for Xtandi and will receive his medication today as well.   ? Started Xtandi 9/8/2021.  ? 9/22/2021 here for toxicity check, so far tolerating Xtandi quite well other than a slight increase in diarrhea controlled with Imodium.  ? 10/6/2021 continues to tolerate Xtandi well.  Labs are within range today, we will proceed with Xgeva today.  ? Patient seen 10/20/2021 having more nausea with Xtandi and demonstrating a substantial reduction in PSA level.  After discussion we plan to reduce his dose to 80 mg daily following his PSA and performance status over the next approximate 7 weeks at which time he would be scheduled for his follow-up Lupron.  ? Patient assessed 12/8/2021 with further reduction in PSA to 4.7, ongoing hypophosphatemia and hypocalcemia-Xgeva moved to every 3 months given on same schedule as Lupron  ? Patient seen 3/21/2022, 6-week follow-up with mild increase in PSA recognized  ? Patient reassessed 5/2/2022 with PSA at 10.1, Xtandi moved to 160 mg p.o. daily  ? Patient assessed 6/13/2022, Xtandi at 160 mg p.o. daily, PSA pending, Xgeva moved to every 6 months,  Lupron every 3 months  ? Patient seen 9/21/2022 at which time we continued our current approach, reviewed additional options for therapy should he clearly progress.  ? Patient seen 12/21/2022, increasing bony discomfort, Xgeva and Eligard continued, plans made for Pylarify scanning.   ? Patient seen 1/11/2023 with his son, discuss potential treatment options such as Pluvicto (Lutetium Yady 177 vipivotide tetraxetan) knowing that Pms-Asa is strongly positive in bone, palliative radiotherapy, radium-223 and alteration to additional androgen receptor such as darolutamide.  Patient referred to radiation therapy for their evaluation and possible treatment options, [facility] as application made for darolutamide and medications altered to include meloxicam 15 mg p.o. daily along with his Norco.  ? Patient assessed 2/20/2023 improved for radiation therapy, no additional options for treatment and [facility], darolutamide initiated.    2.  Neuropathy/ restless legs:    · 5/13/2020 gabapentin 600 mg at bedtime, trazadone 50 mg QHS.  · On gabapentin 300, 2 tablets at bedtime, and requip 0.5.mg    · Still having neuropathy symptoms.  Will increase gabapentin to 900 mg at bedtime.   · 9/22/2021 increase in gabapentin to 900 mg at bedtime has helped neuropathy.  · Requip moved to bedtime dosing only-1 mg, escalate as needed    3.  Cancer related pain: on Norco 5/325 every 6 hours as needed.  · Mobic 15 mg p.o. every morning initiated 1/11/2023, consider MS Contin every 12 hours  · Status post palliative radiotherapy with improved pain control by 2/20/2023    4.  Hyperkalemia:  · Intermittent issue for the patient.  It is typically dietary related, as he consumes large amount  fresh tomatoes and other foods from his garden..  He was instructed to significantly decrease his intake as his potassium level today is 6.4, magnesium 2.7  He is not symptomatic.  We will also check an EKG.   · 9/22/2021 potassium level is improved to  5.7.  Patient states that he is no longer eating tomatoes from his garden.  · 10/6/2021 potassium level is 5.8.  Due to patient's multiple electrolyte abnormalities we will go ahead and refer him to nephrology for further evaluation.  Patient states he actually has an existing nephrologist, Dr. Vicky Sauceda who he typically only sees on an annual basis.  We will schedule him to be seen by her soon for further evaluation.  · Reassess 12/21/2022 at 4.7    5.  Hypophosphatemia:  Phosphorus 1.9- hold xgeva 9/8/2021 ., Increase foods high in phosphorus, recheck in 2 weeks. \  · 9/22/2021 phosphorus level 1.7.  Reviewed with Dr. Lee.  In addition to increasing foods high in phosphorus, I have instructed the patient to start Neutra-Phos 1 tablet daily.  We will recheck again in 2 weeks.  · Phosphorus 2.3 assessed 10/20/2021  · Reassess 12/8/2021 at 1.9, Xgeva moved to every 3 months.  · Patient assessed 6/13/2022 Xgeva moved every 6 months  · Reevaluation 9/21/2022 with phosphorus 3.6, Xgeva continued every 6 months  · Reevaluation 12/21/2022 at 4.0, reassessment 1/11/2023 and 2.4    PLAN:      1. Continue gabapentin 900 mg at bedtime, Requip to move to 2 mg p.o. nightly, escalate as needed  2. Continue Imodium if needed for diarrhea  3. At this point hold darolutamide for at least 2 weeks then restart at lower dose 1 tab twice daily  4. Clarify that Xgeva and Xgeva will be given every 3 months.  5. 1 month follow-up, MD or NP, Xgeva,, Lupron, PSA, CMP, CBC  6. Case discussed with patient's son, Cody, who concurs as well as the patient.    Patient on high risk medication requiring close monitoring for toxicity.      Jose Lee MD  02/20/2023

## 2023-02-20 ENCOUNTER — OFFICE VISIT (OUTPATIENT)
Dept: ONCOLOGY | Facility: CLINIC | Age: 83
End: 2023-02-20
Payer: MEDICARE

## 2023-02-20 ENCOUNTER — RADIATION ONCOLOGY WEEKLY ASSESSMENT (OUTPATIENT)
Dept: RADIATION ONCOLOGY | Facility: HOSPITAL | Age: 83
End: 2023-02-20
Payer: MEDICARE

## 2023-02-20 ENCOUNTER — LAB (OUTPATIENT)
Dept: LAB | Facility: HOSPITAL | Age: 83
End: 2023-02-20
Payer: MEDICARE

## 2023-02-20 ENCOUNTER — TREATMENT (OUTPATIENT)
Dept: RADIATION ONCOLOGY | Facility: HOSPITAL | Age: 83
End: 2023-02-20
Payer: MEDICARE

## 2023-02-20 ENCOUNTER — SPECIALTY PHARMACY (OUTPATIENT)
Dept: PHARMACY | Facility: HOSPITAL | Age: 83
End: 2023-02-20
Payer: MEDICARE

## 2023-02-20 VITALS
HEART RATE: 55 BPM | BODY MASS INDEX: 33.49 KG/M2 | WEIGHT: 195.2 LBS | SYSTOLIC BLOOD PRESSURE: 205 MMHG | OXYGEN SATURATION: 96 % | DIASTOLIC BLOOD PRESSURE: 74 MMHG

## 2023-02-20 VITALS
DIASTOLIC BLOOD PRESSURE: 76 MMHG | BODY MASS INDEX: 33.31 KG/M2 | WEIGHT: 195.1 LBS | HEART RATE: 58 BPM | HEIGHT: 64 IN | OXYGEN SATURATION: 98 % | TEMPERATURE: 98.3 F | RESPIRATION RATE: 16 BRPM | SYSTOLIC BLOOD PRESSURE: 190 MMHG

## 2023-02-20 DIAGNOSIS — C61 PROSTATE CANCER METASTATIC TO BONE: ICD-10-CM

## 2023-02-20 DIAGNOSIS — C79.51 PROSTATE CANCER METASTATIC TO BONE: Primary | ICD-10-CM

## 2023-02-20 DIAGNOSIS — C61 PROSTATE CANCER METASTATIC TO BONE: Primary | ICD-10-CM

## 2023-02-20 DIAGNOSIS — C79.51 PROSTATE CANCER METASTATIC TO BONE: ICD-10-CM

## 2023-02-20 DIAGNOSIS — C79.51 OSSEOUS METASTASIS: Primary | ICD-10-CM

## 2023-02-20 LAB
BASOPHILS # BLD AUTO: 0.02 10*3/MM3 (ref 0–0.2)
BASOPHILS NFR BLD AUTO: 0.4 % (ref 0–1.5)
DEPRECATED RDW RBC AUTO: 48.1 FL (ref 37–54)
EOSINOPHIL # BLD AUTO: 0.14 10*3/MM3 (ref 0–0.4)
EOSINOPHIL NFR BLD AUTO: 2.7 % (ref 0.3–6.2)
ERYTHROCYTE [DISTWIDTH] IN BLOOD BY AUTOMATED COUNT: 14.9 % (ref 12.3–15.4)
HCT VFR BLD AUTO: 31.8 % (ref 37.5–51)
HGB BLD-MCNC: 10.6 G/DL (ref 13–17.7)
IMM GRANULOCYTES # BLD AUTO: 0.06 10*3/MM3 (ref 0–0.05)
IMM GRANULOCYTES NFR BLD AUTO: 1.1 % (ref 0–0.5)
LYMPHOCYTES # BLD AUTO: 1.09 10*3/MM3 (ref 0.7–3.1)
LYMPHOCYTES NFR BLD AUTO: 20.9 % (ref 19.6–45.3)
MCH RBC QN AUTO: 29.5 PG (ref 26.6–33)
MCHC RBC AUTO-ENTMCNC: 33.3 G/DL (ref 31.5–35.7)
MCV RBC AUTO: 88.6 FL (ref 79–97)
MONOCYTES # BLD AUTO: 0.52 10*3/MM3 (ref 0.1–0.9)
MONOCYTES NFR BLD AUTO: 10 % (ref 5–12)
NEUTROPHILS NFR BLD AUTO: 3.39 10*3/MM3 (ref 1.7–7)
NEUTROPHILS NFR BLD AUTO: 64.9 % (ref 42.7–76)
NRBC BLD AUTO-RTO: 0 /100 WBC (ref 0–0.2)
PLATELET # BLD AUTO: 187 10*3/MM3 (ref 140–450)
PMV BLD AUTO: 10.1 FL (ref 6–12)
RAD ONC ARIA COURSE ID: NORMAL
RAD ONC ARIA COURSE INTENT: NORMAL
RAD ONC ARIA COURSE LAST TREATMENT DATE: NORMAL
RAD ONC ARIA COURSE START DATE: NORMAL
RAD ONC ARIA COURSE TREATMENT ELAPSED DAYS: 13
RAD ONC ARIA FIRST TREATMENT DATE: NORMAL
RAD ONC ARIA PLAN FRACTIONS TREATED TO DATE: 10
RAD ONC ARIA PLAN ID: NORMAL
RAD ONC ARIA PLAN PRESCRIBED DOSE PER FRACTION: 3 GY
RAD ONC ARIA PLAN PRIMARY REFERENCE POINT: NORMAL
RAD ONC ARIA PLAN TOTAL FRACTIONS PRESCRIBED: 10
RAD ONC ARIA PLAN TOTAL PRESCRIBED DOSE: 3000 CGY
RAD ONC ARIA REFERENCE POINT DOSAGE GIVEN TO DATE: 30 GY
RAD ONC ARIA REFERENCE POINT DOSAGE GIVEN TO DATE: 30.77 GY
RAD ONC ARIA REFERENCE POINT ID: NORMAL
RAD ONC ARIA REFERENCE POINT ID: NORMAL
RAD ONC ARIA REFERENCE POINT SESSION DOSAGE GIVEN: 3 GY
RAD ONC ARIA REFERENCE POINT SESSION DOSAGE GIVEN: 3.08 GY
RBC # BLD AUTO: 3.59 10*6/MM3 (ref 4.14–5.8)
WBC NRBC COR # BLD: 5.22 10*3/MM3 (ref 3.4–10.8)

## 2023-02-20 PROCEDURE — 36415 COLL VENOUS BLD VENIPUNCTURE: CPT

## 2023-02-20 PROCEDURE — 99214 OFFICE O/P EST MOD 30 MIN: CPT | Performed by: INTERNAL MEDICINE

## 2023-02-20 PROCEDURE — 77386: CPT | Performed by: RADIOLOGY

## 2023-02-20 PROCEDURE — 77386 CHG INTENSITY MODULATED RADIATION TX DLVR COMPLEX: CPT | Performed by: RADIOLOGY

## 2023-02-20 PROCEDURE — 85025 COMPLETE CBC W/AUTO DIFF WBC: CPT

## 2023-02-20 NOTE — PROGRESS NOTES
Subjective     No ref. provider found    Cancer Staging   Physician Weekly Management Note    Diagnosis:     Diagnosis Plan   1. Prostate cancer metastatic to bone (HCC)            RT Details:  fx 10/10 lumbosacral spine 30/30Gy    Notes on Treatment course, Films, Medical progress:  kps 80% patient asked to see doctor today due to diarrhea over the weekend 4-5bms per day, started using immodium yesterday which he thinks is helping, reports very mild nausea.  I offered to send in lomotil and zofran for the nausea but he wants to hold off for now.  He is seeing dr. Lee later this morning and wants to discuss with him. bp elevated in clinic today.  advised patient to take bp at home later but if it remains this elevated he need to contact his pcp    Weekly Management:  Medication reviewed?   Yes  New medications given?   No  Problemlist reviewed?   Yes  Problem added?   No  Issues raised requiring referral to support services - task assigned to:  rachel    Technical aspects reviewed:  Weekly OBI approved?   Yes  Weekly port films approved?   Yes  Change requests noted on port film?   No  Patient setup and plan reviewed?   Yes    Chart Reviewed:  Continue current treatment plan?   Yes  Treatment plan change requested?   No  CBC reviewed?   No  Concurrent Chemo?   No    Objective     Toxicities:   Diarrhea, grade 1     Review of Systems   Constitutional: Positive for fatigue.   Gastrointestinal: Positive for diarrhea and nausea.   Psychiatric/Behavioral: Negative.           Vitals:    02/20/23 0838   BP: (!) 205/74   Pulse: 55   SpO2: 96%       Current Status 1/11/2023   ECOG score 0       Physical Exam  Constitutional:       Appearance: Normal appearance.   Neurological:      Mental Status: He is alert. Mental status is at baseline.   Psychiatric:         Mood and Affect: Mood normal.             Problem Summary List    Diagnosis:     Diagnosis Plan   1. Prostate cancer metastatic to bone (HCC)          Pathology:    Prostate mets to bone    Past Medical History:   Diagnosis Date   • Aortic stenosis, mild 01/01/2021    Per echocardiogram 2021   • Ascending aorta dilatation (HCC)    • Asthma    • Benign prostatic hyperplasia    • Bone cancer (HCC)    • Cellulitis of great toe of left foot 10/19/2018   • CKD (chronic kidney disease)     Stage 3; followed by Dr. Vicky Duran    • Diastolic dysfunction     GRADE II per echo 2018   • Difficulty breathing     during exertion   • Dyslipidemia    • Erectile dysfunction    • Fatigue    • Heart murmur    • History of blood transfusion    • History of kidney stones    • HL (hearing loss)    • Hyperlipidemia    • Hypertension    • Hyponatremia    • Kidney stone    • Left ventricular hypertrophy     per echo 2018   • Localized edema    • Neuropathy    • Obstructive sleep apnea     USING CPAP   • Osteoarthritis    • Peptic ulcer    • Pneumonia    • Pneumonia of left upper lobe due to infectious organism 03/15/2019   • Prostate cancer (HCC)     Status post prostatectomy, radiation therapy, and hormone therapy followed by Dr. Lee; metastatsis to bone   • Pure hyperglyceridemia    • Restless leg syndrome    • Sepsis (HCC)    • Sinus bradycardia    • Transient cerebral ischemia    • Type 2 diabetes mellitus (HCC)          Past Surgical History:   Procedure Laterality Date   • BRONCHOSCOPY N/A 04/09/2018    Procedure: BRONCHOSCOPY;  Surgeon: Bijan Rivera III, MD;  Location: [facility];  Service: Cardiothoracic   • COLONOSCOPY     • DEEP NECK LYMPH NODE BIOPSY / EXCISION     • HEMORRHOIDECTOMY     • LYMPH NODE BIOPSY     • MEDIASTINOSCOPY N/A 04/09/2018    Procedure: MEDIASTINOSCOPY WITH LYMPH NODE BIOPSY;  Surgeon: Bijan Rivera III, MD;  Location: [facility];  Service: Cardiothoracic   • OTHER SURGICAL HISTORY      ulcer repair   • PROSTATECTOMY  2006         Current Outpatient Medications on File Prior to Visit   Medication Sig Dispense Refill   • Accu-Chek Guide test strip  USE 1 STRIP TO CHECK BLOOD SUGAR 4 TIMES DAILY     • Accu-Chek Softclix Lancets lancets USE 1 LANCET TO CHECK GLUCOSE 4 TIMES DAILY     • aspirin 81 MG EC tablet Take 1 tablet by mouth daily.     • cholecalciferol (VITAMIN D3) 1000 units tablet Take 2,000 Units by mouth Daily.     • Darolutamide 300 MG tablet Take 1 tablet by mouth 2 (Two) Times a Day. 60 tablet 0   • Denosumab (XGEVA SC) Inject  under the skin into the appropriate area as directed Every 30 (Thirty) Days.     • dilTIAZem CD (CARDIZEM CD) 120 MG 24 hr capsule TAKE 1 CAPSULE EVERY DAY 90 capsule 3   • furosemide (LASIX) 40 MG tablet Take 40 mg by mouth Every Morning.     • gabapentin (NEURONTIN) 300 MG capsule TAKE 3 CAPSULES EVERY NIGHT AT BEDTIME 90 capsule 1   • HYDROcodone-acetaminophen (NORCO) 5-325 MG per tablet Take 1 tablet by mouth Every 6 (Six) Hours As Needed for Moderate Pain. 120 tablet 0   • Insulin Aspart (novoLOG) 100 UNIT/ML injection INJECT 100 UNITS VIA PUMP ONCE DAILY     • Leuprolide Acetate, 3 Month, (LUPRON DEPOT, 3-MONTH, IM) Inject  into the appropriate muscle as directed by prescriber Every 3 (Three) Months.     • levothyroxine (SYNTHROID, LEVOTHROID) 88 MCG tablet      • loperamide (IMODIUM) 1 MG/5ML solution Take 2 mg by mouth 4 (Four) Times a Day As Needed for Diarrhea.     • meloxicam (MOBIC) 15 MG tablet Take 1 tablet by mouth Daily. 30 tablet 1   • modafinil (PROVIGIL) 200 MG tablet Take 1 tablet by mouth Daily. 30 tablet 0   • olmesartan (BENICAR) 40 MG tablet TAKE 1 TABLET EVERY DAY (DISCONTINUE LOSARTAN 100MG) 90 tablet 3   • ondansetron (ZOFRAN) 8 MG tablet Take 1 tablet by mouth 3 (Three) Times a Day As Needed for Nausea or Vomiting. 30 tablet 5   • pravastatin (PRAVACHOL) 40 MG tablet Take 40 mg by mouth daily.     • rOPINIRole (REQUIP) 0.5 MG tablet Take 2 tablets by mouth in the evening and 2 at bedtime. 360 tablet 2   • traZODone (DESYREL) 50 MG tablet Take  mg by mouth every night at bedtime.       No  current facility-administered medications on file prior to visit.       Allergies   Allergen Reactions   • Niacin Unknown - Low Severity   • Statins Unknown - Low Severity         Referring Provider:    No referring provider defined for this encounter.    Oncologist:  No primary care provider on file.      Seen and approved by:  Lanette Morris MD  02/20/2023

## 2023-02-20 NOTE — PROGRESS NOTES
MTM telephone encounter re:adherence and side effects (Nubeqa)     Semaj reports starting Nubeqa 10 days ago. Thus far, patient is having trouble with diarrhea.  He reports using Imodium for this with little improvement.  He was seen in the office this morning by Dr. Lee and discussed the side effect with him.  Patient states he was also receiving radiation at the time and completed therapy today.  Will hold Nubeqa for 2 weeks to see if diarrhea is radiation or medication related.  Patient will continue to use Imodium PRN for symptoms and call our office if things do not improve in 24-48 hours.  Patient expressed understanding and had no additional questions at this time.       Sadie Harris [facility]  2/20/2023  13:00 EST

## 2023-02-20 NOTE — PROGRESS NOTES
Radiation Treatment Summary Note      Patient Name: Semaj Herrera  : 1940    Attending Provider: Wang Harper MD      Diagnosis:     ICD-10-CM ICD-9-CM   1. Prostate cancer metastatic to bone (HCC)  C61 185    C79.51 198.5       Radiation Start Date: 2023    Radiation Completion Date: 2023      Prescription:     Site: L spine and Left Pelvis  Laterality: Left  Total Dose: 3000cGy  Dose per Fraction: 300cGy  Total Fractions: 10  Daily or BID: Daily  Modality: Photon  Technique: IMRT/VMAT/Rapid Arc  Bolus: No      Final Delivered Dose Deviated From Initially Prescribed Dose: No    Concurrent Chemotherapy: No    Patient Tolerated Treatment Without Unexpected Side Effects/Complications: Yes    ECOG: Restricted in physically strenuous activity but ambulatory and able to carry out work of a light or sedentary nature, e.g., light house work, office work = 1    Pain Management Plan: Opioid or other pain medication prescribed by other provider    Follow-Up Plan: PRN    Imaging Ordered for Follow-Up: None/NA        Wang Harper MD

## 2023-02-21 DIAGNOSIS — K52.1 DIARRHEA DUE TO DRUG: Primary | ICD-10-CM

## 2023-02-21 RX ORDER — DIPHENOXYLATE HYDROCHLORIDE AND ATROPINE SULFATE 2.5; .025 MG/1; MG/1
1 TABLET ORAL 4 TIMES DAILY PRN
Qty: 30 TABLET | Refills: 0 | Status: SHIPPED | OUTPATIENT
Start: 2023-02-21

## 2023-02-23 ENCOUNTER — SPECIALTY PHARMACY (OUTPATIENT)
Dept: PHARMACY | Facility: HOSPITAL | Age: 83
End: 2023-02-23
Payer: MEDICARE

## 2023-02-23 LAB
RAD ONC ARIA COURSE END DATE: NORMAL
RAD ONC ARIA COURSE ID: NORMAL
RAD ONC ARIA COURSE INTENT: NORMAL
RAD ONC ARIA COURSE LAST TREATMENT DATE: NORMAL
RAD ONC ARIA COURSE START DATE: NORMAL
RAD ONC ARIA COURSE TREATMENT ELAPSED DAYS: 13
RAD ONC ARIA FIRST TREATMENT DATE: NORMAL
RAD ONC ARIA PLAN FRACTIONS TREATED TO DATE: 10
RAD ONC ARIA PLAN ID: NORMAL
RAD ONC ARIA PLAN NAME: NORMAL
RAD ONC ARIA PLAN PRESCRIBED DOSE PER FRACTION: 3 GY
RAD ONC ARIA PLAN PRIMARY REFERENCE POINT: NORMAL
RAD ONC ARIA PLAN TOTAL FRACTIONS PRESCRIBED: 10
RAD ONC ARIA PLAN TOTAL PRESCRIBED DOSE: 3000 CGY
RAD ONC ARIA REFERENCE POINT DOSAGE GIVEN TO DATE: 30 GY
RAD ONC ARIA REFERENCE POINT DOSAGE GIVEN TO DATE: 30.77 GY
RAD ONC ARIA REFERENCE POINT ID: NORMAL
RAD ONC ARIA REFERENCE POINT ID: NORMAL

## 2023-02-23 NOTE — PROGRESS NOTES
Called [facility] to cancel the Prostate Cancer Fund.  He is on free drug Nubeqa and no longer needs the nathen.

## 2023-02-24 ENCOUNTER — TELEPHONE (OUTPATIENT)
Dept: ONCOLOGY | Facility: CLINIC | Age: 83
End: 2023-02-24
Payer: MEDICARE

## 2023-02-24 DIAGNOSIS — C79.51 PROSTATE CANCER METASTATIC TO BONE: ICD-10-CM

## 2023-02-24 DIAGNOSIS — C61 PROSTATE CANCER METASTATIC TO BONE: ICD-10-CM

## 2023-02-24 DIAGNOSIS — C79.51 OSSEOUS METASTASIS: Primary | ICD-10-CM

## 2023-02-24 NOTE — TELEPHONE ENCOUNTER
Called pt and informed him of note below. He v/u. He also wanted to let us know his diarrhea has improved. Pt will keep apts as scheduled.     ----- Message from Joes Lee MD sent at 2/23/2023  9:28 PM EST -----   It looks like he has been receiving both injections every 3 months.  This would work as well.  Since we have been using this consistently please place the current plan to every 3 months for both drugs.  I would also like to notify the patient and his son.  If you could let them know please I will amend the note concerning this.  Thanks, ASAEL

## 2023-02-27 ENCOUNTER — TELEPHONE (OUTPATIENT)
Dept: OTHER | Facility: HOSPITAL | Age: 83
End: 2023-02-27
Payer: MEDICARE

## 2023-02-27 NOTE — OUTREACH NOTE
Medical Week 3 Survey      Responses   Facility patient discharged from?  [facility]   Does the patient have one of the following disease processes/diagnoses(primary or secondary)?  Other   Week 3 attempt successful?  No   Unsuccessful attempts  Attempt 2          Rosalie Hood RN   Statement Selected

## 2023-02-27 NOTE — TELEPHONE ENCOUNTER
Oncology Social Work    OSW attempted to call patient related to high distress score and explantation of social work support. No answer and no voicemail set up.   OSW will follow up at a later time.     Nirali WILLETT

## 2023-03-01 DIAGNOSIS — C79.51 PROSTATE CANCER METASTATIC TO BONE: ICD-10-CM

## 2023-03-01 DIAGNOSIS — C61 PROSTATE CANCER METASTATIC TO BONE: ICD-10-CM

## 2023-03-01 RX ORDER — HYDROCODONE BITARTRATE AND ACETAMINOPHEN 5; 325 MG/1; MG/1
1 TABLET ORAL EVERY 6 HOURS PRN
Qty: 120 TABLET | Refills: 0 | Status: SHIPPED | OUTPATIENT
Start: 2023-03-01 | End: 2023-03-30 | Stop reason: SDUPTHER

## 2023-03-06 ENCOUNTER — DOCUMENTATION (OUTPATIENT)
Dept: OTHER | Facility: HOSPITAL | Age: 83
End: 2023-03-06
Payer: MEDICARE

## 2023-03-06 NOTE — PROGRESS NOTES
Oncology Social Work    Distress Level: 5 (2/9/2023  2:00 PM)    OSW reach out to patient related to high distress score and to offer supportive serivces via telephone. OSW spoke with the patient and he reported that he is doing fine. He declined OSW / supportive services at this time. OSW will mail patient OSW contact information if he needs in the future.   No additional follow-up is needed.     Nirali LUKEW

## 2023-03-08 ENCOUNTER — SPECIALTY PHARMACY (OUTPATIENT)
Dept: PHARMACY | Facility: HOSPITAL | Age: 83
End: 2023-03-08
Payer: MEDICARE

## 2023-03-08 NOTE — PROGRESS NOTES
Excellent, thanks, ASAEL.  I also wanted to notify the patient that his Xgeva and Lupron treatments are given together every 3 months.  I had called he and his son and left a message but I am not sure they got the information.  Please let them know of this?  ASAEL

## 2023-03-08 NOTE — PROGRESS NOTES
[facility] Encounter -- Nubeqa    Mr Herrera called [facility] office today requesting direction on when to restart medication.  Patient states Dr. Lee instructed him to hold medication a few weeks ago due to side effects from radiation.  Patient reports diarrhea has resolved and BM are back to normal. He no longer requires Imodium PRN.  Reviewed Dr. Lee's last note and discussed with patient, plan was to hold for 2 weeks then resume at dose of 1 tablet (300 mg) twice daily.  Patient agreeable to this plan. Discussed he will resume treatment tomorrow.  Patient expressed understanding and had no additional questions at this time.   Encouraged patient to call our office with any additional question or concerns.     Sunday Harris, PharmD, BCOP

## 2023-03-16 ENCOUNTER — SPECIALTY PHARMACY (OUTPATIENT)
Dept: PHARMACY | Facility: HOSPITAL | Age: 83
End: 2023-03-16
Payer: MEDICARE

## 2023-03-16 NOTE — PROGRESS NOTES
MTM telephone encounter re:adherence and side effects (Nubeqa)     Semaj reports restarting Nubeqa last week. Thus far, patient denies side effects. Patient reports no diarrhea or loose stools since resuming medication.  Patient states he did fall in the yard recently, but does not believe this is related to the medication.  He states he feels fine but is a little sore from this.  Advised pt to call office if any changes. Patient expressed understanding and had no additional questions at this time.       Sadie Harris RPH  3/16/2023  13:51 EDT

## 2023-03-20 ENCOUNTER — LAB (OUTPATIENT)
Dept: LAB | Facility: HOSPITAL | Age: 83
End: 2023-03-20
Payer: MEDICARE

## 2023-03-20 ENCOUNTER — INFUSION (OUTPATIENT)
Dept: ONCOLOGY | Facility: HOSPITAL | Age: 83
End: 2023-03-20
Payer: MEDICARE

## 2023-03-20 ENCOUNTER — APPOINTMENT (OUTPATIENT)
Dept: ONCOLOGY | Facility: HOSPITAL | Age: 83
End: 2023-03-20
Payer: MEDICARE

## 2023-03-20 ENCOUNTER — OFFICE VISIT (OUTPATIENT)
Dept: ONCOLOGY | Facility: CLINIC | Age: 83
End: 2023-03-20
Payer: MEDICARE

## 2023-03-20 VITALS
OXYGEN SATURATION: 98 % | RESPIRATION RATE: 18 BRPM | SYSTOLIC BLOOD PRESSURE: 188 MMHG | HEART RATE: 58 BPM | DIASTOLIC BLOOD PRESSURE: 77 MMHG | WEIGHT: 193.8 LBS | TEMPERATURE: 96.6 F | BODY MASS INDEX: 33.09 KG/M2 | HEIGHT: 64 IN

## 2023-03-20 DIAGNOSIS — C79.51 PROSTATE CANCER METASTATIC TO BONE: Primary | ICD-10-CM

## 2023-03-20 DIAGNOSIS — D64.9 ANEMIA, UNSPECIFIED TYPE: ICD-10-CM

## 2023-03-20 DIAGNOSIS — C79.51 OSSEOUS METASTASIS: ICD-10-CM

## 2023-03-20 DIAGNOSIS — C61 PROSTATE CANCER METASTATIC TO BONE: Primary | ICD-10-CM

## 2023-03-20 DIAGNOSIS — K52.1 DIARRHEA DUE TO DRUG: ICD-10-CM

## 2023-03-20 DIAGNOSIS — C61 PROSTATE CANCER METASTATIC TO BONE: ICD-10-CM

## 2023-03-20 DIAGNOSIS — C79.51 PROSTATE CANCER METASTATIC TO BONE: ICD-10-CM

## 2023-03-20 LAB
ALBUMIN SERPL-MCNC: 4 G/DL (ref 3.5–5.2)
ALBUMIN/GLOB SERPL: 1.2 G/DL (ref 1.1–2.4)
ALP SERPL-CCNC: 315 U/L (ref 38–116)
ALT SERPL W P-5'-P-CCNC: 9 U/L (ref 0–41)
ANION GAP SERPL CALCULATED.3IONS-SCNC: 12.1 MMOL/L (ref 5–15)
AST SERPL-CCNC: 16 U/L (ref 0–40)
BASOPHILS # BLD AUTO: 0.02 10*3/MM3 (ref 0–0.2)
BASOPHILS NFR BLD AUTO: 0.4 % (ref 0–1.5)
BILIRUB SERPL-MCNC: 0.3 MG/DL (ref 0.2–1.2)
BUN SERPL-MCNC: 18 MG/DL (ref 6–20)
BUN/CREAT SERPL: 17.1 (ref 7.3–30)
CALCIUM SPEC-SCNC: 9.5 MG/DL (ref 8.5–10.2)
CHLORIDE SERPL-SCNC: 105 MMOL/L (ref 98–107)
CO2 SERPL-SCNC: 24.9 MMOL/L (ref 22–29)
CREAT SERPL-MCNC: 1.05 MG/DL (ref 0.7–1.3)
DEPRECATED RDW RBC AUTO: 50.9 FL (ref 37–54)
EGFRCR SERPLBLD CKD-EPI 2021: 70.9 ML/MIN/1.73
EOSINOPHIL # BLD AUTO: 0.12 10*3/MM3 (ref 0–0.4)
EOSINOPHIL NFR BLD AUTO: 2.3 % (ref 0.3–6.2)
ERYTHROCYTE [DISTWIDTH] IN BLOOD BY AUTOMATED COUNT: 15.5 % (ref 12.3–15.4)
FERRITIN SERPL-MCNC: 156.1 NG/ML (ref 30–400)
FOLATE SERPL-MCNC: 6.03 NG/ML (ref 4.78–24.2)
GLOBULIN UR ELPH-MCNC: 3.3 GM/DL (ref 1.8–3.5)
GLUCOSE SERPL-MCNC: 154 MG/DL (ref 74–124)
HCT VFR BLD AUTO: 31.3 % (ref 37.5–51)
HGB BLD-MCNC: 9.9 G/DL (ref 13–17.7)
IMM GRANULOCYTES # BLD AUTO: 0.08 10*3/MM3 (ref 0–0.05)
IMM GRANULOCYTES NFR BLD AUTO: 1.6 % (ref 0–0.5)
IRON 24H UR-MRATE: 92 MCG/DL (ref 59–158)
IRON SATN MFR SERPL: 25 % (ref 14–48)
LYMPHOCYTES # BLD AUTO: 0.95 10*3/MM3 (ref 0.7–3.1)
LYMPHOCYTES NFR BLD AUTO: 18.4 % (ref 19.6–45.3)
MAGNESIUM SERPL-MCNC: 2.1 MG/DL (ref 1.8–2.5)
MCH RBC QN AUTO: 29 PG (ref 26.6–33)
MCHC RBC AUTO-ENTMCNC: 31.6 G/DL (ref 31.5–35.7)
MCV RBC AUTO: 91.8 FL (ref 79–97)
MONOCYTES # BLD AUTO: 0.46 10*3/MM3 (ref 0.1–0.9)
MONOCYTES NFR BLD AUTO: 8.9 % (ref 5–12)
NEUTROPHILS NFR BLD AUTO: 3.53 10*3/MM3 (ref 1.7–7)
NEUTROPHILS NFR BLD AUTO: 68.4 % (ref 42.7–76)
NRBC BLD AUTO-RTO: 0 /100 WBC (ref 0–0.2)
PHOSPHATE SERPL-MCNC: 2.8 MG/DL (ref 2.5–4.5)
PLATELET # BLD AUTO: 193 10*3/MM3 (ref 140–450)
PMV BLD AUTO: 9 FL (ref 6–12)
POTASSIUM SERPL-SCNC: 4.5 MMOL/L (ref 3.5–4.7)
PROT SERPL-MCNC: 7.3 G/DL (ref 6.3–8)
PSA SERPL-MCNC: 29.8 NG/ML (ref 0–4)
RBC # BLD AUTO: 3.41 10*6/MM3 (ref 4.14–5.8)
SODIUM SERPL-SCNC: 142 MMOL/L (ref 134–145)
TIBC SERPL-MCNC: 367 MCG/DL (ref 249–505)
TRANSFERRIN SERPL-MCNC: 262 MG/DL (ref 200–360)
VIT B12 BLD-MCNC: 256 PG/ML (ref 211–946)
WBC NRBC COR # BLD: 5.16 10*3/MM3 (ref 3.4–10.8)

## 2023-03-20 PROCEDURE — 82746 ASSAY OF FOLIC ACID SERUM: CPT | Performed by: NURSE PRACTITIONER

## 2023-03-20 PROCEDURE — 96402 CHEMO HORMON ANTINEOPL SQ/IM: CPT

## 2023-03-20 PROCEDURE — 82728 ASSAY OF FERRITIN: CPT

## 2023-03-20 PROCEDURE — 82607 VITAMIN B-12: CPT | Performed by: NURSE PRACTITIONER

## 2023-03-20 PROCEDURE — 1159F MED LIST DOCD IN RCRD: CPT | Performed by: NURSE PRACTITIONER

## 2023-03-20 PROCEDURE — 1160F RVW MEDS BY RX/DR IN RCRD: CPT | Performed by: NURSE PRACTITIONER

## 2023-03-20 PROCEDURE — 1125F AMNT PAIN NOTED PAIN PRSNT: CPT | Performed by: NURSE PRACTITIONER

## 2023-03-20 PROCEDURE — 83735 ASSAY OF MAGNESIUM: CPT

## 2023-03-20 PROCEDURE — 36415 COLL VENOUS BLD VENIPUNCTURE: CPT

## 2023-03-20 PROCEDURE — 3077F SYST BP >= 140 MM HG: CPT | Performed by: NURSE PRACTITIONER

## 2023-03-20 PROCEDURE — 80053 COMPREHEN METABOLIC PANEL: CPT

## 2023-03-20 PROCEDURE — 85025 COMPLETE CBC W/AUTO DIFF WBC: CPT

## 2023-03-20 PROCEDURE — 3078F DIAST BP <80 MM HG: CPT | Performed by: NURSE PRACTITIONER

## 2023-03-20 PROCEDURE — 83540 ASSAY OF IRON: CPT | Performed by: NURSE PRACTITIONER

## 2023-03-20 PROCEDURE — 84100 ASSAY OF PHOSPHORUS: CPT

## 2023-03-20 PROCEDURE — 99214 OFFICE O/P EST MOD 30 MIN: CPT | Performed by: NURSE PRACTITIONER

## 2023-03-20 PROCEDURE — 84466 ASSAY OF TRANSFERRIN: CPT | Performed by: NURSE PRACTITIONER

## 2023-03-20 PROCEDURE — 96372 THER/PROPH/DIAG INJ SC/IM: CPT

## 2023-03-20 PROCEDURE — 25010000002 LEUPROLIDE ACETATE (3 MONTH) PER 7.5 MG: Performed by: NURSE PRACTITIONER

## 2023-03-20 PROCEDURE — 84153 ASSAY OF PSA TOTAL: CPT | Performed by: INTERNAL MEDICINE

## 2023-03-20 PROCEDURE — 25010000002 DENOSUMAB 120 MG/1.7ML SOLUTION: Performed by: NURSE PRACTITIONER

## 2023-03-20 RX ORDER — MELOXICAM 15 MG/1
15 TABLET ORAL DAILY
Qty: 30 TABLET | Refills: 1 | Status: SHIPPED | OUTPATIENT
Start: 2023-03-20

## 2023-03-20 RX ADMIN — DENOSUMAB 120 MG: 120 INJECTION SUBCUTANEOUS at 11:32

## 2023-03-20 RX ADMIN — LEUPROLIDE ACETATE 22.5 MG: KIT at 11:29

## 2023-03-20 NOTE — PROGRESS NOTES
Subjective      REASONS FOR FOLLOW UP:   1.  Metastatic prostate cancer    HISTORY OF PRESENT ILLNESS:   The patient is a 82 y.o. male with the above mentioned history who returns to the office today for follow up, Lupron and Diandra. When he was last evaluated by Dr. Lee, he was struggling with significant diarrhea. It is unclear if this was secondary to radiation or Daroluatmide. He was instructed to hold his medication times 2 weeks, and resume at a reduced dose of 300mg twice a day. He has been back on Daroluatmidefor 10 days now at a reduced dose and is tolerating this well. He is without diarrhea. He has occasional soft bowels though this is controlled with imodium and occasion lomotil as needed. He has fatigue though does not feel this is changed from his baseline. He is eating and drinking well. He has no new pain. He is without shortness of breath or chest pain.     Hematologic/oncologic history:   The patient is an 82-year-old male with significant past medical history including prostate carcinoma, CKD 3 and long-term tobacco use be been seen by primary care in late January, 2018 for hoarseness.  Chest x-ray is now outpatient January 23 revealed sclerotic change in the posterior mid chest to be within 3 adjacent thoracic ertebral bodies of uncertain significance.  A follow-up chest CT revealed numerous sclerotic foci within al visualized bones consistent with metastatic disease, multiple enlarged mediastinal lymph nodes concerning for metastatic lymphadenopathy and a polypoidal abnormality in the right lateral wall of the trachea.  CT of abdomen March 20 revealed extensive osseous metastatic disease mildly enlarged retroperitoneal lymph nodes.  Bone scan March 20 was consistent with widespread osseous metastasis particularly in the proximal half the left humeral shaft, abnormal uptake in the sternum and manubrium and a small focus in the posterior aspect of the calvarium.  This led to a plain film the  left humerus with mottled sclerosis involving the shaft of the humerus particularly in the proximal half but no evidence of pathologic fracture.    The patient has additionally type 2 diabetes on insulin pump, again a history of prostate carcinoma prostatectomy 12 years previously, hypertension, and hyperlipidemia.  Additional studies included a PSA of 395.1 from March 14, 2018.The patient's been seen by urology March 15 with plans to start Firmagon.    The patient is now seen in pulmonary clinic with Dr. Bijan Rivera and we have discussed that he will need bronchoscopy and mediastinoscopy.  Interestingly his additional history includes a long occupational exposure including working in the [place] just out of school, subsequent construction work for many years and a 30-pack-year history of smoking stopping in the late 1990s.  He indicates that he followed with Dr. Guillen of urology for many years with no changes in his exams and normal PSAs.  He stopped this follow-up approximately 2 years ago.     Patient was seen in consultation with thoracic surgery on March 28 and plans are made for mediastinoscopy and bronchoscopy with lymph node biopsy.  Pathology revealed that these nodes were consistent with metastatic prostate carcinoma.  He was discussed at thoracic conference.  A PET/CT was not pursued and it was determined that he see medical oncology for hormonal treatment and radiation therapy if symptomatic.  It should be noted that the patient's March 15 [facility] office note describes that Firmagon was planned.     The patient has met with the son and grandson April 23.  We have also contacted Dr. Taylor of urology in that Firmagon was given just after his last visit and would next be due approximately May 3.  Patient feels considerably better with resolution of his pain, normalization of his performance status.  He would like to continue treatment through our office now contacted   Brandon concerning this.    The patient is next seen June 11, 2018 we discussed his follow-up including his treatments with Lupron May 7 and his next treatment on July 30.  He has returned to essentially normal performance status and his PSA has dropped further from 395 March 14 27.8 May 7 and now 2.03 June 11.  We do plan to initiate Xgeva also study him via scans to document his improvement approximately a month from now.   The patient is next seen July 26, 2018.  Repeat imaging demonstrated interval resolution of mediastinal and retroperitoneal lymphadenopathy.  There is thickening in the distal aspect of the appendix with stranding?  Chronic appendicitis.  The patient indicates he is having no such symptoms and never has.  There is no change in sclerotic bony metastasis in the patient's PSA has dropped substantially to 1.66 by July 19 and 1.79 July 26.  He is actually feeling excellent and this is corroborated by family members.   Patient is next seen January 10, 2019 continuing to do very well and, in fact indicating that [facility], likely hearing replacements, visual review.  He is not certain is going to continue his care with them or with us or combination of both.    Patient is next seen April 4, 2019.  He is recently discharged after hospitalization March 15-18 with left upper lobe pneumonia.  We reviewed the findings in detail with his gradual recovery now documented.  His studies include a CT of chest which does not demonstrate any further bony lesions and/or pulmonary metastasis having developed.  He is feeling considerably better and approximately back to his baseline.  The patient is next seen June 28, 2019 feeling well but having baseline generally musculoskeletal pain and restless legs.His recent exams include a PSA of 0.81, CMP with potassium of 5.3 with repeat pending.  His performance status overall remains good to very good and is clearly  responding to his treatments.  The patient is next seen December 13, 2019 continue to feel well.  He has had, oddly enough, 2 episodes of sleep paralysis which have occurred to him previously and he wonders if there is any connection with his prostate carcinoma though this is felt unlikely.     The patient when assessed in December had his PSA go to 2.9.  We continue with Lupron and Xgeva and is seen back now March 9, 2020 without additional symptoms.  We discussed that a schedule could likely change moving to 3 months for both of these medications particularly if he needs additional therapy directed at progressive disease.     Patient contacted our practice May 4 concern for pain in his hips.  This led to moving his scans up and they were performed May 8, 2020 showing a sub-6 mm pleural-based pulmonary nodule in the right lower lobe that is new from March 15, 2018, remainder of the sub-6 mm pulmonary nodules bilaterally are stable.  There is extensive osseous metastatic disease as previous with no other new findings.     The patient indicates, when seen, May 13 that his bone pain is now resolved.  While this is good information does not mean that he does not have further bony metastasis and we have discussed that a follow-up bone scan is likely necessary.  Furthermore he is having some difficulty with sleeplessness and increase in restless legs as he continues to stay at home during the COVID 19 crisis which, itself, is producing more anxiety for him.  A follow-up bone scan was obtained Lety 3 revealing multifocal osseous metastatic disease which was compared to March 2, 2018 with improvement.  No new abnormal uptake is present.  He is next seen with us on June 17, 2020 and, fortunately, is not having any significant pain at this point.  We discussed his scans in detail and, on balance, his performance status also remains improved.     It was elected to follow the patient rather than changes antiandrogen therapy.   He is reassessed September 3 with unchanged CMP, H&H of 11.4 and 36.3 with normal white count, platelet count and differential, PSA at 15.8.  Follow-up PA lateral chest x-ray does not show any new abnormalities though there is extensive metastatic bone disease throughout the bony thorax and osteoblastic metastasis have been seen on chest CT May 2020.  The patient is seen with his son Georges September 10 noticing increasing bony discomfort primarily in the right hip following fairly intensive gardening which he does frequently.  Now has further elevation of PSA were wondering whether we should try to intervene sooner per additional antiandrogens.  We will need to further assess him via CT scanning to make this decision     These were obtained October 1 showing extensive sclerotic disease with no metastatic disease in chest abdomen or pelvis.  PSA had gone from 15.8 September 3-16 0.6 October 1.     He still has pain getting up in the morning and after active gardening.  This is manageable with current pain medications and, at present, it is not apparent that he needs to switch medications to gain better control of his disease.  The patient did have follow-up studies done including a PSA November 25, 2020 now at 25.4.  The patient is seen with his son December 10, 2020 doing fair but having some increased pain in the mid sternum and right hip that he ascribes to additional exercise/work in managing his garden.  It is apparent however, that his last bone scan was 6 months ago and we do need to reassess him concerning this.     The patient had follow-up bone scan performed January 11, 2021 showing again uptake in the calvarium, humerus, right medial clavicle, sternum, ribs, spine, sacrum, pelvis, right acetabulum, proximal femora and now left frontal bone which appears new.  There is also mild increase in sternal uptake and equivocal increase in the pelvic lesions of all of the sacrum and the right proximal femur.      The patient is seen with his son January 18, 2021 and indicates that he is having pain gradually worsened in his right hip, mid chest that had been an issue previously.  These findings on bone scan are reviewed with both of them as likely the underlying culprit for his discomfort.  He would need change of the systemic therapy but, at present, will ask for radiation therapy palliation initially.  He was previously treated by Dr. Kulkarni many years ago and will ask her to see him.  We will also make application for the current cost of Xtandi or Zytiga.  The patient is not felt to be a candidate for systemic chemotherapy.     The patient was referred for ration therapy as we continued to assess his PSA on current therapy as well as exam the cost of Zytiga or Xtandi.  Radiation therapy (Dr. Kulkarni) saw the patient January 26 and felt that the right femur and sternal lesion could benefit from therapy-3000 cGy in 10 fractions.  The patient took treatment February 1 to February 12 to the above areas and is next seen back March 15.  Fortunately his pain has improved dramatically and he is quite happy about this.  Unfortunately he notes some difficulty with swallowing that is temporary and often leads to increased salivation and mucus production.  Patient was asked to return his next assessed April 12, 2021.  He is able to swallow with less pain but still has excess mucus production and issues with salivation.  His PSA has noted increase but he is having no additional bony discomfort at this point and, additionally, has noted low-grade fevers just under 100 degrees at nighttime?.  His weight, appetite and general performance status have remained good to excellent.  We discussed follow-up studies at this point to determine his status.    Follow-up scans were scheduled CT scans of chest and pelvis 5/4/2021 showing osseous metastasis quite similar to previous examinations in the chest, CT of abdomen pelvis also with no acute  intra-abdominal adenopathy no indication of abdominal pelvic metastatic disease but again widespread osseous metastasis.  His PSA at this point have been slowly increasing to a level of 44 on 4/12/2021.  As the patient is reassessed 5/10/2021 we find, fortunately, that he is not exceeding symptomatic.  It could make reasonable sense at this point to take the mediastinal nodes from his biopsy 4/9/2018, reassess potential targeted therapies, MSI status, and germline analysis.  Fortunately he is not having significant pain or discomfort and is seen with his son as we discussed the above plan.  Notably a follow-up PSA is found to be 42.6.   Patient is next seen in 6/7/2021 for Lupron and Xgeva.  Fortunately he is feeling considerably better according to both he and his son though his stamina has reduced.  He is not having additional pain at this point.  We have also reviewed his genetics assessment which was significant only for a variant of unknown significance.  This is not an actionable abnormality.    As the patient's PSA further escalated increasing to 89 7/7/2021 his subsequent Axumin PET was obtained 8/20 demonstrating extensive osteosclerotic metastatic disease showing reduced uptake-typical finding but no evidence of visceral metastatic disease in the neck chest abdomen pelvis.  These findings are discussed with the patient and his son 8/25/2021 and indicative of his progressive disease-etiology of his rising PSA-though the patient is asymptomatic we have discussed moving to initiate Xtandi is available at 160 mg p.o. twice daily along with his current Lupron and Xgeva.     The patient's testing 10/6 included PSA that is dropped to 9.69.  He is seen with his son, Cody, both indicating that he been doing relatively well with treatment but began to notice nausea in the last week.  The schedule that he has been given also needs to be somewhat updated in terms of his Lupron and Xgeva.  He has been able to  maintain his appetite and overall performance status to date.    The patient is next assessed 12/8/2021 tolerating his current Xtandi dose better than previous and maintaining a good performance status overall as well as demonstrating a drop in his PSA now to 4.7 on 12/8/2021 compared to high of 112 9/8/2021.  He remains hypophosphatemic and hypocalcemic and we have discussed, again, changing his Xgeva dosing to every 3 months along with his Lupron will continue his Xtandi to 80 mg daily.    The patient is next reviewed 3/21/2022 seen for both Xgeva and Lupron.  Fortunately he is feeling reasonably well with little additional pain though he has restarted to place his garden into shape for the season and is working more outdoors.    Patient reevaluated 5/2/2022 with repeat CBC including H&H of 10.5 and 33.4, white count 5980 and platelet count of 192,000, currently CMP and PSA pending with a previous PSA 1 month ago at 9.06.  At this point we increased his Xtandi to 160 mg p.o. daily while continuing treatment with every 3 month Xgeva and Lupron.  Notably the patient's Requip  alcohol I am sure it is mariza now at 1.5 mg p.o. nightly and Neurontin 900 mg p.o. daily has improved his restless leg syndrome substantially.    Patient seen 6/13/2022 at which time Xtandi at 160 mg p.o. daily was continued, Xgeva moved to every 6 months and Lupron every 3 months.    This was continued when patient was seen 9/21/2022 though subsequently we proceeded to every 3 months for both medications.    Patient seen 12/21/2022 at which time a subsequent Pylarify scan was requested as result of increasing PSA level and bony discomfort.  The patient received Lupron and Xgeva at this point.     He is seen back with his son 1/11/2023.  The potential treatment options such as Pluvicto (Lutetium Yady 177 vipivotide tetraxetan), now that we know that his PSMA scan is positive, though the patient would have to initially be treated with taxane  chemotherapy which would be difficult for him to tolerate, radium-223 considering his bony metastasis.  He has slowly progressive disease however we have discussed additional issues including radiation therapy and/or alteration to another androgen receptor agonist such as darolutamide.  We have discussed all these options moving to have assessments done by radiation therapy and, [facility] as we address his symptoms.    The patient was not felt a candidate for additional treatments via [facility], was able to start darolutamide and completed palliative radiotherapy.  He was stable seen 2/20/2023 though experiencing diarrhea post radiation therapy.    Past Medical History:   Diagnosis Date   • Aortic stenosis, mild 01/01/2021    Per echocardiogram 2021   • Ascending aorta dilatation (HCC)    • Asthma    • Benign prostatic hyperplasia    • Bone cancer (HCC)    • Cellulitis of great toe of left foot 10/19/2018   • CKD (chronic kidney disease)     Stage 3; followed by Dr. Vicky uDran    • Diastolic dysfunction     GRADE II per echo 2018   • Difficulty breathing     during exertion   • Dyslipidemia    • Erectile dysfunction    • Fatigue    • Heart murmur    • History of blood transfusion    • History of kidney stones    • HL (hearing loss)    • Hyperlipidemia    • Hypertension    • Hyponatremia    • Kidney stone    • Left ventricular hypertrophy     per echo 2018   • Localized edema    • Neuropathy    • Obstructive sleep apnea     USING CPAP   • Osteoarthritis    • Peptic ulcer    • Pneumonia    • Pneumonia of left upper lobe due to infectious organism 03/15/2019   • Prostate cancer (HCC)     Status post prostatectomy, radiation therapy, and hormone therapy followed by Dr. Lee; metastatsis to bone   • Pure hyperglyceridemia    • Restless leg syndrome    • Sepsis (HCC)    • Sinus bradycardia    • Transient cerebral ischemia    • Type 2 diabetes mellitus (HCC)         Past Surgical History:   Procedure  Laterality Date   • BRONCHOSCOPY N/A 04/09/2018    Procedure: BRONCHOSCOPY;  Surgeon: Bijan Rivera III, MD;  [facility];  Service: Cardiothoracic   • COLONOSCOPY     • DEEP NECK LYMPH NODE BIOPSY / EXCISION     • HEMORRHOIDECTOMY     • LYMPH NODE BIOPSY     • MEDIASTINOSCOPY N/A 04/09/2018    Procedure: MEDIASTINOSCOPY WITH LYMPH NODE BIOPSY;  Surgeon: Bijan Rivera III, MD;  Location: [facility];  Service: Cardiothoracic   • OTHER SURGICAL HISTORY      ulcer repair   • PROSTATECTOMY  2006        Current Outpatient Medications on File Prior to Visit   Medication Sig Dispense Refill   • Accu-Chek Guide test strip USE 1 STRIP TO CHECK BLOOD SUGAR 4 TIMES DAILY     • Accu-Chek Softclix Lancets lancets USE 1 LANCET TO CHECK GLUCOSE 4 TIMES DAILY     • aspirin 81 MG EC tablet Take 1 tablet by mouth Daily.     • cholecalciferol (VITAMIN D3) 1000 units tablet Take 2 tablets by mouth Daily.     • Darolutamide 300 MG tablet Take 1 tablet by mouth 2 (Two) Times a Day. 60 tablet 0   • Denosumab (XGEVA SC) Inject  under the skin into the appropriate area as directed Every 30 (Thirty) Days.     • dilTIAZem CD (CARDIZEM CD) 120 MG 24 hr capsule TAKE 1 CAPSULE EVERY DAY 90 capsule 3   • diphenoxylate-atropine (LOMOTIL) 2.5-0.025 MG per tablet Take 1 tablet by mouth 4 (Four) Times a Day As Needed for Diarrhea. 30 tablet 0   • furosemide (LASIX) 40 MG tablet Take 1 tablet by mouth Every Morning.     • gabapentin (NEURONTIN) 300 MG capsule TAKE 3 CAPSULES EVERY NIGHT AT BEDTIME 90 capsule 1   • HYDROcodone-acetaminophen (NORCO) 5-325 MG per tablet Take 1 tablet by mouth Every 6 (Six) Hours As Needed for Moderate Pain. 120 tablet 0   • Insulin Aspart (novoLOG) 100 UNIT/ML injection INJECT 100 UNITS VIA PUMP ONCE DAILY     • Leuprolide Acetate, 3 Month, (LUPRON DEPOT, 3-MONTH, IM) Inject  into the appropriate muscle as directed by prescriber Every 3 (Three) Months.     • levothyroxine (SYNTHROID, LEVOTHROID) 88  MCG tablet      • loperamide (IMODIUM) 1 MG/5ML solution Take 10 mL by mouth 4 (Four) Times a Day As Needed for Diarrhea.     • modafinil (PROVIGIL) 200 MG tablet Take 1 tablet by mouth Daily. 30 tablet 0   • olmesartan (BENICAR) 40 MG tablet TAKE 1 TABLET EVERY DAY (DISCONTINUE LOSARTAN 100MG) 90 tablet 3   • ondansetron (ZOFRAN) 8 MG tablet Take 1 tablet by mouth 3 (Three) Times a Day As Needed for Nausea or Vomiting. 30 tablet 5   • pravastatin (PRAVACHOL) 40 MG tablet Take 1 tablet by mouth Daily.     • rOPINIRole (REQUIP) 0.5 MG tablet Take 2 tablets by mouth in the evening and 2 at bedtime. 360 tablet 2   • traZODone (DESYREL) 50 MG tablet Take 1-2 tablets by mouth every night at bedtime.     • [DISCONTINUED] meloxicam (MOBIC) 15 MG tablet Take 1 tablet by mouth Daily. 30 tablet 1     No current facility-administered medications on file prior to visit.        ALLERGIES:    Allergies   Allergen Reactions   • Niacin Unknown - Low Severity   • Statins Unknown - Low Severity        Social History     Socioeconomic History   • Marital status:    • Years of education: College   Tobacco Use   • Smoking status: Former     Packs/day: 1.50     Years: 30.00     Pack years: 45.00     Types: Cigarettes     Start date: 1968     Quit date: 1998     Years since quittin.1   • Smokeless tobacco: Never   Substance and Sexual Activity   • Alcohol use: Not Currently     Comment: Caffeine use: 2-3 cups daily   • Drug use: Never   • Sexual activity: Not Currently        Family History   Problem Relation Age of Onset   • Heart failure Mother    • Hypertension Mother            • Diabetes Mother    • Heart disease Mother            • Lung cancer Father 46   • Hypertension Sister    • Lung cancer Sister 60   • Hypertension Brother            • Lung cancer Brother 62   • Diabetes Brother    • Heart disease Other    • Prostate cancer Brother 60   • Cancer Brother            • Cancer  "Sister            • Cancer Sister    • Cancer Sister    • Heart disease Brother            • Malig Hyperthermia Neg Hx         Review of Systems    ROS as per HPI    Objective     Vitals:    23 1014   BP: (!) 188/77   Pulse: 58   Resp: 18   Temp: 96.6 °F (35.9 °C)   TempSrc: Temporal   SpO2: 98%   Weight: 87.9 kg (193 lb 12.8 oz)   Height: 162.6 cm (64.02\")   PainSc:   6   PainLoc: Generalized     Current Status 3/20/2023   ECOG score 0       Physical Exam   Constitutional: He is oriented to person, place, and time. He appears well-developed. No distress.   HENT:   Head: Normocephalic and atraumatic.   Mouth/Throat: No oropharyngeal exudate.   Eyes: Pupils are equal, round, and reactive to light.   Cardiovascular: Normal rate, regular rhythm and normal heart sounds.   No murmur heard.  Pulmonary/Chest: Effort normal and breath sounds normal. No respiratory distress. He has no wheezes. He has no rhonchi. He has no rales.   Abdominal: Soft. Normal appearance and bowel sounds are normal. He exhibits no distension.   Musculoskeletal: Normal range of motion.   Neurological: He is alert and oriented to person, place, and time.   Skin: Skin is warm and dry. No rash noted.   Vitals reviewed.    I have reexamined the patient and the results are consistent with the previously documented exam. Vicky Leon, SYD     RECENT LABS:  Results from last 7 days   Lab Units 23  1003   WBC 10*3/mm3 5.16   NEUTROS ABS 10*3/mm3 3.53   HEMOGLOBIN g/dL 9.9*   HEMATOCRIT % 31.3*   PLATELETS 10*3/mm3 193     Results from last 7 days   Lab Units 23  1003   SODIUM mmol/L 142   POTASSIUM mmol/L 4.5   CHLORIDE mmol/L 105   CO2 mmol/L 24.9   BUN mg/dL 18   CREATININE mg/dL 1.05   CALCIUM mg/dL 9.5   ALBUMIN g/dL 4.0   BILIRUBIN mg/dL 0.3   ALK PHOS U/L 315*   ALT (SGPT) U/L 9   AST (SGOT) U/L 16   GLUCOSE mg/dL 154*   MAGNESIUM mg/dL 2.1   FERRITIN ng/mL 156.10   IRON mcg/dL 92   TIBC mcg/dL 367       "     Assessment & Plan      1.  Metastatic prostate cancer:  ? Patient initially diagnosed in 2006 and had a prostatectomy and hormone therapy.  ? Patient in January 2018 began to complain of shortness of breath and hoarseness.  Chest x-ray obtained 1/23/18 showed sclerotic bone lesions.  ? CT of the chest 3/12/2018 numerous sclerotic bone foci with all visualized bones consistent with metastatic disease, multiple enlarged mediastinal lymph nodes.  ? .1 from 3/14/2018.  Patient was started on Firmagon by urology[facility].  ? 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  ? Case discussed at thoracic conference and recommendations were to continue ADT and radiation for symptomatic sites.  ? Lupron injections every 3 mos initiated 5/7/18 PSA at that time 7.810  ? Monthly Xgeva initiated 6/11/18 PSA 2.030  ? Last PSA 6/13/2019 0.881  ? 9/20/2019 patient tolerating treatment well, no new concerns.  ? 12/2019 PSA 2.9  ? 3/9/2020  PSA increasing to 5.030, he remains continuing on Lupron and Xgeva and asymptomatic though follow-up CT of chest and pelvis will be requested in 11 weeks prior to follow-up in 12 weeks.   ? 5/4/2020 patient called reported worsening right hip pain, plans to purse CT scans ASAP.   ? 5/8/2020 CT scans C/A/P show no progression of disease. Hip pain improved, Gabapentin added.. Bone scan requested.  ? 6/3/2020 bone scan that does not demonstrate worsening of bone nor need for localized radiation therapy.  The patient's PSA has been slowly rising and we have not been able to determine worsening of disease and and, therefore, it is not felt warranted add additional medications such as Xtandi or apalutamide to his therapy.  Please note would like to avoid Zytiga try not to add prednisone which would worsen his blood sugar control.  ? 9/3/2020 PSA 15.8, CXR extensive metastatic bone disease- patient reviewed 9/10/2020  reporting increased bony discomfort CT scans requested.  ? Scans done and reviewed 10/7/2020 ultimately reveal no evidence of progression of disease for visceral involvement or clearly for bone involvement.  After further review with the patient and his son plan continued Xgeva and Lupron.  We have assessed for availability of Xtandi 160 mg p.o. daily and find the cost is currently prohibitive.   ? 11/25/2020 PSA 25.4  ? 12/10/2020 review with some mild increase in bony discomfort.  After repeat discussion we went on to have him undergo Lupron therapy, and his PSA actually to be mildly reduced at 22.2.   ? Follow-up bone scan 1/11/2021 demonstrates some evidence of progression in sternum, left sacrum and proximal femur.  These findings were reviewed with the patient and his son January 18, 2020 and the patient was referred for palliative radiotherapy(Dr. Kulkarni)  ? Palliative radiation under care Dr. Kulkarni to right femur and sternal lesion -3000 cGy in 10 fractions given February 1 to February 12 with significant symptom improvement.  ? 3/15/2021 PSA 38.5  ? 4/12/2021 PSA 44  ? 5/10/2021 PSA 42.6 CT chest abdomen pelvis 5/4/2021 - for progression of disease and follow-up PSA 5 10/2021 is at 42 slightly reduced from previous.  We have discussed how to proceed from here and find a significant family history that clearly supports a potential genetic assessment for his malignancy.  ? It is felt most reasonable at this point to take the mediastinal nodes from his biopsy 4/9/2018 and reassess for potential targeted therapies, MSI status, as well as germline analysis.  ? 6/7/2021 PSA 65.9 his analysis completed for actionable mutations including germline mutations.  These exams were negative though there is a variant of unknown significance.  Again this is not an actionable mutation.  We proceeded with additional Xgeva and Lupron planning for monthly Xgeva and Lupron at 3 months  ? 7/7/2021 PSA 89 and subsequent testing  with Axumin PET was performed confirming extensive osteosclerotic metastatic disease with little uptake, no evidence of visceral metastatic disease in neck chest abdomen or pelvis.  ? 8/25/2021  , PET/CT reviewed, subsequent PSA increased to 105.  Patient fortunately asymptomatic.  Requested reevaluation for Xtandi 160 mg po daily.   ? 9/8/2021 due for his lupron, receiving every 3 months, and monthly Xgeva.  Will have education today for Xtandi and will receive his medication today as well.   ? Started Xtandi 9/8/2021.  ? 9/22/2021 here for toxicity check, so far tolerating Xtandi quite well other than a slight increase in diarrhea controlled with Imodium.  ? 10/6/2021 continues to tolerate Xtandi well.  Labs are within range today, we will proceed with Xgeva today.  ? Patient seen 10/20/2021 having more nausea with Xtandi and demonstrating a substantial reduction in PSA level.  After discussion we plan to reduce his dose to 80 mg daily following his PSA and performance status over the next approximate 7 weeks at which time he would be scheduled for his follow-up Lupron.  ? Patient assessed 12/8/2021 with further reduction in PSA to 4.7, ongoing hypophosphatemia and hypocalcemia-Xgeva moved to every 3 months given on same schedule as Lupron  ? Patient seen 3/21/2022, 6-week follow-up with mild increase in PSA recognized  ? Patient reassessed 5/2/2022 with PSA at 10.1, Xtandi moved to 160 mg p.o. daily  ? Patient assessed 6/13/2022, Xtandi at 160 mg p.o. daily, PSA pending, Xgeva moved to every 6 months, Lupron every 3 months  ? Patient seen 9/21/2022 at which time we continued our current approach, reviewed additional options for therapy should he clearly progress.  ? Patient seen 12/21/2022, increasing bony discomfort, Xgeva and Eligard continued, plans made for Pylarify scanning.   ? 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  Patient referred to radiation therapy for their evaluation and possible treatment options, [facility] as application made for darolutamide and medications altered to include meloxicam 15 mg p.o. daily along with his Norco.  ? 2/20/203 darolutamide held due to diarrhea. Unclear if this was secondary to medication or radiation. darolutamide resumed at a reduced dose of 1 tablet twice a day.  ? Significant improvement in diarrhea today, continuing darolutamide  1 tablet twice a day the past 10 days. This dosing will be continued. Lupron and Xgeva today, 3/20/2023    2.  Neuropathy/ restless legs:    · 5/13/2020 gabapentin 600 mg at bedtime, trazadone 50 mg QHS.  · On gabapentin 300, 2 tablets at bedtime, and requip 0.5.mg    · Still having neuropathy symptoms.  Will increase gabapentin to 900 mg at bedtime.   · 9/22/2021 increase in gabapentin to 900 mg at bedtime has helped neuropathy.  · Requip moved to bedtime dosing only-1 mg, escalate as needed    3.  Cancer related pain: on Norco 5/325 every 6 hours as needed.  · Mobic 15 mg p.o. every morning initiated 1/11/2023, consider MS Contin every 12 hours  · Status post palliative radiotherapy with improved pain control by 2/20/2023  · He reports today his pain is much improved following radiation.     4.  Hyperkalemia:  · Intermittent issue for the patient.  It is typically dietary related, as he consumes large amount  fresh tomatoes and other foods from his garden..  He was instructed to significantly decrease his intake as his potassium level today is 6.4, magnesium 2.7  He is not symptomatic.  We will also check an EKG.   · 9/22/2021 potassium level is improved to 5.7.  Patient states that he is no longer eating tomatoes from his garden.  · 10/6/2021 potassium level is 5.8.  Due to patient's  multiple electrolyte abnormalities we will go ahead and refer him to nephrology for further evaluation.  Patient states he actually has an existing nephrologist, Dr. Perry Daily who he typically only sees on an annual basis.  We will schedule him to be seen by her soon for further evaluation.  · Potassium today within normal limits at 4.5    5.  Hypophosphatemia:  Phosphorus 1.9- hold xgeva 9/8/2021 ., Increase foods high in phosphorus, recheck in 2 weeks.   · 9/22/2021 phosphorus level 1.7.  Reviewed with Dr. Lee.  In addition to increasing foods high in phosphorus, I have instructed the patient to start Neutra-Phos 1 tablet daily.  We will recheck again in 2 weeks.  · Phosphorus 2.3 assessed 10/20/2021  · Reassess 12/8/2021 at 1.9, Xgeva moved to every 3 months.  · Patient assessed 6/13/2022 Xgeva moved every 6 months  · Reevaluation 9/21/2022 with phosphorus 3.6, Xgeva continued every 6 months  · Reevaluation 12/21/2022 at 4.0, reassessment 1/11/2023 and 2.4  · phosphorus normal today at 2.8, he will receive Xgeva    6. Worsening anemia.  · Hemoglobin declined today at 9.9. Additional labs today including ferritin 156, iron sat 25%, folate 6.03, B12 256. We will being an OTC B12 supplement at 1000mcg daily.       PLAN:    1. Continue darolutamide 300mg 1 tablet twice daily  2. Proceed today with Xgeva and Lupron which are continued every 3 months  3. Begin B12 1000 mcg sublingual daily  4. Continue gabapentin 900 mg at bedtime, Requip to move to 2 mg p.o. nightly, escalate as needed  5. Continue Imodium if needed for diarrhea  6. Return in 1 month for CBC, CMP, PSA, MD follow-up with Dr. Lee    Patient on high risk medication requiring close monitoring for toxicity.    ADDENDUM: darolutamide dosing was reviewed with Dr. Lee, he would like to try and escalate the patient's dosing.  We will therefore increase to 300 mg in the morning 600 mg at night.  If he tolerates this well we will then increase to  600 mg twice daily.  I did message pharmacy team regarding the increased dose as the patient will need more supply.  The patient understands if he develops diarrhea or any issues he will contact the office for dosing instructions.    Vicky Leon, APRN  03/20/2023

## 2023-03-24 ENCOUNTER — SPECIALTY PHARMACY (OUTPATIENT)
Dept: PHARMACY | Facility: HOSPITAL | Age: 83
End: 2023-03-24
Payer: MEDICARE

## 2023-03-24 NOTE — PROGRESS NOTES
Re: Refills of Oral Specialty Medication - Nubeqa (darolutamide)    • Drug-Drug Interactions: The current medication list was reviewed and there are no relevant drug-drug interactions.  • Medication Allergies: The patient has no relevant allergies as it relates to their oral specialty medication  • Review of Labs/Dose Adjustments: DOSE CHANGE - I reviewed the most recent note and labs. Due to tolerance the dose is being increased. I sent refills as described below.    Drug: Nubeqa (darolutamide)  Strength: 300 mg  Directions: Take 2 tablets by mouth twice a day  Quantity: 120  Refills: 2  Pharmacy prescription sent to: [facility]    Name/Credentials: Sunday Harris, BobD, BCOP      3/24/2023  15:51 EDT    Completed independent double check on medication order/RX.    Thanks,   Diya Del Rio, PharmD, BCPS

## 2023-03-30 DIAGNOSIS — C61 PROSTATE CANCER METASTATIC TO BONE: ICD-10-CM

## 2023-03-30 DIAGNOSIS — C79.51 PROSTATE CANCER METASTATIC TO BONE: ICD-10-CM

## 2023-03-30 RX ORDER — HYDROCODONE BITARTRATE AND ACETAMINOPHEN 5; 325 MG/1; MG/1
1 TABLET ORAL EVERY 6 HOURS PRN
Qty: 120 TABLET | Refills: 0 | Status: SHIPPED | OUTPATIENT
Start: 2023-03-30

## 2023-03-30 RX ORDER — MELOXICAM 15 MG/1
15 TABLET ORAL DAILY
Qty: 30 TABLET | Refills: 1 | OUTPATIENT
Start: 2023-03-30

## 2023-04-14 NOTE — PROGRESS NOTES
"  Subjective  Patient with recent falls onto his right side while gardening                                                                                                                        REASONS FOR FOLLOW UP:   1.  Metastatic prostate cancer    HISTORY OF PRESENT ILLNESS:     Patient is an 82-year-old with metastatic prostate cancer who is seen as he continues current therapy with Xtandi, Xgeva moved to every 6 months, Lupron every 3 months.                                  The patient was seen back along with his son, Georges, 9/21/2022 fortunately doing reasonably well without additional pain, discomfort or excessive fatigue.  We have discussed various options for treatment of prostate cancer if we are unable to control this disease with current measures.    He is next evaluated 12/21/2022 now scheduled for Lupron.  He had last received both his Lupron and Xgeva 9/21/2022.  He notes increasing chest discomfort that appears to be \"when he twists\".  Initially he felt he might have a pneumonia but never had fever or cough.  We have discussed that his disease could well be progressing and then assessment that would allow us to clarify treatment options is reasonable.      This led to a repeat PSA 12/21/2022 that was found to be elevated to 23.9 and a follow-up Pylarify study performed 1/6/2023 that does demonstrate marked progression of sclerotic bone metastasis diffusely compared to 8/20/2021.  This includes the femoral neck,, sacrum, left iliac bone with SUV up to 48.8, stable tiny mediastinal nodes, 2 punctate nodules in the calvarium.      He is seen back with his son 1/11/2023.  The potential treatment options such as Pluvicto (Lutetium Yady 177 vipivotide tetraxetan), now that we know that his PSMA scan is positive, though the patient would have to initially be treated with taxane chemotherapy which would be difficult for him to tolerate, radium-223 considering his bony metastasis.  He has slowly " progressive disease however we have discussed additional issues including radiation therapy and/or alteration to another androgen receptor agonist such as darolutamide.  We have discussed all these options moving to have assessments done by radiation therapy and, [facility] as we address his symptoms.    The patient was seen by radiation therapy and felt a candidate for palliative radiotherapy as we made application for darolutamide.He is also seen by [facility] for additional treatment options 1/23/2023 and 2/10/2023 as radiation therapy proceeded to LSP/pelvis.    The patient was also seen by [facility] without additional therapeutic options at this time.    He is next seen 2/20/2023 to review his status recognizing his follow-up Xgeva will be due 3/15/2023 and and Lupron would be due 6/7/2023.  He was able to obtain darolutamide (Nubeqa) as free drug.  He is seen with his son both indicating that he just completed radiation therapy today.  While his pain has improved, he did have diarrhea develop during radiation therapy and is using antidiarrheal medications to control it currently.  This is also, potentially, side effect of darolutamide.      He is next seen 4/17/2023.  We have reiterated that his treatments with Lupron and Xgeva injections continued every 3 months.  On lower dose darolutamide which we gradually escalate to 600 mg nightly and 300 mg a day trying to move to 600 mg twice daily.  His last injections were given 3/20/2023 for both Xgeva and Lupron.  Unfortunately the patient has fallen twice while gardening onto his right side approximately 2 weeks prior to his visit 4/17/2023 and is only slowly recovering with right side and back pain.  He is, however, able to function and seen some improvement.  His increased use of pain medications has caused worsening constipation however.  In a lengthy conversation we have discussed increasing his darolutamide to 600 mg twice daily to gain his best  response from therapy and also improve his bowel function.      Hematologic/oncologic history:   The patient is an 82-year-old male with significant past medical history including prostate carcinoma, CKD 3 and long-term tobacco use be been seen by primary care in late January, 2018 for hoarseness.  Chest x-ray is now outpatient January 23 revealed sclerotic change in the posterior mid chest to be within 3 adjacent thoracic ertebral bodies of uncertain significance.  A follow-up chest CT revealed numerous sclerotic foci within al visualized bones consistent with metastatic disease, multiple enlarged mediastinal lymph nodes concerning for metastatic lymphadenopathy and a polypoidal abnormality in the right lateral wall of the trachea.  CT of abdomen March 20 revealed extensive osseous metastatic disease mildly enlarged retroperitoneal lymph nodes.  Bone scan March 20 was consistent with widespread osseous metastasis particularly in the proximal half the left humeral shaft, abnormal uptake in the sternum and manubrium and a small focus in the posterior aspect of the calvarium.  This led to a plain film the left humerus with mottled sclerosis involving the shaft of the humerus particularly in the proximal half but no evidence of pathologic fracture.    The patient has additionally type 2 diabetes on insulin pump, again a history of prostate carcinoma prostatectomy 12 years previously, hypertension, and hyperlipidemia.  Additional studies included a PSA of 395.1 from March 14, 2018.The patient's been seen by urology March 15 with plans to start Firmagon.    The patient is now seen in pulmonary clinic with Dr. Bijan Rivera and we have discussed that he will need bronchoscopy and mediastinoscopy.  Interestingly his additional history includes a long occupational exposure including working in the [place] just out of school, subsequent construction work for many years and a 30-pack-year history of  smoking stopping in the late 1990s.  He indicates that he followed with Dr. Guillen of urology for many years with no changes in his exams and normal PSAs.  He stopped this follow-up approximately 2 years ago.     Patient was seen in consultation with thoracic surgery on March 28 and plans are made for mediastinoscopy and bronchoscopy with lymph node biopsy.  Pathology revealed that these nodes were consistent with metastatic prostate carcinoma.  He was discussed at thoracic conference.  A PET/CT was not pursued and it was determined that he see medical oncology for hormonal treatment and radiation therapy if symptomatic.  It should be noted that the patient's March 15 [facility] office note describes that Firmagon was planned.     The patient has met with the son and grandson April 23.  We have also contacted Dr. Taylor of urology in that Firmagon was given just after his last visit and would next be due approximately May 3.  Patient feels considerably better with resolution of his pain, normalization of his performance status.  He would like to continue treatment through our office now contacted Dr. Taylor concerning this.    The patient is next seen June 11, 2018 we discussed his follow-up including his treatments with Lupron May 7 and his next treatment on July 30.  He has returned to essentially normal performance status and his PSA has dropped further from 395 March 14 27.8 May 7 and now 2.03 June 11.  We do plan to initiate Xgeva also study him via scans to document his improvement approximately a month from now.   The patient is next seen July 26, 2018.  Repeat imaging demonstrated interval resolution of mediastinal and retroperitoneal lymphadenopathy.  There is thickening in the distal aspect of the appendix with stranding?  Chronic appendicitis.  The patient indicates he is having no such symptoms and never has.  There is no change in sclerotic bony metastasis in the patient's PSA has dropped  substantially to 1.66 by July 19 and 1.79 July 26.  He is actually feeling excellent and this is corroborated by family members.   Patient is next seen January 10, 2019 continuing to do very well and, in fact indicating [facility], likely [facility] replacements, visual review.  He is not certain is going to continue his care with them or with us or combination of both.    Patient is next seen April 4, 2019.  He is recently discharged after hospitalization March 15-18 with left upper lobe pneumonia.  We reviewed the findings in detail with his gradual recovery now documented.  His studies include a CT of chest which does not demonstrate any further bony lesions and/or pulmonary metastasis having developed.  He is feeling considerably better and approximately back to his baseline.  The patient is next seen June 28, 2019 feeling well but having baseline generally musculoskeletal pain and restless legs.His recent exams include a PSA of 0.81, CMP with potassium of 5.3 with repeat pending.  His performance status overall remains good to very good and is clearly responding to his treatments.  The patient is next seen December 13, 2019 continue to feel well.  He has had, oddly enough, 2 episodes of sleep paralysis which have occurred to him previously and he wonders if there is any connection with his prostate carcinoma though this is felt unlikely.     The patient when assessed in December had his PSA go to 2.9.  We continue with Lupron and Xgeva and is seen back now March 9, 2020 without additional symptoms.  We discussed that a schedule could likely change moving to 3 months for both of these medications particularly if he needs additional therapy directed at progressive disease.     Patient contacted our practice May 4 concern for pain in his hips.  This led to moving his scans up and they were performed May 8, 2020 showing a sub-6 mm pleural-based pulmonary nodule in  the right lower lobe that is new from March 15, 2018, remainder of the sub-6 mm pulmonary nodules bilaterally are stable.  There is extensive osseous metastatic disease as previous with no other new findings.     The patient indicates, when seen, May 13 that his bone pain is now resolved.  While this is good information does not mean that he does not have further bony metastasis and we have discussed that a follow-up bone scan is likely necessary.  Furthermore he is having some difficulty with sleeplessness and increase in restless legs as he continues to stay at home during the COVID 19 crisis which, itself, is producing more anxiety for him.  A follow-up bone scan was obtained Lety 3 revealing multifocal osseous metastatic disease which was compared to March 2, 2018 with improvement.  No new abnormal uptake is present.  He is next seen with us on June 17, 2020 and, fortunately, is not having any significant pain at this point.  We discussed his scans in detail and, on balance, his performance status also remains improved.     It was elected to follow the patient rather than changes antiandrogen therapy.  He is reassessed September 3 with unchanged CMP, H&H of 11.4 and 36.3 with normal white count, platelet count and differential, PSA at 15.8.  Follow-up PA lateral chest x-ray does not show any new abnormalities though there is extensive metastatic bone disease throughout the bony thorax and osteoblastic metastasis have been seen on chest CT May 2020.  The patient is seen with his son Georges September 10 noticing increasing bony discomfort primarily in the right hip following fairly intensive gardening which he does frequently.  Now has further elevation of PSA were wondering whether we should try to intervene sooner per additional antiandrogens.  We will need to further assess him via CT scanning to make this decision     These were obtained October 1 showing extensive sclerotic disease with no metastatic disease  in chest abdomen or pelvis.  PSA had gone from 15.8 September 3-16 0.6 October 1.     He still has pain getting up in the morning and after active gardening.  This is manageable with current pain medications and, at present, it is not apparent that he needs to switch medications to gain better control of his disease.  The patient did have follow-up studies done including a PSA November 25, 2020 now at 25.4.  The patient is seen with his son December 10, 2020 doing fair but having some increased pain in the mid sternum and right hip that he ascribes to additional exercise/work in managing his garden.  It is apparent however, that his last bone scan was 6 months ago and we do need to reassess him concerning this.     The patient had follow-up bone scan performed January 11, 2021 showing again uptake in the calvarium, humerus, right medial clavicle, sternum, ribs, spine, sacrum, pelvis, right acetabulum, proximal femora and now left frontal bone which appears new.  There is also mild increase in sternal uptake and equivocal increase in the pelvic lesions of all of the sacrum and the right proximal femur.     The patient is seen with his son January 18, 2021 and indicates that he is having pain gradually worsened in his right hip, mid chest that had been an issue previously.  These findings on bone scan are reviewed with both of them as likely the underlying culprit for his discomfort.  He would need change of the systemic therapy but, at present, will ask for radiation therapy palliation initially.  He was previously treated by Dr. Kulkarni many years ago and will ask her to see him.  We will also make application for the current cost of Xtandi or Zytiga.  The patient is not felt to be a candidate for systemic chemotherapy.     The patient was referred for ration therapy as we continued to assess his PSA on current therapy as well as exam the cost of Zytiga or Xtandi.  Radiation therapy (Dr. Kulkarni) saw the patient  January 26 and felt that the right femur and sternal lesion could benefit from therapy-3000 cGy in 10 fractions.  The patient took treatment February 1 to February 12 to the above areas and is next seen back March 15.  Fortunately his pain has improved dramatically and he is quite happy about this.  Unfortunately he notes some difficulty with swallowing that is temporary and often leads to increased salivation and mucus production.  Patient was asked to return his next assessed April 12, 2021.  He is able to swallow with less pain but still has excess mucus production and issues with salivation.  His PSA has noted increase but he is having no additional bony discomfort at this point and, additionally, has noted low-grade fevers just under 100 degrees at nighttime?.  His weight, appetite and general performance status have remained good to excellent.  We discussed follow-up studies at this point to determine his status.    Follow-up scans were scheduled CT scans of chest and pelvis 5/4/2021 showing osseous metastasis quite similar to previous examinations in the chest, CT of abdomen pelvis also with no acute intra-abdominal adenopathy no indication of abdominal pelvic metastatic disease but again widespread osseous metastasis.  His PSA at this point have been slowly increasing to a level of 44 on 4/12/2021.  As the patient is reassessed 5/10/2021 we find, fortunately, that he is not exceeding symptomatic.  It could make reasonable sense at this point to take the mediastinal nodes from his biopsy 4/9/2018, reassess potential targeted therapies, MSI status, and germline analysis.  Fortunately he is not having significant pain or discomfort and is seen with his son as we discussed the above plan.  Notably a follow-up PSA is found to be 42.6.   Patient is next seen in 6/7/2021 for Lupron and Xgeva.  Fortunately he is feeling considerably better according to both he and his son though his stamina has reduced.  He is not  having additional pain at this point.  We have also reviewed his genetics assessment which was significant only for a variant of unknown significance.  This is not an actionable abnormality.    As the patient's PSA further escalated increasing to 89 7/7/2021 his subsequent Axumin PET was obtained 8/20 demonstrating extensive osteosclerotic metastatic disease showing reduced uptake-typical finding but no evidence of visceral metastatic disease in the neck chest abdomen pelvis.  These findings are discussed with the patient and his son 8/25/2021 and indicative of his progressive disease-etiology of his rising PSA-though the patient is asymptomatic we have discussed moving to initiate Xtandi is available at 160 mg p.o. twice daily along with his current Lupron and Xgeva.     The patient's testing 10/6 included PSA that is dropped to 9.69.  He is seen with his son, Cody, both indicating that he been doing relatively well with treatment but began to notice nausea in the last week.  The schedule that he has been given also needs to be somewhat updated in terms of his Lupron and Xgeva.  He has been able to maintain his appetite and overall performance status to date.    The patient is next assessed 12/8/2021 tolerating his current Xtandi dose better than previous and maintaining a good performance status overall as well as demonstrating a drop in his PSA now to 4.7 on 12/8/2021 compared to high of 112 9/8/2021.  He remains hypophosphatemic and hypocalcemic and we have discussed, again, changing his Xgeva dosing to every 3 months along with his Lupron will continue his Xtandi to 80 mg daily.    The patient is next reviewed 3/21/2022 seen for both Xgeva and Lupron.  Fortunately he is feeling reasonably well with little additional pain though he has restarted to place his garden into shape for the season and is working more outdoors.    Patient reevaluated 5/2/2022 with repeat CBC including H&H of 10.5 and 33.4, white count  5980 and platelet count of 192,000, currently CMP and PSA pending with a previous PSA 1 month ago at 9.06.  At this point we increased his Xtandi to 160 mg p.o. daily while continuing treatment with every 3 month Xgeva and Lupron.  Notably the patient's Requip  alcohol I am sure it is mariza now at 1.5 mg p.o. nightly and Neurontin 900 mg p.o. daily has improved his restless leg syndrome substantially.    Patient seen 6/13/2022 at which time Xtandi at 160 mg p.o. daily was continued, Xgeva moved to every 6 months and Lupron every 3 months.    This was continued when patient was seen 9/21/2022 though subsequently we proceeded to every 3 months for both medications.    Patient seen 12/21/2022 at which time a subsequent Pylarify scan was requested as result of increasing PSA level and bony discomfort.  The patient received Lupron and Xgeva at this point.     He is seen back with his son 1/11/2023.  The potential treatment options such as Pluvicto (Lutetium Yady 177 vipivotide tetraxetan), now that we know that his PSMA scan is positive, though the patient would have to initially be treated with taxane chemotherapy which would be difficult for him to tolerate, radium-223 considering his bony metastasis.  He has slowly progressive disease however we have discussed additional issues including radiation therapy and/or alteration to another androgen receptor agonist such as darolutamide.  We have discussed all these options moving to have assessments done by radiation therapy and, [facility] as we address his symptoms.    The patient was not felt a candidate for additional treatments via [facility], was able to start darolutamide and completed palliative radiotherapy.  He was stable seen 2/20/2023 though experiencing diarrhea post radiation therapy.    Patient seen 4/17/2023 with darolutamide gradually increased to 600 mg twice daily.  His Xgeva and Lupron will be given every 3 months.    Past Medical History:    Diagnosis Date   • Aortic stenosis, mild 01/01/2021    Per echocardiogram 2021   • Ascending aorta dilatation    • Asthma    • Benign prostatic hyperplasia    • Bone cancer    • Cellulitis of great toe of left foot 10/19/2018   • CKD (chronic kidney disease)     Stage 3; followed by Dr. Vicky Duran    • Diastolic dysfunction     GRADE II per echo 2018   • Difficulty breathing     during exertion   • Dyslipidemia    • Erectile dysfunction    • Fatigue    • Heart murmur    • History of blood transfusion    • History of kidney stones    • HL (hearing loss)    • Hyperlipidemia    • Hypertension    • Hyponatremia    • Kidney stone    • Left ventricular hypertrophy     per echo 2018   • Localized edema    • Neuropathy    • Obstructive sleep apnea     USING CPAP   • Osteoarthritis    • Peptic ulcer    • Pneumonia    • Pneumonia of left upper lobe due to infectious organism 03/15/2019   • Prostate cancer     Status post prostatectomy, radiation therapy, and hormone therapy followed by Dr. Lee; metastatsis to bone   • Pure hyperglyceridemia    • Restless leg syndrome    • Sepsis    • Sinus bradycardia    • Transient cerebral ischemia    • Type 2 diabetes mellitus         Past Surgical History:   Procedure Laterality Date   • BRONCHOSCOPY N/A 04/09/2018    Procedure: BRONCHOSCOPY;  Surgeon: Bijan Rivera III, MD;  [facility];  Service: Cardiothoracic   • COLONOSCOPY     • DEEP NECK LYMPH NODE BIOPSY / EXCISION     • HEMORRHOIDECTOMY     • LYMPH NODE BIOPSY     • MEDIASTINOSCOPY N/A 04/09/2018    Procedure: MEDIASTINOSCOPY WITH LYMPH NODE BIOPSY;  Surgeon: Bijan Rivera III, MD;  [facility];  Service: Cardiothoracic   • OTHER SURGICAL HISTORY      ulcer repair   • PROSTATECTOMY  2006        Current Outpatient Medications on File Prior to Visit   Medication Sig Dispense Refill   • Accu-Chek Guide test strip USE 1 STRIP TO CHECK BLOOD SUGAR 4 TIMES DAILY     • Accu-Chek Softclix  Lancets lancets USE 1 LANCET TO CHECK GLUCOSE 4 TIMES DAILY     • aspirin 81 MG EC tablet Take 1 tablet by mouth Daily.     • cholecalciferol (VITAMIN D3) 1000 units tablet Take 2 tablets by mouth Daily.     • Darolutamide 300 MG tablet Take 2 tablets by mouth 2 (Two) Times a Day. 120 tablet 2   • Denosumab (XGEVA SC) Inject  under the skin into the appropriate area as directed Every 30 (Thirty) Days.     • dilTIAZem CD (CARDIZEM CD) 120 MG 24 hr capsule TAKE 1 CAPSULE EVERY DAY 90 capsule 3   • diphenoxylate-atropine (LOMOTIL) 2.5-0.025 MG per tablet Take 1 tablet by mouth 4 (Four) Times a Day As Needed for Diarrhea. 30 tablet 0   • furosemide (LASIX) 40 MG tablet Take 1 tablet by mouth Every Morning.     • gabapentin (NEURONTIN) 300 MG capsule TAKE 3 CAPSULES EVERY NIGHT AT BEDTIME 90 capsule 1   • HYDROcodone-acetaminophen (NORCO) 5-325 MG per tablet Take 1 tablet by mouth Every 6 (Six) Hours As Needed for Moderate Pain. 120 tablet 0   • Insulin Aspart (novoLOG) 100 UNIT/ML injection INJECT 100 UNITS VIA PUMP ONCE DAILY     • Leuprolide Acetate, 3 Month, (LUPRON DEPOT, 3-MONTH, IM) Inject  into the appropriate muscle as directed by prescriber Every 3 (Three) Months.     • levothyroxine (SYNTHROID, LEVOTHROID) 88 MCG tablet      • loperamide (IMODIUM) 1 MG/5ML solution Take 10 mL by mouth 4 (Four) Times a Day As Needed for Diarrhea.     • meloxicam (MOBIC) 15 MG tablet Take 1 tablet by mouth Daily. 30 tablet 1   • modafinil (PROVIGIL) 200 MG tablet Take 1 tablet by mouth Daily. 30 tablet 0   • olmesartan (BENICAR) 40 MG tablet TAKE 1 TABLET EVERY DAY (DISCONTINUE LOSARTAN 100MG) 90 tablet 3   • ondansetron (ZOFRAN) 8 MG tablet Take 1 tablet by mouth 3 (Three) Times a Day As Needed for Nausea or Vomiting. 30 tablet 5   • pravastatin (PRAVACHOL) 40 MG tablet Take 1 tablet by mouth Daily.     • rOPINIRole (REQUIP) 0.5 MG tablet Take 2 tablets by mouth in the evening and 2 at bedtime. 360 tablet 2   • traZODone  "(DESYREL) 50 MG tablet Take 1-2 tablets by mouth every night at bedtime.       No current facility-administered medications on file prior to visit.        ALLERGIES:    Allergies   Allergen Reactions   • Niacin Unknown - Low Severity   • Statins Unknown - Low Severity        Social History     Socioeconomic History   • Marital status:    • Years of education: College   Tobacco Use   • Smoking status: Former     Packs/day: 1.50     Years: 30.00     Pack years: 45.00     Types: Cigarettes     Start date: 1968     Quit date: 1998     Years since quittin.2   • Smokeless tobacco: Never   Substance and Sexual Activity   • Alcohol use: Not Currently     Comment: Caffeine use: 2-3 cups daily   • Drug use: Never   • Sexual activity: Not Currently        Family History   Problem Relation Age of Onset   • Heart failure Mother    • Hypertension Mother            • Diabetes Mother    • Heart disease Mother            • Lung cancer Father 46   • Hypertension Sister    • Lung cancer Sister 60   • Hypertension Brother            • Lung cancer Brother 62   • Diabetes Brother    • Heart disease Other    • Prostate cancer Brother 60   • Cancer Brother            • Cancer Sister            • Cancer Sister    • Cancer Sister    • Heart disease Brother            • Malig Hyperthermia Neg Hx         Review of Systems  As per HPI  *Pain improved and clued in his hip and left pelvis, diarrhea during radiation therapy    *Recent falls onto his right side when seen 2023, slowly improving  Objective     Vitals:    23 0830 23 0833   BP: (!) 185/73 173/68   Pulse: 52    Resp: 18    Temp: 97.3 °F (36.3 °C)    TempSrc: Temporal    SpO2: 96%    Weight: 88.7 kg (195 lb 9.6 oz)    Height: 162.6 cm (64.02\")    PainSc:   6    PainLoc: Back          2023     8:31 AM   Current Status   ECOG score 1       Physical Exam   Constitutional: He is oriented to person, " place, and time. He appears well-developed. No distress.   HENT:   Head: Normocephalic and atraumatic.   Mouth/Throat: No oropharyngeal exudate.   Eyes: Pupils are equal, round, and reactive to light.   Cardiovascular: Normal rate, regular rhythm and normal heart sounds.   No murmur heard.  Pulmonary/Chest: Effort normal and breath sounds normal. No respiratory distress. He has no wheezes. He has no rhonchi. He has no rales.   Abdominal: Soft. Normal appearance and bowel sounds are normal. He exhibits no distension.   Musculoskeletal: Normal range of motion.   Neurological: He is alert and oriented to person, place, and time.   Skin: Skin is warm and dry. No rash noted.   Vitals reviewed.    RECENT LABS:  Hematology WBC   Date Value Ref Range Status   04/17/2023 4.51 3.40 - 10.80 10*3/mm3 Final     RBC   Date Value Ref Range Status   04/17/2023 3.20 (L) 4.14 - 5.80 10*6/mm3 Final     Hemoglobin   Date Value Ref Range Status   04/17/2023 9.1 (L) 13.0 - 17.7 g/dL Final     Hematocrit   Date Value Ref Range Status   04/17/2023 29.5 (L) 37.5 - 51.0 % Final     Platelets   Date Value Ref Range Status   04/17/2023 194 140 - 450 10*3/mm3 Final        Lab Results   Component Value Date    GLUCOSE 154 (H) 03/20/2023    BUN 18 03/20/2023    CREATININE 1.05 03/20/2023    EGFRIFNONA 56 (L) 02/23/2022    BCR 17.1 03/20/2023    K 4.5 03/20/2023    CO2 24.9 03/20/2023    CALCIUM 9.5 03/20/2023    ALBUMIN 4.0 03/20/2023    AST 16 03/20/2023    ALT 9 03/20/2023       NM Bone Scan Whole Body (01/11/2021 13:25)  CT Chest With Contrast Diagnostic (05/04/2021 09:11)  CT Abdomen Pelvis With Contrast (05/04/2021 09:11)  NM PET/CT Skull Base to Mid Thigh (08/20/2021 12:18)      Assessment & Plan      1.  Metastatic prostate cancer:  ? Patient initially diagnosed in 2006 and had a prostatectomy and hormone therapy.  ? Patient in January 2018 began to complain of shortness of breath and hoarseness.  Chest x-ray obtained 1/23/18 showed  sclerotic bone lesions.  ? CT of the chest 3/12/2018 numerous sclerotic bone foci with all visualized bones consistent with metastatic disease, multiple enlarged mediastinal lymph nodes.  ? .1 from 3/14/2018.  Patient was started on Firmagon by urology, [facility].  ? 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  ? Case discussed at thoracic conference and recommendations were to continue ADT and radiation for symptomatic sites.  ? Lupron injections every 3 mos initiated 5/7/18 PSA at that time 7.810  ? Monthly Xgeva initiated 6/11/18 PSA 2.030  ? Last PSA 6/13/2019 0.881  ? 9/20/2019 patient tolerating treatment well, no new concerns.  ? 12/2019 PSA 2.9  ? 3/9/2020  PSA increasing to 5.030, he remains continuing on Lupron and Xgeva and asymptomatic though follow-up CT of chest and pelvis will be requested in 11 weeks prior to follow-up in 12 weeks.   ? 5/4/2020 patient called reported worsening right hip pain, plans to purse CT scans ASAP.   ? 5/8/2020 CT scans C/A/P show no progression of disease. Hip pain improved, Gabapentin added.. Bone scan requested.  ? 6/3/2020 bone scan that does not demonstrate worsening of bone nor need for localized radiation therapy.  The patient's PSA has been slowly rising and we have not been able to determine worsening of disease and and, therefore, it is not felt warranted add additional medications such as Xtandi or apalutamide to his therapy.  Please note would like to avoid Zytiga try not to add prednisone which would worsen his blood sugar control.  ? 9/3/2020 PSA 15.8, CXR extensive metastatic bone disease- patient reviewed 9/10/2020 reporting increased bony discomfort CT scans requested.  ? Scans done and reviewed 10/7/2020 ultimately reveal no evidence of progression of disease for visceral involvement or clearly for bone involvement.  After further review with the patient and his son plan  continued Xgeva and Lupron.  We have assessed for availability of Xtandi 160 mg p.o. daily and find the cost is currently prohibitive.   ? 11/25/2020 PSA 25.4  ? 12/10/2020 review with some mild increase in bony discomfort.  After repeat discussion we went on to have him undergo Lupron therapy, and his PSA actually to be mildly reduced at 22.2.   ? Follow-up bone scan 1/11/2021 demonstrates some evidence of progression in sternum, left sacrum and proximal femur.  These findings were reviewed with the patient and his son January 18, 2020 and the patient was referred for palliative radiotherapy(Dr. Kulkarni)  ? Palliative radiation under care Dr. Kulkarni to right femur and sternal lesion -3000 cGy in 10 fractions given February 1 to February 12 with significant symptom improvement.  ? 3/15/2021 PSA 38.5  ? 4/12/2021 PSA 44  ? 5/10/2021 PSA 42.6 CT chest abdomen pelvis 5/4/2021 - for progression of disease and follow-up PSA 5 10/2021 is at 42 slightly reduced from previous.  We have discussed how to proceed from here and find a significant family history that clearly supports a potential genetic assessment for his malignancy.  ? It is felt most reasonable at this point to take the mediastinal nodes from his biopsy 4/9/2018 and reassess for potential targeted therapies, MSI status, as well as germline analysis.  ? 6/7/2021 PSA 65.9 his analysis completed for actionable mutations including germline mutations.  These exams were negative though there is a variant of unknown significance.  Again this is not an actionable mutation.  We proceeded with additional Xgeva and Lupron planning for monthly Xgeva and Lupron at 3 months  ? 7/7/2021 PSA 89 and subsequent testing with Axumin PET was performed confirming extensive osteosclerotic metastatic disease with little uptake, no evidence of visceral metastatic disease in neck chest abdomen or pelvis.  ? 8/25/2021  , PET/CT reviewed, subsequent PSA increased to 105.  Patient  fortunately asymptomatic.  Requested reevaluation for Xtandi 160 mg po daily.   ? 9/8/2021 due for his lupron, receiving every 3 months, and monthly Xgeva.  Will have education today for Xtandi and will receive his medication today as well.   ? Started Xtandi 9/8/2021.  ? 9/22/2021 here for toxicity check, so far tolerating Xtandi quite well other than a slight increase in diarrhea controlled with Imodium.  ? 10/6/2021 continues to tolerate Xtandi well.  Labs are within range today, we will proceed with Xgeva today.  ? Patient seen 10/20/2021 having more nausea with Xtandi and demonstrating a substantial reduction in PSA level.  After discussion we plan to reduce his dose to 80 mg daily following his PSA and performance status over the next approximate 7 weeks at which time he would be scheduled for his follow-up Lupron.  ? Patient assessed 12/8/2021 with further reduction in PSA to 4.7, ongoing hypophosphatemia and hypocalcemia-Xgeva moved to every 3 months given on same schedule as Lupron  ? Patient seen 3/21/2022, 6-week follow-up with mild increase in PSA recognized  ? Patient reassessed 5/2/2022 with PSA at 10.1, Xtandi moved to 160 mg p.o. daily  ? Patient assessed 6/13/2022, Xtandi at 160 mg p.o. daily, PSA pending, Xgeva moved to every 6 months, Lupron every 3 months  ? Patient seen 9/21/2022 at which time we continued our current approach, reviewed additional options for therapy should he clearly progress.  ? Patient seen 12/21/2022, increasing bony discomfort, Xgeva and Eligard continued, plans made for Pylarify scanning.   ? Patient seen 1/11/2023 with his son, discuss potential treatment options such as Pluvicto (Lutetium Yady 177 vipivotide tetraxetan) knowing that Pms-Asa is strongly positive in bone, palliative radiotherapy, radium-223 and alteration to additional androgen receptor such as darolutamide.  Patient referred to radiation therapy for their evaluation and possible treatment options, [facility] as application made for darolutamide and medications altered to include meloxicam 15 mg p.o. daily along with his Norco.  ? Patient assessed 2/20/2023 improved for radiation therapy, no additional options for treatment and [facility], darolutamide initiated.  ? Patient assessed 4/17/2023, darolutamide increased to 600 mg twice daily, Xgeva and Lupron every 3 months    2.  Neuropathy/ restless legs:    · 5/13/2020 gabapentin 600 mg at bedtime, trazadone 50 mg QHS.  · On gabapentin 300, 2 tablets at bedtime, and requip 0.5.mg    · Still having neuropathy symptoms.  Will increase gabapentin to 900 mg at bedtime.   · 9/22/2021 increase in gabapentin to 900 mg at bedtime has helped neuropathy.  · Requip moved to bedtime dosing only-1 mg, escalate as needed    3.  Cancer related pain: on Norco 5/325 every 6 hours as needed.  · Mobic 15 mg p.o. every morning initiated 1/11/2023, consider MS Contin every 12 hours  · Status post palliative radiotherapy with improved pain control by 2/20/2023  · Effective pain relief except increasing constipation noted 4/17/2023, narcotic adjusted downward as possible.    4.  Hyperkalemia:  · Intermittent issue for the patient.  It is typically dietary related, as he consumes large amount  fresh tomatoes and other foods from his garden..  He was instructed to significantly decrease his intake as his potassium level today is 6.4, magnesium 2.7  He is not symptomatic.  We will also check an EKG.   · 9/22/2021 potassium level is improved to 5.7.  Patient states that he is no longer eating tomatoes from his garden.  · 10/6/2021 potassium level is 5.8.  Due to patient's multiple electrolyte abnormalities we will go ahead and refer him to nephrology for further evaluation.  Patient states he actually has an existing nephrologist, Dr. Perry Daily who he typically only sees on an annual basis.  We will schedule him to be seen by her soon for further evaluation.  · Reassess 12/21/2022  at 4.7  · Pending assessment 4/17/23    5.  Hypophosphatemia:  Phosphorus 1.9- hold xgeva 9/8/2021 ., Increase foods high in phosphorus, recheck in 2 weeks. \  · 9/22/2021 phosphorus level 1.7.  Reviewed with Dr. Lee.  In addition to increasing foods high in phosphorus, I have instructed the patient to start Neutra-Phos 1 tablet daily.  We will recheck again in 2 weeks.  · Phosphorus 2.3 assessed 10/20/2021  · Reassess 12/8/2021 at 1.9, Xgeva moved to every 3 months.  · Patient assessed 6/13/2022 Xgeva moved every 6 months  · Reevaluation 9/21/2022 with phosphorus 3.6, Xgeva continued every 6 months  · Reevaluation 12/21/2022 at 4.0, reassessment 1/11/2023 and 2.4    PLAN:      1. Continue gabapentin 900 mg at bedtime, Requip to move to 2 mg p.o. nightly, escalate as needed  2. Patient asked to hold Imodium, increase darolutamide to 600 mg twice daily, decrease Norco as his pain improves  3. Clarifyed that Xgeva and Xgeva will be given every 3 months-now next due 6/12/2023  4. Increasing anemia noted 4/17/2023, 4 weeks follow-up NP assessment, CMP, CBC and PSA  5. Case discussed with patient's son, Cody, who concurs as well as the patient.    Patient on high risk medication requiring close monitoring for toxicity.      Jose Lee MD  04/17/2023

## 2023-04-17 ENCOUNTER — LAB (OUTPATIENT)
Dept: LAB | Facility: HOSPITAL | Age: 83
End: 2023-04-17
Payer: MEDICARE

## 2023-04-17 ENCOUNTER — OFFICE VISIT (OUTPATIENT)
Dept: ONCOLOGY | Facility: CLINIC | Age: 83
End: 2023-04-17
Payer: MEDICARE

## 2023-04-17 VITALS
RESPIRATION RATE: 18 BRPM | HEIGHT: 64 IN | OXYGEN SATURATION: 96 % | TEMPERATURE: 97.3 F | HEART RATE: 52 BPM | WEIGHT: 195.6 LBS | BODY MASS INDEX: 33.39 KG/M2 | SYSTOLIC BLOOD PRESSURE: 173 MMHG | DIASTOLIC BLOOD PRESSURE: 68 MMHG

## 2023-04-17 DIAGNOSIS — C79.51 PROSTATE CANCER METASTATIC TO BONE: Primary | ICD-10-CM

## 2023-04-17 DIAGNOSIS — C61 PROSTATE CANCER METASTATIC TO BONE: Primary | ICD-10-CM

## 2023-04-17 DIAGNOSIS — C79.51 MALIGNANT NEOPLASM METASTATIC TO BONE: ICD-10-CM

## 2023-04-17 DIAGNOSIS — C79.51 PROSTATE CANCER METASTATIC TO BONE: ICD-10-CM

## 2023-04-17 DIAGNOSIS — C61 PROSTATE CANCER METASTATIC TO BONE: ICD-10-CM

## 2023-04-17 LAB
ALBUMIN SERPL-MCNC: 3.7 G/DL (ref 3.5–5.2)
ALBUMIN/GLOB SERPL: 1.2 G/DL (ref 1.1–2.4)
ALP SERPL-CCNC: 209 U/L (ref 38–116)
ALT SERPL W P-5'-P-CCNC: 10 U/L (ref 0–41)
ANION GAP SERPL CALCULATED.3IONS-SCNC: 11.2 MMOL/L (ref 5–15)
AST SERPL-CCNC: 13 U/L (ref 0–40)
BASOPHILS # BLD AUTO: 0.02 10*3/MM3 (ref 0–0.2)
BASOPHILS NFR BLD AUTO: 0.4 % (ref 0–1.5)
BILIRUB SERPL-MCNC: 0.2 MG/DL (ref 0.2–1.2)
BUN SERPL-MCNC: 17 MG/DL (ref 6–20)
BUN/CREAT SERPL: 19.3 (ref 7.3–30)
CALCIUM SPEC-SCNC: 8.8 MG/DL (ref 8.5–10.2)
CHLORIDE SERPL-SCNC: 109 MMOL/L (ref 98–107)
CO2 SERPL-SCNC: 22.8 MMOL/L (ref 22–29)
CREAT SERPL-MCNC: 0.88 MG/DL (ref 0.7–1.3)
DEPRECATED RDW RBC AUTO: 52.6 FL (ref 37–54)
EGFRCR SERPLBLD CKD-EPI 2021: 85.9 ML/MIN/1.73
EOSINOPHIL # BLD AUTO: 0.1 10*3/MM3 (ref 0–0.4)
EOSINOPHIL NFR BLD AUTO: 2.2 % (ref 0.3–6.2)
ERYTHROCYTE [DISTWIDTH] IN BLOOD BY AUTOMATED COUNT: 15.7 % (ref 12.3–15.4)
GLOBULIN UR ELPH-MCNC: 3.1 GM/DL (ref 1.8–3.5)
GLUCOSE SERPL-MCNC: 147 MG/DL (ref 74–124)
HCT VFR BLD AUTO: 29.5 % (ref 37.5–51)
HGB BLD-MCNC: 9.1 G/DL (ref 13–17.7)
IMM GRANULOCYTES # BLD AUTO: 0.24 10*3/MM3 (ref 0–0.05)
IMM GRANULOCYTES NFR BLD AUTO: 5.3 % (ref 0–0.5)
LYMPHOCYTES # BLD AUTO: 0.8 10*3/MM3 (ref 0.7–3.1)
LYMPHOCYTES NFR BLD AUTO: 17.7 % (ref 19.6–45.3)
MCH RBC QN AUTO: 28.4 PG (ref 26.6–33)
MCHC RBC AUTO-ENTMCNC: 30.8 G/DL (ref 31.5–35.7)
MCV RBC AUTO: 92.2 FL (ref 79–97)
MONOCYTES # BLD AUTO: 0.4 10*3/MM3 (ref 0.1–0.9)
MONOCYTES NFR BLD AUTO: 8.9 % (ref 5–12)
NEUTROPHILS NFR BLD AUTO: 2.95 10*3/MM3 (ref 1.7–7)
NEUTROPHILS NFR BLD AUTO: 65.5 % (ref 42.7–76)
NRBC BLD AUTO-RTO: 0.4 /100 WBC (ref 0–0.2)
PLATELET # BLD AUTO: 194 10*3/MM3 (ref 140–450)
PMV BLD AUTO: 9.1 FL (ref 6–12)
POTASSIUM SERPL-SCNC: 4.2 MMOL/L (ref 3.5–4.7)
PROT SERPL-MCNC: 6.8 G/DL (ref 6.3–8)
RBC # BLD AUTO: 3.2 10*6/MM3 (ref 4.14–5.8)
SODIUM SERPL-SCNC: 143 MMOL/L (ref 134–145)
WBC NRBC COR # BLD: 4.51 10*3/MM3 (ref 3.4–10.8)

## 2023-04-17 PROCEDURE — 85025 COMPLETE CBC W/AUTO DIFF WBC: CPT

## 2023-04-17 PROCEDURE — 3077F SYST BP >= 140 MM HG: CPT | Performed by: INTERNAL MEDICINE

## 2023-04-17 PROCEDURE — 99214 OFFICE O/P EST MOD 30 MIN: CPT | Performed by: INTERNAL MEDICINE

## 2023-04-17 PROCEDURE — 36415 COLL VENOUS BLD VENIPUNCTURE: CPT

## 2023-04-17 PROCEDURE — 1125F AMNT PAIN NOTED PAIN PRSNT: CPT | Performed by: INTERNAL MEDICINE

## 2023-04-17 PROCEDURE — 80053 COMPREHEN METABOLIC PANEL: CPT

## 2023-04-17 PROCEDURE — 3078F DIAST BP <80 MM HG: CPT | Performed by: INTERNAL MEDICINE

## 2023-04-22 DIAGNOSIS — C61 PROSTATE CANCER METASTATIC TO BONE: ICD-10-CM

## 2023-04-22 DIAGNOSIS — C79.51 PROSTATE CANCER METASTATIC TO BONE: ICD-10-CM

## 2023-04-25 RX ORDER — GABAPENTIN 300 MG/1
CAPSULE ORAL
Qty: 90 CAPSULE | Refills: 0 | Status: SHIPPED | OUTPATIENT
Start: 2023-04-25

## 2023-04-29 DIAGNOSIS — C61 PROSTATE CANCER METASTATIC TO BONE: ICD-10-CM

## 2023-04-29 DIAGNOSIS — C79.51 PROSTATE CANCER METASTATIC TO BONE: ICD-10-CM

## 2023-05-01 ENCOUNTER — SPECIALTY PHARMACY (OUTPATIENT)
Dept: PHARMACY | Facility: HOSPITAL | Age: 83
End: 2023-05-01
Payer: MEDICARE

## 2023-05-01 RX ORDER — HYDROCODONE BITARTRATE AND ACETAMINOPHEN 5; 325 MG/1; MG/1
1 TABLET ORAL EVERY 6 HOURS PRN
Qty: 120 TABLET | Refills: 0 | Status: SHIPPED | OUTPATIENT
Start: 2023-05-01

## 2023-05-01 NOTE — PROGRESS NOTES
Re: Refills of Oral Specialty Medication - Nubeqa (darolutamide)    • Drug-Drug Interactions: The current medication list was reviewed and there are no relevant drug-drug interactions.  • Medication Allergies: The patient has no relevant allergies as it relates to their oral specialty medication  • Review of Labs/Dose Adjustments: NO DOSE CHANGE - I reviewed the most recent note and labs and the patient will continue without any dose changes.  I sent refills as described below.    Drug: Nubeqa (darolutamide)  Strength: 300 mg  Directions: Take 2 tablets by mouth twice a day  Quantity: 120  Refills: 5  Pharmacy prescription sent to: [facility]    Name/Credentials: Sunday Harris PharmD, BCOP    5/1/2023  12:45 EDT       Completed independent double check on medication order/RX. Nikki Remy, Lanie, BCOP

## 2023-05-01 NOTE — TELEPHONE ENCOUNTER
Refill requested from pharmacy. Per last chart note, patient to continue darolutamide to 600 mg twice daily. Will route to MD for cosignature.    Sunday Harris, PharmD, BCOP

## 2023-05-12 NOTE — PROGRESS NOTES
"  Subjective  Patient with recent falls onto his right side while gardening                                                                                                     REASONS FOR FOLLOW UP:   1.  Metastatic prostate cancer    HISTORY OF PRESENT ILLNESS:     Patient is an 82-year-old with metastatic prostate cancer who is seen as he continues current therapy with Xtandi, Xgeva moved to every 6 months, Lupron every 3 months.                                  The patient was seen back along with his son, Georges, 9/21/2022 fortunately doing reasonably well without additional pain, discomfort or excessive fatigue.  We have discussed various options for treatment of prostate cancer if we are unable to control this disease with current measures.    He is next evaluated 12/21/2022 now scheduled for Lupron.  He had last received both his Lupron and Xgeva 9/21/2022.  He notes increasing chest discomfort that appears to be \"when he twists\".  Initially he felt he might have a pneumonia but never had fever or cough.  We have discussed that his disease could well be progressing and then assessment that would allow us to clarify treatment options is reasonable.      This led to a repeat PSA 12/21/2022 that was found to be elevated to 23.9 and a follow-up Pylarify study performed 1/6/2023 that does demonstrate marked progression of sclerotic bone metastasis diffusely compared to 8/20/2021.  This includes the femoral neck,, sacrum, left iliac bone with SUV up to 48.8, stable tiny mediastinal nodes, 2 punctate nodules in the calvarium.      He is seen back with his son 1/11/2023.  The potential treatment options such as Pluvicto (Lutetium Yady 177 vipivotide tetraxetan), now that we know that his PSMA scan is positive, though the patient would have to initially be treated with taxane chemotherapy which would be difficult for him to tolerate, radium-223 considering his bony metastasis.  He has slowly progressive disease however " we have discussed additional issues including radiation therapy and/or alteration to another androgen receptor agonist such as darolutamide.  We have discussed all these options moving to have assessments done by radiation therapy and, [facility] as we address his symptoms.    The patient was seen by radiation therapy and felt a candidate for palliative radiotherapy as we made application for darolutamide.He is also seen by [facility] for additional treatment options 1/23/2023 and 2/10/2023 as radiation therapy proceeded to LSP/pelvis.    The patient was also seen by [facility] without additional therapeutic options at this time.    He is next seen 2/20/2023 to review his status recognizing his follow-up Xgeva will be due 3/15/2023 and and Lupron would be due 6/7/2023.  He was able to obtain darolutamide (Nubeqa) as free drug.  He is seen with his son both indicating that he just completed radiation therapy today.  While his pain has improved, he did have diarrhea develop during radiation therapy and is using antidiarrheal medications to control it currently.  This is also, potentially, side effect of darolutamide.      He is next seen 4/17/2023.  We have reiterated that his treatments with Lupron and Xgeva injections continued every 3 months.  On lower dose darolutamide which we gradually escalate to 600 mg nightly and 300 mg a day trying to move to 600 mg twice daily.  His last injections were given 3/20/2023 for both Xgeva and Lupron.  Unfortunately the patient has fallen twice while gardening onto his right side approximately 2 weeks prior to his visit 4/17/2023 and is only slowly recovering with right side and back pain.  He is, however, able to function and seen some improvement.  His increased use of pain medications has caused worsening constipation however.  In a lengthy conversation we have discussed increasing his darolutamide to 600 mg twice daily to gain his best response from therapy and also  improve his bowel function.    Patient is next evaluated 5/15/2023.  He continues on darolutamide 600 mg twice daily.  He also receives Xgeva and Lupron every 3 months, due in 1 month.  He returns today for lab review. He has occasional diarrhea, controlled with Lomotil as needed.  Continues [place] 5/325 as needed for pain and feels his pain is well controlled on this regimen.  Energy level is stable, he tires easily after working in his garden, but energy improves once he rests.  Appetite is good.  Denies fever or chills.  Denies nausea or vomiting.  Denies new or worsening pain.  No new concerns today.    Hematologic/oncologic history:   The patient is an 82-year-old male with significant past medical history including prostate carcinoma, CKD 3 and long-term tobacco use be been seen by primary care in late January, 2018 for hoarseness.  Chest x-ray is now outpatient January 23 revealed sclerotic change in the posterior mid chest to be within 3 adjacent thoracic ertebral bodies of uncertain significance.  A follow-up chest CT revealed numerous sclerotic foci within al visualized bones consistent with metastatic disease, multiple enlarged mediastinal lymph nodes concerning for metastatic lymphadenopathy and a polypoidal abnormality in the right lateral wall of the trachea.  CT of abdomen March 20 revealed extensive osseous metastatic disease mildly enlarged retroperitoneal lymph nodes.  Bone scan March 20 was consistent with widespread osseous metastasis particularly in the proximal half the left humeral shaft, abnormal uptake in the sternum and manubrium and a small focus in the posterior aspect of the calvarium.  This led to a plain film the left humerus with mottled sclerosis involving the shaft of the humerus particularly in the proximal half but no evidence of pathologic fracture.    The patient has additionally type 2 diabetes on insulin pump, again a history of prostate carcinoma prostatectomy 12 years  previously, hypertension, and hyperlipidemia.  Additional studies included a PSA of 395.1 from March 14, 2018.The patient's been seen by urology March 15 with plans to start Firmagon.    The patient is now seen in pulmonary clinic with Dr. Bijan Rivera and we have discussed that he will need bronchoscopy and mediastinoscopy.  Interestingly his additional history includes a long occupational exposure including working in the [place] just out of school, subsequent construction work for many years and a 30-pack-year history of smoking stopping in the late 1990s.  He indicates that he followed with Dr. Guillen of urology for many years with no changes in his exams and normal PSAs.  He stopped this follow-up approximately 2 years ago.     Patient was seen in consultation with thoracic surgery on March 28 and plans are made for mediastinoscopy and bronchoscopy with lymph node biopsy.  Pathology revealed that these nodes were consistent with metastatic prostate carcinoma.  He was discussed at thoracic conference.  A PET/CT was not pursued and it was determined that he see medical oncology for hormonal treatment and radiation therapy if symptomatic.  It should be noted that the patient's March 15 [facility] office note describes that Firmagon was planned.     The patient has met with the son and grandson April 23.  We have also contacted Dr. Taylor of urology in that Firmagon was given just after his last visit and would next be due approximately May 3.  Patient feels considerably better with resolution of his pain, normalization of his performance status.  He would like to continue treatment through our office now contacted Dr. Taylor concerning this.    The patient is next seen June 11, 2018 we discussed his follow-up including his treatments with Lupron May 7 and his next treatment on July 30.  He has returned to essentially normal performance status and his PSA has dropped further from 395 March  14 27.8 May 7 and now 2.03 June 11.  We do plan to initiate Xgeva also study him via scans to document his improvement approximately a month from now.   The patient is next seen July 26, 2018.  Repeat imaging demonstrated interval resolution of mediastinal and retroperitoneal lymphadenopathy.  There is thickening in the distal aspect of the appendix with stranding?  Chronic appendicitis.  The patient indicates he is having no such symptoms and never has.  There is no change in sclerotic bony metastasis in the patient's PSA has dropped substantially to 1.66 by July 19 and 1.79 July 26.  He is actually feeling excellent and this is corroborated by family members.   Patient is next seen January 10, 2019 continuing to do very well and, in fact indicating that [facility], likely [facility] replacements, visual review.  He is not certain is going to continue his care with them or with us or combination of both.    Patient is next seen April 4, 2019.  He is recently discharged after hospitalization March 15-18 with left upper lobe pneumonia.  We reviewed the findings in detail with his gradual recovery now documented.  His studies include a CT of chest which does not demonstrate any further bony lesions and/or pulmonary metastasis having developed.  He is feeling considerably better and approximately back to his baseline.  The patient is next seen June 28, 2019 feeling well but having baseline generally musculoskeletal pain and restless legs.His recent exams include a PSA of 0.81, CMP with potassium of 5.3 with repeat pending.  His performance status overall remains good to very good and is clearly responding to his treatments.  The patient is next seen December 13, 2019 continue to feel well.  He has had, oddly enough, 2 episodes of sleep paralysis which have occurred to him previously and he wonders if there is any connection with his prostate carcinoma though this is felt  unlikely.     The patient when assessed in December had his PSA go to 2.9.  We continue with Lupron and Xgeva and is seen back now March 9, 2020 without additional symptoms.  We discussed that a schedule could likely change moving to 3 months for both of these medications particularly if he needs additional therapy directed at progressive disease.     Patient contacted our practice May 4 concern for pain in his hips.  This led to moving his scans up and they were performed May 8, 2020 showing a sub-6 mm pleural-based pulmonary nodule in the right lower lobe that is new from March 15, 2018, remainder of the sub-6 mm pulmonary nodules bilaterally are stable.  There is extensive osseous metastatic disease as previous with no other new findings.     The patient indicates, when seen, May 13 that his bone pain is now resolved.  While this is good information does not mean that he does not have further bony metastasis and we have discussed that a follow-up bone scan is likely necessary.  Furthermore he is having some difficulty with sleeplessness and increase in restless legs as he continues to stay at home during the COVID 19 crisis which, itself, is producing more anxiety for him.  A follow-up bone scan was obtained Lety 3 revealing multifocal osseous metastatic disease which was compared to March 2, 2018 with improvement.  No new abnormal uptake is present.  He is next seen with us on June 17, 2020 and, fortunately, is not having any significant pain at this point.  We discussed his scans in detail and, on balance, his performance status also remains improved.     It was elected to follow the patient rather than changes antiandrogen therapy.  He is reassessed September 3 with unchanged CMP, H&H of 11.4 and 36.3 with normal white count, platelet count and differential, PSA at 15.8.  Follow-up PA lateral chest x-ray does not show any new abnormalities though there is extensive metastatic bone disease throughout the bony  thorax and osteoblastic metastasis have been seen on chest CT May 2020.  The patient is seen with his son Georges September 10 noticing increasing bony discomfort primarily in the right hip following fairly intensive gardening which he does frequently.  Now has further elevation of PSA were wondering whether we should try to intervene sooner per additional antiandrogens.  We will need to further assess him via CT scanning to make this decision     These were obtained October 1 showing extensive sclerotic disease with no metastatic disease in chest abdomen or pelvis.  PSA had gone from 15.8 September 3-16 0.6 October 1.     He still has pain getting up in the morning and after active gardening.  This is manageable with current pain medications and, at present, it is not apparent that he needs to switch medications to gain better control of his disease.  The patient did have follow-up studies done including a PSA November 25, 2020 now at 25.4.  The patient is seen with his son December 10, 2020 doing fair but having some increased pain in the mid sternum and right hip that he ascribes to additional exercise/work in managing his garden.  It is apparent however, that his last bone scan was 6 months ago and we do need to reassess him concerning this.     The patient had follow-up bone scan performed January 11, 2021 showing again uptake in the calvarium, humerus, right medial clavicle, sternum, ribs, spine, sacrum, pelvis, right acetabulum, proximal femora and now left frontal bone which appears new.  There is also mild increase in sternal uptake and equivocal increase in the pelvic lesions of all of the sacrum and the right proximal femur.     The patient is seen with his son January 18, 2021 and indicates that he is having pain gradually worsened in his right hip, mid chest that had been an issue previously.  These findings on bone scan are reviewed with both of them as likely the underlying culprit for his discomfort.   He would need change of the systemic therapy but, at present, will ask for radiation therapy palliation initially.  He was previously treated by Dr. Kulkarni many years ago and will ask her to see him.  We will also make application for the current cost of Xtandi or Zytiga.  The patient is not felt to be a candidate for systemic chemotherapy.     The patient was referred for ration therapy as we continued to assess his PSA on current therapy as well as exam the cost of Zytiga or Xtandi.  Radiation therapy (Dr. Kulkarni) saw the patient January 26 and felt that the right femur and sternal lesion could benefit from therapy-3000 cGy in 10 fractions.  The patient took treatment February 1 to February 12 to the above areas and is next seen back March 15.  Fortunately his pain has improved dramatically and he is quite happy about this.  Unfortunately he notes some difficulty with swallowing that is temporary and often leads to increased salivation and mucus production.  Patient was asked to return his next assessed April 12, 2021.  He is able to swallow with less pain but still has excess mucus production and issues with salivation.  His PSA has noted increase but he is having no additional bony discomfort at this point and, additionally, has noted low-grade fevers just under 100 degrees at nighttime?.  His weight, appetite and general performance status have remained good to excellent.  We discussed follow-up studies at this point to determine his status.    Follow-up scans were scheduled CT scans of chest and pelvis 5/4/2021 showing osseous metastasis quite similar to previous examinations in the chest, CT of abdomen pelvis also with no acute intra-abdominal adenopathy no indication of abdominal pelvic metastatic disease but again widespread osseous metastasis.  His PSA at this point have been slowly increasing to a level of 44 on 4/12/2021.  As the patient is reassessed 5/10/2021 we find, fortunately, that he is not  exceeding symptomatic.  It could make reasonable sense at this point to take the mediastinal nodes from his biopsy 4/9/2018, reassess potential targeted therapies, MSI status, and germline analysis.  Fortunately he is not having significant pain or discomfort and is seen with his son as we discussed the above plan.  Notably a follow-up PSA is found to be 42.6.   Patient is next seen in 6/7/2021 for Lupron and Xgeva.  Fortunately he is feeling considerably better according to both he and his son though his stamina has reduced.  He is not having additional pain at this point.  We have also reviewed his genetics assessment which was significant only for a variant of unknown significance.  This is not an actionable abnormality.    As the patient's PSA further escalated increasing to 89 7/7/2021 his subsequent Axumin PET was obtained 8/20 demonstrating extensive osteosclerotic metastatic disease showing reduced uptake-typical finding but no evidence of visceral metastatic disease in the neck chest abdomen pelvis.  These findings are discussed with the patient and his son 8/25/2021 and indicative of his progressive disease-etiology of his rising PSA-though the patient is asymptomatic we have discussed moving to initiate Xtandi is available at 160 mg p.o. twice daily along with his current Lupron and Xgeva.     The patient's testing 10/6 included PSA that is dropped to 9.69.  He is seen with his son, Cody, both indicating that he been doing relatively well with treatment but began to notice nausea in the last week.  The schedule that he has been given also needs to be somewhat updated in terms of his Lupron and Xgeva.  He has been able to maintain his appetite and overall performance status to date.    The patient is next assessed 12/8/2021 tolerating his current Xtandi dose better than previous and maintaining a good performance status overall as well as demonstrating a drop in his PSA now to 4.7 on 12/8/2021 compared  to high of 112 9/8/2021.  He remains hypophosphatemic and hypocalcemic and we have discussed, again, changing his Xgeva dosing to every 3 months along with his Lupron will continue his Xtandi to 80 mg daily.    The patient is next reviewed 3/21/2022 seen for both Xgeva and Lupron.  Fortunately he is feeling reasonably well with little additional pain though he has restarted to place his garden into shape for the season and is working more outdoors.    Patient reevaluated 5/2/2022 with repeat CBC including H&H of 10.5 and 33.4, white count 5980 and platelet count of 192,000, currently CMP and PSA pending with a previous PSA 1 month ago at 9.06.  At this point we increased his Xtandi to 160 mg p.o. daily while continuing treatment with every 3 month Xgeva and Lupron.  Notably the patient's Requip  alcohol I am sure it is mariza now at 1.5 mg p.o. nightly and Neurontin 900 mg p.o. daily has improved his restless leg syndrome substantially.    Patient seen 6/13/2022 at which time Xtandi at 160 mg p.o. daily was continued, Xgeva moved to every 6 months and Lupron every 3 months.    This was continued when patient was seen 9/21/2022 though subsequently we proceeded to every 3 months for both medications.    Patient seen 12/21/2022 at which time a subsequent Pylarify scan was requested as result of increasing PSA level and bony discomfort.  The patient received Lupron and Xgeva at this point.     He is seen back with his son 1/11/2023.  The potential treatment options such as Pluvicto (Lutetium Yady 177 vipivotide tetraxetan), now that we know that his PSMA scan is positive, though the patient would have to initially be treated with taxane chemotherapy which would be difficult for him to tolerate, radium-223 considering his bony metastasis.  He has slowly progressive disease however we have discussed additional issues including radiation therapy and/or alteration to another androgen receptor agonist such as darolutamide.  We  have discussed all these options moving to have assessments done by radiation therapy and, [facility] as we address his symptoms.    The patient was not felt a candidate for additional treatments via [facility], was able to start darolutamide and completed palliative radiotherapy.  He was stable seen 2/20/2023 though experiencing diarrhea post radiation therapy.    Patient seen 4/17/2023 with darolutamide gradually increased to 600 mg twice daily.  His Xgeva and Lupron will be given every 3 months.    Past Medical History:   Diagnosis Date   • Aortic stenosis, mild 01/01/2021    Per echocardiogram 2021   • Ascending aorta dilatation    • Asthma    • Benign prostatic hyperplasia    • Bone cancer    • Cellulitis of great toe of left foot 10/19/2018   • CKD (chronic kidney disease)     Stage 3; followed by Dr. Vicky Duran    • Diastolic dysfunction     GRADE II per echo 2018   • Difficulty breathing     during exertion   • Dyslipidemia    • Erectile dysfunction    • Fatigue    • Heart murmur    • History of blood transfusion    • History of kidney stones    • HL (hearing loss)    • Hyperlipidemia    • Hypertension    • Hyponatremia    • Kidney stone    • Left ventricular hypertrophy     per echo 2018   • Localized edema    • Neuropathy    • Obstructive sleep apnea     USING CPAP   • Osteoarthritis    • Peptic ulcer    • Pneumonia    • Pneumonia of left upper lobe due to infectious organism 03/15/2019   • Prostate cancer     Status post prostatectomy, radiation therapy, and hormone therapy followed by Dr. Lee; metastatsis to bone   • Pure hyperglyceridemia    • Restless leg syndrome    • Sepsis    • Sinus bradycardia    • Transient cerebral ischemia    • Type 2 diabetes mellitus         Past Surgical History:   Procedure Laterality Date   • BRONCHOSCOPY N/A 04/09/2018    Procedure: BRONCHOSCOPY;  Surgeon: Bijan Rivera III, MD;  Location: [facility];  Service: Cardiothoracic   • COLONOSCOPY     •  DEEP NECK LYMPH NODE BIOPSY / EXCISION     • HEMORRHOIDECTOMY     • LYMPH NODE BIOPSY     • MEDIASTINOSCOPY N/A 04/09/2018    Procedure: MEDIASTINOSCOPY WITH LYMPH NODE BIOPSY;  Surgeon: Bijan Rivera III, MD;  Location: [facility];  Service: Cardiothoracic   • OTHER SURGICAL HISTORY      ulcer repair   • PROSTATECTOMY  2006        Current Outpatient Medications on File Prior to Visit   Medication Sig Dispense Refill   • Accu-Chek Guide test strip USE 1 STRIP TO CHECK BLOOD SUGAR 4 TIMES DAILY     • Accu-Chek Softclix Lancets lancets USE 1 LANCET TO CHECK GLUCOSE 4 TIMES DAILY     • aspirin 81 MG EC tablet Take 1 tablet by mouth Daily.     • cholecalciferol (VITAMIN D3) 1000 units tablet Take 2 tablets by mouth Daily.     • Darolutamide (NUBEQA) 300 MG tablet Take 2 tablets by mouth 2 (Two) Times a Day. 120 tablet 5   • Denosumab (XGEVA SC) Inject  under the skin into the appropriate area as directed Every 30 (Thirty) Days.     • dilTIAZem CD (CARDIZEM CD) 120 MG 24 hr capsule TAKE 1 CAPSULE EVERY DAY 90 capsule 3   • diphenoxylate-atropine (LOMOTIL) 2.5-0.025 MG per tablet Take 1 tablet by mouth 4 (Four) Times a Day As Needed for Diarrhea. 30 tablet 0   • furosemide (LASIX) 40 MG tablet Take 1 tablet by mouth Every Morning.     • gabapentin (NEURONTIN) 300 MG capsule TAKE 3 CAPSULES EVERY NIGHT AT BEDTIME 90 capsule 0   • HYDROcodone-acetaminophen (NORCO) 5-325 MG per tablet Take 1 tablet by mouth Every 6 (Six) Hours As Needed for Moderate Pain. 120 tablet 0   • Insulin Aspart (novoLOG) 100 UNIT/ML injection INJECT 100 UNITS VIA PUMP ONCE DAILY     • Leuprolide Acetate, 3 Month, (LUPRON DEPOT, 3-MONTH, IM) Inject  into the appropriate muscle as directed by prescriber Every 3 (Three) Months.     • levothyroxine (SYNTHROID, LEVOTHROID) 88 MCG tablet      • loperamide (IMODIUM) 1 MG/5ML solution Take 10 mL by mouth 4 (Four) Times a Day As Needed for Diarrhea.     • meloxicam (MOBIC) 15 MG tablet Take 1 tablet  by mouth Daily. 30 tablet 1   • modafinil (PROVIGIL) 200 MG tablet Take 1 tablet by mouth Daily. 30 tablet 0   • olmesartan (BENICAR) 40 MG tablet TAKE 1 TABLET EVERY DAY (DISCONTINUE LOSARTAN 100MG) 90 tablet 3   • ondansetron (ZOFRAN) 8 MG tablet Take 1 tablet by mouth 3 (Three) Times a Day As Needed for Nausea or Vomiting. 30 tablet 5   • pravastatin (PRAVACHOL) 40 MG tablet Take 1 tablet by mouth Daily.     • rOPINIRole (REQUIP) 0.5 MG tablet Take 2 tablets by mouth in the evening and 2 at bedtime. 360 tablet 2   • traZODone (DESYREL) 50 MG tablet Take 1-2 tablets by mouth every night at bedtime.       No current facility-administered medications on file prior to visit.        ALLERGIES:    Allergies   Allergen Reactions   • Niacin Unknown - Low Severity   • Statins Unknown - Low Severity        Social History     Socioeconomic History   • Marital status:    • Years of education: College   Tobacco Use   • Smoking status: Former     Packs/day: 1.50     Years: 30.00     Pack years: 45.00     Types: Cigarettes     Start date: 1968     Quit date: 1998     Years since quittin.3   • Smokeless tobacco: Never   Substance and Sexual Activity   • Alcohol use: Not Currently     Comment: Caffeine use: 2-3 cups daily   • Drug use: Never   • Sexual activity: Not Currently        Family History   Problem Relation Age of Onset   • Heart failure Mother    • Hypertension Mother            • Diabetes Mother    • Heart disease Mother            • Lung cancer Father 46   • Hypertension Sister    • Lung cancer Sister 60   • Hypertension Brother            • Lung cancer Brother 62   • Diabetes Brother    • Heart disease Other    • Prostate cancer Brother 60   • Cancer Brother            • Cancer Sister            • Cancer Sister    • Cancer Sister    • Heart disease Brother            • Malig Hyperthermia Neg Hx         Review of Systems  As per HPI    Objective  "    Vitals:    05/15/23 1016   BP: 153/64   Pulse: 64   Resp: 18   Temp: 98.2 °F (36.8 °C)   TempSrc: Infrared   SpO2: 98%   Weight: 85.6 kg (188 lb 11.2 oz)   Height: 162.6 cm (64.02\")   PainSc:   4   PainLoc: Back  Comment: shoulder         5/15/2023    10:25 AM   Current Status   ECOG score 1     Physical Exam   Constitutional: He is oriented to person, place, and time. He appears well-developed. No distress.   HENT:   Head: Normocephalic and atraumatic.   Mouth/Throat: No oropharyngeal exudate.   Eyes: Pupils are equal, round, and reactive to light.   Cardiovascular: Normal rate, regular rhythm and normal heart sounds.   No murmur heard.  Pulmonary/Chest: Effort normal and breath sounds normal. No respiratory distress. He has no wheezes. He has no rhonchi. He has no rales.   Abdominal: Soft. Normal appearance and bowel sounds are normal. He exhibits no distension.   Musculoskeletal: Normal range of motion.   Neurological: He is alert and oriented to person, place, and time.   Skin: Skin is warm and dry. No rash noted.   Vitals reviewed.    RECENT LABS:  Hematology WBC   Date Value Ref Range Status   05/15/2023 5.30 3.40 - 10.80 10*3/mm3 Final     RBC   Date Value Ref Range Status   05/15/2023 3.25 (L) 4.14 - 5.80 10*6/mm3 Final     Hemoglobin   Date Value Ref Range Status   05/15/2023 9.1 (L) 13.0 - 17.7 g/dL Final     Hematocrit   Date Value Ref Range Status   05/15/2023 29.9 (L) 37.5 - 51.0 % Final     Platelets   Date Value Ref Range Status   05/15/2023 219 140 - 450 10*3/mm3 Final        Lab Results   Component Value Date    GLUCOSE 147 (H) 04/17/2023    BUN 17 04/17/2023    CREATININE 0.88 04/17/2023    EGFRIFNONA 56 (L) 02/23/2022    BCR 19.3 04/17/2023    K 4.2 04/17/2023    CO2 22.8 04/17/2023    CALCIUM 8.8 04/17/2023    ALBUMIN 3.7 04/17/2023    AST 13 04/17/2023    ALT 10 04/17/2023       NM Bone Scan Whole Body (01/11/2021 13:25)  CT Chest With Contrast Diagnostic (05/04/2021 09:11)  CT Abdomen " Pelvis With Contrast (05/04/2021 09:11)  NM PET/CT Skull Base to Mid Thigh (08/20/2021 12:18)      Assessment & Plan      1.  Metastatic prostate cancer:  ? Patient initially diagnosed in 2006 and had a prostatectomy and hormone therapy.  ? Patient in January 2018 began to complain of shortness of breath and hoarseness.  Chest x-ray obtained 1/23/18 showed sclerotic bone lesions.  ? CT of the chest 3/12/2018 numerous sclerotic bone foci with all visualized bones consistent with metastatic disease, multiple enlarged mediastinal lymph nodes.  ? .1 from 3/14/2018.  Patient was started on Firmagon by urology[facility].  ? 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  ? Case discussed at thoracic conference and recommendations were to continue ADT and radiation for symptomatic sites.  ? Lupron injections every 3 mos initiated 5/7/18 PSA at that time 7.810  ? Monthly Xgeva initiated 6/11/18 PSA 2.030  ? Last PSA 6/13/2019 0.881  ? 9/20/2019 patient tolerating treatment well, no new concerns.  ? 12/2019 PSA 2.9  ? 3/9/2020  PSA increasing to 5.030, he remains continuing on Lupron and Xgeva and asymptomatic though follow-up CT of chest and pelvis will be requested in 11 weeks prior to follow-up in 12 weeks.   ? 5/4/2020 patient called reported worsening right hip pain, plans to purse CT scans ASAP.   ? 5/8/2020 CT scans C/A/P show no progression of disease. Hip pain improved, Gabapentin added.. Bone scan requested.  ? 6/3/2020 bone scan that does not demonstrate worsening of bone nor need for localized radiation therapy.  The patient's PSA has been slowly rising and we have not been able to determine worsening of disease and and, therefore, it is not felt warranted add additional medications such as Xtandi or apalutamide to his therapy.  Please note would like to avoid Zytiga try not to add prednisone which would worsen his blood sugar  control.  ? 9/3/2020 PSA 15.8, CXR extensive metastatic bone disease- patient reviewed 9/10/2020 reporting increased bony discomfort CT scans requested.  ? Scans done and reviewed 10/7/2020 ultimately reveal no evidence of progression of disease for visceral involvement or clearly for bone involvement.  After further review with the patient and his son plan continued Xgeva and Lupron.  We have assessed for availability of Xtandi 160 mg p.o. daily and find the cost is currently prohibitive.   ? 11/25/2020 PSA 25.4  ? 12/10/2020 review with some mild increase in bony discomfort.  After repeat discussion we went on to have him undergo Lupron therapy, and his PSA actually to be mildly reduced at 22.2.   ? Follow-up bone scan 1/11/2021 demonstrates some evidence of progression in sternum, left sacrum and proximal femur.  These findings were reviewed with the patient and his son January 18, 2020 and the patient was referred for palliative radiotherapy(Dr. Kulkarni)  ? Palliative radiation under care Dr. Kulkarni to right femur and sternal lesion -3000 cGy in 10 fractions given February 1 to February 12 with significant symptom improvement.  ? 3/15/2021 PSA 38.5  ? 4/12/2021 PSA 44  ? 5/10/2021 PSA 42.6 CT chest abdomen pelvis 5/4/2021 - for progression of disease and follow-up PSA 5 10/2021 is at 42 slightly reduced from previous.  We have discussed how to proceed from here and find a significant family history that clearly supports a potential genetic assessment for his malignancy.  ? It is felt most reasonable at this point to take the mediastinal nodes from his biopsy 4/9/2018 and reassess for potential targeted therapies, MSI status, as well as germline analysis.  ? 6/7/2021 PSA 65.9 his analysis completed for actionable mutations including germline mutations.  These exams were negative though there is a variant of unknown significance.  Again this is not an actionable mutation.  We proceeded with additional Xgeva and  Lupron planning for monthly Xgeva and Lupron at 3 months  ? 7/7/2021 PSA 89 and subsequent testing with Axumin PET was performed confirming extensive osteosclerotic metastatic disease with little uptake, no evidence of visceral metastatic disease in neck chest abdomen or pelvis.  ? 8/25/2021  , PET/CT reviewed, subsequent PSA increased to 105.  Patient fortunately asymptomatic.  Requested reevaluation for Xtandi 160 mg po daily.   ? 9/8/2021 due for his lupron, receiving every 3 months, and monthly Xgeva.  Will have education today for Xtandi and will receive his medication today as well.   ? Started Xtandi 9/8/2021.  ? 9/22/2021 here for toxicity check, so far tolerating Xtandi quite well other than a slight increase in diarrhea controlled with Imodium.  ? 10/6/2021 continues to tolerate Xtandi well.  Labs are within range today, we will proceed with Xgeva today.  ? Patient seen 10/20/2021 having more nausea with Xtandi and demonstrating a substantial reduction in PSA level.  After discussion we plan to reduce his dose to 80 mg daily following his PSA and performance status over the next approximate 7 weeks at which time he would be scheduled for his follow-up Lupron.  ? Patient assessed 12/8/2021 with further reduction in PSA to 4.7, ongoing hypophosphatemia and hypocalcemia-Xgeva moved to every 3 months given on same schedule as Lupron  ? Patient seen 3/21/2022, 6-week follow-up with mild increase in PSA recognized  ? Patient reassessed 5/2/2022 with PSA at 10.1, Xtandi moved to 160 mg p.o. daily  ? Patient assessed 6/13/2022, Xtandi at 160 mg p.o. daily, PSA pending, Xgeva moved to every 6 months, Lupron every 3 months  ? Patient seen 9/21/2022 at which time we continued our current approach, reviewed additional options for therapy should he clearly progress.  ? Patient seen 12/21/2022, increasing bony discomfort, Xgeva and Eligard continued, plans made for Pylarify scanning.   ? Patient seen 1/11/2023  with his son, discuss potential treatment options such as Pluvicto (Lutetium Yady 177 vipivotide tetraxetan) knowing that Pms-Asa is strongly positive in bone, palliative radiotherapy, radium-223 and alteration to additional androgen receptor such as darolutamide.  Patient referred to radiation therapy for their evaluation and possible treatment options, [facility] as application made for darolutamide and medications altered to include meloxicam 15 mg p.o. daily along with his Norco.  ? Patient assessed 2/20/2023 improved for radiation therapy, no additional options for treatment and [facility], darolutamide initiated.  ? Patient assessed 4/17/2023, darolutamide increased to 600 mg twice daily, Xgeva and Lupron every 3 months  ? 5/15/2023: Continues darolutamide 600 mg twice daily.  Continues Xgeva and Lupron every 3 months, due in 1 month.  Hemoglobin today stable at 9.1.  Increased PSA, now 46 previously 29.  Discussed with Dr. Lee and we will proceed with bone scan in 3 weeks.    2.  Neuropathy/ restless legs:    · 5/13/2020 gabapentin 600 mg at bedtime, trazadone 50 mg QHS.  · On gabapentin 300, 2 tablets at bedtime, and requip 0.5.mg    · Still having neuropathy symptoms.  Will increase gabapentin to 900 mg at bedtime.   · 9/22/2021 increase in gabapentin to 900 mg at bedtime has helped neuropathy.  · Now taking Requip 2 mg nightly.    3.  Cancer related pain: on Norco 5/325 every 6 hours as needed.  · Mobic 15 mg p.o. every morning initiated 1/11/2023, consider MS Contin every 12 hours  · Status post palliative radiotherapy with improved pain control by 2/20/2023  · Effective pain relief except increasing constipation noted 4/17/2023, narcotic adjusted downward as possible.  · Continues Norco 5/325 every 6 hours as needed.  Feels his pain is well controlled on this regimen.  · Semaj Javier reports a pain score of 4.  Given his pain assessment as noted, treatment options were discussed and the following  options were decided upon as a follow-up plan to address the patient's pain: continuation of current treatment plan for pain.    4.  Hyperkalemia:  · Intermittent issue for the patient.  It is typically dietary related, as he consumes large amount  fresh tomatoes and other foods from his garden..  He was instructed to significantly decrease his intake as his potassium level today is 6.4, magnesium 2.7  He is not symptomatic.  We will also check an EKG.   · 9/22/2021 potassium level is improved to 5.7.  Patient states that he is no longer eating tomatoes from his garden.  · 10/6/2021 potassium level is 5.8.  Due to patient's multiple electrolyte abnormalities we will go ahead and refer him to nephrology for further evaluation.  Patient states he actually has an existing nephrologist, Dr. Vicky Sauceda who he typically only sees on an annual basis.  We will schedule him to be seen by her soon for further evaluation.  · Potassium today 4.5.    5.  Hypophosphatemia:  Phosphorus 1.9- hold xgeva 9/8/2021 ., Increase foods high in phosphorus, recheck in 2 weeks. \  · 9/22/2021 phosphorus level 1.7.  Reviewed with Dr. Lee.  In addition to increasing foods high in phosphorus, I have instructed the patient to start Neutra-Phos 1 tablet daily.  We will recheck again in 2 weeks.  · Phosphorus 2.3 assessed 10/20/2021  · Reassess 12/8/2021 at 1.9, Xgeva moved to every 3 months.  · Patient assessed 6/13/2022 Xgeva moved every 6 months  · Reevaluation 9/21/2022 with phosphorus 3.6, Xgeva continued every 6 months  · Reevaluation 12/21/2022 at 4.0, reassessment 1/11/2023 and 2.4    PLAN:     1. Order placed for bone scan due to increasing PSA.  This will be completed in 3 weeks prior to MD follow up.  2. Continue gabapentin 900 mg at bedtime, Requip 2 mg p.o. nightly, escalate as needed  3. Continue darolutamide 600 mg twice daily.  4. Continue Norco 5/325 every 6 hours as needed for pain.  5. Xgeva and Lupron every 3 months,  next due 6/12/2023.  6. Return in 4 weeks for MD follow-up with labs and Xgeva/Lupron.    Patient on high risk medication requiring close monitoring for toxicity.  The patient was discussed with Dr. Lee who is in agreement with today's plan of care.    I spent 55 minutes caring for Semaj on this date of service. This time includes time spent by me in the following activities: preparing for the visit, reviewing tests, obtaining and/or reviewing a separately obtained history, performing a medically appropriate examination and/or evaluation, counseling and educating the patient/family/caregiver, ordering medications, tests, or procedures, documenting information in the medical record and care coordination.       Lisa Wright, SYD  05/15/2023

## 2023-05-15 ENCOUNTER — LAB (OUTPATIENT)
Dept: LAB | Facility: HOSPITAL | Age: 83
End: 2023-05-15
Payer: MEDICARE

## 2023-05-15 ENCOUNTER — OFFICE VISIT (OUTPATIENT)
Dept: ONCOLOGY | Facility: CLINIC | Age: 83
End: 2023-05-15
Payer: MEDICARE

## 2023-05-15 VITALS
TEMPERATURE: 98.2 F | OXYGEN SATURATION: 98 % | RESPIRATION RATE: 18 BRPM | SYSTOLIC BLOOD PRESSURE: 153 MMHG | WEIGHT: 188.7 LBS | DIASTOLIC BLOOD PRESSURE: 64 MMHG | BODY MASS INDEX: 32.21 KG/M2 | HEIGHT: 64 IN | HEART RATE: 64 BPM

## 2023-05-15 DIAGNOSIS — C79.51 PROSTATE CANCER METASTATIC TO BONE: ICD-10-CM

## 2023-05-15 DIAGNOSIS — C61 PROSTATE CANCER METASTATIC TO BONE: ICD-10-CM

## 2023-05-15 DIAGNOSIS — C79.51 PROSTATE CANCER METASTATIC TO BONE: Primary | ICD-10-CM

## 2023-05-15 DIAGNOSIS — D64.9 ANEMIA, UNSPECIFIED TYPE: ICD-10-CM

## 2023-05-15 DIAGNOSIS — C61 PROSTATE CANCER METASTATIC TO BONE: Primary | ICD-10-CM

## 2023-05-15 DIAGNOSIS — Z79.899 HIGH RISK MEDICATION USE: ICD-10-CM

## 2023-05-15 DIAGNOSIS — C79.51 MALIGNANT NEOPLASM METASTATIC TO BONE: ICD-10-CM

## 2023-05-15 DIAGNOSIS — K52.1 DIARRHEA DUE TO DRUG: ICD-10-CM

## 2023-05-15 LAB
ALBUMIN SERPL-MCNC: 3.9 G/DL (ref 3.5–5.2)
ALBUMIN/GLOB SERPL: 1.2 G/DL (ref 1.1–2.4)
ALP SERPL-CCNC: 244 U/L (ref 38–116)
ALT SERPL W P-5'-P-CCNC: 7 U/L (ref 0–41)
ANION GAP SERPL CALCULATED.3IONS-SCNC: 12.7 MMOL/L (ref 5–15)
AST SERPL-CCNC: 14 U/L (ref 0–40)
BASOPHILS # BLD AUTO: 0.01 10*3/MM3 (ref 0–0.2)
BASOPHILS NFR BLD AUTO: 0.2 % (ref 0–1.5)
BILIRUB SERPL-MCNC: 0.3 MG/DL (ref 0.2–1.2)
BUN SERPL-MCNC: 18 MG/DL (ref 6–20)
BUN/CREAT SERPL: 22.2 (ref 7.3–30)
CALCIUM SPEC-SCNC: 9.1 MG/DL (ref 8.5–10.2)
CHLORIDE SERPL-SCNC: 105 MMOL/L (ref 98–107)
CO2 SERPL-SCNC: 22.3 MMOL/L (ref 22–29)
CREAT SERPL-MCNC: 0.81 MG/DL (ref 0.7–1.3)
DEPRECATED RDW RBC AUTO: 55.1 FL (ref 37–54)
EGFRCR SERPLBLD CKD-EPI 2021: 88 ML/MIN/1.73
EOSINOPHIL # BLD AUTO: 0.1 10*3/MM3 (ref 0–0.4)
EOSINOPHIL NFR BLD AUTO: 1.9 % (ref 0.3–6.2)
ERYTHROCYTE [DISTWIDTH] IN BLOOD BY AUTOMATED COUNT: 16.3 % (ref 12.3–15.4)
GLOBULIN UR ELPH-MCNC: 3.3 GM/DL (ref 1.8–3.5)
GLUCOSE SERPL-MCNC: 160 MG/DL (ref 74–124)
HCT VFR BLD AUTO: 29.9 % (ref 37.5–51)
HGB BLD-MCNC: 9.1 G/DL (ref 13–17.7)
IMM GRANULOCYTES # BLD AUTO: 0.11 10*3/MM3 (ref 0–0.05)
IMM GRANULOCYTES NFR BLD AUTO: 2.1 % (ref 0–0.5)
LYMPHOCYTES # BLD AUTO: 0.91 10*3/MM3 (ref 0.7–3.1)
LYMPHOCYTES NFR BLD AUTO: 17.2 % (ref 19.6–45.3)
MCH RBC QN AUTO: 28 PG (ref 26.6–33)
MCHC RBC AUTO-ENTMCNC: 30.4 G/DL (ref 31.5–35.7)
MCV RBC AUTO: 92 FL (ref 79–97)
MONOCYTES # BLD AUTO: 0.47 10*3/MM3 (ref 0.1–0.9)
MONOCYTES NFR BLD AUTO: 8.9 % (ref 5–12)
NEUTROPHILS NFR BLD AUTO: 3.7 10*3/MM3 (ref 1.7–7)
NEUTROPHILS NFR BLD AUTO: 69.7 % (ref 42.7–76)
NRBC BLD AUTO-RTO: 0 /100 WBC (ref 0–0.2)
PLATELET # BLD AUTO: 219 10*3/MM3 (ref 140–450)
PMV BLD AUTO: 9.3 FL (ref 6–12)
POTASSIUM SERPL-SCNC: 4.5 MMOL/L (ref 3.5–4.7)
PROT SERPL-MCNC: 7.2 G/DL (ref 6.3–8)
PSA SERPL-MCNC: 46.1 NG/ML (ref 0–4)
RBC # BLD AUTO: 3.25 10*6/MM3 (ref 4.14–5.8)
SODIUM SERPL-SCNC: 140 MMOL/L (ref 134–145)
WBC NRBC COR # BLD: 5.3 10*3/MM3 (ref 3.4–10.8)

## 2023-05-15 PROCEDURE — 84153 ASSAY OF PSA TOTAL: CPT | Performed by: INTERNAL MEDICINE

## 2023-05-15 PROCEDURE — 85025 COMPLETE CBC W/AUTO DIFF WBC: CPT

## 2023-05-15 PROCEDURE — 80053 COMPREHEN METABOLIC PANEL: CPT

## 2023-05-15 PROCEDURE — 36415 COLL VENOUS BLD VENIPUNCTURE: CPT

## 2023-05-25 ENCOUNTER — OFFICE VISIT (OUTPATIENT)
Dept: SLEEP MEDICINE | Facility: HOSPITAL | Age: 83
End: 2023-05-25

## 2023-05-25 ENCOUNTER — TELEPHONE (OUTPATIENT)
Dept: SLEEP MEDICINE | Facility: HOSPITAL | Age: 83
End: 2023-05-25
Payer: MEDICARE

## 2023-05-25 VITALS — HEART RATE: 63 BPM | BODY MASS INDEX: 32.44 KG/M2 | WEIGHT: 190 LBS | OXYGEN SATURATION: 97 % | HEIGHT: 64 IN

## 2023-05-25 DIAGNOSIS — G47.33 OBSTRUCTIVE SLEEP APNEA SYNDROME: Primary | ICD-10-CM

## 2023-05-25 DIAGNOSIS — F51.04 PSYCHOPHYSIOLOGICAL INSOMNIA: ICD-10-CM

## 2023-05-25 DIAGNOSIS — G47.14 HYPERSOMNIA DUE TO MEDICAL CONDITION: ICD-10-CM

## 2023-05-25 DIAGNOSIS — G25.81 RESTLESS LEGS SYNDROME (RLS): ICD-10-CM

## 2023-05-25 PROCEDURE — G0463 HOSPITAL OUTPT CLINIC VISIT: HCPCS

## 2023-05-25 NOTE — PROGRESS NOTES
"[facility] Up [facility] Note     Chief Complaint:  RONNY     Primary Care Physician: Axel Guardado MD    Interval History:   The patient is a 82 y.o. male  who I last saw 2022 and that note was reviewed.  The patient reports he is unchanged.  The patient goes to bed at 9 PM and gets out of bed at 6:30 AM.  He will use the bathroom during that time.    Patient continues to take modafinil 200 mg every morning.    As related to RLS, the patient continues to take Requip 0.5 mg, 2 tablets in the evening and gabapentin 300 mg 3 at bedtime and trazodone 50 mg at bedtime.    Review of Systems:    A complete review of systems was done and all were negative with the exception of some nasal congestion    Social History:    Social History     Socioeconomic History   • Marital status:    • Years of education: College   Tobacco Use   • Smoking status: Former     Packs/day: 1.50     Years: 30.00     Pack years: 45.00     Types: Cigarettes     Start date: 1968     Quit date: 1998     Years since quittin.3   • Smokeless tobacco: Never   Substance and Sexual Activity   • Alcohol use: Not Currently     Comment: Caffeine use: 2-3 cups daily   • Drug use: Never   • Sexual activity: Not Currently       Allergies:  Niacin and Statins     Medication Review:  Reviewed.      Vital Signs:    Vitals:    23 0858   Pulse: 63   SpO2: 97%   Weight: 86.2 kg (190 lb)   Height: 162.6 cm (64.02\")     Body mass index is 32.59 kg/m².    Physical Exam:    Constitutional:  Well developed 82 y.o. male that appears in no apparent distress.  Awake & oriented times 3.  Normal mood with normal recent and remote memory and normal judgement.  Eyes:  Conjunctivae normal.  Oropharynx: Previously, moist mucous membranes without exudate and a large tongue and normal uvula and narrow posterior pharyngeal opening.    Self-administered Reardan Sleepiness Scale test results: 6, previously 5  0-5 Lower normal daytime " sleepiness  6-10 Higher normal daytime sleepiness  11-12 Mild, 13-15 Moderate, & 16-24 Severe excessive daytime sleepiness     Downloaded PAP Data Reviewed For Therapeutic Response and Compliance:  DME is [facility] and he uses nasal pillows.  Downloads between 2/23 and 5/23/2023 compliance 96%.  Average usage is 6 hours and 46 minutes.  Average AHI is normal and he does have a significant leak.  Average auto BiPAP pressure is IPAP of 15.6 and EPAP of 11.6 and his ResMed auto BiPAP settings: IPAP 18 EPAP 11 and pressure support of 4    I have reviewed the above results and compared them with the patient's last downloads and reviewed with the patient.    Impression:   Obstructive sleep apnea adequately treated with ResMed auto BiPAP with good compliance and good usage and no complaints of hypersomnolence.  The patient continues to take modafinil 200 mg every morning for complaints of hypersomnolence. The patient's obstructive apnea appears to be adequately treated.  The patient's average clear airway index is normal.    Restless leg syndrome treated with Requip 0.5 mg tablets, 2 every evening, and gabapentin 300 mg, 3 at bedtime  Psychophysiologic insomnia treated with trazodone 50 mg at bedtime    Plan:  Good sleep hygiene measures should be maintained.  Weight loss would be beneficial in this patient who is obese by Body mass index is 32.59 kg/m²..      After evaluating the patient and assessing results available, the patient is benefiting from the treatment being provided.     The patient will continue ResMed auto BiPAP.  Potential side effects of PAP therapy reviewed and addressed as needed.  After clinical evaluation and review of downloads, I recommend changes to the patient's pressures.  A new prescription will be sent to the patient's DME.  The patient's maximum IPAP will be decreased from 18 down to 16.    The patient will continue medications as he is doing.  Sen reviewed and there is no  irregularities.    I answered all of the patient's questions.  The patient will call the [facility] for any problems with side effects of PAP therapy and will follow up in 6 months.      Bob Mcdermott MD  Sleep Medicine  05/25/23  09:23 EDT

## 2023-05-30 DIAGNOSIS — C61 PROSTATE CANCER METASTATIC TO BONE: ICD-10-CM

## 2023-05-30 DIAGNOSIS — C79.51 PROSTATE CANCER METASTATIC TO BONE: ICD-10-CM

## 2023-05-30 RX ORDER — HYDROCODONE BITARTRATE AND ACETAMINOPHEN 5; 325 MG/1; MG/1
1 TABLET ORAL EVERY 6 HOURS PRN
Qty: 120 TABLET | Refills: 0 | Status: SHIPPED | OUTPATIENT
Start: 2023-05-30

## 2023-06-05 ENCOUNTER — HOSPITAL ENCOUNTER (OUTPATIENT)
Dept: NUCLEAR MEDICINE | Facility: HOSPITAL | Age: 83
Discharge: HOME | End: 2023-06-05
Payer: MEDICARE

## 2023-06-05 DIAGNOSIS — C79.51 PROSTATE CANCER METASTATIC TO BONE: ICD-10-CM

## 2023-06-05 DIAGNOSIS — C61 PROSTATE CANCER METASTATIC TO BONE: ICD-10-CM

## 2023-06-05 PROCEDURE — A9503 TC99M MEDRONATE: HCPCS | Performed by: NURSE PRACTITIONER

## 2023-06-05 PROCEDURE — 78306 BONE IMAGING WHOLE BODY: CPT

## 2023-06-05 PROCEDURE — 0 TECHNETIUM MEDRONATE KIT: Performed by: NURSE PRACTITIONER

## 2023-06-05 RX ORDER — TC 99M MEDRONATE 20 MG/10ML
21.6 INJECTION, POWDER, LYOPHILIZED, FOR SOLUTION INTRAVENOUS
Status: COMPLETED | OUTPATIENT
Start: 2023-06-05 | End: 2023-06-05

## 2023-06-05 RX ADMIN — Medication 21.6 MILLICURIE: at 09:52

## 2023-06-09 NOTE — PROGRESS NOTES
"                                                                                                                                            REASONS FOR FOLLOW UP:   1.  Metastatic prostate cancer    HISTORY OF PRESENT ILLNESS:     Patient is an 82-year-old with metastatic prostate cancer who is seen as he continues current therapy with Xtandi, Xgeva moved to every 6 months, Lupron every 3 months.                                  The patient was seen back along with his son, Georges, 9/21/2022 fortunately doing reasonably well without additional pain, discomfort or excessive fatigue.  We have discussed various options for treatment of prostate cancer if we are unable to control this disease with current measures.    He is next evaluated 12/21/2022 now scheduled for Lupron.  He had last received both his Lupron and Xgeva 9/21/2022.  He notes increasing chest discomfort that appears to be \"when he twists\".  Initially he felt he might have a pneumonia but never had fever or cough.  We have discussed that his disease could well be progressing and then assessment that would allow us to clarify treatment options is reasonable.      This led to a repeat PSA 12/21/2022 that was found to be elevated to 23.9 and a follow-up Pylarify study performed 1/6/2023 that does demonstrate marked progression of sclerotic bone metastasis diffusely compared to 8/20/2021.  This includes the femoral neck,, sacrum, left iliac bone with SUV up to 48.8, stable tiny mediastinal nodes, 2 punctate nodules in the calvarium.      He is seen back with his son 1/11/2023.  The potential treatment options such as Pluvicto (Lutetium Yady 177 vipivotide tetraxetan), now that we know that his PSMA scan is positive, though the patient would have to initially be treated with taxane chemotherapy which would be difficult for him to tolerate, radium-223 considering his bony metastasis.  He has slowly progressive disease however we have discussed additional issues " including radiation therapy and/or alteration to another androgen receptor agonist such as darolutamide.  We have discussed all these options moving to have assessments done by radiation therapy and, [facility] as we address his symptoms.    The patient was seen by radiation therapy and felt a candidate for palliative radiotherapy as we made application for darolutamide.He is also seen by [facility] for additional treatment options 1/23/2023 and 2/10/2023 as radiation therapy proceeded to LSP/pelvis.    The patient was also seen by [facility] without additional therapeutic options at this time.    He is next seen 2/20/2023 to review his status recognizing his follow-up Xgeva will be due 3/15/2023 and and Lupron would be due 6/7/2023.  He was able to obtain darolutamide (Nubeqa) as free drug.  He is seen with his son both indicating that he just completed radiation therapy today.  While his pain has improved, he did have diarrhea develop during radiation therapy and is using antidiarrheal medications to control it currently.  This is also, potentially, side effect of darolutamide.      He is next seen 4/17/2023.  We have reiterated that his treatments with Lupron and Xgeva injections continued every 3 months.  On lower dose darolutamide which we gradually escalate to 600 mg nightly and 300 mg a day trying to move to 600 mg twice daily.  His last injections were given 3/20/2023 for both Xgeva and Lupron.  Unfortunately the patient has fallen twice while gardening onto his right side approximately 2 weeks prior to his visit 4/17/2023 and is only slowly recovering with right side and back pain.  He is, however, able to function and seen some improvement.  His increased use of pain medications has caused worsening constipation however.  In a lengthy conversation we have discussed increasing his darolutamide to 600 mg twice daily to gain his best response from therapy and also improve his bowel  function.    Patient is next evaluated 5/15/2023.  He continues on darolutamide 600 mg twice daily.  He also receives Xgeva and Lupron every 3 months, due in 1 month.  He returns today for lab review. He has occasional diarrhea, controlled with Lomotil as needed.  Continues [place] 5/325 as needed for pain and feels his pain is well controlled on this regimen.  Energy level is stable, he tires easily after working in his garden, but energy improves once he rests.  Appetite is good.  Denies fever or chills.  Denies nausea or vomiting.  Denies new or worsening pain.  No new concerns today.    The patient's follow-up tests include a PSA of 46.1 5/15/2023 and bone density 6/5/2023 showing abnormal uptake within several sites including skull base, ribs, humeri, spine, pelvis, Femara, sternum, tibia, left radius all consistent with progression of his disease.  This patient could be a candidate for a number of studies but we have, in particular, discussed Provenge.  He would likely need to be seen by [facility] initially.    Patient seen with his son 6/12/2023 and we discussed options such as Provenge.  The patient, self, indicates that he is not really interested in doing this but would rather have control of his pain primarily.  As result we have increased his Norco to 10 mg tablets #100 E scribed to his pharmacy as we continue Xgeva.  Pending his assessment in the next several weeks to month we may need to proceed to palliative care primarily.        Hematologic/oncologic history:   The patient is an 82-year-old male with significant past medical history including prostate carcinoma, CKD 3 and long-term tobacco use be been seen by primary care in late January, 2018 for hoarseness.  Chest x-ray is now outpatient January 23 revealed sclerotic change in the posterior mid chest to be within 3 adjacent thoracic ertebral bodies of uncertain significance.  A follow-up chest CT revealed numerous sclerotic foci within al  visualized bones consistent with metastatic disease, multiple enlarged mediastinal lymph nodes concerning for metastatic lymphadenopathy and a polypoidal abnormality in the right lateral wall of the trachea.  CT of abdomen March 20 revealed extensive osseous metastatic disease mildly enlarged retroperitoneal lymph nodes.  Bone scan March 20 was consistent with widespread osseous metastasis particularly in the proximal half the left humeral shaft, abnormal uptake in the sternum and manubrium and a small focus in the posterior aspect of the calvarium.  This led to a plain film the left humerus with mottled sclerosis involving the shaft of the humerus particularly in the proximal half but no evidence of pathologic fracture.    The patient has additionally type 2 diabetes on insulin pump, again a history of prostate carcinoma prostatectomy 12 years previously, hypertension, and hyperlipidemia.  Additional studies included a PSA of 395.1 from March 14, 2018.The patient's been seen by urology March 15 with plans to start Firmagon.    The patient is now seen in pulmonary clinic with Dr. Bijan Rivera and we have discussed that he will need bronchoscopy and mediastinoscopy.  Interestingly his additional history includes a long occupational exposure including working in the [place] just out of school, subsequent construction work for many years and a 30-pack-year history of smoking stopping in the late 1990s.  He indicates that he followed with Dr. Guillen of urology for many years with no changes in his exams and normal PSAs.  He stopped this follow-up approximately 2 years ago.     Patient was seen in consultation with thoracic surgery on March 28 and plans are made for mediastinoscopy and bronchoscopy with lymph node biopsy.  Pathology revealed that these nodes were consistent with metastatic prostate carcinoma.  He was discussed at thoracic conference.  A PET/CT was not pursued and it was determined  that he see medical oncology for hormonal treatment and radiation therapy if symptomatic.  It should be noted that the patient's March 15 [facility] office note describes that Firmagon was planned.     The patient has met with the son and grandson April 23.  We have also contacted Dr. Taylor of urology in that Firmagon was given just after his last visit and would next be due approximately May 3.  Patient feels considerably better with resolution of his pain, normalization of his performance status.  He would like to continue treatment through our office now contacted Dr. Taylor concerning this.    The patient is next seen June 11, 2018 we discussed his follow-up including his treatments with Lupron May 7 and his next treatment on July 30.  He has returned to essentially normal performance status and his PSA has dropped further from 395 March 14 27.8 May 7 and now 2.03 June 11.  We do plan to initiate Xgeva also study him via scans to document his improvement approximately a month from now.   The patient is next seen July 26, 2018.  Repeat imaging demonstrated interval resolution of mediastinal and retroperitoneal lymphadenopathy.  There is thickening in the distal aspect of the appendix with stranding?  Chronic appendicitis.  The patient indicates he is having no such symptoms and never has.  There is no change in sclerotic bony metastasis in the patient's PSA has dropped substantially to 1.66 by July 19 and 1.79 July 26.  He is actually feeling excellent and this is corroborated by family members.   Patient is next seen January 10, 2019 continuing to do very well and, in fact indicating that [facility], likely [facility] replacements, visual review.  He is not certain is going to continue his care with them or with us or combination of both.    Patient is next seen April 4, 2019.  He is recently discharged after hospitalization March 15-18 with left upper lobe  pneumonia.  We reviewed the findings in detail with his gradual recovery now documented.  His studies include a CT of chest which does not demonstrate any further bony lesions and/or pulmonary metastasis having developed.  He is feeling considerably better and approximately back to his baseline.  The patient is next seen June 28, 2019 feeling well but having baseline generally musculoskeletal pain and restless legs.His recent exams include a PSA of 0.81, CMP with potassium of 5.3 with repeat pending.  His performance status overall remains good to very good and is clearly responding to his treatments.  The patient is next seen December 13, 2019 continue to feel well.  He has had, oddly enough, 2 episodes of sleep paralysis which have occurred to him previously and he wonders if there is any connection with his prostate carcinoma though this is felt unlikely.     The patient when assessed in December had his PSA go to 2.9.  We continue with Lupron and Xgeva and is seen back now March 9, 2020 without additional symptoms.  We discussed that a schedule could likely change moving to 3 months for both of these medications particularly if he needs additional therapy directed at progressive disease.     Patient contacted our practice May 4 concern for pain in his hips.  This led to moving his scans up and they were performed May 8, 2020 showing a sub-6 mm pleural-based pulmonary nodule in the right lower lobe that is new from March 15, 2018, remainder of the sub-6 mm pulmonary nodules bilaterally are stable.  There is extensive osseous metastatic disease as previous with no other new findings.     The patient indicates, when seen, May 13 that his bone pain is now resolved.  While this is good information does not mean that he does not have further bony metastasis and we have discussed that a follow-up bone scan is likely necessary.  Furthermore he is having some difficulty with sleeplessness and increase in restless legs as  he continues to stay at home during the COVID 19 crisis which, itself, is producing more anxiety for him.  A follow-up bone scan was obtained Lety 3 revealing multifocal osseous metastatic disease which was compared to March 2, 2018 with improvement.  No new abnormal uptake is present.  He is next seen with us on June 17, 2020 and, fortunately, is not having any significant pain at this point.  We discussed his scans in detail and, on balance, his performance status also remains improved.     It was elected to follow the patient rather than changes antiandrogen therapy.  He is reassessed September 3 with unchanged CMP, H&H of 11.4 and 36.3 with normal white count, platelet count and differential, PSA at 15.8.  Follow-up PA lateral chest x-ray does not show any new abnormalities though there is extensive metastatic bone disease throughout the bony thorax and osteoblastic metastasis have been seen on chest CT May 2020.  The patient is seen with his son Georges September 10 noticing increasing bony discomfort primarily in the right hip following fairly intensive gardening which he does frequently.  Now has further elevation of PSA were wondering whether we should try to intervene sooner per additional antiandrogens.  We will need to further assess him via CT scanning to make this decision     These were obtained October 1 showing extensive sclerotic disease with no metastatic disease in chest abdomen or pelvis.  PSA had gone from 15.8 September 3-16 0.6 October 1.     He still has pain getting up in the morning and after active gardening.  This is manageable with current pain medications and, at present, it is not apparent that he needs to switch medications to gain better control of his disease.  The patient did have follow-up studies done including a PSA November 25, 2020 now at 25.4.  The patient is seen with his son December 10, 2020 doing fair but having some increased pain in the mid sternum and right hip that he  ascribes to additional exercise/work in managing his garden.  It is apparent however, that his last bone scan was 6 months ago and we do need to reassess him concerning this.     The patient had follow-up bone scan performed January 11, 2021 showing again uptake in the calvarium, humerus, right medial clavicle, sternum, ribs, spine, sacrum, pelvis, right acetabulum, proximal femora and now left frontal bone which appears new.  There is also mild increase in sternal uptake and equivocal increase in the pelvic lesions of all of the sacrum and the right proximal femur.     The patient is seen with his son January 18, 2021 and indicates that he is having pain gradually worsened in his right hip, mid chest that had been an issue previously.  These findings on bone scan are reviewed with both of them as likely the underlying culprit for his discomfort.  He would need change of the systemic therapy but, at present, will ask for radiation therapy palliation initially.  He was previously treated by Dr. Kulkarni many years ago and will ask her to see him.  We will also make application for the current cost of Xtandi or Zytiga.  The patient is not felt to be a candidate for systemic chemotherapy.     The patient was referred for ration therapy as we continued to assess his PSA on current therapy as well as exam the cost of Zytiga or Xtandi.  Radiation therapy (Dr. Kulkarni) saw the patient January 26 and felt that the right femur and sternal lesion could benefit from therapy-3000 cGy in 10 fractions.  The patient took treatment February 1 to February 12 to the above areas and is next seen back March 15.  Fortunately his pain has improved dramatically and he is quite happy about this.  Unfortunately he notes some difficulty with swallowing that is temporary and often leads to increased salivation and mucus production.  Patient was asked to return his next assessed April 12, 2021.  He is able to swallow with less pain but still has  excess mucus production and issues with salivation.  His PSA has noted increase but he is having no additional bony discomfort at this point and, additionally, has noted low-grade fevers just under 100 degrees at nighttime?.  His weight, appetite and general performance status have remained good to excellent.  We discussed follow-up studies at this point to determine his status.    Follow-up scans were scheduled CT scans of chest and pelvis 5/4/2021 showing osseous metastasis quite similar to previous examinations in the chest, CT of abdomen pelvis also with no acute intra-abdominal adenopathy no indication of abdominal pelvic metastatic disease but again widespread osseous metastasis.  His PSA at this point have been slowly increasing to a level of 44 on 4/12/2021.  As the patient is reassessed 5/10/2021 we find, fortunately, that he is not exceeding symptomatic.  It could make reasonable sense at this point to take the mediastinal nodes from his biopsy 4/9/2018, reassess potential targeted therapies, MSI status, and germline analysis.  Fortunately he is not having significant pain or discomfort and is seen with his son as we discussed the above plan.  Notably a follow-up PSA is found to be 42.6.   Patient is next seen in 6/7/2021 for Lupron and Xgeva.  Fortunately he is feeling considerably better according to both he and his son though his stamina has reduced.  He is not having additional pain at this point.  We have also reviewed his genetics assessment which was significant only for a variant of unknown significance.  This is not an actionable abnormality.    As the patient's PSA further escalated increasing to 89 7/7/2021 his subsequent Axumin PET was obtained 8/20 demonstrating extensive osteosclerotic metastatic disease showing reduced uptake-typical finding but no evidence of visceral metastatic disease in the neck chest abdomen pelvis.  These findings are discussed with the patient and his son 8/25/2021  and indicative of his progressive disease-etiology of his rising PSA-though the patient is asymptomatic we have discussed moving to initiate Xtandi is available at 160 mg p.o. twice daily along with his current Lupron and Xgeva.     The patient's testing 10/6 included PSA that is dropped to 9.69.  He is seen with his son, Cody, both indicating that he been doing relatively well with treatment but began to notice nausea in the last week.  The schedule that he has been given also needs to be somewhat updated in terms of his Lupron and Xgeva.  He has been able to maintain his appetite and overall performance status to date.    The patient is next assessed 12/8/2021 tolerating his current Xtandi dose better than previous and maintaining a good performance status overall as well as demonstrating a drop in his PSA now to 4.7 on 12/8/2021 compared to high of 112 9/8/2021.  He remains hypophosphatemic and hypocalcemic and we have discussed, again, changing his Xgeva dosing to every 3 months along with his Lupron will continue his Xtandi to 80 mg daily.    The patient is next reviewed 3/21/2022 seen for both Xgeva and Lupron.  Fortunately he is feeling reasonably well with little additional pain though he has restarted to place his garden into shape for the season and is working more outdoors.    Patient reevaluated 5/2/2022 with repeat CBC including H&H of 10.5 and 33.4, white count 5980 and platelet count of 192,000, currently CMP and PSA pending with a previous PSA 1 month ago at 9.06.  At this point we increased his Xtandi to 160 mg p.o. daily while continuing treatment with every 3 month Xgeva and Lupron.  Notably the patient's Requip  alcohol I am sure it is mariza now at 1.5 mg p.o. nightly and Neurontin 900 mg p.o. daily has improved his restless leg syndrome substantially.    Patient seen 6/13/2022 at which time Xtandi at 160 mg p.o. daily was continued, Xgeva moved to every 6 months and Lupron every 3  months.    This was continued when patient was seen 9/21/2022 though subsequently we proceeded to every 3 months for both medications.    Patient seen 12/21/2022 at which time a subsequent Pylarify scan was requested as result of increasing PSA level and bony discomfort.  The patient received Lupron and Xgeva at this point.     He is seen back with his son 1/11/2023.  The potential treatment options such as Pluvicto (Lutetium Yady 177 vipivotide tetraxetan), now that we know that his PSMA scan is positive, though the patient would have to initially be treated with taxane chemotherapy which would be difficult for him to tolerate, radium-223 considering his bony metastasis.  He has slowly progressive disease however we have discussed additional issues including radiation therapy and/or alteration to another androgen receptor agonist such as darolutamide.  We have discussed all these options moving to have assessments done by radiation therapy and, [facility] as we address his symptoms.    The patient was not felt a candidate for additional treatments via [facility], was able to start darolutamide and completed palliative radiotherapy.  He was stable seen 2/20/2023 though experiencing diarrhea post radiation therapy.    Patient seen 4/17/2023 with darolutamide gradually increased to 600 mg twice daily.  His Xgeva and Lupron will be given every 3 months.    Patient seen 6/12/2023, unfortunately, PSA increasing and bone scan worsening.  Patient at this point indicated that he did not wish additional therapies but, instead, control of his pain initially.  Plans were made to continue his Lupron and Xgeva seen him at the 3 to 4-week annia and considering palliative care primarily with either hospice or [facility].    Past Medical History:   Diagnosis Date    Aortic stenosis, mild 01/01/2021    Per echocardiogram 2021    Ascending aorta dilatation     Asthma     Benign prostatic hyperplasia     Bone cancer     Cellulitis of  great toe of left foot 10/19/2018    CKD (chronic kidney disease)     Stage 3; followed by Dr. Vicky Duran     Diastolic dysfunction     GRADE II per echo 2018    Difficulty breathing     during exertion    Dyslipidemia     Erectile dysfunction     Fatigue     Heart murmur     History of blood transfusion     History of kidney stones     HL (hearing loss)     Hyperlipidemia     Hypertension     Hyponatremia     Kidney stone     Left ventricular hypertrophy     per echo 2018    Localized edema     Neuropathy     Obstructive sleep apnea     USING CPAP    Osteoarthritis     Peptic ulcer     Pneumonia     Pneumonia of left upper lobe due to infectious organism 03/15/2019    Prostate cancer     Status post prostatectomy, radiation therapy, and hormone therapy followed by Dr. Lee; metastatsis to bone    Pure hyperglyceridemia     Restless leg syndrome     Sepsis     Sinus bradycardia     Transient cerebral ischemia     Type 2 diabetes mellitus         Past Surgical History:   Procedure Laterality Date    BRONCHOSCOPY N/A 04/09/2018    Procedure: BRONCHOSCOPY;  Surgeon: Bijan Rivera III, MD;  Location: [facility];  Service: Cardiothoracic    COLONOSCOPY      DEEP NECK LYMPH NODE BIOPSY / EXCISION      HEMORRHOIDECTOMY      LYMPH NODE BIOPSY      MEDIASTINOSCOPY N/A 04/09/2018    Procedure: MEDIASTINOSCOPY WITH LYMPH NODE BIOPSY;  Surgeon: Bijan Rivera III, MD;  Location: [facility];  Service: Cardiothoracic    OTHER SURGICAL HISTORY      ulcer repair    PROSTATECTOMY  2006        Current Outpatient Medications on File Prior to Visit   Medication Sig Dispense Refill    Accu-Chek Guide test strip USE 1 STRIP TO CHECK BLOOD SUGAR 4 TIMES DAILY      Accu-Chek Softclix Lancets lancets USE 1 LANCET TO CHECK GLUCOSE 4 TIMES DAILY      aspirin 81 MG EC tablet Take 1 tablet by mouth Daily.      cholecalciferol (VITAMIN D3) 1000 units tablet Take 2 tablets by mouth Daily.      Darolutamide (NUBEQA) 300 MG  tablet Take 2 tablets by mouth 2 (Two) Times a Day. 120 tablet 5    Denosumab (XGEVA SC) Inject  under the skin into the appropriate area as directed Every 30 (Thirty) Days.      dilTIAZem CD (CARDIZEM CD) 120 MG 24 hr capsule TAKE 1 CAPSULE EVERY DAY 90 capsule 3    diphenoxylate-atropine (LOMOTIL) 2.5-0.025 MG per tablet Take 1 tablet by mouth 4 (Four) Times a Day As Needed for Diarrhea. 30 tablet 0    furosemide (LASIX) 40 MG tablet Take 1 tablet by mouth Every Morning.      gabapentin (NEURONTIN) 300 MG capsule TAKE 3 CAPSULES EVERY NIGHT AT BEDTIME 90 capsule 0    Insulin Aspart (novoLOG) 100 UNIT/ML injection INJECT 100 UNITS VIA PUMP ONCE DAILY      Leuprolide Acetate, 3 Month, (LUPRON DEPOT, 3-MONTH, IM) Inject  into the appropriate muscle as directed by prescriber Every 3 (Three) Months.      levothyroxine (SYNTHROID, LEVOTHROID) 88 MCG tablet       loperamide (IMODIUM) 1 MG/5ML solution Take 10 mL by mouth 4 (Four) Times a Day As Needed for Diarrhea.      meloxicam (MOBIC) 15 MG tablet Take 1 tablet by mouth Daily. 30 tablet 1    modafinil (PROVIGIL) 200 MG tablet Take 1 tablet by mouth Daily. 30 tablet 0    olmesartan (BENICAR) 40 MG tablet TAKE 1 TABLET EVERY DAY (DISCONTINUE LOSARTAN 100MG) 90 tablet 3    ondansetron (ZOFRAN) 8 MG tablet Take 1 tablet by mouth 3 (Three) Times a Day As Needed for Nausea or Vomiting. 30 tablet 5    pravastatin (PRAVACHOL) 40 MG tablet Take 1 tablet by mouth Daily.      rOPINIRole (REQUIP) 0.5 MG tablet Take 2 tablets by mouth in the evening and 2 at bedtime. 360 tablet 2    traZODone (DESYREL) 50 MG tablet Take 1-2 tablets by mouth every night at bedtime.      [DISCONTINUED] HYDROcodone-acetaminophen (NORCO) 5-325 MG per tablet Take 1 tablet by mouth Every 6 (Six) Hours As Needed for Moderate Pain. 120 tablet 0     No current facility-administered medications on file prior to visit.        ALLERGIES:    Allergies   Allergen Reactions    Niacin Unknown - Low Severity     "Statins Unknown - Low Severity        Social History     Socioeconomic History    Marital status:     Years of education: College   Tobacco Use    Smoking status: Former     Packs/day: 1.50     Years: 30.00     Pack years: 45.00     Types: Cigarettes     Start date: 1968     Quit date: 1998     Years since quittin.4    Smokeless tobacco: Never   Substance and Sexual Activity    Alcohol use: Not Currently     Comment: Caffeine use: 2-3 cups daily    Drug use: Never    Sexual activity: Not Currently        Family History   Problem Relation Age of Onset    Heart failure Mother     Hypertension Mother             Diabetes Mother     Heart disease Mother             Lung cancer Father 46    Hypertension Sister     Lung cancer Sister 60    Hypertension Brother             Lung cancer Brother 62    Diabetes Brother     Heart disease Other     Prostate cancer Brother 60    Cancer Brother             Cancer Sister             Cancer Sister     Cancer Sister     Heart disease Brother             Malig Hyperthermia Neg Hx         Review of Systems  As per HPI    Objective     Vitals:    23 1030   BP: 161/63   Pulse: 53   Resp: 18   Temp: 97.8 °F (36.6 °C)   TempSrc: Temporal   SpO2: 100%   Weight: 83.7 kg (184 lb 8 oz)   Height: 162.6 cm (64.02\")   PainSc:   8   PainLoc: Shoulder  Comment: right shoulder/ left hip pain         2023    10:33 AM   Current Status   ECOG score 1     Physical Exam   Constitutional: He is oriented to person, place, and time. He appears well-developed. No distress.   HENT:   Head: Normocephalic and atraumatic.   Mouth/Throat: No oropharyngeal exudate.   Eyes: Pupils are equal, round, and reactive to light.   Cardiovascular: Normal rate, regular rhythm and normal heart sounds.   No murmur heard.  Pulmonary/Chest: Effort normal and breath sounds normal. No respiratory distress. He has no wheezes. He has no rhonchi. He has " no rales.   Abdominal: Soft. Normal appearance and bowel sounds are normal. He exhibits no distension.   Musculoskeletal: Normal range of motion.   Neurological: He is alert and oriented to person, place, and time.   Skin: Skin is warm and dry. No rash noted.   Vitals reviewed.  RECENT LABS:  Hematology WBC   Date Value Ref Range Status   06/12/2023 5.27 3.40 - 10.80 10*3/mm3 Final     RBC   Date Value Ref Range Status   06/12/2023 2.95 (L) 4.14 - 5.80 10*6/mm3 Final     Hemoglobin   Date Value Ref Range Status   06/12/2023 8.2 (L) 13.0 - 17.7 g/dL Final     Hematocrit   Date Value Ref Range Status   06/12/2023 27.6 (L) 37.5 - 51.0 % Final     Platelets   Date Value Ref Range Status   06/12/2023 192 140 - 450 10*3/mm3 Final        Lab Results   Component Value Date    GLUCOSE 147 (H) 06/12/2023    BUN 24 (H) 06/12/2023    CREATININE 0.90 06/12/2023    EGFRIFNONA 56 (L) 02/23/2022    BCR 26.7 06/12/2023    K 4.7 06/12/2023    CO2 21.6 (L) 06/12/2023    CALCIUM 8.9 06/12/2023    ALBUMIN 3.8 06/12/2023    AST 12 06/12/2023    ALT 9 06/12/2023       NM Bone Scan Whole Body (01/11/2021 13:25)  CT Chest With Contrast Diagnostic (05/04/2021 09:11)  CT Abdomen Pelvis With Contrast (05/04/2021 09:11)  NM PET/CT Skull Base to Mid Thigh (08/20/2021 12:18)      Assessment & Plan      1.  Metastatic prostate cancer:  Patient initially diagnosed in 2006 and had a prostatectomy and hormone therapy.  Patient in January 2018 began to complain of shortness of breath and hoarseness.  Chest x-ray obtained 1/23/18 showed sclerotic bone lesions.  CT of the chest 3/12/2018 numerous sclerotic bone foci with all visualized bones consistent with metastatic disease, multiple enlarged mediastinal lymph nodes.  .1 from 3/14/2018.  Patient was started on Firmagon by urology, [facility].  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  Case discussed at thoracic  conference and recommendations were to continue ADT and radiation for symptomatic sites.  Lupron injections every 3 mos initiated 5/7/18 PSA at that time 7.810  Monthly Xgeva initiated 6/11/18 PSA 2.030  Last PSA 6/13/2019 0.881  9/20/2019 patient tolerating treatment well, no new concerns.  12/2019 PSA 2.9  3/9/2020  PSA increasing to 5.030, he remains continuing on Lupron and Xgeva and asymptomatic though follow-up CT of chest and pelvis will be requested in 11 weeks prior to follow-up in 12 weeks.   5/4/2020 patient called reported worsening right hip pain, plans to purse CT scans ASAP.   5/8/2020 CT scans C/A/P show no progression of disease. Hip pain improved, Gabapentin added.. Bone scan requested.  6/3/2020 bone scan that does not demonstrate worsening of bone nor need for localized radiation therapy.  The patient's PSA has been slowly rising and we have not been able to determine worsening of disease and and, therefore, it is not felt warranted add additional medications such as Xtandi or apalutamide to his therapy.  Please note would like to avoid Zytiga try not to add prednisone which would worsen his blood sugar control.  9/3/2020 PSA 15.8, CXR extensive metastatic bone disease- patient reviewed 9/10/2020 reporting increased bony discomfort CT scans requested.  Scans done and reviewed 10/7/2020 ultimately reveal no evidence of progression of disease for visceral involvement or clearly for bone involvement.  After further review with the patient and his son plan continued Xgeva and Lupron.  We have assessed for availability of Xtandi 160 mg p.o. daily and find the cost is currently prohibitive.   11/25/2020 PSA 25.4  12/10/2020 review with some mild increase in bony discomfort.  After repeat discussion we went on to have him undergo Lupron therapy, and his PSA actually to be mildly reduced at 22.2.   Follow-up bone scan 1/11/2021 demonstrates some evidence of progression in sternum, left sacrum and  proximal femur.  These findings were reviewed with the patient and his son January 18, 2020 and the patient was referred for palliative radiotherapy(Dr. Kulkarni)  Palliative radiation under care Dr. Kulkarni to right femur and sternal lesion -3000 cGy in 10 fractions given February 1 to February 12 with significant symptom improvement.  3/15/2021 PSA 38.5  4/12/2021 PSA 44  5/10/2021 PSA 42.6 CT chest abdomen pelvis 5/4/2021 - for progression of disease and follow-up PSA 5 10/2021 is at 42 slightly reduced from previous.  We have discussed how to proceed from here and find a significant family history that clearly supports a potential genetic assessment for his malignancy.  It is felt most reasonable at this point to take the mediastinal nodes from his biopsy 4/9/2018 and reassess for potential targeted therapies, MSI status, as well as germline analysis.  6/7/2021 PSA 65.9 his analysis completed for actionable mutations including germline mutations.  These exams were negative though there is a variant of unknown significance.  Again this is not an actionable mutation.  We proceeded with additional Xgeva and Lupron planning for monthly Xgeva and Lupron at 3 months  7/7/2021 PSA 89 and subsequent testing with Axumin PET was performed confirming extensive osteosclerotic metastatic disease with little uptake, no evidence of visceral metastatic disease in neck chest abdomen or pelvis.  8/25/2021  , PET/CT reviewed, subsequent PSA increased to 105.  Patient fortunately asymptomatic.  Requested reevaluation for Xtandi 160 mg po daily.   9/8/2021 due for his lupron, receiving every 3 months, and monthly Xgeva.  Will have education today for Xtandi and will receive his medication today as well.   Started Xtandi 9/8/2021.  9/22/2021 here for toxicity check, so far tolerating Xtandi quite well other than a slight increase in diarrhea controlled with Imodium.  10/6/2021 continues to tolerate Xtandi well.  Labs are within  range today, we will proceed with Xgeva today.  Patient seen 10/20/2021 having more nausea with Xtandi and demonstrating a substantial reduction in PSA level.  After discussion we plan to reduce his dose to 80 mg daily following his PSA and performance status over the next approximate 7 weeks at which time he would be scheduled for his follow-up Lupron.  Patient assessed 12/8/2021 with further reduction in PSA to 4.7, ongoing hypophosphatemia and hypocalcemia-Xgeva moved to every 3 months given on same schedule as Lupron  Patient seen 3/21/2022, 6-week follow-up with mild increase in PSA recognized  Patient reassessed 5/2/2022 with PSA at 10.1, Xtandi moved to 160 mg p.o. daily  Patient assessed 6/13/2022, Xtandi at 160 mg p.o. daily, PSA pending, Xgeva moved to every 6 months, Lupron every 3 months  Patient seen 9/21/2022 at which time we continued our current approach, reviewed additional options for therapy should he clearly progress.  Patient seen 12/21/2022, increasing bony discomfort, Xgeva and Eligard continued, plans made for Pylarify scanning.   Patient seen 1/11/2023 with his son, discuss potential treatment options such as Pluvicto (Lutetium Yady 177 vipivotide tetraxetan) knowing that Pms-Asa is strongly positive in bone, palliative radiotherapy, radium-223 and alteration to additional androgen receptor such as darolutamide.  Patient referred to radiation therapy for their evaluation and possible treatment options, [facility] as application made for darolutamide and medications altered to include meloxicam 15 mg p.o. daily along with his Norco.  Patient assessed 2/20/2023 improved for radiation therapy, no additional options for treatment and [facility], darolutamide initiated.  Patient assessed 4/17/2023, darolutamide increased to 600 mg twice daily, Xgeva and Lupron every 3 months  5/15/2023: Continues darolutamide 600 mg twice daily.  Continues Xgeva and Lupron every 3 months, due in 1 month.   Hemoglobin today stable at 9.1.  Increased PSA, now 46 previously 29.  Discussed with Dr. Lee and we will proceed with bone scan in 3 weeks.  Subsequent bone scan with progression of metastatic disease to number of sites, issues discussed with patient and his son 6/12/2023 with plans to proceed to, primarily, palliative care approach.  Patient treated with Xgeva and Lupron in 3-4-week follow-up consider hospice or [facility] care.      2.  Neuropathy/ restless legs:    5/13/2020 gabapentin 600 mg at bedtime, trazadone 50 mg QHS.  On gabapentin 300, 2 tablets at bedtime, and requip 0.5.mg    Still having neuropathy symptoms.  Will increase gabapentin to 900 mg at bedtime.   9/22/2021 increase in gabapentin to 900 mg at bedtime has helped neuropathy.  Now taking Requip 2 mg nightly.    3.  Cancer related pain: on Norco 5/325 every 6 hours as needed.  Mobic 15 mg p.o. every morning initiated 1/11/2023, consider MS Contin every 12 hours  Status post palliative radiotherapy with improved pain control by 2/20/2023  Effective pain relief except increasing constipation noted 4/17/2023, narcotic adjusted downward as possible.  Continues Norco 5/325 every 6 hours as needed.  Feels his pain is well controlled on this regimen.  Semaj Herrera reports a pain score of .  Given his pain assessment as noted, treatment options were discussed and the following options were decided upon as a follow-up plan to address the patient's pain: continuation of current treatment plan for pain.    4.  Hyperkalemia:  Intermittent issue for the patient.  It is typically dietary related, as he consumes large amount  fresh tomatoes and other foods from his garden..  He was instructed to significantly decrease his intake as his potassium level today is 6.4, magnesium 2.7  He is not symptomatic.  We will also check an EKG.   9/22/2021 potassium level is improved to 5.7.  Patient states that he is no longer eating tomatoes from his garden.  10/6/2021  potassium level is 5.8.  Due to patient's multiple electrolyte abnormalities we will go ahead and refer him to nephrology for further evaluation.  Patient states he actually has an existing nephrologist, Dr. Perry Daily who he typically only sees on an annual basis.  We will schedule him to be seen by her soon for further evaluation.  Potassium today 4. 7    5.  Hypophosphatemia:  Phosphorus 1.9- hold xgeva 9/8/2021 ., Increase foods high in phosphorus, recheck in 2 weeks. \  9/22/2021 phosphorus level 1.7.  Reviewed with Dr. Lee.  In addition to increasing foods high in phosphorus, I have instructed the patient to start Neutra-Phos 1 tablet daily.  We will recheck again in 2 weeks.  Phosphorus 2.3 assessed 10/20/2021  Reassess 12/8/2021 at 1.9, Xgeva moved to every 3 months.  Patient assessed 6/13/2022 Xgeva moved every 6 months  Reevaluation 9/21/2022 with phosphorus 3.6, Xgeva continued every 6 months  Reevaluation 12/21/2022 at 4.0, reassessment 1/11/2023 and 2.4  Assessment 6/12/2023 phosphorus 2.8    PLAN:     Continue gabapentin 900 mg at bedtime, Requip 2 mg p.o. nightly, escalate as needed  Continue darolutamide 600 mg twice daily.  Increase Norco to 10 mg/325 every 6 hours as needed for pain.  Xgeva and Lupron every 3 months-treat today  Return in 4 weeks for MD [id number] at that point either hospice care or [facility].    I spent 68 minutes caring for Semaj on this date of service. This time includes time spent by me in the following activities: preparing for the visit, reviewing tests, obtaining and/or reviewing a separately obtained history, performing a medically appropriate examination and/or evaluation, counseling and educating the patient/family/caregiver, ordering medications, tests, or procedures, referring and communicating with other health care professionals, documenting information in the medical record, independently interpreting results and communicating that information with  the patient/family/caregiver, and care coordination.       Jose Lee MD  06/12/2023

## 2023-06-12 ENCOUNTER — INFUSION (OUTPATIENT)
Dept: ONCOLOGY | Facility: HOSPITAL | Age: 83
End: 2023-06-12
Payer: MEDICARE

## 2023-06-12 ENCOUNTER — LAB (OUTPATIENT)
Dept: LAB | Facility: HOSPITAL | Age: 83
End: 2023-06-12
Payer: MEDICARE

## 2023-06-12 ENCOUNTER — OFFICE VISIT (OUTPATIENT)
Dept: ONCOLOGY | Facility: CLINIC | Age: 83
End: 2023-06-12
Payer: MEDICARE

## 2023-06-12 VITALS
HEIGHT: 64 IN | OXYGEN SATURATION: 100 % | SYSTOLIC BLOOD PRESSURE: 161 MMHG | DIASTOLIC BLOOD PRESSURE: 63 MMHG | WEIGHT: 184.5 LBS | BODY MASS INDEX: 31.5 KG/M2 | RESPIRATION RATE: 18 BRPM | TEMPERATURE: 97.8 F | HEART RATE: 53 BPM

## 2023-06-12 DIAGNOSIS — C79.51 MALIGNANT NEOPLASM METASTATIC TO BONE: ICD-10-CM

## 2023-06-12 DIAGNOSIS — C61 PROSTATE CANCER METASTATIC TO BONE: Primary | ICD-10-CM

## 2023-06-12 DIAGNOSIS — C79.51 PROSTATE CANCER METASTATIC TO BONE: ICD-10-CM

## 2023-06-12 DIAGNOSIS — C79.51 PROSTATE CANCER METASTATIC TO BONE: Primary | ICD-10-CM

## 2023-06-12 DIAGNOSIS — C61 PROSTATE CANCER METASTATIC TO BONE: ICD-10-CM

## 2023-06-12 LAB
ALBUMIN SERPL-MCNC: 3.8 G/DL (ref 3.5–5.2)
ALBUMIN/GLOB SERPL: 1.1 G/DL (ref 1.1–2.4)
ALP SERPL-CCNC: 287 U/L (ref 38–116)
ALT SERPL W P-5'-P-CCNC: 9 U/L (ref 0–41)
ANION GAP SERPL CALCULATED.3IONS-SCNC: 13.4 MMOL/L (ref 5–15)
AST SERPL-CCNC: 12 U/L (ref 0–40)
BASOPHILS # BLD AUTO: 0.02 10*3/MM3 (ref 0–0.2)
BASOPHILS NFR BLD AUTO: 0.4 % (ref 0–1.5)
BILIRUB SERPL-MCNC: 0.2 MG/DL (ref 0.2–1.2)
BUN SERPL-MCNC: 24 MG/DL (ref 6–20)
BUN/CREAT SERPL: 26.7 (ref 7.3–30)
CALCIUM SPEC-SCNC: 8.9 MG/DL (ref 8.5–10.2)
CHLORIDE SERPL-SCNC: 106 MMOL/L (ref 98–107)
CO2 SERPL-SCNC: 21.6 MMOL/L (ref 22–29)
CREAT SERPL-MCNC: 0.9 MG/DL (ref 0.7–1.3)
DEPRECATED RDW RBC AUTO: 58.3 FL (ref 37–54)
EGFRCR SERPLBLD CKD-EPI 2021: 85.3 ML/MIN/1.73
EOSINOPHIL # BLD AUTO: 0.1 10*3/MM3 (ref 0–0.4)
EOSINOPHIL NFR BLD AUTO: 1.9 % (ref 0.3–6.2)
ERYTHROCYTE [DISTWIDTH] IN BLOOD BY AUTOMATED COUNT: 17.5 % (ref 12.3–15.4)
GLOBULIN UR ELPH-MCNC: 3.4 GM/DL (ref 1.8–3.5)
GLUCOSE SERPL-MCNC: 147 MG/DL (ref 74–124)
HCT VFR BLD AUTO: 27.6 % (ref 37.5–51)
HGB BLD-MCNC: 8.2 G/DL (ref 13–17.7)
IMM GRANULOCYTES # BLD AUTO: 0.11 10*3/MM3 (ref 0–0.05)
IMM GRANULOCYTES NFR BLD AUTO: 2.1 % (ref 0–0.5)
LYMPHOCYTES # BLD AUTO: 1.01 10*3/MM3 (ref 0.7–3.1)
LYMPHOCYTES NFR BLD AUTO: 19.2 % (ref 19.6–45.3)
MAGNESIUM SERPL-MCNC: 2.4 MG/DL (ref 1.8–2.5)
MCH RBC QN AUTO: 27.8 PG (ref 26.6–33)
MCHC RBC AUTO-ENTMCNC: 29.7 G/DL (ref 31.5–35.7)
MCV RBC AUTO: 93.6 FL (ref 79–97)
MONOCYTES # BLD AUTO: 0.53 10*3/MM3 (ref 0.1–0.9)
MONOCYTES NFR BLD AUTO: 10.1 % (ref 5–12)
NEUTROPHILS NFR BLD AUTO: 3.5 10*3/MM3 (ref 1.7–7)
NEUTROPHILS NFR BLD AUTO: 66.3 % (ref 42.7–76)
NRBC BLD AUTO-RTO: 0.4 /100 WBC (ref 0–0.2)
PHOSPHATE SERPL-MCNC: 2.8 MG/DL (ref 2.5–4.5)
PLATELET # BLD AUTO: 192 10*3/MM3 (ref 140–450)
PMV BLD AUTO: 9.4 FL (ref 6–12)
POTASSIUM SERPL-SCNC: 4.7 MMOL/L (ref 3.5–4.7)
PROT SERPL-MCNC: 7.2 G/DL (ref 6.3–8)
PSA SERPL-MCNC: 54 NG/ML (ref 0–4)
RBC # BLD AUTO: 2.95 10*6/MM3 (ref 4.14–5.8)
SODIUM SERPL-SCNC: 141 MMOL/L (ref 134–145)
WBC NRBC COR # BLD: 5.27 10*3/MM3 (ref 3.4–10.8)

## 2023-06-12 PROCEDURE — 85025 COMPLETE CBC W/AUTO DIFF WBC: CPT

## 2023-06-12 PROCEDURE — 83735 ASSAY OF MAGNESIUM: CPT

## 2023-06-12 PROCEDURE — 36415 COLL VENOUS BLD VENIPUNCTURE: CPT

## 2023-06-12 PROCEDURE — 25010000002 LEUPROLIDE ACETATE (3 MONTH) PER 7.5 MG: Performed by: INTERNAL MEDICINE

## 2023-06-12 PROCEDURE — 80053 COMPREHEN METABOLIC PANEL: CPT

## 2023-06-12 PROCEDURE — 84153 ASSAY OF PSA TOTAL: CPT | Performed by: INTERNAL MEDICINE

## 2023-06-12 PROCEDURE — 96402 CHEMO HORMON ANTINEOPL SQ/IM: CPT

## 2023-06-12 PROCEDURE — 84100 ASSAY OF PHOSPHORUS: CPT

## 2023-06-12 PROCEDURE — 25010000002 DENOSUMAB 120 MG/1.7ML SOLUTION: Performed by: INTERNAL MEDICINE

## 2023-06-12 PROCEDURE — 96372 THER/PROPH/DIAG INJ SC/IM: CPT

## 2023-06-12 RX ORDER — HYDROCODONE BITARTRATE AND ACETAMINOPHEN 10; 325 MG/1; MG/1
1 TABLET ORAL EVERY 6 HOURS PRN
Qty: 100 TABLET | Refills: 0 | Status: SHIPPED | OUTPATIENT
Start: 2023-06-12

## 2023-06-12 RX ADMIN — LEUPROLIDE ACETATE 22.5 MG: KIT at 11:51

## 2023-06-12 RX ADMIN — DENOSUMAB 120 MG: 120 INJECTION SUBCUTANEOUS at 11:51

## 2023-06-12 NOTE — LETTER
"June 12, 2023     Axel Guardado MD    Patient: Semaj Herrera   YOB: 1940   Date of Visit: 6/12/2023       Dear Dr. Geo MD:    Thank you for referring Semaj Herrera to me for evaluation. Below are the relevant portions of my assessment and plan of care.    If you have questions, please do not hesitate to call me. I look forward to following Semaj along with you.         Sincerely,        Jose Lee MD        CC: No Recipients    Jose Lee MD  06/12/23 1245  Sign when Signing Visit                                                                                                                                              REASONS FOR FOLLOW UP:   1.  Metastatic prostate cancer    HISTORY OF PRESENT ILLNESS:     Patient is an 82-year-old with metastatic prostate cancer who is seen as he continues current therapy with Xtandi, Xgeva moved to every 6 months, Lupron every 3 months.                                  The patient was seen back along with his son, Georges, 9/21/2022 fortunately doing reasonably well without additional pain, discomfort or excessive fatigue.  We have discussed various options for treatment of prostate cancer if we are unable to control this disease with current measures.    He is next evaluated 12/21/2022 now scheduled for Lupron.  He had last received both his Lupron and Xgeva 9/21/2022.  He notes increasing chest discomfort that appears to be \"when he twists\".  Initially he felt he might have a pneumonia but never had fever or cough.  We have discussed that his disease could well be progressing and then assessment that would allow us to clarify treatment options is reasonable.      This led to a repeat PSA 12/21/2022 that was found to be elevated to 23.9 and a follow-up Pylarify study performed 1/6/2023 that does demonstrate marked progression of sclerotic bone metastasis diffusely compared to 8/20/2021.  This includes the femoral neck,, sacrum, left iliac bone with " SUV up to 48.8, stable tiny mediastinal nodes, 2 punctate nodules in the calvarium.      He is seen back with his son 1/11/2023.  The potential treatment options such as Pluvicto (Lutetium Yady 177 vipivotide tetraxetan), now that we know that his PSMA scan is positive, though the patient would have to initially be treated with taxane chemotherapy which would be difficult for him to tolerate, radium-223 considering his bony metastasis.  He has slowly progressive disease however we have discussed additional issues including radiation therapy and/or alteration to another androgen receptor agonist such as darolutamide.  We have discussed all these options moving to have assessments done by radiation therapy and, [facility] as we address his symptoms.    The patient was seen by radiation therapy and felt a candidate for palliative radiotherapy as we made application for darolutamide.He is also seen by [facility] for additional treatment options 1/23/2023 and 2/10/2023 as radiation therapy proceeded to LSP/pelvis.    The patient was also seen by [facility] without additional therapeutic options at this time.    He is next seen 2/20/2023 to review his status recognizing his follow-up Xgeva will be due 3/15/2023 and and Lupron would be due 6/7/2023.  He was able to obtain darolutamide (Nubeqa) as free drug.  He is seen with his son both indicating that he just completed radiation therapy today.  While his pain has improved, he did have diarrhea develop during radiation therapy and is using antidiarrheal medications to control it currently.  This is also, potentially, side effect of darolutamide.      He is next seen 4/17/2023.  We have reiterated that his treatments with Lupron and Xgeva injections continued every 3 months.  On lower dose darolutamide which we gradually escalate to 600 mg nightly and 300 mg a day trying to move to 600 mg twice daily.  His last injections were given 3/20/2023 for both Xgeva and  Lupron.  Unfortunately the patient has fallen twice while gardening onto his right side approximately 2 weeks prior to his visit 4/17/2023 and is only slowly recovering with right side and back pain.  He is, however, able to function and seen some improvement.  His increased use of pain medications has caused worsening constipation however.  In a lengthy conversation we have discussed increasing his darolutamide to 600 mg twice daily to gain his best response from therapy and also improve his bowel function.    Patient is next evaluated 5/15/2023.  He continues on darolutamide 600 mg twice daily.  He also receives Xgeva and Lupron every 3 months, due in 1 month.  He returns today for lab review. He has occasional diarrhea, controlled with Lomotil as needed.  Continues Paisley 5/325 as needed for pain and feels his pain is well controlled on this regimen.  Energy level is stable, he tires easily after working in his garden, but energy improves once he rests.  Appetite is good.  Denies fever or chills.  Denies nausea or vomiting.  Denies new or worsening pain.  No new concerns today.    The patient's follow-up tests include a PSA of 46.1 5/15/2023 and bone density 6/5/2023 showing abnormal uptake within several sites including skull base, ribs, humeri, spine, pelvis, Femara, sternum, tibia, left radius all consistent with progression of his disease.  This patient could be a candidate for a number of studies but we have, in particular, discussed Provenge.  He would likely need to be seen by [facility] initially.    Patient seen with his son 6/12/2023 and we discussed options such as Provenge.  The patient, self, indicates that he is not really interested in doing this but would rather have control of his pain primarily.  As result we have increased his Norco to 10 mg tablets #100 E scribed to his pharmacy as we continue Xgeva.  Pending his assessment in the next several weeks to month we may need to proceed to  palliative care primarily.        Hematologic/oncologic history:   The patient is an 82-year-old male with significant past medical history including prostate carcinoma, CKD 3 and long-term tobacco use be been seen by primary care in late January, 2018 for hoarseness.  Chest x-ray is now outpatient January 23 revealed sclerotic change in the posterior mid chest to be within 3 adjacent thoracic ertebral bodies of uncertain significance.  A follow-up chest CT revealed numerous sclerotic foci within al visualized bones consistent with metastatic disease, multiple enlarged mediastinal lymph nodes concerning for metastatic lymphadenopathy and a polypoidal abnormality in the right lateral wall of the trachea.  CT of abdomen March 20 revealed extensive osseous metastatic disease mildly enlarged retroperitoneal lymph nodes.  Bone scan March 20 was consistent with widespread osseous metastasis particularly in the proximal half the left humeral shaft, abnormal uptake in the sternum and manubrium and a small focus in the posterior aspect of the calvarium.  This led to a plain film the left humerus with mottled sclerosis involving the shaft of the humerus particularly in the proximal half but no evidence of pathologic fracture.    The patient has additionally type 2 diabetes on insulin pump, again a history of prostate carcinoma prostatectomy 12 years previously, hypertension, and hyperlipidemia.  Additional studies included a PSA of 395.1 from March 14, 2018.The patient's been seen by urology March 15 with plans to start Firmagon.    The patient is now seen in pulmonary clinic with Dr. Bijan Rivera and we have discussed that he will need bronchoscopy and mediastinoscopy.  Interestingly his additional history includes a long occupational exposure including working in the [place] just out of school, subsequent construction work for many years and a 30-pack-year history of smoking stopping in the late 1990s.   He indicates that he followed with Dr. Guillen of urology for many years with no changes in his exams and normal PSAs.  He stopped this follow-up approximately 2 years ago.     Patient was seen in consultation with thoracic surgery on March 28 and plans are made for mediastinoscopy and bronchoscopy with lymph node biopsy.  Pathology revealed that these nodes were consistent with metastatic prostate carcinoma.  He was discussed at thoracic conference.  A PET/CT was not pursued and it was determined that he see medical oncology for hormonal treatment and radiation therapy if symptomatic.  It should be noted that the patient's March 15 [facility] office note describes that Firmagon was planned.     The patient has met with the son and grandson April 23.  We have also contacted Dr. Taylor of urology in that Firmagon was given just after his last visit and would next be due approximately May 3.  Patient feels considerably better with resolution of his pain, normalization of his performance status.  He would like to continue treatment through our office now contacted Dr. Taylor concerning this.    The patient is next seen June 11, 2018 we discussed his follow-up including his treatments with Lupron May 7 and his next treatment on July 30.  He has returned to essentially normal performance status and his PSA has dropped further from 395 March 14 27.8 May 7 and now 2.03 June 11.  We do plan to initiate Xgeva also study him via scans to document his improvement approximately a month from now.   The patient is next seen July 26, 2018.  Repeat imaging demonstrated interval resolution of mediastinal and retroperitoneal lymphadenopathy.  There is thickening in the distal aspect of the appendix with stranding?  Chronic appendicitis.  The patient indicates he is having no such symptoms and never has.  There is no change in sclerotic bony metastasis in the patient's PSA has dropped substantially to 1.66 by July 19 and 1.79 July  26.  He is actually feeling excellent and this is corroborated by family members.   Patient is next seen January 10, 2019 continuing to do very well and, in fact indicating [facility], likely [facility] replacements, visual review.  He is not certain is going to continue his care with them or with us or combination of both.    Patient is next seen April 4, 2019.  He is recently discharged after hospitalization March 15-18 with left upper lobe pneumonia.  We reviewed the findings in detail with his gradual recovery now documented.  His studies include a CT of chest which does not demonstrate any further bony lesions and/or pulmonary metastasis having developed.  He is feeling considerably better and approximately back to his baseline.  The patient is next seen June 28, 2019 feeling well but having baseline generally musculoskeletal pain and restless legs.His recent exams include a PSA of 0.81, CMP with potassium of 5.3 with repeat pending.  His performance status overall remains good to very good and is clearly responding to his treatments.  The patient is next seen December 13, 2019 continue to feel well.  He has had, oddly enough, 2 episodes of sleep paralysis which have occurred to him previously and he wonders if there is any connection with his prostate carcinoma though this is felt unlikely.     The patient when assessed in December had his PSA go to 2.9.  We continue with Lupron and Xgeva and is seen back now March 9, 2020 without additional symptoms.  We discussed that a schedule could likely change moving to 3 months for both of these medications particularly if he needs additional therapy directed at progressive disease.     Patient contacted our practice May 4 concern for pain in his hips.  This led to moving his scans up and they were performed May 8, 2020 showing a sub-6 mm pleural-based pulmonary nodule in the right lower lobe that is new from March 15,  2018, remainder of the sub-6 mm pulmonary nodules bilaterally are stable.  There is extensive osseous metastatic disease as previous with no other new findings.     The patient indicates, when seen, May 13 that his bone pain is now resolved.  While this is good information does not mean that he does not have further bony metastasis and we have discussed that a follow-up bone scan is likely necessary.  Furthermore he is having some difficulty with sleeplessness and increase in restless legs as he continues to stay at home during the COVID 19 crisis which, itself, is producing more anxiety for him.  A follow-up bone scan was obtained Lety 3 revealing multifocal osseous metastatic disease which was compared to March 2, 2018 with improvement.  No new abnormal uptake is present.  He is next seen with us on June 17, 2020 and, fortunately, is not having any significant pain at this point.  We discussed his scans in detail and, on balance, his performance status also remains improved.     It was elected to follow the patient rather than changes antiandrogen therapy.  He is reassessed September 3 with unchanged CMP, H&H of 11.4 and 36.3 with normal white count, platelet count and differential, PSA at 15.8.  Follow-up PA lateral chest x-ray does not show any new abnormalities though there is extensive metastatic bone disease throughout the bony thorax and osteoblastic metastasis have been seen on chest CT May 2020.  The patient is seen with his son Georges September 10 noticing increasing bony discomfort primarily in the right hip following fairly intensive gardening which he does frequently.  Now has further elevation of PSA were wondering whether we should try to intervene sooner per additional antiandrogens.  We will need to further assess him via CT scanning to make this decision     These were obtained October 1 showing extensive sclerotic disease with no metastatic disease in chest abdomen or pelvis.  PSA had gone from  15.8 September 3-16 0.6 October 1.     He still has pain getting up in the morning and after active gardening.  This is manageable with current pain medications and, at present, it is not apparent that he needs to switch medications to gain better control of his disease.  The patient did have follow-up studies done including a PSA November 25, 2020 now at 25.4.  The patient is seen with his son December 10, 2020 doing fair but having some increased pain in the mid sternum and right hip that he ascribes to additional exercise/work in managing his garden.  It is apparent however, that his last bone scan was 6 months ago and we do need to reassess him concerning this.     The patient had follow-up bone scan performed January 11, 2021 showing again uptake in the calvarium, humerus, right medial clavicle, sternum, ribs, spine, sacrum, pelvis, right acetabulum, proximal femora and now left frontal bone which appears new.  There is also mild increase in sternal uptake and equivocal increase in the pelvic lesions of all of the sacrum and the right proximal femur.     The patient is seen with his son January 18, 2021 and indicates that he is having pain gradually worsened in his right hip, mid chest that had been an issue previously.  These findings on bone scan are reviewed with both of them as likely the underlying culprit for his discomfort.  He would need change of the systemic therapy but, at present, will ask for radiation therapy palliation initially.  He was previously treated by Dr. Kulkarni many years ago and will ask her to see him.  We will also make application for the current cost of Xtandi or Zytiga.  The patient is not felt to be a candidate for systemic chemotherapy.     The patient was referred for ration therapy as we continued to assess his PSA on current therapy as well as exam the cost of Zytiga or Xtandi.  Radiation therapy (Dr. Kulkarni) saw the patient January 26 and felt that the right femur and sternal  lesion could benefit from therapy-3000 cGy in 10 fractions.  The patient took treatment February 1 to February 12 to the above areas and is next seen back March 15.  Fortunately his pain has improved dramatically and he is quite happy about this.  Unfortunately he notes some difficulty with swallowing that is temporary and often leads to increased salivation and mucus production.  Patient was asked to return his next assessed April 12, 2021.  He is able to swallow with less pain but still has excess mucus production and issues with salivation.  His PSA has noted increase but he is having no additional bony discomfort at this point and, additionally, has noted low-grade fevers just under 100 degrees at nighttime?.  His weight, appetite and general performance status have remained good to excellent.  We discussed follow-up studies at this point to determine his status.    Follow-up scans were scheduled CT scans of chest and pelvis 5/4/2021 showing osseous metastasis quite similar to previous examinations in the chest, CT of abdomen pelvis also with no acute intra-abdominal adenopathy no indication of abdominal pelvic metastatic disease but again widespread osseous metastasis.  His PSA at this point have been slowly increasing to a level of 44 on 4/12/2021.  As the patient is reassessed 5/10/2021 we find, fortunately, that he is not exceeding symptomatic.  It could make reasonable sense at this point to take the mediastinal nodes from his biopsy 4/9/2018, reassess potential targeted therapies, MSI status, and germline analysis.  Fortunately he is not having significant pain or discomfort and is seen with his son as we discussed the above plan.  Notably a follow-up PSA is found to be 42.6.   Patient is next seen in 6/7/2021 for Lupron and Xgeva.  Fortunately he is feeling considerably better according to both he and his son though his stamina has reduced.  He is not having additional pain at this point.  We have also  reviewed his genetics assessment which was significant only for a variant of unknown significance.  This is not an actionable abnormality.    As the patient's PSA further escalated increasing to 89 7/7/2021 his subsequent Axumin PET was obtained 8/20 demonstrating extensive osteosclerotic metastatic disease showing reduced uptake-typical finding but no evidence of visceral metastatic disease in the neck chest abdomen pelvis.  These findings are discussed with the patient and his son 8/25/2021 and indicative of his progressive disease-etiology of his rising PSA-though the patient is asymptomatic we have discussed moving to initiate Xtandi is available at 160 mg p.o. twice daily along with his current Lupron and Xgeva.     The patient's testing 10/6 included PSA that is dropped to 9.69.  He is seen with his son, Cody, both indicating that he been doing relatively well with treatment but began to notice nausea in the last week.  The schedule that he has been given also needs to be somewhat updated in terms of his Lupron and Xgeva.  He has been able to maintain his appetite and overall performance status to date.    The patient is next assessed 12/8/2021 tolerating his current Xtandi dose better than previous and maintaining a good performance status overall as well as demonstrating a drop in his PSA now to 4.7 on 12/8/2021 compared to high of 112 9/8/2021.  He remains hypophosphatemic and hypocalcemic and we have discussed, again, changing his Xgeva dosing to every 3 months along with his Lupron will continue his Xtandi to 80 mg daily.    The patient is next reviewed 3/21/2022 seen for both Xgeva and Lupron.  Fortunately he is feeling reasonably well with little additional pain though he has restarted to place his garden into shape for the season and is working more outdoors.    Patient reevaluated 5/2/2022 with repeat CBC including H&H of 10.5 and 33.4, white count 5980 and platelet count of 192,000, currently CMP  and PSA pending with a previous PSA 1 month ago at 9.06.  At this point we increased his Xtandi to 160 mg p.o. daily while continuing treatment with every 3 month Xgeva and Lupron.  Notably the patient's Requip  alcohol I am sure it is mariza now at 1.5 mg p.o. nightly and Neurontin 900 mg p.o. daily has improved his restless leg syndrome substantially.    Patient seen 6/13/2022 at which time Xtandi at 160 mg p.o. daily was continued, Xgeva moved to every 6 months and Lupron every 3 months.    This was continued when patient was seen 9/21/2022 though subsequently we proceeded to every 3 months for both medications.    Patient seen 12/21/2022 at which time a subsequent Pylarify scan was requested as result of increasing PSA level and bony discomfort.  The patient received Lupron and Xgeva at this point.     He is seen back with his son 1/11/2023.  The potential treatment options such as Pluvicto (Lutetium Yady 177 vipivotide tetraxetan), now that we know that his PSMA scan is positive, though the patient would have to initially be treated with taxane chemotherapy which would be difficult for him to tolerate, radium-223 considering his bony metastasis.  He has slowly progressive disease however we have discussed additional issues including radiation therapy and/or alteration to another androgen receptor agonist such as darolutamide.  We have discussed all these options moving to have assessments done by radiation therapy and, [facility] as we address his symptoms.    The patient was not felt a candidate for additional treatments via [facility], was able to start darolutamide and completed palliative radiotherapy.  He was stable seen 2/20/2023 though experiencing diarrhea post radiation therapy.    Patient seen 4/17/2023 with darolutamide gradually increased to 600 mg twice daily.  His Xgeva and Lupron will be given every 3 months.    Patient seen 6/12/2023, unfortunately, PSA increasing and bone scan worsening.   Patient at this point indicated that he did not wish additional therapies but, instead, control of his pain initially.  Plans were made to continue his Lupron and Xgeva seen him at the 3 to 4-week annia and considering palliative care primarily with either hospice or [facility].    Past Medical History:   Diagnosis Date   • Aortic stenosis, mild 01/01/2021    Per echocardiogram 2021   • Ascending aorta dilatation    • Asthma    • Benign prostatic hyperplasia    • Bone cancer    • Cellulitis of great toe of left foot 10/19/2018   • CKD (chronic kidney disease)     Stage 3; followed by Dr. Vicky Duran    • Diastolic dysfunction     GRADE II per echo 2018   • Difficulty breathing     during exertion   • Dyslipidemia    • Erectile dysfunction    • Fatigue    • Heart murmur    • History of blood transfusion    • History of kidney stones    • HL (hearing loss)    • Hyperlipidemia    • Hypertension    • Hyponatremia    • Kidney stone    • Left ventricular hypertrophy     per echo 2018   • Localized edema    • Neuropathy    • Obstructive sleep apnea     USING CPAP   • Osteoarthritis    • Peptic ulcer    • Pneumonia    • Pneumonia of left upper lobe due to infectious organism 03/15/2019   • Prostate cancer     Status post prostatectomy, radiation therapy, and hormone therapy followed by Dr. Lee; metastatsis to bone   • Pure hyperglyceridemia    • Restless leg syndrome    • Sepsis    • Sinus bradycardia    • Transient cerebral ischemia    • Type 2 diabetes mellitus         Past Surgical History:   Procedure Laterality Date   • BRONCHOSCOPY N/A 04/09/2018    Procedure: BRONCHOSCOPY;  Surgeon: Bijan Rivera III, MD;  Location: [facility] OR;  Service: Cardiothoracic   • COLONOSCOPY     • DEEP NECK LYMPH NODE BIOPSY / EXCISION     • HEMORRHOIDECTOMY     • LYMPH NODE BIOPSY     • MEDIASTINOSCOPY N/A 04/09/2018    Procedure: MEDIASTINOSCOPY WITH LYMPH NODE BIOPSY;  Surgeon: Bijan Rivera III, MD;  Location: [facility]  OR;  Service: Cardiothoracic   • OTHER SURGICAL HISTORY      ulcer repair   • PROSTATECTOMY  2006        Current Outpatient Medications on File Prior to Visit   Medication Sig Dispense Refill   • Accu-Chek Guide test strip USE 1 STRIP TO CHECK BLOOD SUGAR 4 TIMES DAILY     • Accu-Chek Softclix Lancets lancets USE 1 LANCET TO CHECK GLUCOSE 4 TIMES DAILY     • aspirin 81 MG EC tablet Take 1 tablet by mouth Daily.     • cholecalciferol (VITAMIN D3) 1000 units tablet Take 2 tablets by mouth Daily.     • Darolutamide (NUBEQA) 300 MG tablet Take 2 tablets by mouth 2 (Two) Times a Day. 120 tablet 5   • Denosumab (XGEVA SC) Inject  under the skin into the appropriate area as directed Every 30 (Thirty) Days.     • dilTIAZem CD (CARDIZEM CD) 120 MG 24 hr capsule TAKE 1 CAPSULE EVERY DAY 90 capsule 3   • diphenoxylate-atropine (LOMOTIL) 2.5-0.025 MG per tablet Take 1 tablet by mouth 4 (Four) Times a Day As Needed for Diarrhea. 30 tablet 0   • furosemide (LASIX) 40 MG tablet Take 1 tablet by mouth Every Morning.     • gabapentin (NEURONTIN) 300 MG capsule TAKE 3 CAPSULES EVERY NIGHT AT BEDTIME 90 capsule 0   • Insulin Aspart (novoLOG) 100 UNIT/ML injection INJECT 100 UNITS VIA PUMP ONCE DAILY     • Leuprolide Acetate, 3 Month, (LUPRON DEPOT, 3-MONTH, IM) Inject  into the appropriate muscle as directed by prescriber Every 3 (Three) Months.     • levothyroxine (SYNTHROID, LEVOTHROID) 88 MCG tablet      • loperamide (IMODIUM) 1 MG/5ML solution Take 10 mL by mouth 4 (Four) Times a Day As Needed for Diarrhea.     • meloxicam (MOBIC) 15 MG tablet Take 1 tablet by mouth Daily. 30 tablet 1   • modafinil (PROVIGIL) 200 MG tablet Take 1 tablet by mouth Daily. 30 tablet 0   • olmesartan (BENICAR) 40 MG tablet TAKE 1 TABLET EVERY DAY (DISCONTINUE LOSARTAN 100MG) 90 tablet 3   • ondansetron (ZOFRAN) 8 MG tablet Take 1 tablet by mouth 3 (Three) Times a Day As Needed for Nausea or Vomiting. 30 tablet 5   • pravastatin (PRAVACHOL) 40 MG tablet  "Take 1 tablet by mouth Daily.     • rOPINIRole (REQUIP) 0.5 MG tablet Take 2 tablets by mouth in the evening and 2 at bedtime. 360 tablet 2   • traZODone (DESYREL) 50 MG tablet Take 1-2 tablets by mouth every night at bedtime.     • [DISCONTINUED] HYDROcodone-acetaminophen (NORCO) 5-325 MG per tablet Take 1 tablet by mouth Every 6 (Six) Hours As Needed for Moderate Pain. 120 tablet 0     No current facility-administered medications on file prior to visit.        ALLERGIES:    Allergies   Allergen Reactions   • Niacin Unknown - Low Severity   • Statins Unknown - Low Severity        Social History     Socioeconomic History   • Marital status:    • Years of education: College   Tobacco Use   • Smoking status: Former     Packs/day: 1.50     Years: 30.00     Pack years: 45.00     Types: Cigarettes     Start date: 1968     Quit date: 1998     Years since quittin.4   • Smokeless tobacco: Never   Substance and Sexual Activity   • Alcohol use: Not Currently     Comment: Caffeine use: 2-3 cups daily   • Drug use: Never   • Sexual activity: Not Currently        Family History   Problem Relation Age of Onset   • Heart failure Mother    • Hypertension Mother            • Diabetes Mother    • Heart disease Mother            • Lung cancer Father 46   • Hypertension Sister    • Lung cancer Sister 60   • Hypertension Brother            • Lung cancer Brother 62   • Diabetes Brother    • Heart disease Other    • Prostate cancer Brother 60   • Cancer Brother            • Cancer Sister            • Cancer Sister    • Cancer Sister    • Heart disease Brother            • Malig Hyperthermia Neg Hx         Review of Systems  As per HPI    Objective     Vitals:    23 1030   BP: 161/63   Pulse: 53   Resp: 18   Temp: 97.8 °F (36.6 °C)   TempSrc: Temporal   SpO2: 100%   Weight: 83.7 kg (184 lb 8 oz)   Height: 162.6 cm (64.02\")   PainSc:   8   PainLoc: Shoulder  Comment: " right shoulder/ left hip pain         6/12/2023    10:33 AM   Current Status   ECOG score 1     Physical Exam   Constitutional: He is oriented to person, place, and time. He appears well-developed. No distress.   HENT:   Head: Normocephalic and atraumatic.   Mouth/Throat: No oropharyngeal exudate.   Eyes: Pupils are equal, round, and reactive to light.   Cardiovascular: Normal rate, regular rhythm and normal heart sounds.   No murmur heard.  Pulmonary/Chest: Effort normal and breath sounds normal. No respiratory distress. He has no wheezes. He has no rhonchi. He has no rales.   Abdominal: Soft. Normal appearance and bowel sounds are normal. He exhibits no distension.   Musculoskeletal: Normal range of motion.   Neurological: He is alert and oriented to person, place, and time.   Skin: Skin is warm and dry. No rash noted.   Vitals reviewed.  RECENT LABS:  Hematology WBC   Date Value Ref Range Status   06/12/2023 5.27 3.40 - 10.80 10*3/mm3 Final     RBC   Date Value Ref Range Status   06/12/2023 2.95 (L) 4.14 - 5.80 10*6/mm3 Final     Hemoglobin   Date Value Ref Range Status   06/12/2023 8.2 (L) 13.0 - 17.7 g/dL Final     Hematocrit   Date Value Ref Range Status   06/12/2023 27.6 (L) 37.5 - 51.0 % Final     Platelets   Date Value Ref Range Status   06/12/2023 192 140 - 450 10*3/mm3 Final        Lab Results   Component Value Date    GLUCOSE 147 (H) 06/12/2023    BUN 24 (H) 06/12/2023    CREATININE 0.90 06/12/2023    EGFRIFNONA 56 (L) 02/23/2022    BCR 26.7 06/12/2023    K 4.7 06/12/2023    CO2 21.6 (L) 06/12/2023    CALCIUM 8.9 06/12/2023    ALBUMIN 3.8 06/12/2023    AST 12 06/12/2023    ALT 9 06/12/2023       NM Bone Scan Whole Body (01/11/2021 13:25)  CT Chest With Contrast Diagnostic (05/04/2021 09:11)  CT Abdomen Pelvis With Contrast (05/04/2021 09:11)  NM PET/CT Skull Base to Mid Thigh (08/20/2021 12:18)      Assessment & Plan      1.  Metastatic prostate cancer:  Patient initially diagnosed in 2006 and had a  prostatectomy and hormone therapy.  Patient in January 2018 began to complain of shortness of breath and hoarseness.  Chest x-ray obtained 1/23/18 showed sclerotic bone lesions.  CT of the chest 3/12/2018 numerous sclerotic bone foci with all visualized bones consistent with metastatic disease, multiple enlarged mediastinal lymph nodes.  .1 from 3/14/2018.  Patient was started on Firmagon by urology[facility].  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  Case discussed at thoracic conference and recommendations were to continue ADT and radiation for symptomatic sites.  Lupron injections every 3 mos initiated 5/7/18 PSA at that time 7.810  Monthly Xgeva initiated 6/11/18 PSA 2.030  Last PSA 6/13/2019 0.881  9/20/2019 patient tolerating treatment well, no new concerns.  12/2019 PSA 2.9  3/9/2020  PSA increasing to 5.030, he remains continuing on Lupron and Xgeva and asymptomatic though follow-up CT of chest and pelvis will be requested in 11 weeks prior to follow-up in 12 weeks.   5/4/2020 patient called reported worsening right hip pain, plans to purse CT scans ASAP.   5/8/2020 CT scans C/A/P show no progression of disease. Hip pain improved, Gabapentin added.. Bone scan requested.  6/3/2020 bone scan that does not demonstrate worsening of bone nor need for localized radiation therapy.  The patient's PSA has been slowly rising and we have not been able to determine worsening of disease and and, therefore, it is not felt warranted add additional medications such as Xtandi or apalutamide to his therapy.  Please note would like to avoid Zytiga try not to add prednisone which would worsen his blood sugar control.  9/3/2020 PSA 15.8, CXR extensive metastatic bone disease- patient reviewed 9/10/2020 reporting increased bony discomfort CT scans requested.  Scans done and reviewed 10/7/2020 ultimately reveal no evidence of progression  of disease for visceral involvement or clearly for bone involvement.  After further review with the patient and his son plan continued Xgeva and Lupron.  We have assessed for availability of Xtandi 160 mg p.o. daily and find the cost is currently prohibitive.   11/25/2020 PSA 25.4  12/10/2020 review with some mild increase in bony discomfort.  After repeat discussion we went on to have him undergo Lupron therapy, and his PSA actually to be mildly reduced at 22.2.   Follow-up bone scan 1/11/2021 demonstrates some evidence of progression in sternum, left sacrum and proximal femur.  These findings were reviewed with the patient and his son January 18, 2020 and the patient was referred for palliative radiotherapy(Dr. Kulkarni)  Palliative radiation under care Dr. Kulkarni to right femur and sternal lesion -3000 cGy in 10 fractions given February 1 to February 12 with significant symptom improvement.  3/15/2021 PSA 38.5  4/12/2021 PSA 44  5/10/2021 PSA 42.6 CT chest abdomen pelvis 5/4/2021 - for progression of disease and follow-up PSA 5 10/2021 is at 42 slightly reduced from previous.  We have discussed how to proceed from here and find a significant family history that clearly supports a potential genetic assessment for his malignancy.  It is felt most reasonable at this point to take the mediastinal nodes from his biopsy 4/9/2018 and reassess for potential targeted therapies, MSI status, as well as germline analysis.  6/7/2021 PSA 65.9 his analysis completed for actionable mutations including germline mutations.  These exams were negative though there is a variant of unknown significance.  Again this is not an actionable mutation.  We proceeded with additional Xgeva and Lupron planning for monthly Xgeva and Lupron at 3 months  7/7/2021 PSA 89 and subsequent testing with Axumin PET was performed confirming extensive osteosclerotic metastatic disease with little uptake, no evidence of visceral metastatic disease in neck  chest abdomen or pelvis.  8/25/2021  , PET/CT reviewed, subsequent PSA increased to 105.  Patient fortunately asymptomatic.  Requested reevaluation for Xtandi 160 mg po daily.   9/8/2021 due for his lupron, receiving every 3 months, and monthly Xgeva.  Will have education today for Xtandi and will receive his medication today as well.   Started Xtandi 9/8/2021.  9/22/2021 here for toxicity check, so far tolerating Xtandi quite well other than a slight increase in diarrhea controlled with Imodium.  10/6/2021 continues to tolerate Xtandi well.  Labs are within range today, we will proceed with Xgeva today.  Patient seen 10/20/2021 having more nausea with Xtandi and demonstrating a substantial reduction in PSA level.  After discussion we plan to reduce his dose to 80 mg daily following his PSA and performance status over the next approximate 7 weeks at which time he would be scheduled for his follow-up Lupron.  Patient assessed 12/8/2021 with further reduction in PSA to 4.7, ongoing hypophosphatemia and hypocalcemia-Xgeva moved to every 3 months given on same schedule as Lupron  Patient seen 3/21/2022, 6-week follow-up with mild increase in PSA recognized  Patient reassessed 5/2/2022 with PSA at 10.1, Xtandi moved to 160 mg p.o. daily  Patient assessed 6/13/2022, Xtandi at 160 mg p.o. daily, PSA pending, Xgeva moved to every 6 months, Lupron every 3 months  Patient seen 9/21/2022 at which time we continued our current approach, reviewed additional options for therapy should he clearly progress.  Patient seen 12/21/2022, increasing bony discomfort, Xgeva and Eligard continued, plans made for Pylarify scanning.   Patient seen 1/11/2023 with his son, discuss potential treatment options such as Pluvicto (Lutetium Yady 177 vipivotide tetraxetan) knowing that Pms-Asa is strongly positive in bone, palliative radiotherapy, radium-223 and alteration to additional androgen receptor such as darolutamide.  Patient referred to  radiation therapy for their evaluation and possible treatment options, [facility] as application made for darolutamide and medications altered to include meloxicam 15 mg p.o. daily along with his Norco.  Patient assessed 2/20/2023 improved for radiation therapy, no additional options for treatment and [facility], darolutamide initiated.  Patient assessed 4/17/2023, darolutamide increased to 600 mg twice daily, Xgeva and Lupron every 3 months  5/15/2023: Continues darolutamide 600 mg twice daily.  Continues Xgeva and Lupron every 3 months, due in 1 month.  Hemoglobin today stable at 9.1.  Increased PSA, now 46 previously 29.  Discussed with Dr. Lee and we will proceed with bone scan in 3 weeks.  Subsequent bone scan with progression of metastatic disease to number of sites, issues discussed with patient and his son 6/12/2023 with plans to proceed to, primarily, palliative care approach.  Patient treated with Xgeva and Lupron in 3-4-week follow-up consider hospice or [facility] care.      2.  Neuropathy/ restless legs:    5/13/2020 gabapentin 600 mg at bedtime, trazadone 50 mg QHS.  On gabapentin 300, 2 tablets at bedtime, and requip 0.5.mg    Still having neuropathy symptoms.  Will increase gabapentin to 900 mg at bedtime.   9/22/2021 increase in gabapentin to 900 mg at bedtime has helped neuropathy.  Now taking Requip 2 mg nightly.    3.  Cancer related pain: on Norco 5/325 every 6 hours as needed.  Mobic 15 mg p.o. every morning initiated 1/11/2023, consider MS Contin every 12 hours  Status post palliative radiotherapy with improved pain control by 2/20/2023  Effective pain relief except increasing constipation noted 4/17/2023, narcotic adjusted downward as possible.  Continues Norco 5/325 every 6 hours as needed.  Feels his pain is well controlled on this regimen.  Semaj Javier reports a pain score of .  Given his pain assessment as noted, treatment options were discussed and the following options were  decided upon as a follow-up plan to address the patient's pain: continuation of current treatment plan for pain.    4.  Hyperkalemia:  Intermittent issue for the patient.  It is typically dietary related, as he consumes large amount  fresh tomatoes and other foods from his garden..  He was instructed to significantly decrease his intake as his potassium level today is 6.4, magnesium 2.7  He is not symptomatic.  We will also check an EKG.   9/22/2021 potassium level is improved to 5.7.  Patient states that he is no longer eating tomatoes from his garden.  10/6/2021 potassium level is 5.8.  Due to patient's multiple electrolyte abnormalities we will go ahead and refer him to nephrology for further evaluation.  Patient states he actually has an existing nephrologist, Dr. Perry Daily who he typically only sees on an annual basis.  We will schedule him to be seen by her soon for further evaluation.  Potassium today 4. 7    5.  Hypophosphatemia:  Phosphorus 1.9- hold xgeva 9/8/2021 ., Increase foods high in phosphorus, recheck in 2 weeks. \  9/22/2021 phosphorus level 1.7.  Reviewed with Dr. Lee.  In addition to increasing foods high in phosphorus, I have instructed the patient to start Neutra-Phos 1 tablet daily.  We will recheck again in 2 weeks.  Phosphorus 2.3 assessed 10/20/2021  Reassess 12/8/2021 at 1.9, Xgeva moved to every 3 months.  Patient assessed 6/13/2022 Xgeva moved every 6 months  Reevaluation 9/21/2022 with phosphorus 3.6, Xgeva continued every 6 months  Reevaluation 12/21/2022 at 4.0, reassessment 1/11/2023 and 2.4  Assessment 6/12/2023 phosphorus 2.8    PLAN:     Continue gabapentin 900 mg at bedtime, Requip 2 mg p.o. nightly, escalate as needed  Continue darolutamide 600 mg twice daily.  Increase Norco to 10 mg/325 every 6 hours as needed for pain.  Xgeva and Lupron every 3 months-treat today  Return in 4 weeks for MD [id number] at that point either hospice care or [facility].    Patient  on high risk medication requiring close monitoring for toxicity.  The patient was discussed with Dr. Lee who is in agreement with today's plan of care.      Jose Lee MD  06/12/2023

## 2023-06-15 ENCOUNTER — OFFICE VISIT (OUTPATIENT)
Dept: CARDIOLOGY | Facility: CLINIC | Age: 83
End: 2023-06-15
Payer: MEDICARE

## 2023-06-15 VITALS
BODY MASS INDEX: 31.76 KG/M2 | DIASTOLIC BLOOD PRESSURE: 70 MMHG | HEIGHT: 64 IN | WEIGHT: 186 LBS | HEART RATE: 68 BPM | SYSTOLIC BLOOD PRESSURE: 160 MMHG

## 2023-06-15 DIAGNOSIS — I77.810 ASCENDING AORTA DILATATION: ICD-10-CM

## 2023-06-15 DIAGNOSIS — I51.89 DIASTOLIC DYSFUNCTION: ICD-10-CM

## 2023-06-15 DIAGNOSIS — Z79.4 TYPE 2 DIABETES MELLITUS WITH CHRONIC KIDNEY DISEASE, WITH LONG-TERM CURRENT USE OF INSULIN, UNSPECIFIED CKD STAGE: ICD-10-CM

## 2023-06-15 DIAGNOSIS — E11.22 TYPE 2 DIABETES MELLITUS WITH CHRONIC KIDNEY DISEASE, WITH LONG-TERM CURRENT USE OF INSULIN, UNSPECIFIED CKD STAGE: ICD-10-CM

## 2023-06-15 DIAGNOSIS — I10 PRIMARY HYPERTENSION: ICD-10-CM

## 2023-06-15 DIAGNOSIS — E78.2 MIXED HYPERLIPIDEMIA: ICD-10-CM

## 2023-06-15 DIAGNOSIS — I35.0 AORTIC STENOSIS, MILD: Primary | ICD-10-CM

## 2023-06-15 NOTE — PROGRESS NOTES
Date of Office Visit: 06/15/2023  Encounter Provider: Tata Lee MD  Place of Service: [facility] CARDIOLOGY  Patient Name: Semaj Herrera  :1940      Patient ID:  Semaj Herrera is a 82 y.o. male is here for  followup for cardiac risks.        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  He has had metastatic prostate cancer and follows with Dr. Lee.     I first saw the patient on 2010 for dizziness, fatigue and  bradycardia. At that time, we stopped his metoprolol and this went away.   He has a known history of hypertension and has had left ventricular     He sees Dr. almanza for his diabetes and Dr. Vicky Duran for his renal disease.      Echo done 3/2017 showed LVEF 62% with grade 2 diastolic dysfunction. There was no significant valve disease.      He sees Dr. Lee for metastatic prostate cancer.  He had bony metastases.  This affected his spine and his right shoulder.       An echocardiogram done 2020 with ejection fraction 61-65%, grade 2 diastolic dysfunction, mild left atrial enlargement, mild aortic valvular stenosis with a mean gradient of 11 and a maximal gradient 24, RVSP normal 22 mmHg.  He had a contrasted CT abdomen pelvis done 10/1/2020 which showed probable extensive coronary artery calcification noted in all vessels.      He last saw Dr. Lee 2021 and was having increasing bone pain because of the cancer.  His scans have not shown any progression of disease.  He has been started on Lupron therapy and referred for palliative radiation therapy for the painful bony sites that he has.  He is now pursuing palliative care due to progression of his metastatic bone disease from prostate cancer.     Laboratory  values done 6/12/2023 show alkaline phosphatase 287, BUN 24, otherwise normal CMP, hemoglobin 8.2, otherwise normal CBC.  He has no real exertional chest tightness or pressure.  He is still widening and enjoys this.  He sometimes is short winded at night but if he adjusts his CPAP machine, he feels better.  He does not feels heart racing or skipping.  He is somewhat unstable on his feet.  He does have bone pain.  He has no dizziness, syncope or falls.  He does have a history of hypotension at times.  He is otherwise taking his medications as directed.  I rechecked his blood pressure today and got 132/72.       Past Medical History:   Diagnosis Date   • Aortic stenosis, mild 01/01/2021    Per echocardiogram 2021   • Ascending aorta dilatation    • Asthma    • Benign prostatic hyperplasia    • Bone cancer    • Cellulitis of great toe of left foot 10/19/2018   • CKD (chronic kidney disease)     Stage 3; followed by Dr. Vicky Duran    • Diastolic dysfunction     GRADE II per echo 2018   • Difficulty breathing     during exertion   • Dyslipidemia    • Erectile dysfunction    • Fatigue    • Heart murmur    • History of blood transfusion    • History of kidney stones    • HL (hearing loss)    • Hyperlipidemia    • Hypertension    • Hyponatremia    • Kidney stone    • Left ventricular hypertrophy     per echo 2018   • Localized edema    • Neuropathy    • Obstructive sleep apnea     USING CPAP   • Osteoarthritis    • Peptic ulcer    • Pneumonia    • Pneumonia of left upper lobe due to infectious organism 03/15/2019   • Prostate cancer     Status post prostatectomy, radiation therapy, and hormone therapy followed by Dr. Lee; metastatsis to bone   • Pure hyperglyceridemia    • Restless leg syndrome    • Sepsis    • Sinus bradycardia    • Transient cerebral ischemia    • Type 2 diabetes mellitus          Past Surgical History:   Procedure Laterality Date   • BRONCHOSCOPY N/A 04/09/2018    Procedure: BRONCHOSCOPY;   Surgeon: Bijan Rivera III, MD;  Location: [facility] OR;  Service: Cardiothoracic   • COLONOSCOPY     • DEEP NECK LYMPH NODE BIOPSY / EXCISION     • HEMORRHOIDECTOMY     • LYMPH NODE BIOPSY     • MEDIASTINOSCOPY N/A 04/09/2018    Procedure: MEDIASTINOSCOPY WITH LYMPH NODE BIOPSY;  Surgeon: Bijan Rivera III, MD;  Location: [facility] OR;  Service: Cardiothoracic   • OTHER SURGICAL HISTORY      ulcer repair   • PROSTATECTOMY  2006       Current Outpatient Medications on File Prior to Visit   Medication Sig Dispense Refill   • Accu-Chek Guide test strip USE 1 STRIP TO CHECK BLOOD SUGAR 4 TIMES DAILY     • Accu-Chek Softclix Lancets lancets USE 1 LANCET TO CHECK GLUCOSE 4 TIMES DAILY     • aspirin 81 MG EC tablet Take 1 tablet by mouth Daily.     • cholecalciferol (VITAMIN D3) 1000 units tablet Take 2 tablets by mouth Daily.     • Darolutamide (NUBEQA) 300 MG tablet Take 2 tablets by mouth 2 (Two) Times a Day. 120 tablet 5   • Denosumab (XGEVA SC) Inject  under the skin into the appropriate area as directed Every 30 (Thirty) Days.     • dilTIAZem CD (CARDIZEM CD) 120 MG 24 hr capsule TAKE 1 CAPSULE EVERY DAY 90 capsule 3   • diphenoxylate-atropine (LOMOTIL) 2.5-0.025 MG per tablet Take 1 tablet by mouth 4 (Four) Times a Day As Needed for Diarrhea. 30 tablet 0   • furosemide (LASIX) 40 MG tablet Take 1 tablet by mouth Every Morning.     • gabapentin (NEURONTIN) 300 MG capsule TAKE 3 CAPSULES EVERY NIGHT AT BEDTIME 90 capsule 0   • HYDROcodone-acetaminophen (NORCO)  MG per tablet Take 1 tablet by mouth Every 6 (Six) Hours As Needed for Moderate Pain. 100 tablet 0   • Insulin Aspart (novoLOG) 100 UNIT/ML injection INJECT 100 UNITS VIA PUMP ONCE DAILY     • Leuprolide Acetate, 3 Month, (LUPRON DEPOT, 3-MONTH, IM) Inject  into the appropriate muscle as directed by prescriber Every 3 (Three) Months.     • levothyroxine (SYNTHROID, LEVOTHROID) 88 MCG tablet      • loperamide (IMODIUM) 1 MG/5ML solution Take 10  "mL by mouth 4 (Four) Times a Day As Needed for Diarrhea.     • meloxicam (MOBIC) 15 MG tablet Take 1 tablet by mouth Daily. 30 tablet 1   • modafinil (PROVIGIL) 200 MG tablet Take 1 tablet by mouth Daily. 30 tablet 0   • olmesartan (BENICAR) 40 MG tablet TAKE 1 TABLET EVERY DAY (DISCONTINUE LOSARTAN 100MG) 90 tablet 3   • ondansetron (ZOFRAN) 8 MG tablet Take 1 tablet by mouth 3 (Three) Times a Day As Needed for Nausea or Vomiting. 30 tablet 5   • pravastatin (PRAVACHOL) 40 MG tablet Take 1 tablet by mouth Daily.     • rOPINIRole (REQUIP) 0.5 MG tablet Take 2 tablets by mouth in the evening and 2 at bedtime. 360 tablet 2   • traZODone (DESYREL) 50 MG tablet Take 1-2 tablets by mouth every night at bedtime.       No current facility-administered medications on file prior to visit.       Social History     Socioeconomic History   • Marital status:    • Years of education: College   Tobacco Use   • Smoking status: Former     Packs/day: 1.50     Years: 30.00     Pack years: 45.00     Types: Cigarettes     Start date: 1968     Quit date: 1998     Years since quittin.4     Passive exposure: Past   • Smokeless tobacco: Never   Substance and Sexual Activity   • Alcohol use: Not Currently     Comment: Caffeine use: 2-3 cups daily   • Drug use: Never   • Sexual activity: Not Currently           ROS    Procedures    ECG 12 Lead    Date/Time: 6/15/2023 10:21 AM  Performed by: Tata Lee MD  Authorized by: Taat Lee MD   Comparison: compared with previous ECG   Similar to previous ECG  Rhythm: sinus rhythm    Clinical impression: normal ECG                Objective:      Vitals:    06/15/23 0958   BP: 160/70   Pulse: 68   Weight: 84.4 kg (186 lb)   Height: 162.6 cm (64\")     Body mass index is 31.93 kg/m².    Vitals reviewed.   Constitutional:       General: Not in acute distress.     Appearance: Well-developed. Not diaphoretic.   Eyes:      General: No scleral icterus.     " Conjunctiva/sclera: Conjunctivae normal.   HENT:      Head: Normocephalic and atraumatic.   Neck:      Thyroid: No thyromegaly.      Vascular: No carotid bruit or JVD.      Lymphadenopathy: No cervical adenopathy.   Pulmonary:      Effort: Pulmonary effort is normal. No respiratory distress.      Breath sounds: Normal breath sounds. No wheezing. No rhonchi. No rales.   Chest:      Chest wall: Not tender to palpatation.   Cardiovascular:      Normal rate. Regular rhythm.      Murmurs: There is a grade 2/6 harsh midsystolic murmur at the URSB, radiating to the neck.      No gallop.   Pulses:     Intact distal pulses.   Edema:     Peripheral edema absent.   Abdominal:      General: Bowel sounds are normal. There is no distension or abdominal bruit.      Palpations: Abdomen is soft. There is no abdominal mass.      Tenderness: There is no abdominal tenderness.   Musculoskeletal:         General: No deformity.      Extremities: No clubbing present.     Cervical back: Neck supple. Skin:     General: Skin is warm and dry. There is no cyanosis.      Coloration: Skin is not pale.      Findings: No rash.   Neurological:      Mental Status: Alert and oriented to person, place, and time.      Cranial Nerves: No cranial nerve deficit.   Psychiatric:         Judgment: Judgment normal.         Lab Review:       Assessment:      Diagnosis Plan   1. Aortic stenosis, mild        2. Ascending aorta dilatation        3. Diastolic dysfunction        4. Mixed hyperlipidemia        5. Primary hypertension        6. Type 2 diabetes mellitus with chronic kidney disease, with long-term current use of insulin, unspecified CKD stage          1. Hypertensive heart disease.  Stable  2. Hyperlipidemia. Per jayson Lau.   3. Diabetes mellitus type 2 with neuropathy.   4. Transient ischemic attack, no further issues.   5. Obstructive sleep apnea, on CPAP. Follows with Dr. Mcdermott and has been well treated.   6. Restless leg syndrome.  "  7. Prostate cancer, status post prostatectomy, radiation therapy and hormone therapy.  Is metastatic.  Sees Dr. Lee.   8. Erectile dysfunction.   9. Aortic stenosis, mild and aortic insufficiency which is mild.      Plan:       Stable cardiovascular status, he occasionally has some breathing difficulty at night but its usually easily remedied by adjusting his CPAP.  See me back in 1 year at [facility].  He has progression of his metastatic prostate cancer and is following with Dr. Lee.       STOP-Bang Score  Have you been diagnosed with Sleep Apnea?: yes  Snoring?: no  Tired?: yes  Observed?: no  Pressure?: yes  Stop Score: 2  Body Mass Index more than 35 kg/m2?: no  Age older than 50 year old?: yes  Neck large? \">17\"/43cm-M, >16\"/41cm-F: no  Gender=Male?: yes  Total Stop-Bang Score: 4    "

## 2023-07-24 ENCOUNTER — OFFICE VISIT (OUTPATIENT)
Dept: ONCOLOGY | Facility: CLINIC | Age: 83
End: 2023-07-24
Payer: MEDICARE

## 2023-07-24 ENCOUNTER — LAB (OUTPATIENT)
Dept: LAB | Facility: HOSPITAL | Age: 83
End: 2023-07-24
Payer: MEDICARE

## 2023-07-24 VITALS
HEART RATE: 51 BPM | SYSTOLIC BLOOD PRESSURE: 164 MMHG | TEMPERATURE: 97.7 F | DIASTOLIC BLOOD PRESSURE: 74 MMHG | HEIGHT: 64 IN | BODY MASS INDEX: 30.64 KG/M2 | RESPIRATION RATE: 16 BRPM | OXYGEN SATURATION: 98 % | WEIGHT: 179.5 LBS

## 2023-07-24 DIAGNOSIS — D63.1 ANEMIA DUE TO STAGE 3 CHRONIC KIDNEY DISEASE, UNSPECIFIED WHETHER STAGE 3A OR 3B CKD: ICD-10-CM

## 2023-07-24 DIAGNOSIS — N18.30 ANEMIA DUE TO STAGE 3 CHRONIC KIDNEY DISEASE, UNSPECIFIED WHETHER STAGE 3A OR 3B CKD: ICD-10-CM

## 2023-07-24 DIAGNOSIS — C79.51 PROSTATE CANCER METASTATIC TO BONE: Primary | ICD-10-CM

## 2023-07-24 DIAGNOSIS — C61 PROSTATE CANCER METASTATIC TO BONE: ICD-10-CM

## 2023-07-24 DIAGNOSIS — C79.51 PROSTATE CANCER METASTATIC TO BONE: ICD-10-CM

## 2023-07-24 DIAGNOSIS — G89.3 CANCER ASSOCIATED PAIN: ICD-10-CM

## 2023-07-24 DIAGNOSIS — C61 PROSTATE CANCER METASTATIC TO BONE: Primary | ICD-10-CM

## 2023-07-24 DIAGNOSIS — C79.51 MALIGNANT NEOPLASM METASTATIC TO BONE: ICD-10-CM

## 2023-07-24 LAB
BASOPHILS # BLD AUTO: 0.02 10*3/MM3 (ref 0–0.2)
BASOPHILS NFR BLD AUTO: 0.4 % (ref 0–1.5)
DEPRECATED RDW RBC AUTO: 57.9 FL (ref 37–54)
EOSINOPHIL # BLD AUTO: 0.09 10*3/MM3 (ref 0–0.4)
EOSINOPHIL NFR BLD AUTO: 1.6 % (ref 0.3–6.2)
ERYTHROCYTE [DISTWIDTH] IN BLOOD BY AUTOMATED COUNT: 17.4 % (ref 12.3–15.4)
HCT VFR BLD AUTO: 24.4 % (ref 37.5–51)
HGB BLD-MCNC: 7.3 G/DL (ref 13–17.7)
IMM GRANULOCYTES # BLD AUTO: 0.21 10*3/MM3 (ref 0–0.05)
IMM GRANULOCYTES NFR BLD AUTO: 3.8 % (ref 0–0.5)
LYMPHOCYTES # BLD AUTO: 0.94 10*3/MM3 (ref 0.7–3.1)
LYMPHOCYTES NFR BLD AUTO: 17.2 % (ref 19.6–45.3)
MCH RBC QN AUTO: 27.3 PG (ref 26.6–33)
MCHC RBC AUTO-ENTMCNC: 29.9 G/DL (ref 31.5–35.7)
MCV RBC AUTO: 91.4 FL (ref 79–97)
MONOCYTES # BLD AUTO: 0.65 10*3/MM3 (ref 0.1–0.9)
MONOCYTES NFR BLD AUTO: 11.9 % (ref 5–12)
NEUTROPHILS NFR BLD AUTO: 3.55 10*3/MM3 (ref 1.7–7)
NEUTROPHILS NFR BLD AUTO: 65.1 % (ref 42.7–76)
NRBC BLD AUTO-RTO: 0.5 /100 WBC (ref 0–0.2)
PLATELET # BLD AUTO: 247 10*3/MM3 (ref 140–450)
PMV BLD AUTO: 9.6 FL (ref 6–12)
RBC # BLD AUTO: 2.67 10*6/MM3 (ref 4.14–5.8)
WBC NRBC COR # BLD: 5.46 10*3/MM3 (ref 3.4–10.8)

## 2023-07-24 PROCEDURE — 99214 OFFICE O/P EST MOD 30 MIN: CPT | Performed by: NURSE PRACTITIONER

## 2023-07-24 PROCEDURE — 1126F AMNT PAIN NOTED NONE PRSNT: CPT | Performed by: NURSE PRACTITIONER

## 2023-07-24 PROCEDURE — 3078F DIAST BP <80 MM HG: CPT | Performed by: NURSE PRACTITIONER

## 2023-07-24 PROCEDURE — 3077F SYST BP >= 140 MM HG: CPT | Performed by: NURSE PRACTITIONER

## 2023-07-24 PROCEDURE — 1160F RVW MEDS BY RX/DR IN RCRD: CPT | Performed by: NURSE PRACTITIONER

## 2023-07-24 PROCEDURE — 1159F MED LIST DOCD IN RCRD: CPT | Performed by: NURSE PRACTITIONER

## 2023-07-24 PROCEDURE — 85025 COMPLETE CBC W/AUTO DIFF WBC: CPT

## 2023-07-24 PROCEDURE — 36415 COLL VENOUS BLD VENIPUNCTURE: CPT

## 2023-07-24 RX ORDER — HYDROCODONE BITARTRATE AND ACETAMINOPHEN 10; 325 MG/1; MG/1
1 TABLET ORAL EVERY 6 HOURS PRN
Qty: 100 TABLET | Refills: 0 | Status: SHIPPED | OUTPATIENT
Start: 2023-07-24

## 2023-07-24 RX ORDER — FENTANYL 50 UG/1
1 PATCH TRANSDERMAL
Qty: 10 EACH | Refills: 0 | Status: SHIPPED | OUTPATIENT
Start: 2023-07-24

## 2023-07-24 NOTE — PROGRESS NOTES
"                                                                                                                                            REASONS FOR FOLLOW UP:   1.  Metastatic prostate cancer    HISTORY OF PRESENT ILLNESS:     Patient is an 82-year-old with metastatic prostate cancer who is seen as he continues current therapy with Xtandi, Xgeva moved to every 6 months, Lupron every 3 months.                                  The patient was seen back along with his son, Georges, 9/21/2022 fortunately doing reasonably well without additional pain, discomfort or excessive fatigue.  We have discussed various options for treatment of prostate cancer if we are unable to control this disease with current measures.    He is next evaluated 12/21/2022 now scheduled for Lupron.  He had last received both his Lupron and Xgeva 9/21/2022.  He notes increasing chest discomfort that appears to be \"when he twists\".  Initially he felt he might have a pneumonia but never had fever or cough.  We have discussed that his disease could well be progressing and then assessment that would allow us to clarify treatment options is reasonable.      This led to a repeat PSA 12/21/2022 that was found to be elevated to 23.9 and a follow-up Pylarify study performed 1/6/2023 that does demonstrate marked progression of sclerotic bone metastasis diffusely compared to 8/20/2021.  This includes the femoral neck,, sacrum, left iliac bone with SUV up to 48.8, stable tiny mediastinal nodes, 2 punctate nodules in the calvarium.      He is seen back with his son 1/11/2023.  The potential treatment options such as Pluvicto (Lutetium Yady 177 vipivotide tetraxetan), now that we know that his PSMA scan is positive, though the patient would have to initially be treated with taxane chemotherapy which would be difficult for him to tolerate, radium-223 considering his bony metastasis.  He has slowly progressive disease however we have discussed additional issues " including radiation therapy and/or alteration to another androgen receptor agonist such as darolutamide.  We have discussed all these options moving to have assessments done by radiation therapy and, [facility] as we address his symptoms.    The patient was seen by radiation therapy and felt a candidate for palliative radiotherapy as we made application for darolutamide.He is also seen by [facility] for additional treatment options 1/23/2023 and 2/10/2023 as radiation therapy proceeded to LSP/pelvis.    The patient was also seen by [facility] without additional therapeutic options at this time.    He is next seen 2/20/2023 to review his status recognizing his follow-up Xgeva will be due 3/15/2023 and and Lupron would be due 6/7/2023.  He was able to obtain darolutamide (Nubeqa) as free drug.  He is seen with his son both indicating that he just completed radiation therapy today.  While his pain has improved, he did have diarrhea develop during radiation therapy and is using antidiarrheal medications to control it currently.  This is also, potentially, side effect of darolutamide.      He is next seen 4/17/2023.  We have reiterated that his treatments with Lupron and Xgeva injections continued every 3 months.  On lower dose darolutamide which we gradually escalate to 600 mg nightly and 300 mg a day trying to move to 600 mg twice daily.  His last injections were given 3/20/2023 for both Xgeva and Lupron.  Unfortunately the patient has fallen twice while gardening onto his right side approximately 2 weeks prior to his visit 4/17/2023 and is only slowly recovering with right side and back pain.  He is, however, able to function and seen some improvement.  His increased use of pain medications has caused worsening constipation however.  In a lengthy conversation we have discussed increasing his darolutamide to 600 mg twice daily to gain his best response from therapy and also improve his bowel  function.    Patient is next evaluated 5/15/2023.  He continues on darolutamide 600 mg twice daily.  He also receives Xgeva and Lupron every 3 months, due in 1 month.  He returns today for lab review. He has occasional diarrhea, controlled with Lomotil as needed.  Continues [place] 5/325 as needed for pain and feels his pain is well controlled on this regimen.  Energy level is stable, he tires easily after working in his garden, but energy improves once he rests.  Appetite is good.  Denies fever or chills.  Denies nausea or vomiting.  Denies new or worsening pain.  No new concerns today.    The patient's follow-up tests include a PSA of 46.1 5/15/2023 and bone density 6/5/2023 showing abnormal uptake within several sites including skull base, ribs, humeri, spine, pelvis, Femara, sternum, tibia, left radius all consistent with progression of his disease.  This patient could be a candidate for a number of studies but we have, in particular, discussed Provenge.  He would likely need to be seen by [facility] initially.    Patient seen with his son 6/12/2023 and we discussed options such as Provenge.  The patient, self, indicates that he is not really interested in doing this but would rather have control of his pain primarily.  As result we have increased his Norco to 10 mg tablets #100 E scribed to his pharmacy as we continue Xgeva.  Pending his assessment in the next several weeks to month we may need to proceed to palliative care primarily.    The patient is seen back 7/3/2023, unfortunately, began to have increasing pain that is not managed sufficiently by his Norco tablets.  Additionally he is found to be considerably anemic and this is felt to be on the basis of his progressive malignancy.  The patient is deteriorating we are now discussing palliative care.  The patient's son also found that his son is diagnosed at age 30 with type 2 diabetes and is struggling to manage both his father and his son's  "care.      Patient returns today 7/24/2023 for lab review and follow-up.  At his last visit on 7/3/2023 hemoglobin was noted to be 7.2.  At that time he was scheduled to receive a transfusion of 1 unit of PRBC.  Patient states that actually after receiving the blood he has felt worse, and \"less peppy\".  He is using a fentanyl patch for pain control, which is helping, but does not feel like it is adequately controlling his pain.  He is also using Norco 10/325 every 6 hours for breakthrough pain.  He is having difficulty sleeping due to pain control.  He is also struggling with constipation.  Patient states that his bowels are moving, however they are difficult to move.  They are supposed to meeting with palliative us on Friday.  They are also scheduled to see nephrology later on this week.       Hematologic/oncologic history:   The patient is an 82-year-old male with significant past medical history including prostate carcinoma, CKD 3 and long-term tobacco use be been seen by primary care in late January, 2018 for hoarseness.  Chest x-ray is now outpatient January 23 revealed sclerotic change in the posterior mid chest to be within 3 adjacent thoracic ertebral bodies of uncertain significance.  A follow-up chest CT revealed numerous sclerotic foci within al visualized bones consistent with metastatic disease, multiple enlarged mediastinal lymph nodes concerning for metastatic lymphadenopathy and a polypoidal abnormality in the right lateral wall of the trachea.  CT of abdomen March 20 revealed extensive osseous metastatic disease mildly enlarged retroperitoneal lymph nodes.  Bone scan March 20 was consistent with widespread osseous metastasis particularly in the proximal half the left humeral shaft, abnormal uptake in the sternum and manubrium and a small focus in the posterior aspect of the calvarium.  This led to a plain film the left humerus with mottled sclerosis involving the shaft of the humerus particularly in " the proximal half but no evidence of pathologic fracture.    The patient has additionally type 2 diabetes on insulin pump, again a history of prostate carcinoma prostatectomy 12 years previously, hypertension, and hyperlipidemia.  Additional studies included a PSA of 395.1 from March 14, 2018.The patient's been seen by urology March 15 with plans to start Firmagon.    The patient is now seen in pulmonary clinic with Dr. Bijan Rivera and we have discussed that he will need bronchoscopy and mediastinoscopy.  Interestingly his additional history includes a long occupational exposure including working in the [place] just out of school, subsequent construction work for many years and a 30-pack-year history of smoking stopping in the late 1990s.  He indicates that he followed with Dr. Guillen of urology for many years with no changes in his exams and normal PSAs.  He stopped this follow-up approximately 2 years ago.     Patient was seen in consultation with thoracic surgery on March 28 and plans are made for mediastinoscopy and bronchoscopy with lymph node biopsy.  Pathology revealed that these nodes were consistent with metastatic prostate carcinoma.  He was discussed at thoracic conference.  A PET/CT was not pursued and it was determined that he see medical oncology for hormonal treatment and radiation therapy if symptomatic.  It should be noted that the patient's March 15 [facility] office note describes that Firmagon was planned.     The patient has met with the son and grandson April 23.  We have also contacted Dr. Taylor of urology in that Firmagon was given just after his last visit and would next be due approximately May 3.  Patient feels considerably better with resolution of his pain, normalization of his performance status.  He would like to continue treatment through our office now contacted Dr. Taylor concerning this.    The patient is next seen June 11, 2018 we discussed his follow-up  including his treatments with Lupron May 7 and his next treatment on July 30.  He has returned to essentially normal performance status and his PSA has dropped further from 395 March 14 27.8 May 7 and now 2.03 June 11.  We do plan to initiate Xgeva also study him via scans to document his improvement approximately a month from now.   The patient is next seen July 26, 2018.  Repeat imaging demonstrated interval resolution of mediastinal and retroperitoneal lymphadenopathy.  There is thickening in the distal aspect of the appendix with stranding?  Chronic appendicitis.  The patient indicates he is having no such symptoms and never has.  There is no change in sclerotic bony metastasis in the patient's PSA has dropped substantially to 1.66 by July 19 and 1.79 July 26.  He is actually feeling excellent and this is corroborated by family members.   Patient is next seen January 10, 2019 continuing to do very well and, in fact indicating that [facility], likely [facility] replacements, visual review.  He is not certain is going to continue his care with them or with us or combination of both.    Patient is next seen April 4, 2019.  He is recently discharged after hospitalization March 15-18 with left upper lobe pneumonia.  We reviewed the findings in detail with his gradual recovery now documented.  His studies include a CT of chest which does not demonstrate any further bony lesions and/or pulmonary metastasis having developed.  He is feeling considerably better and approximately back to his baseline.  The patient is next seen June 28, 2019 feeling well but having baseline generally musculoskeletal pain and restless legs.His recent exams include a PSA of 0.81, CMP with potassium of 5.3 with repeat pending.  His performance status overall remains good to very good and is clearly responding to his treatments.  The patient is next seen December 13, 2019 continue to feel well.  He  has had, oddly enough, 2 episodes of sleep paralysis which have occurred to him previously and he wonders if there is any connection with his prostate carcinoma though this is felt unlikely.     The patient when assessed in December had his PSA go to 2.9.  We continue with Lupron and Xgeva and is seen back now March 9, 2020 without additional symptoms.  We discussed that a schedule could likely change moving to 3 months for both of these medications particularly if he needs additional therapy directed at progressive disease.     Patient contacted our practice May 4 concern for pain in his hips.  This led to moving his scans up and they were performed May 8, 2020 showing a sub-6 mm pleural-based pulmonary nodule in the right lower lobe that is new from March 15, 2018, remainder of the sub-6 mm pulmonary nodules bilaterally are stable.  There is extensive osseous metastatic disease as previous with no other new findings.     The patient indicates, when seen, May 13 that his bone pain is now resolved.  While this is good information does not mean that he does not have further bony metastasis and we have discussed that a follow-up bone scan is likely necessary.  Furthermore he is having some difficulty with sleeplessness and increase in restless legs as he continues to stay at home during the COVID 19 crisis which, itself, is producing more anxiety for him.  A follow-up bone scan was obtained Lety 3 revealing multifocal osseous metastatic disease which was compared to March 2, 2018 with improvement.  No new abnormal uptake is present.  He is next seen with us on June 17, 2020 and, fortunately, is not having any significant pain at this point.  We discussed his scans in detail and, on balance, his performance status also remains improved.     It was elected to follow the patient rather than changes antiandrogen therapy.  He is reassessed September 3 with unchanged CMP, H&H of 11.4 and 36.3 with normal white count,  platelet count and differential, PSA at 15.8.  Follow-up PA lateral chest x-ray does not show any new abnormalities though there is extensive metastatic bone disease throughout the bony thorax and osteoblastic metastasis have been seen on chest CT May 2020.  The patient is seen with his son Georges September 10 noticing increasing bony discomfort primarily in the right hip following fairly intensive gardening which he does frequently.  Now has further elevation of PSA were wondering whether we should try to intervene sooner per additional antiandrogens.  We will need to further assess him via CT scanning to make this decision     These were obtained October 1 showing extensive sclerotic disease with no metastatic disease in chest abdomen or pelvis.  PSA had gone from 15.8 September 3-16 0.6 October 1.     He still has pain getting up in the morning and after active gardening.  This is manageable with current pain medications and, at present, it is not apparent that he needs to switch medications to gain better control of his disease.  The patient did have follow-up studies done including a PSA November 25, 2020 now at 25.4.  The patient is seen with his son December 10, 2020 doing fair but having some increased pain in the mid sternum and right hip that he ascribes to additional exercise/work in managing his garden.  It is apparent however, that his last bone scan was 6 months ago and we do need to reassess him concerning this.     The patient had follow-up bone scan performed January 11, 2021 showing again uptake in the calvarium, humerus, right medial clavicle, sternum, ribs, spine, sacrum, pelvis, right acetabulum, proximal femora and now left frontal bone which appears new.  There is also mild increase in sternal uptake and equivocal increase in the pelvic lesions of all of the sacrum and the right proximal femur.     The patient is seen with his son January 18, 2021 and indicates that he is having pain gradually  worsened in his right hip, mid chest that had been an issue previously.  These findings on bone scan are reviewed with both of them as likely the underlying culprit for his discomfort.  He would need change of the systemic therapy but, at present, will ask for radiation therapy palliation initially.  He was previously treated by Dr. Kulkarni many years ago and will ask her to see him.  We will also make application for the current cost of Xtandi or Zytiga.  The patient is not felt to be a candidate for systemic chemotherapy.     The patient was referred for ration therapy as we continued to assess his PSA on current therapy as well as exam the cost of Zytiga or Xtandi.  Radiation therapy (Dr. Kulkarni) saw the patient January 26 and felt that the right femur and sternal lesion could benefit from therapy-3000 cGy in 10 fractions.  The patient took treatment February 1 to February 12 to the above areas and is next seen back March 15.  Fortunately his pain has improved dramatically and he is quite happy about this.  Unfortunately he notes some difficulty with swallowing that is temporary and often leads to increased salivation and mucus production.  Patient was asked to return his next assessed April 12, 2021.  He is able to swallow with less pain but still has excess mucus production and issues with salivation.  His PSA has noted increase but he is having no additional bony discomfort at this point and, additionally, has noted low-grade fevers just under 100 degrees at nighttime?.  His weight, appetite and general performance status have remained good to excellent.  We discussed follow-up studies at this point to determine his status.    Follow-up scans were scheduled CT scans of chest and pelvis 5/4/2021 showing osseous metastasis quite similar to previous examinations in the chest, CT of abdomen pelvis also with no acute intra-abdominal adenopathy no indication of abdominal pelvic metastatic disease but again widespread  osseous metastasis.  His PSA at this point have been slowly increasing to a level of 44 on 4/12/2021.  As the patient is reassessed 5/10/2021 we find, fortunately, that he is not exceeding symptomatic.  It could make reasonable sense at this point to take the mediastinal nodes from his biopsy 4/9/2018, reassess potential targeted therapies, MSI status, and germline analysis.  Fortunately he is not having significant pain or discomfort and is seen with his son as we discussed the above plan.  Notably a follow-up PSA is found to be 42.6.   Patient is next seen in 6/7/2021 for Lupron and Xgeva.  Fortunately he is feeling considerably better according to both he and his son though his stamina has reduced.  He is not having additional pain at this point.  We have also reviewed his genetics assessment which was significant only for a variant of unknown significance.  This is not an actionable abnormality.    As the patient's PSA further escalated increasing to 89 7/7/2021 his subsequent Axumin PET was obtained 8/20 demonstrating extensive osteosclerotic metastatic disease showing reduced uptake-typical finding but no evidence of visceral metastatic disease in the neck chest abdomen pelvis.  These findings are discussed with the patient and his son 8/25/2021 and indicative of his progressive disease-etiology of his rising PSA-though the patient is asymptomatic we have discussed moving to initiate Xtandi is available at 160 mg p.o. twice daily along with his current Lupron and Xgeva.     The patient's testing 10/6 included PSA that is dropped to 9.69.  He is seen with his son, Cody, both indicating that he been doing relatively well with treatment but began to notice nausea in the last week.  The schedule that he has been given also needs to be somewhat updated in terms of his Lupron and Xgeva.  He has been able to maintain his appetite and overall performance status to date.    The patient is next assessed 12/8/2021  tolerating his current Xtandi dose better than previous and maintaining a good performance status overall as well as demonstrating a drop in his PSA now to 4.7 on 12/8/2021 compared to high of 112 9/8/2021.  He remains hypophosphatemic and hypocalcemic and we have discussed, again, changing his Xgeva dosing to every 3 months along with his Lupron will continue his Xtandi to 80 mg daily.    The patient is next reviewed 3/21/2022 seen for both Xgeva and Lupron.  Fortunately he is feeling reasonably well with little additional pain though he has restarted to place his garden into shape for the season and is working more outdoors.    Patient reevaluated 5/2/2022 with repeat CBC including H&H of 10.5 and 33.4, white count 5980 and platelet count of 192,000, currently CMP and PSA pending with a previous PSA 1 month ago at 9.06.  At this point we increased his Xtandi to 160 mg p.o. daily while continuing treatment with every 3 month Xgeva and Lupron.  Notably the patient's Requip  alcohol I am sure it is mariza now at 1.5 mg p.o. nightly and Neurontin 900 mg p.o. daily has improved his restless leg syndrome substantially.    Patient seen 6/13/2022 at which time Xtandi at 160 mg p.o. daily was continued, Xgeva moved to every 6 months and Lupron every 3 months.    This was continued when patient was seen 9/21/2022 though subsequently we proceeded to every 3 months for both medications.    Patient seen 12/21/2022 at which time a subsequent Pylarify scan was requested as result of increasing PSA level and bony discomfort.  The patient received Lupron and Xgeva at this point.     He is seen back with his son 1/11/2023.  The potential treatment options such as Pluvicto (Lutetium Yady 177 vipivotide tetraxetan), now that we know that his PSMA scan is positive, though the patient would have to initially be treated with taxane chemotherapy which would be difficult for him to tolerate, radium-223 considering his bony metastasis.  He  has slowly progressive disease however we have discussed additional issues including radiation therapy and/or alteration to another androgen receptor agonist such as darolutamide.  We have discussed all these options moving to have assessments done by radiation therapy and, [facility] as we address his symptoms.    The patient was not felt a candidate for additional treatments via [facility], was able to start darolutamide and completed palliative radiotherapy.  He was stable seen 2/20/2023 though experiencing diarrhea post radiation therapy.    Patient seen 4/17/2023 with darolutamide gradually increased to 600 mg twice daily.  His Xgeva and Lupron will be given every 3 months.    Patient seen 6/12/2023, unfortunately, PSA increasing and bone scan worsening.  Patient at this point indicated that he did not wish additional therapies but, instead, control of his pain initially.  Plans were made to continue his Lupron and Xgeva seen him at the 3 to 4-week annia and considering palliative care primarily with either hospice or [facility].    Patient seen 7/3/2023 with plans to initiate palliative care starting with [facility].    Past Medical History:   Diagnosis Date    Aortic stenosis, mild 01/01/2021    Per echocardiogram 2021    Ascending aorta dilatation     Asthma     Benign prostatic hyperplasia     Bone cancer     Cellulitis of great toe of left foot 10/19/2018    CKD (chronic kidney disease)     Stage 3; followed by Dr. Vicky Duran     Diastolic dysfunction     GRADE II per echo 2018    Difficulty breathing     during exertion    Dyslipidemia     Erectile dysfunction     Fatigue     Heart murmur     History of blood transfusion     History of kidney stones     HL (hearing loss)     Hyperlipidemia     Hypertension     Hyponatremia     Kidney stone     Left ventricular hypertrophy     per echo 2018    Localized edema     Neuropathy     Obstructive sleep apnea     USING CPAP    Osteoarthritis     Peptic  ulcer     Pneumonia     Pneumonia of left upper lobe due to infectious organism 03/15/2019    Prostate cancer     Status post prostatectomy, radiation therapy, and hormone therapy followed by Dr. Lee; metastatsis to bone    Pure hyperglyceridemia     Restless leg syndrome     Sepsis     Sinus bradycardia     Transient cerebral ischemia     Type 2 diabetes mellitus         Past Surgical History:   Procedure Laterality Date    BRONCHOSCOPY N/A 04/09/2018    Procedure: BRONCHOSCOPY;  Surgeon: Bijan Rivera III, MD;  Location: [facility] OR;  Service: Cardiothoracic    COLONOSCOPY      DEEP NECK LYMPH NODE BIOPSY / EXCISION      HEMORRHOIDECTOMY      LYMPH NODE BIOPSY      MEDIASTINOSCOPY N/A 04/09/2018    Procedure: MEDIASTINOSCOPY WITH LYMPH NODE BIOPSY;  Surgeon: Bijan Rivera III, MD;  Location: [facility] OR;  Service: Cardiothoracic    OTHER SURGICAL HISTORY      ulcer repair    PROSTATECTOMY  2006        Current Outpatient Medications on File Prior to Visit   Medication Sig Dispense Refill    Accu-Chek Guide test strip USE 1 STRIP TO CHECK BLOOD SUGAR 4 TIMES DAILY      Accu-Chek Softclix Lancets lancets USE 1 LANCET TO CHECK GLUCOSE 4 TIMES DAILY      aspirin 81 MG EC tablet Take 1 tablet by mouth Daily.      cholecalciferol (VITAMIN D3) 1000 units tablet Take 2 tablets by mouth Daily.      Denosumab (XGEVA SC) Inject  under the skin into the appropriate area as directed Every 30 (Thirty) Days.      dilTIAZem CD (CARDIZEM CD) 120 MG 24 hr capsule TAKE 1 CAPSULE EVERY DAY 90 capsule 3    diphenoxylate-atropine (LOMOTIL) 2.5-0.025 MG per tablet Take 1 tablet by mouth 4 (Four) Times a Day As Needed for Diarrhea. 30 tablet 0    fentaNYL (DURAGESIC) 25 MCG/HR patch Place 1 patch on the skin as directed by provider Every 72 (Seventy-Two) Hours. 5 patch 0    furosemide (LASIX) 40 MG tablet Take 1 tablet by mouth Every Morning.      gabapentin (NEURONTIN) 300 MG capsule TAKE 3 CAPSULES EVERY NIGHT AT BEDTIME  90 capsule 0    Insulin Aspart (novoLOG) 100 UNIT/ML injection INJECT 100 UNITS VIA PUMP ONCE DAILY      Leuprolide Acetate, 3 Month, (LUPRON DEPOT, 3-MONTH, IM) Inject  into the appropriate muscle as directed by prescriber Every 3 (Three) Months.      levothyroxine (SYNTHROID, LEVOTHROID) 88 MCG tablet       loperamide (IMODIUM) 1 MG/5ML solution Take 10 mL by mouth 4 (Four) Times a Day As Needed for Diarrhea.      meloxicam (MOBIC) 15 MG tablet Take 1 tablet by mouth Daily. 30 tablet 1    modafinil (PROVIGIL) 200 MG tablet Take 1 tablet by mouth Daily. 30 tablet 0    olmesartan (BENICAR) 40 MG tablet TAKE 1 TABLET EVERY DAY (DISCONTINUE LOSARTAN 100MG) 90 tablet 3    ondansetron (ZOFRAN) 8 MG tablet Take 1 tablet by mouth 3 (Three) Times a Day As Needed for Nausea or Vomiting. 30 tablet 5    pravastatin (PRAVACHOL) 40 MG tablet Take 1 tablet by mouth Daily.      rOPINIRole (REQUIP) 0.5 MG tablet Take 2 tablets by mouth in the evening and 2 at bedtime. 360 tablet 2    traZODone (DESYREL) 50 MG tablet Take 1-2 tablets by mouth every night at bedtime.      [DISCONTINUED] HYDROcodone-acetaminophen (NORCO)  MG per tablet Take 1 tablet by mouth Every 6 (Six) Hours As Needed for Moderate Pain. 100 tablet 0     No current facility-administered medications on file prior to visit.        ALLERGIES:    Allergies   Allergen Reactions    Niacin Unknown - Low Severity    Statins Unknown - Low Severity        Social History     Socioeconomic History    Marital status:     Years of education: College   Tobacco Use    Smoking status: Former     Packs/day: 1.50     Years: 30.00     Pack years: 45.00     Types: Cigarettes     Start date: 1968     Quit date: 1998     Years since quittin.5     Passive exposure: Past    Smokeless tobacco: Never   Substance and Sexual Activity    Alcohol use: Not Currently     Comment: Caffeine use: 2-3 cups daily    Drug use: Never    Sexual activity: Not Currently     "    Family History   Problem Relation Age of Onset    Heart failure Mother     Hypertension Mother             Diabetes Mother     Heart disease Mother             Lung cancer Father 46    Hypertension Sister     Lung cancer Sister 60    Hypertension Brother             Lung cancer Brother 62    Diabetes Brother     Heart disease Other     Prostate cancer Brother 60    Cancer Brother             Cancer Sister             Cancer Sister     Cancer Sister     Heart disease Brother             Malig Hyperthermia Neg Hx         Review of Systems  As per HPI    Objective     Vitals:    23 1130   BP: 164/74   Pulse: 51   Resp: 16   Temp: 97.7 °F (36.5 °C)   TempSrc: Temporal   SpO2: 98%   Weight: 81.4 kg (179 lb 8 oz)   Height: 162.6 cm (64.02\")   PainSc: 0-No pain         2023    11:32 AM   Current Status   ECOG score 1     Physical Exam   Constitutional: He is oriented to person, place, and time. He appears well-developed. No distress.   HENT:   Head: Normocephalic and atraumatic.   Mouth/Throat: No oropharyngeal exudate.   Eyes: Pupils are equal, round, and reactive to light.   Cardiovascular: Normal rate, regular rhythm and normal heart sounds.   No murmur heard.  Pulmonary/Chest: Effort normal and breath sounds normal. No respiratory distress. He has no wheezes. He has no rhonchi. He has no rales.   Abdominal: Soft. Normal appearance and bowel sounds are normal. He exhibits no distension.   Musculoskeletal: Normal range of motion.   Neurological: He is alert and oriented to person, place, and time.   Skin: Skin is warm and dry. No rash noted.   Vitals reviewed.  2023 patient examined, unchanged from above.    RECENT LABS:  Hematology WBC   Date Value Ref Range Status   2023 5.46 3.40 - 10.80 10*3/mm3 Final     RBC   Date Value Ref Range Status   2023 2.67 (L) 4.14 - 5.80 10*6/mm3 Final     Hemoglobin   Date Value Ref Range Status   2023 " 7.3 (C) 13.0 - 17.7 g/dL Final     Hematocrit   Date Value Ref Range Status   07/24/2023 24.4 (L) 37.5 - 51.0 % Final     Platelets   Date Value Ref Range Status   07/24/2023 247 140 - 450 10*3/mm3 Final        Lab Results   Component Value Date    GLUCOSE 139 (H) 07/19/2023    BUN 26 (H) 07/19/2023    CREATININE 1.05 07/19/2023    EGFRIFNONA 56 (L) 02/23/2022    BCR 24.8 07/19/2023    K 6.2 (C) 07/19/2023    CO2 19.0 (L) 07/19/2023    CALCIUM 8.6 07/19/2023    ALBUMIN 3.9 07/19/2023    AST 12 06/12/2023    ALT 9 06/12/2023       NM Bone Scan Whole Body (01/11/2021 13:25)  CT Chest With Contrast Diagnostic (05/04/2021 09:11)  CT Abdomen Pelvis With Contrast (05/04/2021 09:11)  NM PET/CT Skull Base to Mid Thigh (08/20/2021 12:18)      Assessment & Plan      1.  Metastatic prostate cancer:  Patient initially diagnosed in 2006 and had a prostatectomy and hormone therapy.  Patient in January 2018 began to complain of shortness of breath and hoarseness.  Chest x-ray obtained 1/23/18 showed sclerotic bone lesions.  CT of the chest 3/12/2018 numerous sclerotic bone foci with all visualized bones consistent with metastatic disease, multiple enlarged mediastinal lymph nodes.  .1 from 3/14/2018.  Patient was started on Firmagon by [facility].  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  Case discussed at thoracic conference and recommendations were to continue ADT and radiation for symptomatic sites.  Lupron injections every 3 mos initiated 5/7/18 PSA at that time 7.810  Monthly Xgeva initiated 6/11/18 PSA 2.030  Last PSA 6/13/2019 0.881  9/20/2019 patient tolerating treatment well, no new concerns.  12/2019 PSA 2.9  3/9/2020  PSA increasing to 5.030, he remains continuing on Lupron and Xgeva and asymptomatic though follow-up CT of chest and pelvis will be requested in 11 weeks prior to follow-up in 12 weeks.   5/4/2020 patient  called reported worsening right hip pain, plans to purse CT scans ASAP.   5/8/2020 CT scans C/A/P show no progression of disease. Hip pain improved, Gabapentin added.. Bone scan requested.  6/3/2020 bone scan that does not demonstrate worsening of bone nor need for localized radiation therapy.  The patient's PSA has been slowly rising and we have not been able to determine worsening of disease and and, therefore, it is not felt warranted add additional medications such as Xtandi or apalutamide to his therapy.  Please note would like to avoid Zytiga try not to add prednisone which would worsen his blood sugar control.  9/3/2020 PSA 15.8, CXR extensive metastatic bone disease- patient reviewed 9/10/2020 reporting increased bony discomfort CT scans requested.  Scans done and reviewed 10/7/2020 ultimately reveal no evidence of progression of disease for visceral involvement or clearly for bone involvement.  After further review with the patient and his son plan continued Xgeva and Lupron.  We have assessed for availability of Xtandi 160 mg p.o. daily and find the cost is currently prohibitive.   11/25/2020 PSA 25.4  12/10/2020 review with some mild increase in bony discomfort.  After repeat discussion we went on to have him undergo Lupron therapy, and his PSA actually to be mildly reduced at 22.2.   Follow-up bone scan 1/11/2021 demonstrates some evidence of progression in sternum, left sacrum and proximal femur.  These findings were reviewed with the patient and his son January 18, 2020 and the patient was referred for palliative radiotherapy(Dr. Kulkarni)  Palliative radiation under care Dr. Kulkarni to right femur and sternal lesion -3000 cGy in 10 fractions given February 1 to February 12 with significant symptom improvement.  3/15/2021 PSA 38.5  4/12/2021 PSA 44  5/10/2021 PSA 42.6 CT chest abdomen pelvis 5/4/2021 - for progression of disease and follow-up PSA 5 10/2021 is at 42 slightly reduced from previous.  We have  discussed how to proceed from here and find a significant family history that clearly supports a potential genetic assessment for his malignancy.  It is felt most reasonable at this point to take the mediastinal nodes from his biopsy 4/9/2018 and reassess for potential targeted therapies, MSI status, as well as germline analysis.  6/7/2021 PSA 65.9 his analysis completed for actionable mutations including germline mutations.  These exams were negative though there is a variant of unknown significance.  Again this is not an actionable mutation.  We proceeded with additional Xgeva and Lupron planning for monthly Xgeva and Lupron at 3 months  7/7/2021 PSA 89 and subsequent testing with Axumin PET was performed confirming extensive osteosclerotic metastatic disease with little uptake, no evidence of visceral metastatic disease in neck chest abdomen or pelvis.  8/25/2021  , PET/CT reviewed, subsequent PSA increased to 105.  Patient fortunately asymptomatic.  Requested reevaluation for Xtandi 160 mg po daily.   9/8/2021 due for his lupron, receiving every 3 months, and monthly Xgeva.  Will have education today for Xtandi and will receive his medication today as well.   Started Xtandi 9/8/2021.  9/22/2021 here for toxicity check, so far tolerating Xtandi quite well other than a slight increase in diarrhea controlled with Imodium.  10/6/2021 continues to tolerate Xtandi well.  Labs are within range today, we will proceed with Xgeva today.  Patient seen 10/20/2021 having more nausea with Xtandi and demonstrating a substantial reduction in PSA level.  After discussion we plan to reduce his dose to 80 mg daily following his PSA and performance status over the next approximate 7 weeks at which time he would be scheduled for his follow-up Lupron.  Patient assessed 12/8/2021 with further reduction in PSA to 4.7, ongoing hypophosphatemia and hypocalcemia-Xgeva moved to every 3 months given on same schedule as  Lupron  Patient seen 3/21/2022, 6-week follow-up with mild increase in PSA recognized  Patient reassessed 5/2/2022 with PSA at 10.1, Xtandi moved to 160 mg p.o. daily  Patient assessed 6/13/2022, Xtandi at 160 mg p.o. daily, PSA pending, Xgeva moved to every 6 months, Lupron every 3 months  Patient seen 9/21/2022 at which time we continued our current approach, reviewed additional options for therapy should he clearly progress.  Patient seen 12/21/2022, increasing bony discomfort, Xgeva and Eligard continued, plans made for Pylarify scanning.   Patient seen 1/11/2023 with his son, discuss potential treatment options such as Pluvicto (Lutetium Yady 177 vipivotide tetraxetan) knowing that Pms-Asa is strongly positive in bone, palliative radiotherapy, radium-223 and alteration to additional androgen receptor such as darolutamide.  Patient referred to radiation therapy for their evaluation and possible treatment options, [facility] as application made for darolutamide and medications altered to include meloxicam 15 mg p.o. daily along with his Norco.  Patient assessed 2/20/2023 improved for radiation therapy, no additional options for treatment and [facility], darolutamide initiated.  Patient assessed 4/17/2023, darolutamide increased to 600 mg twice daily, Xgeva and Lupron every 3 months  5/15/2023: Continues darolutamide 600 mg twice daily.  Continues Xgeva and Lupron every 3 months, due in 1 month.  Hemoglobin today stable at 9.1.  Increased PSA, now 46 previously 29.  Discussed with Dr. Lee and we will proceed with bone scan in 3 weeks.  Subsequent bone scan with progression of metastatic disease to number of sites, issues discussed with patient and his son 6/12/2023 with plans to proceed to, primarily, palliative care approach.  Patient treated with Xgeva and Lupron in 3-4-week follow-up consider hospice or pallitus care.  Patient seen 7/3/2023-further anemia with hemoglobin 7.2 g%.  Plan 1 unit packed RBC  transfusion, initiation of Pallitus.  7/24/2023 hemoglobin 7.2.  Patient declines blood transfusion.  He is reporting pain currently not well controlled which will be discussed below.  Meeting with romeo on Friday of this week.      2.  Neuropathy/ restless legs:    5/13/2020 gabapentin 600 mg at bedtime, trazadone 50 mg QHS.  On gabapentin 300, 2 tablets at bedtime, and requip 0.5.mg    Still having neuropathy symptoms.  Will increase gabapentin to 900 mg at bedtime.   9/22/2021 increase in gabapentin to 900 mg at bedtime has helped neuropathy.  Now taking Requip 2 mg nightly.    3.  Cancer related pain: on Norco 5/325 every 6 hours as needed.  Mobic 15 mg p.o. every morning initiated 1/11/2023, consider MS Contin every 12 hours  Status post palliative radiotherapy with improved pain control by 2/20/2023  Effective pain relief except increasing constipation noted 4/17/2023, narcotic adjusted downward as possible.  Continues Norco 5/325 every 6 hours as needed.  Feels his pain is well controlled on this regimen.  Semaj Herrera reports a pain score of 8.  Given his pain assessment as noted, treatment options were discussed and the following options were decided upon as a follow-up plan to address the patient's pain-plan to add Duragesic 25 mcg every 72 hours daily E scribed to pharmacy  7/24/2023 patient complaining of pain not controlled with current medication.  We will increase fentanyl patch to 50 mcg every 72 hours.  Patient to continue Norco 10/3/2025 every 6 hours if needed for breakthrough pain.    PLAN:     Increase fentanyl patch to 50 mcg every 72 hours  Continue Norco 10/325 every 6 hours for breakthrough pain.  Continue gabapentin 100 mg at bedtime, and Requip 2 mg nightly.  Patient meeting with Romeo this Friday.Dr. Lee is asked the patient's son to call after their meeting to give us an update on plan.    Call/ return sooner should the patient develop any new concerns or problems.    Patient is  on a high risk medication requiring close monitoring for toxicity.        Randee Alfredo, APRN  07/24/2023

## 2023-07-28 ENCOUNTER — TELEPHONE (OUTPATIENT)
Dept: ONCOLOGY | Facility: CLINIC | Age: 83
End: 2023-07-28
Payer: MEDICARE

## 2023-08-02 ENCOUNTER — DOCUMENTATION (OUTPATIENT)
Dept: ONCOLOGY | Facility: CLINIC | Age: 83
End: 2023-08-02
Payer: MEDICARE

## 2023-08-06 DIAGNOSIS — G62.9 PERIPHERAL POLYNEUROPATHY: ICD-10-CM

## 2023-08-06 DIAGNOSIS — C61 PROSTATE CANCER METASTATIC TO BONE: ICD-10-CM

## 2023-08-06 DIAGNOSIS — C79.51 PROSTATE CANCER METASTATIC TO BONE: ICD-10-CM

## 2023-08-06 RX ORDER — GABAPENTIN 300 MG/1
CAPSULE ORAL
Qty: 90 CAPSULE | Refills: 0 | Status: CANCELLED | OUTPATIENT
Start: 2023-08-06

## 2023-08-07 RX ORDER — ONDANSETRON HYDROCHLORIDE 8 MG/1
8 TABLET, FILM COATED ORAL 3 TIMES DAILY PRN
Qty: 30 TABLET | Refills: 5 | Status: SHIPPED | OUTPATIENT
Start: 2023-08-07

## 2023-08-07 RX ORDER — GABAPENTIN 300 MG/1
900 CAPSULE ORAL
Qty: 90 CAPSULE | Refills: 0 | Status: SHIPPED | OUTPATIENT
Start: 2023-08-07

## 2023-08-08 ENCOUNTER — TELEPHONE (OUTPATIENT)
Dept: ONCOLOGY | Facility: CLINIC | Age: 83
End: 2023-08-08
Payer: MEDICARE

## 2023-08-08 NOTE — TELEPHONE ENCOUNTER
Called Cody to let him know that [facility] app[facility] was a f/u and that Dr. Lee did not set this up. Cody grider/renata.

## 2023-08-08 NOTE — TELEPHONE ENCOUNTER
I did not schedule him with [facility].  April was helping me in the last several weeks and sending the order for the lift chair.  Thanks, ASAEL

## 2023-08-08 NOTE — TELEPHONE ENCOUNTER
Caller: Cody Herrera    Relationship: Emergency Contact    Best call back number: [phone] (CELL, PREFERRED, BUT NO VOICEMAIL ON THIS NUMBER)    What is the best time to reach you: ASAP    Who are you requesting to speak with (clinical staff, provider,  specific staff member): CLINICAL    What was the call regarding: PT'S SON WANTS TO SPEAK TO CLINICAL ABOUT THE PT'S APPT AT [facility], NEED TO FIND OUT ABOUT THAT, DID DR. GRIFFITH SCHEDULE IT FOR HIM?  PLEASE CALL CODY TO DISCUSS.  [phone] IS HOME NUMBER FOR CODY IF NEED TO LEAVE A MESSAGE.  HIS CELL DOES NOT HAVE VOICEMAIL.  ALSO NEED TO FIND OUT STATUS OF LIFT CHAIR DR. GRIFFITH WAS ORDERING, AND DOES CODY NEED TO DO ANYTHING FOR THAT?

## 2023-08-09 NOTE — TELEPHONE ENCOUNTER
Called and s/w Kady at [facility]. They confirmed they received the order for the Lift chair and it was sent to [facility] for fulfillment. Called pt's son Cody and informed him of this. He will call [facility] to follow up.

## 2023-08-11 NOTE — ED NOTES
To room 18. Report to Ro Redd RN  10/19/18 1945     Attending Attestation (For Attendings USE Only)...

## 2023-08-15 ENCOUNTER — DOCUMENTATION (OUTPATIENT)
Dept: PHARMACY | Facility: HOSPITAL | Age: 83
End: 2023-08-15
Payer: MEDICARE

## 2023-08-15 DIAGNOSIS — C61 PROSTATE CANCER METASTATIC TO BONE: ICD-10-CM

## 2023-08-15 DIAGNOSIS — C79.51 PROSTATE CANCER METASTATIC TO BONE: ICD-10-CM

## 2023-08-15 RX ORDER — ONDANSETRON HYDROCHLORIDE 8 MG/1
8 TABLET, FILM COATED ORAL 3 TIMES DAILY PRN
Qty: 30 TABLET | Refills: 5 | Status: SHIPPED | OUTPATIENT
Start: 2023-08-15

## 2023-08-15 NOTE — PROGRESS NOTES
I received a message from [facility] asking about a Zofran prescription that was sent to them.  I called pt and found out that he wanted it sent in to [facility].  I asked Altagracia RAMIREZ to re-send it in to [facility] on his profile.      Radha Brito  Specialty Pharmacy Technician

## 2023-08-25 DIAGNOSIS — C79.51 PROSTATE CANCER METASTATIC TO BONE: ICD-10-CM

## 2023-08-25 DIAGNOSIS — C79.51 MALIGNANT NEOPLASM METASTATIC TO BONE: ICD-10-CM

## 2023-08-25 DIAGNOSIS — G62.9 PERIPHERAL POLYNEUROPATHY: ICD-10-CM

## 2023-08-25 DIAGNOSIS — C61 PROSTATE CANCER METASTATIC TO BONE: ICD-10-CM

## 2023-08-25 RX ORDER — HYDROCODONE BITARTRATE AND ACETAMINOPHEN 10; 325 MG/1; MG/1
1 TABLET ORAL EVERY 6 HOURS PRN
Qty: 100 TABLET | Refills: 0 | Status: CANCELLED | OUTPATIENT
Start: 2023-08-25

## 2023-08-25 RX ORDER — HYDROCODONE BITARTRATE AND ACETAMINOPHEN 10; 325 MG/1; MG/1
1 TABLET ORAL EVERY 6 HOURS PRN
Qty: 100 TABLET | Refills: 0 | Status: SHIPPED | OUTPATIENT
Start: 2023-08-25

## 2023-08-25 RX ORDER — GABAPENTIN 300 MG/1
900 CAPSULE ORAL
Qty: 90 CAPSULE | Refills: 0 | Status: SHIPPED | OUTPATIENT
Start: 2023-08-25

## 2023-08-25 RX ORDER — FENTANYL 50 UG/1
1 PATCH TRANSDERMAL
Qty: 10 EACH | Refills: 0 | Status: CANCELLED | OUTPATIENT
Start: 2023-08-25

## 2023-08-25 RX ORDER — FENTANYL 50 UG/1
1 PATCH TRANSDERMAL
Qty: 10 EACH | Refills: 0 | Status: SHIPPED | OUTPATIENT
Start: 2023-08-25

## 2023-09-05 DIAGNOSIS — C79.51 PROSTATE CANCER METASTATIC TO BONE: ICD-10-CM

## 2023-09-05 DIAGNOSIS — C61 PROSTATE CANCER METASTATIC TO BONE: ICD-10-CM

## 2023-09-05 DIAGNOSIS — G62.9 PERIPHERAL POLYNEUROPATHY: ICD-10-CM

## 2023-09-05 RX ORDER — MELOXICAM 15 MG/1
15 TABLET ORAL DAILY
Qty: 30 TABLET | Refills: 1 | Status: SHIPPED | OUTPATIENT
Start: 2023-09-05

## 2023-09-05 RX ORDER — GABAPENTIN 300 MG/1
900 CAPSULE ORAL
Qty: 90 CAPSULE | Refills: 0 | OUTPATIENT
Start: 2023-09-05

## 2023-09-07 NOTE — PROGRESS NOTES
"                                                                                                                                            REASONS FOR FOLLOW UP:   1.  Metastatic prostate cancer    HISTORY OF PRESENT ILLNESS:     Patient is an 82-year-old with metastatic prostate cancer who is seen 9/8/2023 as he continues current therapy with with Xgeva and Lupron after previous therapy also including Xtandi through the fall of last year.         The patient was previously seen back along with his son, Georges, 9/21/2022 fortunately doing reasonably well without additional pain, discomfort or excessive fatigue.  We have discussed various options for treatment of prostate cancer if we are unable to control this disease with current measures.    He is next evaluated 12/21/2022 now scheduled for Lupron.  He had last received both his Lupron and Xgeva 9/21/2022.  He notes increasing chest discomfort that appears to be \"when he twists\".  Initially he felt he might have a pneumonia but never had fever or cough.  We have discussed that his disease could well be progressing and then assessment that would allow us to clarify treatment options is reasonable.      This led to a repeat PSA 12/21/2022 that was found to be elevated to 23.9 and a follow-up Pylarify study performed 1/6/2023 that does demonstrate marked progression of sclerotic bone metastasis diffusely compared to 8/20/2021.  This includes the femoral neck,, sacrum, left iliac bone with SUV up to 48.8, stable tiny mediastinal nodes, 2 punctate nodules in the calvarium.      He is seen back with his son 1/11/2023.  The potential treatment options such as Pluvicto (Lutetium Yady 177 vipivotide tetraxetan), now that we know that his PSMA scan is positive, though the patient would have to initially be treated with taxane chemotherapy which would be difficult for him to tolerate, radium-223 considering his bony metastasis.  He has slowly progressive disease however we " have discussed additional issues including radiation therapy and/or alteration to another androgen receptor agonist such as darolutamide.  We have discussed all these options moving to have assessments done by radiation therapy and, [facility] as we address his symptoms.    The patient was seen by radiation therapy and felt a candidate for palliative radiotherapy as we made application for darolutamide.He is also seen by [facility] for additional treatment options 1/23/2023 and 2/10/2023 as radiation therapy proceeded to LSP/pelvis.    The patient was also seen by [facility] without additional therapeutic options at this time.    He is next seen 2/20/2023 to review his status recognizing his follow-up Xgeva will be due 3/15/2023 and and Lupron would be due 6/7/2023.  He was able to obtain darolutamide (Nubeqa) as free drug.  He is seen with his son both indicating that he just completed radiation therapy today.  While his pain has improved, he did have diarrhea develop during radiation therapy and is using antidiarrheal medications to control it currently.  This is also, potentially, side effect of darolutamide.      He is next seen 4/17/2023.  We have reiterated that his treatments with Lupron and Xgeva injections continued every 3 months.  On lower dose darolutamide which we gradually escalate to 600 mg nightly and 300 mg a day trying to move to 600 mg twice daily.  His last injections were given 3/20/2023 for both Xgeva and Lupron.  Unfortunately the patient has fallen twice while gardening onto his right side approximately 2 weeks prior to his visit 4/17/2023 and is only slowly recovering with right side and back pain.  He is, however, able to function and seen some improvement.  His increased use of pain medications has caused worsening constipation however.  In a lengthy conversation we have discussed increasing his darolutamide to 600 mg twice daily to gain his best response from therapy and also  improve his bowel function.    Patient is next evaluated 5/15/2023.  He continues on darolutamide 600 mg twice daily.  He also receives Xgeva and Lupron every 3 months, due in 1 month.  He returns today for lab review. He has occasional diarrhea, controlled with Lomotil as needed.  Continues [place] 5/325 as needed for pain and feels his pain is well controlled on this regimen.  Energy level is stable, he tires easily after working in his garden, but energy improves once he rests.  Appetite is good.  Denies fever or chills.  Denies nausea or vomiting.  Denies new or worsening pain.  No new concerns today.    The patient's follow-up tests include a PSA of 46.1 5/15/2023 and bone density 6/5/2023 showing abnormal uptake within several sites including skull base, ribs, humeri, spine, pelvis, Femara, sternum, tibia, left radius all consistent with progression of his disease.  This patient could be a candidate for a number of studies but we have, in particular, discussed Provenge.  He would likely need to be seen by [facility] initially.    Patient seen with his son 6/12/2023 and we discussed options such as Provenge.  The patient, self, indicates that he is not really interested in doing this but would rather have control of his pain primarily.  As result we have increased his Norco to 10 mg tablets #100 E scribed to his pharmacy as we continue Xgeva.  Pending his assessment in the next several weeks to month we may need to proceed to palliative care primarily.    The patient is seen back 7/3/2023, unfortunately, began to have increasing pain that is not managed sufficiently by his Norco tablets.  Additionally he is found to be considerably anemic and this is felt to be on the basis of his progressive malignancy.  The patient is deteriorating we are now discussing palliative care.  The patient's son also found that his son is diagnosed at age 30 with type 2 diabetes and is struggling to manage both his father and his  "son's care.      Patient returns today 7/24/2023 for lab review and follow-up.  At his last visit on 7/3/2023 hemoglobin was noted to be 7.2.  At that time he was scheduled to receive a transfusion of 1 unit of PRBC.  Patient states that actually after receiving the blood he has felt worse, and \"less peppy\".  He is using a fentanyl patch for pain control, which is helping, but does not feel like it is adequately controlling his pain.  He is also using Norco 10/325 every 6 hours for breakthrough pain.  He is having difficulty sleeping due to pain control.  He is also struggling with constipation.  Patient states that his bowels are moving, however they are difficult to move.  They are supposed to meeting with palliative us on Friday.  They are also scheduled to see nephrology later on this week.     The patient is next seen 9/8/2023 indicating that he still does not wish to proceed to hospice care but is becoming quite weak and fatigued.  He is found to be considerably anemic and would benefit from transfusion as we have tried to continue both Lupron and Xgeva.      Hematologic/oncologic history:   The patient is an 82-year-old male with significant past medical history including prostate carcinoma, CKD 3 and long-term tobacco use be been seen by primary care in late January, 2018 for hoarseness.  Chest x-ray is now outpatient January 23 revealed sclerotic change in the posterior mid chest to be within 3 adjacent thoracic ertebral bodies of uncertain significance.  A follow-up chest CT revealed numerous sclerotic foci within al visualized bones consistent with metastatic disease, multiple enlarged mediastinal lymph nodes concerning for metastatic lymphadenopathy and a polypoidal abnormality in the right lateral wall of the trachea.  CT of abdomen March 20 revealed extensive osseous metastatic disease mildly enlarged retroperitoneal lymph nodes.  Bone scan March 20 was consistent with widespread osseous metastasis " particularly in the proximal half the left humeral shaft, abnormal uptake in the sternum and manubrium and a small focus in the posterior aspect of the calvarium.  This led to a plain film the left humerus with mottled sclerosis involving the shaft of the humerus particularly in the proximal half but no evidence of pathologic fracture.    The patient has additionally type 2 diabetes on insulin pump, again a history of prostate carcinoma prostatectomy 12 years previously, hypertension, and hyperlipidemia.  Additional studies included a PSA of 395.1 from March 14, 2018.The patient's been seen by urology March 15 with plans to start Firmagon.    The patient is now seen in pulmonary clinic with Dr. Bijan Rivera and we have discussed that he will need bronchoscopy and mediastinoscopy.  Interestingly his additional history includes a long occupational exposure including working in the [place] just out of school, subsequent construction work for many years and a 30-pack-year history of smoking stopping in the late 1990s.  He indicates that he followed with Dr. Guillen of urology for many years with no changes in his exams and normal PSAs.  He stopped this follow-up approximately 2 years ago.     Patient was seen in consultation with thoracic surgery on March 28 and plans are made for mediastinoscopy and bronchoscopy with lymph node biopsy.  Pathology revealed that these nodes were consistent with metastatic prostate carcinoma.  He was discussed at thoracic conference.  A PET/CT was not pursued and it was determined that he see medical oncology for hormonal treatment and radiation therapy if symptomatic.  It should be noted that the patient's March 15 [facility] office note describes that Firmagon was planned.     The patient has met with the son and grandson April 23.  We have also contacted Dr. Taylor of urology in that Firmagon was given just after his last visit and would next be due approximately  May 3.  Patient feels considerably better with resolution of his pain, normalization of his performance status.  He would like to continue treatment through our office now contacted Dr. Taylor concerning this.    The patient is next seen June 11, 2018 we discussed his follow-up including his treatments with Lupron May 7 and his next treatment on July 30.  He has returned to essentially normal performance status and his PSA has dropped further from 395 March 14 27.8 May 7 and now 2.03 June 11.  We do plan to initiate Xgeva also study him via scans to document his improvement approximately a month from now.   The patient is next seen July 26, 2018.  Repeat imaging demonstrated interval resolution of mediastinal and retroperitoneal lymphadenopathy.  There is thickening in the distal aspect of the appendix with stranding?  Chronic appendicitis.  The patient indicates he is having no such symptoms and never has.  There is no change in sclerotic bony metastasis in the patient's PSA has dropped substantially to 1.66 by July 19 and 1.79 July 26.  He is actually feeling excellent and this is corroborated by family members.   Patient is next seen January 10, 2019 continuing to do very well and, in fact indicating that [facility], likely [facility] replacements, visual review.  He is not certain is going to continue his care with them or with us or combination of both.    Patient is next seen April 4, 2019.  He is recently discharged after hospitalization March 15-18 with left upper lobe pneumonia.  We reviewed the findings in detail with his gradual recovery now documented.  His studies include a CT of chest which does not demonstrate any further bony lesions and/or pulmonary metastasis having developed.  He is feeling considerably better and approximately back to his baseline.  The patient is next seen June 28, 2019 feeling well but having baseline generally musculoskeletal pain  and restless legs.His recent exams include a PSA of 0.81, CMP with potassium of 5.3 with repeat pending.  His performance status overall remains good to very good and is clearly responding to his treatments.  The patient is next seen December 13, 2019 continue to feel well.  He has had, oddly enough, 2 episodes of sleep paralysis which have occurred to him previously and he wonders if there is any connection with his prostate carcinoma though this is felt unlikely.     The patient when assessed in December had his PSA go to 2.9.  We continue with Lupron and Xgeva and is seen back now March 9, 2020 without additional symptoms.  We discussed that a schedule could likely change moving to 3 months for both of these medications particularly if he needs additional therapy directed at progressive disease.     Patient contacted our practice May 4 concern for pain in his hips.  This led to moving his scans up and they were performed May 8, 2020 showing a sub-6 mm pleural-based pulmonary nodule in the right lower lobe that is new from March 15, 2018, remainder of the sub-6 mm pulmonary nodules bilaterally are stable.  There is extensive osseous metastatic disease as previous with no other new findings.     The patient indicates, when seen, May 13 that his bone pain is now resolved.  While this is good information does not mean that he does not have further bony metastasis and we have discussed that a follow-up bone scan is likely necessary.  Furthermore he is having some difficulty with sleeplessness and increase in restless legs as he continues to stay at home during the COVID 19 crisis which, itself, is producing more anxiety for him.  A follow-up bone scan was obtained Lety 3 revealing multifocal osseous metastatic disease which was compared to March 2, 2018 with improvement.  No new abnormal uptake is present.  He is next seen with us on June 17, 2020 and, fortunately, is not having any significant pain at this point.  We  discussed his scans in detail and, on balance, his performance status also remains improved.     It was elected to follow the patient rather than changes antiandrogen therapy.  He is reassessed September 3 with unchanged CMP, H&H of 11.4 and 36.3 with normal white count, platelet count and differential, PSA at 15.8.  Follow-up PA lateral chest x-ray does not show any new abnormalities though there is extensive metastatic bone disease throughout the bony thorax and osteoblastic metastasis have been seen on chest CT May 2020.  The patient is seen with his son Georges September 10 noticing increasing bony discomfort primarily in the right hip following fairly intensive gardening which he does frequently.  Now has further elevation of PSA were wondering whether we should try to intervene sooner per additional antiandrogens.  We will need to further assess him via CT scanning to make this decision     These were obtained October 1 showing extensive sclerotic disease with no metastatic disease in chest abdomen or pelvis.  PSA had gone from 15.8 September 3-16 0.6 October 1.     He still has pain getting up in the morning and after active gardening.  This is manageable with current pain medications and, at present, it is not apparent that he needs to switch medications to gain better control of his disease.  The patient did have follow-up studies done including a PSA November 25, 2020 now at 25.4.  The patient is seen with his son December 10, 2020 doing fair but having some increased pain in the mid sternum and right hip that he ascribes to additional exercise/work in managing his garden.  It is apparent however, that his last bone scan was 6 months ago and we do need to reassess him concerning this.     The patient had follow-up bone scan performed January 11, 2021 showing again uptake in the calvarium, humerus, right medial clavicle, sternum, ribs, spine, sacrum, pelvis, right acetabulum, proximal femora and now left  frontal bone which appears new.  There is also mild increase in sternal uptake and equivocal increase in the pelvic lesions of all of the sacrum and the right proximal femur.     The patient is seen with his son January 18, 2021 and indicates that he is having pain gradually worsened in his right hip, mid chest that had been an issue previously.  These findings on bone scan are reviewed with both of them as likely the underlying culprit for his discomfort.  He would need change of the systemic therapy but, at present, will ask for radiation therapy palliation initially.  He was previously treated by Dr. Kulkarni many years ago and will ask her to see him.  We will also make application for the current cost of Xtandi or Zytiga.  The patient is not felt to be a candidate for systemic chemotherapy.     The patient was referred for ration therapy as we continued to assess his PSA on current therapy as well as exam the cost of Zytiga or Xtandi.  Radiation therapy (Dr. Kulkarni) saw the patient January 26 and felt that the right femur and sternal lesion could benefit from therapy-3000 cGy in 10 fractions.  The patient took treatment February 1 to February 12 to the above areas and is next seen back March 15.  Fortunately his pain has improved dramatically and he is quite happy about this.  Unfortunately he notes some difficulty with swallowing that is temporary and often leads to increased salivation and mucus production.  Patient was asked to return his next assessed April 12, 2021.  He is able to swallow with less pain but still has excess mucus production and issues with salivation.  His PSA has noted increase but he is having no additional bony discomfort at this point and, additionally, has noted low-grade fevers just under 100 degrees at nighttime?.  His weight, appetite and general performance status have remained good to excellent.  We discussed follow-up studies at this point to determine his status.    Follow-up  scans were scheduled CT scans of chest and pelvis 5/4/2021 showing osseous metastasis quite similar to previous examinations in the chest, CT of abdomen pelvis also with no acute intra-abdominal adenopathy no indication of abdominal pelvic metastatic disease but again widespread osseous metastasis.  His PSA at this point have been slowly increasing to a level of 44 on 4/12/2021.  As the patient is reassessed 5/10/2021 we find, fortunately, that he is not exceeding symptomatic.  It could make reasonable sense at this point to take the mediastinal nodes from his biopsy 4/9/2018, reassess potential targeted therapies, MSI status, and germline analysis.  Fortunately he is not having significant pain or discomfort and is seen with his son as we discussed the above plan.  Notably a follow-up PSA is found to be 42.6.   Patient is next seen in 6/7/2021 for Lupron and Xgeva.  Fortunately he is feeling considerably better according to both he and his son though his stamina has reduced.  He is not having additional pain at this point.  We have also reviewed his genetics assessment which was significant only for a variant of unknown significance.  This is not an actionable abnormality.    As the patient's PSA further escalated increasing to 89 7/7/2021 his subsequent Axumin PET was obtained 8/20 demonstrating extensive osteosclerotic metastatic disease showing reduced uptake-typical finding but no evidence of visceral metastatic disease in the neck chest abdomen pelvis.  These findings are discussed with the patient and his son 8/25/2021 and indicative of his progressive disease-etiology of his rising PSA-though the patient is asymptomatic we have discussed moving to initiate Xtandi is available at 160 mg p.o. twice daily along with his current Lupron and Xgeva.     The patient's testing 10/6 included PSA that is dropped to 9.69.  He is seen with his son, Cody, both indicating that he been doing relatively well with treatment  but began to notice nausea in the last week.  The schedule that he has been given also needs to be somewhat updated in terms of his Lupron and Xgeva.  He has been able to maintain his appetite and overall performance status to date.    The patient is next assessed 12/8/2021 tolerating his current Xtandi dose better than previous and maintaining a good performance status overall as well as demonstrating a drop in his PSA now to 4.7 on 12/8/2021 compared to high of 112 9/8/2021.  He remains hypophosphatemic and hypocalcemic and we have discussed, again, changing his Xgeva dosing to every 3 months along with his Lupron will continue his Xtandi to 80 mg daily.    The patient is next reviewed 3/21/2022 seen for both Xgeva and Lupron.  Fortunately he is feeling reasonably well with little additional pain though he has restarted to place his garden into shape for the season and is working more outdoors.    Patient reevaluated 5/2/2022 with repeat CBC including H&H of 10.5 and 33.4, white count 5980 and platelet count of 192,000, currently CMP and PSA pending with a previous PSA 1 month ago at 9.06.  At this point we increased his Xtandi to 160 mg p.o. daily while continuing treatment with every 3 month Xgeva and Lupron.  Notably the patient's Requip  alcohol I am sure it is mariza now at 1.5 mg p.o. nightly and Neurontin 900 mg p.o. daily has improved his restless leg syndrome substantially.    Patient seen 6/13/2022 at which time Xtandi at 160 mg p.o. daily was continued, Xgeva moved to every 6 months and Lupron every 3 months.    This was continued when patient was seen 9/21/2022 though subsequently we proceeded to every 3 months for both medications.    Patient seen 12/21/2022 at which time a subsequent Pylarify scan was requested as result of increasing PSA level and bony discomfort.  The patient received Lupron and Xgeva at this point.     He is seen back with his son 1/11/2023.  The potential treatment options such  as Pluvicto (Lutetium Yady 177 vipivotide tetraxetan), now that we know that his PSMA scan is positive, though the patient would have to initially be treated with taxane chemotherapy which would be difficult for him to tolerate, radium-223 considering his bony metastasis.  He has slowly progressive disease however we have discussed additional issues including radiation therapy and/or alteration to another androgen receptor agonist such as darolutamide.  We have discussed all these options moving to have assessments done by radiation therapy and, [facility] as we address his symptoms.    The patient was not felt a candidate for additional treatments via [facility], was able to start darolutamide and completed palliative radiotherapy.  He was stable seen 2/20/2023 though experiencing diarrhea post radiation therapy.    Patient seen 4/17/2023 with darolutamide gradually increased to 600 mg twice daily.  His Xgeva and Lupron will be given every 3 months.    Patient seen 6/12/2023, unfortunately, PSA increasing and bone scan worsening.  Patient at this point indicated that he did not wish additional therapies but, instead, control of his pain initially.  Plans were made to continue his Lupron and Xgeva seen him at the 3 to 4-week annia and considering palliative care primarily with either hospice or [facility].    Patient seen 7/3/2023 with plans to initiate palliative care starting with [facility].  The patient continued this approach seen back 9/8/2023 referring not to proceed with hospice but again with palliative care including transfusion.    Past Medical History:   Diagnosis Date    Aortic stenosis, mild 01/01/2021    Per echocardiogram 2021    Ascending aorta dilatation     Asthma     Benign prostatic hyperplasia     Bone cancer     Cellulitis of great toe of left foot 10/19/2018    CKD (chronic kidney disease)     Stage 3; followed by Dr. Vicky Duran     Diastolic dysfunction     GRADE II per echo 2018     Difficulty breathing     during exertion    Dyslipidemia     Erectile dysfunction     Fatigue     Heart murmur     History of blood transfusion     History of kidney stones     HL (hearing loss)     Hyperlipidemia     Hypertension     Hyponatremia     Kidney stone     Left ventricular hypertrophy     per echo 2018    Localized edema     Neuropathy     Obstructive sleep apnea     USING CPAP    Osteoarthritis     Peptic ulcer     Pneumonia     Pneumonia of left upper lobe due to infectious organism 03/15/2019    Prostate cancer     Status post prostatectomy, radiation therapy, and hormone therapy followed by Dr. Lee; metastatsis to bone    Pure hyperglyceridemia     Restless leg syndrome     Sepsis     Sinus bradycardia     Transient cerebral ischemia     Type 2 diabetes mellitus         Past Surgical History:   Procedure Laterality Date    BRONCHOSCOPY N/A 04/09/2018    Procedure: BRONCHOSCOPY;  Surgeon: Bijan Rivera III, MD;  Location: [facility] OR;  Service: Cardiothoracic    COLONOSCOPY      DEEP NECK LYMPH NODE BIOPSY / EXCISION      HEMORRHOIDECTOMY      LYMPH NODE BIOPSY      MEDIASTINOSCOPY N/A 04/09/2018    Procedure: MEDIASTINOSCOPY WITH LYMPH NODE BIOPSY;  Surgeon: Bijan Rivera III, MD;  Location: [facility] OR;  Service: Cardiothoracic    OTHER SURGICAL HISTORY      ulcer repair    PROSTATECTOMY  2006        Current Outpatient Medications on File Prior to Visit   Medication Sig Dispense Refill    Accu-Chek Guide test strip USE 1 STRIP TO CHECK BLOOD SUGAR 4 TIMES DAILY      Accu-Chek Softclix Lancets lancets USE 1 LANCET TO CHECK GLUCOSE 4 TIMES DAILY      aspirin 81 MG EC tablet Take 1 tablet by mouth Daily.      cholecalciferol (VITAMIN D3) 1000 units tablet Take 2 tablets by mouth Daily.      Denosumab (XGEVA SC) Inject  under the skin into the appropriate area as directed Every 30 (Thirty) Days.      dilTIAZem CD (CARDIZEM CD) 120 MG 24 hr capsule TAKE 1 CAPSULE EVERY DAY 90 capsule 3     diphenoxylate-atropine (LOMOTIL) 2.5-0.025 MG per tablet Take 1 tablet by mouth 4 (Four) Times a Day As Needed for Diarrhea. 30 tablet 0    fentaNYL (DURAGESIC) 25 MCG/HR patch Place 1 patch on the skin as directed by provider Every 72 (Seventy-Two) Hours. 5 patch 0    fentaNYL (DURAGESIC) 50 MCG/HR patch Place 1 patch on the skin as directed by provider Every 72 (Seventy-Two) Hours. 10 each 0    furosemide (LASIX) 40 MG tablet Take 1 tablet by mouth Every Morning.      gabapentin (NEURONTIN) 300 MG capsule Take 3 capsules by mouth every night at bedtime. 90 capsule 0    HYDROcodone-acetaminophen (NORCO)  MG per tablet Take 1 tablet by mouth Every 6 (Six) Hours As Needed for Moderate Pain. 100 tablet 0    Insulin Aspart (novoLOG) 100 UNIT/ML injection INJECT 100 UNITS VIA PUMP ONCE DAILY      Leuprolide Acetate, 3 Month, (LUPRON DEPOT, 3-MONTH, IM) Inject  into the appropriate muscle as directed by prescriber Every 3 (Three) Months.      levothyroxine (SYNTHROID, LEVOTHROID) 88 MCG tablet       loperamide (IMODIUM) 1 MG/5ML solution Take 10 mL by mouth 4 (Four) Times a Day As Needed for Diarrhea.      meloxicam (MOBIC) 15 MG tablet Take 1 tablet by mouth Daily. 30 tablet 1    modafinil (PROVIGIL) 200 MG tablet Take 1 tablet by mouth Daily. 30 tablet 0    olmesartan (BENICAR) 40 MG tablet TAKE 1 TABLET EVERY DAY (DISCONTINUE LOSARTAN 100MG) 90 tablet 3    ondansetron (ZOFRAN) 8 MG tablet Take 1 tablet by mouth 3 (Three) Times a Day As Needed for Nausea or Vomiting. 30 tablet 5    pravastatin (PRAVACHOL) 40 MG tablet Take 1 tablet by mouth Daily.      rOPINIRole (REQUIP) 0.5 MG tablet Take 2 tablets by mouth in the evening and 2 at bedtime. 360 tablet 2    traZODone (DESYREL) 50 MG tablet Take 1-2 tablets by mouth every night at bedtime.       No current facility-administered medications on file prior to visit.        ALLERGIES:    Allergies   Allergen Reactions    Niacin Unknown - Low Severity    Statins  "Unknown - Low Severity        Social History     Socioeconomic History    Marital status:     Years of education: College   Tobacco Use    Smoking status: Former     Packs/day: 1.50     Years: 30.00     Pack years: 45.00     Types: Cigarettes     Start date: 1968     Quit date: 1998     Years since quittin.6     Passive exposure: Past    Smokeless tobacco: Never   Substance and Sexual Activity    Alcohol use: Not Currently     Comment: Caffeine use: 2-3 cups daily    Drug use: Never    Sexual activity: Not Currently        Family History   Problem Relation Age of Onset    Heart failure Mother     Hypertension Mother             Diabetes Mother     Heart disease Mother             Lung cancer Father 46    Hypertension Sister     Lung cancer Sister 60    Hypertension Brother             Lung cancer Brother 62    Diabetes Brother     Heart disease Other     Prostate cancer Brother 60    Cancer Brother             Cancer Sister             Cancer Sister     Cancer Sister     Heart disease Brother             Malig Hyperthermia Neg Hx         Review of Systems  As per HPI    Objective     Vitals:    23 1155   BP: 160/70   Pulse: 80   Resp: 16   Temp: 97.7 °F (36.5 °C)   TempSrc: Temporal   SpO2: 99%   Weight: 79.1 kg (174 lb 4.8 oz)   Height: 162.6 cm (64.02\")   PainSc:   6   PainLoc: Leg  Comment: both         2023    11:57 AM   Current Status   ECOG score 1     Physical Exam   Constitutional: He is oriented to person, place, and time. He appears well-developed. No distress.   HENT:   Head: Normocephalic and atraumatic.   Mouth/Throat: No oropharyngeal exudate.   Eyes: Pupils are equal, round, and reactive to light.   Cardiovascular: Normal rate, regular rhythm and normal heart sounds.   No murmur heard.  Pulmonary/Chest: Effort normal and breath sounds normal. No respiratory distress. He has no wheezes. He has no rhonchi. He has no rales. "   Abdominal: Soft. Normal appearance and bowel sounds are normal. He exhibits no distension.   Musculoskeletal: Normal range of motion.   Neurological: He is alert and oriented to person, place, and time.   Skin: Skin is warm and dry. No rash noted.   Vitals reviewed.  09/08/2023 patient examined, unchanged from above.    RECENT LABS:  Hematology WBC   Date Value Ref Range Status   09/08/2023 5.93 3.40 - 10.80 10*3/mm3 Final     RBC   Date Value Ref Range Status   09/08/2023 1.90 (L) 4.14 - 5.80 10*6/mm3 Final     Hemoglobin   Date Value Ref Range Status   09/08/2023 5.2 (C) 13.0 - 17.7 g/dL Final     Hematocrit   Date Value Ref Range Status   09/08/2023 17.7 (L) 37.5 - 51.0 % Final     Platelets   Date Value Ref Range Status   09/08/2023 143 140 - 450 10*3/mm3 Final        Lab Results   Component Value Date    GLUCOSE 118 (H) 09/08/2023    BUN 14 09/08/2023    CREATININE 0.78 09/08/2023    EGFRIFNONA 56 (L) 02/23/2022    BCR 17.9 09/08/2023    K 4.3 09/08/2023    CO2 21.8 (L) 09/08/2023    CALCIUM 8.8 09/08/2023    ALBUMIN 3.6 09/08/2023    AST 16 09/08/2023    ALT 7 09/08/2023       NM Bone Scan Whole Body (01/11/2021 13:25)  CT Chest With Contrast Diagnostic (05/04/2021 09:11)  CT Abdomen Pelvis With Contrast (05/04/2021 09:11)  NM PET/CT Skull Base to Mid Thigh (08/20/2021 12:18)      Assessment & Plan      1.  Metastatic prostate cancer:  Patient initially diagnosed in 2006 and had a prostatectomy and hormone therapy.  Patient in January 2018 began to complain of shortness of breath and hoarseness.  Chest x-ray obtained 1/23/18 showed sclerotic bone lesions.  CT of the chest 3/12/2018 numerous sclerotic bone foci with all visualized bones consistent with metastatic disease, multiple enlarged mediastinal lymph nodes.  .1 from 3/14/2018.  Patient was started on Firmagon by [facility]rology[facility].  4/9/2018 mediastinoscopy pathology positive for metastatic adenocarcinoma consistent with origin from  prostatic primary.  Case discussed at thoracic conference and recommendations were to continue ADT and radiation for symptomatic sites.  Lupron injections every 3 mos initiated 5/7/18 PSA at that time 7.810  Monthly Xgeva initiated 6/11/18 PSA 2.030  Last PSA 6/13/2019 0.881  9/20/2019 patient tolerating treatment well, no new concerns.  12/2019 PSA 2.9  3/9/2020  PSA increasing to 5.030, he remains continuing on Lupron and Xgeva and asymptomatic though follow-up CT of chest and pelvis will be requested in 11 weeks prior to follow-up in 12 weeks.   5/4/2020 patient called reported worsening right hip pain, plans to purse CT scans ASAP.   5/8/2020 CT scans C/A/P show no progression of disease. Hip pain improved, Gabapentin added.. Bone scan requested.  6/3/2020 bone scan that does not demonstrate worsening of bone nor need for localized radiation therapy.  The patient's PSA has been slowly rising and we have not been able to determine worsening of disease and and, therefore, it is not felt warranted add additional medications such as Xtandi or apalutamide to his therapy.  Please note would like to avoid Zytiga try not to add prednisone which would worsen his blood sugar control.  9/3/2020 PSA 15.8, CXR extensive metastatic bone disease- patient reviewed 9/10/2020 reporting increased bony discomfort CT scans requested.  Scans done and reviewed 10/7/2020 ultimately reveal no evidence of progression of disease for visceral involvement or clearly for bone involvement.  After further review with the patient and his son plan continued Xgeva and Lupron.  We have assessed for availability of Xtandi 160 mg p.o. daily and find the cost is currently prohibitive.   11/25/2020 PSA 25.4  12/10/2020 review with some mild increase in bony discomfort.  After repeat discussion we went on to have him undergo Lupron therapy, and his PSA actually to be mildly reduced at 22.2.   Follow-up bone scan 1/11/2021 demonstrates some evidence of  progression in sternum, left sacrum and proximal femur.  These findings were reviewed with the patient and his son January 18, 2020 and the patient was referred for palliative radiotherapy(Dr. Kulkarni)  Palliative radiation under care Dr. Kulkarni to right femur and sternal lesion -3000 cGy in 10 fractions given February 1 to February 12 with significant symptom improvement.  3/15/2021 PSA 38.5  4/12/2021 PSA 44  5/10/2021 PSA 42.6 CT chest abdomen pelvis 5/4/2021 - for progression of disease and follow-up PSA 5 10/2021 is at 42 slightly reduced from previous.  We have discussed how to proceed from here and find a significant family history that clearly supports a potential genetic assessment for his malignancy.  It is felt most reasonable at this point to take the mediastinal nodes from his biopsy 4/9/2018 and reassess for potential targeted therapies, MSI status, as well as germline analysis.  6/7/2021 PSA 65.9 his analysis completed for actionable mutations including germline mutations.  These exams were negative though there is a variant of unknown significance.  Again this is not an actionable mutation.  We proceeded with additional Xgeva and Lupron planning for monthly Xgeva and Lupron at 3 months  7/7/2021 PSA 89 and subsequent testing with Axumin PET was performed confirming extensive osteosclerotic metastatic disease with little uptake, no evidence of visceral metastatic disease in neck chest abdomen or pelvis.  8/25/2021  , PET/CT reviewed, subsequent PSA increased to 105.  Patient fortunately asymptomatic.  Requested reevaluation for Xtandi 160 mg po daily.   9/8/2021 due for his lupron, receiving every 3 months, and monthly Xgeva.  Will have education today for Xtandi and will receive his medication today as well.   Started Xtandi 9/8/2021.  9/22/2021 here for toxicity check, so far tolerating Xtandi quite well other than a slight increase in diarrhea controlled with Imodium.  10/6/2021 continues to  tolerate Xtandi well.  Labs are within range today, we will proceed with Xgeva today.  Patient seen 10/20/2021 having more nausea with Xtandi and demonstrating a substantial reduction in PSA level.  After discussion we plan to reduce his dose to 80 mg daily following his PSA and performance status over the next approximate 7 weeks at which time he would be scheduled for his follow-up Lupron.  Patient assessed 12/8/2021 with further reduction in PSA to 4.7, ongoing hypophosphatemia and hypocalcemia-Xgeva moved to every 3 months given on same schedule as Lupron  Patient seen 3/21/2022, 6-week follow-up with mild increase in PSA recognized  Patient reassessed 5/2/2022 with PSA at 10.1, Xtandi moved to 160 mg p.o. daily  Patient assessed 6/13/2022, Xtandi at 160 mg p.o. daily, PSA pending, Xgeva moved to every 6 months, Lupron every 3 months  Patient seen 9/21/2022 at which time we continued our current approach, reviewed additional options for therapy should he clearly progress.  Patient seen 12/21/2022, increasing bony discomfort, Xgeva and Eligard continued, plans made for Pylarify scanning.   Patient seen 1/11/2023 with his son, discuss potential treatment options such as Pluvicto (Lutetium Yady 177 vipivotide tetraxetan) knowing that Pms-Asa is strongly positive in bone, palliative radiotherapy, radium-223 and alteration to additional androgen receptor such as darolutamide.  Patient referred to radiation therapy for their evaluation and possible treatment options, [facility] as application made for darolutamide and medications altered to include meloxicam 15 mg p.o. daily along with his Norco.  Patient assessed 2/20/2023 improved for radiation therapy, no additional options for treatment and [facility], darolutamide initiated.  Patient assessed 4/17/2023, darolutamide increased to 600 mg twice daily, Xgeva and Lupron every 3 months  5/15/2023: Continues darolutamide 600 mg twice daily.  Continues Xgeva and  Lupron every 3 months, due in 1 month.  Hemoglobin today stable at 9.1.  Increased PSA, now 46 previously 29.  Discussed with Dr. Lee and we will proceed with bone scan in 3 weeks.  Subsequent bone scan with progression of metastatic disease to number of sites, issues discussed with patient and his son 6/12/2023 with plans to proceed to, primarily, palliative care approach.  Patient treated with Xgeva and Lupron in 3-4-week follow-up consider hospice or pallitus care.  Patient seen 7/3/2023-further anemia with hemoglobin 7.2 g%.  Plan 1 unit packed RBC transfusion, initiation of Pallitus.  7/24/2023 hemoglobin 7.2.  Patient declines blood transfusion.  He is reporting pain currently not well controlled which will be discussed below.  Meeting with nilton on Friday of this week.  Patient assessed 9/8/2023 with hemoglobin 5.2 g%, further transfusion planned with additional steroid, Benadryl premedication      2.  Neuropathy/ restless legs:    5/13/2020 gabapentin 600 mg at bedtime, trazadone 50 mg QHS.  On gabapentin 300, 2 tablets at bedtime, and requip 0.5.mg    Still having neuropathy symptoms.  Will increase gabapentin to 900 mg at bedtime.   9/22/2021 increase in gabapentin to 900 mg at bedtime has helped neuropathy.  Now taking Requip 2 mg nightly.    3.  Cancer related pain: on Norco 5/325 every 6 hours as needed.  Mobic 15 mg p.o. every morning initiated 1/11/2023, consider MS Contin every 12 hours  Status post palliative radiotherapy with improved pain control by 2/20/2023  Effective pain relief except increasing constipation noted 4/17/2023, narcotic adjusted downward as possible.  Continues Norco 5/325 every 6 hours as needed.  Feels his pain is well controlled on this regimen.  Semaj Javier reports a pain score of 8.  Given his pain assessment as noted, treatment options were discussed and the following options were decided upon as a follow-up plan to address the patient's pain-plan to add Duragesic 25  mcg every 72 hours daily E scribed to pharmacy  7/24/2023 patient complaining of pain not controlled with current medication.  We will increase fentanyl patch to 50 mcg every 72 hours.  Patient to continue Norco 10/3/2025 every 6 hours if needed for breakthrough pain.    PLAN:     Continue fentanyl patch to 50 mcg every 72 hours  Continue Norco 10/325 every 6 hours for breakthrough pain.  Continue gabapentin 100 mg at bedtime, and Requip 2 mg nightly.  Schedule transfusion 2 units packed RBC again with premed dexamethasone and Benadryl.  Reassessment 4 weeks NP, Chirag Olivo  Call/ return sooner should the patient develop any new concerns or problems.    Patient is on a high risk medication requiring close monitoring for toxicity.        Jose Lee MD  09/08/2023

## 2023-09-08 ENCOUNTER — INFUSION (OUTPATIENT)
Dept: ONCOLOGY | Facility: HOSPITAL | Age: 83
End: 2023-09-08
Payer: MEDICARE

## 2023-09-08 ENCOUNTER — LAB (OUTPATIENT)
Dept: LAB | Facility: HOSPITAL | Age: 83
End: 2023-09-08
Payer: MEDICARE

## 2023-09-08 ENCOUNTER — OFFICE VISIT (OUTPATIENT)
Dept: ONCOLOGY | Facility: CLINIC | Age: 83
End: 2023-09-08
Payer: MEDICARE

## 2023-09-08 VITALS
BODY MASS INDEX: 29.76 KG/M2 | OXYGEN SATURATION: 99 % | RESPIRATION RATE: 16 BRPM | SYSTOLIC BLOOD PRESSURE: 160 MMHG | HEIGHT: 64 IN | DIASTOLIC BLOOD PRESSURE: 70 MMHG | HEART RATE: 80 BPM | WEIGHT: 174.3 LBS | TEMPERATURE: 97.7 F

## 2023-09-08 DIAGNOSIS — C79.51 MALIGNANT NEOPLASM METASTATIC TO BONE: ICD-10-CM

## 2023-09-08 DIAGNOSIS — D64.9 ANEMIA, UNSPECIFIED TYPE: Primary | ICD-10-CM

## 2023-09-08 DIAGNOSIS — C61 PROSTATE CANCER METASTATIC TO BONE: ICD-10-CM

## 2023-09-08 DIAGNOSIS — C79.51 PROSTATE CANCER METASTATIC TO BONE: ICD-10-CM

## 2023-09-08 DIAGNOSIS — C61 PROSTATE CANCER METASTATIC TO BONE: Primary | ICD-10-CM

## 2023-09-08 DIAGNOSIS — C79.51 PROSTATE CANCER METASTATIC TO BONE: Primary | ICD-10-CM

## 2023-09-08 LAB
ABO GROUP BLD: NORMAL
ALBUMIN SERPL-MCNC: 3.6 G/DL (ref 3.5–5.2)
ALBUMIN/GLOB SERPL: 1.2 G/DL
ALP SERPL-CCNC: 378 U/L (ref 39–117)
ALT SERPL W P-5'-P-CCNC: 7 U/L (ref 1–41)
ANION GAP SERPL CALCULATED.3IONS-SCNC: 14.2 MMOL/L (ref 5–15)
AST SERPL-CCNC: 16 U/L (ref 1–40)
BASOPHILS # BLD MANUAL: 0.06 10*3/MM3 (ref 0–0.2)
BASOPHILS NFR BLD MANUAL: 1 % (ref 0–1.5)
BILIRUB SERPL-MCNC: 0.2 MG/DL (ref 0–1.2)
BLD GP AB SCN SERPL QL: NEGATIVE
BUN SERPL-MCNC: 14 MG/DL (ref 8–23)
BUN/CREAT SERPL: 17.9 (ref 7–25)
BURR CELLS BLD QL SMEAR: ABNORMAL
CALCIUM SPEC-SCNC: 8.8 MG/DL (ref 8.6–10.5)
CHLORIDE SERPL-SCNC: 105 MMOL/L (ref 98–107)
CO2 SERPL-SCNC: 21.8 MMOL/L (ref 22–29)
CREAT SERPL-MCNC: 0.78 MG/DL (ref 0.7–1.3)
DACRYOCYTES BLD QL SMEAR: ABNORMAL
DEPRECATED RDW RBC AUTO: 66.2 FL (ref 37–54)
EGFRCR SERPLBLD CKD-EPI 2021: 89 ML/MIN/1.73
EOSINOPHIL # BLD MANUAL: 0.06 10*3/MM3 (ref 0–0.4)
EOSINOPHIL NFR BLD MANUAL: 1 % (ref 0.3–6.2)
ERYTHROCYTE [DISTWIDTH] IN BLOOD BY AUTOMATED COUNT: 19.9 % (ref 12.3–15.4)
GLOBULIN UR ELPH-MCNC: 3.1 GM/DL
GLUCOSE SERPL-MCNC: 118 MG/DL (ref 65–99)
HCT VFR BLD AUTO: 17.7 % (ref 37.5–51)
HGB BLD-MCNC: 5.2 G/DL (ref 13–17.7)
HYPOCHROMIA BLD QL: ABNORMAL
LYMPHOCYTES # BLD MANUAL: 0.95 10*3/MM3 (ref 0.7–3.1)
LYMPHOCYTES NFR BLD MANUAL: 1 % (ref 5–12)
MAGNESIUM SERPL-MCNC: 2.3 MG/DL (ref 1.6–2.4)
MCH RBC QN AUTO: 27.4 PG (ref 26.6–33)
MCHC RBC AUTO-ENTMCNC: 29.4 G/DL (ref 31.5–35.7)
MCV RBC AUTO: 93.2 FL (ref 79–97)
METAMYELOCYTES NFR BLD MANUAL: 5 % (ref 0–0)
MONOCYTES # BLD: 0.06 10*3/MM3 (ref 0.1–0.9)
MYELOCYTES NFR BLD MANUAL: 2 % (ref 0–0)
NEUTROPHILS # BLD AUTO: 4.39 10*3/MM3 (ref 1.7–7)
NEUTROPHILS NFR BLD MANUAL: 73 % (ref 42.7–76)
NEUTS BAND NFR BLD MANUAL: 1 % (ref 0–5)
PHOSPHATE SERPL-MCNC: 2.7 MG/DL (ref 2.5–4.5)
PLAT MORPH BLD: NORMAL
PLATELET # BLD AUTO: 143 10*3/MM3 (ref 140–450)
PMV BLD AUTO: 9.7 FL (ref 6–12)
POTASSIUM SERPL-SCNC: 4.3 MMOL/L (ref 3.5–5.2)
PROT SERPL-MCNC: 6.7 G/DL (ref 6–8.5)
PSA SERPL-MCNC: 115 NG/ML (ref 0–4)
RBC # BLD AUTO: 1.9 10*6/MM3 (ref 4.14–5.8)
RH BLD: NEGATIVE
SODIUM SERPL-SCNC: 141 MMOL/L (ref 136–145)
T&S EXPIRATION DATE: NORMAL
VARIANT LYMPHS NFR BLD MANUAL: 1 % (ref 0–5)
VARIANT LYMPHS NFR BLD MANUAL: 15 % (ref 19.6–45.3)
WBC MORPH BLD: NORMAL
WBC NRBC COR # BLD: 5.93 10*3/MM3 (ref 3.4–10.8)

## 2023-09-08 PROCEDURE — 85007 BL SMEAR W/DIFF WBC COUNT: CPT

## 2023-09-08 PROCEDURE — 86900 BLOOD TYPING SEROLOGIC ABO: CPT | Performed by: INTERNAL MEDICINE

## 2023-09-08 PROCEDURE — 80053 COMPREHEN METABOLIC PANEL: CPT

## 2023-09-08 PROCEDURE — 96372 THER/PROPH/DIAG INJ SC/IM: CPT

## 2023-09-08 PROCEDURE — 3078F DIAST BP <80 MM HG: CPT | Performed by: INTERNAL MEDICINE

## 2023-09-08 PROCEDURE — 25010000002 DENOSUMAB 120 MG/1.7ML SOLUTION: Performed by: INTERNAL MEDICINE

## 2023-09-08 PROCEDURE — 84153 ASSAY OF PSA TOTAL: CPT | Performed by: NURSE PRACTITIONER

## 2023-09-08 PROCEDURE — 85025 COMPLETE CBC W/AUTO DIFF WBC: CPT

## 2023-09-08 PROCEDURE — 96402 CHEMO HORMON ANTINEOPL SQ/IM: CPT

## 2023-09-08 PROCEDURE — 25010000002 LEUPROLIDE ACETATE (3 MONTH) PER 7.5 MG: Performed by: INTERNAL MEDICINE

## 2023-09-08 PROCEDURE — 1125F AMNT PAIN NOTED PAIN PRSNT: CPT | Performed by: INTERNAL MEDICINE

## 2023-09-08 PROCEDURE — 86901 BLOOD TYPING SEROLOGIC RH(D): CPT | Performed by: INTERNAL MEDICINE

## 2023-09-08 PROCEDURE — 84100 ASSAY OF PHOSPHORUS: CPT

## 2023-09-08 PROCEDURE — 99214 OFFICE O/P EST MOD 30 MIN: CPT | Performed by: INTERNAL MEDICINE

## 2023-09-08 PROCEDURE — 3077F SYST BP >= 140 MM HG: CPT | Performed by: INTERNAL MEDICINE

## 2023-09-08 PROCEDURE — 83735 ASSAY OF MAGNESIUM: CPT

## 2023-09-08 PROCEDURE — 86850 RBC ANTIBODY SCREEN: CPT | Performed by: INTERNAL MEDICINE

## 2023-09-08 PROCEDURE — 86923 COMPATIBILITY TEST ELECTRIC: CPT

## 2023-09-08 PROCEDURE — 36415 COLL VENOUS BLD VENIPUNCTURE: CPT

## 2023-09-08 RX ORDER — DIPHENHYDRAMINE HCL 25 MG
25 CAPSULE ORAL ONCE
OUTPATIENT
Start: 2023-09-10 | End: 2023-09-08

## 2023-09-08 RX ORDER — METHYLPREDNISOLONE SODIUM SUCCINATE 40 MG/ML
40 INJECTION, POWDER, LYOPHILIZED, FOR SOLUTION INTRAMUSCULAR; INTRAVENOUS ONCE
OUTPATIENT
Start: 2023-09-10

## 2023-09-08 RX ORDER — DIPHENHYDRAMINE HCL 12.5MG/5ML
12.5 LIQUID (ML) ORAL ONCE
OUTPATIENT
Start: 2023-09-10 | End: 2023-09-08

## 2023-09-08 RX ORDER — SODIUM CHLORIDE 9 MG/ML
250 INJECTION, SOLUTION INTRAVENOUS AS NEEDED
OUTPATIENT
Start: 2023-09-10

## 2023-09-08 RX ORDER — ACETAMINOPHEN 325 MG/1
650 TABLET ORAL ONCE
OUTPATIENT
Start: 2023-09-10 | End: 2023-09-08

## 2023-09-08 RX ADMIN — DENOSUMAB 120 MG: 120 INJECTION SUBCUTANEOUS at 13:03

## 2023-09-08 RX ADMIN — LEUPROLIDE ACETATE 22.5 MG: KIT at 13:03

## 2023-09-10 ENCOUNTER — INFUSION (OUTPATIENT)
Dept: ONCOLOGY | Facility: HOSPITAL | Age: 83
End: 2023-09-10
Payer: MEDICARE

## 2023-09-10 VITALS
SYSTOLIC BLOOD PRESSURE: 129 MMHG | DIASTOLIC BLOOD PRESSURE: 56 MMHG | RESPIRATION RATE: 16 BRPM | OXYGEN SATURATION: 100 % | HEART RATE: 58 BPM | TEMPERATURE: 97.5 F

## 2023-09-10 DIAGNOSIS — D64.9 ANEMIA, UNSPECIFIED TYPE: ICD-10-CM

## 2023-09-10 PROCEDURE — P9016 RBC LEUKOCYTES REDUCED: HCPCS

## 2023-09-10 PROCEDURE — 36430 TRANSFUSION BLD/BLD COMPNT: CPT

## 2023-09-10 PROCEDURE — 86900 BLOOD TYPING SEROLOGIC ABO: CPT

## 2023-09-10 PROCEDURE — 63710000001 DIPHENHYDRAMINE PER 50 MG: Performed by: INTERNAL MEDICINE

## 2023-09-10 RX ORDER — ACETAMINOPHEN 650 MG/1
650 SUPPOSITORY RECTAL ONCE
Status: COMPLETED | OUTPATIENT
Start: 2023-09-10 | End: 2023-09-10

## 2023-09-10 RX ORDER — ACETAMINOPHEN 325 MG/1
650 TABLET ORAL ONCE
Status: COMPLETED | OUTPATIENT
Start: 2023-09-10 | End: 2023-09-10

## 2023-09-10 RX ORDER — ACETAMINOPHEN 160 MG/5ML
650 SOLUTION ORAL ONCE
Status: COMPLETED | OUTPATIENT
Start: 2023-09-10 | End: 2023-09-10

## 2023-09-10 RX ORDER — DIPHENHYDRAMINE HYDROCHLORIDE 50 MG/ML
25 INJECTION INTRAMUSCULAR; INTRAVENOUS ONCE
Status: COMPLETED | OUTPATIENT
Start: 2023-09-10 | End: 2023-09-10

## 2023-09-10 RX ORDER — DIPHENHYDRAMINE HCL 25 MG
25 CAPSULE ORAL ONCE
Status: COMPLETED | OUTPATIENT
Start: 2023-09-10 | End: 2023-09-10

## 2023-09-10 RX ADMIN — ACETAMINOPHEN 650 MG: 325 TABLET ORAL at 07:46

## 2023-09-10 RX ADMIN — DIPHENHYDRAMINE HYDROCHLORIDE 25 MG: 25 CAPSULE ORAL at 07:46

## 2023-09-11 LAB
BH BB BLOOD EXPIRATION DATE: NORMAL
BH BB BLOOD EXPIRATION DATE: NORMAL
BH BB BLOOD TYPE BARCODE: 9500
BH BB BLOOD TYPE BARCODE: 9500
BH BB DISPENSE STATUS: NORMAL
BH BB DISPENSE STATUS: NORMAL
BH BB PRODUCT CODE: NORMAL
BH BB PRODUCT CODE: NORMAL
BH BB UNIT NUMBER: NORMAL
BH BB UNIT NUMBER: NORMAL
CROSSMATCH INTERPRETATION: NORMAL
CROSSMATCH INTERPRETATION: NORMAL
UNIT  ABO: NORMAL
UNIT  ABO: NORMAL
UNIT  RH: NORMAL
UNIT  RH: NORMAL

## 2023-09-15 DIAGNOSIS — C61 PROSTATE CANCER METASTATIC TO BONE: ICD-10-CM

## 2023-09-15 DIAGNOSIS — G62.9 PERIPHERAL POLYNEUROPATHY: ICD-10-CM

## 2023-09-15 DIAGNOSIS — C79.51 PROSTATE CANCER METASTATIC TO BONE: ICD-10-CM

## 2023-09-15 RX ORDER — GABAPENTIN 300 MG/1
CAPSULE ORAL
Qty: 90 CAPSULE | Refills: 0 | Status: ON HOLD | OUTPATIENT
Start: 2023-09-15

## 2023-09-16 DIAGNOSIS — G47.33 OBSTRUCTIVE SLEEP APNEA SYNDROME: ICD-10-CM

## 2023-09-16 DIAGNOSIS — G47.14 HYPERSOMNIA DUE TO MEDICAL CONDITION: ICD-10-CM

## 2023-09-16 RX ORDER — MODAFINIL 200 MG/1
200 TABLET ORAL DAILY
Qty: 90 TABLET | Refills: 1 | Status: ON HOLD | OUTPATIENT
Start: 2023-09-16

## 2023-09-17 ENCOUNTER — HOSPITAL ENCOUNTER (INPATIENT)
Facility: HOSPITAL | Age: 83
LOS: 7 days | Discharge: HOME HEALTH | DRG: 543 | End: 2023-09-25
Attending: EMERGENCY MEDICINE | Admitting: INTERNAL MEDICINE
Payer: MEDICARE

## 2023-09-17 ENCOUNTER — APPOINTMENT (OUTPATIENT)
Dept: MRI IMAGING | Facility: HOSPITAL | Age: 83
DRG: 543 | End: 2023-09-17
Payer: MEDICARE

## 2023-09-17 ENCOUNTER — APPOINTMENT (OUTPATIENT)
Dept: CT IMAGING | Facility: HOSPITAL | Age: 83
DRG: 543 | End: 2023-09-17
Payer: MEDICARE

## 2023-09-17 DIAGNOSIS — K56.41 FECAL IMPACTION: Primary | ICD-10-CM

## 2023-09-17 DIAGNOSIS — R29.898 WEAKNESS OF BOTH LOWER EXTREMITIES: ICD-10-CM

## 2023-09-17 DIAGNOSIS — C79.51 METASTATIC CANCER TO BONE: ICD-10-CM

## 2023-09-17 DIAGNOSIS — C79.51 PROSTATE CANCER METASTATIC TO BONE: ICD-10-CM

## 2023-09-17 DIAGNOSIS — C61 PROSTATE CANCER METASTATIC TO BONE: ICD-10-CM

## 2023-09-17 LAB
ABO GROUP BLD: NORMAL
ALBUMIN SERPL-MCNC: 3.9 G/DL (ref 3.5–5.2)
ALBUMIN/GLOB SERPL: 1.1 G/DL
ALP SERPL-CCNC: 385 U/L (ref 39–117)
ALT SERPL W P-5'-P-CCNC: 10 U/L (ref 1–41)
ANION GAP SERPL CALCULATED.3IONS-SCNC: 15.1 MMOL/L (ref 5–15)
ANISOCYTOSIS BLD QL: ABNORMAL
AST SERPL-CCNC: 16 U/L (ref 1–40)
BASOPHILS # BLD MANUAL: 0.13 10*3/MM3 (ref 0–0.2)
BASOPHILS NFR BLD MANUAL: 2 % (ref 0–1.5)
BILIRUB SERPL-MCNC: 0.4 MG/DL (ref 0–1.2)
BILIRUB UR QL STRIP: NEGATIVE
BLD GP AB SCN SERPL QL: NEGATIVE
BUN SERPL-MCNC: 10 MG/DL (ref 8–23)
BUN/CREAT SERPL: 14.3 (ref 7–25)
CALCIUM SPEC-SCNC: 8.1 MG/DL (ref 8.6–10.5)
CHLORIDE SERPL-SCNC: 102 MMOL/L (ref 98–107)
CLARITY UR: CLEAR
CO2 SERPL-SCNC: 19.9 MMOL/L (ref 22–29)
COLOR UR: YELLOW
CREAT SERPL-MCNC: 0.7 MG/DL (ref 0.76–1.27)
DEPRECATED RDW RBC AUTO: 53.7 FL (ref 37–54)
EGFRCR SERPLBLD CKD-EPI 2021: 92 ML/MIN/1.73
EOSINOPHIL # BLD MANUAL: 0.06 10*3/MM3 (ref 0–0.4)
EOSINOPHIL NFR BLD MANUAL: 1 % (ref 0.3–6.2)
ERYTHROCYTE [DISTWIDTH] IN BLOOD BY AUTOMATED COUNT: 17.7 % (ref 12.3–15.4)
GLOBULIN UR ELPH-MCNC: 3.4 GM/DL
GLUCOSE BLDC GLUCOMTR-MCNC: 175 MG/DL (ref 70–130)
GLUCOSE SERPL-MCNC: 147 MG/DL (ref 65–99)
GLUCOSE UR STRIP-MCNC: NEGATIVE MG/DL
HCT VFR BLD AUTO: 25.2 % (ref 37.5–51)
HGB BLD-MCNC: 8.1 G/DL (ref 13–17.7)
HGB UR QL STRIP.AUTO: NEGATIVE
KETONES UR QL STRIP: NEGATIVE
LEUKOCYTE ESTERASE UR QL STRIP.AUTO: NEGATIVE
LYMPHOCYTES # BLD MANUAL: 0.51 10*3/MM3 (ref 0.7–3.1)
LYMPHOCYTES NFR BLD MANUAL: 7 % (ref 5–12)
MAGNESIUM SERPL-MCNC: 2.8 MG/DL (ref 1.6–2.4)
MCH RBC QN AUTO: 27.5 PG (ref 26.6–33)
MCHC RBC AUTO-ENTMCNC: 32.1 G/DL (ref 31.5–35.7)
MCV RBC AUTO: 85.4 FL (ref 79–97)
METAMYELOCYTES NFR BLD MANUAL: 4 % (ref 0–0)
MONOCYTES # BLD: 0.45 10*3/MM3 (ref 0.1–0.9)
MYELOCYTES NFR BLD MANUAL: 5 % (ref 0–0)
NEUTROPHILS # BLD AUTO: 4.69 10*3/MM3 (ref 1.7–7)
NEUTROPHILS NFR BLD MANUAL: 73 % (ref 42.7–76)
NITRITE UR QL STRIP: NEGATIVE
NRBC SPEC MANUAL: 2 /100 WBC (ref 0–0.2)
OVALOCYTES BLD QL SMEAR: ABNORMAL
PH UR STRIP.AUTO: 8 [PH] (ref 5–8)
PLAT MORPH BLD: NORMAL
PLATELET # BLD AUTO: 106 10*3/MM3 (ref 140–450)
PMV BLD AUTO: 10.4 FL (ref 6–12)
POIKILOCYTOSIS BLD QL SMEAR: ABNORMAL
POLYCHROMASIA BLD QL SMEAR: ABNORMAL
POTASSIUM SERPL-SCNC: 4.6 MMOL/L (ref 3.5–5.2)
PROT SERPL-MCNC: 7.3 G/DL (ref 6–8.5)
PROT UR QL STRIP: NEGATIVE
RBC # BLD AUTO: 2.95 10*6/MM3 (ref 4.14–5.8)
RH BLD: NEGATIVE
SODIUM SERPL-SCNC: 137 MMOL/L (ref 136–145)
SP GR UR STRIP: 1.01 (ref 1–1.03)
T&S EXPIRATION DATE: NORMAL
UROBILINOGEN UR QL STRIP: NORMAL
VARIANT LYMPHS NFR BLD MANUAL: 8 % (ref 19.6–45.3)
WBC MORPH BLD: NORMAL
WBC NRBC COR # BLD: 6.42 10*3/MM3 (ref 3.4–10.8)

## 2023-09-17 PROCEDURE — 86850 RBC ANTIBODY SCREEN: CPT | Performed by: EMERGENCY MEDICINE

## 2023-09-17 PROCEDURE — 80053 COMPREHEN METABOLIC PANEL: CPT | Performed by: PHYSICIAN ASSISTANT

## 2023-09-17 PROCEDURE — 81003 URINALYSIS AUTO W/O SCOPE: CPT | Performed by: PHYSICIAN ASSISTANT

## 2023-09-17 PROCEDURE — 72158 MRI LUMBAR SPINE W/O & W/DYE: CPT

## 2023-09-17 PROCEDURE — 99285 EMERGENCY DEPT VISIT HI MDM: CPT

## 2023-09-17 PROCEDURE — 83735 ASSAY OF MAGNESIUM: CPT | Performed by: PHYSICIAN ASSISTANT

## 2023-09-17 PROCEDURE — 25010000002 HYDROMORPHONE PER 4 MG: Performed by: EMERGENCY MEDICINE

## 2023-09-17 PROCEDURE — 85025 COMPLETE CBC W/AUTO DIFF WBC: CPT | Performed by: PHYSICIAN ASSISTANT

## 2023-09-17 PROCEDURE — 25010000002 DEXAMETHASONE PER 1 MG: Performed by: INTERNAL MEDICINE

## 2023-09-17 PROCEDURE — G0378 HOSPITAL OBSERVATION PER HR: HCPCS

## 2023-09-17 PROCEDURE — 25010000002 ONDANSETRON PER 1 MG: Performed by: PHYSICIAN ASSISTANT

## 2023-09-17 PROCEDURE — 63710000001 INSULIN LISPRO (HUMAN) PER 5 UNITS: Performed by: INTERNAL MEDICINE

## 2023-09-17 PROCEDURE — A9577 INJ MULTIHANCE: HCPCS | Performed by: EMERGENCY MEDICINE

## 2023-09-17 PROCEDURE — 85007 BL SMEAR W/DIFF WBC COUNT: CPT | Performed by: PHYSICIAN ASSISTANT

## 2023-09-17 PROCEDURE — 82948 REAGENT STRIP/BLOOD GLUCOSE: CPT

## 2023-09-17 PROCEDURE — 86900 BLOOD TYPING SEROLOGIC ABO: CPT | Performed by: EMERGENCY MEDICINE

## 2023-09-17 PROCEDURE — 74176 CT ABD & PELVIS W/O CONTRAST: CPT

## 2023-09-17 PROCEDURE — 0 GADOBENATE DIMEGLUMINE 529 MG/ML SOLUTION: Performed by: EMERGENCY MEDICINE

## 2023-09-17 PROCEDURE — 86901 BLOOD TYPING SEROLOGIC RH(D): CPT | Performed by: EMERGENCY MEDICINE

## 2023-09-17 PROCEDURE — 86923 COMPATIBILITY TEST ELECTRIC: CPT

## 2023-09-17 RX ORDER — HYDROMORPHONE HYDROCHLORIDE 1 MG/ML
0.5 INJECTION, SOLUTION INTRAMUSCULAR; INTRAVENOUS; SUBCUTANEOUS ONCE
Status: COMPLETED | OUTPATIENT
Start: 2023-09-17 | End: 2023-09-17

## 2023-09-17 RX ORDER — ONDANSETRON 2 MG/ML
4 INJECTION INTRAMUSCULAR; INTRAVENOUS EVERY 6 HOURS PRN
Status: DISCONTINUED | OUTPATIENT
Start: 2023-09-17 | End: 2023-09-25 | Stop reason: HOSPADM

## 2023-09-17 RX ORDER — IBUPROFEN 600 MG/1
1 TABLET ORAL
Status: DISCONTINUED | OUTPATIENT
Start: 2023-09-17 | End: 2023-09-25 | Stop reason: HOSPADM

## 2023-09-17 RX ORDER — NICOTINE POLACRILEX 4 MG
15 LOZENGE BUCCAL
Status: DISCONTINUED | OUTPATIENT
Start: 2023-09-17 | End: 2023-09-25 | Stop reason: HOSPADM

## 2023-09-17 RX ORDER — BISACODYL 10 MG
10 SUPPOSITORY, RECTAL RECTAL DAILY PRN
Status: DISCONTINUED | OUTPATIENT
Start: 2023-09-17 | End: 2023-09-18

## 2023-09-17 RX ORDER — AMOXICILLIN 250 MG
2 CAPSULE ORAL 2 TIMES DAILY
Status: DISCONTINUED | OUTPATIENT
Start: 2023-09-17 | End: 2023-09-18

## 2023-09-17 RX ORDER — ONDANSETRON 4 MG/1
4 TABLET, FILM COATED ORAL EVERY 6 HOURS PRN
Status: DISCONTINUED | OUTPATIENT
Start: 2023-09-17 | End: 2023-09-25 | Stop reason: HOSPADM

## 2023-09-17 RX ORDER — PANTOPRAZOLE SODIUM 40 MG/1
40 TABLET, DELAYED RELEASE ORAL
Status: DISCONTINUED | OUTPATIENT
Start: 2023-09-18 | End: 2023-09-25 | Stop reason: HOSPADM

## 2023-09-17 RX ORDER — ACETAMINOPHEN 325 MG/1
650 TABLET ORAL EVERY 4 HOURS PRN
Status: DISCONTINUED | OUTPATIENT
Start: 2023-09-17 | End: 2023-09-25 | Stop reason: HOSPADM

## 2023-09-17 RX ORDER — DEXTROSE MONOHYDRATE 25 G/50ML
25 INJECTION, SOLUTION INTRAVENOUS
Status: DISCONTINUED | OUTPATIENT
Start: 2023-09-17 | End: 2023-09-25 | Stop reason: HOSPADM

## 2023-09-17 RX ORDER — INSULIN LISPRO 100 [IU]/ML
2-9 INJECTION, SOLUTION INTRAVENOUS; SUBCUTANEOUS
Status: DISCONTINUED | OUTPATIENT
Start: 2023-09-17 | End: 2023-09-25 | Stop reason: HOSPADM

## 2023-09-17 RX ORDER — ONDANSETRON 2 MG/ML
4 INJECTION INTRAMUSCULAR; INTRAVENOUS ONCE
Status: COMPLETED | OUTPATIENT
Start: 2023-09-17 | End: 2023-09-17

## 2023-09-17 RX ORDER — CALCIUM CARBONATE 500 MG/1
2 TABLET, CHEWABLE ORAL 3 TIMES DAILY PRN
Status: DISCONTINUED | OUTPATIENT
Start: 2023-09-17 | End: 2023-09-25 | Stop reason: HOSPADM

## 2023-09-17 RX ORDER — UREA 10 %
3 LOTION (ML) TOPICAL NIGHTLY PRN
Status: DISCONTINUED | OUTPATIENT
Start: 2023-09-17 | End: 2023-09-25 | Stop reason: HOSPADM

## 2023-09-17 RX ORDER — BISACODYL 5 MG/1
5 TABLET, DELAYED RELEASE ORAL DAILY PRN
Status: DISCONTINUED | OUTPATIENT
Start: 2023-09-17 | End: 2023-09-18

## 2023-09-17 RX ORDER — DEXAMETHASONE SODIUM PHOSPHATE 4 MG/ML
4 INJECTION, SOLUTION INTRA-ARTICULAR; INTRALESIONAL; INTRAMUSCULAR; INTRAVENOUS; SOFT TISSUE EVERY 6 HOURS
Status: DISCONTINUED | OUTPATIENT
Start: 2023-09-17 | End: 2023-09-21

## 2023-09-17 RX ORDER — POLYETHYLENE GLYCOL 3350 17 G/17G
17 POWDER, FOR SOLUTION ORAL DAILY PRN
Status: DISCONTINUED | OUTPATIENT
Start: 2023-09-17 | End: 2023-09-18

## 2023-09-17 RX ORDER — HYDROMORPHONE HYDROCHLORIDE 1 MG/ML
0.5 INJECTION, SOLUTION INTRAMUSCULAR; INTRAVENOUS; SUBCUTANEOUS
Status: DISCONTINUED | OUTPATIENT
Start: 2023-09-17 | End: 2023-09-25 | Stop reason: HOSPADM

## 2023-09-17 RX ADMIN — GADOBENATE DIMEGLUMINE 17 ML: 529 INJECTION, SOLUTION INTRAVENOUS at 16:44

## 2023-09-17 RX ADMIN — SENNOSIDES AND DOCUSATE SODIUM 2 TABLET: 50; 8.6 TABLET ORAL at 22:14

## 2023-09-17 RX ADMIN — INSULIN LISPRO 2 UNITS: 100 INJECTION, SOLUTION INTRAVENOUS; SUBCUTANEOUS at 22:14

## 2023-09-17 RX ADMIN — DEXAMETHASONE SODIUM PHOSPHATE 4 MG: 4 INJECTION, SOLUTION INTRAMUSCULAR; INTRAVENOUS at 23:48

## 2023-09-17 RX ADMIN — HYDROMORPHONE HYDROCHLORIDE 0.5 MG: 1 INJECTION, SOLUTION INTRAMUSCULAR; INTRAVENOUS; SUBCUTANEOUS at 16:01

## 2023-09-17 RX ADMIN — ONDANSETRON 4 MG: 2 INJECTION INTRAMUSCULAR; INTRAVENOUS at 14:36

## 2023-09-17 NOTE — ED PROVIDER NOTES
EMERGENCY DEPARTMENT ENCOUNTER    Room Number:  E458/1  PCP: Axel Guardado MD  Discussed/ obtained information from independent historians: patient      HPI:  Chief Complaint: constipation, leg weakness  A complete HPI/ROS/PMH/PSH/SH/FH are unobtainable due to: none  Context: Semaj Herrera is a 82 y.o. 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  He states he is unable to stand and dress himself today.  He has had some nausea and dry heaves without vomiting, states he had 1 episode of feeling that he needed to urinate earlier in the week and not being able to go but has been able to void freely since.  He denies any falls or trauma.  He has an insulin pump.  He denies fever chills upper respiratory symptoms chest pain and shortness of breath as well as any abdominal pain.      External (non-ED) record review: Office visit with oncology 9/8/2023 for metastatic prostate cancer on Xgeva and Lupron therapy.  Declined hospice care but was noted to be becoming quite weak and fatigued, considerably anemic.  Palliative care recommended including blood transfusion.  Fentanyl patch and Norco continued for pain as well as gabapentin and Requip, was scheduled for 2 units PRBCs with premedication with dexamethasone and Benadryl was to be reassessed in 4 weeks.      PAST MEDICAL HISTORY  Active Ambulatory Problems     Diagnosis Date Noted    Type 2 diabetes mellitus with kidney complication, with long-term current use of insulin 03/22/2016    Fatigue 03/22/2016    Hyperlipidemia 03/22/2016    Hypertension 03/22/2016    Left ventricular hypertrophy 03/22/2016    Obstructive sleep apnea syndrome treated auto BiPAP 03/22/2016    Sinus bradycardia 03/22/2016    Prostate cancer metastatic to bone  03/28/2018    Mediastinal adenopathy 03/28/2018    Malignant neoplasm metastatic to bone 06/07/2018    Chronic kidney disease, stage III (moderate) 02/03/2016    Diastolic dysfunction 06/15/2018    Localized edema 06/15/2018    Neuropathy     Hypersomnia due to medical condition treated with modafinil 200 mg every morning 08/04/2018    CSA (central sleep apnea) 04/14/2019    Restless legs syndrome (RLS) 10/11/2020    Psychophysiological insomnia 10/11/2020    Edema 02/14/2022    Type 2 diabetes mellitus with diabetic chronic kidney disease 02/03/2016    Class 2 severe obesity due to excess calories with serious comorbidity and body mass index (BMI) of 35.0 to 35.9 in adult 02/14/2022    Aortic stenosis, mild 2021    Ascending aorta dilatation      Resolved Ambulatory Problems     Diagnosis Date Noted    Cellulitis of great toe of left foot 10/19/2018    Pneumonia of left upper lobe due to infectious organism 03/15/2019    Hyponatremia 03/15/2019    Sepsis 03/15/2019    Stage 3a chronic kidney disease 02/03/2016     Past Medical History:   Diagnosis Date    Asthma     Benign prostatic hyperplasia     Bone cancer     CKD (chronic kidney disease)     Difficulty breathing     Dyslipidemia     Erectile dysfunction     Heart murmur     History of blood transfusion     History of kidney stones     HL (hearing loss)     Kidney stone     Obstructive sleep apnea     Osteoarthritis     Peptic ulcer     Pneumonia     Prostate cancer     Pure hyperglyceridemia     Restless leg syndrome     Transient cerebral ischemia     Type 2 diabetes mellitus          PAST SURGICAL HISTORY  Past Surgical History:   Procedure Laterality Date    BRONCHOSCOPY N/A 04/09/2018    Procedure: BRONCHOSCOPY;  Surgeon: Biajn Rivera III, MD;  Location: [facility];  Service: Cardiothoracic    COLONOSCOPY      DEEP NECK LYMPH NODE BIOPSY / EXCISION      HEMORRHOIDECTOMY      LYMPH NODE BIOPSY      MEDIASTINOSCOPY N/A 04/09/2018    Procedure:  MEDIASTINOSCOPY WITH LYMPH NODE BIOPSY;  Surgeon: Bijan Rivera III, MD;  Location: [facility];  Service: Cardiothoracic    OTHER SURGICAL HISTORY      ulcer repair    PROSTATECTOMY  2006         FAMILY HISTORY  Family History   Problem Relation Age of Onset    Heart failure Mother     Hypertension Mother             Diabetes Mother     Heart disease Mother             Lung cancer Father 46    Hypertension Sister     Lung cancer Sister 60    Hypertension Brother             Lung cancer Brother 62    Diabetes Brother     Heart disease Other     Prostate cancer Brother 60    Cancer Brother             Cancer Sister             Cancer Sister     Cancer Sister     Heart disease Brother             Malig Hyperthermia Neg Hx          SOCIAL HISTORY  Social History     Socioeconomic History    Marital status:     Years of education: College   Tobacco Use    Smoking status: Former     Packs/day: 1.50     Years: 30.00     Pack years: 45.00     Types: Cigarettes     Start date: 1968     Quit date: 1998     Years since quittin.6     Passive exposure: Past    Smokeless tobacco: Never   Vaping Use    Vaping Use: Never used   Substance and Sexual Activity    Alcohol use: Not Currently     Comment: Caffeine use: 2-3 cups daily    Drug use: Never    Sexual activity: Not Currently         ALLERGIES  Niacin and Statins        REVIEW OF SYSTEMS  Review of Systems         PHYSICAL EXAM  ED Triage Vitals [23 1141]   Temp Heart Rate Resp BP SpO2   98.3 °F (36.8 °C) 68 16 142/67 99 %      Temp src Heart Rate Source Patient Position BP Location FiO2 (%)   Tympanic Monitor -- -- --       Physical Exam      GENERAL: no acute distress  HENT: normocephalic, atraumatic  EYES: no scleral icterus  CV: regular rhythm, normal rate, systolic murmur, patient aware and states has previously been evaluated  RESPIRATORY: normal effort, CTA B  ABDOMEN: nondistended  nontender  MUSCULOSKELETAL: no deformity.  No midline C, T, L-spine tenderness. Sensation is intact to light touch throughout the bilateral lower extremities. Muscle strength is 3/5 and symmetrical with plantarflexion and EHL. Patellar reflexes are 1 this + and equal bilaterally. DP and PT pulses are 2+ bilaterally.   Rectal exam performed with Jacqueline RN chaperone.  Patient does seem to have decreased rectal tone and a large amount of soft stool in the rectal vault perianal sensation intact to light touch.  NEURO: alert, moves all extremities, follows commands  PSYCH:  calm, cooperative  SKIN: warm, dry    Vital signs and nursing notes reviewed.          LAB RESULTS  Recent Results (from the past 24 hour(s))   Comprehensive Metabolic Panel    Collection Time: 09/17/23 12:07 PM    Specimen: Blood   Result Value Ref Range    Glucose 147 (H) 65 - 99 mg/dL    BUN 10 8 - 23 mg/dL    Creatinine 0.70 (L) 0.76 - 1.27 mg/dL    Sodium 137 136 - 145 mmol/L    Potassium 4.6 3.5 - 5.2 mmol/L    Chloride 102 98 - 107 mmol/L    CO2 19.9 (L) 22.0 - 29.0 mmol/L    Calcium 8.1 (L) 8.6 - 10.5 mg/dL    Total Protein 7.3 6.0 - 8.5 g/dL    Albumin 3.9 3.5 - 5.2 g/dL    ALT (SGPT) 10 1 - 41 U/L    AST (SGOT) 16 1 - 40 U/L    Alkaline Phosphatase 385 (H) 39 - 117 U/L    Total Bilirubin 0.4 0.0 - 1.2 mg/dL    Globulin 3.4 gm/dL    A/G Ratio 1.1 g/dL    BUN/Creatinine Ratio 14.3 7.0 - 25.0    Anion Gap 15.1 (H) 5.0 - 15.0 mmol/L    eGFR 92.0 >60.0 mL/min/1.73   Magnesium    Collection Time: 09/17/23 12:07 PM    Specimen: Blood   Result Value Ref Range    Magnesium 2.8 (H) 1.6 - 2.4 mg/dL   CBC Auto Differential    Collection Time: 09/17/23 12:07 PM    Specimen: Blood   Result Value Ref Range    WBC 6.42 3.40 - 10.80 10*3/mm3    RBC 2.95 (L) 4.14 - 5.80 10*6/mm3    Hemoglobin 8.1 (L) 13.0 - 17.7 g/dL    Hematocrit 25.2 (L) 37.5 - 51.0 %    MCV 85.4 79.0 - 97.0 fL    MCH 27.5 26.6 - 33.0 pg    MCHC 32.1 31.5 - 35.7 g/dL    RDW 17.7 (H) 12.3 -  15.4 %    RDW-SD 53.7 37.0 - 54.0 fl    MPV 10.4 6.0 - 12.0 fL    Platelets 106 (L) 140 - 450 10*3/mm3   Manual Differential    Collection Time: 09/17/23 12:07 PM    Specimen: Blood   Result Value Ref Range    Neutrophil % 73.0 42.7 - 76.0 %    Lymphocyte % 8.0 (L) 19.6 - 45.3 %    Monocyte % 7.0 5.0 - 12.0 %    Eosinophil % 1.0 0.3 - 6.2 %    Basophil % 2.0 (H) 0.0 - 1.5 %    Metamyelocyte % 4.0 (H) 0.0 - 0.0 %    Myelocyte % 5.0 (H) 0.0 - 0.0 %    Neutrophils Absolute 4.69 1.70 - 7.00 10*3/mm3    Lymphocytes Absolute 0.51 (L) 0.70 - 3.10 10*3/mm3    Monocytes Absolute 0.45 0.10 - 0.90 10*3/mm3    Eosinophils Absolute 0.06 0.00 - 0.40 10*3/mm3    Basophils Absolute 0.13 0.00 - 0.20 10*3/mm3    nRBC 2.0 (H) 0.0 - 0.2 /100 WBC    Anisocytosis Mod/2+ None Seen    Ovalocytes Slight/1+ None Seen    Poikilocytes Slight/1+ None Seen    Polychromasia Slight/1+ None Seen    WBC Morphology Normal Normal    Platelet Morphology Normal Normal   Type & Screen    Collection Time: 09/17/23 12:36 PM    Specimen: Blood   Result Value Ref Range    ABO Type O     RH type Negative     Antibody Screen Negative     T&S Expiration Date 9/20/2023 11:59:59 PM    Urinalysis With Microscopic If Indicated (No Culture) - Urine, Clean Catch    Collection Time: 09/17/23  3:15 PM    Specimen: Urine, Clean Catch   Result Value Ref Range    Color, UA Yellow Yellow, Straw    Appearance, UA Clear Clear    pH, UA 8.0 5.0 - 8.0    Specific Gravity, UA 1.010 1.005 - 1.030    Glucose, UA Negative Negative    Ketones, UA Negative Negative    Bilirubin, UA Negative Negative    Blood, UA Negative Negative    Protein, UA Negative Negative    Leuk Esterase, UA Negative Negative    Nitrite, UA Negative Negative    Urobilinogen, UA 1.0 E.U./dL 0.2 - 1.0 E.U./dL       Ordered the above labs and reviewed the results.        RADIOLOGY  CT Abdomen Pelvis Without Contrast    Result Date: 9/17/2023  CT ABDOMEN PELVIS WO CONTRAST-  Radiation dose reduction techniques  were utilized, including automated exposure control and exposure modulation based on body size.  Clinical: Constipation, metastatic prostate carcinoma  COMPARISON PET/CT dated 8/20/2021  FINDINGS: 1. There is a large amount of dense fecal material demonstrated within the low sigmoid and rectum. There is some rectal wall thickening diffusely. At least constipation, suspect fecal impaction. There is diverticulosis of the colon, no indication of active diverticulitis. The appendix and small bowel are satisfactory in appearance. The stomach is normal.  2. The liver, gallbladder, pancreas, spleen and adrenal glands have a satisfactory appearance.  3. There is a 7-8 mm hypodense nodule arising from the upper pole of the right kidney, not seen with certainty on the previous 2021 examination. Probable hyperdense cyst. Due to its small size it is indeterminant and conservative CT or sonography advised. No right-sided calculus or obstructive uropathy. 8 mm likely simple cyst arising from the lower pole of the left kidney. The left kidney is otherwise unremarkable. The ureters are satisfactory in course and caliber to the urinary bladder which is typical in appearance. Hemostatic clips demonstrated at the bladder base corresponding to previous prostatectomy.  4. Widespread osseous metastasis which appears more pronounced compared to the previous examination. No pathologic fracture is demonstrated. Remainder is unremarkable.   This report was finalized on 9/17/2023 2:42 PM by Dr. Tony Fernandes M.D.       Ordered the above noted radiological studies. Reviewed by me in PACS.            PROCEDURES  Procedures              MEDICATIONS GIVEN IN ER  Medications   acetaminophen (TYLENOL) tablet 650 mg (has no administration in time range)   sennosides-docusate (PERICOLACE) 8.6-50 MG per tablet 2 tablet (has no administration in time range)     And   polyethylene glycol (MIRALAX) packet 17 g (has no administration in time range)      And   bisacodyl (DULCOLAX) EC tablet 5 mg (has no administration in time range)     And   bisacodyl (DULCOLAX) suppository 10 mg (has no administration in time range)   ondansetron (ZOFRAN) tablet 4 mg (has no administration in time range)     Or   ondansetron (ZOFRAN) injection 4 mg (has no administration in time range)   melatonin tablet 3 mg (has no administration in time range)   pantoprazole (PROTONIX) EC tablet 40 mg (has no administration in time range)   calcium carbonate (TUMS) chewable tablet 500 mg (200 mg elemental) (has no administration in time range)   dexAMETHasone (DECADRON) injection 4 mg (has no administration in time range)   HYDROmorphone (DILAUDID) injection 0.5 mg (has no administration in time range)   dextrose (GLUTOSE) oral gel 15 g (has no administration in time range)   dextrose (D50W) (25 g/50 mL) IV injection 25 g (has no administration in time range)   glucagon (GLUCAGEN) injection 1 mg (has no administration in time range)   insulin lispro (HUMALOG/ADMELOG) injection 2-9 Units (has no administration in time range)   ondansetron (ZOFRAN) injection 4 mg (4 mg Intravenous Given 9/17/23 1436)   HYDROmorphone (DILAUDID) injection 0.5 mg (0.5 mg Intravenous Given 9/17/23 1601)   gadobenate dimeglumine (MULTIHANCE) injection 17 mL (17 mL Intravenous Given 9/17/23 1644)                   MEDICAL DECISION MAKING, PROGRESS, and CONSULTS    All labs have been independently reviewed by me.  All radiology studies have been reviewed by me and I have also reviewed the radiology report.   EKG's independently viewed and interpreted by me.  Discussion below represents my analysis of pertinent findings related to patient's condition, differential diagnosis, treatment plan and final disposition.            Orders placed during this visit:  Orders Placed This Encounter   Procedures    MRI Lumbar Spine With & Without Contrast    CT Abdomen Pelvis Without Contrast    Comprehensive Metabolic Panel     Urinalysis With Microscopic If Indicated (No Culture) - Urine, Clean Catch    Magnesium    CBC Auto Differential    Manual Differential    Basic Metabolic Panel    CBC (No Diff)    Diet: Cardiac Diets; Healthy Heart (2-3 Na+); Texture: Regular Texture (IDDSI 7); Fluid Consistency: Thin (IDDSI 0)    Vital Signs    Up with assistance    Daily Weights    Strict Intake & Output    Oral Care    Place Sequential Compression Device    Maintain Sequential Compression Device    Code Status and Medical Interventions:    Inpatient Palliative Care Team Consult    Inpatient Hematology & Oncology Consult    POC Glucose 4x Daily Before Meals & at Bedtime    Type & Screen    Initiate Observation Status    CBC & Differential           Differential diagnosis:  Constipation, pathologic fracture, cauda equina, metastatic disease      Independent interpretation of labs, radiology studies, and discussions with consultants:  ED Course as of 09/17/23 2025   Sun Sep 17, 2023   1538 Discussing with Dr. Elliott with [facility].  She agrees to admit. [TR]   1538 The patient has new lower extremity weakness with known metastatic disease to the spine and worsening metastatic disease on imaging.  He has fecal impaction on CT.  His neuro exam is notable for decreased rectal tone.  I have an MRI ordered.  Do have some concern about spinal impingement given his cancer and symptoms.  Plan admission for further evaluation and management. [TR]   1558 CT Abdomen Pelvis Without Contrast  My independent interpretation of the CT of the abdomen pelvis is no free air.   [TR]   1600 I reassessed the patient, counseled him on all of his lab and imaging results including all incidental findings.  He is feeling pretty uncomfortable, has been using fentanyl patches as prescribed but has not taken Norco in several days because the constipation.  I have ordered 1/2 mg of Dilaudid.  He is agreeable with the plan for admission. [KA]   2024 Glucose(!): 147 [KA]   2024  Creatinine(!): 0.70 [KA]   2024 Magnesium(!): 2.8 [KA]   2024 Blood, UA: Negative [KA]   2024 Nitrite, UA: Negative [KA]   2024 WBC: 6.42 [KA]   2024 Hemoglobin(!): 8.1  chronic anemia significantly improved from recent priors since blood transfusion a couple of days ago [KA]      ED Course User Index  [KA] Joann aMrtinez PA  [TR] Jacob Jennings MD       - Shared decision making: Recommended admission for further evaluation, patient family agreeable          Additional sources:    - Chronic or social conditions impacting care: metastatic           DIAGNOSIS  Final diagnoses:   Fecal impaction   Weakness of both lower extremities   Prostate cancer metastatic to bone         Latest Documented Vital Signs:  As of 20:25 EDT  BP- 143/58 HR- 62 Temp- 98.4 °F (36.9 °C) O2 sat- 100%              --    Please note that portions of this were completed with a voice recognition program.       Note Disclaimer: At [facility], we believe that sharing information builds trust and better relationships. You are receiving this note because you are receiving care at [facility] or recently visited. It is possible you will see health information before a provider has talked with you about it. This kind of information can be easy to misunderstand. To help you fully understand what it means for your health, we urge you to discuss this note with your provider.             Joann Martinez PA  09/17/23 2025

## 2023-09-17 NOTE — ED NOTES
"Nursing report ED to floor  Seamj Herrera  82 y.o.  male    HPI :   Chief Complaint   Patient presents with    Weakness - Generalized    Constipation       Admitting doctor:   Melissa Elliott MD    Admitting diagnosis:   The primary encounter diagnosis was Fecal impaction. Diagnoses of Weakness of both lower extremities and Prostate cancer metastatic to bone were also pertinent to this visit.    Code status:   Current Code Status       Date Active Code Status Order ID Comments User Context       Prior            Allergies:   Niacin and Statins    Isolation:   No active isolations    Intake and Output  No intake or output data in the 24 hours ending 09/17/23 1542    Weight:       09/17/23  1141   Weight: 78.9 kg (174 lb)       Most recent vitals:   Vitals:    09/17/23 1141   BP: 142/67   Pulse: 68   Resp: 16   Temp: 98.3 °F (36.8 °C)   TempSrc: Tympanic   SpO2: 99%   Weight: 78.9 kg (174 lb)   Height: 162.6 cm (64\")       Active LDAs/IV Access:   Lines, Drains & Airways       Active LDAs       Name Placement date Placement time Site Days    Peripheral IV 09/17/23 1206 Right Antecubital 09/17/23  1206  Antecubital  less than 1                    Labs (abnormal labs have a star):   Labs Reviewed   COMPREHENSIVE METABOLIC PANEL - Abnormal; Notable for the following components:       Result Value    Glucose 147 (*)     Creatinine 0.70 (*)     CO2 19.9 (*)     Calcium 8.1 (*)     Alkaline Phosphatase 385 (*)     Anion Gap 15.1 (*)     All other components within normal limits    Narrative:     GFR Normal >60  Chronic Kidney Disease <60  Kidney Failure <15    The GFR formula is only valid for adults with stable renal function between ages 18 and 70.   MAGNESIUM - Abnormal; Notable for the following components:    Magnesium 2.8 (*)     All other components within normal limits   CBC WITH AUTO DIFFERENTIAL - Abnormal; Notable for the following components:    RBC 2.95 (*)     Hemoglobin 8.1 (*)     Hematocrit 25.2 (*)     " RDW 17.7 (*)     Platelets 106 (*)     All other components within normal limits   MANUAL DIFFERENTIAL - Abnormal; Notable for the following components:    Lymphocyte % 8.0 (*)     Basophil % 2.0 (*)     Metamyelocyte % 4.0 (*)     Myelocyte % 5.0 (*)     Lymphocytes Absolute 0.51 (*)     nRBC 2.0 (*)     All other components within normal limits   URINALYSIS W/ MICROSCOPIC IF INDICATED (NO CULTURE) - Normal    Narrative:     Urine microscopic not indicated.   TYPE AND SCREEN   CBC AND DIFFERENTIAL    Narrative:     The following orders were created for panel order CBC & Differential.  Procedure                               Abnormality         Status                     ---------                               -----------         ------                     CBC Auto Differential[[id number]]        Abnormal            Final result                 Please view results for these tests on the individual orders.       EKG:   No orders to display       Meds given in ED:   Medications   ondansetron (ZOFRAN) injection 4 mg (4 mg Intravenous Given 23 1436)       Imaging results:  No radiology results for the last day    Ambulatory status:   - bedrest    Social issues:   Social History     Socioeconomic History    Marital status:     Years of education: College   Tobacco Use    Smoking status: Former     Packs/day: 1.50     Years: 30.00     Pack years: 45.00     Types: Cigarettes     Start date: 1968     Quit date: 1998     Years since quittin.6     Passive exposure: Past    Smokeless tobacco: Never   Substance and Sexual Activity    Alcohol use: Not Currently     Comment: Caffeine use: 2-3 cups daily    Drug use: Never    Sexual activity: Not Currently       NIH Stroke Scale:       Miguel Martinez RN  23 15:42 EDT

## 2023-09-17 NOTE — H&P
HISTORY AND PHYSICAL   [facility]        Date of Admission: 2023  Patient Identification:  Name: Semaj Herrera  Age: 82 y.o.  Sex: male  :  1940  MRN: [id number]                     Primary Care Physician: Axel Guardado MD    Chief Complaint:  82 year old gentleman who presented to the emergency room with weakness of his legs and constipation which started about ten days ago; he has also had back pain; he has a history of metastatic prostate cancer; he is followed by dr simon; he was not able to stand up today; he has had some difficulty voiding but not recently; no fever or chills     History of Present Illness:   As above    Past Medical History:  Past Medical History:   Diagnosis Date    Aortic stenosis, mild 2021    Per echocardiogram     Ascending aorta dilatation     Asthma     Benign prostatic hyperplasia     Bone cancer     Cellulitis of great toe of left foot 10/19/2018    CKD (chronic kidney disease)     Stage 3; followed by Dr. Vicky Duran     Diastolic dysfunction     GRADE II per echo 2018    Difficulty breathing     during exertion    Dyslipidemia     Erectile dysfunction     Fatigue     Heart murmur     History of blood transfusion     History of kidney stones     HL (hearing loss)     Hyperlipidemia     Hypertension     Hyponatremia     Kidney stone     Left ventricular hypertrophy     per echo 2018    Localized edema     Neuropathy     Obstructive sleep apnea     USING CPAP    Osteoarthritis     Peptic ulcer     Pneumonia     Pneumonia of left upper lobe due to infectious organism 03/15/2019    Prostate cancer     Status post prostatectomy, radiation therapy, and hormone therapy followed by Dr. Simon; metastatsis to bone    Pure hyperglyceridemia     Restless leg syndrome     Sepsis     Sinus bradycardia     Transient cerebral ischemia     Type 2 diabetes mellitus      Past Surgical History:  Past Surgical History:   Procedure Laterality Date     BRONCHOSCOPY N/A 04/09/2018    Procedure: BRONCHOSCOPY;  Surgeon: Bijan Rivera III, MD;  Location: [facility] MAIN OR;  Service: Cardiothoracic    COLONOSCOPY      DEEP NECK LYMPH NODE BIOPSY / EXCISION      HEMORRHOIDECTOMY      LYMPH NODE BIOPSY      MEDIASTINOSCOPY N/A 04/09/2018    Procedure: MEDIASTINOSCOPY WITH LYMPH NODE BIOPSY;  Surgeon: Bijan Rivera III, MD;  Location: [facility] MAIN OR;  Service: Cardiothoracic    OTHER SURGICAL HISTORY      ulcer repair    PROSTATECTOMY  2006      Home Meds:  Medications Prior to Admission   Medication Sig Dispense Refill Last Dose    Accu-Chek Guide test strip USE 1 STRIP TO CHECK BLOOD SUGAR 4 TIMES DAILY       Accu-Chek Softclix Lancets lancets USE 1 LANCET TO CHECK GLUCOSE 4 TIMES DAILY       aspirin 81 MG EC tablet Take 1 tablet by mouth Daily.       cholecalciferol (VITAMIN D3) 1000 units tablet Take 2 tablets by mouth Daily.       Denosumab (XGEVA SC) Inject  under the skin into the appropriate area as directed Every 30 (Thirty) Days.       dilTIAZem CD (CARDIZEM CD) 120 MG 24 hr capsule TAKE 1 CAPSULE EVERY DAY 90 capsule 3     diphenoxylate-atropine (LOMOTIL) 2.5-0.025 MG per tablet Take 1 tablet by mouth 4 (Four) Times a Day As Needed for Diarrhea. 30 tablet 0     fentaNYL (DURAGESIC) 25 MCG/HR patch Place 1 patch on the skin as directed by provider Every 72 (Seventy-Two) Hours. 5 patch 0     fentaNYL (DURAGESIC) 50 MCG/HR patch Place 1 patch on the skin as directed by provider Every 72 (Seventy-Two) Hours. 10 each 0     furosemide (LASIX) 40 MG tablet Take 1 tablet by mouth Every Morning.       gabapentin (NEURONTIN) 300 MG capsule TAKE 3 CAPSULES EVERY NIGHT AT BEDTIME 90 capsule 0     HYDROcodone-acetaminophen (NORCO)  MG per tablet Take 1 tablet by mouth Every 6 (Six) Hours As Needed for Moderate Pain. 100 tablet 0     Insulin Aspart (novoLOG) 100 UNIT/ML injection INJECT 100 UNITS VIA PUMP ONCE DAILY       Leuprolide Acetate, 3 Month, (LUPRON DEPOT,  3-MONTH, IM) Inject  into the appropriate muscle as directed by prescriber Every 3 (Three) Months.       levothyroxine (SYNTHROID, LEVOTHROID) 88 MCG tablet        loperamide (IMODIUM) 1 MG/5ML solution Take 10 mL by mouth 4 (Four) Times a Day As Needed for Diarrhea.       meloxicam (MOBIC) 15 MG tablet Take 1 tablet by mouth Daily. 30 tablet 1     modafinil (PROVIGIL) 200 MG tablet Take 1 tablet by mouth Daily. 90 tablet 1     olmesartan (BENICAR) 40 MG tablet TAKE 1 TABLET EVERY DAY (DISCONTINUE LOSARTAN 100MG) 90 tablet 3     ondansetron (ZOFRAN) 8 MG tablet Take 1 tablet by mouth 3 (Three) Times a Day As Needed for Nausea or Vomiting. 30 tablet 5     pravastatin (PRAVACHOL) 40 MG tablet Take 1 tablet by mouth Daily.       rOPINIRole (REQUIP) 0.5 MG tablet Take 2 tablets by mouth in the evening and 2 at bedtime. 360 tablet 2     traZODone (DESYREL) 50 MG tablet Take 1-2 tablets by mouth every night at bedtime.          Allergies:  Allergies   Allergen Reactions    Niacin Unknown - Low Severity    Statins Unknown - Low Severity     Immunizations:  Immunization History   Administered Date(s) Administered    COVID-19 (MODERNA) 1st,2nd,3rd Dose Monovalent 01/20/2021, 02/17/2021, 08/26/2021    COVID-19 (MODERNA) BIVALENT 12+YRS 11/09/2022    COVID-19 (MODERNA) Monovalent Original Booster 04/01/2022    FLUAD TRI 65YR+ 09/04/2019    Fluzone High Dose =>65 Years ([employer] ONLY) 09/28/2016, 09/07/2017, 09/03/2020    Fluzone High-Dose 65+yrs 09/03/2020, 09/28/2021, 10/10/2022    Influenza, Unspecified 09/04/2019    Pneumococcal Conjugate 13-Valent (PCV13) 09/07/2017    Pneumococcal Polysaccharide (PPSV23) 11/06/2018     Social History:   Social History     Social History Narrative    CAFFEINE USE: 1 CUP DAILY; OCCASIONAL SOFT DRINK.      Social History     Socioeconomic History    Marital status:     Years of education: College   Tobacco Use    Smoking status: Former     Packs/day: 1.50     Years: 30.00     Pack  years: 45.00     Types: Cigarettes     Start date: 1968     Quit date: 1998     Years since quittin.6     Passive exposure: Past    Smokeless tobacco: Never   Vaping Use    Vaping Use: Never used   Substance and Sexual Activity    Alcohol use: Not Currently     Comment: Caffeine use: 2-3 cups daily    Drug use: Never    Sexual activity: Not Currently       Family History:  Family History   Problem Relation Age of Onset    Heart failure Mother     Hypertension Mother             Diabetes Mother     Heart disease Mother             Lung cancer Father 46    Hypertension Sister     Lung cancer Sister 60    Hypertension Brother             Lung cancer Brother 62    Diabetes Brother     Heart disease Other     Prostate cancer Brother 60    Cancer Brother             Cancer Sister             Cancer Sister     Cancer Sister     Heart disease Brother             Malig Hyperthermia Neg Hx         Review of Systems  See history of present illness and past medical history.  Patient denies headache, dizziness, syncope, falls, trauma, change in vision, change in hearing, change in taste, changes in weight, changes in appetite, focal weakness, numbness, or paresthesia.  Patient denies chest pain, palpitations, dyspnea, orthopnea, PND, cough, sinus pressure, rhinorrhea, epistaxis, hemoptysis,vomiting,hematemesis, diarrhea or hematochezia.  Denies cold or heat intolerance, polydipsia, polyuria, polyphagia. Denies hematuria, pyuria, dysuria, hesitancy, frequency or urgency. Denies consumption of raw and under cooked meats foods or change in water source.  Denies fever, chills, sweats, night sweats.     Objective:  T Max 24 hrs: Temp (24hrs), Av.5 °F (36.9 °C), Min:98.3 °F (36.8 °C), Max:98.6 °F (37 °C)    Vitals Ranges:   Temp:  [98.3 °F (36.8 °C)-98.6 °F (37 °C)] 98.6 °F (37 °C)  Heart Rate:  [59-68] 62  Resp:  [16] 16  BP: (142-174)/(58-67) 172/58      Exam:  BP  "172/58 (BP Location: Right arm, Patient Position: Lying)   Pulse 62   Temp 98.6 °F (37 °C) (Oral)   Resp 16   Ht 162.6 cm (64\")   Wt 78.9 kg (174 lb)   SpO2 99%   BMI 29.87 kg/m²     General Appearance:    Alert, cooperative, no distress, appears stated age   Head:    Normocephalic, without obvious abnormality, atraumatic   Eyes:    PERRL, conjunctivae/corneas clear, EOM's intact, both eyes   Ears:    Normal external ear canals, both ears   Nose:   Nares normal, septum midline, mucosa normal, no drainage    or sinus tenderness   Throat:   Lips, mucosa, and tongue normal   Neck:   Supple, symmetrical, trachea midline, no adenopathy;     thyroid:  no enlargement/tenderness/nodules; no carotid    bruit or JVD   Back:     Symmetric, no curvature, ROM normal, no CVA tenderness   Lungs:     Decreased breath sounds bilaterally, respirations unlabored   Chest Wall:    No tenderness or deformity    Heart:    Regular rate and rhythm, S1 and S2 normal, no murmur, rub   or gallop   Abdomen:     Soft, nontender, bowel sounds active all four quadrants,     no masses, no hepatomegaly, no splenomegaly   Extremities:   Extremities normal, atraumatic, no cyanosis or edema                       .    Data Review:  Labs in chart were reviewed.  WBC   Date Value Ref Range Status   09/17/2023 6.42 3.40 - 10.80 10*3/mm3 Final     Hemoglobin   Date Value Ref Range Status   09/17/2023 8.1 (L) 13.0 - 17.7 g/dL Final     Hematocrit   Date Value Ref Range Status   09/17/2023 25.2 (L) 37.5 - 51.0 % Final     Platelets   Date Value Ref Range Status   09/17/2023 106 (L) 140 - 450 10*3/mm3 Final     Sodium   Date Value Ref Range Status   09/17/2023 137 136 - 145 mmol/L Final     Potassium   Date Value Ref Range Status   09/17/2023 4.6 3.5 - 5.2 mmol/L Final     Chloride   Date Value Ref Range Status   09/17/2023 102 98 - 107 mmol/L Final     CO2   Date Value Ref Range Status   09/17/2023 19.9 (L) 22.0 - 29.0 mmol/L Final     BUN   Date Value " Ref Range Status   09/17/2023 10 8 - 23 mg/dL Final     Creatinine   Date Value Ref Range Status   09/17/2023 0.70 (L) 0.76 - 1.27 mg/dL Final     Glucose   Date Value Ref Range Status   09/17/2023 147 (H) 65 - 99 mg/dL Final     Calcium   Date Value Ref Range Status   09/17/2023 8.1 (L) 8.6 - 10.5 mg/dL Final     Magnesium   Date Value Ref Range Status   09/17/2023 2.8 (H) 1.6 - 2.4 mg/dL Final                Imaging Results (All)       Procedure Component Value Units Date/Time    MRI Lumbar Spine With & Without Contrast [[id number]] Resulted: 09/17/23 1630     Updated: 09/17/23 1719    CT Abdomen Pelvis Without Contrast [[id number]] Collected: 09/17/23 1434     Updated: 09/17/23 1445    Narrative:      CT ABDOMEN PELVIS WO CONTRAST-     Radiation dose reduction techniques were utilized, including automated  exposure control and exposure modulation based on body size.     Clinical: Constipation, metastatic prostate carcinoma     COMPARISON PET/CT dated 8/20/2021     FINDINGS:  1. There is a large amount of dense fecal material demonstrated within  the low sigmoid and rectum. There is some rectal wall thickening  diffusely. At least constipation, suspect fecal impaction. There is  diverticulosis of the colon, no indication of active diverticulitis. The  appendix and small bowel are satisfactory in appearance. The stomach is  normal.     2. The liver, gallbladder, pancreas, spleen and adrenal glands have a  satisfactory appearance.     3. There is a 7-8 mm hypodense nodule arising from the upper pole of the  right kidney, not seen with certainty on the previous 2021 examination.  Probable hyperdense cyst. Due to its small size it is indeterminant and  conservative CT or sonography advised. No right-sided calculus or  obstructive uropathy. 8 mm likely simple cyst arising from the lower  pole of the left kidney. The left kidney is otherwise unremarkable. The  ureters are satisfactory in course and caliber to the urinary  bladder  which is typical in appearance. Hemostatic clips demonstrated at the  bladder base corresponding to previous prostatectomy.     4. Widespread osseous metastasis which appears more pronounced compared  to the previous examination. No pathologic fracture is demonstrated.  Remainder is unremarkable.        This report was finalized on 9/17/2023 2:42 PM by Dr. Tony Fernandes M.D.                 Assessment:  Active Hospital Problems    Diagnosis  POA    **Fecal impaction in rectum [K56.41]  Yes      Resolved Hospital Problems   No resolved problems to display.   Leg weakness  Back pain  Metastatic prostate cancer  Ckd3  Diabetes  Hypertension      Plan:  Will await reading of mri  Start decadron  Ask oncology to see him  Accu checks, sliding scale  Monitor on telemetry  Dw patient, nurse and ed provider    Melissa Elliott MD  9/17/2023  19:32 EDT

## 2023-09-17 NOTE — ED NOTES
Increased leg weakness x 2-3 days.  He has been constipated x 3 days.  He's tried otc enemas and supps.  He is on meds for constipation also.  He has abd pain and lower back pain.  Hx prostate cancer mets to bone and lymph nodes

## 2023-09-17 NOTE — ED PROVIDER NOTES
MD ATTESTATION NOTE    The ESTRADA and I have discussed this patient's history, physical exam, and treatment plan.  I have reviewed the documentation and personally had a face to face interaction with the patient. I affirm the documentation and agree with the treatment and plan.  The attached note describes my personal findings.    I provided a substantive portion of the care of this patient. I personally performed the physical exam, in its entirety.    Independent Historians: Patient    A complete HPI/ROS/PMH/PSH/SH/FH are unobtainable due to: Nothing    Chronic or social conditions impacting patient care (social determinants of health): None    Semaj Herrera is a 82 y.o. male who presents to the ED c/o acute constipation as well as lower extremity weakness.  The patient reports for the last 3 days the patient has not been able to have a bowel movement.  He reports increasing lower back pain and lower extremity weakness.  The patient reports he has a history of metastatic cancer and is currently on chemotherapy.  His original source is prostate.  He denies any falls or injury.        Prescription drug monitoring program review:        On exam:  GENERAL: Awake, alert, no acute distress  SKIN: Warm, dry  HENT: Normocephalic, atraumatic  EYES: no scleral icterus  CV: regular rhythm, regular rate  RESPIRATORY: normal effort, lungs clear  ABDOMEN: soft, nontender, nondistended  MUSCULOSKELETAL: no deformity.  Intact patellar reflexes bilaterally.  Equal lower extremity strength.  There is a fentanyl patch on his back.  He has no tenderness on palpation of his thoracic or lumbar spines.  He has decreased rectal tone with soft stool.  NEURO: alert, moves all extremities, follows commands                                                             Labs  Recent Results (from the past 24 hour(s))   POC Glucose Once    Collection Time: 09/17/23  9:03 PM    Specimen: Blood   Result Value Ref Range    Glucose 175 (H) 70 - 130 mg/dL    Basic Metabolic Panel    Collection Time: 09/18/23  4:12 AM    Specimen: Blood   Result Value Ref Range    Glucose 176 (H) 65 - 99 mg/dL    BUN 10 8 - 23 mg/dL    Creatinine 0.73 (L) 0.76 - 1.27 mg/dL    Sodium 136 136 - 145 mmol/L    Potassium 5.4 (H) 3.5 - 5.2 mmol/L    Chloride 105 98 - 107 mmol/L    CO2 21.0 (L) 22.0 - 29.0 mmol/L    Calcium 8.0 (L) 8.6 - 10.5 mg/dL    BUN/Creatinine Ratio 13.7 7.0 - 25.0    Anion Gap 10.0 5.0 - 15.0 mmol/L    eGFR 90.8 >60.0 mL/min/1.73   CBC (No Diff)    Collection Time: 09/18/23  4:12 AM    Specimen: Blood   Result Value Ref Range    WBC 6.46 3.40 - 10.80 10*3/mm3    RBC 2.82 (L) 4.14 - 5.80 10*6/mm3    Hemoglobin 7.7 (L) 13.0 - 17.7 g/dL    Hematocrit 23.9 (L) 37.5 - 51.0 %    MCV 84.8 79.0 - 97.0 fL    MCH 27.3 26.6 - 33.0 pg    MCHC 32.2 31.5 - 35.7 g/dL    RDW 17.3 (H) 12.3 - 15.4 %    RDW-SD 53.2 37.0 - 54.0 fl    MPV 9.7 6.0 - 12.0 fL    Platelets 87 (L) 140 - 450 10*3/mm3   POC Glucose Once    Collection Time: 09/18/23  7:54 AM    Specimen: Blood   Result Value Ref Range    Glucose 196 (H) 70 - 130 mg/dL   POC Glucose Once    Collection Time: 09/18/23 12:17 PM    Specimen: Blood   Result Value Ref Range    Glucose 220 (H) 70 - 130 mg/dL   POC Glucose Once    Collection Time: 09/18/23 12:53 PM    Specimen: Blood   Result Value Ref Range    Glucose 199 (H) 70 - 130 mg/dL   Potassium    Collection Time: 09/18/23  2:41 PM    Specimen: Blood   Result Value Ref Range    Potassium 4.9 3.5 - 5.2 mmol/L   Ferritin    Collection Time: 09/18/23  2:41 PM    Specimen: Blood   Result Value Ref Range    Ferritin 1,714.00 (H) 30.00 - 400.00 ng/mL   Iron    Collection Time: 09/18/23  2:41 PM    Specimen: Blood   Result Value Ref Range    Iron 205 (H) 59 - 158 mcg/dL   Iron Profile    Collection Time: 09/18/23  2:41 PM    Specimen: Blood   Result Value Ref Range    Iron 205 (H) 59 - 158 mcg/dL    Iron Saturation (TSAT) 67 (H) 20 - 50 %    Transferrin 206 200 - 360 mg/dL    TIBC 307  298 - 536 mcg/dL   Lactate Dehydrogenase    Collection Time: 09/18/23  2:41 PM    Specimen: Blood   Result Value Ref Range     (H) 135 - 225 U/L   POC Glucose Once    Collection Time: 09/18/23  4:56 PM    Specimen: Blood   Result Value Ref Range    Glucose 218 (H) 70 - 130 mg/dL       Radiology  No Radiology Exams Resulted Within Past 24 Hours    Medical Decision Making:  ED Course as of 09/18/23 1750   Sun Sep 17, 2023   1538 Discussing with Dr. Elliott with A.  She agrees to admit. [TR]   1538 The patient has new lower extremity weakness with known metastatic disease to the spine and worsening metastatic disease on imaging.  He has fecal impaction on CT.  His neuro exam is notable for decreased rectal tone.  I have an MRI ordered.  Do have some concern about spinal impingement given his cancer and symptoms.  Plan admission for further evaluation and management. [TR]   1558 CT Abdomen Pelvis Without Contrast  My independent interpretation of the CT of the abdomen pelvis is no free air.   [TR]   1600 I reassessed the patient, counseled him on all of his lab and imaging results including all incidental findings.  He is feeling pretty uncomfortable, has been using fentanyl patches as prescribed but has not taken Norco in several days because the constipation.  I have ordered 1/2 mg of Dilaudid.  He is agreeable with the plan for admission. [KA]   2024 Glucose(!): 147 [KA]   2024 Creatinine(!): 0.70 [KA]   2024 Magnesium(!): 2.8 [KA]   2024 Blood, UA: Negative [KA]   2024 Nitrite, UA: Negative [KA]   2024 WBC: 6.42 [KA]   2024 Hemoglobin(!): 8.1  chronic anemia significantly improved from recent priors since blood transfusion a couple of days ago [KA]      ED Course User Index  [KA] Joann Martinez PA  [TR] Jacob Jennings MD       The patient has known metastatic prostate cancer and is having constipation as well as lower extremity weakness.  He has a history of anemia with recent transfusion.  He has not  had any recent imaging of his abdomen in the last several years sites PET scan.  Plan to check his hemoglobin, chemistries, CT of the abdomen pelvis.  I suspect that he will require an MRI to rule out pathologic fracture, cauda equina syndrome.  My differential diagnosis includes metastatic disease, cauda equina syndrome, fracture, opiate-induced constipation, bowel obstruction, and others.    Procedures:  Procedures            Diagnosis  Final diagnoses:   Fecal impaction   Weakness of both lower extremities   Prostate cancer metastatic to bone       Note Disclaimer: At [facility], we believe that sharing information builds trust and better relationships. You are receiving this note because you recently visited [facility]. It is possible you will see health information before a provider has talked with you about it. This kind of information can be easy to misunderstand. To help you fully understand what it means for your health, we urge you to discuss this note with your provider.       Jacob Jennings MD  09/17/23 1287       Jacob Jennings MD  09/17/23 8246       Jacob Jennings MD  09/18/23 7253

## 2023-09-18 ENCOUNTER — APPOINTMENT (OUTPATIENT)
Dept: MRI IMAGING | Facility: HOSPITAL | Age: 83
DRG: 543 | End: 2023-09-18
Payer: MEDICARE

## 2023-09-18 PROBLEM — R29.898 LOWER EXTREMITY WEAKNESS: Status: ACTIVE | Noted: 2023-09-18

## 2023-09-18 PROBLEM — D63.8 ANEMIA, CHRONIC DISEASE: Status: ACTIVE | Noted: 2023-09-18

## 2023-09-18 PROBLEM — C79.51 METASTATIC CANCER TO BONE: Status: ACTIVE | Noted: 2023-09-18

## 2023-09-18 LAB
ANION GAP SERPL CALCULATED.3IONS-SCNC: 10 MMOL/L (ref 5–15)
BUN SERPL-MCNC: 10 MG/DL (ref 8–23)
BUN/CREAT SERPL: 13.7 (ref 7–25)
CALCIUM SPEC-SCNC: 8 MG/DL (ref 8.6–10.5)
CHLORIDE SERPL-SCNC: 105 MMOL/L (ref 98–107)
CO2 SERPL-SCNC: 21 MMOL/L (ref 22–29)
CREAT SERPL-MCNC: 0.73 MG/DL (ref 0.76–1.27)
DEPRECATED RDW RBC AUTO: 53.2 FL (ref 37–54)
EGFRCR SERPLBLD CKD-EPI 2021: 90.8 ML/MIN/1.73
ERYTHROCYTE [DISTWIDTH] IN BLOOD BY AUTOMATED COUNT: 17.3 % (ref 12.3–15.4)
FERRITIN SERPL-MCNC: 1714 NG/ML (ref 30–400)
GLUCOSE BLDC GLUCOMTR-MCNC: 196 MG/DL (ref 70–130)
GLUCOSE BLDC GLUCOMTR-MCNC: 199 MG/DL (ref 70–130)
GLUCOSE BLDC GLUCOMTR-MCNC: 218 MG/DL (ref 70–130)
GLUCOSE BLDC GLUCOMTR-MCNC: 220 MG/DL (ref 70–130)
GLUCOSE BLDC GLUCOMTR-MCNC: 226 MG/DL (ref 70–130)
GLUCOSE SERPL-MCNC: 176 MG/DL (ref 65–99)
HCT VFR BLD AUTO: 23.9 % (ref 37.5–51)
HGB BLD-MCNC: 7.7 G/DL (ref 13–17.7)
IRON 24H UR-MRATE: 205 MCG/DL (ref 59–158)
IRON 24H UR-MRATE: 205 MCG/DL (ref 59–158)
IRON SATN MFR SERPL: 67 % (ref 20–50)
LDH SERPL-CCNC: 479 U/L (ref 135–225)
MCH RBC QN AUTO: 27.3 PG (ref 26.6–33)
MCHC RBC AUTO-ENTMCNC: 32.2 G/DL (ref 31.5–35.7)
MCV RBC AUTO: 84.8 FL (ref 79–97)
PLATELET # BLD AUTO: 87 10*3/MM3 (ref 140–450)
PMV BLD AUTO: 9.7 FL (ref 6–12)
POTASSIUM SERPL-SCNC: 4.9 MMOL/L (ref 3.5–5.2)
POTASSIUM SERPL-SCNC: 5.4 MMOL/L (ref 3.5–5.2)
RBC # BLD AUTO: 2.82 10*6/MM3 (ref 4.14–5.8)
SODIUM SERPL-SCNC: 136 MMOL/L (ref 136–145)
TIBC SERPL-MCNC: 307 MCG/DL (ref 298–536)
TRANSFERRIN SERPL-MCNC: 206 MG/DL (ref 200–360)
VIT B12 BLD-MCNC: 716 PG/ML (ref 211–946)
WBC NRBC COR # BLD: 6.46 10*3/MM3 (ref 3.4–10.8)

## 2023-09-18 PROCEDURE — 83921 ORGANIC ACID SINGLE QUANT: CPT | Performed by: INTERNAL MEDICINE

## 2023-09-18 PROCEDURE — 83615 LACTATE (LD) (LDH) ENZYME: CPT | Performed by: INTERNAL MEDICINE

## 2023-09-18 PROCEDURE — 97166 OT EVAL MOD COMPLEX 45 MIN: CPT

## 2023-09-18 PROCEDURE — 82747 ASSAY OF FOLIC ACID RBC: CPT | Performed by: INTERNAL MEDICINE

## 2023-09-18 PROCEDURE — 83540 ASSAY OF IRON: CPT | Performed by: INTERNAL MEDICINE

## 2023-09-18 PROCEDURE — 85014 HEMATOCRIT: CPT | Performed by: INTERNAL MEDICINE

## 2023-09-18 PROCEDURE — 25010000002 DEXAMETHASONE PER 1 MG: Performed by: INTERNAL MEDICINE

## 2023-09-18 PROCEDURE — 84132 ASSAY OF SERUM POTASSIUM: CPT | Performed by: NURSE PRACTITIONER

## 2023-09-18 PROCEDURE — 72157 MRI CHEST SPINE W/O & W/DYE: CPT

## 2023-09-18 PROCEDURE — 84466 ASSAY OF TRANSFERRIN: CPT | Performed by: INTERNAL MEDICINE

## 2023-09-18 PROCEDURE — 63710000001 INSULIN LISPRO (HUMAN) PER 5 UNITS: Performed by: INTERNAL MEDICINE

## 2023-09-18 PROCEDURE — 36415 COLL VENOUS BLD VENIPUNCTURE: CPT | Performed by: INTERNAL MEDICINE

## 2023-09-18 PROCEDURE — 82728 ASSAY OF FERRITIN: CPT | Performed by: INTERNAL MEDICINE

## 2023-09-18 PROCEDURE — 0 GADOBENATE DIMEGLUMINE 529 MG/ML SOLUTION: Performed by: HOSPITALIST

## 2023-09-18 PROCEDURE — 72156 MRI NECK SPINE W/O & W/DYE: CPT

## 2023-09-18 PROCEDURE — 99222 1ST HOSP IP/OBS MODERATE 55: CPT | Performed by: PHYSICIAN ASSISTANT

## 2023-09-18 PROCEDURE — 82607 VITAMIN B-12: CPT | Performed by: INTERNAL MEDICINE

## 2023-09-18 PROCEDURE — 97110 THERAPEUTIC EXERCISES: CPT

## 2023-09-18 PROCEDURE — A9577 INJ MULTIHANCE: HCPCS | Performed by: HOSPITALIST

## 2023-09-18 PROCEDURE — 99222 1ST HOSP IP/OBS MODERATE 55: CPT | Performed by: INTERNAL MEDICINE

## 2023-09-18 PROCEDURE — 80048 BASIC METABOLIC PNL TOTAL CA: CPT | Performed by: INTERNAL MEDICINE

## 2023-09-18 PROCEDURE — 63710000001 INSULIN GLARGINE PER 5 UNITS: Performed by: NURSE PRACTITIONER

## 2023-09-18 PROCEDURE — 85027 COMPLETE CBC AUTOMATED: CPT | Performed by: INTERNAL MEDICINE

## 2023-09-18 PROCEDURE — 97162 PT EVAL MOD COMPLEX 30 MIN: CPT

## 2023-09-18 PROCEDURE — 82948 REAGENT STRIP/BLOOD GLUCOSE: CPT

## 2023-09-18 RX ORDER — AMOXICILLIN 250 MG
2 CAPSULE ORAL 2 TIMES DAILY
Status: DISCONTINUED | OUTPATIENT
Start: 2023-09-18 | End: 2023-09-24

## 2023-09-18 RX ORDER — MODAFINIL 100 MG/1
200 TABLET ORAL DAILY
Status: DISPENSED | OUTPATIENT
Start: 2023-09-18 | End: 2023-09-25

## 2023-09-18 RX ORDER — BISACODYL 5 MG/1
5 TABLET, DELAYED RELEASE ORAL DAILY PRN
Status: DISCONTINUED | OUTPATIENT
Start: 2023-09-18 | End: 2023-09-24

## 2023-09-18 RX ORDER — FENTANYL 50 UG/1
1 PATCH TRANSDERMAL
Status: DISCONTINUED | OUTPATIENT
Start: 2023-09-20 | End: 2023-09-19

## 2023-09-18 RX ORDER — PRAVASTATIN SODIUM 40 MG
40 TABLET ORAL DAILY
Status: DISCONTINUED | OUTPATIENT
Start: 2023-09-18 | End: 2023-09-25 | Stop reason: HOSPADM

## 2023-09-18 RX ORDER — BISACODYL 10 MG
10 SUPPOSITORY, RECTAL RECTAL DAILY PRN
Status: DISCONTINUED | OUTPATIENT
Start: 2023-09-18 | End: 2023-09-24

## 2023-09-18 RX ORDER — POLYETHYLENE GLYCOL 3350 17 G/17G
17 POWDER, FOR SOLUTION ORAL DAILY
Status: DISCONTINUED | OUTPATIENT
Start: 2023-09-19 | End: 2023-09-24

## 2023-09-18 RX ORDER — ROPINIROLE 0.5 MG/1
0.5 TABLET, FILM COATED ORAL NIGHTLY
Status: DISCONTINUED | OUTPATIENT
Start: 2023-09-18 | End: 2023-09-20

## 2023-09-18 RX ORDER — TRAZODONE HYDROCHLORIDE 50 MG/1
50 TABLET ORAL NIGHTLY PRN
Status: DISCONTINUED | OUTPATIENT
Start: 2023-09-18 | End: 2023-09-25 | Stop reason: HOSPADM

## 2023-09-18 RX ORDER — GABAPENTIN 300 MG/1
300 CAPSULE ORAL NIGHTLY
Status: DISCONTINUED | OUTPATIENT
Start: 2023-09-18 | End: 2023-09-20

## 2023-09-18 RX ORDER — MELATONIN
2000 DAILY
Status: DISCONTINUED | OUTPATIENT
Start: 2023-09-18 | End: 2023-09-25 | Stop reason: HOSPADM

## 2023-09-18 RX ORDER — LEVOTHYROXINE SODIUM 88 UG/1
88 TABLET ORAL
Status: DISCONTINUED | OUTPATIENT
Start: 2023-09-19 | End: 2023-09-20

## 2023-09-18 RX ORDER — ASPIRIN 81 MG/1
81 TABLET ORAL DAILY
Status: DISCONTINUED | OUTPATIENT
Start: 2023-09-18 | End: 2023-09-25 | Stop reason: HOSPADM

## 2023-09-18 RX ORDER — DILTIAZEM HYDROCHLORIDE 120 MG/1
120 CAPSULE, COATED, EXTENDED RELEASE ORAL DAILY
Status: DISCONTINUED | OUTPATIENT
Start: 2023-09-18 | End: 2023-09-24

## 2023-09-18 RX ADMIN — PRAVASTATIN SODIUM 40 MG: 40 TABLET ORAL at 18:00

## 2023-09-18 RX ADMIN — DILTIAZEM HYDROCHLORIDE 120 MG: 120 CAPSULE, COATED, EXTENDED RELEASE ORAL at 16:24

## 2023-09-18 RX ADMIN — INSULIN GLARGINE 8 UNITS: 100 INJECTION, SOLUTION SUBCUTANEOUS at 21:20

## 2023-09-18 RX ADMIN — INSULIN LISPRO 2 UNITS: 100 INJECTION, SOLUTION INTRAVENOUS; SUBCUTANEOUS at 08:13

## 2023-09-18 RX ADMIN — GABAPENTIN 300 MG: 300 CAPSULE ORAL at 21:08

## 2023-09-18 RX ADMIN — ASPIRIN 81 MG: 81 TABLET, COATED ORAL at 13:56

## 2023-09-18 RX ADMIN — ROPINIROLE HYDROCHLORIDE 0.5 MG: 0.5 TABLET, FILM COATED ORAL at 21:08

## 2023-09-18 RX ADMIN — PANTOPRAZOLE SODIUM 40 MG: 40 TABLET, DELAYED RELEASE ORAL at 18:00

## 2023-09-18 RX ADMIN — DEXAMETHASONE SODIUM PHOSPHATE 4 MG: 4 INJECTION, SOLUTION INTRAMUSCULAR; INTRAVENOUS at 08:13

## 2023-09-18 RX ADMIN — Medication 2000 UNITS: at 13:56

## 2023-09-18 RX ADMIN — DEXAMETHASONE SODIUM PHOSPHATE 4 MG: 4 INJECTION, SOLUTION INTRAMUSCULAR; INTRAVENOUS at 13:56

## 2023-09-18 RX ADMIN — DEXAMETHASONE SODIUM PHOSPHATE 4 MG: 4 INJECTION, SOLUTION INTRAMUSCULAR; INTRAVENOUS at 21:07

## 2023-09-18 RX ADMIN — GADOBENATE DIMEGLUMINE 15 ML: 529 INJECTION, SOLUTION INTRAVENOUS at 23:22

## 2023-09-18 RX ADMIN — INSULIN LISPRO 4 UNITS: 100 INJECTION, SOLUTION INTRAVENOUS; SUBCUTANEOUS at 18:00

## 2023-09-18 RX ADMIN — PANTOPRAZOLE SODIUM 40 MG: 40 TABLET, DELAYED RELEASE ORAL at 06:37

## 2023-09-18 RX ADMIN — INSULIN LISPRO 4 UNITS: 100 INJECTION, SOLUTION INTRAVENOUS; SUBCUTANEOUS at 12:30

## 2023-09-18 RX ADMIN — INSULIN LISPRO 4 UNITS: 100 INJECTION, SOLUTION INTRAVENOUS; SUBCUTANEOUS at 21:19

## 2023-09-18 RX ADMIN — SENNOSIDES AND DOCUSATE SODIUM 2 TABLET: 50; 8.6 TABLET ORAL at 08:13

## 2023-09-18 NOTE — THERAPY EVALUATION
Patient Name: Semaj Herrera  : 1940    MRN: [id number]                              Today's Date: 2023       Admit Date: 2023    Visit Dx:     ICD-10-CM ICD-9-CM   1. Fecal impaction  K56.41 560.32   2. Weakness of both lower extremities  R29.898 729.89   3. Prostate cancer metastatic to bone  C61 185    C79.51 198.5     Patient Active Problem List   Diagnosis    Type 2 diabetes mellitus with kidney complication, with long-term current use of insulin    Fatigue    Hyperlipidemia    Hypertension    Left ventricular hypertrophy    Obstructive sleep apnea syndrome treated auto BiPAP    Sinus bradycardia    Prostate cancer metastatic to bone    Mediastinal adenopathy    Malignant neoplasm metastatic to bone    Chronic kidney disease, stage III (moderate)    Diastolic dysfunction    Localized edema    Neuropathy    Hypersomnia due to medical condition treated with modafinil 200 mg every morning    CSA (central sleep apnea)    Restless legs syndrome (RLS)    Psychophysiological insomnia    Edema    Type 2 diabetes mellitus with diabetic chronic kidney disease    Class 2 severe obesity due to excess calories with serious comorbidity and body mass index (BMI) of 35.0 to 35.9 in adult    Aortic stenosis, mild    Ascending aorta dilatation    Fecal impaction in rectum    Lower extremity weakness    Anemia, chronic disease     Past Medical History:   Diagnosis Date    Aortic stenosis, mild 2021    Per echocardiogram     Ascending aorta dilatation     Asthma     Benign prostatic hyperplasia     Bone cancer     Cellulitis of great toe of left foot 10/19/2018    CKD (chronic kidney disease)     Stage 3; followed by Dr. Vicky Duran     Diastolic dysfunction     GRADE II per echo     Difficulty breathing     during exertion    Dyslipidemia     Erectile dysfunction     Fatigue     Heart murmur     History of blood transfusion     History of kidney stones     HL (hearing loss)     Hyperlipidemia      Hypertension     Hyponatremia     Kidney stone     Left ventricular hypertrophy     per echo 2018    Localized edema     Neuropathy     Obstructive sleep apnea     USING CPAP    Osteoarthritis     Peptic ulcer     Pneumonia     Pneumonia of left upper lobe due to infectious organism 03/15/2019    Prostate cancer     Status post prostatectomy, radiation therapy, and hormone therapy followed by Dr. Lee; metastatsis to bone    Pure hyperglyceridemia     Restless leg syndrome     Sepsis     Sinus bradycardia     Transient cerebral ischemia     Type 2 diabetes mellitus      Past Surgical History:   Procedure Laterality Date    BRONCHOSCOPY N/A 04/09/2018    Procedure: BRONCHOSCOPY;  Surgeon: Bijan Rivera III, MD;  Location: [facility];  Service: Cardiothoracic    COLONOSCOPY      DEEP NECK LYMPH NODE BIOPSY / EXCISION      HEMORRHOIDECTOMY      LYMPH NODE BIOPSY      MEDIASTINOSCOPY N/A 04/09/2018    Procedure: MEDIASTINOSCOPY WITH LYMPH NODE BIOPSY;  Surgeon: Bijan Rivera III, MD;  Location: [facility];  Service: Cardiothoracic    OTHER SURGICAL HISTORY      ulcer repair    PROSTATECTOMY  2006      General Information       Row Name 09/18/23 1318          Physical Therapy Time and Intention    Document Type evaluation  -EJ     Mode of Treatment physical therapy;occupational therapy;co-treatment  -EJ       Row Name 09/18/23 1318          General Information    Patient Profile Reviewed yes  -EJ     Prior Level of Function independent:;all household mobility;ADL's  previously independent without AD, but recently son has been assisting pt- pt has barely been able to stand or ambulate over past several days  -EJ     Existing Precautions/Restrictions fall  -EJ     Barriers to Rehab medically complex;previous functional deficit  -EJ       Row Name 09/18/23 1318          Living Environment    People in Home child(jovanni), adult  -EJ       Row Name 09/18/23 1318          Cognition    Orientation Status  (Cognition) oriented x 3  -EJ       Row Name 09/18/23 1318          Safety Issues, Functional Mobility    Impairments Affecting Function (Mobility) balance;endurance/activity tolerance;strength  -EJ               User Key  (r) = Recorded By, (t) = Taken By, (c) = Cosigned By      Initials Name Provider Type    Tracy David, PT Physical Therapist                   Mobility       Row Name 09/18/23 1319          Bed Mobility    Bed Mobility supine-sit  -EJ     Supine-Sit [place] (Bed Mobility) verbal cues;moderate assist (50% patient effort)  -EJ     Sit-Supine [place] (Bed Mobility) verbal cues;moderate assist (50% patient effort)  -EJ     Assistive Device (Bed Mobility) head of bed elevated;bed rails  -       Row Name 09/18/23 1319          Sit-Stand Transfer    Sit-Stand [place] (Transfers) 2 person assist;moderate assist (50% patient effort);verbal cues;nonverbal cues (demo/gesture)  -     Assistive Device (Sit-Stand Transfers) walker, front-wheeled  -EJ     Comment, (Sit-Stand Transfer) able to reach full standing  -       Row Name 09/18/23 1319          Gait/Stairs (Locomotion)    [place] Level (Gait) verbal cues;nonverbal cues (demo/gesture);moderate assist (50% patient effort);2 person assist  -EJ     Assistive Device (Gait) walker, front-wheeled  -EJ     Distance in Feet (Gait) 1  -EJ     Comment, (Gait/Stairs) attempted one side step toward HOB, knees buckling  -EJ               User Key  (r) = Recorded By, (t) = Taken By, (c) = Cosigned By      Initials Name Provider Type    Tracy David, PT Physical Therapist                   Obj/Interventions       Row Name 09/18/23 1320          Range of Motion Comprehensive    General Range of Motion no range of motion deficits identified  -       Row Name 09/18/23 1320          Strength Comprehensive (MMT)    Comment, General Manual Muscle Testing (MMT) Assessment generalized weakness in BLE, able to partially lift BLE  against gravity  -EJ       Row Name 09/18/23 1320          Balance    Balance Assessment sitting static balance;standing static balance;standing dynamic balance  -EJ     Static Sitting Balance standby assist  -EJ     Static Standing Balance minimal assist;moderate assist;2-person assist  -EJ     Dynamic Standing Balance moderate assist;maximum assist;2-person assist  -EJ     Position/Device Used, Standing Balance walker, front-wheeled  -EJ               User Key  (r) = Recorded By, (t) = Taken By, (c) = Cosigned By      Initials Name Provider Type    EJ Tracy Garnica, PT Physical Therapist                   Goals/Plan       Row Name 09/18/23 1326          Bed Mobility Goal 1 (PT)    Activity/Assistive Device (Bed Mobility Goal 1, PT) bed mobility activities, all  -EJ     [place] Level/Cues Needed (Bed Mobility Goal 1, PT) contact guard required  -EJ     Time Frame (Bed Mobility Goal 1, PT) 1 week  -[facility] Name 09/18/23 1326          Transfer Goal 1 (PT)    Activity/Assistive Device (Transfer Goal 1, PT) transfers, all;sit-to-stand/stand-to-sit;bed-to-chair/chair-to-bed;walker, rolling  -EJ     [place] Level/Cues Needed (Transfer Goal 1, PT) minimum assist (75% or more patient effort)  -EJ     Time Frame (Transfer Goal 1, PT) 1 week  -[facility] Name 09/18/23 1326          Gait Training Goal 1 (PT)    Activity/Assistive Device (Gait Training Goal 1, PT) gait (walking locomotion);walker, rolling  -EJ     [place] Level (Gait Training Goal 1, PT) minimum assist (75% or more patient effort)  -EJ     Distance (Gait Training Goal 1, PT) 20  -EJ     Time Frame (Gait Training Goal 1, PT) 1 week  -       Row Name 09/18/23 1326          Therapy Assessment/Plan (PT)    Planned Therapy Interventions (PT) balance training;bed mobility training;gait training;home exercise program;stretching;strengthening;stair training;ROM (range of motion);patient/family education;transfer training  -EJ                User Key  (r) = Recorded By, (t) = Taken By, (c) = Cosigned By      Initials Name Provider Type    Tracy David, PT Physical Therapist                   Clinical Impression       Row Name 09/18/23 1321          Pain    Pretreatment Pain Rating 0/10 - no pain  -EJ       Row Name 09/18/23 1321          Plan of Care Review    Plan of Care Reviewed With patient  -EJ     Outcome Evaluation Pt seen for PT eval this am. Pt admitted to [facility] yesterday w fecal impaction and increasing weakness in BLE.  Pt does have hx of prostate cancer w mets. At baseline, pt lives at home w his son. He reports he is usually independent w mobility and ADL; however, over the past week he has become increasingly weak and his son has been assisting w transfers and ADLs. Pt also states he recently fell in the bathroom. Today, pt able to perform bed mobility w min/mod A. He stood w mod A x 2using Rwx. Attempted side step toward HOB, but RLE buckling. Pt w significant lower extremity weakness. Recommend SNU upon DC. Pt will continue to benefit from skilled PT to maximize safety, strength, and independence w mobility.  -       Row Name 09/18/23 1321          Therapy Assessment/Plan (PT)    Rehab Potential (PT) good, to achieve stated therapy goals  -EJ     Criteria for Skilled Interventions Met (PT) yes  -EJ     Therapy Frequency (PT) 6 times/wk  -EJ       Row Name 09/18/23 1321          Positioning and Restraints    Pre-Treatment Position in bed  -EJ     Post Treatment Position bed  -EJ     In Bed notified nsg;supine;call light within reach;encouraged to call for assist;exit alarm on  -EJ               User Key  (r) = Recorded By, (t) = Taken By, (c) = Cosigned By      Initials Name Provider Type    Tracy David, PT Physical Therapist                   Outcome Measures       Row Name 09/18/23 1326          How much help from another person do you currently need...    Turning from your back to your side while in flat bed without  using bedrails? 3  -EJ     Moving from lying on back to sitting on the side of a flat bed without bedrails? 2  -EJ     Moving to and from a bed to a chair (including a wheelchair)? 2  -EJ     Standing up from a chair using your arms (e.g., wheelchair, bedside chair)? 2  -EJ     Climbing 3-5 steps with a railing? 1  -EJ     To walk in hospital room? 1  -EJ     AM-PAC 6 Clicks Score (PT) 11  -EJ     Highest level of mobility 4 --> Transferred to chair/commode  -       Row Name 09/18/23 1326 09/18/23 1250       Functional Assessment    Outcome Measure Options AM-PAC 6 Clicks Basic Mobility (PT)  -EJ AM-PAC 6 Clicks Daily Activity (OT)  -CE              User Key  (r) = Recorded By, (t) = Taken By, (c) = Cosigned By      Initials Name Provider Type    EJ Tracy Garnica, PT Physical Therapist    CE Ryann Hernandez, OT Occupational Therapist                                 Physical Therapy Education       Title: PT OT SLP Therapies (In Progress)       Topic: Physical Therapy (In Progress)       Point: Mobility training (Done)       Learning Progress Summary             Patient Acceptance, E,TB,D, VU,NR by  at 9/18/2023 1327                         Point: Home exercise program (Not Started)       Learner Progress:  Not documented in this visit.              Point: Body mechanics (Not Started)       Learner Progress:  Not documented in this visit.              Point: Precautions (Not Started)       Learner Progress:  Not documented in this visit.                              User Key       Initials Effective Dates Name Provider Type Discipline     06/16/21 -  Tracy Garnica, PT Physical Therapist PT                  PT Recommendation and Plan  Planned Therapy Interventions (PT): balance training, bed mobility training, gait training, home exercise program, stretching, strengthening, stair training, ROM (range of motion), patient/family education, transfer training  Plan of Care Reviewed With: patient  Outcome  Evaluation: Pt seen for PT eval this am. Pt admitted to [facility] yesterday w fecal impaction and increasing weakness in BLE.  Pt does have hx of prostate cancer w mets. At baseline, pt lives at home w his son. He reports he is usually independent w mobility and ADL; however, over the past week he has become increasingly weak and his son has been assisting w transfers and ADLs. Pt also states he recently fell in the bathroom. Today, pt able to perform bed mobility w min/mod A. He stood w mod A x 2using Rwx. Attempted side step toward HOB, but RLE buckling. Pt w significant lower extremity weakness. Recommend SNU upon DC. Pt will continue to benefit from skilled PT to maximize safety, strength, and independence w mobility.     Time Calculation:         PT Charges       Row Name 09/18/23 1328             Time Calculation    Start Time 1045  -EJ      Stop Time 1103  -EJ      Time Calculation (min) 18 min  -EJ      PT Received On 09/18/23  -EJ      PT - Next Appointment 09/19/23  -EJ      PT Goal Re-Cert Due Date 09/25/23  -EJ         Time Calculation- PT    Total Timed Code Minutes- PT 12 minute(s)  -EJ                User Key  (r) = Recorded By, (t) = Taken By, (c) = Cosigned By      Initials Name Provider Type    EJ Tracy Garnica, PT Physical Therapist                  Therapy Charges for Today       Code Description Service Date Service Provider Modifiers Qty    02778807464 HC PT EVAL MOD COMPLEXITY 3 9/18/2023 Tracy Garnica, PT GP 1    72398645450 HC PT THER PROC EA 15 MIN 9/18/2023 Tracy Garnica, PT GP 1    49176086022 HC PT THER SUPP EA 15 MIN 9/18/2023 Tracy Garnica, PT GP 1            PT G-Codes  Outcome Measure Options: AM-PAC 6 Clicks Basic Mobility (PT)  AM-PAC 6 Clicks Score (PT): 11  AM-PAC 6 Clicks Score (OT): 15  PT Discharge Summary  Anticipated Discharge Disposition (PT): skilled nursing facility    Tracy Garnica PT  9/18/2023

## 2023-09-18 NOTE — PROGRESS NOTES
Nutrition Services    Patient Name:  Semaj Herrera  YOB: 1940  MRN: [id number]  Admit Date:  9/17/2023  Assessment Date:  09/19/23    Summary: MST score 4    Nutrition assessment completed 2/2 MST score 4. Healthy heart diet initiated yesterday per MD. PO intake % x 3 meals past 24 hrs. Agreeable to ONS BID with meals. Weight history reviewed with 21 lb (10.9%) weight loss past 5 months. Pt reports appetite relatively normal and denies n/v at this time. NFPE completed with moderate wasting observed. +1 BM past 24 hrs per I/Os.     Patient meets ASPEN/AND criteria for nutrition diagnosis of moderate malnutrition of chronic illness based on: 10.9% weight loss past 5 months, moderate fat and muscle wasting and measurably reduced functional capacity.     Recommendations:   Change diet to consistent carb for management of DM.   Add Boost Glucose Control BID with meals for additional nutrition support.     RD will continue to follow course for PO intake and tolerance.    CLINICAL NUTRITION ASSESSMENT      Reason for Assessment MST score 2+     Diagnosis/Problem   Fecal impaction in rectum   Medical/Surgical History Past Medical History:   Diagnosis Date    Aortic stenosis, mild 01/01/2021    Per echocardiogram 2021    Ascending aorta dilatation     Asthma     Benign prostatic hyperplasia     Bone cancer     Cellulitis of great toe of left foot 10/19/2018    CKD (chronic kidney disease)     Stage 3; followed by Dr. Vicky Duran     Diastolic dysfunction     GRADE II per echo 2018    Difficulty breathing     during exertion    Dyslipidemia     Erectile dysfunction     Fatigue     Heart murmur     History of blood transfusion     History of kidney stones     HL (hearing loss)     Hyperlipidemia     Hypertension     Hyponatremia     Kidney stone     Left ventricular hypertrophy     per echo 2018    Localized edema     Neuropathy     Obstructive sleep apnea     USING CPAP    Osteoarthritis     Peptic  "ulcer     Pneumonia     Pneumonia of left upper lobe due to infectious organism 03/15/2019    Prostate cancer     Status post prostatectomy, radiation therapy, and hormone therapy followed by Dr. Lee; metastatsis to bone    Pure hyperglyceridemia     Restless leg syndrome     Sepsis     Sinus bradycardia     Transient cerebral ischemia     Type 2 diabetes mellitus        Past Surgical History:   Procedure Laterality Date    BRONCHOSCOPY N/A 04/09/2018    Procedure: BRONCHOSCOPY;  Surgeon: Bijan Rivera III, MD;  Location: [facility] OR;  Service: Cardiothoracic    COLONOSCOPY      DEEP NECK LYMPH NODE BIOPSY / EXCISION      HEMORRHOIDECTOMY      LYMPH NODE BIOPSY      MEDIASTINOSCOPY N/A 04/09/2018    Procedure: MEDIASTINOSCOPY WITH LYMPH NODE BIOPSY;  Surgeon: Bijan Rivera III, MD;  Location: [facility];  Service: Cardiothoracic    OTHER SURGICAL HISTORY      ulcer repair    PROSTATECTOMY  2006        Encounter Information        Nutrition History:     Factors Affecting Intake: altered GI function, decreased appetite     Anthropometrics        Current Height  Current Weight  BMI kg/m2 Height: 162.6 cm (64\")  Weight: 77 kg (169 lb 12.1 oz) (09/19/23 0500)  Body mass index is 29.12 kg/m².   Adjusted BMI (if applicable)    BMI Category Overweight (25 - 29.9)       Admission Weight 174 lb (79 kg)       Ideal Body Weight (IBW) 130 lb (59 kg)       Usual Body Weight (UBW) 203 lb (1/2023)   Weight Trend Loss, Amount/Timeframe: 29 lb (14%) weight loss past 8 months, 21 lb (10.8%) weight loss past 5 months per weight history       Weight History Wt Readings from Last 30 Encounters:   09/19/23 0500 77 kg (169 lb 12.1 oz)   09/18/23 0430 76.6 kg (168 lb 14 oz)   09/17/23 1141 78.9 kg (174 lb)   09/18/23 2122 76.2 kg (168 lb)   09/08/23 1155 79.1 kg (174 lb 4.8 oz)   07/24/23 1130 81.4 kg (179 lb 8 oz)   07/03/23 1053 82 kg (180 lb 12.8 oz)   06/15/23 0958 84.4 kg (186 lb)   06/12/23 1030 83.7 kg (184 lb 8 oz) "   05/25/23 0858 86.2 kg (190 lb)   05/15/23 1016 85.6 kg (188 lb 11.2 oz)   04/17/23 0830 88.7 kg (195 lb 9.6 oz)   03/20/23 1014 87.9 kg (193 lb 12.8 oz)   02/20/23 0838 88.5 kg (195 lb 3.2 oz)   02/20/23 1030 88.5 kg (195 lb 1.6 oz)   02/13/23 0916 89.7 kg (197 lb 12.8 oz)   02/09/23 1424 90.6 kg (199 lb 11.2 oz)   02/02/23 0806 89.8 kg (198 lb)   01/18/23 0901 92.4 kg (203 lb 12.8 oz)   01/11/23 0805 90.9 kg (200 lb 6.4 oz)   12/21/22 1121 89.3 kg (196 lb 14.4 oz)   11/17/22 0840 93 kg (205 lb)   09/21/22 1137 90.4 kg (199 lb 6.4 oz)   08/04/22 0859 89.4 kg (197 lb)   06/13/22 0932 90.7 kg (200 lb)   05/27/22 1100 91 kg (200 lb 9.6 oz)   05/02/22 1244 88.8 kg (195 lb 11.2 oz)   03/21/22 0918 88.2 kg (194 lb 6.4 oz)   03/21/22 0849 88.1 kg (194 lb 3.2 oz)   02/14/22 0856 88 kg (194 lb)   12/08/21 1056 88.5 kg (195 lb 3.2 oz)   12/03/21 1357 87.5 kg (193 lb)        Estimated/Assessed Needs        Current Weight  Weight: 77 kg (169 lb 12.1 oz) (09/19/23 0500)       Energy Requirements    Weight for Calculation 77 kg   Method for Estimation  22 kcal/kg, 25 kcal/kg   EST Needs (kcal/day) 6424-1626 kcal       Protein Requirements    Weight for Calculation 77 kg   EST Protein Needs (g/kg) 1.0 - 1.2 gm/kg   EST Daily Needs (g/day) 77-92 gm       Fluid Requirements     Method for Estimation 1 mL/kcal    EST Needs (mL/day)      Tests/Procedures        Tests/Procedures No new tests/procedures     Labs       Pertinent Labs    Results from last 7 days   Lab Units 09/19/23  0631 09/18/23  1441 09/18/23  0412 09/17/23  1207   SODIUM mmol/L 135*  --  136 137   POTASSIUM mmol/L 5.2 4.9 5.4* 4.6   CHLORIDE mmol/L 105  --  105 102   CO2 mmol/L 19.0*  --  21.0* 19.9*   BUN mg/dL 20  --  10 10   CREATININE mg/dL 0.83  --  0.73* 0.70*   CALCIUM mg/dL 8.3*  --  8.0* 8.1*   BILIRUBIN mg/dL 0.3  --   --  0.4   ALK PHOS U/L 351*  --   --  385*   ALT (SGPT) U/L 7  --   --  10   AST (SGOT) U/L 12  --   --  16   GLUCOSE mg/dL 212*  --  176*  147*     Results from last 7 days   Lab Units 09/19/23  0631 09/18/23  0412 09/17/23  1207   MAGNESIUM mg/dL  --   --  2.8*   HEMOGLOBIN g/dL 7.1*   < > 8.1*   HEMATOCRIT % 21.9*   < > 25.2*   WBC 10*3/mm3 7.77   < > 6.42   ALBUMIN g/dL 3.9  --  3.9    < > = values in this interval not displayed.     Results from last 7 days   Lab Units 09/19/23  0631 09/18/23  0412 09/17/23  1207   PLATELETS 10*3/mm3 102* 87* 106*     No results found for: COVID19  Lab Results   Component Value Date    HGBA1C 8.80 (H) 03/16/2019          Medications           Scheduled Medications aspirin, 81 mg, Oral, Daily  [START ON 9/20/2023] fentaNYL, 1 patch, Transdermal, Q72H   And  Check Fentanyl Patch Placement, 1 each, Does not apply, Q12H  cholecalciferol, 2,000 Units, Oral, Daily  dexAMETHasone, 4 mg, Intravenous, Q6H  dilTIAZem CD, 120 mg, Oral, Daily  gabapentin, 300 mg, Oral, Nightly  insulin glargine, 12 Units, Subcutaneous, Nightly  insulin lispro, 2 Units, Subcutaneous, TID With Meals  insulin lispro, 2-9 Units, Subcutaneous, 4x Daily AC & at Bedtime  levothyroxine, 88 mcg, Oral, Q AM  modafinil, 200 mg, Oral, Daily  pantoprazole, 40 mg, Oral, BID AC  senna-docusate sodium, 2 tablet, Oral, BID   And  polyethylene glycol, 17 g, Oral, Daily  pravastatin, 40 mg, Oral, Daily  rOPINIRole, 0.5 mg, Oral, Nightly       Infusions     PRN Medications   acetaminophen    senna-docusate sodium **AND** polyethylene glycol **AND** bisacodyl **AND** bisacodyl    calcium carbonate    dextrose    dextrose    glucagon (human recombinant)    HYDROmorphone    melatonin    ondansetron **OR** ondansetron    traZODone     Physical Findings          General Appearance alert, oriented, room air   Oral/Mouth Cavity tooth or teeth missing   Edema  no edema   Gastrointestinal last bowel movement: 9/18   Skin  bruising   Tubes/Drains/Lines none   NFPE Date Completed: 9/19     Malnutrition Severity Assessment      Patient meets criteria for : Moderate  (non-severe) Malnutrition  Malnutrition Type (last 8 hours)       Malnutrition Severity Assessment       Row Name 09/19/23 1202       Malnutrition Severity Assessment    Malnutrition Type Chronic Disease - Related Malnutrition      Row Name 09/19/23 1202       Insufficient Energy Intake     Insufficient Energy Intake Findings Mild      Row Name 09/19/23 1202       Unintentional Weight Loss     Unintentional Weight Loss Findings Moderate    Unintentional Weight Loss  Weight loss greater than 10% in six months  21 lb (10.9%) weight loss past 5 months per weight history      Row Name 09/19/23 1202       Muscle Loss    Loss of Muscle Mass Findings Moderate    [place] Region Moderate - slight depression    Clavicle Bone Region Moderate - some protrusion in females, visible in males    Acromion Bone Region Moderate - acromion may slightly protrude      Row Name 09/19/23 1202       Fat Loss    Subcutaneous Fat Loss Findings Moderate    Orbital Region  Moderate -  somewhat hollowness, slightly dark circles      Row Name 09/19/23 1202       Declining Functional Status    Declining Functional Status Findings Measurably Reduced      Row Name 09/19/23 1202       Criteria Met (Must meet criteria for severity in at least 2 of these categories: M Wasting, Fat Loss, Fluid, Secondary Signs, Wt. Status, Intake)    Patient meets criteria for  Moderate (non-severe) Malnutrition                       Current Nutrition Orders & Evaluation of Intake       Oral Nutrition     Food Allergies NKFA   Current PO Diet Diet: Diabetic Diets; Consistent Carbohydrate; Texture: Regular Texture (IDDSI 7); Fluid Consistency: Thin (IDDSI 0)   Supplement n/a   PO Evaluation     % PO Intake/# of Days % x 3 meals   --  PES STATEMENT / NUTRITION DIAGNOSIS      Nutrition Dx Problem  Problem: Unintentional Weight Loss  Etiology: Factors Affecting Nutrition - altered GI function, decreased appetite    Signs/Symptoms: Report of Minimal PO Intake and  Unintended Weight Change     --  NUTRITION INTERVENTION / PLAN OF CARE      Intervention Goal(s) Improved nutrition related labs, Reduce/improve symptoms, Meet estimated needs, Disease management/therapy, Tolerate PO , Continue positive trend, Maintain weight, and No significant weight loss         RD Intervention/Action Encourage intake, Continue to monitor, and Care plan reviewed         Prescription/Orders:       PO Diet       Supplements Boost Glucose Control BID with meals       Snacks       Enteral Nutrition       Parenteral Nutrition    New Prescription Ordered? Yes   --      Monitor/Evaluation Per protocol, I&O, PO intake, Supplement intake, Pertinent labs, Weight, Skin status, GI status, Symptoms   Discharge Plan/Needs Pending clinical course   --    RD to follow per protocol.      Electronically signed by:  iNrali Torres RD  09/19/23 12:02 EDT

## 2023-09-18 NOTE — PLAN OF CARE
Goal Outcome Evaluation:  Plan of Care Reviewed With: patient           Outcome Evaluation: Pt seen for PT eval this am. Pt admitted to [facility] yesterday w fecal impaction and increasing weakness in BLE.  Pt does have hx of prostate cancer w mets. At baseline, pt lives at home w his son. He reports he is usually independent w mobility and ADL; however, over the past week he has become increasingly weak and his son has been assisting w transfers and ADLs. Pt also states he recently fell in the bathroom. Today, pt able to perform bed mobility w min/mod A. He stood w mod A x 2using Rwx. Attempted side step toward HOB, but RLE buckling. Pt w significant lower extremity weakness. Recommend SNU upon DC. Pt will continue to benefit from skilled PT to maximize safety, strength, and independence w mobility.      Anticipated Discharge Disposition (PT): skilled nursing facility

## 2023-09-18 NOTE — CONSULTS
Subjective     REASON FOR CONSULTATION:  1. Metastatic prostate cancer     2.  Significant back pain with weakness in the lower extremities since last 3 days, rule out spinal cord compression  Provide an opinion on any further workup or treatment                             REQUESTING PHYSICIAN:      RECORDS OBTAINED:  Records of the patients history including those obtained from the referring provider were reviewed and summarized in detail.    HISTORY OF PRESENT ILLNESS:  The patient is a 82 y.o. year old male who is here for an opinion about the above issue.    History of Present Illness   Patient is a 82-year-old gentleman with history of metastatic prostate cancer followed by Dr. Lee in our office and was last seen September 8, 2023.  I am seeing this patient for the first time.  Initially in March 2018 he had undergone a CT of the chest which showed multiple sclerotic lesions on the bone and multiple enlarged mediastinal lymph nodes.  PSA was 395.  April 9, 2018 underwent mediastinoscopy and pathology was consistent with metastatic adenocarcinoma consistent with prostate primary.  He was treated with ADT and radiation for symptomatic sites.  He receives Lupron injections every 3 months and Xgeva starting June 2018.  His PSA dropped down.  To 0.08 on June 13, 2019.  Subsequently his PSA started rising starting December 2019.  He continued Lupron and Xgeva.  He had worsening hip pain on May 4, 2020.  But CT scan of the chest abdomen pelvis showed no progression of disease.  He was started on gabapentin.  Bone scan was done and it does not show any worsening of bone mets.  This was done Lety 3, 2020.    Patient's PSA continue to rise and in November 2020 was 25.4.  He was noncompliant with Lupron and that was restarted.  In January 2021 he had progression in the sternum.  And worsening pain in the right femur and patient underwent radiation to the right femur as well as sternum.    In August 2021 his PSA  continue to rise and he was started on Xtandi on September 8, 2021.  PSA started dropping to 4.7.  He continued Xtandi.    In January 2023 patient continued to have increasing bony discomfort.  He had a clarify PET scanning done.  He was eligible for lutetium 177.    Patient 1 subsequently started by [facility] on Jeaneth low to mild April 17, 2023 and increased to 600 mg twice daily along with Xgeva and Lupron every 3 months    In June 12, 2023 patient had bone scan with progressive metastatic disease and patient was to follow-up with hospice and palliative care.  His hemoglobin had dropped down to 7.2 and patient declined blood transfusion.  On September 8, 2023 his hemoglobin was down to 5.2 and he agreed for transfusion    Patient has pain and currently on fentanyl patch 50 mcg every 72 hours and Norco 10 x 325 every 6 hours as needed along with gabapentin at bedtime patient was to receive 2 units of packed red blood cells with premedication dexamethasone and Benadryl.    Patient was admitted yesterday because he was felt weak and unable to stand up.  His hemoglobin was 8.1, platelets of 102    Because of back pain MRI of the lumbar spine done which showed constipation.  With possible fecal impaction.  Liver gallbladder pancreas spleen and adrenal glands are normal.  7 to 8 mm hypodense nodules arising in the upper pole of the right kidney.  Widespread osseous metastasis that appears more pronounced.    We have been consulted and also palliative care has been consulted.    CT abdomen pelvis showed the 7 to 8 mm hypodense nodules in the upper pole of the right kidney not seen on previous exam in 2021.  Probably hyperdense cyst but widespread bony metastasis    MRI lumbar spine shows diffuse marrow changes consistent with metastatic disease.  No vertebral body collapse.  Epidural enhancing soft tissue posterior to the T11 and T12 either directly from its vertebral body or dural metastasis with effacement of the  ventral sac without deformity or compression of the distal thoracic cord.  Multilevel degenerative arthritis    Last PSA had increased from  September 8, 2023    Hematologic/oncologic history:   The patient is an 82-year-old male with significant past medical history including prostate carcinoma, CKD 3 and long-term tobacco use be been seen by primary care in late January, 2018 for hoarseness.  Chest x-ray is now outpatient January 23 revealed sclerotic change in the posterior mid chest to be within 3 adjacent thoracic ertebral bodies of uncertain significance.  A follow-up chest CT revealed numerous sclerotic foci within al visualized bones consistent with metastatic disease, multiple enlarged mediastinal lymph nodes concerning for metastatic lymphadenopathy and a polypoidal abnormality in the right lateral wall of the trachea.  CT of abdomen March 20 revealed extensive osseous metastatic disease mildly enlarged retroperitoneal lymph nodes.  Bone scan March 20 was consistent with widespread osseous metastasis particularly in the proximal half the left humeral shaft, abnormal uptake in the sternum and manubrium and a small focus in the posterior aspect of the calvarium.  This led to a plain film the left humerus with mottled sclerosis involving the shaft of the humerus particularly in the proximal half but no evidence of pathologic fracture.    The patient has additionally type 2 diabetes on insulin pump, again a history of prostate carcinoma prostatectomy 12 years previously, hypertension, and hyperlipidemia.  Additional studies included a PSA of 395.1 from March 14, 2018.The patient's been seen by urology March 15 with plans to start Firmagon.    The patient is now seen in pulmonary clinic with Dr. Bijan Rivera and we have discussed that he will need bronchoscopy and mediastinoscopy.  Interestingly his additional history includes a long occupational exposure including working in the [place] just out of school, subsequent construction work for many years and a 30-pack-year history of smoking stopping in the late 1990s.  He indicates that he followed with Dr. Guillen of urology for many years with no changes in his exams and normal PSAs.  He stopped this follow-up approximately 2 years ago.     Patient was seen in consultation with thoracic surgery on March 28 and plans are made for mediastinoscopy and bronchoscopy with lymph node biopsy.  Pathology revealed that these nodes were consistent with metastatic prostate carcinoma.  He was discussed at thoracic conference.  A PET/CT was not pursued and it was determined that he see medical oncology for hormonal treatment and radiation therapy if symptomatic.  It should be noted that the patient's March 15 [facility] office note describes that Firmagon was planned.     The patient has met with the son and grandson April 23.  We have also contacted Dr. Taylor of urology in that Firmagon was given just after his last visit and would next be due approximately May 3.  Patient feels considerably better with resolution of his pain, normalization of his performance status.  He would like to continue treatment through our office now contacted Dr. Taylor concerning this.    The patient is next seen June 11, 2018 we discussed his follow-up including his treatments with Lupron May 7 and his next treatment on July 30.  He has returned to essentially normal performance status and his PSA has dropped further from 395 March 14 27.8 May 7 and now 2.03 June 11.  We do plan to initiate Xgeva also study him via scans to document his improvement approximately a month from now.   The patient is next seen July 26, 2018.  Repeat imaging demonstrated interval resolution of mediastinal and retroperitoneal lymphadenopathy.  There is thickening in the distal aspect of the appendix with stranding?  Chronic appendicitis.  The patient indicates he is having no such symptoms and  never has.  There is no change in sclerotic bony metastasis in the patient's PSA has dropped substantially to 1.66 by July 19 and 1.79 July 26.  He is actually feeling excellent and this is corroborated by family members.   Patient is next seen January 10, 2019 continuing to do very well and, in fact indicating that [facility], likely [facility] replacements, visual review.  He is not certain is going to continue his care with them or with us or combination of both.    Patient is next seen April 4, 2019.  He is recently discharged after hospitalization March 15-18 with left upper lobe pneumonia.  We reviewed the findings in detail with his gradual recovery now documented.  His studies include a CT of chest which does not demonstrate any further bony lesions and/or pulmonary metastasis having developed.  He is feeling considerably better and approximately back to his baseline.  The patient is next seen June 28, 2019 feeling well but having baseline generally musculoskeletal pain and restless legs.His recent exams include a PSA of 0.81, CMP with potassium of 5.3 with repeat pending.  His performance status overall remains good to very good and is clearly responding to his treatments.  The patient is next seen December 13, 2019 continue to feel well.  He has had, oddly enough, 2 episodes of sleep paralysis which have occurred to him previously and he wonders if there is any connection with his prostate carcinoma though this is felt unlikely.     The patient when assessed in December had his PSA go to 2.9.  We continue with Lupron and Xgeva and is seen back now March 9, 2020 without additional symptoms.  We discussed that a schedule could likely change moving to 3 months for both of these medications particularly if he needs additional therapy directed at progressive disease.     Patient contacted our practice May 4 concern for pain in his hips.  This led to moving his scans  up and they were performed May 8, 2020 showing a sub-6 mm pleural-based pulmonary nodule in the right lower lobe that is new from March 15, 2018, remainder of the sub-6 mm pulmonary nodules bilaterally are stable.  There is extensive osseous metastatic disease as previous with no other new findings.     The patient indicates, when seen, May 13 that his bone pain is now resolved.  While this is good information does not mean that he does not have further bony metastasis and we have discussed that a follow-up bone scan is likely necessary.  Furthermore he is having some difficulty with sleeplessness and increase in restless legs as he continues to stay at home during the COVID 19 crisis which, itself, is producing more anxiety for him.  A follow-up bone scan was obtained Lety 3 revealing multifocal osseous metastatic disease which was compared to March 2, 2018 with improvement.  No new abnormal uptake is present.  He is next seen with us on June 17, 2020 and, fortunately, is not having any significant pain at this point.  We discussed his scans in detail and, on balance, his performance status also remains improved.     It was elected to follow the patient rather than changes antiandrogen therapy.  He is reassessed September 3 with unchanged CMP, H&H of 11.4 and 36.3 with normal white count, platelet count and differential, PSA at 15.8.  Follow-up PA lateral chest x-ray does not show any new abnormalities though there is extensive metastatic bone disease throughout the bony thorax and osteoblastic metastasis have been seen on chest CT May 2020.  The patient is seen with his son Georges September 10 noticing increasing bony discomfort primarily in the right hip following fairly intensive gardening which he does frequently.  Now has further elevation of PSA were wondering whether we should try to intervene sooner per additional antiandrogens.  We will need to further assess him via CT scanning to make this decision      These were obtained October 1 showing extensive sclerotic disease with no metastatic disease in chest abdomen or pelvis.  PSA had gone from 15.8 September 3-16 0.6 October 1.     He still has pain getting up in the morning and after active gardening.  This is manageable with current pain medications and, at present, it is not apparent that he needs to switch medications to gain better control of his disease.  The patient did have follow-up studies done including a PSA November 25, 2020 now at 25.4.  The patient is seen with his son December 10, 2020 doing fair but having some increased pain in the mid sternum and right hip that he ascribes to additional exercise/work in managing his garden.  It is apparent however, that his last bone scan was 6 months ago and we do need to reassess him concerning this.     The patient had follow-up bone scan performed January 11, 2021 showing again uptake in the calvarium, humerus, right medial clavicle, sternum, ribs, spine, sacrum, pelvis, right acetabulum, proximal femora and now left frontal bone which appears new.  There is also mild increase in sternal uptake and equivocal increase in the pelvic lesions of all of the sacrum and the right proximal femur.     The patient is seen with his son January 18, 2021 and indicates that he is having pain gradually worsened in his right hip, mid chest that had been an issue previously.  These findings on bone scan are reviewed with both of them as likely the underlying culprit for his discomfort.  He would need change of the systemic therapy but, at present, will ask for radiation therapy palliation initially.  He was previously treated by Dr. Kulkarni many years ago and will ask her to see him.  We will also make application for the current cost of Xtandi or Zytiga.  The patient is not felt to be a candidate for systemic chemotherapy.     The patient was referred for ration therapy as we continued to assess his PSA on current therapy as  well as exam the cost of Zytiga or Xtandi.  Radiation therapy (Dr. Kulkarni) saw the patient January 26 and felt that the right femur and sternal lesion could benefit from therapy-3000 cGy in 10 fractions.  The patient took treatment February 1 to February 12 to the above areas and is next seen back March 15.  Fortunately his pain has improved dramatically and he is quite happy about this.  Unfortunately he notes some difficulty with swallowing that is temporary and often leads to increased salivation and mucus production.  Patient was asked to return his next assessed April 12, 2021.  He is able to swallow with less pain but still has excess mucus production and issues with salivation.  His PSA has noted increase but he is having no additional bony discomfort at this point and, additionally, has noted low-grade fevers just under 100 degrees at nighttime?.  His weight, appetite and general performance status have remained good to excellent.  We discussed follow-up studies at this point to determine his status.     Follow-up scans were scheduled CT scans of chest and pelvis 5/4/2021 showing osseous metastasis quite similar to previous examinations in the chest, CT of abdomen pelvis also with no acute intra-abdominal adenopathy no indication of abdominal pelvic metastatic disease but again widespread osseous metastasis.  His PSA at this point have been slowly increasing to a level of 44 on 4/12/2021.  As the patient is reassessed 5/10/2021 we find, fortunately, that he is not exceeding symptomatic.  It could make reasonable sense at this point to take the mediastinal nodes from his biopsy 4/9/2018, reassess potential targeted therapies, MSI status, and germline analysis.  Fortunately he is not having significant pain or discomfort and is seen with his son as we discussed the above plan.  Notably a follow-up PSA is found to be 42.6.   Patient is next seen in 6/7/2021 for Lupron and Xgeva.  Fortunately he is feeling  considerably better according to both he and his son though his stamina has reduced.  He is not having additional pain at this point.  We have also reviewed his genetics assessment which was significant only for a variant of unknown significance.  This is not an actionable abnormality.     As the patient's PSA further escalated increasing to 89 7/7/2021 his subsequent Axumin PET was obtained 8/20 demonstrating extensive osteosclerotic metastatic disease showing reduced uptake-typical finding but no evidence of visceral metastatic disease in the neck chest abdomen pelvis.  These findings are discussed with the patient and his son 8/25/2021 and indicative of his progressive disease-etiology of his rising PSA-though the patient is asymptomatic we have discussed moving to initiate Xtandi is available at 160 mg p.o. twice daily along with his current Lupron and Xgeva.     The patient's testing 10/6 included PSA that is dropped to 9.69.  He is seen with his son, Cody, both indicating that he been doing relatively well with treatment but began to notice nausea in the last week.  The schedule that he has been given also needs to be somewhat updated in terms of his Lupron and Xgeva.  He has been able to maintain his appetite and overall performance status to date.     The patient is next assessed 12/8/2021 tolerating his current Xtandi dose better than previous and maintaining a good performance status overall as well as demonstrating a drop in his PSA now to 4.7 on 12/8/2021 compared to high of 112 9/8/2021.  He remains hypophosphatemic and hypocalcemic and we have discussed, again, changing his Xgeva dosing to every 3 months along with his Lupron will continue his Xtandi to 80 mg daily.     The patient is next reviewed 3/21/2022 seen for both Xgeva and Lupron.  Fortunately he is feeling reasonably well with little additional pain though he has restarted to place his garden into shape for the season and is working more  outdoors.     Patient reevaluated 5/2/2022 with repeat CBC including H&H of 10.5 and 33.4, white count 5980 and platelet count of 192,000, currently CMP and PSA pending with a previous PSA 1 month ago at 9.06.  At this point we increased his Xtandi to 160 mg p.o. daily while continuing treatment with every 3 month Xgeva and Lupron.  Notably the patient's Requip  alcohol I am sure it is mariza now at 1.5 mg p.o. nightly and Neurontin 900 mg p.o. daily has improved his restless leg syndrome substantially.     Patient seen 6/13/2022 at which time Xtandi at 160 mg p.o. daily was continued, Xgeva moved to every 6 months and Lupron every 3 months.     This was continued when patient was seen 9/21/2022 though subsequently we proceeded to every 3 months for both medications.     Patient seen 12/21/2022 at which time a subsequent Pylarify scan was requested as result of increasing PSA level and bony discomfort.  The patient received Lupron and Xgeva at this point.      He is seen back with his son 1/11/2023.  The potential treatment options such as Pluvicto (Lutetium Yady 177 vipivotide tetraxetan), now that we know that his PSMA scan is positive, though the patient would have to initially be treated with taxane chemotherapy which would be difficult for him to tolerate, radium-223 considering his bony metastasis.  He has slowly progressive disease however we have discussed additional issues including radiation therapy and/or alteration to another androgen receptor agonist such as darolutamide.  We have discussed all these options moving to have assessments done by radiation therapy and, [facility] as we address his symptoms.     The patient was not felt a candidate for additional treatments via [facility], was able to start darolutamide and completed palliative radiotherapy.  He was stable seen 2/20/2023 though experiencing diarrhea post radiation therapy.     Patient seen 4/17/2023 with darolutamide gradually increased  to 600 mg twice daily.  His Xgeva and Lupron will be given every 3 months.     Patient seen 6/12/2023, unfortunately, PSA increasing and bone scan worsening.  Patient at this point indicated that he did not wish additional therapies but, instead, control of his pain initially.  Plans were made to continue his Lupron and Xgeva seen him at the 3 to 4-week annia and considering palliative care primarily with either hospice or [facility].     Patient seen 7/3/2023 with plans to initiate palliative care starting with [facility].  The patient continued this approach seen back 9/8/2023 referring not to proceed with hospice but again with palliative care including transfusion.    I had discussion with the patient and patient's son today.  Patient has weakness in the lower extremity since last 3 days.  Prior to that he was able to walk and cook by himself.  He has tingling pain in bilateral lower extremities which is significant.  His MRI of the lumbar spine did not show any final cord compression or nerve impingement.  Patient is currently on steroids and will require an MRI of the thoracic and cervical spine     Past Medical History:   Diagnosis Date    Aortic stenosis, mild 01/01/2021    Per echocardiogram 2021    Ascending aorta dilatation     Asthma     Benign prostatic hyperplasia     Bone cancer     Cellulitis of great toe of left foot 10/19/2018    CKD (chronic kidney disease)     Stage 3; followed by Dr. Vicky Duran     Diastolic dysfunction     GRADE II per echo 2018    Difficulty breathing     during exertion    Dyslipidemia     Erectile dysfunction     Fatigue     Heart murmur     History of blood transfusion     History of kidney stones     HL (hearing loss)     Hyperlipidemia     Hypertension     Hyponatremia     Kidney stone     Left ventricular hypertrophy     per echo 2018    Localized edema     Neuropathy     Obstructive sleep apnea     USING CPAP    Osteoarthritis     Peptic ulcer     Pneumonia      Pneumonia of left upper lobe due to infectious organism 03/15/2019    Prostate cancer     Status post prostatectomy, radiation therapy, and hormone therapy followed by Dr. Lee; metastatsis to bone    Pure hyperglyceridemia     Restless leg syndrome     Sepsis     Sinus bradycardia     Transient cerebral ischemia     Type 2 diabetes mellitus         Past Surgical History:   Procedure Laterality Date    BRONCHOSCOPY N/A 04/09/2018    Procedure: BRONCHOSCOPY;  Surgeon: Bijan Rivera III, MD;  Location: [facility] OR;  Service: Cardiothoracic    COLONOSCOPY      DEEP NECK LYMPH NODE BIOPSY / EXCISION      HEMORRHOIDECTOMY      LYMPH NODE BIOPSY      MEDIASTINOSCOPY N/A 04/09/2018    Procedure: MEDIASTINOSCOPY WITH LYMPH NODE BIOPSY;  Surgeon: Bijan Rivera III, MD;  Location: [facility];  Service: Cardiothoracic    OTHER SURGICAL HISTORY      ulcer repair    PROSTATECTOMY  2006        No current facility-administered medications on file prior to encounter.     Current Outpatient Medications on File Prior to Encounter   Medication Sig Dispense Refill    Accu-Chek Guide test strip USE 1 STRIP TO CHECK BLOOD SUGAR 4 TIMES DAILY      Accu-Chek Softclix Lancets lancets USE 1 LANCET TO CHECK GLUCOSE 4 TIMES DAILY      aspirin 81 MG EC tablet Take 1 tablet by mouth Daily.      cholecalciferol (VITAMIN D3) 1000 units tablet Take 2 tablets by mouth Daily.      dilTIAZem CD (CARDIZEM CD) 120 MG 24 hr capsule TAKE 1 CAPSULE EVERY DAY 90 capsule 3    fentaNYL (DURAGESIC) 50 MCG/HR patch Place 1 patch on the skin as directed by provider Every 72 (Seventy-Two) Hours. 10 each 0    gabapentin (NEURONTIN) 300 MG capsule TAKE 3 CAPSULES EVERY NIGHT AT BEDTIME 90 capsule 0    Insulin Aspart (novoLOG) 100 UNIT/ML injection INJECT 100 UNITS VIA PUMP ONCE DAILY      Leuprolide Acetate, 3 Month, (LUPRON DEPOT, 3-MONTH, IM) Inject  into the appropriate muscle as directed by prescriber Every 3 (Three) Months.      levothyroxine  (SYNTHROID, LEVOTHROID) 88 MCG tablet       loperamide (IMODIUM) 1 MG/5ML solution Take 10 mL by mouth 4 (Four) Times a Day As Needed for Diarrhea.      modafinil (PROVIGIL) 200 MG tablet Take 1 tablet by mouth Daily. 90 tablet 1    ondansetron (ZOFRAN) 8 MG tablet Take 1 tablet by mouth 3 (Three) Times a Day As Needed for Nausea or Vomiting. 30 tablet 5    pravastatin (PRAVACHOL) 40 MG tablet Take 1 tablet by mouth Daily.      rOPINIRole (REQUIP) 0.5 MG tablet Take 2 tablets by mouth in the evening and 2 at bedtime. 360 tablet 2    traZODone (DESYREL) 50 MG tablet Take 1-2 tablets by mouth every night at bedtime.      diphenoxylate-atropine (LOMOTIL) 2.5-0.025 MG per tablet Take 1 tablet by mouth 4 (Four) Times a Day As Needed for Diarrhea. 30 tablet 0    [DISCONTINUED] Denosumab (XGEVA SC) Inject  under the skin into the appropriate area as directed Every 30 (Thirty) Days.      [DISCONTINUED] fentaNYL (DURAGESIC) 25 MCG/HR patch Place 1 patch on the skin as directed by provider Every 72 (Seventy-Two) Hours. 5 patch 0    [DISCONTINUED] furosemide (LASIX) 40 MG tablet Take 1 tablet by mouth Every Morning.      [DISCONTINUED] HYDROcodone-acetaminophen (NORCO)  MG per tablet Take 1 tablet by mouth Every 6 (Six) Hours As Needed for Moderate Pain. 100 tablet 0    [DISCONTINUED] meloxicam (MOBIC) 15 MG tablet Take 1 tablet by mouth Daily. 30 tablet 1    [DISCONTINUED] olmesartan (BENICAR) 40 MG tablet TAKE 1 TABLET EVERY DAY (DISCONTINUE LOSARTAN 100MG) 90 tablet 3        ALLERGIES:    Allergies   Allergen Reactions    Niacin Unknown - Low Severity    Statins Unknown - Low Severity        Social History     Socioeconomic History    Marital status:     Years of education: College   Tobacco Use    Smoking status: Former     Packs/day: 1.50     Years: 30.00     Pack years: 45.00     Types: Cigarettes     Start date: 1968     Quit date: 1998     Years since quittin.6     Passive exposure: Past     Smokeless tobacco: Never   Vaping Use    Vaping Use: Never used   Substance and Sexual Activity    Alcohol use: Not Currently     Comment: Caffeine use: 2-3 cups daily    Drug use: Never    Sexual activity: Not Currently        Family History   Problem Relation Age of Onset    Heart failure Mother     Hypertension Mother             Diabetes Mother     Heart disease Mother             Lung cancer Father 46    Hypertension Sister     Lung cancer Sister 60    Hypertension Brother             Lung cancer Brother 62    Diabetes Brother     Heart disease Other     Prostate cancer Brother 60    Cancer Brother             Cancer Sister             Cancer Sister     Cancer Sister     Heart disease Brother             Malig Hyperthermia Neg Hx         Review of Systems   Constitutional:  Negative for appetite change, chills, diaphoresis, fatigue, fever and unexpected weight change.   HENT:  Negative for hearing loss, sore throat and trouble swallowing.    Respiratory:  Negative for cough, chest tightness, shortness of breath and wheezing.    Cardiovascular:  Negative for chest pain, palpitations and leg swelling.   Gastrointestinal:  Negative for abdominal distention, abdominal pain, constipation, diarrhea, nausea and vomiting.   Genitourinary:  Negative for dysuria, frequency, hematuria and urgency.   Musculoskeletal:  Positive for back pain. Negative for joint swelling.        No muscle weakness.   Skin:  Negative for rash and wound.   Neurological:  Positive for weakness. Negative for seizures, syncope, speech difficulty, numbness and headaches.   Hematological:  Negative for adenopathy. Does not bruise/bleed easily.   Psychiatric/Behavioral:  Negative for behavioral problems, confusion and suicidal ideas.       Objective     Vitals:    23 0430 23 0749 23 1358 23 1624   BP:  143/52 127/59 130/60   BP Location:  Right arm Right arm    Patient Position:   Lying Lying    Pulse:  55 57 58   Resp:  16 18    Temp:  97.9 °F (36.6 °C) 97.7 °F (36.5 °C)    TempSrc:  Oral Oral    SpO2:  99% 100%    Weight: 76.6 kg (168 lb 14 oz)      Height:             9/8/2023    11:57 AM   Current Status   ECOG score 1       Physical Exam      CONSTITUTIONAL:  Vital signs reviewed.  Alert and oriented x3  No distress, patient has difficulty walking,   EYES:  Conjunctivae and lids unremarkable.  Extraocular eye movements intact.  HEENT: No evidence of lymphadenopathy, no thyromegaly  EARS,NOSE,MOUTH,THROAT:  Ears and nose appear unremarkable.  Lips, teeth, gums appear unremarkable.  RESPIRATORY:  Normal respiratory effort.  No rales  or wheezing, clear.   CARDIOVASCULAR:  Regular rate and rhythm, no murmur  No significant lower extremity edema.  Abdomen: Soft nontender positive bowel sounds no hepatosplenomegaly  SKIN: No wounds.  No rashes.  MUSCULOSKELETAL/EXTREMITIES: No clubbing or cyanosis.  No apparent unilateral weakness.  NEURO: CN 2-12 appear intact. No focal neurological deficits noted.  PSYCHIATRIC:  Normal judgment and insight.  Normal mood and affect.  Bilateral lateral lower extremity weakness.  With pain    RECENT LABS:  Hematology WBC   Date Value Ref Range Status   09/18/2023 6.46 3.40 - 10.80 10*3/mm3 Final     RBC   Date Value Ref Range Status   09/18/2023 2.82 (L) 4.14 - 5.80 10*6/mm3 Final     Hemoglobin   Date Value Ref Range Status   09/18/2023 7.7 (L) 13.0 - 17.7 g/dL Final     Hematocrit   Date Value Ref Range Status   09/18/2023 23.9 (L) 37.5 - 51.0 % Final     Platelets   Date Value Ref Range Status   09/18/2023 87 (L) 140 - 450 10*3/mm3 Final          Assessment & Plan     #. Back pain and lower extremity weakness that is new since last 3 days with patient's history of metastatic prostate cancer  Patient has had metastatic prostate cancer followed by Dr. Lee in our office  Patient was on doralutamide and Xgeva.  Given new onset weakness in the lower  extremities we will obtain MRI of the thoracic and cervical spine with and without contrast to rule out any spinal cord compression  Continue steroids dexamethasone 4 mg IV every 6 hours  We will consult neurology for weakness of the lower extremities as well as radiation oncology.    #.  Anemia and thrombocytopenia.  S/p packed red blood cell transfusion in the past.  Likely this is related to his previous radiation as well as significant multiple metastasis in the bone.  We will closely follow the labs    1.  Metastatic prostate cancer:  Patient initially diagnosed in 2006 and had a prostatectomy and hormone therapy.  Patient in January 2018 began to complain of shortness of breath and hoarseness.  Chest x-ray obtained 1/23/18 showed sclerotic bone lesions.  CT of the chest 3/12/2018 numerous sclerotic bone foci with all visualized bones consistent with metastatic disease, multiple enlarged mediastinal lymph nodes.  .1 from 3/14/2018.  Patient was started on Firmagon by urology, [facility].  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  Case discussed at thoracic conference and recommendations were to continue ADT and radiation for symptomatic sites.  Lupron injections every 3 mos initiated 5/7/18 PSA at that time 7.810  Monthly Xgeva initiated 6/11/18 PSA 2.030  Last PSA 6/13/2019 0.881  9/20/2019 patient tolerating treatment well, no new concerns.  12/2019 PSA 2.9  3/9/2020  PSA increasing to 5.030, he remains continuing on Lupron and Xgeva and asymptomatic though follow-up CT of chest and pelvis will be requested in 11 weeks prior to follow-up in 12 weeks.   5/4/2020 patient called reported worsening right hip pain, plans to purse CT scans ASAP.   5/8/2020 CT scans C/A/P show no progression of disease. Hip pain improved, Gabapentin added.. Bone scan requested.  6/3/2020 bone scan that does not demonstrate worsening of bone nor  need for localized radiation therapy.  The patient's PSA has been slowly rising and we have not been able to determine worsening of disease and and, therefore, it is not felt warranted add additional medications such as Xtandi or apalutamide to his therapy.  Please note would like to avoid Zytiga try not to add prednisone which would worsen his blood sugar control.  9/3/2020 PSA 15.8, CXR extensive metastatic bone disease- patient reviewed 9/10/2020 reporting increased bony discomfort CT scans requested.  Scans done and reviewed 10/7/2020 ultimately reveal no evidence of progression of disease for visceral involvement or clearly for bone involvement.  After further review with the patient and his son plan continued Xgeva and Lupron.  We have assessed for availability of Xtandi 160 mg p.o. daily and find the cost is currently prohibitive.   11/25/2020 PSA 25.4  12/10/2020 review with some mild increase in bony discomfort.  After repeat discussion we went on to have him undergo Lupron therapy, and his PSA actually to be mildly reduced at 22.2.   Follow-up bone scan 1/11/2021 demonstrates some evidence of progression in sternum, left sacrum and proximal femur.  These findings were reviewed with the patient and his son January 18, 2020 and the patient was referred for palliative radiotherapy(Dr. Kulkarni)  Palliative radiation under care Dr. Kulkarni to right femur and sternal lesion -3000 cGy in 10 fractions given February 1 to February 12 with significant symptom improvement.  3/15/2021 PSA 38.5  4/12/2021 PSA 44  5/10/2021 PSA 42.6 CT chest abdomen pelvis 5/4/2021 - for progression of disease and follow-up PSA 5 10/2021 is at 42 slightly reduced from previous.  We have discussed how to proceed from here and find a significant family history that clearly supports a potential genetic assessment for his malignancy.  It is felt most reasonable at this point to take the mediastinal nodes from his biopsy 4/9/2018 and reassess  for potential targeted therapies, MSI status, as well as germline analysis.  6/7/2021 PSA 65.9 his analysis completed for actionable mutations including germline mutations.  These exams were negative though there is a variant of unknown significance.  Again this is not an actionable mutation.  We proceeded with additional Xgeva and Lupron planning for monthly Xgeva and Lupron at 3 months  7/7/2021 PSA 89 and subsequent testing with Axumin PET was performed confirming extensive osteosclerotic metastatic disease with little uptake, no evidence of visceral metastatic disease in neck chest abdomen or pelvis.  8/25/2021  , PET/CT reviewed, subsequent PSA increased to 105.  Patient fortunately asymptomatic.  Requested reevaluation for Xtandi 160 mg po daily.   9/8/2021 due for his lupron, receiving every 3 months, and monthly Xgeva.  Will have education today for Xtandi and will receive his medication today as well.   Started Xtandi 9/8/2021.  9/22/2021 here for toxicity check, so far tolerating Xtandi quite well other than a slight increase in diarrhea controlled with Imodium.  10/6/2021 continues to tolerate Xtandi well.  Labs are within range today, we will proceed with Xgeva today.  Patient seen 10/20/2021 having more nausea with Xtandi and demonstrating a substantial reduction in PSA level.  After discussion we plan to reduce his dose to 80 mg daily following his PSA and performance status over the next approximate 7 weeks at which time he would be scheduled for his follow-up Lupron.  Patient assessed 12/8/2021 with further reduction in PSA to 4.7, ongoing hypophosphatemia and hypocalcemia-Xgeva moved to every 3 months given on same schedule as Lupron  Patient seen 3/21/2022, 6-week follow-up with mild increase in PSA recognized  Patient reassessed 5/2/2022 with PSA at 10.1, Xtandi moved to 160 mg p.o. daily  Patient assessed 6/13/2022, Xtandi at 160 mg p.o. daily, PSA pending, Xgeva moved to every 6 months,  Lupron every 3 months  Patient seen 9/21/2022 at which time we continued our current approach, reviewed additional options for therapy should he clearly progress.  Patient seen 12/21/2022, increasing bony discomfort, Xgeva and Eligard continued, plans made for Pylarify scanning.   Patient seen 1/11/2023 with his son, discuss potential treatment options such as Pluvicto (Lutetium Yady 177 vipivotide tetraxetan) knowing that Pms-Asa is strongly positive in bone, palliative radiotherapy, radium-223 and alteration to additional androgen receptor such as darolutamide.  Patient referred to radiation therapy for their evaluation and possible treatment options, [facility] as application made for darolutamide and medications altered to include meloxicam 15 mg p.o. daily along with his Norco.  Patient assessed 2/20/2023 improved for radiation therapy, no additional options for treatment and [facility], darolutamide initiated.  Patient assessed 4/17/2023, darolutamide increased to 600 mg twice daily, Xgeva and Lupron every 3 months  5/15/2023: Continues darolutamide 600 mg twice daily.  Continues Xgeva and Lupron every 3 months, due in 1 month.  Hemoglobin today stable at 9.1.  Increased PSA, now 46 previously 29.  Discussed with Dr. Lee and we will proceed with bone scan in 3 weeks.  Subsequent bone scan with progression of metastatic disease to number of sites, issues discussed with patient and his son 6/12/2023 with plans to proceed to, primarily, palliative care approach.  Patient treated with Xgeva and Lupron in 3-4-week follow-up consider hospice or pallitus care.  Patient seen 7/3/2023-further anemia with hemoglobin 7.2 g%.  Plan 1 unit packed RBC transfusion, initiation of Pallitus.  7/24/2023 hemoglobin 7.2.  Patient declines blood transfusion.  He is reporting pain currently not well controlled which will be discussed below.  Meeting with nilton on Friday of this week.  Patient assessed 9/8/2023 with  hemoglobin 5.2 g%, further transfusion planned with additional steroid, Benadryl premedication  Patient admitted September 17, 2023 with pain: Reviewed MRI of the lumbar spine which did not show any spinal cord compression except metastasis.  PSA has been increasing.  CT abdomen pelvis shows widespread bony metastasis.  There is some rectal wall thickening diffusely.  There is fecal impaction.  7 to 8 mm hypodense nodules in the upper pole of the right kidney not seen in the previous exam in 2021.  They are indeterminate.        2.  Neuropathy/ restless legs:    5/13/2020 gabapentin 600 mg at bedtime, trazadone 50 mg QHS.  On gabapentin 300, 2 tablets at bedtime, and requip 0.5.mg    Still having neuropathy symptoms.  Will increase gabapentin to 900 mg at bedtime.   9/22/2021 increase in gabapentin to 900 mg at bedtime has helped neuropathy.  Now taking Requip 2 mg nightly.     3.  Cancer related pain: on Norco 5/325 every 6 hours as needed.  Mobic 15 mg p.o. every morning initiated 1/11/2023, consider MS Contin every 12 hours  Status post palliative radiotherapy with improved pain control by 2/20/2023  Effective pain relief except increasing constipation noted 4/17/2023, narcotic adjusted downward as possible.  Continues Norco 5/325 every 6 hours as needed.  Feels his pain is well controlled on this regimen.  Semaj Herrera reports a pain score of 8.  Given his pain assessment as noted, treatment options were discussed and the following options were decided upon as a follow-up plan to address the patient's pain-plan to add Duragesic 25 mcg every 72 hours daily E scribed to pharmacy  7/24/2023 patient complaining of pain not controlled with current medication.  We will increase fentanyl patch to 50 mcg every 72 hours.  Patient to continue Norco 10/3/2025 every 6 hours if needed for breakthrough pain.     PLAN:      Continue fentanyl patch to 50 mcg every 72 hours  Continue Norco 10/325 every 6 hours for breakthrough  pain.  If patient needs to be transfused he will require dexamethasone and Benadryl prior to transfusion  Currently patient is on 4 mg every 6 hours of dexamethasone  Reviewed MRI of the lumbar spine with significant metastasis without any nerve impingement  Given new onset weakness in the lower extremities will obtain MRI of the cervical and thoracic spine stat to rule out any spinal cord compression  We will consult radiation oncology and neurology to be on the case  Continue gabapentin 100 mg at bedtime, and Requip 2 mg nightly.  Discussed with Dr. Lee and the plan is to consider hospice if patient does feels worse.  Will communicate with patient's family and son  Discussed with patient and patient's son and they certainly want all of the work-up done and are not too keen on hospice at present  Palliative care consult done by primary    Rula Aly MD

## 2023-09-18 NOTE — CASE MANAGEMENT/SOCIAL WORK
Discharge Planning Assessment  [facility]     Patient Name: Semaj Herrera  MRN: [id number]  Today's Date: 9/18/2023    Admit Date: 9/17/2023    Plan: Home with son and HH or Hospice vs SNF.   Discharge Needs Assessment       Row Name 09/18/23 1720       Living Environment    People in Home child(jovanni), adult    Name(s) of People in Home Pt's son lives with him.    Current Living Arrangements home    Potentially Unsafe Housing Conditions none    Primary Care Provided by self    Provides Primary Care For no one, unable/limited ability to care for self    Family Caregiver if Needed child(jovanni), adult    Quality of Family Relationships involved;helpful;supportive    Able to Return to Prior Arrangements yes       Resource/Environmental Concerns    Resource/Environmental Concerns none    Transportation Concerns none       Transition Planning    Patient/Family Anticipates Transition to home with family    Patient/Family Anticipated Services at Transition home health care;hospice care;rehabilitation services    Transportation Anticipated family or friend will provide       Discharge Needs Assessment    Readmission Within the Last 30 Days no previous admission in last 30 days    Equipment Currently Used at Home wheelchair;cane, straight;glucometer;power chair,(recliner lift);medication pump    Concerns to be Addressed decision-making;discharge planning;care coordination/care conferences    Anticipated Changes Related to Illness inability to care for self    Equipment Needed After Discharge none    Discharge Facility/Level of Care Needs home with home health;nursing facility, skilled;other (see comments)  Home with Hospice    Provided Post Acute Provider List? Yes    Post Acute Provider List Home Health;Nursing Home;DME Supplier    Provided Post Acute Provider Quality & Resource List? Yes    Post Acute Provider Quality and Resource List Home Health;Nursing Home    Delivered To Patient;Support Person    Support Person son     Method of Delivery In person    Current Discharge Risk chronically ill;terminally ill                   Discharge Plan       Row Name 09/18/23 3419       Plan    Plan Home with son and HH or Hospice vs SNF.    Patient/Family in Agreement with Plan yes    Plan Comments Met with pt. and sonCody at bedside. Pt gave permission to speak with family present.  Explained roll of . Face sheet and pharmacy verified. Pt's son, Cody, lives with him. Son states he is there 24/7. There are no steps to enter home. Home DME includes a glucometer, wheelchair, cane, insulin pump, and they just purchased a recliner lift chair.  Pt was independent with ADLs a week ago. Son states up until a few days ago pt walked without any DME and was outside hoeing his garden. States a days ago he had to start using his cane. Then when he brought him in the ED pt could not walk. Pt has never been to Rehab or used HH. Son states they had talked to Palliative care but decided not to go with them. States they really do not provide much. Pt and son are discussing Hospice and son states they will probably just go with Hospice.  Pt's PCP is retired. States Dr Schulte is really his PCP at this point. Pt enrolled with [facility] to [facility]. Pt no longer drives. Provided Road to [facility], List of SNFs and HH, and information on Medicare Compare Website. Pt and son are unsure of D/C plan. States plan will depend on findings of tests. Business card given to Pt and son for any questions or follow up. Explained that CCP would follow to assess for discharge needs.  Erick Inman RN-BC                  Continued Care and Services - Admitted Since 9/17/2023    Coordination has not been started for this encounter.       Expected Discharge Date and Time       Expected Discharge Date Expected Discharge Time    Sep 20, 2023            Demographic Summary       Row Name 09/18/23 6798       General Information    Admission Type inpatient    Arrived From emergency  department    Required Notices Provided Important Message from Medicare    Reason for Consult discharge planning                   Functional Status       Row Name 09/18/23 1482       Functional Status    Usual Activity Tolerance moderate    Current Activity Tolerance poor       Functional Status, IADL    Medications assistive equipment    Meal Preparation assistive equipment    Housekeeping assistive equipment    Laundry assistive equipment    Shopping assistive equipment       Mental Status    General Appearance WDL WDL       Mental Status Summary    Recent Changes in Mental Status/Cognitive Functioning no changes       Employment/    Employment Status retired                            Erick Inman RN

## 2023-09-18 NOTE — PROGRESS NOTES
Name: Semaj Herrera ADMIT: 2023   : 1940  PCP: Axel Guardado MD    MRN: [id number] LOS: 0 days   AGE/SEX: 82 y.o. male  ROOM: E4/     Subjective   Subjective   Resting in bed.  Playing a hand-held game.  He denies any current pain, dyspnea, cough, fever or chills.  He does have intermittent nausea with dry heaves, but denies any abdominal pain.  He states that over the last several days he has become progressively weaker and can no longer lift his legs.  He states that he can move them but cannot raise them.  He has had issues walking as a result and reports baseline numbness and tingling related to neuropathy.  He states he did have some constipation as well as trouble urinating, but both of those have resolved as he is currently having bowel movements and voiding well.     Objective   Objective   Vital Signs  Temp:  [97.9 °F (36.6 °C)-98.6 °F (37 °C)] 97.9 °F (36.6 °C)  Heart Rate:  [55-64] 55  Resp:  [16] 16  BP: (143-174)/(52-65) 143/52  SpO2:  [99 %-100 %] 99 %  on   ;   Device (Oxygen Therapy): room air  Body mass index is 28.99 kg/m².    Physical Exam  Vitals and nursing note reviewed.   Constitutional:       Appearance: He is ill-appearing. He is not toxic-appearing.   Cardiovascular:      Rate and Rhythm: Normal rate and regular rhythm.      Pulses: Normal pulses.   Pulmonary:      Effort: Pulmonary effort is normal. No respiratory distress.      Breath sounds: Normal breath sounds.      Comments: Diminished and on room air.  Abdominal:      General: Bowel sounds are normal. There is no distension.      Palpations: Abdomen is soft.      Tenderness: There is no abdominal tenderness.   Musculoskeletal:         General: Swelling (Mild bilateral lower extremities) present. No tenderness.   Skin:     General: Skin is warm and dry.      Findings: No bruising.   Neurological:      Mental Status: He is alert and oriented to person, place, and time.      Sensory: Sensory deficit present.       Motor: Weakness present.      Coordination: Coordination normal.   Psychiatric:         Mood and Affect: Mood normal.         Behavior: Behavior normal.     Results Review:       I reviewed the patient's new clinical results.  Results from last 7 days   Lab Units 09/18/23  0412 09/17/23  1207   WBC 10*3/mm3 6.46 6.42   HEMOGLOBIN g/dL 7.7* 8.1*   PLATELETS 10*3/mm3 87* 106*     Results from last 7 days   Lab Units 09/18/23  0412 09/17/23  1207   SODIUM mmol/L 136 137   POTASSIUM mmol/L 5.4* 4.6   CHLORIDE mmol/L 105 102   CO2 mmol/L 21.0* 19.9*   BUN mg/dL 10 10   CREATININE mg/dL 0.73* 0.70*   GLUCOSE mg/dL 176* 147*   Estimated Creatinine Clearance: 73.1 mL/min (A) (by C-G formula based on SCr of 0.73 mg/dL (L)).  Results from last 7 days   Lab Units 09/17/23  1207   ALBUMIN g/dL 3.9   BILIRUBIN mg/dL 0.4   ALK PHOS U/L 385*   AST (SGOT) U/L 16   ALT (SGPT) U/L 10     Results from last 7 days   Lab Units 09/18/23  0412 09/17/23  1207   CALCIUM mg/dL 8.0* 8.1*   ALBUMIN g/dL  --  3.9   MAGNESIUM mg/dL  --  2.8*       Glucose   Date/Time Value Ref Range Status   09/18/2023 1253 199 (H) 70 - 130 mg/dL Final   09/18/2023 1217 220 (H) 70 - 130 mg/dL Final   09/18/2023 0754 196 (H) 70 - 130 mg/dL Final   09/17/2023 2103 175 (H) 70 - 130 mg/dL Final       aspirin, 81 mg, Oral, Daily  fentaNYL, 1 patch, Transdermal, Q72H   And  [START ON 9/19/2023] Check Fentanyl Patch Placement, 1 each, Does not apply, Q12H  cholecalciferol, 2,000 Units, Oral, Daily  dexAMETHasone, 4 mg, Intravenous, Q6H  dilTIAZem CD, 120 mg, Oral, Daily  gabapentin, 300 mg, Oral, Nightly  insulin lispro, 2-9 Units, Subcutaneous, 4x Daily AC & at Bedtime  [START ON 9/19/2023] levothyroxine, 88 mcg, Oral, Q AM  modafinil, 200 mg, Oral, Daily  pantoprazole, 40 mg, Oral, BID AC  pravastatin, 40 mg, Oral, Daily  rOPINIRole, 0.5 mg, Oral, Nightly  senna-docusate sodium, 2 tablet, Oral, BID       Diet: Cardiac Diets; Healthy Heart (2-3 Na+); Texture: Regular  Texture (IDDSI 7); Fluid Consistency: Thin (IDDSI 0)       Assessment/Plan     Active Hospital Problems    Diagnosis  POA    **Fecal impaction in rectum [K56.41]  Yes    Lower extremity weakness [R29.898]  Yes    Anemia, chronic disease [D63.8]  Yes    Restless legs syndrome (RLS) [G25.81]  Yes    Diastolic dysfunction [I51.89]  Yes    Neuropathy [G62.9]  Yes    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       Mr. Herrera is a 82 y.o. that presented to the hospital with a 2 to 3-day history of increasing bilateral leg weakness as well as constipation in the setting of known metastatic prostate cancer to the bone and lymph nodes.    Fecal impaction in the rectum  -CT A/P on admission with large amount of dense fecal material within the low sigmoid and rectum with rectal wall thickening.  Continued note of widespread osseous metastasis that was more pronounced compared to prior.  -Large bowel movement documented overnight.  Continue bowel regimen.  -We will ask gastroenterology to evaluate.  He is chronically on opiates for his cancer related pain-he may benefit from an anti-opiate induced constipation drug.    Lower extremity weakness  Neuropathy  -MRI of the lumbar spine with metastatic disease.  -PT/OT consulted.  -Resume home gabapentin.  -Likely needs placement.    Prostate cancer metastatic to bone  -Oncology has been consulted.  -Palliative consulted overnight as well.  -Will await their recommendations for ongoing plan of care.  -Resume pain medications as needed.    DM2  -Typically on insulin pump that is not currently being used.  -Apears overall stable  with correctional sliding scale insulin.  -We will add 8 units of Lantus nightly as he is currently required 8 units of correctional insulin thus far  -Titrate pending glucose trends and oral intake.    RONNY  -Home CPAP if available.  -Avoid oversedation.    Diastolic dysfunction  Hypertension  -Established with Dr. Lee for mild aortic stenosis.  -Continue outpatient follow-up.  -Resume home regimen.    CKD 3  -Labs appear stable exception of mildly elevated potassium.  -Will repeat lab now.  -He is established with Dr. Vicky Duran.    Anemia  Thrombocytopenia  -Levels appear overall stable compared to prior-also platelets mildly lower.  -Defer any need for transfusions to oncology.    Discussed with patient, nurse and Dr. Dewey.    VTE Prophylaxis - SCDs  Code Status - NO CPR/intubation. Confirmed with patient at bedside.  Disposition - Anticipate discharge likely to SNF when cleared by all. Suspect hospice in the long-term would be appropriate. Await oncology input.      SYD Oswald  [facility]  09/18/23  13:26 EDT

## 2023-09-18 NOTE — THERAPY EVALUATION
Patient Name: Semaj Herrera  : 1940    MRN: [id number]                              Today's Date: 2023       Admit Date: 2023    Visit Dx:     ICD-10-CM ICD-9-CM   1. Fecal impaction  K56.41 560.32   2. Weakness of both lower extremities  R29.898 729.89   3. Prostate cancer metastatic to bone  C61 185    C79.51 198.5     Patient Active Problem List   Diagnosis    Type 2 diabetes mellitus with kidney complication, with long-term current use of insulin    Fatigue    Hyperlipidemia    Hypertension    Left ventricular hypertrophy    Obstructive sleep apnea syndrome treated auto BiPAP    Sinus bradycardia    Prostate cancer metastatic to bone    Mediastinal adenopathy    Malignant neoplasm metastatic to bone    Chronic kidney disease, stage III (moderate)    Diastolic dysfunction    Localized edema    Neuropathy    Hypersomnia due to medical condition treated with modafinil 200 mg every morning    CSA (central sleep apnea)    Restless legs syndrome (RLS)    Psychophysiological insomnia    Edema    Type 2 diabetes mellitus with diabetic chronic kidney disease    Class 2 severe obesity due to excess calories with serious comorbidity and body mass index (BMI) of 35.0 to 35.9 in adult    Aortic stenosis, mild    Ascending aorta dilatation    Fecal impaction in rectum     Past Medical History:   Diagnosis Date    Aortic stenosis, mild 2021    Per echocardiogram     Ascending aorta dilatation     Asthma     Benign prostatic hyperplasia     Bone cancer     Cellulitis of great toe of left foot 10/19/2018    CKD (chronic kidney disease)     Stage 3; followed by Dr. Vicky Duran     Diastolic dysfunction     GRADE II per echo     Difficulty breathing     during exertion    Dyslipidemia     Erectile dysfunction     Fatigue     Heart murmur     History of blood transfusion     History of kidney stones     HL (hearing loss)     Hyperlipidemia     Hypertension     Hyponatremia     Kidney stone      Left ventricular hypertrophy     per echo 2018    Localized edema     Neuropathy     Obstructive sleep apnea     USING CPAP    Osteoarthritis     Peptic ulcer     Pneumonia     Pneumonia of left upper lobe due to infectious organism 03/15/2019    Prostate cancer     Status post prostatectomy, radiation therapy, and hormone therapy followed by Dr. Lee; metastatsis to bone    Pure hyperglyceridemia     Restless leg syndrome     Sepsis     Sinus bradycardia     Transient cerebral ischemia     Type 2 diabetes mellitus      Past Surgical History:   Procedure Laterality Date    BRONCHOSCOPY N/A 04/09/2018    Procedure: BRONCHOSCOPY;  Surgeon: Bijan Rivera III, MD;  Location: [facility];  Service: Cardiothoracic    COLONOSCOPY      DEEP NECK LYMPH NODE BIOPSY / EXCISION      HEMORRHOIDECTOMY      LYMPH NODE BIOPSY      MEDIASTINOSCOPY N/A 04/09/2018    Procedure: MEDIASTINOSCOPY WITH LYMPH NODE BIOPSY;  Surgeon: Bijan Rivera III, MD;  Location: [facility];  Service: Cardiothoracic    OTHER SURGICAL HISTORY      ulcer repair    PROSTATECTOMY  2006      General Information       Row Name 09/18/23 1229          OT Time and Intention    Document Type evaluation  -CE     Mode of Treatment physical therapy;occupational therapy;co-treatment  -CE       Row Name 09/18/23 1229          General Information    Patient Profile Reviewed yes  -CE     Prior Level of Function independent:;ADL's;transfer  per pt son was assisting the past week or so but prior to this he was able dress self without AD  -CE     Existing Precautions/Restrictions fall  -CE     Barriers to Rehab medically complex;previous functional deficit  -CE       Row Name 09/18/23 1229          Living Environment    People in Home child(jovanni), adult  -CE       Row Name 09/18/23 1229          Cognition    Orientation Status (Cognition) oriented x 3  -CE       Row Name 09/18/23 1229          Safety Issues, Functional Mobility    Safety Issues Affecting  Function (Mobility) insight into deficits/self-awareness  -CE     Impairments Affecting Function (Mobility) balance;endurance/activity tolerance;strength  -CE               User Key  (r) = Recorded By, (t) = Taken By, (c) = Cosigned By      Initials Name Provider Type    Ryann Mathews OT Occupational Therapist                     Mobility/ADL's       Row Name 09/18/23 1232          Bed Mobility    Bed Mobility sit-supine  -CE     Sit-Supine [place] (Bed Mobility) 1 person assist;moderate assist (50% patient effort)  for LE mgt  -CE     Assistive Device (Bed Mobility) head of bed elevated;bed rails  -CE       Row Name 09/18/23 1232          Transfers    Transfers sit-stand transfer;stand-sit transfer  -CE       Row Name 09/18/23 1232          Sit-Stand Transfer    Sit-Stand [place] (Transfers) 2 person assist;moderate assist (50% patient effort)  -CE     Assistive Device (Sit-Stand Transfers) walker, front-wheeled  -CE       Row Name 09/18/23 1232          Stand-Sit Transfer    Stand-Sit [place] (Transfers) moderate assist (50% patient effort);2 person assist  -CE       Row Name 09/18/23 1232          Functional Mobility    Functional Mobility- Comment pt taking two steps to R towards HOB and R knee buckling with weightbearing.  -CE       Row Name 09/18/23 1232          Activities of Daily Living    BADL Assessment/Intervention lower body dressing  -CE       Row Name 09/18/23 1232          Lower Body Dressing Assessment/Training    [place] Level (Lower Body Dressing) don;socks;maximum assist (25% patient effort)  -CE     Position (Lower Body Dressing) sitting up in bed  -CE               User Key  (r) = Recorded By, (t) = Taken By, (c) = Cosigned By      Initials Name Provider Type    Ryann Mathews OT Occupational Therapist                   Obj/Interventions       Row Name 09/18/23 1234          Vision Assessment/Intervention    Visual Impairment/Limitations WFL  -CE       Row  Name 09/18/23 1234          Range of Motion Comprehensive    General Range of Motion no range of motion deficits identified  -CE       Row Name 09/18/23 1234          Strength Comprehensive (MMT)    Comment, General Manual Muscle Testing (MMT) Assessment generalized weakness throughout but BLE>BUE; UEs grossly 4/5 with activity and 2-3/5 in LEs  -CE       Row Name 09/18/23 1234          Motor Skills    Motor Skills functional endurance  -CE     Functional Endurance poor  -CE       Row Name 09/18/23 1234          Balance    Balance Assessment sit to stand dynamic balance  -CE     Sit to Stand Dynamic Balance 2-person assist;moderate assist  -CE     Balance Interventions supported  -CE               User Key  (r) = Recorded By, (t) = Taken By, (c) = Cosigned By      Initials Name Provider Type    CE Ryann Hernandez, OT Occupational Therapist                   Goals/Plan       Row Name 09/18/23 1248          Transfer Goal 1 (OT)    Activity/Assistive Device (Transfer Goal 1, OT) bed-to-chair/chair-to-bed;commode, bedside without drop arms  -CE     [place] Level/Cues Needed (Transfer Goal 1, OT) minimum assist (75% or more patient effort)  -CE     Time Frame (Transfer Goal 1, OT) short term goal (STG);2 weeks  -CE       Row Name 09/18/23 1248          Dressing Goal 1 (OT)    Activity/Device (Dressing Goal 1, OT) dressing skills, all  -CE     [place]/Cues Needed (Dressing Goal 1, OT) minimum assist (75% or more patient effort)  -CE     Time Frame (Dressing Goal 1, OT) short term goal (STG);2 weeks  -CE       Row Name 09/18/23 1248          Toileting Goal 1 (OT)    Activity/Device (Toileting Goal 1, OT) toileting skills, all  -CE     [place] Level/Cues Needed (Toileting Goal 1, OT) minimum assist (75% or more patient effort)  -CE     Time Frame (Toileting Goal 1, OT) short term goal (STG);2 weeks  -CE       Row Name 09/18/23 1248          Therapy Assessment/Plan (OT)    Planned Therapy Interventions (OT)  activity tolerance training;patient/caregiver education/training;adaptive equipment training;BADL retraining;strengthening exercise;transfer/mobility retraining;functional balance retraining  -CE               User Key  (r) = Recorded By, (t) = Taken By, (c) = Cosigned By      Initials Name Provider Type    CE Ryann Hernandez OT Occupational Therapist                   Clinical Impression       Row Name 09/18/23 1235          Pain Assessment    Pretreatment Pain Rating 0/10 - no pain  -CE     Posttreatment Pain Rating 0/10 - no pain  -CE       Row Name 09/18/23 1231          Plan of Care Review    Plan of Care Reviewed With patient  -CE     Outcome Evaluation Pt seen for OT eval after admission 2/2 fecal impaction and weakness in BLEs. Pt reporting that prior to a week ago he was independent with ADLs in home and in past week son has been assisting for all mobility/transfers as well as ADLs 2/2 weakness in LEs. Pt reports falling in the bathroom as well. Pt agreeable to mobilize this date and able to stand with mod A x 2 with HHA. RLE buckling with steps laterally towards HOB as well. Will con't to follow for stated goals and recommend d/c to SNF.  -CE       Row Name 09/18/23 1230          Therapy Assessment/Plan (OT)    Rehab Potential (OT) good, to achieve stated therapy goals  -CE     Criteria for Skilled Therapeutic Interventions Met (OT) yes  -CE     Therapy Frequency (OT) 3 times/wk  -CE       Row Name 09/18/23 1234          Therapy Plan Review/Discharge Plan (OT)    Anticipated Discharge Disposition (OT) skilled nursing facility  -CE       Row Name 09/18/23 1232          Vital Signs    O2 Delivery Pre Treatment room air  -CE     O2 Delivery Intra Treatment room air  -CE     O2 Delivery Post Treatment room air  -CE     Pre Patient Position Supine  -CE     Intra Patient Position Standing  -CE     Post Patient Position Supine  -CE       Row Name 09/18/23 1238          Positioning and Restraints     Pre-Treatment Position in bed  -CE     Post Treatment Position bed  -CE     In Bed notified nsg;side lying right;call light within reach;encouraged to call for assist;exit alarm on  -CE               User Key  (r) = Recorded By, (t) = Taken By, (c) = Cosigned By      Initials Name Provider Type    CE Ryann Hernandez OT Occupational Therapist                   Outcome Measures       Row Name 09/18/23 1250          How much help from another is currently needed...    Putting on and taking off regular lower body clothing? 2  -CE     Bathing (including washing, rinsing, and drying) 2  -CE     Toileting (which includes using toilet bed pan or urinal) 2  -CE     Putting on and taking off regular upper body clothing 2  -CE     Taking care of personal grooming (such as brushing teeth) 3  -CE     Eating meals 4  -CE     AM-PAC 6 Clicks Score (OT) 15  -CE       Row Name 09/18/23 1250          Functional Assessment    Outcome Measure Options AM-PAC 6 Clicks Daily Activity (OT)  -CE               User Key  (r) = Recorded By, (t) = Taken By, (c) = Cosigned By      Initials Name Provider Type     Ryann Hernandez OT Occupational Therapist                    Occupational Therapy Education       Title: PT OT SLP Therapies (Done)       Topic: Occupational Therapy (Done)       Point: ADL training (Done)       Description:   Instruct learner(s) on proper safety adaptation and remediation techniques during self care or transfers.   Instruct in proper use of assistive devices.                  Learning Progress Summary             Patient Acceptance, E, VU,NR by CE at 9/18/2023 1251                                         User Key       Initials Effective Dates Name Provider Type Discipline    CE 10/17/22 -  Ryann Hernandez OT Occupational Therapist OT                  OT Recommendation and Plan  Planned Therapy Interventions (OT): activity tolerance training, patient/caregiver education/training, adaptive equipment training,  BADL retraining, strengthening exercise, transfer/mobility retraining, functional balance retraining  Therapy Frequency (OT): 3 times/wk  Plan of Care Review  Plan of Care Reviewed With: patient  Outcome Evaluation: Pt seen for OT eval after admission 2/2 fecal impaction and weakness in BLEs. Pt reporting that prior to a week ago he was independent with ADLs in home and in past week son has been assisting for all mobility/transfers as well as ADLs 2/2 weakness in LEs. Pt reports falling in the bathroom as well. Pt agreeable to mobilize this date and able to stand with mod A x 2 with HHA. RLE buckling with steps laterally towards HOB as well. Will con't to follow for stated goals and recommend d/c to SNF.     Time Calculation:   Evaluation Complexity (OT)  Review Occupational Profile/Medical/Therapy History Complexity: expanded/moderate complexity  Assessment, Occupational Performance/Identification of Deficit Complexity: 3-5 performance deficits  Clinical Decision Making Complexity (OT): detailed assessment/moderate complexity  Overall Complexity of Evaluation (OT): moderate complexity     Time Calculation- OT       Row Name 09/18/23 1256             Time Calculation- OT    OT Start Time 1055  -CE      OT Stop Time 1103  -CE      OT Time Calculation (min) 8 min  -CE      OT Received On 09/18/23  -CE      OT - Next Appointment 09/20/23  -CE      OT Goal Re-Cert Due Date 10/02/23  -CE                User Key  (r) = Recorded By, (t) = Taken By, (c) = Cosigned By      Initials Name Provider Type    CE Ryann Hernandez OT Occupational Therapist                  Therapy Charges for Today       Code Description Service Date Service Provider Modifiers Qty    [id number]  OT EVAL MOD COMPLEXITY 3 9/18/2023 Ryann Hernandez OT GO 1                 Ryann Hernandez OT  9/18/2023

## 2023-09-18 NOTE — PLAN OF CARE
Goal Outcome Evaluation:  Plan of Care Reviewed With: patient           Outcome Evaluation: Pt seen for OT eval after admission 2/2 fecal impaction and weakness in BLEs. Pt reporting that prior to a week ago he was independent with ADLs in home and in past week son has been assisting for all mobility/transfers as well as ADLs 2/2 weakness in LEs. Pt reports falling in the bathroom as well. Pt agreeable to mobilize this date and able to stand with mod A x 2 with HHA. RLE buckling with steps laterally towards HOB as well. Will con't to follow for stated goals and recommend d/c to SNF.      Anticipated Discharge Disposition (OT): skilled nursing facility

## 2023-09-18 NOTE — CASE MANAGEMENT/SOCIAL WORK
Continued Stay Note  [facility]     Patient Name: Semaj Herrera  MRN: [id number]  Today's Date: 9/18/2023    Admit Date: 9/17/2023    Plan: Home with son and HH or Hospice vs SNF.   Discharge Plan       Row Name 09/18/23 3765       Plan    Plan Home with son and HH or Hospice vs SNF.    Patient/Family in Agreement with Plan yes    Plan Comments Met with pt. and son, Cody at bedside. Pt gave permission to speak with family present.  Explained roll of . Face sheet and pharmacy verified. Pt's son, Cody, lives with him. Son states he is there 24/7. There are no steps to enter home. Home DME includes a glucometer, wheelchair, cane, insulin pump, and they just purchased a recliner lift chair.  Pt was independent with ADLs a week ago. Son states up until a few days ago pt walked without any DME and was outside hoeing his garden. States a days ago he had to start using his cane. Then when he brought him in the ED pt could not walk. Pt has never been to Rehab or used HH. Son states they had talked to Palliative care but decided not to go with them. States they really do not provide much. Pt and son are discussing Hospice and son states they will probably just go with Hospice.  Pt's PCP is retired. States Dr Schulte is really his PCP at this point. Pt enrolled with [facility]. Pt no longer drives. Provided [facility], List of SNFs and HH, and information on Medicare Compare Website. Pt and son are unsure of D/C plan. States plan will depend on findings of tests. Business card given to Pt and son for any questions or follow up. Explained that CCP would follow to assess for discharge needs.  Erick Inman RN-BC                   Discharge Codes    No documentation.                 Expected Discharge Date and Time       Expected Discharge Date Expected Discharge Time    Sep 20, 2023               Erick Inman RN

## 2023-09-18 NOTE — PLAN OF CARE
Goal Outcome Evaluation:  Plan of Care Reviewed With: patient        Progress: no change  Outcome Evaluation: Vitals stable. Pt worked w PT and OT to side of bed. Neuro and Rad/Onc consulted. Pt needs met and questions answered. Will continue to monitor.

## 2023-09-18 NOTE — CONSULTS
[facility]  Initial Inpatient Consult Note    Referring Provider: A     Reason for Consultation: Constipation/fecal impaction    Subjective     History of present illness:      Thank you for allowing us to participate in the care of this patient.  82 y.o. male unknown to our service with history of metastatic prostate cancer, HTN/HLP, CKD stage III, and DM 2.he presented to the ED on 9/17 with low back pain, lower extremity weakness, and acute constipation all going on for 3 days and progressive in nature.  CT scan revealed large amount of fecal material in the low sigmoid and rectum with rectal wall thickening diffusely suspicious for fecal impaction.  Our service consulted for constipation and fecal impaction however he has had 2 large bowel movements since admission, last overnight.  Cleared with rectal suppositories, enemas, and daily Damari-Colace.  He is on chronic opioids-fentanyl patch-due to his cancer-related pain.  There is some consideration for hospice although patient not ready for this step.  He reports typical bowel movements are daily and a good amount but he became constipated when he was unable to ambulate due to the lower extremity weakness.  He admitted to nausea and abdominal pain with the constipation which is now resolved.  Denies melena or hematochezia, vomiting.  He eats well.    9/18/2023 labs show hemoglobin 7.7 which was 8.1 at admission.  But 1 month ago he had dropped down to 5.2.  Normocytic normochromic.  Iron panel in March 2023 was normal.  B12 56, folate 6.03.      Past Medical History:  Past Medical History:   Diagnosis Date    Aortic stenosis, mild 01/01/2021    Per echocardiogram 2021    Ascending aorta dilatation     Asthma     Benign prostatic hyperplasia     Bone cancer     Cellulitis of great toe of left foot 10/19/2018    CKD (chronic kidney disease)     Stage 3; followed by Dr. Vicky Duran     Diastolic dysfunction     GRADE II per echo 2018     Difficulty breathing     during exertion    Dyslipidemia     Erectile dysfunction     Fatigue     Heart murmur     History of blood transfusion     History of kidney stones     HL (hearing loss)     Hyperlipidemia     Hypertension     Hyponatremia     Kidney stone     Left ventricular hypertrophy     per echo 2018    Localized edema     Neuropathy     Obstructive sleep apnea     USING CPAP    Osteoarthritis     Peptic ulcer     Pneumonia     Pneumonia of left upper lobe due to infectious organism 03/15/2019    Prostate cancer     Status post prostatectomy, radiation therapy, and hormone therapy followed by Dr. Lee; metastatsis to bone    Pure hyperglyceridemia     Restless leg syndrome     Sepsis     Sinus bradycardia     Transient cerebral ischemia     Type 2 diabetes mellitus      Past Surgical History:  Past Surgical History:   Procedure Laterality Date    BRONCHOSCOPY N/A 2018    Procedure: BRONCHOSCOPY;  Surgeon: Bijan Rivera III, MD;  Location: [facility] OR;  Service: Cardiothoracic    COLONOSCOPY      DEEP NECK LYMPH NODE BIOPSY / EXCISION      HEMORRHOIDECTOMY      LYMPH NODE BIOPSY      MEDIASTINOSCOPY N/A 2018    Procedure: MEDIASTINOSCOPY WITH LYMPH NODE BIOPSY;  Surgeon: Bijan Rivera III, MD;  Location: [facility] OR;  Service: Cardiothoracic    OTHER SURGICAL HISTORY      ulcer repair    PROSTATECTOMY        Social History:   Social History     Tobacco Use    Smoking status: Former     Packs/day: 1.50     Years: 30.00     Pack years: 45.00     Types: Cigarettes     Start date: 1968     Quit date: 1998     Years since quittin.6     Passive exposure: Past    Smokeless tobacco: Never   Substance Use Topics    Alcohol use: Not Currently     Comment: Caffeine use: 2-3 cups daily      Family History:  Family History   Problem Relation Age of Onset    Heart failure Mother     Hypertension Mother             Diabetes Mother     Heart disease Mother              Lung cancer Father 46    Hypertension Sister     Lung cancer Sister 60    Hypertension Brother             Lung cancer Brother 62    Diabetes Brother     Heart disease Other     Prostate cancer Brother 60    Cancer Brother             Cancer Sister             Cancer Sister     Cancer Sister     Heart disease Brother             Malig Hyperthermia Neg Hx        Home Meds:  Medications Prior to Admission   Medication Sig Dispense Refill Last Dose    Accu-Chek Guide test strip USE 1 STRIP TO CHECK BLOOD SUGAR 4 TIMES DAILY   2023    Accu-Chek Softclix Lancets lancets USE 1 LANCET TO CHECK GLUCOSE 4 TIMES DAILY   2023    aspirin 81 MG EC tablet Take 1 tablet by mouth Daily.   2023    cholecalciferol (VITAMIN D3) 1000 units tablet Take 2 tablets by mouth Daily.   2023    dilTIAZem CD (CARDIZEM CD) 120 MG 24 hr capsule TAKE 1 CAPSULE EVERY DAY 90 capsule 3 2023    fentaNYL (DURAGESIC) 50 MCG/HR patch Place 1 patch on the skin as directed by provider Every 72 (Seventy-Two) Hours. 10 each 0 2023    gabapentin (NEURONTIN) 300 MG capsule TAKE 3 CAPSULES EVERY NIGHT AT BEDTIME 90 capsule 0 2023    Insulin Aspart (novoLOG) 100 UNIT/ML injection INJECT 100 UNITS VIA PUMP ONCE DAILY   2023    Leuprolide Acetate, 3 Month, (LUPRON DEPOT, 3-MONTH, IM) Inject  into the appropriate muscle as directed by prescriber Every 3 (Three) Months.   Past Month    levothyroxine (SYNTHROID, LEVOTHROID) 88 MCG tablet    2023    loperamide (IMODIUM) 1 MG/5ML solution Take 10 mL by mouth 4 (Four) Times a Day As Needed for Diarrhea.   Past Week    modafinil (PROVIGIL) 200 MG tablet Take 1 tablet by mouth Daily. 90 tablet 1 2023    ondansetron (ZOFRAN) 8 MG tablet Take 1 tablet by mouth 3 (Three) Times a Day As Needed for Nausea or Vomiting. 30 tablet 5 2023    pravastatin (PRAVACHOL) 40 MG tablet Take 1 tablet by mouth Daily.   2023    rOPINIRole  (REQUIP) 0.5 MG tablet Take 2 tablets by mouth in the evening and 2 at bedtime. 360 tablet 2 Past Week    traZODone (DESYREL) 50 MG tablet Take 1-2 tablets by mouth every night at bedtime.   Past Week    diphenoxylate-atropine (LOMOTIL) 2.5-0.025 MG per tablet Take 1 tablet by mouth 4 (Four) Times a Day As Needed for Diarrhea. 30 tablet 0 Unknown     Current Meds:   aspirin, 81 mg, Oral, Daily  [START ON 9/20/2023] fentaNYL, 1 patch, Transdermal, Q72H   And  [START ON 9/19/2023] Check Fentanyl Patch Placement, 1 each, Does not apply, Q12H  cholecalciferol, 2,000 Units, Oral, Daily  dexAMETHasone, 4 mg, Intravenous, Q6H  dilTIAZem CD, 120 mg, Oral, Daily  gabapentin, 300 mg, Oral, Nightly  insulin glargine, 8 Units, Subcutaneous, Nightly  insulin lispro, 2-9 Units, Subcutaneous, 4x Daily AC & at Bedtime  [START ON 9/19/2023] levothyroxine, 88 mcg, Oral, Q AM  modafinil, 200 mg, Oral, Daily  pantoprazole, 40 mg, Oral, BID AC  pravastatin, 40 mg, Oral, Daily  rOPINIRole, 0.5 mg, Oral, Nightly  senna-docusate sodium, 2 tablet, Oral, BID      Allergies:  Allergies   Allergen Reactions    Niacin Unknown - Low Severity    Statins Unknown - Low Severity     Review of Systems  Respiratory ROS: no cough, shortness of breath, or wheezing  Cardiovascular ROS: no chest pain or dyspnea on exertion    Objective     Vital Signs  Temp:  [97.7 °F (36.5 °C)-98.6 °F (37 °C)] 97.7 °F (36.5 °C)  Heart Rate:  [55-64] 57  Resp:  [16-18] 18  BP: (127-174)/(52-65) 127/59  Physical Exam:   Constitutional:    Alert, cooperative, in no acute distress    Eyes:          conjunctivae and sclerae normal, no icterus    HENT:   Atraumatic, normal hearing, mucosa moist    Neck:   Trachea midline,    Lungs:     Clear to auscultation bilaterally, normal respiratory effort     Heart:    Regular rhythm and normal rate, no GR, no abdominal bruits    Abdomen:     Soft, nondistended, LLQ TTP without guarding; normal bowel sounds , no organomegaly    Rectal:  Few tiny denuded, uninflamed external hemorrhoids, no external lesions; decreased sphincter tone although still present, GALILEO without mass palpated or hard stool, average amount of soft stool in rectal vault, no blood on gloved finger or externally   Extremities:   no edema or erythema of bilateral lower extremities    Skin:   No bruising, rash, or jaundice   Psychiatric:  normal mood and affect;      Results Review:   I reviewed the patient's new clinical results.    Results from last 7 days   Lab Units 09/18/23  0412 09/17/23  1207   WBC 10*3/mm3 6.46 6.42   HEMOGLOBIN g/dL 7.7* 8.1*   HEMATOCRIT % 23.9* 25.2*   PLATELETS 10*3/mm3 87* 106*     Results from last 7 days   Lab Units 09/18/23  0412 09/17/23  1207   SODIUM mmol/L 136 137   POTASSIUM mmol/L 5.4* 4.6   CHLORIDE mmol/L 105 102   CO2 mmol/L 21.0* 19.9*   BUN mg/dL 10 10   CREATININE mg/dL 0.73* 0.70*   CALCIUM mg/dL 8.0* 8.1*   BILIRUBIN mg/dL  --  0.4   ALK PHOS U/L  --  385*   ALT (SGPT) U/L  --  10   AST (SGOT) U/L  --  16   GLUCOSE mg/dL 176* 147*         Lab Results   Lab Value Date/Time    LIPASE 34 03/15/2019 1647       Radiology:  MRI Lumbar Spine With & Without Contrast   Final Result         Electronically signed by Jose Ahumada M.D. on 09-17-23 at 2123      CT Abdomen Pelvis Without Contrast   Final Result          Assessment & Plan   Active Hospital Problems    Diagnosis     **Fecal impaction in rectum     Lower extremity weakness     Anemia, chronic disease     Restless legs syndrome (RLS)     Diastolic dysfunction     Neuropathy     Prostate cancer metastatic to bone     Obstructive sleep apnea syndrome treated auto BiPAP     Type 2 diabetes mellitus with kidney complication, with long-term current use of insulin     Hypertension     Chronic kidney disease, stage III (moderate)        Assessment:  Acute constipation with fecal impaction  Anemia of chronic disease  Prostate cancer with extensive bony metastases    Plan:  Constipation likely  multifactorial with chronic opioid use due to cancer pain as well as acute neurological lower extremity impairment which limited mobility.  Recommend daily bowel regimen while in hospital and at home to keep bowels moving and prevent impaction.  Bowel regimen may need to be adjusted based on bowel movements and dependent on mobility.  Start MiraLAX daily.  Could consider adding in OIC medication such as Amitiza or Symproic depending on insurance preference/cost and if needed pending response to MiraLAX.  Would not recommend colonoscopy given metastatic prostate cancer as risks would outweigh benefits.  Rectal exam without mass or obstruction.  No further work-up or recommendations from GI perspective.  Thank you for the consult.  Please call us back if our services are needed further.      I discussed the patients findings and my recommendations with patient and family.    Dragon dictation used throughout this note.            Vicky Foreman PA-C  [facility]  [address] [place] [address]  Office: [phone]

## 2023-09-18 NOTE — PLAN OF CARE
Goal Outcome Evaluation:  Plan of Care Reviewed With: patient      Progress: no change   Arrived on floor at 1715 new admit for acute constipation as well as lower extremity weakness. Pending MRI,  A&Ox4, no complaints of pain at this time. Noted fentanyl patch to back (from home), insulin pump taken off and sent home with son. Spoke with Dr Elliott about patient request for Zantac, pain regimen and zofran- pending orders. Pt resting in bed at this time, all questions answered.

## 2023-09-19 LAB
ALBUMIN SERPL-MCNC: 3.9 G/DL (ref 3.5–5.2)
ALBUMIN/GLOB SERPL: 1.3 G/DL
ALP SERPL-CCNC: 351 U/L (ref 39–117)
ALT SERPL W P-5'-P-CCNC: 7 U/L (ref 1–41)
ANION GAP SERPL CALCULATED.3IONS-SCNC: 11 MMOL/L (ref 5–15)
ANISOCYTOSIS BLD QL: ABNORMAL
AST SERPL-CCNC: 12 U/L (ref 1–40)
BILIRUB SERPL-MCNC: 0.3 MG/DL (ref 0–1.2)
BUN SERPL-MCNC: 20 MG/DL (ref 8–23)
BUN/CREAT SERPL: 24.1 (ref 7–25)
CALCIUM SPEC-SCNC: 8.3 MG/DL (ref 8.6–10.5)
CHLORIDE SERPL-SCNC: 105 MMOL/L (ref 98–107)
CO2 SERPL-SCNC: 19 MMOL/L (ref 22–29)
CREAT SERPL-MCNC: 0.83 MG/DL (ref 0.76–1.27)
DEPRECATED RDW RBC AUTO: 49.7 FL (ref 37–54)
EGFRCR SERPLBLD CKD-EPI 2021: 87.4 ML/MIN/1.73
EOSINOPHIL # BLD MANUAL: 0.16 10*3/MM3 (ref 0–0.4)
EOSINOPHIL NFR BLD MANUAL: 2.1 % (ref 0.3–6.2)
ERYTHROCYTE [DISTWIDTH] IN BLOOD BY AUTOMATED COUNT: 17.1 % (ref 12.3–15.4)
FOLATE BLD-MCNC: 360 NG/ML
FOLATE RBC-MCNC: 1506 NG/ML
GLOBULIN UR ELPH-MCNC: 2.9 GM/DL
GLUCOSE BLDC GLUCOMTR-MCNC: 208 MG/DL (ref 70–130)
GLUCOSE BLDC GLUCOMTR-MCNC: 217 MG/DL (ref 70–130)
GLUCOSE BLDC GLUCOMTR-MCNC: 244 MG/DL (ref 70–130)
GLUCOSE BLDC GLUCOMTR-MCNC: 359 MG/DL (ref 70–130)
GLUCOSE SERPL-MCNC: 212 MG/DL (ref 65–99)
HCT VFR BLD AUTO: 21.9 % (ref 37.5–51)
HCT VFR BLD AUTO: 23.9 % (ref 37.5–51)
HGB BLD-MCNC: 7.1 G/DL (ref 13–17.7)
LYMPHOCYTES # BLD MANUAL: 1.2 10*3/MM3 (ref 0.7–3.1)
LYMPHOCYTES NFR BLD MANUAL: 5.2 % (ref 5–12)
MCH RBC QN AUTO: 26.8 PG (ref 26.6–33)
MCHC RBC AUTO-ENTMCNC: 32.4 G/DL (ref 31.5–35.7)
MCV RBC AUTO: 82.6 FL (ref 79–97)
MONOCYTES # BLD: 0.4 10*3/MM3 (ref 0.1–0.9)
MYELOCYTES NFR BLD MANUAL: 1 % (ref 0–0)
NEUTROPHILS # BLD AUTO: 5.93 10*3/MM3 (ref 1.7–7)
NEUTROPHILS NFR BLD MANUAL: 76.3 % (ref 42.7–76)
NRBC SPEC MANUAL: 2.1 /100 WBC (ref 0–0.2)
PLAT MORPH BLD: NORMAL
PLATELET # BLD AUTO: 102 10*3/MM3 (ref 140–450)
PMV BLD AUTO: 10.2 FL (ref 6–12)
POIKILOCYTOSIS BLD QL SMEAR: ABNORMAL
POLYCHROMASIA BLD QL SMEAR: ABNORMAL
POTASSIUM SERPL-SCNC: 5.2 MMOL/L (ref 3.5–5.2)
PROT SERPL-MCNC: 6.8 G/DL (ref 6–8.5)
RBC # BLD AUTO: 2.65 10*6/MM3 (ref 4.14–5.8)
SODIUM SERPL-SCNC: 135 MMOL/L (ref 136–145)
VARIANT LYMPHS NFR BLD MANUAL: 15.5 % (ref 19.6–45.3)
WBC MORPH BLD: NORMAL
WBC NRBC COR # BLD: 7.77 10*3/MM3 (ref 3.4–10.8)

## 2023-09-19 PROCEDURE — 77295 3-D RADIOTHERAPY PLAN: CPT | Performed by: STUDENT IN AN ORGANIZED HEALTH CARE EDUCATION/TRAINING PROGRAM

## 2023-09-19 PROCEDURE — P9016 RBC LEUKOCYTES REDUCED: HCPCS

## 2023-09-19 PROCEDURE — 77334 RADIATION TREATMENT AID(S): CPT | Performed by: STUDENT IN AN ORGANIZED HEALTH CARE EDUCATION/TRAINING PROGRAM

## 2023-09-19 PROCEDURE — 99221 1ST HOSP IP/OBS SF/LOW 40: CPT | Performed by: NURSE PRACTITIONER

## 2023-09-19 PROCEDURE — 63710000001 INSULIN LISPRO (HUMAN) PER 5 UNITS: Performed by: INTERNAL MEDICINE

## 2023-09-19 PROCEDURE — 25010000002 DEXAMETHASONE PER 1 MG: Performed by: INTERNAL MEDICINE

## 2023-09-19 PROCEDURE — 77263 THER RADIOLOGY TX PLNG CPLX: CPT | Performed by: STUDENT IN AN ORGANIZED HEALTH CARE EDUCATION/TRAINING PROGRAM

## 2023-09-19 PROCEDURE — 63710000001 DIPHENHYDRAMINE PER 50 MG: Performed by: INTERNAL MEDICINE

## 2023-09-19 PROCEDURE — 85007 BL SMEAR W/DIFF WBC COUNT: CPT | Performed by: INTERNAL MEDICINE

## 2023-09-19 PROCEDURE — 77290 THER RAD SIMULAJ FIELD CPLX: CPT | Performed by: STUDENT IN AN ORGANIZED HEALTH CARE EDUCATION/TRAINING PROGRAM

## 2023-09-19 PROCEDURE — 82948 REAGENT STRIP/BLOOD GLUCOSE: CPT

## 2023-09-19 PROCEDURE — 36430 TRANSFUSION BLD/BLD COMPNT: CPT

## 2023-09-19 PROCEDURE — 63710000001 INSULIN GLARGINE PER 5 UNITS: Performed by: NURSE PRACTITIONER

## 2023-09-19 PROCEDURE — 99232 SBSQ HOSP IP/OBS MODERATE 35: CPT | Performed by: INTERNAL MEDICINE

## 2023-09-19 PROCEDURE — 86900 BLOOD TYPING SEROLOGIC ABO: CPT

## 2023-09-19 PROCEDURE — 99223 1ST HOSP IP/OBS HIGH 75: CPT | Performed by: PSYCHIATRY & NEUROLOGY

## 2023-09-19 PROCEDURE — 80053 COMPREHEN METABOLIC PANEL: CPT | Performed by: INTERNAL MEDICINE

## 2023-09-19 PROCEDURE — 99223 1ST HOSP IP/OBS HIGH 75: CPT | Performed by: STUDENT IN AN ORGANIZED HEALTH CARE EDUCATION/TRAINING PROGRAM

## 2023-09-19 PROCEDURE — 77300 RADIATION THERAPY DOSE PLAN: CPT | Performed by: STUDENT IN AN ORGANIZED HEALTH CARE EDUCATION/TRAINING PROGRAM

## 2023-09-19 PROCEDURE — 85027 COMPLETE CBC AUTOMATED: CPT | Performed by: INTERNAL MEDICINE

## 2023-09-19 PROCEDURE — 63710000001 INSULIN LISPRO (HUMAN) PER 5 UNITS: Performed by: NURSE PRACTITIONER

## 2023-09-19 RX ORDER — INSULIN LISPRO 100 [IU]/ML
2 INJECTION, SOLUTION INTRAVENOUS; SUBCUTANEOUS
Status: DISCONTINUED | OUTPATIENT
Start: 2023-09-19 | End: 2023-09-20

## 2023-09-19 RX ORDER — ACETAMINOPHEN 325 MG/1
650 TABLET ORAL ONCE
Status: COMPLETED | OUTPATIENT
Start: 2023-09-19 | End: 2023-09-19

## 2023-09-19 RX ORDER — FENTANYL 50 UG/1
1 PATCH TRANSDERMAL
Status: DISCONTINUED | OUTPATIENT
Start: 2023-09-19 | End: 2023-09-25 | Stop reason: HOSPADM

## 2023-09-19 RX ORDER — DIPHENHYDRAMINE HYDROCHLORIDE 50 MG/ML
25 INJECTION INTRAMUSCULAR; INTRAVENOUS ONCE
Status: COMPLETED | OUTPATIENT
Start: 2023-09-19 | End: 2023-09-19

## 2023-09-19 RX ORDER — ACETAMINOPHEN 160 MG/5ML
650 SOLUTION ORAL ONCE
Status: COMPLETED | OUTPATIENT
Start: 2023-09-19 | End: 2023-09-19

## 2023-09-19 RX ORDER — DIPHENHYDRAMINE HCL 25 MG
25 CAPSULE ORAL ONCE
Status: COMPLETED | OUTPATIENT
Start: 2023-09-19 | End: 2023-09-19

## 2023-09-19 RX ORDER — ACETAMINOPHEN 650 MG/1
650 SUPPOSITORY RECTAL ONCE
Status: COMPLETED | OUTPATIENT
Start: 2023-09-19 | End: 2023-09-19

## 2023-09-19 RX ADMIN — INSULIN LISPRO 4 UNITS: 100 INJECTION, SOLUTION INTRAVENOUS; SUBCUTANEOUS at 18:11

## 2023-09-19 RX ADMIN — DEXAMETHASONE SODIUM PHOSPHATE 4 MG: 4 INJECTION, SOLUTION INTRAMUSCULAR; INTRAVENOUS at 04:12

## 2023-09-19 RX ADMIN — DEXAMETHASONE SODIUM PHOSPHATE 4 MG: 4 INJECTION, SOLUTION INTRAMUSCULAR; INTRAVENOUS at 08:34

## 2023-09-19 RX ADMIN — GABAPENTIN 300 MG: 300 CAPSULE ORAL at 20:17

## 2023-09-19 RX ADMIN — SENNOSIDES AND DOCUSATE SODIUM 2 TABLET: 50; 8.6 TABLET ORAL at 20:17

## 2023-09-19 RX ADMIN — Medication 2000 UNITS: at 08:26

## 2023-09-19 RX ADMIN — PANTOPRAZOLE SODIUM 40 MG: 40 TABLET, DELAYED RELEASE ORAL at 08:26

## 2023-09-19 RX ADMIN — PRAVASTATIN SODIUM 40 MG: 40 TABLET ORAL at 08:26

## 2023-09-19 RX ADMIN — FENTANYL 1 PATCH: 50 PATCH TRANSDERMAL at 17:44

## 2023-09-19 RX ADMIN — DILTIAZEM HYDROCHLORIDE 120 MG: 120 CAPSULE, COATED, EXTENDED RELEASE ORAL at 08:26

## 2023-09-19 RX ADMIN — LEVOTHYROXINE SODIUM 88 MCG: 0.09 TABLET ORAL at 05:46

## 2023-09-19 RX ADMIN — ACETAMINOPHEN 650 MG: 325 TABLET, FILM COATED ORAL at 08:34

## 2023-09-19 RX ADMIN — INSULIN LISPRO 8 UNITS: 100 INJECTION, SOLUTION INTRAVENOUS; SUBCUTANEOUS at 21:17

## 2023-09-19 RX ADMIN — INSULIN GLARGINE 12 UNITS: 100 INJECTION, SOLUTION SUBCUTANEOUS at 21:17

## 2023-09-19 RX ADMIN — ACETAMINOPHEN 650 MG: 325 TABLET ORAL at 20:17

## 2023-09-19 RX ADMIN — INSULIN LISPRO 2 UNITS: 100 INJECTION, SOLUTION INTRAVENOUS; SUBCUTANEOUS at 14:45

## 2023-09-19 RX ADMIN — DEXAMETHASONE SODIUM PHOSPHATE 4 MG: 4 INJECTION, SOLUTION INTRAMUSCULAR; INTRAVENOUS at 20:17

## 2023-09-19 RX ADMIN — DEXAMETHASONE SODIUM PHOSPHATE 4 MG: 4 INJECTION, SOLUTION INTRAMUSCULAR; INTRAVENOUS at 14:44

## 2023-09-19 RX ADMIN — PANTOPRAZOLE SODIUM 40 MG: 40 TABLET, DELAYED RELEASE ORAL at 17:45

## 2023-09-19 RX ADMIN — ASPIRIN 81 MG: 81 TABLET, COATED ORAL at 08:26

## 2023-09-19 RX ADMIN — DIPHENHYDRAMINE HYDROCHLORIDE 25 MG: 25 CAPSULE ORAL at 08:34

## 2023-09-19 RX ADMIN — INSULIN LISPRO 4 UNITS: 100 INJECTION, SOLUTION INTRAVENOUS; SUBCUTANEOUS at 08:35

## 2023-09-19 RX ADMIN — INSULIN LISPRO 4 UNITS: 100 INJECTION, SOLUTION INTRAVENOUS; SUBCUTANEOUS at 14:45

## 2023-09-19 RX ADMIN — INSULIN LISPRO 2 UNITS: 100 INJECTION, SOLUTION INTRAVENOUS; SUBCUTANEOUS at 18:12

## 2023-09-19 NOTE — PROGRESS NOTES
Subjective .     REASONS FOR FOLLOWUP:      REASON FOR CONSULTATION:  1. Metastatic prostate cancer      2.  Significant back pain with weakness in the lower extremities since last 3 days, rule out spinal cord compression      Interval history:    September 19, 2023: Patient was seen by radiation oncology with plans to start radiation today.  His hemoglobin is down to 7.1 and we will give him 2 units of packed red blood cells.  MRI thoracic and lumbar spine has been done but not read yet.  He is afebrile.  Complains of lower extremity weakness    MRI of the thoracic spine shows multiple spine mets with spinal cord compression at thoracic 10.  Neurosurgery has been consulted and radiation oncology has started radiation already.    HISTORY OF PRESENT ILLNESS:    Patient is a 82-year-old gentleman with history of metastatic prostate cancer followed by Dr. Lee in our office and was last seen September 8, 2023.  I am seeing this patient for the first time.  Initially in March 2018 he had undergone a CT of the chest which showed multiple sclerotic lesions on the bone and multiple enlarged mediastinal lymph nodes.  PSA was 395.  April 9, 2018 underwent mediastinoscopy and pathology was consistent with metastatic adenocarcinoma consistent with prostate primary.  He was treated with ADT and radiation for symptomatic sites.  He receives Lupron injections every 3 months and Xgeva starting June 2018.  His PSA dropped down.  To 0.08 on June 13, 2019.  Subsequently his PSA started rising starting December 2019.  He continued Lupron and Xgeva.  He had worsening hip pain on May 4, 2020.  But CT scan of the chest abdomen pelvis showed no progression of disease.  He was started on gabapentin.  Bone scan was done and it does not show any worsening of bone mets.  This was done Lety 3, 2020.     Patient's PSA continue to rise and in November 2020 was 25.4.  He was noncompliant with Lupron and that was restarted.  In January 2021 he  had progression in the sternum.  And worsening pain in the right femur and patient underwent radiation to the right femur as well as sternum.     In August 2021 his PSA continue to rise and he was started on Xtandi on September 8, 2021.  PSA started dropping to 4.7.  He continued Xtandi.     In January 2023 patient continued to have increasing bony discomfort.  He had a clarify PET scanning done.  He was eligible for lutetium 177.     Patient 1 subsequently started by [facility] on Jeaneth low to mild April 17, 2023 and increased to 600 mg twice daily along with Xgeva and Lupron every 3 months     In June 12, 2023 patient had bone scan with progressive metastatic disease and patient was to follow-up with hospice and palliative care.  His hemoglobin had dropped down to 7.2 and patient declined blood transfusion.  On September 8, 2023 his hemoglobin was down to 5.2 and he agreed for transfusion     Patient has pain and currently on fentanyl patch 50 mcg every 72 hours and Norco 10 x 325 every 6 hours as needed along with gabapentin at bedtime patient was to receive 2 units of packed red blood cells with premedication dexamethasone and Benadryl.     Patient was admitted yesterday because he was felt weak and unable to stand up.  His hemoglobin was 8.1, platelets of 102     Because of back pain MRI of the lumbar spine done which showed constipation.  With possible fecal impaction.  Liver gallbladder pancreas spleen and adrenal glands are normal.  7 to 8 mm hypodense nodules arising in the upper pole of the right kidney.  Widespread osseous metastasis that appears more pronounced.     We have been consulted and also palliative care has been consulted.     CT abdomen pelvis showed the 7 to 8 mm hypodense nodules in the upper pole of the right kidney not seen on previous exam in 2021.  Probably hyperdense cyst but widespread bony metastasis     MRI lumbar spine shows diffuse marrow changes consistent with metastatic  disease.  No vertebral body collapse.  Epidural enhancing soft tissue posterior to the T11 and T12 either directly from its vertebral body or dural metastasis with effacement of the ventral sac without deformity or compression of the distal thoracic cord.  Multilevel degenerative arthritis     Last PSA had increased from  September 8, 2023     Hematologic/oncologic history:   The patient is an 82-year-old male with significant past medical history including prostate carcinoma, CKD 3 and long-term tobacco use be been seen by primary care in late January, 2018 for hoarseness.  Chest x-ray is now outpatient January 23 revealed sclerotic change in the posterior mid chest to be within 3 adjacent thoracic ertebral bodies of uncertain significance.  A follow-up chest CT revealed numerous sclerotic foci within al visualized bones consistent with metastatic disease, multiple enlarged mediastinal lymph nodes concerning for metastatic lymphadenopathy and a polypoidal abnormality in the right lateral wall of the trachea.  CT of abdomen March 20 revealed extensive osseous metastatic disease mildly enlarged retroperitoneal lymph nodes.  Bone scan March 20 was consistent with widespread osseous metastasis particularly in the proximal half the left humeral shaft, abnormal uptake in the sternum and manubrium and a small focus in the posterior aspect of the calvarium.  This led to a plain film the left humerus with mottled sclerosis involving the shaft of the humerus particularly in the proximal half but no evidence of pathologic fracture.    The patient has additionally type 2 diabetes on insulin pump, again a history of prostate carcinoma prostatectomy 12 years previously, hypertension, and hyperlipidemia.  Additional studies included a PSA of 395.1 from March 14, 2018.The patient's been seen by urology March 15 with plans to start Firmagon.    The patient is now seen in pulmonary clinic with Dr. Bijan Rivera and we have  discussed that he will need bronchoscopy and mediastinoscopy.  Interestingly his additional history includes a long occupational exposure including working in the [place] just out of school, subsequent construction work for many years and a 30-pack-year history of smoking stopping in the late 1990s.  He indicates that he followed with Dr. Guillen of urology for many years with no changes in his exams and normal PSAs.  He stopped this follow-up approximately 2 years ago.     Patient was seen in consultation with thoracic surgery on March 28 and plans are made for mediastinoscopy and bronchoscopy with lymph node biopsy.  Pathology revealed that these nodes were consistent with metastatic prostate carcinoma.  He was discussed at thoracic conference.  A PET/CT was not pursued and it was determined that he see medical oncology for hormonal treatment and radiation therapy if symptomatic.  It should be noted that the patient's March 15 [facility] office note describes that Firmagon was planned.     The patient has met with the son and grandson April 23.  We have also contacted Dr. Taylor of urology in that Firmagon was given just after his last visit and would next be due approximately May 3.  Patient feels considerably better with resolution of his pain, normalization of his performance status.  He would like to continue treatment through our office now contacted Dr. Taylor concerning this.    The patient is next seen June 11, 2018 we discussed his follow-up including his treatments with Lupron May 7 and his next treatment on July 30.  He has returned to essentially normal performance status and his PSA has dropped further from 395 March 14 27.8 May 7 and now 2.03 June 11.  We do plan to initiate Xgeva also study him via scans to document his improvement approximately a month from now.   The patient is next seen July 26, 2018.  Repeat imaging demonstrated interval resolution of mediastinal and  retroperitoneal lymphadenopathy.  There is thickening in the distal aspect of the appendix with stranding?  Chronic appendicitis.  The patient indicates he is having no such symptoms and never has.  There is no change in sclerotic bony metastasis in the patient's PSA has dropped substantially to 1.66 by July 19 and 1.79 July 26.  He is actually feeling excellent and this is corroborated by family members.   Patient is next seen January 10, 2019 continuing to do very well and, in fact indicating that [facility], likely hearing replacements, visual review.  He is not certain is going to continue his care with them or with us or combination of both.    Patient is next seen April 4, 2019.  He is recently discharged after hospitalization March 15-18 with left upper lobe pneumonia.  We reviewed the findings in detail with his gradual recovery now documented.  His studies include a CT of chest which does not demonstrate any further bony lesions and/or pulmonary metastasis having developed.  He is feeling considerably better and approximately back to his baseline.  The patient is next seen June 28, 2019 feeling well but having baseline generally musculoskeletal pain and restless legs.His recent exams include a PSA of 0.81, CMP with potassium of 5.3 with repeat pending.  His performance status overall remains good to very good and is clearly responding to his treatments.  The patient is next seen December 13, 2019 continue to feel well.  He has had, oddly enough, 2 episodes of sleep paralysis which have occurred to him previously and he wonders if there is any connection with his prostate carcinoma though this is felt unlikely.     The patient when assessed in December had his PSA go to 2.9.  We continue with Lupron and Xgeva and is seen back now March 9, 2020 without additional symptoms.  We discussed that a schedule could likely change moving to 3 months for both of these  medications particularly if he needs additional therapy directed at progressive disease.     Patient contacted our practice May 4 concern for pain in his hips.  This led to moving his scans up and they were performed May 8, 2020 showing a sub-6 mm pleural-based pulmonary nodule in the right lower lobe that is new from March 15, 2018, remainder of the sub-6 mm pulmonary nodules bilaterally are stable.  There is extensive osseous metastatic disease as previous with no other new findings.     The patient indicates, when seen, May 13 that his bone pain is now resolved.  While this is good information does not mean that he does not have further bony metastasis and we have discussed that a follow-up bone scan is likely necessary.  Furthermore he is having some difficulty with sleeplessness and increase in restless legs as he continues to stay at home during the COVID 19 crisis which, itself, is producing more anxiety for him.  A follow-up bone scan was obtained Lety 3 revealing multifocal osseous metastatic disease which was compared to March 2, 2018 with improvement.  No new abnormal uptake is present.  He is next seen with us on June 17, 2020 and, fortunately, is not having any significant pain at this point.  We discussed his scans in detail and, on balance, his performance status also remains improved.     It was elected to follow the patient rather than changes antiandrogen therapy.  He is reassessed September 3 with unchanged CMP, H&H of 11.4 and 36.3 with normal white count, platelet count and differential, PSA at 15.8.  Follow-up PA lateral chest x-ray does not show any new abnormalities though there is extensive metastatic bone disease throughout the bony thorax and osteoblastic metastasis have been seen on chest CT May 2020.  The patient is seen with his son Georges September 10 noticing increasing bony discomfort primarily in the right hip following fairly intensive gardening which he does frequently.  Now has  further elevation of PSA were wondering whether we should try to intervene sooner per additional antiandrogens.  We will need to further assess him via CT scanning to make this decision     These were obtained October 1 showing extensive sclerotic disease with no metastatic disease in chest abdomen or pelvis.  PSA had gone from 15.8 September 3-16 0.6 October 1.     He still has pain getting up in the morning and after active gardening.  This is manageable with current pain medications and, at present, it is not apparent that he needs to switch medications to gain better control of his disease.  The patient did have follow-up studies done including a PSA November 25, 2020 now at 25.4.  The patient is seen with his son December 10, 2020 doing fair but having some increased pain in the mid sternum and right hip that he ascribes to additional exercise/work in managing his garden.  It is apparent however, that his last bone scan was 6 months ago and we do need to reassess him concerning this.     The patient had follow-up bone scan performed January 11, 2021 showing again uptake in the calvarium, humerus, right medial clavicle, sternum, ribs, spine, sacrum, pelvis, right acetabulum, proximal femora and now left frontal bone which appears new.  There is also mild increase in sternal uptake and equivocal increase in the pelvic lesions of all of the sacrum and the right proximal femur.     The patient is seen with his son January 18, 2021 and indicates that he is having pain gradually worsened in his right hip, mid chest that had been an issue previously.  These findings on bone scan are reviewed with both of them as likely the underlying culprit for his discomfort.  He would need change of the systemic therapy but, at present, will ask for radiation therapy palliation initially.  He was previously treated by Dr. Kulkarni many years ago and will ask her to see him.  We will also make application for the current cost of  Xtandi or Zytiga.  The patient is not felt to be a candidate for systemic chemotherapy.     The patient was referred for ration therapy as we continued to assess his PSA on current therapy as well as exam the cost of Zytiga or Xtandi.  Radiation therapy (Dr. Kulkarni) saw the patient January 26 and felt that the right femur and sternal lesion could benefit from therapy-3000 cGy in 10 fractions.  The patient took treatment February 1 to February 12 to the above areas and is next seen back March 15.  Fortunately his pain has improved dramatically and he is quite happy about this.  Unfortunately he notes some difficulty with swallowing that is temporary and often leads to increased salivation and mucus production.  Patient was asked to return his next assessed April 12, 2021.  He is able to swallow with less pain but still has excess mucus production and issues with salivation.  His PSA has noted increase but he is having no additional bony discomfort at this point and, additionally, has noted low-grade fevers just under 100 degrees at nighttime?.  His weight, appetite and general performance status have remained good to excellent.  We discussed follow-up studies at this point to determine his status.     Follow-up scans were scheduled CT scans of chest and pelvis 5/4/2021 showing osseous metastasis quite similar to previous examinations in the chest, CT of abdomen pelvis also with no acute intra-abdominal adenopathy no indication of abdominal pelvic metastatic disease but again widespread osseous metastasis.  His PSA at this point have been slowly increasing to a level of 44 on 4/12/2021.  As the patient is reassessed 5/10/2021 we find, fortunately, that he is not exceeding symptomatic.  It could make reasonable sense at this point to take the mediastinal nodes from his biopsy 4/9/2018, reassess potential targeted therapies, MSI status, and germline analysis.  Fortunately he is not having significant pain or discomfort  and is seen with his son as we discussed the above plan.  Notably a follow-up PSA is found to be 42.6.   Patient is next seen in 6/7/2021 for Lupron and Xgeva.  Fortunately he is feeling considerably better according to both he and his son though his stamina has reduced.  He is not having additional pain at this point.  We have also reviewed his genetics assessment which was significant only for a variant of unknown significance.  This is not an actionable abnormality.     As the patient's PSA further escalated increasing to 89 7/7/2021 his subsequent Axumin PET was obtained 8/20 demonstrating extensive osteosclerotic metastatic disease showing reduced uptake-typical finding but no evidence of visceral metastatic disease in the neck chest abdomen pelvis.  These findings are discussed with the patient and his son 8/25/2021 and indicative of his progressive disease-etiology of his rising PSA-though the patient is asymptomatic we have discussed moving to initiate Xtandi is available at 160 mg p.o. twice daily along with his current Lupron and Xgeva.     The patient's testing 10/6 included PSA that is dropped to 9.69.  He is seen with his son, Cody, both indicating that he been doing relatively well with treatment but began to notice nausea in the last week.  The schedule that he has been given also needs to be somewhat updated in terms of his Lupron and Xgeva.  He has been able to maintain his appetite and overall performance status to date.     The patient is next assessed 12/8/2021 tolerating his current Xtandi dose better than previous and maintaining a good performance status overall as well as demonstrating a drop in his PSA now to 4.7 on 12/8/2021 compared to high of 112 9/8/2021.  He remains hypophosphatemic and hypocalcemic and we have discussed, again, changing his Xgeva dosing to every 3 months along with his Lupron will continue his Xtandi to 80 mg daily.     The patient is next reviewed 3/21/2022 seen for  both Xgeva and Lupron.  Fortunately he is feeling reasonably well with little additional pain though he has restarted to place his garden into shape for the season and is working more outdoors.     Patient reevaluated 5/2/2022 with repeat CBC including H&H of 10.5 and 33.4, white count 5980 and platelet count of 192,000, currently CMP and PSA pending with a previous PSA 1 month ago at 9.06.  At this point we increased his Xtandi to 160 mg p.o. daily while continuing treatment with every 3 month Xgeva and Lupron.  Notably the patient's Requip  alcohol I am sure it is mariza now at 1.5 mg p.o. nightly and Neurontin 900 mg p.o. daily has improved his restless leg syndrome substantially.     Patient seen 6/13/2022 at which time Xtandi at 160 mg p.o. daily was continued, Xgeva moved to every 6 months and Lupron every 3 months.     This was continued when patient was seen 9/21/2022 though subsequently we proceeded to every 3 months for both medications.     Patient seen 12/21/2022 at which time a subsequent Pylarify scan was requested as result of increasing PSA level and bony discomfort.  The patient received Lupron and Xgeva at this point.      He is seen back with his son 1/11/2023.  The potential treatment options such as Pluvicto (Lutetium Yady 177 vipivotide tetraxetan), now that we know that his PSMA scan is positive, though the patient would have to initially be treated with taxane chemotherapy which would be difficult for him to tolerate, radium-223 considering his bony metastasis.  He has slowly progressive disease however we have discussed additional issues including radiation therapy and/or alteration to another androgen receptor agonist such as darolutamide.  We have discussed all these options moving to have assessments done by radiation therapy and, [facility] as we address his symptoms.     The patient was not felt a candidate for additional treatments via [facility], was able to start darolutamide and  completed palliative radiotherapy.  He was stable seen 2/20/2023 though experiencing diarrhea post radiation therapy.     Patient seen 4/17/2023 with darolutamide gradually increased to 600 mg twice daily.  His Xgeva and Lupron will be given every 3 months.     Patient seen 6/12/2023, unfortunately, PSA increasing and bone scan worsening.  Patient at this point indicated that he did not wish additional therapies but, instead, control of his pain initially.  Plans were made to continue his Lupron and Xgeva seen him at the 3 to 4-week annia and considering palliative care primarily with either hospice or [facility].     Patient seen 7/3/2023 with plans to initiate palliative care starting with [facility].  The patient continued this approach seen back 9/8/2023 referring not to proceed with hospice but again with palliative care including transfusion.     I had discussion with the patient and patient's son today.  Patient has weakness in the lower extremity since last 3 days.  Prior to that he was able to walk and cook by himself.  He has tingling pain in bilateral lower extremities which is significant.  His MRI of the lumbar spine did not show any final cord compression or nerve impingement.  Patient is currently on steroids and will require an MRI of the thoracic and cervical spine           Past Medical History:   Diagnosis Date    Aortic stenosis, mild 01/01/2021    Per echocardiogram 2021    Ascending aorta dilatation     Asthma     Benign prostatic hyperplasia     Bone cancer     Cellulitis of great toe of left foot 10/19/2018    CKD (chronic kidney disease)     Stage 3; followed by Dr. Vicky Duran     Diastolic dysfunction     GRADE II per echo 2018    Difficulty breathing     during exertion    Dyslipidemia     Erectile dysfunction     Fatigue     Heart murmur     History of blood transfusion     History of kidney stones     HL (hearing loss)     Hyperlipidemia     Hypertension     Hyponatremia     Kidney  stone     Left ventricular hypertrophy     per echo 2018    Localized edema     Neuropathy     Obstructive sleep apnea     USING CPAP    Osteoarthritis     Peptic ulcer     Pneumonia     Pneumonia of left upper lobe due to infectious organism 03/15/2019    Prostate cancer     Status post prostatectomy, radiation therapy, and hormone therapy followed by Dr. Lee; metastatsis to bone    Pure hyperglyceridemia     Restless leg syndrome     Sepsis     Sinus bradycardia     Transient cerebral ischemia     Type 2 diabetes mellitus        ONCOLOGIC HISTORY:  (History from previous dates can be found in the separate document.)    No current facility-administered medications on file prior to encounter.     Current Outpatient Medications on File Prior to Encounter   Medication Sig Dispense Refill    Accu-Chek Guide test strip USE 1 STRIP TO CHECK BLOOD SUGAR 4 TIMES DAILY      Accu-Chek Softclix Lancets lancets USE 1 LANCET TO CHECK GLUCOSE 4 TIMES DAILY      aspirin 81 MG EC tablet Take 1 tablet by mouth Daily.      cholecalciferol (VITAMIN D3) 1000 units tablet Take 2 tablets by mouth Daily.      dilTIAZem CD (CARDIZEM CD) 120 MG 24 hr capsule TAKE 1 CAPSULE EVERY DAY 90 capsule 3    fentaNYL (DURAGESIC) 50 MCG/HR patch Place 1 patch on the skin as directed by provider Every 72 (Seventy-Two) Hours. 10 each 0    gabapentin (NEURONTIN) 300 MG capsule TAKE 3 CAPSULES EVERY NIGHT AT BEDTIME 90 capsule 0    Insulin Aspart (novoLOG) 100 UNIT/ML injection INJECT 100 UNITS VIA PUMP ONCE DAILY      Leuprolide Acetate, 3 Month, (LUPRON DEPOT, 3-MONTH, IM) Inject  into the appropriate muscle as directed by prescriber Every 3 (Three) Months.      levothyroxine (SYNTHROID, LEVOTHROID) 88 MCG tablet       loperamide (IMODIUM) 1 MG/5ML solution Take 10 mL by mouth 4 (Four) Times a Day As Needed for Diarrhea.      modafinil (PROVIGIL) 200 MG tablet Take 1 tablet by mouth Daily. 90 tablet 1    ondansetron (ZOFRAN) 8 MG tablet Take 1  tablet by mouth 3 (Three) Times a Day As Needed for Nausea or Vomiting. 30 tablet 5    pravastatin (PRAVACHOL) 40 MG tablet Take 1 tablet by mouth Daily.      rOPINIRole (REQUIP) 0.5 MG tablet Take 2 tablets by mouth in the evening and 2 at bedtime. 360 tablet 2    traZODone (DESYREL) 50 MG tablet Take 1-2 tablets by mouth every night at bedtime.      diphenoxylate-atropine (LOMOTIL) 2.5-0.025 MG per tablet Take 1 tablet by mouth 4 (Four) Times a Day As Needed for Diarrhea. 30 tablet 0       ALLERGIES:     Allergies   Allergen Reactions    Niacin Unknown - Low Severity    Statins Unknown - Low Severity       Social History     Socioeconomic History    Marital status:     Years of education: College   Tobacco Use    Smoking status: Former     Packs/day: 1.50     Years: 30.00     Pack years: 45.00     Types: Cigarettes     Start date: 1968     Quit date: 1998     Years since quittin.6     Passive exposure: Past    Smokeless tobacco: Never   Vaping Use    Vaping Use: Never used   Substance and Sexual Activity    Alcohol use: Not Currently     Comment: Caffeine use: 2-3 cups daily    Drug use: Never    Sexual activity: Not Currently         Cancer-related family history includes Cancer in his brother, sister, sister, and sister; Lung cancer (age of onset: 46) in his father; Lung cancer (age of onset: 60) in his sister; Lung cancer (age of onset: 62) in his brother; Prostate cancer (age of onset: 60) in his brother.     Review of Systems  A comprehensive 14 point review of systems was performed and was negative except as mentioned.  Patient had weakness in the lower extremities which is improved on steroids.  Objective      Vitals:    23 0845 23 0907 23 1332 23 1353   BP: 137/47 141/51 154/53 154/53   BP Location:       Patient Position:       Pulse: 51 51 52 52   Resp: 16 18 18 18   Temp: 97.9 °F (36.6 °C) 97.9 °F (36.6 °C) 97.9 °F (36.6 °C) 97.9 °F (36.6 °C)   TempSrc: Oral  Oral     SpO2: 100% 100% 100% 100%   Weight:       Height:             9/8/2023    11:57 AM   Current Status   ECOG score 1       Physical Exam          CONSTITUTIONAL:  Vital signs reviewed.  Alert and oriented x3  No distress, looks comfortable.  .  RESPIRATORY:  Normal respiratory effort.  No rales  or wheezing, clear.   CARDIOVASCULAR:  Regular rate and rhythm, no murmur  No significant lower extremity edema.  Abdomen: Soft nontender positive bowel sounds no hepatosplenomegaly  SKIN: No wounds.  No rashes.  MUSCULOSKELETAL/EXTREMITIES: No clubbing or cyanosis.  No apparent unilateral weakness.  NEURO: CN 2-12 appear intact. No focal neurological deficits noted.  PSYCHIATRIC:  Normal judgment and insight.  Normal mood and affect.    Patient has bilateral lower extremity weakness which is improved today compared to yesterday      RECENT LABS:  Hematology WBC   Date Value Ref Range Status   09/19/2023 7.77 3.40 - 10.80 10*3/mm3 Final     RBC   Date Value Ref Range Status   09/19/2023 2.65 (L) 4.14 - 5.80 10*6/mm3 Final     Hemoglobin   Date Value Ref Range Status   09/19/2023 7.1 (L) 13.0 - 17.7 g/dL Final     Hematocrit   Date Value Ref Range Status   09/19/2023 21.9 (L) 37.5 - 51.0 % Final     Platelets   Date Value Ref Range Status   09/19/2023 102 (L) 140 - 450 10*3/mm3 Final        Assessment & Plan     #. Back pain and lower extremity weakness that is new since last 3 days with patient's history of metastatic prostate cancer  Patient has had metastatic prostate cancer followed by Dr. Lee in our office  Patient was on doralutamide and Xgeva.  Given new onset weakness in the lower extremities we will obtain MRI of the thoracic and cervical spine with and without contrast to rule out any spinal cord compression  Continue steroids dexamethasone 4 mg IV every 6 hours  We will consult neurology for weakness of the lower extremities as well as radiation oncology.  Reviewed MRI of the spine.  Patient has spinal  cord compression at thoracic 9 and 10 and has started receiving radiation and steroids with his lower extremity improving.  Neurosurgery is also on the case.     #.  Anemia and thrombocytopenia.  S/p packed red blood cell transfusion in the past.  Likely this is related to his previous radiation as well as significant multiple metastasis in the bone.  We will closely follow the labs  We will transfuse 2 units of packed red blood cells     1.  Metastatic prostate cancer:  Patient initially diagnosed in 2006 and had a prostatectomy and hormone therapy.  Patient in January 2018 began to complain of shortness of breath and hoarseness.  Chest x-ray obtained 1/23/18 showed sclerotic bone lesions.  CT of the chest 3/12/2018 numerous sclerotic bone foci with all visualized bones consistent with metastatic disease, multiple enlarged mediastinal lymph nodes.  .1 from 3/14/2018.  Patient was started on Firmagon by [facility]rology[facility].  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  Case discussed at thoracic conference and recommendations were to continue ADT and radiation for symptomatic sites.  Lupron injections every 3 mos initiated 5/7/18 PSA at that time 7.810  Monthly Xgeva initiated 6/11/18 PSA 2.030  Last PSA 6/13/2019 0.881  9/20/2019 patient tolerating treatment well, no new concerns.  12/2019 PSA 2.9  3/9/2020  PSA increasing to 5.030, he remains continuing on Lupron and Xgeva and asymptomatic though follow-up CT of chest and pelvis will be requested in 11 weeks prior to follow-up in 12 weeks.   5/4/2020 patient called reported worsening right hip pain, plans to purse CT scans ASAP.   5/8/2020 CT scans C/A/P show no progression of disease. Hip pain improved, Gabapentin added.. Bone scan requested.  6/3/2020 bone scan that does not demonstrate worsening of bone nor need for localized radiation therapy.  The patient's PSA has been  slowly rising and we have not been able to determine worsening of disease and and, therefore, it is not felt warranted add additional medications such as Xtandi or apalutamide to his therapy.  Please note would like to avoid Zytiga try not to add prednisone which would worsen his blood sugar control.  9/3/2020 PSA 15.8, CXR extensive metastatic bone disease- patient reviewed 9/10/2020 reporting increased bony discomfort CT scans requested.  Scans done and reviewed 10/7/2020 ultimately reveal no evidence of progression of disease for visceral involvement or clearly for bone involvement.  After further review with the patient and his son plan continued Xgeva and Lupron.  We have assessed for availability of Xtandi 160 mg p.o. daily and find the cost is currently prohibitive.   11/25/2020 PSA 25.4  12/10/2020 review with some mild increase in bony discomfort.  After repeat discussion we went on to have him undergo Lupron therapy, and his PSA actually to be mildly reduced at 22.2.   Follow-up bone scan 1/11/2021 demonstrates some evidence of progression in sternum, left sacrum and proximal femur.  These findings were reviewed with the patient and his son January 18, 2020 and the patient was referred for palliative radiotherapy(Dr. Kulkarni)  Palliative radiation under care Dr. Kulkarni to right femur and sternal lesion -3000 cGy in 10 fractions given February 1 to February 12 with significant symptom improvement.  3/15/2021 PSA 38.5  4/12/2021 PSA 44  5/10/2021 PSA 42.6 CT chest abdomen pelvis 5/4/2021 - for progression of disease and follow-up PSA 5 10/2021 is at 42 slightly reduced from previous.  We have discussed how to proceed from here and find a significant family history that clearly supports a potential genetic assessment for his malignancy.  It is felt most reasonable at this point to take the mediastinal nodes from his biopsy 4/9/2018 and reassess for potential targeted therapies, MSI status, as well as germline  analysis.  6/7/2021 PSA 65.9 his analysis completed for actionable mutations including germline mutations.  These exams were negative though there is a variant of unknown significance.  Again this is not an actionable mutation.  We proceeded with additional Xgeva and Lupron planning for monthly Xgeva and Lupron at 3 months  7/7/2021 PSA 89 and subsequent testing with Axumin PET was performed confirming extensive osteosclerotic metastatic disease with little uptake, no evidence of visceral metastatic disease in neck chest abdomen or pelvis.  8/25/2021  , PET/CT reviewed, subsequent PSA increased to 105.  Patient fortunately asymptomatic.  Requested reevaluation for Xtandi 160 mg po daily.   9/8/2021 due for his lupron, receiving every 3 months, and monthly Xgeva.  Will have education today for Xtandi and will receive his medication today as well.   Started Xtandi 9/8/2021.  9/22/2021 here for toxicity check, so far tolerating Xtandi quite well other than a slight increase in diarrhea controlled with Imodium.  10/6/2021 continues to tolerate Xtandi well.  Labs are within range today, we will proceed with Xgeva today.  Patient seen 10/20/2021 having more nausea with Xtandi and demonstrating a substantial reduction in PSA level.  After discussion we plan to reduce his dose to 80 mg daily following his PSA and performance status over the next approximate 7 weeks at which time he would be scheduled for his follow-up Lupron.  Patient assessed 12/8/2021 with further reduction in PSA to 4.7, ongoing hypophosphatemia and hypocalcemia-Xgeva moved to every 3 months given on same schedule as Lupron  Patient seen 3/21/2022, 6-week follow-up with mild increase in PSA recognized  Patient reassessed 5/2/2022 with PSA at 10.1, Xtandi moved to 160 mg p.o. daily  Patient assessed 6/13/2022, Xtandi at 160 mg p.o. daily, PSA pending, Xgeva moved to every 6 months, Lupron every 3 months  Patient seen 9/21/2022 at which time we  continued our current approach, reviewed additional options for therapy should he clearly progress.  Patient seen 12/21/2022, increasing bony discomfort, Xgeva and Eligard continued, plans made for Pylarify scanning.   Patient seen 1/11/2023 with his son, discuss potential treatment options such as Pluvicto (Lutetium Yady 177 vipivotide tetraxetan) knowing that Pms-Asa is strongly positive in bone, palliative radiotherapy, radium-223 and alteration to additional androgen receptor such as darolutamide.  Patient referred to radiation therapy for their evaluation and possible treatment options, [facility] as application made for darolutamide and medications altered to include meloxicam 15 mg p.o. daily along with his Norco.  Patient assessed 2/20/2023 improved for radiation therapy, no additional options for treatment and [facility], darolutamide initiated.  Patient assessed 4/17/2023, darolutamide increased to 600 mg twice daily, Xgeva and Lupron every 3 months  5/15/2023: Continues darolutamide 600 mg twice daily.  Continues Xgeva and Lupron every 3 months, due in 1 month.  Hemoglobin today stable at 9.1.  Increased PSA, now 46 previously 29.  Discussed with Dr. Lee and we will proceed with bone scan in 3 weeks.  Subsequent bone scan with progression of metastatic disease to number of sites, issues discussed with patient and his son 6/12/2023 with plans to proceed to, primarily, palliative care approach.  Patient treated with Xgeva and Lupron in 3-4-week follow-up consider hospice or pallitus care.  Patient seen 7/3/2023-further anemia with hemoglobin 7.2 g%.  Plan 1 unit packed RBC transfusion, initiation of Pallitus.  7/24/2023 hemoglobin 7.2.  Patient declines blood transfusion.  He is reporting pain currently not well controlled which will be discussed below.  Meeting with pallitus on Friday of this week.  Patient assessed 9/8/2023 with hemoglobin 5.2 g%, further transfusion planned with additional  steroid, Benadryl premedication  Patient admitted September 17, 2023 with pain: Reviewed MRI of the lumbar spine which did not show any spinal cord compression except metastasis.  PSA has been increasing.  CT abdomen pelvis shows widespread bony metastasis.  There is some rectal wall thickening diffusely.  There is fecal impaction.  7 to 8 mm hypodense nodules in the upper pole of the right kidney not seen in the previous exam in 2021.  They are indeterminate.        2.  Neuropathy/ restless legs:    5/13/2020 gabapentin 600 mg at bedtime, trazadone 50 mg QHS.  On gabapentin 300, 2 tablets at bedtime, and requip 0.5.mg    Still having neuropathy symptoms.  Will increase gabapentin to 900 mg at bedtime.   9/22/2021 increase in gabapentin to 900 mg at bedtime has helped neuropathy.  Now taking Requip 2 mg nightly.     3.  Cancer related pain: on Norco 5/325 every 6 hours as needed.  Mobic 15 mg p.o. every morning initiated 1/11/2023, consider MS Contin every 12 hours  Status post palliative radiotherapy with improved pain control by 2/20/2023  Effective pain relief except increasing constipation noted 4/17/2023, narcotic adjusted downward as possible.  Continues Norco 5/325 every 6 hours as needed.  Feels his pain is well controlled on this regimen.  Semaj Herrera reports a pain score of 8.  Given his pain assessment as noted, treatment options were discussed and the following options were decided upon as a follow-up plan to address the patient's pain-plan to add Duragesic 25 mcg every 72 hours daily E scribed to pharmacy  7/24/2023 patient complaining of pain not controlled with current medication.  We will increase fentanyl patch to 50 mcg every 72 hours.  Patient to continue Norco 10/3/2025 every 6 hours if needed for breakthrough pain.     PLAN:      Continue fentanyl patch to 50 mcg every 72 hours  Continue Norco 10/325 every 6 hours for breakthrough pain.  If patient needs to be transfused he will require  dexamethasone and Benadryl prior to transfusion  Currently patient is on 4 mg every 6 hours of dexamethasone  Reviewed MRI of the spine which showed spinal cord compression at thoracic 10 and thoracic 9  Patient started radiation and is currently on dexamethasone with improvement in his lower extremity  Await neurosurgery input  We will follow    Rula Aly MD                   Cc:  Jacob Jennings MD

## 2023-09-19 NOTE — CONSULTS
[facility] Radiation Oncology   Inpatient Consult    Chief Complaint  Diffusely metastatic prostate cancer      Diagnosis: Diffusely metastatic prostate cancer    Overall Stage Diffusely metastatic      Pacemaker: no, insulin pump  Prior History of Radiation: yes, 30Gy/10fxs to sternum and right hip completed 2/2021, possible prior radiation to prostate bed, 30Gy/10fx to L spine and Left pelvis completed 2/2023  Contraindications to Radiation: no  Patient Requires Pregnancy Test: No, patient is male    HPI:    Semaj Herrera is a 82 y.o. male with a history of diffusely metastatic prostate cancer progressing through multiple androgen deprivation agents over the past few years.  The patient was most recently treated with palliative radiation therapy by me to the L spine and left pelvis in February 2023 which was followed by darolutamide and Lupron.  The patient had additional progression on NM bone scan in June 2023.  He has been considering hospice/palliative care.  The patient presented to [facility] ED for profound anemia and progressing back pain.  The patient also reports that he is having progressive lower extremity weakness and progressive bladder incontinence.  He also is having severe constipation with possible fecal impaction.    The patient has had imaging work-up which includes MRI of the total spine which is highly concerning for extraosseous epidural metastatic disease in the lower T-spine causing severe canal stenosis and cord compression.  Radiation oncology has been consulted for consideration of palliative radiation therapy to his T-spine to help with pain as well as progressing cord compression symptoms.  The patient has already been started on dexamethasone.        Imaging:      NM Bone Scan 6/5/2023    IMPRESSION;  Widespread osseous metastatic disease with progression when compared to  the prior exam 01/11/2021.      CT AP 9/17/2023    FINDINGS:  1. There is a large amount of dense fecal material  demonstrated within  the low sigmoid and rectum. There is some rectal wall thickening  diffusely. At least constipation, suspect fecal impaction. There is  diverticulosis of the colon, no indication of active diverticulitis. The  appendix and small bowel are satisfactory in appearance. The stomach is  normal.     2. The liver, gallbladder, pancreas, spleen and adrenal glands have a  satisfactory appearance.     3. There is a 7-8 mm hypodense nodule arising from the upper pole of the  right kidney, not seen with certainty on the previous 2021 examination.  Probable hyperdense cyst. Due to its small size it is indeterminant and  conservative CT or sonography advised. No right-sided calculus or  obstructive uropathy. 8 mm likely simple cyst arising from the lower  pole of the left kidney. The left kidney is otherwise unremarkable. The  ureters are satisfactory in course and caliber to the urinary bladder  which is typical in appearance. Hemostatic clips demonstrated at the  bladder base corresponding to previous prostatectomy.     4. Widespread osseous metastasis which appears more pronounced compared  to the previous examination. No pathologic fracture is demonstrated.  Remainder is unremarkable.      MRI lumbar spine 9/17/2023    IMPRESSION:       Diffuse marrow changes consistent with metastatic disease is demonstrated   multiple prior studies.  No vertebral body collapse.  Epidural enhancing soft tissue posterior to T11 and T12 either directly   from the vertebral body or dural metastases with effacement of the   ventral sac without deformity or compression of the distal thoracic cord.  Nonspecific enhancement of the single left sacral descending nerve root,   either inflammatory versus metastatic disease.  Multilevel degenerative changes greatest at L4-5 and L5 S1.      MRI C and T-spine 9/18/2023    IMPRESSION:     There is very extensive osseous metastatic disease that is seen at every  level of the thoracic  spine as well as throughout the visualized lower  cervical spine and upper lumbar spine. Additionally, there is  extraosseous epidural metastatic disease that is most prominently  identified centered at the level of the T10 vertebral body. This  metastatic disease circumferentially narrows the thecal sac at the T9  and T10 levels. At T10, there is severe canal stenosis with marked cord  compression and potential cord edema. This metastatic disease involves  both T10-11 neural foramina as well as the left T9-10 neural foramen.  Additionally, there is ventral epidural metastatic disease at T11 and  T12 as discussed in detail above. At these levels, there is a  mild-to-moderate degree of narrowing of the thecal sac. Additional  paraspinal soft tissue metastatic disease is also as discussed in detail  above.     Note is also made of numerous rib metastases.    IMPRESSION:  1. Heterogeneous enhancement noted throughout the cervical spine, in  keeping with diffuse osseous metastatic disease. No cord signal  abnormalities are seen. There is no significant cord compression.      Pathology:    No new relevant pathology    Labs:    Lab Results   Component Value Date    CREATININE 0.73 (L) 09/18/2023       Lab Results   Component Value Date    .000 (H) 09/08/2023    PSA 54.000 (H) 06/12/2023    PSA 46.100 (H) 05/15/2023               Problem List:    Fecal impaction in rectum    Type 2 diabetes mellitus with kidney complication, with long-term current use of insulin    Hypertension    Obstructive sleep apnea syndrome treated auto BiPAP    Prostate cancer metastatic to bone    Chronic kidney disease, stage III (moderate)    Diastolic dysfunction    Neuropathy    Restless legs syndrome (RLS)    Lower extremity weakness    Anemia, chronic disease    Metastatic cancer to bone         Medications:  No current facility-administered medications on file prior to encounter.     Current Outpatient Medications on File Prior to  Encounter   Medication Sig Dispense Refill    Accu-Chek Guide test strip USE 1 STRIP TO CHECK BLOOD SUGAR 4 TIMES DAILY      Accu-Chek Softclix Lancets lancets USE 1 LANCET TO CHECK GLUCOSE 4 TIMES DAILY      aspirin 81 MG EC tablet Take 1 tablet by mouth Daily.      cholecalciferol (VITAMIN D3) 1000 units tablet Take 2 tablets by mouth Daily.      dilTIAZem CD (CARDIZEM CD) 120 MG 24 hr capsule TAKE 1 CAPSULE EVERY DAY 90 capsule 3    fentaNYL (DURAGESIC) 50 MCG/HR patch Place 1 patch on the skin as directed by provider Every 72 (Seventy-Two) Hours. 10 each 0    gabapentin (NEURONTIN) 300 MG capsule TAKE 3 CAPSULES EVERY NIGHT AT BEDTIME 90 capsule 0    Insulin Aspart (novoLOG) 100 UNIT/ML injection INJECT 100 UNITS VIA PUMP ONCE DAILY      Leuprolide Acetate, 3 Month, (LUPRON DEPOT, 3-MONTH, IM) Inject  into the appropriate muscle as directed by prescriber Every 3 (Three) Months.      levothyroxine (SYNTHROID, LEVOTHROID) 88 MCG tablet       loperamide (IMODIUM) 1 MG/5ML solution Take 10 mL by mouth 4 (Four) Times a Day As Needed for Diarrhea.      modafinil (PROVIGIL) 200 MG tablet Take 1 tablet by mouth Daily. 90 tablet 1    ondansetron (ZOFRAN) 8 MG tablet Take 1 tablet by mouth 3 (Three) Times a Day As Needed for Nausea or Vomiting. 30 tablet 5    pravastatin (PRAVACHOL) 40 MG tablet Take 1 tablet by mouth Daily.      rOPINIRole (REQUIP) 0.5 MG tablet Take 2 tablets by mouth in the evening and 2 at bedtime. 360 tablet 2    traZODone (DESYREL) 50 MG tablet Take 1-2 tablets by mouth every night at bedtime.      diphenoxylate-atropine (LOMOTIL) 2.5-0.025 MG per tablet Take 1 tablet by mouth 4 (Four) Times a Day As Needed for Diarrhea. 30 tablet 0          Allergies:  Allergies   Allergen Reactions    Niacin Unknown - Low Severity    Statins Unknown - Low Severity         Family History:  The patient has no family history of conditions which would be contraindications to radiation therapy        Social  "History:  Former Smoker      Distance From Clinic: <30 minutes     Patient has someone who can assist with transportation: yes      Vital Signs:  /54 (BP Location: Right arm, Patient Position: Lying)   Pulse 53   Temp 98.8 °F (37.1 °C) (Oral)   Resp 18   Ht 162.6 cm (64\")   Wt 77 kg (169 lb 12.1 oz)   SpO2 100%   BMI 29.12 kg/m²   Estimated body mass index is 29.12 kg/m² as calculated from the following:    Height as of 9/18/23: 162.6 cm (64.02\").    Weight as of this encounter: 77 kg (169 lb 12.1 oz).  There were no vitals filed for this visit.      ECOG: Capable of only limited selfcare; confined to bed or chair more than 50% of waking hours = 3    Physical Exam  Vitals reviewed.   Constitutional:       General: He is not in acute distress.     Appearance: Normal appearance.   HENT:      Head: Normocephalic and atraumatic.   Eyes:      Extraocular Movements: Extraocular movements intact.      Pupils: Pupils are equal, round, and reactive to light.   Pulmonary:      Effort: Pulmonary effort is normal.   Abdominal:      General: Abdomen is flat.      Palpations: Abdomen is soft.   Musculoskeletal:      Cervical back: Normal range of motion.      Comments: Bilateral lower extremity weakness.  Particularly weak with hip flexion.   Skin:     General: Skin is warm and dry.   Neurological:      Mental Status: He is alert and oriented to person, place, and time.      Comments: Intermittent bladder incontinence.   Psychiatric:         Mood and Affect: Mood normal.         Behavior: Behavior normal.          Result Review :  The following data was reviewed by: Wang Harper MD on 09/17/2023:  Labs: Last Creatinine, PSA  Data reviewed : Radiologic studies NM Bone Scan, CT AP, MRI C, T, L Spine               Assessment:    Semaj Herrera is a 82 y.o. male with a history of diffusely metastatic prostate cancer progressing through multiple androgen deprivation agents over the past few years.  The patient was most " recently treated with palliative radiation therapy by me to the L spine and left pelvis in February 2023 which was followed by darolutamide and Lupron.  The patient had additional progression on NM bone scan in June 2023.  He has been considering hospice/palliative care.  The patient presented to [facility] ED for profound anemia and progressing back pain.  The patient also reports that he is having progressive lower extremity weakness and progressive bladder incontinence.  He also is having severe constipation with possible fecal impaction.    The patient has had imaging work-up which includes MRI of the total spine which is highly concerning for extraosseous epidural metastatic disease in the lower T-spine causing severe canal stenosis and cord compression.  Radiation oncology has been consulted for consideration of palliative radiation therapy to his T-spine to help with pain as well as progressing cord compression symptoms.  The patient has already been started on dexamethasone.    I met with the patient in his hospital room and discussed his work-up and treatment to date.  I discussed the risk, benefits, side effects, logistics of radiation therapy to his T-spine with goal of swelling and potentially reversing some of his cord compression symptoms.  I answered all the patient's questions to his satisfaction.  The end of our conversation the patient was to proceed with palliative radiation therapy.  Plan for CT simulation 9/19/2023.    Plan:    -Plan for palliative radiation therapy to the T-spine and possibly upper L-spine pending completion of MRI total spine.  -Recommend continuing dexamethasone       I spent 60 minutes caring for Semaj on this date of service. This time includes time spent by me in the following activities:preparing for the visit, reviewing tests, obtaining and/or reviewing a separately obtained history, referring and communicating with other health care professionals , documenting  information in the medical record, independently interpreting results and communicating that information with the patient/family/caregiver, and care coordination  Follow Up   No follow-ups on file.  Patient was given instructions and counseling regarding his condition or for health maintenance advice. Please see specific information pulled into the AVS if appropriate.     Wang Harper MD

## 2023-09-19 NOTE — PLAN OF CARE
Goal Outcome Evaluation:MRI of of thoracic spine done. Had 2 large BM on my shift clean keep dry and comfortable. VS stable , no other complaints made.For palliative care consult today.

## 2023-09-19 NOTE — CONSULTS
Neurology Consult Note    Consult Date: 9/19/2023    Referring MD: Jacob Jennings MD    Reason for Consult I have been asked to see the patient in neurological consultation to render advice and opinion regarding back pain, paraparesis    Semaj Herrera is a 82 y.o. male with metastatic prostate cancer, hypertension, neuropathy, restless legs.  3 days ago he developed relatively rapid onset of paraparesis with associated constipation.  He has been worked up by oncology and [facility] and was found to have a severe cord compression at T10 related to metastatic disease.  We were consulted for back pain I think as a part of the initial work-up prior to studies coming back.  At this point the etiology of his symptoms is very clear-cut and neurosurgery was consulted this morning.  Of note the patient did have a significant improvement in his neurologic symptoms after receiving steroids last night.    Past Medical History:   Diagnosis Date    Aortic stenosis, mild 01/01/2021    Per echocardiogram 2021    Ascending aorta dilatation     Asthma     Benign prostatic hyperplasia     Bone cancer     Cellulitis of great toe of left foot 10/19/2018    CKD (chronic kidney disease)     Stage 3; followed by Dr. Vicky Duran     Diastolic dysfunction     GRADE II per echo 2018    Difficulty breathing     during exertion    Dyslipidemia     Erectile dysfunction     Fatigue     Heart murmur     History of blood transfusion     History of kidney stones     HL (hearing loss)     Hyperlipidemia     Hypertension     Hyponatremia     Kidney stone     Left ventricular hypertrophy     per echo 2018    Localized edema     Neuropathy     Obstructive sleep apnea     USING CPAP    Osteoarthritis     Peptic ulcer     Pneumonia     Pneumonia of left upper lobe due to infectious organism 03/15/2019    Prostate cancer     Status post prostatectomy, radiation therapy, and hormone therapy followed by Dr. Lee; metastatsis to bone    Pure hyperglyceridemia  "    Restless leg syndrome     Sepsis     Sinus bradycardia     Transient cerebral ischemia     Type 2 diabetes mellitus        Exam  /51   Pulse 51   Temp 97.9 °F (36.6 °C) (Oral)   Resp 18   Ht 162.6 cm (64\")   Wt 77 kg (169 lb 12.1 oz)   SpO2 100%   BMI 29.12 kg/m²   Gen: NAD, vitals reviewed  MS: oriented x3, recent/remote memory mildly impaired, normal attention/concentration, language intact, no neglect.  CN: visual acuity grossly normal, PERRL, EOMI, no facial droop, no dysarthria  Motor: 5/5 throughout upper extremities, 4/5 bilateral lower extremities  Reflexes: Plantars upgoing with triple flexion  Sensory: Sensory level to cold temperature at T10    DATA:    Lab Results   Component Value Date    GLUCOSE 212 (H) 09/19/2023    CALCIUM 8.3 (L) 09/19/2023     (L) 09/19/2023    K 5.2 09/19/2023    CO2 19.0 (L) 09/19/2023     09/19/2023    BUN 20 09/19/2023    CREATININE 0.83 09/19/2023    EGFRIFNONA 56 (L) 02/23/2022    BCR 24.1 09/19/2023    ANIONGAP 11.0 09/19/2023     Lab Results   Component Value Date    WBC 7.77 09/19/2023    HGB 7.1 (L) 09/19/2023    HCT 21.9 (L) 09/19/2023    MCV 82.6 09/19/2023     (L) 09/19/2023       Lab review: Sodium 135, GFR 56, hemoglobin 7.1    Imaging review: I personally reviewed his imaging of the cervical and thoracic spine which were significant for severe cord compression and edema at T10 with apparent metastatic disease at that area and numerous other areas in the spine.  Radiology report reviewed.    Diagnoses:  Acute myelopathy at T10  Stage IV prostate cancer with metastases to the spine    Comment: Metastatic prostate cancer with acute cord compression at T10 related to metastatic disease.  Improved overnight with steroids.  Agree with getting a neurosurgical opinion    PLAN:  Nothing further to add from neurology standpoint.    Discussed with Dr. Hess      "

## 2023-09-19 NOTE — CONSULTS
[facility]   Consult Note    Patient Name: Semaj Herrera  : 1940  MRN: [id number]  Primary Care Physician:  Axel Guardado MD  Referring Physician: Jacob Jennings MD  Date of admission: 2023    Inpatient Neurosurgery Consult  Consult performed by: Tracey Shepard APRN  Consult ordered by: Mariaa Austin APRN  Reason for consult: History of known spinal metastases with abnormal MRI showing cord compression in the thoracic spine.      Subjective   Subjective     Reason for Consult/ Chief Complaint: History of known metastatic prostate cancer to the spine now with findings of cord compression in the thoracic spine.    History of Present Illness  Semaj Herrera is a 82 y.o. male with a history of diffuse metastatic prostate cancer and previous history of radiation treatment to lumbar spine metastases.  He was initially diagnosed with a prostate cancer over 15 years ago.  Until about a week ago, he had been independently walking with a cane and doing some gardening in his yard, even pulling up tomato plants.  Last Wednesday, he had been noticing some weakness and numbness with shocklike sensations in both of his legs from the knees down.  He often attributed this to restless leg syndrome.  He subsequently had a fall last week.  He was able to get up with assistance and continued walking with help in addition to using the cane.  On Thursday he was beginning to notice some increasing discomfort in the lower thoracic region of his back and was still able to get around using a cane until approximately Friday or Saturday when his weakness began to worsen.  By  he was unable to stand whatsoever and had become quite constipated thereby prompting an EMS called to the house and the patient was subsequently brought to the emergency room for further evaluation.  The patient denies any difficulty urinating.  He states that he voids frequently in small amounts but does not notice any urinary retention.  He states  that the leg weakness and urinary issues have improved somewhat since starting steroids in the hospital.  He has had several bowel movements since his admission to the hospital 3 days ago.  Work-up here in the emergency department initially consisted of lumbar spine MRI which showed multiple metastatic lesions and vertebral body collapse at multiple levels.  There was also evidence of epidural soft tissue enhancement posterior to the T11, T12 vertebral bodies with effacement of the ventral sac and distal cord deformity. Thoracic and Cervical MRI with and without contrast performed yesterday revealed evidence of distal metastatic involvement in the lower cervical spine and the thoracic MRI revealed extensive abnormal marrow replacement throughout the thoracic spine as well as the lower aspect of the cervical spine.  Findings consistent with osseous metastatic disease as well as an epidural mass encasing the thecal sac at the level of T10 contributing to marked cord compression, suspected cord edema.  The epidural mass measures approximately 6 mm in size and the soft tissue mass extends into the T10-11 neural foramen.  There are additional other multiple epidural, soft tissue masses within the thoracic spine but are contributing to a lesser degree of cord compression than the mass described above. Neurosurgery has been asked to evaluate and give recommendations with regards to these findings.    Review of Systems   Constitutional:  Positive for activity change and fatigue. Negative for fever.   HENT: Negative.  Negative for postnasal drip and sore throat.    Eyes: Negative.    Respiratory: Negative.  Negative for choking, chest tightness and shortness of breath.    Cardiovascular: Negative.  Negative for chest pain.   Gastrointestinal:  Positive for constipation.   Endocrine: Negative.    Genitourinary:  Positive for frequency.   Musculoskeletal:  Positive for back pain and gait problem.   Skin: Negative.     Allergic/Immunologic: Negative.    Neurological:  Positive for weakness and numbness. Negative for light-headedness and headaches.   Hematological: Negative.    Psychiatric/Behavioral: Negative.     All other systems reviewed and are negative.       Personal History     Past Medical History:   Diagnosis Date    Aortic stenosis, mild 01/01/2021    Per echocardiogram 2021    Ascending aorta dilatation     Asthma     Benign prostatic hyperplasia     Bone cancer     Cellulitis of great toe of left foot 10/19/2018    CKD (chronic kidney disease)     Stage 3; followed by Dr. Vicky Duran     Diastolic dysfunction     GRADE II per echo 2018    Difficulty breathing     during exertion    Dyslipidemia     Erectile dysfunction     Fatigue     Heart murmur     History of blood transfusion     History of kidney stones     HL (hearing loss)     Hyperlipidemia     Hypertension     Hyponatremia     Kidney stone     Left ventricular hypertrophy     per echo 2018    Localized edema     Neuropathy     Obstructive sleep apnea     USING CPAP    Osteoarthritis     Peptic ulcer     Pneumonia     Pneumonia of left upper lobe due to infectious organism 03/15/2019    Prostate cancer     Status post prostatectomy, radiation therapy, and hormone therapy followed by Dr. Lee; metastatsis to bone    Pure hyperglyceridemia     Restless leg syndrome     Sepsis     Sinus bradycardia     Transient cerebral ischemia     Type 2 diabetes mellitus        Past Surgical History:   Procedure Laterality Date    BRONCHOSCOPY N/A 04/09/2018    Procedure: BRONCHOSCOPY;  Surgeon: Bijan Rivera III, MD;  Location: [facility];  Service: Cardiothoracic    COLONOSCOPY      DEEP NECK LYMPH NODE BIOPSY / EXCISION      HEMORRHOIDECTOMY      LYMPH NODE BIOPSY      MEDIASTINOSCOPY N/A 04/09/2018    Procedure: MEDIASTINOSCOPY WITH LYMPH NODE BIOPSY;  Surgeon: Bijan Rivera III, MD;  Location: [facility];  Service: Cardiothoracic    OTHER SURGICAL  HISTORY      ulcer repair    PROSTATECTOMY  2006       Family History: family history includes Cancer in his brother, sister, sister, and sister; Diabetes in his brother and mother; Heart disease in his brother, mother, and another family member; Heart failure in his mother; Hypertension in his brother, mother, and sister; Lung cancer (age of onset: 46) in his father; Lung cancer (age of onset: 60) in his sister; Lung cancer (age of onset: 62) in his brother; Prostate cancer (age of onset: 60) in his brother. Otherwise pertinent FHx was reviewed and not pertinent to current issue.    Social History:  reports that he quit smoking about 25 years ago. His smoking use included cigarettes. He started smoking about 55 years ago. He has a 45.00 pack-year smoking history. He has been exposed to tobacco smoke. He has never used smokeless tobacco. He reports that he does not currently use alcohol. He reports that he does not use drugs.    Home Medications:   Accu-Chek Softclix Lancets, Insulin Aspart, Leuprolide Acetate (3 Month), aspirin, cholecalciferol, dilTIAZem CD, diphenoxylate-atropine, fentaNYL, gabapentin, glucose blood, levothyroxine, loperamide, modafinil, ondansetron, pravastatin, rOPINIRole, and traZODone    Allergies:  Allergies   Allergen Reactions    Niacin Unknown - Low Severity    Statins Unknown - Low Severity       Objective    Objective     Vitals:  Temp:  [97.1 °F (36.2 °C)-98.8 °F (37.1 °C)] 97.9 °F (36.6 °C)  Heart Rate:  [51-64] 51  Resp:  [16-20] 18  BP: (127-145)/(47-61) 141/51    Physical Exam  Vitals reviewed.   Constitutional:       General: He is not in acute distress.     Appearance: He is well-developed and normal weight. He is ill-appearing. He is not toxic-appearing or diaphoretic.   HENT:      Head: Normocephalic and atraumatic.      Nose: Nose normal.      Mouth/Throat:      Mouth: Mucous membranes are moist.   Eyes:      General:         Right eye: No discharge.         Left eye: No  discharge.      Conjunctiva/sclera: Conjunctivae normal.      Pupils: Pupils are equal, round, and reactive to light.   Neck:      Trachea: No tracheal deviation.   Cardiovascular:      Rate and Rhythm: Normal rate.   Pulmonary:      Effort: Pulmonary effort is normal. No respiratory distress.   Abdominal:      General: There is no distension.      Palpations: Abdomen is soft.      Tenderness: There is no abdominal tenderness.   Musculoskeletal:         General: Tenderness present.      Cervical back: Normal range of motion and neck supple. No rigidity or tenderness.      Right lower leg: No edema.      Left lower leg: No edema.      Comments: The patient has tenderness to palpation in the lower aspect of the thoracic spine only.   Skin:     General: Skin is warm and dry.      Findings: No erythema.   Neurological:      Mental Status: He is alert and oriented to person, place, and time.      GCS: GCS eye subscore is 4. GCS verbal subscore is 5. GCS motor subscore is 6.      Sensory: Sensory deficit present.      Motor: Weakness present. No abnormal muscle tone.      Deep Tendon Reflexes: Reflexes are normal and symmetric. Reflexes normal.      Comments: This is an awake, alert and oriented 82-year-old female in no acute distress.  He is slightly pale and is currently receiving a blood transfusion.  He is feeding himself dinner.  On exam he exhibits no motor or sensory deficits with the exception of bilateral iliopsoas and quadriceps weakness of 3+/5.  There is mild increased sensitivity to light touch at or around the level of the umbilicus.  No hyperreflexia on exam.  Negative Cat's negative clonus bilaterally.  It was not tested due to motor weakness.   Psychiatric:         Mood and Affect: Mood normal.         Behavior: Behavior is cooperative.         Thought Content: Thought content normal.       Result Review    Result Review:  I have personally reviewed the results from the time of this admission to  9/19/2023 13:36 EDT and agree with these findings:  []  Laboratory list / accordion  []  Microbiology  [x]  Radiology  []  EKG/Telemetry   []  Cardiology/Vascular   []  Pathology  []  Old records  []  Other:  Most notable findings include:     I have reviewed MRI images of the lumbar, thoracic and cervical spine with and without contrast and I have discussed those images with Dr. Gilbert who is viewed them from a separate location.    The most significant finding on imaging is of distal metastatic involvement in the lower cervical spine and the thoracic MRI revealed extensive abnormal marrow replacement throughout the thoracic spine as well as the lower aspect of the cervical spine.  Findings consistent with osseous metastatic disease as well as an epidural mass encasing the thecal sac at the level of T10 contributing to marked cord compression, suspected cord edema.  The epidural mass measures approximately 6 mm in size and the soft tissue mass extends into the T10-11 neural foramen.     Assessment & Plan   Assessment / Plan     Brief Patient Summary:  Semaj Herrera is a 82 y.o. male who has a known history of metastatic prostate cancer initially diagnosed 15 or so years ago.  He had known bony metastases and had undergone prior radiation treatment for lumbar metastases a while back.  He had been doing well up until a week or so ago when he began to notice some pain in his back and progressive weakness and difficulty walking.  He suffered a fall and then subsequently noticed an inability to stand independently or take steps a few days ago.  Spine imaging reveals new metastatic involvement involving the thoracic spine with a soft tissue mass compressing the thecal sac and contributing to significant cord compression.    Active Hospital Problems:  Active Hospital Problems    Diagnosis     **Fecal impaction in rectum     Lower extremity weakness     Anemia, chronic disease     Metastatic cancer to bone     Restless  legs syndrome (RLS)     Diastolic dysfunction     Neuropathy     Prostate cancer metastatic to bone     Obstructive sleep apnea syndrome treated auto BiPAP     Type 2 diabetes mellitus with kidney complication, with long-term current use of insulin     Hypertension     Chronic kidney disease, stage III (moderate)      Plan:     I discussed the above imaging results with both the patient and his son who is at bedside.  The patient lives with his son.  The patient has already spoken with the oncologist and his decided to move forward with radiation treatment for the above stated thoracic spine metastases.  Dr. Eddy is in agreement with the above plan.  Surgery poses a significantly high risk for complications, worsening pain and little to no benefit given the overall status of his illness.  He was made aware that he may progress to permanent weakness and ultimately subsequent extremity paralysis thereby leaving him unable to walk or function independently. Both the patient and his son verbalized understanding.      Neurosurgery will be available for any further concerns or questions that may arise.  Please call if needed      SYD Jimenez

## 2023-09-19 NOTE — PLAN OF CARE
Goal Outcome Evaluation:  Plan of Care Reviewed With: patient, son        Progress: no change  Outcome Evaluation: VSS. A/o x4. Patient was a transfer from . Admitted with lower extremity weakness, constipation with a history of prostate cancer with mets to the spine. Radiation oncology met with patient and have decided to do radiation on the spine mets. First treatment today and tolerated well. Neuro surgery consulted but has not seen the patient. Steroids are improving symptoms per patient and his son. 2 units of PRBC given today. Continue to monitor.

## 2023-09-19 NOTE — CONSULTS
.            [facility] Palliative Care Services    Palliative Care Initial Consult   Attending Physician: Alfredito Hess MD  Referring Provider: Dr Jacob Jennings    Reason for Referral: assistance with clarification of goals of care  Family/Support: son Sukumar)    Code Status and Medical Interventions:   Ordered at: 09/18/23 1131     Medical Intervention Limits:    NO intubation (DNI)     Level Of Support Discussed With:    Patient     Code Status (Patient has no pulse and is not breathing):    No CPR (Do Not Attempt to Resuscitate)     Medical Interventions (Patient has pulse or is breathing):    Limited Support     Goals of Care: To be treated for acute conditions, start radiation therapy and continued with treatment for prostate cancer. In addition, return to home setting with outpatient PT if possible.     HPI:   82 y.o. male with history of metastatic prostate cancer (current treatment Xgeva and Lupron), chronic kidney disease stage III, diabetes mellitus type II, obstructive sleep apnea with CPAP, chronic diastolic heart failure, chronic cancer related pain. He lived at home with his son prior to admission.   Patient presented to [facility] on 9/17/2023 related to weakness of legs and constipation. Workup in ER, revealed widespread osseous that appeared more pronounces and fecal impaction. Consults were initially placed for gastroenterology and oncology. Gastroenterology recommended daily bowel regimen and no colonoscopy. Oncology obtained further imaging of spine- MRI cervical and thoracic, which revealed significant cord compression at T10. As a result neurosurgery and radiation oncology were consulted. Neurosurgery has yet to see him. Radiation oncology saw patient with plans to proceed with radiation. He is currently receiving IV dexamethasone.  His hospital course also complicated with anemia which he is receiving blood transfusions. The palliative care team was  consulted for support with goals of care due to disease progression.   Palliative Care Spoke With: patient, family, and HCS  Quality of life: poor  Functional Status: Today, pt able to perform bed mobility w min/mod A. He stood w mod A x 2using Rwx. Attempted side step toward HOB, but RLE buckling. Pt w significant lower extremity weakness per PT notes on 9/18/2023.   Due to the Palliative Care Topics Discussed: palliative care, goals of care, care options, [facility], and discharge options we will establish an advance care plan.   Advance Care Planning   Advance Care Planning Discussion: see  below          Review of Systems   Constitutional: Positive for decreased appetite and malaise/fatigue.   Cardiovascular:  Negative for chest pain.   Respiratory:  Negative for shortness of breath.    Musculoskeletal:  Positive for back pain.   Gastrointestinal:  Positive for constipation (improved). Negative for abdominal pain and nausea.   Neurological:  Positive for focal weakness and weakness.   Psychiatric/Behavioral:  Negative for depression. The patient is not nervous/anxious.      1- Pain Assessment  Pain Location: abdomen  Pain Description: intermittent    Past Medical History:   Diagnosis Date    Aortic stenosis, mild 01/01/2021    Per echocardiogram 2021    Ascending aorta dilatation     Asthma     Benign prostatic hyperplasia     Bone cancer     Cellulitis of great toe of left foot 10/19/2018    CKD (chronic kidney disease)     Stage 3; followed by Dr. Vicky Duran     Diastolic dysfunction     GRADE II per echo 2018    Difficulty breathing     during exertion    Dyslipidemia     Erectile dysfunction     Fatigue     Heart murmur     History of blood transfusion     History of kidney stones     HL (hearing loss)     Hyperlipidemia     Hypertension     Hyponatremia     Kidney stone     Left ventricular hypertrophy     per echo 2018    Localized edema     Neuropathy     Obstructive sleep apnea     USING CPAP     Osteoarthritis     Peptic ulcer     Pneumonia     Pneumonia of left upper lobe due to infectious organism 03/15/2019    Prostate cancer     Status post prostatectomy, radiation therapy, and hormone therapy followed by Dr. Lee; metastatsis to bone    Pure hyperglyceridemia     Restless leg syndrome     Sepsis     Sinus bradycardia     Transient cerebral ischemia     Type 2 diabetes mellitus      Past Surgical History:   Procedure Laterality Date    BRONCHOSCOPY N/A 2018    Procedure: BRONCHOSCOPY;  Surgeon: Bijan Rivera III, MD;  Location: [facility] OR;  Service: Cardiothoracic    COLONOSCOPY      DEEP NECK LYMPH NODE BIOPSY / EXCISION      HEMORRHOIDECTOMY      LYMPH NODE BIOPSY      MEDIASTINOSCOPY N/A 2018    Procedure: MEDIASTINOSCOPY WITH LYMPH NODE BIOPSY;  Surgeon: Bijan iRvera III, MD;  Location: [facility] OR;  Service: Cardiothoracic    OTHER SURGICAL HISTORY      ulcer repair    PROSTATECTOMY       Social History     Socioeconomic History    Marital status:     Years of education: College   Tobacco Use    Smoking status: Former     Packs/day: 1.50     Years: 30.00     Pack years: 45.00     Types: Cigarettes     Start date: 1968     Quit date: 1998     Years since quittin.6     Passive exposure: Past    Smokeless tobacco: Never   Vaping Use    Vaping Use: Never used   Substance and Sexual Activity    Alcohol use: Not Currently     Comment: Caffeine use: 2-3 cups daily    Drug use: Never    Sexual activity: Not Currently       Current Facility-Administered Medications   Medication Dose Route Frequency Provider Last Rate Last Admin    acetaminophen (TYLENOL) tablet 650 mg  650 mg Oral Q4H Melissa Hogan MD        aspirin EC tablet 81 mg  81 mg Oral Daily Mariaa Austin APRN   81 mg at 23 0826    sennosides-docusate (PERICOLACE) 8.6-50 MG per tablet 2 tablet  2 tablet Oral BID Vicky Foreman PA-C        And    polyethylene glycol  (MIRALAX) packet 17 g  17 g Oral Daily Vicky Foreman PA-C        And    bisacodyl (DULCOLAX) EC tablet 5 mg  5 mg Oral Daily PRN Vicky Foreman PA-C        And    bisacodyl (DULCOLAX) suppository 10 mg  10 mg Rectal Daily PRN Vicky Foreman PA-C        calcium carbonate (TUMS) chewable tablet 500 mg (200 mg elemental)  2 tablet Oral TID PRN Melissa Elliott MD        [START ON 9/20/2023] fentaNYL (DURAGESIC) 50 MCG/HR patch 1 patch  1 patch Transdermal Q72H Mariaa Austin APRN        And    Check Fentanyl Patch Placement  1 each Does not apply Q12H Mariaa Austin APRN        cholecalciferol (VITAMIN D3) tablet 2,000 Units  2,000 Units Oral Daily Mariaa Austin APRN   2,000 Units at 09/19/23 0826    dexAMETHasone (DECADRON) injection 4 mg  4 mg Intravenous Q6H Melissa Elliott MD   4 mg at 09/19/23 0834    dextrose (D50W) (25 g/50 mL) IV injection 25 g  25 g Intravenous Q15 Min PRN Melissa Elliott MD        dextrose (GLUTOSE) oral gel 15 g  15 g Oral Q15 Min PRN Melissa Elliott MD        dilTIAZem CD (CARDIZEM CD) 24 hr capsule 120 mg  120 mg Oral Daily Mariaa Austin APRN   120 mg at 09/19/23 0826    gabapentin (NEURONTIN) capsule 300 mg  300 mg Oral Nightly Mariaa Austin APRN   300 mg at 09/18/23 2108    glucagon (GLUCAGEN) injection 1 mg  1 mg Intramuscular Q15 Min PRN Melissa Elliott MD        HYDROmorphone (DILAUDID) injection 0.5 mg  0.5 mg Intravenous Q3H PRN Melissa Elliott MD        insulin glargine (LANTUS, SEMGLEE) injection 12 Units  12 Units Subcutaneous Nightly Mariaa Austin APRN        insulin lispro (HUMALOG/ADMELOG) injection 2 Units  2 Units Subcutaneous TID With Meals Mariaa Austin APRN        insulin lispro (HUMALOG/ADMELOG) injection 2-9 Units  2-9 Units Subcutaneous 4x Daily AC & at Bedtime StinglMelissa MD   4 Units at 09/19/23 0807    levothyroxine (SYNTHROID, LEVOTHROID) tablet 88 mcg  88 mcg Oral Q AM Mariaa Austin  "SYD MCKEON   88 mcg at 09/19/23 0546    melatonin tablet 3 mg  3 mg Oral Nightly PRN Melissa Elliott MD        modafinil (PROVIGIL) tablet 200 mg  200 mg Oral Daily Mariaa Austin APRN        ondansetron (ZOFRAN) tablet 4 mg  4 mg Oral Q6H PRN Melissa Elliott MD        Or    ondansetron (ZOFRAN) injection 4 mg  4 mg Intravenous Q6H PRN Melissa Elliott MD        pantoprazole (PROTONIX) EC tablet 40 mg  40 mg Oral BID AC Melissa Elliott MD   40 mg at 09/19/23 0826    pravastatin (PRAVACHOL) tablet 40 mg  40 mg Oral Daily Mariaa Austin APRN   40 mg at 09/19/23 0826    rOPINIRole (REQUIP) tablet 0.5 mg  0.5 mg Oral Nightly Mariaa Austin APRN   0.5 mg at 09/18/23 2108    traZODone (DESYREL) tablet 50 mg  50 mg Oral Nightly PRN Mariaa Austin APRN              acetaminophen    senna-docusate sodium **AND** polyethylene glycol **AND** bisacodyl **AND** bisacodyl    calcium carbonate    dextrose    dextrose    glucagon (human recombinant)    HYDROmorphone    melatonin    ondansetron **OR** ondansetron    traZODone    Allergies   Allergen Reactions    Niacin Unknown - Low Severity    Statins Unknown - Low Severity     Attest that current medications reviewed  including but not limited to prescriptions, over-the counter, herbals and vitamin/mineral/dietary (nutritional) supplements for name, route of administration, type, dose and frequency and are current using all immediate resources available at time of dictation.      Intake/Output Summary (Last 24 hours) at 9/19/2023 1001  Last data filed at 9/18/2023 1356  Gross per 24 hour   Intake 240 ml   Output --   Net 240 ml       Physical Exam:    Diagnostics: Reviewed  /51   Pulse 51   Temp 97.9 °F (36.6 °C) (Oral)   Resp 18   Ht 162.6 cm (64\")   Wt 77 kg (169 lb 12.1 oz)   SpO2 100%   BMI 29.12 kg/m²     Constitutional:       Appearance: Not in distress.      Comments: Elderly    HENT:      Head:      Comments: [facility]  Pulmonary:      " Effort: Pulmonary effort is normal.      Breath sounds: Normal breath sounds.   Cardiovascular:      PMI at left midclavicular line. Normal rate. Regular rhythm.   Edema:     Peripheral edema absent.   Abdominal:      General: Bowel sounds are normal.      Palpations: Abdomen is soft.      Tenderness: There is no abdominal tenderness.      Comments: obese   Neurological:      Mental Status: Alert and oriented to person, place, and time.      Comments: Left sided lower leg weakness  Equal  strength    Psychiatric:         Mood and Affect: Mood and affect normal.         Speech: Speech normal.         Behavior: Behavior is cooperative.         Thought Content: Thought content normal.         Cognition and Memory: Cognition normal.         Judgment: Judgment normal.     Patient status: Disease state: Controlled with current treatments.  Functional status: Palliative Performance Scale Score: Performance 40% based on the following measures: Ambulation: Mainly in bed, Activity and Evidence of Disease: Unable to do any work, extensive evidence of disease, Self-Care: Mainly assistance required,  Intake: Normal or reduced, LOC: Full, drowsy or confusion   ECOG Status(3) Capable of limited self-care, confined to bed or chair > 50% of waking hours.  Nutritional status: Albumin 3.9 on 9/19/2023  Body mass index is 29.12 kg/m².    Family support: The patient receives support from his son and extended family..  Advance Directives: Advance Directive Status: Patient has advance directive, copy in chart   POA/Healthcare surrogate-Georges cox.       Impression/Problem List:     Fecal impaction   T-10  cord compression     Metastatic prostate cancer to bone and lymph nodes  Cancer related pain  Anemia   Thrombocytopenia  Chronic kidney disease stage III  Chronic diastolic heart failure   Obstructive sleep apnea   Diabetes mellitus type II        Recommendations/Plan:  1. Provide support with goals of care        2.  Palliative care  encounter  I spoke with patient and his son/HCS, Coyd at bedside regarding goals of care. They have a fairly good understanding of the patient medical conditions which we reviewed in detail. They are aware of disease progression noted on imaging and concerns with cord compression in thoracic spine. They have elected to proceed with radiation. In addition, they hope to continue with current cancer related regimen (Xgeva and Lupron). The patient wishes to return to home setting and go to outpatient PT if possible, but amenable to home health PT/OT if needed. His goals are to improve functional mobility so that he can continue to be active and care for himself. Prior to this acute weakness he would do yard work and other light chores around home. He was independent with ADLs and used a cane occasionally.  The have met with Romeo in 7/2023 and didn't feel as if they receive much support as they felt oncology was managing his care just fine. They are familiar with [facility] as they utilized there services with patient spouse in 2014. They are not interested in consult at this time. I didn't address CODE status as this had already been addressed.  The patient does have a living will on file and designates his son, Cody. The patient goals at this time are to be treated for acute conditions, continue with treatments for prostate cancer, start radiation and return to home setting. They are wanting to do outpatient PT if possible. I spoke with ASHLEY Melendez.       Thank you for this consult and allowing us to participate in patient's plan of care. Palliative Care Team will sign off and see PRN.       Time spent:45 minutes spent reviewing medical and medication records, assessing and examining patient, discussing with family, answering questions, providing some guidance about a plan and documentation of care, and coordinating care with other healthcare members, with > 50% time spent face to face.     Mireille Krishna,  APRN  9/19/2023  10:01 EDT

## 2023-09-19 NOTE — PROGRESS NOTES
Name: Semaj Herrera ADMIT: 2023   : 1940  PCP: Axel Guardado MD    MRN: [id number] LOS: 1 days   AGE/SEX: 82 y.o. male  ROOM: E4/1     Subjective   Subjective   Resting in bed.  No family at bedside.  He continues to report some mid back pain that is worse with movement.  He states that the numbness and tingling in his legs actually seems to be a little bit better today as well as his weakness.  He is able to flex and extend his legs more today.  He had some more bowel movements overnight denies any nausea or vomiting.  He denies any abdominal pain.  He denies any chest pain or trouble breathing.  He is currently receiving blood.  He states that he is going to be getting radiation soon.  Denies any issues with emptying bladder.  RI cervical and thoracic spine were obtained by oncology yesterday.  RI thoracic spine today showing extensive metastatic disease with extraosseous epidural metastatic disease at the level of T10 with severe canal stenosis and marked cord compression/edema.  Neurology was consulted yesterday.      Objective   Objective   Vital Signs  Temp:  [97.1 °F (36.2 °C)-98.8 °F (37.1 °C)] 97.9 °F (36.6 °C)  Heart Rate:  [51-64] 51  Resp:  [16-20] 18  BP: (127-145)/(47-61) 141/51  SpO2:  [98 %-100 %] 100 %  on   ;   Device (Oxygen Therapy): room air  Body mass index is 29.12 kg/m².    Physical Exam  Vitals and nursing note reviewed.   Constitutional:       Appearance: He is ill-appearing. He is not toxic-appearing.   Cardiovascular:      Rate and Rhythm: Normal rate and regular rhythm.      Pulses: Normal pulses.   Pulmonary:      Effort: Pulmonary effort is normal. No respiratory distress.      Breath sounds: Normal breath sounds.      Comments: Diminished and on room air.  Abdominal:      General: Bowel sounds are normal. There is no distension.      Palpations: Abdomen is soft.      Tenderness: There is no abdominal tenderness.   Musculoskeletal:         General: Swelling (Mild  bilateral lower extremities) present. No tenderness.   Skin:     General: Skin is warm and dry.      Findings: No bruising.   Neurological:      Mental Status: He is alert and oriented to person, place, and time.      Sensory: Sensory deficit present.      Motor: Weakness present. Movement in legs a little better today.     Coordination: Coordination normal.   Psychiatric:         Mood and Affect: Mood normal.         Behavior: Behavior normal.      Results Review:       I reviewed the patient's new clinical results.  Results from last 7 days   Lab Units 09/19/23  0631 09/18/23  0412 09/17/23  1207   WBC 10*3/mm3 7.77 6.46 6.42   HEMOGLOBIN g/dL 7.1* 7.7* 8.1*   PLATELETS 10*3/mm3 102* 87* 106*     Results from last 7 days   Lab Units 09/19/23  0631 09/18/23  1441 09/18/23  0412 09/17/23  1207   SODIUM mmol/L 135*  --  136 137   POTASSIUM mmol/L 5.2 4.9 5.4* 4.6   CHLORIDE mmol/L 105  --  105 102   CO2 mmol/L 19.0*  --  21.0* 19.9*   BUN mg/dL 20  --  10 10   CREATININE mg/dL 0.83  --  0.73* 0.70*   GLUCOSE mg/dL 212*  --  176* 147*   Estimated Creatinine Clearance: 64.3 mL/min (by C-G formula based on SCr of 0.83 mg/dL).  Results from last 7 days   Lab Units 09/19/23  0631 09/17/23  1207   ALBUMIN g/dL 3.9 3.9   BILIRUBIN mg/dL 0.3 0.4   ALK PHOS U/L 351* 385*   AST (SGOT) U/L 12 16   ALT (SGPT) U/L 7 10     Results from last 7 days   Lab Units 09/19/23  0631 09/18/23  0412 09/17/23  1207   CALCIUM mg/dL 8.3* 8.0* 8.1*   ALBUMIN g/dL 3.9  --  3.9   MAGNESIUM mg/dL  --   --  2.8*       Glucose   Date/Time Value Ref Range Status   09/19/2023 0800 217 (H) 70 - 130 mg/dL Final   09/18/2023 2108 226 (H) 70 - 130 mg/dL Final   09/18/2023 1656 218 (H) 70 - 130 mg/dL Final   09/18/2023 1253 199 (H) 70 - 130 mg/dL Final   09/18/2023 1217 220 (H) 70 - 130 mg/dL Final   09/18/2023 0754 196 (H) 70 - 130 mg/dL Final   09/17/2023 2103 175 (H) 70 - 130 mg/dL Final       aspirin, 81 mg, Oral, Daily  [START ON 9/20/2023] fentaNYL,  1 patch, Transdermal, Q72H   And  Check Fentanyl Patch Placement, 1 each, Does not apply, Q12H  cholecalciferol, 2,000 Units, Oral, Daily  dexAMETHasone, 4 mg, Intravenous, Q6H  dilTIAZem CD, 120 mg, Oral, Daily  gabapentin, 300 mg, Oral, Nightly  insulin glargine, 8 Units, Subcutaneous, Nightly  insulin lispro, 2-9 Units, Subcutaneous, 4x Daily AC & at Bedtime  levothyroxine, 88 mcg, Oral, Q AM  modafinil, 200 mg, Oral, Daily  pantoprazole, 40 mg, Oral, BID AC  senna-docusate sodium, 2 tablet, Oral, BID   And  polyethylene glycol, 17 g, Oral, Daily  pravastatin, 40 mg, Oral, Daily  rOPINIRole, 0.5 mg, Oral, Nightly       Diet: Cardiac Diets; Healthy Heart (2-3 Na+); Texture: Regular Texture (IDDSI 7); Fluid Consistency: Thin (IDDSI 0)       Assessment/Plan     Active Hospital Problems    Diagnosis  POA    **Fecal impaction in rectum [K56.41]  Yes    Lower extremity weakness [R29.898]  Yes    Anemia, chronic disease [D63.8]  Yes    Metastatic cancer to bone [C79.51]  Yes    Restless legs syndrome (RLS) [G25.81]  Yes    Diastolic dysfunction [I51.89]  Yes    Neuropathy [G62.9]  Yes    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     Mr. Herrera is a 82 y.o. that presented to the hospital with a 2 to 3-day history of increasing bilateral leg weakness as well as constipation in the setting of known metastatic prostate cancer to the bone and lymph nodes.     Fecal impaction in the rectum  -CT A/P on admission with large amount of dense fecal material within the low sigmoid and rectum with rectal wall thickening.  Continued note of widespread osseous metastasis that was  more pronounced compared to prior.  -Improving with bowel regimen.  -GI consulted. Appreciate their recommendations. Recommend daily Miralax and potential OIC medication if non-responsive to first line drugs.     Lower extremity weakness  Neuropathy  -MRI of the lumbar spine with metastatic disease. MRI cervical spine with metastatic disease but no significant compression. 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   -Some improvement with IV steroids at present.   -Radiation oncology and Neurology consulted by oncology.   -Given significant cord compression at T10- ask neurosurgery to evaluate. Suspect most likely course of treatment would be steroids and radiation given the extent of his disease.   -PT/OT following. Will need SNF.  -Continue home gabapentin.     Prostate cancer metastatic to bone  -Oncology following. Appreciate their assistance.  -Palliative consulted for further goals of care.  -Continue pain medications.     DM2  -Typically on insulin pump that is not currently being used.  -Apears overall stable with correctional sliding scale insulin.  -We will increase Lantus nightly to 12 units and add meal time humalog with 2 units TID.  -Titrate pending glucose trends and oral intake.     RONNY  -Home CPAP if available.  -Avoid oversedation.     Diastolic dysfunction  Hypertension  -Established with Dr. Lee for mild aortic stenosis.  -Continue outpatient follow-up.  -Continue home regimen.     CKD 3  -Renal function/electrolytes overall stable. Continue to monitor.  -He is established with Dr. Vicky Duran.     Anemia  Thrombocytopenia  -Hemoglobin 7.7->7.1 today. Platelets 87->101.   -2 units of PRBC ordered today by oncology.     Discussed with patient, nurse and Dr. Hess.    No objection to moving to [facility] for ongoing oncological care if deemed appropriate by others.     VTE Prophylaxis - SCDs  Code Status  - NO CPR/intubation. Confirmed with patient at bedside.  Disposition - Anticipate discharge likely to SNF when cleared by all.     SYD Oswald  [facility]  09/19/23  09:54 EDT

## 2023-09-20 PROBLEM — E44.0 MODERATE MALNUTRITION: Status: ACTIVE | Noted: 2023-09-20

## 2023-09-20 LAB
ALBUMIN SERPL-MCNC: 3.7 G/DL (ref 3.5–5.2)
ALBUMIN/GLOB SERPL: 1.2 G/DL
ALP SERPL-CCNC: 300 U/L (ref 39–117)
ALT SERPL W P-5'-P-CCNC: 8 U/L (ref 1–41)
ANION GAP SERPL CALCULATED.3IONS-SCNC: 10.9 MMOL/L (ref 5–15)
ANISOCYTOSIS BLD QL: ABNORMAL
AST SERPL-CCNC: 13 U/L (ref 1–40)
BH BB BLOOD EXPIRATION DATE: NORMAL
BH BB BLOOD EXPIRATION DATE: NORMAL
BH BB BLOOD TYPE BARCODE: 9500
BH BB BLOOD TYPE BARCODE: 9500
BH BB DISPENSE STATUS: NORMAL
BH BB DISPENSE STATUS: NORMAL
BH BB PRODUCT CODE: NORMAL
BH BB PRODUCT CODE: NORMAL
BH BB UNIT NUMBER: NORMAL
BH BB UNIT NUMBER: NORMAL
BILIRUB SERPL-MCNC: 0.4 MG/DL (ref 0–1.2)
BUN SERPL-MCNC: 25 MG/DL (ref 8–23)
BUN/CREAT SERPL: 33.3 (ref 7–25)
CALCIUM SPEC-SCNC: 8.1 MG/DL (ref 8.6–10.5)
CHLORIDE SERPL-SCNC: 105 MMOL/L (ref 98–107)
CO2 SERPL-SCNC: 21.1 MMOL/L (ref 22–29)
CREAT SERPL-MCNC: 0.75 MG/DL (ref 0.76–1.27)
CROSSMATCH INTERPRETATION: NORMAL
CROSSMATCH INTERPRETATION: NORMAL
DEPRECATED RDW RBC AUTO: 58.2 FL (ref 37–54)
EGFRCR SERPLBLD CKD-EPI 2021: 90.1 ML/MIN/1.73
ERYTHROCYTE [DISTWIDTH] IN BLOOD BY AUTOMATED COUNT: 20 % (ref 12.3–15.4)
GLOBULIN UR ELPH-MCNC: 3 GM/DL
GLUCOSE BLDC GLUCOMTR-MCNC: 180 MG/DL (ref 70–130)
GLUCOSE BLDC GLUCOMTR-MCNC: 205 MG/DL (ref 70–130)
GLUCOSE BLDC GLUCOMTR-MCNC: 208 MG/DL (ref 70–130)
GLUCOSE BLDC GLUCOMTR-MCNC: 223 MG/DL (ref 70–130)
GLUCOSE SERPL-MCNC: 197 MG/DL (ref 65–99)
HCT VFR BLD AUTO: 33 % (ref 37.5–51)
HGB BLD-MCNC: 10.8 G/DL (ref 13–17.7)
LYMPHOCYTES # BLD MANUAL: 0.66 10*3/MM3 (ref 0.7–3.1)
LYMPHOCYTES NFR BLD MANUAL: 4 % (ref 5–12)
MCH RBC QN AUTO: 26.4 PG (ref 26.6–33)
MCHC RBC AUTO-ENTMCNC: 32.7 G/DL (ref 31.5–35.7)
MCV RBC AUTO: 80.7 FL (ref 79–97)
METAMYELOCYTES NFR BLD MANUAL: 2 % (ref 0–0)
MONOCYTES # BLD: 0.33 10*3/MM3 (ref 0.1–0.9)
MYELOCYTES NFR BLD MANUAL: 2 % (ref 0–0)
NEUTROPHILS # BLD AUTO: 6.81 10*3/MM3 (ref 1.7–7)
NEUTROPHILS NFR BLD MANUAL: 83.8 % (ref 42.7–76)
NRBC BLD AUTO-RTO: 2.2 /100 WBC (ref 0–0.2)
NRBC SPEC MANUAL: 2 /100 WBC (ref 0–0.2)
PLAT MORPH BLD: NORMAL
PLATELET # BLD AUTO: 96 10*3/MM3 (ref 140–450)
PMV BLD AUTO: 9.6 FL (ref 6–12)
POIKILOCYTOSIS BLD QL SMEAR: ABNORMAL
POLYCHROMASIA BLD QL SMEAR: ABNORMAL
POTASSIUM SERPL-SCNC: 5.1 MMOL/L (ref 3.5–5.2)
PROT SERPL-MCNC: 6.7 G/DL (ref 6–8.5)
RAD ONC ARIA COURSE ID: NORMAL
RAD ONC ARIA COURSE INTENT: NORMAL
RAD ONC ARIA COURSE LAST TREATMENT DATE: NORMAL
RAD ONC ARIA COURSE START DATE: NORMAL
RAD ONC ARIA COURSE TREATMENT ELAPSED DAYS: 0
RAD ONC ARIA FIRST TREATMENT DATE: NORMAL
RAD ONC ARIA PLAN FRACTIONS TREATED TO DATE: 1
RAD ONC ARIA PLAN ID: NORMAL
RAD ONC ARIA PLAN PRESCRIBED DOSE PER FRACTION: 4 GY
RAD ONC ARIA PLAN PRIMARY REFERENCE POINT: NORMAL
RAD ONC ARIA PLAN TOTAL FRACTIONS PRESCRIBED: 5
RAD ONC ARIA PLAN TOTAL PRESCRIBED DOSE: 2000 CGY
RAD ONC ARIA REFERENCE POINT DOSAGE GIVEN TO DATE: 4 GY
RAD ONC ARIA REFERENCE POINT ID: NORMAL
RAD ONC ARIA REFERENCE POINT SESSION DOSAGE GIVEN: 4 GY
RBC # BLD AUTO: 4.09 10*6/MM3 (ref 4.14–5.8)
SODIUM SERPL-SCNC: 137 MMOL/L (ref 136–145)
UNIT  ABO: NORMAL
UNIT  ABO: NORMAL
UNIT  RH: NORMAL
UNIT  RH: NORMAL
VARIANT LYMPHS NFR BLD MANUAL: 8.1 % (ref 19.6–45.3)
WBC MORPH BLD: NORMAL
WBC NRBC COR # BLD: 8.13 10*3/MM3 (ref 3.4–10.8)

## 2023-09-20 PROCEDURE — 85007 BL SMEAR W/DIFF WBC COUNT: CPT | Performed by: INTERNAL MEDICINE

## 2023-09-20 PROCEDURE — 97530 THERAPEUTIC ACTIVITIES: CPT

## 2023-09-20 PROCEDURE — 63710000001 INSULIN LISPRO (HUMAN) PER 5 UNITS: Performed by: NURSE PRACTITIONER

## 2023-09-20 PROCEDURE — 80053 COMPREHEN METABOLIC PANEL: CPT | Performed by: INTERNAL MEDICINE

## 2023-09-20 PROCEDURE — 85025 COMPLETE CBC W/AUTO DIFF WBC: CPT | Performed by: INTERNAL MEDICINE

## 2023-09-20 PROCEDURE — 63710000001 INSULIN LISPRO (HUMAN) PER 5 UNITS: Performed by: INTERNAL MEDICINE

## 2023-09-20 PROCEDURE — 77427 RADIATION TX MANAGEMENT X5: CPT | Performed by: STUDENT IN AN ORGANIZED HEALTH CARE EDUCATION/TRAINING PROGRAM

## 2023-09-20 PROCEDURE — 99232 SBSQ HOSP IP/OBS MODERATE 35: CPT | Performed by: INTERNAL MEDICINE

## 2023-09-20 PROCEDURE — 77280 THER RAD SIMULAJ FIELD SMPL: CPT | Performed by: STUDENT IN AN ORGANIZED HEALTH CARE EDUCATION/TRAINING PROGRAM

## 2023-09-20 PROCEDURE — G6002 STEREOSCOPIC X-RAY GUIDANCE: HCPCS | Performed by: STUDENT IN AN ORGANIZED HEALTH CARE EDUCATION/TRAINING PROGRAM

## 2023-09-20 PROCEDURE — 82948 REAGENT STRIP/BLOOD GLUCOSE: CPT

## 2023-09-20 PROCEDURE — 25010000002 DEXAMETHASONE PER 1 MG: Performed by: INTERNAL MEDICINE

## 2023-09-20 PROCEDURE — 77412 RADIATION TX DELIVERY LVL 3: CPT | Performed by: STUDENT IN AN ORGANIZED HEALTH CARE EDUCATION/TRAINING PROGRAM

## 2023-09-20 PROCEDURE — 77387 GUIDANCE FOR RADJ TX DLVR: CPT | Performed by: STUDENT IN AN ORGANIZED HEALTH CARE EDUCATION/TRAINING PROGRAM

## 2023-09-20 PROCEDURE — 97110 THERAPEUTIC EXERCISES: CPT

## 2023-09-20 PROCEDURE — 63710000001 INSULIN GLARGINE PER 5 UNITS: Performed by: NURSE PRACTITIONER

## 2023-09-20 RX ORDER — LEVOTHYROXINE SODIUM 88 UG/1
176 TABLET ORAL
Status: DISCONTINUED | OUTPATIENT
Start: 2023-09-24 | End: 2023-09-25 | Stop reason: HOSPADM

## 2023-09-20 RX ORDER — LEVOTHYROXINE SODIUM 88 UG/1
88 TABLET ORAL
Status: DISCONTINUED | OUTPATIENT
Start: 2023-09-21 | End: 2023-09-25 | Stop reason: HOSPADM

## 2023-09-20 RX ORDER — GABAPENTIN 300 MG/1
900 CAPSULE ORAL NIGHTLY
Status: DISCONTINUED | OUTPATIENT
Start: 2023-09-20 | End: 2023-09-25 | Stop reason: HOSPADM

## 2023-09-20 RX ORDER — ROPINIROLE 1 MG/1
1 TABLET, FILM COATED ORAL NIGHTLY
Status: DISCONTINUED | OUTPATIENT
Start: 2023-09-20 | End: 2023-09-25 | Stop reason: HOSPADM

## 2023-09-20 RX ORDER — INSULIN LISPRO 100 [IU]/ML
4 INJECTION, SOLUTION INTRAVENOUS; SUBCUTANEOUS
Status: DISCONTINUED | OUTPATIENT
Start: 2023-09-20 | End: 2023-09-21

## 2023-09-20 RX ADMIN — DEXAMETHASONE SODIUM PHOSPHATE 4 MG: 4 INJECTION, SOLUTION INTRAMUSCULAR; INTRAVENOUS at 02:34

## 2023-09-20 RX ADMIN — LEVOTHYROXINE SODIUM 88 MCG: 0.09 TABLET ORAL at 05:33

## 2023-09-20 RX ADMIN — DEXAMETHASONE SODIUM PHOSPHATE 4 MG: 4 INJECTION, SOLUTION INTRAMUSCULAR; INTRAVENOUS at 22:10

## 2023-09-20 RX ADMIN — GABAPENTIN 900 MG: 300 CAPSULE ORAL at 22:10

## 2023-09-20 RX ADMIN — SENNOSIDES AND DOCUSATE SODIUM 2 TABLET: 50; 8.6 TABLET ORAL at 22:10

## 2023-09-20 RX ADMIN — INSULIN LISPRO 2 UNITS: 100 INJECTION, SOLUTION INTRAVENOUS; SUBCUTANEOUS at 09:16

## 2023-09-20 RX ADMIN — SENNOSIDES AND DOCUSATE SODIUM 2 TABLET: 50; 8.6 TABLET ORAL at 09:16

## 2023-09-20 RX ADMIN — INSULIN LISPRO 4 UNITS: 100 INJECTION, SOLUTION INTRAVENOUS; SUBCUTANEOUS at 09:17

## 2023-09-20 RX ADMIN — INSULIN LISPRO 2 UNITS: 100 INJECTION, SOLUTION INTRAVENOUS; SUBCUTANEOUS at 12:30

## 2023-09-20 RX ADMIN — INSULIN LISPRO 4 UNITS: 100 INJECTION, SOLUTION INTRAVENOUS; SUBCUTANEOUS at 12:30

## 2023-09-20 RX ADMIN — PANTOPRAZOLE SODIUM 40 MG: 40 TABLET, DELAYED RELEASE ORAL at 09:16

## 2023-09-20 RX ADMIN — DEXAMETHASONE SODIUM PHOSPHATE 4 MG: 4 INJECTION, SOLUTION INTRAMUSCULAR; INTRAVENOUS at 15:25

## 2023-09-20 RX ADMIN — MODAFINIL 200 MG: 100 TABLET ORAL at 09:16

## 2023-09-20 RX ADMIN — DILTIAZEM HYDROCHLORIDE 120 MG: 120 CAPSULE, COATED, EXTENDED RELEASE ORAL at 09:16

## 2023-09-20 RX ADMIN — ROPINIROLE 1 MG: 1 TABLET, FILM COATED ORAL at 22:21

## 2023-09-20 RX ADMIN — INSULIN LISPRO 4 UNITS: 100 INJECTION, SOLUTION INTRAVENOUS; SUBCUTANEOUS at 17:24

## 2023-09-20 RX ADMIN — ROPINIROLE HYDROCHLORIDE 0.5 MG: 0.5 TABLET, FILM COATED ORAL at 02:34

## 2023-09-20 RX ADMIN — PRAVASTATIN SODIUM 40 MG: 40 TABLET ORAL at 09:16

## 2023-09-20 RX ADMIN — PANTOPRAZOLE SODIUM 40 MG: 40 TABLET, DELAYED RELEASE ORAL at 17:24

## 2023-09-20 RX ADMIN — ASPIRIN 81 MG: 81 TABLET, COATED ORAL at 09:15

## 2023-09-20 RX ADMIN — Medication 2000 UNITS: at 09:16

## 2023-09-20 RX ADMIN — INSULIN GLARGINE 15 UNITS: 100 INJECTION, SOLUTION SUBCUTANEOUS at 22:29

## 2023-09-20 RX ADMIN — INSULIN LISPRO 4 UNITS: 100 INJECTION, SOLUTION INTRAVENOUS; SUBCUTANEOUS at 22:29

## 2023-09-20 RX ADMIN — DEXAMETHASONE SODIUM PHOSPHATE 4 MG: 4 INJECTION, SOLUTION INTRAMUSCULAR; INTRAVENOUS at 09:16

## 2023-09-20 NOTE — THERAPY TREATMENT NOTE
Patient Name: Semaj Herrera  : 1940    MRN: [id number]                              Today's Date: 2023       Admit Date: 2023    Visit Dx:     ICD-10-CM ICD-9-CM   1. Fecal impaction  K56.41 560.32   2. Weakness of both lower extremities  R29.898 729.89   3. Prostate cancer metastatic to bone  C61 185    C79.51 198.5     Patient Active Problem List   Diagnosis    Type 2 diabetes mellitus with kidney complication, with long-term current use of insulin    Fatigue    Hyperlipidemia    Hypertension    Left ventricular hypertrophy    Obstructive sleep apnea syndrome treated auto BiPAP    Sinus bradycardia    Prostate cancer metastatic to bone    Mediastinal adenopathy    Malignant neoplasm metastatic to bone    Chronic kidney disease, stage III (moderate)    Diastolic dysfunction    Localized edema    Neuropathy    Hypersomnia due to medical condition treated with modafinil 200 mg every morning    CSA (central sleep apnea)    Restless legs syndrome (RLS)    Psychophysiological insomnia    Edema    Type 2 diabetes mellitus with diabetic chronic kidney disease    Class 2 severe obesity due to excess calories with serious comorbidity and body mass index (BMI) of 35.0 to 35.9 in adult    Aortic stenosis, mild    Ascending aorta dilatation    Fecal impaction in rectum    Lower extremity weakness    Anemia, chronic disease    Metastatic cancer to bone    Moderate malnutrition     Past Medical History:   Diagnosis Date    Aortic stenosis, mild 2021    Per echocardiogram     Ascending aorta dilatation     Asthma     Benign prostatic hyperplasia     Bone cancer     Cellulitis of great toe of left foot 10/19/2018    CKD (chronic kidney disease)     Stage 3; followed by Dr. Vicky Duran     Diastolic dysfunction     GRADE II per echo     Difficulty breathing     during exertion    Dyslipidemia     Erectile dysfunction     Fatigue     Heart murmur     History of blood transfusion     History of  kidney stones     HL (hearing loss)     Hyperlipidemia     Hypertension     Hyponatremia     Kidney stone     Left ventricular hypertrophy     per echo 2018    Localized edema     Neuropathy     Obstructive sleep apnea     USING CPAP    Osteoarthritis     Peptic ulcer     Pneumonia     Pneumonia of left upper lobe due to infectious organism 03/15/2019    Prostate cancer     Status post prostatectomy, radiation therapy, and hormone therapy followed by Dr. Lee; metastatsis to bone    Pure hyperglyceridemia     Restless leg syndrome     Sepsis     Sinus bradycardia     Transient cerebral ischemia     Type 2 diabetes mellitus      Past Surgical History:   Procedure Laterality Date    BRONCHOSCOPY N/A 04/09/2018    Procedure: BRONCHOSCOPY;  Surgeon: Bijan Rivera III, MD;  Location: [facility];  Service: Cardiothoracic    COLONOSCOPY      DEEP NECK LYMPH NODE BIOPSY / EXCISION      HEMORRHOIDECTOMY      LYMPH NODE BIOPSY      MEDIASTINOSCOPY N/A 04/09/2018    Procedure: MEDIASTINOSCOPY WITH LYMPH NODE BIOPSY;  Surgeon: Bijan Rivera III, MD;  Location: [facility];  Service: Cardiothoracic    OTHER SURGICAL HISTORY      ulcer repair    PROSTATECTOMY  2006      General Information       Row Name 09/20/23 1600          Physical Therapy Time and Intention    Document Type therapy note (daily note)  -EB     Mode of Treatment individual therapy;physical therapy  -EB       Row Name 09/20/23 1600          General Information    Patient Profile Reviewed yes  -EB     Existing Precautions/Restrictions fall  -EB       Row Name 09/20/23 1600          Cognition    Orientation Status (Cognition) oriented x 3  -EB       Row Name 09/20/23 1600          Safety Issues, Functional Mobility    Safety Issues Affecting Function (Mobility) insight into deficits/self-awareness  -EB     Impairments Affecting Function (Mobility) balance;endurance/activity tolerance;strength  -EB               User Key  (r) = Recorded By, (t) =  Taken By, (c) = Cosigned By      Initials Name Provider Type    Tanisha Parsons PTA Physical Therapist Assistant                   Mobility       Row Name 09/20/23 1600          Bed Mobility    Supine-Sit [place] (Bed Mobility) moderate assist (50% patient effort);verbal cues  -EB     Sit-Supine [place] (Bed Mobility) moderate assist (50% patient effort);verbal cues  -EB     Assistive Device (Bed Mobility) bed rails;head of bed elevated  -       Row Name 09/20/23 1600          Sit-Stand Transfer    Sit-Stand [place] (Transfers) minimum assist (75% patient effort);2 person assist;moderate assist (50% patient effort);verbal cues;nonverbal cues (demo/gesture)  -EB     Assistive Device (Sit-Stand Transfers) --  HHAX2  -EB     Comment, (Sit-Stand Transfer) 3 STS transfers. Pt able to stand fully upright and no buckling.  -       Row Name 09/20/23 1600          Gait/Stairs (Locomotion)    [place] Level (Gait) moderate assist (50% patient effort);2 person assist  -EB     Assistive Device (Gait) --  HHA  -EB     Distance in Feet (Gait) 3  -EB     Deviations/Abnormal Patterns (Gait) stride length decreased;gait speed decreased  -EB     Bilateral Gait Deviations forward flexed posture  -EB     Comment, (Gait/Stairs) pt able to take several side steps with each stand but needed a seated rest break after each foot. No knees  buckling but does fatigue quickly. Stayed along EOB for safety.  -EB               User Key  (r) = Recorded By, (t) = Taken By, (c) = Cosigned By      Initials Name Provider Type    Tanisha Parsons PTA Physical Therapist Assistant                   Obj/Interventions       Row Name 09/20/23 1603          Motor Skills    Therapeutic Exercise --  BLE: AP, LAQs, SM (X5-10)  -       Row Name 09/20/23 1603          Balance    Balance Assessment sitting static balance;sitting dynamic balance  -EB     Static Sitting Balance standby assist  -EB     Dynamic Sitting Balance minimal assist   -EB     Position, Sitting Balance supported;unsupported;sitting edge of bed  -EB               User Key  (r) = Recorded By, (t) = Taken By, (c) = Cosigned By      Initials Name Provider Type    Tanisha Parsons PTA Physical Therapist Assistant                   Goals/Plan    No documentation.                  Clinical Impression       Row Name 09/20/23 1603          Plan of Care Review    Plan of Care Reviewed With patient;son  -EB     Progress improving  -EB     Outcome Evaluation Pt seen for PT treatment today. Pt is progressing with mobility but still continues to have BLE weakness. Pt required ModA with bed mobility and pt needed SBA/Efren with sitting balance. Pt needed cues to correct sitting balance. Pt completed 3 STS transfers with ModAX2. Pt was able to take side steps along EOB with each stand. No knees buckling today but pt does fatigue quickly and needs seated rest breaks. Will continue to progress pt as able.  -EB       Row Name 09/20/23 1603          Therapy Assessment/Plan (PT)    Therapy Frequency (PT) 6 times/wk  -EB       Row Name 09/20/23 1603          Positioning and Restraints    Pre-Treatment Position in bed  -EB     Post Treatment Position bed  -EB     In Bed supine;call light within reach;exit alarm on;encouraged to call for assist;with nsg;with family/caregiver  -EB               User Key  (r) = Recorded By, (t) = Taken By, (c) = Cosigned By      Initials Name Provider Type    Tanisha Parsons PTA Physical Therapist Assistant                   Outcome Measures       Row Name 09/20/23 1606          How much help from another person do you currently need...    Turning from your back to your side while in flat bed without using bedrails? 3  -EB     Moving from lying on back to sitting on the side of a flat bed without bedrails? 2  -EB     Moving to and from a bed to a chair (including a wheelchair)? 2  -EB     Standing up from a chair using your arms (e.g., wheelchair, bedside chair)? 2  -EB      Climbing 3-5 steps with a railing? 1  -EB     To walk in hospital room? 1  -EB     AM-PAC 6 Clicks Score (PT) 11  -EB     Highest level of mobility 4 --> Transferred to chair/commode  -               User Key  (r) = Recorded By, (t) = Taken By, (c) = Cosigned By      Initials Name Provider Type    EB Tanisha Allen PTA Physical Therapist Assistant                                 Physical Therapy Education       Title: PT OT SLP Therapies (Done)       Topic: Physical Therapy (Done)       Point: Mobility training (Done)       Learning Progress Summary             Patient Acceptance, E,D, VU,NR by  at 9/20/2023 1607    Acceptance, E, VU by AP at 9/18/2023 1853    Acceptance, E,TB,D, VU,NR by  at 9/18/2023 1327                         Point: Home exercise program (Done)       Learning Progress Summary             Patient Acceptance, E,D, VU,NR by  at 9/20/2023 1607    Acceptance, E, VU by AP at 9/18/2023 1853                         Point: Body mechanics (Done)       Learning Progress Summary             Patient Acceptance, E,D, VU,NR by  at 9/20/2023 1607    Acceptance, E, VU by AP at 9/18/2023 1853                         Point: Precautions (Done)       Learning Progress Summary             Patient Acceptance, E,D, VU,NR by  at 9/20/2023 1607    Acceptance, E, VU by AP at 9/18/2023 1853                                         User Key       Initials Effective Dates Name Provider Type Discipline     06/16/21 -  Tracy Garnica, PT Physical Therapist PT     02/14/23 -  Tanisha Allen PTA Physical Therapist Assistant PT    AP 01/17/23 -  Nora Cerrato RN Registered Nurse Nurse                  PT Recommendation and Plan     Plan of Care Reviewed With: patient, son  Progress: improving  Outcome Evaluation: Pt seen for PT treatment today. Pt is progressing with mobility but still continues to have BLE weakness. Pt required ModA with bed mobility and pt needed SBA/Efren with sitting balance. Pt  needed cues to correct sitting balance. Pt completed 3 STS transfers with ModAX2. Pt was able to take side steps along EOB with each stand. No knees buckling today but pt does fatigue quickly and needs seated rest breaks. Will continue to progress pt as able.     Time Calculation:         PT Charges       Row Name 09/20/23 1600             Time Calculation    Start Time 1111  -EB      Stop Time 1134  -EB      Time Calculation (min) 23 min  -EB      PT Received On 09/20/23  -EB      PT - Next Appointment 09/21/23  -EB         Time Calculation- PT    Total Timed Code Minutes- PT 23 minute(s)  -EB                User Key  (r) = Recorded By, (t) = Taken By, (c) = Cosigned By      Initials Name Provider Type    EB Tanisha Allen PTA Physical Therapist Assistant                  Therapy Charges for Today       Code Description Service Date Service Provider Modifiers Qty    08985798264 HC PT THERAPEUTIC ACT EA 15 MIN 9/20/2023 Tanisha Allen PTA GP 1    13866940005 HC PT THER PROC EA 15 MIN 9/20/2023 Tanisha Allen PTA GP 1    36357245360 HC PT THER SUPP EA 15 MIN 9/20/2023 Tanisha Allen PTA GP 1            PT G-Codes  Outcome Measure Options: AM-PAC 6 Clicks Basic Mobility (PT)  AM-PAC 6 Clicks Score (PT): 11  AM-PAC 6 Clicks Score (OT): 15       Tanisha Allen PTA  9/20/2023

## 2023-09-20 NOTE — CASE MANAGEMENT/SOCIAL WORK
Continued Stay Note  [facility]     Patient Name: Semaj Herrera  MRN: [id number]  Today's Date: 9/20/2023    Admit Date: 9/17/2023    Plan: Home with son and HH vs SNF   Discharge Plan       Row Name 09/20/23 1052       Plan    Plan Home with son and HH vs SNF    Provided Post Acute Provider List? Yes    Post Acute Provider List Home Health;Nursing Home    Provided Post Acute Provider Quality & Resource List? Yes    Post Acute Provider Quality and Resource List Home Health;Nursing Home    Delivered To Patient;Support Person    Method of Delivery In person    Plan Comments Spoke with patient and son at bedside. Introduced self. CCP inquired about choices for SNF if needed. Stated that they had left the book at home, CCP provided new list. Family stated that patient needs to be able to transfer to and from bed to w/c at minimum to return home as they can not lift him.                   Discharge Codes    No documentation.                 Expected Discharge Date and Time       Expected Discharge Date Expected Discharge Time    Sep 20, 2023               Tata Atkinson RN

## 2023-09-20 NOTE — DISCHARGE PLACEMENT REQUEST
"Semaj Shirley (82 y.o. Male)       Date of Birth   1940    Social Security Number       Address   [address] [place] [address]    Home Phone   [phone]    MRN   [id number]       Anabaptist   Caodaism    Marital Status                               Admission Date   9/17/23    Admission Type   Emergency    Admitting Provider   Melissa Elliott MD    Attending Provider   Alfredito Hess MD    Department, Room/Bed   [address], P391/1       Discharge Date       Discharge Disposition       Discharge Destination                                 Attending Provider: Alfredito Hess MD    Allergies: Niacin, Statins    Isolation: None   Infection: None   Code Status: No CPR    Ht: 162.6 cm (64\")   Wt: 77 kg (169 lb 12.1 oz)    Admission Cmt: None   Principal Problem: Fecal impaction in rectum [K56.41]                   Active Insurance as of 9/17/2023       Primary Coverage       Payor Plan Insurance Group Employer/Plan Group    MEDICARE MEDICARE A & B        Payor Plan Address Payor Plan Phone Number Payor Plan Fax Number Effective Dates    [address] [phone]  11/1/2005 - None Entered    [facility] [id number]         Subscriber Name Subscriber Birth Date Member ID       SEMAJ SHIRLEY 1940 [id number]               Secondary Coverage       Payor Plan Insurance Group Employer/Plan Group    AARP  SUP AARP HEALTH CARE OPTIONS        Payor Plan Address Payor Plan Phone Number Payor Plan Fax Number Effective Dates    [facility] [phone]  1/1/2016 - None Entered    [address]       [facility] [address]         Subscriber Name Subscriber Birth Date Member ID       SEMAJ SHIRLEY 1940 [id number]                     Emergency Contacts        (Rel.) Home Phone Work Phone Mobile Phone    Cody Shirley (HCS) (Son) [phone] -- [phone]                "

## 2023-09-20 NOTE — PLAN OF CARE
Goal Outcome Evaluation:  Plan of Care Reviewed With: patient        Progress: no change     Pt AxOx4, calm and cooperative. VSS. Pt went for Radiation this morning. Meds given whole with water, see MAR. Insulin scheduled and slading scale given. No complains of pain this shift. Pt incontinent, brief on. Family visiting earlier afternoon, attentive to pt. RN will continue to monitor.

## 2023-09-20 NOTE — CASE MANAGEMENT/SOCIAL WORK
Continued Stay Note  [facility]     Patient Name: Semaj Herrera  MRN: [id number]  Today's Date: 9/20/2023    Admit Date: 9/17/2023    Plan: Home vs SNF   Discharge Plan       Row Name 09/20/23 1442       Plan    Plan Comments Inbound call from [facility] no beds available and currently has a covid outbreak.      Row Name 09/20/23 1406       Plan    Plan Home vs SNF    Patient/Family in Agreement with Plan yes    Plan Comments Spoke with patient and son at bedside. Introduced self. CCP inquired about choices for SNF. Patient stated that they had a couple and were going to continue looking as well. Patient's first choice [facility]. Left message for [facility].                   Discharge Codes    No documentation.                 Expected Discharge Date and Time       Expected Discharge Date Expected Discharge Time    Sep 22, 2023               Tata Atkinson RN

## 2023-09-20 NOTE — NURSING NOTE
"Diabetes Education  Assessment/Teaching    Patient Name:  Semaj Herrera  YOB: 1940  MRN: [id number]  Admit Date:  9/17/2023      Assessment Date:  9/20/2023  Flowsheet Row Most Recent Value   General Information     Referral From: APRN/MD order. Speak w/pt over the phone for initial assessment of needs.    Height 162.6 cm (64\")   Height Method Stated   Weight 77 kg (169 lb 12.1 oz)   Weight Method Bed scale   Diabetes History    What type of diabetes do you have? Type 2   Do you test your blood sugar at home? yes  -pt reports he has both CGM and a BG meter.   Do you have any diabetes complications? nephropathy   Education Preferences    Barriers to Learning hearing loss   Assessment Topics    Taking Medication - Assessment -- Pt reports using [employer] insulin pump. He states his son has gone home to get supplies for pt to re-start but he needs (possibly more sensors?) refill for his CGM. He is aware to use BG meter as a back-up or if out of sensors. Apparently, pt son assists pt with insulin pump mgt and pt asked if I would check back in the morning.    Healthy Coping - Assessment Competent   Monitoring - Assessment Competent   DM Goals    Contact Plan Follow-up medical care            Flowsheet Row Most Recent Value   DM Education Needs    Meter Has own   Healthy Coping Appropriate   Discharge Plan -- home vs snf per CCP note.   Teaching Method Discussion   Patient Response Verbalized understanding        Other Comments:    Electronically signed by:  Lisa Sanchez RN, BSN, [facility]  09/20/23 15:25 EDT  "

## 2023-09-20 NOTE — PROGRESS NOTES
Subjective .     REASONS FOR FOLLOWUP:      REASON FOR CONSULTATION:  1. Metastatic prostate cancer      2.  Significant back pain with weakness in the lower extremities since last 3 days, rule out spinal cord compression      Interval history:    September 19, 2023: Patient was seen by radiation oncology with plans to start radiation today.  His hemoglobin is down to 7.1 and we will give him 2 units of packed red blood cells.  MRI thoracic and lumbar spine has been done but not read yet.  He is afebrile.  Complains of lower extremity weakness    MRI of the thoracic spine shows multiple spine mets with spinal cord compression at thoracic 10.  Neurosurgery has been consulted and radiation oncology has started radiation already.    September 20, 2023: Patient's weakness in the lower extremity has improved significantly.  Grade 2 radiation    HISTORY OF PRESENT ILLNESS:    Patient is a 82-year-old gentleman with history of metastatic prostate cancer followed by Dr. Lee in our office and was last seen September 8, 2023.  I am seeing this patient for the first time.  Initially in March 2018 he had undergone a CT of the chest which showed multiple sclerotic lesions on the bone and multiple enlarged mediastinal lymph nodes.  PSA was 395.  April 9, 2018 underwent mediastinoscopy and pathology was consistent with metastatic adenocarcinoma consistent with prostate primary.  He was treated with ADT and radiation for symptomatic sites.  He receives Lupron injections every 3 months and Xgeva starting June 2018.  His PSA dropped down.  To 0.08 on June 13, 2019.  Subsequently his PSA started rising starting December 2019.  He continued Lupron and Xgeva.  He had worsening hip pain on May 4, 2020.  But CT scan of the chest abdomen pelvis showed no progression of disease.  He was started on gabapentin.  Bone scan was done and it does not show any worsening of bone mets.  This was done Lety 3, 2020.     Patient's PSA continue to  rise and in November 2020 was 25.4.  He was noncompliant with Lupron and that was restarted.  In January 2021 he had progression in the sternum.  And worsening pain in the right femur and patient underwent radiation to the right femur as well as sternum.     In August 2021 his PSA continue to rise and he was started on Xtandi on September 8, 2021.  PSA started dropping to 4.7.  He continued Xtandi.     In January 2023 patient continued to have increasing bony discomfort.  He had a clarify PET scanning done.  He was eligible for lutetium 177.     Patient 1 subsequently started by [facility] urology on Jeaneth low to mild April 17, 2023 and increased to 600 mg twice daily along with Xgeva and Lupron every 3 months     In June 12, 2023 patient had bone scan with progressive metastatic disease and patient was to follow-up with hospice and palliative care.  His hemoglobin had dropped down to 7.2 and patient declined blood transfusion.  On September 8, 2023 his hemoglobin was down to 5.2 and he agreed for transfusion     Patient has pain and currently on fentanyl patch 50 mcg every 72 hours and Norco 10 x 325 every 6 hours as needed along with gabapentin at bedtime patient was to receive 2 units of packed red blood cells with premedication dexamethasone and Benadryl.     Patient was admitted yesterday because he was felt weak and unable to stand up.  His hemoglobin was 8.1, platelets of 102     Because of back pain MRI of the lumbar spine done which showed constipation.  With possible fecal impaction.  Liver gallbladder pancreas spleen and adrenal glands are normal.  7 to 8 mm hypodense nodules arising in the upper pole of the right kidney.  Widespread osseous metastasis that appears more pronounced.     We have been consulted and also palliative care has been consulted.     CT abdomen pelvis showed the 7 to 8 mm hypodense nodules in the upper pole of the right kidney not seen on previous exam in 2021.  Probably hyperdense cyst  but widespread bony metastasis     MRI lumbar spine shows diffuse marrow changes consistent with metastatic disease.  No vertebral body collapse.  Epidural enhancing soft tissue posterior to the T11 and T12 either directly from its vertebral body or dural metastasis with effacement of the ventral sac without deformity or compression of the distal thoracic cord.  Multilevel degenerative arthritis     Last PSA had increased from  September 8, 2023     Hematologic/oncologic history:   The patient is an 82-year-old male with significant past medical history including prostate carcinoma, CKD 3 and long-term tobacco use be been seen by primary care in late January, 2018 for hoarseness.  Chest x-ray is now outpatient January 23 revealed sclerotic change in the posterior mid chest to be within 3 adjacent thoracic ertebral bodies of uncertain significance.  A follow-up chest CT revealed numerous sclerotic foci within al visualized bones consistent with metastatic disease, multiple enlarged mediastinal lymph nodes concerning for metastatic lymphadenopathy and a polypoidal abnormality in the right lateral wall of the trachea.  CT of abdomen March 20 revealed extensive osseous metastatic disease mildly enlarged retroperitoneal lymph nodes.  Bone scan March 20 was consistent with widespread osseous metastasis particularly in the proximal half the left humeral shaft, abnormal uptake in the sternum and manubrium and a small focus in the posterior aspect of the calvarium.  This led to a plain film the left humerus with mottled sclerosis involving the shaft of the humerus particularly in the proximal half but no evidence of pathologic fracture.    The patient has additionally type 2 diabetes on insulin pump, again a history of prostate carcinoma prostatectomy 12 years previously, hypertension, and hyperlipidemia.  Additional studies included a PSA of 395.1 from March 14, 2018.The patient's been seen by urology March 15 with  plans to start Firmagon.    The patient is now seen in pulmonary clinic with Dr. Bijan Rivera and we have discussed that he will need bronchoscopy and mediastinoscopy.  Interestingly his additional history includes a long occupational exposure including working in the [place] just out of school, subsequent construction work for many years and a 30-pack-year history of smoking stopping in the late 1990s.  He indicates that he followed with Dr. Guillen of urology for many years with no changes in his exams and normal PSAs.  He stopped this follow-up approximately 2 years ago.     Patient was seen in consultation with thoracic surgery on March 28 and plans are made for mediastinoscopy and bronchoscopy with lymph node biopsy.  Pathology revealed that these nodes were consistent with metastatic prostate carcinoma.  He was discussed at thoracic conference.  A PET/CT was not pursued and it was determined that he see medical oncology for hormonal treatment and radiation therapy if symptomatic.  It should be noted that the patient's March 15 [facility] office note describes that Firmagon was planned.     The patient has met with the son and grandson April 23.  We have also contacted Dr. Taylor of urology in that Firmagon was given just after his last visit and would next be due approximately May 3.  Patient feels considerably better with resolution of his pain, normalization of his performance status.  He would like to continue treatment through our office now contacted Dr. Taylor concerning this.    The patient is next seen June 11, 2018 we discussed his follow-up including his treatments with Lupron May 7 and his next treatment on July 30.  He has returned to essentially normal performance status and his PSA has dropped further from 395 March 14 27.8 May 7 and now 2.03 June 11.  We do plan to initiate Xgeva also study him via scans to document his improvement approximately a month from now.   The  patient is next seen July 26, 2018.  Repeat imaging demonstrated interval resolution of mediastinal and retroperitoneal lymphadenopathy.  There is thickening in the distal aspect of the appendix with stranding?  Chronic appendicitis.  The patient indicates he is having no such symptoms and never has.  There is no change in sclerotic bony metastasis in the patient's PSA has dropped substantially to 1.66 by July 19 and 1.79 July 26.  He is actually feeling excellent and this is corroborated by family members.   Patient is next seen January 10, 2019 continuing to do very well and, in fact indicating that [facility], likely hearing replacements, visual review.  He is not certain is going to continue his care with them or with us or combination of both.    Patient is next seen April 4, 2019.  He is recently discharged after hospitalization March 15-18 with left upper lobe pneumonia.  We reviewed the findings in detail with his gradual recovery now documented.  His studies include a CT of chest which does not demonstrate any further bony lesions and/or pulmonary metastasis having developed.  He is feeling considerably better and approximately back to his baseline.  The patient is next seen June 28, 2019 feeling well but having baseline generally musculoskeletal pain and restless legs.His recent exams include a PSA of 0.81, CMP with potassium of 5.3 with repeat pending.  His performance status overall remains good to very good and is clearly responding to his treatments.  The patient is next seen December 13, 2019 continue to feel well.  He has had, oddly enough, 2 episodes of sleep paralysis which have occurred to him previously and he wonders if there is any connection with his prostate carcinoma though this is felt unlikely.     The patient when assessed in December had his PSA go to 2.9.  We continue with Lupron and Xgeva and is seen back now March 9, 2020 without  additional symptoms.  We discussed that a schedule could likely change moving to 3 months for both of these medications particularly if he needs additional therapy directed at progressive disease.     Patient contacted our practice May 4 concern for pain in his hips.  This led to moving his scans up and they were performed May 8, 2020 showing a sub-6 mm pleural-based pulmonary nodule in the right lower lobe that is new from March 15, 2018, remainder of the sub-6 mm pulmonary nodules bilaterally are stable.  There is extensive osseous metastatic disease as previous with no other new findings.     The patient indicates, when seen, May 13 that his bone pain is now resolved.  While this is good information does not mean that he does not have further bony metastasis and we have discussed that a follow-up bone scan is likely necessary.  Furthermore he is having some difficulty with sleeplessness and increase in restless legs as he continues to stay at home during the COVID 19 crisis which, itself, is producing more anxiety for him.  A follow-up bone scan was obtained Lety 3 revealing multifocal osseous metastatic disease which was compared to March 2, 2018 with improvement.  No new abnormal uptake is present.  He is next seen with us on June 17, 2020 and, fortunately, is not having any significant pain at this point.  We discussed his scans in detail and, on balance, his performance status also remains improved.     It was elected to follow the patient rather than changes antiandrogen therapy.  He is reassessed September 3 with unchanged CMP, H&H of 11.4 and 36.3 with normal white count, platelet count and differential, PSA at 15.8.  Follow-up PA lateral chest x-ray does not show any new abnormalities though there is extensive metastatic bone disease throughout the bony thorax and osteoblastic metastasis have been seen on chest CT May 2020.  The patient is seen with his son Georegs September 10 noticing increasing bony  discomfort primarily in the right hip following fairly intensive gardening which he does frequently.  Now has further elevation of PSA were wondering whether we should try to intervene sooner per additional antiandrogens.  We will need to further assess him via CT scanning to make this decision     These were obtained October 1 showing extensive sclerotic disease with no metastatic disease in chest abdomen or pelvis.  PSA had gone from 15.8 September 3-16 0.6 October 1.     He still has pain getting up in the morning and after active gardening.  This is manageable with current pain medications and, at present, it is not apparent that he needs to switch medications to gain better control of his disease.  The patient did have follow-up studies done including a PSA November 25, 2020 now at 25.4.  The patient is seen with his son December 10, 2020 doing fair but having some increased pain in the mid sternum and right hip that he ascribes to additional exercise/work in managing his garden.  It is apparent however, that his last bone scan was 6 months ago and we do need to reassess him concerning this.     The patient had follow-up bone scan performed January 11, 2021 showing again uptake in the calvarium, humerus, right medial clavicle, sternum, ribs, spine, sacrum, pelvis, right acetabulum, proximal femora and now left frontal bone which appears new.  There is also mild increase in sternal uptake and equivocal increase in the pelvic lesions of all of the sacrum and the right proximal femur.     The patient is seen with his son January 18, 2021 and indicates that he is having pain gradually worsened in his right hip, mid chest that had been an issue previously.  These findings on bone scan are reviewed with both of them as likely the underlying culprit for his discomfort.  He would need change of the systemic therapy but, at present, will ask for radiation therapy palliation initially.  He was previously treated by   Cayla many years ago and will ask her to see him.  We will also make application for the current cost of Xtandi or Zytiga.  The patient is not felt to be a candidate for systemic chemotherapy.     The patient was referred for ration therapy as we continued to assess his PSA on current therapy as well as exam the cost of Zytiga or Xtandi.  Radiation therapy (Dr. Kulkarni) saw the patient January 26 and felt that the right femur and sternal lesion could benefit from therapy-3000 cGy in 10 fractions.  The patient took treatment February 1 to February 12 to the above areas and is next seen back March 15.  Fortunately his pain has improved dramatically and he is quite happy about this.  Unfortunately he notes some difficulty with swallowing that is temporary and often leads to increased salivation and mucus production.  Patient was asked to return his next assessed April 12, 2021.  He is able to swallow with less pain but still has excess mucus production and issues with salivation.  His PSA has noted increase but he is having no additional bony discomfort at this point and, additionally, has noted low-grade fevers just under 100 degrees at nighttime?.  His weight, appetite and general performance status have remained good to excellent.  We discussed follow-up studies at this point to determine his status.     Follow-up scans were scheduled CT scans of chest and pelvis 5/4/2021 showing osseous metastasis quite similar to previous examinations in the chest, CT of abdomen pelvis also with no acute intra-abdominal adenopathy no indication of abdominal pelvic metastatic disease but again widespread osseous metastasis.  His PSA at this point have been slowly increasing to a level of 44 on 4/12/2021.  As the patient is reassessed 5/10/2021 we find, fortunately, that he is not exceeding symptomatic.  It could make reasonable sense at this point to take the mediastinal nodes from his biopsy 4/9/2018, reassess potential targeted  therapies, MSI status, and germline analysis.  Fortunately he is not having significant pain or discomfort and is seen with his son as we discussed the above plan.  Notably a follow-up PSA is found to be 42.6.   Patient is next seen in 6/7/2021 for Lupron and Xgeva.  Fortunately he is feeling considerably better according to both he and his son though his stamina has reduced.  He is not having additional pain at this point.  We have also reviewed his genetics assessment which was significant only for a variant of unknown significance.  This is not an actionable abnormality.     As the patient's PSA further escalated increasing to 89 7/7/2021 his subsequent Axumin PET was obtained 8/20 demonstrating extensive osteosclerotic metastatic disease showing reduced uptake-typical finding but no evidence of visceral metastatic disease in the neck chest abdomen pelvis.  These findings are discussed with the patient and his son 8/25/2021 and indicative of his progressive disease-etiology of his rising PSA-though the patient is asymptomatic we have discussed moving to initiate Xtandi is available at 160 mg p.o. twice daily along with his current Lupron and Xgeva.     The patient's testing 10/6 included PSA that is dropped to 9.69.  He is seen with his son, Cody, both indicating that he been doing relatively well with treatment but began to notice nausea in the last week.  The schedule that he has been given also needs to be somewhat updated in terms of his Lupron and Xgeva.  He has been able to maintain his appetite and overall performance status to date.     The patient is next assessed 12/8/2021 tolerating his current Xtandi dose better than previous and maintaining a good performance status overall as well as demonstrating a drop in his PSA now to 4.7 on 12/8/2021 compared to high of 112 9/8/2021.  He remains hypophosphatemic and hypocalcemic and we have discussed, again, changing his Xgeva dosing to every 3 months along  with his Lupron will continue his Xtandi to 80 mg daily.     The patient is next reviewed 3/21/2022 seen for both Xgeva and Lupron.  Fortunately he is feeling reasonably well with little additional pain though he has restarted to place his garden into shape for the season and is working more outdoors.     Patient reevaluated 5/2/2022 with repeat CBC including H&H of 10.5 and 33.4, white count 5980 and platelet count of 192,000, currently CMP and PSA pending with a previous PSA 1 month ago at 9.06.  At this point we increased his Xtandi to 160 mg p.o. daily while continuing treatment with every 3 month Xgeva and Lupron.  Notably the patient's Requip  alcohol I am sure it is mariza now at 1.5 mg p.o. nightly and Neurontin 900 mg p.o. daily has improved his restless leg syndrome substantially.     Patient seen 6/13/2022 at which time Xtandi at 160 mg p.o. daily was continued, Xgeva moved to every 6 months and Lupron every 3 months.     This was continued when patient was seen 9/21/2022 though subsequently we proceeded to every 3 months for both medications.     Patient seen 12/21/2022 at which time a subsequent Pylarify scan was requested as result of increasing PSA level and bony discomfort.  The patient received Lupron and Xgeva at this point.      He is seen back with his son 1/11/2023.  The potential treatment options such as Pluvicto (Lutetium Yady 177 vipivotide tetraxetan), now that we know that his PSMA scan is positive, though the patient would have to initially be treated with taxane chemotherapy which would be difficult for him to tolerate, radium-223 considering his bony metastasis.  He has slowly progressive disease however we have discussed additional issues including radiation therapy and/or alteration to another androgen receptor agonist such as darolutamide.  We have discussed all these options moving to have assessments done by radiation therapy and, [facility] as we address his symptoms.     The  patient was not felt a candidate for additional treatments via [facility], was able to start darolutamide and completed palliative radiotherapy.  He was stable seen 2/20/2023 though experiencing diarrhea post radiation therapy.     Patient seen 4/17/2023 with darolutamide gradually increased to 600 mg twice daily.  His Xgeva and Lupron will be given every 3 months.     Patient seen 6/12/2023, unfortunately, PSA increasing and bone scan worsening.  Patient at this point indicated that he did not wish additional therapies but, instead, control of his pain initially.  Plans were made to continue his Lupron and Xgeva seen him at the 3 to 4-week annia and considering palliative care primarily with either hospice or [facility].     Patient seen 7/3/2023 with plans to initiate palliative care starting with [facility].  The patient continued this approach seen back 9/8/2023 referring not to proceed with hospice but again with palliative care including transfusion.     I had discussion with the patient and patient's son today.  Patient has weakness in the lower extremity since last 3 days.  Prior to that he was able to walk and cook by himself.  He has tingling pain in bilateral lower extremities which is significant.  His MRI of the lumbar spine did not show any final cord compression or nerve impingement.  Patient is currently on steroids and will require an MRI of the thoracic and cervical spine           Past Medical History:   Diagnosis Date    Aortic stenosis, mild 01/01/2021    Per echocardiogram 2021    Ascending aorta dilatation     Asthma     Benign prostatic hyperplasia     Bone cancer     Cellulitis of great toe of left foot 10/19/2018    CKD (chronic kidney disease)     Stage 3; followed by Dr. Vicky Duran     Diastolic dysfunction     GRADE II per echo 2018    Difficulty breathing     during exertion    Dyslipidemia     Erectile dysfunction     Fatigue     Heart murmur     History of blood transfusion      History of kidney stones     HL (hearing loss)     Hyperlipidemia     Hypertension     Hyponatremia     Kidney stone     Left ventricular hypertrophy     per echo 2018    Localized edema     Neuropathy     Obstructive sleep apnea     USING CPAP    Osteoarthritis     Peptic ulcer     Pneumonia     Pneumonia of left upper lobe due to infectious organism 03/15/2019    Prostate cancer     Status post prostatectomy, radiation therapy, and hormone therapy followed by Dr. Lee; metastatsis to bone    Pure hyperglyceridemia     Restless leg syndrome     Sepsis     Sinus bradycardia     Transient cerebral ischemia     Type 2 diabetes mellitus        ONCOLOGIC HISTORY:  (History from previous dates can be found in the separate document.)    No current facility-administered medications on file prior to encounter.     Current Outpatient Medications on File Prior to Encounter   Medication Sig Dispense Refill    Accu-Chek Guide test strip USE 1 STRIP TO CHECK BLOOD SUGAR 4 TIMES DAILY      Accu-Chek Softclix Lancets lancets USE 1 LANCET TO CHECK GLUCOSE 4 TIMES DAILY      aspirin 81 MG EC tablet Take 1 tablet by mouth Daily.      cholecalciferol (VITAMIN D3) 1000 units tablet Take 2 tablets by mouth Daily.      dilTIAZem CD (CARDIZEM CD) 120 MG 24 hr capsule TAKE 1 CAPSULE EVERY DAY 90 capsule 3    fentaNYL (DURAGESIC) 50 MCG/HR patch Place 1 patch on the skin as directed by provider Every 72 (Seventy-Two) Hours. 10 each 0    gabapentin (NEURONTIN) 300 MG capsule TAKE 3 CAPSULES EVERY NIGHT AT BEDTIME 90 capsule 0    Insulin Aspart (novoLOG) 100 UNIT/ML injection INJECT 100 UNITS VIA PUMP ONCE DAILY      Leuprolide Acetate, 3 Month, (LUPRON DEPOT, 3-MONTH, IM) Inject  into the appropriate muscle as directed by prescriber Every 3 (Three) Months.      levothyroxine (SYNTHROID, LEVOTHROID) 88 MCG tablet       loperamide (IMODIUM) 1 MG/5ML solution Take 10 mL by mouth 4 (Four) Times a Day As Needed for Diarrhea.      modafinil  (PROVIGIL) 200 MG tablet Take 1 tablet by mouth Daily. 90 tablet 1    ondansetron (ZOFRAN) 8 MG tablet Take 1 tablet by mouth 3 (Three) Times a Day As Needed for Nausea or Vomiting. 30 tablet 5    pravastatin (PRAVACHOL) 40 MG tablet Take 1 tablet by mouth Daily.      rOPINIRole (REQUIP) 0.5 MG tablet Take 2 tablets by mouth in the evening and 2 at bedtime. 360 tablet 2    traZODone (DESYREL) 50 MG tablet Take 1-2 tablets by mouth every night at bedtime.      diphenoxylate-atropine (LOMOTIL) 2.5-0.025 MG per tablet Take 1 tablet by mouth 4 (Four) Times a Day As Needed for Diarrhea. 30 tablet 0       ALLERGIES:     Allergies   Allergen Reactions    Niacin Unknown - Low Severity    Statins Unknown - Low Severity       Social History     Socioeconomic History    Marital status:     Years of education: College   Tobacco Use    Smoking status: Former     Packs/day: 1.50     Years: 30.00     Pack years: 45.00     Types: Cigarettes     Start date: 1968     Quit date: 1998     Years since quittin.6     Passive exposure: Past    Smokeless tobacco: Never   Vaping Use    Vaping Use: Never used   Substance and Sexual Activity    Alcohol use: Not Currently     Comment: Caffeine use: 2-3 cups daily    Drug use: Never    Sexual activity: Not Currently         Cancer-related family history includes Cancer in his brother, sister, sister, and sister; Lung cancer (age of onset: 46) in his father; Lung cancer (age of onset: 60) in his sister; Lung cancer (age of onset: 62) in his brother; Prostate cancer (age of onset: 60) in his brother.     Review of Systems  A comprehensive 14 point review of systems was performed and was negative except as mentioned.  Patient had weakness in the lower extremities which is improved on steroids.  Objective      Vitals:    23 2107 23 0300 23 0700 23 1145   BP: 150/54 157/56  169/61   BP Location: Left arm Left arm  Left arm   Patient Position: Lying Lying   Lying   Pulse: 54 (!) 43  50   Resp: 18 18  18   Temp: 97.2 °F (36.2 °C) 97.3 °F (36.3 °C) Comment: off the floor 98.1 °F (36.7 °C)   TempSrc: Oral Oral  Oral   SpO2: 99% 98%  100%   Weight:       Height:             9/8/2023    11:57 AM   Current Status   ECOG score 1       Physical Exam          CONSTITUTIONAL:  Vital signs reviewed.  Alert and oriented x3  No distress, looks comfortable.  .  RESPIRATORY:  Normal respiratory effort.  No rales  or wheezing, clear.   CARDIOVASCULAR:  Regular rate and rhythm, no murmur  No significant lower extremity edema.  Abdomen: Soft nontender positive bowel sounds no hepatosplenomegaly  SKIN: No wounds.  No rashes.  MUSCULOSKELETAL/EXTREMITIES: No clubbing or cyanosis.  No apparent unilateral weakness.  NEURO: CN 2-12 appear intact. No focal neurological deficits noted.  PSYCHIATRIC:  Normal judgment and insight.  Normal mood and affect.    Patient has bilateral lower extremity weakness which is improved today compared to yesterday  Lower extremity strength is improved      RECENT LABS:  Hematology WBC   Date Value Ref Range Status   09/20/2023 8.13 3.40 - 10.80 10*3/mm3 Final     RBC   Date Value Ref Range Status   09/20/2023 4.09 (L) 4.14 - 5.80 10*6/mm3 Final     Hemoglobin   Date Value Ref Range Status   09/20/2023 10.8 (L) 13.0 - 17.7 g/dL Final     Hematocrit   Date Value Ref Range Status   09/20/2023 33.0 (L) 37.5 - 51.0 % Final     Platelets   Date Value Ref Range Status   09/20/2023 96 (L) 140 - 450 10*3/mm3 Final        Assessment & Plan     #.  Spinal cord compression at T10 with back pain and lower extremity weakness that is new since last 3 days with patient's history of metastatic prostate cancer  Patient has had metastatic prostate cancer followed by Dr. Lee in our office  Patient was on doralutamide and Xgeva.  Given new onset weakness in the lower extremities we will obtain MRI of the thoracic and cervical spine with and without contrast to rule out any  spinal cord compression  Continue steroids dexamethasone 4 mg IV every 6 hours  We will consult neurology for weakness of the lower extremities as well as radiation oncology.  Reviewed MRI of the spine.  Patient has spinal cord compression at thoracic 9 and 10 and has started receiving radiation and steroids with his lower extremity improving.  Neurosurgery is also on the case.  Neurosurgery does not plan to do any thoracic decompression because of the extent of bone involvement and palliative radiation to be done  Day to radiation today.  Currently improved lower extremity weakness     #.  Anemia and thrombocytopenia.  S/p packed red blood cell transfusion in the past.  Likely this is related to his previous radiation as well as significant multiple metastasis in the bone.  We will closely follow the labs  We will transfuse 2 units of packed red blood cells     1.  Metastatic prostate cancer:  Patient initially diagnosed in 2006 and had a prostatectomy and hormone therapy.  Patient in January 2018 began to complain of shortness of breath and hoarseness.  Chest x-ray obtained 1/23/18 showed sclerotic bone lesions.  CT of the chest 3/12/2018 numerous sclerotic bone foci with all visualized bones consistent with metastatic disease, multiple enlarged mediastinal lymph nodes.  .1 from 3/14/2018.  Patient was started on Firmagon by urology, [facility].  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  Case discussed at thoracic conference and recommendations were to continue ADT and radiation for symptomatic sites.  Lupron injections every 3 mos initiated 5/7/18 PSA at that time 7.810  Monthly Xgeva initiated 6/11/18 PSA 2.030  Last PSA 6/13/2019 0.881  9/20/2019 patient tolerating treatment well, no new concerns.  12/2019 PSA 2.9  3/9/2020  PSA increasing to 5.030, he remains continuing on Lupron and Xgeva and asymptomatic though  follow-up CT of chest and pelvis will be requested in 11 weeks prior to follow-up in 12 weeks.   5/4/2020 patient called reported worsening right hip pain, plans to purse CT scans ASAP.   5/8/2020 CT scans C/A/P show no progression of disease. Hip pain improved, Gabapentin added.. Bone scan requested.  6/3/2020 bone scan that does not demonstrate worsening of bone nor need for localized radiation therapy.  The patient's PSA has been slowly rising and we have not been able to determine worsening of disease and and, therefore, it is not felt warranted add additional medications such as Xtandi or apalutamide to his therapy.  Please note would like to avoid Zytiga try not to add prednisone which would worsen his blood sugar control.  9/3/2020 PSA 15.8, CXR extensive metastatic bone disease- patient reviewed 9/10/2020 reporting increased bony discomfort CT scans requested.  Scans done and reviewed 10/7/2020 ultimately reveal no evidence of progression of disease for visceral involvement or clearly for bone involvement.  After further review with the patient and his son plan continued Xgeva and Lupron.  We have assessed for availability of Xtandi 160 mg p.o. daily and find the cost is currently prohibitive.   11/25/2020 PSA 25.4  12/10/2020 review with some mild increase in bony discomfort.  After repeat discussion we went on to have him undergo Lupron therapy, and his PSA actually to be mildly reduced at 22.2.   Follow-up bone scan 1/11/2021 demonstrates some evidence of progression in sternum, left sacrum and proximal femur.  These findings were reviewed with the patient and his son January 18, 2020 and the patient was referred for palliative radiotherapy(Dr. Kulkarni)  Palliative radiation under care Dr. Kulkarni to right femur and sternal lesion -3000 cGy in 10 fractions given February 1 to February 12 with significant symptom improvement.  3/15/2021 PSA 38.5  4/12/2021 PSA 44  5/10/2021 PSA 42.6 CT chest abdomen pelvis  5/4/2021 - for progression of disease and follow-up PSA 5 10/2021 is at 42 slightly reduced from previous.  We have discussed how to proceed from here and find a significant family history that clearly supports a potential genetic assessment for his malignancy.  It is felt most reasonable at this point to take the mediastinal nodes from his biopsy 4/9/2018 and reassess for potential targeted therapies, MSI status, as well as germline analysis.  6/7/2021 PSA 65.9 his analysis completed for actionable mutations including germline mutations.  These exams were negative though there is a variant of unknown significance.  Again this is not an actionable mutation.  We proceeded with additional Xgeva and Lupron planning for monthly Xgeva and Lupron at 3 months  7/7/2021 PSA 89 and subsequent testing with Axumin PET was performed confirming extensive osteosclerotic metastatic disease with little uptake, no evidence of visceral metastatic disease in neck chest abdomen or pelvis.  8/25/2021  , PET/CT reviewed, subsequent PSA increased to 105.  Patient fortunately asymptomatic.  Requested reevaluation for Xtandi 160 mg po daily.   9/8/2021 due for his lupron, receiving every 3 months, and monthly Xgeva.  Will have education today for Xtandi and will receive his medication today as well.   Started Xtandi 9/8/2021.  9/22/2021 here for toxicity check, so far tolerating Xtandi quite well other than a slight increase in diarrhea controlled with Imodium.  10/6/2021 continues to tolerate Xtandi well.  Labs are within range today, we will proceed with Xgeva today.  Patient seen 10/20/2021 having more nausea with Xtandi and demonstrating a substantial reduction in PSA level.  After discussion we plan to reduce his dose to 80 mg daily following his PSA and performance status over the next approximate 7 weeks at which time he would be scheduled for his follow-up Lupron.  Patient assessed 12/8/2021 with further reduction in PSA to  4.7, ongoing hypophosphatemia and hypocalcemia-Xgeva moved to every 3 months given on same schedule as Lupron  Patient seen 3/21/2022, 6-week follow-up with mild increase in PSA recognized  Patient reassessed 5/2/2022 with PSA at 10.1, Xtandi moved to 160 mg p.o. daily  Patient assessed 6/13/2022, Xtandi at 160 mg p.o. daily, PSA pending, Xgeva moved to every 6 months, Lupron every 3 months  Patient seen 9/21/2022 at which time we continued our current approach, reviewed additional options for therapy should he clearly progress.  Patient seen 12/21/2022, increasing bony discomfort, Xgeva and Eligard continued, plans made for Pylarify scanning.   Patient seen 1/11/2023 with his son, discuss potential treatment options such as Pluvicto (Lutetium Yady 177 vipivotide tetraxetan) knowing that Pms-Asa is strongly positive in bone, palliative radiotherapy, radium-223 and alteration to additional androgen receptor such as darolutamide.  Patient referred to radiation therapy for their evaluation and possible treatment options, [facility] as application made for darolutamide and medications altered to include meloxicam 15 mg p.o. daily along with his Norco.  Patient assessed 2/20/2023 improved for radiation therapy, no additional options for treatment and [facility], darolutamide initiated.  Patient assessed 4/17/2023, darolutamide increased to 600 mg twice daily, Xgeva and Lupron every 3 months  5/15/2023: Continues darolutamide 600 mg twice daily.  Continues Xgeva and Lupron every 3 months, due in 1 month.  Hemoglobin today stable at 9.1.  Increased PSA, now 46 previously 29.  Discussed with Dr. Lee and we will proceed with bone scan in 3 weeks.  Subsequent bone scan with progression of metastatic disease to number of sites, issues discussed with patient and his son 6/12/2023 with plans to proceed to, primarily, palliative care approach.  Patient treated with Xgeva and Lupron in 3-4-week follow-up consider hospice or  pallitus care.  Patient seen 7/3/2023-further anemia with hemoglobin 7.2 g%.  Plan 1 unit packed RBC transfusion, initiation of Pallitus.  7/24/2023 hemoglobin 7.2.  Patient declines blood transfusion.  He is reporting pain currently not well controlled which will be discussed below.  Meeting with nilton on Friday of this week.  Patient assessed 9/8/2023 with hemoglobin 5.2 g%, further transfusion planned with additional steroid, Benadryl premedication  Patient admitted September 17, 2023 with pain: Reviewed MRI of the lumbar spine which did not show any spinal cord compression except metastasis.  PSA has been increasing.  CT abdomen pelvis shows widespread bony metastasis.  There is some rectal wall thickening diffusely.  There is fecal impaction.  7 to 8 mm hypodense nodules in the upper pole of the right kidney not seen in the previous exam in 2021.  They are indeterminate.        2.  Neuropathy/ restless legs:    5/13/2020 gabapentin 600 mg at bedtime, trazadone 50 mg QHS.  On gabapentin 300, 2 tablets at bedtime, and requip 0.5.mg    Still having neuropathy symptoms.  Will increase gabapentin to 900 mg at bedtime.   9/22/2021 increase in gabapentin to 900 mg at bedtime has helped neuropathy.  Now taking Requip 2 mg nightly.     3.  Cancer related pain: on Norco 5/325 every 6 hours as needed.  Mobic 15 mg p.o. every morning initiated 1/11/2023, consider MS Contin every 12 hours  Status post palliative radiotherapy with improved pain control by 2/20/2023  Effective pain relief except increasing constipation noted 4/17/2023, narcotic adjusted downward as possible.  Continues Norco 5/325 every 6 hours as needed.  Feels his pain is well controlled on this regimen.  Semaj Herrera reports a pain score of 8.  Given his pain assessment as noted, treatment options were discussed and the following options were decided upon as a follow-up plan to address the patient's pain-plan to add Duragesic 25 mcg every 72 hours daily  E scribed to pharmacy  7/24/2023 patient complaining of pain not controlled with current medication.  We will increase fentanyl patch to 50 mcg every 72 hours.  Patient to continue Norco 10/3/2025 every 6 hours if needed for breakthrough pain.     PLAN:      Continue fentanyl patch to 50 mcg every 72 hours  Continue Norco 10/325 every 6 hours for breakthrough pain.  If patient needs to be transfused he will require dexamethasone and Benadryl prior to transfusion  Currently patient is on 4 mg every 6 hours of dexamethasone  Reviewed MRI of the spine which showed spinal cord compression at thoracic 10 and thoracic 9  Patient started radiation and is currently on dexamethasone with improvement in his lower extremity    Neurosurgery has no plans for surgery.  Continue palliative radiation day 2    Rula Aly MD                   Cc:  Jacob Jennings MD

## 2023-09-20 NOTE — PROGRESS NOTES
Name: Semaj Herrera ADMIT: 2023   : 1940  PCP: Axel Guardado MD    MRN: [id number] LOS: 2 days   AGE/SEX: 82 y.o. male  ROOM: [facility]/1     Subjective   Subjective   Resting in bed.  Son at bedside.  Patient and son with lot of questions today.  Son is inquiring about his discharge planning as well as how many rounds of radiation are planned.  He is hoping that he will be able to stand and move well enough to return home with him and he will transport to radiation therapy.  Patient states that his home medications were not entered correctly as he apparently takes 900 mg of gabapentin nightly as well as 1 mg of Requip nightly.  He also takes 88 mcg of levothyroxine all days except for  and Wednesday where he will take 2 of his 88 mcg.  States that he is currently out of test strips for his insulin pump which is at home and inquiring about getting these.    He denies any current chest pain, dyspnea, cough, fever or chills.  He denies any nausea, vomiting or abdominal pain.  States that he has had multiple bowel movements and is urinating well.  Feels like his weakness and numbness in his legs is improving with steroids.  He went for radiation this morning.    He and his son do report that his sugars are usually much more controlled at home.  Would like to resume his home insulin pump if able.      Objective   Objective   Vital Signs  Temp:  [97.2 °F (36.2 °C)-98.4 °F (36.9 °C)] 97.3 °F (36.3 °C)  Heart Rate:  [43-55] 43  Resp:  [16-18] 18  BP: (147-157)/(53-63) 157/56  SpO2:  [98 %-100 %] 98 %  on   ;   Device (Oxygen Therapy): room air  Body mass index is 29.12 kg/m².    Physical Exam  Vitals and nursing note reviewed.   Constitutional:       Appearance: He is ill-appearing. He is not toxic-appearing.   Cardiovascular:      Rate and Rhythm: Normal rate and regular rhythm.      Pulses: Normal pulses.   Pulmonary:      Effort: Pulmonary effort is normal. No respiratory distress.      Breath sounds:  Normal breath sounds.      Comments: Diminished and on room air.  Abdominal:      General: Bowel sounds are normal. There is no distension.      Palpations: Abdomen is soft.      Tenderness: There is no abdominal tenderness.   Musculoskeletal:         General: Swelling (Mild bilateral lower extremities) present. No tenderness.   Skin:     General: Skin is warm and dry.      Findings: No bruising.   Neurological:      Mental Status: He is alert and oriented to person, place, and time.      Sensory: Sensory deficit present.      Motor: Weakness present. Movement in legs a little better today.     Coordination: Coordination normal.   Psychiatric:         Mood and Affect: Mood normal.         Behavior: Behavior normal.     Results Review:       I reviewed the patient's new clinical results.  Results from last 7 days   Lab Units 09/20/23  0634 09/19/23  0631 09/18/23  0412 09/17/23  1207   WBC 10*3/mm3 8.13 7.77 6.46 6.42   HEMOGLOBIN g/dL 10.8* 7.1* 7.7* 8.1*   PLATELETS 10*3/mm3 96* 102* 87* 106*     Results from last 7 days   Lab Units 09/20/23  0634 09/19/23  0631 09/18/23  1441 09/18/23 0412 09/17/23  1207   SODIUM mmol/L 137 135*  --  136 137   POTASSIUM mmol/L 5.1 5.2 4.9 5.4* 4.6   CHLORIDE mmol/L 105 105  --  105 102   CO2 mmol/L 21.1* 19.0*  --  21.0* 19.9*   BUN mg/dL 25* 20  --  10 10   CREATININE mg/dL 0.75* 0.83  --  0.73* 0.70*   GLUCOSE mg/dL 197* 212*  --  176* 147*   Estimated Creatinine Clearance: 71.2 mL/min (A) (by C-G formula based on SCr of 0.75 mg/dL (L)).  Results from last 7 days   Lab Units 09/20/23  0634 09/19/23  0631 09/17/23  1207   ALBUMIN g/dL 3.7 3.9 3.9   BILIRUBIN mg/dL 0.4 0.3 0.4   ALK PHOS U/L 300* 351* 385*   AST (SGOT) U/L 13 12 16   ALT (SGPT) U/L 8 7 10     Results from last 7 days   Lab Units 09/20/23  0634 09/19/23  0631 09/18/23  0412 09/17/23  1207   CALCIUM mg/dL 8.1* 8.3* 8.0* 8.1*   ALBUMIN g/dL 3.7 3.9  --  3.9   MAGNESIUM mg/dL  --   --   --  2.8*       Glucose    Date/Time Value Ref Range Status   09/20/2023 0904 205 (H) 70 - 130 mg/dL Final   09/19/2023 2104 359 (H) 70 - 130 mg/dL Final   09/19/2023 1724 244 (H) 70 - 130 mg/dL Final   09/19/2023 1218 208 (H) 70 - 130 mg/dL Final   09/19/2023 0800 217 (H) 70 - 130 mg/dL Final   09/18/2023 2108 226 (H) 70 - 130 mg/dL Final   09/18/2023 1656 218 (H) 70 - 130 mg/dL Final       aspirin, 81 mg, Oral, Daily  fentaNYL, 1 patch, Transdermal, Q72H   And  Check Fentanyl Patch Placement, 1 each, Does not apply, Q12H  cholecalciferol, 2,000 Units, Oral, Daily  dexAMETHasone, 4 mg, Intravenous, Q6H  dilTIAZem CD, 120 mg, Oral, Daily  gabapentin, 300 mg, Oral, Nightly  insulin glargine, 15 Units, Subcutaneous, Nightly  insulin lispro, 2-9 Units, Subcutaneous, 4x Daily AC & at Bedtime  insulin lispro, 4 Units, Subcutaneous, TID With Meals  levothyroxine, 88 mcg, Oral, Q AM  modafinil, 200 mg, Oral, Daily  pantoprazole, 40 mg, Oral, BID AC  senna-docusate sodium, 2 tablet, Oral, BID   And  polyethylene glycol, 17 g, Oral, Daily  pravastatin, 40 mg, Oral, Daily  rOPINIRole, 0.5 mg, Oral, Nightly       Diet: Diabetic Diets; Consistent Carbohydrate; Texture: Regular Texture (IDDSI 7); Fluid Consistency: Thin (IDDSI 0)       Assessment/Plan     Active Hospital Problems    Diagnosis  POA    **Fecal impaction in rectum [K56.41]  Yes    Lower extremity weakness [R29.898]  Yes    Anemia, chronic disease [D63.8]  Yes    Metastatic cancer to bone [C79.51]  Yes    Restless legs syndrome (RLS) [G25.81]  Yes    Diastolic dysfunction [I51.89]  Yes    Neuropathy [G62.9]  Yes    Prostate cancer metastatic to bone [C61, C79.51]  Yes    Obstructive sleep apnea syndrome treated auto BiPAP [G47.33]  Yes    Type 2 diabetes mellitus with kidney complication, with long-term current use of insulin [E11.29, Z79.4]  Not Applicable    Hypertension [I10]  Yes    Chronic kidney disease, stage III (moderate) [N18.30]  Yes      Resolved Hospital Problems   No resolved  problems to display.     Mr. Herrera is a 82 y.o. that presented to the hospital with a 2 to 3-day history of increasing bilateral leg weakness as well as constipation in the setting of known metastatic prostate cancer to the bone and lymph nodes.     Fecal impaction in the rectum  -CT A/P on admission with large amount of dense fecal material within the low sigmoid and rectum with rectal wall thickening.  Continued note of widespread osseous metastasis that was more pronounced compared to prior.  -Improving with bowel regimen.  -GI consulted. Appreciate their recommendations. Recommend daily Miralax and potential OIC medication if non-responsive to first line drugs.     Lower extremity weakness  Neuropathy  -MRI of the lumbar spine with metastatic disease. MRI cervical spine with metastatic disease but no significant compression. 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   -Some improvement with IV steroids at present.   -Radiation oncology and Neurology consulted by oncology.   -Given significant cord compression at T10- neurosurgery evaluated.  [place] that due to the extent of his metastatic disease a thoracic decompression would likely destabilize the patient and therefore radiation therapy was recommended.   -PT/OT following.  -Continue home gabapentin-increase to the correct dose.   -Radiation oncology planning radiation treatments-unclear how many treatments are planned at this point.  -Neurology briefly evaluated but deferred to neurosurgery.     Prostate cancer metastatic to bone  -Oncology following. Appreciate their assistance.  -Palliative consulted for further goals of care-plans to continue radiation and current treatment.  -Continue pain medications.     DM2  -Typically on insulin pump that is not currently being used-explained and I am okay with this being resumed if able to bring in the supplies and demonstrate  competence.  We will have diabetes educator to evaluate.  -Apears overall stable with correctional sliding scale insulin.  -We will increase Lantus nightly to 15 units and increase meal time humalog with 4 units TID.  -Titrate pending glucose trends and oral intake.     RONNY  -Home CPAP if available.  -Avoid oversedation.     Diastolic dysfunction  Hypertension  -Established with Dr. Lee for mild aortic stenosis.  -Continue outpatient follow-up.  -Continue home regimen.     CKD 3  -Renal function/electrolytes overall stable. Continue to monitor.  -He is established with Dr. Vicky Duran.     Anemia  Thrombocytopenia  -Hemoglobin 10.8 today. Platelets 96.  -S/P 2 units of PRBC on 9/19 for hemoglobin 7.1      Discussed with patient and son at bedside.     Await final oncological plans. Anticipate if mobility improves then discharge possible by Friday if okay with oncology. Otherwise may need SNF.      VTE Prophylaxis - SCDs  Code Status - NO CPR/intubation. Confirmed with patient at bedside.  Disposition - Anticipate discharge as above.    SYD Oswald  [facility]  09/20/23  10:09 EDT

## 2023-09-20 NOTE — CASE MANAGEMENT/SOCIAL WORK
Continued Stay Note  [facility]     Patient Name: Semaj Herrera  MRN: [id number]  Today's Date: 9/20/2023    Admit Date: 9/17/2023    Plan: Home vs SNF   Discharge Plan       Row Name 09/20/23 1401       Plan    Plan Home vs SNF    Patient/Family in Agreement with Plan yes    Plan Comments Spoke with patient and son at bedside. Introduced self. CCP inquired about choices for SNF. Patient stated that they had a couple and were going to continue looking as well. Patient's [facility] choice is [facility]. Left message for [facility].                   Discharge Codes    No documentation.                 Expected Discharge Date and Time       Expected Discharge Date Expected Discharge Time    Sep 20, 2023               Tata Atkinson RN

## 2023-09-20 NOTE — PLAN OF CARE
Goal Outcome Evaluation: Patient VSS with no complaints of pain.  Patient attempted bed exit x1.  Incontinence care provided.  Safety measures remain in place.  No new issues overnight.  Patient to receive radiation this AM.  RN will continue to monitor patient's progress.

## 2023-09-20 NOTE — PLAN OF CARE
Goal Outcome Evaluation:  Plan of Care Reviewed With: patient, son        Progress: improving  Outcome Evaluation: Pt seen for PT treatment today. Pt is progressing with mobility but still continues to have BLE weakness. Pt required ModA with bed mobility and pt needed SBA/Efren with sitting balance. Pt needed cues to correct sitting balance. Pt completed 3 STS transfers with ModAX2. Pt was able to take side steps along EOB with each stand. No knees buckling today but pt does fatigue quickly and needs seated rest breaks. Will continue to progress pt as able.

## 2023-09-21 ENCOUNTER — HOSPITAL ENCOUNTER (OUTPATIENT)
Dept: RADIATION ONCOLOGY | Facility: HOSPITAL | Age: 83
Discharge: HOME | End: 2023-09-21
Payer: MEDICARE

## 2023-09-21 LAB
ALBUMIN SERPL-MCNC: 3.5 G/DL (ref 3.5–5.2)
ALBUMIN/GLOB SERPL: 1.1 G/DL
ALP SERPL-CCNC: 308 U/L (ref 39–117)
ALT SERPL W P-5'-P-CCNC: 9 U/L (ref 1–41)
ANION GAP SERPL CALCULATED.3IONS-SCNC: 11 MMOL/L (ref 5–15)
ANION GAP SERPL CALCULATED.3IONS-SCNC: 13 MMOL/L (ref 5–15)
AST SERPL-CCNC: 14 U/L (ref 1–40)
BILIRUB SERPL-MCNC: 0.4 MG/DL (ref 0–1.2)
BUN SERPL-MCNC: 29 MG/DL (ref 8–23)
BUN SERPL-MCNC: 33 MG/DL (ref 8–23)
BUN/CREAT SERPL: 37.2 (ref 7–25)
BUN/CREAT SERPL: 39.8 (ref 7–25)
CALCIUM SPEC-SCNC: 7.9 MG/DL (ref 8.6–10.5)
CALCIUM SPEC-SCNC: 8.1 MG/DL (ref 8.6–10.5)
CHLORIDE SERPL-SCNC: 103 MMOL/L (ref 98–107)
CHLORIDE SERPL-SCNC: 105 MMOL/L (ref 98–107)
CO2 SERPL-SCNC: 17 MMOL/L (ref 22–29)
CO2 SERPL-SCNC: 21 MMOL/L (ref 22–29)
CREAT SERPL-MCNC: 0.78 MG/DL (ref 0.76–1.27)
CREAT SERPL-MCNC: 0.83 MG/DL (ref 0.76–1.27)
DEPRECATED RDW RBC AUTO: 56.2 FL (ref 37–54)
EGFRCR SERPLBLD CKD-EPI 2021: 87.4 ML/MIN/1.73
EGFRCR SERPLBLD CKD-EPI 2021: 89 ML/MIN/1.73
ERYTHROCYTE [DISTWIDTH] IN BLOOD BY AUTOMATED COUNT: 19.8 % (ref 12.3–15.4)
GLOBULIN UR ELPH-MCNC: 3.2 GM/DL
GLUCOSE BLDC GLUCOMTR-MCNC: 185 MG/DL (ref 70–130)
GLUCOSE BLDC GLUCOMTR-MCNC: 196 MG/DL (ref 70–130)
GLUCOSE BLDC GLUCOMTR-MCNC: 243 MG/DL (ref 70–130)
GLUCOSE BLDC GLUCOMTR-MCNC: 311 MG/DL (ref 70–130)
GLUCOSE SERPL-MCNC: 203 MG/DL (ref 65–99)
GLUCOSE SERPL-MCNC: 206 MG/DL (ref 65–99)
HCT VFR BLD AUTO: 34.3 % (ref 37.5–51)
HGB BLD-MCNC: 11.4 G/DL (ref 13–17.7)
LYMPHOCYTES # BLD MANUAL: 0.98 10*3/MM3 (ref 0.7–3.1)
LYMPHOCYTES NFR BLD MANUAL: 9 % (ref 5–12)
MCH RBC QN AUTO: 26.5 PG (ref 26.6–33)
MCHC RBC AUTO-ENTMCNC: 33.2 G/DL (ref 31.5–35.7)
MCV RBC AUTO: 79.6 FL (ref 79–97)
METAMYELOCYTES NFR BLD MANUAL: 4 % (ref 0–0)
MONOCYTES # BLD: 0.74 10*3/MM3 (ref 0.1–0.9)
MYELOCYTES NFR BLD MANUAL: 1 % (ref 0–0)
NEUTROPHILS # BLD AUTO: 6.07 10*3/MM3 (ref 1.7–7)
NEUTROPHILS NFR BLD MANUAL: 74 % (ref 42.7–76)
NRBC SPEC MANUAL: 1 /100 WBC (ref 0–0.2)
PLAT MORPH BLD: NORMAL
PLATELET # BLD AUTO: 105 10*3/MM3 (ref 140–450)
PMV BLD AUTO: 9.6 FL (ref 6–12)
POTASSIUM SERPL-SCNC: 4.4 MMOL/L (ref 3.5–5.2)
POTASSIUM SERPL-SCNC: 5.4 MMOL/L (ref 3.5–5.2)
PROT SERPL-MCNC: 6.7 G/DL (ref 6–8.5)
RAD ONC ARIA COURSE ID: NORMAL
RAD ONC ARIA COURSE INTENT: NORMAL
RAD ONC ARIA COURSE LAST TREATMENT DATE: NORMAL
RAD ONC ARIA COURSE START DATE: NORMAL
RAD ONC ARIA COURSE TREATMENT ELAPSED DAYS: 1
RAD ONC ARIA FIRST TREATMENT DATE: NORMAL
RAD ONC ARIA PLAN FRACTIONS TREATED TO DATE: 2
RAD ONC ARIA PLAN ID: NORMAL
RAD ONC ARIA PLAN PRESCRIBED DOSE PER FRACTION: 4 GY
RAD ONC ARIA PLAN PRIMARY REFERENCE POINT: NORMAL
RAD ONC ARIA PLAN TOTAL FRACTIONS PRESCRIBED: 5
RAD ONC ARIA PLAN TOTAL PRESCRIBED DOSE: 2000 CGY
RAD ONC ARIA REFERENCE POINT DOSAGE GIVEN TO DATE: 8 GY
RAD ONC ARIA REFERENCE POINT ID: NORMAL
RAD ONC ARIA REFERENCE POINT SESSION DOSAGE GIVEN: 4 GY
RBC # BLD AUTO: 4.31 10*6/MM3 (ref 4.14–5.8)
RBC MORPH BLD: NORMAL
SODIUM SERPL-SCNC: 133 MMOL/L (ref 136–145)
SODIUM SERPL-SCNC: 137 MMOL/L (ref 136–145)
VARIANT LYMPHS NFR BLD MANUAL: 12 % (ref 19.6–45.3)
WBC MORPH BLD: NORMAL
WBC NRBC COR # BLD: 8.2 10*3/MM3 (ref 3.4–10.8)

## 2023-09-21 PROCEDURE — 63710000001 INSULIN LISPRO (HUMAN) PER 5 UNITS: Performed by: NURSE PRACTITIONER

## 2023-09-21 PROCEDURE — 97530 THERAPEUTIC ACTIVITIES: CPT

## 2023-09-21 PROCEDURE — 85027 COMPLETE CBC AUTOMATED: CPT | Performed by: INTERNAL MEDICINE

## 2023-09-21 PROCEDURE — 25010000002 DEXAMETHASONE PER 1 MG: Performed by: INTERNAL MEDICINE

## 2023-09-21 PROCEDURE — 82948 REAGENT STRIP/BLOOD GLUCOSE: CPT

## 2023-09-21 PROCEDURE — 63710000001 DEXAMETHASONE PER 0.25 MG: Performed by: INTERNAL MEDICINE

## 2023-09-21 PROCEDURE — G6002 STEREOSCOPIC X-RAY GUIDANCE: HCPCS | Performed by: STUDENT IN AN ORGANIZED HEALTH CARE EDUCATION/TRAINING PROGRAM

## 2023-09-21 PROCEDURE — 77387 GUIDANCE FOR RADJ TX DLVR: CPT | Performed by: STUDENT IN AN ORGANIZED HEALTH CARE EDUCATION/TRAINING PROGRAM

## 2023-09-21 PROCEDURE — 99232 SBSQ HOSP IP/OBS MODERATE 35: CPT | Performed by: INTERNAL MEDICINE

## 2023-09-21 PROCEDURE — 77412 RADIATION TX DELIVERY LVL 3: CPT | Performed by: STUDENT IN AN ORGANIZED HEALTH CARE EDUCATION/TRAINING PROGRAM

## 2023-09-21 PROCEDURE — 63710000001 INSULIN GLARGINE PER 5 UNITS: Performed by: NURSE PRACTITIONER

## 2023-09-21 PROCEDURE — 80053 COMPREHEN METABOLIC PANEL: CPT | Performed by: INTERNAL MEDICINE

## 2023-09-21 PROCEDURE — 97110 THERAPEUTIC EXERCISES: CPT

## 2023-09-21 PROCEDURE — 63710000001 INSULIN LISPRO (HUMAN) PER 5 UNITS: Performed by: INTERNAL MEDICINE

## 2023-09-21 RX ORDER — HYDROCODONE BITARTRATE AND ACETAMINOPHEN 10; 325 MG/1; MG/1
1 TABLET ORAL EVERY 6 HOURS PRN
Status: DISCONTINUED | OUTPATIENT
Start: 2023-09-21 | End: 2023-09-25 | Stop reason: HOSPADM

## 2023-09-21 RX ORDER — DEXAMETHASONE 4 MG/1
4 TABLET ORAL EVERY 6 HOURS
Status: DISCONTINUED | OUTPATIENT
Start: 2023-09-21 | End: 2023-09-25 | Stop reason: HOSPADM

## 2023-09-21 RX ORDER — INSULIN LISPRO 100 [IU]/ML
6 INJECTION, SOLUTION INTRAVENOUS; SUBCUTANEOUS
Status: DISCONTINUED | OUTPATIENT
Start: 2023-09-21 | End: 2023-09-25 | Stop reason: HOSPADM

## 2023-09-21 RX ADMIN — ASPIRIN 81 MG: 81 TABLET, COATED ORAL at 08:56

## 2023-09-21 RX ADMIN — ROPINIROLE 1 MG: 1 TABLET, FILM COATED ORAL at 21:33

## 2023-09-21 RX ADMIN — SENNOSIDES AND DOCUSATE SODIUM 2 TABLET: 50; 8.6 TABLET ORAL at 08:56

## 2023-09-21 RX ADMIN — PRAVASTATIN SODIUM 40 MG: 40 TABLET ORAL at 08:57

## 2023-09-21 RX ADMIN — INSULIN LISPRO 6 UNITS: 100 INJECTION, SOLUTION INTRAVENOUS; SUBCUTANEOUS at 17:09

## 2023-09-21 RX ADMIN — INSULIN LISPRO 2 UNITS: 100 INJECTION, SOLUTION INTRAVENOUS; SUBCUTANEOUS at 17:09

## 2023-09-21 RX ADMIN — INSULIN GLARGINE 20 UNITS: 100 INJECTION, SOLUTION SUBCUTANEOUS at 22:24

## 2023-09-21 RX ADMIN — DEXAMETHASONE 4 MG: 4 TABLET ORAL at 17:09

## 2023-09-21 RX ADMIN — DEXAMETHASONE SODIUM PHOSPHATE 4 MG: 4 INJECTION, SOLUTION INTRAMUSCULAR; INTRAVENOUS at 03:35

## 2023-09-21 RX ADMIN — INSULIN LISPRO 6 UNITS: 100 INJECTION, SOLUTION INTRAVENOUS; SUBCUTANEOUS at 11:49

## 2023-09-21 RX ADMIN — GABAPENTIN 900 MG: 300 CAPSULE ORAL at 21:33

## 2023-09-21 RX ADMIN — PANTOPRAZOLE SODIUM 40 MG: 40 TABLET, DELAYED RELEASE ORAL at 08:57

## 2023-09-21 RX ADMIN — INSULIN LISPRO 7 UNITS: 100 INJECTION, SOLUTION INTRAVENOUS; SUBCUTANEOUS at 22:25

## 2023-09-21 RX ADMIN — MODAFINIL 200 MG: 100 TABLET ORAL at 08:57

## 2023-09-21 RX ADMIN — Medication 2000 UNITS: at 08:56

## 2023-09-21 RX ADMIN — INSULIN LISPRO 4 UNITS: 100 INJECTION, SOLUTION INTRAVENOUS; SUBCUTANEOUS at 08:56

## 2023-09-21 RX ADMIN — LEVOTHYROXINE SODIUM 88 MCG: 0.09 TABLET ORAL at 08:57

## 2023-09-21 RX ADMIN — INSULIN LISPRO 4 UNITS: 100 INJECTION, SOLUTION INTRAVENOUS; SUBCUTANEOUS at 11:49

## 2023-09-21 RX ADMIN — PANTOPRAZOLE SODIUM 40 MG: 40 TABLET, DELAYED RELEASE ORAL at 17:09

## 2023-09-21 RX ADMIN — DEXAMETHASONE 4 MG: 4 TABLET ORAL at 21:33

## 2023-09-21 RX ADMIN — DEXAMETHASONE SODIUM PHOSPHATE 4 MG: 4 INJECTION, SOLUTION INTRAMUSCULAR; INTRAVENOUS at 10:18

## 2023-09-21 NOTE — PLAN OF CARE
Goal Outcome Evaluation:  Plan of Care Reviewed With: patient        Progress: improving  Outcome Evaluation: Pt seen for PT treatment today. Pt continues to have weakness of the bilateral LEs. Pt is Efren with supine to sit needing sequencing cues and increased time to complete. Pt's STS transfers improved needing MinAX2. Pt ambulated about 4ft with rwx, but very unsteady. Needed modAX2/Max assist. Pt had left knee buckle several times and had a posterior lean, needed chair to be pulled up behind pt. At this time, would recommend SNF as pt still requires assist of 2 and LEs are very weak. Will continue to progress pt as able.

## 2023-09-21 NOTE — PROGRESS NOTES
Nutrition Services    Patient Name:  Semaj Herrera  YOB: 1940  MRN: [id number]  Admit Date:  9/17/2023  Assessment Date:  09/21/23    Summary: Follow up for moderate malnutrition     Chart reviewed. Tolerating diet and eating well, % at meals per I/O flowsheet. Supplementing with Boost Glucose Control BID. Glucoses trending >200s. Diet was changed to consistent carb on 9/19. CDE met with pt (note reviewed). Pt uses insulin pump at home and wears CGM device. No recent Aic on file. Getting palliative radiation therapy for the prostate cancer with mets.     Recommendations:   -Continue same diet/ONS. No changes at this time.   -RD to follow.     CLINICAL NUTRITION ASSESSMENT      Reason for Assessment Follow-up Protocol, MST score 2+     Diagnosis/Problem   Fecal impaction in rectum   Medical/Surgical History Past Medical History:   Diagnosis Date    Aortic stenosis, mild 01/01/2021    Per echocardiogram 2021    Ascending aorta dilatation     Asthma     Benign prostatic hyperplasia     Bone cancer     Cellulitis of great toe of left foot 10/19/2018    CKD (chronic kidney disease)     Stage 3; followed by Dr. Vicky Duran     Diastolic dysfunction     GRADE II per echo 2018    Difficulty breathing     during exertion    Dyslipidemia     Erectile dysfunction     Fatigue     Heart murmur     History of blood transfusion     History of kidney stones     HL (hearing loss)     Hyperlipidemia     Hypertension     Hyponatremia     Kidney stone     Left ventricular hypertrophy     per echo 2018    Localized edema     Neuropathy     Obstructive sleep apnea     USING CPAP    Osteoarthritis     Peptic ulcer     Pneumonia     Pneumonia of left upper lobe due to infectious organism 03/15/2019    Prostate cancer     Status post prostatectomy, radiation therapy, and hormone therapy followed by Dr. Lee; metastatsis to bone    Pure hyperglyceridemia     Restless leg syndrome     Sepsis     Sinus bradycardia   "   Transient cerebral ischemia     Type 2 diabetes mellitus        Past Surgical History:   Procedure Laterality Date    BRONCHOSCOPY N/A 04/09/2018    Procedure: BRONCHOSCOPY;  Surgeon: Bijan Rivera III, MD;  Location: [facility] MAIN OR;  Service: Cardiothoracic    COLONOSCOPY      DEEP NECK LYMPH NODE BIOPSY / EXCISION      HEMORRHOIDECTOMY      LYMPH NODE BIOPSY      MEDIASTINOSCOPY N/A 04/09/2018    Procedure: MEDIASTINOSCOPY WITH LYMPH NODE BIOPSY;  Surgeon: Bijan Rivera III, MD;  Location: [facility] OR;  Service: Cardiothoracic    OTHER SURGICAL HISTORY      ulcer repair    PROSTATECTOMY  2006        Encounter Information        Nutrition History:     Factors Affecting Intake: altered GI function, decreased appetite     Anthropometrics        Current Height  Current Weight  BMI kg/m2 Height: 162.6 cm (64\")  Weight: 77 kg (169 lb 12.1 oz) (09/19/23 0500)  Body mass index is 29.12 kg/m².   Adjusted BMI (if applicable)    BMI Category Overweight (25 - 29.9)       Admission Weight 174 lb (79 kg)       Ideal Body Weight (IBW) 130 lb (59 kg)       Usual Body Weight (UBW) 203 lb (1/2023)   Weight Trend Loss, Amount/Timeframe: 29 lb (14%) weight loss past 8 months, 21 lb (10.8%) weight loss past 5 months per weight history       Weight History Wt Readings from Last 30 Encounters:   09/19/23 0500 77 kg (169 lb 12.1 oz)   09/18/23 0430 76.6 kg (168 lb 14 oz)   09/17/23 1141 78.9 kg (174 lb)   09/18/23 2122 76.2 kg (168 lb)   09/08/23 1155 79.1 kg (174 lb 4.8 oz)   07/24/23 1130 81.4 kg (179 lb 8 oz)   07/03/23 1053 82 kg (180 lb 12.8 oz)   06/15/23 0958 84.4 kg (186 lb)   06/12/23 1030 83.7 kg (184 lb 8 oz)   05/25/23 0858 86.2 kg (190 lb)   05/15/23 1016 85.6 kg (188 lb 11.2 oz)   04/17/23 0830 88.7 kg (195 lb 9.6 oz)   03/20/23 1014 87.9 kg (193 lb 12.8 oz)   02/20/23 0838 88.5 kg (195 lb 3.2 oz)   02/20/23 1030 88.5 kg (195 lb 1.6 oz)   02/13/23 0916 89.7 kg (197 lb 12.8 oz)   02/09/23 1424 90.6 kg (199 lb " 11.2 oz)   02/02/23 0806 89.8 kg (198 lb)   01/18/23 0901 92.4 kg (203 lb 12.8 oz)   01/11/23 0805 90.9 kg (200 lb 6.4 oz)   12/21/22 1121 89.3 kg (196 lb 14.4 oz)   11/17/22 0840 93 kg (205 lb)   09/21/22 1137 90.4 kg (199 lb 6.4 oz)   08/04/22 0859 89.4 kg (197 lb)   06/13/22 0932 90.7 kg (200 lb)   05/27/22 1100 91 kg (200 lb 9.6 oz)   05/02/22 1244 88.8 kg (195 lb 11.2 oz)   03/21/22 0918 88.2 kg (194 lb 6.4 oz)   03/21/22 0849 88.1 kg (194 lb 3.2 oz)   02/14/22 0856 88 kg (194 lb)   12/08/21 1056 88.5 kg (195 lb 3.2 oz)   12/03/21 1357 87.5 kg (193 lb)        Estimated/Assessed Needs        Current Weight  Weight: 77 kg (169 lb 12.1 oz) (09/19/23 0500)       Energy Requirements    Weight for Calculation 77 kg   Method for Estimation  22 kcal/kg, 25 kcal/kg   EST Needs (kcal/day) 4507-7329 kcal       Protein Requirements    Weight for Calculation 77 kg   EST Protein Needs (g/kg) 1.0 - 1.2 gm/kg   EST Daily Needs (g/day) 77-92 gm       Fluid Requirements     Method for Estimation 1 mL/kcal    EST Needs (mL/day)      Tests/Procedures        Tests/Procedures Radiation Therapy     Labs       Pertinent Labs    Results from last 7 days   Lab Units 09/21/23  0712 09/20/23  0634 09/19/23  0631   SODIUM mmol/L 133* 137 135*   POTASSIUM mmol/L 5.4* 5.1 5.2   CHLORIDE mmol/L 105 105 105   CO2 mmol/L 17.0* 21.1* 19.0*   BUN mg/dL 29* 25* 20   CREATININE mg/dL 0.78 0.75* 0.83   CALCIUM mg/dL 7.9* 8.1* 8.3*   BILIRUBIN mg/dL 0.4 0.4 0.3   ALK PHOS U/L 308* 300* 351*   ALT (SGPT) U/L 9 8 7   AST (SGOT) U/L 14 13 12   GLUCOSE mg/dL 203* 197* 212*       Results from last 7 days   Lab Units 09/21/23  0712 09/18/23  0412 09/17/23  1207   MAGNESIUM mg/dL  --   --  2.8*   HEMOGLOBIN g/dL 11.4*   < > 8.1*   HEMATOCRIT % 34.3*   < > 25.2*   WBC 10*3/mm3 8.20   < > 6.42   ALBUMIN g/dL 3.5   < > 3.9    < > = values in this interval not displayed.       Results from last 7 days   Lab Units 09/21/23  0712 09/20/23  0634 09/19/23  0631  09/18/23  0412 09/17/23  1207   PLATELETS 10*3/mm3 105* 96* 102* 87* 106*       No results found for: COVID19  Lab Results   Component Value Date    HGBA1C 8.80 (H) 03/16/2019          Medications           Scheduled Medications aspirin, 81 mg, Oral, Daily  fentaNYL, 1 patch, Transdermal, Q72H   And  Check Fentanyl Patch Placement, 1 each, Does not apply, Q12H  cholecalciferol, 2,000 Units, Oral, Daily  dexAMETHasone, 4 mg, Intravenous, Q6H  dilTIAZem CD, 120 mg, Oral, Daily  gabapentin, 900 mg, Oral, Nightly  insulin glargine, 20 Units, Subcutaneous, Nightly  insulin lispro, 2-9 Units, Subcutaneous, 4x Daily AC & at Bedtime  insulin lispro, 6 Units, Subcutaneous, TID With Meals  [START ON 9/24/2023] levothyroxine, 176 mcg, Oral, Once per day on Sun Wed  levothyroxine, 88 mcg, Oral, Once per day on Mon Tue Thu Sat  modafinil, 200 mg, Oral, Daily  pantoprazole, 40 mg, Oral, BID AC  senna-docusate sodium, 2 tablet, Oral, BID   And  polyethylene glycol, 17 g, Oral, Daily  pravastatin, 40 mg, Oral, Daily  rOPINIRole, 1 mg, Oral, Nightly       Infusions     PRN Medications   acetaminophen    senna-docusate sodium **AND** polyethylene glycol **AND** bisacodyl **AND** bisacodyl    calcium carbonate    dextrose    dextrose    glucagon (human recombinant)    HYDROmorphone    melatonin    ondansetron **OR** ondansetron    traZODone     Physical Findings          General Appearance alert, oriented, room air   Oral/Mouth Cavity tooth or teeth missing   Edema  no edema   Gastrointestinal last bowel movement: 9/20   Skin  bruising   Tubes/Drains/Lines none   NFPE Date Completed: 9/19     Malnutrition Severity Assessment      Patient meets criteria for : Moderate (non-severe) Malnutrition         Current Nutrition Orders & Evaluation of Intake       Oral Nutrition     Food Allergies NKFA   Current PO Diet Diet: Diabetic Diets; Consistent Carbohydrate; Texture: Regular Texture (IDDSI 7); Fluid Consistency: Thin (IDDSI 0)    Supplement n/a   PO Evaluation     % PO Intake/# of Days % x 2 days   --  PES STATEMENT / NUTRITION DIAGNOSIS      Nutrition Dx Problem  Problem: Unintentional Weight Loss  Etiology: Factors Affecting Nutrition - altered GI function, decreased appetite    Signs/Symptoms: Report of Minimal PO Intake and Unintended Weight Change     --  NUTRITION INTERVENTION / PLAN OF CARE      Intervention Goal(s) Improved nutrition related labs, Reduce/improve symptoms, Meet estimated needs, Disease management/therapy, Tolerate PO , Continue positive trend, Maintain weight, and No significant weight loss         RD Intervention/Action Encourage intake, Continue to monitor, and Care plan reviewed         Prescription/Orders:       PO Diet       Supplements Boost Glucose Control BID with meals - ordered 9/19      Snacks       Enteral Nutrition       Parenteral Nutrition    New Prescription Ordered? Yes   --      Monitor/Evaluation Per protocol, I&O, PO intake, Supplement intake, Pertinent labs, Weight, Skin status, GI status, Symptoms   Discharge Plan/Needs Pending clinical course   --    RD to follow per protocol.      Electronically signed by:  Arminda Mazariegos RD  09/21/23 14:22 EDT

## 2023-09-21 NOTE — PROGRESS NOTES
Subjective .     REASONS FOR FOLLOWUP:      REASON FOR CONSULTATION:  1. Metastatic prostate cancer      2.  Significant back pain with weakness in the lower extremities since last 3 days, rule out spinal cord compression, with improvement after starting radiation      Interval history:    September 19, 2023: Patient was seen by radiation oncology with plans to start radiation today.  His hemoglobin is down to 7.1 and we will give him 2 units of packed red blood cells.  MRI thoracic and lumbar spine has been done but not read yet.  He is afebrile.  Complains of lower extremity weakness    MRI of the thoracic spine shows multiple spine mets with spinal cord compression at thoracic 10.  Neurosurgery has been consulted and radiation oncology has started radiation already.    September 20, 2023: Patient's weakness in the lower extremity has improved significantly.  Grade 2 radiation    September 21, 2023: Patient's lower extremity weakness has improved.  He is on steroid 4 mg which we will switch to p.o. steroids p.o. dexamethasone 4 mg every 6 hours.  Discussed with the hospitalist team and patient agreeable to go home with hospice.  He can follow-up with Dr. Lee as outpatient but he is willing to accept hospice at this point.  He wants to go home and get radiation from home.    HISTORY OF PRESENT ILLNESS:    Patient is a 82-year-old gentleman with history of metastatic prostate cancer followed by Dr. Lee in our office and was last seen September 8, 2023.  I am seeing this patient for the first time.  Initially in March 2018 he had undergone a CT of the chest which showed multiple sclerotic lesions on the bone and multiple enlarged mediastinal lymph nodes.  PSA was 395.  April 9, 2018 underwent mediastinoscopy and pathology was consistent with metastatic adenocarcinoma consistent with prostate primary.  He was treated with ADT and radiation for symptomatic sites.  He receives Lupron injections every 3 months  and Xgeva starting June 2018.  His PSA dropped down.  To 0.08 on June 13, 2019.  Subsequently his PSA started rising starting December 2019.  He continued Lupron and Xgeva.  He had worsening hip pain on May 4, 2020.  But CT scan of the chest abdomen pelvis showed no progression of disease.  He was started on gabapentin.  Bone scan was done and it does not show any worsening of bone mets.  This was done Lety 3, 2020.     Patient's PSA continue to rise and in November 2020 was 25.4.  He was noncompliant with Lupron and that was restarted.  In January 2021 he had progression in the sternum.  And worsening pain in the right femur and patient underwent radiation to the right femur as well as sternum.     In August 2021 his PSA continue to rise and he was started on Xtandi on September 8, 2021.  PSA started dropping to 4.7.  He continued Xtandi.     In January 2023 patient continued to have increasing bony discomfort.  He had a clarify PET scanning done.  He was eligible for lutetium 177.     Patient 1 subsequently started by [facility] on Jeaneth low to mild April 17, 2023 and increased to 600 mg twice daily along with Xgeva and Lupron every 3 months     In June 12, 2023 patient had bone scan with progressive metastatic disease and patient was to follow-up with hospice and palliative care.  His hemoglobin had dropped down to 7.2 and patient declined blood transfusion.  On September 8, 2023 his hemoglobin was down to 5.2 and he agreed for transfusion     Patient has pain and currently on fentanyl patch 50 mcg every 72 hours and Norco 10 x 325 every 6 hours as needed along with gabapentin at bedtime patient was to receive 2 units of packed red blood cells with premedication dexamethasone and Benadryl.     Patient was admitted yesterday because he was felt weak and unable to stand up.  His hemoglobin was 8.1, platelets of 102     Because of back pain MRI of the lumbar spine done which showed constipation.  With possible  fecal impaction.  Liver gallbladder pancreas spleen and adrenal glands are normal.  7 to 8 mm hypodense nodules arising in the upper pole of the right kidney.  Widespread osseous metastasis that appears more pronounced.     We have been consulted and also palliative care has been consulted.     CT abdomen pelvis showed the 7 to 8 mm hypodense nodules in the upper pole of the right kidney not seen on previous exam in 2021.  Probably hyperdense cyst but widespread bony metastasis     MRI lumbar spine shows diffuse marrow changes consistent with metastatic disease.  No vertebral body collapse.  Epidural enhancing soft tissue posterior to the T11 and T12 either directly from its vertebral body or dural metastasis with effacement of the ventral sac without deformity or compression of the distal thoracic cord.  Multilevel degenerative arthritis     Last PSA had increased from  September 8, 2023     Hematologic/oncologic history:   The patient is an 82-year-old male with significant past medical history including prostate carcinoma, CKD 3 and long-term tobacco use be been seen by primary care in late January, 2018 for hoarseness.  Chest x-ray is now outpatient January 23 revealed sclerotic change in the posterior mid chest to be within 3 adjacent thoracic ertebral bodies of uncertain significance.  A follow-up chest CT revealed numerous sclerotic foci within al visualized bones consistent with metastatic disease, multiple enlarged mediastinal lymph nodes concerning for metastatic lymphadenopathy and a polypoidal abnormality in the right lateral wall of the trachea.  CT of abdomen March 20 revealed extensive osseous metastatic disease mildly enlarged retroperitoneal lymph nodes.  Bone scan March 20 was consistent with widespread osseous metastasis particularly in the proximal half the left humeral shaft, abnormal uptake in the sternum and manubrium and a small focus in the posterior aspect of the calvarium.  This led  to a plain film the left humerus with mottled sclerosis involving the shaft of the humerus particularly in the proximal half but no evidence of pathologic fracture.    The patient has additionally type 2 diabetes on insulin pump, again a history of prostate carcinoma prostatectomy 12 years previously, hypertension, and hyperlipidemia.  Additional studies included a PSA of 395.1 from March 14, 2018.The patient's been seen by urology March 15 with plans to start Firmagon.    The patient is now seen in pulmonary clinic with Dr. Bijan Rivera and we have discussed that he will need bronchoscopy and mediastinoscopy.  Interestingly his additional history includes a long occupational exposure including working in the [place] just out of school, subsequent construction work for many years and a 30-pack-year history of smoking stopping in the late 1990s.  He indicates that he followed with Dr. Guillen of urology for many years with no changes in his exams and normal PSAs.  He stopped this follow-up approximately 2 years ago.     Patient was seen in consultation with thoracic surgery on March 28 and plans are made for mediastinoscopy and bronchoscopy with lymph node biopsy.  Pathology revealed that these nodes were consistent with metastatic prostate carcinoma.  He was discussed at thoracic conference.  A PET/CT was not pursued and it was determined that he see medical oncology for hormonal treatment and radiation therapy if symptomatic.  It should be noted that the patient's March 15 [facility] office note describes that Firmagon was planned.     The patient has met with the son and grandson April 23.  We have also contacted Dr. Taylor of urology in that Firmagon was given just after his last visit and would next be due approximately May 3.  Patient feels considerably better with resolution of his pain, normalization of his performance status.  He would like to continue treatment through our office now  contacted Dr. Taylor concerning this.    The patient is next seen June 11, 2018 we discussed his follow-up including his treatments with Lupron May 7 and his next treatment on July 30.  He has returned to essentially normal performance status and his PSA has dropped further from 395 March 14 27.8 May 7 and now 2.03 June 11.  We do plan to initiate Xgeva also study him via scans to document his improvement approximately a month from now.   The patient is next seen July 26, 2018.  Repeat imaging demonstrated interval resolution of mediastinal and retroperitoneal lymphadenopathy.  There is thickening in the distal aspect of the appendix with stranding?  Chronic appendicitis.  The patient indicates he is having no such symptoms and never has.  There is no change in sclerotic bony metastasis in the patient's PSA has dropped substantially to 1.66 by July 19 and 1.79 July 26.  He is actually feeling excellent and this is corroborated by family members.   Patient is next seen January 10, 2019 continuing to do very well and, in fact indicating that [facility], likely [facility] replacements, visual review.  He is not certain is going to continue his care with them or with us or combination of both.    Patient is next seen April 4, 2019.  He is recently discharged after hospitalization March 15-18 with left upper lobe pneumonia.  We reviewed the findings in detail with his gradual recovery now documented.  His studies include a CT of chest which does not demonstrate any further bony lesions and/or pulmonary metastasis having developed.  He is feeling considerably better and approximately back to his baseline.  The patient is next seen June 28, 2019 feeling well but having baseline generally musculoskeletal pain and restless legs.His recent exams include a PSA of 0.81, CMP with potassium of 5.3 with repeat pending.  His performance status overall remains good to very good and is  clearly responding to his treatments.  The patient is next seen December 13, 2019 continue to feel well.  He has had, oddly enough, 2 episodes of sleep paralysis which have occurred to him previously and he wonders if there is any connection with his prostate carcinoma though this is felt unlikely.     The patient when assessed in December had his PSA go to 2.9.  We continue with Lupron and Xgeva and is seen back now March 9, 2020 without additional symptoms.  We discussed that a schedule could likely change moving to 3 months for both of these medications particularly if he needs additional therapy directed at progressive disease.     Patient contacted our practice May 4 concern for pain in his hips.  This led to moving his scans up and they were performed May 8, 2020 showing a sub-6 mm pleural-based pulmonary nodule in the right lower lobe that is new from March 15, 2018, remainder of the sub-6 mm pulmonary nodules bilaterally are stable.  There is extensive osseous metastatic disease as previous with no other new findings.     The patient indicates, when seen, May 13 that his bone pain is now resolved.  While this is good information does not mean that he does not have further bony metastasis and we have discussed that a follow-up bone scan is likely necessary.  Furthermore he is having some difficulty with sleeplessness and increase in restless legs as he continues to stay at home during the COVID 19 crisis which, itself, is producing more anxiety for him.  A follow-up bone scan was obtained Lety 3 revealing multifocal osseous metastatic disease which was compared to March 2, 2018 with improvement.  No new abnormal uptake is present.  He is next seen with us on June 17, 2020 and, fortunately, is not having any significant pain at this point.  We discussed his scans in detail and, on balance, his performance status also remains improved.     It was elected to follow the patient rather than changes antiandrogen  therapy.  He is reassessed September 3 with unchanged CMP, H&H of 11.4 and 36.3 with normal white count, platelet count and differential, PSA at 15.8.  Follow-up PA lateral chest x-ray does not show any new abnormalities though there is extensive metastatic bone disease throughout the bony thorax and osteoblastic metastasis have been seen on chest CT May 2020.  The patient is seen with his son Georges September 10 noticing increasing bony discomfort primarily in the right hip following fairly intensive gardening which he does frequently.  Now has further elevation of PSA were wondering whether we should try to intervene sooner per additional antiandrogens.  We will need to further assess him via CT scanning to make this decision     These were obtained October 1 showing extensive sclerotic disease with no metastatic disease in chest abdomen or pelvis.  PSA had gone from 15.8 September 3-16 0.6 October 1.     He still has pain getting up in the morning and after active gardening.  This is manageable with current pain medications and, at present, it is not apparent that he needs to switch medications to gain better control of his disease.  The patient did have follow-up studies done including a PSA November 25, 2020 now at 25.4.  The patient is seen with his son December 10, 2020 doing fair but having some increased pain in the mid sternum and right hip that he ascribes to additional exercise/work in managing his garden.  It is apparent however, that his last bone scan was 6 months ago and we do need to reassess him concerning this.     The patient had follow-up bone scan performed January 11, 2021 showing again uptake in the calvarium, humerus, right medial clavicle, sternum, ribs, spine, sacrum, pelvis, right acetabulum, proximal femora and now left frontal bone which appears new.  There is also mild increase in sternal uptake and equivocal increase in the pelvic lesions of all of the sacrum and the right proximal  femur.     The patient is seen with his son January 18, 2021 and indicates that he is having pain gradually worsened in his right hip, mid chest that had been an issue previously.  These findings on bone scan are reviewed with both of them as likely the underlying culprit for his discomfort.  He would need change of the systemic therapy but, at present, will ask for radiation therapy palliation initially.  He was previously treated by Dr. Kulkarni many years ago and will ask her to see him.  We will also make application for the current cost of Xtandi or Zytiga.  The patient is not felt to be a candidate for systemic chemotherapy.     The patient was referred for ration therapy as we continued to assess his PSA on current therapy as well as exam the cost of Zytiga or Xtandi.  Radiation therapy (Dr. Kulkarni) saw the patient January 26 and felt that the right femur and sternal lesion could benefit from therapy-3000 cGy in 10 fractions.  The patient took treatment February 1 to February 12 to the above areas and is next seen back March 15.  Fortunately his pain has improved dramatically and he is quite happy about this.  Unfortunately he notes some difficulty with swallowing that is temporary and often leads to increased salivation and mucus production.  Patient was asked to return his next assessed April 12, 2021.  He is able to swallow with less pain but still has excess mucus production and issues with salivation.  His PSA has noted increase but he is having no additional bony discomfort at this point and, additionally, has noted low-grade fevers just under 100 degrees at nighttime?.  His weight, appetite and general performance status have remained good to excellent.  We discussed follow-up studies at this point to determine his status.     Follow-up scans were scheduled CT scans of chest and pelvis 5/4/2021 showing osseous metastasis quite similar to previous examinations in the chest, CT of abdomen pelvis also with  no acute intra-abdominal adenopathy no indication of abdominal pelvic metastatic disease but again widespread osseous metastasis.  His PSA at this point have been slowly increasing to a level of 44 on 4/12/2021.  As the patient is reassessed 5/10/2021 we find, fortunately, that he is not exceeding symptomatic.  It could make reasonable sense at this point to take the mediastinal nodes from his biopsy 4/9/2018, reassess potential targeted therapies, MSI status, and germline analysis.  Fortunately he is not having significant pain or discomfort and is seen with his son as we discussed the above plan.  Notably a follow-up PSA is found to be 42.6.   Patient is next seen in 6/7/2021 for Lupron and Xgeva.  Fortunately he is feeling considerably better according to both he and his son though his stamina has reduced.  He is not having additional pain at this point.  We have also reviewed his genetics assessment which was significant only for a variant of unknown significance.  This is not an actionable abnormality.     As the patient's PSA further escalated increasing to 89 7/7/2021 his subsequent Axumin PET was obtained 8/20 demonstrating extensive osteosclerotic metastatic disease showing reduced uptake-typical finding but no evidence of visceral metastatic disease in the neck chest abdomen pelvis.  These findings are discussed with the patient and his son 8/25/2021 and indicative of his progressive disease-etiology of his rising PSA-though the patient is asymptomatic we have discussed moving to initiate Xtandi is available at 160 mg p.o. twice daily along with his current Lupron and Xgeva.     The patient's testing 10/6 included PSA that is dropped to 9.69.  He is seen with his son, Cody, both indicating that he been doing relatively well with treatment but began to notice nausea in the last week.  The schedule that he has been given also needs to be somewhat updated in terms of his Lupron and Xgeva.  He has been able  to maintain his appetite and overall performance status to date.     The patient is next assessed 12/8/2021 tolerating his current Xtandi dose better than previous and maintaining a good performance status overall as well as demonstrating a drop in his PSA now to 4.7 on 12/8/2021 compared to high of 112 9/8/2021.  He remains hypophosphatemic and hypocalcemic and we have discussed, again, changing his Xgeva dosing to every 3 months along with his Lupron will continue his Xtandi to 80 mg daily.     The patient is next reviewed 3/21/2022 seen for both Xgeva and Lupron.  Fortunately he is feeling reasonably well with little additional pain though he has restarted to place his garden into shape for the season and is working more outdoors.     Patient reevaluated 5/2/2022 with repeat CBC including H&H of 10.5 and 33.4, white count 5980 and platelet count of 192,000, currently CMP and PSA pending with a previous PSA 1 month ago at 9.06.  At this point we increased his Xtandi to 160 mg p.o. daily while continuing treatment with every 3 month Xgeva and Lupron.  Notably the patient's Requip  alcohol I am sure it is mariza now at 1.5 mg p.o. nightly and Neurontin 900 mg p.o. daily has improved his restless leg syndrome substantially.     Patient seen 6/13/2022 at which time Xtandi at 160 mg p.o. daily was continued, Xgeva moved to every 6 months and Lupron every 3 months.     This was continued when patient was seen 9/21/2022 though subsequently we proceeded to every 3 months for both medications.     Patient seen 12/21/2022 at which time a subsequent Pylarify scan was requested as result of increasing PSA level and bony discomfort.  The patient received Lupron and Xgeva at this point.      He is seen back with his son 1/11/2023.  The potential treatment options such as Pluvicto (Lutetium Yady 177 vipivotide tetraxetan), now that we know that his PSMA scan is positive, though the patient would have to initially be treated with  taxane chemotherapy which would be difficult for him to tolerate, radium-223 considering his bony metastasis.  He has slowly progressive disease however we have discussed additional issues including radiation therapy and/or alteration to another androgen receptor agonist such as darolutamide.  We have discussed all these options moving to have assessments done by radiation therapy and, [facility] as we address his symptoms.     The patient was not felt a candidate for additional treatments via [facility], was able to start darolutamide and completed palliative radiotherapy.  He was stable seen 2/20/2023 though experiencing diarrhea post radiation therapy.     Patient seen 4/17/2023 with darolutamide gradually increased to 600 mg twice daily.  His Xgeva and Lupron will be given every 3 months.     Patient seen 6/12/2023, unfortunately, PSA increasing and bone scan worsening.  Patient at this point indicated that he did not wish additional therapies but, instead, control of his pain initially.  Plans were made to continue his Lupron and Xgeva seen him at the 3 to 4-week annia and considering palliative care primarily with either hospice or [facility].     Patient seen 7/3/2023 with plans to initiate palliative care starting with [facility].  The patient continued this approach seen back 9/8/2023 referring not to proceed with hospice but again with palliative care including transfusion.     I had discussion with the patient and patient's son today.  Patient has weakness in the lower extremity since last 3 days.  Prior to that he was able to walk and cook by himself.  He has tingling pain in bilateral lower extremities which is significant.  His MRI of the lumbar spine did not show any final cord compression or nerve impingement.  Patient is currently on steroids and will require an MRI of the thoracic and cervical spine           Past Medical History:   Diagnosis Date    Aortic stenosis, mild 01/01/2021    Per  echocardiogram 2021    Ascending aorta dilatation     Asthma     Benign prostatic hyperplasia     Bone cancer     Cellulitis of great toe of left foot 10/19/2018    CKD (chronic kidney disease)     Stage 3; followed by Dr. Vicky Duran     Diastolic dysfunction     GRADE II per echo 2018    Difficulty breathing     during exertion    Dyslipidemia     Erectile dysfunction     Fatigue     Heart murmur     History of blood transfusion     History of kidney stones     HL (hearing loss)     Hyperlipidemia     Hypertension     Hyponatremia     Kidney stone     Left ventricular hypertrophy     per echo 2018    Localized edema     Neuropathy     Obstructive sleep apnea     USING CPAP    Osteoarthritis     Peptic ulcer     Pneumonia     Pneumonia of left upper lobe due to infectious organism 03/15/2019    Prostate cancer     Status post prostatectomy, radiation therapy, and hormone therapy followed by Dr. Lee; metastatsis to bone    Pure hyperglyceridemia     Restless leg syndrome     Sepsis     Sinus bradycardia     Transient cerebral ischemia     Type 2 diabetes mellitus        ONCOLOGIC HISTORY:  (History from previous dates can be found in the separate document.)    No current facility-administered medications on file prior to encounter.     Current Outpatient Medications on File Prior to Encounter   Medication Sig Dispense Refill    Accu-Chek Guide test strip USE 1 STRIP TO CHECK BLOOD SUGAR 4 TIMES DAILY      Accu-Chek Softclix Lancets lancets USE 1 LANCET TO CHECK GLUCOSE 4 TIMES DAILY      aspirin 81 MG EC tablet Take 1 tablet by mouth Daily.      cholecalciferol (VITAMIN D3) 1000 units tablet Take 2 tablets by mouth Daily.      dilTIAZem CD (CARDIZEM CD) 120 MG 24 hr capsule TAKE 1 CAPSULE EVERY DAY 90 capsule 3    fentaNYL (DURAGESIC) 50 MCG/HR patch Place 1 patch on the skin as directed by provider Every 72 (Seventy-Two) Hours. 10 each 0    gabapentin (NEURONTIN) 300 MG capsule TAKE 3 CAPSULES EVERY NIGHT  AT BEDTIME 90 capsule 0    Insulin Aspart (novoLOG) 100 UNIT/ML injection INJECT 100 UNITS VIA PUMP ONCE DAILY      Leuprolide Acetate, 3 Month, (LUPRON DEPOT, 3-MONTH, IM) Inject  into the appropriate muscle as directed by prescriber Every 3 (Three) Months.      levothyroxine (SYNTHROID, LEVOTHROID) 88 MCG tablet       loperamide (IMODIUM) 1 MG/5ML solution Take 10 mL by mouth 4 (Four) Times a Day As Needed for Diarrhea.      modafinil (PROVIGIL) 200 MG tablet Take 1 tablet by mouth Daily. 90 tablet 1    ondansetron (ZOFRAN) 8 MG tablet Take 1 tablet by mouth 3 (Three) Times a Day As Needed for Nausea or Vomiting. 30 tablet 5    pravastatin (PRAVACHOL) 40 MG tablet Take 1 tablet by mouth Daily.      rOPINIRole (REQUIP) 0.5 MG tablet Take 2 tablets by mouth in the evening and 2 at bedtime. 360 tablet 2    traZODone (DESYREL) 50 MG tablet Take 1-2 tablets by mouth every night at bedtime.      diphenoxylate-atropine (LOMOTIL) 2.5-0.025 MG per tablet Take 1 tablet by mouth 4 (Four) Times a Day As Needed for Diarrhea. 30 tablet 0       ALLERGIES:     Allergies   Allergen Reactions    Niacin Unknown - Low Severity    Statins Unknown - Low Severity       Social History     Socioeconomic History    Marital status:     Years of education: College   Tobacco Use    Smoking status: Former     Packs/day: 1.50     Years: 30.00     Pack years: 45.00     Types: Cigarettes     Start date: 1968     Quit date: 1998     Years since quittin.6     Passive exposure: Past    Smokeless tobacco: Never   Vaping Use    Vaping Use: Never used   Substance and Sexual Activity    Alcohol use: Not Currently     Comment: Caffeine use: 2-3 cups daily    Drug use: Never    Sexual activity: Not Currently         Cancer-related family history includes Cancer in his brother, sister, sister, and sister; Lung cancer (age of onset: 46) in his father; Lung cancer (age of onset: 60) in his sister; Lung cancer (age of onset: 62) in his  brother; Prostate cancer (age of onset: 60) in his brother.     Review of Systems  A comprehensive 14 point review of systems was performed and was negative except as mentioned.  Patient had weakness in the lower extremities which is improved on steroids.  Objective      Vitals:    09/21/23 0300 09/21/23 0857 09/21/23 0901 09/21/23 1556   BP: 157/58  151/59 167/53   BP Location: Right arm  Right arm Right arm   Patient Position: Lying  Lying Lying   Pulse: 50 (!) 45 (!) 46 50   Resp: 15  16 16   Temp: 97.5 °F (36.4 °C)  97.5 °F (36.4 °C) 97.7 °F (36.5 °C)   TempSrc: Oral  Oral Oral   SpO2: 98%  97% 97%   Weight:       Height:             9/8/2023    11:57 AM   Current Status   ECOG score 1       Physical Exam          CONSTITUTIONAL:  Vital signs reviewed.  Alert and oriented x3  No distress, looks comfortable.  .  RESPIRATORY:  Normal respiratory effort.  No rales  or wheezing, clear.   CARDIOVASCULAR:  Regular rate and rhythm, no murmur  No significant lower extremity edema.  Abdomen: Soft nontender positive bowel sounds no hepatosplenomegaly  SKIN: No wounds.  No rashes.  MUSCULOSKELETAL/EXTREMITIES: No clubbing or cyanosis.  No apparent unilateral weakness.  NEURO: CN 2-12 appear intact. No focal neurological deficits noted.  PSYCHIATRIC:  Normal judgment and insight.  Normal mood and affect.    Patient has bilateral lower extremity weakness which is improved today compared to yesterday  Lower extremity strength is improved  I have reexamined the patient and the results are consistent with the previously documented exam. Rula Aly MD       RECENT LABS:  Hematology WBC   Date Value Ref Range Status   09/21/2023 8.20 3.40 - 10.80 10*3/mm3 Final     RBC   Date Value Ref Range Status   09/21/2023 4.31 4.14 - 5.80 10*6/mm3 Final     Hemoglobin   Date Value Ref Range Status   09/21/2023 11.4 (L) 13.0 - 17.7 g/dL Final     Hematocrit   Date Value Ref Range Status   09/21/2023 34.3 (L) 37.5 - 51.0 % Final      Platelets   Date Value Ref Range Status   09/21/2023 105 (L) 140 - 450 10*3/mm3 Final        Assessment & Plan     #.  Spinal cord compression at T10 with back pain and lower extremity weakness that is new since last 3 days with patient's history of metastatic prostate cancer  Patient has had metastatic prostate cancer followed by Dr. Lee in our office  Patient was on doralutamide and Xgeva.  Given new onset weakness in the lower extremities we will obtain MRI of the thoracic and cervical spine with and without contrast to rule out any spinal cord compression  Continue steroids dexamethasone 4 mg IV every 6 hours  We will consult neurology for weakness of the lower extremities as well as radiation oncology.  Reviewed MRI of the spine.  Patient has spinal cord compression at thoracic 9 and 10 and has started receiving radiation and steroids with his lower extremity improving.  Neurosurgery is also on the case.  Neurosurgery does not plan to do any thoracic decompression because of the extent of bone involvement and palliative radiation to be done  Day to radiation today.  Currently improved lower extremity weakness  September 21, 2023: Discussed in length with patient.  His lower extremity weakness is improving.  But he does want to go home with hospice.  I discussed with Dr. Lee his primary oncologist who was in agreement for him to go with hospice to home.     #.  Anemia and thrombocytopenia.  S/p packed red blood cell transfusion in the past.  Likely this is related to his previous radiation as well as significant multiple metastasis in the bone.  We will closely follow the labs  We will transfuse 2 units of packed red blood cells     1.  Metastatic prostate cancer:  Patient initially diagnosed in 2006 and had a prostatectomy and hormone therapy.  Patient in January 2018 began to complain of shortness of breath and hoarseness.  Chest x-ray obtained 1/23/18 showed sclerotic bone lesions.  CT of the chest  3/12/2018 numerous sclerotic bone foci with all visualized bones consistent with metastatic disease, multiple enlarged mediastinal lymph nodes.  .1 from 3/14/2018.  Patient was started on Firmagon by urology, [facility].  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  Case discussed at thoracic conference and recommendations were to continue ADT and radiation for symptomatic sites.  Lupron injections every 3 mos initiated 5/7/18 PSA at that time 7.810  Monthly Xgeva initiated 6/11/18 PSA 2.030  Last PSA 6/13/2019 0.881  9/20/2019 patient tolerating treatment well, no new concerns.  12/2019 PSA 2.9  3/9/2020  PSA increasing to 5.030, he remains continuing on Lupron and Xgeva and asymptomatic though follow-up CT of chest and pelvis will be requested in 11 weeks prior to follow-up in 12 weeks.   5/4/2020 patient called reported worsening right hip pain, plans to purse CT scans ASAP.   5/8/2020 CT scans C/A/P show no progression of disease. Hip pain improved, Gabapentin added.. Bone scan requested.  6/3/2020 bone scan that does not demonstrate worsening of bone nor need for localized radiation therapy.  The patient's PSA has been slowly rising and we have not been able to determine worsening of disease and and, therefore, it is not felt warranted add additional medications such as Xtandi or apalutamide to his therapy.  Please note would like to avoid Zytiga try not to add prednisone which would worsen his blood sugar control.  9/3/2020 PSA 15.8, CXR extensive metastatic bone disease- patient reviewed 9/10/2020 reporting increased bony discomfort CT scans requested.  Scans done and reviewed 10/7/2020 ultimately reveal no evidence of progression of disease for visceral involvement or clearly for bone involvement.  After further review with the patient and his son plan continued Xgeva and Lupron.  We have assessed for availability of  Xtandi 160 mg p.o. daily and find the cost is currently prohibitive.   11/25/2020 PSA 25.4  12/10/2020 review with some mild increase in bony discomfort.  After repeat discussion we went on to have him undergo Lupron therapy, and his PSA actually to be mildly reduced at 22.2.   Follow-up bone scan 1/11/2021 demonstrates some evidence of progression in sternum, left sacrum and proximal femur.  These findings were reviewed with the patient and his son January 18, 2020 and the patient was referred for palliative radiotherapy(Dr. Kulkarni)  Palliative radiation under care Dr. Kulkarni to right femur and sternal lesion -3000 cGy in 10 fractions given February 1 to February 12 with significant symptom improvement.  3/15/2021 PSA 38.5  4/12/2021 PSA 44  5/10/2021 PSA 42.6 CT chest abdomen pelvis 5/4/2021 - for progression of disease and follow-up PSA 5 10/2021 is at 42 slightly reduced from previous.  We have discussed how to proceed from here and find a significant family history that clearly supports a potential genetic assessment for his malignancy.  It is felt most reasonable at this point to take the mediastinal nodes from his biopsy 4/9/2018 and reassess for potential targeted therapies, MSI status, as well as germline analysis.  6/7/2021 PSA 65.9 his analysis completed for actionable mutations including germline mutations.  These exams were negative though there is a variant of unknown significance.  Again this is not an actionable mutation.  We proceeded with additional Xgeva and Lupron planning for monthly Xgeva and Lupron at 3 months  7/7/2021 PSA 89 and subsequent testing with Axumin PET was performed confirming extensive osteosclerotic metastatic disease with little uptake, no evidence of visceral metastatic disease in neck chest abdomen or pelvis.  8/25/2021  , PET/CT reviewed, subsequent PSA increased to 105.  Patient fortunately asymptomatic.  Requested reevaluation for Xtandi 160 mg po daily.   9/8/2021  due for his lupron, receiving every 3 months, and monthly Xgeva.  Will have education today for Xtandi and will receive his medication today as well.   Started Xtandi 9/8/2021.  9/22/2021 here for toxicity check, so far tolerating Xtandi quite well other than a slight increase in diarrhea controlled with Imodium.  10/6/2021 continues to tolerate Xtandi well.  Labs are within range today, we will proceed with Xgeva today.  Patient seen 10/20/2021 having more nausea with Xtandi and demonstrating a substantial reduction in PSA level.  After discussion we plan to reduce his dose to 80 mg daily following his PSA and performance status over the next approximate 7 weeks at which time he would be scheduled for his follow-up Lupron.  Patient assessed 12/8/2021 with further reduction in PSA to 4.7, ongoing hypophosphatemia and hypocalcemia-Xgeva moved to every 3 months given on same schedule as Lupron  Patient seen 3/21/2022, 6-week follow-up with mild increase in PSA recognized  Patient reassessed 5/2/2022 with PSA at 10.1, Xtandi moved to 160 mg p.o. daily  Patient assessed 6/13/2022, Xtandi at 160 mg p.o. daily, PSA pending, Xgeva moved to every 6 months, Lupron every 3 months  Patient seen 9/21/2022 at which time we continued our current approach, reviewed additional options for therapy should he clearly progress.  Patient seen 12/21/2022, increasing bony discomfort, Xgeva and Eligard continued, plans made for Pylarify scanning.   Patient seen 1/11/2023 with his son, discuss potential treatment options such as Pluvicto (Lutetium Yady 177 vipivotide tetraxetan) knowing that Pms-Asa is strongly positive in bone, palliative radiotherapy, radium-223 and alteration to additional androgen receptor such as darolutamide.  Patient referred to radiation therapy for their evaluation and possible treatment options, [facility] as application made for darolutamide and medications altered to include meloxicam 15 mg p.o. daily along  with his Norco.  Patient assessed 2/20/2023 improved for radiation therapy, no additional options for treatment and [facility], darolutamide initiated.  Patient assessed 4/17/2023, darolutamide increased to 600 mg twice daily, Xgeva and Lupron every 3 months  5/15/2023: Continues darolutamide 600 mg twice daily.  Continues Xgeva and Lupron every 3 months, due in 1 month.  Hemoglobin today stable at 9.1.  Increased PSA, now 46 previously 29.  Discussed with Dr. Lee and we will proceed with bone scan in 3 weeks.  Subsequent bone scan with progression of metastatic disease to number of sites, issues discussed with patient and his son 6/12/2023 with plans to proceed to, primarily, palliative care approach.  Patient treated with Xgeva and Lupron in 3-4-week follow-up consider hospice or pallitus care.  Patient seen 7/3/2023-further anemia with hemoglobin 7.2 g%.  Plan 1 unit packed RBC transfusion, initiation of Pallitus.  7/24/2023 hemoglobin 7.2.  Patient declines blood transfusion.  He is reporting pain currently not well controlled which will be discussed below.  Meeting with evius on Friday of this week.  Patient assessed 9/8/2023 with hemoglobin 5.2 g%, further transfusion planned with additional steroid, Benadryl premedication  Patient admitted September 17, 2023 with pain: Reviewed MRI of the lumbar spine which did not show any spinal cord compression except metastasis.  PSA has been increasing.  CT abdomen pelvis shows widespread bony metastasis.  There is some rectal wall thickening diffusely.  There is fecal impaction.  7 to 8 mm hypodense nodules in the upper pole of the right kidney not seen in the previous exam in 2021.  They are indeterminate.        2.  Neuropathy/ restless legs:    5/13/2020 gabapentin 600 mg at bedtime, trazadone 50 mg QHS.  On gabapentin 300, 2 tablets at bedtime, and requip 0.5.mg    Still having neuropathy symptoms.  Will increase gabapentin to 900 mg at bedtime.   9/22/2021  increase in gabapentin to 900 mg at bedtime has helped neuropathy.  Now taking Requip 2 mg nightly.     3.  Cancer related pain: on Norco 5/325 every 6 hours as needed.  Mobic 15 mg p.o. every morning initiated 1/11/2023, consider MS Contin every 12 hours  Status post palliative radiotherapy with improved pain control by 2/20/2023  Effective pain relief except increasing constipation noted 4/17/2023, narcotic adjusted downward as possible.  Continues Norco 5/325 every 6 hours as needed.  Feels his pain is well controlled on this regimen.  Semaj Herrera reports a pain score of 8.  Given his pain assessment as noted, treatment options were discussed and the following options were decided upon as a follow-up plan to address the patient's pain-plan to add Duragesic 25 mcg every 72 hours daily E scribed to pharmacy  7/24/2023 patient complaining of pain not controlled with current medication.  We will increase fentanyl patch to 50 mcg every 72 hours.  Patient to continue Norco 10/3/2025 every 6 hours if needed for breakthrough pain.     PLAN:      Continue fentanyl patch to 50 mcg every 72 hours  Continue Norco 10/325 every 6 hours for breakthrough pain.  If patient needs to be transfused he will require dexamethasone and Benadryl prior to transfusion  Currently patient is on 4 mg every 6 hours of dexamethasone  Reviewed MRI of the spine which showed spinal cord compression at thoracic 10 and thoracic 9  Patient started radiation and is currently on dexamethasone with improvement in his lower extremity    Neurosurgery has no plans for surgery.  Continue palliative radiation day 2    Patient is improving in his lower extremity weakness.  He wants to go home with hospice.  Discussed with hospitalist team.  Also had a discussion with patient's son in the past and patient and both were in agreement for home with hospice rather than palliative care.  I will go ahead and give appointment with Dr. Lee in 2 weeks to 3  weeks.    Patient can be discharged home with hospice.  We will sign off.  Continue dexamethasone 4 mg every 6 hours p.o. and continue palliative radiation, can be done from home    Rula Aly MD                   Cc:  Jacob Jennings MD

## 2023-09-21 NOTE — NURSING NOTE
"Diabetes Education  Assessment/Teaching    Patient Name:  Semaj Herrera  YOB: 1940  MRN: [id number]  Admit Date:  9/17/2023      Assessment Date:  9/21/2023  Flowsheet Row Most Recent Value   General Information     Referral From: APRN/MD order. Follow up on 3P.   Height 162.6 cm (64\")   Height Method Stated   Weight 77 kg (169 lb 12.1 oz)   Weight Method Bed scale   Diabetes History    What type of diabetes do you have? Type 2   Do you test your blood sugar at home? yes   Do you have any diabetes complications? nephropathy   Education Preferences    Barriers to Learning -- pls note this assessment performed w/pt son on unit.   Assessment Topics    Taking Medication - Assessment Competent  -pt's son reports plan at this time is to continue subcutaneous insulin tx and hold pt's insulin pump for now.   Healthy Coping - Assessment Competent   Monitoring - Assessment Competent   DM Goals    Contact Plan Follow-up medical care            Flowsheet Row Most Recent Value   DM Education Needs    Meter Has own   Problem Solving Hyperglycemia -pt's son voices concern re BGs in high 200's.   Healthy Coping Appropriate   Discharge Plan -- home vs snf per CCP note.   Teaching Method Discussion   Patient Response Verbalized understanding        Other Comments:    Electronically signed by:  Lisa Sanchez, RN, BSN, [facility]  09/21/23 12:11 EDT  " Discharged

## 2023-09-21 NOTE — PLAN OF CARE
Goal Outcome Evaluation:  Plan of Care Reviewed With: patient        Progress: improving  Outcome Evaluation: Patient alert and oriented, on room air, tolerating cc diet, blood sugars monitored and treated per order, patient working with physical therapy and encouraged to be up in chair for meals, radiation completed this AM, fent patch intact on left arm not voicing pain this shift, incontinent of bowel and bladder incontinent care provided, large BM this shift, patient bradycardic but has pacemaker, vitals are stable, plan of care continued

## 2023-09-21 NOTE — THERAPY TREATMENT NOTE
Patient Name: Semaj Herrera  : 1940    MRN: [id number]                              Today's Date: 2023       Admit Date: 2023    Visit Dx:     ICD-10-CM ICD-9-CM   1. Fecal impaction  K56.41 560.32   2. Weakness of both lower extremities  R29.898 729.89   3. Prostate cancer metastatic to bone  C61 185    C79.51 198.5     Patient Active Problem List   Diagnosis    Type 2 diabetes mellitus with kidney complication, with long-term current use of insulin    Fatigue    Hyperlipidemia    Hypertension    Left ventricular hypertrophy    Obstructive sleep apnea syndrome treated auto BiPAP    Sinus bradycardia    Prostate cancer metastatic to bone    Mediastinal adenopathy    Malignant neoplasm metastatic to bone    Chronic kidney disease, stage III (moderate)    Diastolic dysfunction    Localized edema    Neuropathy    Hypersomnia due to medical condition treated with modafinil 200 mg every morning    CSA (central sleep apnea)    Restless legs syndrome (RLS)    Psychophysiological insomnia    Edema    Type 2 diabetes mellitus with diabetic chronic kidney disease    Class 2 severe obesity due to excess calories with serious comorbidity and body mass index (BMI) of 35.0 to 35.9 in adult    Aortic stenosis, mild    Ascending aorta dilatation    Fecal impaction in rectum    Lower extremity weakness    Anemia, chronic disease    Metastatic cancer to bone    Moderate malnutrition     Past Medical History:   Diagnosis Date    Aortic stenosis, mild 2021    Per echocardiogram     Ascending aorta dilatation     Asthma     Benign prostatic hyperplasia     Bone cancer     Cellulitis of great toe of left foot 10/19/2018    CKD (chronic kidney disease)     Stage 3; followed by Dr. Vicky Duran     Diastolic dysfunction     GRADE II per echo     Difficulty breathing     during exertion    Dyslipidemia     Erectile dysfunction     Fatigue     Heart murmur     History of blood transfusion     History of  kidney stones     HL (hearing loss)     Hyperlipidemia     Hypertension     Hyponatremia     Kidney stone     Left ventricular hypertrophy     per echo 2018    Localized edema     Neuropathy     Obstructive sleep apnea     USING CPAP    Osteoarthritis     Peptic ulcer     Pneumonia     Pneumonia of left upper lobe due to infectious organism 03/15/2019    Prostate cancer     Status post prostatectomy, radiation therapy, and hormone therapy followed by Dr. Lee; metastatsis to bone    Pure hyperglyceridemia     Restless leg syndrome     Sepsis     Sinus bradycardia     Transient cerebral ischemia     Type 2 diabetes mellitus      Past Surgical History:   Procedure Laterality Date    BRONCHOSCOPY N/A 04/09/2018    Procedure: BRONCHOSCOPY;  Surgeon: Bijan Rivera III, MD;  Location: [facility];  Service: Cardiothoracic    COLONOSCOPY      DEEP NECK LYMPH NODE BIOPSY / EXCISION      HEMORRHOIDECTOMY      LYMPH NODE BIOPSY      MEDIASTINOSCOPY N/A 04/09/2018    Procedure: MEDIASTINOSCOPY WITH LYMPH NODE BIOPSY;  Surgeon: Bijan Rivera III, MD;  Location: [facility];  Service: Cardiothoracic    OTHER SURGICAL HISTORY      ulcer repair    PROSTATECTOMY  2006      General Information       Row Name 09/21/23 1607          Physical Therapy Time and Intention    Document Type therapy note (daily note)  -EB     Mode of Treatment individual therapy;physical therapy  -EB       Row Name 09/21/23 1607          General Information    Patient Profile Reviewed yes  -EB     Existing Precautions/Restrictions fall  -EB       Row Name 09/21/23 1607          Cognition    Orientation Status (Cognition) oriented x 3  -EB       Row Name 09/21/23 1607          Safety Issues, Functional Mobility    Impairments Affecting Function (Mobility) balance;endurance/activity tolerance;strength;postural/trunk control  -EB               User Key  (r) = Recorded By, (t) = Taken By, (c) = Cosigned By      Initials Name Provider Type    EB  Tanisha Allen PTA Physical Therapist Assistant                   Mobility       Row Name 09/21/23 1607          Bed Mobility    Supine-Sit [place] (Bed Mobility) minimum assist (75% patient effort);verbal cues;nonverbal cues (demo/gesture)  -EB     Sit-Supine [place] (Bed Mobility) not tested  -EB     Assistive Device (Bed Mobility) bed rails;head of bed elevated  -EB     Comment, (Bed Mobility) sequencing cues and increased time.  -EB       Row Name 09/21/23 1607          Sit-Stand Transfer    Sit-Stand [place] (Transfers) minimum assist (75% patient effort);2 person assist  -EB     Assistive Device (Sit-Stand Transfers) walker, front-wheeled  cues for hand placement when standing from walker.  -EB       Row Name 09/21/23 1607          Gait/Stairs (Locomotion)    [place] Level (Gait) moderate assist (50% patient effort);2 person assist;maximum assist (25% patient effort)  -EB     Assistive Device (Gait) walker, front-wheeled  -EB     Distance in Feet (Gait) 4ft  -EB     Deviations/Abnormal Patterns (Gait) gait speed decreased;weight shifting decreased  -EB     Left Sided Gait Deviations knee buckling, left side;decreased knee extension  -EB     Right Sided Gait Deviations decreased knee extension  -EB     Comment, (Gait/Stairs) pt ambulated forward to the chair. Pt took too big of a step with right foot and thru pt off balance. Pt's left knee buckled several times and needed a chair to be pulled behind pt. Pt very unsteady and several LOB needing modA/Max assist to recover.  -EB               User Key  (r) = Recorded By, (t) = Taken By, (c) = Cosigned By      Initials Name Provider Type    EB Tanisha Allen PTA Physical Therapist Assistant                   Obj/Interventions       Row Name 09/21/23 1611          Motor Skills    Therapeutic Exercise --  BLE: LAQs and seated marches (X10)  -EB               User Key  (r) = Recorded By, (t) = Taken By, (c) = Cosigned By      Initials Name Provider  Type    Tanisha Parsons PTA Physical Therapist Assistant                   Goals/Plan    No documentation.                  Clinical Impression       Row Name 09/21/23 1611          Plan of Care Review    Plan of Care Reviewed With patient  -EB     Progress improving  -EB     Outcome Evaluation Pt seen for PT treatment today. Pt continues to have weakness of the bilateral LEs. Pt is Efren with supine to sit needing sequencing cues and increased time to complete. Pt's STS transfers improved needing MinAX2. Pt ambulated about 4ft with rwx, but very unsteady. Needed modAX2/Max assist. Pt had left knee buckle several times and had a posterior lean, needed chair to be pulled up behind pt. At this time, would recommend SNF as pt still requires assist of 2 and LEs are very weak. Will continue to progress pt as able.  -EB       Row Name 09/21/23 1611          Therapy Assessment/Plan (PT)    Therapy Frequency (PT) 6 times/wk  -EB       Row Name 09/21/23 1611          Positioning and Restraints    Pre-Treatment Position in bed  -EB     Post Treatment Position chair  -EB     In Chair reclined;call light within reach;encouraged to call for assist;exit alarm on  -EB               User Key  (r) = Recorded By, (t) = Taken By, (c) = Cosigned By      Initials Name Provider Type    Tanisha Parsons PTA Physical Therapist Assistant                   Outcome Measures       Row Name 09/21/23 1617 09/21/23 0855       How much help from another person do you currently need...    Turning from your back to your side while in flat bed without using bedrails? 3  -EB 3  -EH    Moving from lying on back to sitting on the side of a flat bed without bedrails? 3  -EB 3  -EH    Moving to and from a bed to a chair (including a wheelchair)? 2  -EB 3  -EH    Standing up from a chair using your arms (e.g., wheelchair, bedside chair)? 2  -EB 2  -EH    Climbing 3-5 steps with a railing? 1  -EB 1  -EH    To walk in hospital room? 1  -EB 1  -EH    AM-PAC  6 Clicks Score (PT) 12  -EB 13  -    Highest level of mobility 4 --> Transferred to chair/commode  - 4 --> Transferred to chair/commode  -              User Key  (r) = Recorded By, (t) = Taken By, (c) = Cosigned By      Initials Name Provider Type    EB Tanisha Allen PTA Physical Therapist Assistant     Ольга Mcgowan RN Registered Nurse                                 Physical Therapy Education       Title: PT OT SLP Therapies (Done)       Topic: Physical Therapy (Done)       Point: Mobility training (Done)       Learning Progress Summary             Patient Acceptance, E, VU,NR by EB at 9/21/2023 1617    Acceptance, E,D, VU,NR by EB at 9/20/2023 1607    Acceptance, E, VU by AP at 9/18/2023 1853    Acceptance, E,TB,D, VU,NR by  at 9/18/2023 1327                         Point: Home exercise program (Done)       Learning Progress Summary             Patient Acceptance, E, VU,NR by EB at 9/21/2023 1617    Acceptance, E,D, VU,NR by EB at 9/20/2023 1607    Acceptance, E, VU by AP at 9/18/2023 1853                         Point: Body mechanics (Done)       Learning Progress Summary             Patient Acceptance, E, VU,NR by EB at 9/21/2023 1617    Acceptance, E,D, VU,NR by EB at 9/20/2023 1607    Acceptance, E, VU by AP at 9/18/2023 1853                         Point: Precautions (Done)       Learning Progress Summary             Patient Acceptance, E, VU,NR by EB at 9/21/2023 1617    Acceptance, E,D, VU,NR by EB at 9/20/2023 1607    Acceptance, E, VU by AP at 9/18/2023 1853                                         User Key       Initials Effective Dates Name Provider Type Discipline    EJ 06/16/21 -  Tracy Garnica, PT Physical Therapist PT    EB 02/14/23 -  Tanisha Allen PTA Physical Therapist Assistant PT    AP 01/17/23 -  Nora Cerrato RN Registered Nurse Nurse                  PT Recommendation and Plan     Plan of Care Reviewed With: patient  Progress: improving  Outcome Evaluation: Pt  seen for PT treatment today. Pt continues to have weakness of the bilateral LEs. Pt is Efren with supine to sit needing sequencing cues and increased time to complete. Pt's STS transfers improved needing MinAX2. Pt ambulated about 4ft with rwx, but very unsteady. Needed modAX2/Max assist. Pt had left knee buckle several times and had a posterior lean, needed chair to be pulled up behind pt. At this time, would recommend SNF as pt still requires assist of 2 and LEs are very weak. Will continue to progress pt as able.     Time Calculation:         PT Charges       Row Name 09/21/23 1342             Time Calculation    Start Time 1111  -EB      Stop Time 1135  -EB      Time Calculation (min) 24 min  -EB      PT Received On 09/21/23  -EB      PT - Next Appointment 09/22/23  -EB         Time Calculation- PT    Total Timed Code Minutes- PT 24 minute(s)  -EB                User Key  (r) = Recorded By, (t) = Taken By, (c) = Cosigned By      Initials Name Provider Type    EB Tanisha Allen PTA Physical Therapist Assistant                  Therapy Charges for Today       Code Description Service Date Service Provider Modifiers Qty    78531418445 HC PT THERAPEUTIC ACT EA 15 MIN 9/20/2023 Tanisha Allen, PTA GP 1    86326756294 HC PT THER PROC EA 15 MIN 9/20/2023 Tanisha Allen, PTA GP 1    99247940198 HC PT THER SUPP EA 15 MIN 9/20/2023 Tanisha Allen, MADELINE GP 1    92010442974 HC PT THERAPEUTIC ACT EA 15 MIN 9/21/2023 Tanisha Allen PTA GP 1    91476029571 HC PT THER PROC EA 15 MIN 9/21/2023 Tanisha Allen PTA GP 1    71590150574 HC PT THER SUPP EA 15 MIN 9/21/2023 Tanisha Allen, MADELINE GP 1            PT G-Codes  Outcome Measure Options: AM-PAC 6 Clicks Basic Mobility (PT)  AM-PAC 6 Clicks Score (PT): 12  AM-PAC 6 Clicks Score (OT): 15       Tanisha Allen PTA  9/21/2023

## 2023-09-21 NOTE — PROGRESS NOTES
Name: Semaj Herrera ADMIT: 2023   : 1940  PCP: Axel Guardado MD    MRN: [id number] LOS: 3 days   AGE/SEX: 82 y.o. male  ROOM: [facility]/     Subjective   Subjective   Resting in bed.  No family at bedside.  He denies any current pain, dyspnea, cough, fever or chills.  He states he cut back a little and eating yesterday as he did not want to have any bowel movements.  He states that he is eating and drinking well.  He denies any nausea, vomiting or abdominal pain.  Does not have all of the components of his insulin pump that he needs to start this back in the hospital.     Objective   Objective   Vital Signs  Temp:  [97.5 °F (36.4 °C)-98.4 °F (36.9 °C)] 97.5 °F (36.4 °C)  Heart Rate:  [45-50] 46  Resp:  [14-16] 16  BP: (151-176)/(58-63) 151/59  SpO2:  [96 %-99 %] 97 %  on   ;   Device (Oxygen Therapy): room air  Body mass index is 29.12 kg/m².    Physical Exam  Vitals and nursing note reviewed.   Constitutional:       Appearance: He is ill-appearing. He is not toxic-appearing.   Cardiovascular:      Rate and Rhythm: Normal rate and regular rhythm.      Pulses: Normal pulses.   Pulmonary:      Effort: Pulmonary effort is normal. No respiratory distress.      Breath sounds: Normal breath sounds.      Comments: Diminished and on room air.  Abdominal:      General: Bowel sounds are normal. There is no distension.      Palpations: Abdomen is soft.      Tenderness: There is no abdominal tenderness.   Musculoskeletal:         General: Swelling (Mild bilateral lower extremities) present. No tenderness.   Skin:     General: Skin is warm and dry.      Findings: No bruising.   Neurological:      Mental Status: He is alert and oriented to person, place, and time.      Sensory: Sensory deficit present.      Motor: Weakness present.      Coordination: Coordination normal.   Psychiatric:         Mood and Affect: Mood normal.         Behavior: Behavior normal.     Results Review:       I reviewed the patient's new  clinical results.  Results from last 7 days   Lab Units 09/21/23  0712 09/20/23  0634 09/19/23  0631 09/18/23  0412   WBC 10*3/mm3 8.20 8.13 7.77 6.46   HEMOGLOBIN g/dL 11.4* 10.8* 7.1* 7.7*   PLATELETS 10*3/mm3 105* 96* 102* 87*     Results from last 7 days   Lab Units 09/21/23  0712 09/20/23  0634 09/19/23  0631 09/18/23  1441 09/18/23  0412   SODIUM mmol/L 133* 137 135*  --  136   POTASSIUM mmol/L 5.4* 5.1 5.2 4.9 5.4*   CHLORIDE mmol/L 105 105 105  --  105   CO2 mmol/L 17.0* 21.1* 19.0*  --  21.0*   BUN mg/dL 29* 25* 20  --  10   CREATININE mg/dL 0.78 0.75* 0.83  --  0.73*   GLUCOSE mg/dL 203* 197* 212*  --  176*   Estimated Creatinine Clearance: 68.5 mL/min (by C-G formula based on SCr of 0.78 mg/dL).  Results from last 7 days   Lab Units 09/21/23  0712 09/20/23  0634 09/19/23  0631 09/17/23  1207   ALBUMIN g/dL 3.5 3.7 3.9 3.9   BILIRUBIN mg/dL 0.4 0.4 0.3 0.4   ALK PHOS U/L 308* 300* 351* 385*   AST (SGOT) U/L 14 13 12 16   ALT (SGPT) U/L 9 8 7 10     Results from last 7 days   Lab Units 09/21/23  0712 09/20/23  0634 09/19/23  0631 09/18/23  0412 09/17/23  1207   CALCIUM mg/dL 7.9* 8.1* 8.3* 8.0* 8.1*   ALBUMIN g/dL 3.5 3.7 3.9  --  3.9   MAGNESIUM mg/dL  --   --   --   --  2.8*       Glucose   Date/Time Value Ref Range Status   09/21/2023 1136 243 (H) 70 - 130 mg/dL Final   09/21/2023 0902 196 (H) 70 - 130 mg/dL Final   09/20/2023 2224 223 (H) 70 - 130 mg/dL Final   09/20/2023 1632 208 (H) 70 - 130 mg/dL Final   09/20/2023 1138 180 (H) 70 - 130 mg/dL Final   09/20/2023 0904 205 (H) 70 - 130 mg/dL Final   09/19/2023 2104 359 (H) 70 - 130 mg/dL Final       aspirin, 81 mg, Oral, Daily  fentaNYL, 1 patch, Transdermal, Q72H   And  Check Fentanyl Patch Placement, 1 each, Does not apply, Q12H  cholecalciferol, 2,000 Units, Oral, Daily  dexAMETHasone, 4 mg, Intravenous, Q6H  dilTIAZem CD, 120 mg, Oral, Daily  gabapentin, 900 mg, Oral, Nightly  insulin glargine, 20 Units, Subcutaneous, Nightly  insulin lispro, 2-9  Units, Subcutaneous, 4x Daily AC & at Bedtime  insulin lispro, 6 Units, Subcutaneous, TID With Meals  [START ON 9/24/2023] levothyroxine, 176 mcg, Oral, Once per day on Sun Wed  levothyroxine, 88 mcg, Oral, Once per day on Mon Tue Thu Sat  modafinil, 200 mg, Oral, Daily  pantoprazole, 40 mg, Oral, BID AC  senna-docusate sodium, 2 tablet, Oral, BID   And  polyethylene glycol, 17 g, Oral, Daily  pravastatin, 40 mg, Oral, Daily  rOPINIRole, 1 mg, Oral, Nightly       Diet: Diabetic Diets; Consistent Carbohydrate; Texture: Regular Texture (IDDSI 7); Fluid Consistency: Thin (IDDSI 0)       Assessment/Plan     Active Hospital Problems    Diagnosis  POA    **Fecal impaction in rectum [K56.41]  Yes    Moderate malnutrition [E44.0]  Yes    Lower extremity weakness [R29.898]  Yes    Anemia, chronic disease [D63.8]  Yes    Metastatic cancer to bone [C79.51]  Yes    Restless legs syndrome (RLS) [G25.81]  Yes    Diastolic dysfunction [I51.89]  Yes    Neuropathy [G62.9]  Yes    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     Mr. Herrera is a 82 y.o. that presented to the hospital with a 2 to 3-day history of increasing bilateral leg weakness as well as constipation in the setting of known metastatic prostate cancer to the bone and lymph nodes.     Fecal impaction in the rectum  -CT A/P on admission with large amount of dense fecal material within the low sigmoid and rectum with rectal wall thickening.  Continued note of widespread osseous metastasis that was more pronounced compared to prior.  -Improving with bowel regimen.  -GI consulted. Appreciate their recommendations.  Recommend daily Miralax and potential OIC medication if non-responsive to first line drugs.     Lower extremity weakness  Neuropathy  -MRI of the lumbar spine with metastatic disease. MRI cervical spine with metastatic disease but no significant compression. 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   -Some improvement with IV steroids at present.   -Radiation oncology and Neurology consulted by oncology.   -Given significant cord compression at T10- neurosurgery evaluated.  [place] that due to the extent of his metastatic disease a thoracic decompression would likely destabilize the patient and therefore radiation therapy was recommended.   -PT/OT following-plans for home health versus SNF.  -Continue home gabapentin.  -Radiation oncology planning radiation treatments-unclear how many treatments are planned at this point-nursing trying to figure this out.  -Neurology briefly evaluated but deferred to neurosurgery.     Prostate cancer metastatic to bone  -Oncology following. Appreciate their assistance.  -Palliative consulted for further goals of care-plans to continue radiation and current treatment.  -Continue pain medications.  -Hospice outpatient has been recommended by oncology.      DM2  -Typically on insulin pump that is not currently being used-DM educator met with patient and son and they do not have all the supplies he would need to resume this while inpatient.  -Apears overall stable with correctional sliding scale insulin.  -We will increase Lantus nightly to 20 units and increase meal time humalog with 6 units TID.  -Titrate pending glucose trends and oral intake.     RONNY  -Home CPAP if available.  -Avoid oversedation.     Diastolic dysfunction  Hypertension  -Established with Dr. Lee for mild aortic stenosis.  -Continue outpatient follow-up.  -Continue home regimen.     CKD 3  -Renal function/electrolytes overall  stable. Continue to monitor-bicarb is a little lower today and potassium mildly elevated.  Have ordered a repeat BMP is pending.  -He is established with Dr. Vicky Duran.     Anemia  Thrombocytopenia  -Hemoglobin 11.4 today. Platelets 105  -S/P 2 units of PRBC on 9/19 for hemoglobin 7.1      Discussed with patient, nursing staff and .     Possible discharge tomorrow pending AM labs and placement. Possibly home with HH versus SNF. Will discuss hospice consult with son (they declined services on 9/19). Will need Decadron 4 mg every 6 hours oral per oncology while on radiation treatments.     VTE Prophylaxis - SCDs  Code Status - NO CPR/intubation. Confirmed with patient at bedside.  Disposition - Anticipate discharge as above.       SYD Oswald  [facility]  09/21/23  14:35 EDT    Addendum: Discussed with patient's son via phone. He is not wanting to set up Hospice now. He does have their card to call. He desires to take his dad home. Asking about a lift. Will plan tentative discharge tomorrow pending we can clarify how many radiation treatments are planned and all is stable.

## 2023-09-21 NOTE — CASE MANAGEMENT/SOCIAL WORK
Continued Stay Note  [facility]     Patient Name: Semaj Herrera  MRN: [id number]  Today's Date: 9/21/2023    Admit Date: 9/17/2023    Plan: Home with HH vs SNF.   Discharge Plan       Row Name 09/21/23 1443       Plan    Plan Home with HH vs SNF.    Roadmap to Recovery Yes    Patient/Family in Agreement with Plan yes    Plan Comments Spoke with patient and son at bedside. Introduced self. Explained [facility] does not have bed availability. Explained second choice [facility] has semi private bed available. Patient and son agreeable to [facility] if SNF recommended. Patient requested referral to [facility] if home is recommended. Spoke with [facility]/ [facility].                   Discharge Codes    No documentation.                 Expected Discharge Date and Time       Expected Discharge Date Expected Discharge Time    Sep 22, 2023               Tata Atkinson RN

## 2023-09-22 ENCOUNTER — TRANSCRIBE ORDERS (OUTPATIENT)
Dept: HOME HEALTH SERVICES | Facility: HOME HEALTHCARE | Age: 83
End: 2023-09-22
Payer: MEDICARE

## 2023-09-22 ENCOUNTER — HOME HEALTH ADMISSION (OUTPATIENT)
Dept: HOME HEALTH SERVICES | Facility: HOME HEALTHCARE | Age: 83
End: 2023-09-22
Payer: MEDICARE

## 2023-09-22 ENCOUNTER — DOCUMENTATION (OUTPATIENT)
Dept: ONCOLOGY | Facility: CLINIC | Age: 83
End: 2023-09-22
Payer: MEDICARE

## 2023-09-22 ENCOUNTER — DOCUMENTATION (OUTPATIENT)
Dept: HOME HEALTH SERVICES | Facility: HOME HEALTHCARE | Age: 83
End: 2023-09-22
Payer: MEDICARE

## 2023-09-22 DIAGNOSIS — C79.51 PROSTATE CANCER METASTATIC TO BONE: Primary | ICD-10-CM

## 2023-09-22 DIAGNOSIS — C61 PROSTATE CANCER METASTATIC TO BONE: Primary | ICD-10-CM

## 2023-09-22 DIAGNOSIS — G95.20 SPINAL CORD COMPRESSION: ICD-10-CM

## 2023-09-22 LAB
ANION GAP SERPL CALCULATED.3IONS-SCNC: 10 MMOL/L (ref 5–15)
BUN SERPL-MCNC: 35 MG/DL (ref 8–23)
BUN/CREAT SERPL: 47.9 (ref 7–25)
CALCIUM SPEC-SCNC: 7.8 MG/DL (ref 8.6–10.5)
CHLORIDE SERPL-SCNC: 105 MMOL/L (ref 98–107)
CO2 SERPL-SCNC: 22 MMOL/L (ref 22–29)
CREAT SERPL-MCNC: 0.73 MG/DL (ref 0.76–1.27)
EGFRCR SERPLBLD CKD-EPI 2021: 90.8 ML/MIN/1.73
GLUCOSE BLDC GLUCOMTR-MCNC: 137 MG/DL (ref 70–130)
GLUCOSE BLDC GLUCOMTR-MCNC: 152 MG/DL (ref 70–130)
GLUCOSE BLDC GLUCOMTR-MCNC: 170 MG/DL (ref 70–130)
GLUCOSE BLDC GLUCOMTR-MCNC: 190 MG/DL (ref 70–130)
GLUCOSE SERPL-MCNC: 170 MG/DL (ref 65–99)
METHYLMALONATE SERPL-SCNC: 173 NMOL/L (ref 0–378)
POTASSIUM SERPL-SCNC: 5.2 MMOL/L (ref 3.5–5.2)
POTASSIUM SERPL-SCNC: 5.9 MMOL/L (ref 3.5–5.2)
RAD ONC ARIA COURSE ID: NORMAL
RAD ONC ARIA COURSE INTENT: NORMAL
RAD ONC ARIA COURSE LAST TREATMENT DATE: NORMAL
RAD ONC ARIA COURSE START DATE: NORMAL
RAD ONC ARIA COURSE TREATMENT ELAPSED DAYS: 2
RAD ONC ARIA FIRST TREATMENT DATE: NORMAL
RAD ONC ARIA PLAN FRACTIONS TREATED TO DATE: 3
RAD ONC ARIA PLAN ID: NORMAL
RAD ONC ARIA PLAN PRESCRIBED DOSE PER FRACTION: 4 GY
RAD ONC ARIA PLAN PRIMARY REFERENCE POINT: NORMAL
RAD ONC ARIA PLAN TOTAL FRACTIONS PRESCRIBED: 5
RAD ONC ARIA PLAN TOTAL PRESCRIBED DOSE: 2000 CGY
RAD ONC ARIA REFERENCE POINT DOSAGE GIVEN TO DATE: 12 GY
RAD ONC ARIA REFERENCE POINT ID: NORMAL
RAD ONC ARIA REFERENCE POINT SESSION DOSAGE GIVEN: 4 GY
SODIUM SERPL-SCNC: 137 MMOL/L (ref 136–145)

## 2023-09-22 PROCEDURE — 80048 BASIC METABOLIC PNL TOTAL CA: CPT | Performed by: NURSE PRACTITIONER

## 2023-09-22 PROCEDURE — 77336 RADIATION PHYSICS CONSULT: CPT | Performed by: STUDENT IN AN ORGANIZED HEALTH CARE EDUCATION/TRAINING PROGRAM

## 2023-09-22 PROCEDURE — G6002 STEREOSCOPIC X-RAY GUIDANCE: HCPCS | Performed by: STUDENT IN AN ORGANIZED HEALTH CARE EDUCATION/TRAINING PROGRAM

## 2023-09-22 PROCEDURE — 84132 ASSAY OF SERUM POTASSIUM: CPT | Performed by: NURSE PRACTITIONER

## 2023-09-22 PROCEDURE — 97110 THERAPEUTIC EXERCISES: CPT

## 2023-09-22 PROCEDURE — 82948 REAGENT STRIP/BLOOD GLUCOSE: CPT

## 2023-09-22 PROCEDURE — 97530 THERAPEUTIC ACTIVITIES: CPT

## 2023-09-22 PROCEDURE — 63710000001 INSULIN LISPRO (HUMAN) PER 5 UNITS: Performed by: NURSE PRACTITIONER

## 2023-09-22 PROCEDURE — 63710000001 DEXAMETHASONE PER 0.25 MG: Performed by: INTERNAL MEDICINE

## 2023-09-22 PROCEDURE — 63710000001 INSULIN GLARGINE PER 5 UNITS: Performed by: NURSE PRACTITIONER

## 2023-09-22 PROCEDURE — 63710000001 INSULIN LISPRO (HUMAN) PER 5 UNITS: Performed by: INTERNAL MEDICINE

## 2023-09-22 PROCEDURE — 97535 SELF CARE MNGMENT TRAINING: CPT

## 2023-09-22 PROCEDURE — 77412 RADIATION TX DELIVERY LVL 3: CPT | Performed by: STUDENT IN AN ORGANIZED HEALTH CARE EDUCATION/TRAINING PROGRAM

## 2023-09-22 PROCEDURE — 77387 GUIDANCE FOR RADJ TX DLVR: CPT | Performed by: STUDENT IN AN ORGANIZED HEALTH CARE EDUCATION/TRAINING PROGRAM

## 2023-09-22 RX ORDER — DEXAMETHASONE 4 MG/1
4 TABLET ORAL EVERY 6 HOURS
Qty: 84 TABLET | Refills: 0 | Status: SHIPPED | OUTPATIENT
Start: 2023-09-22 | End: 2023-10-13

## 2023-09-22 RX ORDER — AMOXICILLIN 250 MG
2 CAPSULE ORAL 2 TIMES DAILY
Start: 2023-09-22

## 2023-09-22 RX ORDER — POLYETHYLENE GLYCOL 3350 17 G/17G
17 POWDER, FOR SOLUTION ORAL DAILY
Start: 2023-09-23

## 2023-09-22 RX ORDER — PANTOPRAZOLE SODIUM 40 MG/1
40 TABLET, DELAYED RELEASE ORAL
Qty: 60 TABLET | Refills: 0 | Status: SHIPPED | OUTPATIENT
Start: 2023-09-22

## 2023-09-22 RX ADMIN — PRAVASTATIN SODIUM 40 MG: 40 TABLET ORAL at 08:36

## 2023-09-22 RX ADMIN — INSULIN LISPRO 6 UNITS: 100 INJECTION, SOLUTION INTRAVENOUS; SUBCUTANEOUS at 17:31

## 2023-09-22 RX ADMIN — FENTANYL 1 PATCH: 50 PATCH TRANSDERMAL at 17:30

## 2023-09-22 RX ADMIN — DILTIAZEM HYDROCHLORIDE 120 MG: 120 CAPSULE, COATED, EXTENDED RELEASE ORAL at 08:36

## 2023-09-22 RX ADMIN — INSULIN LISPRO 2 UNITS: 100 INJECTION, SOLUTION INTRAVENOUS; SUBCUTANEOUS at 12:28

## 2023-09-22 RX ADMIN — PANTOPRAZOLE SODIUM 40 MG: 40 TABLET, DELAYED RELEASE ORAL at 08:33

## 2023-09-22 RX ADMIN — MODAFINIL 200 MG: 100 TABLET ORAL at 08:33

## 2023-09-22 RX ADMIN — INSULIN GLARGINE 20 UNITS: 100 INJECTION, SOLUTION SUBCUTANEOUS at 21:51

## 2023-09-22 RX ADMIN — INSULIN LISPRO 6 UNITS: 100 INJECTION, SOLUTION INTRAVENOUS; SUBCUTANEOUS at 08:35

## 2023-09-22 RX ADMIN — INSULIN LISPRO 2 UNITS: 100 INJECTION, SOLUTION INTRAVENOUS; SUBCUTANEOUS at 21:51

## 2023-09-22 RX ADMIN — ROPINIROLE 1 MG: 1 TABLET, FILM COATED ORAL at 21:51

## 2023-09-22 RX ADMIN — DEXAMETHASONE 4 MG: 4 TABLET ORAL at 04:27

## 2023-09-22 RX ADMIN — Medication 2000 UNITS: at 08:33

## 2023-09-22 RX ADMIN — GABAPENTIN 900 MG: 300 CAPSULE ORAL at 21:50

## 2023-09-22 RX ADMIN — SENNOSIDES AND DOCUSATE SODIUM 2 TABLET: 50; 8.6 TABLET ORAL at 08:33

## 2023-09-22 RX ADMIN — INSULIN LISPRO 6 UNITS: 100 INJECTION, SOLUTION INTRAVENOUS; SUBCUTANEOUS at 12:28

## 2023-09-22 RX ADMIN — DEXAMETHASONE 4 MG: 4 TABLET ORAL at 12:28

## 2023-09-22 RX ADMIN — DEXAMETHASONE 4 MG: 4 TABLET ORAL at 17:29

## 2023-09-22 RX ADMIN — ASPIRIN 81 MG: 81 TABLET, COATED ORAL at 08:32

## 2023-09-22 RX ADMIN — HYDROCODONE BITARTRATE AND ACETAMINOPHEN 1 TABLET: 10; 325 TABLET ORAL at 04:30

## 2023-09-22 RX ADMIN — DEXAMETHASONE 4 MG: 4 TABLET ORAL at 21:50

## 2023-09-22 RX ADMIN — INSULIN LISPRO 2 UNITS: 100 INJECTION, SOLUTION INTRAVENOUS; SUBCUTANEOUS at 08:33

## 2023-09-22 RX ADMIN — PANTOPRAZOLE SODIUM 40 MG: 40 TABLET, DELAYED RELEASE ORAL at 17:29

## 2023-09-22 NOTE — THERAPY TREATMENT NOTE
Patient Name: Semaj Herrera  : 1940    MRN: [id number]                              Today's Date: 2023       Admit Date: 2023    Visit Dx:     ICD-10-CM ICD-9-CM   1. Fecal impaction  K56.41 560.32   2. Weakness of both lower extremities  R29.898 729.89   3. Prostate cancer metastatic to bone  C61 185    C79.51 198.5     Patient Active Problem List   Diagnosis    Type 2 diabetes mellitus with kidney complication, with long-term current use of insulin    Fatigue    Hyperlipidemia    Hypertension    Left ventricular hypertrophy    Obstructive sleep apnea syndrome treated auto BiPAP    Sinus bradycardia    Prostate cancer metastatic to bone    Mediastinal adenopathy    Malignant neoplasm metastatic to bone    Chronic kidney disease, stage III (moderate)    Diastolic dysfunction    Localized edema    Neuropathy    Hypersomnia due to medical condition treated with modafinil 200 mg every morning    CSA (central sleep apnea)    Restless legs syndrome (RLS)    Psychophysiological insomnia    Edema    Type 2 diabetes mellitus with diabetic chronic kidney disease    Class 2 severe obesity due to excess calories with serious comorbidity and body mass index (BMI) of 35.0 to 35.9 in adult    Aortic stenosis, mild    Ascending aorta dilatation    Fecal impaction in rectum    Lower extremity weakness    Anemia, chronic disease    Metastatic cancer to bone    Moderate malnutrition     Past Medical History:   Diagnosis Date    Aortic stenosis, mild 2021    Per echocardiogram     Ascending aorta dilatation     Asthma     Benign prostatic hyperplasia     Bone cancer     Cellulitis of great toe of left foot 10/19/2018    CKD (chronic kidney disease)     Stage 3; followed by Dr. Vicky Duran     Diastolic dysfunction     GRADE II per echo     Difficulty breathing     during exertion    Dyslipidemia     Erectile dysfunction     Fatigue     Heart murmur     History of blood transfusion     History of  kidney stones     HL (hearing loss)     Hyperlipidemia     Hypertension     Hyponatremia     Kidney stone     Left ventricular hypertrophy     per echo 2018    Localized edema     Neuropathy     Obstructive sleep apnea     USING CPAP    Osteoarthritis     Peptic ulcer     Pneumonia     Pneumonia of left upper lobe due to infectious organism 03/15/2019    Prostate cancer     Status post prostatectomy, radiation therapy, and hormone therapy followed by Dr. Lee; metastatsis to bone    Pure hyperglyceridemia     Restless leg syndrome     Sepsis     Sinus bradycardia     Transient cerebral ischemia     Type 2 diabetes mellitus      Past Surgical History:   Procedure Laterality Date    BRONCHOSCOPY N/A 04/09/2018    Procedure: BRONCHOSCOPY;  Surgeon: Bijan Rivera III, MD;  Location: [facility];  Service: Cardiothoracic    COLONOSCOPY      DEEP NECK LYMPH NODE BIOPSY / EXCISION      HEMORRHOIDECTOMY      LYMPH NODE BIOPSY      MEDIASTINOSCOPY N/A 04/09/2018    Procedure: MEDIASTINOSCOPY WITH LYMPH NODE BIOPSY;  Surgeon: Bijan Rivera III, MD;  Location: [facility];  Service: Cardiothoracic    OTHER SURGICAL HISTORY      ulcer repair    PROSTATECTOMY  2006      General Information       Row Name 09/22/23 1149          Physical Therapy Time and Intention    Document Type therapy note (daily note)  -EB     Mode of Treatment physical therapy;co-treatment  -EB       Row Name 09/22/23 1149          General Information    Patient Profile Reviewed yes  -EB     Existing Precautions/Restrictions fall  -EB       Row Name 09/22/23 1149          Cognition    Orientation Status (Cognition) oriented x 3  -EB       Row Name 09/22/23 1149          Safety Issues, Functional Mobility    Safety Issues Affecting Function (Mobility) insight into deficits/self-awareness  -EB     Impairments Affecting Function (Mobility) balance;endurance/activity tolerance;strength;postural/trunk control  -EB               User Key  (r) =  Recorded By, (t) = Taken By, (c) = Cosigned By      Initials Name Provider Type    Tanisha Parsons PTA Physical Therapist Assistant                   Mobility       Row Name 09/22/23 1150          Bed Mobility    Supine-Sit [place] (Bed Mobility) minimum assist (75% patient effort);verbal cues  -EB     Sit-Supine [place] (Bed Mobility) minimum assist (75% patient effort);verbal cues  -EB     Assistive Device (Bed Mobility) bed rails;head of bed elevated  -EB       Row Name 09/22/23 1150          Bed-Chair Transfer    Bed-Chair [place] (Transfers) minimum assist (75% patient effort);2 person assist;verbal cues;nonverbal cues (demo/gesture)  -EB     Comment, (Bed-Chair Transfer) transfers to and from [facility], educated pt's son and pt with transfers and safety reguarding hand and walker placement and how to use gait belt. Pt's son assisted with transfer from [facility] to bed. knees do buckle at times, pt able to push through walker with UEs.  -EB       Row Name 09/22/23 1150          Sit-Stand Transfer    Sit-Stand [place] (Transfers) minimum assist (75% patient effort);2 person assist;verbal cues  -EB     Assistive Device (Sit-Stand Transfers) walker, front-wheeled  -EB               User Key  (r) = Recorded By, (t) = Taken By, (c) = Cosigned By      Initials Name Provider Type    Tanisha Parsons PTA Physical Therapist Assistant                   Obj/Interventions       Row Name 09/22/23 1152          Motor Skills    Therapeutic Exercise --  BLE: LAQs and seated marches (X10)  -EB       Row Name 09/22/23 1152          Balance    Balance Assessment sitting static balance;standing static balance  -EB     Static Sitting Balance contact guard;minimal assist  -EB     Position, Sitting Balance sitting edge of bed  -EB     Static Standing Balance contact guard;minimal assist;2-person assist  -EB     Position/Device Used, Standing Balance supported;walker, rolling  -EB               User Key  (r) = Recorded By,  (t) = Taken By, (c) = Cosigned By      Initials Name Provider Type    Tanisha Parsons PTA Physical Therapist Assistant                   Goals/Plan    No documentation.                  Clinical Impression       Row Name 09/22/23 1153          Plan of Care Review    Plan of Care Reviewed With patient;son  -EB     Progress improving  -EB     Outcome Evaluation Pt seen for PT/OT co-treat today. D/c plans are home with hospice. Provided pt and pt's son education and demo with transfers. Pt completed bed mobility with Efren and requried MinAX2 with STS transfers and stand/pivot transfer from bsc<->bed. Pt's left knee continues to buckle if taking forward steps. Pt required safety education with walker placement and hand placement with transfers. Son assisted with transfer from bs back to bed. Both verbalized understanding that transfers will need to be an assist of 2 without the lift. Pt will need bsc for home and would benefit from hospital bed, however son states that pt usually sleeps in recliner/couch. PT will sign off as pt to d/c home with hospice.  -EB       Row Name 09/22/23 1153          Therapy Assessment/Plan (PT)    Therapy Frequency (PT) 6 times/wk  -EB       Row Name 09/22/23 1153          Positioning and Restraints    Pre-Treatment Position in bed  -EB     Post Treatment Position bed  -EB     In Bed supine;call light within reach;encouraged to call for assist;exit alarm on  -EB               User Key  (r) = Recorded By, (t) = Taken By, (c) = Cosigned By      Initials Name Provider Type    Tanisha Parsons PTA Physical Therapist Assistant                   Outcome Measures       Row Name 09/22/23 1158          How much help from another person do you currently need...    Turning from your back to your side while in flat bed without using bedrails? 3  -EB     Moving from lying on back to sitting on the side of a flat bed without bedrails? 3  -EB     Moving to and from a bed to a chair (including a  wheelchair)? 2  -EB     Standing up from a chair using your arms (e.g., wheelchair, bedside chair)? 2  -EB     Climbing 3-5 steps with a railing? 1  -EB     To walk in hospital room? 1  -EB     AM-PAC 6 Clicks Score (PT) 12  -EB     Highest level of mobility 4 --> Transferred to chair/commode  -EB       Row Name 09/22/23 1057          Functional Assessment    Outcome Measure Options AM-PAC 6 Clicks Daily Activity (OT)  -               User Key  (r) = Recorded By, (t) = Taken By, (c) = Cosigned By      Initials Name Provider Type    Tanisha Parsons, MADELINE Physical Therapist Assistant    Giovana Peterson, OT Occupational Therapist                                 Physical Therapy Education       Title: PT OT SLP Therapies (Done)       Topic: Physical Therapy (Done)       Point: Mobility training (Done)       Learning Progress Summary             Patient Acceptance, TB,D,E, VU,NR by EB at 9/22/2023 1200    Acceptance, E,D,TB, VU,NR by EB at 9/22/2023 1159    Acceptance, E, VU,NR by EB at 9/21/2023 1617    Acceptance, E,D, VU,NR by EB at 9/20/2023 1607    Acceptance, E, VU by AP at 9/18/2023 1853    Acceptance, E,TB,D, VU,NR by EJ at 9/18/2023 1327   Family Acceptance, TB,D,E, VU,NR by EB at 9/22/2023 1200                         Point: Home exercise program (Done)       Learning Progress Summary             Patient Acceptance, TB,D,E, VU,NR by EB at 9/22/2023 1200    Acceptance, E,D,TB, VU,NR by EB at 9/22/2023 1159    Acceptance, E, VU,NR by EB at 9/21/2023 1617    Acceptance, E,D, VU,NR by EB at 9/20/2023 1607    Acceptance, E, VU by AP at 9/18/2023 1853   Family Acceptance, TB,D,E, VU,NR by EB at 9/22/2023 1200                         Point: Body mechanics (Done)       Learning Progress Summary             Patient Acceptance, TB,D,E, VU,NR by EB at 9/22/2023 1200    Acceptance, E,D,TB, VU,NR by EB at 9/22/2023 1159    Acceptance, E, VU,NR by EB at 9/21/2023 1617    Acceptance, E,D, VU,NR by EB at 9/20/2023 1607     Acceptance, E, VU by AP at 9/18/2023 1853   Family Acceptance, TB,D,E, VU,NR by EB at 9/22/2023 1200                         Point: Precautions (Done)       Learning Progress Summary             Patient Acceptance, TB,D,E, VU,NR by EB at 9/22/2023 1200    Acceptance, E,D,TB, VU,NR by EB at 9/22/2023 1159    Acceptance, E, VU,NR by EB at 9/21/2023 1617    Acceptance, E,D, VU,NR by EB at 9/20/2023 1607    Acceptance, E, VU by AP at 9/18/2023 1853   Family Acceptance, TB,D,E, VU,NR by EB at 9/22/2023 1200                                         User Key       Initials Effective Dates Name Provider Type Discipline    EJ 06/16/21 -  Tracy Garnica, PT Physical Therapist PT    EB 02/14/23 -  Tanisha Allen PTA Physical Therapist Assistant PT    AP 01/17/23 -  Nora Cerrato RN Registered Nurse Nurse                  PT Recommendation and Plan     Plan of Care Reviewed With: patient, son  Progress: improving  Outcome Evaluation: Pt seen for PT/OT co-treat today. D/c plans are home with hospice. Provided pt and pt's son education and demo with transfers. Pt completed bed mobility with Efren and requried MinAX2 with STS transfers and stand/pivot transfer from bsc<->bed. Pt's left knee continues to buckle if taking forward steps. Pt required safety education with walker placement and hand placement with transfers. Son assisted with transfer from bsc back to bed. Both verbalized understanding that transfers will need to be an assist of 2 without the lift. Pt will need bsc for home and would benefit from hospital bed, however son states that pt usually sleeps in recliner/couch. PT will sign off as pt to d/c home with hospice.     Time Calculation:         PT Charges       Row Name 09/22/23 1144             Time Calculation    Start Time 0901  -EB      Stop Time 0931  -EB      Time Calculation (min) 30 min  -EB      PT Received On 09/22/23  -EB      PT - Next Appointment 09/23/23  -EB         Time Calculation- PT     Total Timed Code Minutes- PT 30 minute(s)  -GIANNA                User Key  (r) = Recorded By, (t) = Taken By, (c) = Cosigned By      Initials Name Provider Type    Tanisha Parsons PTA Physical Therapist Assistant                  Therapy Charges for Today       Code Description Service Date Service Provider Modifiers Qty    18240634172 HC PT THERAPEUTIC ACT EA 15 MIN 9/21/2023 Tanisha Allen, PTA GP 1    42033287594 HC PT THER PROC EA 15 MIN 9/21/2023 Tanisha Allen, PTA GP 1    36858480522 HC PT THER SUPP EA 15 MIN 9/21/2023 Tanisha Allen, PTA GP 1    77664307853 HC PT THERAPEUTIC ACT EA 15 MIN 9/22/2023 Tanisha Allen, MADELINE GP 1    50405652590 HC PT THER PROC EA 15 MIN 9/22/2023 Tanisha Allen, PTA GP 1    06081074385 HC PT THER SUPP EA 15 MIN 9/22/2023 Tanisha Allen, PTA GP 1            PT G-Codes  Outcome Measure Options: AM-PAC 6 Clicks Daily Activity (OT)  AM-PAC 6 Clicks Score (PT): 12  AM-PAC 6 Clicks Score (OT): 13       Tanisha Allen PTA  9/22/2023

## 2023-09-22 NOTE — PLAN OF CARE
Goal Outcome Evaluation:  Plan of Care Reviewed With: patient, son           Outcome Evaluation: Pt now plans to dc home with hospice, OT spoke with son out in hallway. He reports concerns with transfering pt at home in and out of the car and on/off BSC. OT educated pt and spouse on safe transfer technique, safe body mechanics of caregiver, using gait belt. LEs remain weak as well as pt having difficulty sitting unsupported without heavy use of UEs to maintain sitting balance. Pt is recommended to have 2 assist for all transfers. Son and grandson plan to assist at home. OT discussed recommended DME. He will need BSC as he is not safe to mobilize to toilet. At home they have a urinal,, transport chair, ordering a lift devise, lift chair. Pt plans to sleep on the couch but would benefit from hospital bed if not longer safely able to transfer to couch with family (they feel as of now they can). Son assist with some of the transfers today with OT close by for cues.      Anticipated Discharge Disposition (OT): home with 24/7 care, other (see comments)

## 2023-09-22 NOTE — CASE MANAGEMENT/SOCIAL WORK
Continued Stay Note  [facility]     Patient Name: Semaj Herrera  MRN: [id number]  Today's Date: 9/22/2023    Admit Date: 9/17/2023    Plan: Home with probable HH.   Discharge Plan       Row Name 09/22/23 1459       Plan    Plan Home with probable HH.    Plan Comments Patient requires frequent positioning not feasible in an ordinary bed. Patient requires assistance of 2 for transfers.                   Discharge Codes    No documentation.                 Expected Discharge Date and Time       Expected Discharge Date Expected Discharge Time    Sep 22, 2023               Tata Atkinson RN

## 2023-09-22 NOTE — PLAN OF CARE
Goal Outcome Evaluation:     VSS with pain well controlled with current analgesic regimen.  Patient to have radiation therapy #3 this AM.  No new issues overnight.  RN will continue to monitor patient's status.

## 2023-09-22 NOTE — PROGRESS NOTES
Name: Semaj Herrera ADMIT: 2023   : 1940  PCP: Axel Guardado MD    MRN: [id number] LOS: 4 days   AGE/SEX: 82 y.o. male  ROOM: [facility]/1     Subjective   Subjective   Resting in bed. Son caught me in the hallway- discharge completed earlier. He states that hospital bed and [employer] lift requested but now aren't being delivered until Monday. Patient feels like he needs this equipment to return home. Patient without any new complaints including chest pain or dyspnea.     Objective   Objective   Vital Signs  Temp:  [97.5 °F (36.4 °C)-97.9 °F (36.6 °C)] 97.5 °F (36.4 °C)  Heart Rate:  [51-61] 51  Resp:  [16-18] 18  BP: (159-169)/(59-66) 159/59  SpO2:  [97 %-99 %] 99 %  on   ;   Device (Oxygen Therapy): room air  Body mass index is 29.12 kg/m².    Physical Exam  Vitals and nursing note reviewed.   Constitutional:       Appearance: He is ill-appearing. He is not toxic-appearing.   Cardiovascular:      Rate and Rhythm: Normal rate and regular rhythm.      Pulses: Normal pulses.   Pulmonary:      Effort: Pulmonary effort is normal. No respiratory distress.      Breath sounds: Normal breath sounds.      Comments: Diminished and on room air.  Abdominal:      General: Bowel sounds are normal. There is no distension.      Palpations: Abdomen is soft.      Tenderness: There is no abdominal tenderness.   Musculoskeletal:         General: Swelling (Mild bilateral lower extremities) present. No tenderness.   Skin:     General: Skin is warm and dry.      Findings: No bruising.   Neurological:      Mental Status: He is alert and oriented to person, place, and time.      Sensory: Sensory deficit present.      Motor: Weakness present.      Coordination: Coordination normal.   Psychiatric:         Mood and Affect: Mood normal.         Behavior: Behavior normal.     Results Review:       I reviewed the patient's new clinical results.  Results from last 7 days   Lab Units 23  0712 23  0634 23  0631  09/18/23  0412   WBC 10*3/mm3 8.20 8.13 7.77 6.46   HEMOGLOBIN g/dL 11.4* 10.8* 7.1* 7.7*   PLATELETS 10*3/mm3 105* 96* 102* 87*     Results from last 7 days   Lab Units 09/22/23  1132 09/22/23  0823 09/21/23  1449 09/21/23  0712 09/20/23  0634   SODIUM mmol/L  --  137 137 133* 137   POTASSIUM mmol/L 5.2 5.9* 4.4 5.4* 5.1   CHLORIDE mmol/L  --  105 103 105 105   CO2 mmol/L  --  22.0 21.0* 17.0* 21.1*   BUN mg/dL  --  35* 33* 29* 25*   CREATININE mg/dL  --  0.73* 0.83 0.78 0.75*   GLUCOSE mg/dL  --  170* 206* 203* 197*   Estimated Creatinine Clearance: 73.2 mL/min (A) (by C-G formula based on SCr of 0.73 mg/dL (L)).  Results from last 7 days   Lab Units 09/21/23  0712 09/20/23  0634 09/19/23  0631 09/17/23  1207   ALBUMIN g/dL 3.5 3.7 3.9 3.9   BILIRUBIN mg/dL 0.4 0.4 0.3 0.4   ALK PHOS U/L 308* 300* 351* 385*   AST (SGOT) U/L 14 13 12 16   ALT (SGPT) U/L 9 8 7 10     Results from last 7 days   Lab Units 09/22/23  0823 09/21/23  1449 09/21/23  0712 09/20/23  0634 09/19/23  0631 09/18/23  0412 09/17/23  1207   CALCIUM mg/dL 7.8* 8.1* 7.9* 8.1* 8.3*   < > 8.1*   ALBUMIN g/dL  --   --  3.5 3.7 3.9  --  3.9   MAGNESIUM mg/dL  --   --   --   --   --   --  2.8*    < > = values in this interval not displayed.       Glucose   Date/Time Value Ref Range Status   09/22/2023 1211 152 (H) 70 - 130 mg/dL Final   09/22/2023 0819 170 (H) 70 - 130 mg/dL Final   09/21/2023 2010 311 (H) 70 - 130 mg/dL Final   09/21/2023 1557 185 (H) 70 - 130 mg/dL Final   09/21/2023 1136 243 (H) 70 - 130 mg/dL Final   09/21/2023 0902 196 (H) 70 - 130 mg/dL Final   09/20/2023 2224 223 (H) 70 - 130 mg/dL Final       aspirin, 81 mg, Oral, Daily  fentaNYL, 1 patch, Transdermal, Q72H   And  Check Fentanyl Patch Placement, 1 each, Does not apply, Q12H  cholecalciferol, 2,000 Units, Oral, Daily  dexAMETHasone, 4 mg, Oral, Q6H  dilTIAZem CD, 120 mg, Oral, Daily  gabapentin, 900 mg, Oral, Nightly  insulin glargine, 20 Units, Subcutaneous, Nightly  insulin  lispro, 2-9 Units, Subcutaneous, 4x Daily AC & at Bedtime  insulin lispro, 6 Units, Subcutaneous, TID With Meals  [START ON 9/24/2023] levothyroxine, 176 mcg, Oral, Once per day on Sun Wed  levothyroxine, 88 mcg, Oral, Once per day on Mon Tue Thu Sat  modafinil, 200 mg, Oral, Daily  pantoprazole, 40 mg, Oral, BID AC  senna-docusate sodium, 2 tablet, Oral, BID   And  polyethylene glycol, 17 g, Oral, Daily  pravastatin, 40 mg, Oral, Daily  rOPINIRole, 1 mg, Oral, Nightly       Diet: Diabetic Diets; Consistent Carbohydrate; Texture: Regular Texture (IDDSI 7); Fluid Consistency: Thin (IDDSI 0)       Assessment/Plan     Active Hospital Problems    Diagnosis  POA    **Fecal impaction in rectum [K56.41]  Yes    Moderate malnutrition [E44.0]  Yes    Lower extremity weakness [R29.898]  Yes    Anemia, chronic disease [D63.8]  Yes    Metastatic cancer to bone [C79.51]  Yes    Restless legs syndrome (RLS) [G25.81]  Yes    Diastolic dysfunction [I51.89]  Yes    Neuropathy [G62.9]  Yes    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     Mr. Herrera is a 82 y.o. that presented to the hospital with a 2 to 3-day history of increasing bilateral leg weakness as well as constipation in the setting of known metastatic prostate cancer to the bone and lymph nodes.     Fecal impaction in the rectum  -CT A/P on admission with large amount of dense fecal material within the low sigmoid and rectum with rectal wall thickening.  Continued note of widespread osseous metastasis that was more pronounced compared to prior.  -Improving with bowel regimen.  -GI consulted. Appreciate their  recommendations. Recommend daily Miralax and potential OIC medication if non-responsive to first line drugs.     Lower extremity weakness  Neuropathy  -MRI of the lumbar spine with metastatic disease. MRI cervical spine with metastatic disease but no significant compression. 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   -Some improvement with IV steroids at present.   -Radiation oncology and Neurology consulted by oncology.   -Given significant cord compression at T10- neurosurgery evaluated.  Clyde that due to the extent of his metastatic disease a thoracic decompression would likely destabilize the patient and therefore radiation therapy was recommended.   -PT/OT following-plans for home health versus SNF- patient/son want home. DME ordered.  -Continue home gabapentin.  -Radiation oncology planning radiation treatments-unclear how many treatments are planned at this point-nursing trying to figure this out.  -Neurology briefly evaluated but deferred to neurosurgery.     Prostate cancer metastatic to bone  -Oncology following. Appreciate their assistance.  -Palliative consulted for further goals of care-plans to continue radiation and current treatment.  -Continue pain medications.  -Hospice outpatient has been recommended by oncology- son has their information to call and set up.     DM2  -Typically on insulin pump that is not currently being used-DM educator met with patient and son and they do not have all the supplies he would need to resume this while inpatient.  -Apears overall stable with correctional sliding scale insulin.  -Continue Lantus nightly to 20 units and meal time humalog with 6 units TID- sugars improved today.  -Titrate pending glucose trends and oral intake.     RONNY  -Home CPAP if available.  -Avoid oversedation.     Diastolic dysfunction  Hypertension  -Established with Dr. Lee for mild aortic  stenosis.  -Continue outpatient follow-up.  -Continue home regimen.     CKD 3  -Renal function/electrolytes overall stable.  -Potassium 5.9 but recheck 5.2. This is a chronic issue- change to low potassium diet.  -He is established with Dr. Vicky Duran.     Anemia  Thrombocytopenia  -S/P 2 units of PRBC on 9/19 for hemoglobin 7.1      Discussed with patient, nursing staff, son and Dr. Dewey.    Plans for discharge today cancelled as hospital bed/lift not available today. Hopefully tomorrow or Sunday.     VTE Prophylaxis - SCDs  Code Status - NO CPR/intubation. Confirmed with patient at bedside.  Disposition - Anticipate discharge as above.      SYD Oswald  [facility]  09/22/23  16:11 EDT

## 2023-09-22 NOTE — PLAN OF CARE
Goal Outcome Evaluation:  Plan of Care Reviewed With: patient, son        Progress: improving  Outcome Evaluation: Pt seen for PT/OT co-treat today. D/c plans are home with hospice. Provided pt and pt's son education and demo with transfers. Pt completed bed mobility with Efren and requried MinAX2 with STS transfers and stand/pivot transfer from bsc<->bed. Pt's left knee continues to buckle if taking forward steps. Pt required safety education with walker placement and hand placement with transfers. Son assisted with transfer from bsc back to bed. Both verbalized understanding that transfers will need to be an assist of 2 without the lift. Pt will need bsc for home and would benefit from hospital bed, however son states that pt usually sleeps in recliner/couch. PT will sign off as pt to d/c home with hospice.

## 2023-09-22 NOTE — NURSING NOTE
Spoke w/pt's son about pt CGM sensor/supply. Pt's sensor has been removed during radiation. Advise pt's son to contact [employer] as pt is using their (Guardian) sensor. Pt does have meter at home to use in the interim.

## 2023-09-22 NOTE — DISCHARGE SUMMARY
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     Procedures Performed  none     Consults       Date and Time Order Name Status Description    9/19/2023  9:06 AM Inpatient Neurosurgery Consult Completed     9/18/2023  3:38 PM Inpatient Neurology Consult Other (see comments) Completed     9/18/2023  3:38 PM Inpatient Radiation Oncology Consult Completed     9/18/2023  1:32 PM Inpatient Gastroenterology Consult Completed     9/17/2023  7:32 PM Inpatient Hematology & Oncology Consult            Hospital Course  Please see history and physical for details. Patient is a 82 y.o. 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  CT A/P on admission revealed a large amount of dense fecal material within the low sigmoid and rectum with rectal wall thickening as well as continued note of widespread osseous metastasis that was more pronounced compared to prior.  He was admitted to the  hospital and seen in consultation by several specialists.   He was started on a bowel regimen on admission and GI was consulted.  He had adequate bowel movements and was recommended to continue daily MiraLAX.  He has been tolerating Senokot scheduled and using MiraLAX as needed without any issues.  He would be potential candidate for OIC medication if nonresponsive to first-line drugs.  GI quickly signed off as he has been having bowel movements without issue.   Given his sudden onset of lower extremity weakness, MRI of the lumbar/thoracic/cervical spine were obtained.  MRI of the lumbar spine showed metastatic disease and MRI cervical spine was with metastatic disease but no significant compression.  MRI thoracic spine revealed metastatic disease as well as severe canal stenosis at T10 with extraosseous epidural metastatic disease and significant cord compression.  He was started on IV steroids and neurosurgery was consulted.  They felt that due to the extent of his metastatic disease a thoracic decompression would not likely be beneficial and would probably destabilize him.  Radiation oncology was consulted and radiation therapy was recommended for this reason.  There are 5 treatments planned and he has currently received 3 of these.  He did have some improvement with steroids and will continue on Decadron 4 mg p.o. every 6 hours per oncology recommendations.  Further taper of this will be deferred to them in the outpatient setting.  Neurology briefly evaluated the patient but ultimately deferred to neurosurgery given the spinal issues.   He was evaluated by PT/OT who recommended home with 24/7 assistance versus SNF.  Son requested discharge to home as he can manage him as long as he is able to stand and pivot.  We will transport him to his further radiation treatments.  Hospice was suggested by oncology and CODE STATUS appropriate during the stay.  Son states that he has the hospice card and will call them when  they are ready.   The patient is normally on an insulin pump, but was not on this during the hospital stay due to need to remove his sensor with radiation treatments as well as lack of home supplies available.  He was maintained on a basal/bolus insulin regimen with sugar slowly improving on steroids.  His labs have been overall stable, but he has had some intermittent elevated potassium levels.  Son states that this has been somewhat of a chronic issue for him he has been advised to eat low potassium foods for this reason.  Potassium on the latest check was 5.2, which is where he has been.  Did have an isolated hemolyzed specimen that was 5.9, but this is felt to be lab error.  He will need to continue a low potassium diet and recommend follow-up with repeat labs with his PCP in the next 1 to 2 weeks.  He is already established with Dr. Vicky Duran should follow-up with her as advised. His hemoglobin did drop at one time to 7.1 and he was given 2 units PRBC on 9/19.   Oncology has cleared him for discharge with outpatient radiation as well as oral steroids.  He is already managed chronically for his pain by Dr. Schulte.  He plans to resume his insulin pump at discharge and diabetes educator will discuss with them prior to discharge.  The patient denies any chest pain, dyspnea, cough, fever or chills.  He denies any nausea, vomiting or abdominal pain.  He is having adequate bowel movements.  His son is at bedside and feels comfortable taking him home today.    I discussed the patient's findings and my recommendations with patient, family, nursing staff, and consulting provider.    Condition on Discharge: Improved.     Temp:  [97.5 °F (36.4 °C)-97.9 °F (36.6 °C)] 97.5 °F (36.4 °C)  Heart Rate:  [50-61] 51  Resp:  [16-18] 18  BP: (159-169)/(53-66) 159/59  Body mass index is 29.12 kg/m².    Physical Exam  Vitals and nursing note reviewed.   Constitutional:       Appearance: He is ill-appearing. He is not  toxic-appearing.   Cardiovascular:      Rate and Rhythm: Normal rate and regular rhythm.      Pulses: Normal pulses.   Pulmonary:      Effort: Pulmonary effort is normal. No respiratory distress.      Breath sounds: Normal breath sounds.      Comments: Diminished and on room air.  Abdominal:      General: Bowel sounds are normal. There is no distension.      Palpations: Abdomen is soft.      Tenderness: There is no abdominal tenderness.   Musculoskeletal:         General: Swelling (Mild bilateral lower extremities) present. No tenderness.   Skin:     General: Skin is warm and dry.      Findings: No bruising.   Neurological:      Mental Status: He is alert and oriented to person, place, and time.      Sensory: Sensory deficit present.      Motor: Weakness present.      Coordination: Coordination normal.   Psychiatric:         Mood and Affect: Mood normal.         Behavior: Behavior normal.        Discharge Medications        New Medications        Instructions Start Date   dexAMETHasone 4 MG tablet  Commonly known as: DECADRON   4 mg, Oral, Every 6 Hours      pantoprazole 40 MG EC tablet  Commonly known as: PROTONIX   40 mg, Oral, 2 Times Daily Before Meals      polyethylene glycol 17 g packet  Commonly known as: MIRALAX   17 g, Oral, Daily   Start Date: September 23, 2023     sennosides-docusate 8.6-50 MG per tablet  Commonly known as: PERICOLACE   2 tablets, Oral, 2 Times Daily             Continue These Medications        Instructions Start Date   Accu-Chek Guide test strip  Generic drug: glucose blood   USE 1 STRIP TO CHECK BLOOD SUGAR 4 TIMES DAILY      Accu-Chek Softclix Lancets lancets   USE 1 LANCET TO CHECK GLUCOSE 4 TIMES DAILY      aspirin 81 MG EC tablet   1 tablet, Oral, Daily      cholecalciferol 25 MCG (1000 UT) tablet  Commonly known as: VITAMIN D3   2,000 Units, Oral, Daily      dilTIAZem  MG 24 hr capsule  Commonly known as: CARDIZEM CD   TAKE 1 CAPSULE EVERY DAY      diphenoxylate-atropine  2.5-0.025 MG per tablet  Commonly known as: LOMOTIL   1 tablet, Oral, 4 Times Daily PRN      fentaNYL 50 MCG/HR patch  Commonly known as: DURAGESIC   1 patch, Transdermal, Every 72 Hours      gabapentin 300 MG capsule  Commonly known as: NEURONTIN   TAKE 3 CAPSULES EVERY NIGHT AT BEDTIME      Insulin Aspart 100 UNIT/ML injection  Commonly known as: novoLOG   INJECT 100 UNITS VIA PUMP ONCE DAILY      levothyroxine 88 MCG tablet  Commonly known as: SYNTHROID, LEVOTHROID   No dose, route, or frequency recorded.      loperamide 1 MG/5ML solution  Commonly known as: IMODIUM   2 mg, Oral, 4 Times Daily PRN      LUPRON DEPOT (3-MONTH) IM   Intramuscular, Every 3 Months      modafinil 200 MG tablet  Commonly known as: PROVIGIL   200 mg, Oral, Daily      ondansetron 8 MG tablet  Commonly known as: ZOFRAN   8 mg, Oral, 3 Times Daily PRN      pravastatin 40 MG tablet  Commonly known as: PRAVACHOL   40 mg, Oral, Daily      rOPINIRole 0.5 MG tablet  Commonly known as: REQUIP   Take 2 tablets by mouth in the evening and 2 at bedtime.      traZODone 50 MG tablet  Commonly known as: DESYREL    mg, Oral, Every Night at Bedtime              Diet Instructions       Diet: Regular/House Diet, Diabetic Diets, Renal Diets; Consistent Carbohydrate; Regular Texture (IDDSI 7); Thin (IDDSI 0); Low Potassium      Discharge Diet:  Regular/House Diet  Diabetic Diets  Renal Diets       Diabetic Diet: Consistent Carbohydrate    Texture: Regular Texture (IDDSI 7)    Fluid Consistency: Thin (IDDSI 0)    Renal Diet: Low Potassium           Activity Instructions       Activity as Tolerated             Additional Instructions for the Follow-ups that You Need to Schedule       Discharge Follow-up with PCP   As directed       Currently Documented PCP:    Axel Guardado MD    PCP Phone Number:    [phone]     Follow Up Details: see in 1-2 weeks        Discharge Follow-up with Specified Provider: Dr. Lee or his NP in 2 weeks   As directed       To: Dr. Lee or his NP in 2 weeks   Follow Up Details: will need further clarification of Decadron plans (potential taper) at that time        Discharge Follow-up with Specified Provider: Radiation Oncology   As directed      To: Radiation Oncology   Follow Up Details: As advised.               Follow-up Information       Axel Guardado MD .    Specialty: Family Medicine  Why: see in 1-2 weeks  Contact information:  [address] [place] [address]  [phone]                           Test Results Pending at Discharge  Pending Labs       Order Current Status    Methylmalonic Acid, Serum In process           Mariaa Austin, SYD  09/22/23  13:11 EDT    Discharge time spent greater than 30 minutes.

## 2023-09-22 NOTE — PROGRESS NOTES
Alyse with [facility] called asking if we could order PT for this pt as he is just being discharged from [facility]. Advised her this would be OK per Dr. Lee. Verbal order given and Alyse grider/renata.

## 2023-09-22 NOTE — PROGRESS NOTES
Patient is discharging today . Spoke to son who is caregiver and is agreeable to home health. Has no current home health. Face sheet information is correct and please call home number first - [phone] , son's cell second- [place] [phone]. Will transcribe home health orders for PT per verbal order Yoli / Dr Schulte for start of care Monday 9/25/23. Thank you!

## 2023-09-22 NOTE — THERAPY TREATMENT NOTE
Patient Name: Semaj Herrera  : 1940    MRN: [id number]                              Today's Date: 2023       Admit Date: 2023    Visit Dx:     ICD-10-CM ICD-9-CM   1. Fecal impaction  K56.41 560.32   2. Weakness of both lower extremities  R29.898 729.89   3. Prostate cancer metastatic to bone  C61 185    C79.51 198.5     Patient Active Problem List   Diagnosis    Type 2 diabetes mellitus with kidney complication, with long-term current use of insulin    Fatigue    Hyperlipidemia    Hypertension    Left ventricular hypertrophy    Obstructive sleep apnea syndrome treated auto BiPAP    Sinus bradycardia    Prostate cancer metastatic to bone    Mediastinal adenopathy    Malignant neoplasm metastatic to bone    Chronic kidney disease, stage III (moderate)    Diastolic dysfunction    Localized edema    Neuropathy    Hypersomnia due to medical condition treated with modafinil 200 mg every morning    CSA (central sleep apnea)    Restless legs syndrome (RLS)    Psychophysiological insomnia    Edema    Type 2 diabetes mellitus with diabetic chronic kidney disease    Class 2 severe obesity due to excess calories with serious comorbidity and body mass index (BMI) of 35.0 to 35.9 in adult    Aortic stenosis, mild    Ascending aorta dilatation    Fecal impaction in rectum    Lower extremity weakness    Anemia, chronic disease    Metastatic cancer to bone    Moderate malnutrition     Past Medical History:   Diagnosis Date    Aortic stenosis, mild 2021    Per echocardiogram     Ascending aorta dilatation     Asthma     Benign prostatic hyperplasia     Bone cancer     Cellulitis of great toe of left foot 10/19/2018    CKD (chronic kidney disease)     Stage 3; followed by Dr. Vicky Duran     Diastolic dysfunction     GRADE II per echo     Difficulty breathing     during exertion    Dyslipidemia     Erectile dysfunction     Fatigue     Heart murmur     History of blood transfusion     History of  kidney stones     HL (hearing loss)     Hyperlipidemia     Hypertension     Hyponatremia     Kidney stone     Left ventricular hypertrophy     per echo 2018    Localized edema     Neuropathy     Obstructive sleep apnea     USING CPAP    Osteoarthritis     Peptic ulcer     Pneumonia     Pneumonia of left upper lobe due to infectious organism 03/15/2019    Prostate cancer     Status post prostatectomy, radiation therapy, and hormone therapy followed by Dr. Lee; metastatsis to bone    Pure hyperglyceridemia     Restless leg syndrome     Sepsis     Sinus bradycardia     Transient cerebral ischemia     Type 2 diabetes mellitus      Past Surgical History:   Procedure Laterality Date    BRONCHOSCOPY N/A 04/09/2018    Procedure: BRONCHOSCOPY;  Surgeon: Bijan Rivera III, MD;  Location: [facility];  Service: Cardiothoracic    COLONOSCOPY      DEEP NECK LYMPH NODE BIOPSY / EXCISION      HEMORRHOIDECTOMY      LYMPH NODE BIOPSY      MEDIASTINOSCOPY N/A 04/09/2018    Procedure: MEDIASTINOSCOPY WITH LYMPH NODE BIOPSY;  Surgeon: Bijan Rivera III, MD;  Location: [facility];  Service: Cardiothoracic    OTHER SURGICAL HISTORY      ulcer repair    PROSTATECTOMY  2006      General Information       Row Name 09/22/23 1050          OT Time and Intention    Document Type therapy note (daily note)  -SM     Mode of Treatment occupational therapy;co-treatment  with PT to maximize benefits, family training today, poor activity tolerance/weakness  -SM       Row Name 09/22/23 1050          General Information    Patient Profile Reviewed yes  -SM     Existing Precautions/Restrictions fall  -SM       Row Name 09/22/23 1050          Cognition    Orientation Status (Cognition) oriented x 3  -SM       Row Name 09/22/23 1050          Safety Issues, Functional Mobility    Safety Issues Affecting Function (Mobility) insight into deficits/self-awareness  -SM     Impairments Affecting Function (Mobility) balance;endurance/activity  tolerance;strength;postural/trunk control  -               User Key  (r) = Recorded By, (t) = Taken By, (c) = Cosigned By      Initials Name Provider Type     Giovana Muniz OT Occupational Therapist                     Mobility/ADL's       Row Name 09/22/23 1051          Bed Mobility    Supine-Sit [place] (Bed Mobility) minimum assist (75% patient effort);verbal cues  -     Sit-Supine [place] (Bed Mobility) minimum assist (75% patient effort);verbal cues  -     Assistive Device (Bed Mobility) bed rails;head of bed elevated  -[facility] Name 09/22/23 1051          Transfers    Transfers toilet transfer  -       Row Name 09/22/23 1051          Sit-Stand Transfer    Sit-Stand [place] (Transfers) minimum assist (75% patient effort);2 person assist;verbal cues  -     Assistive Device (Sit-Stand Transfers) walker, front-wheeled  -[facility] Name 09/22/23 1051          Toilet Transfer    [place] Level (Toilet Transfer) moderate assist (50% patient effort);2 person assist;verbal cues  -     Assistive Device (Toilet Transfer) walker, front-wheeled;commode, bedside with drop arms  -       Row Name 09/22/23 1051          Functional Mobility    Functional Mobility- Comment knees buckling, not safe to attempt at this time  -[facility] Name 09/22/23 1051          Activities of Daily Living    BADL Assessment/Intervention toileting  -[facility] Name 09/22/23 1051          Lower Body Dressing Assessment/Training    [place] Level (Lower Body Dressing) don;socks;dependent (less than 25% patient effort)  -     Position (Lower Body Dressing) edge of bed sitting  -     Comment, (Lower Body Dressing) LE weakness limiting, also pt with spinal lesion in T10, avoiding bending forward, pt is interested in sock aid as he wants to be as independent as possible. Has family that also can assist him  -       Row Name 09/22/23 1051          Toileting Assessment/Training    [place]  Level (Toileting) dependent (less than 25% patient effort)  -     Comment, (Toileting) X2 person assist needed for safety with standing balance  -               User Key  (r) = Recorded By, (t) = Taken By, (c) = Cosigned By      Initials Name Provider Type    Giovana Peterson OT Occupational Therapist                   Obj/Interventions       Row Name 09/22/23 1053          Balance    Static Sitting Balance contact guard;minimal assist  -SM     Dynamic Sitting Balance minimal assist  -SM     Position, Sitting Balance sitting edge of bed  -     Static Standing Balance contact guard;minimal assist;2-person assist  -SM     Dynamic Standing Balance minimal assist;moderate assist;2-person assist  -SM     Position/Device Used, Standing Balance supported;walker, rolling  -               User Key  (r) = Recorded By, (t) = Taken By, (c) = Cosigned By      Initials Name Provider Type    Giovana Peterson OT Occupational Therapist                   Goals/Plan    No documentation.                  Clinical Impression       Row Name 09/22/23 1053          Pain Assessment    Pretreatment Pain Rating 0/10 - no pain  -SM     Posttreatment Pain Rating 0/10 - no pain  -SM       Row Name 09/22/23 1053          Plan of Care Review    Plan of Care Reviewed With patient;son  -     Outcome Evaluation Pt now plans to dc home with hospice, OT spoke with son out in [facility]. He reports concerns with transfering pt at home in and out of the car and on/off BSC. OT educated pt and spouse on safe transfer technique, safe body mechanics of caregiver, using gait belt. LEs remain weak as well as pt having difficulty sitting unsupported without heavy use of UEs to maintain sitting balance. Pt is recommended to have 2 assist for all transfers. Son and grandson plan to assist at home. OT discussed recommended DME. He will need BSC as he is not safe to mobilize to toilet. At home they have a urinal,, transport chair, ordering a lift  devise, lift chair. Pt plans to sleep on the couch but would benefit from hospital bed if not longer safely able to transfer to couch with family (they feel as of now they can). Son assist with some of the transfers today with OT close by for cues.  -       Row Name 09/22/23 1053          Therapy Plan Review/Discharge Plan (OT)    Anticipated Discharge Disposition (OT) home with 24/7 care;other (see comments)  -       Row Name 09/22/23 1053          Positioning and Restraints    Pre-Treatment Position in bed  -SM     Post Treatment Position bed  -SM     In Bed fowlers;call light within reach;encouraged to call for assist;exit alarm on;notified nsg;with family/caregiver  -               User Key  (r) = Recorded By, (t) = Taken By, (c) = Cosigned By      Initials Name Provider Type    Giovana Peterson OT Occupational Therapist                   Outcome Measures       Row Name 09/22/23 1057          How much help from another is currently needed...    Putting on and taking off regular lower body clothing? 1  -SM     Bathing (including washing, rinsing, and drying) 2  -SM     Toileting (which includes using toilet bed pan or urinal) 1  -SM     Putting on and taking off regular upper body clothing 2  -SM     Taking care of personal grooming (such as brushing teeth) 3  -SM     Eating meals 4  -SM     AM-PAC 6 Clicks Score (OT) 13  -       Row Name 09/22/23 1057          Functional Assessment    Outcome Measure Options AM-PAC 6 Clicks Daily Activity (OT)  -               User Key  (r) = Recorded By, (t) = Taken By, (c) = Cosigned By      Initials Name Provider Type    Giovana Peterson OT Occupational Therapist                    Occupational Therapy Education       Title: PT OT SLP Therapies (Done)       Topic: Occupational Therapy (Done)       Point: ADL training (Done)       Description:   Instruct learner(s) on proper safety adaptation and remediation techniques during self care or transfers.    Instruct in proper use of assistive devices.                  Learning Progress Summary             Patient Acceptance, E, VU by AP at 9/18/2023 1853    Acceptance, E, VU,NR by CE at 9/18/2023 1251                                         User Key       Initials Effective Dates Name Provider Type Discipline    CE 10/17/22 -  Ryann Hernandez OT Occupational Therapist OT    RICO 01/17/23 -  Nora Cerrato, RN Registered Nurse Nurse                  OT Recommendation and Plan     Plan of Care Review  Plan of Care Reviewed With: patient, son  Outcome Evaluation: Pt now plans to dc home with hospice, OT spoke with son out in hallway. He reports concerns with transfering pt at home in and out of the car and on/off BSC. OT educated pt and spouse on safe transfer technique, safe body mechanics of caregiver, using gait belt. LEs remain weak as well as pt having difficulty sitting unsupported without heavy use of UEs to maintain sitting balance. Pt is recommended to have 2 assist for all transfers. Son and grandson plan to assist at home. OT discussed recommended DME. He will need BSC as he is not safe to mobilize to toilet. At home they have a urinal,, transport chair, ordering a lift devise, lift chair. Pt plans to sleep on the couch but would benefit from hospital bed if not longer safely able to transfer to couch with family (they feel as of now they can). Son assist with some of the transfers today with OT close by for cues.     Time Calculation:         Time Calculation- OT       Row Name 09/22/23 1057             Time Calculation- OT    OT Start Time 0901  -      OT Stop Time 0930  -      OT Time Calculation (min) 29 min  -SM      Total Timed Code Minutes- OT 29 minute(s)  -SM      OT Received On 09/22/23  -      OT - Next Appointment 09/25/23  -         Timed Charges    14137 - OT Self Care/Mgmt Minutes 29  -SM         Total Minutes    Timed Charges Total Minutes 29  -SM       Total Minutes 29  -SM                 User Key  (r) = Recorded By, (t) = Taken By, (c) = Cosigned By      Initials Name Provider Type     Giovana Muniz OT Occupational Therapist                  Therapy Charges for Today       Code Description Service Date Service Provider Modifiers Qty    84431025612 HC OT SELF CARE/MGMT/TRAIN EA 15 MIN 9/22/2023 Giovana Muniz OT GO 2                 Giovana Muniz OT  9/22/2023

## 2023-09-22 NOTE — SIGNIFICANT NOTE
09/22/23 1200   PT Discharge Summary   Anticipated Discharge Disposition (PT) home;other (see comments)  (home with family and hospice)   Reason for Discharge Discharge from facility   Outcomes Achieved Other   Discharge Destination   (home with hospice)

## 2023-09-23 LAB
ANION GAP SERPL CALCULATED.3IONS-SCNC: 13.4 MMOL/L (ref 5–15)
BUN SERPL-MCNC: 30 MG/DL (ref 8–23)
BUN/CREAT SERPL: 50 (ref 7–25)
CALCIUM SPEC-SCNC: 7.8 MG/DL (ref 8.6–10.5)
CHLORIDE SERPL-SCNC: 105 MMOL/L (ref 98–107)
CO2 SERPL-SCNC: 18.6 MMOL/L (ref 22–29)
CREAT SERPL-MCNC: 0.6 MG/DL (ref 0.76–1.27)
DEPRECATED RDW RBC AUTO: 55.7 FL (ref 37–54)
EGFRCR SERPLBLD CKD-EPI 2021: 96.4 ML/MIN/1.73
ERYTHROCYTE [DISTWIDTH] IN BLOOD BY AUTOMATED COUNT: 19.6 % (ref 12.3–15.4)
GLUCOSE BLDC GLUCOMTR-MCNC: 195 MG/DL (ref 70–130)
GLUCOSE BLDC GLUCOMTR-MCNC: 201 MG/DL (ref 70–130)
GLUCOSE BLDC GLUCOMTR-MCNC: 203 MG/DL (ref 70–130)
GLUCOSE BLDC GLUCOMTR-MCNC: 328 MG/DL (ref 70–130)
GLUCOSE SERPL-MCNC: 197 MG/DL (ref 65–99)
HCT VFR BLD AUTO: 33.3 % (ref 37.5–51)
HGB BLD-MCNC: 10.9 G/DL (ref 13–17.7)
MCH RBC QN AUTO: 26.2 PG (ref 26.6–33)
MCHC RBC AUTO-ENTMCNC: 32.7 G/DL (ref 31.5–35.7)
MCV RBC AUTO: 80 FL (ref 79–97)
PLATELET # BLD AUTO: 95 10*3/MM3 (ref 140–450)
PMV BLD AUTO: 9.6 FL (ref 6–12)
POTASSIUM SERPL-SCNC: 5.2 MMOL/L (ref 3.5–5.2)
RBC # BLD AUTO: 4.16 10*6/MM3 (ref 4.14–5.8)
SODIUM SERPL-SCNC: 137 MMOL/L (ref 136–145)
WBC NRBC COR # BLD: 6.13 10*3/MM3 (ref 3.4–10.8)

## 2023-09-23 PROCEDURE — 63710000001 INSULIN GLARGINE PER 5 UNITS: Performed by: NURSE PRACTITIONER

## 2023-09-23 PROCEDURE — 63710000001 INSULIN LISPRO (HUMAN) PER 5 UNITS: Performed by: INTERNAL MEDICINE

## 2023-09-23 PROCEDURE — 82948 REAGENT STRIP/BLOOD GLUCOSE: CPT

## 2023-09-23 PROCEDURE — 80048 BASIC METABOLIC PNL TOTAL CA: CPT | Performed by: NURSE PRACTITIONER

## 2023-09-23 PROCEDURE — 63710000001 DEXAMETHASONE PER 0.25 MG: Performed by: INTERNAL MEDICINE

## 2023-09-23 PROCEDURE — 63710000001 INSULIN LISPRO (HUMAN) PER 5 UNITS: Performed by: NURSE PRACTITIONER

## 2023-09-23 PROCEDURE — 85027 COMPLETE CBC AUTOMATED: CPT | Performed by: NURSE PRACTITIONER

## 2023-09-23 RX ADMIN — INSULIN LISPRO 6 UNITS: 100 INJECTION, SOLUTION INTRAVENOUS; SUBCUTANEOUS at 12:07

## 2023-09-23 RX ADMIN — DEXAMETHASONE 4 MG: 4 TABLET ORAL at 11:08

## 2023-09-23 RX ADMIN — HYDROCODONE BITARTRATE AND ACETAMINOPHEN 1 TABLET: 10; 325 TABLET ORAL at 11:08

## 2023-09-23 RX ADMIN — DEXAMETHASONE 4 MG: 4 TABLET ORAL at 23:58

## 2023-09-23 RX ADMIN — ASPIRIN 81 MG: 81 TABLET, COATED ORAL at 08:45

## 2023-09-23 RX ADMIN — INSULIN LISPRO 6 UNITS: 100 INJECTION, SOLUTION INTRAVENOUS; SUBCUTANEOUS at 17:25

## 2023-09-23 RX ADMIN — INSULIN LISPRO 7 UNITS: 100 INJECTION, SOLUTION INTRAVENOUS; SUBCUTANEOUS at 21:08

## 2023-09-23 RX ADMIN — LEVOTHYROXINE SODIUM 88 MCG: 0.09 TABLET ORAL at 05:07

## 2023-09-23 RX ADMIN — INSULIN LISPRO 4 UNITS: 100 INJECTION, SOLUTION INTRAVENOUS; SUBCUTANEOUS at 12:07

## 2023-09-23 RX ADMIN — ROPINIROLE 1 MG: 1 TABLET, FILM COATED ORAL at 20:48

## 2023-09-23 RX ADMIN — INSULIN LISPRO 6 UNITS: 100 INJECTION, SOLUTION INTRAVENOUS; SUBCUTANEOUS at 08:46

## 2023-09-23 RX ADMIN — INSULIN LISPRO 2 UNITS: 100 INJECTION, SOLUTION INTRAVENOUS; SUBCUTANEOUS at 08:46

## 2023-09-23 RX ADMIN — INSULIN LISPRO 4 UNITS: 100 INJECTION, SOLUTION INTRAVENOUS; SUBCUTANEOUS at 17:25

## 2023-09-23 RX ADMIN — Medication 2000 UNITS: at 08:45

## 2023-09-23 RX ADMIN — PANTOPRAZOLE SODIUM 40 MG: 40 TABLET, DELAYED RELEASE ORAL at 08:45

## 2023-09-23 RX ADMIN — INSULIN GLARGINE 20 UNITS: 100 INJECTION, SOLUTION SUBCUTANEOUS at 20:53

## 2023-09-23 RX ADMIN — GABAPENTIN 900 MG: 300 CAPSULE ORAL at 20:53

## 2023-09-23 RX ADMIN — MODAFINIL 200 MG: 100 TABLET ORAL at 08:45

## 2023-09-23 RX ADMIN — PANTOPRAZOLE SODIUM 40 MG: 40 TABLET, DELAYED RELEASE ORAL at 17:25

## 2023-09-23 RX ADMIN — DEXAMETHASONE 4 MG: 4 TABLET ORAL at 17:25

## 2023-09-23 RX ADMIN — DEXAMETHASONE 4 MG: 4 TABLET ORAL at 05:07

## 2023-09-23 RX ADMIN — SENNOSIDES AND DOCUSATE SODIUM 2 TABLET: 50; 8.6 TABLET ORAL at 20:53

## 2023-09-23 RX ADMIN — PRAVASTATIN SODIUM 40 MG: 40 TABLET ORAL at 08:46

## 2023-09-23 NOTE — PROGRESS NOTES
Name: Semaj Herrera ADMIT: 2023   : 1940  PCP: Axel Guardado MD    MRN: [id number] LOS: 5 days   AGE/SEX: 82 y.o. male  ROOM: [facility]     Subjective   Subjective   Denies any new complaint. No chest pain or shortness of breath or abdominal pain.     Objective   Objective   Vital Signs  Temp:  [97.9 °F (36.6 °C)-98.4 °F (36.9 °C)] 97.9 °F (36.6 °C)  Heart Rate:  [47-61] 47  Resp:  [18] 18  BP: (161-165)/(59-65) 165/65  SpO2:  [97 %-100 %] 100 %  on   ;   Device (Oxygen Therapy): room air  Body mass index is 28.06 kg/m².    Physical Exam  Constitutional:       General: He is not in acute distress.     Appearance: He is ill-appearing (chronic). He is not toxic-appearing.   Cardiovascular:      Rate and Rhythm: Normal rate and regular rhythm.   Pulmonary:      Effort: Pulmonary effort is normal. No respiratory distress.      Breath sounds: Normal breath sounds. No wheezing or rhonchi.   Abdominal:      General: Bowel sounds are normal.      Palpations: Abdomen is soft.      Tenderness: There is no abdominal tenderness. There is no guarding or rebound.   Skin:     General: Skin is warm and dry.   Neurological:      Mental Status: He is alert and oriented to person, place, and time.   Psychiatric:         Mood and Affect: Mood normal.         Behavior: Behavior normal.     Results Review:       I reviewed the patient's new clinical results.  Results from last 7 days   Lab Units 23  0531 23  0712 23  0634 23  0631   WBC 10*3/mm3 6.13 8.20 8.13 7.77   HEMOGLOBIN g/dL 10.9* 11.4* 10.8* 7.1*   PLATELETS 10*3/mm3 95* 105* 96* 102*       Results from last 7 days   Lab Units 23  0531 23  1132 23  0823 23  1449 23  0712   SODIUM mmol/L 137  --  137 137 133*   POTASSIUM mmol/L 5.2 5.2 5.9* 4.4 5.4*   CHLORIDE mmol/L 105  --  105 103 105   CO2 mmol/L 18.6*  --  22.0 21.0* 17.0*   BUN mg/dL 30*  --  35* 33* 29*   CREATININE mg/dL 0.60*  --  0.73* 0.83 0.78    GLUCOSE mg/dL 197*  --  170* 206* 203*     Estimated Creatinine Clearance: 87.5 mL/min (A) (by C-G formula based on SCr of 0.6 mg/dL (L)).  Results from last 7 days   Lab Units 09/21/23  0712 09/20/23  0634 09/19/23  0631 09/17/23  1207   ALBUMIN g/dL 3.5 3.7 3.9 3.9   BILIRUBIN mg/dL 0.4 0.4 0.3 0.4   ALK PHOS U/L 308* 300* 351* 385*   AST (SGOT) U/L 14 13 12 16   ALT (SGPT) U/L 9 8 7 10       Results from last 7 days   Lab Units 09/23/23  0531 09/22/23  0823 09/21/23  1449 09/21/23  0712 09/20/23  0634 09/19/23  0631 09/18/23  0412 09/17/23  1207   CALCIUM mg/dL 7.8* 7.8* 8.1* 7.9* 8.1* 8.3*   < > 8.1*   ALBUMIN g/dL  --   --   --  3.5 3.7 3.9  --  3.9   MAGNESIUM mg/dL  --   --   --   --   --   --   --  2.8*    < > = values in this interval not displayed.         Glucose   Date/Time Value Ref Range Status   09/23/2023 0745 195 (H) 70 - 130 mg/dL Final   09/22/2023 2136 190 (H) 70 - 130 mg/dL Final   09/22/2023 1704 137 (H) 70 - 130 mg/dL Final   09/22/2023 1211 152 (H) 70 - 130 mg/dL Final   09/22/2023 0819 170 (H) 70 - 130 mg/dL Final   09/21/2023 2010 311 (H) 70 - 130 mg/dL Final   09/21/2023 1557 185 (H) 70 - 130 mg/dL Final       Scheduled Meds  aspirin, 81 mg, Oral, Daily  fentaNYL, 1 patch, Transdermal, Q72H   And  Check Fentanyl Patch Placement, 1 each, Does not apply, Q12H  cholecalciferol, 2,000 Units, Oral, Daily  dexAMETHasone, 4 mg, Oral, Q6H  dilTIAZem CD, 120 mg, Oral, Daily  gabapentin, 900 mg, Oral, Nightly  insulin glargine, 20 Units, Subcutaneous, Nightly  insulin lispro, 2-9 Units, Subcutaneous, 4x Daily AC & at Bedtime  insulin lispro, 6 Units, Subcutaneous, TID With Meals  [START ON 9/24/2023] levothyroxine, 176 mcg, Oral, Once per day on Sun Wed  levothyroxine, 88 mcg, Oral, Once per day on Mon Tue Thu Sat  modafinil, 200 mg, Oral, Daily  pantoprazole, 40 mg, Oral, BID AC  senna-docusate sodium, 2 tablet, Oral, BID   And  polyethylene glycol, 17 g, Oral, Daily  pravastatin, 40 mg, Oral,  Daily  rOPINIRole, 1 mg, Oral, Nightly    Continuous Infusions   PRN Meds    acetaminophen    senna-docusate sodium **AND** polyethylene glycol **AND** bisacodyl **AND** bisacodyl    calcium carbonate    dextrose    dextrose    glucagon (human recombinant)    HYDROcodone-acetaminophen    HYDROmorphone    melatonin    ondansetron **OR** ondansetron    traZODone    Diet: Diabetic Diets, Renal Diets; Consistent Carbohydrate; Low Potassium; Texture: Regular Texture (IDDSI 7); Fluid Consistency: Thin (IDDSI 0)    I have personally reviewed:  [x]  Medications  [x]  Laboratory   []  Microbiology   [x]  Radiology   []  EKG/Telemetry   []  Cardiology/Vascular   []  Pathology   []  Records        Assessment/Plan     Active Hospital Problems    Diagnosis  POA    **Fecal impaction in rectum [K56.41]  Yes    Moderate malnutrition [E44.0]  Yes    Lower extremity weakness [R29.898]  Yes    Anemia, chronic disease [D63.8]  Yes    Metastatic cancer to bone [C79.51]  Yes    Restless legs syndrome (RLS) [G25.81]  Yes    Diastolic dysfunction [I51.89]  Yes    Neuropathy [G62.9]  Yes    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     Mr. Herrera is a 82 y.o. that presented to the hospital with a 2 to 3-day history of increasing bilateral leg weakness as well as constipation in the setting of known metastatic prostate cancer to the bone and lymph nodes.     Fecal impaction in the rectum  -CT A/P on admission with large amount of dense fecal material within the low sigmoid and rectum with rectal wall thickening.  Continued note of widespread osseous metastasis that was more pronounced  compared to prior.  -Improving with bowel regimen.  -GI consulted. Appreciate their recommendations. Recommend daily Miralax and potential OIC medication if non-responsive to first line drugs.     Lower extremity weakness  Neuropathy  -MRI of the lumbar spine with metastatic disease. MRI cervical spine with metastatic disease but no significant compression. 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   -Some improvement with IV steroids now on PO  -Radiation oncology and Neurology consulted by oncology.   -Given significant cord compression at T10- neurosurgery evaluated.  Pringle that due to the extent of his metastatic disease a thoracic decompression would likely destabilize the patient and therefore radiation therapy was recommended.   -PT/OT following-plans for home health versus SNF- patient/son want home. DME ordered and awaiting delivery before discharge  -Continue home gabapentin.  -Radiation oncology planning radiation treatments  -Neurology briefly evaluated but deferred to neurosurgery.     Prostate cancer metastatic to bone  -Oncology following. Appreciate their assistance.  -Palliative consulted for further goals of care-plans to continue radiation and current treatment.  -Continue pain medications.  -Hospice outpatient has been recommended by oncology- son has their information to call and set up.     DM2  -Typically on insulin pump that is not currently being used-DM educator met with patient and son and they do not have all the supplies he would need to resume this while inpatient.  -Acceptable control with correctional sliding scale insulin here on Lantus nightly to 20 units and meal time humalog with 6 units TID  -Titrate pending glucose trends and oral intake     RONNY  -Home CPAP if available.  -Avoid oversedation.     Diastolic dysfunction  Hypertension  -Established with Dr. Lee for mild aortic  stenosis.  -Continue outpatient follow-up.  -Continue home regimen.     CKD 3  -Renal function/electrolytes overall stable.  -Has some chronic high normal potassiums now on low potassium diet.  -He is established with Dr. Vicky Duran.     Anemia  Thrombocytopenia  -S/P 2 units of PRBC on 9/19 for hemoglobin 7.1      Discharge pending delivery of hospital bed/lift      VTE Prophylaxis - SCDs  Code Status - NO CPR/intubation.     Martineznina Dewey MD  [facility]  09/23/23

## 2023-09-23 NOTE — PLAN OF CARE
Goal Outcome Evaluation:  Plan of Care Reviewed With: patient        Progress: no change  Outcome Evaluation: Patient alert and oriented, on room air, tolerating cc diet, blood sugars monitored and treated per order, fent patch intact norco given once for voiced pain, incontinent of bowel and bladder incontinent care provided, patient bradycardic at times MD aware, vitals are stable, plan of care continued

## 2023-09-24 LAB
GLUCOSE BLDC GLUCOMTR-MCNC: 140 MG/DL (ref 70–130)
GLUCOSE BLDC GLUCOMTR-MCNC: 155 MG/DL (ref 70–130)
GLUCOSE BLDC GLUCOMTR-MCNC: 164 MG/DL (ref 70–130)
GLUCOSE BLDC GLUCOMTR-MCNC: 177 MG/DL (ref 70–130)
GLUCOSE BLDC GLUCOMTR-MCNC: 286 MG/DL (ref 70–130)

## 2023-09-24 PROCEDURE — 82948 REAGENT STRIP/BLOOD GLUCOSE: CPT

## 2023-09-24 PROCEDURE — 63710000001 INSULIN LISPRO (HUMAN) PER 5 UNITS: Performed by: INTERNAL MEDICINE

## 2023-09-24 PROCEDURE — 63710000001 DEXAMETHASONE PER 0.25 MG: Performed by: INTERNAL MEDICINE

## 2023-09-24 PROCEDURE — 63710000001 INSULIN LISPRO (HUMAN) PER 5 UNITS: Performed by: NURSE PRACTITIONER

## 2023-09-24 PROCEDURE — 63710000001 INSULIN GLARGINE PER 5 UNITS: Performed by: NURSE PRACTITIONER

## 2023-09-24 RX ORDER — BISACODYL 10 MG
10 SUPPOSITORY, RECTAL RECTAL DAILY PRN
Status: DISCONTINUED | OUTPATIENT
Start: 2023-09-24 | End: 2023-09-25 | Stop reason: HOSPADM

## 2023-09-24 RX ORDER — POLYETHYLENE GLYCOL 3350 17 G/17G
17 POWDER, FOR SOLUTION ORAL 2 TIMES DAILY
Status: DISCONTINUED | OUTPATIENT
Start: 2023-09-24 | End: 2023-09-25 | Stop reason: HOSPADM

## 2023-09-24 RX ORDER — AMOXICILLIN 250 MG
2 CAPSULE ORAL 2 TIMES DAILY
Status: DISCONTINUED | OUTPATIENT
Start: 2023-09-24 | End: 2023-09-25 | Stop reason: HOSPADM

## 2023-09-24 RX ORDER — BISACODYL 5 MG/1
5 TABLET, DELAYED RELEASE ORAL DAILY PRN
Status: DISCONTINUED | OUTPATIENT
Start: 2023-09-24 | End: 2023-09-25 | Stop reason: HOSPADM

## 2023-09-24 RX ADMIN — GABAPENTIN 900 MG: 300 CAPSULE ORAL at 21:12

## 2023-09-24 RX ADMIN — ROPINIROLE 1 MG: 1 TABLET, FILM COATED ORAL at 21:12

## 2023-09-24 RX ADMIN — LEVOTHYROXINE SODIUM 176 MCG: 0.09 TABLET ORAL at 06:15

## 2023-09-24 RX ADMIN — PANTOPRAZOLE SODIUM 40 MG: 40 TABLET, DELAYED RELEASE ORAL at 08:30

## 2023-09-24 RX ADMIN — SENNOSIDES AND DOCUSATE SODIUM 2 TABLET: 50; 8.6 TABLET ORAL at 17:29

## 2023-09-24 RX ADMIN — INSULIN LISPRO 2 UNITS: 100 INJECTION, SOLUTION INTRAVENOUS; SUBCUTANEOUS at 17:29

## 2023-09-24 RX ADMIN — INSULIN LISPRO 6 UNITS: 100 INJECTION, SOLUTION INTRAVENOUS; SUBCUTANEOUS at 17:29

## 2023-09-24 RX ADMIN — MODAFINIL 200 MG: 100 TABLET ORAL at 08:30

## 2023-09-24 RX ADMIN — POLYETHYLENE GLYCOL 3350 17 G: 17 POWDER, FOR SOLUTION ORAL at 21:12

## 2023-09-24 RX ADMIN — PRAVASTATIN SODIUM 40 MG: 40 TABLET ORAL at 08:30

## 2023-09-24 RX ADMIN — DEXAMETHASONE 4 MG: 4 TABLET ORAL at 23:43

## 2023-09-24 RX ADMIN — INSULIN LISPRO 2 UNITS: 100 INJECTION, SOLUTION INTRAVENOUS; SUBCUTANEOUS at 08:31

## 2023-09-24 RX ADMIN — DEXAMETHASONE 4 MG: 4 TABLET ORAL at 06:15

## 2023-09-24 RX ADMIN — DEXAMETHASONE 4 MG: 4 TABLET ORAL at 12:13

## 2023-09-24 RX ADMIN — INSULIN LISPRO 6 UNITS: 100 INJECTION, SOLUTION INTRAVENOUS; SUBCUTANEOUS at 12:13

## 2023-09-24 RX ADMIN — HYDROCODONE BITARTRATE AND ACETAMINOPHEN 1 TABLET: 10; 325 TABLET ORAL at 21:45

## 2023-09-24 RX ADMIN — ASPIRIN 81 MG: 81 TABLET, COATED ORAL at 08:30

## 2023-09-24 RX ADMIN — DEXAMETHASONE 4 MG: 4 TABLET ORAL at 17:29

## 2023-09-24 RX ADMIN — PANTOPRAZOLE SODIUM 40 MG: 40 TABLET, DELAYED RELEASE ORAL at 17:28

## 2023-09-24 RX ADMIN — INSULIN LISPRO 6 UNITS: 100 INJECTION, SOLUTION INTRAVENOUS; SUBCUTANEOUS at 08:31

## 2023-09-24 RX ADMIN — INSULIN GLARGINE 20 UNITS: 100 INJECTION, SOLUTION SUBCUTANEOUS at 21:12

## 2023-09-24 RX ADMIN — Medication 2000 UNITS: at 08:30

## 2023-09-24 NOTE — PLAN OF CARE
Goal Outcome Evaluation:  Plan of Care Reviewed With: patient        Progress: no change  Outcome Evaluation: Patient alert and oriented, on room air, tolerating cc diet, blood sugars monitored and treated per order, fent patch intact,, incontinent of bowel and bladder incontinent care provided, patient bradycardic at times MD aware, vitals are stable, plan of care continued

## 2023-09-25 ENCOUNTER — DOCUMENTATION (OUTPATIENT)
Dept: HOME HEALTH SERVICES | Facility: HOME HEALTHCARE | Age: 83
End: 2023-09-25

## 2023-09-25 ENCOUNTER — RADIATION ONCOLOGY WEEKLY ASSESSMENT (OUTPATIENT)
Dept: RADIATION ONCOLOGY | Facility: HOSPITAL | Age: 83
End: 2023-09-25

## 2023-09-25 ENCOUNTER — READMISSION MANAGEMENT (OUTPATIENT)
Dept: CALL CENTER | Facility: HOSPITAL | Age: 83
End: 2023-09-25

## 2023-09-25 VITALS
BODY MASS INDEX: 29.55 KG/M2 | HEART RATE: 49 BPM | WEIGHT: 173.06 LBS | HEIGHT: 64 IN | TEMPERATURE: 97.5 F | OXYGEN SATURATION: 97 % | SYSTOLIC BLOOD PRESSURE: 150 MMHG | DIASTOLIC BLOOD PRESSURE: 60 MMHG | RESPIRATION RATE: 18 BRPM

## 2023-09-25 DIAGNOSIS — C61 PROSTATE CANCER METASTATIC TO BONE: Primary | ICD-10-CM

## 2023-09-25 DIAGNOSIS — C79.51 PROSTATE CANCER METASTATIC TO BONE: Primary | ICD-10-CM

## 2023-09-25 LAB
ANION GAP SERPL CALCULATED.3IONS-SCNC: 10 MMOL/L (ref 5–15)
BUN SERPL-MCNC: 27 MG/DL (ref 8–23)
BUN/CREAT SERPL: 39.7 (ref 7–25)
CALCIUM SPEC-SCNC: 7.3 MG/DL (ref 8.6–10.5)
CHLORIDE SERPL-SCNC: 105 MMOL/L (ref 98–107)
CO2 SERPL-SCNC: 23 MMOL/L (ref 22–29)
CREAT SERPL-MCNC: 0.68 MG/DL (ref 0.76–1.27)
EGFRCR SERPLBLD CKD-EPI 2021: 92.8 ML/MIN/1.73
GLUCOSE BLDC GLUCOMTR-MCNC: 159 MG/DL (ref 70–130)
GLUCOSE BLDC GLUCOMTR-MCNC: 185 MG/DL (ref 70–130)
GLUCOSE SERPL-MCNC: 118 MG/DL (ref 65–99)
POTASSIUM SERPL-SCNC: 5 MMOL/L (ref 3.5–5.2)
RAD ONC ARIA COURSE ID: NORMAL
RAD ONC ARIA COURSE INTENT: NORMAL
RAD ONC ARIA COURSE LAST TREATMENT DATE: NORMAL
RAD ONC ARIA COURSE START DATE: NORMAL
RAD ONC ARIA COURSE TREATMENT ELAPSED DAYS: 5
RAD ONC ARIA FIRST TREATMENT DATE: NORMAL
RAD ONC ARIA PLAN FRACTIONS TREATED TO DATE: 4
RAD ONC ARIA PLAN ID: NORMAL
RAD ONC ARIA PLAN PRESCRIBED DOSE PER FRACTION: 4 GY
RAD ONC ARIA PLAN PRIMARY REFERENCE POINT: NORMAL
RAD ONC ARIA PLAN TOTAL FRACTIONS PRESCRIBED: 5
RAD ONC ARIA PLAN TOTAL PRESCRIBED DOSE: 2000 CGY
RAD ONC ARIA REFERENCE POINT DOSAGE GIVEN TO DATE: 16 GY
RAD ONC ARIA REFERENCE POINT ID: NORMAL
RAD ONC ARIA REFERENCE POINT SESSION DOSAGE GIVEN: 4 GY
SODIUM SERPL-SCNC: 138 MMOL/L (ref 136–145)

## 2023-09-25 PROCEDURE — 80048 BASIC METABOLIC PNL TOTAL CA: CPT | Performed by: HOSPITALIST

## 2023-09-25 PROCEDURE — 77387 GUIDANCE FOR RADJ TX DLVR: CPT | Performed by: STUDENT IN AN ORGANIZED HEALTH CARE EDUCATION/TRAINING PROGRAM

## 2023-09-25 PROCEDURE — G6002 STEREOSCOPIC X-RAY GUIDANCE: HCPCS | Performed by: STUDENT IN AN ORGANIZED HEALTH CARE EDUCATION/TRAINING PROGRAM

## 2023-09-25 PROCEDURE — 82948 REAGENT STRIP/BLOOD GLUCOSE: CPT

## 2023-09-25 PROCEDURE — 63710000001 INSULIN LISPRO (HUMAN) PER 5 UNITS: Performed by: NURSE PRACTITIONER

## 2023-09-25 PROCEDURE — 63710000001 DEXAMETHASONE PER 0.25 MG: Performed by: INTERNAL MEDICINE

## 2023-09-25 PROCEDURE — 77412 RADIATION TX DELIVERY LVL 3: CPT | Performed by: STUDENT IN AN ORGANIZED HEALTH CARE EDUCATION/TRAINING PROGRAM

## 2023-09-25 RX ADMIN — Medication 2000 UNITS: at 10:13

## 2023-09-25 RX ADMIN — HYDROCODONE BITARTRATE AND ACETAMINOPHEN 1 TABLET: 10; 325 TABLET ORAL at 15:12

## 2023-09-25 RX ADMIN — DEXAMETHASONE 4 MG: 4 TABLET ORAL at 05:32

## 2023-09-25 RX ADMIN — ASPIRIN 81 MG: 81 TABLET, COATED ORAL at 10:12

## 2023-09-25 RX ADMIN — PANTOPRAZOLE SODIUM 40 MG: 40 TABLET, DELAYED RELEASE ORAL at 10:12

## 2023-09-25 RX ADMIN — PRAVASTATIN SODIUM 40 MG: 40 TABLET ORAL at 10:12

## 2023-09-25 RX ADMIN — INSULIN LISPRO 6 UNITS: 100 INJECTION, SOLUTION INTRAVENOUS; SUBCUTANEOUS at 12:17

## 2023-09-25 RX ADMIN — INSULIN LISPRO 6 UNITS: 100 INJECTION, SOLUTION INTRAVENOUS; SUBCUTANEOUS at 10:12

## 2023-09-25 RX ADMIN — LEVOTHYROXINE SODIUM 88 MCG: 0.09 TABLET ORAL at 05:32

## 2023-09-25 RX ADMIN — DEXAMETHASONE 4 MG: 4 TABLET ORAL at 12:17

## 2023-09-25 NOTE — PROGRESS NOTES
Radiation Oncology  On-Treatment Note      Patient: Semaj Herrera    MRN: [id number]    Attending Physician: Wang Harper MD     Diagnosis:     ICD-10-CM ICD-9-CM   1. Prostate cancer metastatic to bone  C61 185    C79.51 198.5       Radiation Therapy Visit:  Continue radiation therapy, Dosimetry plan remains acceptable, Films reviewed and remains acceptable, Pain assessed, Pain management planned, Radiation dose schedule reviewed and remains acceptable, Radiation technique remains acceptable, and Symptoms within expected range    Radiation Treatments       Active   Plans   T/L Spine-3D   Most recent treatment: Dose planned: 400 cGy (fraction 4 on 9/25/2023)   Total: Dose planned: 2,000 cGy (5 fractions)   Elapsed Days: 5      Reference Points   Rx: T/L Spine   Most recent treatment: Dose given: 400 cGy (on 9/25/2023)   Total: Dose given: 1,600 cGy   Elapsed Days: 5                      Physical Examination:  Vitals: There were no vitals taken for this visit.  There were no vitals filed for this visit.    Completely disabled; cannot carry on any selfcare; totally confined to bed or chair = 4    We examined the relevant areas: yes  Findings are within the expected range for this stage of treatment: yes  -------------------------------------------------------------------------------------------------------------------    ACTION ITEMS:  Patient tolerating treatment well and as expected for this stage in their treatment and Continue radiation therapy as planned    Estimated Completion Date: 9/26/2023    Follow up JARRETT Harper MD  Radiation Oncology

## 2023-09-25 NOTE — PROGRESS NOTES
Patient is discharging today . Spoke to son who is caregiver and is agreeable to home health and has no current home health . Face sheet is correct and prefers we call home number please- [phone]. Orders in Epic for PT and requests start of care Wednesday 9/27/23 please. Thank you !

## 2023-09-25 NOTE — DISCHARGE SUMMARY
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     Procedures Performed  none     Consults       Date and Time Order Name Status Description    9/19/2023  9:06 AM Inpatient Neurosurgery Consult Completed     9/18/2023  3:38 PM Inpatient Neurology Consult Other (see comments) Completed     9/18/2023  3:38 PM Inpatient Radiation Oncology Consult Completed     9/18/2023  1:32 PM Inpatient Gastroenterology Consult Completed     9/17/2023  7:32 PM Inpatient Hematology & Oncology Consult            Hospital Course  Please see history and physical for details. Patient is a 82 y.o. 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  CT A/P on admission revealed a large amount of dense fecal material within the low sigmoid and rectum with rectal wall thickening as well as continued note of widespread osseous metastasis that was more pronounced compared to prior.  He was admitted to the  hospital and seen in consultation by several specialists.              He was started on a bowel regimen on admission and GI was consulted.  He had adequate bowel movements and was recommended to continue daily MiraLAX.  He has been tolerating Senokot scheduled and using MiraLAX as needed without any issues.  He would be potential candidate for OIC medication if nonresponsive to first-line drugs.  GI quickly signed off as he has been having bowel movements without issue.              Given his sudden onset of lower extremity weakness, MRI of the lumbar/thoracic/cervical spine were obtained.  MRI of the lumbar spine showed metastatic disease and MRI cervical spine was with metastatic disease but no significant compression.  MRI thoracic spine revealed metastatic disease as well as severe canal stenosis at T10 with extraosseous epidural metastatic disease and significant cord compression.  He was started on IV steroids and neurosurgery was consulted.  They felt that due to the extent of his metastatic disease a thoracic decompression would not likely be beneficial and would probably destabilize him. Radiation oncology was consulted and radiation therapy was recommended for this reason.  There are 5 treatments planned and he has currently received 4 of these (however, patient apparently states the 5th treatment was cancelled).  He did have some improvement with steroids and will continue on Decadron 4 mg p.o. every 6 hours per oncology recommendations.  Further taper of this will be deferred to them in the outpatient setting.  Neurology briefly evaluated the patient but ultimately deferred to neurosurgery given the spinal issues.              He was evaluated by PT/OT who recommended home with 24/7 assistance versus SNF.  Son requested discharge to home as he can manage him as long as he is able to stand and pivot.  He requested home Attila lift and hospital bed as well as other various DME which delayed initial  discharge. Hospice was suggested by oncology and CODE STATUS appropriate during the stay.  Son states that he has the hospice card and will call them when they are ready.              The patient is normally on an insulin pump, but was not on this during the hospital stay due to need to remove his sensor with radiation treatments as well as lack of home supplies available.  He was maintained on a basal/bolus insulin regimen with sugar slowly improving on steroids.  His labs have been overall stable, but he has had some intermittent elevated potassium levels.  Son states that this has been somewhat of a chronic issue for him he has been advised to eat low potassium foods for this reason.  He will need to continue a low potassium diet and recommend follow-up with repeat labs with his PCP in the next 1 to 2 weeks.  He is already established with Dr. Vicky Duran should follow-up with her as advised. His hemoglobin did drop at one time to 7.1 and he was given 2 units PRBC on 9/19.              Oncology has cleared him for discharge with outpatient radiation as well as oral steroids.  He is already managed chronically for his pain by Dr. Schulte.  He plans to resume his insulin pump at discharge and diabetes educator will discuss with them prior to discharge.  The patient denies any chest pain, dyspnea, cough, fever or chills.  He denies any nausea, vomiting or abdominal pain.  He is having adequate bowel movements.  His son is at bedside and feels comfortable taking him home today.     I discussed the patient's findings and my recommendations with patient, family, nursing staff, and Dr. Dewey .    Condition on Discharge: Improved.     Temp:  [97.5 °F (36.4 °C)-98.2 °F (36.8 °C)] 97.5 °F (36.4 °C)  Heart Rate:  [44-52] 44  Resp:  [12-16] 16  BP: (140-163)/(50-66) 140/50  Body mass index is 29.69 kg/m².    Physical Exam  Vitals and nursing note reviewed.   Constitutional:       Appearance: He is ill-appearing. He is  not toxic-appearing.   Cardiovascular:      Rate and Rhythm: Normal rate and regular rhythm.      Pulses: Normal pulses.   Pulmonary:      Effort: Pulmonary effort is normal. No respiratory distress.      Breath sounds: Normal breath sounds.      Comments: Diminished and on room air.  Abdominal:      General: Bowel sounds are normal. There is no distension.      Palpations: Abdomen is soft.      Tenderness: There is no abdominal tenderness.   Musculoskeletal:         General: Swelling (Mild bilateral lower extremities) present. No tenderness.   Skin:     General: Skin is warm and dry.      Findings: No bruising.   Neurological:      Mental Status: He is alert and oriented to person, place, and time.      Sensory: Sensory deficit present.      Motor: Weakness present.      Coordination: Coordination normal.   Psychiatric:         Mood and Affect: Mood normal.         Behavior: Behavior normal.        Discharge Medications        New Medications        Instructions Start Date   dexAMETHasone 4 MG tablet  Commonly known as: DECADRON   4 mg, Oral, Every 6 Hours      pantoprazole 40 MG EC tablet  Commonly known as: PROTONIX   40 mg, Oral, 2 Times Daily Before Meals      polyethylene glycol 17 g packet  Commonly known as: MIRALAX   17 g, Oral, Daily      sennosides-docusate 8.6-50 MG per tablet  Commonly known as: PERICOLACE   2 tablets, Oral, 2 Times Daily             Continue These Medications        Instructions Start Date   Accu-Chek Guide test strip  Generic drug: glucose blood   USE 1 STRIP TO CHECK BLOOD SUGAR 4 TIMES DAILY      Accu-Chek Softclix Lancets lancets   USE 1 LANCET TO CHECK GLUCOSE 4 TIMES DAILY      aspirin 81 MG EC tablet   1 tablet, Oral, Daily      cholecalciferol 25 MCG (1000 UT) tablet  Commonly known as: VITAMIN D3   2,000 Units, Oral, Daily      dilTIAZem  MG 24 hr capsule  Commonly known as: CARDIZEM CD   TAKE 1 CAPSULE EVERY DAY      diphenoxylate-atropine 2.5-0.025 MG per  tablet  Commonly known as: LOMOTIL   1 tablet, Oral, 4 Times Daily PRN      fentaNYL 50 MCG/HR patch  Commonly known as: DURAGESIC   1 patch, Transdermal, Every 72 Hours      gabapentin 300 MG capsule  Commonly known as: NEURONTIN   TAKE 3 CAPSULES EVERY NIGHT AT BEDTIME      Insulin Aspart 100 UNIT/ML injection  Commonly known as: novoLOG   INJECT 100 UNITS VIA PUMP ONCE DAILY      levothyroxine 88 MCG tablet  Commonly known as: SYNTHROID, LEVOTHROID   No dose, route, or frequency recorded.      loperamide 1 MG/5ML solution  Commonly known as: IMODIUM   2 mg, Oral, 4 Times Daily PRN      LUPRON DEPOT (3-MONTH) IM   Intramuscular, Every 3 Months      modafinil 200 MG tablet  Commonly known as: PROVIGIL   200 mg, Oral, Daily      ondansetron 8 MG tablet  Commonly known as: ZOFRAN   8 mg, Oral, 3 Times Daily PRN      pravastatin 40 MG tablet  Commonly known as: PRAVACHOL   40 mg, Oral, Daily      rOPINIRole 0.5 MG tablet  Commonly known as: REQUIP   Take 2 tablets by mouth in the evening and 2 at bedtime.      traZODone 50 MG tablet  Commonly known as: DESYREL    mg, Oral, Every Night at Bedtime              Diet Instructions       Diet: Regular/House Diet, Diabetic Diets, Renal Diets; Consistent Carbohydrate; Regular Texture (IDDSI 7); Thin (IDDSI 0); Low Potassium      Discharge Diet:  Regular/House Diet  Diabetic Diets  Renal Diets       Diabetic Diet: Consistent Carbohydrate    Texture: Regular Texture (IDDSI 7)    Fluid Consistency: Thin (IDDSI 0)    Renal Diet: Low Potassium           Activity Instructions       Activity as Tolerated             Additional Instructions for the Follow-ups that You Need to Schedule       Discharge Follow-up with PCP   As directed       Currently Documented PCP:    Axel Guardado MD    PCP Phone Number:    [phone]     Follow Up Details: see in 1-2 weeks        Discharge Follow-up with Specified Provider: Dr. Lee or his NP in 2 weeks   As directed      To: Dr. Lee  or his NP in 2 weeks   Follow Up Details: will need further clarification of Decadron plans (potential taper) at that time        Discharge Follow-up with Specified Provider: Radiation Oncology   As directed      To: Radiation Oncology   Follow Up Details: As advised.               Contact information for follow-up providers       Axel Guardado MD .    Specialty: Family Medicine  Why: see in 1-2 weeks  Contact information:  [address] [place] [address]  [phone]                       Contact information for after-discharge care       Durable Medical Equipment       [facility] .    Service: Durable Medical Equipment  Contact information:  [address] [address]  [phone]                                 Test Results Pending at Discharge     Mariaa Austin, APRN  09/25/23  11:17 EDT    Discharge time spent greater than 30 minutes.

## 2023-09-26 DIAGNOSIS — C61 PROSTATE CANCER METASTATIC TO BONE: ICD-10-CM

## 2023-09-26 DIAGNOSIS — C79.51 MALIGNANT NEOPLASM METASTATIC TO BONE: ICD-10-CM

## 2023-09-26 DIAGNOSIS — C79.51 PROSTATE CANCER METASTATIC TO BONE: ICD-10-CM

## 2023-09-26 RX ORDER — FENTANYL 50 UG/1
1 PATCH TRANSDERMAL
Qty: 10 EACH | Refills: 0 | Status: SHIPPED | OUTPATIENT
Start: 2023-09-26

## 2023-09-26 NOTE — OUTREACH NOTE
Prep Survey      Flowsheet Row Responses   [facility] facility patient discharged from? [place]   Is LACE score < 7 ? No   Eligibility Readm Mgmt   Discharge diagnosis fecal impaction of rectum   Does the patient have one of the following disease processes/diagnoses(primary or secondary)? Other   Does the patient have Home health ordered? Yes   What is the Home health agency?  [facility]   Is there a DME ordered? Yes   What DME was ordered? [employer] for O2   Prep survey completed? Yes            ANATOLIY VEGA - Registered Nurse

## 2023-09-27 ENCOUNTER — TREATMENT (OUTPATIENT)
Dept: RADIATION ONCOLOGY | Facility: HOSPITAL | Age: 83
End: 2023-09-27
Payer: MEDICARE

## 2023-09-27 ENCOUNTER — HOME CARE VISIT (OUTPATIENT)
Dept: HOME HEALTH SERVICES | Facility: HOME HEALTHCARE | Age: 83
End: 2023-09-27
Payer: MEDICARE

## 2023-09-27 VITALS
RESPIRATION RATE: 16 BRPM | HEART RATE: 56 BPM | DIASTOLIC BLOOD PRESSURE: 57 MMHG | OXYGEN SATURATION: 98 % | TEMPERATURE: 96.6 F | SYSTOLIC BLOOD PRESSURE: 138 MMHG

## 2023-09-27 DIAGNOSIS — C79.51 PROSTATE CANCER METASTATIC TO BONE: Primary | ICD-10-CM

## 2023-09-27 DIAGNOSIS — C61 PROSTATE CANCER METASTATIC TO BONE: Primary | ICD-10-CM

## 2023-09-27 PROCEDURE — G0151 HHCP-SERV OF PT,EA 15 MIN: HCPCS

## 2023-09-27 NOTE — Clinical Note
Home health physical therapy evaluation completed. Patient will be seen 2x/wk for 4 weeks for therapeutic exercises, fall risk education, transfer training, pain management. Gait training if patient is able.

## 2023-09-27 NOTE — CASE MANAGEMENT/SOCIAL WORK
Case Management Discharge Note      Final Note: Home w/HH    Provided Post Acute Provider List?: Yes  Post Acute Provider List: Home Health, Nursing Home  Provided Post Acute Provider Quality & Resource List?: Yes  Post Acute Provider Quality and Resource List: Home Health, Nursing Home  Delivered To: Patient, Support Person  Support Person: son  Method of Delivery: In person    Selected Continued Care - Discharged on 9/25/2023 Admission date: 9/17/2023 - Discharge disposition: Home-Health Care Svc      Destination    No services have been selected for the patient.                Durable Medical Equipment       Service Provider Selected Services Address Phone Fax Patient Preferred    [facility] [address], [place] [address] [phone] --              Dialysis/Infusion    No services have been selected for the patient.                Home Medical Care    No services have been selected for the patient.                Therapy    No services have been selected for the patient.                Community Resources    No services have been selected for the patient.                Community & DME    No services have been selected for the patient.                         Final Discharge Disposition Code: 06 - home with home health care

## 2023-09-27 NOTE — HOME HEALTH
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    MEDICAL HISTORY: Patient was diagnosed with prostrate cancer several years ago and states he was independent with activities until 2 weeks ago. He began to have increased weakness and was taken to the hospital via EMS on 9/17/2023. Patient's son reports he found to have a mass on his spine. Patient was diagnosed with metastatic prostate cancer to the bones and lymph nodes. Was treat with steroids and had 4 of 5 scheduled radiation treatments. Was discharged home on 9/25/2023.    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

## 2023-09-28 LAB
RAD ONC ARIA COURSE END DATE: NORMAL
RAD ONC ARIA COURSE ID: NORMAL
RAD ONC ARIA COURSE INTENT: NORMAL
RAD ONC ARIA COURSE LAST TREATMENT DATE: NORMAL
RAD ONC ARIA COURSE START DATE: NORMAL
RAD ONC ARIA COURSE TREATMENT ELAPSED DAYS: 5
RAD ONC ARIA FIRST TREATMENT DATE: NORMAL
RAD ONC ARIA PLAN FRACTIONS TREATED TO DATE: 4
RAD ONC ARIA PLAN ID: NORMAL
RAD ONC ARIA PLAN NAME: NORMAL
RAD ONC ARIA PLAN PRESCRIBED DOSE PER FRACTION: 4 GY
RAD ONC ARIA PLAN PRIMARY REFERENCE POINT: NORMAL
RAD ONC ARIA PLAN TOTAL FRACTIONS PRESCRIBED: 5
RAD ONC ARIA PLAN TOTAL PRESCRIBED DOSE: 2000 CGY
RAD ONC ARIA REFERENCE POINT DOSAGE GIVEN TO DATE: 16 GY
RAD ONC ARIA REFERENCE POINT ID: NORMAL

## 2023-09-29 ENCOUNTER — READMISSION MANAGEMENT (OUTPATIENT)
Dept: CALL CENTER | Facility: HOSPITAL | Age: 83
End: 2023-09-29
Payer: MEDICARE

## 2023-09-29 ENCOUNTER — HOME CARE VISIT (OUTPATIENT)
Dept: HOME HEALTH SERVICES | Facility: HOME HEALTHCARE | Age: 83
End: 2023-09-29
Payer: MEDICARE

## 2023-09-29 VITALS
SYSTOLIC BLOOD PRESSURE: 139 MMHG | TEMPERATURE: 95.5 F | OXYGEN SATURATION: 96 % | DIASTOLIC BLOOD PRESSURE: 55 MMHG | HEART RATE: 57 BPM

## 2023-09-29 PROCEDURE — G0151 HHCP-SERV OF PT,EA 15 MIN: HCPCS

## 2023-09-29 NOTE — HOME HEALTH
"Subjective: \"I'm doing terrible.\" Patient's son states they haev been up since 3 this morning.    Falls- none    Medication Changes- None    Assessment: Patient is able to complete upper and lower extremity exercises but still unable to transfer sit<>stand.     Communication: n/a    Plan for next visit:  Therapeutic exercises  Fall risk education  Pain management"

## 2023-09-29 NOTE — PROGRESS NOTES
Radiation Treatment Summary Note      Patient Name: Semaj Herrera  : 1940    Attending Provider: Wang Harper MD      Diagnosis:     ICD-10-CM ICD-9-CM   1. Prostate cancer metastatic to bone  C61 185    C79.51 198.5       Radiation Start Date: 2023    Radiation Completion Date: 2023      Prescription:     Site: T/L Spine  Laterality: N/A  Total Dose: 1600cGy  Dose per Fraction: 400cGy  Total Fractions: 4  Daily or BID: Daily  Modality: Photon  Technique: 3DCRT  Bolus: No    Final Delivered Dose Deviated From Initially Prescribed Dose: Yes, patient and family elected not to complete last radiation fraction as an outpatient, only 16Gy of a planned 20Gy delivered.     Concurrent Chemotherapy: No    Patient Tolerated Treatment Without Unexpected Side Effects/Complications: Yes    ECOG: Completely disabled; cannot carry on any selfcare; totally confined to bed or chair = 4    Pain Management Plan: Opioid or other pain medication prescribed by other provider    Follow-Up Plan: PRN    Imaging Ordered for Follow-Up: None/NA        Wang Harper MD

## 2023-09-29 NOTE — OUTREACH NOTE
Medical Week 1 Survey      Flowsheet Row Responses   [facility] patient discharged from? [place]   Does the patient have one of the following disease processes/diagnoses(primary or secondary)? Other   Week 1 attempt successful? Yes   Call start time 1001   Call end time 1004   Discharge diagnosis fecal impaction of rectum   Meds reviewed with patient/caregiver? Yes   Is the patient having any side effects they believe may be caused by any medication additions or changes? No   Does the patient have all medications ordered at discharge? Yes   Is the patient taking all medications as directed (includes completed medication regime)? Yes   Does the patient have a primary care provider?  Yes   Does the patient have an appointment with their PCP within 7 days of discharge? Greater than 7 days   Has the patient kept scheduled appointments due by today? Yes   What is the Home health agency?  [facility]   Has home health visited the patient within 72 hours of discharge? Yes   What DME was ordered? [employer] for O2   Psychosocial issues? No   Nursing interventions Reviewed instructions with patient   What is the patient's perception of their health status since discharge? Same   Is the patient/caregiver able to teach back signs and symptoms related to disease process for when to call PCP? Yes   Is the patient/caregiver able to teach back signs and symptoms related to disease process for when to call 911? Yes   Is the patient/caregiver able to teach back the hierarchy of who to call/visit for symptoms/problems? PCP, Specialist, Home health nurse, Urgent Care, ED, 911 Yes   Week 1 call completed? Yes   Wrap up additional comments son stated pt in PT right now. doing the same. His oncologist is trating him as his PCP retired. No questions today   Call end time 1004            ASUNCION CHONG - Registered Nurse

## 2023-10-03 ENCOUNTER — HOME CARE VISIT (OUTPATIENT)
Dept: HOME HEALTH SERVICES | Facility: HOME HEALTHCARE | Age: 83
End: 2023-10-03
Payer: MEDICARE

## 2023-10-05 ENCOUNTER — HOME CARE VISIT (OUTPATIENT)
Dept: HOME HEALTH SERVICES | Facility: HOME HEALTHCARE | Age: 83
End: 2023-10-05
Payer: MEDICARE

## 2023-10-05 VITALS
TEMPERATURE: 96.4 F | OXYGEN SATURATION: 98 % | HEART RATE: 81 BPM | SYSTOLIC BLOOD PRESSURE: 136 MMHG | DIASTOLIC BLOOD PRESSURE: 69 MMHG

## 2023-10-05 PROCEDURE — G0151 HHCP-SERV OF PT,EA 15 MIN: HCPCS

## 2023-10-05 NOTE — HOME HEALTH
"Subjective: \"I'm feel like I'm doing worse.\" Patient's son states some days are better than others.    Falls- none    Medication Changes- None    Assessment: Patient is able to complete upper and lower extremity exercises with low repetitions. Discussed obtaining a transfer board and sent message to Dr. Lee to order W/C.    Communication: Message to Dr. Schulte requesting order be sent to [facility] for W/C    Plan for next visit:  Therapeutic exercises  Fall risk education  Pain management"

## 2023-10-10 ENCOUNTER — HOME CARE VISIT (OUTPATIENT)
Dept: HOME HEALTH SERVICES | Facility: HOME HEALTHCARE | Age: 83
End: 2023-10-10
Payer: MEDICARE

## 2023-10-10 ENCOUNTER — TELEPHONE (OUTPATIENT)
Dept: ONCOLOGY | Facility: CLINIC | Age: 83
End: 2023-10-10
Payer: MEDICARE

## 2023-10-10 VITALS
OXYGEN SATURATION: 98 % | DIASTOLIC BLOOD PRESSURE: 67 MMHG | SYSTOLIC BLOOD PRESSURE: 154 MMHG | TEMPERATURE: 96.4 F | HEART RATE: 59 BPM

## 2023-10-10 PROCEDURE — G0151 HHCP-SERV OF PT,EA 15 MIN: HCPCS

## 2023-10-10 NOTE — HOME HEALTH
"Subjective: \"I feel fairly well.\" Patient's son reports they got a call from [facility] stating they received orders for the wheelchair.    Falls- none    Medication Changes- None    Assessment: Patient is able perform all the exercises x10 repetitions today and was able to perform sit>supine transfers with less assist.     Communication: n/a    Plan for next visit:  Therapeutic exercises  Fall risk education  Transfer training"

## 2023-10-10 NOTE — TELEPHONE ENCOUNTER
Called the patient back and he stated he cannot come in tomorrow for his appointment because he is still not strong enough to get into his car to come in. He asked if we could do a video visit or push it back some. I let him know I would discuss this with Dr. Lee and get back with him. He v/u.

## 2023-10-10 NOTE — PROGRESS NOTES
"                                                                                                                                            REASONS FOR FOLLOW UP:   1.  Metastatic prostate cancer    HISTORY OF PRESENT ILLNESS:     Patient is an 82-year-old with metastatic prostate cancer who is seen 9/8/2023 as he continues current therapy with with Xgeva and Lupron after previous therapy also including Xtandi through the fall of last year.         The patient was previously seen back along with his son, Georges, 9/21/2022 fortunately doing reasonably well without additional pain, discomfort or excessive fatigue.  We have discussed various options for treatment of prostate cancer if we are unable to control this disease with current measures.    He is next evaluated 12/21/2022 now scheduled for Lupron.  He had last received both his Lupron and Xgeva 9/21/2022.  He notes increasing chest discomfort that appears to be \"when he twists\".  Initially he felt he might have a pneumonia but never had fever or cough.  We have discussed that his disease could well be progressing and then assessment that would allow us to clarify treatment options is reasonable.      This led to a repeat PSA 12/21/2022 that was found to be elevated to 23.9 and a follow-up Pylarify study performed 1/6/2023 that does demonstrate marked progression of sclerotic bone metastasis diffusely compared to 8/20/2021.  This includes the femoral neck,, sacrum, left iliac bone with SUV up to 48.8, stable tiny mediastinal nodes, 2 punctate nodules in the calvarium.      He is seen back with his son 1/11/2023.  The potential treatment options such as Pluvicto (Lutetium Yady 177 vipivotide tetraxetan), now that we know that his PSMA scan is positive, though the patient would have to initially be treated with taxane chemotherapy which would be difficult for him to tolerate, radium-223 considering his bony metastasis.  He has slowly progressive disease however we " have discussed additional issues including radiation therapy and/or alteration to another androgen receptor agonist such as darolutamide.  We have discussed all these options moving to have assessments done by radiation therapy and, [facility] as we address his symptoms.    The patient was seen by radiation therapy and felt a candidate for palliative radiotherapy as we made application for darolutamide.He is also seen by [facility] for additional treatment options 1/23/2023 and 2/10/2023 as radiation therapy proceeded to LSP/pelvis.    The patient was also seen by [facility] without additional therapeutic options at this time.    He is next seen 2/20/2023 to review his status recognizing his follow-up Xgeva will be due 3/15/2023 and and Lupron would be due 6/7/2023.  He was able to obtain darolutamide (Nubeqa) as free drug.  He is seen with his son both indicating that he just completed radiation therapy today.  While his pain has improved, he did have diarrhea develop during radiation therapy and is using antidiarrheal medications to control it currently.  This is also, potentially, side effect of darolutamide.      He is next seen 4/17/2023.  We have reiterated that his treatments with Lupron and Xgeva injections continued every 3 months.  On lower dose darolutamide which we gradually escalate to 600 mg nightly and 300 mg a day trying to move to 600 mg twice daily.  His last injections were given 3/20/2023 for both Xgeva and Lupron.  Unfortunately the patient has fallen twice while gardening onto his right side approximately 2 weeks prior to his visit 4/17/2023 and is only slowly recovering with right side and back pain.  He is, however, able to function and seen some improvement.  His increased use of pain medications has caused worsening constipation however.  In a lengthy conversation we have discussed increasing his darolutamide to 600 mg twice daily to gain his best response from therapy and also  improve his bowel function.    Patient is next evaluated 5/15/2023.  He continues on darolutamide 600 mg twice daily.  He also receives Xgeva and Lupron every 3 months, due in 1 month.  He returns today for lab review. He has occasional diarrhea, controlled with Lomotil as needed.  Continues [place] 5/325 as needed for pain and feels his pain is well controlled on this regimen.  Energy level is stable, he tires easily after working in his garden, but energy improves once he rests.  Appetite is good.  Denies fever or chills.  Denies nausea or vomiting.  Denies new or worsening pain.  No new concerns today.    The patient's follow-up tests include a PSA of 46.1 5/15/2023 and bone density 6/5/2023 showing abnormal uptake within several sites including skull base, ribs, humeri, spine, pelvis, Femara, sternum, tibia, left radius all consistent with progression of his disease.  This patient could be a candidate for a number of studies but we have, in particular, discussed Provenge.  He would likely need to be seen by [facility] initially.    Patient seen with his son 6/12/2023 and we discussed options such as Provenge.  The patient, self, indicates that he is not really interested in doing this but would rather have control of his pain primarily.  As result we have increased his Norco to 10 mg tablets #100 E scribed to his pharmacy as we continue Xgeva.  Pending his assessment in the next several weeks to month we may need to proceed to palliative care primarily.    The patient is seen back 7/3/2023, unfortunately, began to have increasing pain that is not managed sufficiently by his Norco tablets.  Additionally he is found to be considerably anemic and this is felt to be on the basis of his progressive malignancy.  The patient is deteriorating we are now discussing palliative care.  The patient's son also found that his son is diagnosed at age 30 with type 2 diabetes and is struggling to manage both his father and his  "son's care.      Patient returns today 7/24/2023 for lab review and follow-up.  At his last visit on 7/3/2023 hemoglobin was noted to be 7.2.  At that time he was scheduled to receive a transfusion of 1 unit of PRBC.  Patient states that actually after receiving the blood he has felt worse, and \"less peppy\".  He is using a fentanyl patch for pain control, which is helping, but does not feel like it is adequately controlling his pain.  He is also using Norco 10/325 every 6 hours for breakthrough pain.  He is having difficulty sleeping due to pain control.  He is also struggling with constipation.  Patient states that his bowels are moving, however they are difficult to move.  They are supposed to meeting with palliative us on Friday.  They are also scheduled to see nephrology later on this week.     The patient is next seen 9/8/2023 indicating that he still does not wish to proceed to hospice care but is becoming quite weak and fatigued.  He is found to be considerably anemic and would benefit from transfusion as we have tried to continue both Lupron and Xgeva.    Patient required hospitalization 9/17 - 9/25/2023 with 2 to 3-day history of bilateral leg weakness and constipation.  Repeat scans demonstrated substantial constipation and he started a bowel regimen.  MRI of the lumbar spine demonstrate metastatic disease as did cervical spine without significant compression but severe canal stenosis at T10 with epidural metastatic disease and cord compression.  He was placed on IV steroids and neurosurgery felt that decompression was likely not to be beneficial.  Radiation therapy was planned with 5 treatments and he did have some modest improvement thereafter.  He requested home care and hospital bed and various DME was delivered with plans to restart his insulin pump at discharge.  He completed his radiation therapy 9/27/2023.      Patient was to be seen in office 10/11/2023 but was too weak and a video visit was " rescheduled for same day.  He is seen with his son Georges and, fortunately, has been able to function at home without considerable difficulty.  He still has pain and is having increasing weakness in his lower extremities.  He is able to sleep, has reasonable appetite and his constipation is no longer an issue.  He has home health and does still not which hospice care at this point.      Hematologic/oncologic history:   The patient is an 82-year-old male with significant past medical history including prostate carcinoma, CKD 3 and long-term tobacco use be been seen by primary care in late January, 2018 for hoarseness.  Chest x-ray is now outpatient January 23 revealed sclerotic change in the posterior mid chest to be within 3 adjacent thoracic ertebral bodies of uncertain significance.  A follow-up chest CT revealed numerous sclerotic foci within al visualized bones consistent with metastatic disease, multiple enlarged mediastinal lymph nodes concerning for metastatic lymphadenopathy and a polypoidal abnormality in the right lateral wall of the trachea.  CT of abdomen March 20 revealed extensive osseous metastatic disease mildly enlarged retroperitoneal lymph nodes.  Bone scan March 20 was consistent with widespread osseous metastasis particularly in the proximal half the left humeral shaft, abnormal uptake in the sternum and manubrium and a small focus in the posterior aspect of the calvarium.  This led to a plain film the left humerus with mottled sclerosis involving the shaft of the humerus particularly in the proximal half but no evidence of pathologic fracture.    The patient has additionally type 2 diabetes on insulin pump, again a history of prostate carcinoma prostatectomy 12 years previously, hypertension, and hyperlipidemia.  Additional studies included a PSA of 395.1 from March 14, 2018.The patient's been seen by urology March 15 with plans to start Firmagon.    The patient is now seen in pulmonary clinic  with Dr. Bijan Rivera and we have discussed that he will need bronchoscopy and mediastinoscopy.  Interestingly his additional history includes a long occupational exposure including working in the [place] just out of school, subsequent construction work for many years and a 30-pack-year history of smoking stopping in the late 1990s.  He indicates that he followed with Dr. Guillen of urology for many years with no changes in his exams and normal PSAs.  He stopped this follow-up approximately 2 years ago.     Patient was seen in consultation with thoracic surgery on March 28 and plans are made for mediastinoscopy and bronchoscopy with lymph node biopsy.  Pathology revealed that these nodes were consistent with metastatic prostate carcinoma.  He was discussed at thoracic conference.  A PET/CT was not pursued and it was determined that he see medical oncology for hormonal treatment and radiation therapy if symptomatic.  It should be noted that the patient's March 15 [facility] office note describes that Firmagon was planned.     The patient has met with the son and grandson April 23.  We have also contacted Dr. Taylor of urology in that Firmagon was given just after his last visit and would next be due approximately May 3.  Patient feels considerably better with resolution of his pain, normalization of his performance status.  He would like to continue treatment through our office now contacted Dr. Taylor concerning this.    The patient is next seen June 11, 2018 we discussed his follow-up including his treatments with Lupron May 7 and his next treatment on July 30.  He has returned to essentially normal performance status and his PSA has dropped further from 395 March 14 27.8 May 7 and now 2.03 June 11.  We do plan to initiate Xgeva also study him via scans to document his improvement approximately a month from now.   The patient is next seen July 26, 2018.  Repeat imaging demonstrated interval  resolution of mediastinal and retroperitoneal lymphadenopathy.  There is thickening in the distal aspect of the appendix with stranding?  Chronic appendicitis.  The patient indicates he is having no such symptoms and never has.  There is no change in sclerotic bony metastasis in the patient's PSA has dropped substantially to 1.66 by July 19 and 1.79 July 26.  He is actually feeling excellent and this is corroborated by family members.   Patient is next seen January 10, 2019 continuing to do very well and, in fact indicating that [facility], likely hearing replacements, visual review.  He is not certain is going to continue his care with them or with us or combination of both.    Patient is next seen April 4, 2019.  He is recently discharged after hospitalization March 15-18 with left upper lobe pneumonia.  We reviewed the findings in detail with his gradual recovery now documented.  His studies include a CT of chest which does not demonstrate any further bony lesions and/or pulmonary metastasis having developed.  He is feeling considerably better and approximately back to his baseline.  The patient is next seen June 28, 2019 feeling well but having baseline generally musculoskeletal pain and restless legs.His recent exams include a PSA of 0.81, CMP with potassium of 5.3 with repeat pending.  His performance status overall remains good to very good and is clearly responding to his treatments.  The patient is next seen December 13, 2019 continue to feel well.  He has had, oddly enough, 2 episodes of sleep paralysis which have occurred to him previously and he wonders if there is any connection with his prostate carcinoma though this is felt unlikely.     The patient when assessed in December had his PSA go to 2.9.  We continue with Lupron and Xgeva and is seen back now March 9, 2020 without additional symptoms.  We discussed that a schedule could likely change moving to  3 months for both of these medications particularly if he needs additional therapy directed at progressive disease.     Patient contacted our practice May 4 concern for pain in his hips.  This led to moving his scans up and they were performed May 8, 2020 showing a sub-6 mm pleural-based pulmonary nodule in the right lower lobe that is new from March 15, 2018, remainder of the sub-6 mm pulmonary nodules bilaterally are stable.  There is extensive osseous metastatic disease as previous with no other new findings.     The patient indicates, when seen, May 13 that his bone pain is now resolved.  While this is good information does not mean that he does not have further bony metastasis and we have discussed that a follow-up bone scan is likely necessary.  Furthermore he is having some difficulty with sleeplessness and increase in restless legs as he continues to stay at home during the COVID 19 crisis which, itself, is producing more anxiety for him.  A follow-up bone scan was obtained Lety 3 revealing multifocal osseous metastatic disease which was compared to March 2, 2018 with improvement.  No new abnormal uptake is present.  He is next seen with us on June 17, 2020 and, fortunately, is not having any significant pain at this point.  We discussed his scans in detail and, on balance, his performance status also remains improved.     It was elected to follow the patient rather than changes antiandrogen therapy.  He is reassessed September 3 with unchanged CMP, H&H of 11.4 and 36.3 with normal white count, platelet count and differential, PSA at 15.8.  Follow-up PA lateral chest x-ray does not show any new abnormalities though there is extensive metastatic bone disease throughout the bony thorax and osteoblastic metastasis have been seen on chest CT May 2020.  The patient is seen with his son Georges September 10 noticing increasing bony discomfort primarily in the right hip following fairly intensive gardening which he  does frequently.  Now has further elevation of PSA were wondering whether we should try to intervene sooner per additional antiandrogens.  We will need to further assess him via CT scanning to make this decision     These were obtained October 1 showing extensive sclerotic disease with no metastatic disease in chest abdomen or pelvis.  PSA had gone from 15.8 September 3-16 0.6 October 1.     He still has pain getting up in the morning and after active gardening.  This is manageable with current pain medications and, at present, it is not apparent that he needs to switch medications to gain better control of his disease.  The patient did have follow-up studies done including a PSA November 25, 2020 now at 25.4.  The patient is seen with his son December 10, 2020 doing fair but having some increased pain in the mid sternum and right hip that he ascribes to additional exercise/work in managing his garden.  It is apparent however, that his last bone scan was 6 months ago and we do need to reassess him concerning this.     The patient had follow-up bone scan performed January 11, 2021 showing again uptake in the calvarium, humerus, right medial clavicle, sternum, ribs, spine, sacrum, pelvis, right acetabulum, proximal femora and now left frontal bone which appears new.  There is also mild increase in sternal uptake and equivocal increase in the pelvic lesions of all of the sacrum and the right proximal femur.     The patient is seen with his son January 18, 2021 and indicates that he is having pain gradually worsened in his right hip, mid chest that had been an issue previously.  These findings on bone scan are reviewed with both of them as likely the underlying culprit for his discomfort.  He would need change of the systemic therapy but, at present, will ask for radiation therapy palliation initially.  He was previously treated by Dr. Kulkarni many years ago and will ask her to see him.  We will also make application for  the current cost of Xtandi or Zytiga.  The patient is not felt to be a candidate for systemic chemotherapy.     The patient was referred for ration therapy as we continued to assess his PSA on current therapy as well as exam the cost of Zytiga or Xtandi.  Radiation therapy (Dr. Kulkarni) saw the patient January 26 and felt that the right femur and sternal lesion could benefit from therapy-3000 cGy in 10 fractions.  The patient took treatment February 1 to February 12 to the above areas and is next seen back March 15.  Fortunately his pain has improved dramatically and he is quite happy about this.  Unfortunately he notes some difficulty with swallowing that is temporary and often leads to increased salivation and mucus production.  Patient was asked to return his next assessed April 12, 2021.  He is able to swallow with less pain but still has excess mucus production and issues with salivation.  His PSA has noted increase but he is having no additional bony discomfort at this point and, additionally, has noted low-grade fevers just under 100 degrees at nighttime?.  His weight, appetite and general performance status have remained good to excellent.  We discussed follow-up studies at this point to determine his status.    Follow-up scans were scheduled CT scans of chest and pelvis 5/4/2021 showing osseous metastasis quite similar to previous examinations in the chest, CT of abdomen pelvis also with no acute intra-abdominal adenopathy no indication of abdominal pelvic metastatic disease but again widespread osseous metastasis.  His PSA at this point have been slowly increasing to a level of 44 on 4/12/2021.  As the patient is reassessed 5/10/2021 we find, fortunately, that he is not exceeding symptomatic.  It could make reasonable sense at this point to take the mediastinal nodes from his biopsy 4/9/2018, reassess potential targeted therapies, MSI status, and germline analysis.  Fortunately he is not having significant  pain or discomfort and is seen with his son as we discussed the above plan.  Notably a follow-up PSA is found to be 42.6.   Patient is next seen in 6/7/2021 for Lupron and Xgeva.  Fortunately he is feeling considerably better according to both he and his son though his stamina has reduced.  He is not having additional pain at this point.  We have also reviewed his genetics assessment which was significant only for a variant of unknown significance.  This is not an actionable abnormality.    As the patient's PSA further escalated increasing to 89 7/7/2021 his subsequent Axumin PET was obtained 8/20 demonstrating extensive osteosclerotic metastatic disease showing reduced uptake-typical finding but no evidence of visceral metastatic disease in the neck chest abdomen pelvis.  These findings are discussed with the patient and his son 8/25/2021 and indicative of his progressive disease-etiology of his rising PSA-though the patient is asymptomatic we have discussed moving to initiate Xtandi is available at 160 mg p.o. twice daily along with his current Lupron and Xgeva.     The patient's testing 10/6 included PSA that is dropped to 9.69.  He is seen with his son, Cody, both indicating that he been doing relatively well with treatment but began to notice nausea in the last week.  The schedule that he has been given also needs to be somewhat updated in terms of his Lupron and Xgeva.  He has been able to maintain his appetite and overall performance status to date.    The patient is next assessed 12/8/2021 tolerating his current Xtandi dose better than previous and maintaining a good performance status overall as well as demonstrating a drop in his PSA now to 4.7 on 12/8/2021 compared to high of 112 9/8/2021.  He remains hypophosphatemic and hypocalcemic and we have discussed, again, changing his Xgeva dosing to every 3 months along with his Lupron will continue his Xtandi to 80 mg daily.    The patient is next reviewed  3/21/2022 seen for both Xgeva and Lupron.  Fortunately he is feeling reasonably well with little additional pain though he has restarted to place his garden into shape for the season and is working more outdoors.    Patient reevaluated 5/2/2022 with repeat CBC including H&H of 10.5 and 33.4, white count 5980 and platelet count of 192,000, currently CMP and PSA pending with a previous PSA 1 month ago at 9.06.  At this point we increased his Xtandi to 160 mg p.o. daily while continuing treatment with every 3 month Xgeva and Lupron.  Notably the patient's Requip  alcohol I am sure it is mariza now at 1.5 mg p.o. nightly and Neurontin 900 mg p.o. daily has improved his restless leg syndrome substantially.    Patient seen 6/13/2022 at which time Xtandi at 160 mg p.o. daily was continued, Xgeva moved to every 6 months and Lupron every 3 months.    This was continued when patient was seen 9/21/2022 though subsequently we proceeded to every 3 months for both medications.    Patient seen 12/21/2022 at which time a subsequent Pylarify scan was requested as result of increasing PSA level and bony discomfort.  The patient received Lupron and Xgeva at this point.     He is seen back with his son 1/11/2023.  The potential treatment options such as Pluvicto (Lutetium Yady 177 vipivotide tetraxetan), now that we know that his PSMA scan is positive, though the patient would have to initially be treated with taxane chemotherapy which would be difficult for him to tolerate, radium-223 considering his bony metastasis.  He has slowly progressive disease however we have discussed additional issues including radiation therapy and/or alteration to another androgen receptor agonist such as darolutamide.  We have discussed all these options moving to have assessments done by radiation therapy and, [facility] as we address his symptoms.    The patient was not felt a candidate for additional treatments via [facility], was able to start  darolutamide and completed palliative radiotherapy.  He was stable seen 2/20/2023 though experiencing diarrhea post radiation therapy.    Patient seen 4/17/2023 with darolutamide gradually increased to 600 mg twice daily.  His Xgeva and Lupron will be given every 3 months.    Patient seen 6/12/2023, unfortunately, PSA increasing and bone scan worsening.  Patient at this point indicated that he did not wish additional therapies but, instead, control of his pain initially.  Plans were made to continue his Lupron and Xgeva seen him at the 3 to 4-week annia and considering palliative care primarily with either hospice or [facility].    Patient seen 7/3/2023 with plans to initiate palliative care starting with [facility].  The patient continued this approach seen back 9/8/2023 referring not to proceed with hospice but again with palliative care including transfusion.    Patient required hospitalization 9/17 - 9/25/2023 with 2 to 3-day history of bilateral leg weakness and constipation.  Repeat scans demonstrated substantial constipation and he started a bowel regimen.  MRI of the lumbar spine demonstrate metastatic disease as did cervical spine without significant compression but severe canal stenosis at T10 with epidural metastatic disease and cord compression.  He was placed on IV steroids and neurosurgery felt that decompression was likely not to be beneficial.  Radiation therapy was planned with 5 treatments and he did have some modest improvement thereafter.  He requested home care and hospital bed and various DME was delivered with plans to restart his insulin pump at discharge.  He completed his radiation therapy 9/27/2023.      Patient was to be seen in office 10/11/2023 but was too weak and a video visit was rescheduled for same day.  He is seen with his son Georges and, fortunately, has been able to function at home without considerable difficulty.  He still has pain and is having increasing weakness in his lower  extremities.  He is able to sleep, has reasonable appetite and his constipation is no longer an issue.  He has home health and does still not which hospice care at this point.      Past Medical History:   Diagnosis Date    Aortic stenosis, mild 01/01/2021    Per echocardiogram 2021    Ascending aorta dilatation     Asthma     Benign prostatic hyperplasia     Bone cancer     Cellulitis of great toe of left foot 10/19/2018    CKD (chronic kidney disease)     Stage 3; followed by Dr. Vicky Duran     Diastolic dysfunction     GRADE II per echo 2018    Difficulty breathing     during exertion    Dyslipidemia     Erectile dysfunction     Fatigue     Heart murmur     History of blood transfusion     History of kidney stones     HL (hearing loss)     Hyperlipidemia     Hypertension     Hyponatremia     Kidney stone     Left ventricular hypertrophy     per echo 2018    Localized edema     Neuropathy     Obstructive sleep apnea     USING CPAP    Osteoarthritis     Peptic ulcer     Pneumonia     Pneumonia of left upper lobe due to infectious organism 03/15/2019    Prostate cancer     Status post prostatectomy, radiation therapy, and hormone therapy followed by Dr. Lee; metastatsis to bone    Pure hyperglyceridemia     Restless leg syndrome     Sepsis     Sinus bradycardia     Transient cerebral ischemia     Type 2 diabetes mellitus         Past Surgical History:   Procedure Laterality Date    BRONCHOSCOPY N/A 04/09/2018    Procedure: BRONCHOSCOPY;  Surgeon: Bijan Rivera III, MD;  Location: [facility] OR;  Service: Cardiothoracic    COLONOSCOPY      DEEP NECK LYMPH NODE BIOPSY / EXCISION      HEMORRHOIDECTOMY      LYMPH NODE BIOPSY      MEDIASTINOSCOPY N/A 04/09/2018    Procedure: MEDIASTINOSCOPY WITH LYMPH NODE BIOPSY;  Surgeon: Bijan Rivera III, MD;  Location: [facility] OR;  Service: Cardiothoracic    OTHER SURGICAL HISTORY      ulcer repair    PROSTATECTOMY  2006        Current Outpatient Medications on  File Prior to Visit   Medication Sig Dispense Refill    Accu-Chek Guide test strip USE 1 STRIP TO CHECK BLOOD SUGAR 4 TIMES DAILY      Accu-Chek Softclix Lancets lancets USE 1 LANCET TO CHECK GLUCOSE 4 TIMES DAILY      aspirin 81 MG EC tablet Take 1 tablet (81 mg total) by mouth Daily Indications: Venous Thromboembolism       cholecalciferol (VITAMIN D3) 1000 units tablet Take 2 tablets by mouth Daily.      Cyanocobalamin (Vitamin B12) 1000 MCG tablet controlled-release Take 1 tablet by mouth Daily.      dexAMETHasone (DECADRON) 4 MG tablet Take 1 tablet by mouth Every 6 (Six) Hours for 21 days. 84 tablet 0    dilTIAZem CD (CARDIZEM CD) 120 MG 24 hr capsule TAKE 1 CAPSULE EVERY DAY 90 capsule 3    diphenoxylate-atropine (LOMOTIL) 2.5-0.025 MG per tablet Take 1 tablet by mouth 4 (Four) Times a Day As Needed for Diarrhea. 30 tablet 0    fentaNYL (DURAGESIC) 50 MCG/HR patch Place 1 patch on the skin as directed by provider Every 72 (Seventy-Two) Hours. 10 each 0    gabapentin (NEURONTIN) 300 MG capsule TAKE 3 CAPSULES EVERY NIGHT AT BEDTIME 90 capsule 0    Insulin Aspart (novoLOG) 100 UNIT/ML injection INJECT 100 UNITS VIA PUMP ONCE DAILY      Leuprolide Acetate, 3 Month, (LUPRON DEPOT, 3-MONTH, IM) Inject  into the appropriate muscle as directed by prescriber Every 3 (Three) Months.      levothyroxine (SYNTHROID, LEVOTHROID) 88 MCG tablet Take 88 mcg by mouth Daily. 2 on Wed and Sunday      loperamide (IMODIUM) 1 MG/5ML solution Take 10 mL by mouth 4 (Four) Times a Day As Needed for Diarrhea. Indications: Diarrhea      modafinil (PROVIGIL) 200 MG tablet Take 1 tablet by mouth Daily. 90 tablet 1    ondansetron (ZOFRAN) 8 MG tablet Take 1 tablet by mouth 3 (Three) Times a Day As Needed for Nausea or Vomiting. 30 tablet 5    pantoprazole (PROTONIX) 40 MG EC tablet Take 1 tablet by mouth 2 (Two) Times a Day Before Meals. 60 tablet 0    polyethylene glycol (MIRALAX) 17 g packet Take 17 g by mouth Daily.      pravastatin  (PRAVACHOL) 40 MG tablet Take 1 tablet by mouth Daily.      rOPINIRole (REQUIP) 0.5 MG tablet Take 2 tablets by mouth in the evening and 2 at bedtime. 360 tablet 2    sennosides-docusate (PERICOLACE) 8.6-50 MG per tablet Take 2 tablets by mouth 2 (Two) Times a Day.      traZODone (DESYREL) 50 MG tablet Take 1-2 tablets by mouth every night at bedtime. Indications: Trouble Sleeping       No current facility-administered medications on file prior to visit.        ALLERGIES:    Allergies   Allergen Reactions    Niacin Unknown - Low Severity    Statins Unknown - Low Severity        Social History     Socioeconomic History    Marital status:     Years of education: College   Tobacco Use    Smoking status: Former     Packs/day: 1.50     Years: 30.00     Additional pack years: 0.00     Total pack years: 45.00     Types: Cigarettes     Start date: 1968     Quit date: 1998     Years since quittin.7     Passive exposure: Past    Smokeless tobacco: Never   Vaping Use    Vaping Use: Never used   Substance and Sexual Activity    Alcohol use: Not Currently     Comment: Caffeine use: 2-3 cups daily    Drug use: Never    Sexual activity: Not Currently        Family History   Problem Relation Age of Onset    Heart failure Mother     Hypertension Mother             Diabetes Mother     Heart disease Mother             Lung cancer Father 46    Hypertension Sister     Lung cancer Sister 60    Hypertension Brother             Lung cancer Brother 62    Diabetes Brother     Heart disease Other     Prostate cancer Brother 60    Cancer Brother             Cancer Sister             Cancer Sister     Cancer Sister     Heart disease Brother             Malig Hyperthermia Neg Hx         Review of Systems  As per HPI    Objective     There were no vitals filed for this visit.        2023    11:57 AM   Current Status   ECOG score 1     Physical Exam   Constitutional: He is  oriented to person, place, and time. He appears well-developed. No distress.   HENT:   Head: Normocephalic and atraumatic.   Mouth/Throat: No oropharyngeal exudate.   Eyes: Pupils are equal, round, and reactive to light.   Cardiovascular:   No murmur heard.  Pulmonary/Chest: Effort normal. He has no rhonchi.   Abdominal: Soft. Normal appearance. He exhibits no distension.   Musculoskeletal: Normal range of motion.   Neurological: He is alert and oriented to person, place, and time.   Skin: Skin is warm and dry. No rash noted.   Vitals reviewed.    10/11/2023 patient examined, unchanged from above.    RECENT LABS:  Hematology WBC   Date Value Ref Range Status   09/23/2023 6.13 3.40 - 10.80 10*3/mm3 Final     RBC   Date Value Ref Range Status   09/23/2023 4.16 4.14 - 5.80 10*6/mm3 Final     Hemoglobin   Date Value Ref Range Status   09/23/2023 10.9 (L) 13.0 - 17.7 g/dL Final     Hematocrit   Date Value Ref Range Status   09/23/2023 33.3 (L) 37.5 - 51.0 % Final     Platelets   Date Value Ref Range Status   09/23/2023 95 (L) 140 - 450 10*3/mm3 Final        Lab Results   Component Value Date    GLUCOSE 118 (H) 09/25/2023    BUN 27 (H) 09/25/2023    CREATININE 0.68 (L) 09/25/2023    EGFRIFNONA 56 (L) 02/23/2022    BCR 39.7 (H) 09/25/2023    K 5.0 09/25/2023    CO2 23.0 09/25/2023    CALCIUM 7.3 (L) 09/25/2023    ALBUMIN 3.5 09/21/2023    AST 14 09/21/2023    ALT 9 09/21/2023       NM Bone Scan Whole Body (01/11/2021 13:25)  CT Chest With Contrast Diagnostic (05/04/2021 09:11)  CT Abdomen Pelvis With Contrast (05/04/2021 09:11)  NM PET/CT Skull Base to Mid Thigh (08/20/2021 12:18)      Assessment & Plan      1.  Metastatic prostate cancer:  Patient initially diagnosed in 2006 and had a prostatectomy and hormone therapy.  Patient in January 2018 began to complain of shortness of breath and hoarseness.  Chest x-ray obtained 1/23/18 showed sclerotic bone lesions.  CT of the chest 3/12/2018 numerous sclerotic bone foci with  all visualized bones consistent with metastatic disease, multiple enlarged mediastinal lymph nodes.  .1 from 3/14/2018.  Patient was started on Firmagon by urology, [facility].  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  Case discussed at thoracic conference and recommendations were to continue ADT and radiation for symptomatic sites.  Lupron injections every 3 mos initiated 5/7/18 PSA at that time 7.810  Monthly Xgeva initiated 6/11/18 PSA 2.030  Last PSA 6/13/2019 0.881  9/20/2019 patient tolerating treatment well, no new concerns.  12/2019 PSA 2.9  3/9/2020  PSA increasing to 5.030, he remains continuing on Lupron and Xgeva and asymptomatic though follow-up CT of chest and pelvis will be requested in 11 weeks prior to follow-up in 12 weeks.   5/4/2020 patient called reported worsening right hip pain, plans to purse CT scans ASAP.   5/8/2020 CT scans C/A/P show no progression of disease. Hip pain improved, Gabapentin added.. Bone scan requested.  6/3/2020 bone scan that does not demonstrate worsening of bone nor need for localized radiation therapy.  The patient's PSA has been slowly rising and we have not been able to determine worsening of disease and and, therefore, it is not felt warranted add additional medications such as Xtandi or apalutamide to his therapy.  Please note would like to avoid Zytiga try not to add prednisone which would worsen his blood sugar control.  9/3/2020 PSA 15.8, CXR extensive metastatic bone disease- patient reviewed 9/10/2020 reporting increased bony discomfort CT scans requested.  Scans done and reviewed 10/7/2020 ultimately reveal no evidence of progression of disease for visceral involvement or clearly for bone involvement.  After further review with the patient and his son plan continued Xgeva and Lupron.  We have assessed for availability of Xtandi 160 mg p.o. daily and find the cost is  currently prohibitive.   11/25/2020 PSA 25.4  12/10/2020 review with some mild increase in bony discomfort.  After repeat discussion we went on to have him undergo Lupron therapy, and his PSA actually to be mildly reduced at 22.2.   Follow-up bone scan 1/11/2021 demonstrates some evidence of progression in sternum, left sacrum and proximal femur.  These findings were reviewed with the patient and his son January 18, 2020 and the patient was referred for palliative radiotherapy(Dr. Kulkarni)  Palliative radiation under care Dr. Kulkarni to right femur and sternal lesion -3000 cGy in 10 fractions given February 1 to February 12 with significant symptom improvement.  3/15/2021 PSA 38.5  4/12/2021 PSA 44  5/10/2021 PSA 42.6 CT chest abdomen pelvis 5/4/2021 - for progression of disease and follow-up PSA 5 10/2021 is at 42 slightly reduced from previous.  We have discussed how to proceed from here and find a significant family history that clearly supports a potential genetic assessment for his malignancy.  It is felt most reasonable at this point to take the mediastinal nodes from his biopsy 4/9/2018 and reassess for potential targeted therapies, MSI status, as well as germline analysis.  6/7/2021 PSA 65.9 his analysis completed for actionable mutations including germline mutations.  These exams were negative though there is a variant of unknown significance.  Again this is not an actionable mutation.  We proceeded with additional Xgeva and Lupron planning for monthly Xgeva and Lupron at 3 months  7/7/2021 PSA 89 and subsequent testing with Axumin PET was performed confirming extensive osteosclerotic metastatic disease with little uptake, no evidence of visceral metastatic disease in neck chest abdomen or pelvis.  8/25/2021  , PET/CT reviewed, subsequent PSA increased to 105.  Patient fortunately asymptomatic.  Requested reevaluation for Xtandi 160 mg po daily.   9/8/2021 due for his lupron, receiving every 3 months,  and monthly Xgeva.  Will have education today for Xtandi and will receive his medication today as well.   Started Xtandi 9/8/2021.  9/22/2021 here for toxicity check, so far tolerating Xtandi quite well other than a slight increase in diarrhea controlled with Imodium.  10/6/2021 continues to tolerate Xtandi well.  Labs are within range today, we will proceed with Xgeva today.  Patient seen 10/20/2021 having more nausea with Xtandi and demonstrating a substantial reduction in PSA level.  After discussion we plan to reduce his dose to 80 mg daily following his PSA and performance status over the next approximate 7 weeks at which time he would be scheduled for his follow-up Lupron.  Patient assessed 12/8/2021 with further reduction in PSA to 4.7, ongoing hypophosphatemia and hypocalcemia-Xgeva moved to every 3 months given on same schedule as Lupron  Patient seen 3/21/2022, 6-week follow-up with mild increase in PSA recognized  Patient reassessed 5/2/2022 with PSA at 10.1, Xtandi moved to 160 mg p.o. daily  Patient assessed 6/13/2022, Xtandi at 160 mg p.o. daily, PSA pending, Xgeva moved to every 6 months, Lupron every 3 months  Patient seen 9/21/2022 at which time we continued our current approach, reviewed additional options for therapy should he clearly progress.  Patient seen 12/21/2022, increasing bony discomfort, Xgeva and Eligard continued, plans made for Pylarify scanning.   Patient seen 1/11/2023 with his son, discuss potential treatment options such as Pluvicto (Lutetium Yady 177 vipivotide tetraxetan) knowing that Pms-Asa is strongly positive in bone, palliative radiotherapy, radium-223 and alteration to additional androgen receptor such as darolutamide.  Patient referred to radiation therapy for their evaluation and possible treatment options, [facility] as application made for darolutamide and medications altered to include meloxicam 15 mg p.o. daily along with his Norco.  Patient assessed 2/20/2023  improved for radiation therapy, no additional options for treatment and [facility], darolutamide initiated.  Patient assessed 4/17/2023, darolutamide increased to 600 mg twice daily, Xgeva and Lupron every 3 months  5/15/2023: Continues darolutamide 600 mg twice daily.  Continues Xgeva and Lupron every 3 months, due in 1 month.  Hemoglobin today stable at 9.1.  Increased PSA, now 46 previously 29.  Discussed with Dr. Lee and we will proceed with bone scan in 3 weeks.  Subsequent bone scan with progression of metastatic disease to number of sites, issues discussed with patient and his son 6/12/2023 with plans to proceed to, primarily, palliative care approach.  Patient treated with Xgeva and Lupron in 3-4-week follow-up consider hospice or pallitus care.  Patient seen 7/3/2023-further anemia with hemoglobin 7.2 g%.  Plan 1 unit packed RBC transfusion, initiation of [facility].  7/24/2023 hemoglobin 7.2.  Patient declines blood transfusion.  He is reporting pain currently not well controlled which will be discussed below.  Meeting with nilton on Friday of this week.  Patient assessed 9/8/2023 with hemoglobin 5.2 g%, further transfusion planned with additional steroid, Benadryl premedication  atient required hospitalization 9/17 - 9/25/2023 with 2 to 3-day history of bilateral leg weakness and constipation.  Repeat scans demonstrated substantial constipation and he started a bowel regimen.  MRI of the lumbar spine demonstrate metastatic disease as did cervical spine without significant compression but severe canal stenosis at T10 with epidural metastatic disease and cord compression.  He was placed on IV steroids and neurosurgery felt that decompression was likely not to be beneficial.  Radiation therapy was planned with 5 treatments and he did have some modest improvement thereafter.  He requested home care and hospital bed and various DME was delivered with plans to restart his insulin pump at discharge.  He  completed his radiation therapy 9/27/2023.  Patient was to be seen in office 10/11/2023 but was too weak and a video visit was rescheduled for same day.  He is seen with his son Georges and, fortunately, has been able to function at home without considerable difficulty.  He still has pain and is having increasing weakness in his lower extremities.  He is able to sleep, has reasonable appetite and his constipation is no longer an issue.  He has home health and does still not which hospice care at this point.      2.  Neuropathy/ restless legs:    5/13/2020 gabapentin 600 mg at bedtime, trazadone 50 mg QHS.  On gabapentin 300, 2 tablets at bedtime, and requip 0.5.mg    Still having neuropathy symptoms.  Will increase gabapentin to 900 mg at bedtime.   9/22/2021 increase in gabapentin to 900 mg at bedtime has helped neuropathy.  Now taking Requip 2 mg nightly.    3.  Cancer related pain: on Norco 5/325 every 6 hours as needed.  Mobic 15 mg p.o. every morning initiated 1/11/2023, consider MS Contin every 12 hours  Status post palliative radiotherapy with improved pain control by 2/20/2023  Effective pain relief except increasing constipation noted 4/17/2023, narcotic adjusted downward as possible.  Continues Norco 5/325 every 6 hours as needed.  Feels his pain is well controlled on this regimen.  Semaj Herrera reports a pain score of 8.  Given his pain assessment as noted, treatment options were discussed and the following options were decided upon as a follow-up plan to address the patient's pain-plan to add Duragesic 25 mcg every 72 hours daily E scribed to pharmacy  7/24/2023 patient complaining of pain not controlled with current medication.  We will increase fentanyl patch to 50 mcg every 72 hours.  Patient to continue Norco 10/3/2025 every 6 hours if needed for breakthrough pain.  Issues with obtaining Norco recognized, oxycodone 5 mg p.o. every 4 hours E scribed to pharmacy 10/11/2023    PLAN:     Continue fentanyl  patch to 50 mcg every 72 hours  Currently alter pain medication to oxycodone 5 mg p.o. every 4 hours as needed pain E scribed to pharmacy  Again, at present, the patient does not wish hospice care and is seen by home health once weekly  We have determined that his dexamethasone will be reduced to 4 mg every 12 hours rather than every 6 hours  Plan follow-up in 2 weeks by repeat video visit.  Additional radiation therapy remains an option and that it was not completed after discharge as result of patient's inability to travel.  If the patient worsens further hospice care would certainly be an option and his son may contact myself or the practice concerning this.  I spent 55 minutes caring for Semaj on this date of service. This time includes time spent by me in the following activities: preparing for the visit, reviewing tests, obtaining and/or reviewing a separately obtained history, performing a medically appropriate examination and/or evaluation, counseling and educating the patient/family/caregiver, ordering medications, tests, or procedures, referring and communicating with other health care professionals, documenting information in the medical record, independently interpreting results and communicating that information with the patient/family/caregiver, and care coordination.         Jose Lee MD  10/11/2023

## 2023-10-10 NOTE — TELEPHONE ENCOUNTER
Provider: Dr eLe  Caller: Semaj Herrera  Relationship to Patient: Self  Call Back Phone Number: [phone]  Reason for Call: Pt asking to speak to Jacqueline, stated he is handicapped, has appt tomorrow

## 2023-10-10 NOTE — TELEPHONE ENCOUNTER
Called and spoke with patients kenny Su and let him know we would switch his visit for tomorrow to a video visit per Dr. Lee. He then let me know that he needed a refill on his norco  every 6 hours and it looks like it got d/c from the hospital but it was still in his note from Dr. Aly on 9/21. I let him know I would talk with Dr. Lee d/t the norco being on backorder and that I would get back with him and he v/u.

## 2023-10-11 ENCOUNTER — TELEMEDICINE (OUTPATIENT)
Dept: ONCOLOGY | Facility: CLINIC | Age: 83
End: 2023-10-11
Payer: MEDICARE

## 2023-10-11 DIAGNOSIS — C61 PROSTATE CANCER METASTATIC TO BONE: ICD-10-CM

## 2023-10-11 DIAGNOSIS — C79.51 MALIGNANT NEOPLASM METASTATIC TO BONE: Primary | ICD-10-CM

## 2023-10-11 DIAGNOSIS — C79.51 METASTATIC CANCER TO BONE: ICD-10-CM

## 2023-10-11 DIAGNOSIS — C79.51 PROSTATE CANCER METASTATIC TO BONE: ICD-10-CM

## 2023-10-11 RX ORDER — OXYCODONE HYDROCHLORIDE 5 MG/1
5 TABLET ORAL EVERY 4 HOURS PRN
Qty: 60 TABLET | Refills: 0 | Status: SHIPPED | OUTPATIENT
Start: 2023-10-11

## 2023-10-12 ENCOUNTER — DOCUMENTATION (OUTPATIENT)
Dept: ONCOLOGY | Facility: CLINIC | Age: 83
End: 2023-10-12
Payer: MEDICARE

## 2023-10-12 ENCOUNTER — READMISSION MANAGEMENT (OUTPATIENT)
Dept: CALL CENTER | Facility: HOSPITAL | Age: 83
End: 2023-10-12
Payer: MEDICARE

## 2023-10-12 ENCOUNTER — HOME CARE VISIT (OUTPATIENT)
Dept: HOME HEALTH SERVICES | Facility: HOME HEALTHCARE | Age: 83
End: 2023-10-12
Payer: MEDICARE

## 2023-10-12 VITALS
DIASTOLIC BLOOD PRESSURE: 62 MMHG | OXYGEN SATURATION: 98 % | TEMPERATURE: 96.4 F | HEART RATE: 65 BPM | SYSTOLIC BLOOD PRESSURE: 147 MMHG

## 2023-10-12 PROCEDURE — G0151 HHCP-SERV OF PT,EA 15 MIN: HCPCS

## 2023-10-12 NOTE — HOME HEALTH
"Subjective: \"I'm doing all right.\"    Falls- none    Medication Changes- Dexamethasone changed to 2x/day instead of every 6 hours.    Assessment: Patient is able to increase repetitions of some of the exercises today.    Communication: Report to Tracey Russell, PT    Plan for next visit:  Therapeutic exercises  Fall risk education  Transfer training"

## 2023-10-12 NOTE — PROGRESS NOTES
The patient has a mobility limitation that significantly impairs his ability to participate in one or more mobility related activities of daily living such as: toileting feeding, dressing, grooming and bathing as customary within the home due to malignancy.   The use of a manual wheelchair will significantly improve the patients ability to participate in ADL's and the patient will use it on a regular basis within the home.   Patient unable to safely use a cane or walker. Due to immobility related to malignancy , a standard wheelchair is needed to assist with daily living activities inside the home. Patient has upper body strength and mental capability to propel the wheelchair inside the home.     Electronically Signed By: Jose Lee MD  (NPI: [id number])

## 2023-10-12 NOTE — OUTREACH NOTE
Medical Week 3 Survey      Flowsheet Row Responses   [facility] patient discharged from? [place]   Does the patient have one of the following disease processes/diagnoses(primary or secondary)? Other   Week 3 attempt successful? Yes   Call start time 1356   Call end time 1400   Discharge diagnosis fecal impaction of rectum   Person spoke with today (if not patient) and relationship kenny Ross   Meds reviewed with patient/caregiver? Yes   Is the patient having any side effects they believe may be caused by any medication additions or changes? No   Does the patient have all medications ordered at discharge? Yes   Prescription comments reports that dexamethasone decreased to BID, reports that narcotics changed at his f/u yesterday   Is the patient taking all medications as directed (includes completed medication regime)? Yes   Comments regarding appointments Pt had f/u with oncology on 10/11/23 and another in 2 weeks   Does the patient have a primary care provider?  Yes   Does the patient have an appointment with their PCP within 7 days of discharge? Greater than 7 days   Has the patient kept scheduled appointments due by today? Yes   What is the Home health agency?  [facility]   Has home health visited the patient within 72 hours of discharge? Yes   Did the patient receive a copy of their discharge instructions? Yes   Nursing interventions Reviewed instructions with patient  [with son]   What is the patient's perception of their health status since discharge? Improving  [Son reports that he is doing much better since discharge, gaining strength with leg movement, working with PT, never lost his appetite.  Issues with constipation relieved.]   Is the patient/caregiver able to teach back signs and symptoms related to disease process for when to call PCP? Yes   Is the patient/caregiver able to teach back signs and symptoms related to disease process for when to call 911? Yes   Week 3 Call Completed? Yes   Graduated  Yes   Call end time 1400            YAYA MCKEON - Registered Nurse

## 2023-10-13 RX ORDER — DEXAMETHASONE 4 MG/1
4 TABLET ORAL 2 TIMES DAILY
Qty: 84 TABLET | Refills: 0 | Status: SHIPPED | OUTPATIENT
Start: 2023-10-13 | End: 2023-11-24

## 2023-10-13 RX ORDER — DEXAMETHASONE 4 MG/1
4 TABLET ORAL 2 TIMES DAILY
Qty: 84 TABLET | Refills: 0 | Status: CANCELLED | OUTPATIENT
Start: 2023-10-13 | End: 2023-11-03

## 2023-10-18 ENCOUNTER — HOME CARE VISIT (OUTPATIENT)
Dept: HOME HEALTH SERVICES | Facility: HOME HEALTHCARE | Age: 83
End: 2023-10-18
Payer: MEDICARE

## 2023-10-18 VITALS
SYSTOLIC BLOOD PRESSURE: 148 MMHG | RESPIRATION RATE: 18 BRPM | DIASTOLIC BLOOD PRESSURE: 78 MMHG | OXYGEN SATURATION: 99 % | TEMPERATURE: 96.5 F | HEART RATE: 55 BPM

## 2023-10-18 PROCEDURE — G0151 HHCP-SERV OF PT,EA 15 MIN: HCPCS

## 2023-10-18 NOTE — HOME HEALTH
Pt reports he is doing fair today, not one of his stronger days.  Family has still not heard anything regarding the wheelchair and sliding board.    Plan for next visit:  continue to review and progress HEP as tolerated

## 2023-10-20 ENCOUNTER — HOME CARE VISIT (OUTPATIENT)
Dept: HOME HEALTH SERVICES | Facility: HOME HEALTHCARE | Age: 83
End: 2023-10-20
Payer: MEDICARE

## 2023-10-20 DIAGNOSIS — C79.51 MALIGNANT NEOPLASM METASTATIC TO BONE: ICD-10-CM

## 2023-10-20 PROCEDURE — G0151 HHCP-SERV OF PT,EA 15 MIN: HCPCS

## 2023-10-20 RX ORDER — OXYCODONE HYDROCHLORIDE 5 MG/1
5 TABLET ORAL EVERY 4 HOURS PRN
Qty: 60 TABLET | Refills: 0 | Status: SHIPPED | OUTPATIENT
Start: 2023-10-20

## 2023-10-23 VITALS
OXYGEN SATURATION: 91 % | HEART RATE: 51 BPM | TEMPERATURE: 97 F | RESPIRATION RATE: 18 BRPM | DIASTOLIC BLOOD PRESSURE: 72 MMHG | SYSTOLIC BLOOD PRESSURE: 118 MMHG

## 2023-10-23 NOTE — HOME HEALTH
Pt reports he is feeling a bit stronger today compared to last visit.    Plan for next visit:  chart on hold to wait for w/c and sliding board vs continueing with HEP review/progression

## 2023-10-24 ENCOUNTER — HOME CARE VISIT (OUTPATIENT)
Dept: HOME HEALTH SERVICES | Facility: HOME HEALTHCARE | Age: 83
End: 2023-10-24
Payer: MEDICARE

## 2023-10-24 ENCOUNTER — TELEMEDICINE (OUTPATIENT)
Dept: ONCOLOGY | Facility: CLINIC | Age: 83
End: 2023-10-24
Payer: MEDICARE

## 2023-10-24 DIAGNOSIS — C79.51 MALIGNANT NEOPLASM METASTATIC TO BONE: Primary | ICD-10-CM

## 2023-10-24 DIAGNOSIS — C79.51 METASTATIC CANCER TO BONE: ICD-10-CM

## 2023-10-24 DIAGNOSIS — C61 PROSTATE CANCER METASTATIC TO BONE: ICD-10-CM

## 2023-10-24 DIAGNOSIS — C79.51 PROSTATE CANCER METASTATIC TO BONE: ICD-10-CM

## 2023-10-24 PROCEDURE — 1126F AMNT PAIN NOTED NONE PRSNT: CPT | Performed by: INTERNAL MEDICINE

## 2023-10-24 PROCEDURE — 99213 OFFICE O/P EST LOW 20 MIN: CPT | Performed by: INTERNAL MEDICINE

## 2023-10-24 NOTE — PROGRESS NOTES
"                                                                                                                                            REASONS FOR FOLLOW UP:   1.  Metastatic prostate cancer    HISTORY OF PRESENT ILLNESS:     Patient is an 82-year-old with metastatic prostate cancer who is seen 9/8/2023 as he continues current therapy with with Xgeva and Lupron after previous therapy also including Xtandi through the fall of last year.         The patient was previously seen back along with his son, Georges, 9/21/2022 fortunately doing reasonably well without additional pain, discomfort or excessive fatigue.  We have discussed various options for treatment of prostate cancer if we are unable to control this disease with current measures.    He is next evaluated 12/21/2022 now scheduled for Lupron.  He had last received both his Lupron and Xgeva 9/21/2022.  He notes increasing chest discomfort that appears to be \"when he twists\".  Initially he felt he might have a pneumonia but never had fever or cough.  We have discussed that his disease could well be progressing and then assessment that would allow us to clarify treatment options is reasonable.      This led to a repeat PSA 12/21/2022 that was found to be elevated to 23.9 and a follow-up Pylarify study performed 1/6/2023 that does demonstrate marked progression of sclerotic bone metastasis diffusely compared to 8/20/2021.  This includes the femoral neck,, sacrum, left iliac bone with SUV up to 48.8, stable tiny mediastinal nodes, 2 punctate nodules in the calvarium.      He is seen back with his son 1/11/2023.  The potential treatment options such as Pluvicto (Lutetium Yady 177 vipivotide tetraxetan), now that we know that his PSMA scan is positive, though the patient would have to initially be treated with taxane chemotherapy which would be difficult for him to tolerate, radium-223 considering his bony metastasis.  He has slowly progressive disease however we " have discussed additional issues including radiation therapy and/or alteration to another androgen receptor agonist such as darolutamide.  We have discussed all these options moving to have assessments done by radiation therapy and, [facility] as we address his symptoms.    The patient was seen by radiation therapy and felt a candidate for palliative radiotherapy as we made application for darolutamide.He is also seen by [facility] for additional treatment options 1/23/2023 and 2/10/2023 as radiation therapy proceeded to LSP/pelvis.    The patient was also seen by [facility] without additional therapeutic options at this time.    He is next seen 2/20/2023 to review his status recognizing his follow-up Xgeva will be due 3/15/2023 and and Lupron would be due 6/7/2023.  He was able to obtain darolutamide (Nubeqa) as free drug.  He is seen with his son both indicating that he just completed radiation therapy today.  While his pain has improved, he did have diarrhea develop during radiation therapy and is using antidiarrheal medications to control it currently.  This is also, potentially, side effect of darolutamide.      He is next seen 4/17/2023.  We have reiterated that his treatments with Lupron and Xgeva injections continued every 3 months.  On lower dose darolutamide which we gradually escalate to 600 mg nightly and 300 mg a day trying to move to 600 mg twice daily.  His last injections were given 3/20/2023 for both Xgeva and Lupron.  Unfortunately the patient has fallen twice while gardening onto his right side approximately 2 weeks prior to his visit 4/17/2023 and is only slowly recovering with right side and back pain.  He is, however, able to function and seen some improvement.  His increased use of pain medications has caused worsening constipation however.  In a lengthy conversation we have discussed increasing his darolutamide to 600 mg twice daily to gain his best response from therapy and also  improve his bowel function.    Patient is next evaluated 5/15/2023.  He continues on darolutamide 600 mg twice daily.  He also receives Xgeva and Lupron every 3 months, due in 1 month.  He returns today for lab review. He has occasional diarrhea, controlled with Lomotil as needed.  Continues [place] 5/325 as needed for pain and feels his pain is well controlled on this regimen.  Energy level is stable, he tires easily after working in his garden, but energy improves once he rests.  Appetite is good.  Denies fever or chills.  Denies nausea or vomiting.  Denies new or worsening pain.  No new concerns today.    The patient's follow-up tests include a PSA of 46.1 5/15/2023 and bone density 6/5/2023 showing abnormal uptake within several sites including skull base, ribs, humeri, spine, pelvis, Femara, sternum, tibia, left radius all consistent with progression of his disease.  This patient could be a candidate for a number of studies but we have, in particular, discussed Provenge.  He would likely need to be seen by [facility] initially.    Patient seen with his son 6/12/2023 and we discussed options such as Provenge.  The patient, self, indicates that he is not really interested in doing this but would rather have control of his pain primarily.  As result we have increased his Norco to 10 mg tablets #100 E scribed to his pharmacy as we continue Xgeva.  Pending his assessment in the next several weeks to month we may need to proceed to palliative care primarily.    The patient is seen back 7/3/2023, unfortunately, began to have increasing pain that is not managed sufficiently by his Norco tablets.  Additionally he is found to be considerably anemic and this is felt to be on the basis of his progressive malignancy.  The patient is deteriorating we are now discussing palliative care.  The patient's son also found that his son is diagnosed at age 30 with type 2 diabetes and is struggling to manage both his father and his  "son's care.      Patient returns today 7/24/2023 for lab review and follow-up.  At his last visit on 7/3/2023 hemoglobin was noted to be 7.2.  At that time he was scheduled to receive a transfusion of 1 unit of PRBC.  Patient states that actually after receiving the blood he has felt worse, and \"less peppy\".  He is using a fentanyl patch for pain control, which is helping, but does not feel like it is adequately controlling his pain.  He is also using Norco 10/325 every 6 hours for breakthrough pain.  He is having difficulty sleeping due to pain control.  He is also struggling with constipation.  Patient states that his bowels are moving, however they are difficult to move.  They are supposed to meeting with palliative us on Friday.  They are also scheduled to see nephrology later on this week.     The patient is next seen 9/8/2023 indicating that he still does not wish to proceed to hospice care but is becoming quite weak and fatigued.  He is found to be considerably anemic and would benefit from transfusion as we have tried to continue both Lupron and Xgeva.    Patient required hospitalization 9/17 - 9/25/2023 with 2 to 3-day history of bilateral leg weakness and constipation.  Repeat scans demonstrated substantial constipation and he started a bowel regimen.  MRI of the lumbar spine demonstrate metastatic disease as did cervical spine without significant compression but severe canal stenosis at T10 with epidural metastatic disease and cord compression.  He was placed on IV steroids and neurosurgery felt that decompression was likely not to be beneficial.  Radiation therapy was planned with 5 treatments and he did have some modest improvement thereafter.  He requested home care and hospital bed and various DME was delivered with plans to restart his insulin pump at discharge.  He completed his radiation therapy 9/27/2023.      Patient was to be seen in office 10/11/2023 but was too weak and a video visit was " rescheduled for same day.  He is seen with his son Georges and, fortunately, has been able to function at home without considerable difficulty.  He still has pain and is having increasing weakness in his lower extremities.  He is able to sleep, has reasonable appetite and his constipation is no longer an issue.  He has home health and does still not which hospice care at this point.    The patient is seen back 10/24/2023 by video visit fortunately having improved somewhat with adjustment of medications and with further rest.  His pain is under control with his current medications and his general weakness is slowly abating.  We discussed further adjustment and tapering of his dexamethasone.  The patient is comfortable with his current treatment and does not wish hospice care at this point.      Hematologic/oncologic history:   The patient is an 82-year-old male with significant past medical history including prostate carcinoma, CKD 3 and long-term tobacco use be been seen by primary care in late January, 2018 for hoarseness.  Chest x-ray is now outpatient January 23 revealed sclerotic change in the posterior mid chest to be within 3 adjacent thoracic ertebral bodies of uncertain significance.  A follow-up chest CT revealed numerous sclerotic foci within al visualized bones consistent with metastatic disease, multiple enlarged mediastinal lymph nodes concerning for metastatic lymphadenopathy and a polypoidal abnormality in the right lateral wall of the trachea.  CT of abdomen March 20 revealed extensive osseous metastatic disease mildly enlarged retroperitoneal lymph nodes.  Bone scan March 20 was consistent with widespread osseous metastasis particularly in the proximal half the left humeral shaft, abnormal uptake in the sternum and manubrium and a small focus in the posterior aspect of the calvarium.  This led to a plain film the left humerus with mottled sclerosis involving the shaft of the humerus particularly in  the proximal half but no evidence of pathologic fracture.    The patient has additionally type 2 diabetes on insulin pump, again a history of prostate carcinoma prostatectomy 12 years previously, hypertension, and hyperlipidemia.  Additional studies included a PSA of 395.1 from March 14, 2018.The patient's been seen by urology March 15 with plans to start Firmagon.    The patient is now seen in pulmonary clinic with Dr. Bijan Rivera and we have discussed that he will need bronchoscopy and mediastinoscopy.  Interestingly his additional history includes a long occupational exposure including working in the [place] just out of school, subsequent construction work for many years and a 30-pack-year history of smoking stopping in the late 1990s.  He indicates that he followed with Dr. Guillen of urology for many years with no changes in his exams and normal PSAs.  He stopped this follow-up approximately 2 years ago.     Patient was seen in consultation with thoracic surgery on March 28 and plans are made for mediastinoscopy and bronchoscopy with lymph node biopsy.  Pathology revealed that these nodes were consistent with metastatic prostate carcinoma.  He was discussed at thoracic conference.  A PET/CT was not pursued and it was determined that he see medical oncology for hormonal treatment and radiation therapy if symptomatic.  It should be noted that the patient's March 15 [facility] office note describes that Firmagon was planned.     The patient has met with the son and grandson April 23.  We have also contacted Dr. Taylor of urology in that Firmagon was given just after his last visit and would next be due approximately May 3.  Patient feels considerably better with resolution of his pain, normalization of his performance status.  He would like to continue treatment through our office now contacted Dr. Taylor concerning this.    The patient is next seen June 11, 2018 we discussed his follow-up  including his treatments with Lupron May 7 and his next treatment on July 30.  He has returned to essentially normal performance status and his PSA has dropped further from 395 March 14 27.8 May 7 and now 2.03 June 11.  We do plan to initiate Xgeva also study him via scans to document his improvement approximately a month from now.   The patient is next seen July 26, 2018.  Repeat imaging demonstrated interval resolution of mediastinal and retroperitoneal lymphadenopathy.  There is thickening in the distal aspect of the appendix with stranding?  Chronic appendicitis.  The patient indicates he is having no such symptoms and never has.  There is no change in sclerotic bony metastasis in the patient's PSA has dropped substantially to 1.66 by July 19 and 1.79 July 26.  He is actually feeling excellent and this is corroborated by family members.   Patient is next seen January 10, 2019 continuing to do very well and, in fact indicating that [facility], likely [facility] replacements, visual review.  He is not certain is going to continue his care with them or with us or combination of both.    Patient is next seen April 4, 2019.  He is recently discharged after hospitalization March 15-18 with left upper lobe pneumonia.  We reviewed the findings in detail with his gradual recovery now documented.  His studies include a CT of chest which does not demonstrate any further bony lesions and/or pulmonary metastasis having developed.  He is feeling considerably better and approximately back to his baseline.  The patient is next seen June 28, 2019 feeling well but having baseline generally musculoskeletal pain and restless legs.His recent exams include a PSA of 0.81, CMP with potassium of 5.3 with repeat pending.  His performance status overall remains good to very good and is clearly responding to his treatments.  The patient is next seen December 13, 2019 continue to feel well.  He  has had, oddly enough, 2 episodes of sleep paralysis which have occurred to him previously and he wonders if there is any connection with his prostate carcinoma though this is felt unlikely.     The patient when assessed in December had his PSA go to 2.9.  We continue with Lupron and Xgeva and is seen back now March 9, 2020 without additional symptoms.  We discussed that a schedule could likely change moving to 3 months for both of these medications particularly if he needs additional therapy directed at progressive disease.     Patient contacted our practice May 4 concern for pain in his hips.  This led to moving his scans up and they were performed May 8, 2020 showing a sub-6 mm pleural-based pulmonary nodule in the right lower lobe that is new from March 15, 2018, remainder of the sub-6 mm pulmonary nodules bilaterally are stable.  There is extensive osseous metastatic disease as previous with no other new findings.     The patient indicates, when seen, May 13 that his bone pain is now resolved.  While this is good information does not mean that he does not have further bony metastasis and we have discussed that a follow-up bone scan is likely necessary.  Furthermore he is having some difficulty with sleeplessness and increase in restless legs as he continues to stay at home during the COVID 19 crisis which, itself, is producing more anxiety for him.  A follow-up bone scan was obtained Lety 3 revealing multifocal osseous metastatic disease which was compared to March 2, 2018 with improvement.  No new abnormal uptake is present.  He is next seen with us on June 17, 2020 and, fortunately, is not having any significant pain at this point.  We discussed his scans in detail and, on balance, his performance status also remains improved.     It was elected to follow the patient rather than changes antiandrogen therapy.  He is reassessed September 3 with unchanged CMP, H&H of 11.4 and 36.3 with normal white count,  platelet count and differential, PSA at 15.8.  Follow-up PA lateral chest x-ray does not show any new abnormalities though there is extensive metastatic bone disease throughout the bony thorax and osteoblastic metastasis have been seen on chest CT May 2020.  The patient is seen with his son Georges September 10 noticing increasing bony discomfort primarily in the right hip following fairly intensive gardening which he does frequently.  Now has further elevation of PSA were wondering whether we should try to intervene sooner per additional antiandrogens.  We will need to further assess him via CT scanning to make this decision     These were obtained October 1 showing extensive sclerotic disease with no metastatic disease in chest abdomen or pelvis.  PSA had gone from 15.8 September 3-16 0.6 October 1.     He still has pain getting up in the morning and after active gardening.  This is manageable with current pain medications and, at present, it is not apparent that he needs to switch medications to gain better control of his disease.  The patient did have follow-up studies done including a PSA November 25, 2020 now at 25.4.  The patient is seen with his son December 10, 2020 doing fair but having some increased pain in the mid sternum and right hip that he ascribes to additional exercise/work in managing his garden.  It is apparent however, that his last bone scan was 6 months ago and we do need to reassess him concerning this.     The patient had follow-up bone scan performed January 11, 2021 showing again uptake in the calvarium, humerus, right medial clavicle, sternum, ribs, spine, sacrum, pelvis, right acetabulum, proximal femora and now left frontal bone which appears new.  There is also mild increase in sternal uptake and equivocal increase in the pelvic lesions of all of the sacrum and the right proximal femur.     The patient is seen with his son January 18, 2021 and indicates that he is having pain gradually  worsened in his right hip, mid chest that had been an issue previously.  These findings on bone scan are reviewed with both of them as likely the underlying culprit for his discomfort.  He would need change of the systemic therapy but, at present, will ask for radiation therapy palliation initially.  He was previously treated by Dr. Kulkarni many years ago and will ask her to see him.  We will also make application for the current cost of Xtandi or Zytiga.  The patient is not felt to be a candidate for systemic chemotherapy.     The patient was referred for ration therapy as we continued to assess his PSA on current therapy as well as exam the cost of Zytiga or Xtandi.  Radiation therapy (Dr. Kulkarni) saw the patient January 26 and felt that the right femur and sternal lesion could benefit from therapy-3000 cGy in 10 fractions.  The patient took treatment February 1 to February 12 to the above areas and is next seen back March 15.  Fortunately his pain has improved dramatically and he is quite happy about this.  Unfortunately he notes some difficulty with swallowing that is temporary and often leads to increased salivation and mucus production.  Patient was asked to return his next assessed April 12, 2021.  He is able to swallow with less pain but still has excess mucus production and issues with salivation.  His PSA has noted increase but he is having no additional bony discomfort at this point and, additionally, has noted low-grade fevers just under 100 degrees at nighttime?.  His weight, appetite and general performance status have remained good to excellent.  We discussed follow-up studies at this point to determine his status.    Follow-up scans were scheduled CT scans of chest and pelvis 5/4/2021 showing osseous metastasis quite similar to previous examinations in the chest, CT of abdomen pelvis also with no acute intra-abdominal adenopathy no indication of abdominal pelvic metastatic disease but again widespread  osseous metastasis.  His PSA at this point have been slowly increasing to a level of 44 on 4/12/2021.  As the patient is reassessed 5/10/2021 we find, fortunately, that he is not exceeding symptomatic.  It could make reasonable sense at this point to take the mediastinal nodes from his biopsy 4/9/2018, reassess potential targeted therapies, MSI status, and germline analysis.  Fortunately he is not having significant pain or discomfort and is seen with his son as we discussed the above plan.  Notably a follow-up PSA is found to be 42.6.   Patient is next seen in 6/7/2021 for Lupron and Xgeva.  Fortunately he is feeling considerably better according to both he and his son though his stamina has reduced.  He is not having additional pain at this point.  We have also reviewed his genetics assessment which was significant only for a variant of unknown significance.  This is not an actionable abnormality.    As the patient's PSA further escalated increasing to 89 7/7/2021 his subsequent Axumin PET was obtained 8/20 demonstrating extensive osteosclerotic metastatic disease showing reduced uptake-typical finding but no evidence of visceral metastatic disease in the neck chest abdomen pelvis.  These findings are discussed with the patient and his son 8/25/2021 and indicative of his progressive disease-etiology of his rising PSA-though the patient is asymptomatic we have discussed moving to initiate Xtandi is available at 160 mg p.o. twice daily along with his current Lupron and Xgeva.     The patient's testing 10/6 included PSA that is dropped to 9.69.  He is seen with his son, Cody, both indicating that he been doing relatively well with treatment but began to notice nausea in the last week.  The schedule that he has been given also needs to be somewhat updated in terms of his Lupron and Xgeva.  He has been able to maintain his appetite and overall performance status to date.    The patient is next assessed 12/8/2021  tolerating his current Xtandi dose better than previous and maintaining a good performance status overall as well as demonstrating a drop in his PSA now to 4.7 on 12/8/2021 compared to high of 112 9/8/2021.  He remains hypophosphatemic and hypocalcemic and we have discussed, again, changing his Xgeva dosing to every 3 months along with his Lupron will continue his Xtandi to 80 mg daily.    The patient is next reviewed 3/21/2022 seen for both Xgeva and Lupron.  Fortunately he is feeling reasonably well with little additional pain though he has restarted to place his garden into shape for the season and is working more outdoors.    Patient reevaluated 5/2/2022 with repeat CBC including H&H of 10.5 and 33.4, white count 5980 and platelet count of 192,000, currently CMP and PSA pending with a previous PSA 1 month ago at 9.06.  At this point we increased his Xtandi to 160 mg p.o. daily while continuing treatment with every 3 month Xgeva and Lupron.  Notably the patient's Requip  alcohol I am sure it is mariza now at 1.5 mg p.o. nightly and Neurontin 900 mg p.o. daily has improved his restless leg syndrome substantially.    Patient seen 6/13/2022 at which time Xtandi at 160 mg p.o. daily was continued, Xgeva moved to every 6 months and Lupron every 3 months.    This was continued when patient was seen 9/21/2022 though subsequently we proceeded to every 3 months for both medications.    Patient seen 12/21/2022 at which time a subsequent Pylarify scan was requested as result of increasing PSA level and bony discomfort.  The patient received Lupron and Xgeva at this point.     He is seen back with his son 1/11/2023.  The potential treatment options such as Pluvicto (Lutetium Yady 177 vipivotide tetraxetan), now that we know that his PSMA scan is positive, though the patient would have to initially be treated with taxane chemotherapy which would be difficult for him to tolerate, radium-223 considering his bony metastasis.  He  has slowly progressive disease however we have discussed additional issues including radiation therapy and/or alteration to another androgen receptor agonist such as darolutamide.  We have discussed all these options moving to have assessments done by radiation therapy and, [facility] as we address his symptoms.    The patient was not felt a candidate for additional treatments via [facility], was able to start darolutamide and completed palliative radiotherapy.  He was stable seen 2/20/2023 though experiencing diarrhea post radiation therapy.    Patient seen 4/17/2023 with darolutamide gradually increased to 600 mg twice daily.  His Xgeva and Lupron will be given every 3 months.    Patient seen 6/12/2023, unfortunately, PSA increasing and bone scan worsening.  Patient at this point indicated that he did not wish additional therapies but, instead, control of his pain initially.  Plans were made to continue his Lupron and Xgeva seen him at the 3 to 4-week annia and considering palliative care primarily with either hospice or [facility].    Patient seen 7/3/2023 with plans to initiate palliative care starting with Pall[facility].  The patient continued this approach seen back 9/8/2023 referring not to proceed with hospice but again with palliative care including transfusion.    Patient required hospitalization 9/17 - 9/25/2023 with 2 to 3-day history of bilateral leg weakness and constipation.  Repeat scans demonstrated substantial constipation and he started a bowel regimen.  MRI of the lumbar spine demonstrate metastatic disease as did cervical spine without significant compression but severe canal stenosis at T10 with epidural metastatic disease and cord compression.  He was placed on IV steroids and neurosurgery felt that decompression was likely not to be beneficial.  Radiation therapy was planned with 5 treatments and he did have some modest improvement thereafter.  He requested home care and hospital bed and  various DME was delivered with plans to restart his insulin pump at discharge.  He completed his radiation therapy 9/27/2023.      Patient was to be seen in office 10/11/2023 but was too weak and a video visit was rescheduled for same day.  He is seen with his son Georges and, fortunately, has been able to function at home without considerable difficulty.  He still has pain and is having increasing weakness in his lower extremities.  He is able to sleep, has reasonable appetite and his constipation is no longer an issue.  He has home health and does still not which hospice care at this point.    Patient evaluated by video visit 10/24/2023 again stable to to modestly improved.  Past Medical History:   Diagnosis Date    Aortic stenosis, mild 01/01/2021    Per echocardiogram 2021    Ascending aorta dilatation     Asthma     Benign prostatic hyperplasia     Bone cancer     Cellulitis of great toe of left foot 10/19/2018    CKD (chronic kidney disease)     Stage 3; followed by Dr. Vicky Duran     Diastolic dysfunction     GRADE II per echo 2018    Difficulty breathing     during exertion    Dyslipidemia     Erectile dysfunction     Fatigue     Heart murmur     History of blood transfusion     History of kidney stones     HL (hearing loss)     Hyperlipidemia     Hypertension     Hyponatremia     Kidney stone     Left ventricular hypertrophy     per echo 2018    Localized edema     Neuropathy     Obstructive sleep apnea     USING CPAP    Osteoarthritis     Peptic ulcer     Pneumonia     Pneumonia of left upper lobe due to infectious organism 03/15/2019    Prostate cancer     Status post prostatectomy, radiation therapy, and hormone therapy followed by Dr. Lee; metastatsis to bone    Pure hyperglyceridemia     Restless leg syndrome     Sepsis     Sinus bradycardia     Transient cerebral ischemia     Type 2 diabetes mellitus         Past Surgical History:   Procedure Laterality Date    BRONCHOSCOPY N/A 04/09/2018     Procedure: BRONCHOSCOPY;  Surgeon: Bijan Rivera III, MD;  Location: [facility] OR;  Service: Cardiothoracic    COLONOSCOPY      DEEP NECK LYMPH NODE BIOPSY / EXCISION      HEMORRHOIDECTOMY      LYMPH NODE BIOPSY      MEDIASTINOSCOPY N/A 04/09/2018    Procedure: MEDIASTINOSCOPY WITH LYMPH NODE BIOPSY;  Surgeon: Bijan Rivera III, MD;  Location: [facility] OR;  Service: Cardiothoracic    OTHER SURGICAL HISTORY      ulcer repair    PROSTATECTOMY  2006        Current Outpatient Medications on File Prior to Visit   Medication Sig Dispense Refill    Accu-Chek Guide test strip USE 1 STRIP TO CHECK BLOOD SUGAR 4 TIMES DAILY      Accu-Chek Softclix Lancets lancets USE 1 LANCET TO CHECK GLUCOSE 4 TIMES DAILY      aspirin 81 MG EC tablet Take 1 tablet (81 mg total) by mouth Daily Indications: Venous Thromboembolism       cholecalciferol (VITAMIN D3) 1000 units tablet Take 2 tablets by mouth Daily.      Cyanocobalamin (Vitamin B12) 1000 MCG tablet controlled-release Take 1 tablet by mouth Daily.      dexAMETHasone (DECADRON) 4 MG tablet Take 1 tablet by mouth 2 (Two) Times a Day for 42 days. Indications: Cancer of the Prostate Gland 84 tablet 0    dilTIAZem CD (CARDIZEM CD) 120 MG 24 hr capsule TAKE 1 CAPSULE EVERY DAY 90 capsule 3    diphenoxylate-atropine (LOMOTIL) 2.5-0.025 MG per tablet Take 1 tablet by mouth 4 (Four) Times a Day As Needed for Diarrhea. 30 tablet 0    fentaNYL (DURAGESIC) 50 MCG/HR patch Place 1 patch on the skin as directed by provider Every 72 (Seventy-Two) Hours. 10 each 0    gabapentin (NEURONTIN) 300 MG capsule TAKE 3 CAPSULES EVERY NIGHT AT BEDTIME 90 capsule 0    Insulin Aspart (novoLOG) 100 UNIT/ML injection INJECT 100 UNITS VIA PUMP ONCE DAILY      Leuprolide Acetate, 3 Month, (LUPRON DEPOT, 3-MONTH, IM) Inject  into the appropriate muscle as directed by prescriber Every 3 (Three) Months.      levothyroxine (SYNTHROID, LEVOTHROID) 88 MCG tablet Take 88 mcg by mouth Daily. 2 on Wed and        loperamide (IMODIUM) 1 MG/5ML solution Take 10 mL by mouth 4 (Four) Times a Day As Needed for Diarrhea. Indications: Diarrhea      modafinil (PROVIGIL) 200 MG tablet Take 1 tablet by mouth Daily. 90 tablet 1    ondansetron (ZOFRAN) 8 MG tablet Take 1 tablet by mouth 3 (Three) Times a Day As Needed for Nausea or Vomiting. 30 tablet 5    oxyCODONE (Roxicodone) 5 MG immediate release tablet Take 1 tablet by mouth Every 4 (Four) Hours As Needed for Moderate Pain. 60 tablet 0    pantoprazole (PROTONIX) 40 MG EC tablet Take 1 tablet by mouth 2 (Two) Times a Day Before Meals. 60 tablet 0    polyethylene glycol (MIRALAX) 17 g packet Take 17 g by mouth Daily.      pravastatin (PRAVACHOL) 40 MG tablet Take 1 tablet by mouth Daily.      rOPINIRole (REQUIP) 0.5 MG tablet Take 2 tablets by mouth in the evening and 2 at bedtime. 360 tablet 2    sennosides-docusate (PERICOLACE) 8.6-50 MG per tablet Take 2 tablets by mouth 2 (Two) Times a Day.      traZODone (DESYREL) 50 MG tablet Take 1-2 tablets by mouth every night at bedtime. Indications: Trouble Sleeping       No current facility-administered medications on file prior to visit.        ALLERGIES:    Allergies   Allergen Reactions    Niacin Unknown - Low Severity    Statins Unknown - Low Severity        Social History     Socioeconomic History    Marital status:     Years of education: College   Tobacco Use    Smoking status: Former     Packs/day: 1.50     Years: 30.00     Additional pack years: 0.00     Total pack years: 45.00     Types: Cigarettes     Start date: 1968     Quit date: 1998     Years since quittin.7     Passive exposure: Past    Smokeless tobacco: Never   Vaping Use    Vaping Use: Never used   Substance and Sexual Activity    Alcohol use: Not Currently     Comment: Caffeine use: 2-3 cups daily    Drug use: Never    Sexual activity: Not Currently        Family History   Problem Relation Age of Onset    Heart failure Mother      Hypertension Mother             Diabetes Mother     Heart disease Mother             Lung cancer Father 46    Hypertension Sister     Lung cancer Sister 60    Hypertension Brother             Lung cancer Brother 62    Diabetes Brother     Heart disease Other     Prostate cancer Brother 60    Cancer Brother             Cancer Sister             Cancer Sister     Cancer Sister     Heart disease Brother             Malig Hyperthermia Neg Hx         Review of Systems  As per HPI    Objective     There were no vitals filed for this visit.        2023    11:57 AM   Current Status   ECOG score 1     Physical Exam   Constitutional: He is oriented to person, place, and time. He appears well-developed. No distress.   HENT:   Head: Normocephalic and atraumatic.   Mouth/Throat: No oropharyngeal exudate.   Eyes: Pupils are equal, round, and reactive to light.   Cardiovascular:   No murmur heard.  Abdominal: Soft. Normal appearance. He exhibits no distension.   Musculoskeletal: Normal range of motion.   Neurological: He is alert and oriented to person, place, and time.   Skin: Skin is warm and dry. No rash noted.   Vitals reviewed.    10/24/2023 patient examined, unchanged from above.    RECENT LABS:  Hematology WBC   Date Value Ref Range Status   2023 6.13 3.40 - 10.80 10*3/mm3 Final     RBC   Date Value Ref Range Status   2023 4.16 4.14 - 5.80 10*6/mm3 Final     Hemoglobin   Date Value Ref Range Status   2023 10.9 (L) 13.0 - 17.7 g/dL Final     Hematocrit   Date Value Ref Range Status   2023 33.3 (L) 37.5 - 51.0 % Final     Platelets   Date Value Ref Range Status   2023 95 (L) 140 - 450 10*3/mm3 Final        Lab Results   Component Value Date    GLUCOSE 118 (H) 2023    BUN 27 (H) 2023    CREATININE 0.68 (L) 2023    EGFRIFNONA 56 (L) 2022    BCR 39.7 (H) 2023    K 5.0 2023    CO2 23.0 2023    CALCIUM 7.3  (L) 09/25/2023    ALBUMIN 3.5 09/21/2023    AST 14 09/21/2023    ALT 9 09/21/2023       NM Bone Scan Whole Body (01/11/2021 13:25)  CT Chest With Contrast Diagnostic (05/04/2021 09:11)  CT Abdomen Pelvis With Contrast (05/04/2021 09:11)  NM PET/CT Skull Base to Mid Thigh (08/20/2021 12:18)      Assessment & Plan      1.  Metastatic prostate cancer:  Patient initially diagnosed in 2006 and had a prostatectomy and hormone therapy.  Patient in January 2018 began to complain of shortness of breath and hoarseness.  Chest x-ray obtained 1/23/18 showed sclerotic bone lesions.  CT of the chest 3/12/2018 numerous sclerotic bone foci with all visualized bones consistent with metastatic disease, multiple enlarged mediastinal lymph nodes.  .1 from 3/14/2018.  Patient was started on Firmagon by urology, [facility].  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  Case discussed at thoracic conference and recommendations were to continue ADT and radiation for symptomatic sites.  Lupron injections every 3 mos initiated 5/7/18 PSA at that time 7.810  Monthly Xgeva initiated 6/11/18 PSA 2.030  Last PSA 6/13/2019 0.881  9/20/2019 patient tolerating treatment well, no new concerns.  12/2019 PSA 2.9  3/9/2020  PSA increasing to 5.030, he remains continuing on Lupron and Xgeva and asymptomatic though follow-up CT of chest and pelvis will be requested in 11 weeks prior to follow-up in 12 weeks.   5/4/2020 patient called reported worsening right hip pain, plans to purse CT scans ASAP.   5/8/2020 CT scans C/A/P show no progression of disease. Hip pain improved, Gabapentin added.. Bone scan requested.  6/3/2020 bone scan that does not demonstrate worsening of bone nor need for localized radiation therapy.  The patient's PSA has been slowly rising and we have not been able to determine worsening of disease and and, therefore, it is not felt warranted add  additional medications such as Xtandi or apalutamide to his therapy.  Please note would like to avoid Zytiga try not to add prednisone which would worsen his blood sugar control.  9/3/2020 PSA 15.8, CXR extensive metastatic bone disease- patient reviewed 9/10/2020 reporting increased bony discomfort CT scans requested.  Scans done and reviewed 10/7/2020 ultimately reveal no evidence of progression of disease for visceral involvement or clearly for bone involvement.  After further review with the patient and his son plan continued Xgeva and Lupron.  We have assessed for availability of Xtandi 160 mg p.o. daily and find the cost is currently prohibitive.   11/25/2020 PSA 25.4  12/10/2020 review with some mild increase in bony discomfort.  After repeat discussion we went on to have him undergo Lupron therapy, and his PSA actually to be mildly reduced at 22.2.   Follow-up bone scan 1/11/2021 demonstrates some evidence of progression in sternum, left sacrum and proximal femur.  These findings were reviewed with the patient and his son January 18, 2020 and the patient was referred for palliative radiotherapy(Dr. Kulkarni)  Palliative radiation under care Dr. Kulkarni to right femur and sternal lesion -3000 cGy in 10 fractions given February 1 to February 12 with significant symptom improvement.  3/15/2021 PSA 38.5  4/12/2021 PSA 44  5/10/2021 PSA 42.6 CT chest abdomen pelvis 5/4/2021 - for progression of disease and follow-up PSA 5 10/2021 is at 42 slightly reduced from previous.  We have discussed how to proceed from here and find a significant family history that clearly supports a potential genetic assessment for his malignancy.  It is felt most reasonable at this point to take the mediastinal nodes from his biopsy 4/9/2018 and reassess for potential targeted therapies, MSI status, as well as germline analysis.  6/7/2021 PSA 65.9 his analysis completed for actionable mutations including germline mutations.  These exams  were negative though there is a variant of unknown significance.  Again this is not an actionable mutation.  We proceeded with additional Xgeva and Lupron planning for monthly Xgeva and Lupron at 3 months  7/7/2021 PSA 89 and subsequent testing with Axumin PET was performed confirming extensive osteosclerotic metastatic disease with little uptake, no evidence of visceral metastatic disease in neck chest abdomen or pelvis.  8/25/2021  , PET/CT reviewed, subsequent PSA increased to 105.  Patient fortunately asymptomatic.  Requested reevaluation for Xtandi 160 mg po daily.   9/8/2021 due for his lupron, receiving every 3 months, and monthly Xgeva.  Will have education today for Xtandi and will receive his medication today as well.   Started Xtandi 9/8/2021.  9/22/2021 here for toxicity check, so far tolerating Xtandi quite well other than a slight increase in diarrhea controlled with Imodium.  10/6/2021 continues to tolerate Xtandi well.  Labs are within range today, we will proceed with Xgeva today.  Patient seen 10/20/2021 having more nausea with Xtandi and demonstrating a substantial reduction in PSA level.  After discussion we plan to reduce his dose to 80 mg daily following his PSA and performance status over the next approximate 7 weeks at which time he would be scheduled for his follow-up Lupron.  Patient assessed 12/8/2021 with further reduction in PSA to 4.7, ongoing hypophosphatemia and hypocalcemia-Xgeva moved to every 3 months given on same schedule as Lupron  Patient seen 3/21/2022, 6-week follow-up with mild increase in PSA recognized  Patient reassessed 5/2/2022 with PSA at 10.1, Xtandi moved to 160 mg p.o. daily  Patient assessed 6/13/2022, Xtandi at 160 mg p.o. daily, PSA pending, Xgeva moved to every 6 months, Lupron every 3 months  Patient seen 9/21/2022 at which time we continued our current approach, reviewed additional options for therapy should he clearly progress.  Patient seen  12/21/2022, increasing bony discomfort, Xgeva and Eligard continued, plans made for Pylarify scanning.   Patient seen 1/11/2023 with his son, discuss potential treatment options such as Pluvicto (Lutetium Yady 177 vipivotide tetraxetan) knowing that Pms-Asa is strongly positive in bone, palliative radiotherapy, radium-223 and alteration to additional androgen receptor such as darolutamide.  Patient referred to radiation therapy for their evaluation and possible treatment options, [facility] as application made for darolutamide and medications altered to include meloxicam 15 mg p.o. daily along with his Norco.  Patient assessed 2/20/2023 improved for radiation therapy, no additional options for treatment and [facility], darolutamide initiated.  Patient assessed 4/17/2023, darolutamide increased to 600 mg twice daily, Xgeva and Lupron every 3 months  5/15/2023: Continues darolutamide 600 mg twice daily.  Continues Xgeva and Lupron every 3 months, due in 1 month.  Hemoglobin today stable at 9.1.  Increased PSA, now 46 previously 29.  Discussed with Dr. Lee and we will proceed with bone scan in 3 weeks.  Subsequent bone scan with progression of metastatic disease to number of sites, issues discussed with patient and his son 6/12/2023 with plans to proceed to, primarily, palliative care approach.  Patient treated with Xgeva and Lupron in 3-4-week follow-up consider hospice or pallitus care.  Patient seen 7/3/2023-further anemia with hemoglobin 7.2 g%.  Plan 1 unit packed RBC transfusion, initiation of Pallitus.  7/24/2023 hemoglobin 7.2.  Patient declines blood transfusion.  He is reporting pain currently not well controlled which will be discussed below.  Meeting with pall[facility] on Friday of this week.  Patient assessed 9/8/2023 with hemoglobin 5.2 g%, further transfusion planned with additional steroid, Benadryl premedication  atient required hospitalization 9/17 - 9/25/2023 with 2 to 3-day history of bilateral  leg weakness and constipation.  Repeat scans demonstrated substantial constipation and he started a bowel regimen.  MRI of the lumbar spine demonstrate metastatic disease as did cervical spine without significant compression but severe canal stenosis at T10 with epidural metastatic disease and cord compression.  He was placed on IV steroids and neurosurgery felt that decompression was likely not to be beneficial.  Radiation therapy was planned with 5 treatments and he did have some modest improvement thereafter.  He requested home care and hospital bed and various DME was delivered with plans to restart his insulin pump at discharge.  He completed his radiation therapy 9/27/2023.  Patient was to be seen in office 10/11/2023 but was too weak and a video visit was rescheduled for same day.  He is seen with his son Georges and, fortunately, has been able to function at home without considerable difficulty.  He still has pain and is having increasing weakness in his lower extremities.  He is able to sleep, has reasonable appetite and his constipation is no longer an issue.  He has home health and does still not which hospice care at this point.  The patient has a mobility limitation that significantly impairs his/her ability to participate in one or more mobility related activity of daily living such as: toileting, feeding, dressing, grooming, and bathing   customarily done within the home secondary to his progressive malignancy.  · A cane or walker have been ruled out.    · The use of a manual wheelchair will significantly improve the patient's ability to participate in ADL's and the patient will use it on a regular basis with in the home. · Patient has the upper body strength and   mental capability to safely propel the wheelchair   in their home or if they do not, they do have a   caregiver that is available, willing and able to provide assistance with the wheelchair.         2.  Neuropathy/ restless legs:    5/13/2020  gabapentin 600 mg at bedtime, trazadone 50 mg QHS.  On gabapentin 300, 2 tablets at bedtime, and requip 0.5.mg    Still having neuropathy symptoms.  Will increase gabapentin to 900 mg at bedtime.   9/22/2021 increase in gabapentin to 900 mg at bedtime has helped neuropathy.  Now taking Requip 2 mg nightly.    3.  Cancer related pain: on Norco 5/325 every 6 hours as needed.  Mobic 15 mg p.o. every morning initiated 1/11/2023, consider MS Contin every 12 hours  Status post palliative radiotherapy with improved pain control by 2/20/2023  Effective pain relief except increasing constipation noted 4/17/2023, narcotic adjusted downward as possible.  Continues Norco 5/325 every 6 hours as needed.  Feels his pain is well controlled on this regimen.  Semaj Herrera reports a pain score of 8.  Given his pain assessment as noted, treatment options were discussed and the following options were decided upon as a follow-up plan to address the patient's pain-plan to add Duragesic 25 mcg every 72 hours daily E scribed to pharmacy  7/24/2023 patient complaining of pain not controlled with current medication.  We will increase fentanyl patch to 50 mcg every 72 hours.  Patient to continue Norco 10/3/2025 every 6 hours if needed for breakthrough pain.  Issues with obtaining Norco recognized, oxycodone 5 mg p.o. every 4 hours E scribed to pharmacy 10/11/2023  Pain controlled 10/24/2023    PLAN:     Continue fentanyl patch to 50 mcg every 72 hours  Continue current pain medication to oxycodone 5 mg p.o. every 4 hours as needed pain E scribed to pharmacy  Again, at present, the patient does not wish hospice care and is seen by home health once weekly  We have determined that his dexamethasone will be reduced to 2 mg every 12 hours x2 weeks and then drop to 2 mg daily  Plan follow-up in 3-5 weeks by repeat video visit.  Additional radiation therapy remains an option and that it was not completed after discharge as result of patient's  inability to travel.  If the patient worsens further hospice care would certainly be an option and his son may contact myself or the practice concerning this.        Jose Lee MD  10/24/2023

## 2023-10-26 DIAGNOSIS — C79.51 MALIGNANT NEOPLASM METASTATIC TO BONE: ICD-10-CM

## 2023-10-26 DIAGNOSIS — C79.51 PROSTATE CANCER METASTATIC TO BONE: ICD-10-CM

## 2023-10-26 DIAGNOSIS — C61 PROSTATE CANCER METASTATIC TO BONE: ICD-10-CM

## 2023-10-27 RX ORDER — FENTANYL 50 UG/1
1 PATCH TRANSDERMAL
Qty: 10 EACH | Refills: 0 | Status: SHIPPED | OUTPATIENT
Start: 2023-10-27

## 2023-10-29 ENCOUNTER — HOME CARE VISIT (OUTPATIENT)
Dept: HOME HEALTH SERVICES | Facility: HOME HEALTHCARE | Age: 83
End: 2023-10-29
Payer: MEDICARE

## 2023-10-30 ENCOUNTER — TELEPHONE (OUTPATIENT)
Dept: ONCOLOGY | Facility: CLINIC | Age: 83
End: 2023-10-30
Payer: MEDICARE

## 2023-10-30 DIAGNOSIS — C79.51 MALIGNANT NEOPLASM METASTATIC TO BONE: ICD-10-CM

## 2023-10-30 RX ORDER — OXYCODONE HYDROCHLORIDE 5 MG/1
5 TABLET ORAL EVERY 4 HOURS PRN
Qty: 60 TABLET | Refills: 0 | Status: SHIPPED | OUTPATIENT
Start: 2023-10-30

## 2023-10-30 NOTE — TELEPHONE ENCOUNTER
Called Georges to let him know that the WC company had all documentation needed and they stated they would call him today. He v/u.

## 2023-10-30 NOTE — TELEPHONE ENCOUNTER
Caller: Cody Herrera ([facility])    Relationship to patient: Emergency Contact    Best call back number: [phone]    Patient is needing: TO CHECK ON WHEELCHAIR ORDER.

## 2023-11-02 ENCOUNTER — DOCUMENTATION (OUTPATIENT)
Dept: PHARMACY | Facility: HOSPITAL | Age: 83
End: 2023-11-02
Payer: MEDICARE

## 2023-11-02 NOTE — PROGRESS NOTES
Received fax from [employer] asking for a new prescription to be called in.  I called to find out what pharmacy to send a script to.  They told me to send to [facility] in [place].  However, when I looked in pt's chart, it was determined that pt is no longer on any medication by [employer].  We will not be applying for any free drug at this time.      Radha Brito  Specialty Pharmacy Technician

## 2023-11-08 ENCOUNTER — TELEPHONE (OUTPATIENT)
Dept: ONCOLOGY | Facility: CLINIC | Age: 83
End: 2023-11-08
Payer: MEDICARE

## 2023-11-08 NOTE — TELEPHONE ENCOUNTER
Provider: Dr. Lee  Caller: CAYLA Hughes  with [facility]  Relationship to Patient: Provider  Call Back Phone Number: [phone]  Reason for Call: Please return call to Ellen.. she's needing new orders for pt's therapy

## 2023-11-09 DIAGNOSIS — C79.51 MALIGNANT NEOPLASM METASTATIC TO BONE: ICD-10-CM

## 2023-11-09 RX ORDER — OXYCODONE HYDROCHLORIDE 5 MG/1
5 TABLET ORAL EVERY 4 HOURS PRN
Qty: 60 TABLET | Refills: 0 | Status: SHIPPED | OUTPATIENT
Start: 2023-11-09

## 2023-11-15 ENCOUNTER — HOME CARE VISIT (OUTPATIENT)
Dept: HOME HEALTH SERVICES | Facility: HOME HEALTHCARE | Age: 83
End: 2023-11-15
Payer: MEDICARE

## 2023-11-15 VITALS
SYSTOLIC BLOOD PRESSURE: 120 MMHG | HEART RATE: 96 BPM | OXYGEN SATURATION: 94 % | TEMPERATURE: 96.4 F | DIASTOLIC BLOOD PRESSURE: 62 MMHG

## 2023-11-15 PROCEDURE — G0151 HHCP-SERV OF PT,EA 15 MIN: HCPCS

## 2023-11-15 RX ORDER — DEXAMETHASONE 2 MG/1
2 TABLET ORAL DAILY
Qty: 84 TABLET | Refills: 0 | Status: SHIPPED | OUTPATIENT
Start: 2023-11-15 | End: 2024-02-07

## 2023-11-15 RX ORDER — DEXAMETHASONE 4 MG/1
2 TABLET ORAL DAILY
Qty: 84 TABLET | Refills: 0 | Status: CANCELLED | OUTPATIENT
Start: 2023-11-15 | End: 2023-12-27

## 2023-11-15 NOTE — HOME HEALTH
"Subjective: \"I'm doing all right.\"    Falls- none    Medication Changes- Dexamethasone changed to 1/2 tablet 1x/day    Assessment: Patient is able to perform UE exercises with 3 lb weights now. Patient's son declines to work on transfers stating they have been using the transfer board to assist his father from couch to bedside commode and feels there won't be an issue with assisting him in and out of car.    Communication: Patient's son had a question about home covid shots. Contacted [facility] about it but was informed that there were no services providing home covid shots.    Plan for next visit:  Therapeutic exercises  Transfer training"

## 2023-11-17 ENCOUNTER — HOME CARE VISIT (OUTPATIENT)
Dept: HOME HEALTH SERVICES | Facility: HOME HEALTHCARE | Age: 83
End: 2023-11-17
Payer: MEDICARE

## 2023-11-17 VITALS
SYSTOLIC BLOOD PRESSURE: 122 MMHG | HEART RATE: 55 BPM | DIASTOLIC BLOOD PRESSURE: 53 MMHG | OXYGEN SATURATION: 96 % | TEMPERATURE: 96.9 F

## 2023-11-17 PROCEDURE — G0151 HHCP-SERV OF PT,EA 15 MIN: HCPCS

## 2023-11-17 NOTE — HOME HEALTH
"Subjective: \"I'm feeling lousy today.\"    Falls- none    Medication Changes- None    Assessment: Patient is able to complete exercises with minimal cuing. Patient requests one visit next week for discharge.    Communication: N/A    Plan for next visit:  Therapeutic exercises  Transfer training"

## 2023-11-19 DIAGNOSIS — C79.51 MALIGNANT NEOPLASM METASTATIC TO BONE: ICD-10-CM

## 2023-11-20 RX ORDER — OXYCODONE HYDROCHLORIDE 5 MG/1
5 TABLET ORAL EVERY 4 HOURS PRN
Qty: 60 TABLET | Refills: 0 | Status: SHIPPED | OUTPATIENT
Start: 2023-11-20

## 2023-11-22 ENCOUNTER — TELEPHONE (OUTPATIENT)
Dept: ONCOLOGY | Facility: CLINIC | Age: 83
End: 2023-11-22

## 2023-11-22 ENCOUNTER — HOME CARE VISIT (OUTPATIENT)
Dept: HOME HEALTH SERVICES | Facility: HOME HEALTHCARE | Age: 83
End: 2023-11-22
Payer: MEDICARE

## 2023-11-22 VITALS
DIASTOLIC BLOOD PRESSURE: 57 MMHG | HEART RATE: 63 BPM | TEMPERATURE: 95.7 F | OXYGEN SATURATION: 97 % | SYSTOLIC BLOOD PRESSURE: 132 MMHG

## 2023-11-22 PROCEDURE — G0151 HHCP-SERV OF PT,EA 15 MIN: HCPCS

## 2023-11-22 NOTE — TELEPHONE ENCOUNTER
Caller: Cody Herrera ([facility])    Relationship: Emergency Contact    Best call back number: [phone]    What is the best time to reach you: ANYTIME    Who are you requesting to speak with (clinical staff, provider, specific staff member): SCHEDULING    What was the call regarding: CODY WANTED PATIENT'S 11/28 VIDEO VISIT ADDED BACK TO THE SCHEDULE. THEY CANC IT BY MISTAKE. IT IS 11/28  PM FOR A VIDEO VISIT.     PLEASE CALL TO ADVISE.

## 2023-11-22 NOTE — Clinical Note
Semaj Herrera was discharged from home health physical therapy on 11/22/2023 with most goals met. He is independent with his HEP and needs less assist with transfers.

## 2023-11-22 NOTE — Clinical Note
Patient discharged from physical therapy and the agency on 11/22/2023 to self/caregiver, in stable condition, with most goals met.

## 2023-11-26 DIAGNOSIS — C61 PROSTATE CANCER METASTATIC TO BONE: ICD-10-CM

## 2023-11-26 DIAGNOSIS — C79.51 PROSTATE CANCER METASTATIC TO BONE: ICD-10-CM

## 2023-11-26 DIAGNOSIS — C79.51 MALIGNANT NEOPLASM METASTATIC TO BONE: ICD-10-CM

## 2023-11-27 RX ORDER — FENTANYL 50 UG/1
1 PATCH TRANSDERMAL
Qty: 10 EACH | Refills: 0 | Status: SHIPPED | OUTPATIENT
Start: 2023-11-27

## 2023-11-27 NOTE — PROGRESS NOTES
"                                                                                                                                            REASONS FOR FOLLOW UP:   1.  Metastatic prostate cancer    HISTORY OF PRESENT ILLNESS:     Patient is an 83-year-old with metastatic prostate cancer who is seen 9/8/2023 as he continues current therapy with with Xgeva and Lupron after previous therapy also including Xtandi through the fall of last year.         The patient was previously seen back along with his son, Georges, 9/21/2022 fortunately doing reasonably well without additional pain, discomfort or excessive fatigue.  We have discussed various options for treatment of prostate cancer if we are unable to control this disease with current measures.    He is next evaluated 12/21/2022 now scheduled for Lupron.  He had last received both his Lupron and Xgeva 9/21/2022.  He notes increasing chest discomfort that appears to be \"when he twists\".  Initially he felt he might have a pneumonia but never had fever or cough.  We have discussed that his disease could well be progressing and then assessment that would allow us to clarify treatment options is reasonable.      This led to a repeat PSA 12/21/2022 that was found to be elevated to 23.9 and a follow-up Pylarify study performed 1/6/2023 that does demonstrate marked progression of sclerotic bone metastasis diffusely compared to 8/20/2021.  This includes the femoral neck,, sacrum, left iliac bone with SUV up to 48.8, stable tiny mediastinal nodes, 2 punctate nodules in the calvarium.      He is seen back with his son 1/11/2023.  The potential treatment options such as Pluvicto (Lutetium Yady 177 vipivotide tetraxetan), now that we know that his PSMA scan is positive, though the patient would have to initially be treated with taxane chemotherapy which would be difficult for him to tolerate, radium-223 considering his bony metastasis.  He has slowly progressive disease however we " have discussed additional issues including radiation therapy and/or alteration to another androgen receptor agonist such as darolutamide.  We have discussed all these options moving to have assessments done by radiation therapy and, [facility] as we address his symptoms.    The patient was seen by radiation therapy and felt a candidate for palliative radiotherapy as we made application for darolutamide.He is also seen by [facility] for additional treatment options 1/23/2023 and 2/10/2023 as radiation therapy proceeded to LSP/pelvis.    The patient was also seen by [facility] without additional therapeutic options at this time.    He is next seen 2/20/2023 to review his status recognizing his follow-up Xgeva will be due 3/15/2023 and and Lupron would be due 6/7/2023.  He was able to obtain darolutamide (Nubeqa) as free drug.  He is seen with his son both indicating that he just completed radiation therapy today.  While his pain has improved, he did have diarrhea develop during radiation therapy and is using antidiarrheal medications to control it currently.  This is also, potentially, side effect of darolutamide.      He is next seen 4/17/2023.  We have reiterated that his treatments with Lupron and Xgeva injections continued every 3 months.  On lower dose darolutamide which we gradually escalate to 600 mg nightly and 300 mg a day trying to move to 600 mg twice daily.  His last injections were given 3/20/2023 for both Xgeva and Lupron.  Unfortunately the patient has fallen twice while gardening onto his right side approximately 2 weeks prior to his visit 4/17/2023 and is only slowly recovering with right side and back pain.  He is, however, able to function and seen some improvement.  His increased use of pain medications has caused worsening constipation however.  In a lengthy conversation we have discussed increasing his darolutamide to 600 mg twice daily to gain his best response from therapy and also  improve his bowel function.    Patient is next evaluated 5/15/2023.  He continues on darolutamide 600 mg twice daily.  He also receives Xgeva and Lupron every 3 months, due in 1 month.  He returns today for lab review. He has occasional diarrhea, controlled with Lomotil as needed.  Continues Faison 5/325 as needed for pain and feels his pain is well controlled on this regimen.  Energy level is stable, he tires easily after working in his garden, but energy improves once he rests.  Appetite is good.  Denies fever or chills.  Denies nausea or vomiting.  Denies new or worsening pain.  No new concerns today.    The patient's follow-up tests include a PSA of 46.1 5/15/2023 and bone density 6/5/2023 showing abnormal uptake within several sites including skull base, ribs, humeri, spine, pelvis, Femara, sternum, tibia, left radius all consistent with progression of his disease.  This patient could be a candidate for a number of studies but we have, in particular, discussed Provenge.  He would likely need to be seen by [facility] initially.    Patient seen with his son 6/12/2023 and we discussed options such as Provenge.  The patient, self, indicates that he is not really interested in doing this but would rather have control of his pain primarily.  As result we have increased his Norco to 10 mg tablets #100 E scribed to his pharmacy as we continue Xgeva.  Pending his assessment in the next several weeks to month we may need to proceed to palliative care primarily.    The patient is seen back 7/3/2023, unfortunately, began to have increasing pain that is not managed sufficiently by his Norco tablets.  Additionally he is found to be considerably anemic and this is felt to be on the basis of his progressive malignancy.  The patient is deteriorating we are now discussing palliative care.  The patient's son also found that his son is diagnosed at age 30 with type 2 diabetes and is struggling to manage both his father and his  "son's care.      Patient returns today 7/24/2023 for lab review and follow-up.  At his last visit on 7/3/2023 hemoglobin was noted to be 7.2.  At that time he was scheduled to receive a transfusion of 1 unit of PRBC.  Patient states that actually after receiving the blood he has felt worse, and \"less peppy\".  He is using a fentanyl patch for pain control, which is helping, but does not feel like it is adequately controlling his pain.  He is also using Norco 10/325 every 6 hours for breakthrough pain.  He is having difficulty sleeping due to pain control.  He is also struggling with constipation.  Patient states that his bowels are moving, however they are difficult to move.  They are supposed to meeting with palliative us on Friday.  They are also scheduled to see nephrology later on this week.     The patient is next seen 9/8/2023 indicating that he still does not wish to proceed to hospice care but is becoming quite weak and fatigued.  He is found to be considerably anemic and would benefit from transfusion as we have tried to continue both Lupron and Xgeva.    Patient required hospitalization 9/17 - 9/25/2023 with 2 to 3-day history of bilateral leg weakness and constipation.  Repeat scans demonstrated substantial constipation and he started a bowel regimen.  MRI of the lumbar spine demonstrate metastatic disease as did cervical spine without significant compression but severe canal stenosis at T10 with epidural metastatic disease and cord compression.  He was placed on IV steroids and neurosurgery felt that decompression was likely not to be beneficial.  Radiation therapy was planned with 5 treatments and he did have some modest improvement thereafter.  He requested home care and hospital bed and various DME was delivered with plans to restart his insulin pump at discharge.  He completed his radiation therapy 9/27/2023.      Patient was to be seen in office 10/11/2023 but was too weak and a video visit was " rescheduled for same day.  He is seen with his son Georges and, fortunately, has been able to function at home without considerable difficulty.  He still has pain and is having increasing weakness in his lower extremities.  He is able to sleep, has reasonable appetite and his constipation is no longer an issue.  He has home health and does still not which hospice care at this point.    The patient is seen back 10/24/2023 by video visit fortunately having improved somewhat with adjustment of medications and with further rest.  His pain is under control with his current medications and his general weakness is slowly abating.  We discussed further adjustment and tapering of his dexamethasone.  The patient is comfortable with his current treatment and does not wish hospice care at this point.    The patient is contacted by video visit 11/28/2023 and he, and his son, indicate that they both have had a recent viral illness leading to prolonged fatigue and worsening weakness particular for the patient.  He had been in the process of a steroid taper and the concern, as result is that he is adrenal insufficient and will need to, at least briefly, increase his dexamethasone to improve his symptomatology.  He had been doing relatively well just prior to this illness developing during which both he and his son have struggled to regain their stamina.      Hematologic/oncologic history:   The patient is an 83-year-old male with significant past medical history including prostate carcinoma, CKD 3 and long-term tobacco use be been seen by primary care in late January, 2018 for hoarseness.  Chest x-ray is now outpatient January 23 revealed sclerotic change in the posterior mid chest to be within 3 adjacent thoracic ertebral bodies of uncertain significance.  A follow-up chest CT revealed numerous sclerotic foci within al visualized bones consistent with metastatic disease, multiple enlarged mediastinal lymph nodes concerning for  metastatic lymphadenopathy and a polypoidal abnormality in the right lateral wall of the trachea.  CT of abdomen March 20 revealed extensive osseous metastatic disease mildly enlarged retroperitoneal lymph nodes.  Bone scan March 20 was consistent with widespread osseous metastasis particularly in the proximal half the left humeral shaft, abnormal uptake in the sternum and manubrium and a small focus in the posterior aspect of the calvarium.  This led to a plain film the left humerus with mottled sclerosis involving the shaft of the humerus particularly in the proximal half but no evidence of pathologic fracture.    The patient has additionally type 2 diabetes on insulin pump, again a history of prostate carcinoma prostatectomy 12 years previously, hypertension, and hyperlipidemia.  Additional studies included a PSA of 395.1 from March 14, 2018.The patient's been seen by urology March 15 with plans to start Firmagon.    The patient is now seen in pulmonary clinic with Dr. Bijan Rivera and we have discussed that he will need bronchoscopy and mediastinoscopy.  Interestingly his additional history includes a long occupational exposure including working in the [place] just out of school, subsequent construction work for many years and a 30-pack-year history of smoking stopping in the late 1990s.  He indicates that he followed with Dr. Guillen of urology for many years with no changes in his exams and normal PSAs.  He stopped this follow-up approximately 2 years ago.     Patient was seen in consultation with thoracic surgery on March 28 and plans are made for mediastinoscopy and bronchoscopy with lymph node biopsy.  Pathology revealed that these nodes were consistent with metastatic prostate carcinoma.  He was discussed at thoracic conference.  A PET/CT was not pursued and it was determined that he see medical oncology for hormonal treatment and radiation therapy if symptomatic.  It should be noted  that the patient's March 15 [facility] office note describes that Firmagon was planned.     The patient has met with the son and grandson April 23.  We have also contacted Dr. Taylor of urology in that Firmagon was given just after his last visit and would next be due approximately May 3.  Patient feels considerably better with resolution of his pain, normalization of his performance status.  He would like to continue treatment through our office now contacted Dr. Taylor concerning this.    The patient is next seen June 11, 2018 we discussed his follow-up including his treatments with Lupron May 7 and his next treatment on July 30.  He has returned to essentially normal performance status and his PSA has dropped further from 395 March 14 27.8 May 7 and now 2.03 June 11.  We do plan to initiate Xgeva also study him via scans to document his improvement approximately a month from now.   The patient is next seen July 26, 2018.  Repeat imaging demonstrated interval resolution of mediastinal and retroperitoneal lymphadenopathy.  There is thickening in the distal aspect of the appendix with stranding?  Chronic appendicitis.  The patient indicates he is having no such symptoms and never has.  There is no change in sclerotic bony metastasis in the patient's PSA has dropped substantially to 1.66 by July 19 and 1.79 July 26.  He is actually feeling excellent and this is corroborated by family members.   Patient is next seen January 10, 2019 continuing to do very well and, in fact indicating that [facility], likely hearing replacements, visual review.  He is not certain is going to continue his care with them or with us or combination of both.    Patient is next seen April 4, 2019.  He is recently discharged after hospitalization March 15-18 with left upper lobe pneumonia.  We reviewed the findings in detail with his gradual recovery now documented.  His studies include a  CT of chest which does not demonstrate any further bony lesions and/or pulmonary metastasis having developed.  He is feeling considerably better and approximately back to his baseline.  The patient is next seen June 28, 2019 feeling well but having baseline generally musculoskeletal pain and restless legs.His recent exams include a PSA of 0.81, CMP with potassium of 5.3 with repeat pending.  His performance status overall remains good to very good and is clearly responding to his treatments.  The patient is next seen December 13, 2019 continue to feel well.  He has had, oddly enough, 2 episodes of sleep paralysis which have occurred to him previously and he wonders if there is any connection with his prostate carcinoma though this is felt unlikely.     The patient when assessed in December had his PSA go to 2.9.  We continue with Lupron and Xgeva and is seen back now March 9, 2020 without additional symptoms.  We discussed that a schedule could likely change moving to 3 months for both of these medications particularly if he needs additional therapy directed at progressive disease.     Patient contacted our practice May 4 concern for pain in his hips.  This led to moving his scans up and they were performed May 8, 2020 showing a sub-6 mm pleural-based pulmonary nodule in the right lower lobe that is new from March 15, 2018, remainder of the sub-6 mm pulmonary nodules bilaterally are stable.  There is extensive osseous metastatic disease as previous with no other new findings.     The patient indicates, when seen, May 13 that his bone pain is now resolved.  While this is good information does not mean that he does not have further bony metastasis and we have discussed that a follow-up bone scan is likely necessary.  Furthermore he is having some difficulty with sleeplessness and increase in restless legs as he continues to stay at home during the COVID 19 crisis which, itself, is producing more anxiety for him.  A  follow-up bone scan was obtained Lety 3 revealing multifocal osseous metastatic disease which was compared to March 2, 2018 with improvement.  No new abnormal uptake is present.  He is next seen with us on June 17, 2020 and, fortunately, is not having any significant pain at this point.  We discussed his scans in detail and, on balance, his performance status also remains improved.     It was elected to follow the patient rather than changes antiandrogen therapy.  He is reassessed September 3 with unchanged CMP, H&H of 11.4 and 36.3 with normal white count, platelet count and differential, PSA at 15.8.  Follow-up PA lateral chest x-ray does not show any new abnormalities though there is extensive metastatic bone disease throughout the bony thorax and osteoblastic metastasis have been seen on chest CT May 2020.  The patient is seen with his son Georges September 10 noticing increasing bony discomfort primarily in the right hip following fairly intensive gardening which he does frequently.  Now has further elevation of PSA were wondering whether we should try to intervene sooner per additional antiandrogens.  We will need to further assess him via CT scanning to make this decision     These were obtained October 1 showing extensive sclerotic disease with no metastatic disease in chest abdomen or pelvis.  PSA had gone from 15.8 September 3-16 0.6 October 1.     He still has pain getting up in the morning and after active gardening.  This is manageable with current pain medications and, at present, it is not apparent that he needs to switch medications to gain better control of his disease.  The patient did have follow-up studies done including a PSA November 25, 2020 now at 25.4.  The patient is seen with his son December 10, 2020 doing fair but having some increased pain in the mid sternum and right hip that he ascribes to additional exercise/work in managing his garden.  It is apparent however, that his last bone scan  was 6 months ago and we do need to reassess him concerning this.     The patient had follow-up bone scan performed January 11, 2021 showing again uptake in the calvarium, humerus, right medial clavicle, sternum, ribs, spine, sacrum, pelvis, right acetabulum, proximal femora and now left frontal bone which appears new.  There is also mild increase in sternal uptake and equivocal increase in the pelvic lesions of all of the sacrum and the right proximal femur.     The patient is seen with his son January 18, 2021 and indicates that he is having pain gradually worsened in his right hip, mid chest that had been an issue previously.  These findings on bone scan are reviewed with both of them as likely the underlying culprit for his discomfort.  He would need change of the systemic therapy but, at present, will ask for radiation therapy palliation initially.  He was previously treated by Dr. Kulkarni many years ago and will ask her to see him.  We will also make application for the current cost of Xtandi or Zytiga.  The patient is not felt to be a candidate for systemic chemotherapy.     The patient was referred for ration therapy as we continued to assess his PSA on current therapy as well as exam the cost of Zytiga or Xtandi.  Radiation therapy (Dr. Kulkarni) saw the patient January 26 and felt that the right femur and sternal lesion could benefit from therapy-3000 cGy in 10 fractions.  The patient took treatment February 1 to February 12 to the above areas and is next seen back March 15.  Fortunately his pain has improved dramatically and he is quite happy about this.  Unfortunately he notes some difficulty with swallowing that is temporary and often leads to increased salivation and mucus production.  Patient was asked to return his next assessed April 12, 2021.  He is able to swallow with less pain but still has excess mucus production and issues with salivation.  His PSA has noted increase but he is having no additional  bony discomfort at this point and, additionally, has noted low-grade fevers just under 100 degrees at nighttime?.  His weight, appetite and general performance status have remained good to excellent.  We discussed follow-up studies at this point to determine his status.    Follow-up scans were scheduled CT scans of chest and pelvis 5/4/2021 showing osseous metastasis quite similar to previous examinations in the chest, CT of abdomen pelvis also with no acute intra-abdominal adenopathy no indication of abdominal pelvic metastatic disease but again widespread osseous metastasis.  His PSA at this point have been slowly increasing to a level of 44 on 4/12/2021.  As the patient is reassessed 5/10/2021 we find, fortunately, that he is not exceeding symptomatic.  It could make reasonable sense at this point to take the mediastinal nodes from his biopsy 4/9/2018, reassess potential targeted therapies, MSI status, and germline analysis.  Fortunately he is not having significant pain or discomfort and is seen with his son as we discussed the above plan.  Notably a follow-up PSA is found to be 42.6.   Patient is next seen in 6/7/2021 for Lupron and Xgeva.  Fortunately he is feeling considerably better according to both he and his son though his stamina has reduced.  He is not having additional pain at this point.  We have also reviewed his genetics assessment which was significant only for a variant of unknown significance.  This is not an actionable abnormality.    As the patient's PSA further escalated increasing to 89 7/7/2021 his subsequent Axumin PET was obtained 8/20 demonstrating extensive osteosclerotic metastatic disease showing reduced uptake-typical finding but no evidence of visceral metastatic disease in the neck chest abdomen pelvis.  These findings are discussed with the patient and his son 8/25/2021 and indicative of his progressive disease-etiology of his rising PSA-though the patient is asymptomatic we have  discussed moving to initiate Xtandi is available at 160 mg p.o. twice daily along with his current Lupron and Xgeva.     The patient's testing 10/6 included PSA that is dropped to 9.69.  He is seen with his son, Cody, both indicating that he been doing relatively well with treatment but began to notice nausea in the last week.  The schedule that he has been given also needs to be somewhat updated in terms of his Lupron and Xgeva.  He has been able to maintain his appetite and overall performance status to date.    The patient is next assessed 12/8/2021 tolerating his current Xtandi dose better than previous and maintaining a good performance status overall as well as demonstrating a drop in his PSA now to 4.7 on 12/8/2021 compared to high of 112 9/8/2021.  He remains hypophosphatemic and hypocalcemic and we have discussed, again, changing his Xgeva dosing to every 3 months along with his Lupron will continue his Xtandi to 80 mg daily.    The patient is next reviewed 3/21/2022 seen for both Xgeva and Lupron.  Fortunately he is feeling reasonably well with little additional pain though he has restarted to place his garden into shape for the season and is working more outdoors.    Patient reevaluated 5/2/2022 with repeat CBC including H&H of 10.5 and 33.4, white count 5980 and platelet count of 192,000, currently CMP and PSA pending with a previous PSA 1 month ago at 9.06.  At this point we increased his Xtandi to 160 mg p.o. daily while continuing treatment with every 3 month Xgeva and Lupron.  Notably the patient's Requip  alcohol I am sure it is mariza now at 1.5 mg p.o. nightly and Neurontin 900 mg p.o. daily has improved his restless leg syndrome substantially.    Patient seen 6/13/2022 at which time Xtandi at 160 mg p.o. daily was continued, Xgeva moved to every 6 months and Lupron every 3 months.    This was continued when patient was seen 9/21/2022 though subsequently we proceeded to every 3 months for both  medications.    Patient seen 12/21/2022 at which time a subsequent Pylarify scan was requested as result of increasing PSA level and bony discomfort.  The patient received Lupron and Xgeva at this point.     He is seen back with his son 1/11/2023.  The potential treatment options such as Pluvicto (Lutetium Yady 177 vipivotide tetraxetan), now that we know that his PSMA scan is positive, though the patient would have to initially be treated with taxane chemotherapy which would be difficult for him to tolerate, radium-223 considering his bony metastasis.  He has slowly progressive disease however we have discussed additional issues including radiation therapy and/or alteration to another androgen receptor agonist such as darolutamide.  We have discussed all these options moving to have assessments done by radiation therapy and, [facility] as we address his symptoms.    The patient was not felt a candidate for additional treatments via [facility], was able to start darolutamide and completed palliative radiotherapy.  He was stable seen 2/20/2023 though experiencing diarrhea post radiation therapy.    Patient seen 4/17/2023 with darolutamide gradually increased to 600 mg twice daily.  His Xgeva and Lupron will be given every 3 months.    Patient seen 6/12/2023, unfortunately, PSA increasing and bone scan worsening.  Patient at this point indicated that he did not wish additional therapies but, instead, control of his pain initially.  Plans were made to continue his Lupron and Xgeva seen him at the 3 to 4-week annia and considering palliative care primarily with either hospice or [facility].    Patient seen 7/3/2023 with plans to initiate palliative care starting with Pall[facility].  The patient continued this approach seen back 9/8/2023 referring not to proceed with hospice but again with palliative care including transfusion.    Patient required hospitalization 9/17 - 9/25/2023 with 2 to 3-day history of bilateral leg  weakness and constipation.  Repeat scans demonstrated substantial constipation and he started a bowel regimen.  MRI of the lumbar spine demonstrate metastatic disease as did cervical spine without significant compression but severe canal stenosis at T10 with epidural metastatic disease and cord compression.  He was placed on IV steroids and neurosurgery felt that decompression was likely not to be beneficial.  Radiation therapy was planned with 5 treatments and he did have some modest improvement thereafter.  He requested home care and hospital bed and various DME was delivered with plans to restart his insulin pump at discharge.  He completed his radiation therapy 9/27/2023.      Patient was to be seen in office 10/11/2023 but was too weak and a video visit was rescheduled for same day.  He is seen with his son Georges and, fortunately, has been able to function at home without considerable difficulty.  He still has pain and is having increasing weakness in his lower extremities.  He is able to sleep, has reasonable appetite and his constipation is no longer an issue.  He has home health and does still not which hospice care at this point.    Patient evaluated by video visit 10/24/2023 again stable to to modestly improved.    Patient assessed 11/20/2023 with concern of recent viral illness and concurrent adrenal insufficiency, dexamethasone increased briefly to try to improve his status.  Past Medical History:   Diagnosis Date    Aortic stenosis, mild 01/01/2021    Per echocardiogram 2021    Ascending aorta dilatation     Asthma     Benign prostatic hyperplasia     Bone cancer     Cellulitis of great toe of left foot 10/19/2018    CKD (chronic kidney disease)     Stage 3; followed by Dr. Vicky Duran     Diastolic dysfunction     GRADE II per echo 2018    Difficulty breathing     during exertion    Dyslipidemia     Erectile dysfunction     Fatigue     Heart murmur     History of blood transfusion     History of  kidney stones     HL (hearing loss)     Hyperlipidemia     Hypertension     Hyponatremia     Kidney stone     Left ventricular hypertrophy     per echo 2018    Localized edema     Neuropathy     Obstructive sleep apnea     USING CPAP    Osteoarthritis     Peptic ulcer     Pneumonia     Pneumonia of left upper lobe due to infectious organism 03/15/2019    Prostate cancer     Status post prostatectomy, radiation therapy, and hormone therapy followed by Dr. Lee; metastatsis to bone    Pure hyperglyceridemia     Restless leg syndrome     Sepsis     Sinus bradycardia     Transient cerebral ischemia     Type 2 diabetes mellitus         Past Surgical History:   Procedure Laterality Date    BRONCHOSCOPY N/A 04/09/2018    Procedure: BRONCHOSCOPY;  Surgeon: Bijan Rivera III, MD;  Location: [facility] OR;  Service: Cardiothoracic    COLONOSCOPY      DEEP NECK LYMPH NODE BIOPSY / EXCISION      HEMORRHOIDECTOMY      LYMPH NODE BIOPSY      MEDIASTINOSCOPY N/A 04/09/2018    Procedure: MEDIASTINOSCOPY WITH LYMPH NODE BIOPSY;  Surgeon: Bijan Rivera III, MD;  Location: [facility];  Service: Cardiothoracic    OTHER SURGICAL HISTORY      ulcer repair    PROSTATECTOMY  2006        Current Outpatient Medications on File Prior to Visit   Medication Sig Dispense Refill    Accu-Chek Guide test strip USE 1 STRIP TO CHECK BLOOD SUGAR 4 TIMES DAILY      Accu-Chek Softclix Lancets lancets USE 1 LANCET TO CHECK GLUCOSE 4 TIMES DAILY      aspirin 81 MG EC tablet Take 1 tablet (81 mg total) by mouth Daily Indications: Venous Thromboembolism       cholecalciferol (VITAMIN D3) 1000 units tablet Take 2 tablets by mouth Daily.      Cyanocobalamin (Vitamin B12) 1000 MCG tablet controlled-release Take 1 tablet by mouth Daily.      dexAMETHasone (DECADRON) 2 MG tablet Take 1 tablet by mouth Daily for 84 days. Indications: Cancer of the Prostate Gland 84 tablet 0    dilTIAZem CD (CARDIZEM CD) 120 MG 24 hr capsule TAKE 1 CAPSULE EVERY DAY  90 capsule 3    diphenoxylate-atropine (LOMOTIL) 2.5-0.025 MG per tablet Take 1 tablet by mouth 4 (Four) Times a Day As Needed for Diarrhea. 30 tablet 0    fentaNYL (DURAGESIC) 50 MCG/HR patch Place 1 patch on the skin as directed by provider Every 72 (Seventy-Two) Hours. Indications: Chronic Pain 10 each 0    gabapentin (NEURONTIN) 300 MG capsule TAKE 3 CAPSULES EVERY NIGHT AT BEDTIME 90 capsule 0    Insulin Aspart (novoLOG) 100 UNIT/ML injection INJECT 100 UNITS VIA PUMP ONCE DAILY      Leuprolide Acetate, 3 Month, (LUPRON DEPOT, 3-MONTH, IM) Inject  into the appropriate muscle as directed by prescriber Every 3 (Three) Months.      levothyroxine (SYNTHROID, LEVOTHROID) 88 MCG tablet Take 88 mcg by mouth Daily. 2 on Wed and Sunday      loperamide (IMODIUM) 1 MG/5ML solution Take 10 mL by mouth 4 (Four) Times a Day As Needed for Diarrhea. Indications: Diarrhea      modafinil (PROVIGIL) 200 MG tablet Take 1 tablet by mouth Daily. 90 tablet 1    ondansetron (ZOFRAN) 8 MG tablet Take 1 tablet by mouth 3 (Three) Times a Day As Needed for Nausea or Vomiting. 30 tablet 5    oxyCODONE (Roxicodone) 5 MG immediate release tablet Take 1 tablet by mouth Every 4 (Four) Hours As Needed for Moderate Pain. 60 tablet 0    pantoprazole (PROTONIX) 40 MG EC tablet Take 1 tablet by mouth 2 (Two) Times a Day Before Meals. 60 tablet 0    polyethylene glycol (MIRALAX) 17 g packet Take 17 g by mouth Daily.      pravastatin (PRAVACHOL) 40 MG tablet Take 1 tablet by mouth Daily.      rOPINIRole (REQUIP) 0.5 MG tablet Take 2 tablets by mouth in the evening and 2 at bedtime. 360 tablet 2    sennosides-docusate (PERICOLACE) 8.6-50 MG per tablet Take 2 tablets by mouth 2 (Two) Times a Day.      traZODone (DESYREL) 50 MG tablet Take 1-2 tablets by mouth every night at bedtime. Indications: Trouble Sleeping       No current facility-administered medications on file prior to visit.        ALLERGIES:    Allergies   Allergen Reactions    Niacin  Unknown - Low Severity    Statins Unknown - Low Severity        Social History     Socioeconomic History    Marital status:     Years of education: College   Tobacco Use    Smoking status: Former     Packs/day: 1.50     Years: 30.00     Additional pack years: 0.00     Total pack years: 45.00     Types: Cigarettes     Start date: 1968     Quit date: 1998     Years since quittin.8     Passive exposure: Past    Smokeless tobacco: Never   Vaping Use    Vaping Use: Never used   Substance and Sexual Activity    Alcohol use: Not Currently     Comment: Caffeine use: 2-3 cups daily    Drug use: Never    Sexual activity: Not Currently        Family History   Problem Relation Age of Onset    Heart failure Mother     Hypertension Mother             Diabetes Mother     Heart disease Mother             Lung cancer Father 46    Hypertension Sister     Lung cancer Sister 60    Hypertension Brother             Lung cancer Brother 62    Diabetes Brother     Heart disease Other     Prostate cancer Brother 60    Cancer Brother             Cancer Sister             Cancer Sister     Cancer Sister     Heart disease Brother             Malig Hyperthermia Neg Hx         Review of Systems  As per HPI    Objective     There were no vitals filed for this visit.        2023    11:57 AM   Current Status   ECOG score 1     Physical Exam   Constitutional: He is oriented to person, place, and time. He appears well-developed. No distress.   HENT:   Head: Normocephalic and atraumatic.   Mouth/Throat: No oropharyngeal exudate.   Eyes: Pupils are equal, round, and reactive to light.   Cardiovascular:   No murmur heard.  Abdominal: Soft. Normal appearance. He exhibits no distension.   Musculoskeletal: Normal range of motion.   Neurological: He is alert and oriented to person, place, and time.   Skin: Skin is warm and dry. No rash noted.   Vitals reviewed.    2023 patient  examined, unchanged from above.    RECENT LABS:  Hematology WBC   Date Value Ref Range Status   09/23/2023 6.13 3.40 - 10.80 10*3/mm3 Final     RBC   Date Value Ref Range Status   09/23/2023 4.16 4.14 - 5.80 10*6/mm3 Final     Hemoglobin   Date Value Ref Range Status   09/23/2023 10.9 (L) 13.0 - 17.7 g/dL Final     Hematocrit   Date Value Ref Range Status   09/23/2023 33.3 (L) 37.5 - 51.0 % Final     Platelets   Date Value Ref Range Status   09/23/2023 95 (L) 140 - 450 10*3/mm3 Final        Lab Results   Component Value Date    GLUCOSE 118 (H) 09/25/2023    BUN 27 (H) 09/25/2023    CREATININE 0.68 (L) 09/25/2023    EGFRIFNONA 56 (L) 02/23/2022    BCR 39.7 (H) 09/25/2023    K 5.0 09/25/2023    CO2 23.0 09/25/2023    CALCIUM 7.3 (L) 09/25/2023    ALBUMIN 3.5 09/21/2023    AST 14 09/21/2023    ALT 9 09/21/2023       NM Bone Scan Whole Body (01/11/2021 13:25)  CT Chest With Contrast Diagnostic (05/04/2021 09:11)  CT Abdomen Pelvis With Contrast (05/04/2021 09:11)  NM PET/CT Skull Base to Mid Thigh (08/20/2021 12:18)      Assessment & Plan      1.  Metastatic prostate cancer:  Patient initially diagnosed in 2006 and had a prostatectomy and hormone therapy.  Patient in January 2018 began to complain of shortness of breath and hoarseness.  Chest x-ray obtained 1/23/18 showed sclerotic bone lesions.  CT of the chest 3/12/2018 numerous sclerotic bone foci with all visualized bones consistent with metastatic disease, multiple enlarged mediastinal lymph nodes.  .1 from 3/14/2018.  Patient was started on Firmagon by urology[facility].  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  Case discussed at thoracic conference and recommendations were to continue ADT and radiation for symptomatic sites.  Lupron injections every 3 mos initiated 5/7/18 PSA at that time 7.810  Monthly Xgeva initiated 6/11/18 PSA 2.030  Last PSA 6/13/2019  0.881  9/20/2019 patient tolerating treatment well, no new concerns.  12/2019 PSA 2.9  3/9/2020  PSA increasing to 5.030, he remains continuing on Lupron and Xgeva and asymptomatic though follow-up CT of chest and pelvis will be requested in 11 weeks prior to follow-up in 12 weeks.   5/4/2020 patient called reported worsening right hip pain, plans to purse CT scans ASAP.   5/8/2020 CT scans C/A/P show no progression of disease. Hip pain improved, Gabapentin added.. Bone scan requested.  6/3/2020 bone scan that does not demonstrate worsening of bone nor need for localized radiation therapy.  The patient's PSA has been slowly rising and we have not been able to determine worsening of disease and and, therefore, it is not felt warranted add additional medications such as Xtandi or apalutamide to his therapy.  Please note would like to avoid Zytiga try not to add prednisone which would worsen his blood sugar control.  9/3/2020 PSA 15.8, CXR extensive metastatic bone disease- patient reviewed 9/10/2020 reporting increased bony discomfort CT scans requested.  Scans done and reviewed 10/7/2020 ultimately reveal no evidence of progression of disease for visceral involvement or clearly for bone involvement.  After further review with the patient and his son plan continued Xgeva and Lupron.  We have assessed for availability of Xtandi 160 mg p.o. daily and find the cost is currently prohibitive.   11/25/2020 PSA 25.4  12/10/2020 review with some mild increase in bony discomfort.  After repeat discussion we went on to have him undergo Lupron therapy, and his PSA actually to be mildly reduced at 22.2.   Follow-up bone scan 1/11/2021 demonstrates some evidence of progression in sternum, left sacrum and proximal femur.  These findings were reviewed with the patient and his son January 18, 2020 and the patient was referred for palliative radiotherapy(Dr. Kulkarni)  Palliative radiation under care Dr. Kulkarni to right femur and  sternal lesion -3000 cGy in 10 fractions given February 1 to February 12 with significant symptom improvement.  3/15/2021 PSA 38.5  4/12/2021 PSA 44  5/10/2021 PSA 42.6 CT chest abdomen pelvis 5/4/2021 - for progression of disease and follow-up PSA 5 10/2021 is at 42 slightly reduced from previous.  We have discussed how to proceed from here and find a significant family history that clearly supports a potential genetic assessment for his malignancy.  It is felt most reasonable at this point to take the mediastinal nodes from his biopsy 4/9/2018 and reassess for potential targeted therapies, MSI status, as well as germline analysis.  6/7/2021 PSA 65.9 his analysis completed for actionable mutations including germline mutations.  These exams were negative though there is a variant of unknown significance.  Again this is not an actionable mutation.  We proceeded with additional Xgeva and Lupron planning for monthly Xgeva and Lupron at 3 months  7/7/2021 PSA 89 and subsequent testing with Axumin PET was performed confirming extensive osteosclerotic metastatic disease with little uptake, no evidence of visceral metastatic disease in neck chest abdomen or pelvis.  8/25/2021  , PET/CT reviewed, subsequent PSA increased to 105.  Patient fortunately asymptomatic.  Requested reevaluation for Xtandi 160 mg po daily.   9/8/2021 due for his lupron, receiving every 3 months, and monthly Xgeva.  Will have education today for Xtandi and will receive his medication today as well.   Started Xtandi 9/8/2021.  9/22/2021 here for toxicity check, so far tolerating Xtandi quite well other than a slight increase in diarrhea controlled with Imodium.  10/6/2021 continues to tolerate Xtandi well.  Labs are within range today, we will proceed with Xgeva today.  Patient seen 10/20/2021 having more nausea with Xtandi and demonstrating a substantial reduction in PSA level.  After discussion we plan to reduce his dose to 80 mg daily  following his PSA and performance status over the next approximate 7 weeks at which time he would be scheduled for his follow-up Lupron.  Patient assessed 12/8/2021 with further reduction in PSA to 4.7, ongoing hypophosphatemia and hypocalcemia-Xgeva moved to every 3 months given on same schedule as Lupron  Patient seen 3/21/2022, 6-week follow-up with mild increase in PSA recognized  Patient reassessed 5/2/2022 with PSA at 10.1, Xtandi moved to 160 mg p.o. daily  Patient assessed 6/13/2022, Xtandi at 160 mg p.o. daily, PSA pending, Xgeva moved to every 6 months, Lupron every 3 months  Patient seen 9/21/2022 at which time we continued our current approach, reviewed additional options for therapy should he clearly progress.  Patient seen 12/21/2022, increasing bony discomfort, Xgeva and Eligard continued, plans made for Pylarify scanning.   Patient seen 1/11/2023 with his son, discuss potential treatment options such as Pluvicto (Lutetium Yady 177 vipivotide tetraxetan) knowing that Pms-Asa is strongly positive in bone, palliative radiotherapy, radium-223 and alteration to additional androgen receptor such as darolutamide.  Patient referred to radiation therapy for their evaluation and possible treatment options, [facility] as application made for darolutamide and medications altered to include meloxicam 15 mg p.o. daily along with his Norco.  Patient assessed 2/20/2023 improved for radiation therapy, no additional options for treatment and [facility], darolutamide initiated.  Patient assessed 4/17/2023, darolutamide increased to 600 mg twice daily, Xgeva and Lupron every 3 months  5/15/2023: Continues darolutamide 600 mg twice daily.  Continues Xgeva and Lupron every 3 months, due in 1 month.  Hemoglobin today stable at 9.1.  Increased PSA, now 46 previously 29.  Discussed with Dr. Lee and we will proceed with bone scan in 3 weeks.  Subsequent bone scan with progression of metastatic disease to number of  sites, issues discussed with patient and his son 6/12/2023 with plans to proceed to, primarily, palliative care approach.  Patient treated with Xgeva and Lupron in 3-4-week follow-up consider hospice or pallitus care.  Patient seen 7/3/2023-further anemia with hemoglobin 7.2 g%.  Plan 1 unit packed RBC transfusion, initiation of Pallitus.  7/24/2023 hemoglobin 7.2.  Patient declines blood transfusion.  He is reporting pain currently not well controlled which will be discussed below.  Meeting with evius on Friday of this week.  Patient assessed 9/8/2023 with hemoglobin 5.2 g%, further transfusion planned with additional steroid, Benadryl premedication  atient required hospitalization 9/17 - 9/25/2023 with 2 to 3-day history of bilateral leg weakness and constipation.  Repeat scans demonstrated substantial constipation and he started a bowel regimen.  MRI of the lumbar spine demonstrate metastatic disease as did cervical spine without significant compression but severe canal stenosis at T10 with epidural metastatic disease and cord compression.  He was placed on IV steroids and neurosurgery felt that decompression was likely not to be beneficial.  Radiation therapy was planned with 5 treatments and he did have some modest improvement thereafter.  He requested home care and hospital bed and various DME was delivered with plans to restart his insulin pump at discharge.  He completed his radiation therapy 9/27/2023.  Patient was to be seen in office 10/11/2023 but was too weak and a video visit was rescheduled for same day.  He is seen with his son Georges and, fortunately, has been able to function at home without considerable difficulty.  He still has pain and is having increasing weakness in his lower extremities.  He is able to sleep, has reasonable appetite and his constipation is no longer an issue.  He has home health and does still not which hospice care at this point.  The patient has a mobility limitation that  significantly impairs his/her ability to participate in one or more mobility related activity of daily living such as: toileting, feeding, dressing, grooming, and bathing   customarily done within the home secondary to his progressive malignancy.  · A cane or walker have been ruled out.    · The use of a manual wheelchair will significantly improve the patient's ability to participate in ADL's and the patient will use it on a regular basis with in the home. · Patient has the upper body strength and   mental capability to safely propel the wheelchair   in their home or if they do not, they do have a   caregiver that is available, willing and able to provide assistance with the wheelchair.   Patient assessed by videoconference 11/28/2023 after recent viral illness, dexamethasone increased briefly during stress status.        2.  Neuropathy/ restless legs:    5/13/2020 gabapentin 600 mg at bedtime, trazadone 50 mg QHS.  On gabapentin 300, 2 tablets at bedtime, and requip 0.5.mg    Still having neuropathy symptoms.  Will increase gabapentin to 900 mg at bedtime.   9/22/2021 increase in gabapentin to 900 mg at bedtime has helped neuropathy.  Now taking Requip 2 mg nightly.    3.  Cancer related pain: on Norco 5/325 every 6 hours as needed.  Mobic 15 mg p.o. every morning initiated 1/11/2023, consider MS Contin every 12 hours  Status post palliative radiotherapy with improved pain control by 2/20/2023  Effective pain relief except increasing constipation noted 4/17/2023, narcotic adjusted downward as possible.  Continues Norco 5/325 every 6 hours as needed.  Feels his pain is well controlled on this regimen.  Semaj Javier reports a pain score of 8.  Given his pain assessment as noted, treatment options were discussed and the following options were decided upon as a follow-up plan to address the patient's pain-plan to add Duragesic 25 mcg every 72 hours daily E scribed to pharmacy  7/24/2023 patient complaining of pain not  controlled with current medication.  We will increase fentanyl patch to 50 mcg every 72 hours.  Patient to continue Norco 10/3/2025 every 6 hours if needed for breakthrough pain.  Issues with obtaining Norco recognized, oxycodone 5 mg p.o. every 4 hours E scribed to pharmacy 10/11/2023  Pain controlled 10/24/2023 and 11/20/2023    PLAN:     Continue fentanyl patch to 50 mcg every 72 hours  Continue current pain medication to oxycodone 5 mg p.o. every 4 hours as needed pain E scribed to pharmacy  Again, at present, the patient does not wish hospice care and is seen by home health once weekly  We have determined that his dexamethasone will be increased to 2 mg twice daily  Plan follow-up in 4-5 weeks by repeat video visit.  If the patient worsens further hospice care would certainly be an option and his son may contact myself or the practice concerning this.        Jose Lee MD  11/28/2023

## 2023-11-28 ENCOUNTER — TELEMEDICINE (OUTPATIENT)
Dept: ONCOLOGY | Facility: CLINIC | Age: 83
End: 2023-11-28
Payer: MEDICARE

## 2023-11-28 DIAGNOSIS — C79.51 MALIGNANT NEOPLASM METASTATIC TO BONE: Primary | ICD-10-CM

## 2023-11-28 DIAGNOSIS — C61 PROSTATE CANCER METASTATIC TO BONE: ICD-10-CM

## 2023-11-28 DIAGNOSIS — C79.51 PROSTATE CANCER METASTATIC TO BONE: ICD-10-CM

## 2023-11-28 PROCEDURE — 1126F AMNT PAIN NOTED NONE PRSNT: CPT | Performed by: INTERNAL MEDICINE

## 2023-11-28 PROCEDURE — 99213 OFFICE O/P EST LOW 20 MIN: CPT | Performed by: INTERNAL MEDICINE

## 2023-11-29 DIAGNOSIS — C79.51 MALIGNANT NEOPLASM METASTATIC TO BONE: ICD-10-CM

## 2023-11-29 RX ORDER — OXYCODONE HYDROCHLORIDE 5 MG/1
5 TABLET ORAL EVERY 4 HOURS PRN
Qty: 60 TABLET | Refills: 0 | Status: SHIPPED | OUTPATIENT
Start: 2023-11-29

## 2023-12-08 DIAGNOSIS — C79.51 MALIGNANT NEOPLASM METASTATIC TO BONE: ICD-10-CM

## 2023-12-08 RX ORDER — OXYCODONE HYDROCHLORIDE 5 MG/1
5 TABLET ORAL EVERY 4 HOURS PRN
Qty: 60 TABLET | Refills: 0 | Status: SHIPPED | OUTPATIENT
Start: 2023-12-08

## 2023-12-18 PROBLEM — D62 ACUTE BLOOD LOSS ANEMIA: Status: ACTIVE | Noted: 2023-01-01

## 2023-12-18 NOTE — ED NOTES
Nursing report ED to floor  Semaj Herrera  83 y.o.  male    HPI :   Chief Complaint   Patient presents with    Weakness - Generalized       Admitting doctor:   Yasir Orellana MD    Admitting diagnosis:   The primary encounter diagnosis was Acute blood loss anemia. Diagnoses of Gastrointestinal hemorrhage, unspecified gastrointestinal hemorrhage type, Weakness, and Prostate cancer metastatic to bone were also pertinent to this visit.    Code status:   Current Code Status       Date Active Code Status Order ID Comments User Context       Prior            Allergies:   Niacin and Statins    Isolation:   No active isolations    Intake and Output    Intake/Output Summary (Last 24 hours) at 12/18/2023 1101  Last data filed at 12/18/2023 1057  Gross per 24 hour   Intake 416.25 ml   Output 1 ml   Net 415.25 ml       Weight:       12/18/23  0836   Weight: 78.5 kg (173 lb)       Most recent vitals:   Vitals:    12/18/23 0906 12/18/23 0936 12/18/23 1005 12/18/23 1058   BP: 132/69 108/82 147/71 139/65   BP Location:   Left arm Left arm   Patient Position:   Lying Lying   Pulse: 85 84 97 82   Resp:   20 20   Temp:   98.3 °F (36.8 °C) 98.3 °F (36.8 °C)   TempSrc:   Oral Oral   SpO2: 100% 95% 98% 99%   Weight:       Height:           Active LDAs/IV Access:   Lines, Drains & Airways       Active LDAs       Name Placement date Placement time Site Days    Peripheral IV 12/18/23 0843 Right Antecubital 12/18/23  0843  Antecubital  less than 1    Peripheral IV 12/18/23 1016 Left Antecubital 12/18/23  1016  Antecubital  less than 1    External Urinary Catheter 12/18/23  0901  --  less than 1                    Labs (abnormal labs have a star):   Labs Reviewed   COMPREHENSIVE METABOLIC PANEL - Abnormal; Notable for the following components:       Result Value    Creatinine 0.60 (*)     CO2 21.5 (*)     Total Protein 5.9 (*)     Alkaline Phosphatase 167 (*)     BUN/Creatinine Ratio 31.7 (*)     All other components within normal limits     Narrative:     GFR Normal >60  Chronic Kidney Disease <60  Kidney Failure <15    The GFR formula is only valid for adults with stable renal function between ages 18 and 70.   CBC WITH AUTO DIFFERENTIAL - Abnormal; Notable for the following components:    RBC 2.01 (*)     Hemoglobin 5.8 (*)     Hematocrit 18.3 (*)     RDW 25.4 (*)     RDW-SD 83.2 (*)     Platelets 41 (*)     All other components within normal limits   MANUAL DIFFERENTIAL - Abnormal; Notable for the following components:    Lymphocyte % 17.0 (*)     Metamyelocyte % 2.0 (*)     Myelocyte % 2.0 (*)     nRBC 13.0 (*)     All other components within normal limits   POCT OCCULT BLOOD STOOL (ED ONLY) - Abnormal; Notable for the following components:    Fecal Occult Blood Positive (*)     All other components within normal limits   URINALYSIS W/ MICROSCOPIC IF INDICATED (NO CULTURE)   HEMOGLOBIN AND HEMATOCRIT, BLOOD   POCT GLUCOSE FINGERSTICK   POCT GLUCOSE FINGERSTICK   POCT GLUCOSE FINGERSTICK   POCT GLUCOSE FINGERSTICK   PREPARE RBC   TYPE AND SCREEN   PREPARE PLATELET PHERESIS   CBC AND DIFFERENTIAL    Narrative:     The following orders were created for panel order CBC & Differential.  Procedure                               Abnormality         Status                     ---------                               -----------         ------                     CBC Auto Differential[[id number]]        Abnormal            Final result                 Please view results for these tests on the individual orders.       EKG:   ECG 12 Lead Other; weakness   Preliminary Result   HEART RATE= 77  bpm   RR Interval= 779  ms   NV Interval= 131  ms   P Horizontal Axis= -6  deg   P Front Axis= 9  deg   QRSD Interval= 97  ms   QT Interval= 359  ms   QTcB= 407  ms   QRS Axis= -16  deg   T Wave Axis= 9  deg   - OTHERWISE NORMAL ECG -   Sinus rhythm   Borderline left axis deviation   Baseline wander in lead(s) V5   Electronically Signed By:    Date and Time of Study:  2023-12-18 07:28:28          Meds given in ED:   Medications   sodium chloride 0.9 % flush 10 mL (has no administration in time range)   sodium chloride 0.9 % flush 10 mL (has no administration in time range)   sodium chloride 0.9 % infusion 40 mL (has no administration in time range)   sennosides-docusate (PERICOLACE) 8.6-50 MG per tablet 2 tablet (has no administration in time range)     And   polyethylene glycol (MIRALAX) packet 17 g (has no administration in time range)     And   bisacodyl (DULCOLAX) EC tablet 5 mg (has no administration in time range)     And   bisacodyl (DULCOLAX) suppository 10 mg (has no administration in time range)   nitroglycerin (NITROSTAT) SL tablet 0.4 mg (has no administration in time range)   acetaminophen (TYLENOL) tablet 650 mg (has no administration in time range)     Or   acetaminophen (TYLENOL) 160 MG/5ML oral solution 650 mg (has no administration in time range)     Or   acetaminophen (TYLENOL) suppository 650 mg (has no administration in time range)   ondansetron (ZOFRAN) injection 4 mg (has no administration in time range)   dextrose (GLUTOSE) oral gel 15 g (has no administration in time range)   dextrose (D50W) (25 g/50 mL) IV injection 25 g (has no administration in time range)   glucagon (GLUCAGEN) injection 1 mg (has no administration in time range)   Insulin Lispro (humaLOG) injection 2-9 Units (has no administration in time range)   pantoprazole (PROTONIX) injection 40 mg (has no administration in time range)   pantoprazole (PROTONIX) injection 80 mg (80 mg Intravenous Given 12/18/23 1011)       Imaging results:  XR Chest 1 View    Result Date: 12/18/2023  1. No acute cardiopulmonary process identified. 2. Bony metastases.   This report was finalized on 12/18/2023 8:34 AM by Dr. Reynaldo De M.D on Workstation: [id number]       Ambulatory status:   - bedrest    Social issues:   Social History     Socioeconomic History    Marital status:     Years of  education: College   Tobacco Use    Smoking status: Former     Packs/day: 1.50     Years: 30.00     Additional pack years: 0.00     Total pack years: 45.00     Types: Cigarettes     Start date: 1968     Quit date: 1998     Years since quittin.9     Passive exposure: Past    Smokeless tobacco: Never   Vaping Use    Vaping Use: Never used   Substance and Sexual Activity    Alcohol use: Not Currently     Comment: Caffeine use: 2-3 cups daily    Drug use: Never    Sexual activity: Not Currently       NIH Stroke Scale:       Anabel Garcia RN  23 11:01 EST

## 2023-12-18 NOTE — CONSULTS
"[facility]  Initial Inpatient Consult Note    Referring Provider: Dr. Orellana    Reason for Consultation: Anemia    Subjective     History of present illness:    83 y.o. male w/ PMHx of aortic stenosis, hx of metastatic prostate cancer s/p prostatectomy, s/p radiation, HTN, CKD, Dm type II presented to the ED w/ generalized weakness, although he is bedbound usually and taken care by Son. At presentation, he is noted to have worsening anemia. He reports he has been having diarrhea for last 2-3 days, reports it occurs several times a day. Denies known melena, hematochezia, abdominal pain. He states he takes NSAIDs on daily basis at night for \"restlessness.\" He thinks he has had EGD/colonoscopy in the last year here at [facility], but no such records in the system. He denies dysphagia, odynophagia., GERD symptoms.     Patient was seen by Gi service during his last admission 09/2023 for constipation. He required blood transfusion this admission as well. At the time, his anemia was thought to be due to metastatic prostate cancer w/ significant bone involvement.     Past Medical History:  Past Medical History:   Diagnosis Date    Aortic stenosis, mild 01/01/2021    Per echocardiogram 2021    Ascending aorta dilatation     Asthma     Benign prostatic hyperplasia     Bone cancer     Cellulitis of great toe of left foot 10/19/2018    CKD (chronic kidney disease)     Stage 3; followed by Dr. Vicky Duran     Diastolic dysfunction     GRADE II per echo 2018    Difficulty breathing     during exertion    Dyslipidemia     Erectile dysfunction     Fatigue     Heart murmur     History of blood transfusion     History of kidney stones     HL (hearing loss)     Hyperlipidemia     Hypertension     Hyponatremia     Kidney stone     Left ventricular hypertrophy     per echo 2018    Localized edema     Neuropathy     Obstructive sleep apnea     USING CPAP    Osteoarthritis     Peptic ulcer     Pneumonia     " Pneumonia of left upper lobe due to infectious organism 03/15/2019    Prostate cancer     Status post prostatectomy, radiation therapy, and hormone therapy followed by Dr. Lee; metastatsis to bone    Pure hyperglyceridemia     Restless leg syndrome     Sepsis     Sinus bradycardia     Transient cerebral ischemia     Type 2 diabetes mellitus      Past Surgical History:  Past Surgical History:   Procedure Laterality Date    BRONCHOSCOPY N/A 2018    Procedure: BRONCHOSCOPY;  Surgeon: Bijan Rivera III, MD;  Location: [facility];  Service: Cardiothoracic    COLONOSCOPY      DEEP NECK LYMPH NODE BIOPSY / EXCISION      HEMORRHOIDECTOMY      LYMPH NODE BIOPSY      MEDIASTINOSCOPY N/A 2018    Procedure: MEDIASTINOSCOPY WITH LYMPH NODE BIOPSY;  Surgeon: Bijan Rivera III, MD;  Location: [facility];  Service: Cardiothoracic    OTHER SURGICAL HISTORY      ulcer repair    PROSTATECTOMY        Social History:   Social History     Tobacco Use    Smoking status: Former     Packs/day: 1.50     Years: 30.00     Additional pack years: 0.00     Total pack years: 45.00     Types: Cigarettes     Start date: 1968     Quit date: 1998     Years since quittin.9     Passive exposure: Past    Smokeless tobacco: Never   Substance Use Topics    Alcohol use: Not Currently     Comment: Caffeine use: 2-3 cups daily      Family History:  Family History   Problem Relation Age of Onset    Heart failure Mother     Hypertension Mother             Diabetes Mother     Heart disease Mother             Lung cancer Father 46    Hypertension Sister     Lung cancer Sister 60    Hypertension Brother             Lung cancer Brother 62    Diabetes Brother     Heart disease Other     Prostate cancer Brother 60    Cancer Brother             Cancer Sister             Cancer Sister     Cancer Sister     Heart disease Brother             Malig Hyperthermia Neg Hx         Home Meds:  (Not in a hospital admission)    Current Meds:   insulin lispro, 2-9 Units, Subcutaneous, 4x Daily AC & at Bedtime  pantoprazole, 40 mg, Intravenous, BID AC  senna-docusate sodium, 2 tablet, Oral, BID  sodium chloride, 10 mL, Intravenous, Q12H      Allergies:  Allergies   Allergen Reactions    Niacin Unknown - Low Severity    Statins Unknown - Low Severity       Objective     Vital Signs  Temp:  [98.3 °F (36.8 °C)-98.4 °F (36.9 °C)] 98.3 °F (36.8 °C)  Heart Rate:  [79-97] 82  Resp:  [16-20] 20  BP: (108-147)/(65-91) 139/65  Physical Exam:  General Appearance:     Alert, cooperative, in no acute distress   Abdomen:     Normal bowel sounds, no masses, no organomegaly, soft     nontender, nondistended, no guarding, no rebound                 tenderness   Rectal:     Deferred       Results Review:   I reviewed the patient's new clinical results.    Results from last 7 days   Lab Units 12/18/23  0844   WBC 10*3/mm3 4.71   HEMOGLOBIN g/dL 5.8*   HEMATOCRIT % 18.3*   PLATELETS 10*3/mm3 41*     Results from last 7 days   Lab Units 12/18/23  0844   SODIUM mmol/L 141   POTASSIUM mmol/L 4.1   CHLORIDE mmol/L 106   CO2 mmol/L 21.5*   BUN mg/dL 19   CREATININE mg/dL 0.60*   CALCIUM mg/dL 8.6   BILIRUBIN mg/dL 0.9   ALK PHOS U/L 167*   ALT (SGPT) U/L 13   AST (SGOT) U/L 18   GLUCOSE mg/dL 82         Lab Results   Lab Value Date/Time    LIPASE 34 03/15/2019 1647       Radiology:  XR Chest 1 View   Final Result   1. No acute cardiopulmonary process identified.   2. Bony metastases.           This report was finalized on 12/18/2023 8:34 AM by Dr. Reynaldo De M.D on Workstation: [id number]                Assessment:   Normocytic acute on chronic anemia- question if this is due to radiation for his prostate cancer that has metastasized to his bones, but given acute drop, NSAIDs use, and +FOBT, plan EGD  Thrombocytopenia  Diarrhea  B/l leg weakness     Plan:   Agree w/ blood transfusion. Will need Hgb >7 and  platelets above 50  Tentatively EGD tomorrow given ongoing NSAIDs use, +FOBT, and anemia  PPI gtt  Stool studies given acute diarrhea  Trend H/H, monitor for GI bleed   Judicious use of NSAIDs     I discussed the patients findings and my recommendations with patient.         Sarah Coleman PA-C  [facility]  [address] [place] [address]  Office: [phone]

## 2023-12-18 NOTE — ED TRIAGE NOTES
"To ER via EMS from home.  Pt sent to ER by family member for placement.  They can no \"my bottom\".     Pt is bedridden. Per pt he was discharged from this hospital approx 2 weeks ago and has been staying on the couch since.  There is no record of pt being admitted to this facility since September.   "

## 2023-12-18 NOTE — ED PROVIDER NOTES
EMERGENCY DEPARTMENT ENCOUNTER    Room Number:  127/1  PCP: Axel Guardado MD  Discussed/ obtained information from independent historians: patient      HPI:  Chief Complaint: bilateral leg weakness  A complete HPI/ROS/PMH/PSH/SH/FH are unobtainable due to: none  Context: Semaj Herrera is a 83 y.o. male with a history of metastatic prostate cancer and insulin-dependent diabetes with insulin pump who presents to the ED c/o bilateral leg weakness.  His son cares for him full-time at home.  Patient has had leg weakness for few months due to cancer but states it has been worse the last 2 weeks and he has been unable to stand as he previously was.  Son awoke this morning feeling that he himself was having a heart attack and knew he could not care for his father and therefore called EMS for his father, this patient to be brought to the ER.  Patient states he is not having any acute symptoms other than the increased leg weakness for the last 2 weeks with inability to stand.  He denies any cough congestion fever chills chest pain shortness of breath urinary symptoms vomiting.  He states he has had some stool incontinence, denies urinary retention.      External (non-ED) record review: Patient was admitted 9/17/2023 to 9/25/2023 after presenting to the hospital with increasing bilateral lower extremity weakness and constipation.  Fecal impaction noted on CT abdomen pelvis improved with bowel regimen.  Had increasing widespread prostate metastatic disease.  MRI of the C, T, L-spine showed widespread metastatic disease, he did have some severe canal stenosis with extra osseous epidural metastatic disease at T10 with significant cord compression and received 4 of 5 recommended radiation treatments to the area as well as IV steroids after neurosurgery and oncology consultation.  PT OT recommended home with 24/7 assistance versus skilled nursing facility and son requested [employer] lift and hospital bed as well as other various DME  which delayed his discharge.  Ultimately hospice was recommended by oncology.      PAST MEDICAL HISTORY  Active Ambulatory Problems     Diagnosis Date Noted    Type 2 diabetes mellitus with kidney complication, with long-term current use of insulin 03/22/2016    Generalized weakness 03/22/2016    Hyperlipidemia 03/22/2016    Hypertension 03/22/2016    Left ventricular hypertrophy 03/22/2016    Obstructive sleep apnea syndrome treated auto BiPAP 03/22/2016    Sinus bradycardia 03/22/2016    Prostate cancer metastatic to bone 03/28/2018    Mediastinal adenopathy 03/28/2018    Malignant neoplasm metastatic to bone 06/07/2018    Chronic kidney disease, stage III (moderate) 02/03/2016    Diastolic dysfunction 06/15/2018    Localized edema 06/15/2018    Neuropathy     Hypersomnia due to medical condition treated with modafinil 200 mg every morning 08/04/2018    CSA (central sleep apnea) 04/14/2019    Restless legs syndrome (RLS) 10/11/2020    Psychophysiological insomnia 10/11/2020    Edema 02/14/2022    Type 2 diabetes mellitus with diabetic chronic kidney disease 02/03/2016    Class 2 severe obesity due to excess calories with serious comorbidity and body mass index (BMI) of 35.0 to 35.9 in adult 02/14/2022    Aortic stenosis, mild 2021    Ascending aorta dilatation     Fecal impaction in rectum 09/17/2023    Lower extremity weakness 09/18/2023    Anemia, chronic disease 09/18/2023    Metastatic cancer to bone 09/18/2023    Moderate malnutrition 09/20/2023     Resolved Ambulatory Problems     Diagnosis Date Noted    Cellulitis of great toe of left foot 10/19/2018    Pneumonia of left upper lobe due to infectious organism 03/15/2019    Hyponatremia 03/15/2019    Sepsis 03/15/2019    Stage 3a chronic kidney disease 02/03/2016     Past Medical History:   Diagnosis Date    Asthma     Benign prostatic hyperplasia     Bone cancer     CKD (chronic kidney disease)     Difficulty breathing     Dyslipidemia     Erectile  dysfunction     Fatigue     Heart murmur     History of blood transfusion     History of kidney stones     HL (hearing loss)     Kidney stone     Obstructive sleep apnea     Osteoarthritis     Peptic ulcer     Pneumonia     Prostate cancer     Pure hyperglyceridemia     Restless leg syndrome     Transient cerebral ischemia     Type 2 diabetes mellitus          PAST SURGICAL HISTORY  Past Surgical History:   Procedure Laterality Date    BRONCHOSCOPY N/A 2018    Procedure: BRONCHOSCOPY;  Surgeon: Bijan Rivera III, MD;  Location: [facility] OR;  Service: Cardiothoracic    COLONOSCOPY      DEEP NECK LYMPH NODE BIOPSY / EXCISION      HEMORRHOIDECTOMY      LYMPH NODE BIOPSY      MEDIASTINOSCOPY N/A 2018    Procedure: MEDIASTINOSCOPY WITH LYMPH NODE BIOPSY;  Surgeon: Bijan Rivera III, MD;  Location: [facility] MAIN OR;  Service: Cardiothoracic    OTHER SURGICAL HISTORY      ulcer repair    PROSTATECTOMY  2006         FAMILY HISTORY  Family History   Problem Relation Age of Onset    Heart failure Mother     Hypertension Mother             Diabetes Mother     Heart disease Mother             Lung cancer Father 46    Hypertension Sister     Lung cancer Sister 60    Hypertension Brother             Lung cancer Brother 62    Diabetes Brother     Heart disease Other     Prostate cancer Brother 60    Cancer Brother             Cancer Sister             Cancer Sister     Cancer Sister     Heart disease Brother             Malig Hyperthermia Neg Hx          SOCIAL HISTORY  Social History     Socioeconomic History    Marital status:     Years of education: College   Tobacco Use    Smoking status: Former     Packs/day: 1.50     Years: 30.00     Additional pack years: 0.00     Total pack years: 45.00     Types: Cigarettes     Start date: 1968     Quit date: 1998     Years since quittin.9     Passive exposure: Past    Smokeless tobacco: Never   Vaping  Use    Vaping Use: Never used   Substance and Sexual Activity    Alcohol use: Not Currently     Comment: Caffeine use: 2-3 cups daily    Drug use: Never    Sexual activity: Not Currently         ALLERGIES  Niacin and Statins        REVIEW OF SYSTEMS  Review of Systems         PHYSICAL EXAM  ED Triage Vitals   Temp Heart Rate Resp BP SpO2   12/18/23 0701 12/18/23 0658 12/18/23 0658 12/18/23 0658 12/18/23 0658   98.4 °F (36.9 °C) 96 16 147/81 97 %      Temp src Heart Rate Source Patient Position BP Location FiO2 (%)   12/18/23 0701 -- -- -- --   Oral           Physical Exam      GENERAL: no acute distress, chronically ill-appearing  HENT: normocephalic, atraumatic  EYES: no scleral icterus  CV: regular rhythm, normal rate  RESPIRATORY: normal effort CTA B  ABDOMEN: nondistended, soft nontender normal bowel sounds no guarding or rigidity, insulin pump in place   MUSCULOSKELETAL: no deformity  NEURO: alert, moves all extremities, moves legs weakly, is able to slightly bend at the knees, unable to lift from the bed, sensation intact distally, follows commands  PSYCH:  calm, cooperative  SKIN: warm, dry    Vital signs and nursing notes reviewed.          LAB RESULTS  Recent Results (from the past 24 hour(s))   ECG 12 Lead Other; weakness    Collection Time: 12/18/23  7:28 AM   Result Value Ref Range    QT Interval 359 ms    QTC Interval 407 ms   Comprehensive Metabolic Panel    Collection Time: 12/18/23  8:44 AM    Specimen: Arm, Right; Blood   Result Value Ref Range    Glucose 82 65 - 99 mg/dL    BUN 19 8 - 23 mg/dL    Creatinine 0.60 (L) 0.76 - 1.27 mg/dL    Sodium 141 136 - 145 mmol/L    Potassium 4.1 3.5 - 5.2 mmol/L    Chloride 106 98 - 107 mmol/L    CO2 21.5 (L) 22.0 - 29.0 mmol/L    Calcium 8.6 8.6 - 10.5 mg/dL    Total Protein 5.9 (L) 6.0 - 8.5 g/dL    Albumin 3.6 3.5 - 5.2 g/dL    ALT (SGPT) 13 1 - 41 U/L    AST (SGOT) 18 1 - 40 U/L    Alkaline Phosphatase 167 (H) 39 - 117 U/L    Total Bilirubin 0.9 0.0 - 1.2  mg/dL    Globulin 2.3 gm/dL    A/G Ratio 1.6 g/dL    BUN/Creatinine Ratio 31.7 (H) 7.0 - 25.0    Anion Gap 13.5 5.0 - 15.0 mmol/L    eGFR 95.8 >60.0 mL/min/1.73   CBC Auto Differential    Collection Time: 12/18/23  8:44 AM    Specimen: Arm, Right; Blood   Result Value Ref Range    WBC 4.71 3.40 - 10.80 10*3/mm3    RBC 2.01 (L) 4.14 - 5.80 10*6/mm3    Hemoglobin 5.8 (C) 13.0 - 17.7 g/dL    Hematocrit 18.3 (C) 37.5 - 51.0 %    MCV 91.0 79.0 - 97.0 fL    MCH 28.9 26.6 - 33.0 pg    MCHC 31.7 31.5 - 35.7 g/dL    RDW 25.4 (H) 12.3 - 15.4 %    RDW-SD 83.2 (H) 37.0 - 54.0 fl    Platelets 41 (C) 140 - 450 10*3/mm3   Manual Differential    Collection Time: 12/18/23  8:44 AM    Specimen: Arm, Right; Blood   Result Value Ref Range    Neutrophil % 72.0 42.7 - 76.0 %    Lymphocyte % 17.0 (L) 19.6 - 45.3 %    Monocyte % 7.0 5.0 - 12.0 %    Metamyelocyte % 2.0 (H) 0.0 - 0.0 %    Myelocyte % 2.0 (H) 0.0 - 0.0 %    Neutrophils Absolute 3.39 1.70 - 7.00 10*3/mm3    Lymphocytes Absolute 0.80 0.70 - 3.10 10*3/mm3    Monocytes Absolute 0.33 0.10 - 0.90 10*3/mm3    nRBC 13.0 (H) 0.0 - 0.2 /100 WBC    RBC Morphology Normal Normal    WBC Morphology Normal Normal    Platelet Morphology Normal Normal   POCT Occult Blood Stool    Collection Time: 12/18/23  9:29 AM    Specimen: Per Rectum; Stool   Result Value Ref Range    Fecal Occult Blood Positive (A) Negative    Lot Number [id number]     Expiration Date 9,727,024     Positive Control Positive Positive    Negative Control Negative Negative   Type & Screen    Collection Time: 12/18/23  9:42 AM    Specimen: Blood   Result Value Ref Range    ABO Type O     RH type Negative     Antibody Screen Negative     T&S Expiration Date 12/21/2023 11:59:59 PM    Prepare Platelet Pheresis, 1 Units    Collection Time: 12/18/23 10:27 AM   Result Value Ref Range    Product Code W5293A66     Unit Number [id number]     UNIT  ABO A     UNIT  RH POS     Dispense Status IS     Blood Expiration Date 938036048409      Blood Type Barcode 6200    Type & Screen    Collection Time: 12/18/23 11:24 AM    Specimen: Blood   Result Value Ref Range    ABO Type O     RH type Negative     Antibody Screen Negative     T&S Expiration Date 12/21/2023 11:59:59 PM    Prepare RBC, 2 Units    Collection Time: 12/18/23 11:41 AM   Result Value Ref Range    Product Code S5202K97     Unit Number [id number]     UNIT  ABO O     UNIT  RH NEG     Crossmatch Interpretation Compatible     Dispense Status RE     Blood Expiration Date 202312302359     Blood Type Barcode 9500     Product Code T7803G77     Unit Number [id number]     UNIT  ABO O     UNIT  RH NEG     Crossmatch Interpretation Compatible     Dispense Status RE     Blood Expiration Date 191792320488     Blood Type Barcode 9500    Prepare RBC, 2 Units    Collection Time: 12/18/23  2:02 PM   Result Value Ref Range    Product Code Y4259T84     Unit Number [id number]     UNIT  ABO O     UNIT  RH NEG     Crossmatch Interpretation Compatible     Dispense Status IS     Blood Expiration Date 202312282359     Blood Type Barcode 9500     Product Code J8610G66     Unit Number [id number]     UNIT  ABO O     UNIT  RH NEG     Crossmatch Interpretation Compatible     Dispense Status XM     Blood Expiration Date 202312282359     Blood Type Barcode 9500        Ordered the above labs and reviewed the results.        RADIOLOGY  XR Chest 1 View    Result Date: 12/18/2023  XR CHEST 1 VW-12/18/2023  HISTORY: Weakness.  Heart size is within normal limits. Lungs are slightly underinflated but appear clear. There are minor degenerative changes in the spine. There is sclerotic change of the proximal left humerus, left glenoid and possibly in the right scapula as well. FINDINGS are consistent with bony metastases and are seen on previous studies including a nuclear medicine bone scan from 6/5/2023.      1. No acute cardiopulmonary process identified. 2. Bony metastases.   This report was finalized on  12/18/2023 8:34 AM by Dr. Reynaldo De M.D on Workstation: [id number]       Ordered the above noted radiological studies. Reviewed by me in PACS.            PROCEDURES  Critical Care    Performed by: Joann Martinez PA-C  Authorized by: Raghav Cotter MD    Critical care provider statement:     Critical care time (minutes):  38    Critical care time was exclusive of:  Separately billable procedures and treating other patients and teaching time    Critical care was necessary to treat or prevent imminent or life-threatening deterioration of the following conditions:  Circulatory failure    Critical care was time spent personally by me on the following activities:  Blood draw for specimens, development of treatment plan with patient or surrogate, discussions with consultants, evaluation of patient's response to treatment, examination of patient, obtaining history from patient or surrogate, review of old charts, re-evaluation of patient's condition, ordering and review of laboratory studies, ordering and performing treatments and interventions, ordering and review of radiographic studies and pulse oximetry                MEDICATIONS GIVEN IN ER  Medications   sodium chloride 0.9 % flush 10 mL (10 mL Intravenous Not Given 12/18/23 1303)   sodium chloride 0.9 % flush 10 mL (has no administration in time range)   sodium chloride 0.9 % infusion 40 mL (has no administration in time range)   sennosides-docusate (PERICOLACE) 8.6-50 MG per tablet 2 tablet (2 tablets Oral Not Given 12/18/23 1303)     And   polyethylene glycol (MIRALAX) packet 17 g (has no administration in time range)     And   bisacodyl (DULCOLAX) EC tablet 5 mg (has no administration in time range)     And   bisacodyl (DULCOLAX) suppository 10 mg (has no administration in time range)   nitroglycerin (NITROSTAT) SL tablet 0.4 mg (has no administration in time range)   acetaminophen (TYLENOL) tablet 650 mg (has no administration in time range)     Or    acetaminophen (TYLENOL) 160 MG/5ML oral solution 650 mg (has no administration in time range)     Or   acetaminophen (TYLENOL) suppository 650 mg (has no administration in time range)   ondansetron (ZOFRAN) injection 4 mg (has no administration in time range)   dextrose (GLUTOSE) oral gel 15 g (has no administration in time range)   dextrose (D50W) (25 g/50 mL) IV injection 25 g (has no administration in time range)   glucagon (GLUCAGEN) injection 1 mg (has no administration in time range)   Insulin Lispro (humaLOG) injection 2-9 Units ( Subcutaneous Not Given 12/18/23 1506)   pantoprazole (PROTONIX) injection 40 mg (has no administration in time range)   fentaNYL (DURAGESIC) 50 MCG/HR patch 1 patch (has no administration in time range)     And   Check Fentanyl Patch Placement (has no administration in time range)   oxyCODONE (ROXICODONE) immediate release tablet 5 mg (has no administration in time range)   cholecalciferol (VITAMIN D3) tablet 2,000 Units (has no administration in time range)   vitamin B-12 (CYANOCOBALAMIN) tablet 1,000 mcg (has no administration in time range)   dilTIAZem CD (CARDIZEM CD) 24 hr capsule 120 mg (has no administration in time range)   pravastatin (PRAVACHOL) tablet 40 mg (has no administration in time range)   modafinil (PROVIGIL) tablet 200 mg (has no administration in time range)   levothyroxine (SYNTHROID, LEVOTHROID) tablet 176 mcg (has no administration in time range)   levothyroxine (SYNTHROID, LEVOTHROID) tablet 88 mcg (has no administration in time range)   gabapentin (NEURONTIN) capsule 900 mg (has no administration in time range)   rOPINIRole (REQUIP) tablet 2 mg (has no administration in time range)   insulin glargine (LANTUS, SEMGLEE) injection 15 Units (has no administration in time range)   pantoprazole (PROTONIX) injection 80 mg (80 mg Intravenous Given 12/18/23 1011)                   MEDICAL DECISION MAKING, PROGRESS, and CONSULTS    All labs have been independently  reviewed by me.  All radiology studies have been reviewed by me and I have also reviewed the radiology report.   EKG's independently viewed and interpreted by me.  Discussion below represents my analysis of pertinent findings related to patient's condition, differential diagnosis, treatment plan and final disposition.            Orders placed during this visit:  Orders Placed This Encounter   Procedures    Critical Care    Gastrointestinal Panel, PCR - Stool, Per Rectum    Clostridioides difficile Toxin - Stool, Per Rectum    Clostridioides difficile Toxin, PCR - Stool, Per Rectum    XR Chest 1 View    CT Abdomen Pelvis With Contrast    Comprehensive Metabolic Panel    Urinalysis With Microscopic If Indicated (No Culture) - Urine, Clean Catch    CBC Auto Differential    Manual Differential    Comprehensive Metabolic Panel    Hemoglobin & Hematocrit, Blood    Iron Profile    Ferritin    NPO Diet NPO Type: Strict NPO    Diet: Liquid Diets; Clear Liquid; Fluid Consistency: Thin (IDDSI 0)    Monitor Blood Pressure    Pulse Oximetry, Continuous    Verify Informed Consent    Verify Informed Consent    Vital Signs    Oral Care    Place Sequential Compression Device    Maintain Sequential Compression Device    Telemetry - Maintain IV Access    Telemetry - Place Orders & Notify Provider of Results When Patient Experiences Acute Chest Pain, Dysrhythmia or Respiratory Distress    May Be Off Telemetry for Tests    Daily Weights    Strict Intake & Output    Verify Informed Consent    Bladder Scan    Code Status and Medical Interventions:    [facility] (on-call MD unless specified) Details    Inpatient Gastroenterology Consult    Hematology & Oncology Inpatient Consult    Inpatient Palliative Care Team Consult    Inpatient Hospice / [facility] Consult    Patient Isolation Contact Spore    OT Consult: Eval & Treat    PT Consult: Eval & Treat Discharge Placement Assessment, Functional Mobility Below Baseline    Incentive Spirometry    POCT  Occult Blood Stool    POC Glucose 4x Daily Before Meals & at Bedtime    ECG 12 Lead Other; weakness    SCANNED - TELEMETRY      SCANNED - TELEMETRY      Prepare RBC, 2 Units    Type & Screen    Prepare Platelet Pheresis, 1 Units    Type & Screen    Prepare RBC, 2 Units    Insert Peripheral IV    Inpatient Admission    CBC & Differential    CBC & Differential           Differential diagnosis:  Spinal cord compression, dehydration, anemia, electrolyte abnormality      Independent interpretation of labs, radiology studies, and discussions with consultants:  ED Course as of 12/18/23 1545   Mon Dec 18, 2023   0731 EKG interpreted by me demonstrates sinus rhythm, rate of 77, no KS/QT prolongation, no ST elevation, nonspecific ST changes in lead II [MW]   0852 My independent interpretation of chest x-ray is no cardiomegaly or acute infiltrate. [KA]   0922 Hemoglobin(!!): 5.8 [KA]   0929 Fecal Occult Blood(!): Positive [KA]   0929 Patient newly quite anemic with hemoglobin 5.8.  Previously greater than 10 2 months ago.  Occult blood stool test performed and is grossly positive.  Stool is very light in color, not melanotic.  Also significant thrombocytopenia, worse than baseline, less than 50,000 today.  Have ordered 2 units PRBCs as well as platelets, Protonix for GI bleed.  Updated patient, plan to admit.  Vital stable.  He is agreeable.  Offered to update son, he agrees. [KA]   1025 I discussed patient with SYD Dahl for [facility] including history presentation labs and imaging.  She agrees to admit to telemetry on behalf of Dr. Griffiths. [KA]   1030 Called the patient's son with the patient's permission.  Updated him on workup lab and imaging results.  He states the patient has gotten progressively weaker and that the son is having some exertional dyspnea and he is having trouble care for the patient which is why he had EMS transport admitted.  He verbalized understanding for need for admission and blood transfusion and  is agreeable with this plan. [KA]      ED Course User Index  [KA] Joann Martinez PA-C  [MW] Raghav Cotter MD         Additional sources:    - Chronic or social conditions impacting care: Metastatic cancer, known epidural metastases with spinal cord compression          DIAGNOSIS  Final diagnoses:   Acute blood loss anemia   Gastrointestinal hemorrhage, unspecified gastrointestinal hemorrhage type   Weakness   Prostate cancer metastatic to bone           Latest Documented Vital Signs:  As of 15:45 EST  BP- 143/67 HR- 88 Temp- 98.2 °F (36.8 °C) (Oral) O2 sat- 100%              --    Please note that portions of this were completed with a voice recognition program.       Note Disclaimer: At [facility], we believe that sharing information builds trust and better relationships. You are receiving this note because you are receiving care at [facility] or recently visited. It is possible you will see health information before a provider has talked with you about it. This kind of information can be easy to misunderstand. To help you fully understand what it means for your health, we urge you to discuss this note with your provider.             Joann Martinez PA-C  12/18/23 1543       Joann Martinez PA-C  12/18/23 1545

## 2023-12-18 NOTE — PROGRESS NOTES
Clinical Pharmacy Services: Medication History    Semaj Herrera is a 83 y.o. male presenting to [facility] for   Chief Complaint   Patient presents with    Weakness - Generalized       He  has a past medical history of Aortic stenosis, mild (01/01/2021), Ascending aorta dilatation, Asthma, Benign prostatic hyperplasia, Bone cancer, Cellulitis of great toe of left foot (10/19/2018), CKD (chronic kidney disease), Diastolic dysfunction, Difficulty breathing, Dyslipidemia, Erectile dysfunction, Fatigue, Heart murmur, History of blood transfusion, History of kidney stones, HL (hearing loss), Hyperlipidemia, Hypertension, Hyponatremia, Kidney stone, Left ventricular hypertrophy, Localized edema, Neuropathy, Obstructive sleep apnea, Osteoarthritis, Peptic ulcer, Pneumonia, Pneumonia of left upper lobe due to infectious organism (03/15/2019), Prostate cancer, Pure hyperglyceridemia, Restless leg syndrome, Sepsis, Sinus bradycardia, Transient cerebral ischemia, and Type 2 diabetes mellitus.    Allergies as of 12/18/2023 - Reviewed 12/18/2023   Allergen Reaction Noted    Niacin Unknown - Low Severity 06/30/2020    Statins Unknown - Low Severity 06/30/2020       Medication information was obtained from: [facility] and Phone Number:     Prior to Admission Medications       Prescriptions Last Dose Informant Patient Reported? Taking?    aspirin 81 MG EC tablet  Family Member Yes Yes    Take 1 tablet by mouth Daily. Indications: Venous Thromboembolism    cholecalciferol (VITAMIN D3) 1000 units tablet  Family Member Yes Yes    Take 2 tablets by mouth Daily.    Cyanocobalamin (Vitamin B12) 1000 MCG tablet controlled-release  Family Member Yes Yes    Take 1 tablet by mouth Daily.    dexAMETHasone (DECADRON) 2 MG tablet  Family Member No Yes    Take 1 tablet by mouth Daily for 84 days. Indications: Cancer of the Prostate Gland    dilTIAZem CD (CARDIZEM CD) 120 MG 24 hr capsule  Family Member No Yes    TAKE 1  CAPSULE EVERY DAY    Patient taking differently:  Take 1 capsule by mouth Daily.    diphenoxylate-atropine (LOMOTIL) 2.5-0.025 MG per tablet  Family Member No Yes    Take 1 tablet by mouth 4 (Four) Times a Day As Needed for Diarrhea.    fentaNYL (DURAGESIC) 50 MCG/HR patch  Family Member No Yes    Place 1 patch on the skin as directed by provider Every 72 (Seventy-Two) Hours. Indications: Chronic Pain    gabapentin (NEURONTIN) 300 MG capsule   No Yes    TAKE 3 CAPSULES EVERY NIGHT AT BEDTIME    Insulin Aspart (novoLOG) 100 UNIT/ML injection  Family Member Yes Yes    Inject  under the skin into the appropriate area as directed. Via Pump  Indications: Type 2 Diabetes    levothyroxine (SYNTHROID, LEVOTHROID) 88 MCG tablet  Family Member Yes Yes    Take 1 tablet by mouth See Admin Instructions. 1 tablet daily on Monday Tuesday, Thursday, Friday and Saturday    levothyroxine (SYNTHROID, LEVOTHROID) 88 MCG tablet  Family Member Yes Yes    Take 2 tablets by mouth 2 (Two) Times a Week. Sunday and Wednesdays    loperamide (IMODIUM) 1 MG/5ML solution  Family Member Yes Yes    Take 10 mL by mouth 4 (Four) Times a Day As Needed for Diarrhea. Indications: Diarrhea    modafinil (PROVIGIL) 200 MG tablet  Family Member No Yes    Take 1 tablet by mouth Daily.    olmesartan (BENICAR) 40 MG tablet  Family Member Yes Yes    Take 1 tablet by mouth Daily.    ondansetron (ZOFRAN) 8 MG tablet  Family Member No Yes    Take 1 tablet by mouth 3 (Three) Times a Day As Needed for Nausea or Vomiting.    oxyCODONE (Roxicodone) 5 MG immediate release tablet  Family Member No Yes    Take 1 tablet by mouth Every 4 (Four) Hours As Needed for Moderate Pain.    pantoprazole (PROTONIX) 40 MG EC tablet   No Yes    Take 1 tablet by mouth 2 (Two) Times a Day Before Meals.    pravastatin (PRAVACHOL) 40 MG tablet  Family Member Yes Yes    Take 1 tablet by mouth Daily.    rOPINIRole (REQUIP) 0.5 MG tablet  Family Member No Yes    Take 2 tablets by mouth in  the evening and 2 at bedtime.    Patient taking differently:  Take 2 tablets by mouth 2 (Two) Times a Day. Take 2 tablets by mouth in the evening and 2 at bedtime.  Indications: Restless Leg Syndrome    sennosides-docusate (PERICOLACE) 8.6-50 MG per tablet  Family Member No Yes    Take 2 tablets by mouth 2 (Two) Times a Day.              Medication notes:     This medication list is complete to the best of my knowledge as of 12/18/2023    Please call if questions.    Ko Jane  Medication History Technician  [phone]    12/18/2023 09:28 EST

## 2023-12-18 NOTE — ED PROVIDER NOTES
MD ATTESTATION NOTE    The ESTRADA and I have discussed this patient's history, physical exam, and treatment plan.  I have reviewed the documentation and personally had a face to face interaction with the patient. I affirm the documentation and agree with the treatment and plan.  The attached note describes my personal findings.      I provided a substantive portion of the care of the patient.  I personally performed the physical exam in its entirety, and below are my findings.        Brief HPI: 83-year-old male presenting for evaluation of bilateral leg weakness.  Patient is cared for 24/7 by his son at home.  Patient previously was able to stand and pivot however now has lost ability to even stand.  Patient is noted to be pale appearing.    PHYSICAL EXAM  ED Triage Vitals   Temp Heart Rate Resp BP SpO2   12/18/23 0701 12/18/23 0658 12/18/23 0658 12/18/23 0658 12/18/23 0658   98.4 °F (36.9 °C) 96 16 147/81 97 %      Temp src Heart Rate Source Patient Position BP Location FiO2 (%)   12/18/23 0701 12/18/23 1005 12/18/23 1005 12/18/23 1005 --   Oral Monitor Lying Left arm          GENERAL: Ill-appearing, no acute distress  HENT: nares patent  EYES: no scleral icterus  CV: regular rhythm, normal rate  RESPIRATORY: normal effort  ABDOMEN: soft  MUSCULOSKELETAL: no deformity  NEURO: alert, moves all extremities, follows commands  PSYCH:  calm, cooperative  SKIN: warm, dry, pale    Vital signs and nursing notes reviewed.        Plan: 83-year-old male presenting for evaluation of worsened functional status at home.  Laboratory evaluation is notable for positive fecal occult blood, hemoglobin of 5.8 and platelets of 41.  Patient will require transfusion as well as admission for further evaluation.       Raghav Cotter MD  12/18/23 [id number]

## 2023-12-18 NOTE — H&P
Patient Name:  Semaj Herrera  YOB: 1940  MRN:  [id number]  Admit Date:  12/18/2023  Patient Care Team:  Axel Guardado MD as PCP - General  Bob Mcdermott MD as Consulting Physician (Sleep Medicine)  Tata Lee MD as Consulting Physician (Cardiology)  Bijan Rivera III, MD as Referring Physician (Thoracic Surgery)  Jose Lee MD as Consulting Physician (Hematology and Oncology)  Saroj Madrid MD as Consulting Physician (Nephrology)  Wang Harper MD as Consulting Physician (Radiation Oncology)      Subjective   History Present Illness     Chief Complaint   Patient presents with    Weakness - Generalized     History of Present Illness  Mr. Herrera is a 83 y.o. former smoker with a history of metastatic prostate cancer to the bone, CKD 3, DM2, anemia of chronic disease, diastolic dysfunction, obesity, RONNY, RLS and hypertension that presents to [facility] complaining of weakness.  I am actually quite familiar with this patient as I took care of him the last time he was in the hospital.  He was admitted here 9/17-9/25/23 when he presented to the hospital with increasing bilateral leg weakness as well as constipation.  He was found to have a large amount of dense fecal material within the low sigmoid and rectum as well as widespread osseous metastasis that was more pronounced than prior.  He was seen by GI and given a bowel regimen with improvement.  MRIs of his lumbar/thoracic/cervical spine were all obtained showing metastatic disease but no significant cord compression.  Started on IV steroids and neurosurgery was consulted.  Radiation oncology was ultimately sought and he received 4 out of 5 planned radiation treatments.  He was seen by PT/OT who recommended home with 24/7 assistance versus SNF.  A hospital bed as well as Attila lift were ordered and he was discharged by me to home with his son.  Hospice was recommended, but son plan to  call them when they felt they were ready.  He was maintained on an insulin pump prior to that admission and given basal bolus insulin.  He did require a blood transfusion during that stay as well.   The patient returned to the hospital today primarily due to weakness and inability to care for himself at home.  He states that his son woke him up this morning when he developed chest pain and thought he himself was having a heart attack.  He called 911 for his father to be taken to the hospital as he could not care for him.  The patient states that he has been progressively weak since discharging from the hospital few months ago.  He states that he essentially does not walk anymore and that his son does full care assistance for him.  He denies any new or worsening back pain as well as denies any recent chest pain, dyspnea, cough, fever or chills.  He denies any nausea, vomiting or abdominal pain.  He states that he has been urinating.  He denies any overt blood loss.  He did have a telemedicine visit with Dr. Lee on 11/28.  He apparently did not want hospice care at that time and was continued to be followed by home health.  He was maintained on a fentanyl patch as well as Oxy IR for pain.  His Requip was increased to 2 mg nightly with 900 mg of gabapentin.   In the ED, he was afebrile and hemodynamically stable.  Lab work revealed a platelet level of 41, hemoglobin 5.8 and WBC of 4.71.  Creatinine was normal and CO2 level 21.5 with a normal anion gap.  Chest x-ray was negative for any acute findings, but confirmed his bony metastasis.  Orders for PRBC and platelets were initiated in the emergency room and he is being admitted for further evaluation and treatment.    Review of Systems   Constitutional:  Positive for activity change. Negative for appetite change, chills and fever.   HENT:  Negative for congestion and ear pain.    Respiratory:  Negative for cough and shortness of breath.    Cardiovascular:  Positive  for leg swelling. Negative for chest pain.   Gastrointestinal:  Negative for abdominal pain, blood in stool, constipation, diarrhea, nausea and vomiting.   Genitourinary:  Negative for difficulty urinating and dysuria.   Musculoskeletal:  Positive for arthralgias, back pain and gait problem.   Skin:  Positive for pallor. Negative for color change.   Neurological:  Positive for weakness. Negative for dizziness and light-headedness.   Psychiatric/Behavioral:  Negative for confusion. The patient is not nervous/anxious.       Personal History     Past Medical History:   Diagnosis Date    Aortic stenosis, mild 01/01/2021    Per echocardiogram 2021    Ascending aorta dilatation     Asthma     Benign prostatic hyperplasia     Bone cancer     Cellulitis of great toe of left foot 10/19/2018    CKD (chronic kidney disease)     Stage 3; followed by Dr. Vicky Duran     Diastolic dysfunction     GRADE II per echo 2018    Difficulty breathing     during exertion    Dyslipidemia     Erectile dysfunction     Fatigue     Heart murmur     History of blood transfusion     History of kidney stones     HL (hearing loss)     Hyperlipidemia     Hypertension     Hyponatremia     Kidney stone     Left ventricular hypertrophy     per echo 2018    Localized edema     Neuropathy     Obstructive sleep apnea     USING CPAP    Osteoarthritis     Peptic ulcer     Pneumonia     Pneumonia of left upper lobe due to infectious organism 03/15/2019    Prostate cancer     Status post prostatectomy, radiation therapy, and hormone therapy followed by Dr. Lee; metastatsis to bone    Pure hyperglyceridemia     Restless leg syndrome     Sepsis     Sinus bradycardia     Transient cerebral ischemia     Type 2 diabetes mellitus      Past Surgical History:   Procedure Laterality Date    BRONCHOSCOPY N/A 04/09/2018    Procedure: BRONCHOSCOPY;  Surgeon: Bijan Rivera III, MD;  Location: [facility];  Service: Cardiothoracic    COLONOSCOPY      DEEP  NECK LYMPH NODE BIOPSY / EXCISION      HEMORRHOIDECTOMY      LYMPH NODE BIOPSY      MEDIASTINOSCOPY N/A 2018    Procedure: MEDIASTINOSCOPY WITH LYMPH NODE BIOPSY;  Surgeon: Bijan Rivera III, MD;  Location: [facility] OR;  Service: Cardiothoracic    OTHER SURGICAL HISTORY      ulcer repair    PROSTATECTOMY  2006     Family History   Problem Relation Age of Onset    Heart failure Mother     Hypertension Mother             Diabetes Mother     Heart disease Mother             Lung cancer Father 46    Hypertension Sister     Lung cancer Sister 60    Hypertension Brother             Lung cancer Brother 62    Diabetes Brother     Heart disease Other     Prostate cancer Brother 60    Cancer Brother             Cancer Sister             Cancer Sister     Cancer Sister     Heart disease Brother             Malig Hyperthermia Neg Hx      Social History     Tobacco Use    Smoking status: Former     Packs/day: 1.50     Years: 30.00     Additional pack years: 0.00     Total pack years: 45.00     Types: Cigarettes     Start date: 1968     Quit date: 1998     Years since quittin.9     Passive exposure: Past    Smokeless tobacco: Never   Vaping Use    Vaping Use: Never used   Substance Use Topics    Alcohol use: Not Currently     Comment: Caffeine use: 2-3 cups daily    Drug use: Never     Medications Prior to Admission   Medication Sig Dispense Refill Last Dose    aspirin 81 MG EC tablet Take 1 tablet by mouth Daily. Indications: Venous Thromboembolism       cholecalciferol (VITAMIN D3) 1000 units tablet Take 2 tablets by mouth Daily.       Cyanocobalamin (Vitamin B12) 1000 MCG tablet controlled-release Take 1 tablet by mouth Daily.       dexAMETHasone (DECADRON) 2 MG tablet Take 1 tablet by mouth Daily for 84 days. Indications: Cancer of the Prostate Gland 84 tablet 0     dilTIAZem CD (CARDIZEM CD) 120 MG 24 hr capsule TAKE 1 CAPSULE EVERY DAY (Patient taking  differently: Take 1 capsule by mouth Daily.) 90 capsule 3     diphenoxylate-atropine (LOMOTIL) 2.5-0.025 MG per tablet Take 1 tablet by mouth 4 (Four) Times a Day As Needed for Diarrhea. 30 tablet 0     fentaNYL (DURAGESIC) 50 MCG/HR patch Place 1 patch on the skin as directed by provider Every 72 (Seventy-Two) Hours. Indications: Chronic Pain 10 each 0     gabapentin (NEURONTIN) 300 MG capsule TAKE 3 CAPSULES EVERY NIGHT AT BEDTIME 90 capsule 0     Insulin Aspart (novoLOG) 100 UNIT/ML injection Inject  under the skin into the appropriate area as directed. Via Pump  Indications: Type 2 Diabetes       levothyroxine (SYNTHROID, LEVOTHROID) 88 MCG tablet Take 1 tablet by mouth See Admin Instructions. 1 tablet daily on Monday Tuesday, Thursday, Friday and Saturday       levothyroxine (SYNTHROID, LEVOTHROID) 88 MCG tablet Take 2 tablets by mouth 2 (Two) Times a Week. Sunday and Wednesdays       loperamide (IMODIUM) 1 MG/5ML solution Take 10 mL by mouth 4 (Four) Times a Day As Needed for Diarrhea. Indications: Diarrhea       modafinil (PROVIGIL) 200 MG tablet Take 1 tablet by mouth Daily. 90 tablet 1     olmesartan (BENICAR) 40 MG tablet Take 1 tablet by mouth Daily.       ondansetron (ZOFRAN) 8 MG tablet Take 1 tablet by mouth 3 (Three) Times a Day As Needed for Nausea or Vomiting. 30 tablet 5     oxyCODONE (Roxicodone) 5 MG immediate release tablet Take 1 tablet by mouth Every 4 (Four) Hours As Needed for Moderate Pain. 60 tablet 0     pantoprazole (PROTONIX) 40 MG EC tablet Take 1 tablet by mouth 2 (Two) Times a Day Before Meals. 60 tablet 0     pravastatin (PRAVACHOL) 40 MG tablet Take 1 tablet by mouth Daily.       rOPINIRole (REQUIP) 0.5 MG tablet Take 2 tablets by mouth in the evening and 2 at bedtime. (Patient taking differently: Take 2 tablets by mouth 2 (Two) Times a Day. Take 2 tablets by mouth in the evening and 2 at bedtime.  Indications: Restless Leg Syndrome) 360 tablet 2     sennosides-docusate  (PERICOLACE) 8.6-50 MG per tablet Take 2 tablets by mouth 2 (Two) Times a Day.        Allergies:    Allergies   Allergen Reactions    Niacin Unknown - Low Severity    Statins Unknown - Low Severity       Objective    Objective     Vital Signs  Temp:  [98.1 °F (36.7 °C)-98.4 °F (36.9 °C)] 98.1 °F (36.7 °C)  Heart Rate:  [] 88  Resp:  [16-20] 16  BP: (108-155)/(62-91) 152/64  SpO2:  [95 %-100 %] 98 %  on   ;   Device (Oxygen Therapy): room air  Body mass index is 29.7 kg/m².    Physical Exam  Vitals and nursing note reviewed.   Constitutional:       Appearance: He is ill-appearing. He is not toxic-appearing.   HENT:      Head: Normocephalic and atraumatic.      Right Ear: External ear normal.      Left Ear: External ear normal.      Nose: Nose normal.   Cardiovascular:      Rate and Rhythm: Normal rate and regular rhythm.      Pulses: Normal pulses.   Pulmonary:      Effort: Pulmonary effort is normal. No respiratory distress.      Comments: Diminished and on room air  Abdominal:      General: Bowel sounds are normal. There is no distension.      Palpations: Abdomen is soft.      Tenderness: There is no abdominal tenderness.   Musculoskeletal:         General: Swelling (Mild bilateral lower extremities) present. Normal range of motion.      Cervical back: Normal range of motion and neck supple.   Skin:     General: Skin is warm and dry.      Coloration: Skin is pale.      Findings: No bruising.   Neurological:      Mental Status: He is alert and oriented to person, place, and time.      Sensory: No sensory deficit.      Motor: Weakness present.      Coordination: Coordination normal.   Psychiatric:         Mood and Affect: Mood normal.         Behavior: Behavior normal.       Results Review:  I reviewed the patient's new clinical results.  I reviewed the patient's new imaging results and agree with the interpretation.  I reviewed the patient's other test results and agree with the interpretation  I personally  viewed and interpreted the patient's EKG/Telemetry data  Discussed with ED provider.    Lab Results (last 24 hours)       Procedure Component Value Units Date/Time    CBC & Differential [[id number]]  (Abnormal) Collected: 12/18/23 0844    Specimen: Blood from Arm, Right Updated: 12/18/23 0920    Narrative:      The following orders were created for panel order CBC & Differential.  Procedure                               Abnormality         Status                     ---------                               -----------         ------                     CBC Auto Differential[[id number]]        Abnormal            Final result                 Please view results for these tests on the individual orders.    Comprehensive Metabolic Panel [[id number]]  (Abnormal) Collected: 12/18/23 0844    Specimen: Blood from Arm, Right Updated: 12/18/23 0925     Glucose 82 mg/dL      BUN 19 mg/dL      Creatinine 0.60 mg/dL      Sodium 141 mmol/L      Potassium 4.1 mmol/L      Chloride 106 mmol/L      CO2 21.5 mmol/L      Calcium 8.6 mg/dL      Total Protein 5.9 g/dL      Albumin 3.6 g/dL      ALT (SGPT) 13 U/L      AST (SGOT) 18 U/L      Alkaline Phosphatase 167 U/L      Total Bilirubin 0.9 mg/dL      Globulin 2.3 gm/dL      A/G Ratio 1.6 g/dL      BUN/Creatinine Ratio 31.7     Anion Gap 13.5 mmol/L      eGFR 95.8 mL/min/1.73     Narrative:      GFR Normal >60  Chronic Kidney Disease <60  Kidney Failure <15    The GFR formula is only valid for adults with stable renal function between ages 18 and 70.    CBC Auto Differential [[id number]]  (Abnormal) Collected: 12/18/23 0844    Specimen: Blood from Arm, Right Updated: 12/18/23 0920     WBC 4.71 10*3/mm3      RBC 2.01 10*6/mm3      Hemoglobin 5.8 g/dL      Hematocrit 18.3 %      MCV 91.0 fL      MCH 28.9 pg      MCHC 31.7 g/dL      RDW 25.4 %      RDW-SD 83.2 fl      Platelets 41 10*3/mm3     Manual Differential [[id number]]  (Abnormal) Collected: 12/18/23 0844    Specimen: Blood from Arm,  Right Updated: 12/18/23 0953     Neutrophil % 72.0 %      Lymphocyte % 17.0 %      Monocyte % 7.0 %      Metamyelocyte % 2.0 %      Myelocyte % 2.0 %      Neutrophils Absolute 3.39 10*3/mm3      Lymphocytes Absolute 0.80 10*3/mm3      Monocytes Absolute 0.33 10*3/mm3      nRBC 13.0 /100 WBC      RBC Morphology Normal     WBC Morphology Normal     Platelet Morphology Normal    POCT Occult Blood Stool [[id number]]  (Abnormal) Collected: 12/18/23 0929    Specimen: Stool from Per Rectum Updated: 12/18/23 0929     Fecal Occult Blood Positive     Lot Number [id number]     Expiration Date 8,[id number]     Positive Control Positive     Negative Control Negative            Imaging Results (Last 24 Hours)       Procedure Component Value Units Date/Time    XR Chest 1 View [[id number]] Collected: 12/18/23 0833     Updated: 12/18/23 0837    Narrative:      XR CHEST 1 VW-12/18/2023     HISTORY: Weakness.     Heart size is within normal limits. Lungs are slightly underinflated but  appear clear. There are minor degenerative changes in the spine. There  is sclerotic change of the proximal left humerus, left glenoid and  possibly in the right scapula as well. FINDINGS are consistent with bony  metastases and are seen on previous studies including a nuclear medicine  bone scan from 6/5/2023.       Impression:      1. No acute cardiopulmonary process identified.  2. Bony metastases.        This report was finalized on 12/18/2023 8:34 AM by Dr. Reynaldo De M.D on Workstation: [id number]               Results for orders placed during the hospital encounter of 02/19/20    Adult Transthoracic Echo Complete W/ Cont if Necessary Per Protocol    Interpretation Summary  · Estimated EF appears to be in the range of 61 - 65%.  · Left ventricular diastolic dysfunction is noted (grade II w/high LAP) consistent with pseudonormalization.  · Normal right ventricular cavity size and systolic function noted.  · Left atrial cavity size is mildly dilated.  ·  Mild aortic valve stenosis is present. Aortic valve maximum pressure gradient is 24.0 mmHg. Aortic valve mean pressure gradient is 11.0 mmHg.  · Calculated right ventricular systolic pressure from tricuspid regurgitation is 22 mmHg.  · There is no evidence of pericardial effusion.      ECG 12 Lead Other; weakness   Preliminary Result   HEART RATE= 77  bpm   RR Interval= 779  ms   MA Interval= 131  ms   P Horizontal Axis= -6  deg   P Front Axis= 9  deg   QRSD Interval= 97  ms   QT Interval= 359  ms   QTcB= 407  ms   QRS Axis= -16  deg   T Wave Axis= 9  deg   - OTHERWISE NORMAL ECG -   Sinus rhythm   Borderline left axis deviation   Baseline wander in lead(s) V5   Electronically Signed By:    Date and Time of Study: 2023-12-18 07:28:28      SCANNED - TELEMETRY     Final Result           Assessment/Plan     Active Hospital Problems    Diagnosis  POA    **Generalized weakness [R53.1]  Yes    Acute blood loss anemia [D62]  Yes    Anemia, chronic disease [D63.8]  Yes    Class 2 severe obesity due to excess calories with serious comorbidity and body mass index (BMI) of 35.0 to 35.9 in adult [E66.01, Z68.35]  Not Applicable    Restless legs syndrome (RLS) [G25.81]  Yes    Diastolic dysfunction [I51.89]  Yes    Neuropathy [G62.9]  Yes    Prostate cancer metastatic to bone [C61, C79.51]  Yes    Type 2 diabetes mellitus with kidney complication, with long-term current use of insulin [E11.29, Z79.4]  Not Applicable    Obstructive sleep apnea syndrome treated auto BiPAP [G47.33]  Yes    Hypertension [I10]  Yes    Chronic kidney disease, stage III (moderate) [N18.30]  Yes     Mr. Herrera is a 83 y.o. male that presented to the hospital with generalized weakness.  He was living at home with his son who was providing 24/7 assistance via hospital bed and Attila lift.  His son fell ill and he called 911 to have his father taken to the hospital for further care as he is unable to be cared for at home by himself.  He does have known  metastatic prostate cancer with recommendations for hospice in the past, but hospice has not been initiated at this time.  He was noted to have a hemoglobin of 5.8 as well as platelets in the 40s prompting further admission.    Generalized weakness  -Multifactorial secondary to metastatic cancer, advanced age, ongoing immobility as well as anemia.  -Will ask PT/OT to see.  -Will attempt to optimize current clinical status.    Acute blood loss anemia  Anemia of chronic disease  -Apparently was heme positive in the emergency room.  Now on PPI and will ask GI to evaluate.  Patient does report that he would desire EGD if indicated.  -Most likely has seeing anemia secondary to his malignancy.  -2 units of platelets as well as 2 units PRBC ordered by ED. Will monitor response.    Metastatic prostate cancer to bone  Cancer related pain with neuropathy and RLS  -Consult hematology/oncology.  -They agree with hospice consult at this time and do not feel the need for ongoing Decadron.  Will hold this for now.  -Blood transfusions as above with further recommendations per heme..  -Resume home pain medications including fentanyl patch and as needed oxycodone.  -Will ask palliative and hospice to evaluate.  -Resume his gabapentin and Requip.    CKD 3  Hypertension  -Follows with Dr. Vicky Duran.  -Renal function stable.  Will monitor labs.  -Resume home blood pressure medications including diltiazem.    DM2  -Typically uses an insulin pump at home.  However his son is usually the one that manages this and I have asked him to take off his insulin pump and we will do basal/bolus dosing like we did last admission.  -Use sliding scale insulin for now and start Lantus 15 units nightly.  -Titrate insulin as needed.    Diastolic dysfunction  Mild aortic stenosis  -Established with Dr. Lee.  -Appears fairly stable.    RONNY  -Home CPAP if available.  Otherwise avoid oversedation and use oxygen as needed.    I discussed the  patients findings and my recommendations with patient and nursing staff.    VTE Prophylaxis - SCDs.  Code Status - No CPR/intubation.  Discussed with patient at bedside.  He was a DNR the last time he was admitted was considering hospice at that time.  Since last hospitalization he does look much worse than when I saw him last.  At this time, he is interested in speaking with palliative as well as hospice care team.  It seems as if his son is having a lot of difficulty caring for him in the current state at home as it is weighing in on his own health.  Will ask CCP to see for possible placement.       SYD Oswald  [facility]  12/18/23  14:17 EST

## 2023-12-18 NOTE — CONSULTS
Subjective     REASON FOR CONSULTATION: Anemia, thrombocytopenia, metastatic prostate cancer  Provide an opinion on any further workup or treatment                             REQUESTING PHYSICIAN: Esteban    RECORDS OBTAINED:  Records of the patients history including those obtained from the referring provider were reviewed and summarized in detail.    HISTORY OF PRESENT ILLNESS:  The patient is a 83 y.o. year old male who is here for an opinion about the above issue.    History of Present Illness   This is an 83-year-old man follow-up Dr. Lee metastatic prostate cancer in our practice for metastatic prostate cancer who has had metastatic disease since 2018 with multiple sclerotic bone lesions and  at that time.  He has been treated with androgen deprivation therapy, palliative radiation to sites of bony disease/pain control.  He had progression of disease with increasing PSA and started Xtandi 9021 in addition to continuation of Lupron and Xgeva.  He had progression of disease and darolutamide 600 mg twice daily plus Xgeva plus Lupron initiated 4/17/2023.  The patient had progression of disease subsequently with worsening anemia, admission in September for leg weakness secondary to cord compression at T10 (nonsurgical candidate) treated with steroids and radiation.  The patient is not a candidate for further treatment actively of his prostate cancer and hospice has been recommended but so far not instituted.  He has pain of malignancy currently on fentanyl 50 mcg patch and Norco 10/325 as needed breakthrough pain available at home.  He has narcotic induced constipation.    The patient was brought to ER today by family who provide full-time care at home.  The patient has progressive lower extremity weakness x 2 weeks now with inability to stand.  He reports being incontinent of urine and stool.  He has a sacral decubitus ulcer from pressure and incontinence.  Labs drawn in the ER showed worsening counts  with hemoglobin 5.8, platelets 41 with significant nucleated red blood cells.    Past Medical History:   Diagnosis Date    Aortic stenosis, mild 01/01/2021    Per echocardiogram 2021    Ascending aorta dilatation     Asthma     Benign prostatic hyperplasia     Bone cancer     Cellulitis of great toe of left foot 10/19/2018    CKD (chronic kidney disease)     Stage 3; followed by Dr. Vicky Duran     Diastolic dysfunction     GRADE II per echo 2018    Difficulty breathing     during exertion    Dyslipidemia     Erectile dysfunction     Fatigue     Heart murmur     History of blood transfusion     History of kidney stones     HL (hearing loss)     Hyperlipidemia     Hypertension     Hyponatremia     Kidney stone     Left ventricular hypertrophy     per echo 2018    Localized edema     Neuropathy     Obstructive sleep apnea     USING CPAP    Osteoarthritis     Peptic ulcer     Pneumonia     Pneumonia of left upper lobe due to infectious organism 03/15/2019    Prostate cancer     Status post prostatectomy, radiation therapy, and hormone therapy followed by Dr. Lee; metastatsis to bone    Pure hyperglyceridemia     Restless leg syndrome     Sepsis     Sinus bradycardia     Transient cerebral ischemia     Type 2 diabetes mellitus         Past Surgical History:   Procedure Laterality Date    BRONCHOSCOPY N/A 04/09/2018    Procedure: BRONCHOSCOPY;  Surgeon: Bijan Rivera III, MD;  Location: [facility] OR;  Service: Cardiothoracic    COLONOSCOPY      DEEP NECK LYMPH NODE BIOPSY / EXCISION      HEMORRHOIDECTOMY      LYMPH NODE BIOPSY      MEDIASTINOSCOPY N/A 04/09/2018    Procedure: MEDIASTINOSCOPY WITH LYMPH NODE BIOPSY;  Surgeon: Bijan Rivera III, MD;  Location: [facility] OR;  Service: Cardiothoracic    OTHER SURGICAL HISTORY      ulcer repair    PROSTATECTOMY  2006        No current facility-administered medications on file prior to encounter.     Current Outpatient Medications on File Prior to Encounter    Medication Sig Dispense Refill    aspirin 81 MG EC tablet Take 1 tablet by mouth Daily. Indications: Venous Thromboembolism      cholecalciferol (VITAMIN D3) 1000 units tablet Take 2 tablets by mouth Daily.      Cyanocobalamin (Vitamin B12) 1000 MCG tablet controlled-release Take 1 tablet by mouth Daily.      dexAMETHasone (DECADRON) 2 MG tablet Take 1 tablet by mouth Daily for 84 days. Indications: Cancer of the Prostate Gland 84 tablet 0    dilTIAZem CD (CARDIZEM CD) 120 MG 24 hr capsule TAKE 1 CAPSULE EVERY DAY (Patient taking differently: Take 1 capsule by mouth Daily.) 90 capsule 3    diphenoxylate-atropine (LOMOTIL) 2.5-0.025 MG per tablet Take 1 tablet by mouth 4 (Four) Times a Day As Needed for Diarrhea. 30 tablet 0    fentaNYL (DURAGESIC) 50 MCG/HR patch Place 1 patch on the skin as directed by provider Every 72 (Seventy-Two) Hours. Indications: Chronic Pain 10 each 0    gabapentin (NEURONTIN) 300 MG capsule TAKE 3 CAPSULES EVERY NIGHT AT BEDTIME 90 capsule 0    Insulin Aspart (novoLOG) 100 UNIT/ML injection Inject  under the skin into the appropriate area as directed. Via Pump  Indications: Type 2 Diabetes      levothyroxine (SYNTHROID, LEVOTHROID) 88 MCG tablet Take 1 tablet by mouth See Admin Instructions. 1 tablet daily on Monday Tuesday, Thursday, Friday and Saturday      levothyroxine (SYNTHROID, LEVOTHROID) 88 MCG tablet Take 2 tablets by mouth 2 (Two) Times a Week. Sunday and Wednesdays      loperamide (IMODIUM) 1 MG/5ML solution Take 10 mL by mouth 4 (Four) Times a Day As Needed for Diarrhea. Indications: Diarrhea      modafinil (PROVIGIL) 200 MG tablet Take 1 tablet by mouth Daily. 90 tablet 1    olmesartan (BENICAR) 40 MG tablet Take 1 tablet by mouth Daily.      ondansetron (ZOFRAN) 8 MG tablet Take 1 tablet by mouth 3 (Three) Times a Day As Needed for Nausea or Vomiting. 30 tablet 5    oxyCODONE (Roxicodone) 5 MG immediate release tablet Take 1 tablet by mouth Every 4 (Four) Hours As Needed  for Moderate Pain. 60 tablet 0    pantoprazole (PROTONIX) 40 MG EC tablet Take 1 tablet by mouth 2 (Two) Times a Day Before Meals. 60 tablet 0    pravastatin (PRAVACHOL) 40 MG tablet Take 1 tablet by mouth Daily.      rOPINIRole (REQUIP) 0.5 MG tablet Take 2 tablets by mouth in the evening and 2 at bedtime. (Patient taking differently: Take 2 tablets by mouth 2 (Two) Times a Day. Take 2 tablets by mouth in the evening and 2 at bedtime.  Indications: Restless Leg Syndrome) 360 tablet 2    sennosides-docusate (PERICOLACE) 8.6-50 MG per tablet Take 2 tablets by mouth 2 (Two) Times a Day.      [DISCONTINUED] Accu-Chek Guide test strip USE 1 STRIP TO CHECK BLOOD SUGAR 4 TIMES DAILY      [DISCONTINUED] Accu-Chek Softclix Lancets lancets USE 1 LANCET TO CHECK GLUCOSE 4 TIMES DAILY      [DISCONTINUED] Leuprolide Acetate, 3 Month, (LUPRON DEPOT, 3-MONTH, IM) Inject  into the appropriate muscle as directed by prescriber Every 3 (Three) Months.      [DISCONTINUED] polyethylene glycol (MIRALAX) 17 g packet Take 17 g by mouth Daily.      [DISCONTINUED] traZODone (DESYREL) 50 MG tablet Take 1-2 tablets by mouth every night at bedtime. Indications: Trouble Sleeping          ALLERGIES:    Allergies   Allergen Reactions    Niacin Unknown - Low Severity    Statins Unknown - Low Severity        Social History     Socioeconomic History    Marital status:     Years of education: College   Tobacco Use    Smoking status: Former     Packs/day: 1.50     Years: 30.00     Additional pack years: 0.00     Total pack years: 45.00     Types: Cigarettes     Start date: 1968     Quit date: 1998     Years since quittin.9     Passive exposure: Past    Smokeless tobacco: Never   Vaping Use    Vaping Use: Never used   Substance and Sexual Activity    Alcohol use: Not Currently     Comment: Caffeine use: 2-3 cups daily    Drug use: Never    Sexual activity: Not Currently        Family History   Problem Relation Age of Onset    Heart  failure Mother     Hypertension Mother             Diabetes Mother     Heart disease Mother             Lung cancer Father 46    Hypertension Sister     Lung cancer Sister 60    Hypertension Brother             Lung cancer Brother 62    Diabetes Brother     Heart disease Other     Prostate cancer Brother 60    Cancer Brother             Cancer Sister             Cancer Sister     Cancer Sister     Heart disease Brother             Malig Hyperthermia Neg Hx         Review of Systems   Constitutional:  Positive for activity change and fatigue. Negative for fever.   HENT: Negative.     Respiratory:  Negative for cough, shortness of breath and wheezing.    Cardiovascular:  Negative for chest pain, palpitations and leg swelling.   Gastrointestinal:  Negative for constipation.        Stool incontinence   Genitourinary:  Positive for difficulty urinating.        Urine incontinence   Musculoskeletal:  Positive for back pain.   Skin:  Positive for wound (Sacrum).   Neurological:  Positive for weakness. Negative for dizziness and light-headedness.   Hematological:  Does not bruise/bleed easily.   Psychiatric/Behavioral:  Positive for dysphoric mood. The patient is not nervous/anxious.           Objective     Vitals:    23 1005 23 1058 23 1157 23 1234   BP: 147/71 139/65 155/65 143/62   BP Location: Left arm Left arm  Left arm   Patient Position: Lying Lying  Lying   Pulse: 97 82 74 80   Resp: 20 20  16   Temp: 98.3 °F (36.8 °C) 98.3 °F (36.8 °C)  98.2 °F (36.8 °C)   TempSrc: Oral Oral  Oral   SpO2: 98% 99% 98%    Weight:       Height:             2023    11:57 AM   Current Status   ECOG score 1       Physical Exam    CONSTITUTIONAL: pleasant well-developed adult man  HEENT: no icterus, no thrush, moist membranes  LYMPH: no cervical or supraclavicular lad  CV: RRR, S1S2, no murmur  RESP: cta bilat, no wheezing, no rales  GI: soft, non-tender, no  splenomegaly, +bs  MUSC: no edema,  NEURO: alert and oriented x3, significant bilateral lower extremity weakness, cannot move legs parallel to gravity can wiggle feet  PSYCH: normal mood depressed mood  Skin color pallor      RECENT LABS:  Hematology WBC   Date Value Ref Range Status   12/18/2023 4.71 3.40 - 10.80 10*3/mm3 Final     RBC   Date Value Ref Range Status   12/18/2023 2.01 (L) 4.14 - 5.80 10*6/mm3 Final     Hemoglobin   Date Value Ref Range Status   12/18/2023 5.8 (C) 13.0 - 17.7 g/dL Final     Hematocrit   Date Value Ref Range Status   12/18/2023 18.3 (C) 37.5 - 51.0 % Final     Platelets   Date Value Ref Range Status   12/18/2023 41 (C) 140 - 450 10*3/mm3 Final        Lab Results   Component Value Date    GLUCOSE 82 12/18/2023    BUN 19 12/18/2023    CREATININE 0.60 (L) 12/18/2023    EGFR 95.8 12/18/2023    BCR 31.7 (H) 12/18/2023    K 4.1 12/18/2023    CO2 21.5 (L) 12/18/2023    CALCIUM 8.6 12/18/2023    ALBUMIN 3.6 12/18/2023    BILITOT 0.9 12/18/2023    AST 18 12/18/2023    ALT 13 12/18/2023       Assessment & Plan     *Metastatic castrate resistant prostate cancer  The patient has extensive bony metastases and cord compression at T10 previously evaluated not felt to be surgical candidate status post limited radiation and steroid therapy but progressive symptoms of lower extremity weakness and bowel/bladder incontinence  According to Dr. Lee's previous clinic notes the patient has disease progressive on treatment and because of the patient's performance status, he is not felt to be a candidate for further active treatment of his malignancy; hospice has been recommended although he has so far been under care of his family/home health    *Severe anemia/thrombocytopenia  Patient has recurrent anemia requiring transfusion support and worsening thrombocytopenia  Admitted with hemoglobin 5.8 and platelets 41  Differential shows significant nucleated red blood cells 13%, left shifted myeloid cells with  metamyelocytes myelocytes all 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 with decreased production of red cells/platelets    *Pain of malignancy  Pain appears controlled on fentanyl 50 mcg every 72 hour patch and oxycodone 5 mg every 4 hours as needed pain    *Cord compression with progressive symptoms  Patient previously evaluated by neurosurgery not felt to be candidate for decompression surgically  The patient was treated with limited radiation  Currently on dexamethasone 2 mg twice daily    *Sacral pressure wound worsened by incontinence    Oncology plan/recommendations:  Agree with transfusion support for anemia hemoglobin 5.8  Suspect the patient's anemia/thrombocytopenia mostly related to bone marrow involvement of prostate cancer given the significant increase in nucleated red cells and left shifted myeloid cells/myelophthisic process as opposed to GI blood loss.  Given the clinical circumstances of end-stage prostate cancer might consider holding off on EGD unless clearly actively bleeding?  Continue pain control with fentanyl 50 aurelio patch and oxycodone as needed  Okay to hold dexamethasone-doubt helping much with cord compression at this point  The patient is hospice appropriate

## 2023-12-19 ENCOUNTER — ANESTHESIA (OUTPATIENT)
Dept: GASTROENTEROLOGY | Facility: HOSPITAL | Age: 83
End: 2023-12-19
Payer: MEDICARE

## 2023-12-19 ENCOUNTER — ANESTHESIA EVENT (OUTPATIENT)
Dept: GASTROENTEROLOGY | Facility: HOSPITAL | Age: 83
End: 2023-12-19
Payer: MEDICARE

## 2023-12-19 PROCEDURE — 25010000002 PROPOFOL 10 MG/ML EMULSION: Performed by: ANESTHESIOLOGY

## 2023-12-19 PROCEDURE — 25810000003 LACTATED RINGERS PER 1000 ML: Performed by: ANESTHESIOLOGY

## 2023-12-19 RX ORDER — PROPOFOL 10 MG/ML
VIAL (ML) INTRAVENOUS AS NEEDED
Status: DISCONTINUED | OUTPATIENT
Start: 2023-12-19 | End: 2023-12-19 | Stop reason: SURG

## 2023-12-19 RX ORDER — LIDOCAINE HYDROCHLORIDE 20 MG/ML
INJECTION, SOLUTION INFILTRATION; PERINEURAL AS NEEDED
Status: DISCONTINUED | OUTPATIENT
Start: 2023-12-19 | End: 2023-12-19 | Stop reason: SURG

## 2023-12-19 RX ORDER — SODIUM CHLORIDE, SODIUM LACTATE, POTASSIUM CHLORIDE, CALCIUM CHLORIDE 600; 310; 30; 20 MG/100ML; MG/100ML; MG/100ML; MG/100ML
INJECTION, SOLUTION INTRAVENOUS CONTINUOUS PRN
Status: DISCONTINUED | OUTPATIENT
Start: 2023-12-19 | End: 2023-12-19 | Stop reason: SURG

## 2023-12-19 RX ADMIN — LIDOCAINE HYDROCHLORIDE 40 MG: 20 INJECTION, SOLUTION INFILTRATION; PERINEURAL at 10:34

## 2023-12-19 RX ADMIN — PROPOFOL 160 MCG/KG/MIN: 10 INJECTION, EMULSION INTRAVENOUS at 10:35

## 2023-12-19 RX ADMIN — SODIUM CHLORIDE, POTASSIUM CHLORIDE, SODIUM LACTATE AND CALCIUM CHLORIDE: 600; 310; 30; 20 INJECTION, SOLUTION INTRAVENOUS at 10:32

## 2023-12-19 RX ADMIN — PROPOFOL 100 MG: 10 INJECTION, EMULSION INTRAVENOUS at 10:34

## 2023-12-19 NOTE — PLAN OF CARE
Problem: Adult Inpatient Plan of Care  Goal: Plan of Care Review  Outcome: Ongoing, Progressing   Goal Outcome Evaluation:  VSS. RA. Alert and oriented x4. 2nd unit PRBC completed. NPO after midnight for possible scope per GI. Wound noted on coccyx, picture uploaded in epic. Report given to nurse on 5N, awaiting transport to floor.

## 2023-12-19 NOTE — CONSULTS
.            [facility]    Palliative Care Initial Consult   Attending Physician: Martinez Dewey MD  Referring Provider: Mariaa VELARDE     Reason for Referral: assistance with clarification of goals of care and hospice referral or discussion  Family/Support: son and grandchildren     Code Status and Medical Interventions:   Ordered at: 12/18/23 1225     Medical Intervention Limits:    NO intubation (DNI)     Level Of Support Discussed With:    Patient     Code Status (Patient has no pulse and is not breathing):    No CPR (Do Not Attempt to Resuscitate)     Medical Interventions (Patient has pulse or is breathing):    Limited Support     Goals of Care: TBD.    HPI:   83 y.o. male with history of metastatic prostate cancer to the bone, CKD 3, DM2, anemia of chronic disease, diastolic dysfunction, obesity, RONNY, RLS and hypertension. He resided at home prior to admission with his son.   Patient presented to [facility] on 12/18/2023 related to weakness. Workup in ER, revealed anemia (hgb 5.8), thrombocytopenia (platelet 41) and positive FOBT.  He was given blood transfusion and platelets. Consults were placed for GI. He went for EGD this morning that revealed small nonbleeding duodenal ulcers likely NSAID induced. The palliative care team was consulted for support with goals of care conversation due to chronic conditions.   Palliative Care Spoke With: patient  Quality of life: fair  Functional Status:bed/chair per patient with full assistance    Due to the Palliative Care Topics Discussed: palliative care, goals of care, care options, [facility], and discharge options we will establish an advance care plan.   Advance Care Planning   Advance Care Planning Discussion: see below          Review of Systems   Constitutional: Positive for decreased appetite and malaise/fatigue.   Cardiovascular:  Negative for chest pain.   Respiratory:  Negative for shortness of  breath.    Neurological:  Positive for weakness.   Psychiatric/Behavioral:  Negative for depression. The patient is not nervous/anxious.        1-      Past Medical History:   Diagnosis Date    Aortic stenosis, mild 01/01/2021    Per echocardiogram 2021    Ascending aorta dilatation     Asthma     Benign prostatic hyperplasia     Bone cancer     Cellulitis of great toe of left foot 10/19/2018    CKD (chronic kidney disease)     Stage 3; followed by Dr. Vicky Duran     Diastolic dysfunction     GRADE II per echo 2018    Difficulty breathing     during exertion    Dyslipidemia     Erectile dysfunction     Fatigue     Heart murmur     History of blood transfusion     History of kidney stones     HL (hearing loss)     Hyperlipidemia     Hypertension     Hyponatremia     Kidney stone     Left ventricular hypertrophy     per echo 2018    Localized edema     Neuropathy     Obstructive sleep apnea     USING CPAP    Osteoarthritis     Peptic ulcer     Pneumonia     Pneumonia of left upper lobe due to infectious organism 03/15/2019    Prostate cancer     Status post prostatectomy, radiation therapy, and hormone therapy followed by Dr. Lee; metastatsis to bone    Pure hyperglyceridemia     Restless leg syndrome     Sepsis     Sinus bradycardia     Transient cerebral ischemia     Type 2 diabetes mellitus      Past Surgical History:   Procedure Laterality Date    BRONCHOSCOPY N/A 04/09/2018    Procedure: BRONCHOSCOPY;  Surgeon: Bijan Rivera III, MD;  Location: [facility];  Service: Cardiothoracic    COLONOSCOPY      DEEP NECK LYMPH NODE BIOPSY / EXCISION      HEMORRHOIDECTOMY      LYMPH NODE BIOPSY      MEDIASTINOSCOPY N/A 04/09/2018    Procedure: MEDIASTINOSCOPY WITH LYMPH NODE BIOPSY;  Surgeon: Bijan Rivera III, MD;  Location: [facility] OR;  Service: Cardiothoracic    OTHER SURGICAL HISTORY      ulcer repair    PROSTATECTOMY  2006     Social History     Socioeconomic History    Marital status:      Years of education: College   Tobacco Use    Smoking status: Former     Packs/day: 1.50     Years: 30.00     Additional pack years: 0.00     Total pack years: 45.00     Types: Cigarettes     Start date: 1968     Quit date: 1998     Years since quittin.9     Passive exposure: Past    Smokeless tobacco: Never   Vaping Use    Vaping Use: Never used   Substance and Sexual Activity    Alcohol use: Not Currently     Comment: Caffeine use: 2-3 cups daily    Drug use: Never    Sexual activity: Not Currently       Current Facility-Administered Medications   Medication Dose Route Frequency Provider Last Rate Last Admin    acetaminophen (TYLENOL) tablet 650 mg  650 mg Oral Q4H PRN Mariaa Austin APRN        Or    acetaminophen (TYLENOL) 160 MG/5ML oral solution 650 mg  650 mg Oral Q4H PRN Mariaa Austin APRN        Or    acetaminophen (TYLENOL) suppository 650 mg  650 mg Rectal Q4H PRN Mariaa Austin APRN        sennosides-docusate (PERICOLACE) 8.6-50 MG per tablet 2 tablet  2 tablet Oral BID Mariaa Autsin APRN        And    polyethylene glycol (MIRALAX) packet 17 g  17 g Oral Daily PRN Mariaa Austin APRN        And    bisacodyl (DULCOLAX) EC tablet 5 mg  5 mg Oral Daily PRN Mariaa Austin APRN        And    bisacodyl (DULCOLAX) suppository 10 mg  10 mg Rectal Daily PRN Mariaa Austin APRN        fentaNYL (DURAGESIC) 50 MCG/HR patch 1 patch  1 patch Transdermal Q72H Thomas Pak MD   1 patch at 23 1706    And    Check Fentanyl Patch Placement  1 each Does not apply Q12H Thomas Pak MD        cholecalciferol (VITAMIN D3) tablet 2,000 Units  2,000 Units Oral Daily Mariaa Austin APRN   2,000 Units at 23 1707    dextrose (D50W) (25 g/50 mL) IV injection 25 g  25 g Intravenous Q15 Min PRN Mariaa Austin APRN        dextrose (GLUTOSE) oral gel 15 g  15 g Oral Q15 Min PRN Mariaa Austin APRN        dilTIAZem CD (CARDIZEM CD) 24 hr capsule 120 mg  120 mg Oral Daily Mariaa Austin APRN    120 mg at 12/18/23 1708    gabapentin (NEURONTIN) capsule 900 mg  900 mg Oral Nightly Mariaa Austin APRN   900 mg at 12/18/23 2018    glucagon (GLUCAGEN) injection 1 mg  1 mg Intramuscular Q15 Min PRN Mariaa Austin APRN        insulin glargine (LANTUS, SEMGLEE) injection 15 Units  15 Units Subcutaneous Nightly Mariaa Austin APRN        Insulin Lispro (humaLOG) injection 2-9 Units  2-9 Units Subcutaneous 4x Daily AC & at Bedtime Mariaa Austin APRN   2 Units at 12/19/23 0839    levothyroxine (SYNTHROID, LEVOTHROID) tablet 176 mcg  176 mcg Oral Once per day on Mon Thu Mariaa Austin APRN   176 mcg at 12/18/23 1708    levothyroxine (SYNTHROID, LEVOTHROID) tablet 88 mcg  88 mcg Oral Once per day on Sun Tue Wed Fri Sat Mariaa Austin APRN        modafinil (PROVIGIL) tablet 200 mg  200 mg Oral Daily Mariaa Austin APRN   200 mg at 12/18/23 1708    nitroglycerin (NITROSTAT) SL tablet 0.4 mg  0.4 mg Sublingual Q5 Min PRN Mariaa Austin APRN        ondansetron (ZOFRAN) injection 4 mg  4 mg Intravenous Q6H PRN Mariaa Austin APRN        oxyCODONE (ROXICODONE) immediate release tablet 5 mg  5 mg Oral Q4H PRN Thomas Pak MD        pantoprazole (PROTONIX) injection 40 mg  40 mg Intravenous BID AC Mariaa Austin APRN   40 mg at 12/18/23 1714    pravastatin (PRAVACHOL) tablet 40 mg  40 mg Oral Nightly Mariaa Austin APRN   40 mg at 12/18/23 2018    rOPINIRole (REQUIP) tablet 2 mg  2 mg Oral Nightly Mariaa Austin APRN   2 mg at 12/18/23 2021    sodium chloride 0.9 % flush 10 mL  10 mL Intravenous Q12H Mariaa Austin APRN   10 mL at 12/18/23 2018    sodium chloride 0.9 % flush 10 mL  10 mL Intravenous PRN Mariaa Austin APRN        sodium chloride 0.9 % infusion 40 mL  40 mL Intravenous PRN Mariaa Austin APRN        sodium chloride 0.9 % infusion  30 mL/hr Intravenous Continuous PRN Nataliia Rivas MD 30 mL/hr at 12/19/23 1006 30 mL/hr at 12/19/23 1006    vitamin B-12 (CYANOCOBALAMIN) tablet 1,000 mcg   "1,000 mcg Oral Daily Mariaa Austin, APRN   1,000 mcg at 12/18/23 1714     Facility-Administered Medications Ordered in Other Encounters   Medication Dose Route Frequency Provider Last Rate Last Admin    lactated ringers infusion   Intravenous Continuous PRN Skyler Vazquez MD   New Bag at 12/19/23 1032    lidocaine (XYLOCAINE) 2% injection   Intravenous PRN Skyler Vazquez MD   40 mg at 12/19/23 1034    Propofol (DIPRIVAN) injection   Intravenous PRN Skyler Vazquez MD   100 mg at 12/19/23 1034     sodium chloride, 30 mL/hr, Last Rate: 30 mL/hr (12/19/23 1006)        acetaminophen **OR** acetaminophen **OR** acetaminophen    senna-docusate sodium **AND** polyethylene glycol **AND** bisacodyl **AND** bisacodyl    dextrose    dextrose    glucagon (human recombinant)    nitroglycerin    ondansetron    oxyCODONE    sodium chloride    sodium chloride    sodium chloride    Allergies   Allergen Reactions    Niacin Unknown - Low Severity    Statins Unknown - Low Severity     Attest that current medications reviewed  including but not limited to prescriptions, over-the counter, herbals and vitamin/mineral/dietary (nutritional) supplements for name, route of administration, type, dose and frequency and are current using all immediate resources available at time of dictation.      Intake/Output Summary (Last 24 hours) at 12/19/2023 1037  Last data filed at 12/18/2023 2240  Gross per 24 hour   Intake 1180.83 ml   Output --   Net 1180.83 ml       Physical Exam:    Diagnostics: Reviewed  /72 (BP Location: Left arm, Patient Position: Lying)   Pulse 59   Temp 98.5 °F (36.9 °C) (Oral)   Resp 20   Ht 165.1 cm (65\")   Wt 67.9 kg (149 lb 11.1 oz)   SpO2 98%   BMI 24.91 kg/m²     Constitutional:       Appearance: Chronically ill-appearing.   Pulmonary:      Effort: Pulmonary effort is normal.   Cardiovascular:      PMI at left midclavicular line. Normal rate.   Edema:     Peripheral edema " present.  Abdominal:      General: Bowel sounds are normal.      Palpations: Abdomen is soft.      Tenderness: There is no abdominal tenderness.   Neurological:      Mental Status: Alert and oriented to person, place, and time.   Psychiatric:         Mood and Affect: Mood and affect normal.         Speech: Speech normal.         Behavior: Behavior is cooperative.         Thought Content: Thought content normal.         Cognition and Memory: Cognition normal.         Judgment: Judgment normal.       Patient status: Disease state: Deteriorating despite treatments.  Functional status: Palliative Performance Scale Score: Performance 40% based on the following measures: Ambulation: Mainly in bed, Activity and Evidence of Disease: Unable to do any work, extensive evidence of disease, Self-Care: Mainly assistance required,  Intake: Normal or reduced, LOC: Full, drowsy or confusion   ECOG Status(4) Completely disabled, unable to carry out self-care.  Totally confined to bed or chair.  Nutritional status: Albumin 3.23 on 12/19/2023  Body mass index is 24.91 kg/m².      Family support: The patient receives support from his son..  Advance Directives: Advance Directive Status: Patient has advance directive, copy in chart   POA/Healthcare surrogate-sonCody.       Impression/Problem List:    Generalized weakness  Acute blood loss anemia     Gastric ulcer   Metastatic prostate cancer to bone    Chronic kidney disease stage III   Congestive heart failure   Obstructive sleep apnea   Sacral decubitus      Recommendations/Plan:  1. Provide support with goals of care  2. [facility] consult pending       2.  Palliative care encounter  I had a brief conversation with patient regarding goals of care. He has a fairly good understanding of his medical conditions, which we reviewed. He is aware he is not a  candidate for further active treatment of his malignancy per oncology. They have recommended hospice. He resided at home with his son  prior to admission and unfortunately his son woke him up and told him to call 911 because he couldn't care for him anymore at home. Apparently the son was having a chest pain at that time. He reports he has received an update and his son has gastric ulcers.   The requires assistance with ADLs and mainly sits in chair/bed. Of note, I did see this patient on previous admission and he is current with [facility] as of 7/28/2023.  At this time,  he will need assistance with discharge planning. I called patient son/Cody GRAHAM at 1451 and no answer.  I was unable to leave voicemail. I did speak with Mariaa VELARDE at 1447 and we discussed patient case.  It appears the patient son is in the hospital as well. I will plan to contact grandchildren tomorrow to support further goals of care conversation. Of note, the [facility] consult is pending until we can determine who will support patient with conversation. I spoke with SYD Dahl, Tata, RN and CCP.       Thank you for this consult and allowing us to participate in patient's plan of care. Palliative Care Team will continue to follow patient.     Time spent:30 minutes spent reviewing medical and medication records, assessing and examining patient, discussing with family, answering questions, providing some guidance about a plan and documentation of care, and coordinating care with other healthcare members, with > 50% time spent face to face.       SYD Everett  12/19/2023  10:37 EST

## 2023-12-19 NOTE — PROGRESS NOTES
CHIEF COMPLAINT: End-stage prostate cancer, anemia/thrombocytopenia    Interval history:  Patient transfused packed red blood cells with hemoglobin up to 8.9 today.  EGD today with small nonbleeding duodenal ulcers.  The patient has ongoing lower extremity weakness, stool and bladder incontinence.      HISTORY OF PRESENT ILLNESS:   This is an 83-year-old man follow-up Dr. Lee metastatic prostate cancer in our practice for metastatic prostate cancer who has had metastatic disease since 2018 with multiple sclerotic bone lesions and  at that time.  He has been treated with androgen deprivation therapy, palliative radiation to sites of bony disease/pain control.  He had progression of disease with increasing PSA and started Xtandi 9021 in addition to continuation of Lupron and Xgeva.  He had progression of disease and darolutamide 600 mg twice daily plus Xgeva plus Lupron initiated 4/17/2023.  The patient had progression of disease subsequently with worsening anemia, admission in September for leg weakness secondary to cord compression at T10 (nonsurgical candidate) treated with steroids and radiation.  The patient is not a candidate for further treatment actively of his prostate cancer and hospice has been recommended but so far not instituted.  He has pain of malignancy currently on fentanyl 50 mcg patch and Norco 10/325 as needed breakthrough pain available at home.  He has narcotic induced constipation.     The patient was brought to ER today by family who provide full-time care at home.  The patient has progressive lower extremity weakness x 2 weeks now with inability to stand.  He reports being incontinent of urine and stool.  He has a sacral decubitus ulcer from pressure and incontinence.  Labs drawn in the ER showed worsening counts with hemoglobin 5.8, platelets 41 with significant nucleated red blood cells.      Past Medical History, Past Surgical History, Social History, Family History have been  reviewed and are without significant changes except as mentioned.    Review of Systems   A comprehensive 14 point review of systems was performed and was negative except as mentioned.    Medications:  The current medication list was reviewed in the EMR    ALLERGIES:    Allergies   Allergen Reactions    Niacin Unknown - Low Severity    Statins Unknown - Low Severity       Objective      Vitals:    12/19/23 1110   BP: 103/68   Pulse:    Resp: 19   Temp:    SpO2:           Physical Exam    CONSTITUTIONAL: pleasant well-developed adult man  HEENT: no icterus, no thrush, moist membranes  LYMPH: no cervical or supraclavicular lad  CV: RRR, S1S2, no murmur  RESP: cta bilat, no wheezing, no rales  GI: soft, non-tender, no splenomegaly, +bs  MUSC: no edema,  NEURO: alert and oriented x3, significant bilateral lower extremity weakness, cannot move legs parallel to gravity can wiggle feet  PSYCH: normal mood depressed mood  Skin color pallor  Unchanged-12/19/2023  RECENT LABS:  Hematology Results from last 7 days   Lab Units 12/19/23  0746 12/19/23  0432 12/19/23  0059 12/18/23  1752 12/18/23  0844   WBC 10*3/mm3  --  3.88  --   --  4.71   HEMOGLOBIN g/dL 8.9* 8.8* 8.7*   < > 5.8*   HEMATOCRIT % 26.7* 25.3* 25.7*   < > 18.3*   PLATELETS 10*3/mm3  --  56*  --   --  41*    < > = values in this interval not displayed.     2  Lab Results   Component Value Date    GLUCOSE 155 (H) 12/19/2023    BUN 16 12/19/2023    CREATININE 0.61 (L) 12/19/2023    EGFRIFNONA 56 (L) 02/23/2022    BCR 26.2 (H) 12/19/2023    CO2 18.8 (L) 12/19/2023    CALCIUM 8.4 (L) 12/19/2023    ALBUMIN 3.2 (L) 12/19/2023    AST 22 12/19/2023    ALT 13 12/19/2023       Lab Results   Component Value Date    IRON 243 (H) 12/19/2023    TIBC 271 (L) 12/19/2023    FERRITIN 1,987.00 (H) 12/19/2023       Lab Results   Component Value Date    NCQBDLNA86 716 09/18/2023       Lab Results   Component Value Date    FOLATE 6.03 03/20/2023          Assessment & Plan   *Metastatic  castrate resistant prostate cancer  The patient has extensive bony metastases and cord compression at T10 previously evaluated not felt to be surgical candidate status post limited radiation and steroid therapy but progressive symptoms of lower extremity weakness and bowel/bladder incontinence  According to Dr. Lee's previous clinic notes the patient has disease progressive on treatment and because of the patient's performance status, he is not felt to be a candidate for further active treatment of his malignancy; hospice has been recommended although he has so far been under care of his family/home health     *Severe anemia/thrombocytopenia  Patient has recurrent anemia requiring transfusion support and worsening thrombocytopenia  Admitted with hemoglobin 5.8 and platelets 41  Differential shows significant nucleated red blood cells 13%, left shifted myeloid cells with metamyelocytes myelocytes all 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 with decreased production of red cells/platelets  12/19/2023-transfused with hemoglobin up to 8.9  12/19/2023-EGD with small nonbleeding duodenal ulcers likely NSAID induced     *Pain of malignancy  Pain appears controlled on fentanyl 50 mcg every 72 hour patch and oxycodone 5 mg every 4 hours as needed pain, gabapentin 900 mg nightly     *Cord compression with progressive symptoms  Patient previously evaluated by neurosurgery not felt to be candidate for decompression surgically  The patient was treated with limited radiation  D/c DEX with duodenal ulcerations     *Sacral pressure wound worsened by incontinence     Oncology plan/recommendations:  Transfusion support as needed  Suspect the patient's anemia/thrombocytopenia mostly related to bone marrow involvement of prostate cancer given the significant increase in nucleated red cells and left shifted myeloid cells/myelophthisic process as opposed to GI blood loss.  Continue pain control  with fentanyl 50 aurelio patch and oxycodone as needed  Dexamethasone DC'd given duodenal ulcerations  The patient is hospice appropriate                    12/19/2023      CC:

## 2023-12-19 NOTE — PLAN OF CARE
Goal Outcome Evaluation:  Plan of Care Reviewed With: patient   Pt met with palliative today. Requested to see the . Tolerated treatment well. No acute changes. WCTM the remainder of the nursing shift.

## 2023-12-19 NOTE — NURSING NOTE
12/19/23 1511   Wound 12/18/23 2249 Bilateral coccyx   Placement Date/Time: 12/18/23 2249   Present on Original Admission: Yes  Side: Bilateral  Location: coccyx   Pressure Injury Stage 3  (zhou gluteal (2 seperate wounds))   Base moist;pink;yellow   Periwound blanchable;intact;moist;redness   Periwound Temperature warm   Wound Length (cm) 1 cm   Wound Width (cm) 0.5 cm   Wound Surface Area (cm^2) 0.5 cm^2   Drainage Amount none   Care, Wound barrier applied   Skin Interventions   Pressure Reduction Devices pressure-redistributing mattress utilized  (I have ordered pt a low air loss mattress)   Pressure Reduction Techniques heels elevated off bed;positioned off wounds     CWON note: pt seen for evaluation of sacral skin issues POA, appear to be a combination of pressure and MASD. Per pt report, he has been having a lot of loose stools, so for now we can use barrier cream with an ABD pad tucked in place to keep the cream on. Once stooling has subsided, we can begin Opticel ag and Optifoam dressings. I have ordered pt a low air loss mattress from [employer], confirmation #[id number], to aid in control of microclimate and pressure redistribution. Wound care and prevention standing orders have been placed into [facility]. Please re-consult for any additional needs.

## 2023-12-19 NOTE — ANESTHESIA PREPROCEDURE EVALUATION
Anesthesia Evaluation                  Airway   Mallampati: I  TM distance: >3 FB  Neck ROM: full  No difficulty expected  Dental - normal exam     Pulmonary - normal exam   (+) asthma,sleep apnea  (-) pneumonia  Cardiovascular - normal exam    (+) hypertension, valvular problems/murmurs AS, hyperlipidemia      Neuro/Psych  GI/Hepatic/Renal/Endo    (+) renal disease- CRI, diabetes mellitus    Musculoskeletal     Abdominal  - normal exam    Bowel sounds: normal.   Substance History      OB/GYN          Other   arthritis,                 Anesthesia Plan    ASA 3     MAC     intravenous induction     Anesthetic plan, risks, benefits, and alternatives have been provided, discussed and informed consent has been obtained with: patient.  Pre-procedure education provided    CODE STATUS:    Medical Intervention Limits: NO intubation (DNI)  Level Of Support Discussed With: Patient  Code Status (Patient has no pulse and is not breathing): No CPR (Do Not Attempt to Resuscitate)  Medical Interventions (Patient has pulse or is breathing): Limited Support

## 2023-12-19 NOTE — SIGNIFICANT NOTE
12/19/23 0941   OTHER   Discipline occupational therapist   Rehab Time/Intention   Session Not Performed patient unavailable for evaluation  (Patient off the floor for endoscopy, will follow up at next available service date)   Therapy Assessment/Plan (PT)   Criteria for Skilled Interventions Met (PT) yes   Recommendation   OT - Next Appointment 12/20/23

## 2023-12-19 NOTE — ANESTHESIA POSTPROCEDURE EVALUATION
Patient: Semaj Herrera    Procedure Summary       Date: 12/19/23 [facility] / [facility]:  [facility]    Anesthesia Start: 1032 Anesthesia Stop: 1053    Procedure: ESOPHAGOGASTRODUODENOSCOPY (Esophagus) Diagnosis:       Anemia, chronic disease      (Anemia, chronic disease [D63.8])    Surgeons: Nataliia Rivas MD Provider: Skyler Vazquez MD    Anesthesia Type: MAC ASA Status: 3            Anesthesia Type: MAC    Vitals  Vitals Value Taken Time   /68 12/19/23 1111   Temp     Pulse 56 12/19/23 1113   Resp 19 12/19/23 1110   SpO2 99 % 12/19/23 1113   Vitals shown include unfiled device data.        Post Anesthesia Care and Evaluation    Patient location during evaluation: bedside  Patient participation: complete - patient participated  Level of consciousness: awake  Pain management: adequate    Airway patency: patent  Anesthetic complications: No anesthetic complications  PONV Status: none  Cardiovascular status: acceptable  Respiratory status: acceptable  Hydration status: acceptable  Post Neuraxial Block status: Motor and sensory function returned to baseline

## 2023-12-19 NOTE — PROGRESS NOTES
Name: Semaj Herrera ADMIT: 2023   : 1940  PCP: Axel Guardado MD    MRN: [id number] LOS: 1 days   AGE/SEX: 83 y.o. male  ROOM: [facility]     Subjective   Subjective   Back from EGD which showed inflamed, texture changed mucosa in the esophagus as well as nonobstructing nonbleeding duodenal ulcers likely NSAID induced.  He denies any chest pain, dyspnea, cough, fever or chills.  Denies any nausea, vomiting or abdominal pain.  Has eaten a good lunch.  He is a little tearful about further goals of care discussions.      Objective   Objective   Vital Signs  Temp:  [98.1 °F (36.7 °C)-99 °F (37.2 °C)] 98.5 °F (36.9 °C)  Heart Rate:  [] 49  Resp:  [16-20] 19  BP: (103-180)/(47-72) 103/68  SpO2:  [98 %-100 %] 100 %  on  Flow (L/min):  [4] 4;   Device (Oxygen Therapy): room air  Body mass index is 24.91 kg/m².    Physical Exam  Vitals and nursing note reviewed.   Constitutional:       Appearance: He is ill-appearing. He is not toxic-appearing.   Cardiovascular:      Rate and Rhythm: Normal rate and regular rhythm.      Pulses: Normal pulses.   Pulmonary:      Effort: Pulmonary effort is normal. No respiratory distress.      Comments: Diminished and on room air  Abdominal:      General: Bowel sounds are normal. There is no distension.      Palpations: Abdomen is soft.      Tenderness: There is no abdominal tenderness.   Musculoskeletal:         General: Swelling (Mild bilateral lower extremities) present. Normal range of motion.      Cervical back: Normal range of motion and neck supple.   Skin:     General: Skin is warm and dry.      Coloration: Skin is pale.      Findings: No bruising.   Neurological:      Mental Status: He is alert and oriented to person, place, and time.      Sensory: No sensory deficit.      Motor: Weakness present.      Coordination: Coordination normal.   Psychiatric:         Mood and Affect: Mood normal.         Behavior: Behavior normal.    Results Review:       I reviewed the  patient's new clinical results.  Results from last 7 days   Lab Units 12/19/23  0746 12/19/23  0432 12/19/23  0059 12/18/23  1752 12/18/23  0844   WBC 10*3/mm3  --  3.88  --   --  4.71   HEMOGLOBIN g/dL 8.9* 8.8* 8.7* 7.3* 5.8*   PLATELETS 10*3/mm3  --  56*  --   --  41*     Results from last 7 days   Lab Units 12/19/23  0432 12/18/23  0844   SODIUM mmol/L 137 141   POTASSIUM mmol/L 3.9 4.1   CHLORIDE mmol/L 105 106   CO2 mmol/L 18.8* 21.5*   BUN mg/dL 16 19   CREATININE mg/dL 0.61* 0.60*   GLUCOSE mg/dL 155* 82   Estimated Creatinine Clearance: 88.1 mL/min (A) (by C-G formula based on SCr of 0.61 mg/dL (L)).  Results from last 7 days   Lab Units 12/19/23  0432 12/18/23  0844   ALBUMIN g/dL 3.2* 3.6   BILIRUBIN mg/dL 1.5* 0.9   ALK PHOS U/L 163* 167*   AST (SGOT) U/L 22 18   ALT (SGPT) U/L 13 13     Results from last 7 days   Lab Units 12/19/23  0432 12/18/23  0844   CALCIUM mg/dL 8.4* 8.6   ALBUMIN g/dL 3.2* 3.6       Glucose   Date/Time Value Ref Range Status   12/19/2023 1147 111 70 - 130 mg/dL Final   12/19/2023 0640 176 (H) 70 - 130 mg/dL Final   12/18/2023 2101 148 (H) 70 - 130 mg/dL Final   12/18/2023 1728 137 (H) 70 - 130 mg/dL Final       fentaNYL, 1 patch, Transdermal, Q72H   And  Check Fentanyl Patch Placement, 1 each, Does not apply, Q12H  cholecalciferol, 2,000 Units, Oral, Daily  diatrizoate meglumine-sodium, 30 mL, Oral, Once  dilTIAZem CD, 120 mg, Oral, Daily  gabapentin, 900 mg, Oral, Nightly  insulin glargine, 15 Units, Subcutaneous, Nightly  insulin lispro, 2-9 Units, Subcutaneous, 4x Daily AC & at Bedtime  levothyroxine, 176 mcg, Oral, Once per day on Mon Thu  levothyroxine, 88 mcg, Oral, Once per day on Sun Tue Wed Fri Sat  modafinil, 200 mg, Oral, Daily  pantoprazole, 40 mg, Intravenous, BID AC  pravastatin, 40 mg, Oral, Nightly  rOPINIRole, 2 mg, Oral, Nightly  senna-docusate sodium, 2 tablet, Oral, BID  sodium chloride, 10 mL, Intravenous, Q12H  vitamin B-12, 1,000 mcg, Oral,  Daily      sodium chloride, 30 mL/hr, Last Rate: Stopped (12/19/23 1107)    Diet: Cardiac Diets; Healthy Heart (2-3 Na+); Texture: Regular Texture (IDDSI 7); Fluid Consistency: Thin (IDDSI 0)       Assessment/Plan     Active Hospital Problems    Diagnosis  POA    **Generalized weakness [R53.1]  Yes    Acute blood loss anemia [D62]  Yes    Anemia, chronic disease [D63.8]  Yes    Class 2 severe obesity due to excess calories with serious comorbidity and body mass index (BMI) of 35.0 to 35.9 in adult [E66.01, Z68.35]  Not Applicable    Restless legs syndrome (RLS) [G25.81]  Yes    Diastolic dysfunction [I51.89]  Yes    Neuropathy [G62.9]  Yes    Prostate cancer metastatic to bone [C61, 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     Mr. Herrera is a 83 y.o. male that presented to the hospital with generalized weakness.  He was living at home with his son who was providing 24/7 assistance via hospital bed and [employer] lift.  His son fell ill and he called 911 to have his father taken to the hospital for further care as he is unable to be cared for at home by himself.  He does have known metastatic prostate cancer with recommendations for hospice in the past, but hospice has not been initiated at this time.  He was noted to have a hemoglobin of 5.8 as well as platelets in the 40s prompting further admission.     Generalized weakness  -Multifactorial secondary to metastatic cancer, advanced age, ongoing immobility as well as anemia.  -PT/OT to see.  -Will attempt to optimize current clinical status.     Acute blood loss anemia  Anemia of chronic disease  -Apparently was heme positive in the emergency room.   -S/P  EGD 12/18 which showed inflamed, texture changed mucosa in the esophagus as well as nonobstructing nonbleeding duodenal ulcers likely NSAID induced  -S/P 2 units PRBC and platelets on 12/18- monitor CBC.  -Continue PPI.     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  -They agree with hospice consult at this time and do not feel the need for ongoing Decadron.  Will hold this for now.  -Blood transfusions as above with further recommendations per heme..  -Continue home pain medications including fentanyl patch and as needed oxycodone.  -Palliative to see.  -Continue his gabapentin and Requip.     CKD 3  Hypertension  -Follows with Dr. Vicky Duran.  -Renal function stable.  Will monitor labs.  -Continue blood pressure medications including diltiazem.     DM2  -Typically uses an insulin pump at home.  However his son is usually the one that manages this.  -Use sliding scale insulin with Lantus 15 units nightly.  -Titrate insulin as needed.     Diastolic dysfunction  Mild aortic stenosis  -Established with Dr. Lee.  -Appears fairly stable.     RONNY  -Home CPAP if available.  Otherwise avoid oversedation and use oxygen as needed.     I discussed the patients findings and my recommendations with patient and nursing staff.    Again discussed CODE STATUS and goals of care today.  Patient tearful and stating he would like to meet with his family as well as the .  He is agreeable to SNF at discharge.  Would likely be a good candidate for hospice.      VTE Prophylaxis - SCDs.  Code Status - No CPR/intubation.  Discussed with patient.  Disposition - likely to SNF once placed. Awaiting hospice/palliative discussions.    SYD Oswald  [facility]  12/19/23  13:30 EST

## 2023-12-19 NOTE — SIGNIFICANT NOTE
12/19/23 1321   OTHER   Discipline physical therapist   Rehab Time/Intention   Session Not Performed other (see comments)  (Pt RENA for procedure this AM, chart indicates possible palliative consult. PT will follow up 12/20)   Recommendation   PT - Next Appointment 12/20/23

## 2023-12-20 NOTE — PLAN OF CARE
Goal Outcome Evaluation:  Plan of Care Reviewed With: patient           Outcome Evaluation: Patient is an 83 year old male admitted to [facility] with generalized weakness and difficulty caring for self at home found to have acute anemia, GI bleed. Patient also has PMH with prostate cancer with mets to bone, and reports the doctor told him he has about 6 weeks to live. Patient lives with son who is primary caregiver, but is also currently ill and hospitalized and per ER notes the son feels he can no longer care for patient with current decline. Patient reports that he stays on couch all the time, and if he gets up to toilet his son lifts him up and sits him on the toilet, and son performs all self care. Patient reports general decline in functioning over past month, and unable to stand/weight bear. Patient performed bed mobility today with min/mod A with max verbal cues to get to EOB, able to sit at EOB unsupported with SBA to intermittent min A to maintain static sitting balance. Patient unable to clear bottom from EOB with 2 attempts to stand with max Ax2. Patient will benefit from skilled OT to address current functional deficits with generalized decline, hospice eval pending which could impact current OT POC.      Anticipated Discharge Disposition (OT): skilled nursing facility, extended care facility

## 2023-12-20 NOTE — PROGRESS NOTES
CHIEF COMPLAINT: End-stage prostate cancer, anemia/thrombocytopenia    Interval history:  Patient transfused packed red blood cells with hemoglobin up to 8.9 12/19  EGD 12/19 with small nonbleeding duodenal ulcers.  The patient has ongoing lower extremity weakness, stool and bladder incontinence.  The patient states in general he is feeling okay today.  He denies bleeding.  Pain is controlled.  He is eating breakfast.  Son who was taking care of him before admission admitted with gastric ulcers.      HISTORY OF PRESENT ILLNESS:   This is an 83-year-old man follow-up Dr. Lee metastatic prostate cancer in our practice for metastatic prostate cancer who has had metastatic disease since 2018 with multiple sclerotic bone lesions and  at that time.  He has been treated with androgen deprivation therapy, palliative radiation to sites of bony disease/pain control.  He had progression of disease with increasing PSA and started Xtandi 9021 in addition to continuation of Lupron and Xgeva.  He had progression of disease and darolutamide 600 mg twice daily plus Xgeva plus Lupron initiated 4/17/2023.  The patient had progression of disease subsequently with worsening anemia, admission in September for leg weakness secondary to cord compression at T10 (nonsurgical candidate) treated with steroids and radiation.  The patient is not a candidate for further treatment actively of his prostate cancer and hospice has been recommended but so far not instituted.  He has pain of malignancy currently on fentanyl 50 mcg patch and Norco 10/325 as needed breakthrough pain available at home.  He has narcotic induced constipation.     The patient was brought to ER today by family who provide full-time care at home.  The patient has progressive lower extremity weakness x 2 weeks now with inability to stand.  He reports being incontinent of urine and stool.  He has a sacral decubitus ulcer from pressure and incontinence.  Labs drawn in  the ER showed worsening counts with hemoglobin 5.8, platelets 41 with significant nucleated red blood cells.      Past Medical History, Past Surgical History, Social History, Family History have been reviewed and are without significant changes except as mentioned.    Review of Systems   A comprehensive 14 point review of systems was performed and was negative except as mentioned.    Medications:  The current medication list was reviewed in the EMR    ALLERGIES:    Allergies   Allergen Reactions    Niacin Unknown - Low Severity    Statins Unknown - Low Severity       Objective      Vitals:    12/20/23 0658   BP: 129/77   Pulse:    Resp: 18   Temp: 98.3 °F (36.8 °C)   SpO2:           Physical Exam    CONSTITUTIONAL: pleasant well-developed adult man sitting up in bed eating breakfast  HEENT: no icterus, no thrush, moist membranes  LYMPH: no cervical or supraclavicular lad  CV: RRR, S1S2, no murmur  RESP: cta bilat, no wheezing, no rales  GI: soft, non-tender, no splenomegaly, +bs  MUSC: no edema,  NEURO: alert and oriented x3, significant bilateral lower extremity weakness, cannot move legs parallel to gravity can wiggle feet  PSYCH: Dysphoric mood     RECENT LABS:  Hematology Results from last 7 days   Lab Units 12/19/23  0746 12/19/23  0432 12/19/23  0059 12/18/23  1752 12/18/23  0844   WBC 10*3/mm3  --  3.88  --   --  4.71   HEMOGLOBIN g/dL 8.9* 8.8* 8.7*   < > 5.8*   HEMATOCRIT % 26.7* 25.3* 25.7*   < > 18.3*   PLATELETS 10*3/mm3  --  56*  --   --  41*    < > = values in this interval not displayed.     2  Lab Results   Component Value Date    GLUCOSE 155 (H) 12/19/2023    BUN 16 12/19/2023    CREATININE 0.61 (L) 12/19/2023    EGFRIFNONA 56 (L) 02/23/2022    BCR 26.2 (H) 12/19/2023    CO2 18.8 (L) 12/19/2023    CALCIUM 8.4 (L) 12/19/2023    ALBUMIN 3.2 (L) 12/19/2023    AST 22 12/19/2023    ALT 13 12/19/2023       Lab Results   Component Value Date    IRON 243 (H) 12/19/2023    TIBC 271 (L) 12/19/2023    FERRITIN  1,987.00 (H) 12/19/2023       Lab Results   Component Value Date    RRNVETID39 716 09/18/2023       Lab Results   Component Value Date    FOLATE 6.03 03/20/2023          Assessment & Plan   *Metastatic castrate resistant prostate cancer  The patient has extensive bony metastases and cord compression at T10 previously evaluated not felt to be surgical candidate status post limited radiation and steroid therapy but progressive symptoms of lower extremity weakness and bowel/bladder incontinence  According to Dr. 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 with decreased production of red cells/platelets  12/19/2023-transfused with hemoglobin up to 8.9  12/19/2023-EGD with small nonbleeding duodenal ulcers likely NSAID induced     *Pain of malignancy  Pain appears controlled on fentanyl 50 mcg every 72 hour patch and oxycodone 5 mg every 4 hours as needed pain, gabapentin 900 mg nightly     *Cord compression with progressive symptoms  Patient previously evaluated by neurosurgery not felt to be candidate for decompression surgically  The patient was treated with limited radiation  D/c DEX with duodenal ulcerations     *Sacral pressure wound worsened by incontinence     Oncology plan/recommendations:  Transfusion support as needed  Suspect the patient's anemia/thrombocytopenia mostly related to bone marrow involvement of prostate cancer given the significant increase in nucleated red  cells and left shifted myeloid cells/myelophthisic process as opposed to GI blood loss.  Continue pain control with fentanyl 50 aurelio patch and oxycodone as needed; continue Neurontin  Dexamethasone DC'd given duodenal ulcerations  The patient is hospice appropriate but if he wants to continue transfusions for follow-up myelophthisic anemia/thrombocytopenia I am not sure would be allowed per hospice?                    12/20/2023      CC:

## 2023-12-20 NOTE — THERAPY EVALUATION
Patient Name: Semaj Herrera  : 1940    MRN: [id number]                              Today's Date: 2023       Admit Date: 2023    Visit Dx:     ICD-10-CM ICD-9-CM   1. Acute blood loss anemia  D62 285.1   2. Gastrointestinal hemorrhage, unspecified gastrointestinal hemorrhage type  K92.2 578.9   3. Weakness  R53.1 780.79   4. Prostate cancer metastatic to bone  C61 185    C79.51 198.5   5. Anemia, chronic disease  D63.8 285.29     Patient Active Problem List   Diagnosis    Type 2 diabetes mellitus with kidney complication, with long-term current use of insulin    Generalized weakness    Hyperlipidemia    Hypertension    Left ventricular hypertrophy    Obstructive sleep apnea syndrome treated auto BiPAP    Sinus bradycardia    Prostate cancer metastatic to bone    Mediastinal adenopathy    Malignant neoplasm metastatic to bone    Chronic kidney disease, stage III (moderate)    Diastolic dysfunction    Localized edema    Neuropathy    Hypersomnia due to medical condition treated with modafinil 200 mg every morning    CSA (central sleep apnea)    Restless legs syndrome (RLS)    Psychophysiological insomnia    Edema    Type 2 diabetes mellitus with diabetic chronic kidney disease    Class 2 severe obesity due to excess calories with serious comorbidity and body mass index (BMI) of 35.0 to 35.9 in adult    Aortic stenosis, mild    Ascending aorta dilatation    Fecal impaction in rectum    Lower extremity weakness    Anemia, chronic disease    Metastatic cancer to bone    Moderate malnutrition    Acute blood loss anemia     Past Medical History:   Diagnosis Date    Aortic stenosis, mild 2021    Per echocardiogram     Ascending aorta dilatation     Asthma     Benign prostatic hyperplasia     Bone cancer     Cellulitis of great toe of left foot 10/19/2018    CKD (chronic kidney disease)     Stage 3; followed by Dr. Vicky Duran     Diastolic dysfunction     GRADE II per echo 2018    Difficulty  breathing     during exertion    Dyslipidemia     Erectile dysfunction     Fatigue     Heart murmur     History of blood transfusion     History of kidney stones     HL (hearing loss)     Hyperlipidemia     Hypertension     Hyponatremia     Kidney stone     Left ventricular hypertrophy     per echo 2018    Localized edema     Neuropathy     Obstructive sleep apnea     USING CPAP    Osteoarthritis     Peptic ulcer     Pneumonia     Pneumonia of left upper lobe due to infectious organism 03/15/2019    Prostate cancer     Status post prostatectomy, radiation therapy, and hormone therapy followed by Dr. Lee; metastatsis to bone    Pure hyperglyceridemia     Restless leg syndrome     Sepsis     Sinus bradycardia     Transient cerebral ischemia     Type 2 diabetes mellitus      Past Surgical History:   Procedure Laterality Date    BRONCHOSCOPY N/A 04/09/2018    Procedure: BRONCHOSCOPY;  Surgeon: Bijan Rivera III, MD;  Location: [facility] OR;  Service: Cardiothoracic    COLONOSCOPY      DEEP NECK LYMPH NODE BIOPSY / EXCISION      ENDOSCOPY N/A 12/19/2023    Procedure: ESOPHAGOGASTRODUODENOSCOPY;  Surgeon: Nataliia Rivas MD;  Location: [facility] ENDOSCOPY;  Service: Gastroenterology;  Laterality: N/A;  PRE OP - ANEMIA, heme positive stool  POST OP - DUODENAL ULCERS, ABNORMAL ESOPHAGEAL MUCOSA    HEMORRHOIDECTOMY      LYMPH NODE BIOPSY      MEDIASTINOSCOPY N/A 04/09/2018    Procedure: MEDIASTINOSCOPY WITH LYMPH NODE BIOPSY;  Surgeon: Bijan Rivera III, MD;  Location: [facility] OR;  Service: Cardiothoracic    OTHER SURGICAL HISTORY      ulcer repair    PROSTATECTOMY  2006      General Information       Row Name 12/20/23 2738          Physical Therapy Time and Intention    Document Type evaluation  -CW     Mode of Treatment co-treatment;physical therapy;occupational therapy;other (see comments)  -CW       Row Name 12/20/23 3291          General Information    Patient Profile Reviewed yes  -CW     Prior Level of  Function max assist:;transfer;bed mobility  pt reports his son assists with all mobility. Per pt, he has not stood and walked since september and his son picks him up to put him on the commode  -CW     Existing Precautions/Restrictions fall  -CW     Barriers to Rehab medically complex;previous functional deficit  -CW       Row Name 12/20/23 1456          Living Environment    People in Home child(jovanni), adult;other (see comments)  per chart, son with current health concerns  -CW       Row Name 12/20/23 0049          Cognition    Orientation Status (Cognition) oriented x 3  -CW       Row Name 12/20/23 1458          Safety Issues, Functional Mobility    Safety Issues Affecting Function (Mobility) insight into deficits/self-awareness  -CW     Impairments Affecting Function (Mobility) balance;endurance/activity tolerance;strength  -CW     Comment, Safety Issues/Impairments (Mobility) Co treatment medically appropriate and necessary due to patient acuity level, activity tolerance and safety of patient and staff. Evaluation established to achieve all goals in POC.  -CW               User Key  (r) = Recorded By, (t) = Taken By, (c) = Cosigned By      Initials Name Provider Type    CW Melva Patel, PT Physical Therapist                   Mobility       Row Name 12/20/23 5546          Bed Mobility    Bed Mobility scooting/bridging;supine-sit;sit-supine  -CW     Scooting/Bridging [place] (Bed Mobility) maximum assist (25% patient effort);2 person assist  -CW     Supine-Sit [place] (Bed Mobility) minimum assist (75% patient effort);moderate assist (50% patient effort);1 person assist;verbal cues  -CW     Sit-Supine [place] (Bed Mobility) maximum assist (25% patient effort);verbal cues  -CW     Comment, (Bed Mobility) increased assist to return to supine as pt sliding forward at EOB after standing attempts  -CW       Row Name 12/20/23 9415          Bed-Chair Transfer    Bed-Chair [place] (Transfers)  unable to assess  -CW       Row Name 12/20/23 1458          Sit-Stand Transfer    Sit-Stand [place] (Transfers) 2 person assist;dependent (less than 25% patient effort);maximum assist (25% patient effort);verbal cues  -CW     Comment, (Sit-Stand Transfer) Attempted twice, pt unable to clear bottom from EOB with B feet and knees blocked  -CW       Row Name 12/20/23 1458          Gait/Stairs (Locomotion)    [place] Level (Gait) unable to assess  -CW               User Key  (r) = Recorded By, (t) = Taken By, (c) = Cosigned By      Initials Name Provider Type    Melva Ace PT Physical Therapist                   Obj/Interventions       Row Name 12/20/23 1500          Strength Comprehensive (MMT)    Comment, General Manual Muscle Testing (MMT) Assessment Generalized weakness, BLE grossly 2+/5  -CW       Row Name 12/20/23 1500          Balance    Balance Assessment sitting static balance;sitting dynamic balance;standing static balance;standing dynamic balance  -CW     Static Sitting Balance contact guard  -CW     Dynamic Sitting Balance minimal assist  -CW     Position, Sitting Balance unsupported;sitting edge of bed  -CW     Static Standing Balance dependent;2-person assist  -CW               User Key  (r) = Recorded By, (t) = Taken By, (c) = Cosigned By      Initials Name Provider Type    Melva Ace PT Physical Therapist                   Goals/Plan       Row Name 12/20/23 1517          Bed Mobility Goal 1 (PT)    Activity/Assistive Device (Bed Mobility Goal 1, PT) bed mobility activities, all  -CW     [place] Level/Cues Needed (Bed Mobility Goal 1, PT) contact guard required  -CW     Time Frame (Bed Mobility Goal 1, PT) 2 weeks  -CW       Row Name 12/20/23 1517          Transfer Goal 1 (PT)    Activity/Assistive Device (Transfer Goal 1, PT) sit-to-stand/stand-to-sit;bed-to-chair/chair-to-bed  -CW     [place] Level/Cues Needed (Transfer Goal 1, PT) moderate assist (50-74%  patient effort)  -CW     Time Frame (Transfer Goal 1, PT) 2 weeks  -CW       Row Name 12/20/23 151          Gait Training Goal 1 (PT)    Activity/Assistive Device (Gait Training Goal 1, PT) gait (walking locomotion)  -CW     [place] Level (Gait Training Goal 1, PT) moderate assist (50-74% patient effort)  -CW     Distance (Gait Training Goal 1, PT) 5'  -CW     Time Frame (Gait Training Goal 1, PT) 2 weeks  -CW       Row Name 12/20/23 1516          Therapy Assessment/Plan (PT)    Planned Therapy Interventions (PT) balance training;bed mobility training;gait training;ROM (range of motion);strengthening;patient/family education;transfer training  -CW               User Key  (r) = Recorded By, (t) = Taken By, (c) = Cosigned By      Initials Name Provider Type    CW Melva Patel, PT Physical Therapist                   Clinical Impression       Row Name 12/20/23 1504          Pain    Pretreatment Pain Rating 0/10 - no pain  -CW     Posttreatment Pain Rating 0/10 - no pain  -CW     Pain Intervention(s) Repositioned;Rest  -CW       Row Name 12/20/23 1506          Plan of Care Review    Plan of Care Reviewed With patient  -CW     Outcome Evaluation Pt is an 82 yo male admitted with generalized weakness and son difficulty caring for pt. Workup for acute anemia, GI bleed. Hx of prostate cancer with mets to the bone with pt reporting he was recently told he only has 6 weeks to live. Pt lives with his son who is his primary caregiver and has required max assist for functional mobility at home. Pt reports he has not ambulated since September and has generally declined functionally over the last few months. Of note, pt's son is currently dealing with his own health issues and may not be able to care for him any longer. Pt is very pleasant, motivated to improve his mobility and becomes tearful multiple times during session. Today he completes supine>sit with min/mod A and tolerates sitting EOB with cga to sba. Attempted  standing twice with max A x2 but unable to clear bottom from EOB this date. Pt began sliding at EOB and required max A for return to supine. Pt may benefit from skilled PT services to address strength, balance and endurance deficits. Recommending d/c to SNF with likely transition to LTC.  -CW       Row Name 12/20/23 1501          Therapy Assessment/Plan (PT)    Rehab Potential (PT) fair, will monitor progress closely  -CW     Criteria for Skilled Interventions Met (PT) yes  -CW     Therapy Frequency (PT) 3 times/wk  -CW       Row Name 12/20/23 1501          Vital Signs    O2 Delivery Pre Treatment room air  -CW       Row Name 12/20/23 1501          Positioning and Restraints    Pre-Treatment Position in bed  -CW     Post Treatment Position bed  -CW     In Bed notified nsg;call light within reach;encouraged to call for assist;exit alarm on;supine;side rails up x3  -CW               User Key  (r) = Recorded By, (t) = Taken By, (c) = Cosigned By      Initials Name Provider Type    Melva Ace, PT Physical Therapist                   Outcome Measures       Row Name 12/20/23 1518 12/20/23 1408       How much help from another person do you currently need...    Turning from your back to your side while in flat bed without using bedrails? 2  -CW 2  -RF    Moving from lying on back to sitting on the side of a flat bed without bedrails? 2  -CW 2  -RF    Moving to and from a bed to a chair (including a wheelchair)? 1  -CW 2  -RF    Standing up from a chair using your arms (e.g., wheelchair, bedside chair)? 1  -CW 1  -RF    Climbing 3-5 steps with a railing? 1  -CW 1  -RF    To walk in hospital room? 1  -CW 1  -RF    AM-PAC 6 Clicks Score (PT) 8  -CW 9  -RF    Highest Level of Mobility Goal 3 --> Sit at edge of bed  -CW 3 --> Sit at edge of bed  -RF      Row Name 12/20/23 0800          How much help from another person do you currently need...    Turning from your back to your side while in flat bed without using  bedrails? 2  -RF     Moving from lying on back to sitting on the side of a flat bed without bedrails? 2  -RF     Moving to and from a bed to a chair (including a wheelchair)? 2  -RF     Standing up from a chair using your arms (e.g., wheelchair, bedside chair)? 1  -RF     Climbing 3-5 steps with a railing? 1  -RF     To walk in hospital room? 1  -RF     AM-PAC 6 Clicks Score (PT) 9  -RF     Highest Level of Mobility Goal 3 --> Sit at edge of bed  -RF       Row Name 12/20/23 1518 12/20/23 1443       Functional Assessment    Outcome Measure Options AM-PAC 6 Clicks Basic Mobility (PT)  - AM-PAC 6 Clicks Daily Activity (OT)  -              User Key  (r) = Recorded By, (t) = Taken By, (c) = Cosigned By      Initials Name Provider Type    CW Melva Patel, PT Physical Therapist    RF Tata Wilson, RN Registered Nurse     Tracey Guillen, OT Occupational Therapist                                 Physical Therapy Education       Title: PT OT SLP Therapies (In Progress)       Topic: Physical Therapy (In Progress)       Point: Mobility training (Done)       Learning Progress Summary             Patient Acceptance, E, VU,NR by  at 12/20/2023 1519                         Point: Home exercise program (Not Started)       Learner Progress:  Not documented in this visit.              Point: Body mechanics (Not Started)       Learner Progress:  Not documented in this visit.              Point: Precautions (Not Started)       Learner Progress:  Not documented in this visit.                              User Key       Initials Effective Dates Name Provider Type Discipline     12/13/22 -  Melva Patel, CAYLA Physical Therapist PT                  PT Recommendation and Plan  Planned Therapy Interventions (PT): balance training, bed mobility training, gait training, ROM (range of motion), strengthening, patient/family education, transfer training  Plan of Care Reviewed With: patient  Outcome Evaluation: Pt is an 82 yo  male admitted with generalized weakness and son difficulty caring for pt. Workup for acute anemia, GI bleed. Hx of prostate cancer with mets to the bone with pt reporting he was recently told he only has 6 weeks to live. Pt lives with his son who is his primary caregiver and has required max assist for functional mobility at home. Pt reports he has not ambulated since September and has generally declined functionally over the last few months. Of note, pt's son is currently dealing with his own health issues and may not be able to care for him any longer. Pt is very pleasant, motivated to improve his mobility and becomes tearful multiple times during session. Today he completes supine>sit with min/mod A and tolerates sitting EOB with cga to sba. Attempted standing twice with max A x2 but unable to clear bottom from EOB this date. Pt began sliding at EOB and required max A for return to supine. Pt may benefit from skilled PT services to address strength, balance and endurance deficits. Recommending d/c to SNF with likely transition to LTC.     Time Calculation:         PT Charges       Row Name 12/20/23 1455             Time Calculation    Start Time 0924  -CW      Stop Time 0952  -CW      Time Calculation (min) 28 min  -CW      PT Received On 12/20/23  -CW      PT - Next Appointment 12/22/23  -CW      PT Goal Re-Cert Due Date 01/03/24  -CW         Time Calculation- PT    Total Timed Code Minutes- PT 15 minute(s)  -CW         Timed Charges    74304 - PT Therapeutic Activity Minutes 15  -CW         Total Minutes    Timed Charges Total Minutes 15  -CW       Total Minutes 15  -CW                User Key  (r) = Recorded By, (t) = Taken By, (c) = Cosigned By      Initials Name Provider Type    CW Melva Patel PT Physical Therapist                  Therapy Charges for Today       Code Description Service Date Service Provider Modifiers Qty    32315112023 HC PT EVAL MOD COMPLEXITY 3 12/20/2023 Melva Patel, CAYLA GP  1    [id number]  PT THERAPEUTIC ACT EA 15 MIN 12/20/2023 Melva Patel, PT GP 1            PT G-Codes  Outcome Measure Options: AM-PAC 6 Clicks Basic Mobility (PT)  AM-PAC 6 Clicks Score (PT): 8  AM-PAC 6 Clicks Score (OT): 10  PT Discharge Summary  Anticipated Discharge Disposition (PT): extended care facility, skilled nursing facility    Melva Patel, CAYLA  12/20/2023

## 2023-12-20 NOTE — PROGRESS NOTES
.            [facility]    Palliative Care Daily Progress Note   Chief complaint-follow up goals of care/advanced care planning and support for patient/family    Code Status:   Code Status and Medical Interventions:   Ordered at: 12/18/23 1225     Medical Intervention Limits:    NO intubation (DNI)     Level Of Support Discussed With:    Patient     Code Status (Patient has no pulse and is not breathing):    No CPR (Do Not Attempt to Resuscitate)     Medical Interventions (Patient has pulse or is breathing):    Limited Support      Advanced Directives: Advance Directive Status: Patient has advance directive, copy in chart   Goals of Care: Ongoing.     S: Medical record reviewed. Events noted.  Patient resting in bed, tearful. No family present. I reviewed labs, medical notes, therapy notes, and MAR. I spoke with RN as well.              Review of Systems   Constitutional: Positive for malaise/fatigue. Negative for decreased appetite.   Respiratory:  Negative for shortness of breath.    Gastrointestinal:  Negative for abdominal pain and nausea.   Neurological:  Positive for weakness.   Psychiatric/Behavioral:  Positive for depression. The patient is nervous/anxious.      Pain Assessment  Preferred Pain Scale: number (Numeric Rating Pain Scale)  Pain Location: back  Pain Description: aching, intermittent  Pain Onset: protracted (>6 months)  Pain Duration: months  Factors That Aggravate Pain: positioning  Factors That Relieve Pain: rest, repositioning, relaxation techniques  Lifestyle Changes/Adaptations in Response to Pain: decreased activity level, inability or reluctance to perform BADLs/IADLs    O:     Intake/Output Summary (Last 24 hours) at 12/20/2023 1147  Last data filed at 12/20/2023 0847  Gross per 24 hour   Intake 360 ml   Output 1000 ml   Net -640 ml       Diagnostics and current medications: Reviewed.    Current Facility-Administered Medications   Medication Dose  Route Frequency Provider Last Rate Last Admin    acetaminophen (TYLENOL) tablet 650 mg  650 mg Oral Q4H PRN Mariaa Austin APRN        Or    acetaminophen (TYLENOL) 160 MG/5ML oral solution 650 mg  650 mg Oral Q4H PRN Mariaa Austin APRN        Or    acetaminophen (TYLENOL) suppository 650 mg  650 mg Rectal Q4H PRN Mariaa Austin APRN        sennosides-docusate (PERICOLACE) 8.6-50 MG per tablet 2 tablet  2 tablet Oral BID Mariaa Austin APRN   2 tablet at 12/19/23 2040    And    polyethylene glycol (MIRALAX) packet 17 g  17 g Oral Daily PRN Mariaa Austin APRN        And    bisacodyl (DULCOLAX) EC tablet 5 mg  5 mg Oral Daily PRN Mariaa Austin APRN        And    bisacodyl (DULCOLAX) suppository 10 mg  10 mg Rectal Daily PRN Mariaa Austin APRN        fentaNYL (DURAGESIC) 50 MCG/HR patch 1 patch  1 patch Transdermal Q72H Thomas Pak MD   1 patch at 12/18/23 1706    And    Check Fentanyl Patch Placement  1 each Does not apply Q12H Thomas Pak MD        cholecalciferol (VITAMIN D3) tablet 2,000 Units  2,000 Units Oral Daily Mariaa Austin APRN   2,000 Units at 12/20/23 0825    dextrose (D50W) (25 g/50 mL) IV injection 25 g  25 g Intravenous Q15 Min PRN Mariaa Austin APRN        dextrose (GLUTOSE) oral gel 15 g  15 g Oral Q15 Min PRN Mariaa Austin APRN        dilTIAZem CD (CARDIZEM CD) 24 hr capsule 120 mg  120 mg Oral Daily Mariaa Austin APRN   120 mg at 12/20/23 0825    gabapentin (NEURONTIN) capsule 900 mg  900 mg Oral Nightly Mariaa Austin APRN   900 mg at 12/19/23 2040    glucagon (GLUCAGEN) injection 1 mg  1 mg Intramuscular Q15 Min PRN Mariaa Austin APRN        insulin glargine (LANTUS, SEMGLEE) injection 15 Units  15 Units Subcutaneous Nightly Mariaa Austin APRN   15 Units at 12/19/23 2040    Insulin Lispro (humaLOG) injection 2-9 Units  2-9 Units Subcutaneous 4x Daily AC & at Bedtime Mariaa Austin, APRN   4 Units at 12/19/23 2054    levothyroxine (SYNTHROID, LEVOTHROID) tablet  176 mcg  176 mcg Oral Once per day on Mon Thu Isha Austinah SETH MCKEONN   176 mcg at 12/18/23 1708    levothyroxine (SYNTHROID, LEVOTHROID) tablet 88 mcg  88 mcg Oral Once per day on Sun Tue Wed Fri Sat Mariaa Austin APRN   88 mcg at 12/20/23 0618    Menthol-Zinc Oxide 1 application   1 application  Topical 4x Daily Mariaa Austin APRN   1 application  at 12/20/23 0826    modafinil (PROVIGIL) tablet 200 mg  200 mg Oral Daily Mariaa Austin APRN   200 mg at 12/20/23 0825    nitroglycerin (NITROSTAT) SL tablet 0.4 mg  0.4 mg Sublingual Q5 Min PRN Mariaa Austin APRN        ondansetron (ZOFRAN) injection 4 mg  4 mg Intravenous Q6H PRN Mariaa Austin APRN        oxyCODONE (ROXICODONE) immediate release tablet 5 mg  5 mg Oral Q4H PRN Thomas Pak MD        pantoprazole (PROTONIX) injection 40 mg  40 mg Intravenous BID AC Mariaa Austin APRN   40 mg at 12/20/23 0825    pravastatin (PRAVACHOL) tablet 40 mg  40 mg Oral Nightly Mariaa Austin APRN   40 mg at 12/19/23 2100    rOPINIRole (REQUIP) tablet 2 mg  2 mg Oral Nightly Mariaa Austin APRN   2 mg at 12/19/23 2100    sodium chloride 0.9 % flush 10 mL  10 mL Intravenous Q12H Mariaa Austin APRN   10 mL at 12/20/23 0826    sodium chloride 0.9 % flush 10 mL  10 mL Intravenous PRN Mariaa Austin APRN        sodium chloride 0.9 % infusion 40 mL  40 mL Intravenous PRN Mariaa Austin APRN        sodium chloride 0.9 % infusion  30 mL/hr Intravenous Continuous PRN Nataliia Rivas MD   Stopped at 12/19/23 1107    vitamin B-12 (CYANOCOBALAMIN) tablet 1,000 mcg  1,000 mcg Oral Daily Mariaa Austin APRN   1,000 mcg at 12/20/23 0825     sodium chloride, 30 mL/hr, Last Rate: Stopped (12/19/23 1107)        acetaminophen **OR** acetaminophen **OR** acetaminophen    senna-docusate sodium **AND** polyethylene glycol **AND** bisacodyl **AND** bisacodyl    dextrose    dextrose    glucagon (human recombinant)    nitroglycerin    ondansetron    oxyCODONE    sodium chloride     "sodium chloride    sodium chloride  Attest that current medications reviewed including but not limited to prescriptions, over-the counter, herbals and vitamin/mineral/dietary (nutritional) supplements for name, route of administration, type, dose and frequency and are current using all immediate resources available at time of dictation.    A:    /77 (BP Location: Left arm, Patient Position: Lying)   Pulse 68   Temp 98.3 °F (36.8 °C) (Oral)   Resp 18   Ht 165.1 cm (65\")   Wt 65 kg (143 lb 4.8 oz)   SpO2 100%   BMI 23.85 kg/m²     Constitutional:       Appearance: Not in distress. Chronically ill-appearing.   Pulmonary:      Effort: Pulmonary effort is normal.   Cardiovascular:      PMI at left midclavicular line. Normal rate.   Edema:     Peripheral edema absent.   Abdominal:      General: Bowel sounds are normal.      Palpations: Abdomen is soft.      Tenderness: There is no abdominal tenderness.   Neurological:      Mental Status: Alert and oriented to person, place, and time.   Psychiatric:         Mood and Affect: Mood normal. Affect is tearful.         Speech: Speech normal.         Behavior: Behavior is cooperative.         Thought Content: Thought content normal.         Cognition and Memory: Cognition normal.         Patient status: Disease state: Deteriorating despite treatments.  Functional status: Palliative Performance Scale Score: Performance 40% based on the following measures: Ambulation: Mainly in bed, Activity and Evidence of Disease: Unable to do any work, extensive evidence of disease, Self-Care: Mainly assistance required,  Intake: Normal or reduced, LOC: Full, drowsy or confusion   ECOG Status(4) Completely disabled, unable to carry out self-care.  Totally confined to bed or chair.  Nutritional status: Albumin 3.23 on 12/19/2023  Body mass index is 24.91 kg/m².      Family support: The patient receives support from his son..  Advance Directives: Advance Directive Status: Patient has " advance directive, copy in chart   POA/Healthcare surrogate-Cody cox.     Impression/Problem List:     Generalized weakness  Acute blood loss anemia     Gastric ulcer   Metastatic prostate cancer to bone    Chronic kidney disease stage III   Congestive heart failure   Obstructive sleep apnea   Sacral decubitus        Recommendations/Plan:  1. Provide support with goals of care  2. [facility] consult pending         2.  Palliative care encounter  12/19/2023- I had a brief conversation with patient regarding goals of care. He has a fairly good understanding of his medical conditions, which we reviewed. He is aware he is not a  candidate for further active treatment of his malignancy per oncology. They have recommended hospice. He resided at home with his son prior to admission and unfortunately his son woke him up and told him to call 911 because he couldn't care for him anymore at home. Apparently the son was having a chest pain at that time. He reports he has received an update and his son has gastric ulcers.   The requires assistance with ADLs and mainly sits in chair/bed. Of note, I did see this patient on previous admission and he is current with [facility] as of 7/28/2023.  At this time,  he will need assistance with discharge planning. I called patient son/Cody GRAHAM at 1451 and no answer.  I was unable to leave voicemail. I did speak with Mariaa VELARDE at 1447 and we discussed patient case.  It appears the patient son is in the hospital as well. I will plan to contact grandchildren tomorrow to support further goals of care conversation. Of note, the [facility] consult is pending until we can determine who will support patient with conversation. I spoke with SYD Dahl Rebecca, ASHLEY and CCP.     12/20/2023- I had a brief conversation with patient regarding goals. He is emotional upset with his circumstances and his son's illness. He says he spoke with Dr Pak prior to my arrival and he told him he had  "about 6 weeks. He hasn't spoke with his son, but his grandson, Mino visited and informed him that his son was here at [facility] as well.  In regards, to discharge planning he is uncertain if his grandsons can care for him and his son is now dealing with his acute illnesses. He worries about losing his home due to having to relocate to a facility that can care for him now. His grandson, Mino is listed second as healthcare surrogate so he gave me permission to call him. I called him at 1148. He is overwhelmed and states patient son is stable. He has limited understanding of patient conditions, because his father typically handling everything. He does state that both he and his brother can't care for the patient at home and they are inquiring about \"live in facilities.\" I explained that our discharge planner met with patient earlier this morning and will be reaching out to him for further support. He and his brother plan to visit later this evening and will discuss with the patient. I will call Mino tomorrow at 0900 at patient beside to provide further support. I spoke with ASHLEY Payton and ASHLEY Orourke.         Thank you for this consult and allowing us to participate in patient's plan of care. Palliative Care Team will continue to follow patient.     Time spent:30 minutes spent reviewing medical and medication records, assessing and examining patient, discussing with family, answering questions, providing some guidance about a plan and documentation of care, and coordinating care with other healthcare members, with > 50% time spent face to face.       Mireille Krishna, APRN  12/20/2023  11:47 EST   "

## 2023-12-20 NOTE — PROGRESS NOTES
Discharge Planning Assessment  [facility]     Patient Name: Semaj Herrera  MRN: [id number]  Today's Date: 12/20/2023    Admit Date: 12/18/2023    Plan: Assessing plan for SNF   Discharge Needs Assessment       Row Name 12/20/23 1608       Living Environment    People in Home child(jovanni), adult    Name(s) of People in Home Cody cox    Primary Care Provided by other (see comments)  Pt's son Cody has been caring for pt    Provides Primary Care For no one, unable/limited ability to care for self    Family Caregiver if Needed child(jovanni), adult    Family Caregiver Names Cody [phone] son    Quality of Family Relationships involved;helpful;supportive    Able to Return to Prior Arrangements no       Transition Planning    Patient/Family Anticipates Transition to inpatient rehabilitation facility;long-term care facility    Transportation Anticipated health plan transportation                   Discharge Plan       Row Name 12/20/23 161       Plan    Plan Assessing plan for SNF    Plan Comments Spoke with pt this morning at bedside [facility] for DCP/needs.  Pt reports that he has been lving at home where his son Cody has been his primary caregiver.  However Cody was hosptialized same day as pt with health concerns.  Pt stated that he will need to transition to rehab at [place] and is unsure if his son Cody will be able to assist him any longer. Provided pt with a list of SNF/meghana to recovery book and pt would like to discuss SNF options .  Pt emotional about needing to make decision about placement.  Pt did state that since his son is not currently able to help pt with any decisons that CCP should call his grandson Alex. Pt with a [facility] consult pending and is current with [facility].  Pt also being followed by  palliative team.  CCP will follow to assist with pt's DCP/needs.                  Continued Care and Services - Admitted Since 12/18/2023    Coordination has not been started for this encounter.        Selected Continued Care - Prior Encounters Includes continued care and service providers with selected services from prior encounters from 9/19/2023 to 12/20/2023      Discharged on 9/25/2023 Admission date: 9/17/2023 - Discharge disposition: Home-Health Care Svc      Durable Medical Equipment       Service Provider Selected Services Address Phone Fax Patient Preferred    [facility] [address], [place] [address] [phone] --              Home Medical Care       Service Provider Selected Services Address Phone Fax Patient Preferred     [facility] [address] [address] [phone] --                             Demographic Summary       Row Name 12/20/23 1607       General Information    Admission Type inpatient    Arrived From home    Referral Source admission list    Reason for Consult discharge planning    Preferred Language English                   Functional Status       Row Name 12/20/23 1607       Functional Status    Usual Activity Tolerance fair    Current Activity Tolerance poor       Functional Status, IADL    Medications completely dependent    Meal Preparation completely dependent    Housekeeping completely dependent    Laundry completely dependent    Shopping completely dependent       Mental Status Summary    Recent Changes in Mental Status/Cognitive Functioning no changes                   Psychosocial    No documentation.                  Abuse/Neglect    No documentation.                  Legal    No documentation.                  Substance Abuse    No documentation.                  Patient Forms    No documentation.                     Maite Chang MSW

## 2023-12-20 NOTE — THERAPY EVALUATION
Patient Name: Semaj Herrera  : 1940    MRN: [id number]                              Today's Date: 2023       Admit Date: 2023    Visit Dx:     ICD-10-CM ICD-9-CM   1. Acute blood loss anemia  D62 285.1   2. Gastrointestinal hemorrhage, unspecified gastrointestinal hemorrhage type  K92.2 578.9   3. Weakness  R53.1 780.79   4. Prostate cancer metastatic to bone  C61 185    C79.51 198.5   5. Anemia, chronic disease  D63.8 285.29     Patient Active Problem List   Diagnosis    Type 2 diabetes mellitus with kidney complication, with long-term current use of insulin    Generalized weakness    Hyperlipidemia    Hypertension    Left ventricular hypertrophy    Obstructive sleep apnea syndrome treated auto BiPAP    Sinus bradycardia    Prostate cancer metastatic to bone    Mediastinal adenopathy    Malignant neoplasm metastatic to bone    Chronic kidney disease, stage III (moderate)    Diastolic dysfunction    Localized edema    Neuropathy    Hypersomnia due to medical condition treated with modafinil 200 mg every morning    CSA (central sleep apnea)    Restless legs syndrome (RLS)    Psychophysiological insomnia    Edema    Type 2 diabetes mellitus with diabetic chronic kidney disease    Class 2 severe obesity due to excess calories with serious comorbidity and body mass index (BMI) of 35.0 to 35.9 in adult    Aortic stenosis, mild    Ascending aorta dilatation    Fecal impaction in rectum    Lower extremity weakness    Anemia, chronic disease    Metastatic cancer to bone    Moderate malnutrition    Acute blood loss anemia     Past Medical History:   Diagnosis Date    Aortic stenosis, mild 2021    Per echocardiogram     Ascending aorta dilatation     Asthma     Benign prostatic hyperplasia     Bone cancer     Cellulitis of great toe of left foot 10/19/2018    CKD (chronic kidney disease)     Stage 3; followed by Dr. Vicky Duran     Diastolic dysfunction     GRADE II per echo 2018    Difficulty  breathing     during exertion    Dyslipidemia     Erectile dysfunction     Fatigue     Heart murmur     History of blood transfusion     History of kidney stones     HL (hearing loss)     Hyperlipidemia     Hypertension     Hyponatremia     Kidney stone     Left ventricular hypertrophy     per echo 2018    Localized edema     Neuropathy     Obstructive sleep apnea     USING CPAP    Osteoarthritis     Peptic ulcer     Pneumonia     Pneumonia of left upper lobe due to infectious organism 03/15/2019    Prostate cancer     Status post prostatectomy, radiation therapy, and hormone therapy followed by Dr. Lee; metastatsis to bone    Pure hyperglyceridemia     Restless leg syndrome     Sepsis     Sinus bradycardia     Transient cerebral ischemia     Type 2 diabetes mellitus      Past Surgical History:   Procedure Laterality Date    BRONCHOSCOPY N/A 04/09/2018    Procedure: BRONCHOSCOPY;  Surgeon: Bijan Rivera III, MD;  Location: [facility] OR;  Service: Cardiothoracic    COLONOSCOPY      DEEP NECK LYMPH NODE BIOPSY / EXCISION      ENDOSCOPY N/A 12/19/2023    Procedure: ESOPHAGOGASTRODUODENOSCOPY;  Surgeon: Nataliia Rivas MD;  Location: [facility] ENDOSCOPY;  Service: Gastroenterology;  Laterality: N/A;  PRE OP - ANEMIA, heme positive stool  POST OP - DUODENAL ULCERS, ABNORMAL ESOPHAGEAL MUCOSA    HEMORRHOIDECTOMY      LYMPH NODE BIOPSY      MEDIASTINOSCOPY N/A 04/09/2018    Procedure: MEDIASTINOSCOPY WITH LYMPH NODE BIOPSY;  Surgeon: Bijan Rivera III, MD;  Location: [facility] MAIN OR;  Service: Cardiothoracic    OTHER SURGICAL HISTORY      ulcer repair    PROSTATECTOMY  2006      General Information       Row Name 12/20/23 1429          OT Time and Intention    Document Type evaluation  -     Mode of Treatment co-treatment;physical therapy;occupational therapy  -       Row Name 12/20/23 1429          General Information    Patient Profile Reviewed yes  -     Prior Level of Function independent:  -      Existing Precautions/Restrictions fall  -     Barriers to Rehab medically complex;previous functional deficit  -       Row Name 12/20/23 1429          Living Environment    People in Home child(jovanni), adult  -       Row Name 12/20/23 1429          Cognition    Orientation Status (Cognition) oriented x 3  -       Row Name 12/20/23 1429          Safety Issues, Functional Mobility    Impairments Affecting Function (Mobility) balance;endurance/activity tolerance;strength  -               User Key  (r) = Recorded By, (t) = Taken By, (c) = Cosigned By      Initials Name Provider Type     Tracey Guillen, RM Occupational Therapist                     Mobility/ADL's       Row Name 12/20/23 1429          Bed Mobility    Bed Mobility bed mobility (all) activities;supine-sit;sit-supine  -     All Activities, [place] (Bed Mobility) minimum assist (75% patient effort);moderate assist (50% patient effort)  -     Supine-Sit [place] (Bed Mobility) minimum assist (75% patient effort);moderate assist (50% patient effort)  -     Sit-Supine [place] (Bed Mobility) maximum assist (25% patient effort)  -     Bed Mobility, Safety Issues impaired trunk control for bed mobility  -       Row Name 12/20/23 1429          Transfers    Transfers sit-stand transfer  -       Row Name 12/20/23 1429          Sit-Stand Transfer    Sit-Stand [place] (Transfers) maximum assist (25% patient effort);2 person assist  -     Comment, (Sit-Stand Transfer) Unable to clear bottom from EOB  -       Row Name 12/20/23 1429          Functional Mobility    Patient was able to Ambulate no, other medical factors prevent ambulation  -     Reason Patient was unable to Ambulate Non-Ambulatory at Baseline  -               User Key  (r) = Recorded By, (t) = Taken By, (c) = Cosigned By      Initials Name Provider Type     Tracey Guillen OT Occupational Therapist                   Obj/Interventions       Row Name 12/20/23 1433           Sensory Assessment (Somatosensory)    Sensory Assessment (Somatosensory) sensation intact  -[facility] Name 12/20/23 1430          Vision Assessment/Intervention    Visual Impairment/Limitations WFL  -[facility] Name 12/20/23 1430          Range of Motion Comprehensive    General Range of Motion bilateral upper extremity ROM WFL  -[facility] Name 12/20/23 1430          Strength Comprehensive (MMT)    General Manual Muscle Testing (MMT) Assessment upper extremity strength deficits identified  -     Comment, General Manual Muscle Testing (MMT) Assessment BUE 3/5 generalized weakness  -[facility] Name 12/20/23 1430          Motor Skills    Motor Skills functional endurance  -     Functional Endurance Poor  -[facility] Name 12/20/23 1430          Balance    Balance Assessment sitting dynamic balance;sitting static balance;sit to stand dynamic balance  -     Static Sitting Balance standby assist;contact guard  -     Dynamic Sitting Balance contact guard;minimal assist  -     Position, Sitting Balance unsupported;sitting edge of bed  -     Sit to Stand Dynamic Balance maximum assist;2-person assist  -     Balance Interventions sitting;standing;occupation based/functional task  -               User Key  (r) = Recorded By, (t) = Taken By, (c) = Cosigned By      Initials Name Provider Type     Tracey Guillen OT Occupational Therapist                   Goals/Plan       [facility] Name 12/20/23 1443          Bed Mobility Goal 1 (OT)    Activity/Assistive Device (Bed Mobility Goal 1, OT) bed mobility activities, all  [facility]     [place] Level/Cues Needed (Bed Mobility Goal 1, OT) modified [facility]     Time Frame (Bed Mobility Goal 1, OT) short term goal (STG);2 weeks  -[facility] Name 12/20/23 1443          Transfer Goal 1 (OT)    Activity/Assistive Device (Transfer Goal 1, OT) transfers, all  -     [place] Level/Cues Needed (Transfer Goal 1, OT) modified independence  [facility]      Time Frame (Transfer Goal 1, OT) short term goal (STG);2 weeks  -     Progress/Outcome (Transfer Goal 1, OT) new goal  -       Row Name 12/20/23 1443          Dressing Goal 1 (OT)    Activity/Device (Dressing Goal 1, OT) dressing skills, all;upper body dressing;lower body dressing  -     [place]/Cues Needed (Dressing Goal 1, OT) modified independence  [facility]     Time Frame (Dressing Goal 1, OT) short term goal (STG);2 weeks  -     Progress/Outcome (Dressing Goal 1, OT) new goal  -       Row Name 12/20/23 1443          Toileting Goal 1 (OT)    Activity/Device (Toileting Goal 1, OT) toileting skills, all  -     [place] Level/Cues Needed (Toileting Goal 1, OT) modified independence  [facility]     Time Frame (Toileting Goal 1, OT) short term goal (STG);2 weeks  -     Progress/Outcome (Toileting Goal 1, OT) new [facility]  -       Row Name 12/20/23 1443          Grooming Goal 1 (OT)    Activity/Device (Grooming Goal 1, OT) grooming skills, all  -     [place] (Grooming Goal 1, OT) modified independence  [facility]     Time Frame (Grooming Goal 1, OT) short term goal (STG);2 weeks  -     Progress/Outcome (Grooming Goal 1, OT) new [facility]  -       Row Name 12/20/23 1443          Therapy Assessment/Plan (OT)    Planned Therapy Interventions (OT) activity tolerance training;BADL retraining;occupation/activity based interventions;patient/caregiver education/training;strengthening exercise;transfer/mobility retraining;ROM/therapeutic exercise;functional balance retraining  [facility]               User Key  (r) = Recorded By, (t) = Taken By, (c) = Cosigned By      Initials Name Provider Type     Tracey Guillen, OT Occupational Therapist                   Clinical Impression       Row Name 12/20/23 8724          Plan of Care Review    Plan of Care Reviewed With patient  -     Outcome Evaluation Patient is an 83 year old male admitted to [facility] with generalized weakness and difficulty caring for self at home found to have  acute anemia, GI bleed. Patient also has PMH with prostate cancer with mets to bone, and reports the doctor told him he has about 6 weeks to live. Patient lives with son who is primary caregiver, but is also currently ill and hospitalized and per ER notes the son feels he can no longer care for patient with current decline. Patient reports that he stays on couch all the time, and if he gets up to toilet his son lifts him up and sits him on the toilet, and son performs all self care. Patient reports general decline in functioning over past month, and unable to stand/weight bear. Patient performed bed mobility today with min/mod A with max verbal cues to get to EOB, able to sit at EOB unsupported with SBA to intermittent min A to maintain static sitting balance. Patient unable to clear bottom from EOB with 2 attempts to stand with max Ax2. Patient will benefit from skilled OT to address current functional deficits with generalized decline, hospice eval pending which could impact current OT POC.  -       Row Name 12/20/23 1431          Therapy Assessment/Plan (OT)    Rehab Potential (OT) fair, will monitor progress closely  -     Criteria for Skilled Therapeutic Interventions Met (OT) yes;meets criteria;skilled treatment is necessary  -     Therapy Frequency (OT) 3 times/wk  -       Row Name 12/20/23 1431          Therapy Plan Review/Discharge Plan (OT)    Anticipated Discharge Disposition (OT) skilled nursing facility;extended care facility  -               User Key  (r) = Recorded By, (t) = Taken By, (c) = Cosigned By      Initials Name Provider Type     Tracey Guillen, OT Occupational Therapist                   Outcome Measures       Row Name 12/1940          How much help from another is currently needed...    Putting on and taking off regular lower body clothing? 1  -     Bathing (including washing, rinsing, and drying) 1  -     Toileting (which includes using toilet bed pan or urinal) 1  -      Putting on and taking off regular upper body clothing 2  -     Taking care of personal grooming (such as brushing teeth) 2  -     Eating meals 3  -     AM-PAC 6 Clicks Score (OT) 10  -       Row Name 12/20/23 1408 12/20/23 0800       How much help from another person do you currently need...    Turning from your back to your side while in flat bed without using bedrails? 2  -RF 2  -RF    Moving from lying on back to sitting on the side of a flat bed without bedrails? 2  -RF 2  -RF    Moving to and from a bed to a chair (including a wheelchair)? 2  -RF 2  -RF    Standing up from a chair using your arms (e.g., wheelchair, bedside chair)? 1  -RF 1  -RF    Climbing 3-5 steps with a railing? 1  -RF 1  -RF    To walk in hospital room? 1  -RF 1  -RF    AM-PAC 6 Clicks Score (PT) 9  -RF 9  -RF    Highest Level of Mobility Goal 3 --> Sit at edge of bed  -RF 3 --> Sit at edge of bed  -RF      Row Name 12/20/23 1443          Functional Assessment    Outcome Measure Options AM-PAC 6 Clicks Daily Activity (OT)  -               User Key  (r) = Recorded By, (t) = Taken By, (c) = Cosigned By      Initials Name Provider Type    RF Tata Wilson, RN Registered Nurse     Tracey Gulilen OT Occupational Therapist                    Occupational Therapy Education       Title: PT OT SLP Therapies (Done)       Topic: Occupational Therapy (Done)       Point: ADL training (Done)       Description:   Instruct learner(s) on proper safety adaptation and remediation techniques during self care or transfers.   Instruct in proper use of assistive devices.                  Learning Progress Summary             Patient Acceptance, E,TB, VU by  at 12/20/2023 1444    Comment: Instructed in role of OT, discharge planning, home safety, participation in ADLs and daily routine                         Point: Home exercise program (Done)       Description:   Instruct learner(s) on appropriate technique for monitoring, assisting and/or  progressing therapeutic exercises/activities.                  Learning Progress Summary             Patient Acceptance, E,TB, VU by  at 12/20/2023 1444    Comment: Instructed in role of OT, discharge planning, home safety, participation in ADLs and daily routine                         Point: Precautions (Done)       Description:   Instruct learner(s) on prescribed precautions during self-care and functional transfers.                  Learning Progress Summary             Patient Acceptance, E,TB, VU by  at 12/20/2023 1444    Comment: Instructed in role of OT, discharge planning, home safety, participation in ADLs and daily routine                         Point: Body mechanics (Done)       Description:   Instruct learner(s) on proper positioning and spine alignment during self-care, functional mobility activities and/or exercises.                  Learning Progress Summary             Patient Acceptance, E,TB, VU by  at 12/20/2023 1444    Comment: Instructed in role of OT, discharge planning, home safety, participation in ADLs and daily routine                                         User Key       Initials Effective Dates Name Provider Type Discipline     08/31/23 -  Tracey Guillen, RM Occupational Therapist OT                  OT Recommendation and Plan  Planned Therapy Interventions (OT): activity tolerance training, BADL retraining, occupation/activity based interventions, patient/caregiver education/training, strengthening exercise, transfer/mobility retraining, ROM/therapeutic exercise, functional balance retraining  Therapy Frequency (OT): 3 times/wk  Plan of Care Review  Plan of Care Reviewed With: patient  Outcome Evaluation: Patient is an 83 year old male admitted to [facility] with generalized weakness and difficulty caring for self at home found to have acute anemia, GI bleed. Patient also has PMH with prostate cancer with mets to bone, and reports the doctor told him he has about 6 weeks to live.  Patient lives with son who is primary caregiver, but is also currently ill and hospitalized and per ER notes the son feels he can no longer care for patient with current decline. Patient reports that he stays on couch all the time, and if he gets up to toilet his son lifts him up and sits him on the toilet, and son performs all self care. Patient reports general decline in functioning over past month, and unable to stand/weight bear. Patient performed bed mobility today with min/mod A with max verbal cues to get to EOB, able to sit at EOB unsupported with SBA to intermittent min A to maintain static sitting balance. Patient unable to clear bottom from EOB with 2 attempts to stand with max Ax2. Patient will benefit from skilled OT to address current functional deficits with generalized decline, hospice eval pending which could impact current OT POC.     Time Calculation:   Evaluation Complexity (OT)  Review Occupational Profile/Medical/Therapy History Complexity: expanded/moderate complexity  Assessment, Occupational Performance/Identification of Deficit Complexity: 3-5 performance deficits  Clinical Decision Making Complexity (OT): detailed assessment/moderate complexity  Overall Complexity of Evaluation (OT): moderate complexity     Time Calculation- OT       Row Name 12/20/23 1444             Time Calculation- OT    OT Start Time 0924  -      OT Stop Time 0952  -      OT Time Calculation (min) 28 min  -      Total Timed Code Minutes- OT 20 minute(s)  -      OT Non-Billable Time (min) 8 min  -      OT Received On 12/20/23  -      OT - Next Appointment 12/22/23  -      OT Goal Re-Cert Due Date 01/03/24  -         Timed Charges    11260 - OT Therapeutic Activity Minutes 20  -LH         Untimed Charges    OT Eval/Re-eval Minutes 8  -LH         Total Minutes    Timed Charges Total Minutes 20  -LH      Untimed Charges Total Minutes 8  -LH       Total Minutes 28  -LH                User Key  (r) =  Recorded By, (t) = Taken By, (c) = Cosigned By      Initials Name Provider Type     Tracey Guillen, RM Occupational Therapist                  Therapy Charges for Today       Code Description Service Date Service Provider Modifiers Qty    [id number]  OT THERAPEUTIC ACT EA 15 MIN 12/20/2023 Tracey Guillen OT GO 1    [id number]  OT EVAL MOD COMPLEXITY 3 12/20/2023 Tracey Guillen OT GO 1                 Tracey Guillen OT  12/20/2023

## 2023-12-20 NOTE — PROGRESS NOTES
[facility]  Inpatient Progress Note    Reason for Follow Up: Anemia    Subjective     Interval History:   Duodenal ulcers found yesterday, esophagitis found.    Current Facility-Administered Medications:     acetaminophen (TYLENOL) tablet 650 mg, 650 mg, Oral, Q4H PRN **OR** acetaminophen (TYLENOL) 160 MG/5ML oral solution 650 mg, 650 mg, Oral, Q4H PRN **OR** acetaminophen (TYLENOL) suppository 650 mg, 650 mg, Rectal, Q4H PRN, Mariaa Austin APRN    sennosides-docusate (PERICOLACE) 8.6-50 MG per tablet 2 tablet, 2 tablet, Oral, BID, 2 tablet at 12/19/23 2040 **AND** polyethylene glycol (MIRALAX) packet 17 g, 17 g, Oral, Daily PRN **AND** bisacodyl (DULCOLAX) EC tablet 5 mg, 5 mg, Oral, Daily PRN **AND** bisacodyl (DULCOLAX) suppository 10 mg, 10 mg, Rectal, Daily PRN, Mariaa Austin APRN    fentaNYL (DURAGESIC) 50 MCG/HR patch 1 patch, 1 patch, Transdermal, Q72H, 1 patch at 12/18/23 1706 **AND** Check Fentanyl Patch Placement, 1 each, Does not apply, Q12H, Thomas Pak MD    cholecalciferol (VITAMIN D3) tablet 2,000 Units, 2,000 Units, Oral, Daily, Mariaa Austin APRN, 2,000 Units at 12/20/23 0825    dextrose (D50W) (25 g/50 mL) IV injection 25 g, 25 g, Intravenous, Q15 Min PRN, Mariaa Austin APRN    dextrose (GLUTOSE) oral gel 15 g, 15 g, Oral, Q15 Min PRN, Mariaa Austin APRN    dilTIAZem CD (CARDIZEM CD) 24 hr capsule 120 mg, 120 mg, Oral, Daily, Mariaa Austin APRN, 120 mg at 12/20/23 0825    gabapentin (NEURONTIN) capsule 900 mg, 900 mg, Oral, Nightly, Mariaa Austin APRN, 900 mg at 12/19/23 2040    glucagon (GLUCAGEN) injection 1 mg, 1 mg, Intramuscular, Q15 Min PRN, Mariaa Austin, APRN    insulin glargine (LANTUS, SEMGLEE) injection 15 Units, 15 Units, Subcutaneous, Nightly, Mariaa Austin, SYD, 15 Units at 12/19/23 2040    Insulin Lispro (humaLOG) injection 2-9 Units, 2-9 Units, Subcutaneous, 4x Daily AC & at Bedtime, Mariaa Austin, SYD, 4 Units at 12/20/23 1234     insulin lispro (HUMALOG/ADMELOG) injection 2 Units, 2 Units, Subcutaneous, TID With Meals, Mariaa Austin APRN    levothyroxine (SYNTHROID, LEVOTHROID) tablet 176 mcg, 176 mcg, Oral, Once per day on Mon Thu, Mariaa Austin APRN, 176 mcg at 12/18/23 1708    levothyroxine (SYNTHROID, LEVOTHROID) tablet 88 mcg, 88 mcg, Oral, Once per day on Sun Tue Wed Fri Sat, Mariaa Austin APRN, 88 mcg at 12/20/23 0618    Menthol-Zinc Oxide 1 application , 1 application , Topical, 4x Daily, Mariaa Austin APRN, 1 application  at 12/20/23 0826    modafinil (PROVIGIL) tablet 200 mg, 200 mg, Oral, Daily, Mariaa Austin APRN, 200 mg at 12/20/23 0825    nitroglycerin (NITROSTAT) SL tablet 0.4 mg, 0.4 mg, Sublingual, Q5 Min PRN, Mariaa Austin APRN    ondansetron (ZOFRAN) injection 4 mg, 4 mg, Intravenous, Q6H PRN, Mariaa Austin APRN    oxyCODONE (ROXICODONE) immediate release tablet 5 mg, 5 mg, Oral, Q4H PRN, Thomas Pak MD    pantoprazole (PROTONIX) injection 40 mg, 40 mg, Intravenous, BID AC, Mariaa Austin APRN, 40 mg at 12/20/23 0825    pravastatin (PRAVACHOL) tablet 40 mg, 40 mg, Oral, Nightly, Mariaa Austin APRN, 40 mg at 12/19/23 2100    rOPINIRole (REQUIP) tablet 2 mg, 2 mg, Oral, Nightly, Mariaa Austin APRN, 2 mg at 12/19/23 2100    sodium chloride 0.9 % flush 10 mL, 10 mL, Intravenous, Q12H, Mariaa Austin APRN, 10 mL at 12/20/23 0826    sodium chloride 0.9 % flush 10 mL, 10 mL, Intravenous, PRN, Mariaa Austin APRN    sodium chloride 0.9 % infusion 40 mL, 40 mL, Intravenous, PRN, Mariaa Austin APRN    sodium chloride 0.9 % infusion, 30 mL/hr, Intravenous, Continuous PRN, Nataliia Rivas MD, Stopped at 12/19/23 1107    vitamin B-12 (CYANOCOBALAMIN) tablet 1,000 mcg, 1,000 mcg, Oral, Daily, Mariaa Austin APRN, 1,000 mcg at 12/20/23 0825  Review of Systems:    There is weakness and fatigue all other systems reviewed and negative    Objective     Vital Signs  Temp:  [97.5 °F (36.4 °C)-98.3 °F (36.8 °C)]  97.5 °F (36.4 °C)  Heart Rate:  [68-76] 68  Resp:  [18] 18  BP: (119-143)/(65-77) 119/72  Body mass index is 23.85 kg/m².    Intake/Output Summary (Last 24 hours) at 12/20/2023 1426  Last data filed at 12/20/2023 1226  Gross per 24 hour   Intake 360 ml   Output 800 ml   Net -440 ml     I/O this shift:  In: 360 [P.O.:360]  Out: -      Physical Exam:   General: patient awake, alert and cooperative   Eyes: Normal lids and lashes, no scleral icterus   Neck: supple, normal ROM   Skin: warm and dry, not jaundiced   Cardiovascular: regular rhythm and rate, no murmurs auscultated   Pulm: clear to auscultation bilaterally, regular and unlabored   Abdomen: soft, nontender, nondistended; normal bowel sounds   Extremities: no rash or edema   Psychiatric: Normal mood and behavior; memory intact     Results Review:     I reviewed the patient's new clinical results.    Results from last 7 days   Lab Units 12/20/23  1051 12/19/23  0746 12/19/23  0432 12/18/23  1752 12/18/23  0844   WBC 10*3/mm3 3.22*  --  3.88  --  4.71   HEMOGLOBIN g/dL 10.1* 8.9* 8.8*   < > 5.8*   HEMATOCRIT % 30.2* 26.7* 25.3*   < > 18.3*   PLATELETS 10*3/mm3 40*  --  56*  --  41*    < > = values in this interval not displayed.     Results from last 7 days   Lab Units 12/20/23  1051 12/19/23  0432 12/18/23  0844   SODIUM mmol/L 132* 137 141   POTASSIUM mmol/L 3.7 3.9 4.1   CHLORIDE mmol/L 101 105 106   CO2 mmol/L 17.6* 18.8* 21.5*   BUN mg/dL 16 16 19   CREATININE mg/dL 0.61* 0.61* 0.60*   CALCIUM mg/dL 8.9 8.4* 8.6   BILIRUBIN mg/dL  --  1.5* 0.9   ALK PHOS U/L  --  163* 167*   ALT (SGPT) U/L  --  13 13   AST (SGOT) U/L  --  22 18   GLUCOSE mg/dL 204* 155* 82         Lab Results   Lab Value Date/Time    LIPASE 34 03/15/2019 1647       Radiology:  CT Abdomen Pelvis With Contrast   Final Result   1. Descending and sigmoid colon diverticulosis without findings to   suggest diverticulitis.   2. Extensive osseous metastatic disease is unchanged from recent    imaging.   3. A subcentimeter indeterminate right renal lesion will need continued   follow-up.       This report was finalized on 12/19/2023 11:44 PM by Dr. Raghav Luis M.D on Workstation: [id number]          XR Chest 1 View   Final Result   1. No acute cardiopulmonary process identified.   2. Bony metastases.           This report was finalized on 12/18/2023 8:34 AM by Dr. Reynaldo De M.D on Workstation: [id number]              Assessment & Plan     Active Hospital Problems    Diagnosis     **Generalized weakness     Acute blood loss anemia     Anemia, chronic disease     Class 2 severe obesity due to excess calories with serious comorbidity and body mass index (BMI) of 35.0 to 35.9 in adult     Restless legs syndrome (RLS)     Diastolic dysfunction     Neuropathy     Prostate cancer metastatic to bone     Type 2 diabetes mellitus with kidney complication, with long-term current use of insulin     Obstructive sleep apnea syndrome treated auto BiPAP     Hypertension     Chronic kidney disease, stage III (moderate)        Assessment:  Normocytic anemia  Duodenal ulcer  Esophagitis  Thrombocytopenia      Plan:  Follow hemoglobin  PPI twice daily-OK to switch to oral  Avoid NSAIDs  I discussed the patients findings and my recommendations with patient and nursing staff.    Yinka Drake MD

## 2023-12-20 NOTE — PLAN OF CARE
Problem: Adult Inpatient Plan of Care  Goal: Plan of Care Review  Outcome: Ongoing, Progressing  Flowsheets (Taken 12/20/2023 0658)  Progress: improving  Plan of Care Reviewed With: patient  Outcome Evaluation: treatment completed, VSS, unremarkable shift, TEJ noted at this time, q 2 hour turn  Goal: Patient-Specific Goal (Individualized)  Outcome: Ongoing, Progressing  Goal: Absence of Hospital-Acquired Illness or Injury  Outcome: Ongoing, Progressing  Intervention: Identify and Manage Fall Risk  Recent Flowsheet Documentation  Taken 12/20/2023 0600 by Varsha Mendoza RN  Safety Promotion/Fall Prevention: safety round/check completed  Taken 12/20/2023 0400 by Varsha Mendoza RN  Safety Promotion/Fall Prevention: safety round/check completed  Taken 12/20/2023 0200 by Varsha Mendoza RN  Safety Promotion/Fall Prevention: safety round/check completed  Taken 12/20/2023 0000 by Varsha Mendoza RN  Safety Promotion/Fall Prevention: safety round/check completed  Taken 12/19/2023 2200 by Varsha Mendoza RN  Safety Promotion/Fall Prevention: safety round/check completed  Taken 12/19/2023 2000 by Varsha Mendoza RN  Safety Promotion/Fall Prevention: safety round/check completed  Intervention: Prevent Skin Injury  Recent Flowsheet Documentation  Taken 12/20/2023 0600 by Varsha Mendoza RN  Body Position:   supine, legs elevated   tilted   supine  Taken 12/20/2023 0400 by Varsha Mendoza RN  Body Position:   supine, legs elevated   tilted   weight shifting  Taken 12/20/2023 0200 by Varsha Mendoza RN  Body Position:   supine, legs elevated   tilted   weight shifting  Taken 12/20/2023 0000 by Varsha Mendoza RN  Body Position:   right   turned  Taken 12/19/2023 2200 by Varsha Mendoza RN  Body Position:   right   turned  Taken 12/19/2023 2000 by Varsha Mendoza RN  Body Position:   supine, legs elevated   tilted   weight  shifting  Intervention: Prevent and Manage VTE (Venous Thromboembolism) Risk  Recent Flowsheet Documentation  Taken 12/19/2023 2100 by Varsha Mendoza RN  VTE Prevention/Management: bilateral  Taken 12/19/2023 2000 by Varsha Mendoza RN  Activity Management: activity encouraged  VTE Prevention/Management:   bilateral   sequential compression devices on  Range of Motion: active ROM (range of motion) encouraged  Intervention: Prevent Infection  Recent Flowsheet Documentation  Taken 12/20/2023 0600 by Varsha Mendoza RN  Infection Prevention:   hand hygiene promoted   rest/sleep promoted  Taken 12/20/2023 0400 by Varsha Mendoza RN  Infection Prevention:   hand hygiene promoted   rest/sleep promoted  Taken 12/20/2023 0200 by Varsha Mendoza RN  Infection Prevention:   hand hygiene promoted   rest/sleep promoted  Taken 12/20/2023 0000 by Varsha Mendoza RN  Infection Prevention: rest/sleep promoted  Taken 12/19/2023 2200 by Varsha Mendoza RN  Infection Prevention:   hand hygiene promoted   rest/sleep promoted  Taken 12/19/2023 2000 by Varsha Mendoza RN  Infection Prevention:   hand hygiene promoted   rest/sleep promoted  Goal: Optimal Comfort and Wellbeing  Outcome: Ongoing, Progressing  Intervention: Provide Person-Centered Care  Recent Flowsheet Documentation  Taken 12/19/2023 2000 by Varsha Mendoza RN  Trust Relationship/Rapport: (car) care explained  Goal: Readiness for Transition of Care  Outcome: Ongoing, Progressing     Problem: Skin Injury Risk Increased  Goal: Skin Health and Integrity  Outcome: Ongoing, Progressing  Intervention: Optimize Skin Protection  Recent Flowsheet Documentation  Taken 12/20/2023 0600 by Varsha Mendoza RN  Head of Bed (HOB) Positioning: HOB elevated  Taken 12/20/2023 0400 by Varsha Mendoza RN  Head of Bed (HOB) Positioning: HOB elevated  Taken 12/20/2023 0200 by Varsha Mendoza RN  Head of Bed (HOB)  Positioning: HOB elevated  Taken 12/20/2023 0000 by Varsha Mendoza RN  Head of Bed (HOB) Positioning: HOB elevated  Taken 12/19/2023 2200 by Varsha Mendoza RN  Head of Bed (HOB) Positioning: HOB elevated  Taken 12/19/2023 2000 by Varsha Mendoza RN  Head of Bed (HOB) Positioning: HOB elevated     Problem: Fall Injury Risk  Goal: Absence of Fall and Fall-Related Injury  Outcome: Ongoing, Progressing  Intervention: Identify and Manage Contributors  Recent Flowsheet Documentation  Taken 12/20/2023 0600 by Varsha Mendoza RN  Medication Review/Management: medications reviewed  Taken 12/20/2023 0400 by Varsha Mendoza RN  Medication Review/Management: medications reviewed  Taken 12/20/2023 0200 by Varsha Mendoza RN  Medication Review/Management: medications reviewed  Taken 12/20/2023 0000 by Varsha Mendoza RN  Medication Review/Management: medications reviewed  Taken 12/19/2023 2200 by Varsha Mendoza RN  Medication Review/Management: medications reviewed  Taken 12/19/2023 2000 by Varsha Mendoza RN  Medication Review/Management: medications reviewed  Intervention: Promote Injury-Free Environment  Recent Flowsheet Documentation  Taken 12/20/2023 0600 by Varsha Mendoza RN  Safety Promotion/Fall Prevention: safety round/check completed  Taken 12/20/2023 0400 by Varsha Mendoza RN  Safety Promotion/Fall Prevention: safety round/check completed  Taken 12/20/2023 0200 by Varsha Mendoza RN  Safety Promotion/Fall Prevention: safety round/check completed  Taken 12/20/2023 0000 by Varsha Mendoza RN  Safety Promotion/Fall Prevention: safety round/check completed  Taken 12/19/2023 2200 by Varsha Mendoza RN  Safety Promotion/Fall Prevention: safety round/check completed  Taken 12/19/2023 2000 by Varsha Mendoza RN  Safety Promotion/Fall Prevention: safety round/check completed     Problem: Muscle Strength  Impairment  Goal: Improved Muscle Strength  Outcome: Ongoing, Progressing     Problem: Pain Acute  Goal: Acceptable Pain Control and Functional Ability  Outcome: Ongoing, Progressing  Intervention: Prevent or Manage Pain  Recent Flowsheet Documentation  Taken 12/20/2023 0600 by Varsha Mendoza RN  Medication Review/Management: medications reviewed  Taken 12/20/2023 0400 by Varsha Mendoza RN  Medication Review/Management: medications reviewed  Taken 12/20/2023 0200 by Varsha Mendoza RN  Medication Review/Management: medications reviewed  Taken 12/20/2023 0000 by Varsha Mendoza RN  Medication Review/Management: medications reviewed  Taken 12/19/2023 2200 by Varsha Mendoza RN  Medication Review/Management: medications reviewed  Taken 12/19/2023 2000 by Varsha Mendoza RN  Medication Review/Management: medications reviewed  Intervention: Optimize Psychosocial Wellbeing  Recent Flowsheet Documentation  Taken 12/19/2023 2000 by Varsha Mendoza RN  Diversional Activities: television   Goal Outcome Evaluation:  Plan of Care Reviewed With: patient        Progress: improving  Outcome Evaluation: treatment completed, VSS, unremarkable shift, TEJ noted at this time, q 2 hour turn

## 2023-12-20 NOTE — PROGRESS NOTES
Name: Semaj Herrera ADMIT: 2023   : 1940  PCP: Axel Guardado MD    MRN: [id number] LOS: 2 days   AGE/SEX: 83 y.o. male  [facility]     Subjective   Subjective   Resting in bed.  Phlebotomist at bedside attempting to get blood.  He denies any chest pain, dyspnea, cough, fever or chills.  Denies any nausea, vomiting or abdominal pain.  Remains a little tearful at time.  States that his grandsons have been visiting him as well as his son who is also in the hospital.     Objective   Objective   Vital Signs  Temp:  [97.9 °F (36.6 °C)-98.3 °F (36.8 °C)] 98.3 °F (36.8 °C)  Heart Rate:  [61-76] 68  Resp:  [18] 18  BP: (119-143)/(64-77) 129/77  SpO2:  [97 %-100 %] 100 %  on  Flow (L/min):  [4] 4;   Device (Oxygen Therapy): room air  Body mass index is 23.85 kg/m².    Physical Exam  Vitals and nursing note reviewed.   Constitutional:       Appearance: He is ill-appearing. He is not toxic-appearing.   Cardiovascular:      Rate and Rhythm: Normal rate and regular rhythm.      Pulses: Normal pulses.   Pulmonary:      Effort: Pulmonary effort is normal. No respiratory distress.      Comments: Diminished and on room air  Abdominal:      General: Bowel sounds are normal. There is no distension.      Palpations: Abdomen is soft.      Tenderness: There is no abdominal tenderness.   Musculoskeletal:         General: Swelling (mild diffuse) present. Normal range of motion.      Cervical back: Normal range of motion and neck supple.   Skin:     General: Skin is warm and dry.      Coloration: Skin is pale.      Findings: + bruising.   Neurological:      Mental Status: He is alert and oriented to person, place, and time.      Sensory: No sensory deficit.      Motor: Weakness present.      Coordination: Coordination normal.   Psychiatric:         Mood and Affect: Mood normal.         Behavior: Behavior normal.    Results Review:       I reviewed the patient's new clinical results.  Results from last 7 days   Lab Units  12/20/23  1051 12/19/23  0746 12/19/23  0432 12/19/23  0059 12/18/23  1752 12/18/23  0844   WBC 10*3/mm3 3.22*  --  3.88  --   --  4.71   HEMOGLOBIN g/dL 10.1* 8.9* 8.8* 8.7*   < > 5.8*   PLATELETS 10*3/mm3 40*  --  56*  --   --  41*    < > = values in this interval not displayed.     Results from last 7 days   Lab Units 12/20/23  1051 12/19/23  0432 12/18/23  0844   SODIUM mmol/L 132* 137 141   POTASSIUM mmol/L 3.7 3.9 4.1   CHLORIDE mmol/L 101 105 106   CO2 mmol/L 17.6* 18.8* 21.5*   BUN mg/dL 16 16 19   CREATININE mg/dL 0.61* 0.61* 0.60*   GLUCOSE mg/dL 204* 155* 82   Estimated Creatinine Clearance: 84.4 mL/min (A) (by C-G formula based on SCr of 0.61 mg/dL (L)).  Results from last 7 days   Lab Units 12/19/23  0432 12/18/23  0844   ALBUMIN g/dL 3.2* 3.6   BILIRUBIN mg/dL 1.5* 0.9   ALK PHOS U/L 163* 167*   AST (SGOT) U/L 22 18   ALT (SGPT) U/L 13 13     Results from last 7 days   Lab Units 12/20/23  1051 12/19/23  0432 12/18/23  0844   CALCIUM mg/dL 8.9 8.4* 8.6   ALBUMIN g/dL  --  3.2* 3.6       Glucose   Date/Time Value Ref Range Status   12/20/2023 1154 229 (H) 70 - 130 mg/dL Final   12/20/2023 0545 141 (H) 70 - 130 mg/dL Final   12/19/2023 2035 207 (H) 70 - 130 mg/dL Final   12/19/2023 1542 245 (H) 70 - 130 mg/dL Final   12/19/2023 1147 111 70 - 130 mg/dL Final   12/19/2023 0640 176 (H) 70 - 130 mg/dL Final   12/18/2023 2101 148 (H) 70 - 130 mg/dL Final       fentaNYL, 1 patch, Transdermal, Q72H   And  Check Fentanyl Patch Placement, 1 each, Does not apply, Q12H  cholecalciferol, 2,000 Units, Oral, Daily  dilTIAZem CD, 120 mg, Oral, Daily  gabapentin, 900 mg, Oral, Nightly  insulin glargine, 15 Units, Subcutaneous, Nightly  insulin lispro, 2-9 Units, Subcutaneous, 4x Daily AC & at Bedtime  levothyroxine, 176 mcg, Oral, Once per day on Mon Thu  levothyroxine, 88 mcg, Oral, Once per day on Sun Tue Wed Fri Sat  Menthol-Zinc Oxide, 1 application , Topical, 4x Daily  modafinil, 200 mg, Oral, Daily  pantoprazole,  40 mg, Intravenous, BID AC  pravastatin, 40 mg, Oral, Nightly  rOPINIRole, 2 mg, Oral, Nightly  senna-docusate sodium, 2 tablet, Oral, BID  sodium chloride, 10 mL, Intravenous, Q12H  vitamin B-12, 1,000 mcg, Oral, Daily      sodium chloride, 30 mL/hr, Last Rate: Stopped (12/19/23 1107)    Diet: Cardiac Diets; Healthy Heart (2-3 Na+); Texture: Regular Texture (IDDSI 7); Fluid Consistency: Thin (IDDSI 0)       Assessment/Plan     Active Hospital Problems    Diagnosis  POA    **Generalized weakness [R53.1]  Yes    Acute blood loss anemia [D62]  Yes    Anemia, chronic disease [D63.8]  Yes    Class 2 severe obesity due to excess calories with serious comorbidity and body mass index (BMI) of 35.0 to 35.9 in adult [E66.01, Z68.35]  Not Applicable    Restless legs syndrome (RLS) [G25.81]  Yes    Diastolic dysfunction [I51.89]  Yes    Neuropathy [G62.9]  Yes    Prostate cancer metastatic to bone [C61, 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     Mr. Herrera is a 83 y.o. male that presented to the hospital with generalized weakness.  He was living at home with his son who was providing 24/7 assistance via hospital bed and [employer] lift.  His son fell ill and he called 911 to have his father taken to the hospital for further care as he is unable to be cared for at home by himself.  He does have known metastatic prostate cancer with recommendations for hospice in the past, but hospice has not been initiated at this time.  He was noted to have a hemoglobin of 5.8 as well as platelets in the 40s prompting further admission.     Generalized weakness  -Multifactorial secondary to metastatic cancer, advanced age, ongoing  immobility as well as anemia.  -PT/OT to see.  -Will attempt to optimize current clinical status.     Acute blood loss anemia  Anemia of chronic disease  -Apparently was heme positive in the emergency room.   -S/P EGD 12/18 which showed inflamed, texture changed mucosa in the esophagus as well as nonobstructing nonbleeding duodenal ulcers likely NSAID induced  -S/P 2 units PRBC and platelets on 12/18- monitor CBC.  -Continue PPI.  -Hemoglobin 10.1 and platelets 40-> defer to hematology.    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  -They agree with hospice consult at this time and do not feel the need for ongoing Decadron.  Will hold this for now.  -Blood transfusions as above with further recommendations per heme..  -Continue home pain medications including fentanyl patch and as needed oxycodone.  -Palliative to see.  -Continue his gabapentin and Requip.     CKD 3  Hypertension  -Follows with Dr. Vicky Duran.  -Renal function stable. CO2 a little lower today and sodium 132-> will trend.  -Continue blood pressure medications including diltiazem.     DM2  -Typically uses an insulin pump at home.  However his son is usually the one that manages this.  -Use sliding scale insulin with Lantus 15 units nightly. Add low dose meal-time today.     Diastolic dysfunction  Mild aortic stenosis  -Established with Dr. Lee.  -Appears fairly stable.     RONNY  -Home CPAP if available.  Otherwise avoid oversedation and use oxygen as needed.     I discussed the patients findings and my recommendations with patient and nursing staff.     Palliative following. Hospice consulted. Await further plans. Would be most appropriate to go to rehab and continue hospice discussions there. Await CCP placement.     VTE Prophylaxis - SCDs.  Code Status - No CPR/intubation.  Discussed with patient.  Disposition - likely to SNF once placed. Awaiting hospice/palliative  discussions.    SYD Oswald  [facility]  12/20/23  12:31 EST

## 2023-12-20 NOTE — PROGRESS NOTES
Continued Stay Note  [facility]     Patient Name: Semaj Herrera  MRN: [id number]  Today's Date: 12/20/2023    Admit Date: 12/18/2023    Plan: Assessing for SNF   Discharge Plan       Row Name 12/20/23 5295       Plan    Plan Assessing for SNF    Plan Comments Spoke at length with pt's grandson Alex concerning pt's DCP.  Provided SNF list and Road to recovery book to Alex to review with pt's son.   Alex stated that pt's son is doing better and may DC home in 1-2 days. Alex stated that he will review SNF list and discuss guidelines for both SNF and LTC with pt's son.  Alex stated that he will follow up with CCP 12/21/23 concerning SNF options once he reviews list with pt's son.      Row Name 12/20/23 4151       Plan    Plan Assessing plan for SNF    Plan Comments Spoke with pt this morning at bedside toscrren for DCP/needs.  Pt reports that he has been lving at home where his son Cody has been his primary caregiver.  However Cody was hosptialized same day as pt with health concerns.  Pt stated that he will need to transition to rehab at [place] and is unsure if his son Cody will be able to assist him any longer. Provided pt with a list of SNFand pt is agreeable to mass SNF referrals at this time.  Pt emotional about needing to make decision about placement.  Pt did state that since his son is not currently able to help pt with any decisons that CCP should call his grandson Alex. Pt with a [facility] consult pending and is current with [facility].  Pt also being followed by  palliative team.  CCP will follow to assist with pt's DCP/needs.                   Discharge Codes    No documentation.                 Expected Discharge Date and Time       Expected Discharge Date Expected Discharge Time    Dec 22, 2023               RITA Meehan

## 2023-12-20 NOTE — PLAN OF CARE
Problem: Adult Inpatient Plan of Care  Goal: Plan of Care Review  Recent Flowsheet Documentation  Taken 12/20/2023 1501 by Melva Patel PT  Plan of Care Reviewed With: patient  Outcome Evaluation: Pt is an 82 yo male admitted for generalized weakness Workup for acute anemia, GI bleed. Hx of prostate cancer with mets to the bone with pt reporting he was recently told he only has 6 weeks to live. Pt lives with his son who is his primary caregiver and has required max assist for functional mobility at home. Pt reports he has not ambulated since September and has generally declined functionally over the last few months. Of note, pt's son is currently dealing with his own health issues and may not be able to care for him any longer. Pt is very pleasant, motivated to improve his mobility and becomes tearful multiple times during session. Today he completes supine>sit with min/mod A and tolerates sitting EOB with cga to sba. Attempted standing twice with max A x2 but unable to clear bottom from EOB this date. Pt began sliding at EOB and required max A for return to supine. Pt may benefit from skilled PT services to address strength, balance and endurance deficits. Recommending d/c to SNF with likely transition to LTC.     Anticipated Discharge Disposition (PT): extended care facility, skilled nursing facility

## 2023-12-20 NOTE — PLAN OF CARE
Goal Outcome Evaluation:  Plan of Care Reviewed With: patient  Pt tolerated treatment well today. VSS. Aox4. Expressing concerns about his future after he gets discharged. WCTM the remainder of the nursing shift.

## 2023-12-21 NOTE — PROGRESS NOTES
CHIEF COMPLAINT: End-stage prostate cancer, anemia/thrombocytopenia    Interval history:  Patient transfused packed red blood cells with hemoglobin up to 8.9 12/19  EGD 12/19 with small nonbleeding duodenal ulcers.  The patient has ongoing lower extremity weakness, stool and bladder incontinence.  He reports some pain in the lower back today but has not requested his breakthrough medication yet.      HISTORY OF PRESENT ILLNESS:   This is an 83-year-old man follow-up Dr. Lee metastatic prostate cancer in our practice for metastatic prostate cancer who has had metastatic disease since 2018 with multiple sclerotic bone lesions and  at that time.  He has been treated with androgen deprivation therapy, palliative radiation to sites of bony disease/pain control.  He had progression of disease with increasing PSA and started Xtandi 9021 in addition to continuation of Lupron and Xgeva.  He had progression of disease and darolutamide 600 mg twice daily plus Xgeva plus Lupron initiated 4/17/2023.  The patient had progression of disease subsequently with worsening anemia, admission in September for leg weakness secondary to cord compression at T10 (nonsurgical candidate) treated with steroids and radiation.  The patient is not a candidate for further treatment actively of his prostate cancer and hospice has been recommended but so far not instituted.  He has pain of malignancy currently on fentanyl 50 mcg patch and Norco 10/325 as needed breakthrough pain available at home.  He has narcotic induced constipation.     The patient was brought to ER today by family who provide full-time care at home.  The patient has progressive lower extremity weakness x 2 weeks now with inability to stand.  He reports being incontinent of urine and stool.  He has a sacral decubitus ulcer from pressure and incontinence.  Labs drawn in the ER showed worsening counts with hemoglobin 5.8, platelets 41 with significant nucleated red blood  cells.      Past Medical History, Past Surgical History, Social History, Family History have been reviewed and are without significant changes except as mentioned.    Review of Systems   A comprehensive 14 point review of systems was performed and was negative except as mentioned.    Medications:  The current medication list was reviewed in the EMR    ALLERGIES:    Allergies   Allergen Reactions    Niacin Unknown - Low Severity    Statins Unknown - Low Severity       Objective      Vitals:    12/21/23 0851   BP:    Pulse: 87   Resp:    Temp:    SpO2:           Physical Exam    CONSTITUTIONAL: pleasant well-developed adult man sitting up in bed    HEENT: no icterus, no thrush, moist membranes  LYMPH: no cervical or supraclavicular lad  CV: RRR, S1S2, no murmur  RESP: cta bilat, no wheezing, no rales  GI: soft, non-tender, no splenomegaly, +bs  MUSC: no edema,  NEURO: alert and oriented x3, significant bilateral lower extremity weakness, cannot move legs parallel to gravity can wiggle feet  PSYCH: Normal mood and affect for situation     RECENT LABS:  Hematology Results from last 7 days   Lab Units 12/20/23  1051 12/19/23  0746 12/19/23  0432 12/18/23  1752 12/18/23  0844   WBC 10*3/mm3 3.22*  --  3.88  --  4.71   HEMOGLOBIN g/dL 10.1* 8.9* 8.8*   < > 5.8*   HEMATOCRIT % 30.2* 26.7* 25.3*   < > 18.3*   PLATELETS 10*3/mm3 40*  --  56*  --  41*    < > = values in this interval not displayed.     2  Lab Results   Component Value Date    GLUCOSE 204 (H) 12/20/2023    BUN 16 12/20/2023    CREATININE 0.61 (L) 12/20/2023    EGFRIFNONA 56 (L) 02/23/2022    BCR 26.2 (H) 12/20/2023    CO2 17.6 (L) 12/20/2023    CALCIUM 8.9 12/20/2023    ALBUMIN 3.2 (L) 12/19/2023    AST 22 12/19/2023    ALT 13 12/19/2023       Lab Results   Component Value Date    IRON 243 (H) 12/19/2023    TIBC 271 (L) 12/19/2023    FERRITIN 1,987.00 (H) 12/19/2023       Lab Results   Component Value Date    IBBCLINN02 716 09/18/2023       Lab Results    Component Value Date    FOLATE 6.03 03/20/2023          Assessment & Plan   *Metastatic castrate resistant prostate cancer  The patient has extensive bony metastases and cord compression at T10 previously evaluated not felt to be surgical candidate status post limited radiation and steroid therapy but progressive symptoms of lower extremity weakness and bowel/bladder incontinence  According to Dr. 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 with decreased production of red cells/platelets  12/19/2023-transfused with hemoglobin up to 8.9  12/19/2023-EGD with small nonbleeding duodenal ulcers likely NSAID induced     *Pain of malignancy  Pain appears controlled on fentanyl 50 mcg every 72 hour patch and oxycodone 5 mg every 4 hours as needed pain, gabapentin 900 mg nightly     *Cord compression with progressive symptoms  Patient previously evaluated by neurosurgery not felt to be candidate for decompression surgically  The patient was treated with limited radiation  D/c DEX with duodenal ulcerations     *Sacral pressure wound worsened by incontinence     Oncology plan/recommendations:  Transfusion support as needed  Suspect the patient's anemia/thrombocytopenia mostly related to bone marrow involvement of prostate cancer given the significant increase in nucleated red cells and left shifted myeloid cells/myelophthisic process as opposed to GI blood loss.  Continue pain control  with fentanyl 50 aurelio patch and oxycodone as needed-encouraged patient to ask for his breakthrough pain medication when needed; continue Neurontin  Dexamethasone DC'd given duodenal ulcerations; avoid NSAIDs  The patient is hospice appropriate but if he wants to continue transfusions for follow-up myelophthisic anemia/thrombocytopenia I am not sure would be allowed per hospice?  The patient and family to meet with hospice to explain services/allowances                    12/21/2023      CC:              Home

## 2023-12-21 NOTE — PROGRESS NOTES
[facility]  Inpatient Progress Note    Reason for Follow Up: Anemia    Subjective     Interval History:   No overt bleeding    Current Facility-Administered Medications:     acetaminophen (TYLENOL) tablet 650 mg, 650 mg, Oral, Q4H PRN **OR** acetaminophen (TYLENOL) 160 MG/5ML oral solution 650 mg, 650 mg, Oral, Q4H PRN **OR** acetaminophen (TYLENOL) suppository 650 mg, 650 mg, Rectal, Q4H PRN, Mariaa Austin APRN    sennosides-docusate (PERICOLACE) 8.6-50 MG per tablet 2 tablet, 2 tablet, Oral, BID, 2 tablet at 12/20/23 2057 **AND** polyethylene glycol (MIRALAX) packet 17 g, 17 g, Oral, Daily PRN **AND** bisacodyl (DULCOLAX) EC tablet 5 mg, 5 mg, Oral, Daily PRN **AND** bisacodyl (DULCOLAX) suppository 10 mg, 10 mg, Rectal, Daily PRN, Mariaa Austin APRN    fentaNYL (DURAGESIC) 50 MCG/HR patch 1 patch, 1 patch, Transdermal, Q72H, 1 patch at 12/21/23 1500 **AND** Check Fentanyl Patch Placement, 1 each, Does not apply, Q12H, Thomas Pak MD    cholecalciferol (VITAMIN D3) tablet 2,000 Units, 2,000 Units, Oral, Daily, Mariaa Austin APRN, 2,000 Units at 12/21/23 0851    dextrose (D50W) (25 g/50 mL) IV injection 25 g, 25 g, Intravenous, Q15 Min PRN, Mariaa Austin APRN    dextrose (GLUTOSE) oral gel 15 g, 15 g, Oral, Q15 Min PRN, Mariaa Austin APRN    dilTIAZem CD (CARDIZEM CD) 24 hr capsule 120 mg, 120 mg, Oral, Daily, Mariaa Austin APRN, 120 mg at 12/21/23 0851    gabapentin (NEURONTIN) capsule 900 mg, 900 mg, Oral, Nightly, Mariaa Austin APRN, 900 mg at 12/20/23 2057    glucagon (GLUCAGEN) injection 1 mg, 1 mg, Intramuscular, Q15 Min PRN, Mariaa Austin, APRN    insulin glargine (LANTUS, SEMGLEE) injection 15 Units, 15 Units, Subcutaneous, Nightly, Mariaa Austin, SYD, 15 Units at 12/20/23 2057    Insulin Lispro (humaLOG) injection 2-9 Units, 2-9 Units, Subcutaneous, 4x Daily AC & at Bedtime, Mariaa Austin APRN, 6 Units at 12/21/23 1236    insulin lispro (HUMALOG/ADMELOG)  injection 4 Units, 4 Units, Subcutaneous, TID With Meals, Mariaa Austin APRN    levothyroxine (SYNTHROID, LEVOTHROID) tablet 176 mcg, 176 mcg, Oral, Once per day on Mon Thu, Mariaa Austin APRN, 176 mcg at 12/21/23 0634    levothyroxine (SYNTHROID, LEVOTHROID) tablet 88 mcg, 88 mcg, Oral, Once per day on Sun Tue Wed Fri Sat, Mariaa Austin APRN, 88 mcg at 12/20/23 0618    Menthol-Zinc Oxide 1 application , 1 application , Topical, 4x Daily, Mariaa Austin APRN, 1 application  at 12/21/23 1237    modafinil (PROVIGIL) tablet 200 mg, 200 mg, Oral, Daily, Mariaa Austin APRN, 200 mg at 12/21/23 0851    nitroglycerin (NITROSTAT) SL tablet 0.4 mg, 0.4 mg, Sublingual, Q5 Min PRN, Mariaa Austin APRN    ondansetron (ZOFRAN) injection 4 mg, 4 mg, Intravenous, Q6H PRN, Mariaa Austin APRN    oxyCODONE (ROXICODONE) immediate release tablet 5 mg, 5 mg, Oral, Q4H PRN, Thomas Pak MD, 5 mg at 12/21/23 0958    pantoprazole (PROTONIX) EC tablet 40 mg, 40 mg, Oral, BID AC, Yinka Drake MD, 40 mg at 12/21/23 0633    pravastatin (PRAVACHOL) tablet 40 mg, 40 mg, Oral, Nightly, Mariaa Austin APRN, 40 mg at 12/20/23 2058    rOPINIRole (REQUIP) tablet 2 mg, 2 mg, Oral, Nightly, Mariaa Austin APRN, 2 mg at 12/20/23 2058    sodium chloride 0.9 % flush 10 mL, 10 mL, Intravenous, Q12H, Mariaa Austin APRN, 10 mL at 12/21/23 0851    sodium chloride 0.9 % flush 10 mL, 10 mL, Intravenous, PRN, Mariaa Austin APRN    sodium chloride 0.9 % infusion 40 mL, 40 mL, Intravenous, PRN, Mariaa Austin APRN    sodium chloride 0.9 % infusion, 30 mL/hr, Intravenous, Continuous PRN, Nataliia Rivas MD, Stopped at 12/19/23 1107    vitamin B-12 (CYANOCOBALAMIN) tablet 1,000 mcg, 1,000 mcg, Oral, Daily, Mariaa Austin APRN, 1,000 mcg at 12/21/23 0825  Review of Systems:    There is weakness and fatigue all other systems reviewed and negative    Objective     Vital Signs  Temp:  [97.9 °F (36.6 °C)-99.4 °F (37.4 °C)] 97.9 °F (36.6  °C)  Heart Rate:  [] 87  Resp:  [18] 18  BP: (116-150)/(65-79) 134/69  Body mass index is 24.51 kg/m².    Intake/Output Summary (Last 24 hours) at 12/21/2023 1517  Last data filed at 12/21/2023 0900  Gross per 24 hour   Intake 580 ml   Output --   Net 580 ml     I/O this shift:  In: 240 [P.O.:240]  Out: -      Physical Exam:   General: patient awake, alert and cooperative   Eyes: Normal lids and lashes, no scleral icterus   Neck: supple, normal ROM   Skin: warm and dry, not jaundiced   Cardiovascular: regular rhythm and rate, no murmurs auscultated   Pulm: clear to auscultation bilaterally, regular and unlabored   Abdomen: soft, nontender, nondistended; normal bowel sounds   Extremities: no rash or edema   Psychiatric: Normal mood and behavior; memory intact     Results Review:     I reviewed the patient's new clinical results.    Results from last 7 days   Lab Units 12/21/23  1117 12/20/23  1051 12/19/23  0746 12/19/23  0432   WBC 10*3/mm3 2.46* 3.22*  --  3.88   HEMOGLOBIN g/dL 9.1* 10.1* 8.9* 8.8*   HEMATOCRIT % 28.2* 30.2* 26.7* 25.3*   PLATELETS 10*3/mm3 34* 40*  --  56*     Results from last 7 days   Lab Units 12/21/23  1117 12/20/23  1051 12/19/23  0432 12/18/23  0844   SODIUM mmol/L 134* 132* 137 141   POTASSIUM mmol/L 3.9 3.7 3.9 4.1   CHLORIDE mmol/L 103 101 105 106   CO2 mmol/L 18.0* 17.6* 18.8* 21.5*   BUN mg/dL 12 16 16 19   CREATININE mg/dL 0.62* 0.61* 0.61* 0.60*   CALCIUM mg/dL 8.9 8.9 8.4* 8.6   BILIRUBIN mg/dL  --   --  1.5* 0.9   ALK PHOS U/L  --   --  163* 167*   ALT (SGPT) U/L  --   --  13 13   AST (SGOT) U/L  --   --  22 18   GLUCOSE mg/dL 271* 204* 155* 82         Lab Results   Lab Value Date/Time    LIPASE 34 03/15/2019 1647       Radiology:  CT Abdomen Pelvis With Contrast   Final Result   1. Descending and sigmoid colon diverticulosis without findings to   suggest diverticulitis.   2. Extensive osseous metastatic disease is unchanged from recent   imaging.   3. A subcentimeter  indeterminate right renal lesion will need continued   follow-up.       This report was finalized on 12/19/2023 11:44 PM by Dr. Raghav Luis M.D on Workstation: [id number]          XR Chest 1 View   Final Result   1. No acute cardiopulmonary process identified.   2. Bony metastases.           This report was finalized on 12/18/2023 8:34 AM by Dr. Reynaldo De M.D on Workstation: [id number]              Assessment & Plan     Active Hospital Problems    Diagnosis     **Generalized weakness     Acute blood loss anemia     Anemia, chronic disease     Class 2 severe obesity due to excess calories with serious comorbidity and body mass index (BMI) of 35.0 to 35.9 in adult     Restless legs syndrome (RLS)     Diastolic dysfunction     Neuropathy     Prostate cancer metastatic to bone     Type 2 diabetes mellitus with kidney complication, with long-term current use of insulin     Obstructive sleep apnea syndrome treated auto BiPAP     Hypertension     Chronic kidney disease, stage III (moderate)      Assessment:  Normocytic anemia  Duodenal ulcer  Esophagitis  Thrombocytopenia        Plan:  Follow hemoglobin  PPI twice daily-OK to switch to oral  Avoid NSAIDs  No further GI recommendations, he can follow-up in our office in 6 weeks we will see as needed  I discussed the patients findings and my recommendations with patient and nursing staff.    Yinka Drake MD

## 2023-12-21 NOTE — PROGRESS NOTES
Name: Semaj Herrera ADMIT: 2023   : 1940  PCP: Axel Guardado MD    MRN: [id number] LOS: 3 days   AGE/SEX: 83 y.o. male  ROOM: [facility]     Subjective   Subjective   Resting in bed.  Phlebotomist at bedside.  He denies any new complaints.  Denies any chest pain, dyspnea, cough, fever or chills.  Denies any nausea, vomiting or abdominal pain.  States he is moving his bowels.      Objective   Objective   Vital Signs  Temp:  [97.5 °F (36.4 °C)-99.4 °F (37.4 °C)] 98.7 °F (37.1 °C)  Heart Rate:  [] 87  Resp:  [18] 18  BP: (116-150)/(65-79) 144/76  SpO2:  [98 %-99 %] 99 %  on   ;   Device (Oxygen Therapy):  system;room air  Body mass index is 24.51 kg/m².    Physical Exam  Vitals and nursing note reviewed.   Constitutional:       Appearance: He is ill-appearing. He is not toxic-appearing.   Cardiovascular:      Rate and Rhythm: Normal rate and regular rhythm.      Pulses: Normal pulses.   Pulmonary:      Effort: Pulmonary effort is normal. No respiratory distress.      Comments: Diminished and on room air  Abdominal:      General: Bowel sounds are normal. There is no distension.      Palpations: Abdomen is soft.      Tenderness: There is no abdominal tenderness.   Musculoskeletal:         General: Swelling (mild diffuse) present. Normal range of motion.      Cervical back: Normal range of motion and neck supple.   Skin:     General: Skin is warm and dry.      Coloration: Skin is pale.      Findings: + bruising.   Neurological:      Mental Status: He is alert and oriented to person, place, and time.      Sensory: No sensory deficit.      Motor: Weakness present.      Coordination: Coordination normal.   Psychiatric:         Mood and Affect: Mood normal.         Behavior: Behavior normal.    Results Review:       I reviewed the patient's new clinical results.  Results from last 7 days   Lab Units 23  1051 23  0746 23  0432 23  0059 23  1752 23  0844   WBC  10*3/mm3 3.22*  --  3.88  --   --  4.71   HEMOGLOBIN g/dL 10.1* 8.9* 8.8* 8.7*   < > 5.8*   PLATELETS 10*3/mm3 40*  --  56*  --   --  41*    < > = values in this interval not displayed.     Results from last 7 days   Lab Units 12/21/23  1117 12/20/23  1051 12/19/23  0432 12/18/23  0844   SODIUM mmol/L 134* 132* 137 141   POTASSIUM mmol/L 3.9 3.7 3.9 4.1   CHLORIDE mmol/L 103 101 105 106   CO2 mmol/L 18.0* 17.6* 18.8* 21.5*   BUN mg/dL 12 16 16 19   CREATININE mg/dL 0.62* 0.61* 0.61* 0.60*   GLUCOSE mg/dL 271* 204* 155* 82   Estimated Creatinine Clearance: 85.3 mL/min (A) (by C-G formula based on SCr of 0.62 mg/dL (L)).  Results from last 7 days   Lab Units 12/19/23  0432 12/18/23  0844   ALBUMIN g/dL 3.2* 3.6   BILIRUBIN mg/dL 1.5* 0.9   ALK PHOS U/L 163* 167*   AST (SGOT) U/L 22 18   ALT (SGPT) U/L 13 13     Results from last 7 days   Lab Units 12/21/23  1117 12/20/23  1051 12/19/23  0432 12/18/23  0844   CALCIUM mg/dL 8.9 8.9 8.4* 8.6   ALBUMIN g/dL  --   --  3.2* 3.6       Glucose   Date/Time Value Ref Range Status   12/21/2023 1138 252 (H) 70 - 130 mg/dL Final   12/21/2023 0603 197 (H) 70 - 130 mg/dL Final   12/20/2023 2014 125 70 - 130 mg/dL Final   12/20/2023 1603 287 (H) 70 - 130 mg/dL Final   12/20/2023 1154 229 (H) 70 - 130 mg/dL Final   12/20/2023 0545 141 (H) 70 - 130 mg/dL Final   12/19/2023 2035 207 (H) 70 - 130 mg/dL Final       fentaNYL, 1 patch, Transdermal, Q72H   And  Check Fentanyl Patch Placement, 1 each, Does not apply, Q12H  cholecalciferol, 2,000 Units, Oral, Daily  dilTIAZem CD, 120 mg, Oral, Daily  gabapentin, 900 mg, Oral, Nightly  insulin glargine, 15 Units, Subcutaneous, Nightly  insulin lispro, 2-9 Units, Subcutaneous, 4x Daily AC & at Bedtime  insulin lispro, 2 Units, Subcutaneous, TID With Meals  levothyroxine, 176 mcg, Oral, Once per day on Mon Thu  levothyroxine, 88 mcg, Oral, Once per day on Sun Tue Wed Fri Sat  Menthol-Zinc Oxide, 1 application , Topical, 4x Daily  modafinil, 200  mg, Oral, Daily  pantoprazole, 40 mg, Oral, BID AC  pravastatin, 40 mg, Oral, Nightly  rOPINIRole, 2 mg, Oral, Nightly  senna-docusate sodium, 2 tablet, Oral, BID  sodium chloride, 10 mL, Intravenous, Q12H  vitamin B-12, 1,000 mcg, Oral, Daily      sodium chloride, 30 mL/hr, Last Rate: Stopped (12/19/23 1107)    Diet: Cardiac Diets; Healthy Heart (2-3 Na+); Texture: Regular Texture (IDDSI 7); Fluid Consistency: Thin (IDDSI 0)       Assessment/Plan     Active Hospital Problems    Diagnosis  POA    **Generalized weakness [R53.1]  Yes    Acute blood loss anemia [D62]  Yes    Anemia, chronic disease [D63.8]  Yes    Class 2 severe obesity due to excess calories with serious comorbidity and body mass index (BMI) of 35.0 to 35.9 in adult [E66.01, Z68.35]  Not Applicable    Restless legs syndrome (RLS) [G25.81]  Yes    Diastolic dysfunction [I51.89]  Yes    Neuropathy [G62.9]  Yes    Prostate cancer metastatic to bone [C61, 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     Mr. Herrera is a 83 y.o. male that presented to the hospital with generalized weakness.  He was living at home with his son who was providing 24/7 assistance via hospital bed and [employer] lift.  His son fell ill and he called 911 to have his father taken to the hospital for further care as he is unable to be cared for at home by himself.  He does have known metastatic prostate cancer with recommendations for hospice in the past, but hospice has not been initiated at this time.  He was noted to have a hemoglobin of 5.8 as well as platelets in the 40s prompting further admission.     Generalized weakness  -Multifactorial secondary to metastatic  cancer, advanced age, ongoing immobility as well as anemia.  -PT/OT to see. Awaiting SNF placement.  -Will attempt to optimize current clinical status.     Acute blood loss anemia  Anemia of chronic disease  -Apparently was heme positive in the emergency room.   -S/P EGD 12/18 which showed inflamed, texture changed mucosa in the esophagus as well as nonobstructing nonbleeding duodenal ulcers likely NSAID induced  -S/P 2 units PRBC and platelets on 12/18- monitor CBC.  -Continue PPI.  -Labs pending.     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  -They agree with hospice consult at this time and do not feel the need for ongoing Decadron.  Will hold this for now.  -Blood transfusions as above with further recommendations per heme..  -Continue home pain medications including fentanyl patch and as needed oxycodone.  -Palliative following.   -Continue his gabapentin and Requip.     CKD 3  Hypertension  -Follows with Dr. Vicky Duran.  -Labs stable.  -Continue blood pressure medications including diltiazem.     DM2  -Typically uses an insulin pump at home.  However his son is usually the one that manages this.  -Use sliding scale insulin with Lantus 15 units nightly. Will increase meal-time to 4 units TID (6 unit increase today with 10 units of extra insulin needed yesterday via SS).     Diastolic dysfunction  Mild aortic stenosis  -Established with Dr. Lee.  -Appears fairly stable.     RONNY  -Home CPAP if available.  Otherwise avoid oversedation and use oxygen as needed.     I discussed the patients findings and my recommendations with patient.     Palliative following. Hospice consulted. Plans to continue Pallitus with goals of rehab at this time. He continues to desire blood products if needed.     VTE Prophylaxis - SCDs.  Code Status - No CPR/intubation.  Discussed with patient.  Disposition - Awaiting placement. Likely ready by tomorrow if  labs stable.    SYD Oswald  [facility]  12/21/23  12:06 EST

## 2023-12-21 NOTE — PLAN OF CARE
Goal Outcome Evaluation:  Plan of Care Reviewed With: patient              Pt A/O X 4. Platelets decreased from 40 to 34; reported to Oncology, Dr. Pak, no new orders. Hospice spoke with patient today. At this time patient would like to continue with supportive care r/t transfusions. Pain controled with scheduled and PRN medications. Will continue to round throughout duration of shift.

## 2023-12-21 NOTE — PROGRESS NOTES
.            [facility]    Palliative Care Daily Progress Note   Chief complaint-follow up goals of care/advanced care planning, support for patient/family, and hospice referral/discussion    Code Status:   Code Status and Medical Interventions:   Ordered at: 12/18/23 1225     Medical Intervention Limits:    NO intubation (DNI)     Level Of Support Discussed With:    Patient     Code Status (Patient has no pulse and is not breathing):    No CPR (Do Not Attempt to Resuscitate)     Medical Interventions (Patient has pulse or is breathing):    Limited Support      Advanced Directives: Advance Directive Status: Patient has advance directive, copy in chart   Goals of Care: Ongoing.     S: Medical record reviewed. Events noted.  Patient resting in bed, complaining of back pain 7/10. RN informed to provide PRN oxycodone. He has no other complaints. He ate all his breakfast. No family at bedside. I reviewed medical notes, therapy notes,  notes, and MAR. I spoke with RN as well.              Review of Systems   Constitutional: Positive for malaise/fatigue. Negative for decreased appetite.   Cardiovascular:  Negative for chest pain.   Respiratory:  Negative for shortness of breath.    Musculoskeletal:  Positive for back pain.   Gastrointestinal:  Negative for abdominal pain and nausea.   Neurological:  Positive for weakness.   Psychiatric/Behavioral:  Positive for depression. The patient is nervous/anxious.      Pain Assessment  Preferred Pain Scale: number (Numeric Rating Pain Scale)  Pain Location: back  Pain Description: aching, intermittent  Pain Onset: protracted (>6 months)  Pain Duration: months  Factors That Aggravate Pain: positioning  Factors That Relieve Pain: rest, repositioning, relaxation techniques  Lifestyle Changes/Adaptations in Response to Pain: decreased activity level, inability or reluctance to perform BADLs/IADLs    O:     Intake/Output Summary (Last  24 hours) at 12/21/2023 0927  Last data filed at 12/20/2023 1733  Gross per 24 hour   Intake 580 ml   Output --   Net 580 ml       Diagnostics and current medications: Reviewed.    Current Facility-Administered Medications   Medication Dose Route Frequency Provider Last Rate Last Admin    acetaminophen (TYLENOL) tablet 650 mg  650 mg Oral Q4H PRN Mariaa Austin APRN        Or    acetaminophen (TYLENOL) 160 MG/5ML oral solution 650 mg  650 mg Oral Q4H PRN Mariaa Austin APRN        Or    acetaminophen (TYLENOL) suppository 650 mg  650 mg Rectal Q4H PRN Mariaa Austin APRN        sennosides-docusate (PERICOLACE) 8.6-50 MG per tablet 2 tablet  2 tablet Oral BID Mariaa Austin APRN   2 tablet at 12/20/23 2057    And    polyethylene glycol (MIRALAX) packet 17 g  17 g Oral Daily PRN Mariaa Austin APRN        And    bisacodyl (DULCOLAX) EC tablet 5 mg  5 mg Oral Daily PRN Mariaa Austin APRN        And    bisacodyl (DULCOLAX) suppository 10 mg  10 mg Rectal Daily PRN Mariaa Austin APRN        fentaNYL (DURAGESIC) 50 MCG/HR patch 1 patch  1 patch Transdermal Q72H Thomas Pak MD   1 patch at 12/18/23 1706    And    Check Fentanyl Patch Placement  1 each Does not apply Q12H Thomas Pak MD        cholecalciferol (VITAMIN D3) tablet 2,000 Units  2,000 Units Oral Daily Mariaa Austin APRN   2,000 Units at 12/21/23 0851    dextrose (D50W) (25 g/50 mL) IV injection 25 g  25 g Intravenous Q15 Min PRN Mariaa Austin APRN        dextrose (GLUTOSE) oral gel 15 g  15 g Oral Q15 Min PRN Mariaa Austin APRN        dilTIAZem CD (CARDIZEM CD) 24 hr capsule 120 mg  120 mg Oral Daily Mariaa Austin APRN   120 mg at 12/21/23 0851    gabapentin (NEURONTIN) capsule 900 mg  900 mg Oral Nightly Mariaa Austin APRN   900 mg at 12/20/23 2057    glucagon (GLUCAGEN) injection 1 mg  1 mg Intramuscular Q15 Min PRN Mariaa Austin APRN        insulin glargine (LANTUS, SEMGLEE) injection 15 Units  15 Units Subcutaneous Nightly  Mariaa Austin APRN   15 Units at 12/20/23 2057    Insulin Lispro (humaLOG) injection 2-9 Units  2-9 Units Subcutaneous 4x Daily AC & at Bedtime Mariaa Austin APRN   6 Units at 12/20/23 1724    insulin lispro (HUMALOG/ADMELOG) injection 2 Units  2 Units Subcutaneous TID With Meals Mariaa Austin APRN   2 Units at 12/21/23 0851    levothyroxine (SYNTHROID, LEVOTHROID) tablet 176 mcg  176 mcg Oral Once per day on Mon Thu Mariaa Austin APRN   176 mcg at 12/21/23 0634    levothyroxine (SYNTHROID, LEVOTHROID) tablet 88 mcg  88 mcg Oral Once per day on Sun Tue Wed Fri Sat Mariaa Austin APRN   88 mcg at 12/20/23 0618    Menthol-Zinc Oxide 1 application   1 application  Topical 4x Daily Mariaa Austin APRN   1 application  at 12/21/23 0854    modafinil (PROVIGIL) tablet 200 mg  200 mg Oral Daily Mariaa Austin APRN   200 mg at 12/21/23 0851    nitroglycerin (NITROSTAT) SL tablet 0.4 mg  0.4 mg Sublingual Q5 Min PRN Mariaa Austin APRN        ondansetron (ZOFRAN) injection 4 mg  4 mg Intravenous Q6H PRN Mariaa Austin APRN        oxyCODONE (ROXICODONE) immediate release tablet 5 mg  5 mg Oral Q4H PRN Thomas Pak MD        pantoprazole (PROTONIX) EC tablet 40 mg  40 mg Oral BID AC Yinka Drake MD   40 mg at 12/21/23 0633    pravastatin (PRAVACHOL) tablet 40 mg  40 mg Oral Nightly Mariaa Austin APRN   40 mg at 12/20/23 2058    rOPINIRole (REQUIP) tablet 2 mg  2 mg Oral Nightly Mariaa Austin APRN   2 mg at 12/20/23 2058    sodium chloride 0.9 % flush 10 mL  10 mL Intravenous Q12H Mariaa Austin APRN   10 mL at 12/21/23 0851    sodium chloride 0.9 % flush 10 mL  10 mL Intravenous PRN Austin, Mariaa M, APRN        sodium chloride 0.9 % infusion 40 mL  40 mL Intravenous PRN Mariaa Austin, SYD        sodium chloride 0.9 % infusion  30 mL/hr Intravenous Continuous PRN Nataliia Rivsa MD   Stopped at 12/19/23 1107    vitamin B-12 (CYANOCOBALAMIN) tablet 1,000 mcg  1,000 mcg Oral Daily Mariaa Austin,  "APRN   1,000 mcg at 12/21/23 0851     sodium chloride, 30 mL/hr, Last Rate: Stopped (12/19/23 1107)        acetaminophen **OR** acetaminophen **OR** acetaminophen    senna-docusate sodium **AND** polyethylene glycol **AND** bisacodyl **AND** bisacodyl    dextrose    dextrose    glucagon (human recombinant)    nitroglycerin    ondansetron    oxyCODONE    sodium chloride    sodium chloride    sodium chloride  Attest that current medications reviewed including but not limited to prescriptions, over-the counter, herbals and vitamin/mineral/dietary (nutritional) supplements for name, route of administration, type, dose and frequency and are current using all immediate resources available at time of dictation.    A:    /76 (BP Location: Left arm, Patient Position: Lying)   Pulse 87   Temp 98.7 °F (37.1 °C) (Oral)   Resp 18   Ht 165.1 cm (65\")   Wt 66.8 kg (147 lb 4.3 oz)   SpO2 99%   BMI 24.51 kg/m²     Constitutional:       Appearance: Not in distress. Chronically ill-appearing.      Comments: Elderly    Pulmonary:      Effort: Pulmonary effort is normal.   Cardiovascular:      PMI at left midclavicular line.   Edema:     Peripheral edema absent.   Abdominal:      General: Bowel sounds are normal.      Palpations: Abdomen is soft.      Tenderness: There is no abdominal tenderness.   Genitourinary:     Comments: Purewick with sarah urine noted   Neurological:      Mental Status: Alert and oriented to person, place, and time.   Psychiatric:         Mood and Affect: Affect is tearful.         Speech: Speech normal.         Behavior: Behavior is cooperative.         Thought Content: Thought content normal.         Cognition and Memory: Cognition normal.         Judgment: Judgment normal.         Patient status: Disease state: Deteriorating despite treatments.  Functional status: Palliative Performance Scale Score: Performance 40% based on the following measures: Ambulation: Mainly in bed, Activity and Evidence " of Disease: Unable to do any work, extensive evidence of disease, Self-Care: Mainly assistance required,  Intake: Normal or reduced, LOC: Full, drowsy or confusion   ECOG Status(4) Completely disabled, unable to carry out self-care.  Totally confined to bed or chair.  Nutritional status: Albumin 3.23 on 12/19/2023  Body mass index is 24.91 kg/m².      Family support: The patient receives support from his son..  Advance Directives: Advance Directive Status: Patient has advance directive, copy in chart   POA/Healthcare surrogate-Cody cox.     Impression/Problem List:     Generalized weakness  Acute blood loss anemia     Gastric ulcer   Metastatic prostate cancer to bone    Chronic kidney disease stage III   Congestive heart failure   Obstructive sleep apnea   Sacral decubitus        Recommendations/Plan:  1. Provide support with goals of care  2. [facility] consult pending- for educational purposes        2.  Palliative care encounter  12/19/2023- I had a brief conversation with patient regarding goals of care. He has a fairly good understanding of his medical conditions, which we reviewed. He is aware he is not a  candidate for further active treatment of his malignancy per oncology. They have recommended hospice. He resided at home with his son prior to admission and unfortunately his son woke him up and told him to call 911 because he couldn't care for him anymore at home. Apparently the son was having a chest pain at that time. He reports he has received an update and his son has gastric ulcers.   The requires assistance with ADLs and mainly sits in chair/bed. Of note, I did see this patient on previous admission and he is current with [facility] as of 7/28/2023.  At this time,  he will need assistance with discharge planning. I called patient son/Cody GRAHAM at 1451 and no answer.  I was unable to leave voicemail. I did speak with Mariaa VELARDE at 1447 and we discussed patient case.  It appears the patient son  "is in the hospital as well. I will plan to contact grandchildren tomorrow to support further goals of care conversation. Of note, the [facility] consult is pending until we can determine who will support patient with conversation. I spoke with SYD Dahl, Tata, ASHLEY and [facility].      12/20/2023- I had a brief conversation with patient regarding goals. He is emotional upset with his circumstances and his son's illness. He says he spoke with Dr Pak prior to my arrival and he told him he had about 6 weeks. He hasn't spoke with his son, but his grandson, Mino visited and informed him that his son was here at [facility] as well.  In regards, to discharge planning he is uncertain if his grandsons can care for him and his son is now dealing with his acute illnesses. He worries about losing his home due to having to relocate to a facility that can care for him now. His grandson, Mino is listed second as healthcare surrogate so he gave me permission to call him. I called him at 1148. He is overwhelmed and states patient son is stable. He has limited understanding of patient conditions, because his father typically handling everything. He does state that both he and his brother can't care for the patient at home and they are inquiring about \"live in facilities.\" I explained that our discharge planner met with patient earlier this morning and will be reaching out to him for further support. He and his brother plan to visit later this evening and will discuss with the patient. I will call Mino tomorrow at 0900 at patient beside to provide further support. I spoke with ASHLEY Payton and ASHLEY Orourke.     12/21/2023- I spoke with patient and called his grandson/HCS, Mino while at bedside. We reviewed patient conditions and overall poor prognosis. Their goal at this time is to go rehab facility then reevaluate if patient can return to home setting with support of family. Mino states his father is getting better and due to " discharge in next couple days. He met with discharge planner yesterday and will update them later today of their choices as he was able to discuss with his father last night as well. I did provide information regarding [facility] and [facility]. He is been updated that the patient is current with [facility]. He is agreeable to receiving information from [facility] to understand their services if the decision is made to utilize them after rehab. I called in the referral and they will reach out Mino to explain services.  At this time, the patient is wanting to go to rehab, continue with curative treatment such as blood products and return to hospital if warranted for acute conditions. I spoke with Dr Pak and ASHLEY Ramírez.       Thank you for this consult and allowing us to participate in patient's plan of care. Palliative Care Team will sign off.       Time spent:30 minutes spent reviewing medical and medication records, assessing and examining patient, discussing with family, answering questions, providing some guidance about a plan and documentation of care, and coordinating care with other healthcare members, with > 50% time spent face to face.       Mireille Krishna, APRN  12/21/2023  09:27 EST

## 2023-12-22 NOTE — THERAPY TREATMENT NOTE
Patient Name: Semaj Herrera  : 1940    MRN: [id number]                              Today's Date: 2023       Admit Date: 2023    Visit Dx:     ICD-10-CM ICD-9-CM   1. Acute blood loss anemia  D62 285.1   2. Gastrointestinal hemorrhage, unspecified gastrointestinal hemorrhage type  K92.2 578.9   3. Weakness  R53.1 780.79   4. Prostate cancer metastatic to bone  C61 185    C79.51 198.5   5. Anemia, chronic disease  D63.8 285.29     Patient Active Problem List   Diagnosis    Type 2 diabetes mellitus with kidney complication, with long-term current use of insulin    Generalized weakness    Hyperlipidemia    Hypertension    Left ventricular hypertrophy    Obstructive sleep apnea syndrome treated auto BiPAP    Sinus bradycardia    Prostate cancer metastatic to bone    Mediastinal adenopathy    Malignant neoplasm metastatic to bone    Chronic kidney disease, stage III (moderate)    Diastolic dysfunction    Localized edema    Neuropathy    Hypersomnia due to medical condition treated with modafinil 200 mg every morning    CSA (central sleep apnea)    Restless legs syndrome (RLS)    Psychophysiological insomnia    Edema    Type 2 diabetes mellitus with diabetic chronic kidney disease    Class 2 severe obesity due to excess calories with serious comorbidity and body mass index (BMI) of 35.0 to 35.9 in adult    Aortic stenosis, mild    Ascending aorta dilatation    Fecal impaction in rectum    Lower extremity weakness    Anemia, chronic disease    Metastatic cancer to bone    Moderate malnutrition    Acute blood loss anemia     Past Medical History:   Diagnosis Date    Aortic stenosis, mild 2021    Per echocardiogram     Ascending aorta dilatation     Asthma     Benign prostatic hyperplasia     Bone cancer     Cellulitis of great toe of left foot 10/19/2018    CKD (chronic kidney disease)     Stage 3; followed by Dr. Vicky Duran     Diastolic dysfunction     GRADE II per echo 2018    Difficulty  breathing     during exertion    Dyslipidemia     Erectile dysfunction     Fatigue     Heart murmur     History of blood transfusion     History of kidney stones     HL (hearing loss)     Hyperlipidemia     Hypertension     Hyponatremia     Kidney stone     Left ventricular hypertrophy     per echo 2018    Localized edema     Neuropathy     Obstructive sleep apnea     USING CPAP    Osteoarthritis     Peptic ulcer     Pneumonia     Pneumonia of left upper lobe due to infectious organism 03/15/2019    Prostate cancer     Status post prostatectomy, radiation therapy, and hormone therapy followed by Dr. Lee; metastatsis to bone    Pure hyperglyceridemia     Restless leg syndrome     Sepsis     Sinus bradycardia     Transient cerebral ischemia     Type 2 diabetes mellitus      Past Surgical History:   Procedure Laterality Date    BRONCHOSCOPY N/A 04/09/2018    Procedure: BRONCHOSCOPY;  Surgeon: Bijan Rivera III, MD;  Location: [facility] OR;  Service: Cardiothoracic    COLONOSCOPY      DEEP NECK LYMPH NODE BIOPSY / EXCISION      ENDOSCOPY N/A 12/19/2023    Procedure: ESOPHAGOGASTRODUODENOSCOPY;  Surgeon: Nataliia Rivas MD;  Location: [facility] ENDOSCOPY;  Service: Gastroenterology;  Laterality: N/A;  PRE OP - ANEMIA, heme positive stool  POST OP - DUODENAL ULCERS, ABNORMAL ESOPHAGEAL MUCOSA    HEMORRHOIDECTOMY      LYMPH NODE BIOPSY      MEDIASTINOSCOPY N/A 04/09/2018    Procedure: MEDIASTINOSCOPY WITH LYMPH NODE BIOPSY;  Surgeon: Bijan Rivera III, MD;  Location: [facility] MAIN OR;  Service: Cardiothoracic    OTHER SURGICAL HISTORY      ulcer repair    PROSTATECTOMY  2006      General Information       Row Name 12/22/23 1202          OT Time and Intention    Document Type therapy note (daily note)  -VAL     Mode of Treatment occupational therapy;physical therapy  -VAL       Row Name 12/22/23 1205          General Information    Patient Profile Reviewed yes  -VAL       Row Name 12/22/23 2743          Safety Issues,  Functional Mobility    Comment, Safety Issues/Impairments (Mobility) [place] medically appropriate and necessary due to pt acuity, activity tolerance, to maximize mobility efforts and safety of pt and staff. Focus on progression of care and goals established in plan of care.  -               User Key  (r) = Recorded By, (t) = Taken By, (c) = Cosigned By      Initials Name Provider Type    Giselle Chacon OT Occupational Therapist                     Mobility/ADL's       Row Name 12/22/23 1210          Bed Mobility    Supine-Sit [place] (Bed Mobility) moderate assist (50% patient effort);maximum assist (25% patient effort);2 person assist;verbal cues  -     Sit-Supine [place] (Bed Mobility) maximum assist (25% patient effort);verbal cues;2 person assist  -       Row Name 12/22/23 1210          Transfers    Comment, (Transfers) Declined STS transfers. Lengthy discussion of goals of care for therapy. Pt reports he no longer wants to pursue rehab and wants to be comfortable  -       Row Name 12/22/23 1210          Activities of Daily Living    BADL Assessment/Intervention feeding  -       Row Name 12/22/23 1210          Self-Feeding Assessment/Training    [place] Level (Feeding) feeding skills;liquids to mouth;prepare tray/open items;scoop food and bring to mouth;set up  -     Position (Self-Feeding) sitting up in bed  -               User Key  (r) = Recorded By, (t) = Taken By, (c) = Cosigned By      Initials Name Provider Type    Giselle Chacon OT Occupational Therapist                   Obj/Interventions       Row Name 12/22/23 1211          Motor Skills    Functional Endurance poor  -       Row Name 12/22/23 1211          Balance    Static Sitting Balance contact guard;minimal assist  -               User Key  (r) = Recorded By, (t) = Taken By, (c) = Cosigned By      Initials Name Provider Type    Giselle Chacon OT Occupational Therapist                   Goals/Plan     No documentation.                  Clinical Impression       Row Name 12/22/23 1211          Pain Assessment    Pretreatment Pain Rating 0/10 - no pain  -KA     Posttreatment Pain Rating 0/10 - no pain  -KA       Row Name 12/22/23 1211          Plan of Care Review    Plan of Care Reviewed With patient  -     Outcome Evaluation Pt seen for OT session this AM. Required max Ax2 for bed mobility. Sat with CGA-min A for balance. Set up for self feeding. He declined further ADLs, mobility/standing attempts. He stated he no longer would like to continue therapy given his circumstances. He wants to be comfortable and rest. Nursing notified of pt's wishes.  -KA       Row Name 12/22/23 1211          Vital Signs    Pre Patient Position Supine  -KA     Intra Patient Position Sitting  -KA     Post Patient Position Supine  -KA       Row Name 12/22/23 1211          Positioning and Restraints    Pre-Treatment Position in bed  -KA     Post Treatment Position bed  -KA     In Bed notified nsg;supine;call light within reach;encouraged to call for assist;exit alarm on  -KA               User Key  (r) = Recorded By, (t) = Taken By, (c) = Cosigned By      Initials Name Provider Type    Giselle Chacon, RM Occupational Therapist                   Outcome Measures       Row Name 12/22/23 1214          How much help from another is currently needed...    Putting on and taking off regular lower body clothing? 1  -KA     Bathing (including washing, rinsing, and drying) 1  -KA     Toileting (which includes using toilet bed pan or urinal) 1  -KA     Putting on and taking off regular upper body clothing 2  -KA     Taking care of personal grooming (such as brushing teeth) 2  -KA     Eating meals 3  -KA     AM-PAC 6 Clicks Score (OT) 10  -KA       Row Name 12/22/23 1126 12/22/23 0400       How much help from another person do you currently need...    Turning from your back to your side while in flat bed without using bedrails? 2  -CW 2  -BB     Moving from lying on back to sitting on the side of a flat bed without bedrails? 2  -CW 2  -BB    Moving to and from a bed to a chair (including a wheelchair)? 1  -CW 1  -BB    Standing up from a chair using your arms (e.g., wheelchair, bedside chair)? 1  -CW 1  -BB    Climbing 3-5 steps with a railing? 1  -CW 1  -BB    To walk in hospital room? 1  -CW 1  -BB    AM-PAC 6 Clicks Score (PT) 8  -CW 8  -BB    Highest Level of Mobility Goal 3 --> Sit at edge of bed  -CW 3 --> Sit at edge of bed  -BB      Row Name 12/22/23 1214 12/22/23 1126       Functional Assessment    Outcome Measure Options AM-PAC 6 Clicks Daily Activity (OT)  -VAL AM-PAC 6 Clicks Basic Mobility (PT)  -CW              User Key  (r) = Recorded By, (t) = Taken By, (c) = Cosigned By      Initials Name Provider Type    CW Melva Patel, PT Physical Therapist    Giselle Chacon, OT Occupational Therapist    Josephine Silva, RN Registered Nurse                    Occupational Therapy Education       Title: PT OT SLP Therapies (Done)       Topic: Occupational Therapy (Done)       Point: ADL training (Done)       Description:   Instruct learner(s) on proper safety adaptation and remediation techniques during self care or transfers.   Instruct in proper use of assistive devices.                  Learning Progress Summary             Patient Acceptance, E, VU by VICKIE at 12/21/2023 2242    Acceptance, E, VU by VICKIE at 12/21/2023 0142    Acceptance, E,TB, VU by  at 12/20/2023 1444    Comment: Instructed in role of OT, discharge planning, home safety, participation in ADLs and daily routine                         Point: Home exercise program (Done)       Description:   Instruct learner(s) on appropriate technique for monitoring, assisting and/or progressing therapeutic exercises/activities.                  Learning Progress Summary             Patient Acceptance, E, VU by VICKIE at 12/21/2023 2242    Acceptance, E, VU by  at 12/21/2023 0142     Acceptance, E,TB, VU by  at 12/20/2023 1444    Comment: Instructed in role of OT, discharge planning, home safety, participation in ADLs and daily routine                         Point: Precautions (Done)       Description:   Instruct learner(s) on prescribed precautions during self-care and functional transfers.                  Learning Progress Summary             Patient Acceptance, E, VU by  at 12/21/2023 2242    Acceptance, E, VU by  at 12/21/2023 0142    Acceptance, E,TB, VU by  at 12/20/2023 1444    Comment: Instructed in role of OT, discharge planning, home safety, participation in ADLs and daily routine                         Point: Body mechanics (Done)       Description:   Instruct learner(s) on proper positioning and spine alignment during self-care, functional mobility activities and/or exercises.                  Learning Progress Summary             Patient Acceptance, E, VU by  at 12/21/2023 2242    Acceptance, E, VU by  at 12/21/2023 0142    Acceptance, E,TB, VU by  at 12/20/2023 1444    Comment: Instructed in role of OT, discharge planning, home safety, participation in ADLs and daily routine                                         User Key       Initials Effective Dates Name Provider Type Discipline     08/31/23 -  Tracey Guillen OT Occupational Therapist OT     11/16/23 -  Josephine Cr RN Registered Nurse Nurse                  OT Recommendation and Plan     Plan of Care Review  Plan of Care Reviewed With: patient  Outcome Evaluation: Pt seen for OT session this AM. Required max Ax2 for bed mobility. Sat with CGA-min A for balance. Set up for self feeding. He declined further ADLs, mobility/standing attempts. He stated he no longer would like to continue therapy given his circumstances. He wants to be comfortable and rest. Nursing notified of pt's wishes.     Time Calculation:         Time Calculation- OT       Row Name 12/22/23 1215             Time Calculation- OT    OT  Start Time 0822  -KA      OT Stop Time 0837  -KA      OT Time Calculation (min) 15 min  -KA      Total Timed Code Minutes- OT 15 minute(s)  -KA      OT Received On 12/22/23  -KA      OT - Next Appointment 12/27/23  -KA         Timed Charges    10743 - OT Self Care/Mgmt Minutes 15  -KA         Total Minutes    Timed Charges Total Minutes 15  -KA       Total Minutes 15  -KA                User Key  (r) = Recorded By, (t) = Taken By, (c) = Cosigned By      Initials Name Provider Type    Giselle Chacon OT Occupational Therapist                  Therapy Charges for Today       Code Description Service Date Service Provider Modifiers Qty    33095031652 HC OT SELF CARE/MGMT/TRAIN EA 15 MIN 12/22/2023 Giselle Davenport OT GO 1                 Giselle Davenport OT  12/22/2023

## 2023-12-22 NOTE — PROGRESS NOTES
CHIEF COMPLAINT: End-stage prostate cancer, anemia/thrombocytopenia    Interval history:  Patient transfused packed red blood cells with hemoglobin up to 8.9 12/19  EGD 12/19 with small nonbleeding duodenal ulcers.  The patient has ongoing lower extremity weakness, stool and bladder incontinence.  Patient currently resting comfortably.      HISTORY OF PRESENT ILLNESS:   This is an 83-year-old man follow-up Dr. Lee metastatic prostate cancer in our practice for metastatic prostate cancer who has had metastatic disease since 2018 with multiple sclerotic bone lesions and  at that time.  He has been treated with androgen deprivation therapy, palliative radiation to sites of bony disease/pain control.  He had progression of disease with increasing PSA and started Xtandi 9021 in addition to continuation of Lupron and Xgeva.  He had progression of disease and darolutamide 600 mg twice daily plus Xgeva plus Lupron initiated 4/17/2023.  The patient had progression of disease subsequently with worsening anemia, admission in September for leg weakness secondary to cord compression at T10 (nonsurgical candidate) treated with steroids and radiation.  The patient is not a candidate for further treatment actively of his prostate cancer and hospice has been recommended but so far not instituted.  He has pain of malignancy currently on fentanyl 50 mcg patch and Norco 10/325 as needed breakthrough pain available at home.  He has narcotic induced constipation.     The patient was brought to ER today by family who provide full-time care at home.  The patient has progressive lower extremity weakness x 2 weeks now with inability to stand.  He reports being incontinent of urine and stool.  He has a sacral decubitus ulcer from pressure and incontinence.  Labs drawn in the ER showed worsening counts with hemoglobin 5.8, platelets 41 with significant nucleated red blood cells.      Past Medical History, Past Surgical History,  Social History, Family History have been reviewed and are without significant changes except as mentioned.    Review of Systems   A comprehensive 14 point review of systems was performed and was negative except as mentioned.    Medications:  The current medication list was reviewed in the EMR    ALLERGIES:    Allergies   Allergen Reactions    Niacin Unknown - Low Severity    Statins Unknown - Low Severity       Objective      Vitals:    12/22/23 1310   BP: 144/71   Pulse:    Resp: 18   Temp: 98.7 °F (37.1 °C)   SpO2:           Physical Exam    Resting comfortably  RECENT LABS:  Hematology Results from last 7 days   Lab Units 12/22/23  0437 12/21/23  1117 12/20/23  1051   WBC 10*3/mm3 3.30* 2.46* 3.22*   HEMOGLOBIN g/dL 8.9* 9.1* 10.1*   HEMATOCRIT % 26.2* 28.2* 30.2*   PLATELETS 10*3/mm3 35* 34* 40*     2  Lab Results   Component Value Date    GLUCOSE 125 (H) 12/22/2023    BUN 16 12/22/2023    CREATININE 0.59 (L) 12/22/2023    EGFRIFNONA 56 (L) 02/23/2022    BCR 27.1 (H) 12/22/2023    CO2 20.7 (L) 12/22/2023    CALCIUM 8.6 12/22/2023    ALBUMIN 3.2 (L) 12/19/2023    AST 22 12/19/2023    ALT 13 12/19/2023       Lab Results   Component Value Date    IRON 243 (H) 12/19/2023    TIBC 271 (L) 12/19/2023    FERRITIN 1,987.00 (H) 12/19/2023       Lab Results   Component Value Date    ROWRUGMR14 716 09/18/2023       Lab Results   Component Value Date    FOLATE 6.03 03/20/2023          Assessment & Plan   *Metastatic castrate resistant prostate cancer  The patient has extensive bony metastases and cord compression at T10 previously evaluated not felt to be surgical candidate status post limited radiation and steroid therapy but progressive symptoms of lower extremity weakness and bowel/bladder incontinence  According to Dr. 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 with decreased production of red cells/platelets  12/19/2023-transfused with hemoglobin up to 8.9  12/19/2023-EGD with small nonbleeding duodenal ulcers likely NSAID induced  12/22/2023-WBC 3.3, hemoglobin 8.9, platelets 35     *Pain of malignancy  Pain appears controlled on fentanyl 50 mcg every 72 hour patch and oxycodone 5 mg every 4 hours as needed pain, gabapentin 900 mg nightly     *Cord compression with progressive symptoms  Patient previously evaluated by neurosurgery not felt to be candidate for decompression surgically  The patient was treated with limited radiation  D/c DEX with duodenal ulcerations     *Sacral pressure wound worsened by incontinence     Oncology plan/recommendations:  Transfusion support as needed  Suspect the patient's anemia/thrombocytopenia mostly related to bone marrow involvement of prostate cancer given the significant increase in nucleated red cells and left shifted myeloid cells/myelophthisic process as opposed to GI blood loss.  Continue pain control with fentanyl 50 aurelio patch and oxycodone as needed-encouraged patient to ask for his breakthrough pain medication when needed; continue Neurontin  Dexamethasone DC'd given duodenal ulcerations; avoid NSAIDs  The patient wants to continue transfusion support as needed so likely not a candidate for hospice care at [place]; would be appropriate for subacute rehab with palliative care to follow                    12/22/2023      CC:

## 2023-12-22 NOTE — PLAN OF CARE
Goal Outcome Evaluation:  Plan of Care Reviewed With: patient           Outcome Evaluation: Pt participated with PT this morning. He required max A x2 for bed mobility and declined standing attempts. Pt disclosed that he no longer is interested in rehab given his circumstances. Notified RN of change as palliative may need to come back to further discuss with pt and family. PT will keep on caseload and follow up next week pending ongoing goals of care discussion.      Anticipated Discharge Disposition (PT): extended care facility, skilled nursing facility

## 2023-12-22 NOTE — PLAN OF CARE
Final Anesthesia Post-op Assessment    Patient: Waldemar Dove  Procedure(s) Performed: RIGHT KNEE ARTHROSCOPY: DEBRIDEMENT, LOOSE BODY REMOVAL, AND INJECTION OF PLATELET-RICH PLASMA.  Anesthesia type: General    Vital Last Value   Temperature 36.4 °C (97.6 °F) (02/01/18 1100)   Pulse 82 (02/01/18 1100)   Respiratory Rate 16 (02/01/18 1100)   Non-Invasive   Blood Pressure 151/90 (02/01/18 1100)   Arterial  Blood Pressure     Pulse Oximetry 97 % (02/01/18 1100)     Last 24 I/O:   Intake/Output Summary (Last 24 hours) at 02/01/18 1117  Last data filed at 02/01/18 1046   Gross per 24 hour   Intake              800 ml   Output                0 ml   Net              800 ml       PATIENT LOCATION: [facility]  POST-OP VITAL SIGNS: stable  LEVEL OF CONSCIOUSNESS: participates in exam, awake, oriented, answers questions appropriately and alert  RESPIRATORY STATUS: spontaneous ventilation and room air  CARDIOVASCULAR: blood pressure returned to baseline  HYDRATION: euvolemic    PAIN MANAGEMENT: well controlled  NAUSEA: None  AIRWAY PATENCY: patent  POST-OP ASSESSMENT: no complications, patient tolerated procedure well with no complications, no evidence of recall and sufficiently recovered from acute administration of anesthesia effects and able to participate in evaluation  COMPLICATIONS: none       Goal Outcome Evaluation:  Plan of Care Reviewed With: patient        Progress: improving

## 2023-12-22 NOTE — CASE MANAGEMENT/SOCIAL WORK
Continued Stay Note  [facility]     Patient Name: Semaj Herrera  MRN: [id number]  Today's Date: 12/22/2023    Admit Date: 12/18/2023    Plan: SNF referrals pending   Discharge Plan       Row Name 12/22/23 1343       Plan    Plan SNF referrals pending    Patient/Family in Agreement with Plan yes    Plan Comments CCP spoke to the patient’s grandson and healthcare surrogate Mino. Family is agreeable to SNF placement referrals. Patient requested referral to [facility]. Family requested back up referrals to facilities with good ratings. CCP made referrals.                   Discharge Codes    No documentation.                 Expected Discharge Date and Time       Expected Discharge Date Expected Discharge Time    Dec 22, 2023

## 2023-12-22 NOTE — PLAN OF CARE
Goal Outcome Evaluation:  Plan of Care Reviewed With: patient           Outcome Evaluation: Pt seen for OT session this AM. Required max Ax2 for bed mobility. Sat with CGA-min A for balance. Set up for self feeding. He declined further ADLs, mobility/standing attempts. He stated he no longer would like to continue therapy given his circumstances. He wants to be comfortable and rest. Nursing notified of pt's wishes.

## 2023-12-22 NOTE — DISCHARGE PLACEMENT REQUEST
"Semaj Shirley (83 y.o. Male)       Date of Birth   1940    Social Security Number       Address   [address] [place] [address]    Home Phone   [phone]    MRN   [id number]       [facility]    Marital Status                               Admission Date   12/18/23    Admission Type   Emergency    Admitting Provider   Mratinez Dewey MD    Attending Provider   Martinez Dewey MD    Department, Room/Bed   [address], [address]       Discharge Date       Discharge Disposition       Discharge Destination                                 Attending Provider: Martinez Dewey MD    Allergies: Niacin, Statins    Isolation: None   Infection: Other (12/19/23)   Code Status: No CPR    Ht: 165.1 cm (65\")   Wt: 65.4 kg (144 lb 2.9 oz)    Admission Cmt: None   Principal Problem: Generalized weakness [R53.1]                   Active Insurance as of 12/18/2023       Primary Coverage       Payor Plan Insurance Group Employer/Plan Group    MEDICARE MEDICARE A & B        Payor Plan Address Payor Plan Phone Number Payor Plan Fax Number Effective Dates    [address] [phone]  11/1/2005 - None Entered    [facility] [id number]         Subscriber Name Subscriber Birth Date Member ID       SEMAJ SHIRLEY 1940 [id number]               Secondary Coverage       Payor Plan Insurance Group Employer/Plan Group    AARP  SUP AARP HEALTH CARE OPTIONS        Payor Plan Address Payor Plan Phone Number Payor Plan Fax Number Effective Dates    [facility] [phone]  1/1/2016 - None Entered    [address]       [facility] [id number]         Subscriber Name Subscriber Birth Date Member ID       SEMAJ SHIRLEY 1940 [id number]                     Emergency Contacts        (Rel.) Home Phone Work Phone Mobile Phone    ShirleyCody (HCS) (Son) [phone] -- [phone]    Mino Shirley ([employer] #2) (Grandchild) -- -- [phone]    JavierBaraky (Grandchild) -- -- " [phone]

## 2023-12-22 NOTE — NURSING NOTE
[facility] paged to report a critical of platelets of 35.  Awaiting a call back. Will continue to monitor patient for the rest of the shift.

## 2023-12-22 NOTE — PROGRESS NOTES
Name: Semaj Herrera ADMIT: 2023   : 1940  PCP: Axel Guardado MD    MRN: [id number] LOS: 4 days   AGE/SEX: 83 y.o. male  ROOM: [facility]     Subjective   Subjective   Resting in bed. He did not want to work with therapy today-he does not want to do therapy because it is very difficult and he does not to do that in the limited time that he has left. He states he wants to be comfortable and wants to continue with transfusions because it makes him feel better.     Objective   Objective   Vital Signs  Temp:  [97.9 °F (36.6 °C)-99.9 °F (37.7 °C)] 98.3 °F (36.8 °C)  Heart Rate:  [106-117] 106  Resp:  [18-19] 18  BP: (118-146)/(54-70) 133/54  SpO2:  [98 %] 98 %  on   ;   Device (Oxygen Therapy): room air  Body mass index is 23.99 kg/m².    Physical Exam  Constitutional:       General: He is not in acute distress.     Appearance: He is ill-appearing. He is not toxic-appearing.   Cardiovascular:      Rate and Rhythm: Normal rate and regular rhythm.   Pulmonary:      Effort: No respiratory distress.      Breath sounds: No wheezing or rhonchi.   Abdominal:      General: Bowel sounds are normal.      Palpations: Abdomen is soft.      Tenderness: There is no abdominal tenderness.   Musculoskeletal:         General: Swelling present.   Skin:     General: Skin is warm and dry.      Coloration: Skin is pale.   Neurological:      General: No focal deficit present.      Mental Status: He is oriented to person, place, and time.      Motor: Weakness present.   Psychiatric:         Mood and Affect: Mood normal.         Behavior: Behavior normal.     Results Review:       I reviewed the patient's new clinical results.  Results from last 7 days   Lab Units 23  0437 23  1117 23  1051 23  0746 23  0432   WBC 10*3/mm3 3.30* 2.46* 3.22*  --  3.88   HEMOGLOBIN g/dL 8.9* 9.1* 10.1* 8.9* 8.8*   PLATELETS 10*3/mm3 35* 34* 40*  --  56*     Results from last 7 days   Lab Units 23  0437 23  7445  12/20/23  1051 12/19/23  0432   SODIUM mmol/L 138 134* 132* 137   POTASSIUM mmol/L 3.3* 3.9 3.7 3.9   CHLORIDE mmol/L 104 103 101 105   CO2 mmol/L 20.7* 18.0* 17.6* 18.8*   BUN mg/dL 16 12 16 16   CREATININE mg/dL 0.59* 0.62* 0.61* 0.61*   GLUCOSE mg/dL 125* 271* 204* 155*   Estimated Creatinine Clearance: 87.8 mL/min (A) (by C-G formula based on SCr of 0.59 mg/dL (L)).  Results from last 7 days   Lab Units 12/19/23  0432 12/18/23  0844   ALBUMIN g/dL 3.2* 3.6   BILIRUBIN mg/dL 1.5* 0.9   ALK PHOS U/L 163* 167*   AST (SGOT) U/L 22 18   ALT (SGPT) U/L 13 13     Results from last 7 days   Lab Units 12/22/23  0437 12/21/23  1117 12/20/23  1051 12/19/23  0432 12/18/23  0844   CALCIUM mg/dL 8.6 8.9 8.9 8.4* 8.6   ALBUMIN g/dL  --   --   --  3.2* 3.6       Glucose   Date/Time Value Ref Range Status   12/22/2023 0552 148 (H) 70 - 130 mg/dL Final   12/21/2023 2033 237 (H) 70 - 130 mg/dL Final   12/21/2023 1618 191 (H) 70 - 130 mg/dL Final   12/21/2023 1138 252 (H) 70 - 130 mg/dL Final   12/21/2023 0603 197 (H) 70 - 130 mg/dL Final   12/20/2023 2014 125 70 - 130 mg/dL Final   12/20/2023 1603 287 (H) 70 - 130 mg/dL Final     Scheduled Meds  fentaNYL, 1 patch, Transdermal, Q72H   And  Check Fentanyl Patch Placement, 1 each, Does not apply, Q12H  cholecalciferol, 2,000 Units, Oral, Daily  dilTIAZem CD, 120 mg, Oral, Daily  gabapentin, 900 mg, Oral, Nightly  insulin glargine, 15 Units, Subcutaneous, Nightly  insulin lispro, 2-9 Units, Subcutaneous, 4x Daily AC & at Bedtime  insulin lispro, 4 Units, Subcutaneous, TID With Meals  levothyroxine, 176 mcg, Oral, Once per day on Mon Thu  levothyroxine, 88 mcg, Oral, Once per day on Sun Tue Wed Fri Sat  Menthol-Zinc Oxide, 1 application , Topical, 4x Daily  modafinil, 200 mg, Oral, Daily  pantoprazole, 40 mg, Oral, BID AC  pravastatin, 40 mg, Oral, Nightly  rOPINIRole, 2 mg, Oral, Nightly  senna-docusate sodium, 2 tablet, Oral, BID  sodium chloride, 10 mL, Intravenous, Q12H  vitamin  B-12, 1,000 mcg, Oral, Daily    Continuous Infusions  sodium chloride, 30 mL/hr, Last Rate: Stopped (12/19/23 1107)    PRN Meds    acetaminophen **OR** acetaminophen **OR** acetaminophen    senna-docusate sodium **AND** polyethylene glycol **AND** bisacodyl **AND** bisacodyl    dextrose    dextrose    glucagon (human recombinant)    nitroglycerin    ondansetron    oxyCODONE    sodium chloride    sodium chloride    sodium chloride    Diet: Cardiac Diets; Healthy Heart (2-3 Na+); Texture: Regular Texture (IDDSI 7); Fluid Consistency: Thin (IDDSI 0)    I have personally reviewed:  [x]  Medications  [x]  Laboratory   []  Microbiology   []  Radiology   []  EKG/Telemetry   []  Cardiology/Vascular   []  Pathology   []  Records        Assessment/Plan     Active Hospital Problems    Diagnosis  POA    **Generalized weakness [R53.1]  Yes    Acute blood loss anemia [D62]  Yes    Anemia, chronic disease [D63.8]  Yes    Class 2 severe obesity due to excess calories with serious comorbidity and body mass index (BMI) of 35.0 to 35.9 in adult [E66.01, Z68.35]  Not Applicable    Restless legs syndrome (RLS) [G25.81]  Yes    Diastolic dysfunction [I51.89]  Yes    Neuropathy [G62.9]  Yes    Prostate cancer metastatic to bone [C61, 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     Mr. Herrera is a 83 y.o. male that presented to the hospital with generalized weakness.  He was living at home with his son who was providing 24/7 assistance via hospital bed and [employer] lift.  His son fell ill and he called 911 to have his father taken to the hospital for further care as he is unable to be cared for at home by himself.  He does have  known metastatic prostate cancer with recommendations for hospice in the past, but hospice has not been initiated at this time.  He was noted to have a hemoglobin of 5.8 as well as platelets in the 40s prompting further admission.     Generalized weakness  Multifactorial secondary to metastatic cancer, advanced age, ongoing immobility, and anemia.     Acute blood loss anemia  Anemia of chronic disease  Apparently was heme positive in the emergency room.   S/P EGD 12/18 which showed inflamed, texture changed mucosa in the esophagus as well as nonobstructing nonbleeding duodenal ulcers likely NSAID induced  S/P 2 units PRBC and platelets on 12/18- monitoring CBC and transfusing mainly for symptoms  Continue PPI.     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 They agree with hospice consult at this time and do not feel the need for ongoing Decadron.  Will hold this for now.  Blood transfusions for symptoms  Continue home pain medications including fentanyl patch and as needed oxycodone.  Palliative care following.   Continue gabapentin and Requip.     CKD 3  Hypertension  Follows with Dr. Vicky Duran.  Labs stable.  Continue blood pressure medications including diltiazem.    Hypokalemia  One dose K PO ordered     DM2  Typically uses an insulin pump at home.  However his son is usually the one that manages this.  Continue current regimen for now. Blood glucoses acceptable     Diastolic dysfunction  Mild aortic stenosis  Sees Dr. Lee.  Appears stable.     RONNY  Home CPAP if available.  Otherwise avoid oversedation and use oxygen as needed.    Discharge  SNF  Expected Discharge Date: 12/22/2023; Expected Discharge Time:     Discussed with patient and nursing staff      Martinez Dewey MD  [facility]  12/22/23

## 2023-12-22 NOTE — THERAPY TREATMENT NOTE
Patient Name: Semaj Herrera  : 1940    MRN: [id number]                              Today's Date: 2023       Admit Date: 2023    Visit Dx:     ICD-10-CM ICD-9-CM   1. Acute blood loss anemia  D62 285.1   2. Gastrointestinal hemorrhage, unspecified gastrointestinal hemorrhage type  K92.2 578.9   3. Weakness  R53.1 780.79   4. Prostate cancer metastatic to bone  C61 185    C79.51 198.5   5. Anemia, chronic disease  D63.8 285.29     Patient Active Problem List   Diagnosis    Type 2 diabetes mellitus with kidney complication, with long-term current use of insulin    Generalized weakness    Hyperlipidemia    Hypertension    Left ventricular hypertrophy    Obstructive sleep apnea syndrome treated auto BiPAP    Sinus bradycardia    Prostate cancer metastatic to bone    Mediastinal adenopathy    Malignant neoplasm metastatic to bone    Chronic kidney disease, stage III (moderate)    Diastolic dysfunction    Localized edema    Neuropathy    Hypersomnia due to medical condition treated with modafinil 200 mg every morning    CSA (central sleep apnea)    Restless legs syndrome (RLS)    Psychophysiological insomnia    Edema    Type 2 diabetes mellitus with diabetic chronic kidney disease    Class 2 severe obesity due to excess calories with serious comorbidity and body mass index (BMI) of 35.0 to 35.9 in adult    Aortic stenosis, mild    Ascending aorta dilatation    Fecal impaction in rectum    Lower extremity weakness    Anemia, chronic disease    Metastatic cancer to bone    Moderate malnutrition    Acute blood loss anemia     Past Medical History:   Diagnosis Date    Aortic stenosis, mild 2021    Per echocardiogram     Ascending aorta dilatation     Asthma     Benign prostatic hyperplasia     Bone cancer     Cellulitis of great toe of left foot 10/19/2018    CKD (chronic kidney disease)     Stage 3; followed by Dr. Vicky Duran     Diastolic dysfunction     GRADE II per echo 2018    Difficulty  breathing     during exertion    Dyslipidemia     Erectile dysfunction     Fatigue     Heart murmur     History of blood transfusion     History of kidney stones     HL (hearing loss)     Hyperlipidemia     Hypertension     Hyponatremia     Kidney stone     Left ventricular hypertrophy     per echo 2018    Localized edema     Neuropathy     Obstructive sleep apnea     USING CPAP    Osteoarthritis     Peptic ulcer     Pneumonia     Pneumonia of left upper lobe due to infectious organism 03/15/2019    Prostate cancer     Status post prostatectomy, radiation therapy, and hormone therapy followed by Dr. Lee; metastatsis to bone    Pure hyperglyceridemia     Restless leg syndrome     Sepsis     Sinus bradycardia     Transient cerebral ischemia     Type 2 diabetes mellitus      Past Surgical History:   Procedure Laterality Date    BRONCHOSCOPY N/A 04/09/2018    Procedure: BRONCHOSCOPY;  Surgeon: Bijan Rivera III, MD;  Location: [facility] OR;  Service: Cardiothoracic    COLONOSCOPY      DEEP NECK LYMPH NODE BIOPSY / EXCISION      ENDOSCOPY N/A 12/19/2023    Procedure: ESOPHAGOGASTRODUODENOSCOPY;  Surgeon: Nataliia Rivas MD;  Location: [facility] ENDOSCOPY;  Service: Gastroenterology;  Laterality: N/A;  PRE OP - ANEMIA, heme positive stool  POST OP - DUODENAL ULCERS, ABNORMAL ESOPHAGEAL MUCOSA    HEMORRHOIDECTOMY      LYMPH NODE BIOPSY      MEDIASTINOSCOPY N/A 04/09/2018    Procedure: MEDIASTINOSCOPY WITH LYMPH NODE BIOPSY;  Surgeon: Bijan Rivera III, MD;  Location: [facility] OR;  Service: Cardiothoracic    OTHER SURGICAL HISTORY      ulcer repair    PROSTATECTOMY  2006      General Information       Row Name 12/22/23 1105          Physical Therapy Time and Intention    Document Type therapy note (daily note)  -CW     Mode of Treatment physical therapy  -CW       Row Name 12/22/23 1105          General Information    Existing Precautions/Restrictions fall  -CW       Row Name 12/22/23 1105          Cognition     Orientation Status (Cognition) oriented x 3  -CW       Row Name 12/22/23 1105          Safety Issues, Functional Mobility    Impairments Affecting Function (Mobility) balance;endurance/activity tolerance;strength  -CW     Comment, Safety Issues/Impairments (Mobility) Co treatment medically appropriate and necessary due to patient acuity level, activity tolerance and safety of patient and staff. Treatment is focusing on progression of care and goals established in the POC.  -CW               User Key  (r) = Recorded By, (t) = Taken By, (c) = Cosigned By      Initials Name Provider Type    Melva Ace PT Physical Therapist                   Mobility       Row Name 12/22/23 1110          Bed Mobility    Bed Mobility supine-sit;sit-supine  -CW     Supine-Sit [place] (Bed Mobility) moderate assist (50% patient effort);maximum assist (25% patient effort);2 person assist;verbal cues  -CW     Sit-Supine [place] (Bed Mobility) maximum assist (25% patient effort);verbal cues;2 person assist  -CW     Assistive Device (Bed Mobility) bed rails;head of bed elevated  -CW     Comment, (Bed Mobility) cga to min A at EOB,  -CW       Row Name 12/22/23 1110          Transfers    Comment, (Transfers) pt declined, stating he no longer sees the point in rehab for him given his short remainder of time to live  -CW               User Key  (r) = Recorded By, (t) = Taken By, (c) = Cosigned By      Initials Name Provider Type    Melva Ace PT Physical Therapist                   Obj/Interventions       Row Name 12/22/23 1114          Balance    Static Sitting Balance contact guard;minimal assist  -CW               User Key  (r) = Recorded By, (t) = Taken By, (c) = Cosigned By      Initials Name Provider Type    Melva Ace PT Physical Therapist                   Goals/Plan    No documentation.                  Clinical Impression       Row Name 12/22/23 1115          Pain    Pretreatment Pain Rating  0/10 - no pain  -CW     Posttreatment Pain Rating 0/10 - no pain  -CW       Row Name 12/22/23 1115          Plan of Care Review    Plan of Care Reviewed With patient  -CW     Outcome Evaluation Pt participated with PT this morning. He required max A x2 for bed mobility and declined standing attempts. Pt disclosed that he no longer is interested in rehab given his circumstances. Notified RN of change as palliative may need to come back to further discuss with pt and family. PT will keep on caseload and follow up next week pending ongoing goals of care discussion.  -CW       Row Name 12/22/23 1115          Therapy Assessment/Plan (PT)    Therapy Frequency (PT) 2 times/wk  -CW       Row Name 12/22/23 1115          Vital Signs    O2 Delivery Pre Treatment room air  -CW     O2 Delivery Intra Treatment room air  -CW     O2 Delivery Post Treatment room air  -CW       Row Name 12/22/23 1115          Positioning and Restraints    Pre-Treatment Position in bed  -CW     Post Treatment Position bed  -CW     In Bed notified nsg;call light within reach;encouraged to call for assist;exit alarm on;side rails up x3;fowlers  -CW               User Key  (r) = Recorded By, (t) = Taken By, (c) = Cosigned By      Initials Name Provider Type    CW Melva Patel, PT Physical Therapist                   Outcome Measures       Row Name 12/22/23 1126 12/22/23 0400       How much help from another person do you currently need...    Turning from your back to your side while in flat bed without using bedrails? 2  -CW 2  -BB    Moving from lying on back to sitting on the side of a flat bed without bedrails? 2  -CW 2  -BB    Moving to and from a bed to a chair (including a wheelchair)? 1  -CW 1  -BB    Standing up from a chair using your arms (e.g., wheelchair, bedside chair)? 1  -CW 1  -BB    Climbing 3-5 steps with a railing? 1  -CW 1  -BB    To walk in hospital room? 1  -CW 1  -BB    AM-PAC 6 Clicks Score (PT) 8  -CW 8  -BB    Highest Level  of Mobility Goal 3 --> Sit at edge of bed  -CW 3 --> Sit at edge of bed  -BB      Row Name 12/22/23 0000          How much help from another person do you currently need...    Turning from your back to your side while in flat bed without using bedrails? 2  -BB     Moving from lying on back to sitting on the side of a flat bed without bedrails? 2  -BB     Moving to and from a bed to a chair (including a wheelchair)? 1  -BB     Standing up from a chair using your arms (e.g., wheelchair, bedside chair)? 1  -BB     Climbing 3-5 steps with a railing? 1  -BB     To walk in hospital room? 1  -BB     AM-PAC 6 Clicks Score (PT) 8  -BB     Highest Level of Mobility Goal 3 --> Sit at edge of bed  -BB       Row Name 12/22/23 1126          Functional Assessment    Outcome Measure Options AM-PAC 6 Clicks Basic Mobility (PT)  -CW               User Key  (r) = Recorded By, (t) = Taken By, (c) = Cosigned By      Initials Name Provider Type    Melva Ace, PT Physical Therapist    Josephine Silva, RN Registered Nurse                                 Physical Therapy Education       Title: PT OT SLP Therapies (Done)       Topic: Physical Therapy (Done)       Point: Mobility training (Done)       Learning Progress Summary             Patient Acceptance, E, VU by VICKIE at 12/21/2023 2242    Acceptance, E, VU by VICKIE at 12/21/2023 0142    Acceptance, E, VU,NR by CW at 12/20/2023 1519                         Point: Home exercise program (Done)       Learning Progress Summary             Patient Acceptance, E, VU by BB at 12/21/2023 2242    Acceptance, E, VU by BB at 12/21/2023 0142                         Point: Body mechanics (Done)       Learning Progress Summary             Patient Acceptance, E, VU by BB at 12/21/2023 2242    Acceptance, E, VU by BB at 12/21/2023 0142                         Point: Precautions (Done)       Learning Progress Summary             Patient Acceptance, E, VU by BB at 12/21/2023 2242    Acceptance, E,  VU by VICKIE at 12/21/2023 0142                                         User Key       Initials Effective Dates Name Provider Type Discipline    CW 12/13/22 -  Melva Patel PT Physical Therapist PT    BB 11/16/23 -  Josephine Cr, RN Registered Nurse Nurse                  PT Recommendation and Plan  Planned Therapy Interventions (PT): balance training, bed mobility training, gait training, ROM (range of motion), strengthening, patient/family education, transfer training  Plan of Care Reviewed With: patient  Outcome Evaluation: Pt participated with PT this morning. He required max A x2 for bed mobility and declined standing attempts. Pt disclosed that he no longer is interested in rehab given his circumstances. Notified RN of change as palliative may need to come back to further discuss with pt and family. PT will keep on caseload and follow up next week pending ongoing goals of care discussion.     Time Calculation:         PT Charges       Row Name 12/22/23 1128             Time Calculation    Start Time 0818  -CW      Stop Time 0836  -CW      Time Calculation (min) 18 min  -CW      PT Received On 12/22/23  -CW      PT - Next Appointment 12/26/23  -CW                User Key  (r) = Recorded By, (t) = Taken By, (c) = Cosigned By      Initials Name Provider Type    CW Melva Patel, PT Physical Therapist                  Therapy Charges for Today       Code Description Service Date Service Provider Modifiers Qty    11606983351 HC PT THERAPEUTIC ACT EA 15 MIN 12/22/2023 Melva Patel PT GP 1            PT G-Codes  Outcome Measure Options: AM-PAC 6 Clicks Basic Mobility (PT)  AM-PAC 6 Clicks Score (PT): 8  AM-PAC 6 Clicks Score (OT): 10  PT Discharge Summary  Anticipated Discharge Disposition (PT): extended care facility, skilled nursing facility    Melva Patel PT  12/22/2023

## 2023-12-22 NOTE — PLAN OF CARE
Goal Outcome Evaluation:         Patient given nursing care per MD orders and hospital policies and showed no signs or symptoms of distress this shift

## 2023-12-22 NOTE — CONSULTS
Met with pt and grandsons at bedside and provided explanation of services. Written material provided. Plan is rehab then home. Unsure if will move from [facility] to [facility] for care at home. Pt is already enrolled in [facility] program. [facility] to follow up as needed.    Thank you for the referral.    Nirali Justice RN  [facility]  [phone]

## 2023-12-23 NOTE — NURSING NOTE
Dr Jose Campos notified of patient platelet count of 36. No orders given. Will continue to monitor patient this shift

## 2023-12-23 NOTE — PLAN OF CARE
Goal Outcome Evaluation:  Plan of Care Reviewed With: patient           Outcome Evaluation: PT VSS, oriented x4, generalized weakness, Ax2 for bed mobility. Tolerated care well.

## 2023-12-23 NOTE — PROGRESS NOTES
CHIEF COMPLAINT: End-stage prostate cancer, anemia/thrombocytopenia    Interval history:  Patient transfused packed red blood cells with hemoglobin up to 8.9 12/19  EGD 12/19 with small nonbleeding duodenal ulcers.  The patient has ongoing lower extremity weakness, stool and bladder incontinence from his metastatic prostate cancer with cord compression.  Pain is controlled.  He complains of mucus/phlegm with difficulty expectorating.      HISTORY OF PRESENT ILLNESS:   This is an 83-year-old man follow-up Dr. Lee metastatic prostate cancer in our practice for metastatic prostate cancer who has had metastatic disease since 2018 with multiple sclerotic bone lesions and  at that time.  He has been treated with androgen deprivation therapy, palliative radiation to sites of bony disease/pain control.  He had progression of disease with increasing PSA and started Xtandi 9021 in addition to continuation of Lupron and Xgeva.  He had progression of disease and darolutamide 600 mg twice daily plus Xgeva plus Lupron initiated 4/17/2023.  The patient had progression of disease subsequently with worsening anemia, admission in September for leg weakness secondary to cord compression at T10 (nonsurgical candidate) treated with steroids and radiation.  The patient is not a candidate for further treatment actively of his prostate cancer and hospice has been recommended but so far not instituted.  He has pain of malignancy currently on fentanyl 50 mcg patch and Norco 10/325 as needed breakthrough pain available at home.  He has narcotic induced constipation.     The patient was brought to ER today by family who provide full-time care at home.  The patient has progressive lower extremity weakness x 2 weeks now with inability to stand.  He reports being incontinent of urine and stool.  He has a sacral decubitus ulcer from pressure and incontinence.  Labs drawn in the ER showed worsening counts with hemoglobin 5.8, platelets 41  with significant nucleated red blood cells.      Past Medical History, Past Surgical History, Social History, Family History have been reviewed and are without significant changes except as mentioned.    Review of Systems   A comprehensive 14 point review of systems was performed and was negative except as mentioned.    Medications:  The current medication list was reviewed in the EMR    ALLERGIES:    Allergies   Allergen Reactions    Niacin Unknown - Low Severity    Statins Unknown - Low Severity       Objective      Vitals:    12/23/23 0745   BP: 125/58   Pulse: 83   Resp: 18   Temp: 99.3 °F (37.4 °C)   SpO2: 100%          Physical Exam    CONSTITUTIONAL: pleasant man chronic ill-appearing  HEENT: no icterus, no thrush, moist membranes  LYMPH: no cervical or supraclavicular lad  CV: RRR, S1S2, no murmur  RESP: cta bilat, no wheezing, no rales  GI: soft, non-tender, no splenomegaly, +bs  MUSC: no edema  NEURO: alert and oriented x3, bilateral lower extremity paresis  PSYCH: Depressed mood and affect    RECENT LABS:  Hematology Results from last 7 days   Lab Units 12/23/23  0350 12/22/23  0437 12/21/23  1117   WBC 10*3/mm3 3.34* 3.30* 2.46*   HEMOGLOBIN g/dL 9.2* 8.9* 9.1*   HEMATOCRIT % 27.0* 26.2* 28.2*   PLATELETS 10*3/mm3 36* 35* 34*     2  Lab Results   Component Value Date    GLUCOSE 85 12/23/2023    BUN 18 12/23/2023    CREATININE 0.63 (L) 12/23/2023    EGFRIFNONA 56 (L) 02/23/2022    BCR 28.6 (H) 12/23/2023    CO2 19.0 (L) 12/23/2023    CALCIUM 8.7 12/23/2023    ALBUMIN 3.2 (L) 12/19/2023    AST 22 12/19/2023    ALT 13 12/19/2023       Lab Results   Component Value Date    IRON 243 (H) 12/19/2023    TIBC 271 (L) 12/19/2023    FERRITIN 1,987.00 (H) 12/19/2023       Lab Results   Component Value Date    INEGCOCE99 716 09/18/2023       Lab Results   Component Value Date    FOLATE 6.03 03/20/2023          Assessment & Plan   *Metastatic castrate resistant prostate cancer  The patient has extensive bony  metastases and cord compression at T10 previously evaluated not felt to be surgical candidate status post limited radiation and steroid therapy but progressive symptoms of lower extremity weakness and bowel/bladder incontinence  According to Dr. 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 with decreased production of red cells/platelets  12/19/2023-transfused with hemoglobin up to 8.9  12/19/2023-EGD with small nonbleeding duodenal ulcers likely NSAID induced  12/22/2023-WBC 3.3, hemoglobin 8.9, platelets 35  12/23/2023-WBC 3.3, hemoglobin stable 9.2, platelets stable 36     *Pain of malignancy  Pain appears controlled on fentanyl 50 mcg every 72 hour patch and oxycodone 5 mg every 4 hours as needed pain, gabapentin 900 mg nightly     *Cord compression with progressive symptoms  Patient previously evaluated by neurosurgery not felt to be candidate for decompression surgically  The patient was treated with limited radiation  D/c DEX with duodenal ulcerations     *Sacral pressure wound worsened by incontinence     Oncology plan/recommendations:  Transfusion support as needed (hemoglobin less than 7, platelets less than 10)  Suspect the patient's anemia/thrombocytopenia mostly related to bone marrow involvement of prostate cancer given the significant increase in nucleated red cells and left shifted myeloid cells/myelophthisic process as opposed to GI blood loss.  Continue pain  control with fentanyl 50 aurelio patch and oxycodone as needed-encouraged patient to ask for his breakthrough pain medication when needed; continue Neurontin  Dexamethasone DC'd given duodenal ulcerations; avoid NSAIDs  The patient wants to continue transfusion support as needed so likely not a candidate for hospice care at [place]; would be appropriate for subacute rehab with palliative care to follow  Added Zyrtec x 10 doses for phlegm                    12/23/2023      CC:

## 2023-12-23 NOTE — PROGRESS NOTES
Name: Semaj Herrera ADMIT: 2023   : 1940  PCP: Axel Guardado MD    MRN: [id number] LOS: 5 days   AGE/SEX: 83 y.o. male  ROOM: [facility]     Subjective   Subjective   Resting in bed. Complaining of some phlegm and oncology has added Zyrtec. He is wanting to go home but due to his weakness he is not sure he can go home     Objective   Objective   Vital Signs  Temp:  [98.4 °F (36.9 °C)-99.3 °F (37.4 °C)] 99.3 °F (37.4 °C)  Heart Rate:  [83-95] 83  Resp:  [18] 18  BP: (125-144)/(54-71) 125/58  SpO2:  [98 %-100 %] 100 %  on   ;   Device (Oxygen Therapy): room air  Body mass index is 23.92 kg/m².    Physical Exam  Constitutional:       General: He is not in acute distress.     Appearance: He is ill-appearing. He is not toxic-appearing.   Cardiovascular:      Rate and Rhythm: Normal rate and regular rhythm.   Pulmonary:      Effort: No respiratory distress.      Breath sounds: No wheezing or rhonchi.   Abdominal:      General: Bowel sounds are normal.      Palpations: Abdomen is soft.      Tenderness: There is no abdominal tenderness.   Musculoskeletal:         General: Swelling present.   Skin:     General: Skin is warm and dry.      Coloration: Skin is pale.   Neurological:      General: No focal deficit present.      Mental Status: He is oriented to person, place, and time.      Motor: Weakness present.   Psychiatric:         Mood and Affect: Mood normal.         Behavior: Behavior normal.     Results Review:       I reviewed the patient's new clinical results.  Results from last 7 days   Lab Units 23  0350 23  0437 23  1117 23  1051   WBC 10*3/mm3 3.34* 3.30* 2.46* 3.22*   HEMOGLOBIN g/dL 9.2* 8.9* 9.1* 10.1*   PLATELETS 10*3/mm3 36* 35* 34* 40*     Results from last 7 days   Lab Units 23  0350 23  0437 23  1117 23  1051   SODIUM mmol/L 136 138 134* 132*   POTASSIUM mmol/L 4.2 3.3* 3.9 3.7   CHLORIDE mmol/L 104 104 103 101   CO2 mmol/L 19.0* 20.7* 18.0*  17.6*   BUN mg/dL 18 16 12 16   CREATININE mg/dL 0.63* 0.59* 0.62* 0.61*   GLUCOSE mg/dL 85 125* 271* 204*   Estimated Creatinine Clearance: 81.9 mL/min (A) (by C-G formula based on SCr of 0.63 mg/dL (L)).  Results from last 7 days   Lab Units 12/19/23  0432 12/18/23  0844   ALBUMIN g/dL 3.2* 3.6   BILIRUBIN mg/dL 1.5* 0.9   ALK PHOS U/L 163* 167*   AST (SGOT) U/L 22 18   ALT (SGPT) U/L 13 13     Results from last 7 days   Lab Units 12/23/23  0350 12/22/23  0437 12/21/23  1117 12/20/23  1051 12/19/23  0432 12/18/23  0844   CALCIUM mg/dL 8.7 8.6 8.9 8.9 8.4* 8.6   ALBUMIN g/dL  --   --   --   --  3.2* 3.6       Glucose   Date/Time Value Ref Range Status   12/23/2023 0630 112 70 - 130 mg/dL Final   12/22/2023 2103 259 (H) 70 - 130 mg/dL Final   12/22/2023 1706 141 (H) 70 - 130 mg/dL Final   12/22/2023 1124 227 (H) 70 - 130 mg/dL Final   12/22/2023 0552 148 (H) 70 - 130 mg/dL Final   12/21/2023 2033 237 (H) 70 - 130 mg/dL Final   12/21/2023 1618 191 (H) 70 - 130 mg/dL Final     Scheduled Meds  cetirizine, 5 mg, Oral, BID  fentaNYL, 1 patch, Transdermal, Q72H   And  Check Fentanyl Patch Placement, 1 each, Does not apply, Q12H  cholecalciferol, 2,000 Units, Oral, Daily  dilTIAZem CD, 120 mg, Oral, Daily  gabapentin, 900 mg, Oral, Nightly  guaiFENesin, 600 mg, Oral, Q12H  insulin glargine, 15 Units, Subcutaneous, Nightly  insulin lispro, 2-9 Units, Subcutaneous, 4x Daily AC & at Bedtime  insulin lispro, 4 Units, Subcutaneous, TID With Meals  levothyroxine, 176 mcg, Oral, Once per day on Mon Thu  levothyroxine, 88 mcg, Oral, Once per day on Sun Tue Wed Fri Sat  Menthol-Zinc Oxide, 1 application , Topical, 4x Daily  modafinil, 200 mg, Oral, Daily  pantoprazole, 40 mg, Oral, BID AC  pravastatin, 40 mg, Oral, Nightly  rOPINIRole, 2 mg, Oral, Nightly  senna-docusate sodium, 2 tablet, Oral, BID  sodium chloride, 10 mL, Intravenous, Q12H  vitamin B-12, 1,000 mcg, Oral, Daily    Continuous Infusions  sodium chloride, 30 mL/hr,  Last Rate: Stopped (12/19/23 1107)    PRN Meds    acetaminophen **OR** acetaminophen **OR** acetaminophen    senna-docusate sodium **AND** polyethylene glycol **AND** bisacodyl **AND** bisacodyl    dextrose    dextrose    glucagon (human recombinant)    nitroglycerin    ondansetron    oxyCODONE    sodium chloride    sodium chloride    sodium chloride    Diet: Cardiac Diets; Healthy Heart (2-3 Na+); Texture: Regular Texture (IDDSI 7); Fluid Consistency: Thin (IDDSI 0)    I have personally reviewed:  [x]  Medications  [x]  Laboratory   []  Microbiology   []  Radiology   [x]  EKG/Telemetry   []  Cardiology/Vascular   []  Pathology   []  Records        Assessment/Plan     Active Hospital Problems    Diagnosis  POA    **Generalized weakness [R53.1]  Yes    Acute blood loss anemia [D62]  Yes    Anemia, chronic disease [D63.8]  Yes    Class 2 severe obesity due to excess calories with serious comorbidity and body mass index (BMI) of 35.0 to 35.9 in adult [E66.01, Z68.35]  Not Applicable    Restless legs syndrome (RLS) [G25.81]  Yes    Diastolic dysfunction [I51.89]  Yes    Neuropathy [G62.9]  Yes    Prostate cancer metastatic to bone [C61, 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     Mr. Herrera is a 83 y.o. male that presented to the hospital with generalized weakness.  He was living at home with his son who was providing 24/7 assistance via hospital bed and [employer] lift.  His son fell ill and he called 911 to have his father taken to the hospital for further care as he is unable to be cared for at home by himself.  He does have known metastatic prostate cancer with recommendations for hospice in the past, but  hospice has not been initiated at this time.  He was noted to have a hemoglobin of 5.8 as well as platelets in the 40s prompting further admission.     Generalized weakness  Multifactorial secondary to metastatic cancer, advanced age, ongoing immobility, and anemia.     Acute blood loss anemia  Anemia of chronic disease  Heme positive 12/18/2023  S/P EGD 12/18 which showed inflamed, texture changed mucosa in the esophagus as well as nonobstructing nonbleeding duodenal ulcers likely NSAID induced  S/P 2 units PRBC and platelets on 12/18- monitoring CBC and transfusing for symptoms  Continue PPI.     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 They agree with hospice consult at this time and do not feel the need for ongoing Decadron.  Will hold this for now.  Blood transfusions for symptoms  Continue home pain medications including fentanyl patch and as needed oxycodone.  Palliative care following.   Continue gabapentin and Requip.     CKD 3  Hypertension  Follows with Dr. Vicky Duran.  Labs stable.  Continue blood pressure medications including diltiazem.    Hypokalemia  Improved today after one dose K PO      DM2  Typically uses an insulin pump at home.  However his son is usually the one that manages this.  Continue current regimen for now. Blood glucoses acceptable     Diastolic dysfunction  Mild aortic stenosis  Sees Dr. Lee.  Stable.     RONNY  Home CPAP if available.  Otherwise avoid oversedation and use oxygen as needed.    Discharge  SNF pending  Expected discharge date/ time has not been documented.    Discussed with patient and nursing staff      Martinez Dewey MD  [facility]  12/23/23

## 2023-12-24 NOTE — PLAN OF CARE
Goal Outcome Evaluation:  Plan of Care Reviewed With: patient        Progress: no change  Outcome Evaluation: Pt. A&Ox4.  No report of pain or discomfort.  One incontience episode with urine and 405 on bladder scanner.  Discharge plan to SNF and referrals sent.  VSS.  WCTM for the rest of the shift.

## 2023-12-24 NOTE — PROGRESS NOTES
CHIEF COMPLAINT: End-stage prostate cancer, anemia/thrombocytopenia    Interval history:  Patient transfused packed red blood cells with hemoglobin up to 8.9 12/19  EGD 12/19 with small nonbleeding duodenal ulcers.  The patient has ongoing lower extremity weakness, stool and bladder incontinence from his metastatic prostate cancer with cord compression.  Pain is controlled.  Hgb  7.1 from 9.2? But no clinical bleeding.  Platelets stable 36.      HISTORY OF PRESENT ILLNESS:   This is an 83-year-old man follow-up Dr. Lee metastatic prostate cancer in our practice for metastatic prostate cancer who has had metastatic disease since 2018 with multiple sclerotic bone lesions and  at that time.  He has been treated with androgen deprivation therapy, palliative radiation to sites of bony disease/pain control.  He had progression of disease with increasing PSA and started Xtandi 9021 in addition to continuation of Lupron and Xgeva.  He had progression of disease and darolutamide 600 mg twice daily plus Xgeva plus Lupron initiated 4/17/2023.  The patient had progression of disease subsequently with worsening anemia, admission in September for leg weakness secondary to cord compression at T10 (nonsurgical candidate) treated with steroids and radiation.  The patient is not a candidate for further treatment actively of his prostate cancer and hospice has been recommended but so far not instituted.  He has pain of malignancy currently on fentanyl 50 mcg patch and Norco 10/325 as needed breakthrough pain available at home.  He has narcotic induced constipation.     The patient was brought to ER today by family who provide full-time care at home.  The patient has progressive lower extremity weakness x 2 weeks now with inability to stand.  He reports being incontinent of urine and stool.  He has a sacral decubitus ulcer from pressure and incontinence.  Labs drawn in the ER showed worsening counts with hemoglobin 5.8,  platelets 41 with significant nucleated red blood cells.      Past Medical History, Past Surgical History, Social History, Family History have been reviewed and are without significant changes except as mentioned.    Review of Systems   A comprehensive 14 point review of systems was performed and was negative except as mentioned.    Medications:  The current medication list was reviewed in the EMR    ALLERGIES:    Allergies   Allergen Reactions    Niacin Unknown - Low Severity    Statins Unknown - Low Severity       Objective      Vitals:    12/24/23 0715   BP: 132/63   Pulse: 85   Resp: 18   Temp: 99.3 °F (37.4 °C)   SpO2: 95%          Physical Exam    CONSTITUTIONAL: pleasant man chronic ill-appearing resting comfortably  HEENT: no icterus, no thrush, moist membranes  CV: RRR, S1S2, no murmur  RESP: cta bilat, no wheezing, no rales  NEURO: alert and oriented x3, bilateral lower extremity paresis  PSYCH: Depressed mood and affect    RECENT LABS:  Hematology Results from last 7 days   Lab Units 12/24/23  0346 12/23/23  0350 12/22/23  0437   WBC 10*3/mm3 2.52* 3.34* 3.30*   HEMOGLOBIN g/dL 7.1* 9.2* 8.9*   HEMATOCRIT % 21.4* 27.0* 26.2*   PLATELETS 10*3/mm3 36* 36* 35*     2  Lab Results   Component Value Date    GLUCOSE 78 12/24/2023    BUN 18 12/24/2023    CREATININE 0.55 (L) 12/24/2023    EGFRIFNONA 56 (L) 02/23/2022    BCR 32.7 (H) 12/24/2023    CO2 22.0 12/24/2023    CALCIUM 8.2 (L) 12/24/2023    ALBUMIN 3.2 (L) 12/19/2023    AST 22 12/19/2023    ALT 13 12/19/2023       Lab Results   Component Value Date    IRON 243 (H) 12/19/2023    TIBC 271 (L) 12/19/2023    FERRITIN 1,987.00 (H) 12/19/2023       Lab Results   Component Value Date    [id number] 716 09/18/2023       Lab Results   Component Value Date    FOLATE 6.03 03/20/2023          Assessment & Plan   *Metastatic castrate resistant prostate cancer  The patient has extensive bony metastases and cord compression at T10 previously evaluated not felt to be  surgical candidate status post limited radiation and steroid therapy but progressive symptoms of lower extremity weakness and bowel/bladder incontinence  According to Dr. 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 with decreased production of red cells/platelets  12/19/2023-transfused with hemoglobin up to 8.9  12/19/2023-EGD with small nonbleeding duodenal ulcers likely NSAID induced  12/22/2023-WBC 3.3, hemoglobin 8.9, platelets 35  12/23/2023-WBC 3.3, hemoglobin stable 9.2, platelets stable 36  12/24/23-WBC 2.5, hemoglobin 7.1, platelets 36     *Pain of malignancy  Pain appears controlled on fentanyl 50 mcg every 72 hour patch and oxycodone 5 mg every 4 hours as needed pain, gabapentin 900 mg nightly     *Cord compression with progressive symptoms  Patient previously evaluated by neurosurgery not felt to be candidate for decompression surgically  The patient was treated with limited radiation  D/c DEX with duodenal ulcerations and lack of expected benefit     *Sacral pressure wound worsened by incontinence     Oncology plan/recommendations:  Transfusion support as needed (hemoglobin less than 7, platelets less than 10)-repeat hgb noon  Suspect the patient's anemia/thrombocytopenia mostly related to bone marrow involvement of prostate cancer given the significant increase in nucleated red cells and left shifted myeloid cells/myelophthisic process as opposed to GI blood  loss.  Continue pain control with fentanyl 50 aurelio patch and oxycodone as needed-encouraged patient to ask for his breakthrough pain medication when needed; continue Neurontin  Dexamethasone DC'd given duodenal ulcerations; avoid NSAIDs  The patient wants to continue transfusion support as needed so likely not a candidate for hospice care at [place]; would be appropriate for subacute rehab with palliative care to follow  Added Zyrtec x 10 doses for phlegm                    12/24/2023      CC:

## 2023-12-24 NOTE — PROGRESS NOTES
Name: Semaj Herrera ADMIT: 2023   : 1940  PCP: Axel Guardado MD    MRN: [id number] LOS: 6 days   AGE/SEX: 83 y.o. male  ROOM: [facility]     Subjective   Subjective   Resting in bed. Denies any new complaint. Discussed with nursing staff repeat labs ordered to see if he needs transfusion     Objective   Objective   Vital Signs  Temp:  [97.7 °F (36.5 °C)-99.3 °F (37.4 °C)] 99.3 °F (37.4 °C)  Heart Rate:  [76-85] 85  Resp:  [18] 18  BP: (107-147)/(52-66) 132/63  SpO2:  [93 %-100 %] 95 %  on   ;   Device (Oxygen Therapy): room air  Body mass index is 23.52 kg/m².    Physical Exam  Constitutional:       General: He is not in acute distress.     Appearance: He is ill-appearing. He is not toxic-appearing.   Cardiovascular:      Rate and Rhythm: Normal rate and regular rhythm.   Pulmonary:      Effort: No respiratory distress.      Breath sounds: No wheezing or rhonchi.   Abdominal:      General: Bowel sounds are normal.      Palpations: Abdomen is soft.      Tenderness: There is no abdominal tenderness.   Musculoskeletal:         General: Swelling present.   Skin:     General: Skin is warm and dry.      Coloration: Skin is pale.   Neurological:      General: No focal deficit present.      Mental Status: He is oriented to person, place, and time.      Motor: Weakness present.   Psychiatric:         Mood and Affect: Mood normal.         Behavior: Behavior normal.     Results Review:       I reviewed the patient's new clinical results.  Results from last 7 days   Lab Units 23  0346 23  0350 23  04323  1117   WBC 10*3/mm3 2.52* 3.34* 3.30* 2.46*   HEMOGLOBIN g/dL 7.1* 9.2* 8.9* 9.1*   PLATELETS 10*3/mm3 36* 36* 35* 34*     Results from last 7 days   Lab Units 23  0346 23  0350 23  0437 23  1117   SODIUM mmol/L 136 136 138 134*   POTASSIUM mmol/L 3.5 4.2 3.3* 3.9   CHLORIDE mmol/L 103 104 104 103   CO2 mmol/L 22.0 19.0* 20.7* 18.0*   BUN mg/dL 18 18 16 12    CREATININE mg/dL 0.55* 0.63* 0.59* 0.62*   GLUCOSE mg/dL 78 85 125* 271*   Estimated Creatinine Clearance: 92.3 mL/min (A) (by C-G formula based on SCr of 0.55 mg/dL (L)).  Results from last 7 days   Lab Units 12/19/23  0432 12/18/23  0844   ALBUMIN g/dL 3.2* 3.6   BILIRUBIN mg/dL 1.5* 0.9   ALK PHOS U/L 163* 167*   AST (SGOT) U/L 22 18   ALT (SGPT) U/L 13 13     Results from last 7 days   Lab Units 12/24/23  0346 12/23/23  0350 12/22/23  0437 12/21/23  1117 12/20/23  1051 12/19/23  0432 12/18/23  0844   CALCIUM mg/dL 8.2* 8.7 8.6 8.9   < > 8.4* 8.6   ALBUMIN g/dL  --   --   --   --   --  3.2* 3.6    < > = values in this interval not displayed.       Glucose   Date/Time Value Ref Range Status   12/24/2023 1010 79 70 - 130 mg/dL Final   12/24/2023 0620 89 70 - 130 mg/dL Final   12/24/2023 0039 81 70 - 130 mg/dL Final   12/23/2023 2058 80 70 - 130 mg/dL Final   12/23/2023 1604 94 70 - 130 mg/dL Final   12/23/2023 1110 165 (H) 70 - 130 mg/dL Final   12/23/2023 0630 112 70 - 130 mg/dL Final     Scheduled Meds  cetirizine, 5 mg, Oral, BID  fentaNYL, 1 patch, Transdermal, Q72H   And  Check Fentanyl Patch Placement, 1 each, Does not apply, Q12H  cholecalciferol, 2,000 Units, Oral, Daily  dilTIAZem CD, 120 mg, Oral, Daily  gabapentin, 900 mg, Oral, Nightly  guaiFENesin, 600 mg, Oral, Q12H  insulin glargine, 15 Units, Subcutaneous, Nightly  insulin lispro, 2-9 Units, Subcutaneous, 4x Daily AC & at Bedtime  insulin lispro, 4 Units, Subcutaneous, TID With Meals  levothyroxine, 176 mcg, Oral, Once per day on Mon Thu  levothyroxine, 88 mcg, Oral, Once per day on Sun Tue Wed Fri Sat  modafinil, 200 mg, Oral, Daily  pantoprazole, 40 mg, Oral, BID AC  pravastatin, 40 mg, Oral, Nightly  rOPINIRole, 2 mg, Oral, Nightly  senna-docusate sodium, 2 tablet, Oral, BID  sodium chloride, 10 mL, Intravenous, Q12H  vitamin B-12, 1,000 mcg, Oral, Daily    Continuous Infusions  sodium chloride, 30 mL/hr, Last Rate: Stopped (12/19/23  1107)    PRN Meds    acetaminophen **OR** acetaminophen **OR** acetaminophen    senna-docusate sodium **AND** polyethylene glycol **AND** bisacodyl **AND** bisacodyl    dextrose    dextrose    glucagon (human recombinant)    Menthol-Zinc Oxide    nitroglycerin    ondansetron    oxyCODONE    sodium chloride    sodium chloride    sodium chloride    Diet: Cardiac Diets; Healthy Heart (2-3 Na+); Texture: Regular Texture (IDDSI 7); Fluid Consistency: Thin (IDDSI 0)    I have personally reviewed:  [x]  Medications  [x]  Laboratory   []  Microbiology   []  Radiology   [x]  EKG/Telemetry sinus  []  Cardiology/Vascular   []  Pathology   []  Records        Assessment/Plan     Active Hospital Problems    Diagnosis  POA    **Generalized weakness [R53.1]  Yes    Acute blood loss anemia [D62]  Yes    Anemia, chronic disease [D63.8]  Yes    Class 2 severe obesity due to excess calories with serious comorbidity and body mass index (BMI) of 35.0 to 35.9 in adult [E66.01, Z68.35]  Not Applicable    Restless legs syndrome (RLS) [G25.81]  Yes    Diastolic dysfunction [I51.89]  Yes    Neuropathy [G62.9]  Yes    Prostate cancer metastatic to bone [C61, 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     Mr. Herrera is a 83 y.o. male that presented to the hospital with generalized weakness.  He was living at home with his son who was providing 24/7 assistance via hospital bed and [employer] lift.  His son fell ill and he called 911 to have his father taken to the hospital for further care as he is unable to be cared for at home by himself.  He does have known metastatic prostate cancer with recommendations for hospice in the past, but  hospice has not been initiated at this time.  He was noted to have a hemoglobin of 5.8 as well as platelets in the 40s prompting further admission.     Generalized weakness  Multifactorial secondary to metastatic cancer, advanced age, ongoing immobility, and anemia.     Acute blood loss anemia  Anemia of chronic disease  Heme positive 12/18/2023  S/P EGD 12/18 which showed inflamed, texture changed mucosa in the esophagus as well as nonobstructing nonbleeding duodenal ulcers likely NSAID induced  S/P 2 units PRBC and platelets on 12/18- monitoring CBC and transfusing for symptoms  Hemoglobin dropped from 9 to 7 today repeat lab is pending  Continue PPI.     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   Blood transfusions for symptoms  Continue home pain medications including fentanyl patch and as needed oxycodone. Pain seems to be controlled.  Palliative care following.   Continue gabapentin and Requip.     CKD 3  Hypertension  Follows with Dr. Vicky Duran.  Creatinine stable.  Continue blood pressure medications including diltiazem.    Hypokalemia  Monitoring and replacing as needed. Potassium 3.5 today     DM2  Typically uses an insulin pump at home.  However his son is usually the one that manages this.  Continue current regimen for now. Blood glucoses within normal range. Will back off on long-acting and mealtime and correctional insulin to avoid hypoglycemia     Diastolic dysfunction  Mild aortic stenosis  Sees Dr. Lee.  Stable.     RONNY  Home CPAP if available.  Otherwise avoid oversedation and use oxygen as needed.    Discharge  SNF pending  Expected discharge date/ time has not been documented.    Discussed with patient and nursing staff      Martinez Dewey MD  [facility]  12/24/23

## 2023-12-25 NOTE — PROGRESS NOTES
Name: Semaj Herrera ADMIT: 2023   : 1940  PCP: Axel Guardado MD    MRN: [id number] LOS: 7 days   AGE/SEX: 83 y.o. male  ROOM: [facility]     Subjective   Subjective   Patient is lying in the bed and does not appear to be any major distress.  Chronically ill-appearing.  No reports of nausea, vomiting, abdominal pain, chest pain.     Objective   Objective   Vital Signs  Temp:  [98.2 °F (36.8 °C)-99 °F (37.2 °C)] 98.3 °F (36.8 °C)  Heart Rate:  [75-83] 75  Resp:  [16-18] 18  BP: (108-124)/(50-67) 119/67  SpO2:  [94 %-98 %] 98 %  on   ;   Device (Oxygen Therapy): room air  Body mass index is 24.21 kg/m².    Physical Exam  Constitutional:       General: He is not in acute distress.     Appearance: He is ill-appearing. He is not toxic-appearing.   HENT:      Head: Normocephalic and atraumatic.      Mouth/Throat:      Mouth: Mucous membranes are moist.   Eyes:      Extraocular Movements: Extraocular movements intact.      Pupils: Pupils are equal, round, and reactive to light.   Cardiovascular:      Rate and Rhythm: Normal rate and regular rhythm.   Pulmonary:      Effort: No respiratory distress.      Breath sounds: No wheezing or rhonchi.   Abdominal:      General: Bowel sounds are normal.      Palpations: Abdomen is soft.      Tenderness: There is no abdominal tenderness.   Musculoskeletal:         General: Swelling present.   Skin:     General: Skin is warm and dry.      Coloration: Skin is pale.   Neurological:      General: No focal deficit present.      Mental Status: He is oriented to person, place, and time.      Motor: Weakness present.   Psychiatric:         Mood and Affect: Mood normal.         Behavior: Behavior normal.     Results Review:       I reviewed the patient's new clinical results.  Results from last 7 days   Lab Units 23  0411 23  1152 23  0346 23  0350 23  0437   WBC 10*3/mm3 2.21*  --  2.52* 3.34* 3.30*   HEMOGLOBIN g/dL 6.8* 7.1* 7.1* 9.2* 8.9*    PLATELETS 10*3/mm3 33*  --  36* 36* 35*     Results from last 7 days   Lab Units 12/25/23  0411 12/24/23  0346 12/23/23  0350 12/22/23  0437   SODIUM mmol/L 137 136 136 138   POTASSIUM mmol/L 4.1 3.5 4.2 3.3*   CHLORIDE mmol/L 103 103 104 104   CO2 mmol/L 22.0 22.0 19.0* 20.7*   BUN mg/dL 17 18 18 16   CREATININE mg/dL 0.59* 0.55* 0.63* 0.59*   GLUCOSE mg/dL 64* 78 85 125*   Estimated Creatinine Clearance: 88.6 mL/min (A) (by C-G formula based on SCr of 0.59 mg/dL (L)).  Results from last 7 days   Lab Units 12/19/23  0432   ALBUMIN g/dL 3.2*   BILIRUBIN mg/dL 1.5*   ALK PHOS U/L 163*   AST (SGOT) U/L 22   ALT (SGPT) U/L 13     Results from last 7 days   Lab Units 12/25/23  0411 12/24/23  0346 12/23/23  0350 12/22/23  0437 12/20/23  1051 12/19/23  0432   CALCIUM mg/dL 8.0* 8.2* 8.7 8.6   < > 8.4*   ALBUMIN g/dL  --   --   --   --   --  3.2*    < > = values in this interval not displayed.       Hemoglobin A1C   Date/Time Value Ref Range Status   12/24/2023 1152 5.90 (H) 4.80 - 5.60 % Final     Glucose   Date/Time Value Ref Range Status   12/25/2023 1204 111 70 - 130 mg/dL Final   12/24/2023 2204 70 70 - 130 mg/dL Final   12/24/2023 1638 74 70 - 130 mg/dL Final   12/24/2023 1010 79 70 - 130 mg/dL Final   12/24/2023 0620 89 70 - 130 mg/dL Final   12/24/2023 0039 81 70 - 130 mg/dL Final   12/23/2023 2058 80 70 - 130 mg/dL Final     Scheduled Meds  cetirizine, 5 mg, Oral, BID  fentaNYL, 1 patch, Transdermal, Q72H   And  Check Fentanyl Patch Placement, 1 each, Does not apply, Q12H  gabapentin, 900 mg, Oral, Nightly  guaiFENesin, 600 mg, Oral, Q12H  insulin lispro, 3 Units, Subcutaneous, TID With Meals  levothyroxine, 176 mcg, Oral, Once per day on Mon Thu  levothyroxine, 88 mcg, Oral, Once per day on Sun Tue Wed Fri Sat  modafinil, 200 mg, Oral, Daily  pantoprazole, 40 mg, Oral, BID AC  rOPINIRole, 2 mg, Oral, Nightly  senna-docusate sodium, 2 tablet, Oral, BID  sodium chloride, 10 mL, Intravenous, Q12H    Continuous  Infusions  sodium chloride, 30 mL/hr, Last Rate: Stopped (12/19/23 1107)    PRN Meds    acetaminophen **OR** acetaminophen **OR** acetaminophen    acetaminophen **OR** acetaminophen **OR** acetaminophen    senna-docusate sodium **AND** polyethylene glycol **AND** bisacodyl **AND** bisacodyl    dextrose    dextrose    diphenoxylate-atropine    glucagon (human recombinant)    Morphine **OR** morphine **OR** HYDROmorphone    Morphine **OR** morphine **OR** HYDROmorphone    Morphine **OR** morphine **OR** HYDROmorphone    LORazepam **OR** midazolam **OR** midazolam **OR** midazolam **OR** LORazepam **OR** LORazepam    LORazepam **OR** midazolam **OR** midazolam **OR** midazolam **OR** LORazepam **OR** LORazepam    LORazepam **OR** midazolam **OR** midazolam **OR** LORazepam **OR** LORazepam    Menthol-Zinc Oxide    ondansetron    polyethylene Glycol 400    sodium chloride    sodium chloride    sodium chloride    Diet: Cardiac Diets, Regular/House Diet; Healthy Heart (2-3 Na+); Texture: Regular Texture (IDDSI 7); Fluid Consistency: Thin (IDDSI 0)    I have personally reviewed:  [x]  Medications  [x]  Laboratory   []  Microbiology   []  Radiology   [x]  EKG/Telemetry sinus  []  Cardiology/Vascular   []  Pathology   []  Records        Assessment/Plan     Active Hospital Problems    Diagnosis  POA    **Generalized weakness [R53.1]  Yes    Acute blood loss anemia [D62]  Yes    Anemia, chronic disease [D63.8]  Yes    Class 2 severe obesity due to excess calories with serious comorbidity and body mass index (BMI) of 35.0 to 35.9 in adult [E66.01, Z68.35]  Not Applicable    Restless legs syndrome (RLS) [G25.81]  Yes    Diastolic dysfunction [I51.89]  Yes    Neuropathy [G62.9]  Yes    Prostate cancer metastatic to bone [C61, C79.51]  Yes    Type 2 diabetes mellitus with kidney complication, with long-term current use of insulin [E11.29, Z79.4]  Not Applicable    Obstructive sleep apnea syndrome treated auto BiPAP [G47.33]  Yes     Hypertension [I10]  Yes    Chronic kidney disease, stage III (moderate) [N18.30]  Yes      Resolved Hospital Problems   No resolved problems to display.     Mr. Herrera is a 83 y.o. male that presented to the hospital with generalized weakness.  He was living at home with his son who was providing 24/7 assistance via hospital bed and [employer] lift.  His son fell ill and he called 911 to have his father taken to the hospital for further care as he is unable to be cared for at home by himself.  He does have known metastatic prostate cancer with recommendations for hospice in the past, but hospice has not been initiated at this time.  He was noted to have a hemoglobin of 5.8 as well as platelets in the 40s prompting further admission.     Generalized weakness  Multifactorial secondary to metastatic cancer, advanced age, ongoing immobility, and anemia.  PT/OT following along     Acute blood loss anemia  Anemia of chronic disease  Heme positive 12/18/2023  S/P EGD 12/18 which showed inflamed, texture changed mucosa in the esophagus as well as nonobstructing nonbleeding duodenal ulcers likely NSAID induced  S/P 2 units PRBC and platelets on 12/18- monitoring CBC and transfusing for symptoms  Hemoglobin dropped from 9 to 6.8 today and will not transfuse given that patient is currently in palliative care and no evidence of any active bleeding.  Continue PPI.     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   Blood transfusions for symptoms  Continue home pain medications including fentanyl patch and as needed oxycodone. Pain seems to be controlled.  Palliative care following.   Continue gabapentin and Requip.     CKD 3  Hypertension  Follows with Dr. Vicky Duran.  Creatinine stable.  Continue blood pressure medications including diltiazem.    Hypokalemia  Replace per protocol     DM2  He is on insulin pump at home and is currently on corrective  dose    Diastolic dysfunction  Mild aortic stenosis  Sees Dr. Lee.  Stable.     RONNY  Home CPAP if available.      Discharge  To be determined currently in palliative care      Discussed with patient and nursing staff      Copied text on this note has been reviewed by me on 12/25/2023    Rcok Claros MD  [facility]  12/25/23

## 2023-12-25 NOTE — PROGRESS NOTES
"CHIEF COMPLAINT: End-stage prostate cancer, anemia/thrombocytopenia    Interval history:  12/24/2023  Patient transfused packed red blood cells with hemoglobin up to 8.9 12/19  EGD 12/19 with small nonbleeding duodenal ulcers.  The patient has ongoing lower extremity weakness, stool and bladder incontinence from his metastatic prostate cancer with cord compression.  Pain is controlled.  Hgb  7.1 from 9.2? But no clinical bleeding.  Platelets stable 36.    12/25/2023  Afebrile, vital signs stable  Patient seen at bedside, family contacted-discussed with grandson.  The patient, unfortunately, is weak, \"miserable\" and states it is getting worse.  His performance status has clearly worsened and he is best now a candidate for palliative care.  After discussion with the patient and his grandson we will move to transfer the patient to palliative care.  H&H 6.8 and 20.1, white count of 2210, platelet count of 33,000      HISTORY OF PRESENT ILLNESS:   This is an 83-year-old man follow-up Dr. Lee metastatic prostate cancer in our practice for metastatic prostate cancer who has had metastatic disease since 2018 with multiple sclerotic bone lesions and  at that time.  He has been treated with androgen deprivation therapy, palliative radiation to sites of bony disease/pain control.  He had progression of disease with increasing PSA and started Xtandi 9021 in addition to continuation of Lupron and Xgeva.  He had progression of disease and darolutamide 600 mg twice daily plus Xgeva plus Lupron initiated 4/17/2023.  The patient had progression of disease subsequently with worsening anemia, admission in September for leg weakness secondary to cord compression at T10 (nonsurgical candidate) treated with steroids and radiation.  The patient is not a candidate for further treatment actively of his prostate cancer and hospice has been recommended but so far not instituted.  He has pain of malignancy currently on fentanyl 50 " "mcg patch and Norco 10/325 as needed breakthrough pain available at home.  He has narcotic induced constipation.     The patient was brought to ER today by family who provide full-time care at home.  The patient has progressive lower extremity weakness x 2 weeks now with inability to stand.  He reports being incontinent of urine and stool.  He has a sacral decubitus ulcer from pressure and incontinence.  Labs drawn in the ER showed worsening counts with hemoglobin 5.8, platelets 41 with significant nucleated red blood cells.      Past Medical History, Past Surgical History, Social History, Family History have been reviewed and are without significant changes except as mentioned.    Review of Systems   A comprehensive 14 point review of systems was performed and was negative except as mentioned.    Medications:  The current medication list was reviewed in the EMR    ALLERGIES:    Allergies   Allergen Reactions    Niacin Unknown - Low Severity    Statins Unknown - Low Severity       Objective      Vitals:    12/24/23 2339   BP: 113/50   Pulse: 83   Resp: 16   Temp: 99 °F (37.2 °C)   SpO2: 94%          Physical Exam    CONSTITUTIONAL: pleasant man chronic ill-appearing resting in EKG that he is congested and \"miserable\".  HEENT: no icterus, no thrush, moist membranes  CV: RRR, S1S2, no murmur  RESP: cta bilat, no wheezing, no rales, upper respiratory congestion  NEURO: alert and oriented x3, bilateral lower extremity paresis  PSYCH: Depressed mood and affect    RECENT LABS:  Hematology Results from last 7 days   Lab Units 12/25/23  0411 12/24/23  1152 12/24/23  0346 12/23/23  0350   WBC 10*3/mm3 2.21*  --  2.52* 3.34*   HEMOGLOBIN g/dL 6.8* 7.1* 7.1* 9.2*   HEMATOCRIT % 20.1* 21.0* 21.4* 27.0*   PLATELETS 10*3/mm3 33*  --  36* 36*     2  Lab Results   Component Value Date    GLUCOSE 64 (L) 12/25/2023    BUN 17 12/25/2023    CREATININE 0.59 (L) 12/25/2023    EGFRIFNONA 56 (L) 02/23/2022    BCR 28.8 (H) 12/25/2023    CO2 " 22.0 12/25/2023    CALCIUM 8.0 (L) 12/25/2023    ALBUMIN 3.2 (L) 12/19/2023    AST 22 12/19/2023    ALT 13 12/19/2023       Lab Results   Component Value Date    IRON 243 (H) 12/19/2023    TIBC 271 (L) 12/19/2023    FERRITIN 1,987.00 (H) 12/19/2023       Lab Results   Component Value Date    YNZUWMPS03 716 09/18/2023       Lab Results   Component Value Date    FOLATE 6.03 03/20/2023          Assessment & Plan   *Metastatic castrate resistant prostate cancer  The patient has extensive bony metastases and cord compression at T10 previously evaluated not felt to be surgical candidate status post limited radiation and steroid therapy but progressive symptoms of lower extremity weakness and bowel/bladder incontinence  According to Dr. 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 with decreased production of red cells/platelets  12/19/2023-transfused with hemoglobin up to 8.9  12/19/2023-EGD with small nonbleeding duodenal ulcers likely NSAID induced  12/22/2023-WBC 3.3, hemoglobin 8.9, platelets 35  12/23/2023-WBC 3.3, hemoglobin stable 9.2, platelets stable 36  12/24/23-WBC 2.5, hemoglobin 7.1, platelets 36  Reassessed 12/25 H&H is 6.8 and 20.1, platelet count of 33,000     *Pain of malignancy  Pain appears controlled on fentanyl 50 mcg every 72 hour patch and oxycodone 5 mg every 4 hours as needed pain, gabapentin 900 mg nightly     *Cord compression with progressive symptoms  Patient  previously evaluated by neurosurgery not felt to be candidate for decompression surgically  The patient was treated with limited radiation  D/c DEX with duodenal ulcerations and lack of expected benefit     *Sacral pressure wound worsened by incontinence     Oncology plan/recommendations:  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  At this point his disease has progressed to the point that he is becoming extremely uncomfortable without the possibility of improvement with active interventions such as transfusion.    Discussed with patient and his grandson moving to palliative care which we should not employ.  Plan to transfer to for [facility] today  Medications adjusted, discussed with nursing                12/25/2023      CC:

## 2023-12-25 NOTE — PLAN OF CARE
Goal Outcome Evaluation:  Plan of Care Reviewed With: patient, grandchild(jovanni)              Pt transferring to Palliative Care/[facility]. A/O x4, no complaints of pain during shift. Report given to Radha RAMIREZ. Spoke to pt's grandson and updated on plan of care. Verified understanding.

## 2023-12-26 NOTE — PROGRESS NOTES
"CHIEF COMPLAINT: End-stage prostate cancer, anemia/thrombocytopenia    Interval history:  12/24/2023  Patient transfused packed red blood cells with hemoglobin up to 8.9 12/19  EGD 12/19 with small nonbleeding duodenal ulcers.  The patient has ongoing lower extremity weakness, stool and bladder incontinence from his metastatic prostate cancer with cord compression.  Pain is controlled.  Hgb  7.1 from 9.2? But no clinical bleeding.  Platelets stable 36.    12/25/2023  Afebrile, vital signs stable  Patient seen at bedside, family contacted-discussed with grandson.  The patient, unfortunately, is weak, \"miserable\" and states it is getting worse.  His performance status has clearly worsened and he is best now a candidate for palliative care.  After discussion with the patient and his grandson we will move to transfer the patient to palliative care.  H&H 6.8 and 20.1, white count of 2210, platelet count of 33,000    12/26/2023  Case discussed with patient and his son.He is best treated with palliative care in hospital.  Afebrile, vital signs stable  Patient now transferred to [facility] palliative care.  Reviewed patient concerns.      HISTORY OF PRESENT ILLNESS:   This is an 83-year-old man follow-up Dr. Lee metastatic prostate cancer in our practice for metastatic prostate cancer who has had metastatic disease since 2018 with multiple sclerotic bone lesions and  at that time.  He has been treated with androgen deprivation therapy, palliative radiation to sites of bony disease/pain control.  He had progression of disease with increasing PSA and started Xtandi 9021 in addition to continuation of Lupron and Xgeva.  He had progression of disease and darolutamide 600 mg twice daily plus Xgeva plus Lupron initiated 4/17/2023.  The patient had progression of disease subsequently with worsening anemia, admission in September for leg weakness secondary to cord compression at T10 (nonsurgical candidate) treated with " "steroids and radiation.  The patient is not a candidate for further treatment actively of his prostate cancer and hospice has been recommended but so far not instituted.  He has pain of malignancy currently on fentanyl 50 mcg patch and Norco 10/325 as needed breakthrough pain available at home.  He has narcotic induced constipation.     The patient was brought to ER today by family who provide full-time care at home.  The patient has progressive lower extremity weakness x 2 weeks now with inability to stand.  He reports being incontinent of urine and stool.  He has a sacral decubitus ulcer from pressure and incontinence.  Labs drawn in the ER showed worsening counts with hemoglobin 5.8, platelets 41 with significant nucleated red blood cells.      Past Medical History, Past Surgical History, Social History, Family History have been reviewed and are without significant changes except as mentioned.    Review of Systems   A comprehensive 14 point review of systems was performed and was negative except as mentioned.    Medications:  The current medication list was reviewed in the EMR    ALLERGIES:    Allergies   Allergen Reactions    Niacin Unknown - Low Severity    Statins Unknown - Low Severity       Objective      Vitals:    12/26/23 0535   BP: 134/69   Pulse: 84   Resp: 18   Temp: 97.7 °F (36.5 °C)   SpO2: 95%          Physical Exam    CONSTITUTIONAL: pleasant man chronic ill-appearing resting in EKG that he is congested and \"miserable\".  HEENT: no icterus, no thrush, moist membranes  CV: RRR, S1S2, no murmur  RESP: cta bilat, no wheezing, no rales, upper respiratory congestion  NEURO: alert and oriented x3, bilateral lower extremity paresis  PSYCH: Depressed mood and affect    RECENT LABS:  Hematology Results from last 7 days   Lab Units 12/25/23  0411 12/24/23  1152 12/24/23  0346 12/23/23  0350   WBC 10*3/mm3 2.21*  --  2.52* 3.34*   HEMOGLOBIN g/dL 6.8* 7.1* 7.1* 9.2*   HEMATOCRIT % 20.1* 21.0* 21.4* 27.0* "   PLATELETS 10*3/mm3 33*  --  36* 36*     2  Lab Results   Component Value Date    GLUCOSE 64 (L) 12/25/2023    BUN 17 12/25/2023    CREATININE 0.59 (L) 12/25/2023    EGFRIFNONA 56 (L) 02/23/2022    BCR 28.8 (H) 12/25/2023    CO2 22.0 12/25/2023    CALCIUM 8.0 (L) 12/25/2023    ALBUMIN 3.2 (L) 12/19/2023    AST 22 12/19/2023    ALT 13 12/19/2023       Lab Results   Component Value Date    IRON 243 (H) 12/19/2023    TIBC 271 (L) 12/19/2023    FERRITIN 1,987.00 (H) 12/19/2023       Lab Results   Component Value Date    UOLROEXY72 716 09/18/2023       Lab Results   Component Value Date    FOLATE 6.03 03/20/2023          Assessment & Plan   *Metastatic castrate resistant prostate cancer  The patient has extensive bony metastases and cord compression at T10 previously evaluated not felt to be surgical candidate status post limited radiation and steroid therapy but progressive symptoms of lower extremity weakness and bowel/bladder incontinence  According to Dr. 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 with decreased production of red cells/platelets  12/19/2023-transfused with hemoglobin up to 8.9  12/19/2023-EGD with small nonbleeding duodenal ulcers likely NSAID induced  12/22/2023-WBC 3.3, hemoglobin 8.9, platelets 35  12/23/2023-WBC 3.3, hemoglobin stable 9.2, platelets stable 36  12/24/23-WBC 2.5, hemoglobin 7.1, platelets 36  Reassessed 12/25 H&H is 6.8 and 20.1, platelet count  of 33,000     *Pain of malignancy  Pain appears controlled on fentanyl 50 mcg every 72 hour patch and oxycodone 5 mg every 4 hours as needed pain, gabapentin 900 mg nightly     *Cord compression with progressive symptoms  Patient previously evaluated by neurosurgery not felt to be candidate for decompression surgically  The patient was treated with limited radiation  D/c DEX with duodenal ulcerations and lack of expected benefit     *Sacral pressure wound worsened by incontinence     Oncology plan/recommendations:  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  At this point his disease has progressed to the point that he is becoming extremely uncomfortable without the possibility of improvement with active interventions such as transfusion.    Discussed with patient and his grandson moving to palliative care which we should not employ.  Patient currently on palliative care, plan to request scheduled bed status                12/26/2023      CC:

## 2023-12-26 NOTE — PLAN OF CARE
"  Problem: Adult Inpatient Plan of Care  Goal: Plan of Care Review  Recent Flowsheet Documentation  Taken 12/26/2023 0629 by Mary Jo Kamara RN  Progress: no change  Plan of Care Reviewed With: patient  Outcome Evaluation: Patient slept between care. Bed alarm on. Patient refused some of his oral meds saying \"Why do I still have to take this medicines?!.\"  Patient is alert and oriented. Denies pain and discomfort. Continue with plan of care.   Goal Outcome Evaluation:  Plan of Care Reviewed With: patient        Progress: no change  Outcome Evaluation: Patient slept between care. Bed alarm on. Patient refused some of his oral meds saying \"Why do I still have to take this medicines?!.\"  Patient is alert and oriented. Denies pain and discomfort. Continue with plan of care.         "

## 2023-12-26 NOTE — PROGRESS NOTES
SW met with Mr. Herrera for introduction to palliative SW role and to assess potential psychosocial needs. Mr. Herrera shared that he had previously been living at home with his son, who had been providing care for him. He is hopeful to become strong enough to go back home and get himself to the bathroom, but admits that may not be realistic. He is worried about being able to go back home. Patient hopeful that a short term stay at a SNF will help to improve his strength and allow for him to return home. Offered support and connected with [facility] re: discharge plan. Will continue follow-up for support.

## 2023-12-26 NOTE — CASE MANAGEMENT/SOCIAL WORK
Continued Stay Note  [facility]     Patient Name: Semaj Herrera  MRN: [id number]  Today's Date: 12/26/2023    Admit Date: 12/18/2023    Plan: Pending SNF referrals   Discharge Plan       Row Name 12/26/23 1419       Plan    Plan Pending SNF referrals    Plan Comments CCP met with patient at bedside. Patient is interested in SNF at discharge. Patient gave CCP permission to speak with his grandson, Alex. CCP spoke with Mino; he is agreeable to referrals with facilities with 4/5 star ratings. CCP spoke with Susan/Thai and she is evaluating. Other referrals pending. Michelle WILLETT                   Discharge Codes    No documentation.                 Expected Discharge Date and Time       Expected Discharge Date Expected Discharge Time    Dec 29, 2023               TAMIE Meyer

## 2023-12-26 NOTE — PROGRESS NOTES
Name: Semaj Herrera ADMIT: 2023   : 1940  PCP: Axel Guardado MD    MRN: [id number] LOS: 8 days   AGE/SEX: 83 y.o. male  ROOM: [facility]     Subjective   Subjective   Patient is lying in the bed and does not appear to be any major distress.  Chronically ill-appearing.  No reports of nausea, vomiting, abdominal pain, chest pain.     Objective   Objective   Vital Signs  Temp:  [97.7 °F (36.5 °C)-98.3 °F (36.8 °C)] 98.3 °F (36.8 °C)  Heart Rate:  [82-84] 82  Resp:  [18] 18  BP: (134-143)/(68-69) 143/68  SpO2:  [95 %-97 %] 97 %  on   ;   Device (Oxygen Therapy): room air  Body mass index is 24.21 kg/m².    Physical Exam  Constitutional:       General: He is not in acute distress.     Appearance: He is ill-appearing. He is not toxic-appearing.   HENT:      Head: Normocephalic and atraumatic.      Mouth/Throat:      Mouth: Mucous membranes are moist.   Eyes:      Extraocular Movements: Extraocular movements intact.      Pupils: Pupils are equal, round, and reactive to light.   Cardiovascular:      Rate and Rhythm: Normal rate and regular rhythm.   Pulmonary:      Effort: No respiratory distress.      Breath sounds: No wheezing or rhonchi.   Abdominal:      General: Bowel sounds are normal.      Palpations: Abdomen is soft.      Tenderness: There is no abdominal tenderness.   Musculoskeletal:         General: Swelling present.   Skin:     General: Skin is warm and dry.      Coloration: Skin is pale.   Neurological:      General: No focal deficit present.      Mental Status: He is oriented to person, place, and time.      Motor: Weakness present.   Psychiatric:         Mood and Affect: Mood normal.         Behavior: Behavior normal.     Results Review:       I reviewed the patient's new clinical results.  Results from last 7 days   Lab Units 23  0411 23  1152 23  0346 23  0350 23  0437   WBC 10*3/mm3 2.21*  --  2.52* 3.34* 3.30*   HEMOGLOBIN g/dL 6.8* 7.1* 7.1* 9.2* 8.9*    PLATELETS 10*3/mm3 33*  --  36* 36* 35*     Results from last 7 days   Lab Units 12/25/23  0411 12/24/23  0346 12/23/23  0350 12/22/23  0437   SODIUM mmol/L 137 136 136 138   POTASSIUM mmol/L 4.1 3.5 4.2 3.3*   CHLORIDE mmol/L 103 103 104 104   CO2 mmol/L 22.0 22.0 19.0* 20.7*   BUN mg/dL 17 18 18 16   CREATININE mg/dL 0.59* 0.55* 0.63* 0.59*   GLUCOSE mg/dL 64* 78 85 125*   Estimated Creatinine Clearance: 88.6 mL/min (A) (by C-G formula based on SCr of 0.59 mg/dL (L)).        Results from last 7 days   Lab Units 12/25/23 0411 12/24/23  0346 12/23/23  0350 12/22/23  0437   CALCIUM mg/dL 8.0* 8.2* 8.7 8.6       Hemoglobin A1C   Date/Time Value Ref Range Status   12/24/2023 1152 5.90 (H) 4.80 - 5.60 % Final     Glucose   Date/Time Value Ref Range Status   12/25/2023 1709 210 (H) 70 - 130 mg/dL Final   12/25/2023 1204 111 70 - 130 mg/dL Final   12/24/2023 2204 70 70 - 130 mg/dL Final   12/24/2023 1638 74 70 - 130 mg/dL Final   12/24/2023 1010 79 70 - 130 mg/dL Final   12/24/2023 0620 89 70 - 130 mg/dL Final   12/24/2023 0039 81 70 - 130 mg/dL Final     Scheduled Meds  cetirizine, 5 mg, Oral, BID  fentaNYL, 1 patch, Transdermal, Q72H   And  Check Fentanyl Patch Placement, 1 each, Does not apply, Q12H  gabapentin, 900 mg, Oral, Nightly  guaiFENesin, 600 mg, Oral, Q12H  levothyroxine, 176 mcg, Oral, Once per day on Mon Thu  levothyroxine, 88 mcg, Oral, Once per day on Sun Tue Wed Fri Sat  modafinil, 200 mg, Oral, Daily  pantoprazole, 40 mg, Oral, BID AC  rOPINIRole, 2 mg, Oral, Nightly  senna-docusate sodium, 2 tablet, Oral, BID  sodium chloride, 10 mL, Intravenous, Q12H    Continuous Infusions  sodium chloride, 30 mL/hr, Last Rate: Stopped (12/19/23 1107)    PRN Meds    acetaminophen **OR** acetaminophen **OR** acetaminophen    acetaminophen **OR** acetaminophen **OR** acetaminophen    senna-docusate sodium **AND** polyethylene glycol **AND** bisacodyl **AND** bisacodyl    dextrose    dextrose     diphenoxylate-atropine    glucagon (human recombinant)    Morphine **OR** morphine **OR** HYDROmorphone    Morphine **OR** morphine **OR** HYDROmorphone    Morphine **OR** morphine **OR** HYDROmorphone    LORazepam **OR** midazolam **OR** midazolam **OR** midazolam **OR** LORazepam **OR** LORazepam    LORazepam **OR** midazolam **OR** midazolam **OR** midazolam **OR** LORazepam **OR** LORazepam    LORazepam **OR** midazolam **OR** midazolam **OR** LORazepam **OR** LORazepam    Menthol-Zinc Oxide    ondansetron    polyethylene Glycol 400    sodium chloride    sodium chloride    sodium chloride    Diet: Cardiac Diets, Regular/House Diet; Healthy Heart (2-3 Na+); Texture: Regular Texture (IDDSI 7); Fluid Consistency: Thin (IDDSI 0)    I have personally reviewed:  [x]  Medications  [x]  Laboratory   []  Microbiology   []  Radiology   [x]  EKG/Telemetry sinus  []  Cardiology/Vascular   []  Pathology   []  Records        Assessment/Plan     Active Hospital Problems    Diagnosis  POA    **Generalized weakness [R53.1]  Yes    Acute blood loss anemia [D62]  Yes    Anemia, chronic disease [D63.8]  Yes    Class 2 severe obesity due to excess calories with serious comorbidity and body mass index (BMI) of 35.0 to 35.9 in adult [E66.01, Z68.35]  Not Applicable    Restless legs syndrome (RLS) [G25.81]  Yes    Diastolic dysfunction [I51.89]  Yes    Neuropathy [G62.9]  Yes    Prostate cancer metastatic to bone [C61, 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     Mr. Herrera is a 83 y.o. male that presented to the hospital with generalized weakness.  He was living at home with his son who was providing 24/7 assistance via  hospital bed and [employer] lift.  His son fell ill and he called 911 to have his father taken to the hospital for further care as he is unable to be cared for at home by himself.  He does have known metastatic prostate cancer with recommendations for hospice in the past, but hospice has not been initiated at this time.  He was noted to have a hemoglobin of 5.8 as well as platelets in the 40s prompting further admission.     Generalized weakness  Multifactorial secondary to metastatic cancer, advanced age, ongoing immobility, and anemia.  PT/OT following along and will most likely need rehab placement.     Acute blood loss anemia  Anemia of chronic disease  Heme positive 12/18/2023  S/P EGD 12/18 which showed inflamed, texture changed mucosa in the esophagus as well as nonobstructing nonbleeding duodenal ulcers likely NSAID induced  S/P 2 units PRBC and platelets on 12/18- monitoring CBC and transfusing for symptoms  Hemoglobin dropped from 9 to 6.8 today and will not transfuse given that patient is currently in palliative care and no evidence of any active bleeding.  Continue PPI.     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   Blood transfusions for symptoms  Continue home pain medications including fentanyl patch and as needed oxycodone. Pain seems to be controlled.  Palliative care following.   Continue gabapentin and Requip.     CKD 3  Hypertension  Follows with Dr. Vicky Duran.  Creatinine stable.  Continue blood pressure medications including diltiazem.    Hypokalemia  Replace per protocol     DM2  He is on insulin pump at home and is currently on corrective dose    Diastolic dysfunction  Mild aortic stenosis  Sees Dr. Lee.  Stable.     RONNY  Home CPAP if available.      Discharge  Palliative care did evaluate and patient would like to go to SNF if he qualifies      Discussed with patient and nursing staff      Copied text on this note  has been reviewed by me on 12/26/2023    Rock Claros MD  [facility]  12/26/23

## 2023-12-26 NOTE — CONSULTS
Patient transferred to the palliative care unit following goals of care and treatment preferences discussion with Dr. Lee.  Patient and/or family state goal of care to be comfort and treatment preferences to be geared toward symptom management and increased quality of life.  The palliative care team will follow for further support and discussion as needed.

## 2023-12-27 NOTE — PLAN OF CARE
Goal Outcome Evaluation:   Patient resting comfortably in bed, turns every 4 hours and pain medication as needed. He tried a dose of sublingual morphine, but was inadequate. 2mg IV morphine was more effective. Eating about 25-50% meals. Oriented x3-4. Mepilex added to heels, and coccyx mepilex changed. Will cont to monitor for comfort.

## 2023-12-27 NOTE — PLAN OF CARE
Goal Outcome Evaluation:  Plan of Care Reviewed With: patient        Progress: no change  Outcome Evaluation: Denies pain or soa. Appears calm and comfortable. Scheduled medications as orered. Assisted with turns and incontinence care. Will continue palliative care.

## 2023-12-27 NOTE — PROGRESS NOTES
Name: Semaj Herrear ADMIT: 2023   : 1940  PCP: Axel Guardado MD    MRN: [id number] LOS: 9 days   AGE/SEX: 83 y.o. male  ROOM: [facility]     Subjective   Subjective   Patient is lying in the bed and does not appear to be any major distress.  Chronically ill-appearing.  No reports of nausea, vomiting, abdominal pain, chest pain, shortness of breath.     Objective   Objective   Vital Signs  Temp:  [97.2 °F (36.2 °C)-97.4 °F (36.3 °C)] 97.2 °F (36.2 °C)  Heart Rate:  [77-85] 85  Resp:  [16] 16  BP: (110-119)/(63-65) 119/65  SpO2:  [96 %-98 %] 96 %  on   ;   Device (Oxygen Therapy): room air  Body mass index is 24.21 kg/m².    Physical Exam  Constitutional:       General: He is not in acute distress.     Appearance: He is ill-appearing. He is not toxic-appearing.   HENT:      Head: Normocephalic and atraumatic.      Mouth/Throat:      Mouth: Mucous membranes are moist.   Eyes:      Extraocular Movements: Extraocular movements intact.      Pupils: Pupils are equal, round, and reactive to light.   Cardiovascular:      Rate and Rhythm: Normal rate and regular rhythm.   Pulmonary:      Effort: No respiratory distress.      Breath sounds: No wheezing or rhonchi.   Abdominal:      General: Bowel sounds are normal.      Palpations: Abdomen is soft.      Tenderness: There is no abdominal tenderness.   Musculoskeletal:         General: Swelling present.   Skin:     General: Skin is warm and dry.      Coloration: Skin is pale.   Neurological:      General: No focal deficit present.      Mental Status: He is oriented to person, place, and time.      Motor: Weakness present.   Psychiatric:         Mood and Affect: Mood normal.         Behavior: Behavior normal.     Results Review:       I reviewed the patient's new clinical results.  Results from last 7 days   Lab Units 23  0411 23  1152 23  0346 23  0350 23  0437   WBC 10*3/mm3 2.21*  --  2.52* 3.34* 3.30*   HEMOGLOBIN g/dL 6.8* 7.1* 7.1*  9.2* 8.9*   PLATELETS 10*3/mm3 33*  --  36* 36* 35*     Results from last 7 days   Lab Units 12/25/23 0411 12/24/23  0346 12/23/23  0350 12/22/23  0437   SODIUM mmol/L 137 136 136 138   POTASSIUM mmol/L 4.1 3.5 4.2 3.3*   CHLORIDE mmol/L 103 103 104 104   CO2 mmol/L 22.0 22.0 19.0* 20.7*   BUN mg/dL 17 18 18 16   CREATININE mg/dL 0.59* 0.55* 0.63* 0.59*   GLUCOSE mg/dL 64* 78 85 125*   Estimated Creatinine Clearance: 88.6 mL/min (A) (by C-G formula based on SCr of 0.59 mg/dL (L)).        Results from last 7 days   Lab Units 12/25/23 0411 12/24/23 0346 12/23/23  0350 12/22/23  0437   CALCIUM mg/dL 8.0* 8.2* 8.7 8.6       Glucose   Date/Time Value Ref Range Status   12/25/2023 1709 210 (H) 70 - 130 mg/dL Final   12/25/2023 1204 111 70 - 130 mg/dL Final   12/24/2023 2204 70 70 - 130 mg/dL Final     Scheduled Meds  cetirizine, 5 mg, Oral, BID  fentaNYL, 1 patch, Transdermal, Q72H   And  Check Fentanyl Patch Placement, 1 each, Does not apply, Q12H  gabapentin, 900 mg, Oral, Nightly  guaiFENesin, 600 mg, Oral, Q12H  levothyroxine, 176 mcg, Oral, Once per day on Mon Thu  levothyroxine, 88 mcg, Oral, Once per day on Sun Tue Wed Fri Sat  modafinil, 200 mg, Oral, Daily  pantoprazole, 40 mg, Oral, BID AC  rOPINIRole, 2 mg, Oral, Nightly  senna-docusate sodium, 2 tablet, Oral, BID  sodium chloride, 10 mL, Intravenous, Q12H    Continuous Infusions  sodium chloride, 30 mL/hr, Last Rate: Stopped (12/19/23 1107)    PRN Meds    acetaminophen **OR** acetaminophen **OR** acetaminophen    acetaminophen **OR** acetaminophen **OR** acetaminophen    senna-docusate sodium **AND** polyethylene glycol **AND** bisacodyl **AND** bisacodyl    dextrose    dextrose    diphenoxylate-atropine    glucagon (human recombinant)    Morphine **OR** morphine **OR** HYDROmorphone    Morphine **OR** morphine **OR** HYDROmorphone    Morphine **OR** morphine **OR** HYDROmorphone    LORazepam **OR** midazolam **OR** midazolam **OR** midazolam **OR**  LORazepam **OR** LORazepam    LORazepam **OR** midazolam **OR** midazolam **OR** midazolam **OR** LORazepam **OR** LORazepam    LORazepam **OR** midazolam **OR** midazolam **OR** LORazepam **OR** LORazepam    Menthol-Zinc Oxide    ondansetron    polyethylene Glycol 400    sodium chloride    sodium chloride    sodium chloride    Diet: Cardiac Diets, Regular/House Diet; Healthy Heart (2-3 Na+); Texture: Regular Texture (IDDSI 7); Fluid Consistency: Thin (IDDSI 0)    I have personally reviewed:  [x]  Medications  [x]  Laboratory   []  Microbiology   []  Radiology   [x]  EKG/Telemetry sinus  []  Cardiology/Vascular   []  Pathology   []  Records        Assessment/Plan     Active Hospital Problems    Diagnosis  POA    **Generalized weakness [R53.1]  Yes    Acute blood loss anemia [D62]  Yes    Anemia, chronic disease [D63.8]  Yes    Class 2 severe obesity due to excess calories with serious comorbidity and body mass index (BMI) of 35.0 to 35.9 in adult [E66.01, Z68.35]  Not Applicable    Restless legs syndrome (RLS) [G25.81]  Yes    Diastolic dysfunction [I51.89]  Yes    Neuropathy [G62.9]  Yes    Prostate cancer metastatic to bone [C61, 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     Mr. Herrera is a 83 y.o. male that presented to the hospital with generalized weakness.  He was living at home with his son who was providing 24/7 assistance via hospital bed and [employer] lift.  His son fell ill and he called 911 to have his father taken to the hospital for further care as he is unable to be cared for at home by himself.  He does have known metastatic prostate cancer with recommendations for hospice in the past, but  hospice has not been initiated at this time.  He was noted to have a hemoglobin of 5.8 as well as platelets in the 40s prompting further admission.     Generalized weakness  Multifactorial secondary to metastatic cancer, advanced age, ongoing immobility, and anemia.  PT/OT following along and will most likely need rehab placement.     Acute blood loss anemia  Anemia of chronic disease  Heme positive 12/18/2023  S/P EGD 12/18 which showed inflamed, texture changed mucosa in the esophagus as well as nonobstructing nonbleeding duodenal ulcers likely NSAID induced  S/P 2 units PRBC and platelets on 12/18- monitoring CBC and transfusing for symptoms  Hemoglobin dropped from 9 to 7.1 and continue to monitor closely and there is no evidence of any active bleeding  Continue PPI.     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   Blood transfusions for symptoms  Continue home pain medications including fentanyl patch and as needed oxycodone. Pain seems to be controlled.  Palliative care following.   Continue gabapentin and Requip.     CKD 3  Hypertension  Follows with Dr. Vicky Duran.  Creatinine stable.  Currently off Cardizem and monitor blood pressure closely    Hypokalemia  Replace per protocol     DM2  He is on insulin pump at home and is currently on corrective dose    Diastolic dysfunction  Mild aortic stenosis  Sees Dr. Lee.  Stable.     RONNY  Home CPAP if available.      Discharge  Palliative care did evaluate and patient would like to go to SNF if he qualifies      Discussed with patient and nursing staff      Copied text on this note has been reviewed by me on 12/27/2023    Rock Claros MD  [facility]  12/27/23

## 2023-12-27 NOTE — PROGRESS NOTES
SW briefly met with patient and family at bedside this morning. Provided education of SW role to family (son, grandson, and 2 brothers), and assisted in answering questions. No additional needs identified at this time. Family aware of SW availability moving forward.

## 2023-12-27 NOTE — PROGRESS NOTES
"CHIEF COMPLAINT: End-stage prostate cancer, anemia/thrombocytopenia    Interval history:  12/24/2023  Patient transfused packed red blood cells with hemoglobin up to 8.9 12/19  EGD 12/19 with small nonbleeding duodenal ulcers.  The patient has ongoing lower extremity weakness, stool and bladder incontinence from his metastatic prostate cancer with cord compression.  Pain is controlled.  Hgb  7.1 from 9.2? But no clinical bleeding.  Platelets stable 36.    12/25/2023  Afebrile, vital signs stable  Patient seen at bedside, family contacted-discussed with grandson.  The patient, unfortunately, is weak, \"miserable\" and states it is getting worse.  His performance status has clearly worsened and he is best now a candidate for palliative care.  After discussion with the patient and his grandson we will move to transfer the patient to palliative care.  H&H 6.8 and 20.1, white count of 2210, platelet count of 33,000    12/26/2023  Case discussed with patient and his son.He is best treated with palliative care in hospital.  Afebrile, vital signs stable  Patient now transferred to [facility] palliative care.  Reviewed patient concerns.    12/27/2023  T97.4, pulse 77, respirate 16, /63, SpO2 98%.  Patient comfortable at present.  He is now considering SNF at discharge.        HISTORY OF PRESENT ILLNESS:   This is an 83-year-old man follow-up Dr. Lee metastatic prostate cancer in our practice for metastatic prostate cancer who has had metastatic disease since 2018 with multiple sclerotic bone lesions and  at that time.  He has been treated with androgen deprivation therapy, palliative radiation to sites of bony disease/pain control.  He had progression of disease with increasing PSA and started Xtandi 9021 in addition to continuation of Lupron and Xgeva.  He had progression of disease and darolutamide 600 mg twice daily plus Xgeva plus Lupron initiated 4/17/2023.  The patient had progression of disease subsequently " with worsening anemia, admission in September for leg weakness secondary to cord compression at T10 (nonsurgical candidate) treated with steroids and radiation.  The patient is not a candidate for further treatment actively of his prostate cancer and hospice has been recommended but so far not instituted.  He has pain of malignancy currently on fentanyl 50 mcg patch and Norco 10/325 as needed breakthrough pain available at home.  He has narcotic induced constipation.     The patient was brought to ER 12/18/2023 by family who provide full-time care at home.  The patient has progressive lower extremity weakness x 2 weeks now with inability to stand.  He reports being incontinent of urine and stool.  He has a sacral decubitus ulcer from pressure and incontinence.  Labs drawn in the ER showed worsening counts with hemoglobin 5.8, platelets 41 with significant nucleated red blood cells.      Past Medical History, Past Surgical History, Social History, Family History have been reviewed and are without significant changes except as mentioned.    Review of Systems   A comprehensive 14 point review of systems was performed and was negative except as mentioned.    Medications:  The current medication list was reviewed in the EMR    ALLERGIES:    Allergies   Allergen Reactions    Niacin Unknown - Low Severity    Statins Unknown - Low Severity       Objective      Vitals:    12/27/23 0501   BP: 110/63   Pulse: 77   Resp: 16   Temp: 97.4 °F (36.3 °C)   SpO2: 98%          Physical Exam    CONSTITUTIONAL: pleasant man chronic ill-appearing resting comfortably  HEENT: no icterus, no thrush, moist membranes  CV: RRR, S1S2, no murmur  RESP: cta bilat, no wheezing, no rales, upper respiratory congestion  NEURO: alert and oriented x3, bilateral lower extremity paresis  PSYCH: Depressed mood and affect    RECENT LABS:  Hematology Results from last 7 days   Lab Units 12/25/23  0411 12/24/23  1152 12/24/23  0346 12/23/23  0350   WBC  10*3/mm3 2.21*  --  2.52* 3.34*   HEMOGLOBIN g/dL 6.8* 7.1* 7.1* 9.2*   HEMATOCRIT % 20.1* 21.0* 21.4* 27.0*   PLATELETS 10*3/mm3 33*  --  36* 36*     2  Lab Results   Component Value Date    GLUCOSE 64 (L) 12/25/2023    BUN 17 12/25/2023    CREATININE 0.59 (L) 12/25/2023    EGFRIFNONA 56 (L) 02/23/2022    BCR 28.8 (H) 12/25/2023    CO2 22.0 12/25/2023    CALCIUM 8.0 (L) 12/25/2023    ALBUMIN 3.2 (L) 12/19/2023    AST 22 12/19/2023    ALT 13 12/19/2023       Lab Results   Component Value Date    IRON 243 (H) 12/19/2023    TIBC 271 (L) 12/19/2023    FERRITIN 1,987.00 (H) 12/19/2023       Lab Results   Component Value Date    [id number] 716 09/18/2023       Lab Results   Component Value Date    FOLATE 6.03 03/20/2023          Assessment & Plan   *Metastatic castrate resistant prostate cancer  The patient has extensive bony metastases and cord compression at T10 previously evaluated not felt to be surgical candidate status post limited radiation and steroid therapy but progressive symptoms of lower extremity weakness and bowel/bladder incontinence  According to Dr. 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 with decreased production of red cells/platelets  12/19/2023-transfused with hemoglobin up to 8.9  12/19/2023-EGD with small nonbleeding duodenal ulcers likely NSAID induced  12/22/2023-WBC 3.3, hemoglobin 8.9, platelets 35  12/23/2023-WBC 3.3, hemoglobin stable 9.2, platelets  stable 36  12/24/23-WBC 2.5, hemoglobin 7.1, platelets 36  Reassessed 12/25 H&H is 6.8 and 20.1, platelet count of 33,000     *Pain of malignancy  Pain appears controlled on fentanyl 50 mcg every 72 hour patch and oxycodone 5 mg every 4 hours as needed pain, gabapentin 900 mg nightly     *Cord compression with progressive symptoms  Patient previously evaluated by neurosurgery not felt to be candidate for decompression surgically  The patient was treated with limited radiation  D/c DEX with duodenal ulcerations and lack of expected benefit     *Sacral pressure wound worsened by incontinence     Oncology plan/recommendations:  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  At this point his disease has progressed to the point that he is becoming extremely uncomfortable without the possibility of improvement with active interventions such as transfusion.    Discussed with patient and his grandson moving to palliative care which we should now employ.  Patient currently on palliative care, plan to request scheduled bed status.                12/27/2023      CC:

## 2023-12-28 NOTE — PLAN OF CARE
Goal Outcome Evaluation:   Patient resting comfortably in bed, refuses turns at times because he is comfortable. Patient has taken sublingual morphine x1 10mg today with good effect. Fentanyl patch present on R arm. PPS 30%. Will monitor for comfort.

## 2023-12-28 NOTE — PROGRESS NOTES
"CHIEF COMPLAINT: End-stage prostate cancer, anemia/thrombocytopenia    Interval history:  12/24/2023  Patient transfused packed red blood cells with hemoglobin up to 8.9 12/19  EGD 12/19 with small nonbleeding duodenal ulcers.  The patient has ongoing lower extremity weakness, stool and bladder incontinence from his metastatic prostate cancer with cord compression.  Pain is controlled.  Hgb  7.1 from 9.2? But no clinical bleeding.  Platelets stable 36.    12/25/2023  Afebrile, vital signs stable  Patient seen at bedside, family contacted-discussed with grandson.  The patient, unfortunately, is weak, \"miserable\" and states it is getting worse.  His performance status has clearly worsened and he is best now a candidate for palliative care.  After discussion with the patient and his grandson we will move to transfer the patient to palliative care.  H&H 6.8 and 20.1, white count of 2210, platelet count of 33,000    12/26/2023  Case discussed with patient and his son.He is best treated with palliative care in hospital.  Afebrile, vital signs stable  Patient now transferred to [facility] palliative care.  Reviewed patient concerns.    12/27/2023  T97.4, pulse 77, respirate 16, /63, SpO2 98%.  Patient comfortable at present.  He is now considering SNF at discharge.      12/20/2023  T97.8, pulse 80, respirations 18, /62  Now working towards SNF placement      HISTORY OF PRESENT ILLNESS:   This is an 83-year-old man follow-up Dr. Lee metastatic prostate cancer in our practice for metastatic prostate cancer who has had metastatic disease since 2018 with multiple sclerotic bone lesions and  at that time.  He has been treated with androgen deprivation therapy, palliative radiation to sites of bony disease/pain control.  He had progression of disease with increasing PSA and started Xtandi 9021 in addition to continuation of Lupron and Xgeva.  He had progression of disease and darolutamide 600 mg twice daily " plus Xgeva plus Lupron initiated 4/17/2023.  The patient had progression of disease subsequently with worsening anemia, admission in September for leg weakness secondary to cord compression at T10 (nonsurgical candidate) treated with steroids and radiation.  The patient is not a candidate for further treatment actively of his prostate cancer and hospice has been recommended but so far not instituted.  He has pain of malignancy currently on fentanyl 50 mcg patch and Norco 10/325 as needed breakthrough pain available at home.  He has narcotic induced constipation.     The patient was brought to ER 12/18/2023 by family who provide full-time care at home.  The patient has progressive lower extremity weakness x 2 weeks now with inability to stand.  He reports being incontinent of urine and stool.  He has a sacral decubitus ulcer from pressure and incontinence.  Labs drawn in the ER showed worsening counts with hemoglobin 5.8, platelets 41 with significant nucleated red blood cells.      Past Medical History, Past Surgical History, Social History, Family History have been reviewed and are without significant changes except as mentioned.    Review of Systems   A comprehensive 14 point review of systems was performed and was negative except as mentioned.    Medications:  The current medication list was reviewed in the EMR    ALLERGIES:    Allergies   Allergen Reactions    Niacin Unknown - Low Severity    Statins Unknown - Low Severity       Objective      Vitals:    12/28/23 0532   BP: 127/62   Pulse: 80   Resp: 18   Temp: 97.8 °F (36.6 °C)   SpO2: 96%          Physical Exam    CONSTITUTIONAL: pleasant man chronic ill-appearing resting comfortably  HEENT: no icterus, no thrush, moist membranes  CV: RRR, S1S2, no murmur  RESP: cta bilat, no wheezing, no rales, upper respiratory congestion  NEURO: alert and oriented x3, bilateral lower extremity paresis  PSYCH: Depressed mood and affect    RECENT LABS:  Hematology Results  from last 7 days   Lab Units 12/25/23  0411 12/24/23  1152 12/24/23  0346 12/23/23  0350   WBC 10*3/mm3 2.21*  --  2.52* 3.34*   HEMOGLOBIN g/dL 6.8* 7.1* 7.1* 9.2*   HEMATOCRIT % 20.1* 21.0* 21.4* 27.0*   PLATELETS 10*3/mm3 33*  --  36* 36*     2  Lab Results   Component Value Date    GLUCOSE 64 (L) 12/25/2023    BUN 17 12/25/2023    CREATININE 0.59 (L) 12/25/2023    EGFRIFNONA 56 (L) 02/23/2022    BCR 28.8 (H) 12/25/2023    CO2 22.0 12/25/2023    CALCIUM 8.0 (L) 12/25/2023    ALBUMIN 3.2 (L) 12/19/2023    AST 22 12/19/2023    ALT 13 12/19/2023       Lab Results   Component Value Date    IRON 243 (H) 12/19/2023    TIBC 271 (L) 12/19/2023    FERRITIN 1,987.00 (H) 12/19/2023       Lab Results   Component Value Date    [id number] 716 09/18/2023       Lab Results   Component Value Date    FOLATE 6.03 03/20/2023          Assessment & Plan   *Metastatic castrate resistant prostate cancer  The patient has extensive bony metastases and cord compression at T10 previously evaluated not felt to be surgical candidate status post limited radiation and steroid therapy but progressive symptoms of lower extremity weakness and bowel/bladder incontinence  According to Dr. 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 with decreased production of red cells/platelets  12/19/2023-transfused with hemoglobin up to 8.9  12/19/2023-EGD with small nonbleeding duodenal ulcers likely NSAID  induced  12/22/2023-WBC 3.3, hemoglobin 8.9, platelets 35  12/23/2023-WBC 3.3, hemoglobin stable 9.2, platelets stable 36  12/24/23-WBC 2.5, hemoglobin 7.1, platelets 36  Reassessed 12/25 H&H is 6.8 and 20.1, platelet count of 33,000     *Pain of malignancy  Pain appears controlled on fentanyl 50 mcg every 72 hour patch and oxycodone 5 mg every 4 hours as needed pain, gabapentin 900 mg nightly     *Cord compression with progressive symptoms  Patient previously evaluated by neurosurgery not felt to be candidate for decompression surgically  The patient was treated with limited radiation  D/c DEX with duodenal ulcerations and lack of expected benefit     *Sacral pressure wound worsened by incontinence     Oncology plan/recommendations:  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  At this point his disease has progressed to the point that he is becoming extremely uncomfortable without the possibility of improvement with active interventions such as transfusion.    Discussed with patient and his grandson moving to palliative care which we should now employ.  Patient currently on palliative care-he requests SNF placement.                12/28/2023      CC:

## 2023-12-28 NOTE — CASE MANAGEMENT/SOCIAL WORK
Continued Stay Note  [facility]     Patient Name: Semaj Herrera  MRN: [id number]  Today's Date: 12/28/2023    Admit Date: 12/18/2023    Plan: SNF referrals pending - needs PT eval   Discharge Plan       Row Name 12/28/23 1240       Plan    Plan SNF referrals pending - needs PT eval    Plan Comments Plan remains SNF - referrals pending. Patient will need PT evaluation to assess level of care needs prior to admitting to SNF. JOSELITO, TAMIE                   Discharge Codes    No documentation.                 Expected Discharge Date and Time       Expected Discharge Date Expected Discharge Time    Dec 29, 2023               TAMIE Chaparro

## 2023-12-28 NOTE — PLAN OF CARE
Goal Outcome Evaluation:  Plan of Care Reviewed With: patient        Progress: no change  Outcome Evaluation: VSS, refusing turns, dressing changed to coccyx due to soiling, Morphine sublingual X1, purewick placed, call light in place, bed alarm on

## 2023-12-28 NOTE — PROGRESS NOTES
Name: Semaj Herrera ADMIT: 2023   : 1940  PCP: Axel Guardado MD    MRN: [id number] LOS: 10 days   AGE/SEX: 83 y.o. male  ROOM: [facility]     Subjective   Subjective   Patient is lying in the bed and does not appear to be any major distress.  Chronically ill-appearing.  No reports of nausea, vomiting, abdominal pain, chest pain.     Objective   Objective   Vital Signs  Temp:  [97.2 °F (36.2 °C)-97.8 °F (36.6 °C)] 97.2 °F (36.2 °C)  Heart Rate:  [72-80] 72  Resp:  [18] 18  BP: (125-127)/(62-63) 125/63  SpO2:  [96 %-97 %] 97 %  on   ;   Device (Oxygen Therapy): room air  Body mass index is 24.21 kg/m².    Physical Exam  Constitutional:       General: He is not in acute distress.     Appearance: He is ill-appearing. He is not toxic-appearing.   HENT:      Head: Normocephalic and atraumatic.      Mouth/Throat:      Mouth: Mucous membranes are moist.   Eyes:      Extraocular Movements: Extraocular movements intact.      Pupils: Pupils are equal, round, and reactive to light.   Cardiovascular:      Rate and Rhythm: Normal rate and regular rhythm.   Pulmonary:      Effort: No respiratory distress.      Breath sounds: No wheezing or rhonchi.   Abdominal:      General: Bowel sounds are normal.      Palpations: Abdomen is soft.      Tenderness: There is no abdominal tenderness.   Musculoskeletal:         General: Swelling present.   Skin:     General: Skin is warm and dry.      Coloration: Skin is pale.   Neurological:      General: No focal deficit present.      Mental Status: He is oriented to person, place, and time.      Motor: Weakness present.   Psychiatric:         Mood and Affect: Mood normal.         Behavior: Behavior normal.     Results Review:       I reviewed the patient's new clinical results.  Results from last 7 days   Lab Units 23  0411 23  1152 23  0346 23  0350 23  0437   WBC 10*3/mm3 2.21*  --  2.52* 3.34* 3.30*   HEMOGLOBIN g/dL 6.8* 7.1* 7.1* 9.2* 8.9*    PLATELETS 10*3/mm3 33*  --  36* 36* 35*     Results from last 7 days   Lab Units 12/25/23  0411 12/24/23  0346 12/23/23  0350 12/22/23  0437   SODIUM mmol/L 137 136 136 138   POTASSIUM mmol/L 4.1 3.5 4.2 3.3*   CHLORIDE mmol/L 103 103 104 104   CO2 mmol/L 22.0 22.0 19.0* 20.7*   BUN mg/dL 17 18 18 16   CREATININE mg/dL 0.59* 0.55* 0.63* 0.59*   GLUCOSE mg/dL 64* 78 85 125*   Estimated Creatinine Clearance: 88.6 mL/min (A) (by C-G formula based on SCr of 0.59 mg/dL (L)).        Results from last 7 days   Lab Units 12/25/23 0411 12/24/23 0346 12/23/23  0350 12/22/23  0437   CALCIUM mg/dL 8.0* 8.2* 8.7 8.6       Glucose   Date/Time Value Ref Range Status   12/25/2023 1709 210 (H) 70 - 130 mg/dL Final     Scheduled Meds  fentaNYL, 1 patch, Transdermal, Q72H   And  Check Fentanyl Patch Placement, 1 each, Does not apply, Q12H  gabapentin, 900 mg, Oral, Nightly  guaiFENesin, 600 mg, Oral, Q12H  levothyroxine, 176 mcg, Oral, Once per day on Mon Thu  levothyroxine, 88 mcg, Oral, Once per day on Sun Tue Wed Fri Sat  modafinil, 200 mg, Oral, Daily  pantoprazole, 40 mg, Oral, BID AC  rOPINIRole, 2 mg, Oral, Nightly  senna-docusate sodium, 2 tablet, Oral, BID  sodium chloride, 10 mL, Intravenous, Q12H    Continuous Infusions  sodium chloride, 30 mL/hr, Last Rate: Stopped (12/19/23 1107)    PRN Meds    acetaminophen **OR** acetaminophen **OR** acetaminophen    acetaminophen **OR** acetaminophen **OR** acetaminophen    senna-docusate sodium **AND** polyethylene glycol **AND** bisacodyl **AND** bisacodyl    dextrose    dextrose    diphenoxylate-atropine    glucagon (human recombinant)    Morphine **OR** morphine **OR** HYDROmorphone    Morphine **OR** morphine **OR** HYDROmorphone    Morphine **OR** morphine **OR** HYDROmorphone    LORazepam **OR** midazolam **OR** midazolam **OR** midazolam **OR** LORazepam **OR** LORazepam    LORazepam **OR** midazolam **OR** midazolam **OR** midazolam **OR** LORazepam **OR** LORazepam     LORazepam **OR** midazolam **OR** midazolam **OR** LORazepam **OR** LORazepam    Menthol-Zinc Oxide    ondansetron    polyethylene Glycol 400    sodium chloride    sodium chloride    sodium chloride    Diet: Cardiac Diets, Regular/House Diet; Healthy Heart (2-3 Na+); Texture: Regular Texture (IDDSI 7); Fluid Consistency: Thin (IDDSI 0)    I have personally reviewed:  [x]  Medications  [x]  Laboratory   []  Microbiology   []  Radiology   [x]  EKG/Telemetry sinus  []  Cardiology/Vascular   []  Pathology   []  Records        Assessment/Plan     Active Hospital Problems    Diagnosis  POA    **Generalized weakness [R53.1]  Yes    Acute blood loss anemia [D62]  Yes    Anemia, chronic disease [D63.8]  Yes    Class 2 severe obesity due to excess calories with serious comorbidity and body mass index (BMI) of 35.0 to 35.9 in adult [E66.01, Z68.35]  Not Applicable    Restless legs syndrome (RLS) [G25.81]  Yes    Diastolic dysfunction [I51.89]  Yes    Neuropathy [G62.9]  Yes    Prostate cancer metastatic to bone [C61, 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     Mr. Herrera is a 83 y.o. male that presented to the hospital with generalized weakness.  He was living at home with his son who was providing 24/7 assistance via hospital bed and [employer] lift.  His son fell ill and he called 911 to have his father taken to the hospital for further care as he is unable to be cared for at home by himself.  He does have known metastatic prostate cancer with recommendations for hospice in the past, but hospice has not been initiated at this time.  He was noted to have a hemoglobin of 5.8 as well as platelets in the 40s prompting  further admission.     Generalized weakness  Multifactorial secondary to metastatic cancer, advanced age, ongoing immobility, and anemia.  PT/OT following along and will most likely need rehab placement.     Acute blood loss anemia  Anemia of chronic disease  Heme positive 12/18/2023  S/P EGD 12/18 which showed inflamed, texture changed mucosa in the esophagus as well as nonobstructing nonbleeding duodenal ulcers likely NSAID induced  S/P 2 units PRBC and platelets on 12/18- monitoring CBC and transfusing for symptoms  Hemoglobin dropped from 9 to 7.1 and continue to monitor closely and there is no evidence   of any active bleeding.  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  Continue PPI.     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   Blood transfusions for symptoms  Continue home pain medications including fentanyl patch and as needed oxycodone. Pain seems to be controlled.  Palliative care following.   Continue gabapentin and Requip.     CKD 3  Hypertension  Follows with Dr. Vicky Duran.  Creatinine stable.  Currently off Cardizem and monitor blood pressure closely    Hypokalemia  Replace per protocol     DM2  He is on insulin pump at home and is currently on corrective dose    Diastolic dysfunction  Mild aortic stenosis  Sees Dr. Lee.  Stable.     RONNY  Home CPAP if available.      Discharge  Palliative care did evaluate and patient would like to go to SNF if he qualifies      Discussed with patient and nursing staff      Copied text on this note has been reviewed by me on 12/28/2023    Rock Claros MD  [facility]  12/28/23

## 2023-12-29 NOTE — THERAPY RE-EVALUATION
Patient Name: Semaj Herrera  : 1940    MRN: [id number]                              Today's Date: 2023       Admit Date: 2023    Visit Dx:     ICD-10-CM ICD-9-CM   1. Acute blood loss anemia  D62 285.1   2. Gastrointestinal hemorrhage, unspecified gastrointestinal hemorrhage type  K92.2 578.9   3. Weakness  R53.1 780.79   4. Prostate cancer metastatic to bone  C61 185    C79.51 198.5   5. Anemia, chronic disease  D63.8 285.29     Patient Active Problem List   Diagnosis    Type 2 diabetes mellitus with kidney complication, with long-term current use of insulin    Generalized weakness    Hyperlipidemia    Hypertension    Left ventricular hypertrophy    Obstructive sleep apnea syndrome treated auto BiPAP    Sinus bradycardia    Prostate cancer metastatic to bone    Mediastinal adenopathy    Malignant neoplasm metastatic to bone    Chronic kidney disease, stage III (moderate)    Diastolic dysfunction    Localized edema    Neuropathy    Hypersomnia due to medical condition treated with modafinil 200 mg every morning    CSA (central sleep apnea)    Restless legs syndrome (RLS)    Psychophysiological insomnia    Edema    Type 2 diabetes mellitus with diabetic chronic kidney disease    Class 2 severe obesity due to excess calories with serious comorbidity and body mass index (BMI) of 35.0 to 35.9 in adult    Aortic stenosis, mild    Ascending aorta dilatation    Fecal impaction in rectum    Lower extremity weakness    Anemia, chronic disease    Metastatic cancer to bone    Moderate malnutrition    Acute blood loss anemia     Past Medical History:   Diagnosis Date    Aortic stenosis, mild 2021    Per echocardiogram     Ascending aorta dilatation     Asthma     Benign prostatic hyperplasia     Bone cancer     Cellulitis of great toe of left foot 10/19/2018    CKD (chronic kidney disease)     Stage 3; followed by Dr. Vicky Duran     Diastolic dysfunction     GRADE II per echo 2018    Difficulty  breathing     during exertion    Dyslipidemia     Erectile dysfunction     Fatigue     Heart murmur     History of blood transfusion     History of kidney stones     HL (hearing loss)     Hyperlipidemia     Hypertension     Hyponatremia     Kidney stone     Left ventricular hypertrophy     per echo 2018    Localized edema     Neuropathy     Obstructive sleep apnea     USING CPAP    Osteoarthritis     Peptic ulcer     Pneumonia     Pneumonia of left upper lobe due to infectious organism 03/15/2019    Prostate cancer     Status post prostatectomy, radiation therapy, and hormone therapy followed by Dr. Lee; metastatsis to bone    Pure hyperglyceridemia     Restless leg syndrome     Sepsis     Sinus bradycardia     Transient cerebral ischemia     Type 2 diabetes mellitus      Past Surgical History:   Procedure Laterality Date    BRONCHOSCOPY N/A 04/09/2018    Procedure: BRONCHOSCOPY;  Surgeon: Bijan Rivera III, MD;  Location: [facility] OR;  Service: Cardiothoracic    COLONOSCOPY      DEEP NECK LYMPH NODE BIOPSY / EXCISION      ENDOSCOPY N/A 12/19/2023    Procedure: ESOPHAGOGASTRODUODENOSCOPY;  Surgeon: Nataliia Rivas MD;  Location: [facility] ENDOSCOPY;  Service: Gastroenterology;  Laterality: N/A;  PRE OP - ANEMIA, heme positive stool  POST OP - DUODENAL ULCERS, ABNORMAL ESOPHAGEAL MUCOSA    HEMORRHOIDECTOMY      LYMPH NODE BIOPSY      MEDIASTINOSCOPY N/A 04/09/2018    Procedure: MEDIASTINOSCOPY WITH LYMPH NODE BIOPSY;  Surgeon: Bijan Rivera III, MD;  Location: [facility] OR;  Service: Cardiothoracic    OTHER SURGICAL HISTORY      ulcer repair    PROSTATECTOMY  2006      General Information       Row Name 12/29/23 0932          Physical Therapy Time and Intention    Document Type re-evaluation  -MS     Mode of Treatment physical therapy;individual therapy  -MS       Row Name 12/29/23 0932          General Information    Patient Profile Reviewed yes  -MS     Prior Level of Function max assist:  per initial  evaluation  -MS     Existing Precautions/Restrictions fall   Exit alarm  -MS     Barriers to Rehab cognitive status  -MS       Row Name 12/29/23 0932          Cognition    Orientation Status (Cognition) unable/difficult to assess  Pt. non-verbal this AM.  Barely able to keep his eyes open  -MS               User Key  (r) = Recorded By, (t) = Taken By, (c) = Cosigned By      Initials Name Provider Type    MS Rom Camposvibha MACIAS, PT Physical Therapist                   Mobility       Row Name 12/29/23 0934          Bed Mobility    Supine-Sit Tift (Bed Mobility) dependent (less than 25% patient effort);2 person assist  -MS     Comment, (Bed Mobility) Attempted sitting EOB, but pt. unable to sit fully upright due to weakness.   Heavy posterior lean.   -MS               User Key  (r) = Recorded By, (t) = Taken By, (c) = Cosigned By      Initials Name Provider Type    MS BethRaghav, PT Physical Therapist                   Obj/Interventions       Row Name 12/29/23 0934          Range of Motion Comprehensive    Comment, General Range of Motion Unable to assess AROM as pt. does not follow commands this AM.  -MS       Row Name 12/29/23 0934          Strength Comprehensive (MMT)    Comment, General Manual Muscle Testing (MMT) Assessment Unable to assess MMT as pt. does not follow commands this AM.  -MS       Row Name 12/29/23 0934          Motor Skills    Therapeutic Exercise --  BUE/LE (PROM) ther. ex. program x 10 reps completed (Ankle pumps, Heel slides, Hip Abduction, Shld Flexion, Elbow and Wrist Flex/extension)  -MS               User Key  (r) = Recorded By, (t) = Taken By, (c) = Cosigned By      Initials Name Provider Type    MS Beth Raghav MACIAS, PT Physical Therapist                   Goals/Plan       Row Name 12/29/23 0936          Bed Mobility Goal 1 (PT)    Activity/Assistive Device (Bed Mobility Goal 1, PT) bed mobility activities, all  -MS     Tift Level/Cues Needed (Bed Mobility Goal 1, PT)  moderate assist (50-74% patient effort)  Assist x 2  -MS     Time Frame (Bed Mobility Goal 1, PT) long term goal (LTG);1 week  -MS       Row Name 12/29/23 0936          Problem Specific Goal 1 (PT)    Problem Specific Goal 1 (PT) Pt. will perform static sitting balance with Min. assist x 1.  -MS     Time Frame (Problem Specific Goal 1, PT) long-term goal (LTG);1 week  -MS       Row Name 12/29/23 0936          Therapy Assessment/Plan (PT)    Planned Therapy Interventions (PT) balance training;bed mobility training;home exercise program;postural re-education;patient/family education;transfer training;strengthening;ROM (range of motion)  -MS               User Key  (r) = Recorded By, (t) = Taken By, (c) = Cosigned By      Initials Name Provider Type    Raghav Moore, PT Physical Therapist                   Clinical Impression       Row Name 12/29/23 0936          Pain    Pretreatment Pain Rating 0/10 - no pain  -MS     Posttreatment Pain Rating 0/10 - no pain  -MS     Pre/Posttreatment Pain Comment No verbal/visual signs of pain  -MS       Row Name 12/29/23 0936          Plan of Care Review    Plan of Care Reviewed With patient  -MS       Row Name 12/29/23 0936          Therapy Assessment/Plan (PT)    Rehab Potential (PT) fair, will monitor progress closely  -MS     Therapy Frequency (PT) 2 times/wk  -MS       Row Name 12/29/23 0936          Positioning and Restraints    Pre-Treatment Position in bed  -MS     Post Treatment Position bed  -MS     In Bed notified nsg;supine;call light within reach;encouraged to call for assist;exit alarm on;L heel elevated;R heel elevated;LUE elevated;RUE elevated  -MS               User Key  (r) = Recorded By, (t) = Taken By, (c) = Cosigned By      Initials Name Provider Type    Raghav Moore, PT Physical Therapist                   Outcome Measures       Row Name 12/29/23 0937 12/29/23 0856       How much help from another person do you currently need...    Turning from  your back to your side while in flat bed without using bedrails? 1  -MS 2  -DL    Moving from lying on back to sitting on the side of a flat bed without bedrails? 1  -MS 2  -DL    Moving to and from a bed to a chair (including a wheelchair)? 1  -MS 2  -DL    Standing up from a chair using your arms (e.g., wheelchair, bedside chair)? 1  -MS 1  -DL    Climbing 3-5 steps with a railing? 1  -MS 1  -DL    To walk in hospital room? 1  -MS 1  -DL    AM-PAC 6 Clicks Score (PT) 6  -MS 9  -DL    Highest Level of Mobility Goal 2 --> Bed activities/dependent transfer  -MS 3 --> Sit at edge of bed  -DL      Row Name 12/29/23 0937          Functional Assessment    Outcome Measure Options AM-PAC 6 Clicks Basic Mobility (PT)  -MS               User Key  (r) = Recorded By, (t) = Taken By, (c) = Cosigned By      Initials Name Provider Type    Laurie Felix, RN Registered Nurse    Raghav Moore, PT Physical Therapist                                 Physical Therapy Education       Title: PT OT SLP Therapies (In Progress)       Topic: Physical Therapy (In Progress)       Point: Mobility training (In Progress)       Learning Progress Summary             Patient Acceptance, E, NR by KB at 12/28/2023 1313    Acceptance, E, VU by BB at 12/23/2023 0229    Acceptance, E, VU by BB at 12/21/2023 2242    Acceptance, E, VU by BB at 12/21/2023 0142    Acceptance, E, VU,NR by CW at 12/20/2023 1519                         Point: Home exercise program (In Progress)       Learning Progress Summary             Patient Nonacceptance, E,D, NR,NL by MS at 12/29/2023 0937    Acceptance, E, NR by KB at 12/28/2023 1313    Acceptance, E, VU by BB at 12/23/2023 0229    Acceptance, E, VU by BB at 12/21/2023 2242    Acceptance, E, VU by BB at 12/21/2023 0142                         Point: Body mechanics (In Progress)       Learning Progress Summary             Patient Acceptance, E, NR by KB at 12/28/2023 1313    Acceptance, E, VU by BB at 12/23/2023  0229    Acceptance, E, VU by BB at 12/21/2023 2242    Acceptance, E, VU by BB at 12/21/2023 0142                         Point: Precautions (In Progress)       Learning Progress Summary             Patient Acceptance, E, NR by KB at 12/28/2023 1313    Acceptance, E, VU by BB at 12/23/2023 0229    Acceptance, E, VU by BB at 12/21/2023 2242    Acceptance, E, VU by BB at 12/21/2023 0142                                         User Key       Initials Effective Dates Name Provider Type Discipline     10/02/23 -  Ciera Verdin, RN Registered Nurse Nurse    MS 06/16/21 -  Raghav Campos, PT Physical Therapist PT    CW 12/13/22 -  Melva Patel, PT Physical Therapist PT    BB 11/16/23 -  Josephine Cr, RN Registered Nurse Nurse                  PT Recommendation and Plan  Planned Therapy Interventions (PT): balance training, bed mobility training, home exercise program, postural re-education, patient/family education, transfer training, strengthening, ROM (range of motion)  Plan of Care Reviewed With: patient  Outcome Evaluation: Upon entering room, pt. supine in bed, eyes closed, and non-verbal this AM. Attempted bed mobility this AM with Dep. assist x 2 but pt. unable to sit fully upright due to weakness.  Difficult to assess ROM and MMT as pt. was not following commands.  BUE/LE (PROM) ther. ex. program x 10 reps completed.  NO verbal/visual signs of pain.  Pt.'s progress will depend on his ability to follow commands and cooperate with P.T.  Will continue to attempt progression of functional mobility as tolerated.     Time Calculation:         PT Charges       Row Name 12/29/23 0940             Time Calculation    Start Time 0853  -MS      Stop Time 0905  -MS      Time Calculation (min) 12 min  -MS      PT Received On 12/29/23  -MS      PT - Next Appointment 01/02/24  -MS      PT Goal Re-Cert Due Date 01/05/24  -MS         Time Calculation- PT    Total Timed Code Minutes- PT 11 minute(s)  -MS                 User Key  (r) = Recorded By, (t) = Taken By, (c) = Cosigned By      Initials Name Provider Type    MS Raghav Campos, PT Physical Therapist                  Therapy Charges for Today       Code Description Service Date Service Provider Modifiers Qty    20529010851 HC PT THER PROC EA 15 MIN 12/29/2023 Raghav Campos, PT GP 1            PT G-Codes  Outcome Measure Options: AM-PAC 6 Clicks Basic Mobility (PT)  AM-PAC 6 Clicks Score (PT): 6  AM-PAC 6 Clicks Score (OT): 10  PT Discharge Summary  Anticipated Discharge Disposition (PT): skilled nursing facility, extended care facility    Raghav Campos, PT  12/29/2023

## 2023-12-29 NOTE — PROGRESS NOTES
Name: Semaj Herrera ADMIT: 2023   : 1940  PCP: Axel Guardado MD    MRN: [id number] LOS: 11 days   AGE/SEX: 83 y.o. male  ROOM: [facility]     Subjective   Subjective   Patient is lying in the bed and does not appear to be any major distress.  Chronically ill-appearing.  No reports of nausea, vomiting, abdominal pain, chest pain, shortness of breath.     Objective   Objective   Vital Signs  Temp:  [97.2 °F (36.2 °C)] 97.2 °F (36.2 °C)  Heart Rate:  [72] 72  Resp:  [18] 18  BP: (125)/(63) 125/63  SpO2:  [97 %] 97 %  on   ;   Device (Oxygen Therapy): room air  Body mass index is 24.21 kg/m².    Physical Exam  Constitutional:       General: He is not in acute distress.     Appearance: He is ill-appearing. He is not toxic-appearing.   HENT:      Head: Normocephalic and atraumatic.      Mouth/Throat:      Mouth: Mucous membranes are moist.   Eyes:      Extraocular Movements: Extraocular movements intact.      Pupils: Pupils are equal, round, and reactive to light.   Cardiovascular:      Rate and Rhythm: Normal rate and regular rhythm.   Pulmonary:      Effort: No respiratory distress.      Breath sounds: No wheezing or rhonchi.   Abdominal:      General: Bowel sounds are normal.      Palpations: Abdomen is soft.      Tenderness: There is no abdominal tenderness.   Musculoskeletal:         General: Swelling present.   Skin:     General: Skin is warm and dry.      Coloration: Skin is pale.   Neurological:      General: No focal deficit present.      Mental Status: He is oriented to person, place, and time.      Motor: Weakness present.   Psychiatric:         Mood and Affect: Mood normal.         Behavior: Behavior normal.     Results Review:       I reviewed the patient's new clinical results.  Results from last 7 days   Lab Units 23  0411 23  1152 23  0346 23  0350   WBC 10*3/mm3 2.21*  --  2.52* 3.34*   HEMOGLOBIN g/dL 6.8* 7.1* 7.1* 9.2*   PLATELETS 10*3/mm3 33*  --  36* 36*  "    Results from last 7 days   Lab Units 12/25/23  0411 12/24/23  0346 12/23/23  0350   SODIUM mmol/L 137 136 136   POTASSIUM mmol/L 4.1 3.5 4.2   CHLORIDE mmol/L 103 103 104   CO2 mmol/L 22.0 22.0 19.0*   BUN mg/dL 17 18 18   CREATININE mg/dL 0.59* 0.55* 0.63*   GLUCOSE mg/dL 64* 78 85   Estimated Creatinine Clearance: 88.6 mL/min (A) (by C-G formula based on SCr of 0.59 mg/dL (L)).        Results from last 7 days   Lab Units 12/25/23  0411 12/24/23  0346 12/23/23  0350   CALCIUM mg/dL 8.0* 8.2* 8.7       No results found for: \"HGBA1C\", \"POCGLU\"    Scheduled Meds  fentaNYL, 1 patch, Transdermal, Q72H   And  Check Fentanyl Patch Placement, 1 each, Does not apply, Q12H  gabapentin, 900 mg, Oral, Nightly  guaiFENesin, 600 mg, Oral, Q12H  levothyroxine, 176 mcg, Oral, Once per day on Mon Thu  levothyroxine, 88 mcg, Oral, Once per day on Sun Tue Wed Fri Sat  modafinil, 200 mg, Oral, Daily  pantoprazole, 40 mg, Oral, BID AC  rOPINIRole, 2 mg, Oral, Nightly  senna-docusate sodium, 2 tablet, Oral, BID  sodium chloride, 10 mL, Intravenous, Q12H    Continuous Infusions  sodium chloride, 30 mL/hr, Last Rate: Stopped (12/19/23 1107)    PRN Meds    acetaminophen **OR** acetaminophen **OR** acetaminophen    acetaminophen **OR** acetaminophen **OR** acetaminophen    senna-docusate sodium **AND** polyethylene glycol **AND** bisacodyl **AND** bisacodyl    dextrose    dextrose    diphenoxylate-atropine    glucagon (human recombinant)    Morphine **OR** morphine **OR** HYDROmorphone    Morphine **OR** morphine **OR** HYDROmorphone    Morphine **OR** morphine **OR** HYDROmorphone    LORazepam **OR** midazolam **OR** midazolam **OR** midazolam **OR** LORazepam **OR** LORazepam    LORazepam **OR** midazolam **OR** midazolam **OR** midazolam **OR** LORazepam **OR** LORazepam    LORazepam **OR** midazolam **OR** midazolam **OR** LORazepam **OR** LORazepam    Menthol-Zinc Oxide    ondansetron    polyethylene Glycol 400    sodium chloride   "  sodium chloride    sodium chloride    Diet: Cardiac Diets, Regular/House Diet; Healthy Heart (2-3 Na+); Texture: Regular Texture (IDDSI 7); Fluid Consistency: Thin (IDDSI 0)    I have personally reviewed:  [x]  Medications  [x]  Laboratory   []  Microbiology   []  Radiology   [x]  EKG/Telemetry sinus  []  Cardiology/Vascular   []  Pathology   []  Records        Assessment/Plan     Active Hospital Problems    Diagnosis  POA    **Generalized weakness [R53.1]  Yes    Acute blood loss anemia [D62]  Yes    Anemia, chronic disease [D63.8]  Yes    Class 2 severe obesity due to excess calories with serious comorbidity and body mass index (BMI) of 35.0 to 35.9 in adult [E66.01, Z68.35]  Not Applicable    Restless legs syndrome (RLS) [G25.81]  Yes    Diastolic dysfunction [I51.89]  Yes    Neuropathy [G62.9]  Yes    Prostate cancer metastatic to bone [C61, 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     Mr. Herrera is a 83 y.o. male that presented to the hospital with generalized weakness.  He was living at home with his son who was providing 24/7 assistance via hospital bed and [employer] lift.  His son fell ill and he called 911 to have his father taken to the hospital for further care as he is unable to be cared for at home by himself.  He does have known metastatic prostate cancer with recommendations for hospice in the past, but hospice has not been initiated at this time.  He was noted to have a hemoglobin of 5.8 as well as platelets in the 40s prompting further admission.     Generalized weakness  Multifactorial secondary to metastatic cancer, advanced age, ongoing immobility, and anemia.  PT/OT following along  and will most likely need rehab placement.     Acute blood loss anemia  Anemia of chronic disease  Heme positive 12/18/2023  S/P EGD 12/18 which showed inflamed, texture changed mucosa in the esophagus as well as nonobstructing nonbleeding duodenal ulcers likely NSAID induced  S/P 2 units PRBC and platelets on 12/18- monitoring CBC and transfusing for symptoms  Hemoglobin dropped from 9 to 7.1 and continue to monitor closely and there is no evidence   of any active bleeding.  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  Continue PPI.     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   Blood transfusions for symptoms  Continue home pain medications including fentanyl patch and as needed oxycodone. Pain seems to be controlled.  Palliative care following.   Continue gabapentin and Requip.     CKD 3  Hypertension  Follows with Dr. Vicky Duran.  Creatinine stable.  Currently off Cardizem and monitor blood pressure closely    Hypokalemia  Replace per protocol     DM2  He is on insulin pump at home and is currently on corrective dose    Diastolic dysfunction  Mild aortic stenosis  Sees Dr. Lee.  Stable.     RONNY  Home CPAP if available.      Discharge  Palliative care did evaluate and plan is to go to SNF/nursing home once a bed is available.      Discussed with patient and nursing staff      Copied text on this note has been reviewed by me on 12/29/2023    Rock Claros MD  [facility]  12/29/23

## 2023-12-29 NOTE — PLAN OF CARE
Goal Outcome Evaluation:  Plan of Care Reviewed With: patient           Outcome Evaluation: Upon entering room, pt. supine in bed, eyes closed, and non-verbal this AM. Attempted bed mobility this AM with Dep. assist x 2 but pt. unable to sit fully upright due to weakness.  Difficult to assess ROM and MMT as pt. was not following commands.  BUE/LE (PROM) ther. ex. program x 10 reps completed.  NO verbal/visual signs of pain.  Pt.'s progress will depend on his ability to follow commands and cooperate with P.T.  Will continue to attempt progression of functional mobility as tolerated.      Anticipated Discharge Disposition (PT): extended care facility, skilled nursing facility

## 2023-12-29 NOTE — PROGRESS NOTES
"CHIEF COMPLAINT: End-stage prostate cancer, anemia/thrombocytopenia    Interval history:  12/24/2023  Patient transfused packed red blood cells with hemoglobin up to 8.9 12/19  EGD 12/19 with small nonbleeding duodenal ulcers.  The patient has ongoing lower extremity weakness, stool and bladder incontinence from his metastatic prostate cancer with cord compression.  Pain is controlled.  Hgb  7.1 from 9.2? But no clinical bleeding.  Platelets stable 36.    12/25/2023  Afebrile, vital signs stable  Patient seen at bedside, family contacted-discussed with grandson.  The patient, unfortunately, is weak, \"miserable\" and states it is getting worse.  His performance status has clearly worsened and he is best now a candidate for palliative care.  After discussion with the patient and his grandson we will move to transfer the patient to palliative care.  H&H 6.8 and 20.1, white count of 2210, platelet count of 33,000    12/26/2023  Case discussed with patient and his son.He is best treated with palliative care in hospital.  Afebrile, vital signs stable  Patient now transferred to [facility].  Reviewed patient concerns.    12/27/2023  T97.4, pulse 77, respirate 16, /63, SpO2 98%.  Patient comfortable at present.  He is now considering SNF at discharge.    12/29/2023  T97.2, pulse 72, respirations 18, /63  Patient still awaiting on SNF/nursing home placement.    12/20/2023  T97.8, pulse 80, respirations 18, /62  Now working towards SNF placement.  Patient's family at bedside.      HISTORY OF PRESENT ILLNESS:   This is an 83-year-old man follow-up Dr. Lee metastatic prostate cancer in our practice for metastatic prostate cancer who has had metastatic disease since 2018 with multiple sclerotic bone lesions and  at that time.  He has been treated with androgen deprivation therapy, palliative radiation to sites of bony disease/pain control.  He had progression of disease with increasing PSA " and started Xtandi 9021 in addition to continuation of Lupron and Xgeva.  He had progression of disease and darolutamide 600 mg twice daily plus Xgeva plus Lupron initiated 4/17/2023.  The patient had progression of disease subsequently with worsening anemia, admission in September for leg weakness secondary to cord compression at T10 (nonsurgical candidate) treated with steroids and radiation.  The patient is not a candidate for further treatment actively of his prostate cancer and hospice has been recommended but so far not instituted.  He has pain of malignancy currently on fentanyl 50 mcg patch and Norco 10/325 as needed breakthrough pain available at home.  He has narcotic induced constipation.     The patient was brought to ER 12/18/2023 by family who provide full-time care at home.  The patient has progressive lower extremity weakness x 2 weeks now with inability to stand.  He reports being incontinent of urine and stool.  He has a sacral decubitus ulcer from pressure and incontinence.  Labs drawn in the ER showed worsening counts with hemoglobin 5.8, platelets 41 with significant nucleated red blood cells.      Past Medical History, Past Surgical History, Social History, Family History have been reviewed and are without significant changes except as mentioned.    Review of Systems   A comprehensive 14 point review of systems was performed and was negative except as mentioned.    Medications:  The current medication list was reviewed in the EMR    ALLERGIES:    Allergies   Allergen Reactions    Niacin Unknown - Low Severity    Statins Unknown - Low Severity       Objective      Vitals:    12/28/23 1507   BP: 125/63   Pulse: 72   Resp: 18   Temp: 97.2 °F (36.2 °C)   SpO2: 97%          Physical Exam    CONSTITUTIONAL: pleasant man chronic ill-appearing resting comfortably  HEENT: no icterus, no thrush, moist membranes  CV: RRR, S1S2, no murmur  RESP: cta bilat, no wheezing, no rales, upper respiratory  congestion  NEURO: alert and oriented x3, bilateral lower extremity paresis  PSYCH: Depressed mood and affect    RECENT LABS:  Hematology Results from last 7 days   Lab Units 12/25/23  0411 12/24/23  1152 12/24/23  0346 12/23/23  0350   WBC 10*3/mm3 2.21*  --  2.52* 3.34*   HEMOGLOBIN g/dL 6.8* 7.1* 7.1* 9.2*   HEMATOCRIT % 20.1* 21.0* 21.4* 27.0*   PLATELETS 10*3/mm3 33*  --  36* 36*     2  Lab Results   Component Value Date    GLUCOSE 64 (L) 12/25/2023    BUN 17 12/25/2023    CREATININE 0.59 (L) 12/25/2023    EGFRIFNONA 56 (L) 02/23/2022    BCR 28.8 (H) 12/25/2023    CO2 22.0 12/25/2023    CALCIUM 8.0 (L) 12/25/2023    ALBUMIN 3.2 (L) 12/19/2023    AST 22 12/19/2023    ALT 13 12/19/2023       Lab Results   Component Value Date    IRON 243 (H) 12/19/2023    TIBC 271 (L) 12/19/2023    FERRITIN 1,987.00 (H) 12/19/2023       Lab Results   Component Value Date    WTQOLYEQ75 716 09/18/2023       Lab Results   Component Value Date    FOLATE 6.03 03/20/2023          Assessment & Plan   *Metastatic castrate resistant prostate cancer  The patient has extensive bony metastases and cord compression at T10 previously evaluated not felt to be surgical candidate status post limited radiation and steroid therapy but progressive symptoms of lower extremity weakness and bowel/bladder incontinence  According to Dr. 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 with decreased production of red  cells/platelets  12/19/2023-transfused with hemoglobin up to 8.9  12/19/2023-EGD with small nonbleeding duodenal ulcers likely NSAID induced  12/22/2023-WBC 3.3, hemoglobin 8.9, platelets 35  12/23/2023-WBC 3.3, hemoglobin stable 9.2, platelets stable 36  12/24/23-WBC 2.5, hemoglobin 7.1, platelets 36  Reassessed 12/25 H&H is 6.8 and 20.1, platelet count of 33,000     *Pain of malignancy  Pain appears controlled on fentanyl 50 mcg every 72 hour patch and oxycodone 5 mg every 4 hours as needed pain, gabapentin 900 mg nightly     *Cord compression with progressive symptoms  Patient previously evaluated by neurosurgery not felt to be candidate for decompression surgically  The patient was treated with limited radiation  D/c DEX with duodenal ulcerations and lack of expected benefit     *Sacral pressure wound worsened by incontinence     Oncology plan/recommendations:  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  At this point his disease has progressed to the point that he is becoming extremely uncomfortable without the possibility of improvement with active interventions such as transfusion.    Discussed with patient and his grandson moving to palliative care which we should now employ.  Patient currently on palliative care-he requests SNF placement.                12/29/2023      CC:

## 2023-12-29 NOTE — CASE MANAGEMENT/SOCIAL WORK
Continued Stay Note  [facility]     Patient Name: Semaj Herrera  MRN: [id number]  Today's Date: 12/29/2023    Admit Date: 12/18/2023    Plan: SNF referrals pending   Discharge Plan       Row Name 12/29/23 1421       Plan    Plan SNF referrals pending    Plan Comments CCP left [facility] for [facility] to see if they can evaluate referral. CCP spoke with [facility] who states they dot not currently have a bed at this time. CCP continues to reach out to pending SNF referrals. JOSELITO, TAMIE                   Discharge Codes    No documentation.                 Expected Discharge Date and Time       Expected Discharge Date Expected Discharge Time    Dec 29, 2023               TAMIE Chaparro

## 2023-12-29 NOTE — PLAN OF CARE
Goal Outcome Evaluation:      Pt 's PPS is 30%. Pt is currently sleeping with his eyes closed. Pt is breathing with a snorous sound with a unlabored breathing pattern. Pt does easily arouse with voice or touch. Pt 's dressing was changed on his buttock and heel.

## 2023-12-30 NOTE — PROGRESS NOTES
Name: Semaj Herrera ADMIT: 2023   : 1940  PCP: Axel Guardado MD    MRN: [id number] LOS: 12 days   AGE/SEX: 83 y.o. male  ROOM: [facility]     Subjective   Subjective   Patient is lying in the bed and does not appear to be any major distress.  Chronically ill-appearing.  No reports of nausea, vomiting, abdominal pain, chest pain.     Objective   Objective   Vital Signs  Temp:  [97.4 °F (36.3 °C)-98.1 °F (36.7 °C)] 98.1 °F (36.7 °C)  Heart Rate:  [102-111] 111  Resp:  [16-24] 16  BP: (93-97)/(52-55) 97/55  SpO2:  [95 %-99 %] 95 %  on   ;   Device (Oxygen Therapy): room air  Body mass index is 24.21 kg/m².    Physical Exam  Constitutional:       General: He is not in acute distress.     Appearance: He is ill-appearing. He is not toxic-appearing.   HENT:      Head: Normocephalic and atraumatic.      Mouth/Throat:      Mouth: Mucous membranes are moist.   Eyes:      Extraocular Movements: Extraocular movements intact.      Pupils: Pupils are equal, round, and reactive to light.   Cardiovascular:      Rate and Rhythm: Normal rate and regular rhythm.   Pulmonary:      Effort: No respiratory distress.      Breath sounds: No wheezing or rhonchi.   Abdominal:      General: Bowel sounds are normal.      Palpations: Abdomen is soft.      Tenderness: There is no abdominal tenderness.   Musculoskeletal:         General: Swelling present.   Skin:     General: Skin is warm and dry.      Coloration: Skin is pale.   Neurological:      General: No focal deficit present.      Mental Status: He is oriented to person, place, and time.      Motor: Weakness present.   Psychiatric:         Mood and Affect: Mood normal.         Behavior: Behavior normal.     Results Review:       I reviewed the patient's new clinical results.  Results from last 7 days   Lab Units 23  0411 23  1152 23  0346   WBC 10*3/mm3 2.21*  --  2.52*   HEMOGLOBIN g/dL 6.8* 7.1* 7.1*   PLATELETS 10*3/mm3 33*  --  36*     Results from last 7  "days   Lab Units 12/25/23 0411 12/24/23  0346   SODIUM mmol/L 137 136   POTASSIUM mmol/L 4.1 3.5   CHLORIDE mmol/L 103 103   CO2 mmol/L 22.0 22.0   BUN mg/dL 17 18   CREATININE mg/dL 0.59* 0.55*   GLUCOSE mg/dL 64* 78   Estimated Creatinine Clearance: 88.6 mL/min (A) (by C-G formula based on SCr of 0.59 mg/dL (L)).        Results from last 7 days   Lab Units 12/25/23 0411 12/24/23  0346   CALCIUM mg/dL 8.0* 8.2*       No results found for: \"HGBA1C\", \"POCGLU\"    Scheduled Meds  fentaNYL, 1 patch, Transdermal, Q72H   And  Check Fentanyl Patch Placement, 1 each, Does not apply, Q12H  gabapentin, 900 mg, Oral, Nightly  guaiFENesin, 600 mg, Oral, Q12H  levothyroxine, 176 mcg, Oral, Once per day on Mon Thu  levothyroxine, 88 mcg, Oral, Once per day on Sun Tue Wed Fri Sat  pantoprazole, 40 mg, Oral, BID AC  rOPINIRole, 2 mg, Oral, Nightly  senna-docusate sodium, 2 tablet, Oral, BID  sodium chloride, 10 mL, Intravenous, Q12H    Continuous Infusions  sodium chloride, 30 mL/hr, Last Rate: Stopped (12/19/23 1107)    PRN Meds    acetaminophen **OR** acetaminophen **OR** acetaminophen    acetaminophen **OR** acetaminophen **OR** acetaminophen    senna-docusate sodium **AND** polyethylene glycol **AND** bisacodyl **AND** bisacodyl    dextrose    dextrose    diphenoxylate-atropine    glucagon (human recombinant)    Morphine **OR** morphine **OR** HYDROmorphone    Morphine **OR** morphine **OR** HYDROmorphone    Morphine **OR** morphine **OR** HYDROmorphone    LORazepam **OR** midazolam **OR** midazolam **OR** midazolam **OR** LORazepam **OR** LORazepam    LORazepam **OR** midazolam **OR** midazolam **OR** midazolam **OR** LORazepam **OR** LORazepam    LORazepam **OR** midazolam **OR** midazolam **OR** LORazepam **OR** LORazepam    Menthol-Zinc Oxide    ondansetron    polyethylene Glycol 400    sodium chloride    sodium chloride    sodium chloride    Diet: Cardiac Diets, Regular/House Diet; Healthy Heart (2-3 Na+); Texture: " Regular Texture (IDDSI 7); Fluid Consistency: Thin (IDDSI 0)    I have personally reviewed:  [x]  Medications  [x]  Laboratory   []  Microbiology   []  Radiology   [x]  EKG/Telemetry sinus  []  Cardiology/Vascular   []  Pathology   []  Records        Assessment/Plan     Active Hospital Problems    Diagnosis  POA    **Generalized weakness [R53.1]  Yes    Acute blood loss anemia [D62]  Yes    Anemia, chronic disease [D63.8]  Yes    Class 2 severe obesity due to excess calories with serious comorbidity and body mass index (BMI) of 35.0 to 35.9 in adult [E66.01, Z68.35]  Not Applicable    Restless legs syndrome (RLS) [G25.81]  Yes    Diastolic dysfunction [I51.89]  Yes    Neuropathy [G62.9]  Yes    Prostate cancer metastatic to bone [C61, 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     Mr. Herrera is a 83 y.o. male that presented to the hospital with generalized weakness.  He was living at home with his son who was providing 24/7 assistance via hospital bed and [employer] lift.  His son fell ill and he called 911 to have his father taken to the hospital for further care as he is unable to be cared for at home by himself.  He does have known metastatic prostate cancer with recommendations for hospice in the past, but hospice has not been initiated at this time.  He was noted to have a hemoglobin of 5.8 as well as platelets in the 40s prompting further admission.     Generalized weakness  Multifactorial secondary to metastatic cancer, advanced age, ongoing immobility, and anemia.  PT/OT following along and will most likely need rehab placement.     Acute blood loss anemia  Anemia of chronic disease  Heme positive  12/18/2023  S/P EGD 12/18 which showed inflamed, texture changed mucosa in the esophagus as well as nonobstructing nonbleeding duodenal ulcers likely NSAID induced  S/P 2 units PRBC and platelets on 12/18- monitoring CBC and transfusing for symptoms  Hemoglobin dropped from 9 to 6.8 and continue to monitor closely and there is no evidence   of any active bleeding.  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  Currently in palliative care.  Continue PPI.     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   Blood transfusions for symptoms  Continue home pain medications including fentanyl patch and as needed oxycodone. Pain seems to be controlled.  Palliative care following.   Continue gabapentin and Requip.     CKD 3  Hypertension  Follows with Dr. Vicky Duran.  Creatinine stable.  Currently off Cardizem and monitor blood pressure closely    Hypokalemia  Replace per protocol     DM2  He is on insulin pump at home and is currently on corrective dose    Diastolic dysfunction  Mild aortic stenosis  Sees Dr. Lee.  Stable.     RONNY  Home CPAP if available.      Discharge  Palliative care did evaluate and plan is to go to SNF/nursing home once a bed is available.      Discussed with patient and nursing staff      Copied text on this note has been reviewed by me on 12/30/2023    Rock Claros MD  [facility]  12/30/23

## 2023-12-30 NOTE — PROGRESS NOTES
"CHIEF COMPLAINT: End-stage prostate cancer, anemia/thrombocytopenia    Interval history:  12/24/2023  Patient transfused packed red blood cells with hemoglobin up to 8.9 12/19  EGD 12/19 with small nonbleeding duodenal ulcers.  The patient has ongoing lower extremity weakness, stool and bladder incontinence from his metastatic prostate cancer with cord compression.  Pain is controlled.  Hgb  7.1 from 9.2? But no clinical bleeding.  Platelets stable 36.    12/25/2023  Afebrile, vital signs stable  Patient seen at bedside, family contacted-discussed with grandson.  The patient, unfortunately, is weak, \"miserable\" and states it is getting worse.  His performance status has clearly worsened and he is best now a candidate for palliative care.  After discussion with the patient and his grandson we will move to transfer the patient to palliative care.  H&H 6.8 and 20.1, white count of 2210, platelet count of 33,000    12/26/2023  Case discussed with patient and his son.He is best treated with palliative care in hospital.  Afebrile, vital signs stable  Patient now transferred to [facility] palliative care.  Reviewed patient concerns.    12/27/2023  T97.4, pulse 77, respirate 16, /63, SpO2 98%.  Patient comfortable at present.  He is now considering SNF at discharge.    12/28/2023  T97.2, pulse 72, respirations 18, /63  Patient still awaiting on SNF/nursing home placement.    12/29/2023  T97.8, pulse 80, respirations 18, /62  Now working towards SNF placement.  Patient's family at bedside.    12/3/2023  T97.4, pulse 102, respirations 20, BP 97/55, SpO2 97%  Patient now resting, SNF process ongoing    HISTORY OF PRESENT ILLNESS:   This is an 83-year-old man follow-up Dr. Lee metastatic prostate cancer in our practice for metastatic prostate cancer who has had metastatic disease since 2018 with multiple sclerotic bone lesions and  at that time.  He has been treated with androgen deprivation therapy, " palliative radiation to sites of bony disease/pain control.  He had progression of disease with increasing PSA and started Xtandi 9021 in addition to continuation of Lupron and Xgeva.  He had progression of disease and darolutamide 600 mg twice daily plus Xgeva plus Lupron initiated 4/17/2023.  The patient had progression of disease subsequently with worsening anemia, admission in September for leg weakness secondary to cord compression at T10 (nonsurgical candidate) treated with steroids and radiation.  The patient is not a candidate for further treatment actively of his prostate cancer and hospice has been recommended but so far not instituted.  He has pain of malignancy currently on fentanyl 50 mcg patch and Norco 10/325 as needed breakthrough pain available at home.  He has narcotic induced constipation.     The patient was brought to ER 12/18/2023 by family who provide full-time care at home.  The patient has progressive lower extremity weakness x 2 weeks now with inability to stand.  He reports being incontinent of urine and stool.  He has a sacral decubitus ulcer from pressure and incontinence.  Labs drawn in the ER showed worsening counts with hemoglobin 5.8, platelets 41 with significant nucleated red blood cells.      Past Medical History, Past Surgical History, Social History, Family History have been reviewed and are without significant changes except as mentioned.    Review of Systems   A comprehensive 14 point review of systems was performed and was negative except as mentioned.    Medications:  The current medication list was reviewed in the EMR    ALLERGIES:    Allergies   Allergen Reactions    Niacin Unknown - Low Severity    Statins Unknown - Low Severity       Objective      Vitals:    12/30/23 0420   BP: 97/55   Pulse: 102   Resp: 22   Temp: 97.4 °F (36.3 °C)   SpO2: 97%          Physical Exam    CONSTITUTIONAL: pleasant man chronic ill-appearing resting comfortably  HEENT: no icterus, no thrush,  moist membranes  CV: RRR, S1S2, no murmur  RESP: cta bilat, no wheezing, no rales, upper respiratory congestion  NEURO: alert and oriented x3, bilateral lower extremity paresis  PSYCH: Depressed mood and affect    RECENT LABS:  Hematology Results from last 7 days   Lab Units 12/25/23  0411 12/24/23  1152 12/24/23  0346   WBC 10*3/mm3 2.21*  --  2.52*   HEMOGLOBIN g/dL 6.8* 7.1* 7.1*   HEMATOCRIT % 20.1* 21.0* 21.4*   PLATELETS 10*3/mm3 33*  --  36*     2  Lab Results   Component Value Date    GLUCOSE 64 (L) 12/25/2023    BUN 17 12/25/2023    CREATININE 0.59 (L) 12/25/2023    EGFRIFNONA 56 (L) 02/23/2022    BCR 28.8 (H) 12/25/2023    CO2 22.0 12/25/2023    CALCIUM 8.0 (L) 12/25/2023    ALBUMIN 3.2 (L) 12/19/2023    AST 22 12/19/2023    ALT 13 12/19/2023       Lab Results   Component Value Date    IRON 243 (H) 12/19/2023    TIBC 271 (L) 12/19/2023    FERRITIN 1,987.00 (H) 12/19/2023       Lab Results   Component Value Date    [id number] 716 09/18/2023       Lab Results   Component Value Date    FOLATE 6.03 03/20/2023          Assessment & Plan   *Metastatic castrate resistant prostate cancer  The patient has extensive bony metastases and cord compression at T10 previously evaluated not felt to be surgical candidate status post limited radiation and steroid therapy but progressive symptoms of lower extremity weakness and bowel/bladder incontinence  According to Dr. 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 with decreased production of red cells/platelets  12/19/2023-transfused with hemoglobin up to 8.9  12/19/2023-EGD with small nonbleeding duodenal ulcers likely NSAID induced  12/22/2023-WBC 3.3, hemoglobin 8.9, platelets 35  12/23/2023-WBC 3.3, hemoglobin stable 9.2, platelets stable 36  12/24/23-WBC 2.5, hemoglobin 7.1, platelets 36  Reassessed 12/25 H&H is 6.8 and 20.1, platelet count of 33,000     *Pain of malignancy  Pain appears controlled on fentanyl 50 mcg every 72 hour patch and oxycodone 5 mg every 4 hours as needed pain, gabapentin 900 mg nightly     *Cord compression with progressive symptoms  Patient previously evaluated by neurosurgery not felt to be candidate for decompression surgically  The patient was treated with limited radiation  D/c DEX with duodenal ulcerations and lack of expected benefit     *Sacral pressure wound worsened by incontinence     Oncology plan/recommendations:  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  At this point his disease has progressed to the point that he is becoming extremely uncomfortable without the possibility of improvement with active interventions such as transfusion.    Patient currently on palliative care-he requests SNF placement which is ongoing.                12/30/2023      CC:

## 2023-12-31 NOTE — PROGRESS NOTES
Name: Semaj Herrera ADMIT: 2023   : 1940  PCP: Axel Guardado MD    MRN: [id number] LOS: 13 days   AGE/SEX: 83 y.o. male  ROOM: [facility]     Subjective   Subjective   Patient is lying in the bed and sleeping.  Arousable and answer simple questions.  Chronically ill-appearing.  No reports of nausea, vomiting, abdominal pain, chest pain.     Objective   Objective   Vital Signs  Temp:  [97.8 °F (36.6 °C)-98 °F (36.7 °C)] 97.8 °F (36.6 °C)  Heart Rate:  [84-94] 84  Resp:  [16] 16  BP: (138-145)/(68-72) 138/72  SpO2:  [83 %-91 %] 91 %  on   ;   Device (Oxygen Therapy): room air  Body mass index is 24.21 kg/m².    Physical Exam  Constitutional:       General: He is not in acute distress.     Appearance: He is ill-appearing. He is not toxic-appearing.   HENT:      Head: Normocephalic and atraumatic.      Mouth/Throat:      Mouth: Mucous membranes are moist.   Eyes:      Extraocular Movements: Extraocular movements intact.      Pupils: Pupils are equal, round, and reactive to light.   Cardiovascular:      Rate and Rhythm: Normal rate and regular rhythm.   Pulmonary:      Effort: No respiratory distress.      Breath sounds: No wheezing or rhonchi.   Abdominal:      General: Bowel sounds are normal.      Palpations: Abdomen is soft.      Tenderness: There is no abdominal tenderness.   Musculoskeletal:         General: Swelling present.   Skin:     General: Skin is warm and dry.      Coloration: Skin is pale.   Neurological:      General: No focal deficit present.      Mental Status: He is oriented to person, place, and time.      Motor: Weakness present.   Psychiatric:         Mood and Affect: Mood normal.         Behavior: Behavior normal.     Results Review:       I reviewed the patient's new clinical results.  Results from last 7 days   Lab Units 23  0411   WBC 10*3/mm3 2.21*   HEMOGLOBIN g/dL 6.8*   PLATELETS 10*3/mm3 33*     Results from last 7 days   Lab Units 23  0411   SODIUM mmol/L 137  "  POTASSIUM mmol/L 4.1   CHLORIDE mmol/L 103   CO2 mmol/L 22.0   BUN mg/dL 17   CREATININE mg/dL 0.59*   GLUCOSE mg/dL 64*   Estimated Creatinine Clearance: 88.6 mL/min (A) (by C-G formula based on SCr of 0.59 mg/dL (L)).        Results from last 7 days   Lab Units 12/25/23  0411   CALCIUM mg/dL 8.0*       No results found for: \"HGBA1C\", \"POCGLU\"    Scheduled Meds  fentaNYL, 1 patch, Transdermal, Q72H   And  Check Fentanyl Patch Placement, 1 each, Does not apply, Q12H  gabapentin, 900 mg, Oral, Nightly  guaiFENesin, 600 mg, Oral, Q12H  levothyroxine, 176 mcg, Oral, Once per day on Mon Thu  levothyroxine, 88 mcg, Oral, Once per day on Sun Tue Wed Fri Sat  pantoprazole, 40 mg, Oral, BID AC  rOPINIRole, 2 mg, Oral, Nightly  senna-docusate sodium, 2 tablet, Oral, BID  sodium chloride, 10 mL, Intravenous, Q12H    Continuous Infusions  sodium chloride, 30 mL/hr, Last Rate: Stopped (12/19/23 1107)    PRN Meds    acetaminophen **OR** acetaminophen **OR** acetaminophen    acetaminophen **OR** acetaminophen **OR** acetaminophen    senna-docusate sodium **AND** polyethylene glycol **AND** bisacodyl **AND** bisacodyl    dextrose    dextrose    diphenoxylate-atropine    glucagon (human recombinant)    Morphine **OR** morphine **OR** HYDROmorphone    Morphine **OR** morphine **OR** HYDROmorphone    Morphine **OR** morphine **OR** HYDROmorphone    LORazepam **OR** midazolam **OR** midazolam **OR** midazolam **OR** LORazepam **OR** LORazepam    LORazepam **OR** midazolam **OR** midazolam **OR** midazolam **OR** LORazepam **OR** LORazepam    LORazepam **OR** midazolam **OR** midazolam **OR** LORazepam **OR** LORazepam    Menthol-Zinc Oxide    ondansetron    polyethylene Glycol 400    sodium chloride    sodium chloride    sodium chloride    Diet: Cardiac Diets, Regular/House Diet; Healthy Heart (2-3 Na+); Texture: Regular Texture (IDDSI 7); Fluid Consistency: Thin (IDDSI 0)    I have personally reviewed:  [x]  Medications  [x]  " Laboratory   []  Microbiology   []  Radiology   [x]  EKG/Telemetry sinus  []  Cardiology/Vascular   []  Pathology   []  Records        Assessment/Plan     Active Hospital Problems    Diagnosis  POA    **Generalized weakness [R53.1]  Yes    Acute blood loss anemia [D62]  Yes    Anemia, chronic disease [D63.8]  Yes    Class 2 severe obesity due to excess calories with serious comorbidity and body mass index (BMI) of 35.0 to 35.9 in adult [E66.01, Z68.35]  Not Applicable    Restless legs syndrome (RLS) [G25.81]  Yes    Diastolic dysfunction [I51.89]  Yes    Neuropathy [G62.9]  Yes    Prostate cancer metastatic to bone [C61, 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     Mr. Herrera is a 83 y.o. male that presented to the hospital with generalized weakness.  He was living at home with his son who was providing 24/7 assistance via hospital bed and [employer] lift.  His son fell ill and he called 911 to have his father taken to the hospital for further care as he is unable to be cared for at home by himself.  He does have known metastatic prostate cancer with recommendations for hospice in the past, but hospice has not been initiated at this time.  He was noted to have a hemoglobin of 5.8 as well as platelets in the 40s prompting further admission.     Generalized weakness  Multifactorial secondary to metastatic cancer, advanced age, ongoing immobility, and anemia.  PT/OT following along and will most likely need rehab placement.     Acute blood loss anemia  Anemia of chronic disease  Heme positive 12/18/2023  S/P EGD 12/18 which showed inflamed, texture changed mucosa in the esophagus as well as nonobstructing  nonbleeding duodenal ulcers likely NSAID induced  S/P 2 units PRBC and platelets on 12/18- monitoring CBC and transfusing for symptoms  Hemoglobin dropped from 9 to 6.8 and continue to monitor closely and there is no evidence   of any active bleeding.  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  Currently in palliative care.  Continue PPI.     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   Blood transfusions for symptoms  Continue home pain medications including fentanyl patch and as needed oxycodone. Pain seems to be controlled.  Palliative care following.   Continue gabapentin and Requip.     CKD 3  Hypertension  Follows with Dr. Vicky Duran.  Creatinine stable.  Currently off Cardizem and monitor blood pressure closely    Hypokalemia  Replace per protocol     DM2  He is on insulin pump at home and is currently on corrective dose    Diastolic dysfunction  Mild aortic stenosis  Sees Dr. Lee.  Stable.     RONNY  Home CPAP if available.      Discharge  Palliative care did evaluate and plan is to go to SNF/nursing home once a bed is available.      Discussed with patient and nursing staff      Copied text on this note has been reviewed by me on 12/31/2023    Rock Claros MD  [facility]  12/31/23

## 2023-12-31 NOTE — PLAN OF CARE
Problem: Adult Inpatient Plan of Care  Goal: Plan of Care Review  Recent Flowsheet Documentation  Taken 12/30/2023 1845 by Negra Miller RN  Progress: declining  Plan of Care Reviewed With: patient  Outcome Evaluation: pt slept all day. pt complaint of pain x1. recieved 4 mg of morphine. tolerared well. jo and oral care done. pt wasnt hungry. will continue to keep comfortable     Problem: Adult Inpatient Plan of Care  Goal: Patient-Specific Goal (Individualized)  Recent Flowsheet Documentation  Taken 12/30/2023 1845 by Negra Miller RN  Patient-Specific Goals (Include Timeframe): to keep comfortable  Individualized Care Needs: PRN and scheduled meds, jo and oral care

## 2023-12-31 NOTE — PLAN OF CARE
Problem: Adult Inpatient Plan of Care  Goal: Plan of Care Review  Recent Flowsheet Documentation  Taken 12/31/2023 1650 by Negra Miller RN  Progress: declining  Plan of Care Reviewed With: patient  Outcome Evaluation: medicated twice today for pain and anxiety recieved 4 mg of morphine ( increased to 1 mg of dilaudid) and 2 mg of versed ( increased to 4 mg) . tolerared well. jo and oral care done.. will continue to keep comfortable     Problem: Adult Inpatient Plan of Care  Goal: Patient-Specific Goal (Individualized)  Outcome: Ongoing, Progressing  Flowsheets (Taken 12/30/2023 1845)  Patient-Specific Goals (Include Timeframe): to keep comfortable  Individualized Care Needs: PRN and scheduled meds, jo and oral care

## 2023-12-31 NOTE — PLAN OF CARE
Goal Outcome Evaluation:   Janet coma scale score of 9. Access and drains include: jo catheter, IV. No PRNs utilized during shift. No demonstrations of pain/nausea/anxiety. PPS 20%. Plan of care ongoing.         Progress: no change

## 2023-12-31 NOTE — PROGRESS NOTES
"CHIEF COMPLAINT: End-stage prostate cancer, anemia/thrombocytopenia    Interval history:  12/24/2023  Patient transfused packed red blood cells with hemoglobin up to 8.9 12/19  EGD 12/19 with small nonbleeding duodenal ulcers.  The patient has ongoing lower extremity weakness, stool and bladder incontinence from his metastatic prostate cancer with cord compression.  Pain is controlled.  Hgb  7.1 from 9.2? But no clinical bleeding.  Platelets stable 36.    12/25/2023  Afebrile, vital signs stable  Patient seen at bedside, family contacted-discussed with grandson.  The patient, unfortunately, is weak, \"miserable\" and states it is getting worse.  His performance status has clearly worsened and he is best now a candidate for palliative care.  After discussion with the patient and his grandson we will move to transfer the patient to palliative care.  H&H 6.8 and 20.1, white count of 2210, platelet count of 33,000    12/26/2023  Case discussed with patient and his son.He is best treated with palliative care in hospital.  Afebrile, vital signs stable  Patient now transferred to [facility] palliative care.  Reviewed patient concerns.    12/27/2023  T97.4, pulse 77, respirate 16, /63, SpO2 98%.  Patient comfortable at present.  He is now considering SNF at discharge.    12/28/2023  T97.2, pulse 72, respirations 18, /63  Patient still awaiting on SNF/nursing home placement.    12/29/2023  T97.8, pulse 80, respirations 18, /62  Now working towards SNF placement.  Patient's family at bedside.    12/30/2023  T97.4, pulse 102, respirations 20, BP 97/55, SpO2 97%  Patient now resting, SNF process ongoing    12/21/2023  T 90 degrees, pulse 94, respirations 16, /68  Patient resting.  No additional recommendations.    HISTORY OF PRESENT ILLNESS:   This is an 83-year-old man followed with metastatic prostate cancer in our practice for metastatic prostate cancer who has had metastatic disease since 2018 with " multiple sclerotic bone lesions and  at that time.  He has been treated with androgen deprivation therapy, palliative radiation to sites of bony disease/pain control.  He had progression of disease with increasing PSA and started Xtandi 9021 in addition to continuation of Lupron and Xgeva.  He had progression of disease and darolutamide 600 mg twice daily plus Xgeva plus Lupron initiated 4/17/2023.  The patient had progression of disease subsequently with worsening anemia, admission in September for leg weakness secondary to cord compression at T10 (nonsurgical candidate) treated with steroids and radiation.  The patient is not a candidate for further treatment actively of his prostate cancer and hospice has been recommended but so far not instituted.  He has pain of malignancy currently on fentanyl 50 mcg patch and Norco 10/325 as needed breakthrough pain available at home.  He has narcotic induced constipation.     The patient was brought to ER 12/18/2023 by family who provide full-time care at home.  The patient has progressive lower extremity weakness x 2 weeks now with inability to stand.  He reports being incontinent of urine and stool.  He has a sacral decubitus ulcer from pressure and incontinence.  Labs drawn in the ER showed worsening counts with hemoglobin 5.8, platelets 41 with significant nucleated red blood cells.      Past Medical History, Past Surgical History, Social History, Family History have been reviewed and are without significant changes except as mentioned.    Review of Systems   A comprehensive 14 point review of systems was performed and was negative except as mentioned.    Medications:  The current medication list was reviewed in the EMR    ALLERGIES:    Allergies   Allergen Reactions    Niacin Unknown - Low Severity    Statins Unknown - Low Severity       Objective      Vitals:    12/31/23 0440   BP: 145/68   Pulse: 94   Resp: 16   Temp: 98 °F (36.7 °C)   SpO2: (!) 83%           Physical Exam    CONSTITUTIONAL: pleasant man chronic ill-appearing resting comfortably  HEENT: no icterus, no thrush, moist membranes  CV: RRR, S1S2, no murmur  RESP: cta bilat, no wheezing, no rales, upper respiratory congestion  NEURO: alert and oriented x3, bilateral lower extremity paresis  PSYCH: Depressed mood and affect    RECENT LABS:  Hematology Results from last 7 days   Lab Units 12/25/23  0411 12/24/23  1152   WBC 10*3/mm3 2.21*  --    HEMOGLOBIN g/dL 6.8* 7.1*   HEMATOCRIT % 20.1* 21.0*   PLATELETS 10*3/mm3 33*  --      2  Lab Results   Component Value Date    GLUCOSE 64 (L) 12/25/2023    BUN 17 12/25/2023    CREATININE 0.59 (L) 12/25/2023    EGFRIFNONA 56 (L) 02/23/2022    BCR 28.8 (H) 12/25/2023    CO2 22.0 12/25/2023    CALCIUM 8.0 (L) 12/25/2023    ALBUMIN 3.2 (L) 12/19/2023    AST 22 12/19/2023    ALT 13 12/19/2023       Lab Results   Component Value Date    IRON 243 (H) 12/19/2023    TIBC 271 (L) 12/19/2023    FERRITIN 1,987.00 (H) 12/19/2023       Lab Results   Component Value Date    [id number] 716 09/18/2023       Lab Results   Component Value Date    FOLATE 6.03 03/20/2023          Assessment & Plan   *Metastatic castrate resistant prostate cancer  The patient has extensive bony metastases and cord compression at T10 previously evaluated not felt to be surgical candidate status post limited radiation and steroid therapy but progressive symptoms of lower extremity weakness and bowel/bladder incontinence  According to Dr. Lee's previous clinic notes the patient has disease progressive on treatment and because of the patient's performance status, he is not felt to be a candidate for further active treatment of his malignancy      *Severe anemia/thrombocytopenia  Patient has recurrent anemia requiring transfusion support and worsening thrombocytopenia  Admitted with hemoglobin 5.8 and platelets 41  Differential shows significant nucleated red blood cells 13%, left shifted myeloid cells  with metamyelocytes myelocytes all 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 with decreased production of red cells/platelets  12/19/2023-transfused with hemoglobin up to 8.9  12/19/2023-EGD with small nonbleeding duodenal ulcers likely NSAID induced  12/22/2023-WBC 3.3, hemoglobin 8.9, platelets 35  12/23/2023-WBC 3.3, hemoglobin stable 9.2, platelets stable 36  12/24/23-WBC 2.5, hemoglobin 7.1, platelets 36  Reassessed 12/25 H&H is 6.8 and 20.1, platelet count of 33,000     *Pain of malignancy  Pain appears controlled on fentanyl 50 mcg every 72 hour patch and oxycodone 5 mg every 4 hours as needed pain, gabapentin 900 mg nightly     *Cord compression with progressive symptoms  Patient previously evaluated by neurosurgery not felt to be candidate for decompression surgically  The patient was treated with limited radiation  D/c DEX with duodenal ulcerations and lack of expected benefit     *Sacral pressure wound worsened by incontinence     Oncology plan/recommendations:  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  At this point his disease has progressed to the point that he is becoming extremely uncomfortable without the possibility of improvement with active interventions such as transfusion.    Patient currently on palliative care-he requests SNF placement which is ongoing.    *No additional recommendations.  Will follow only peripherally at this point.                12/31/2023      CC:

## 2024-01-01 ENCOUNTER — HOSPITAL ENCOUNTER (INPATIENT)
Facility: HOSPITAL | Age: 84
LOS: 2 days | End: 2024-01-05
Attending: INTERNAL MEDICINE | Admitting: INTERNAL MEDICINE
Payer: MEDICARE

## 2024-01-01 VITALS — TEMPERATURE: 98.1 F | OXYGEN SATURATION: 79 % | SYSTOLIC BLOOD PRESSURE: 92 MMHG | DIASTOLIC BLOOD PRESSURE: 58 MMHG

## 2024-01-01 VITALS
HEIGHT: 65 IN | WEIGHT: 145.5 LBS | OXYGEN SATURATION: 74 % | HEART RATE: 123 BPM | RESPIRATION RATE: 20 BRPM | BODY MASS INDEX: 24.24 KG/M2 | DIASTOLIC BLOOD PRESSURE: 54 MMHG | TEMPERATURE: 99 F | SYSTOLIC BLOOD PRESSURE: 106 MMHG

## 2024-01-01 PROCEDURE — 25010000002 HYDROMORPHONE PER 4 MG: Performed by: INTERNAL MEDICINE

## 2024-01-01 PROCEDURE — 25010000002 GLYCOPYRROLATE 0.2 MG/ML SOLUTION: Performed by: INTERNAL MEDICINE

## 2024-01-01 PROCEDURE — 25010000002 HYDROMORPHONE 1 MG/ML SOLUTION: Performed by: INTERNAL MEDICINE

## 2024-01-01 PROCEDURE — 25010000002 MIDAZOLAM PER 1 MG: Performed by: INTERNAL MEDICINE

## 2024-01-01 RX ORDER — LORAZEPAM 2 MG/ML
0.5 CONCENTRATE ORAL
Status: DISCONTINUED | OUTPATIENT
Start: 2024-01-01 | End: 2024-01-01 | Stop reason: HOSPADM

## 2024-01-01 RX ORDER — LORAZEPAM 2 MG/ML
1 CONCENTRATE ORAL
Status: CANCELLED | OUTPATIENT
Start: 2024-01-01 | End: 2024-01-08

## 2024-01-01 RX ORDER — SODIUM CHLORIDE 0.9 % (FLUSH) 0.9 %
10 SYRINGE (ML) INJECTION AS NEEDED
Status: CANCELLED | OUTPATIENT
Start: 2024-01-01

## 2024-01-01 RX ORDER — SODIUM CHLORIDE 0.9 % (FLUSH) 0.9 %
10 SYRINGE (ML) INJECTION EVERY 12 HOURS SCHEDULED
Status: DISCONTINUED | OUTPATIENT
Start: 2024-01-01 | End: 2024-01-01 | Stop reason: HOSPADM

## 2024-01-01 RX ORDER — GLYCOPYRROLATE 0.2 MG/ML
0.2 INJECTION INTRAMUSCULAR; INTRAVENOUS
Status: CANCELLED | OUTPATIENT
Start: 2024-01-01 | End: 2024-01-08

## 2024-01-01 RX ORDER — GLYCOPYRROLATE 0.2 MG/ML
0.4 INJECTION INTRAMUSCULAR; INTRAVENOUS
Status: DISCONTINUED | OUTPATIENT
Start: 2024-01-01 | End: 2024-01-01 | Stop reason: HOSPADM

## 2024-01-01 RX ORDER — MIDAZOLAM HYDROCHLORIDE 1 MG/ML
4 INJECTION INTRAMUSCULAR; INTRAVENOUS
Status: DISCONTINUED | OUTPATIENT
Start: 2024-01-01 | End: 2024-01-01 | Stop reason: HOSPADM

## 2024-01-01 RX ORDER — ONDANSETRON 2 MG/ML
4 INJECTION INTRAMUSCULAR; INTRAVENOUS EVERY 6 HOURS PRN
Status: CANCELLED | OUTPATIENT
Start: 2024-01-01

## 2024-01-01 RX ORDER — LORAZEPAM 2 MG/ML
2 CONCENTRATE ORAL
Status: DISCONTINUED | OUTPATIENT
Start: 2024-01-01 | End: 2024-01-01 | Stop reason: HOSPADM

## 2024-01-01 RX ORDER — MIDAZOLAM HYDROCHLORIDE 1 MG/ML
2 INJECTION INTRAMUSCULAR; INTRAVENOUS
Status: DISCONTINUED | OUTPATIENT
Start: 2024-01-01 | End: 2024-01-01 | Stop reason: HOSPADM

## 2024-01-01 RX ORDER — MORPHINE SULFATE 20 MG/ML
20 SOLUTION ORAL
Status: DISCONTINUED | OUTPATIENT
Start: 2024-01-01 | End: 2024-01-01 | Stop reason: HOSPADM

## 2024-01-01 RX ORDER — SODIUM CHLORIDE 0.9 % (FLUSH) 0.9 %
10 SYRINGE (ML) INJECTION EVERY 12 HOURS SCHEDULED
Status: CANCELLED | OUTPATIENT
Start: 2024-01-01

## 2024-01-01 RX ORDER — ACETAMINOPHEN 650 MG/1
650 SUPPOSITORY RECTAL EVERY 4 HOURS PRN
Status: CANCELLED | OUTPATIENT
Start: 2024-01-01

## 2024-01-01 RX ORDER — LORAZEPAM 0.5 MG/1
0.5 TABLET ORAL
Status: DISCONTINUED | OUTPATIENT
Start: 2024-01-01 | End: 2024-01-01 | Stop reason: HOSPADM

## 2024-01-01 RX ORDER — ONDANSETRON 2 MG/ML
4 INJECTION INTRAMUSCULAR; INTRAVENOUS EVERY 6 HOURS PRN
Status: DISCONTINUED | OUTPATIENT
Start: 2024-01-01 | End: 2024-01-01 | Stop reason: HOSPADM

## 2024-01-01 RX ORDER — MIDAZOLAM HYDROCHLORIDE 1 MG/ML
4 INJECTION INTRAMUSCULAR; INTRAVENOUS
Status: CANCELLED | OUTPATIENT
Start: 2024-01-01 | End: 2024-01-08

## 2024-01-01 RX ORDER — MIDAZOLAM HYDROCHLORIDE 1 MG/ML
1 INJECTION INTRAMUSCULAR; INTRAVENOUS
Status: DISCONTINUED | OUTPATIENT
Start: 2024-01-01 | End: 2024-01-01 | Stop reason: HOSPADM

## 2024-01-01 RX ORDER — MORPHINE SULFATE 10 MG/ML
6 INJECTION INTRAMUSCULAR; INTRAVENOUS; SUBCUTANEOUS
Status: CANCELLED | OUTPATIENT
Start: 2024-01-01 | End: 2024-01-09

## 2024-01-01 RX ORDER — ACETAMINOPHEN 325 MG/1
650 TABLET ORAL EVERY 4 HOURS PRN
Status: DISCONTINUED | OUTPATIENT
Start: 2024-01-01 | End: 2024-01-01 | Stop reason: HOSPADM

## 2024-01-01 RX ORDER — GLYCOPYRROLATE 0.2 MG/ML
0.2 INJECTION INTRAMUSCULAR; INTRAVENOUS
Status: DISCONTINUED | OUTPATIENT
Start: 2024-01-01 | End: 2024-01-01 | Stop reason: HOSPADM

## 2024-01-01 RX ORDER — LORAZEPAM 1 MG/1
1 TABLET ORAL
Status: DISCONTINUED | OUTPATIENT
Start: 2024-01-01 | End: 2024-01-01 | Stop reason: HOSPADM

## 2024-01-01 RX ORDER — HYDROMORPHONE HYDROCHLORIDE 2 MG/ML
1.5 INJECTION, SOLUTION INTRAMUSCULAR; INTRAVENOUS; SUBCUTANEOUS
Status: CANCELLED | OUTPATIENT
Start: 2024-01-01 | End: 2024-01-09

## 2024-01-01 RX ORDER — LORAZEPAM 2 MG/ML
0.5 CONCENTRATE ORAL
Status: CANCELLED | OUTPATIENT
Start: 2024-01-01 | End: 2024-01-08

## 2024-01-01 RX ORDER — HYDROMORPHONE HYDROCHLORIDE 1 MG/ML
0.5 INJECTION, SOLUTION INTRAMUSCULAR; INTRAVENOUS; SUBCUTANEOUS
Status: DISCONTINUED | OUTPATIENT
Start: 2024-01-01 | End: 2024-01-01 | Stop reason: HOSPADM

## 2024-01-01 RX ORDER — SODIUM CHLORIDE 0.9 % (FLUSH) 0.9 %
10 SYRINGE (ML) INJECTION AS NEEDED
Status: DISCONTINUED | OUTPATIENT
Start: 2024-01-01 | End: 2024-01-01 | Stop reason: HOSPADM

## 2024-01-01 RX ORDER — ACETAMINOPHEN 325 MG/1
650 TABLET ORAL EVERY 4 HOURS PRN
Status: CANCELLED | OUTPATIENT
Start: 2024-01-01

## 2024-01-01 RX ORDER — MORPHINE SULFATE 20 MG/ML
10 SOLUTION ORAL
Status: DISCONTINUED | OUTPATIENT
Start: 2024-01-01 | End: 2024-01-01 | Stop reason: HOSPADM

## 2024-01-01 RX ORDER — GLYCOPYRROLATE 0.2 MG/ML
0.4 INJECTION INTRAMUSCULAR; INTRAVENOUS
Status: CANCELLED | OUTPATIENT
Start: 2024-01-01 | End: 2024-01-08

## 2024-01-01 RX ORDER — MORPHINE SULFATE 4 MG/ML
4 INJECTION, SOLUTION INTRAMUSCULAR; INTRAVENOUS
Status: DISCONTINUED | OUTPATIENT
Start: 2024-01-01 | End: 2024-01-01 | Stop reason: HOSPADM

## 2024-01-01 RX ORDER — LORAZEPAM 2 MG/ML
2 CONCENTRATE ORAL
Status: CANCELLED | OUTPATIENT
Start: 2024-01-01 | End: 2024-01-08

## 2024-01-01 RX ORDER — MIDAZOLAM HYDROCHLORIDE 1 MG/ML
2 INJECTION INTRAMUSCULAR; INTRAVENOUS
Status: CANCELLED | OUTPATIENT
Start: 2024-01-01 | End: 2024-01-08

## 2024-01-01 RX ORDER — MORPHINE SULFATE 20 MG/ML
5 SOLUTION ORAL
Status: DISCONTINUED | OUTPATIENT
Start: 2024-01-01 | End: 2024-01-01 | Stop reason: HOSPADM

## 2024-01-01 RX ORDER — ACETAMINOPHEN 160 MG/5ML
650 SOLUTION ORAL EVERY 4 HOURS PRN
Status: CANCELLED | OUTPATIENT
Start: 2024-01-01

## 2024-01-01 RX ORDER — MORPHINE SULFATE 20 MG/ML
20 SOLUTION ORAL
Status: CANCELLED | OUTPATIENT
Start: 2024-01-01 | End: 2024-01-09

## 2024-01-01 RX ORDER — MORPHINE SULFATE 2 MG/ML
2 INJECTION, SOLUTION INTRAMUSCULAR; INTRAVENOUS
Status: DISCONTINUED | OUTPATIENT
Start: 2024-01-01 | End: 2024-01-01 | Stop reason: HOSPADM

## 2024-01-01 RX ORDER — ACETAMINOPHEN 160 MG/5ML
650 SOLUTION ORAL EVERY 4 HOURS PRN
Status: DISCONTINUED | OUTPATIENT
Start: 2024-01-01 | End: 2024-01-01 | Stop reason: HOSPADM

## 2024-01-01 RX ORDER — LORAZEPAM 1 MG/1
1 TABLET ORAL
Status: CANCELLED | OUTPATIENT
Start: 2024-01-01 | End: 2024-01-08

## 2024-01-01 RX ORDER — MORPHINE SULFATE 20 MG/ML
5 SOLUTION ORAL
Status: CANCELLED | OUTPATIENT
Start: 2024-01-01 | End: 2024-01-08

## 2024-01-01 RX ORDER — HYDROMORPHONE HYDROCHLORIDE 1 MG/ML
0.5 INJECTION, SOLUTION INTRAMUSCULAR; INTRAVENOUS; SUBCUTANEOUS
Status: CANCELLED | OUTPATIENT
Start: 2024-01-01 | End: 2024-01-08

## 2024-01-01 RX ORDER — LORAZEPAM 2 MG/ML
1 CONCENTRATE ORAL
Status: DISCONTINUED | OUTPATIENT
Start: 2024-01-01 | End: 2024-01-01 | Stop reason: HOSPADM

## 2024-01-01 RX ORDER — LORAZEPAM 1 MG/1
2 TABLET ORAL
Status: DISCONTINUED | OUTPATIENT
Start: 2024-01-01 | End: 2024-01-01 | Stop reason: HOSPADM

## 2024-01-01 RX ORDER — MORPHINE SULFATE 20 MG/ML
10 SOLUTION ORAL
Status: CANCELLED | OUTPATIENT
Start: 2024-01-01 | End: 2024-01-09

## 2024-01-01 RX ORDER — MIDAZOLAM HYDROCHLORIDE 1 MG/ML
1 INJECTION INTRAMUSCULAR; INTRAVENOUS
Status: CANCELLED | OUTPATIENT
Start: 2024-01-01 | End: 2024-01-08

## 2024-01-01 RX ORDER — MORPHINE SULFATE 10 MG/ML
6 INJECTION INTRAMUSCULAR; INTRAVENOUS; SUBCUTANEOUS
Status: DISCONTINUED | OUTPATIENT
Start: 2024-01-01 | End: 2024-01-01 | Stop reason: HOSPADM

## 2024-01-01 RX ORDER — ACETAMINOPHEN 650 MG/1
650 SUPPOSITORY RECTAL EVERY 4 HOURS PRN
Status: DISCONTINUED | OUTPATIENT
Start: 2024-01-01 | End: 2024-01-01 | Stop reason: HOSPADM

## 2024-01-01 RX ORDER — LORAZEPAM 0.5 MG/1
0.5 TABLET ORAL
Status: CANCELLED | OUTPATIENT
Start: 2024-01-01 | End: 2024-01-08

## 2024-01-01 RX ORDER — FENTANYL 50 UG/1
1 PATCH TRANSDERMAL
Status: CANCELLED | OUTPATIENT
Start: 2024-01-01 | End: 2024-01-11

## 2024-01-01 RX ORDER — LORAZEPAM 1 MG/1
2 TABLET ORAL
Status: CANCELLED | OUTPATIENT
Start: 2024-01-01 | End: 2024-01-08

## 2024-01-01 RX ORDER — HYDROMORPHONE HYDROCHLORIDE 2 MG/ML
1.5 INJECTION, SOLUTION INTRAMUSCULAR; INTRAVENOUS; SUBCUTANEOUS
Status: DISCONTINUED | OUTPATIENT
Start: 2024-01-01 | End: 2024-01-01 | Stop reason: HOSPADM

## 2024-01-01 RX ORDER — FENTANYL 50 UG/1
1 PATCH TRANSDERMAL
Status: DISCONTINUED | OUTPATIENT
Start: 2024-01-01 | End: 2024-01-01 | Stop reason: HOSPADM

## 2024-01-01 RX ORDER — MORPHINE SULFATE 4 MG/ML
4 INJECTION, SOLUTION INTRAMUSCULAR; INTRAVENOUS
Status: CANCELLED | OUTPATIENT
Start: 2024-01-01 | End: 2024-01-09

## 2024-01-01 RX ORDER — MORPHINE SULFATE 2 MG/ML
2 INJECTION, SOLUTION INTRAMUSCULAR; INTRAVENOUS
Status: CANCELLED | OUTPATIENT
Start: 2024-01-01 | End: 2024-01-08

## 2024-01-01 RX ADMIN — MIDAZOLAM HYDROCHLORIDE 4 MG: 1 INJECTION, SOLUTION INTRAMUSCULAR; INTRAVENOUS at 00:09

## 2024-01-01 RX ADMIN — HYDROMORPHONE HYDROCHLORIDE 1.5 MG: 1 INJECTION, SOLUTION INTRAMUSCULAR; INTRAVENOUS; SUBCUTANEOUS at 12:28

## 2024-01-01 RX ADMIN — HYDROMORPHONE HYDROCHLORIDE 1.5 MG: 1 INJECTION, SOLUTION INTRAMUSCULAR; INTRAVENOUS; SUBCUTANEOUS at 12:21

## 2024-01-01 RX ADMIN — FENTANYL 1 PATCH: 50 PATCH, EXTENDED RELEASE TRANSDERMAL at 14:42

## 2024-01-01 RX ADMIN — MIDAZOLAM HYDROCHLORIDE 4 MG: 1 INJECTION, SOLUTION INTRAMUSCULAR; INTRAVENOUS at 16:41

## 2024-01-01 RX ADMIN — Medication 10 ML: at 20:44

## 2024-01-01 RX ADMIN — GLYCOPYRROLATE 0.4 MG: 0.2 INJECTION INTRAMUSCULAR; INTRAVENOUS at 20:31

## 2024-01-01 RX ADMIN — HYDROMORPHONE HYDROCHLORIDE 1 MG: 1 INJECTION, SOLUTION INTRAMUSCULAR; INTRAVENOUS; SUBCUTANEOUS at 13:10

## 2024-01-01 RX ADMIN — MIDAZOLAM HYDROCHLORIDE 4 MG: 1 INJECTION, SOLUTION INTRAMUSCULAR; INTRAVENOUS at 16:56

## 2024-01-01 RX ADMIN — HYDROMORPHONE HYDROCHLORIDE 1.5 MG: 2 INJECTION, SOLUTION INTRAMUSCULAR; INTRAVENOUS; SUBCUTANEOUS at 01:03

## 2024-01-01 RX ADMIN — HYDROMORPHONE HYDROCHLORIDE 1.5 MG: 2 INJECTION, SOLUTION INTRAMUSCULAR; INTRAVENOUS; SUBCUTANEOUS at 08:42

## 2024-01-01 RX ADMIN — HYDROMORPHONE HYDROCHLORIDE 1 MG: 1 INJECTION, SOLUTION INTRAMUSCULAR; INTRAVENOUS; SUBCUTANEOUS at 08:29

## 2024-01-01 RX ADMIN — GLYCOPYRROLATE 0.4 MG: 0.2 INJECTION INTRAMUSCULAR; INTRAVENOUS at 01:02

## 2024-01-01 RX ADMIN — GLYCOPYRROLATE 0.4 MG: 0.2 INJECTION INTRAMUSCULAR; INTRAVENOUS at 20:20

## 2024-01-01 RX ADMIN — MIDAZOLAM HYDROCHLORIDE 4 MG: 1 INJECTION, SOLUTION INTRAMUSCULAR; INTRAVENOUS at 16:44

## 2024-01-01 RX ADMIN — MIDAZOLAM HYDROCHLORIDE 4 MG: 1 INJECTION, SOLUTION INTRAMUSCULAR; INTRAVENOUS at 08:56

## 2024-01-01 RX ADMIN — HYDROMORPHONE HYDROCHLORIDE 1 MG: 1 INJECTION, SOLUTION INTRAMUSCULAR; INTRAVENOUS; SUBCUTANEOUS at 05:13

## 2024-01-01 RX ADMIN — MIDAZOLAM HYDROCHLORIDE 4 MG: 1 INJECTION, SOLUTION INTRAMUSCULAR; INTRAVENOUS at 01:02

## 2024-01-01 RX ADMIN — MIDAZOLAM HYDROCHLORIDE 4 MG: 1 INJECTION, SOLUTION INTRAMUSCULAR; INTRAVENOUS at 20:31

## 2024-01-01 RX ADMIN — HYDROMORPHONE HYDROCHLORIDE 1 MG: 1 INJECTION, SOLUTION INTRAMUSCULAR; INTRAVENOUS; SUBCUTANEOUS at 17:04

## 2024-01-01 RX ADMIN — Medication 10 ML: at 09:00

## 2024-01-01 RX ADMIN — MIDAZOLAM HYDROCHLORIDE 4 MG: 1 INJECTION, SOLUTION INTRAMUSCULAR; INTRAVENOUS at 20:33

## 2024-01-01 RX ADMIN — MIDAZOLAM HYDROCHLORIDE 4 MG: 1 INJECTION, SOLUTION INTRAMUSCULAR; INTRAVENOUS at 00:19

## 2024-01-01 RX ADMIN — GLYCOPYRROLATE 0.4 MG: 0.2 INJECTION INTRAMUSCULAR; INTRAVENOUS at 08:42

## 2024-01-01 RX ADMIN — HYDROMORPHONE HYDROCHLORIDE 1.5 MG: 1 INJECTION, SOLUTION INTRAMUSCULAR; INTRAVENOUS; SUBCUTANEOUS at 04:36

## 2024-01-01 RX ADMIN — HYDROMORPHONE HYDROCHLORIDE 1.5 MG: 2 INJECTION, SOLUTION INTRAMUSCULAR; INTRAVENOUS; SUBCUTANEOUS at 12:42

## 2024-01-01 RX ADMIN — HYDROMORPHONE HYDROCHLORIDE 1.5 MG: 1 INJECTION, SOLUTION INTRAMUSCULAR; INTRAVENOUS; SUBCUTANEOUS at 16:44

## 2024-01-01 RX ADMIN — MIDAZOLAM HYDROCHLORIDE 4 MG: 1 INJECTION, SOLUTION INTRAMUSCULAR; INTRAVENOUS at 08:30

## 2024-01-01 RX ADMIN — Medication 10 ML: at 20:32

## 2024-01-01 RX ADMIN — HYDROMORPHONE HYDROCHLORIDE 1.5 MG: 2 INJECTION, SOLUTION INTRAMUSCULAR; INTRAVENOUS; SUBCUTANEOUS at 20:31

## 2024-01-01 RX ADMIN — Medication 10 ML: at 08:56

## 2024-01-01 RX ADMIN — MIDAZOLAM HYDROCHLORIDE 4 MG: 1 INJECTION, SOLUTION INTRAMUSCULAR; INTRAVENOUS at 01:09

## 2024-01-01 RX ADMIN — GLYCOPYRROLATE 0.4 MG: 0.2 INJECTION INTRAMUSCULAR; INTRAVENOUS at 12:51

## 2024-01-01 RX ADMIN — MIDAZOLAM HYDROCHLORIDE 4 MG: 1 INJECTION, SOLUTION INTRAMUSCULAR; INTRAVENOUS at 08:42

## 2024-01-01 RX ADMIN — MIDAZOLAM HYDROCHLORIDE 4 MG: 1 INJECTION, SOLUTION INTRAMUSCULAR; INTRAVENOUS at 04:36

## 2024-01-01 RX ADMIN — HYDROMORPHONE HYDROCHLORIDE 1.5 MG: 1 INJECTION, SOLUTION INTRAMUSCULAR; INTRAVENOUS; SUBCUTANEOUS at 20:33

## 2024-01-01 RX ADMIN — MIDAZOLAM HYDROCHLORIDE 4 MG: 1 INJECTION, SOLUTION INTRAMUSCULAR; INTRAVENOUS at 13:09

## 2024-01-01 RX ADMIN — Medication 10 ML: at 08:16

## 2024-01-01 RX ADMIN — MIDAZOLAM HYDROCHLORIDE 4 MG: 1 INJECTION, SOLUTION INTRAMUSCULAR; INTRAVENOUS at 04:09

## 2024-01-01 RX ADMIN — HYDROMORPHONE HYDROCHLORIDE 1.5 MG: 2 INJECTION, SOLUTION INTRAMUSCULAR; INTRAVENOUS; SUBCUTANEOUS at 00:19

## 2024-01-01 RX ADMIN — HYDROMORPHONE HYDROCHLORIDE 1 MG: 1 INJECTION, SOLUTION INTRAMUSCULAR; INTRAVENOUS; SUBCUTANEOUS at 04:09

## 2024-01-01 RX ADMIN — HYDROMORPHONE HYDROCHLORIDE 1.5 MG: 1 INJECTION, SOLUTION INTRAMUSCULAR; INTRAVENOUS; SUBCUTANEOUS at 08:55

## 2024-01-01 RX ADMIN — HYDROMORPHONE HYDROCHLORIDE 1 MG: 1 INJECTION, SOLUTION INTRAMUSCULAR; INTRAVENOUS; SUBCUTANEOUS at 08:47

## 2024-01-01 RX ADMIN — HYDROMORPHONE HYDROCHLORIDE 1.5 MG: 1 INJECTION, SOLUTION INTRAMUSCULAR; INTRAVENOUS; SUBCUTANEOUS at 16:41

## 2024-01-01 RX ADMIN — GLYCOPYRROLATE 0.2 MG: 0.2 INJECTION INTRAMUSCULAR; INTRAVENOUS at 16:44

## 2024-01-01 RX ADMIN — MIDAZOLAM HYDROCHLORIDE 4 MG: 1 INJECTION, SOLUTION INTRAMUSCULAR; INTRAVENOUS at 04:27

## 2024-01-01 RX ADMIN — HYDROMORPHONE HYDROCHLORIDE 1 MG: 1 INJECTION, SOLUTION INTRAMUSCULAR; INTRAVENOUS; SUBCUTANEOUS at 20:42

## 2024-01-01 RX ADMIN — MIDAZOLAM HYDROCHLORIDE 4 MG: 1 INJECTION, SOLUTION INTRAMUSCULAR; INTRAVENOUS at 12:21

## 2024-01-01 RX ADMIN — MIDAZOLAM HYDROCHLORIDE 4 MG: 1 INJECTION, SOLUTION INTRAMUSCULAR; INTRAVENOUS at 12:28

## 2024-01-01 RX ADMIN — GLYCOPYRROLATE 0.4 MG: 0.2 INJECTION INTRAMUSCULAR; INTRAVENOUS at 04:27

## 2024-01-01 RX ADMIN — HYDROMORPHONE HYDROCHLORIDE 1 MG: 1 INJECTION, SOLUTION INTRAMUSCULAR; INTRAVENOUS; SUBCUTANEOUS at 00:09

## 2024-01-01 RX ADMIN — MIDAZOLAM HYDROCHLORIDE 4 MG: 1 INJECTION, SOLUTION INTRAMUSCULAR; INTRAVENOUS at 05:13

## 2024-01-01 RX ADMIN — HYDROMORPHONE HYDROCHLORIDE 1 MG: 1 INJECTION, SOLUTION INTRAMUSCULAR; INTRAVENOUS; SUBCUTANEOUS at 01:27

## 2024-01-01 RX ADMIN — Medication 10 ML: at 20:34

## 2024-01-01 RX ADMIN — Medication 10 ML: at 08:48

## 2024-01-01 RX ADMIN — MIDAZOLAM HYDROCHLORIDE 4 MG: 1 INJECTION, SOLUTION INTRAMUSCULAR; INTRAVENOUS at 01:27

## 2024-01-01 RX ADMIN — MIDAZOLAM HYDROCHLORIDE 4 MG: 1 INJECTION, SOLUTION INTRAMUSCULAR; INTRAVENOUS at 20:42

## 2024-01-01 RX ADMIN — HYDROMORPHONE HYDROCHLORIDE 1.5 MG: 2 INJECTION, SOLUTION INTRAMUSCULAR; INTRAVENOUS; SUBCUTANEOUS at 04:27

## 2024-01-01 RX ADMIN — MIDAZOLAM HYDROCHLORIDE 4 MG: 1 INJECTION, SOLUTION INTRAMUSCULAR; INTRAVENOUS at 12:42

## 2024-01-01 RX ADMIN — GLYCOPYRROLATE 0.4 MG: 0.2 INJECTION INTRAMUSCULAR; INTRAVENOUS at 16:56

## 2024-01-01 RX ADMIN — HYDROMORPHONE HYDROCHLORIDE 1.5 MG: 2 INJECTION, SOLUTION INTRAMUSCULAR; INTRAVENOUS; SUBCUTANEOUS at 20:20

## 2024-01-01 RX ADMIN — HYDROMORPHONE HYDROCHLORIDE 1.5 MG: 2 INJECTION, SOLUTION INTRAMUSCULAR; INTRAVENOUS; SUBCUTANEOUS at 16:56

## 2024-01-01 RX ADMIN — MIDAZOLAM HYDROCHLORIDE 4 MG: 1 INJECTION, SOLUTION INTRAMUSCULAR; INTRAVENOUS at 17:04

## 2024-01-01 RX ADMIN — GLYCOPYRROLATE 0.4 MG: 0.2 INJECTION INTRAMUSCULAR; INTRAVENOUS at 00:20

## 2024-01-01 RX ADMIN — MIDAZOLAM HYDROCHLORIDE 4 MG: 1 INJECTION, SOLUTION INTRAMUSCULAR; INTRAVENOUS at 20:19

## 2024-01-01 RX ADMIN — Medication 10 ML: at 20:20

## 2024-01-01 RX ADMIN — HYDROMORPHONE HYDROCHLORIDE 1.5 MG: 1 INJECTION, SOLUTION INTRAMUSCULAR; INTRAVENOUS; SUBCUTANEOUS at 01:09

## 2024-01-01 RX ADMIN — MIDAZOLAM HYDROCHLORIDE 4 MG: 1 INJECTION, SOLUTION INTRAMUSCULAR; INTRAVENOUS at 08:46

## 2024-01-01 NOTE — PLAN OF CARE
Problem: Adult Inpatient Plan of Care  Goal: Plan of Care Review  Recent Flowsheet Documentation  Taken 1/1/2024 0616 by Mary Jo Kamara RN  Progress: declining  Plan of Care Reviewed With: patient  Outcome Evaluation: PPS 20%. Patient is premedicated prior to turns with Dilaudid 1 mg and Versed 4 mg IV. No family at bedside. Bed alarm on. Oliveros cath and oral care done. Continue with plan of care.   Goal Outcome Evaluation:  Plan of Care Reviewed With: patient        Progress: declining  Outcome Evaluation: PPS 20%. Patient is premedicated prior to turns with Dilaudid 1 mg and Versed 4 mg IV. No family at bedside. Bed alarm on. Oliveros cath and oral care done. Continue with plan of care.

## 2024-01-01 NOTE — PLAN OF CARE
Goal Outcome Evaluation:  Plan of Care Reviewed With: patient        Progress: no change  Outcome Evaluation: Premedicated prior to turns with 1mg IV dilaudid and 4mg IV versed. Pt appears to be resting comfortably at this time, opens eyes when RN enters room. Will continue to monitor and provide palliative care.

## 2024-01-01 NOTE — PROGRESS NOTES
"    Name: Semaj Herrera ADMIT: 2023   : 1940  PCP: Axel Guardado MD    MRN: [id number] LOS: 14 days   AGE/SEX: 83 y.o. male  ROOM: [facility]     Subjective   Subjective   Patient is lying on the bed and is sleeping.  Resting comfortably not in any distress.     Objective   Objective   Vital Signs  Temp:  [97.4 °F (36.3 °C)] 97.4 °F (36.3 °C)  Heart Rate:  [84] 84  Resp:  [16] 16  BP: (146)/(81) 146/81  SpO2:  [97 %] 97 %  on   ;   Device (Oxygen Therapy): room air  Body mass index is 24.21 kg/m².    Physical Exam  Constitutional:       General: He is not in acute distress.     Appearance: He is ill-appearing. He is not toxic-appearing.   HENT:      Head: Normocephalic and atraumatic.      Mouth/Throat:      Mouth: Mucous membranes are moist.   Eyes:      Pupils: Pupils are equal, round, and reactive to light.   Cardiovascular:      Rate and Rhythm: Normal rate and regular rhythm.   Pulmonary:      Effort: No respiratory distress.      Breath sounds: No wheezing or rhonchi.   Abdominal:      General: Bowel sounds are normal.      Palpations: Abdomen is soft.      Tenderness: There is no abdominal tenderness.   Musculoskeletal:         General: Swelling present.   Skin:     General: Skin is warm and dry.      Coloration: Skin is pale.   Neurological:      Motor: Weakness present.     Results Review:       I reviewed the patient's new clinical results.            Estimated Creatinine Clearance: 88.6 mL/min (A) (by C-G formula based on SCr of 0.59 mg/dL (L)).                No results found for: \"HGBA1C\", \"POCGLU\"    Scheduled Meds  fentaNYL, 1 patch, Transdermal, Q72H   And  Check Fentanyl Patch Placement, 1 each, Does not apply, Q12H  gabapentin, 900 mg, Oral, Nightly  guaiFENesin, 600 mg, Oral, Q12H  levothyroxine, 176 mcg, Oral, Once per day on Mon Thu  levothyroxine, 88 mcg, Oral, Once per day on Sun Tue Wed Fri Sat  pantoprazole, 40 mg, Oral, BID AC  rOPINIRole, 2 mg, Oral, Nightly  senna-docusate " sodium, 2 tablet, Oral, BID  sodium chloride, 10 mL, Intravenous, Q12H    Continuous Infusions  sodium chloride, 30 mL/hr, Last Rate: Stopped (12/19/23 1107)    PRN Meds    acetaminophen **OR** acetaminophen **OR** acetaminophen    acetaminophen **OR** acetaminophen **OR** acetaminophen    senna-docusate sodium **AND** polyethylene glycol **AND** bisacodyl **AND** bisacodyl    dextrose    dextrose    diphenoxylate-atropine    glucagon (human recombinant)    Morphine **OR** morphine **OR** HYDROmorphone    Morphine **OR** morphine **OR** HYDROmorphone    Morphine **OR** morphine **OR** HYDROmorphone    LORazepam **OR** midazolam **OR** midazolam **OR** midazolam **OR** LORazepam **OR** LORazepam    LORazepam **OR** midazolam **OR** midazolam **OR** midazolam **OR** LORazepam **OR** LORazepam    LORazepam **OR** midazolam **OR** midazolam **OR** LORazepam **OR** LORazepam    Menthol-Zinc Oxide    ondansetron    polyethylene Glycol 400    sodium chloride    sodium chloride    sodium chloride    Diet: Cardiac Diets, Regular/House Diet; Healthy Heart (2-3 Na+); Texture: Regular Texture (IDDSI 7); Fluid Consistency: Thin (IDDSI 0)    I have personally reviewed:  [x]  Medications  [x]  Laboratory   []  Microbiology   []  Radiology   [x]  EKG/Telemetry sinus  []  Cardiology/Vascular   []  Pathology   []  Records        Assessment/Plan     Active Hospital Problems    Diagnosis  POA    **Generalized weakness [R53.1]  Yes    Acute blood loss anemia [D62]  Yes    Anemia, chronic disease [D63.8]  Yes    Class 2 severe obesity due to excess calories with serious comorbidity and body mass index (BMI) of 35.0 to 35.9 in adult [E66.01, Z68.35]  Not Applicable    Restless legs syndrome (RLS) [G25.81]  Yes    Diastolic dysfunction [I51.89]  Yes    Neuropathy [G62.9]  Yes    Prostate cancer metastatic to bone [C61, C79.51]  Yes    Type 2 diabetes mellitus with kidney complication, with long-term current use of insulin [E11.29, Z79.4]   Not Applicable    Obstructive sleep apnea syndrome treated auto BiPAP [G47.33]  Yes    Hypertension [I10]  Yes    Chronic kidney disease, stage III (moderate) [N18.30]  Yes      Resolved Hospital Problems   No resolved problems to display.     Mr. Herrera is a 83 y.o. male that presented to the hospital with generalized weakness.  He was living at home with his son who was providing 24/7 assistance via hospital bed and [employer] lift.  His son fell ill and he called 911 to have his father taken to the hospital for further care as he is unable to be cared for at home by himself.  He does have known metastatic prostate cancer with recommendations for hospice in the past, but hospice has not been initiated at this time.  He was noted to have a hemoglobin of 5.8 as well as platelets in the 40s prompting further admission.     Generalized weakness  Multifactorial secondary to metastatic cancer, advanced age, ongoing immobility, and anemia.     Acute blood loss anemia  Anemia of chronic disease  Heme positive 12/18/2023  S/P EGD 12/18 which showed inflamed, texture changed mucosa in the esophagus as well as nonobstructing nonbleeding duodenal ulcers likely NSAID induced  S/P 2 units PRBC and platelets on 12/18- monitoring CBC and transfusing for symptoms  Hemoglobin dropped from 9 to 6.8 and continue to monitor closely and there is no evidence   of any active bleeding.  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   Of gabapentin and Requip     CKD 3  Hypertension  Follows with Dr. Vicky Duran.  Creatinine stable.  Currently off  Cardizem and monitor blood pressure closely    Hypokalemia  Replace per protocol     DM2  On insulin pump at home, corrective dose insulin stopped secondary to comfort measures    Diastolic dysfunction  Mild aortic stenosis  Sees Dr. Lee.  Stable.     RONNY    CODE STATUS is DNR/DNI      Copied text on this note has been reviewed by me on 1/1/2024    Rock Claros MD  [facility]  01/01/24

## 2024-01-02 NOTE — PROGRESS NOTES
"    Name: Semaj Herrera ADMIT: 2023   : 1940  PCP: Axel Guardado MD    MRN: [id number] LOS: 15 days   AGE/SEX: 83 y.o. male  ROOM: [facility]/     Subjective   Subjective   Patient is lying on the bed and is sleeping.  Resting comfortably not in any distress.     Objective   Objective   Vital Signs  Temp:  [97.1 °F (36.2 °C)-97.3 °F (36.3 °C)] 97.1 °F (36.2 °C)  Heart Rate:  [86-98] 86  Resp:  [16-18] 16  BP: (144-148)/(69-75) 144/69  SpO2:  [98 %] 98 %  on   ;   Device (Oxygen Therapy): room air  Body mass index is 24.21 kg/m².    Physical Exam  Constitutional:       General: He is not in acute distress.     Appearance: He is ill-appearing. He is not toxic-appearing.   HENT:      Head: Normocephalic and atraumatic.      Mouth/Throat:      Mouth: Mucous membranes are moist.   Eyes:      Pupils: Pupils are equal, round, and reactive to light.   Cardiovascular:      Rate and Rhythm: Normal rate and regular rhythm.   Pulmonary:      Effort: No respiratory distress.      Breath sounds: No wheezing or rhonchi.   Abdominal:      General: Bowel sounds are normal.      Palpations: Abdomen is soft.      Tenderness: There is no abdominal tenderness.   Musculoskeletal:         General: Swelling present.   Skin:     General: Skin is warm and dry.      Coloration: Skin is pale.   Neurological:      Motor: Weakness present.     Results Review:       I reviewed the patient's new clinical results.            Estimated Creatinine Clearance: 88.6 mL/min (A) (by C-G formula based on SCr of 0.59 mg/dL (L)).                No results found for: \"HGBA1C\", \"POCGLU\"    Scheduled Meds  fentaNYL, 1 patch, Transdermal, Q72H   And  Check Fentanyl Patch Placement, 1 each, Does not apply, Q12H  gabapentin, 900 mg, Oral, Nightly  guaiFENesin, 600 mg, Oral, Q12H  levothyroxine, 176 mcg, Oral, Once per day on   levothyroxine, 88 mcg, Oral, Once per day on Sun Tue Wed Fri Sat  pantoprazole, 40 mg, Oral, BID AC  rOPINIRole, 2 mg, Oral, " Nightly  senna-docusate sodium, 2 tablet, Oral, BID  sodium chloride, 10 mL, Intravenous, Q12H    Continuous Infusions  sodium chloride, 30 mL/hr, Last Rate: Stopped (12/19/23 1107)    PRN Meds    acetaminophen **OR** acetaminophen **OR** acetaminophen    acetaminophen **OR** acetaminophen **OR** acetaminophen    senna-docusate sodium **AND** polyethylene glycol **AND** bisacodyl **AND** bisacodyl    dextrose    dextrose    diphenoxylate-atropine    glucagon (human recombinant)    Morphine **OR** morphine **OR** HYDROmorphone    Morphine **OR** morphine **OR** HYDROmorphone    Morphine **OR** morphine **OR** HYDROmorphone    LORazepam **OR** midazolam **OR** midazolam **OR** midazolam **OR** LORazepam **OR** LORazepam    LORazepam **OR** midazolam **OR** midazolam **OR** midazolam **OR** LORazepam **OR** LORazepam    LORazepam **OR** midazolam **OR** midazolam **OR** LORazepam **OR** LORazepam    Menthol-Zinc Oxide    ondansetron    polyethylene Glycol 400    sodium chloride    sodium chloride    sodium chloride    NPO Diet NPO Type: Sips with Meds    I have personally reviewed:  [x]  Medications  [x]  Laboratory   []  Microbiology   []  Radiology   [x]  EKG/Telemetry sinus  []  Cardiology/Vascular   []  Pathology   []  Records        Assessment/Plan     Active Hospital Problems    Diagnosis  POA    **Generalized weakness [R53.1]  Yes    Acute blood loss anemia [D62]  Yes    Anemia, chronic disease [D63.8]  Yes    Class 2 severe obesity due to excess calories with serious comorbidity and body mass index (BMI) of 35.0 to 35.9 in adult [E66.01, Z68.35]  Not Applicable    Restless legs syndrome (RLS) [G25.81]  Yes    Diastolic dysfunction [I51.89]  Yes    Neuropathy [G62.9]  Yes    Prostate cancer metastatic to bone [C61, C79.51]  Yes    Type 2 diabetes mellitus with kidney complication, with long-term current use of insulin [E11.29, Z79.4]  Not Applicable    Obstructive sleep apnea syndrome treated auto BiPAP [G47.33]   Yes    Hypertension [I10]  Yes    Chronic kidney disease, stage III (moderate) [N18.30]  Yes      Resolved Hospital Problems   No resolved problems to display.     Mr. Herrera is a 83 y.o. male that presented to the hospital with generalized weakness.  He was living at home with his son who was providing 24/7 assistance via hospital bed and [employer] lift.  His son fell ill and he called 911 to have his father taken to the hospital for further care as he is unable to be cared for at home by himself.  He does have known metastatic prostate cancer with recommendations for hospice in the past, but hospice has not been initiated at this time.  He was noted to have a hemoglobin of 5.8 as well as platelets in the 40s prompting further admission.     Acute blood loss anemia requiring EGD and PRBC transfusion earlier during this hospital stay  Anemia chronic disease  Metastatic prostate cancer  Generalized weakness  myelophthisic cytopenias  Cancer related pain  CKD stage III  Hypertension  Diabetes mellitus  Diastolic dysfunction      Patient started to fail to thrive and further transfusions were felt unfruitful by   hematology oncology.  Patient has a family members decided to proceed the route   of comfort measures only and therefore care has been withdrawn and hospice consult   has been obtained.  CODE STATUS is DNR/DNI.    Rock Claros MD  [facility]  01/02/24

## 2024-01-02 NOTE — PLAN OF CARE
Goal Outcome Evaluation:  Plan of Care Reviewed With: patient        Progress: declining  Outcome Evaluation: Patient restless at times, open eyes to voice but is very lethargic. Patient given 1mg IV dilaudid and 4mg IV Versed approximately every 4 hours before repositioning and PRN for restlessness. Patient has remained calm and without distress. Oliveros catheter intact, no new symptoms this shift, will continue comfort measures.

## 2024-01-02 NOTE — PLAN OF CARE
Goal Outcome Evaluation:  Plan of Care Reviewed With: patient        Progress: declining  Outcome Evaluation: PPS 10%. Pt can bee restless at times, meds were increased and new IV was placed. Pt recieved 1.5mg of dilaudid and 4mg of versed prior to turns/ Pt seems to be resting more comfortably. No family at bedside. No needs at this time.

## 2024-01-03 PROBLEM — C61 MALIGNANT NEOPLASM OF PROSTATE: Status: ACTIVE | Noted: 2024-01-01

## 2024-01-03 NOTE — PLAN OF CARE
Goal Outcome Evaluation:   Janet coma scale score of 9. Access and drains include: jo catheter, IV. Premedication PRNs: 1.5mg dilaudid IV, 4mg versed IV. PPS 10%. Plan of care ongoing.         Progress: declining

## 2024-01-03 NOTE — PROGRESS NOTES
"    Name: Semaj Herrera ADMIT: 2023   : 1940  PCP: Axel Guardado MD    MRN: [id number] LOS: 16 days   AGE/SEX: 83 y.o. male  ROOM: [facility]     Subjective   Subjective   Patient seen at bedside.       Objective   Objective   Vital Signs  Temp:  [97 °F (36.1 °C)-99 °F (37.2 °C)] 99 °F (37.2 °C)  Heart Rate:  [107-123] 123  Resp:  [16-20] 20  BP: (106-136)/(54-71) 106/54  SpO2:  [74 %-85 %] 74 %  on   ;   Device (Oxygen Therapy): room air  Body mass index is 24.21 kg/m².  Physical Exam  General, awake and alert.  Head and ENT, normocephalic and atraumatic.  Lungs, symmetric expansion, equal air entry bilaterally.  Heart, regular rate and rhythm.  Abdomen, soft and nontender.  Extremities, no clubbing or cyanosis.  Neuro, no focal deficits.  Skin: Warm and no rash.  Psych, normal mood and affect.  Musculoskeletal, joint examination is grossly normal.    Copied text material from yesterday's note has been reviewed for appropriate changes and remains accurate as of 1/3/24.      Results Review     I reviewed the patient's new clinical results.              No results found for: \"HGBA1C\", \"POCGLU\"    No radiology results for the last day    I have personally reviewed all medications:  Scheduled Medications  fentaNYL, 1 patch, Transdermal, Q72H   And  Check Fentanyl Patch Placement, 1 each, Does not apply, Q12H  gabapentin, 900 mg, Oral, Nightly  guaiFENesin, 600 mg, Oral, Q12H  levothyroxine, 176 mcg, Oral, Once per day on Mon Thu  levothyroxine, 88 mcg, Oral, Once per day on Sun Tue Wed Fri Sat  pantoprazole, 40 mg, Oral, BID AC  rOPINIRole, 2 mg, Oral, Nightly  senna-docusate sodium, 2 tablet, Oral, BID  sodium chloride, 10 mL, Intravenous, Q12H    Infusions  sodium chloride, 30 mL/hr, Last Rate: Stopped (23 1107)    Diet  NPO Diet NPO Type: Sips with Meds    I have personally reviewed:  [x]  Laboratory   [x]  Microbiology   [x]  Radiology   [x]  EKG/Telemetry  [x]  Cardiology/Vascular   []  Pathology  "   []  Records       Assessment/Plan     Active Hospital Problems    Diagnosis  POA    **Generalized weakness [R53.1]  Yes    Acute blood loss anemia [D62]  Yes    Anemia, chronic disease [D63.8]  Yes    Class 2 severe obesity due to excess calories with serious comorbidity and body mass index (BMI) of 35.0 to 35.9 in adult [E66.01, Z68.35]  Not Applicable    Restless legs syndrome (RLS) [G25.81]  Yes    Diastolic dysfunction [I51.89]  Yes    Neuropathy [G62.9]  Yes    Prostate cancer metastatic to bone [C61, 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       83 y.o. male admitted with Generalized weakness.    Mr. Herrera is a 83 y.o. male that presented to the hospital with generalized weakness.  He was living at home with his son who was providing 24/7 assistance via hospital bed and [employer] lift.  His son fell ill and he called 911 to have his father taken to the hospital for further care as he is unable to be cared for at home by himself.  He does have known metastatic prostate cancer with recommendations for hospice in the past, but hospice has not been initiated at this time.  He was noted to have a hemoglobin of 5.8 as well as platelets in the 40s prompting further admission.     Acute blood loss anemia requiring EGD and PRBC transfusion earlier during this hospital stay  Anemia chronic disease  Metastatic prostate cancer  Generalized weakness  myelophthisic cytopenias  Cancer related pain  CKD stage III  Hypertension  Diabetes mellitus  Diastolic dysfunction        Patient started to fail to thrive and further transfusions were felt unfruitful by   hematology oncology.  Patient has a family members  decided to proceed the route   of comfort measures only and therefore care has been withdrawn and hospice consult   has been obtained.  CODE STATUS is DNR/DNI.         Lencho Alfaro MD  [facility]  01/03/24  16:08 EST

## 2024-01-03 NOTE — NURSING NOTE
Semaj Herrera id [id number] is a 82 y/o male. Dx metastatic prostate cancer, secondary bone malignancy, anemia, CKD stage 3, HTN, diabetes, diastolic dysfunction. Assessed at bedside. PPS 10%. Requiring IV versed and dialudid for symptom management. Reviewed GOC, EOS with son Cody over the phone. He wants comfort care for Semaj with no aggressive measures and wants inpatient hospice care. Reviewed consent forms with Cody  and signed. Medical eligibility for GIP obtained with hospice physician. Hospitalist agreeable to place sb orders. No new recommendations. Please call with any questions concerns or updates.     Mariluz Layne RN, BSN-Referrals Admissions Coordinator   [phone]

## 2024-01-03 NOTE — SIGNIFICANT NOTE
01/03/24 1602   OTHER   Discipline physical therapist   Rehab Time/Intention   Session Not Performed other (see comments)  (RN states pt not appropriate for skilled PT at this time; will sign off.)

## 2024-01-03 NOTE — PLAN OF CARE
Goal Outcome Evaluation:  Plan of Care Reviewed With: patient, family        Progress: declining  Outcome Evaluation: PPS 10%. Pt recieved 1.5mg of dilaudid and 4mg of versed prior to turns. Pt seems to be resting more comfortably today vs. yesterday. Family at bedside for short time. Hospice to eval. No needs at this time.

## 2024-01-03 NOTE — PROGRESS NOTES
Discharge Planning Assessment  [facility]     Patient Name: Semaj Herrera  MRN: [id number]  Today's Date: 1/3/2024    Admit Date: 12/18/2023    Plan: SNF referrals pending   Discharge Needs Assessment    No documentation.                  Discharge Plan       Row Name 01/03/24 1429       Plan    Plan Comments Spoke with Iris/manager/[place] and she will reach back out to the grandson about meeting with him and his Dad [facility]. DARRIAN Ji Rn, CCP      Row Name 01/03/24 1310       Plan    Plan Comments The patient is getting symptom mgmt by IV at this time. CCP will continue to follow for any needs. DARRIAN Ji Rn CCP.                  Continued Care and Services - Admitted Since 12/18/2023       Destination       Service Provider Request Status Selected Services Address Phone Fax Patient Preferred    [facility] Considering N/A [address] [facility] [id number] [phone] --    [facility] Considering  Need bed availability N/A [address] [facility] [id number] [phone] --    [facility] Considering N/A [address] [facility] [address] [phone] --    [facility] Pending - Request Sent N/A [address] [facility] [id number] [phone] --    [facility] Pending - Request Sent N/A [address] [facility] [id number] [phone] --    [facility] Pending - Request Sent N/A [address] [address] [phone] --    [facility] Pending - Request Sent N/A [address] [facility] [id number] [phone] --    [facility] Pending - Request Sent N/A [address] [facility] [address] [phone] --    [facility] Declined  Bed not  available N/A [address] [address] [phone] --    [facility] Declined  Acuity too high, Bed not available N/A [address] [facility] [address] [phone] --    [facility] Declined  Acuity too high, Bed not available N/A [address] [facility] [address] [phone] --                  Selected Continued Care - Prior Encounters Includes continued care and service providers with selected services from prior encounters from 9/19/2023 to 1/3/2024      Discharged on 9/25/2023 Admission date: 9/17/2023 - Discharge disposition: Home-Health Care Svc      Durable Medical Equipment       Service Provider Selected Services Address Phone Fax Patient Preferred    [facility] [address] [facility] [address] [id number] [phone] --              Home Medical Care       Service Provider Selected Services Address Phone Fax Patient Preferred    [facility] [address] [address] [phone] --                          Expected Discharge Date and Time       Expected Discharge Date Expected Discharge Time    Jan 6, 2024            Demographic Summary    No documentation.                  Functional Status    No documentation.                  Psychosocial    No documentation.                  Abuse/Neglect    No documentation.                  Legal    No documentation.                  Substance Abuse    No documentation.                  Patient Forms    No documentation.                     Marcelina Ji, ASHLEY

## 2024-01-03 NOTE — PROGRESS NOTES
Discharge Planning Assessment  [facility]     Patient Name: Semaj Herrera  MRN: [id number]  Today's Date: 1/3/2024    Admit Date: 12/18/2023    Plan: SNF referrals pending   Discharge Needs Assessment    No documentation.                  Discharge Plan       Row Name 01/03/24 1310       Plan    Plan Comments The patient is getting symptom mgmt by IV at this time. CCP will continue to follow for any needs. DARRIAN Ji Rn CCP.                  Continued Care and Services - Admitted Since 12/18/2023       Destination       Service Provider Request Status Selected Services Address Phone Fax Patient Preferred    [facility] Considering N/A [address] [facility] [address] [phone] --    [facility] Considering  Need bed availability N/A [address] [facility] [id number] [phone] --    [facility] Considering N/A [address] [facility] [address] [phone] --    [facility] Pending - Request Sent N/A [address] [facility] [phone] --    [facility] Pending - Request Sent N/A [address] [facility] [id number] [phone] --    [facility] Pending - Request Sent N/A [address] [facility] [id number] [phone] --    [facility] Pending - Request Sent N/A [address] [facility] [phone] --    [facility] Pending - Request Sent N/A [address] [facility] [address] [phone] --    [facility] Declined  Bed not available N/A [address] [facility] [id number] [phone] --    [facility] Declined  Acuity too high, Bed not available N/A [address] [facility] [address] [phone] --    [facility] Declined   Acuity too high, Bed not available N/A [address] [place] [id number] [phone] --                  Selected Continued Care - Prior Encounters Includes continued care and service providers with selected services from prior encounters from 9/19/2023 to 1/3/2024      Discharged on 9/25/2023 Admission date: 9/17/2023 - Discharge disposition: Home-Health Care Svc      Durable Medical Equipment       Service Provider Selected Services Address Phone Fax Patient Preferred    [facility] Durable Medical Equipment [address] [facility] [address] [address] [phone] --              Home Medical Care       Service Provider Selected Services Address Phone Fax Patient Preferred     [facility] [address] [place] [id number] [phone] --                          Expected Discharge Date and Time       Expected Discharge Date Expected Discharge Time    Jan 6, 2024            Demographic Summary    No documentation.                  Functional Status    No documentation.                  Psychosocial    No documentation.                  Abuse/Neglect    No documentation.                  Legal    No documentation.                  Substance Abuse    No documentation.                  Patient Forms    No documentation.                     Marcelina Ji, RN

## 2024-01-04 NOTE — PROGRESS NOTES
SB team SW met with patient to explain role of SB team SW and assess for needs. Pt was lying in his hospital bed, unresponsive with no signs of pain or discomfort.  No family at the bedside. SW collaborated with hospital nurse and called son to offer support and unable to reach. SW unable to complete PHQ9 due to patient being unresponsive.  SW unable to obtain  Home information or complete bereavement risk assessment. SW will visit on a weekly and PRN basis to offer EOL support and assist with needs.       Topical Sulfur Applications Counseling: Topical Sulfur Counseling: Patient counseled that this medication may cause skin irritation or allergic reactions.  In the event of skin irritation, the patient was advised to reduce the amount of the drug applied or use it less frequently.   The patient verbalized understanding of the proper use and possible adverse effects of topical sulfur application.  All of the patient's questions and concerns were addressed.

## 2024-01-04 NOTE — CONSULTS
has met with pt and family a number of times since pt arrived in Observation 12/18. In initial visit, pt expressed strong importance of Jewish meri and requested  to pray for assurance of salvation. Family have stated they are not spiritual/Presybeterian, but they respect pt's beliefs.  has continued to provide prayer throughout pt's stay in hospital.

## 2024-01-04 NOTE — PROGRESS NOTES
Discharge Planning Assessment  [facility]     Patient Name: Semaj Herrera  MRN: [id number]  Today's Date: 1/4/2024    Admit Date: 12/18/2023    Plan: SNF referrals pending   Discharge Needs Assessment    No documentation.                  Discharge Plan       Row Name 01/04/24 1459       Plan    Final Discharge Disposition Code 51 - hospice medical facility    Final Note Admitted to a [facility] scattered bed on 1/3/24. DARRIAN Ji RN, CCP                  Continued Care and Services - Discharged on 1/3/2024 Admission date: 12/18/2023 - Discharge disposition: Swing Bed w/Planned Readmission      Destination Coordination complete.      Service Provider Request Status Selected Services Address Phone Fax Patient Preferred    [facility]  Selected Inpatient Hospice [address] [facility] [address] [phone] --    [facility] Considering N/A [address] [facility] [address] [phone] --    [facility] Considering  Need bed availability N/A [address] [facility] [address] [phone] --    [facility] Considering N/A [address] [facility] [address] [phone] --    [facility] Pending - Request Sent N/A [address] [facility] [id number] [phone] --    [facility] Pending - Request Sent N/A [address] [facility] [address] [phone] --    [facility] Pending - Request Sent N/A [address] [facility] [address] [phone] --    [facility] Pending - Request Sent N/A [address] [facility] [id number] [phone] --    [facility] Pending - Request Sent N/A [address] [facility] [address] [phone] --    [facility] Declined  Bed not available  N/A [address] [facility] [address] [phone] --    [facility] Declined  Acuity too high, Bed not available N/A [address] [facility] [address] [phone] --    [facility] Declined  Acuity too high, Bed not available N/A [address] [facility] [address] [phone] --                  Selected Continued Care - Prior Encounters Includes continued care and service providers with selected services from prior encounters from 9/19/2023 to 1/3/2024      Discharged on 9/25/2023 Admission date: 9/17/2023 - Discharge disposition: Home-Health Care Svc      Durable Medical Equipment       Service Provider Selected Services Address Phone Fax Patient Preferred    [facility] Durable Medical Equipment [address] [id number] [phone] --              Home Medical Care       Service Provider Selected Services Address Phone Fax Patient Preferred    [facility] [address] [facility] [address] [address] [phone] --                          Expected Discharge Date and Time       Expected Discharge Date Expected Discharge Time    Henrik 3, 2024            Demographic Summary    No documentation.                  Functional Status    No documentation.                  Psychosocial    No documentation.                  Abuse/Neglect    No documentation.                  Legal    No documentation.                  Substance Abuse    No documentation.                  Patient Forms    No documentation.                     Marcelina Ji, ASHLEY

## 2024-01-04 NOTE — PROGRESS NOTES
Case Management Discharge Note      Final Note: Admitted to a [facility] scattered bed on 1/3/24. DARRIAN Ji RN, CCP         Selected Continued Care - Discharged on 1/3/2024 Admission date: 12/18/2023 - Discharge disposition: Swing Bed w/Planned Readmission      Destination Coordination complete.      Service Provider Selected Services Address Phone Fax Patient Preferred    [facility] [address], [place] [id number] [phone] --              Durable Medical Equipment    No services have been selected for the patient.                Dialysis/Infusion    No services have been selected for the patient.                Home Medical Care    No services have been selected for the patient.                Therapy    No services have been selected for the patient.                Community Resources    No services have been selected for the patient.                Community & DME    No services have been selected for the patient.                    Selected Continued Care - Prior Encounters Includes continued care and service providers with selected services from prior encounters from 9/19/2023 to 1/3/2024      Discharged on 9/25/2023 Admission date: 9/17/2023 - Discharge disposition: Home-Health Care Svc      Durable Medical Equipment       Service Provider Selected Services Address Phone Fax Patient Preferred    [facility] Durable Medical Equipment [address], [facility] [address] [phone] --              Home Medical Care       Service Provider Selected Services Address Phone Fax Patient Preferred    [facility] [address] [facility] [address] [id number] [phone] --                               Final Discharge Disposition Code: 51 - hospice medical facility

## 2024-01-04 NOTE — PLAN OF CARE
Goal Outcome Evaluation:   Janet coma scale score of 7. Access and drains include: jo catheter, IV. Premedication PRNs: 1.5mg dilaudid IV, 4mg versed IV, 0.4 robinul IV. PPS 10%. Plan of care ongoing.

## 2024-01-04 NOTE — PROGRESS NOTES
[facility] Visit Report    Semaj Herrera  [id number]  1/4/2024    Admission R/T [facility] Dx: yes    [facility]:    Review of Visit: Patient is a 84 yo male with [facility] diagnosis of Malignant neoplasm of prostate and a secondary neoplasm of bone.. Admitted to [facility]  for GIP for EOL care and symptom management of Excess secretions, agitation, and pain.     PPS: 10%    VS: Temp 98.1, B/P 102/52, , RR 20, o2: 79% RA     Medications in 24 hours: IV Robinul 0.4mg x3 for excess secretions, IV versed 4 mg X3 for agitation, IV dilaudid 1.5 mg x3 for severe pain.     Recommendations:  Continue to monitor for signs of decline and provide comfort measures. Contact [facility] at [phone] with any questions or concerns and at TOD.     Assessment:  Patient is in his bed resting. Pt non verbal and sedated. Skin looked a bit ashen. Skin was also warm and moist. No mottling noted. Pt is mouth breathing. No apparent discomfort or exacerbation of SOA noted, although his O2 Saturation was 79% on RA and he is using accessory muscles to breathe.Pt still looked comfortable and calm. Pt could not verbalize any responses due to sedation, and no s/s of anxiety or pain was observed in this visit.     Collaboration:  Spoke with pts family via phone with condition update and support provided. Training provided regarding symptom management. Family v/u of pts condition and all questions were answered. Reminded them that they can call [facility] for any needs, questions, or concerns 24/7. VU.Collaborated with hospital staff RN about pts condition and there are no needs or questions at this time.     Disposition:  Patient meets GIP criteria d/t frequent administration and continued titration of IV medications to achieve and maintain symptom management. **Patient appears to be unsafe for transport at this time, requiring increasing symptom management and frequent RN assessment. If patient  were to stabilize **he/she would likely require LTC placement d/t increased daily care needs/return home with family and \[facility]\"" Health support. Will continue [facility] RN visits to monitor for changes, assess needs and provide support.       Jerrica Sargent RN, BSN  \[facility]\"" Health Visit Nurse  Scattered Bed Team

## 2024-01-05 NOTE — DISCHARGE SUMMARY
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     Procedure(s):  ESOPHAGOGASTRODUODENOSCOPY     Consults       Date and Time Order Name Status Description    12/18/2023 10:28 AM Hematology & Oncology Inpatient Consult Completed     12/18/2023 10:28 AM Inpatient Gastroenterology Consult Completed           Hospital Course  83 y.o. male   Mr. Herrera is a 83 y.o. male that presented to the hospital with generalized weakness.  He was living at home with his son who was providing 24/7 assistance via hospital bed and Attila lift.  His son fell ill and he called 911 to have his father taken to the hospital for further care as he is unable to be cared for at home by himself.  He does have known metastatic prostate cancer with recommendations for hospice in the past, but hospice has not been initiated at this time.  He was noted to have a hemoglobin of 5.8 as well as platelets in the 40s prompting further admission.     Acute blood loss anemia requiring EGD and PRBC transfusion earlier during this  hospital stay  Anemia chronic disease  Metastatic prostate cancer  Generalized weakness  myelophthisic cytopenias  Cancer related pain  CKD stage III  Hypertension  Diabetes mellitus  Diastolic dysfunction        Patient started to fail to thrive and further transfusions were felt unfruitful by   hematology oncology.  Patient has a family members decided to proceed the route   of comfort measures only and therefore care has been withdrawn and hospice consult   has been obtained.  CODE STATUS is DNR/DNI.    Transitioned to inpatient HSB.    Heart Rate:  [0] 0  Resp:  [0] 0  Body mass index is 24.21 kg/m².    Physical Exam  Constitutional:       General: He is not in acute distress.     Appearance: He is ill-appearing. He is not toxic-appearing.   HENT:      Head: Normocephalic and atraumatic.      Mouth/Throat:      Mouth: Mucous membranes are moist.   Eyes:      Pupils: Pupils are equal, round, and reactive to light.   Cardiovascular:      Rate and Rhythm: Normal rate and regular rhythm.   Pulmonary:      Effort: No respiratory distress.      Breath sounds: No wheezing or rhonchi.   Abdominal:      General: Bowel sounds are normal.      Palpations: Abdomen is soft.      Tenderness: There is no abdominal tenderness.   Musculoskeletal:         General: Swelling present.   Skin:     General: Skin is warm and dry.      Coloration: Skin is pale.   Neurological:      Motor: Weakness present.       Disposition: Swing Bed w/Planned Readmission       Discharge Medications        ASK your doctor about these medications        Instructions Start Date   aspirin 81 MG EC tablet   1 tablet, Oral, Daily      cholecalciferol 25 MCG (1000 UT) tablet  Commonly known as: VITAMIN D3   2,000 Units, Oral, Daily      dexAMETHasone 2 MG tablet  Commonly known as: DECADRON   2 mg, Oral, Daily      dilTIAZem  MG 24 hr capsule  Commonly known as: CARDIZEM CD   TAKE 1 CAPSULE EVERY DAY      diphenoxylate-atropine 2.5-0.025 MG per  tablet  Commonly known as: LOMOTIL   1 tablet, Oral, 4 Times Daily PRN      fentaNYL 50 MCG/HR patch  Commonly known as: DURAGESIC   1 patch, Transdermal, Every 72 Hours      gabapentin 300 MG capsule  Commonly known as: NEURONTIN   TAKE 3 CAPSULES EVERY NIGHT AT BEDTIME      Insulin Aspart 100 UNIT/ML injection  Commonly known as: novoLOG   Inject  under the skin into the appropriate area as directed. Via Pump  Indications: Type 2 Diabetes      levothyroxine 88 MCG tablet  Commonly known as: SYNTHROID, LEVOTHROID   88 mcg, Oral, See Admin Instructions, 1 tablet daily on Monday Tuesday, Thursday, Friday and Saturday       levothyroxine 88 MCG tablet  Commonly known as: SYNTHROID, LEVOTHROID   2 tablets, Oral, 2 Times Weekly, Sunday and Wednesdays       loperamide 1 MG/5ML solution  Commonly known as: IMODIUM   10 mL, Oral, 4 Times Daily PRN      modafinil 200 MG tablet  Commonly known as: PROVIGIL   200 mg, Oral, Daily      olmesartan 40 MG tablet  Commonly known as: BENICAR   40 mg, Oral, Daily      ondansetron 8 MG tablet  Commonly known as: ZOFRAN   8 mg, Oral, 3 Times Daily PRN      oxyCODONE 5 MG immediate release tablet  Commonly known as: Roxicodone   5 mg, Oral, Every 4 Hours PRN      pantoprazole 40 MG EC tablet  Commonly known as: PROTONIX   40 mg, Oral, 2 Times Daily Before Meals      pravastatin 40 MG tablet  Commonly known as: PRAVACHOL   40 mg, Oral, Daily      rOPINIRole 0.5 MG tablet  Commonly known as: REQUIP   Take 2 tablets by mouth in the evening and 2 at bedtime.      sennosides-docusate 8.6-50 MG per tablet  Commonly known as: PERICOLACE   2 tablets, Oral, 2 Times Daily      Vitamin B12 1000 MCG tablet controlled-release   1 tablet, Oral, Daily                 Contact information for follow-up providers       Axel Guardado MD .    Specialty: Family Medicine  Contact information:  [address] [place] [address]  [phone]                       Contact information for  after-discharge care       Destination       [facility] .    Service: Inpatient Hospice  Contact information:  [address]  [facility] [address] [phone]                                No future appointments.     Lencho Alfaro MD  [facility]  01/05/24    Discharge time spent greater than 30 minutes.

## 2024-01-05 NOTE — H&P
Patient Name:  Semaj Herrera  YOB: 1940  MRN:  [id number]  Admit Date:  1/3/2024  Patient Care Team:  Axel Guardado MD as PCP - General  BaldemarBob MD as Consulting Physician (Sleep Medicine)  Tata Lee MD as Consulting Physician (Cardiology)  Bijan Rivera III, MD as Referring Physician (Thoracic Surgery)  Jose Lee MD as Consulting Physician (Hematology and Oncology)  Saroj Madrid MD as Consulting Physician (Nephrology)  Wang Harper MD as Consulting Physician (Radiation Oncology)      Subjective   History Present Illness         History of Present Illness  Mr. Herrera is a 83 y.o. male that presented to the hospital with generalized weakness.  He was living at home with his son who was providing 24/7 assistance via hospital bed and [employer] lift.  His son fell ill and he called 911 to have his father taken to the hospital for further care as he is unable to be cared for at home by himself.  He does have known metastatic prostate cancer with recommendations for hospice in the past, but hospice has not been initiated at this time.  He was noted to have a hemoglobin of 5.8 as well as platelets in the 40s prompting further admission.   Patient started to fail to thrive and further transfusions were felt unfruitful by   hematology oncology.  Patient has a family members decided to proceed the route   of comfort measures only and therefore care has been withdrawn and hospice consult   has been obtained.  CODE STATUS is DNR/DNI.        Review of Systems   Unobtainable as patient becoming lethargic        Personal History     Past Medical History:   Diagnosis Date    Aortic stenosis, mild 01/01/2021    Per echocardiogram 2021    Ascending aorta dilatation     Asthma     Benign prostatic hyperplasia     Bone cancer     Cellulitis of great toe of left foot 10/19/2018    CKD (chronic kidney disease)     Stage 3; followed by Dr. Vicky Duran      Diastolic dysfunction     GRADE II per echo 2018    Difficulty breathing     during exertion    Dyslipidemia     Erectile dysfunction     Fatigue     Heart murmur     History of blood transfusion     History of kidney stones     HL (hearing loss)     Hyperlipidemia     Hypertension     Hyponatremia     Kidney stone     Left ventricular hypertrophy     per echo 2018    Localized edema     Neuropathy     Obstructive sleep apnea     USING CPAP    Osteoarthritis     Peptic ulcer     Pneumonia     Pneumonia of left upper lobe due to infectious organism 03/15/2019    Prostate cancer     Status post prostatectomy, radiation therapy, and hormone therapy followed by Dr. Lee; metastatsis to bone    Pure hyperglyceridemia     Restless leg syndrome     Sepsis     Sinus bradycardia     Transient cerebral ischemia     Type 2 diabetes mellitus      Past Surgical History:   Procedure Laterality Date    BRONCHOSCOPY N/A 2018    Procedure: BRONCHOSCOPY;  Surgeon: Bijan Rivera III, MD;  Location: [facility] OR;  Service: Cardiothoracic    COLONOSCOPY      DEEP NECK LYMPH NODE BIOPSY / EXCISION      ENDOSCOPY N/A 2023    Procedure: ESOPHAGOGASTRODUODENOSCOPY;  Surgeon: Nataliia Rivas MD;  Location: [facility] ENDOSCOPY;  Service: Gastroenterology;  Laterality: N/A;  PRE OP - ANEMIA, heme positive stool  POST OP - DUODENAL ULCERS, ABNORMAL ESOPHAGEAL MUCOSA    HEMORRHOIDECTOMY      LYMPH NODE BIOPSY      MEDIASTINOSCOPY N/A 2018    Procedure: MEDIASTINOSCOPY WITH LYMPH NODE BIOPSY;  Surgeon: Bijan Rivera III, MD;  Location: [facility] MAIN OR;  Service: Cardiothoracic    OTHER SURGICAL HISTORY      ulcer repair    PROSTATECTOMY       Family History   Problem Relation Age of Onset    Heart failure Mother     Hypertension Mother             Diabetes Mother     Heart disease Mother             Lung cancer Father 46    Hypertension Sister     Lung cancer Sister 60    Hypertension Brother              Lung cancer Brother 62    Diabetes Brother     Heart disease Other     Prostate cancer Brother 60    Cancer Brother             Cancer Sister             Cancer Sister     Cancer Sister     Heart disease Brother             Malig Hyperthermia Neg Hx      Social History     Tobacco Use    Smoking status: Former     Packs/day: 1.50     Years: 30.00     Additional pack years: 0.00     Total pack years: 45.00     Types: Cigarettes     Start date: 1968     Quit date: 1998     Years since quittin.9     Passive exposure: Past    Smokeless tobacco: Never   Vaping Use    Vaping Use: Never used   Substance Use Topics    Alcohol use: Not Currently     Comment: Caffeine use: 2-3 cups daily    Drug use: Never     No current facility-administered medications on file prior to encounter.     Current Outpatient Medications on File Prior to Encounter   Medication Sig Dispense Refill    aspirin 81 MG EC tablet Take 1 tablet by mouth Daily. Indications: Venous Thromboembolism      cholecalciferol (VITAMIN D3) 1000 units tablet Take 2 tablets by mouth Daily.      Cyanocobalamin (Vitamin B12) 1000 MCG tablet controlled-release Take 1 tablet by mouth Daily.      dexAMETHasone (DECADRON) 2 MG tablet Take 1 tablet by mouth Daily for 84 days. Indications: Cancer of the Prostate Gland 84 tablet 0    dilTIAZem CD (CARDIZEM CD) 120 MG 24 hr capsule TAKE 1 CAPSULE EVERY DAY (Patient taking differently: Take 1 capsule by mouth Daily.) 90 capsule 3    diphenoxylate-atropine (LOMOTIL) 2.5-0.025 MG per tablet Take 1 tablet by mouth 4 (Four) Times a Day As Needed for Diarrhea. 30 tablet 0    fentaNYL (DURAGESIC) 50 MCG/HR patch Place 1 patch on the skin as directed by provider Every 72 (Seventy-Two) Hours. Indications: Chronic Pain 10 each 0    gabapentin (NEURONTIN) 300 MG capsule TAKE 3 CAPSULES EVERY NIGHT AT BEDTIME 90 capsule 0    Insulin Aspart (novoLOG) 100 UNIT/ML injection Inject  under the  skin into the appropriate area as directed. Via Pump  Indications: Type 2 Diabetes      levothyroxine (SYNTHROID, LEVOTHROID) 88 MCG tablet Take 1 tablet by mouth See Admin Instructions. 1 tablet daily on Monday Tuesday, Thursday, Friday and Saturday      levothyroxine (SYNTHROID, LEVOTHROID) 88 MCG tablet Take 2 tablets by mouth 2 (Two) Times a Week. Sunday and Wednesdays      loperamide (IMODIUM) 1 MG/5ML solution Take 10 mL by mouth 4 (Four) Times a Day As Needed for Diarrhea. Indications: Diarrhea      modafinil (PROVIGIL) 200 MG tablet Take 1 tablet by mouth Daily. 90 tablet 1    olmesartan (BENICAR) 40 MG tablet Take 1 tablet by mouth Daily.      ondansetron (ZOFRAN) 8 MG tablet Take 1 tablet by mouth 3 (Three) Times a Day As Needed for Nausea or Vomiting. 30 tablet 5    oxyCODONE (Roxicodone) 5 MG immediate release tablet Take 1 tablet by mouth Every 4 (Four) Hours As Needed for Moderate Pain. 60 tablet 0    pantoprazole (PROTONIX) 40 MG EC tablet Take 1 tablet by mouth 2 (Two) Times a Day Before Meals. 60 tablet 0    pravastatin (PRAVACHOL) 40 MG tablet Take 1 tablet by mouth Daily.      rOPINIRole (REQUIP) 0.5 MG tablet Take 2 tablets by mouth in the evening and 2 at bedtime. (Patient taking differently: Take 2 tablets by mouth 2 (Two) Times a Day. Take 2 tablets by mouth in the evening and 2 at bedtime.  Indications: Restless Leg Syndrome) 360 tablet 2    sennosides-docusate (PERICOLACE) 8.6-50 MG per tablet Take 2 tablets by mouth 2 (Two) Times a Day.       Allergies   Allergen Reactions    Niacin Unknown - Low Severity    Statins Unknown - Low Severity       Objective    Objective     Vital Signs  Heart Rate:  [0] 0  Resp:  [0] 0     on   ;   Device (Oxygen Therapy): room air  There is no height or weight on file to calculate BMI.    Physical Exam  General, lethargic  Head and ENT, normocephalic and atraumatic.  Lungs, symmetric expansion, equal air entry bilaterally.  Heart, regular rate and  rhythm.  Abdomen, soft and nontender.  Extremities, no clubbing or cyanosis.    Skin: Warm and no rash.    Musculoskeletal, joint examination is grossly normal.      Results Review:  I reviewed the patient's new clinical results.  I reviewed the patient's new imaging results and agree with the interpretation.  I reviewed the patient's other test results and agree with the interpretation  I personally viewed and interpreted the patient's EKG/Telemetry data  Discussed with ED provider.    Lab Results (last 24 hours)       ** No results found for the last 24 hours. **            Imaging Results (Last 24 Hours)       ** No results found for the last 24 hours. **            Results for orders placed during the hospital encounter of 02/19/20    Adult Transthoracic Echo Complete W/ Cont if Necessary Per Protocol    Interpretation Summary  · Estimated EF appears to be in the range of 61 - 65%.  · Left ventricular diastolic dysfunction is noted (grade II w/high LAP) consistent with pseudonormalization.  · Normal right ventricular cavity size and systolic function noted.  · Left atrial cavity size is mildly dilated.  · Mild aortic valve stenosis is present. Aortic valve maximum pressure gradient is 24.0 mmHg. Aortic valve mean pressure gradient is 11.0 mmHg.  · Calculated right ventricular systolic pressure from tricuspid regurgitation is 22 mmHg.  · There is no evidence of pericardial effusion.      No orders to display        Assessment/Plan     Active Hospital Problems    Diagnosis  POA    **Malignant neoplasm of prostate [C61]  Yes      Resolved Hospital Problems   No resolved problems to display.     Assessment and plan  Mr. Herrera is a 83 y.o. male that presented to the hospital with generalized weakness.  He was living at home with his son who was providing 24/7 assistance via hospital bed and [employer] lift.  His son fell ill and he called 911 to have his father taken to the hospital for further care as he is unable to be  cared for at home by himself.  He does have known metastatic prostate cancer with recommendations for hospice in the past, but hospice has not been initiated at this time.  He was noted to have a hemoglobin of 5.8 as well as platelets in the 40s prompting further admission.     Acute blood loss anemia requiring EGD and PRBC transfusion earlier during this hospital stay  Anemia chronic disease  Metastatic prostate cancer  Generalized weakness  myelophthisic cytopenias  Cancer related pain  CKD stage III  Hypertension  Diabetes mellitus  Diastolic dysfunction        Patient started to fail to thrive and further transfusions were felt unfruitful by   hematology oncology.  Patient has a family members decided to proceed the route   of comfort measures only and therefore care has been withdrawn and hospice consult   has been obtained.  CODE STATUS is DNR/DNI.     Transitioned to inpatient HSB.         Lencho Alfaro MD  [facility]  01/05/24  16:37 EST

## 2024-01-05 NOTE — DISCHARGE SUMMARY
Name: Semaj Herrera  :  1940  MRN: [id number]         Primary Care Physician: Axel Guardado MD      Date of Admission:  1/3/2024  Date and Time of Death:  2024 at 2:54 AM    Principle Cause of Death:   Metastatic prostate cancer    Secondary Diagnoses:     Malignant neoplasm of prostate        Hospital Course  Patient was a 83 y.o. male who presented to [facility] with weakness. Please see the admitting history and physical for further details.  Patient was living at home with his son who is providing / assistance via hospital bed and [employer] lift, however his son fell ill and he called 911 to have his father brought to the hospital as he was unable to care for at home for him by himself.  Hemoglobin noted to be 5.8 on admission and platelets in the 40s.  He did receive transfusion support.  However he continued to have gradual decline during hospitalization in the setting of metastatic prostate cancer.  Patient had failure to thrive and after further discussion with family decision was made to pursue comfort measures only.  Patient  on 2024.      Flaco Haji MD  24  16:15 EST

## 2024-01-05 NOTE — PROGRESS NOTES
"    Name: Semaj Herrera ADMIT: 1/3/2024   : 1940  PCP: Axel Guardado MD    MRN: [id number] LOS: 1 days   AGE/SEX: 83 y.o. male  ROOM: [facility]     Subjective   Subjective   Resting comfortably. No family at bedside.     Objective   Objective   Vital Signs  Temp:  [98.1 °F (36.7 °C)-98.8 °F (37.1 °C)] 98.1 °F (36.7 °C)  Heart Rate:  [121-128] 128  Resp:  [20] 20  BP: ()/(52-58) 92/58  SpO2:  [74 %-79 %] 79 %  on   ;   Device (Oxygen Therapy): room air  There is no height or weight on file to calculate BMI.  Physical Exam  Constitutional: Appears lethargic. Appears ill. No distress.   Cardiovascular: Normal rate and regular rhythm.    Pulmonary/Chest: Effort normal and breath sounds diminished.   Abdominal: Soft. Bowel sounds are normal. No distension.   Musculoskeletal: No edema.   Skin: Not diaphoretic.     Results Review     I reviewed the patient's new clinical results.              No results found for: \"HGBA1C\", \"POCGLU\"    No radiology results for the last day  Scheduled Medications  [START ON 2024] fentaNYL, 1 patch, Transdermal, Q72H   And  Check Fentanyl Patch Placement, 1 each, Does not apply, Q12H  sodium chloride, 10 mL, Intravenous, Q12H    Infusions   Diet  NPO Diet NPO Type: Sips with Meds       Assessment/Plan     Active Hospital Problems    Diagnosis  POA    **Malignant neoplasm of prostate [C61]  Yes      Resolved Hospital Problems   No resolved problems to display.       83 y.o. male admitted with Malignant neoplasm of prostate.      24  Continue full comfort measures. Increasing PRN Dilaudid and Versed over past 24hrs                     Flaco Haji MD  [facility]  24  19:33 EST      "

## 2024-01-08 NOTE — PROGRESS NOTES
Case Management Discharge Note      Final Note: The patient  on 24 @ 02:54. BIram Ji RN, CCP         Selected Continued Care - Discharged on 2024 Admission date: 1/3/2024 - Discharge disposition:       Destination Coordination complete.      Service Provider Selected Services Address Phone Fax Patient Preferred    [facility] [address], [place] [id number] [phone] --              Durable Medical Equipment    No services have been selected for the patient.                Dialysis/Infusion    No services have been selected for the patient.                Home Medical Care    No services have been selected for the patient.                Therapy    No services have been selected for the patient.                Community Resources    No services have been selected for the patient.                Community & DME    No services have been selected for the patient.                    Selected Continued Care - Prior Encounters Includes continued care and service providers with selected services from prior encounters from 10/5/2023 to 2024      Discharged on 1/3/2024 Admission date: 2023 - Discharge disposition: Swing Bed w/Planned Readmission      Destination       Service Provider Selected Services Address Phone Fax Patient Preferred    [facility] [address], [place] [id number] [phone] --                               Final Discharge Disposition Code: 41 -  in medical facility

## 2024-01-17 NOTE — PROGRESS NOTES
"Enter Query Response Below      Query Response:     Stage 3 bilateral gluteal pressure injury, present on admission     Electronically signed by Lencho Alfaro MD, 24, 8:10 AM EST.  '             If applicable, please update the problem list.     Patient: Semaj Herrera        : 1940  Account: [id number]           Admit Date: 2023        How to Respond to this query:       a. Click New Note     b. Answer query within the yellow box.                c. Update the Problem List, if applicable.      If you have any questions about this query contact me at: [email or web address]     Dr. Alfaro    83-year-old male with prostate cancer with mets to bone, admitted 23 with generalized weakness. Oncology consult and progress notes document \"He has a sacral decubitus ulcer from pressure and incontinence.\"  WOCN progress note \"stage 3 bilateral gluteal (2 separate wounds)\", \"pt seen for evaluation of sacral skin issues POA, appear to be a combination of pressure and MASD. Per pt report, he has been having a lot of loose stools, so for now we can use barrier cream with an ABD pad tucked in place to keep the cream on. Once stooling has subsided, we can begin Opticel ag and Optifoam dressings. I have ordered pt a low air loss mattress from [employer], confirmation #[id number], to aid in control of microclimate and pressure redistribution.\" Images are loaded in EPIC    Please clarify if the patient was treated/monitored for:  Stage 3 bilateral gluteal pressure injury, present on admission  Other- specify______  Unable to determine    By submitting this query, we are merely seeking further clarification of documentation to accurately reflect all conditions that you are monitoring, evaluating, treating or that extend the hospitalization or utilize additional resources of care. Please utilize your independent clinical judgment when addressing the question(s) above.     This query and your response, once " completed, will be entered into the legal medical record.    Sincerely,  Mariaa ERVIN, RN, CCDS  Clinical Documentation Integrity Program

## 2024-01-17 NOTE — PROGRESS NOTES
"Enter Query Response Below      Query Response:     Anemia due to prostate cancer     Electronically signed by Lencho Alfaro MD, 24, 8:10 AM EST.'                 If applicable, please update the problem list.     Patient: Semaj Herrera        : 1940  Account: [id number]           Admit Date: 2023        How to Respond to this query:       a. Click New Note     b. Answer query within the yellow box.                c. Update the Problem List, if applicable.      If you have any questions about this query contact me at: [email or web address]     Dr. Alfaro    83-year-old male with prostate cancer with mets to bone, admitted 23 with generalized weakness. The [facility] summary notes \"Acute blood loss anemia\", \"Anemia, chronic disease\".  Oncology consult and progress notes document \"Suspect the patient's anemia/thrombocytopenia mostly related to bone marrow involvement of prostate cancer given the significant increase in nucleated red cells and left shifted myeloid cells/myelophthisic process as opposed to GI blood loss\".   EGD: small nonbleeding duodenal ulcers  Hemoglobin: 5.8 (), 9.1 (), 6.8 ()  Treatment: 2 units packed red blood cells (, ), PPI BID, avoid NSAIDs. Patient is not a candidate for further treatment of prostate cancer. At [facility] patient made palliative care.    Please clarify if patient treated/monitored for one or more of the following:  Acute blood loss anemia due to acute duodenal ulcer  Acute blood loss anemia due to chronic duodenal ulcer  Anemia due to prostate cancer  Acute blood loss anemia to acute duodenal ulcer & Anemia due to prostate cancer  Acute blood loss anemia to chronic duodenal ulcer & Anemia due to prostate cancer  Other- specify_______  Unable to determine    By submitting this query, we are merely seeking further clarification of documentation to accurately reflect all conditions that you are monitoring, evaluating, treating or " that extend the hospitalization or utilize additional resources of care. Please utilize your independent clinical judgment when addressing the question(s) above.     This query and your response, once completed, will be entered into the legal medical record.    Sincerely,  Mariaa ERVIN, RN, CCDS  Clinical Documentation Integrity Program

## 2024-01-19 RX ORDER — OLMESARTAN MEDOXOMIL 40 MG/1
TABLET ORAL
Qty: 90 TABLET | Refills: 3 | OUTPATIENT
Start: 2024-01-19

## 2024-01-19 RX ORDER — DILTIAZEM HYDROCHLORIDE 120 MG/1
CAPSULE, COATED, EXTENDED RELEASE ORAL
Qty: 90 CAPSULE | Refills: 3 | OUTPATIENT
Start: 2024-01-19

## 2024-04-30 NOTE — TELEPHONE ENCOUNTER
Addended by: SG VAUGHN on: 4/30/2024 08:45 AM     Modules accepted: Orders     Faxed order for wheelchair to [employer] received.     ----- Message from Jose Lee MD sent at 10/8/2023  8:15 PM EDT -----  Regarding: FW: wheelchair    ----- Message -----  From: Tata Ji PT  Sent: 10/5/2023   1:55 PM EDT  To: Jose Lee MD  Subject: wheelchair                                       I believe Mr. Herrera would benefit from a wheelchair with removable arm rests so that we can try transfers using a sliding board. He is too weak to ambulate and stand<>pivot<>sit transfers from couch to transport chair are very difficult for the son. Can you send an order to [employer] for a standard wheelchair that will fit through a 30 inch door frame? Thank you.   Joseluis fax # is [phone] or [phone]

## (undated) DEVICE — APPL DURAPREP IODOPHOR APL 26ML

## (undated) DEVICE — ADAPT SWVL FIBROPTIC BRONCH

## (undated) DEVICE — CANN O2 ETCO2 FITS ALL CONN CO2 SMPL A/ 7IN DISP LF

## (undated) DEVICE — ELECTRD BLD EDGE/INSUL1P 2.4X5.1MM STRL

## (undated) DEVICE — SINGLE USE BIOPSY VALVE MAJ-210: Brand: SINGLE USE BIOPSY VALVE (STERILE)

## (undated) DEVICE — ELECTROSURGICAL SUCTION COAGULATOR, 10FR

## (undated) DEVICE — MSK AIRWY LARYNG LMA UNIQUE STD PK SZ4

## (undated) DEVICE — TUBING, SUCTION, 1/4" X 10', STRAIGHT: Brand: MEDLINE

## (undated) DEVICE — LN SMPL CO2 SHTRM SD STREAM W/M LUER

## (undated) DEVICE — ADAPT CLN BIOGUARD AIR/H2O DISP

## (undated) DEVICE — VITAL SIGNS™ JACKSON-REES CIRCUITS: Brand: VITAL SIGNS™

## (undated) DEVICE — 3M™ IOBAN™ 2 ANTIMICROBIAL INCISE DRAPE 6650EZ: Brand: IOBAN™ 2

## (undated) DEVICE — NDL HYPO PRECISIONGLIDE REG 25G 1 1/2

## (undated) DEVICE — SINGLE USE SUCTION VALVE MAJ-209: Brand: SINGLE USE SUCTION VALVE (STERILE)

## (undated) DEVICE — DRSNG SURESITE WNDW 2.38X2.75

## (undated) DEVICE — BLCK/BITE BLOX W/DENTL/RIM W/STRAP 54F

## (undated) DEVICE — ENCORE® LATEX ORTHO SIZE 8, STERILE LATEX POWDER-FREE SURGICAL GLOVE: Brand: ENCORE

## (undated) DEVICE — PK CHST BRST 40

## (undated) DEVICE — KT ORCA ORCAPOD DISP STRL

## (undated) DEVICE — DRSNG TELFA PAD NONADH STR 1S 3X4IN

## (undated) DEVICE — SUT SILK 3/0 TIES 18IN A184H

## (undated) DEVICE — ANTIBACTERIAL UNDYED BRAIDED (POLYGLACTIN 910), SYNTHETIC ABSORBABLE SUTURE: Brand: COATED VICRYL

## (undated) DEVICE — LN SMPL O2 NASL/ORL SMART/CAPNOLINE PLS A/

## (undated) DEVICE — SUT SILK 4/0 TIES 18IN A183H

## (undated) DEVICE — SENSR O2 OXIMAX FNGR A/ 18IN NONSTR